# Patient Record
Sex: FEMALE | Race: WHITE | NOT HISPANIC OR LATINO | Employment: UNEMPLOYED | URBAN - METROPOLITAN AREA
[De-identification: names, ages, dates, MRNs, and addresses within clinical notes are randomized per-mention and may not be internally consistent; named-entity substitution may affect disease eponyms.]

---

## 2016-09-14 LAB — EXTERNAL GONORRHEA SCREEN: NEGATIVE

## 2016-09-15 LAB
EXTERNAL ABO GROUPING: NORMAL
EXTERNAL ANTIBODY SCREEN: NORMAL
EXTERNAL CHLAMYDIA SCREEN: NEGATIVE
EXTERNAL HEPATITIS B SURFACE ANTIGEN: NEGATIVE
EXTERNAL HIV-1 ANTIBODY: NON REACTIVE
EXTERNAL RH FACTOR: POSITIVE
EXTERNAL RUBELLA IGG QUANTITATION: NORMAL
EXTERNAL SYPHILIS RPR SCREEN: NORMAL

## 2017-02-08 ENCOUNTER — HOSPITAL ENCOUNTER (INPATIENT)
Facility: HOSPITAL | Age: 42
LOS: 1 days | DRG: 566 | End: 2017-02-09
Attending: SPECIALIST | Admitting: SPECIALIST
Payer: COMMERCIAL

## 2017-02-08 DIAGNOSIS — O14.90 PREECLAMPSIA: ICD-10-CM

## 2017-02-08 DIAGNOSIS — O09.529 ADVANCED MATERNAL AGE IN MULTIGRAVIDA: ICD-10-CM

## 2017-02-08 DIAGNOSIS — F17.200 SMOKER: ICD-10-CM

## 2017-02-08 DIAGNOSIS — Z3A.31 31 WEEKS GESTATION OF PREGNANCY: Primary | ICD-10-CM

## 2017-02-09 ENCOUNTER — GENERIC CONVERSION - ENCOUNTER (OUTPATIENT)
Dept: OTHER | Facility: OTHER | Age: 42
End: 2017-02-09

## 2017-02-09 ENCOUNTER — HOSPITAL ENCOUNTER (INPATIENT)
Facility: HOSPITAL | Age: 42
LOS: 10 days | Discharge: HOME | DRG: 540 | End: 2017-02-19
Attending: SPECIALIST | Admitting: SPECIALIST
Payer: COMMERCIAL

## 2017-02-09 VITALS
DIASTOLIC BLOOD PRESSURE: 77 MMHG | RESPIRATION RATE: 18 BRPM | HEIGHT: 61 IN | BODY MASS INDEX: 40.59 KG/M2 | WEIGHT: 215 LBS | SYSTOLIC BLOOD PRESSURE: 146 MMHG | HEART RATE: 96 BPM | TEMPERATURE: 98.4 F | OXYGEN SATURATION: 99 %

## 2017-02-09 DIAGNOSIS — O14.90 PREECLAMPSIA: ICD-10-CM

## 2017-02-09 DIAGNOSIS — F11.10 OPIATE ABUSE, CONTINUOUS (HCC): ICD-10-CM

## 2017-02-09 DIAGNOSIS — O14.13 SEVERE PRE-ECLAMPSIA IN THIRD TRIMESTER: Primary | ICD-10-CM

## 2017-02-09 DIAGNOSIS — Z3A.31 31 WEEKS GESTATION OF PREGNANCY: ICD-10-CM

## 2017-02-09 DIAGNOSIS — O36.5930 INTRAUTERINE GROWTH RESTRICTION (IUGR) AFFECTING CARE OF MOTHER, THIRD TRIMESTER, SINGLE GESTATION: ICD-10-CM

## 2017-02-09 DIAGNOSIS — O14.93 PREECLAMPSIA, THIRD TRIMESTER: ICD-10-CM

## 2017-02-09 PROBLEM — O24.410 DIET CONTROLLED GESTATIONAL DIABETES MELLITUS (GDM) IN THIRD TRIMESTER: Status: ACTIVE | Noted: 2017-02-09

## 2017-02-09 PROBLEM — E66.01 MORBID OBESITY DUE TO EXCESS CALORIES (HCC): Status: ACTIVE | Noted: 2017-02-09

## 2017-02-09 LAB
ABO GROUP BLD: NORMAL
ALBUMIN SERPL BCP-MCNC: 2.3 G/DL (ref 3.5–5)
ALBUMIN SERPL BCP-MCNC: 2.4 G/DL (ref 3.5–5)
ALBUMIN SERPL BCP-MCNC: 2.5 G/DL (ref 3.5–5)
ALP SERPL-CCNC: 113 U/L (ref 46–116)
ALP SERPL-CCNC: 114 U/L (ref 46–116)
ALP SERPL-CCNC: 123 U/L (ref 46–116)
ALT SERPL W P-5'-P-CCNC: 13 U/L (ref 12–78)
ALT SERPL W P-5'-P-CCNC: 14 U/L (ref 12–78)
ALT SERPL W P-5'-P-CCNC: 14 U/L (ref 12–78)
AMPHETAMINES SERPL QL SCN: NEGATIVE
ANION GAP SERPL CALCULATED.3IONS-SCNC: 10 MMOL/L (ref 4–13)
ANION GAP SERPL CALCULATED.3IONS-SCNC: 12 MMOL/L (ref 4–13)
ANION GAP SERPL CALCULATED.3IONS-SCNC: 12 MMOL/L (ref 4–13)
AST SERPL W P-5'-P-CCNC: 10 U/L (ref 5–45)
AST SERPL W P-5'-P-CCNC: 13 U/L (ref 5–45)
AST SERPL W P-5'-P-CCNC: 9 U/L (ref 5–45)
BACTERIA UR QL AUTO: ABNORMAL /HPF
BARBITURATES UR QL: NEGATIVE
BASOPHILS # BLD AUTO: 0.01 THOUSANDS/ΜL (ref 0–0.1)
BASOPHILS NFR BLD AUTO: 0 % (ref 0–1)
BENZODIAZ UR QL: NEGATIVE
BILIRUB SERPL-MCNC: 0.14 MG/DL (ref 0.2–1)
BILIRUB SERPL-MCNC: 0.15 MG/DL (ref 0.2–1)
BILIRUB SERPL-MCNC: <0.1 MG/DL (ref 0.2–1)
BILIRUB UR QL STRIP: NEGATIVE
BLD GP AB SCN SERPL QL: NEGATIVE
BUN SERPL-MCNC: 10 MG/DL (ref 5–25)
BUN SERPL-MCNC: 12 MG/DL (ref 5–25)
BUN SERPL-MCNC: 17 MG/DL (ref 5–25)
CALCIUM SERPL-MCNC: 8.3 MG/DL (ref 8.3–10.1)
CALCIUM SERPL-MCNC: 8.8 MG/DL (ref 8.3–10.1)
CALCIUM SERPL-MCNC: 9 MG/DL (ref 8.3–10.1)
CHLORIDE SERPL-SCNC: 102 MMOL/L (ref 100–108)
CHLORIDE SERPL-SCNC: 102 MMOL/L (ref 100–108)
CHLORIDE SERPL-SCNC: 103 MMOL/L (ref 100–108)
CLARITY UR: CLEAR
CO2 SERPL-SCNC: 21 MMOL/L (ref 21–32)
CO2 SERPL-SCNC: 21 MMOL/L (ref 21–32)
CO2 SERPL-SCNC: 25 MMOL/L (ref 21–32)
COCAINE UR QL: NEGATIVE
COLOR UR: YELLOW
CREAT SERPL-MCNC: 0.48 MG/DL (ref 0.6–1.3)
CREAT SERPL-MCNC: 0.55 MG/DL (ref 0.6–1.3)
CREAT SERPL-MCNC: 0.58 MG/DL (ref 0.6–1.3)
CREAT UR-MCNC: 97.5 MG/DL
EOSINOPHIL # BLD AUTO: 0.04 THOUSAND/ΜL (ref 0–0.61)
EOSINOPHIL NFR BLD AUTO: 0 % (ref 0–6)
ERYTHROCYTE [DISTWIDTH] IN BLOOD BY AUTOMATED COUNT: 13.5 % (ref 11.6–15.1)
ERYTHROCYTE [DISTWIDTH] IN BLOOD BY AUTOMATED COUNT: 13.7 % (ref 11.6–15.1)
ERYTHROCYTE [DISTWIDTH] IN BLOOD BY AUTOMATED COUNT: 13.7 % (ref 11.6–15.1)
GFR SERPL CREATININE-BSD FRML MDRD: >60 ML/MIN/1.73SQ M
GLUCOSE SERPL-MCNC: 116 MG/DL (ref 65–140)
GLUCOSE SERPL-MCNC: 128 MG/DL (ref 65–140)
GLUCOSE SERPL-MCNC: 131 MG/DL (ref 65–140)
GLUCOSE SERPL-MCNC: 154 MG/DL (ref 65–140)
GLUCOSE SERPL-MCNC: 165 MG/DL (ref 65–140)
GLUCOSE SERPL-MCNC: 174 MG/DL (ref 65–140)
GLUCOSE SERPL-MCNC: 99 MG/DL (ref 65–140)
GLUCOSE UR STRIP-MCNC: NEGATIVE MG/DL
HCT VFR BLD AUTO: 34.2 % (ref 34.8–46.1)
HCT VFR BLD AUTO: 36.4 % (ref 34.8–46.1)
HCT VFR BLD AUTO: 38.2 % (ref 34.8–46.1)
HGB BLD-MCNC: 11.8 G/DL (ref 11.5–15.4)
HGB BLD-MCNC: 12.7 G/DL (ref 11.5–15.4)
HGB BLD-MCNC: 13.5 G/DL (ref 11.5–15.4)
HGB UR QL STRIP.AUTO: ABNORMAL
HYALINE CASTS #/AREA URNS LPF: ABNORMAL /LPF
KETONES UR STRIP-MCNC: NEGATIVE MG/DL
LEUKOCYTE ESTERASE UR QL STRIP: NEGATIVE
LYMPHOCYTES # BLD AUTO: 3.29 THOUSANDS/ΜL (ref 0.6–4.47)
LYMPHOCYTES NFR BLD AUTO: 25 % (ref 14–44)
MCH RBC QN AUTO: 30.4 PG (ref 26.8–34.3)
MCH RBC QN AUTO: 30.6 PG (ref 26.8–34.3)
MCH RBC QN AUTO: 31 PG (ref 26.8–34.3)
MCHC RBC AUTO-ENTMCNC: 34.5 G/DL (ref 31.4–37.4)
MCHC RBC AUTO-ENTMCNC: 34.9 G/DL (ref 31.4–37.4)
MCHC RBC AUTO-ENTMCNC: 35.3 G/DL (ref 31.4–37.4)
MCV RBC AUTO: 88 FL (ref 82–98)
METHADONE UR QL: NEGATIVE
MONOCYTES # BLD AUTO: 0.49 THOUSAND/ΜL (ref 0.17–1.22)
MONOCYTES NFR BLD AUTO: 4 % (ref 4–12)
MUCOUS THREADS UR QL AUTO: ABNORMAL
NEUTROPHILS # BLD AUTO: 9.13 THOUSANDS/ΜL (ref 1.85–7.62)
NEUTS SEG NFR BLD AUTO: 71 % (ref 43–75)
NITRITE UR QL STRIP: NEGATIVE
NON-SQ EPI CELLS URNS QL MICRO: ABNORMAL /HPF
NRBC BLD AUTO-RTO: 0 /100 WBCS
OPIATES UR QL SCN: NEGATIVE
PCP UR QL: NEGATIVE
PH UR STRIP.AUTO: 6 [PH] (ref 4.5–8)
PLATELET # BLD AUTO: 148 THOUSANDS/UL (ref 149–390)
PLATELET # BLD AUTO: 158 THOUSANDS/UL (ref 149–390)
PLATELET # BLD AUTO: 178 THOUSANDS/UL (ref 149–390)
PMV BLD AUTO: 10.2 FL (ref 8.9–12.7)
PMV BLD AUTO: 10.3 FL (ref 8.9–12.7)
PMV BLD AUTO: 10.4 FL (ref 8.9–12.7)
POTASSIUM SERPL-SCNC: 3.8 MMOL/L (ref 3.5–5.3)
POTASSIUM SERPL-SCNC: 4 MMOL/L (ref 3.5–5.3)
POTASSIUM SERPL-SCNC: 4.1 MMOL/L (ref 3.5–5.3)
PROT SERPL-MCNC: 6.4 G/DL (ref 6.4–8.2)
PROT SERPL-MCNC: 6.7 G/DL (ref 6.4–8.2)
PROT SERPL-MCNC: 7.1 G/DL (ref 6.4–8.2)
PROT UR STRIP-MCNC: >=300 MG/DL
PROT UR-MCNC: 548 MG/DL
PROT/CREAT UR: 5.62 MG/G{CREAT} (ref 0–0.1)
RBC # BLD AUTO: 3.88 MILLION/UL (ref 3.81–5.12)
RBC # BLD AUTO: 4.15 MILLION/UL (ref 3.81–5.12)
RBC # BLD AUTO: 4.36 MILLION/UL (ref 3.81–5.12)
RBC #/AREA URNS AUTO: ABNORMAL /HPF
RH BLD: POSITIVE
SODIUM SERPL-SCNC: 135 MMOL/L (ref 136–145)
SODIUM SERPL-SCNC: 136 MMOL/L (ref 136–145)
SODIUM SERPL-SCNC: 137 MMOL/L (ref 136–145)
SP GR UR STRIP.AUTO: >=1.03 (ref 1–1.03)
THC UR QL: NEGATIVE
URATE SERPL-MCNC: 5.3 MG/DL (ref 2–6.8)
URATE SERPL-MCNC: 5.4 MG/DL (ref 2–6.8)
UROBILINOGEN UR QL STRIP.AUTO: 0.2 E.U./DL
WBC # BLD AUTO: 10.98 THOUSAND/UL (ref 4.31–10.16)
WBC # BLD AUTO: 11.1 THOUSAND/UL (ref 4.31–10.16)
WBC # BLD AUTO: 12.96 THOUSAND/UL (ref 4.31–10.16)
WBC #/AREA URNS AUTO: ABNORMAL /HPF

## 2017-02-09 PROCEDURE — 80053 COMPREHEN METABOLIC PANEL: CPT | Performed by: OBSTETRICS & GYNECOLOGY

## 2017-02-09 PROCEDURE — 82948 REAGENT STRIP/BLOOD GLUCOSE: CPT

## 2017-02-09 PROCEDURE — 80307 DRUG TEST PRSMV CHEM ANLYZR: CPT | Performed by: OBSTETRICS & GYNECOLOGY

## 2017-02-09 PROCEDURE — 81001 URINALYSIS AUTO W/SCOPE: CPT | Performed by: OBSTETRICS & GYNECOLOGY

## 2017-02-09 PROCEDURE — 4A1HXCZ MONITORING OF PRODUCTS OF CONCEPTION, CARDIAC RATE, EXTERNAL APPROACH: ICD-10-PCS | Performed by: SPECIALIST

## 2017-02-09 PROCEDURE — 85025 COMPLETE CBC W/AUTO DIFF WBC: CPT | Performed by: OBSTETRICS & GYNECOLOGY

## 2017-02-09 PROCEDURE — 84156 ASSAY OF PROTEIN URINE: CPT | Performed by: OBSTETRICS & GYNECOLOGY

## 2017-02-09 PROCEDURE — 76811 OB US DETAILED SNGL FETUS: CPT | Performed by: OBSTETRICS & GYNECOLOGY

## 2017-02-09 PROCEDURE — 84550 ASSAY OF BLOOD/URIC ACID: CPT | Performed by: OBSTETRICS & GYNECOLOGY

## 2017-02-09 PROCEDURE — 86900 BLOOD TYPING SEROLOGIC ABO: CPT | Performed by: OBSTETRICS & GYNECOLOGY

## 2017-02-09 PROCEDURE — 85027 COMPLETE CBC AUTOMATED: CPT | Performed by: OBSTETRICS & GYNECOLOGY

## 2017-02-09 PROCEDURE — 99205 OFFICE O/P NEW HI 60 MIN: CPT

## 2017-02-09 PROCEDURE — 86850 RBC ANTIBODY SCREEN: CPT | Performed by: OBSTETRICS & GYNECOLOGY

## 2017-02-09 PROCEDURE — 82570 ASSAY OF URINE CREATININE: CPT | Performed by: OBSTETRICS & GYNECOLOGY

## 2017-02-09 PROCEDURE — 87653 STREP B DNA AMP PROBE: CPT | Performed by: OBSTETRICS & GYNECOLOGY

## 2017-02-09 PROCEDURE — 86901 BLOOD TYPING SEROLOGIC RH(D): CPT | Performed by: OBSTETRICS & GYNECOLOGY

## 2017-02-09 RX ORDER — SODIUM CHLORIDE 9 MG/ML
25 INJECTION, SOLUTION INTRAVENOUS CONTINUOUS
Status: DISCONTINUED | OUTPATIENT
Start: 2017-02-09 | End: 2017-02-10

## 2017-02-09 RX ORDER — ACETAMINOPHEN 325 MG/1
650 TABLET ORAL EVERY 4 HOURS PRN
Status: CANCELLED | OUTPATIENT
Start: 2017-02-09

## 2017-02-09 RX ORDER — LABETALOL HYDROCHLORIDE 5 MG/ML
20 INJECTION, SOLUTION INTRAVENOUS ONCE
Status: COMPLETED | OUTPATIENT
Start: 2017-02-09 | End: 2017-02-09

## 2017-02-09 RX ORDER — LABETALOL 100 MG/1
100 TABLET, FILM COATED ORAL EVERY 12 HOURS SCHEDULED
Status: CANCELLED | OUTPATIENT
Start: 2017-02-10

## 2017-02-09 RX ORDER — SODIUM CHLORIDE 9 MG/ML
125 INJECTION, SOLUTION INTRAVENOUS CONTINUOUS
Status: DISCONTINUED | OUTPATIENT
Start: 2017-02-09 | End: 2017-02-09 | Stop reason: HOSPADM

## 2017-02-09 RX ORDER — ACETAMINOPHEN 325 MG/1
650 TABLET ORAL EVERY 4 HOURS PRN
Status: DISCONTINUED | OUTPATIENT
Start: 2017-02-09 | End: 2017-02-15

## 2017-02-09 RX ORDER — OXYCODONE HYDROCHLORIDE AND ACETAMINOPHEN 5; 325 MG/1; MG/1
1 TABLET ORAL EVERY 4 HOURS PRN
Status: DISCONTINUED | OUTPATIENT
Start: 2017-02-09 | End: 2017-02-09 | Stop reason: HOSPADM

## 2017-02-09 RX ORDER — LABETALOL HYDROCHLORIDE 5 MG/ML
INJECTION, SOLUTION INTRAVENOUS
Status: COMPLETED
Start: 2017-02-09 | End: 2017-02-09

## 2017-02-09 RX ORDER — OXYCODONE HYDROCHLORIDE 10 MG/1
10 TABLET ORAL EVERY 8 HOURS PRN
Status: DISCONTINUED | OUTPATIENT
Start: 2017-02-09 | End: 2017-02-11

## 2017-02-09 RX ORDER — OXYCODONE HYDROCHLORIDE AND ACETAMINOPHEN 5; 325 MG/1; MG/1
1 TABLET ORAL EVERY 4 HOURS PRN
Status: DISCONTINUED | OUTPATIENT
Start: 2017-02-09 | End: 2017-02-09

## 2017-02-09 RX ORDER — ACETAMINOPHEN 325 MG/1
650 TABLET ORAL EVERY 4 HOURS PRN
Status: DISCONTINUED | OUTPATIENT
Start: 2017-02-09 | End: 2017-02-09 | Stop reason: HOSPADM

## 2017-02-09 RX ORDER — BETAMETHASONE SODIUM PHOSPHATE AND BETAMETHASONE ACETATE 3; 3 MG/ML; MG/ML
12 INJECTION, SUSPENSION INTRA-ARTICULAR; INTRALESIONAL; INTRAMUSCULAR; SOFT TISSUE EVERY 24 HOURS
Status: DISCONTINUED | OUTPATIENT
Start: 2017-02-09 | End: 2017-02-09

## 2017-02-09 RX ORDER — OXYCODONE HYDROCHLORIDE 5 MG/1
10 TABLET ORAL ONCE
Status: COMPLETED | OUTPATIENT
Start: 2017-02-09 | End: 2017-02-09

## 2017-02-09 RX ORDER — BETAMETHASONE SODIUM PHOSPHATE AND BETAMETHASONE ACETATE 3; 3 MG/ML; MG/ML
12 INJECTION, SUSPENSION INTRA-ARTICULAR; INTRALESIONAL; INTRAMUSCULAR; SOFT TISSUE EVERY 24 HOURS
Status: CANCELLED | OUTPATIENT
Start: 2017-02-10 | End: 2017-02-11

## 2017-02-09 RX ORDER — LABETALOL HYDROCHLORIDE 5 MG/ML
40 INJECTION, SOLUTION INTRAVENOUS ONCE
Status: COMPLETED | OUTPATIENT
Start: 2017-02-09 | End: 2017-02-09

## 2017-02-09 RX ORDER — OXYCODONE HYDROCHLORIDE AND ACETAMINOPHEN 5; 325 MG/1; MG/1
1 TABLET ORAL EVERY 4 HOURS PRN
Status: CANCELLED | OUTPATIENT
Start: 2017-02-09

## 2017-02-09 RX ORDER — LABETALOL 100 MG/1
100 TABLET, FILM COATED ORAL EVERY 12 HOURS SCHEDULED
Status: DISCONTINUED | OUTPATIENT
Start: 2017-02-09 | End: 2017-02-09

## 2017-02-09 RX ORDER — NICOTINE 21 MG/24HR
1 PATCH, TRANSDERMAL 24 HOURS TRANSDERMAL DAILY
Status: DISCONTINUED | OUTPATIENT
Start: 2017-02-09 | End: 2017-02-09 | Stop reason: HOSPADM

## 2017-02-09 RX ORDER — BETAMETHASONE SODIUM PHOSPHATE AND BETAMETHASONE ACETATE 3; 3 MG/ML; MG/ML
12 INJECTION, SUSPENSION INTRA-ARTICULAR; INTRALESIONAL; INTRAMUSCULAR; SOFT TISSUE EVERY 24 HOURS
Status: DISCONTINUED | OUTPATIENT
Start: 2017-02-09 | End: 2017-02-09 | Stop reason: HOSPADM

## 2017-02-09 RX ORDER — SODIUM CHLORIDE 9 MG/ML
125 INJECTION, SOLUTION INTRAVENOUS CONTINUOUS
Status: CANCELLED | OUTPATIENT
Start: 2017-02-09

## 2017-02-09 RX ORDER — BETAMETHASONE SODIUM PHOSPHATE AND BETAMETHASONE ACETATE 3; 3 MG/ML; MG/ML
12 INJECTION, SUSPENSION INTRA-ARTICULAR; INTRALESIONAL; INTRAMUSCULAR; SOFT TISSUE EVERY 24 HOURS
Status: COMPLETED | OUTPATIENT
Start: 2017-02-10 | End: 2017-02-10

## 2017-02-09 RX ORDER — LABETALOL 100 MG/1
100 TABLET, FILM COATED ORAL EVERY 12 HOURS SCHEDULED
Status: DISCONTINUED | OUTPATIENT
Start: 2017-02-10 | End: 2017-02-15

## 2017-02-09 RX ORDER — MAGNESIUM SULFATE IN WATER 40 MG/ML
6 INJECTION, SOLUTION INTRAVENOUS ONCE
Status: COMPLETED | OUTPATIENT
Start: 2017-02-09 | End: 2017-02-09

## 2017-02-09 RX ORDER — METOCLOPRAMIDE HYDROCHLORIDE 5 MG/ML
10 INJECTION INTRAMUSCULAR; INTRAVENOUS ONCE
Status: COMPLETED | OUTPATIENT
Start: 2017-02-09 | End: 2017-02-09

## 2017-02-09 RX ORDER — MAGNESIUM SULFATE IN WATER 40 MG/ML
INJECTION, SOLUTION INTRAVENOUS
Status: COMPLETED
Start: 2017-02-09 | End: 2017-02-09

## 2017-02-09 RX ORDER — LABETALOL 100 MG/1
100 TABLET, FILM COATED ORAL EVERY 12 HOURS SCHEDULED
Status: DISCONTINUED | OUTPATIENT
Start: 2017-02-09 | End: 2017-02-09 | Stop reason: HOSPADM

## 2017-02-09 RX ADMIN — ACETAMINOPHEN 650 MG: 325 TABLET, FILM COATED ORAL at 02:47

## 2017-02-09 RX ADMIN — Medication 6 G: at 17:34

## 2017-02-09 RX ADMIN — SODIUM CHLORIDE 125 ML/HR: 0.9 INJECTION, SOLUTION INTRAVENOUS at 08:32

## 2017-02-09 RX ADMIN — BETAMETHASONE SODIUM PHOSPHATE AND BETAMETHASONE ACETATE 12 MG: 3; 3 INJECTION, SUSPENSION INTRA-ARTICULAR; INTRALESIONAL; INTRAMUSCULAR at 01:22

## 2017-02-09 RX ADMIN — LABETALOL HYDROCHLORIDE 40 MG: 5 INJECTION, SOLUTION INTRAVENOUS at 17:57

## 2017-02-09 RX ADMIN — MAGNESIUM SULFATE HEPTAHYDRATE 2 G/HR: 500 INJECTION, SOLUTION INTRAMUSCULAR; INTRAVENOUS at 18:12

## 2017-02-09 RX ADMIN — SODIUM CHLORIDE 125 ML/HR: 0.9 INJECTION, SOLUTION INTRAVENOUS at 00:45

## 2017-02-09 RX ADMIN — LABETALOL HYDROCHLORIDE 20 MG: 5 INJECTION, SOLUTION INTRAVENOUS at 00:11

## 2017-02-09 RX ADMIN — LABETALOL HYDROCHLORIDE 40 MG: 5 INJECTION, SOLUTION INTRAVENOUS at 01:10

## 2017-02-09 RX ADMIN — OXYCODONE HYDROCHLORIDE AND ACETAMINOPHEN 1 TABLET: 5; 325 TABLET ORAL at 13:51

## 2017-02-09 RX ADMIN — INSULIN LISPRO 2 UNITS: 100 INJECTION, SOLUTION INTRAVENOUS; SUBCUTANEOUS at 12:48

## 2017-02-09 RX ADMIN — METOCLOPRAMIDE 10 MG: 5 INJECTION, SOLUTION INTRAMUSCULAR; INTRAVENOUS at 23:45

## 2017-02-09 RX ADMIN — LABETALOL HYDROCHLORIDE 100 MG: 100 TABLET, FILM COATED ORAL at 17:42

## 2017-02-09 RX ADMIN — SODIUM CHLORIDE 25 ML/HR: 0.9 INJECTION, SOLUTION INTRAVENOUS at 23:47

## 2017-02-09 RX ADMIN — ACETAMINOPHEN 650 MG: 325 TABLET, FILM COATED ORAL at 21:47

## 2017-02-09 RX ADMIN — MAGNESIUM SULFATE HEPTAHYDRATE 2 G/HR: 500 INJECTION, SOLUTION INTRAMUSCULAR; INTRAVENOUS at 22:35

## 2017-02-09 RX ADMIN — LABETALOL HYDROCHLORIDE 20 MG: 5 INJECTION, SOLUTION INTRAVENOUS at 17:10

## 2017-02-09 RX ADMIN — MAGNESIUM SULFATE IN WATER 6 G: 40 INJECTION, SOLUTION INTRAVENOUS at 17:34

## 2017-02-09 RX ADMIN — SODIUM CHLORIDE 125 ML/HR: 0.9 INJECTION, SOLUTION INTRAVENOUS at 17:09

## 2017-02-09 RX ADMIN — OXYCODONE HYDROCHLORIDE 10 MG: 5 TABLET ORAL at 06:00

## 2017-02-10 LAB
ALBUMIN SERPL BCP-MCNC: 2.5 G/DL (ref 3.5–5)
ALP SERPL-CCNC: 111 U/L (ref 46–116)
ALT SERPL W P-5'-P-CCNC: 12 U/L (ref 12–78)
ANION GAP SERPL CALCULATED.3IONS-SCNC: 12 MMOL/L (ref 4–13)
AST SERPL W P-5'-P-CCNC: 9 U/L (ref 5–45)
BILIRUB SERPL-MCNC: 0.16 MG/DL (ref 0.2–1)
BUN SERPL-MCNC: 9 MG/DL (ref 5–25)
CALCIUM SERPL-MCNC: 7.5 MG/DL (ref 8.3–10.1)
CHLORIDE SERPL-SCNC: 104 MMOL/L (ref 100–108)
CO2 SERPL-SCNC: 19 MMOL/L (ref 21–32)
CREAT SERPL-MCNC: 0.57 MG/DL (ref 0.6–1.3)
ERYTHROCYTE [DISTWIDTH] IN BLOOD BY AUTOMATED COUNT: 13.7 % (ref 11.6–15.1)
EST. AVERAGE GLUCOSE BLD GHB EST-MCNC: 126 MG/DL
GFR SERPL CREATININE-BSD FRML MDRD: >60 ML/MIN/1.73SQ M
GLUCOSE SERPL-MCNC: 130 MG/DL (ref 65–140)
GLUCOSE SERPL-MCNC: 143 MG/DL (ref 65–140)
GLUCOSE SERPL-MCNC: 159 MG/DL (ref 65–140)
GLUCOSE SERPL-MCNC: 173 MG/DL (ref 65–140)
GLUCOSE SERPL-MCNC: 175 MG/DL (ref 65–140)
GLUCOSE SERPL-MCNC: 92 MG/DL (ref 65–140)
HBA1C MFR BLD: 6 % (ref 4.2–6.3)
HCT VFR BLD AUTO: 34.9 % (ref 34.8–46.1)
HGB BLD-MCNC: 11.8 G/DL (ref 11.5–15.4)
MCH RBC QN AUTO: 29.8 PG (ref 26.8–34.3)
MCHC RBC AUTO-ENTMCNC: 33.8 G/DL (ref 31.4–37.4)
MCV RBC AUTO: 88 FL (ref 82–98)
PLATELET # BLD AUTO: 173 THOUSANDS/UL (ref 149–390)
PMV BLD AUTO: 10.3 FL (ref 8.9–12.7)
POTASSIUM SERPL-SCNC: 4.3 MMOL/L (ref 3.5–5.3)
PROT 24H UR-MCNC: 5100 MG/24 HRS (ref 40–150)
PROT SERPL-MCNC: 6.8 G/DL (ref 6.4–8.2)
RBC # BLD AUTO: 3.96 MILLION/UL (ref 3.81–5.12)
SODIUM SERPL-SCNC: 135 MMOL/L (ref 136–145)
SPECIMEN VOL UR: 2500 ML
WBC # BLD AUTO: 11.88 THOUSAND/UL (ref 4.31–10.16)

## 2017-02-10 PROCEDURE — 85027 COMPLETE CBC AUTOMATED: CPT | Performed by: OBSTETRICS & GYNECOLOGY

## 2017-02-10 PROCEDURE — 84156 ASSAY OF PROTEIN URINE: CPT | Performed by: OBSTETRICS & GYNECOLOGY

## 2017-02-10 PROCEDURE — 82948 REAGENT STRIP/BLOOD GLUCOSE: CPT

## 2017-02-10 PROCEDURE — 80053 COMPREHEN METABOLIC PANEL: CPT | Performed by: OBSTETRICS & GYNECOLOGY

## 2017-02-10 PROCEDURE — 83036 HEMOGLOBIN GLYCOSYLATED A1C: CPT | Performed by: OBSTETRICS & GYNECOLOGY

## 2017-02-10 PROCEDURE — 90715 TDAP VACCINE 7 YRS/> IM: CPT | Performed by: OBSTETRICS & GYNECOLOGY

## 2017-02-10 RX ORDER — DIPHENHYDRAMINE HCL 25 MG
25 TABLET ORAL ONCE
Status: COMPLETED | OUTPATIENT
Start: 2017-02-10 | End: 2017-02-10

## 2017-02-10 RX ORDER — INSULIN GLARGINE 100 [IU]/ML
30 INJECTION, SOLUTION SUBCUTANEOUS
Status: DISCONTINUED | OUTPATIENT
Start: 2017-02-10 | End: 2017-02-11

## 2017-02-10 RX ORDER — BUTALBITAL, ACETAMINOPHEN AND CAFFEINE 50; 325; 40 MG/1; MG/1; MG/1
1 TABLET ORAL ONCE
Status: COMPLETED | OUTPATIENT
Start: 2017-02-10 | End: 2017-02-10

## 2017-02-10 RX ORDER — INSULIN GLARGINE 100 [IU]/ML
15 INJECTION, SOLUTION SUBCUTANEOUS ONCE
Status: COMPLETED | OUTPATIENT
Start: 2017-02-10 | End: 2017-02-10

## 2017-02-10 RX ADMIN — INSULIN LISPRO 10 UNITS: 100 INJECTION, SOLUTION INTRAVENOUS; SUBCUTANEOUS at 12:44

## 2017-02-10 RX ADMIN — BETAMETHASONE SODIUM PHOSPHATE AND BETAMETHASONE ACETATE 12 MG: 3; 3 INJECTION, SUSPENSION INTRA-ARTICULAR; INTRALESIONAL; INTRAMUSCULAR at 01:05

## 2017-02-10 RX ADMIN — LABETALOL HYDROCHLORIDE 100 MG: 100 TABLET, FILM COATED ORAL at 08:54

## 2017-02-10 RX ADMIN — INSULIN GLARGINE 15 UNITS: 100 INJECTION, SOLUTION SUBCUTANEOUS at 11:20

## 2017-02-10 RX ADMIN — INSULIN LISPRO 10 UNITS: 100 INJECTION, SOLUTION INTRAVENOUS; SUBCUTANEOUS at 17:39

## 2017-02-10 RX ADMIN — LABETALOL HYDROCHLORIDE 100 MG: 100 TABLET, FILM COATED ORAL at 21:06

## 2017-02-10 RX ADMIN — DIPHENHYDRAMINE HCL 25 MG: 25 TABLET ORAL at 10:45

## 2017-02-10 RX ADMIN — INSULIN LISPRO 2 UNITS: 100 INJECTION, SOLUTION INTRAVENOUS; SUBCUTANEOUS at 07:34

## 2017-02-10 RX ADMIN — BUTALBITAL, ACETAMINOPHEN, AND CAFFEINE 1 TABLET: 50; 325; 40 TABLET ORAL at 10:45

## 2017-02-10 RX ADMIN — TETANUS TOXOID, REDUCED DIPHTHERIA TOXOID AND ACELLULAR PERTUSSIS VACCINE, ADSORBED 0.5 ML: 5; 2.5; 8; 8; 2.5 SUSPENSION INTRAMUSCULAR at 16:12

## 2017-02-10 RX ADMIN — OXYCODONE HYDROCHLORIDE 10 MG: 10 TABLET ORAL at 03:31

## 2017-02-10 RX ADMIN — INSULIN GLARGINE 30 UNITS: 100 INJECTION, SOLUTION SUBCUTANEOUS at 21:54

## 2017-02-11 LAB
ALBUMIN SERPL BCP-MCNC: 2.5 G/DL (ref 3.5–5)
ALP SERPL-CCNC: 101 U/L (ref 46–116)
ALT SERPL W P-5'-P-CCNC: 13 U/L (ref 12–78)
ANION GAP SERPL CALCULATED.3IONS-SCNC: 12 MMOL/L (ref 4–13)
AST SERPL W P-5'-P-CCNC: 10 U/L (ref 5–45)
BILIRUB SERPL-MCNC: 0.17 MG/DL (ref 0.2–1)
BUN SERPL-MCNC: 12 MG/DL (ref 5–25)
CALCIUM SERPL-MCNC: 8.3 MG/DL (ref 8.3–10.1)
CHLORIDE SERPL-SCNC: 103 MMOL/L (ref 100–108)
CO2 SERPL-SCNC: 22 MMOL/L (ref 21–32)
CREAT SERPL-MCNC: 0.48 MG/DL (ref 0.6–1.3)
ERYTHROCYTE [DISTWIDTH] IN BLOOD BY AUTOMATED COUNT: 13.8 % (ref 11.6–15.1)
GFR SERPL CREATININE-BSD FRML MDRD: >60 ML/MIN/1.73SQ M
GLUCOSE SERPL-MCNC: 100 MG/DL (ref 65–140)
GLUCOSE SERPL-MCNC: 104 MG/DL (ref 65–140)
GLUCOSE SERPL-MCNC: 76 MG/DL (ref 65–140)
GLUCOSE SERPL-MCNC: 89 MG/DL (ref 65–140)
GLUCOSE SERPL-MCNC: 89 MG/DL (ref 65–140)
HCT VFR BLD AUTO: 35.9 % (ref 34.8–46.1)
HGB BLD-MCNC: 12.1 G/DL (ref 11.5–15.4)
MCH RBC QN AUTO: 30 PG (ref 26.8–34.3)
MCHC RBC AUTO-ENTMCNC: 33.7 G/DL (ref 31.4–37.4)
MCV RBC AUTO: 89 FL (ref 82–98)
PLATELET # BLD AUTO: 184 THOUSANDS/UL (ref 149–390)
PMV BLD AUTO: 10 FL (ref 8.9–12.7)
POTASSIUM SERPL-SCNC: 4.4 MMOL/L (ref 3.5–5.3)
PROT SERPL-MCNC: 6.8 G/DL (ref 6.4–8.2)
RBC # BLD AUTO: 4.03 MILLION/UL (ref 3.81–5.12)
SODIUM SERPL-SCNC: 137 MMOL/L (ref 136–145)
WBC # BLD AUTO: 13.75 THOUSAND/UL (ref 4.31–10.16)

## 2017-02-11 PROCEDURE — 80053 COMPREHEN METABOLIC PANEL: CPT | Performed by: OBSTETRICS & GYNECOLOGY

## 2017-02-11 PROCEDURE — 82948 REAGENT STRIP/BLOOD GLUCOSE: CPT

## 2017-02-11 PROCEDURE — 85027 COMPLETE CBC AUTOMATED: CPT | Performed by: OBSTETRICS & GYNECOLOGY

## 2017-02-11 RX ORDER — OXYCODONE HYDROCHLORIDE AND ACETAMINOPHEN 5; 325 MG/1; MG/1
1 TABLET ORAL EVERY 6 HOURS PRN
Status: DISCONTINUED | OUTPATIENT
Start: 2017-02-11 | End: 2017-02-15

## 2017-02-11 RX ORDER — INSULIN GLARGINE 100 [IU]/ML
15 INJECTION, SOLUTION SUBCUTANEOUS
Status: DISCONTINUED | OUTPATIENT
Start: 2017-02-11 | End: 2017-02-15

## 2017-02-11 RX ADMIN — OXYCODONE HYDROCHLORIDE AND ACETAMINOPHEN 1 TABLET: 5; 325 TABLET ORAL at 16:04

## 2017-02-11 RX ADMIN — INSULIN LISPRO 10 UNITS: 100 INJECTION, SOLUTION INTRAVENOUS; SUBCUTANEOUS at 08:24

## 2017-02-11 RX ADMIN — OXYCODONE HYDROCHLORIDE AND ACETAMINOPHEN 1 TABLET: 5; 325 TABLET ORAL at 23:08

## 2017-02-11 RX ADMIN — LABETALOL HYDROCHLORIDE 100 MG: 100 TABLET, FILM COATED ORAL at 08:25

## 2017-02-11 RX ADMIN — INSULIN GLARGINE 15 UNITS: 100 INJECTION, SOLUTION SUBCUTANEOUS at 23:08

## 2017-02-11 RX ADMIN — OXYCODONE HYDROCHLORIDE AND ACETAMINOPHEN 1 TABLET: 5; 325 TABLET ORAL at 08:17

## 2017-02-11 RX ADMIN — LABETALOL HYDROCHLORIDE 100 MG: 100 TABLET, FILM COATED ORAL at 20:32

## 2017-02-11 RX ADMIN — OXYCODONE HYDROCHLORIDE 10 MG: 10 TABLET ORAL at 00:10

## 2017-02-12 ENCOUNTER — ANESTHESIA (INPATIENT)
Dept: LABOR AND DELIVERY | Facility: HOSPITAL | Age: 42
DRG: 540 | End: 2017-02-12
Payer: COMMERCIAL

## 2017-02-12 LAB
ALBUMIN SERPL BCP-MCNC: 2.6 G/DL (ref 3.5–5)
ALP SERPL-CCNC: 96 U/L (ref 46–116)
ALT SERPL W P-5'-P-CCNC: 14 U/L (ref 12–78)
ANION GAP SERPL CALCULATED.3IONS-SCNC: 10 MMOL/L (ref 4–13)
AST SERPL W P-5'-P-CCNC: 14 U/L (ref 5–45)
BILIRUB SERPL-MCNC: 0.2 MG/DL (ref 0.2–1)
BUN SERPL-MCNC: 13 MG/DL (ref 5–25)
CALCIUM SERPL-MCNC: 9.2 MG/DL (ref 8.3–10.1)
CHLORIDE SERPL-SCNC: 101 MMOL/L (ref 100–108)
CO2 SERPL-SCNC: 24 MMOL/L (ref 21–32)
CREAT SERPL-MCNC: 0.48 MG/DL (ref 0.6–1.3)
ERYTHROCYTE [DISTWIDTH] IN BLOOD BY AUTOMATED COUNT: 13.7 % (ref 11.6–15.1)
GFR SERPL CREATININE-BSD FRML MDRD: >60 ML/MIN/1.73SQ M
GLUCOSE SERPL-MCNC: 118 MG/DL (ref 65–140)
GLUCOSE SERPL-MCNC: 69 MG/DL (ref 65–140)
GLUCOSE SERPL-MCNC: 83 MG/DL (ref 65–140)
GLUCOSE SERPL-MCNC: 84 MG/DL (ref 65–140)
GLUCOSE SERPL-MCNC: 88 MG/DL (ref 65–140)
GP B STREP DNA SPEC QL NAA+PROBE: ABNORMAL
HCT VFR BLD AUTO: 36.6 % (ref 34.8–46.1)
HGB BLD-MCNC: 11.9 G/DL (ref 11.5–15.4)
MCH RBC QN AUTO: 29 PG (ref 26.8–34.3)
MCHC RBC AUTO-ENTMCNC: 32.5 G/DL (ref 31.4–37.4)
MCV RBC AUTO: 89 FL (ref 82–98)
PLATELET # BLD AUTO: 184 THOUSANDS/UL (ref 149–390)
PMV BLD AUTO: 10.2 FL (ref 8.9–12.7)
POTASSIUM SERPL-SCNC: 4.4 MMOL/L (ref 3.5–5.3)
PROT SERPL-MCNC: 7 G/DL (ref 6.4–8.2)
RBC # BLD AUTO: 4.11 MILLION/UL (ref 3.81–5.12)
SODIUM SERPL-SCNC: 135 MMOL/L (ref 136–145)
WBC # BLD AUTO: 12.31 THOUSAND/UL (ref 4.31–10.16)

## 2017-02-12 PROCEDURE — 85027 COMPLETE CBC AUTOMATED: CPT | Performed by: OBSTETRICS & GYNECOLOGY

## 2017-02-12 PROCEDURE — 80053 COMPREHEN METABOLIC PANEL: CPT | Performed by: OBSTETRICS & GYNECOLOGY

## 2017-02-12 PROCEDURE — 82948 REAGENT STRIP/BLOOD GLUCOSE: CPT

## 2017-02-12 RX ORDER — SODIUM CHLORIDE 9 MG/ML
125 INJECTION, SOLUTION INTRAVENOUS CONTINUOUS
Status: DISCONTINUED | OUTPATIENT
Start: 2017-02-13 | End: 2017-02-13

## 2017-02-12 RX ADMIN — LABETALOL HYDROCHLORIDE 100 MG: 100 TABLET, FILM COATED ORAL at 10:00

## 2017-02-12 RX ADMIN — LABETALOL HYDROCHLORIDE 100 MG: 100 TABLET, FILM COATED ORAL at 21:44

## 2017-02-12 RX ADMIN — OXYCODONE HYDROCHLORIDE AND ACETAMINOPHEN 1 TABLET: 5; 325 TABLET ORAL at 17:33

## 2017-02-12 RX ADMIN — INSULIN GLARGINE 15 UNITS: 100 INJECTION, SOLUTION SUBCUTANEOUS at 21:44

## 2017-02-13 ENCOUNTER — GENERIC CONVERSION - ENCOUNTER (OUTPATIENT)
Dept: OTHER | Facility: OTHER | Age: 42
End: 2017-02-13

## 2017-02-13 LAB
ABO GROUP BLD: NORMAL
ALBUMIN SERPL BCP-MCNC: 2.7 G/DL (ref 3.5–5)
ALP SERPL-CCNC: 103 U/L (ref 46–116)
ALT SERPL W P-5'-P-CCNC: 15 U/L (ref 12–78)
ANION GAP SERPL CALCULATED.3IONS-SCNC: 10 MMOL/L (ref 4–13)
AST SERPL W P-5'-P-CCNC: 11 U/L (ref 5–45)
BILIRUB SERPL-MCNC: 0.36 MG/DL (ref 0.2–1)
BLD GP AB SCN SERPL QL: NEGATIVE
BUN SERPL-MCNC: 14 MG/DL (ref 5–25)
CALCIUM SERPL-MCNC: 9.4 MG/DL (ref 8.3–10.1)
CHLORIDE SERPL-SCNC: 99 MMOL/L (ref 100–108)
CO2 SERPL-SCNC: 25 MMOL/L (ref 21–32)
CREAT SERPL-MCNC: 0.44 MG/DL (ref 0.6–1.3)
ERYTHROCYTE [DISTWIDTH] IN BLOOD BY AUTOMATED COUNT: 13.9 % (ref 11.6–15.1)
GFR SERPL CREATININE-BSD FRML MDRD: >60 ML/MIN/1.73SQ M
GLUCOSE SERPL-MCNC: 135 MG/DL (ref 65–140)
GLUCOSE SERPL-MCNC: 69 MG/DL (ref 65–140)
GLUCOSE SERPL-MCNC: 75 MG/DL (ref 65–140)
GLUCOSE SERPL-MCNC: 78 MG/DL (ref 65–140)
GLUCOSE SERPL-MCNC: 88 MG/DL (ref 65–140)
HCT VFR BLD AUTO: 39.5 % (ref 34.8–46.1)
HGB BLD-MCNC: 13.8 G/DL (ref 11.5–15.4)
MCH RBC QN AUTO: 30.7 PG (ref 26.8–34.3)
MCHC RBC AUTO-ENTMCNC: 34.9 G/DL (ref 31.4–37.4)
MCV RBC AUTO: 88 FL (ref 82–98)
PLATELET # BLD AUTO: 184 THOUSANDS/UL (ref 149–390)
PMV BLD AUTO: 10.8 FL (ref 8.9–12.7)
POTASSIUM SERPL-SCNC: 4.1 MMOL/L (ref 3.5–5.3)
PROT SERPL-MCNC: 6.9 G/DL (ref 6.4–8.2)
RBC # BLD AUTO: 4.5 MILLION/UL (ref 3.81–5.12)
RH BLD: POSITIVE
SODIUM SERPL-SCNC: 134 MMOL/L (ref 136–145)
WBC # BLD AUTO: 12.12 THOUSAND/UL (ref 4.31–10.16)

## 2017-02-13 PROCEDURE — 86850 RBC ANTIBODY SCREEN: CPT | Performed by: SPECIALIST

## 2017-02-13 PROCEDURE — 76818 FETAL BIOPHYS PROFILE W/NST: CPT | Performed by: OBSTETRICS & GYNECOLOGY

## 2017-02-13 PROCEDURE — 86592 SYPHILIS TEST NON-TREP QUAL: CPT | Performed by: SPECIALIST

## 2017-02-13 PROCEDURE — 76821 MIDDLE CEREBRAL ARTERY ECHO: CPT | Performed by: OBSTETRICS & GYNECOLOGY

## 2017-02-13 PROCEDURE — 86920 COMPATIBILITY TEST SPIN: CPT

## 2017-02-13 PROCEDURE — 82948 REAGENT STRIP/BLOOD GLUCOSE: CPT

## 2017-02-13 PROCEDURE — 86900 BLOOD TYPING SEROLOGIC ABO: CPT | Performed by: SPECIALIST

## 2017-02-13 PROCEDURE — 80053 COMPREHEN METABOLIC PANEL: CPT | Performed by: SPECIALIST

## 2017-02-13 PROCEDURE — 76820 UMBILICAL ARTERY ECHO: CPT | Performed by: OBSTETRICS & GYNECOLOGY

## 2017-02-13 PROCEDURE — 86901 BLOOD TYPING SEROLOGIC RH(D): CPT | Performed by: SPECIALIST

## 2017-02-13 PROCEDURE — 85027 COMPLETE CBC AUTOMATED: CPT | Performed by: SPECIALIST

## 2017-02-13 RX ORDER — DEXTROSE AND SODIUM CHLORIDE 5; .45 G/100ML; G/100ML
125 INJECTION, SOLUTION INTRAVENOUS CONTINUOUS
Status: DISCONTINUED | OUTPATIENT
Start: 2017-02-13 | End: 2017-02-15

## 2017-02-13 RX ADMIN — DEXTROSE AND SODIUM CHLORIDE 125 ML/HR: 5; .45 INJECTION, SOLUTION INTRAVENOUS at 10:31

## 2017-02-13 RX ADMIN — INSULIN GLARGINE 15 UNITS: 100 INJECTION, SOLUTION SUBCUTANEOUS at 22:01

## 2017-02-13 RX ADMIN — OXYCODONE HYDROCHLORIDE AND ACETAMINOPHEN 1 TABLET: 5; 325 TABLET ORAL at 23:13

## 2017-02-13 RX ADMIN — LABETALOL HYDROCHLORIDE 100 MG: 100 TABLET, FILM COATED ORAL at 21:07

## 2017-02-13 RX ADMIN — LABETALOL HYDROCHLORIDE 100 MG: 100 TABLET, FILM COATED ORAL at 10:47

## 2017-02-13 RX ADMIN — OXYCODONE HYDROCHLORIDE AND ACETAMINOPHEN 1 TABLET: 5; 325 TABLET ORAL at 07:51

## 2017-02-14 LAB
ALBUMIN SERPL BCP-MCNC: 2.6 G/DL (ref 3.5–5)
ALP SERPL-CCNC: 102 U/L (ref 46–116)
ALT SERPL W P-5'-P-CCNC: 15 U/L (ref 12–78)
ANION GAP SERPL CALCULATED.3IONS-SCNC: 11 MMOL/L (ref 4–13)
AST SERPL W P-5'-P-CCNC: 7 U/L (ref 5–45)
BILIRUB SERPL-MCNC: 0.31 MG/DL (ref 0.2–1)
BUN SERPL-MCNC: 16 MG/DL (ref 5–25)
CALCIUM SERPL-MCNC: 9.7 MG/DL (ref 8.3–10.1)
CHLORIDE SERPL-SCNC: 100 MMOL/L (ref 100–108)
CO2 SERPL-SCNC: 23 MMOL/L (ref 21–32)
CREAT SERPL-MCNC: 0.48 MG/DL (ref 0.6–1.3)
ERYTHROCYTE [DISTWIDTH] IN BLOOD BY AUTOMATED COUNT: 13.9 % (ref 11.6–15.1)
GFR SERPL CREATININE-BSD FRML MDRD: >60 ML/MIN/1.73SQ M
GLUCOSE SERPL-MCNC: 118 MG/DL (ref 65–140)
GLUCOSE SERPL-MCNC: 147 MG/DL (ref 65–140)
GLUCOSE SERPL-MCNC: 79 MG/DL (ref 65–140)
GLUCOSE SERPL-MCNC: 79 MG/DL (ref 65–140)
GLUCOSE SERPL-MCNC: 84 MG/DL (ref 65–140)
HCT VFR BLD AUTO: 39.7 % (ref 34.8–46.1)
HGB BLD-MCNC: 13.6 G/DL (ref 11.5–15.4)
MCH RBC QN AUTO: 30.1 PG (ref 26.8–34.3)
MCHC RBC AUTO-ENTMCNC: 34.3 G/DL (ref 31.4–37.4)
MCV RBC AUTO: 88 FL (ref 82–98)
PLATELET # BLD AUTO: 178 THOUSANDS/UL (ref 149–390)
PMV BLD AUTO: 10.2 FL (ref 8.9–12.7)
POTASSIUM SERPL-SCNC: 4.2 MMOL/L (ref 3.5–5.3)
PROT SERPL-MCNC: 7.1 G/DL (ref 6.4–8.2)
RBC # BLD AUTO: 4.52 MILLION/UL (ref 3.81–5.12)
RPR SER QL: NORMAL
SODIUM SERPL-SCNC: 134 MMOL/L (ref 136–145)
WBC # BLD AUTO: 13 THOUSAND/UL (ref 4.31–10.16)

## 2017-02-14 PROCEDURE — 80053 COMPREHEN METABOLIC PANEL: CPT | Performed by: OBSTETRICS & GYNECOLOGY

## 2017-02-14 PROCEDURE — 85027 COMPLETE CBC AUTOMATED: CPT | Performed by: OBSTETRICS & GYNECOLOGY

## 2017-02-14 PROCEDURE — 82948 REAGENT STRIP/BLOOD GLUCOSE: CPT

## 2017-02-14 RX ADMIN — LABETALOL HYDROCHLORIDE 100 MG: 100 TABLET, FILM COATED ORAL at 08:47

## 2017-02-14 RX ADMIN — INSULIN GLARGINE 15 UNITS: 100 INJECTION, SOLUTION SUBCUTANEOUS at 21:51

## 2017-02-14 RX ADMIN — LABETALOL HYDROCHLORIDE 100 MG: 100 TABLET, FILM COATED ORAL at 21:54

## 2017-02-15 PROBLEM — O24.410 DIET CONTROLLED GESTATIONAL DIABETES MELLITUS (GDM) IN THIRD TRIMESTER: Status: RESOLVED | Noted: 2017-02-09 | Resolved: 2017-02-15

## 2017-02-15 PROBLEM — O14.13 SEVERE PRE-ECLAMPSIA IN THIRD TRIMESTER: Status: RESOLVED | Noted: 2017-02-09 | Resolved: 2017-02-15

## 2017-02-15 PROBLEM — Z98.891 S/P CESAREAN SECTION: Status: ACTIVE | Noted: 2017-02-15

## 2017-02-15 PROBLEM — Z3A.31 31 WEEKS GESTATION OF PREGNANCY: Status: RESOLVED | Noted: 2017-02-09 | Resolved: 2017-02-15

## 2017-02-15 PROBLEM — Z72.0 TOBACCO ABUSE: Status: ACTIVE | Noted: 2017-02-15

## 2017-02-15 PROBLEM — O36.5930 POOR FETAL GROWTH AFFECTING MANAGEMENT OF MOTHER IN THIRD TRIMESTER: Status: RESOLVED | Noted: 2017-02-09 | Resolved: 2017-02-15

## 2017-02-15 LAB
ALBUMIN SERPL BCP-MCNC: 2.7 G/DL (ref 3.5–5)
ALP SERPL-CCNC: 109 U/L (ref 46–116)
ALT SERPL W P-5'-P-CCNC: 12 U/L (ref 12–78)
ANION GAP SERPL CALCULATED.3IONS-SCNC: 11 MMOL/L (ref 4–13)
AST SERPL W P-5'-P-CCNC: 5 U/L (ref 5–45)
BASE EXCESS BLDCOA CALC-SCNC: 0.3 MMOL/L (ref 3–11)
BASE EXCESS BLDCOV CALC-SCNC: 0.5 MMOL/L (ref 1–9)
BILIRUB SERPL-MCNC: 0.31 MG/DL (ref 0.2–1)
BUN SERPL-MCNC: 18 MG/DL (ref 5–25)
CALCIUM SERPL-MCNC: 9.6 MG/DL (ref 8.3–10.1)
CHLORIDE SERPL-SCNC: 101 MMOL/L (ref 100–108)
CO2 SERPL-SCNC: 23 MMOL/L (ref 21–32)
CREAT SERPL-MCNC: 0.49 MG/DL (ref 0.6–1.3)
ERYTHROCYTE [DISTWIDTH] IN BLOOD BY AUTOMATED COUNT: 13.7 % (ref 11.6–15.1)
GFR SERPL CREATININE-BSD FRML MDRD: >60 ML/MIN/1.73SQ M
GLUCOSE SERPL-MCNC: 76 MG/DL (ref 65–140)
GLUCOSE SERPL-MCNC: 88 MG/DL (ref 65–140)
GLUCOSE SERPL-MCNC: 91 MG/DL (ref 65–140)
HCO3 BLDCOA-SCNC: 28.4 MMOL/L (ref 17.3–27.3)
HCO3 BLDCOV-SCNC: 27.5 MMOL/L (ref 12.2–28.6)
HCT VFR BLD AUTO: 39 % (ref 34.8–46.1)
HGB BLD-MCNC: 13.1 G/DL (ref 11.5–15.4)
MCH RBC QN AUTO: 29.9 PG (ref 26.8–34.3)
MCHC RBC AUTO-ENTMCNC: 33.6 G/DL (ref 31.4–37.4)
MCV RBC AUTO: 89 FL (ref 82–98)
OXYHGB MFR BLDCOA: 7.4 %
OXYHGB MFR BLDCOV: 38 %
PCO2 BLDCOA: 59.7 MM[HG] (ref 30–60)
PCO2 BLDCOV: 53.1 MM HG (ref 27–43)
PH BLDCOA: 7.29 [PH] (ref 7.23–7.43)
PH BLDCOV: 7.33 [PH] (ref 7.19–7.49)
PLATELET # BLD AUTO: 168 THOUSANDS/UL (ref 149–390)
PMV BLD AUTO: 10.4 FL (ref 8.9–12.7)
PO2 BLDCOA: <10 MM HG (ref 5–25)
PO2 BLDCOV: 19.4 MM HG (ref 15–45)
POTASSIUM SERPL-SCNC: 4.1 MMOL/L (ref 3.5–5.3)
PROT SERPL-MCNC: 7.2 G/DL (ref 6.4–8.2)
RBC # BLD AUTO: 4.38 MILLION/UL (ref 3.81–5.12)
SAO2 % BLDCOV: 8.7 ML/DL
SODIUM SERPL-SCNC: 135 MMOL/L (ref 136–145)
WBC # BLD AUTO: 12.36 THOUSAND/UL (ref 4.31–10.16)

## 2017-02-15 PROCEDURE — 82948 REAGENT STRIP/BLOOD GLUCOSE: CPT

## 2017-02-15 PROCEDURE — 82805 BLOOD GASES W/O2 SATURATION: CPT | Performed by: SPECIALIST

## 2017-02-15 PROCEDURE — 85027 COMPLETE CBC AUTOMATED: CPT | Performed by: OBSTETRICS & GYNECOLOGY

## 2017-02-15 PROCEDURE — 88307 TISSUE EXAM BY PATHOLOGIST: CPT | Performed by: SPECIALIST

## 2017-02-15 PROCEDURE — 0UT60ZZ RESECTION OF LEFT FALLOPIAN TUBE, OPEN APPROACH: ICD-10-PCS | Performed by: SPECIALIST

## 2017-02-15 PROCEDURE — 80053 COMPREHEN METABOLIC PANEL: CPT | Performed by: OBSTETRICS & GYNECOLOGY

## 2017-02-15 PROCEDURE — 88302 TISSUE EXAM BY PATHOLOGIST: CPT | Performed by: SPECIALIST

## 2017-02-15 RX ORDER — FENTANYL CITRATE 50 UG/ML
INJECTION, SOLUTION INTRAMUSCULAR; INTRAVENOUS AS NEEDED
Status: DISCONTINUED | OUTPATIENT
Start: 2017-02-15 | End: 2017-02-15 | Stop reason: SURG

## 2017-02-15 RX ORDER — ONDANSETRON 2 MG/ML
4 INJECTION INTRAMUSCULAR; INTRAVENOUS EVERY 4 HOURS PRN
Status: ACTIVE | OUTPATIENT
Start: 2017-02-15 | End: 2017-02-16

## 2017-02-15 RX ORDER — DOCUSATE SODIUM 100 MG/1
100 CAPSULE, LIQUID FILLED ORAL 2 TIMES DAILY
Status: DISCONTINUED | OUTPATIENT
Start: 2017-02-15 | End: 2017-02-19 | Stop reason: HOSPADM

## 2017-02-15 RX ORDER — ACETAMINOPHEN 325 MG/1
650 TABLET ORAL EVERY 6 HOURS PRN
Status: DISCONTINUED | OUTPATIENT
Start: 2017-02-15 | End: 2017-02-19 | Stop reason: HOSPADM

## 2017-02-15 RX ORDER — LABETALOL 100 MG/1
100 TABLET, FILM COATED ORAL EVERY 12 HOURS SCHEDULED
Status: DISCONTINUED | OUTPATIENT
Start: 2017-02-15 | End: 2017-02-15

## 2017-02-15 RX ORDER — DIPHENHYDRAMINE HYDROCHLORIDE 50 MG/ML
25 INJECTION INTRAMUSCULAR; INTRAVENOUS EVERY 6 HOURS PRN
Status: ACTIVE | OUTPATIENT
Start: 2017-02-15 | End: 2017-02-16

## 2017-02-15 RX ORDER — ONDANSETRON 2 MG/ML
4 INJECTION INTRAMUSCULAR; INTRAVENOUS EVERY 8 HOURS PRN
Status: DISCONTINUED | OUTPATIENT
Start: 2017-02-15 | End: 2017-02-19 | Stop reason: HOSPADM

## 2017-02-15 RX ORDER — DIPHENHYDRAMINE HCL 25 MG
25 TABLET ORAL EVERY 6 HOURS PRN
Status: DISCONTINUED | OUTPATIENT
Start: 2017-02-15 | End: 2017-02-19 | Stop reason: HOSPADM

## 2017-02-15 RX ORDER — SODIUM CHLORIDE, SODIUM LACTATE, POTASSIUM CHLORIDE, CALCIUM CHLORIDE 600; 310; 30; 20 MG/100ML; MG/100ML; MG/100ML; MG/100ML
125 INJECTION, SOLUTION INTRAVENOUS CONTINUOUS
Status: DISCONTINUED | OUTPATIENT
Start: 2017-02-15 | End: 2017-02-19 | Stop reason: HOSPADM

## 2017-02-15 RX ORDER — NALBUPHINE HCL 10 MG/ML
5 AMPUL (ML) INJECTION
Status: ACTIVE | OUTPATIENT
Start: 2017-02-15 | End: 2017-02-16

## 2017-02-15 RX ORDER — ONDANSETRON 2 MG/ML
4 INJECTION INTRAMUSCULAR; INTRAVENOUS EVERY 4 HOURS PRN
Status: DISCONTINUED | OUTPATIENT
Start: 2017-02-15 | End: 2017-02-15

## 2017-02-15 RX ORDER — EPHEDRINE SULFATE 50 MG/ML
INJECTION, SOLUTION INTRAVENOUS AS NEEDED
Status: DISCONTINUED | OUTPATIENT
Start: 2017-02-15 | End: 2017-02-15 | Stop reason: SURG

## 2017-02-15 RX ORDER — SODIUM CHLORIDE 9 MG/ML
125 INJECTION, SOLUTION INTRAVENOUS CONTINUOUS
Status: DISCONTINUED | OUTPATIENT
Start: 2017-02-15 | End: 2017-02-15

## 2017-02-15 RX ORDER — OXYTOCIN 10 [USP'U]/ML
INJECTION, SOLUTION INTRAMUSCULAR; INTRAVENOUS AS NEEDED
Status: DISCONTINUED | OUTPATIENT
Start: 2017-02-15 | End: 2017-02-15 | Stop reason: SURG

## 2017-02-15 RX ORDER — METOCLOPRAMIDE HYDROCHLORIDE 5 MG/ML
5 INJECTION INTRAMUSCULAR; INTRAVENOUS EVERY 6 HOURS PRN
Status: DISCONTINUED | OUTPATIENT
Start: 2017-02-15 | End: 2017-02-15

## 2017-02-15 RX ORDER — ONDANSETRON 2 MG/ML
INJECTION INTRAMUSCULAR; INTRAVENOUS AS NEEDED
Status: DISCONTINUED | OUTPATIENT
Start: 2017-02-15 | End: 2017-02-15 | Stop reason: SURG

## 2017-02-15 RX ORDER — MORPHINE SULFATE 1 MG/ML
INJECTION, SOLUTION EPIDURAL; INTRATHECAL; INTRAVENOUS AS NEEDED
Status: DISCONTINUED | OUTPATIENT
Start: 2017-02-15 | End: 2017-02-15 | Stop reason: SURG

## 2017-02-15 RX ORDER — IBUPROFEN 600 MG/1
600 TABLET ORAL EVERY 6 HOURS PRN
Status: DISCONTINUED | OUTPATIENT
Start: 2017-02-15 | End: 2017-02-19 | Stop reason: HOSPADM

## 2017-02-15 RX ORDER — BUPIVACAINE HYDROCHLORIDE 7.5 MG/ML
INJECTION, SOLUTION INTRASPINAL AS NEEDED
Status: DISCONTINUED | OUTPATIENT
Start: 2017-02-15 | End: 2017-02-15 | Stop reason: SURG

## 2017-02-15 RX ORDER — NALOXONE HYDROCHLORIDE 0.4 MG/ML
0.1 INJECTION, SOLUTION INTRAMUSCULAR; INTRAVENOUS; SUBCUTANEOUS
Status: ACTIVE | OUTPATIENT
Start: 2017-02-15 | End: 2017-02-16

## 2017-02-15 RX ORDER — OXYCODONE HYDROCHLORIDE 10 MG/1
10 TABLET ORAL EVERY 4 HOURS PRN
Status: DISCONTINUED | OUTPATIENT
Start: 2017-02-15 | End: 2017-02-19 | Stop reason: HOSPADM

## 2017-02-15 RX ORDER — LIDOCAINE HYDROCHLORIDE 10 MG/ML
INJECTION, SOLUTION EPIDURAL; INFILTRATION; INTRACAUDAL; PERINEURAL
Status: DISCONTINUED
Start: 2017-02-15 | End: 2017-02-15 | Stop reason: WASHOUT

## 2017-02-15 RX ORDER — BISACODYL 10 MG
10 SUPPOSITORY, RECTAL RECTAL AS NEEDED
Status: DISCONTINUED | OUTPATIENT
Start: 2017-02-15 | End: 2017-02-19 | Stop reason: HOSPADM

## 2017-02-15 RX ORDER — OXYCODONE HYDROCHLORIDE AND ACETAMINOPHEN 5; 325 MG/1; MG/1
1 TABLET ORAL EVERY 4 HOURS PRN
Status: DISCONTINUED | OUTPATIENT
Start: 2017-02-16 | End: 2017-02-16

## 2017-02-15 RX ORDER — OXYCODONE HYDROCHLORIDE AND ACETAMINOPHEN 5; 325 MG/1; MG/1
2 TABLET ORAL EVERY 4 HOURS PRN
Status: DISCONTINUED | OUTPATIENT
Start: 2017-02-16 | End: 2017-02-16

## 2017-02-15 RX ORDER — CEFAZOLIN SODIUM 1 G/3ML
INJECTION, POWDER, FOR SOLUTION INTRAMUSCULAR; INTRAVENOUS AS NEEDED
Status: DISCONTINUED | OUTPATIENT
Start: 2017-02-15 | End: 2017-02-15 | Stop reason: SURG

## 2017-02-15 RX ORDER — SIMETHICONE 80 MG
80 TABLET,CHEWABLE ORAL 4 TIMES DAILY PRN
Status: DISCONTINUED | OUTPATIENT
Start: 2017-02-15 | End: 2017-02-19 | Stop reason: HOSPADM

## 2017-02-15 RX ADMIN — MORPHINE SULFATE 0.15 MG: 1 INJECTION, SOLUTION EPIDURAL; INTRATHECAL; INTRAVENOUS at 08:12

## 2017-02-15 RX ADMIN — FENTANYL CITRATE 15 MCG: 50 INJECTION, SOLUTION INTRAMUSCULAR; INTRAVENOUS at 08:12

## 2017-02-15 RX ADMIN — SODIUM CHLORIDE, SODIUM LACTATE, POTASSIUM CHLORIDE, AND CALCIUM CHLORIDE 125 ML/HR: .6; .31; .03; .02 INJECTION, SOLUTION INTRAVENOUS at 21:39

## 2017-02-15 RX ADMIN — HYDROMORPHONE HYDROCHLORIDE 0.5 MG: 1 INJECTION, SOLUTION INTRAMUSCULAR; INTRAVENOUS; SUBCUTANEOUS at 12:59

## 2017-02-15 RX ADMIN — OXYCODONE HYDROCHLORIDE 10 MG: 10 TABLET ORAL at 21:48

## 2017-02-15 RX ADMIN — SODIUM CHLORIDE, SODIUM LACTATE, POTASSIUM CHLORIDE, AND CALCIUM CHLORIDE 125 ML/HR: .6; .31; .03; .02 INJECTION, SOLUTION INTRAVENOUS at 09:32

## 2017-02-15 RX ADMIN — HYDROMORPHONE HYDROCHLORIDE 0.5 MG: 1 INJECTION, SOLUTION INTRAMUSCULAR; INTRAVENOUS; SUBCUTANEOUS at 19:50

## 2017-02-15 RX ADMIN — CEFAZOLIN 3000 MG: 1 INJECTION, POWDER, FOR SOLUTION INTRAVENOUS at 08:09

## 2017-02-15 RX ADMIN — SODIUM CHLORIDE, SODIUM LACTATE, POTASSIUM CHLORIDE, AND CALCIUM CHLORIDE 125 ML/HR: .6; .31; .03; .02 INJECTION, SOLUTION INTRAVENOUS at 13:39

## 2017-02-15 RX ADMIN — DOCUSATE SODIUM 100 MG: 100 CAPSULE, LIQUID FILLED ORAL at 19:35

## 2017-02-15 RX ADMIN — ONDANSETRON 4 MG: 2 INJECTION INTRAMUSCULAR; INTRAVENOUS at 08:20

## 2017-02-15 RX ADMIN — EPHEDRINE SULFATE 5 MG: 50 INJECTION, SOLUTION INTRAMUSCULAR; INTRAVENOUS; SUBCUTANEOUS at 08:17

## 2017-02-15 RX ADMIN — OXYTOCIN 30 UNITS: 10 INJECTION, SOLUTION INTRAMUSCULAR; INTRAVENOUS at 08:27

## 2017-02-15 RX ADMIN — BUPIVACAINE HYDROCHLORIDE IN DEXTROSE 1.6 ML: 7.5 INJECTION, SOLUTION SUBARACHNOID at 08:12

## 2017-02-15 RX ADMIN — SODIUM CHLORIDE 999 ML/HR: 0.9 INJECTION, SOLUTION INTRAVENOUS at 06:57

## 2017-02-15 RX ADMIN — EPHEDRINE SULFATE 5 MG: 50 INJECTION, SOLUTION INTRAMUSCULAR; INTRAVENOUS; SUBCUTANEOUS at 08:37

## 2017-02-15 RX ADMIN — OXYCODONE HYDROCHLORIDE 10 MG: 10 TABLET ORAL at 15:41

## 2017-02-16 ENCOUNTER — APPOINTMENT (INPATIENT)
Dept: RADIOLOGY | Facility: HOSPITAL | Age: 42
DRG: 540 | End: 2017-02-16
Payer: COMMERCIAL

## 2017-02-16 LAB
BASOPHILS # BLD AUTO: 0 THOUSANDS/ΜL (ref 0–0.1)
BASOPHILS NFR BLD AUTO: 0 % (ref 0–1)
EOSINOPHIL # BLD AUTO: 0.06 THOUSAND/ΜL (ref 0–0.61)
EOSINOPHIL NFR BLD AUTO: 1 % (ref 0–6)
ERYTHROCYTE [DISTWIDTH] IN BLOOD BY AUTOMATED COUNT: 13.9 % (ref 11.6–15.1)
HCT VFR BLD AUTO: 31.8 % (ref 34.8–46.1)
HGB BLD-MCNC: 10.7 G/DL (ref 11.5–15.4)
LYMPHOCYTES # BLD AUTO: 2.25 THOUSANDS/ΜL (ref 0.6–4.47)
LYMPHOCYTES NFR BLD AUTO: 24 % (ref 14–44)
MCH RBC QN AUTO: 30.2 PG (ref 26.8–34.3)
MCHC RBC AUTO-ENTMCNC: 33.6 G/DL (ref 31.4–37.4)
MCV RBC AUTO: 90 FL (ref 82–98)
MONOCYTES # BLD AUTO: 0.43 THOUSAND/ΜL (ref 0.17–1.22)
MONOCYTES NFR BLD AUTO: 5 % (ref 4–12)
NEUTROPHILS # BLD AUTO: 6.68 THOUSANDS/ΜL (ref 1.85–7.62)
NEUTS SEG NFR BLD AUTO: 70 % (ref 43–75)
NRBC BLD AUTO-RTO: 0 /100 WBCS
PLATELET # BLD AUTO: 126 THOUSANDS/UL (ref 149–390)
PMV BLD AUTO: 10.2 FL (ref 8.9–12.7)
RBC # BLD AUTO: 3.54 MILLION/UL (ref 3.81–5.12)
WBC # BLD AUTO: 9.43 THOUSAND/UL (ref 4.31–10.16)

## 2017-02-16 PROCEDURE — 93970 EXTREMITY STUDY: CPT

## 2017-02-16 PROCEDURE — 85025 COMPLETE CBC W/AUTO DIFF WBC: CPT | Performed by: SPECIALIST

## 2017-02-16 RX ORDER — KETOROLAC TROMETHAMINE 30 MG/ML
INJECTION, SOLUTION INTRAMUSCULAR; INTRAVENOUS
Status: COMPLETED
Start: 2017-02-16 | End: 2017-02-16

## 2017-02-16 RX ORDER — KETOROLAC TROMETHAMINE 30 MG/ML
30 INJECTION, SOLUTION INTRAMUSCULAR; INTRAVENOUS ONCE
Status: COMPLETED | OUTPATIENT
Start: 2017-02-16 | End: 2017-02-16

## 2017-02-16 RX ADMIN — KETOROLAC TROMETHAMINE 30 MG: 30 INJECTION, SOLUTION INTRAMUSCULAR; INTRAVENOUS at 16:05

## 2017-02-16 RX ADMIN — DOCUSATE SODIUM 100 MG: 100 CAPSULE, LIQUID FILLED ORAL at 18:16

## 2017-02-16 RX ADMIN — OXYCODONE HYDROCHLORIDE 10 MG: 10 TABLET ORAL at 08:34

## 2017-02-16 RX ADMIN — KETOROLAC TROMETHAMINE 30 MG: 30 INJECTION, SOLUTION INTRAMUSCULAR at 16:05

## 2017-02-16 RX ADMIN — HYDROMORPHONE HYDROCHLORIDE 0.5 MG: 1 INJECTION, SOLUTION INTRAMUSCULAR; INTRAVENOUS; SUBCUTANEOUS at 05:35

## 2017-02-16 RX ADMIN — OXYCODONE HYDROCHLORIDE 10 MG: 10 TABLET ORAL at 13:00

## 2017-02-16 RX ADMIN — OXYCODONE HYDROCHLORIDE 10 MG: 10 TABLET ORAL at 18:16

## 2017-02-16 RX ADMIN — OXYCODONE HYDROCHLORIDE 10 MG: 10 TABLET ORAL at 23:21

## 2017-02-16 RX ADMIN — DOCUSATE SODIUM 100 MG: 100 CAPSULE, LIQUID FILLED ORAL at 10:17

## 2017-02-16 RX ADMIN — ENOXAPARIN SODIUM 50 MG: 60 INJECTION SUBCUTANEOUS at 10:19

## 2017-02-16 RX ADMIN — OXYCODONE HYDROCHLORIDE 10 MG: 10 TABLET ORAL at 04:22

## 2017-02-17 LAB
ABO GROUP BLD BPU: NORMAL
ABO GROUP BLD BPU: NORMAL
BPU ID: NORMAL
BPU ID: NORMAL
CROSSMATCH: NORMAL
CROSSMATCH: NORMAL
UNIT DISPENSE STATUS: NORMAL
UNIT DISPENSE STATUS: NORMAL
UNIT PRODUCT CODE: NORMAL
UNIT PRODUCT CODE: NORMAL
UNIT RH: NORMAL
UNIT RH: NORMAL

## 2017-02-17 RX ADMIN — OXYCODONE HYDROCHLORIDE 10 MG: 10 TABLET ORAL at 09:12

## 2017-02-17 RX ADMIN — HYDROMORPHONE HYDROCHLORIDE 0.5 MG: 1 INJECTION, SOLUTION INTRAMUSCULAR; INTRAVENOUS; SUBCUTANEOUS at 02:03

## 2017-02-17 RX ADMIN — DOCUSATE SODIUM 100 MG: 100 CAPSULE, LIQUID FILLED ORAL at 09:11

## 2017-02-17 RX ADMIN — ENOXAPARIN SODIUM 50 MG: 60 INJECTION SUBCUTANEOUS at 09:11

## 2017-02-17 RX ADMIN — OXYCODONE HYDROCHLORIDE 10 MG: 10 TABLET ORAL at 20:58

## 2017-02-18 RX ORDER — DOCUSATE SODIUM 100 MG/1
100 CAPSULE, LIQUID FILLED ORAL 2 TIMES DAILY
Qty: 60 CAPSULE | Refills: 0 | Status: SHIPPED | OUTPATIENT
Start: 2017-02-18 | End: 2017-08-27

## 2017-02-18 RX ORDER — IBUPROFEN 600 MG/1
600 TABLET ORAL EVERY 6 HOURS PRN
Qty: 30 TABLET | Refills: 0 | Status: SHIPPED | OUTPATIENT
Start: 2017-02-18 | End: 2017-08-27

## 2017-02-18 RX ADMIN — ENOXAPARIN SODIUM 50 MG: 60 INJECTION SUBCUTANEOUS at 08:35

## 2017-02-18 RX ADMIN — IBUPROFEN 600 MG: 600 TABLET ORAL at 00:08

## 2017-02-19 VITALS
RESPIRATION RATE: 18 BRPM | OXYGEN SATURATION: 96 % | HEIGHT: 61 IN | WEIGHT: 219.5 LBS | SYSTOLIC BLOOD PRESSURE: 133 MMHG | TEMPERATURE: 98.3 F | HEART RATE: 84 BPM | BODY MASS INDEX: 41.44 KG/M2 | DIASTOLIC BLOOD PRESSURE: 66 MMHG

## 2017-02-19 RX ADMIN — ENOXAPARIN SODIUM 60 MG: 60 INJECTION SUBCUTANEOUS at 08:44

## 2017-02-19 RX ADMIN — OXYCODONE HYDROCHLORIDE 10 MG: 10 TABLET ORAL at 00:41

## 2017-02-19 RX ADMIN — DOCUSATE SODIUM 100 MG: 100 CAPSULE, LIQUID FILLED ORAL at 08:44

## 2017-02-19 RX ADMIN — OXYCODONE HYDROCHLORIDE 10 MG: 10 TABLET ORAL at 08:43

## 2017-08-24 ENCOUNTER — HOSPITAL ENCOUNTER (EMERGENCY)
Facility: HOSPITAL | Age: 42
Discharge: HOME | End: 2017-08-24
Attending: EMERGENCY MEDICINE | Admitting: EMERGENCY MEDICINE
Payer: COMMERCIAL

## 2017-08-24 ENCOUNTER — APPOINTMENT (EMERGENCY)
Dept: MRI IMAGING | Facility: HOSPITAL | Age: 42
End: 2017-08-24
Payer: COMMERCIAL

## 2017-08-24 VITALS
DIASTOLIC BLOOD PRESSURE: 79 MMHG | HEIGHT: 61 IN | RESPIRATION RATE: 18 BRPM | OXYGEN SATURATION: 99 % | WEIGHT: 228.4 LBS | SYSTOLIC BLOOD PRESSURE: 158 MMHG | BODY MASS INDEX: 43.12 KG/M2 | HEART RATE: 78 BPM | TEMPERATURE: 97.8 F

## 2017-08-24 DIAGNOSIS — M54.50 LOW BACK PAIN: Primary | ICD-10-CM

## 2017-08-24 DIAGNOSIS — R15.9 FECAL INCONTINENCE: ICD-10-CM

## 2017-08-24 PROCEDURE — 96376 TX/PRO/DX INJ SAME DRUG ADON: CPT

## 2017-08-24 PROCEDURE — 72148 MRI LUMBAR SPINE W/O DYE: CPT

## 2017-08-24 PROCEDURE — 96374 THER/PROPH/DIAG INJ IV PUSH: CPT

## 2017-08-24 PROCEDURE — 96372 THER/PROPH/DIAG INJ SC/IM: CPT

## 2017-08-24 PROCEDURE — 96375 TX/PRO/DX INJ NEW DRUG ADDON: CPT

## 2017-08-24 PROCEDURE — 99284 EMERGENCY DEPT VISIT MOD MDM: CPT

## 2017-08-24 RX ORDER — FENTANYL CITRATE 50 UG/ML
50 INJECTION, SOLUTION INTRAMUSCULAR; INTRAVENOUS ONCE
Status: COMPLETED | OUTPATIENT
Start: 2017-08-24 | End: 2017-08-24

## 2017-08-24 RX ORDER — FENTANYL 75 UG/H
75 PATCH TRANSDERMAL ONCE
Status: DISCONTINUED | OUTPATIENT
Start: 2017-08-24 | End: 2017-08-24 | Stop reason: HOSPADM

## 2017-08-24 RX ORDER — FENTANYL CITRATE 50 UG/ML
100 INJECTION, SOLUTION INTRAMUSCULAR; INTRAVENOUS ONCE
Status: COMPLETED | OUTPATIENT
Start: 2017-08-24 | End: 2017-08-24

## 2017-08-24 RX ORDER — PREDNISONE 10 MG/1
40 TABLET ORAL DAILY
Qty: 20 TABLET | Refills: 0 | Status: SHIPPED | OUTPATIENT
Start: 2017-08-24 | End: 2017-08-29

## 2017-08-24 RX ORDER — DIAZEPAM 5 MG/1
5 TABLET ORAL ONCE
Status: COMPLETED | OUTPATIENT
Start: 2017-08-24 | End: 2017-08-24

## 2017-08-24 RX ORDER — PREDNISONE 20 MG/1
40 TABLET ORAL ONCE
Status: COMPLETED | OUTPATIENT
Start: 2017-08-24 | End: 2017-08-24

## 2017-08-24 RX ORDER — KETOROLAC TROMETHAMINE 30 MG/ML
30 INJECTION, SOLUTION INTRAMUSCULAR; INTRAVENOUS ONCE
Status: COMPLETED | OUTPATIENT
Start: 2017-08-24 | End: 2017-08-24

## 2017-08-24 RX ADMIN — FENTANYL CITRATE 100 MCG: 50 INJECTION INTRAMUSCULAR; INTRAVENOUS at 08:00

## 2017-08-24 RX ADMIN — FENTANYL CITRATE 50 MCG: 50 INJECTION INTRAMUSCULAR; INTRAVENOUS at 08:46

## 2017-08-24 RX ADMIN — FENTANYL TRANSDERMAL 75 MCG: 75 PATCH, EXTENDED RELEASE TRANSDERMAL at 09:30

## 2017-08-24 RX ADMIN — KETOROLAC TROMETHAMINE 30 MG: 30 INJECTION, SOLUTION INTRAMUSCULAR; INTRAVENOUS at 08:04

## 2017-08-24 RX ADMIN — DIAZEPAM 5 MG: 5 TABLET ORAL at 07:03

## 2017-08-24 RX ADMIN — HYDROMORPHONE HYDROCHLORIDE 1 MG: 1 INJECTION, SOLUTION INTRAMUSCULAR; INTRAVENOUS; SUBCUTANEOUS at 07:04

## 2017-08-24 RX ADMIN — PREDNISONE 40 MG: 20 TABLET ORAL at 09:20

## 2017-08-27 ENCOUNTER — APPOINTMENT (EMERGENCY)
Dept: CT IMAGING | Facility: HOSPITAL | Age: 42
End: 2017-08-27
Payer: COMMERCIAL

## 2017-08-27 ENCOUNTER — HOSPITAL ENCOUNTER (EMERGENCY)
Facility: HOSPITAL | Age: 42
Discharge: HOME | End: 2017-08-27
Attending: EMERGENCY MEDICINE
Payer: COMMERCIAL

## 2017-08-27 VITALS
HEART RATE: 86 BPM | DIASTOLIC BLOOD PRESSURE: 77 MMHG | OXYGEN SATURATION: 98 % | WEIGHT: 220.9 LBS | HEIGHT: 61 IN | TEMPERATURE: 97.2 F | SYSTOLIC BLOOD PRESSURE: 151 MMHG | RESPIRATION RATE: 16 BRPM | BODY MASS INDEX: 41.71 KG/M2

## 2017-08-27 DIAGNOSIS — M54.50 ACUTE EXACERBATION OF CHRONIC LOW BACK PAIN: Primary | ICD-10-CM

## 2017-08-27 DIAGNOSIS — G89.29 ACUTE EXACERBATION OF CHRONIC LOW BACK PAIN: Primary | ICD-10-CM

## 2017-08-27 DIAGNOSIS — R73.9 HYPERGLYCEMIA, DRUG-INDUCED: ICD-10-CM

## 2017-08-27 DIAGNOSIS — T50.905A HYPERGLYCEMIA, DRUG-INDUCED: ICD-10-CM

## 2017-08-27 LAB
ALBUMIN SERPL BCP-MCNC: 3.5 G/DL (ref 3.5–5)
ALP SERPL-CCNC: 56 U/L (ref 46–116)
ALT SERPL W P-5'-P-CCNC: 51 U/L (ref 12–78)
ANION GAP SERPL CALCULATED.3IONS-SCNC: 11 MMOL/L (ref 4–13)
AST SERPL W P-5'-P-CCNC: 51 U/L (ref 5–45)
BACTERIA UR QL AUTO: ABNORMAL /HPF
BASOPHILS # BLD MANUAL: 0 THOUSAND/UL (ref 0–0.1)
BASOPHILS NFR MAR MANUAL: 0 % (ref 0–1)
BILIRUB SERPL-MCNC: 0.4 MG/DL (ref 0.2–1)
BILIRUB UR QL STRIP: NEGATIVE
BUN SERPL-MCNC: 25 MG/DL (ref 5–25)
CALCIUM SERPL-MCNC: 8.8 MG/DL (ref 8.3–10.1)
CHLORIDE SERPL-SCNC: 97 MMOL/L (ref 100–108)
CLARITY UR: CLEAR
CO2 SERPL-SCNC: 25 MMOL/L (ref 21–32)
COLOR UR: YELLOW
CREAT SERPL-MCNC: 0.67 MG/DL (ref 0.6–1.3)
EOSINOPHIL # BLD MANUAL: 0 THOUSAND/UL (ref 0–0.4)
EOSINOPHIL NFR BLD MANUAL: 0 % (ref 0–6)
ERYTHROCYTE [DISTWIDTH] IN BLOOD BY AUTOMATED COUNT: 13.6 % (ref 11.6–15.1)
GFR SERPL CREATININE-BSD FRML MDRD: 110 ML/MIN/1.73SQ M
GLUCOSE SERPL-MCNC: 240 MG/DL (ref 65–140)
GLUCOSE UR STRIP-MCNC: ABNORMAL MG/DL
HCG UR QL: NEGATIVE
HCT VFR BLD AUTO: 38.9 % (ref 34.8–46.1)
HGB BLD-MCNC: 12.9 G/DL (ref 11.5–15.4)
HGB UR QL STRIP.AUTO: ABNORMAL
KETONES UR STRIP-MCNC: NEGATIVE MG/DL
LEUKOCYTE ESTERASE UR QL STRIP: NEGATIVE
LIPASE SERPL-CCNC: 104 U/L (ref 73–393)
LYMPHOCYTES # BLD AUTO: 1.91 THOUSAND/UL (ref 0.6–4.47)
LYMPHOCYTES # BLD AUTO: 15 % (ref 14–44)
MCH RBC QN AUTO: 29.9 PG (ref 26.8–34.3)
MCHC RBC AUTO-ENTMCNC: 33.2 G/DL (ref 31.4–37.4)
MCV RBC AUTO: 90 FL (ref 82–98)
MONOCYTES # BLD AUTO: 0.38 THOUSAND/UL (ref 0–1.22)
MONOCYTES NFR BLD: 3 % (ref 4–12)
NEUTROPHILS # BLD MANUAL: 10.46 THOUSAND/UL (ref 1.85–7.62)
NEUTS SEG NFR BLD AUTO: 82 % (ref 43–75)
NITRITE UR QL STRIP: NEGATIVE
NON-SQ EPI CELLS URNS QL MICRO: ABNORMAL /HPF
PH UR STRIP.AUTO: 5.5 [PH] (ref 4.5–8)
PLATELET # BLD AUTO: 212 THOUSANDS/UL (ref 149–390)
PLATELET BLD QL SMEAR: ADEQUATE
PMV BLD AUTO: 10.4 FL (ref 8.9–12.7)
POTASSIUM SERPL-SCNC: 5.6 MMOL/L (ref 3.5–5.3)
PROT SERPL-MCNC: 8.2 G/DL (ref 6.4–8.2)
PROT UR STRIP-MCNC: ABNORMAL MG/DL
RBC # BLD AUTO: 4.31 MILLION/UL (ref 3.81–5.12)
RBC #/AREA URNS AUTO: ABNORMAL /HPF
SODIUM SERPL-SCNC: 133 MMOL/L (ref 136–145)
SP GR UR STRIP.AUTO: 1.02 (ref 1–1.03)
TOTAL CELLS COUNTED SPEC: 100
UROBILINOGEN UR QL STRIP.AUTO: 0.2 E.U./DL
WBC # BLD AUTO: 12.75 THOUSAND/UL (ref 4.31–10.16)
WBC #/AREA URNS AUTO: ABNORMAL /HPF

## 2017-08-27 PROCEDURE — 99284 EMERGENCY DEPT VISIT MOD MDM: CPT

## 2017-08-27 PROCEDURE — 83690 ASSAY OF LIPASE: CPT | Performed by: EMERGENCY MEDICINE

## 2017-08-27 PROCEDURE — 96374 THER/PROPH/DIAG INJ IV PUSH: CPT

## 2017-08-27 PROCEDURE — 96361 HYDRATE IV INFUSION ADD-ON: CPT

## 2017-08-27 PROCEDURE — 81001 URINALYSIS AUTO W/SCOPE: CPT | Performed by: EMERGENCY MEDICINE

## 2017-08-27 PROCEDURE — 81025 URINE PREGNANCY TEST: CPT | Performed by: EMERGENCY MEDICINE

## 2017-08-27 PROCEDURE — 85007 BL SMEAR W/DIFF WBC COUNT: CPT | Performed by: EMERGENCY MEDICINE

## 2017-08-27 PROCEDURE — 85027 COMPLETE CBC AUTOMATED: CPT | Performed by: EMERGENCY MEDICINE

## 2017-08-27 PROCEDURE — 36415 COLL VENOUS BLD VENIPUNCTURE: CPT | Performed by: EMERGENCY MEDICINE

## 2017-08-27 PROCEDURE — 74177 CT ABD & PELVIS W/CONTRAST: CPT

## 2017-08-27 PROCEDURE — 96375 TX/PRO/DX INJ NEW DRUG ADDON: CPT

## 2017-08-27 PROCEDURE — 80053 COMPREHEN METABOLIC PANEL: CPT | Performed by: EMERGENCY MEDICINE

## 2017-08-27 RX ORDER — METHOCARBAMOL 750 MG/1
750 TABLET, FILM COATED ORAL 4 TIMES DAILY PRN
Qty: 20 TABLET | Refills: 0 | Status: SHIPPED | OUTPATIENT
Start: 2017-08-27 | End: 2018-05-17 | Stop reason: ALTCHOICE

## 2017-08-27 RX ORDER — KETAMINE HYDROCHLORIDE 50 MG/ML
10 INJECTION, SOLUTION, CONCENTRATE INTRAMUSCULAR; INTRAVENOUS ONCE
Status: COMPLETED | OUTPATIENT
Start: 2017-08-27 | End: 2017-08-27

## 2017-08-27 RX ORDER — ONDANSETRON 2 MG/ML
4 INJECTION INTRAMUSCULAR; INTRAVENOUS ONCE
Status: COMPLETED | OUTPATIENT
Start: 2017-08-27 | End: 2017-08-27

## 2017-08-27 RX ORDER — LORAZEPAM 2 MG/ML
1 INJECTION INTRAMUSCULAR ONCE
Status: COMPLETED | OUTPATIENT
Start: 2017-08-27 | End: 2017-08-27

## 2017-08-27 RX ORDER — METHOCARBAMOL 500 MG/1
750 TABLET, FILM COATED ORAL ONCE
Status: COMPLETED | OUTPATIENT
Start: 2017-08-27 | End: 2017-08-27

## 2017-08-27 RX ADMIN — SODIUM CHLORIDE 1000 ML: 0.9 INJECTION, SOLUTION INTRAVENOUS at 17:15

## 2017-08-27 RX ADMIN — IOHEXOL 100 ML: 350 INJECTION, SOLUTION INTRAVENOUS at 18:05

## 2017-08-27 RX ADMIN — ONDANSETRON 4 MG: 2 INJECTION INTRAMUSCULAR; INTRAVENOUS at 17:10

## 2017-08-27 RX ADMIN — METHOCARBAMOL 750 MG: 500 TABLET ORAL at 17:12

## 2017-08-27 RX ADMIN — KETAMINE HYDROCHLORIDE 10 MG: 50 INJECTION, SOLUTION INTRAMUSCULAR; INTRAVENOUS at 17:11

## 2017-08-27 RX ADMIN — KETAMINE HYDROCHLORIDE 10 MG: 50 INJECTION, SOLUTION INTRAMUSCULAR; INTRAVENOUS at 20:10

## 2017-08-27 RX ADMIN — LORAZEPAM 1 MG: 2 INJECTION, SOLUTION INTRAMUSCULAR; INTRAVENOUS at 17:11

## 2018-01-11 NOTE — PROGRESS NOTES
2017         RE: Cynthia Scott                                  To: MARYBETH Chavira    MR#: [id number]   : 1975   ENC:                                              Fax: [phone]   (Exam #: [id number])      The LMP of this 37year old,  G3, P1-1-0-1 patient was 2016, giving   her an FABIÁN of 2017 and a current gestational age of 34 weeks 1 day   by dates  A sonographic examination was performed on 2017 using real   time equipment  The ultrasound examination was performed using abdominal   technique  Earliest ultrasound found in her record: 16   MFM  11w3d  FABIÁN 17      Problem list:   1  Increased BMI > 40   2  CHTN based on blood pressure of 124/90 in early pregnancy on 16   and a blood pressure 152/86 outside of pregnancy on 14 at an urgent   care visit   3  She has a history of elevated liver enzymes in 2014 with an AST of   75 and an ALP of 56   4  Poor prenatal care with an elevated Glucola of 191  She has never   followed up with diabetes education  5  AMA with a normal NIPT   6  Chronic opioid use in pregnancy secondary to back pain   7  History of a 36 week stillbirth and a pregnancy that was complicated by   preeclampsia  She was noncompliant with follow-up care  Fetal IUGR was   found at the time of delivery  8  History of a prior  section and a successful    9  New onset superimposed preeclampsia based on her increased BP requiring   meds and her newly found 3+ proteinuria        Cardiac motion was observed at 146 bpm       INDICATIONS      advanced maternal age   morbid obesity   previous    previous stillbirth   preeclampsia   chronic hypertension   diabetes, gestational      RESULTS      Fetus # 1 of 1   Vertex presentation   Placenta Location = Anterior   No placenta previa   Placenta Grade = I      MEASUREMENTS (* Included In Average GA)      AC              23 1 cm        27 weeks 3 days* (<5%)   BPD 6 4 cm        25 weeks 6 days* (<5%)   HC              24 1 cm        26 weeks 0 days* (<5%)   Femur            5 0 cm        27 weeks 0 days* (<5%)      Humerus          4 3 cm        25 weeks 5 days  (<5%)   Radius           3 7 cm        27 weeks 0 days   Ulna             4 2 cm        27 weeks 1 day   Tibia            4 2 cm        26 weeks 1 day   (<5%)   Fibula           4 2 cm        25 weeks 1 day      Cerebellum       3 3 cm        29 weeks 4 days   Biorbit          4 3 cm        27 weeks 1 day      HC/AC           1 04   FL/AC           0 22   FL/BPD          0 78   Ceph Index      0 56   EFW (Ac/Fl/Hc)  1021 grams - 2 lbs 4 oz                 (<5%)      THE AVERAGE GESTATIONAL AGE is 26 weeks 4 days +/- 14 days  AMNIOTIC FLUID      GAEL Total = 9 9 cm   Amniotic Fluid: Normal      FETAL VESSELS                                     S/D   PI    RI    PSV   AEDV RF                                                    cm/s       Umbilical Artery                 1 57              No   No       Middle Cerebral Artery           1 30      FETAL VESSELS                                    PVSys PVDia PASys  S/D   S/A   DVI   RF       Ductus Venosus:                                                No      ANATOMY      Head                                    Normal   Face/Neck                               See Details   Th  Cav  Normal   Heart                                   See Details   Abd  Cav  Normal   Stomach                                 Normal   Right Kidney                            Normal   Left Kidney                             Normal   Bladder                                 Normal   Abd   Wall                               Normal   Spine                                   See Details   Extrems                                 See Details   Genitalia                               Normal   Placenta Normal   Umbl  Cord                              Normal   Uterus                                  Normal      ANATOMY DETAILS      Visualized Appearing Sonographically Normal:   HEAD: (Calvarium, BPD Level, Cavum, Lateral Ventricles, Choroid Plexus,   Cerebellum, Cisterna Magna);    FACE/NECK: (Orbits, Nose/Lips, Palate);      TH  CAV  : (Lungs, Diaphragm); HEART: (Four Chamber View, Proximal Left   Outflow, Proximal Right Outflow, Cardiac Axis, Cardiac Position);    ABD    CAV : (Liver);    STOMACH, RIGHT KIDNEY, LEFT KIDNEY, BLADDER, ABD  WALL,   SPINE: (Cervical Spine, Thoracic Spine);    EXTREMS: (Lt Humerus, Rt   Humerus, Lt Forearm, Lt Hand, Lt Femur, Rt Femur, Lt Low Leg, Rt Low Leg,   Lt Foot, Rt Foot);    GENITALIA (Female), PLACENTA, UMBL  CORD, UTERUS      Not Visualized:   FACE/NECK: (Neck, Nuchal Fold, Profile, Face); HEART: (3VV, 3 Vessel   Trachea, Short Axis of Greater Vessels, Ductal Arch, Aortic Arch,   Interventricular Septum, Interatrial Septum, IVC, SVC);    ABD  CAV :   (Gall Bladder); SPINE: (Lumbar Spine, Sacrum);    EXTREMS: (Rt Forearm,   Rt Hand)      ANATOMY COMMENTS      Her survey of the fetal anatomy is not complete  No fetal structural abnormality or ultrasound marker for aneuploidy is   identified on the Level II ultrasound study today  The missed or limited   views above are secondary to her skin thickness, fetal position, and late   gestational age and her back pain and deep breathing  Fetal growth and   amniotic fluid volume appear symmetric and severely lagging behind her   dates  Active movement of the fetal body & extremities was seen  There is   active fetal breathing seen  There is no suspicion of a subchorionic   bleed  The placental cord insertion was not seen  The placenta is not near her prior  section scar  There is cephalization of blood flow seen and normal ductal flows        BIOPHYSICAL PROFILE      The Biophysical Profile score was 10/10  Breathin  Movement: 2  Tone: 2  AFV: 2  NST: 2      IMPRESSION      Portillo IUP   26 weeks and 4 days by this ultrasound  (FABIÁN=MAY 14 2017)   Vertex presentation   Regular fetal heart rate of 146 bpm   Anterior placenta   No placenta previa      GENERAL COMMENT      For 2 months this patient reports she had left the area and went to   [place] to help take care of a dieing relative as she was his power of     She did not seek medical care there  She reports she was   checking her blood sugars and fastings were less than 90 into her   postprandials less than 120  She was feeling better yesterday and had her sister check a blood pressure   which was elevated and which prompted her to be seen at [facility]   Review of the blood work, proteinuria, elevated blood pressures   suggest that she now has superimposed preeclampsia  Ultrasound shows severe symmetric IUGR with cephalization of blood flow   seen and normal ductal flows  NST was 140s with 10-15 beat accels and no contractions and one possible   decel to 60 with no interval change  She is very difficult to keep on the   monitor due to her weight  Recommendations   1  Recommend a hemoglobin A1c and would monitor blood sugars  Her steroids   will increase her blood sugars and will require coverage  2  Recommend serial preeclamptic labs watching for signs of HELLP   3  Would complete 48 hours of steroid benefit   4  Recommend controlling her blood pressures to keep in the range of 140s   over 90s with labetalol  Till controlled < 160/105 agree with starting her   on magnesium sulfate  5  Delivery recommended for superimposed preeclampsia with severe features   or nonreassuring fetal testing or uncontrolled blood pressures despite   medication  6  Recommend inpatient stay until delivered  7  Recommend serial dopplers2 times a week and growth in 2 weeks if   undelivered     8  Recommend transfer to [place] as she is <32 weeks and < 1500 gms  Face to face and floor time is 2hrs  MARYBETH Li     Maternal-Fetal Medicine   Electronically signed 02/09/17 20:24

## 2018-01-12 NOTE — PROGRESS NOTES
2017         RE: Óscar Ni                                  To: MARYBETH Stack    MR#: [id number]   : 1975   ENC:                                              Fax: [phone]   (Exam #: [id number])      The LMP of this 37year old,  G3, P1-1-0-1 patient was 2016, giving   her an FABIÁN of 2017 and a current gestational age of 34 weeks 5 days   by dates  A sonographic examination was performed on 2017 using   real time equipment  The ultrasound examination was performed using   abdominal technique  Earliest ultrasound found in her record: 16   MFM  11w3d  FABIÁN 17         Lorraine Rico is currently an inpatient on Labor and Delivery at the [facility] receiving evaluation and management of her pregnancy complicated by   chronic hypertension with superimposed preeclampsia with severe features,   along with a diagnosis of IUGR with abnormal fetal Doppler studies  Cardiac motion was observed at 142 bpm       INDICATIONS      intrauterine growth restriction      Exam Types      Doppler study   Biophysical Profile      RESULTS      Fetus # 1 of 1   Vertex presentation   Placenta Location = Anterior   Placenta Grade = II      AMNIOTIC FLUID      Q1: 3 1      Q2: 3 5      Q3: 3 6      Q4: 2 5   GAEL Total = 12 7 cm   Amniotic Fluid: Normal      FETAL VESSELS                                     S/D   PI    RI    PSV   AEDV RF                                                    cm/s       Umbilical Artery                 1 77       Middle Cerebral Artery           1 11      FETAL VESSELS                                    PVSys PVDia PASys  S/D   S/A   DVI   RF       Ductus Venosus:                                                No      BIOPHYSICAL PROFILE      The Biophysical Profile score was 8/8     Breathin  Movement: 2  Tone: 2  AFV: 2      IMPRESSION      Portillo IUP   Vertex presentation   Regular fetal heart rate of 142 bpm   Anterior placenta GENERAL COMMENT      Detailed evaluation of fetal growth and anatomy was not performed at the   visit today  The amniotic fluid volume is normal  The biophysical profile   is normal  The fetal Doppler studies are abnormal  The umbilical artery   pulsatility index is increased, though there is no evidence of absent or   reverse diastolic flow  The middle cerebral artery pulsatility index is   abnormally decreased, indicating redistribution of blood flow to the fetal   cerebral circulation  The cerbroplacental ratio is abnormal  The ductus   venosus waveform is normal  Underlying placental ischemic disease is   suspected  FIDENCIO Pardo M D     Maternal-Fetal Medicine   Electronically signed 02/13/17 09:00

## 2018-01-14 NOTE — PROGRESS NOTES
SEP 23 2016         RE: Radha Plants                                  To: Alberta MARYBETH Esqueda    MR#: [id number]   : [address] [place] [address] Way: [phone]:[facility]                             Fax: [phone]   (Exam #: [id number])      The LMP of this 37year old,  G3, P1-1-0-1 patient was 2016, giving   her an FABIÁN of 2017 and a current gestational age of 5 weeks 2 days   by dates  A sonographic examination was performed on SEP 23 2016 using   real time equipment  The ultrasound examination was performed using   abdominal technique  The patient has a BMI of 41 9  Her blood pressure   today was 133/71  Earliest ultrasound found in her record: 16   MFM  11w3d  FABIÁN   17 Multiple longitudinal and transverse sections revealed a boswell   intrauterine pregnancy with the fetus in transverse presentation  The   placenta is anterior in implantation, grade 0 in appearance  Cardiac motion was observed at 162 bpm       INDICATIONS      advanced maternal age   morbid obesity   previous    previous stillbirth      Exam Types      Level I      RESULTS      Fetus # 1 of 1   Transverse presentation   Fetal growth appeared normal      MEASUREMENTS (* Included In Average GA)      CRL              4 9 cm        11 weeks 3 days*   Nuchal Trans    1 40 mm      THE AVERAGE GESTATIONAL AGE is 11 weeks 3 days +/- 7 days  UTERINE ARTERIES                                  S/D   PI    RI    NOTCH       Left Uterine Artery        3 78  1 46  0 74      ANATOMY COMMENTS      Anatomic detail is limited at this gestational age  The yolk sac was not   noted  The fetal cranium appeared normal in shape and the nuchal   translucency was normal in size (1 4mm)  The nasal bone appears to be   present  The intracranial anatomy was unremarkable  Anatomy of the   fetal thorax appeared within normal limits  The cardiac rhythm was   regular    Within the abdomen, stomach & bladder were visualized and the   abdominal wall appeared intact  A three vessel cord appears to be present  Active movement of the fetal body & extremities was seen  There is no   suspicion of a subchorionic bleed  The placental cord insertion was   normal    The uterine artery Dopplers are normal for this gestational age  There is no suspicion of a uterine myoma  Free fluid is not seen in the   posterior cul-de-sac  AMNIOTIC FLUID         Largest Vertical Pocket = 2 5 cm   Amniotic Fluid: Normal      IMPRESSION      Portillo IUP   11 weeks and 3 days by this ultrasound  (FABIÁN=2017)   Transverse presentation   Fetal growth appeared normal   Regular fetal heart rate of 162 bpm   Anterior placenta      [facility]      Dear Dr Raúl Rhodes,      Thank you very much for requesting a consultation on this very nice   patient for the indication of advanced maternal age and history of   stillbirth  The patient has had 2 previous pregnancies, her first of   which in  was a  section at 38 weeks for fetal heart rate   abnormality  In , she had a near-term stillbirth  She states it   occurred between 36 and 37 weeks  She states that she was in the hospital   for at least one month because of "elevated protein"  She denies being   diagnosed with preeclampsia at that time but states that they were very   worried about her "protein"  There are no records of that stillbirth  End the patient was not a very good historian describing the significant   circumstances around that  She did state that she had gestational   diabetes with the pregnancy as well and was on insulin but also states   that she was "too much insulin" and that her blood sugar was "too low at   times"  The patient has significant back pain from reported herniated and   dislocated discs  She has had back surgery  She is on chronic opiates   including Percocet 4-5 times a day of the 10 mg Percocet  She is managed   by Dr Kimi Martins and Dr Junie Saxena  The patient is 37years old and she is   significantly obese with a BMI of greater than 40  She currently smokes   about 6 cigarettes a day  She denies alcohol or illicit drug use  The   patient has allergy to codeine  She takes Percocet 4-5 pills of 10 mg per   day for chronic neck pain  She has no significant family medical history  A review of systems is otherwise negative  On exam, the patient appears   well, in no acute distress, and her abdomen is nontender  We discussed the options for genetic screening, including but not limited   to first trimester screening, second trimester screening, combined first   and second trimester screening, noninvasive prenatal testing (NIPT) for   patients at high risk and diagnostic screening through the use of CVS and   amniocentesis  We discussed the risks and benefits of each approach   including the sensitivities and false positive rates as well as the   difference between a screening test and a diagnostic test   At the   conclusion of our discussion the patient elected noninvasive prenatal   testing utilizing the MaterniT 21 plus test   The patient had her blood   drawn in our office and the results should be available in approximately   7-10 days  Pregnancy in women over the age of 36 is a high risk pregnancy with   increased risk of adverse pregnancy outcomes particularly stillbirth,   diabetes, hypertension, macrosomia, and  section  We recommend   additional surveillance starting at at least 36 weeks with weekly   nonstress test and amniotic fluid evaluations in an otherwise   uncomplicated pregnancy until women who are 40 are over        We discussed the implications of obesity and pregnancy related to the risk   of adverse pregnancy outcomes including but not limited to, risk of   diabetes, hypertensive disorders of pregnancy, macrosomia, intrauterine   growth restriction, labor and shoulder dystocias,  section, and   increased risk of stillbirth  We discussed the current weight gain   recommendations for women with obesity and discussed good dietary   practices as well as the safety of exercise in pregnancy  We recommend the   patient gain no more than 5-10 pounds throughout her entire pregnancy,   increase her exercise and follow healthy dietary habits  Consider   referral to a dietitian should the patient have difficulty following the   aforementioned recommendations  Recommend third trimester growth   ultrasound to screen for fetal growth problems  The patient is at high   risk for the development of gestational diabetes given her advanced   maternal age, morbid obesity, and history of prior diabetes during   pregnancy requiring insulin  Early screening is recommended and referral   to the diabetes in pregnancy program if the patient screens positive  I would recommend obtaining records from the patient's previous stillbirth   as it appears she had a very complicated pregnancy that was either   complicated by preeclampsia or some type of significant renal dysfunction  It sounds like the patient was admitted for at least one month and   obtaining those records could help elucidate what the circumstances   surrounding her stillbirth were  Given the patient's history of probable hypertension in prior pregnancy,   morbid obesity, and age greater than 36, I recommend initiating low dose   aspirin therapy  A recent meta-analysis yielded risk reductions of 24%   for preeclampsia, 20% for intrauterine growth restriction, and 14% for    birth, with an absolute risk reduction of 2-5% for preeclampsia,   one to 5% for intrauterine growth restriction, and 2-4% for  birth  In this study, there was no identified risk of harm to the mother or   fetus but long-term evidence was somewhat limited    Given the overall   safety profile and risk-benefit analysis, I recommend an 81 mg aspirin be   taken everyday until approximately 35 weeks  Ideally, low dose aspirin   therapy should commence prior to 16 weeks because if aspirin is taking   later, it does not appear to be as effective  I reviewed these   recommendations with the patient and answered all of her questions to   apparent satisfaction  I am concerned the patient is on chronic opiates  It is unclear how   necessary these chronic opiates are with regard to the patient's back pain   however chronic copious can increase the risk for pregnancy complications   such as fetal growth restriction and  abstinence syndrome  I   recommend referral to neonatology prior to delivery to make the patient   wear of the screening process for  abstinence syndrome and patient   to utilize chronic opiates  Recommend an anatomy ultrasound be scheduled for 20 weeks gestation  Please note, in addition to the time spent discussing the results of the   ultrasound, I spent approximately 30 minutes of face-to-face time with the   patient, greater than 50% of which was spent in counseling and the   coordination of care for this patient  Thank you very much for allowing us to participate in the care of this   very nice patient  Should you have any questions, please do not hesitate   to contact our office  FIDENCIO Ferrell M D     Electronically signed 16 09:02

## 2018-01-15 NOTE — MISCELLANEOUS
Message  Attempted to contact pt regarding NIPT testing-pt left message on nurse line for results-mailbox is full and cannot leave message  Message was left on pt cell phone on 10/3/2016   Active Problems    1  Advanced maternal age in multigravida, first trimester (65 59) (O09 521)   2  H/O gestational diabetes in prior pregnancy, currently pregnant (V23 49)   (O09 299,Z86 32)   3  Knee Sprain (844 9)   4  Lower back pain (724 2) (M54 5)   5  Maternal morbid obesity in first trimester, antepartum (649 13,278 01) (O99 211,E66 01)   6  Other chronic pain (338 29) (G89 29)   7  Previous stillbirth or demise, antepartum, first trimester (V23 5) (O09 291)    Current Meds   1  Flexeril TABS (Cyclobenzaprine HCl); Therapy: (Recorded:15Vca1837) to Recorded   2  Percocet TABS (Oxycodone-Acetaminophen); Therapy: (Recorded:[id number]) to Recorded   3  Prenatal Vitamin TABS; TAKE 1 TABLET DAILY; Therapy: (Recorded:21Cbf6452) to Recorded    Allergies    1  Codeine Derivatives    2  No Known Environmental Allergies   3   No Known Food Allergies    Signatures   Electronically signed by : Venita Petersen RN; Oct 12 2016  2:24PM EST                       (Author)

## 2018-01-16 NOTE — MISCELLANEOUS
Message  Pt returned call and results of [id number] given and well as gender per pt request       Active Problems    1  Advanced maternal age in multigravida, first trimester (65 59) (O09 521)   2  H/O gestational diabetes in prior pregnancy, currently pregnant (V23 49)   (O09 299,Z86 32)   3  Knee Sprain (844 9)   4  Lower back pain (724 2) (M54 5)   5  Maternal morbid obesity in first trimester, antepartum (649 13,278 01) (O99 211,E66 01)   6  Other chronic pain (338 29) (G89 29)   7  Previous stillbirth or demise, antepartum, first trimester (V23 5) (O09 291)    Current Meds   1  Flexeril TABS (Cyclobenzaprine HCl); Therapy: (Recorded:60Mkb1049) to Recorded   2  Percocet TABS (Oxycodone-Acetaminophen); Therapy: (Recorded:74Mdn9175) to Recorded   3  Prenatal Vitamin TABS; TAKE 1 TABLET DAILY; Therapy: (Recorded:60Fuu0170) to Recorded    Allergies    1  Codeine Derivatives    2  No Known Environmental Allergies   3   No Known Food Allergies    Signatures   Electronically signed by : Laquita Cameron RN; Oct 12 2016  2:34PM EST                       (Author)

## 2018-05-17 ENCOUNTER — OFFICE VISIT (OUTPATIENT)
Dept: URGENT CARE | Facility: CLINIC | Age: 43
End: 2018-05-17
Payer: COMMERCIAL

## 2018-05-17 VITALS — TEMPERATURE: 97.5 F | HEART RATE: 101 BPM | DIASTOLIC BLOOD PRESSURE: 76 MMHG | SYSTOLIC BLOOD PRESSURE: 135 MMHG

## 2018-05-17 DIAGNOSIS — L02.91 ABSCESS: Primary | ICD-10-CM

## 2018-05-17 PROCEDURE — 99283 EMERGENCY DEPT VISIT LOW MDM: CPT | Performed by: NURSE PRACTITIONER

## 2018-05-17 PROCEDURE — G0382 LEV 3 HOSP TYPE B ED VISIT: HCPCS | Performed by: NURSE PRACTITIONER

## 2018-05-17 RX ORDER — CYCLOBENZAPRINE HCL 10 MG
10 TABLET ORAL 3 TIMES DAILY PRN
COMMUNITY
End: 2021-09-27 | Stop reason: ALTCHOICE

## 2018-05-17 RX ORDER — LANCETS 23 GAUGE
EACH MISCELLANEOUS
COMMUNITY
Start: 2016-11-30 | End: 2021-09-27 | Stop reason: ALTCHOICE

## 2018-05-17 RX ORDER — SULFAMETHOXAZOLE AND TRIMETHOPRIM 800; 160 MG/1; MG/1
1 TABLET ORAL EVERY 12 HOURS SCHEDULED
Qty: 14 TABLET | Refills: 0 | Status: SHIPPED | OUTPATIENT
Start: 2018-05-17 | End: 2018-05-24

## 2018-05-17 NOTE — PATIENT INSTRUCTIONS
Abscess   WHAT YOU NEED TO KNOW:   A warm compress may help your abscess drain  Your healthcare provider may make a cut in the abscess so it can drain  You may need surgery to remove an abscess that is on your hands or buttocks  DISCHARGE INSTRUCTIONS:   Return to the emergency department if:   · The area around your abscess becomes very painful, warm, or has red streaks  · You have a fever and chills  · Your heart is beating faster than usual      · You feel faint or confused  Contact your healthcare provider if:   · Your abscess gets bigger or does not get better  · Your abscess returns  · You have questions or concerns about your condition or care  Medicines: You may  need any of the following:  · Antibiotics  help treat a bacterial infection  · Acetaminophen  decreases pain and fever  It is available without a doctor's order  Ask how much to take and how often to take it  Follow directions  Acetaminophen can cause liver damage if not taken correctly  · NSAIDs , such as ibuprofen, help decrease swelling, pain, and fever  This medicine is available with or without a doctor's order  NSAIDs can cause stomach bleeding or kidney problems in certain people  If you take blood thinner medicine, always ask your healthcare provider if NSAIDs are safe for you  Always read the medicine label and follow directions  · Take your medicine as directed  Contact your healthcare provider if you think your medicine is not helping or if you have side effects  Tell him or her if you are allergic to any medicine  Keep a list of the medicines, vitamins, and herbs you take  Include the amounts, and when and why you take them  Bring the list or the pill bottles to follow-up visits  Carry your medicine list with you in case of an emergency  Self-care:   · Apply a warm compress to your abscess  This will help it open and drain  Wet a washcloth in warm, but not hot, water  Apply the compress for 10 minutes  Repeat this 4 times each day  Do not  press on an abscess or try to open it with a needle  You may push the bacteria deeper or into your blood  · Do not share your clothes, towels, or sheets with anyone  This can spread the infection to others  · Wash your hands often  This can help prevent the spread of germs  Use soap and water or an alcohol-based hand rub  Care for your wound after it is drained:   · Care for your wound as directed  If your healthcare provider says it is okay, carefully remove the bandage and gauze packing  You may need to soak the gauze to get it out of your wound  Clean your wound and the area around it as directed  Dry the area and put on new, clean bandages  Change your bandages when they get wet or dirty  · Ask your healthcare provider how to change the gauze in your wound  Keep track of how many pieces of gauze are placed inside the wound  Do not put too much packing in the wound  Do not pack the gauze too tightly in your wound  Follow up with your healthcare provider in 1 to 3 days: You may need to have your packing removed or your bandage changed  Write down your questions so you remember to ask them during your visits  © 2017 [address]  Information is for End User's use only and may not be sold, redistributed or otherwise used for commercial purposes  All illustrations and images included in CareNotes® are the copyrighted property of [employer] , [employer]  or [employer] Pressley  The above information is an  only  It is not intended as medical advice for individual conditions or treatments  Talk to your doctor, nurse or pharmacist before following any medical regimen to see if it is safe and effective for you

## 2018-05-17 NOTE — PROGRESS NOTES
[facility]        NAME: Dinah Hill is a 40 y o  female  : 1975    MRN: [id number]  DATE: May 17, 2018  TIME: 3:43 PM    Assessment and Plan   Abscess [L02 91]  1  Abscess  sulfamethoxazole-trimethoprim (BACTRIM DS) 800-160 mg per tablet         Patient Instructions       Follow up with PCP in 3-5 days  Proceed to  ER if symptoms worsen  Chief Complaint     Chief Complaint   Patient presents with    Cyst     or bug bite on back of head x 3 days that is now painful         History of Present Illness       50-year-old female with a chief complaint a lump on the back of her head  She noticed this 2-3 days ago  She denies injury and she does not remember getting bit by an insect  She is complaining of pain and swelling at the area  No drainage  Review of Systems   Review of Systems   Constitutional: Negative  HENT: Negative  Eyes: Negative  Respiratory: Negative  Cardiovascular: Negative  Gastrointestinal: Negative  Endocrine: Negative  Genitourinary: Negative  Musculoskeletal: Negative  Skin:        Redness and swelling posterior head  Neurological: Negative  Psychiatric/Behavioral: Negative            Current Medications       Current Outpatient Prescriptions:     cyclobenzaprine (FLEXERIL) 10 mg tablet, Take 10 mg by mouth 3 (three) times a day as needed for muscle spasms, Disp: , Rfl:     Glucose Blood (COOL BLOOD GLUCOSE TEST STRIPS VI), by In Vitro route, Disp: , Rfl:     Lancets 28G MISC, by Does not apply route, Disp: , Rfl:     oxyCODONE (ROXICODONE) 10 MG TABS, Take by mouth every 4 (four) hours as needed for moderate pain, Disp: , Rfl:     sertraline (ZOLOFT) 50 mg tablet, Take 50 mg by mouth daily, Disp: , Rfl:     sulfamethoxazole-trimethoprim (BACTRIM DS) 800-160 mg per tablet, Take 1 tablet by mouth every 12 (twelve) hours for 7 days, Disp: 14 tablet, Rfl: 0    Current Allergies     Allergies as of 2018 - Reviewed 2018 Allergen Reaction Noted    Codeine  10/05/2016            The following portions of the patient's history were reviewed and updated as appropriate: allergies, current medications, past family history, past medical history, past social history, past surgical history and problem list      Past Medical History:   Diagnosis Date    Back pain     used 2 percocet tid until current admission in 2017    Bone spur     in back per pt    Depression     Gestational diabetes     insulin dependent    Herniated cervical disc     Hx of degenerative disc disease     Obesity     Pre-eclampsia     Prior pregnancy with fetal demise     previous demise at 39 weeks       Past Surgical History:   Procedure Laterality Date    BACK SURGERY       SECTION      OTHER SURGICAL HISTORY      Right ovary removal 2005 per pt recall at [facility] N/A 2/15/2017    Procedure:  SECTION (); Surgeon: Tameka Goodman MD;  Location: [facility];  Service: Obstetrics    OR Deri Primus TUBE W/ Left 2/15/2017    Procedure: LIGATION/COAGULATION TUBAL;  Surgeon: Tameka Goodman MD;  Location: [facility];  Service: Obstetrics       No family history on file  Medications have been verified  Objective   /76   Pulse 101   Temp 97 5 °F (36 4 °C) (Tympanic)        Physical Exam     Physical Exam   Constitutional: She is oriented to person, place, and time  She appears well-developed and well-nourished  No distress  HENT:   Head: Normocephalic and atraumatic  Right Ear: External ear normal    Left Ear: External ear normal    Nose: Nose normal    Mouth/Throat: Oropharynx is clear and moist    Eyes: Conjunctivae and EOM are normal  Pupils are equal, round, and reactive to light  Neck: Normal range of motion  Neck supple  Cardiovascular: Normal rate, regular rhythm and normal heart sounds      Pulmonary/Chest: Effort normal and breath sounds normal    Abdominal: Soft  Bowel sounds are normal    Lymphadenopathy:     She has no cervical adenopathy  Neurological: She is alert and oriented to person, place, and time  She has normal reflexes  Skin: She is not diaphoretic  Psychiatric: She has a normal mood and affect  Nursing note and vitals reviewed  Follow up with PCP if area worsens or does not improve

## 2018-06-10 ENCOUNTER — HOSPITAL ENCOUNTER (EMERGENCY)
Facility: HOSPITAL | Age: 43
Discharge: HOME | End: 2018-06-10
Attending: EMERGENCY MEDICINE
Payer: COMMERCIAL

## 2018-06-10 VITALS
HEART RATE: 98 BPM | DIASTOLIC BLOOD PRESSURE: 89 MMHG | BODY MASS INDEX: 43.46 KG/M2 | RESPIRATION RATE: 18 BRPM | TEMPERATURE: 99.1 F | OXYGEN SATURATION: 97 % | WEIGHT: 230 LBS | SYSTOLIC BLOOD PRESSURE: 136 MMHG

## 2018-06-10 DIAGNOSIS — L02.811 ABSCESS OF SCALP: Primary | ICD-10-CM

## 2018-06-10 PROCEDURE — 99282 EMERGENCY DEPT VISIT SF MDM: CPT

## 2018-06-10 RX ORDER — MILNACIPRAN HYDROCHLORIDE 50 MG/1
TABLET, FILM COATED ORAL
Refills: 0 | COMMUNITY
Start: 2018-03-15 | End: 2018-09-18

## 2018-06-10 RX ORDER — IBUPROFEN 600 MG/1
600 TABLET ORAL EVERY 8 HOURS PRN
Qty: 15 TABLET | Refills: 0 | Status: SHIPPED | OUTPATIENT
Start: 2018-06-10 | End: 2021-09-27 | Stop reason: ALTCHOICE

## 2018-06-10 RX ORDER — CLINDAMYCIN HYDROCHLORIDE 300 MG/1
300 CAPSULE ORAL 4 TIMES DAILY
Qty: 28 CAPSULE | Refills: 0 | Status: SHIPPED | OUTPATIENT
Start: 2018-06-10 | End: 2018-06-17

## 2018-06-10 RX ORDER — CLINDAMYCIN HYDROCHLORIDE 150 MG/1
300 CAPSULE ORAL ONCE
Status: COMPLETED | OUTPATIENT
Start: 2018-06-10 | End: 2018-06-10

## 2018-06-10 RX ORDER — IBUPROFEN 600 MG/1
600 TABLET ORAL ONCE
Status: COMPLETED | OUTPATIENT
Start: 2018-06-10 | End: 2018-06-10

## 2018-06-10 RX ORDER — LIDOCAINE HYDROCHLORIDE AND EPINEPHRINE 10; 10 MG/ML; UG/ML
10 INJECTION, SOLUTION INFILTRATION; PERINEURAL ONCE
Status: COMPLETED | OUTPATIENT
Start: 2018-06-10 | End: 2018-06-10

## 2018-06-10 RX ADMIN — IBUPROFEN 600 MG: 600 TABLET ORAL at 15:25

## 2018-06-10 RX ADMIN — LIDOCAINE HYDROCHLORIDE,EPINEPHRINE BITARTRATE 10 ML: 10; .01 INJECTION, SOLUTION INFILTRATION; PERINEURAL at 15:25

## 2018-06-10 RX ADMIN — CLINDAMYCIN HYDROCHLORIDE 300 MG: 150 CAPSULE ORAL at 15:25

## 2018-06-10 NOTE — DISCHARGE INSTRUCTIONS
Abscess   WHAT YOU NEED TO KNOW:   A warm compress may help your abscess drain  Your healthcare provider may make a cut in the abscess so it can drain  You may need surgery to remove an abscess that is on your hands or buttocks  DISCHARGE INSTRUCTIONS:   Return to the emergency department if:   · The area around your abscess becomes very painful, warm, or has red streaks  · You have a fever and chills  · Your heart is beating faster than usual      · You feel faint or confused  Contact your healthcare provider if:   · Your abscess gets bigger or does not get better  · Your abscess returns  · You have questions or concerns about your condition or care  Medicines: You may  need any of the following:  · Antibiotics  help treat a bacterial infection  · Acetaminophen  decreases pain and fever  It is available without a doctor's order  Ask how much to take and how often to take it  Follow directions  Acetaminophen can cause liver damage if not taken correctly  · NSAIDs , such as ibuprofen, help decrease swelling, pain, and fever  This medicine is available with or without a doctor's order  NSAIDs can cause stomach bleeding or kidney problems in certain people  If you take blood thinner medicine, always ask your healthcare provider if NSAIDs are safe for you  Always read the medicine label and follow directions  · Take your medicine as directed  Contact your healthcare provider if you think your medicine is not helping or if you have side effects  Tell him or her if you are allergic to any medicine  Keep a list of the medicines, vitamins, and herbs you take  Include the amounts, and when and why you take them  Bring the list or the pill bottles to follow-up visits  Carry your medicine list with you in case of an emergency  Self-care:   · Apply a warm compress to your abscess  This will help it open and drain  Wet a washcloth in warm, but not hot, water  Apply the compress for 10 minutes  Repeat this 4 times each day  Do not  press on an abscess or try to open it with a needle  You may push the bacteria deeper or into your blood  · Do not share your clothes, towels, or sheets with anyone  This can spread the infection to others  · Wash your hands often  This can help prevent the spread of germs  Use soap and water or an alcohol-based hand rub  Care for your wound after it is drained:   · Care for your wound as directed  If your healthcare provider says it is okay, carefully remove the bandage and gauze packing  You may need to soak the gauze to get it out of your wound  Clean your wound and the area around it as directed  Dry the area and put on new, clean bandages  Change your bandages when they get wet or dirty  · Ask your healthcare provider how to change the gauze in your wound  Keep track of how many pieces of gauze are placed inside the wound  Do not put too much packing in the wound  Do not pack the gauze too tightly in your wound  Follow up with your healthcare provider in 1 to 3 days: You may need to have your packing removed or your bandage changed  Write down your questions so you remember to ask them during your visits  © 2017 [address] [employer]  Information is for End User's use only and may not be sold, redistributed or otherwise used for commercial purposes  All illustrations and images included in CareNotes® are the copyrighted property of [employer] , [employer]  or [employer]ley  The above information is an  only  It is not intended as medical advice for individual conditions or treatments  Talk to your doctor, nurse or pharmacist before following any medical regimen to see if it is safe and effective for you

## 2018-06-10 NOTE — EMTALA/ACUTE CARE TRANSFER
[address] [address]  [address] [address]  Dept: [address] [place] TRANSFER CONSENT    NAME Kevin Hanson                                         1975                              MRN [id number]    I have been informed of my rights regarding examination, treatment, and transfer   by Janine Ferrara PA-C    Benefits: Specialized equipment and/or services available at the receiving facility (Include comment)________________________ (hematology and pulmonary)    Risks: Potential for delay in receiving treatment, Potential deterioration of medical condition, Loss of IV, Increased discomfort during transfer, Possible worsening of condition or death during transfer      Consent for Transfer:  I acknowledge that my medical condition has been evaluated and explained to me by the emergency department physician or other qualified medical person and/or my attending physician, who has recommended that I be transferred to the service of  Accepting Physician: dr Bk Tineo at [address] [place] [address] Name, [address] : [address] [facility]  The above potential benefits of such transfer, the potential risks associated with such transfer, and the probable risks of not being transferred have been explained to me, and I fully understand them  The doctor has explained that, in my case, the benefits of transfer outweigh the risks  I agree to be transferred  I authorize the performance of emergency medical procedures and treatments upon me in both transit and upon arrival at the receiving facility  Additionally, I authorize the release of any and all medical records to the receiving facility and request they be transported with me, if possible  I understand that the safest mode of transportation during a medical emergency is an ambulance and that the Hospital advocates the use of this mode of transport   Risks of traveling to the receiving facility by car, including absence of medical control, life sustaining equipment, such as oxygen, and medical personnel has been explained to me and I fully understand them  (JAMESON CORRECT BOX BELOW)  [x]  I consent to the stated transfer and to be transported by ambulance/helicopter  [  ]  I consent to the stated transfer, but refuse transportation by ambulance and accept full responsibility for my transportation by car  I understand the risks of non-ambulance transfers and I exonerate the Hospital and its staff from any deterioration in my condition that results from this refusal     X___________________________________________    DATE  06/10/18  TIME________  Signature of patient or legally responsible individual signing on patient behalf           RELATIONSHIP TO PATIENT_________________________          Provider Certification    NAME Isaias Gilbert                                         1975                              MRN [id number]    A medical screening exam was performed on the above named patient  Based on the examination:    Condition Necessitating Transfer The encounter diagnosis was Abscess of scalp      Patient Condition: The patient has been stabilized such that within reasonable medical probability, no material deterioration of the patient condition or the condition of the unborn child(payal) is likely to result from the transfer    Reason for Transfer: Level of Care needed not available at this facility    Transfer Requirements: [facility]   · Space available and qualified personnel available for treatment as acknowledged by musa pacs  · Agreed to accept transfer and to provide appropriate medical treatment as acknowledged by       dr Maya Cotton  · Appropriate medical records of the examination and treatment of the patient are provided at the time of transfer   [facility], [address] _______  · Transfer will be performed by qualified personnel from [facility]  and appropriate transfer equipment as required, including the use of necessary and appropriate life support measures  Provider Certification: I have examined the patient and explained the following risks and benefits of being transferred/refusing transfer to the patient/family:  General risk, such as traffic hazards, adverse weather conditions, rough terrain or turbulence, possible failure of equipment (including vehicle or aircraft), or consequences of actions of persons outside the control of the transport personnel      Based on these reasonable risks and benefits to the patient and/or the unborn child(payal), and based upon the information available at the time of the patients examination, I certify that the medical benefits reasonably to be expected from the provision of appropriate medical treatments at another medical facility outweigh the increasing risks, if any, to the individuals medical condition, and in the case of labor to the unborn child, from effecting the transfer      X____________________________________________ DATE 06/10/18        TIME_______      ORIGINAL - SEND TO MEDICAL RECORDS   COPY - SEND WITH PATIENT DURING TRANSFER

## 2018-06-10 NOTE — ED PROVIDER NOTES
History  Chief Complaint   Patient presents with    Abscess     painful abcess on back of head which keep forming and "popping"       History provided by:  Patient  Abscess   Location:  Head/neck  Head/neck abscess location:  Scalp (occipital scalp)  Size:  Top lesion 2cm, lower lesion 4cm  Abscess quality: draining, fluctuance, induration and painful    Abscess quality: no redness and no warmth    Red streaking: no    Duration:  1 week  Progression:  Unchanged  Pain details:     Quality:  Pressure, dull and aching    Severity:  Moderate    Timing:  Constant    Progression:  Unchanged  Chronicity:  Recurrent (initially started 5/17/18 seen at urgent care on 7 days of bactrim  recurred just below initial lesion site)  Context: not diabetes, not immunosuppression, not injected drug use, not insect bite/sting and not skin injury    Relieved by:  Nothing  Worsened by:  Nothing  Ineffective treatments:  Draining/squeezing and oral antibiotics (seen at urgent care and [facility] earlier in the course prescribed Bactrim  not currently on abx for this episode)  Associated symptoms: no anorexia, no fatigue, no fever, no headaches, no nausea and no vomiting    Associated symptoms comment:  Otherwise feels well no associated illness  Risk factors: no family hx of MRSA, no hx of MRSA and no prior abscess        Prior to Admission Medications   Prescriptions Last Dose Informant Patient Reported? Taking?    Glucose Blood (COOL BLOOD GLUCOSE TEST STRIPS VI)   Yes No   Sig: by In Vitro route   Lancets 28G MISC   Yes No   Sig: by Does not apply route   SAVELLA 50 MG TABS   Yes No   cyclobenzaprine (FLEXERIL) 10 mg tablet   Yes Yes   Sig: Take 10 mg by mouth 3 (three) times a day as needed for muscle spasms   oxyCODONE (ROXICODONE) 10 MG TABS   Yes Yes   Sig: Take by mouth every 4 (four) hours as needed for moderate pain   sertraline (ZOLOFT) 50 mg tablet   Yes Yes   Sig: Take 50 mg by mouth daily      Facility-Administered Medications: None       Past Medical History:   Diagnosis Date    Back pain     used 2 percocet tid until current admission in 2017    Bone spur     in back per pt    Depression     Gestational diabetes     insulin dependent    Herniated cervical disc     Hx of degenerative disc disease     Obesity     Pre-eclampsia     Prior pregnancy with fetal demise     previous demise at 39 weeks       Past Surgical History:   Procedure Laterality Date    BACK SURGERY       SECTION      OTHER SURGICAL HISTORY      Right ovary removal 2005 per pt recall at [facility] N/A 2/15/2017    Procedure:  SECTION (); Surgeon: Laura Lynne MD;  Location: [facility];  Service: Obstetrics    MI Katelin Share TUBE W/ Left 2/15/2017    Procedure: LIGATION/COAGULATION TUBAL;  Surgeon: Laura Lynne MD;  Location: [facility];  Service: Obstetrics       History reviewed  No pertinent family history  I have reviewed and agree with the history as documented  Social History   Substance Use Topics    Smoking status: Current Every Day Smoker     Packs/day: 0 50     Types: Cigarettes    Smokeless tobacco: Never Used    Alcohol use No        Review of Systems   Constitutional: Negative for activity change, appetite change, chills, diaphoresis, fatigue, fever and unexpected weight change  HENT: Negative for congestion, ear pain, postnasal drip, rhinorrhea, sinus pressure and sore throat  Eyes: Negative for pain, discharge and redness  Respiratory: Negative for cough, chest tightness and shortness of breath  Cardiovascular: Negative for chest pain, palpitations and leg swelling  Gastrointestinal: Negative for abdominal pain, anorexia, constipation, diarrhea, nausea and vomiting  Genitourinary: Negative for difficulty urinating, dysuria, flank pain, frequency, hematuria and urgency     Musculoskeletal: Negative for arthralgias, back pain and myalgias  Skin: Positive for wound (scalp abscess/cyst)  Negative for color change and rash  Allergic/Immunologic: Negative for immunocompromised state  Neurological: Negative for dizziness, tremors, syncope, weakness, numbness and headaches  Physical Exam  Physical Exam   Constitutional: She is oriented to person, place, and time  She appears well-developed and well-nourished  No distress  HENT:   Head: Normocephalic and atraumatic  Right Ear: External ear normal    Left Ear: External ear normal    Nose: Nose normal    Mouth/Throat: Oropharynx is clear and moist    Eyes: Conjunctivae and EOM are normal  Pupils are equal, round, and reactive to light  Neck: Normal range of motion  Neck supple  Cardiovascular: Normal rate, regular rhythm and normal heart sounds  No murmur heard  Pulmonary/Chest: Effort normal and breath sounds normal  No respiratory distress  She exhibits no tenderness  Abdominal: Soft  Bowel sounds are normal    Musculoskeletal: She exhibits no edema or tenderness  Neurological: She is alert and oriented to person, place, and time  No cranial nerve deficit or sensory deficit  Skin: Skin is warm and dry  Capillary refill takes less than 2 seconds  No rash noted  She is not diaphoretic  No erythema  No pallor  Psychiatric: She has a normal mood and affect  Nursing note and vitals reviewed        Vital Signs  ED Triage Vitals [06/10/18 1428]   Temperature Pulse Respirations Blood Pressure SpO2   99 1 °F (37 3 °C) (!) 114 20 141/96 96 %      Temp Source Heart Rate Source Patient Position - Orthostatic VS BP Location FiO2 (%)   Temporal Left Sitting Left arm --      Pain Score       6           Vitals:    06/10/18 1428 06/10/18 1720   BP: 141/96 136/89   Pulse: (!) 114 98   Patient Position - Orthostatic VS: Sitting        Visual Acuity      ED Medications  Medications   lidocaine-epinephrine (XYLOCAINE/EPINEPHRINE) 1 %-1:100,000 injection 10 mL (10 mL Infiltration Given 6/10/18 1525)   ibuprofen (MOTRIN) tablet 600 mg (600 mg Oral Given 6/10/18 1525)   clindamycin (CLEOCIN) capsule 300 mg (300 mg Oral Given 6/10/18 1525)       Diagnostic Studies  Results Reviewed     None                 No orders to display              Procedures  Incision/Drainage  Date/Time: 6/10/2018 4:15 PM  Performed by: Krishna Conklin  Authorized by: Krishna Conklin     Patient location:  ED  Other Assisting Provider: No    Consent:     Consent obtained:  Verbal    Consent given by:  Patient    Risks discussed:  Bleeding, damage to other organs, incomplete drainage, infection and pain  Universal protocol:     Patient identity confirmed:  Verbally with patient and arm band  Location:     Type:  Abscess    Size:  3cm    Location:  Head/neck    Head/neck location:  Scalp (occiput)  Pre-procedure details:     Skin preparation:  Betadine  Anesthesia (see MAR for exact dosages): Anesthesia method:  Local infiltration    Local anesthetic:  Lidocaine 1% WITH epi  Procedure details:     Complexity:  Simple    Needle aspiration: no      Incision types:  Single straight    Scalpel blade:  11    Incision depth:  Skin    Wound management:  Irrigated with saline    Irrigation with saline:  100    Drainage:  Purulent    Drainage amount: Moderate    Wound treatment:  Packing placed    Packing materials:  1/4 in gauze    Amount 1/4":  6 cm  Post-procedure details:     Patient tolerance of procedure:   Tolerated well, no immediate complications           Phone Contacts  ED Phone Contact    ED Course         MDM  Number of Diagnoses or Management Options  Abscess of scalp: new and does not require workup     Amount and/or Complexity of Data Reviewed  Review and summarize past medical records: yes ([facility] visit end of may)    Patient Progress  Patient progress: stable    CritCare Time    Disposition  Final diagnoses:   Abscess of scalp     Time reflects when diagnosis was documented in both MDM as applicable and the Disposition within this note     Time User Action Codes Description Comment    6/10/2018  4:44 PM Milagros So Add [K98 [id number]] Abscess of scalp       ED Disposition     ED Disposition Condition Comment    Discharge  Majo Phillips discharge to home/self care      Condition at discharge: Good        MD Documentation      Most Recent Value   Patient Condition  The patient has been stabilized such that within reasonable medical probability, no material deterioration of the patient condition or the condition of the unborn child(payal) is likely to result from the transfer   Reason for Transfer  Level of Care needed not available at this facility   Benefits of Transfer  Specialized equipment and/or services available at the receiving facility (Include comment)________________________ [hematology and pulmonary]   Risks of Transfer  Potential for delay in receiving treatment, Potential deterioration of medical condition, Loss of IV, Increased discomfort during transfer, Possible worsening of condition or death during transfer   Accepting Physician  dr Tahira Cooper Name, [address]    (Name & Tel number)  [facility] pacs   [facility]  [facility]   Sending MD ronquillo/leslie   Provider Certification  General risk, such as traffic hazards, adverse weather conditions, rough terrain or turbulence, possible failure of equipment (including vehicle or aircraft), or consequences of actions of persons outside the control of the transport personnel      RN Documentation      Most [facility] Name, [address]    (Name & Tel number)  [facility] pacs   Transported by [employer] and Unit #)  [facility]      Follow-up Information     Follow up With Specialties Details Why Contact Info Additional Rekha Lion 10, DO Family Medicine Schedule an appointment as soon as possible for a visit in 2 days For wound re-check [facility]  [address] [facility] [address] [facility]       [address] [facility] [place] Schedule an appointment as soon as possible for a visit in 2 days For wound re-check [address] [facility] [address] [facility], [address], [place], [address] [facility], [address]          Discharge Medication List as of 6/10/2018  4:45 PM      START taking these medications    Details   clindamycin (CLEOCIN) 300 MG capsule Take 1 capsule (300 mg total) by mouth 4 (four) times a day for 7 days, Starting Sun 6/10/2018, Until Sun 6/17/2018, Normal      ibuprofen (MOTRIN) 600 mg tablet Take 1 tablet (600 mg total) by mouth every 8 (eight) hours as needed for mild pain or moderate pain for up to 5 days, Starting Sun 6/10/2018, Until Fri 6/15/2018, Normal         CONTINUE these medications which have NOT CHANGED    Details   cyclobenzaprine (FLEXERIL) 10 mg tablet Take 10 mg by mouth 3 (three) times a day as needed for muscle spasms, Historical Med      oxyCODONE (ROXICODONE) 10 MG TABS Take by mouth every 4 (four) hours as needed for moderate pain, Until Discontinued, Historical Med      sertraline (ZOLOFT) 50 mg tablet Take 50 mg by mouth daily, Historical Med      Glucose Blood (COOL BLOOD GLUCOSE TEST STRIPS VI) by In Vitro route, Starting Wed 11/30/2016, Historical Med      Lancets 28G MISC by Does not apply route, Starting Wed 11/30/2016, Historical Med      SAVELLA 50 MG TABS Starting Thu 3/15/2018, Historical Med           No discharge procedures on file      ED Provider  Electronically Signed by           Rosalie Dutta PA-C  06/10/18 2054

## 2018-06-13 ENCOUNTER — OFFICE VISIT (OUTPATIENT)
Dept: SURGERY | Facility: HOSPITAL | Age: 43
End: 2018-06-13
Payer: COMMERCIAL

## 2018-06-13 ENCOUNTER — TRANSCRIBE ORDERS (OUTPATIENT)
Dept: ADMINISTRATIVE | Facility: HOSPITAL | Age: 43
End: 2018-06-13

## 2018-06-13 ENCOUNTER — APPOINTMENT (OUTPATIENT)
Dept: LAB | Facility: HOSPITAL | Age: 43
End: 2018-06-13
Attending: SURGERY
Payer: COMMERCIAL

## 2018-06-13 VITALS
WEIGHT: 219 LBS | DIASTOLIC BLOOD PRESSURE: 91 MMHG | BODY MASS INDEX: 41.35 KG/M2 | HEIGHT: 61 IN | SYSTOLIC BLOOD PRESSURE: 157 MMHG | HEART RATE: 108 BPM | TEMPERATURE: 98.5 F

## 2018-06-13 DIAGNOSIS — L02.811 ABSCESS OF SCALP: Primary | ICD-10-CM

## 2018-06-13 DIAGNOSIS — L02.91 ABSCESS: ICD-10-CM

## 2018-06-13 PROCEDURE — 87070 CULTURE OTHR SPECIMN AEROBIC: CPT

## 2018-06-13 PROCEDURE — 87205 SMEAR GRAM STAIN: CPT

## 2018-06-13 PROCEDURE — 87147 CULTURE TYPE IMMUNOLOGIC: CPT

## 2018-06-13 PROCEDURE — 87077 CULTURE AEROBIC IDENTIFY: CPT

## 2018-06-13 PROCEDURE — 99204 OFFICE O/P NEW MOD 45 MIN: CPT | Performed by: SURGERY

## 2018-06-13 PROCEDURE — 87186 SC STD MICRODIL/AGAR DIL: CPT

## 2018-06-16 LAB
BACTERIA WND AEROBE CULT: ABNORMAL
BACTERIA WND AEROBE CULT: ABNORMAL
GRAM STN SPEC: ABNORMAL
GRAM STN SPEC: ABNORMAL

## 2018-06-16 RX ORDER — CIPROFLOXACIN 500 MG/1
500 TABLET, FILM COATED ORAL EVERY 12 HOURS SCHEDULED
Qty: 14 TABLET | Refills: 0 | Status: SHIPPED | OUTPATIENT
Start: 2018-06-16 | End: 2018-06-23

## 2018-06-19 PROBLEM — M54.50 LOW BACK PAIN: Status: ACTIVE | Noted: 2018-06-19

## 2018-06-19 PROBLEM — L02.811 ABSCESS OF SCALP: Status: ACTIVE | Noted: 2018-06-19

## 2018-06-19 PROBLEM — O11.9 CHRONIC HYPERTENSION WITH SUPERIMPOSED PREECLAMPSIA: Status: ACTIVE | Noted: 2017-02-14

## 2018-06-19 PROBLEM — M54.30 CHRONIC SCIATICA: Status: ACTIVE | Noted: 2018-06-19

## 2018-06-19 PROBLEM — O36.5930 INTRAUTERINE GROWTH RESTRICTION AFFECTING ANTEPARTUM CARE OF MOTHER IN THIRD TRIMESTER: Status: ACTIVE | Noted: 2017-02-14

## 2018-06-19 PROCEDURE — 10060 I&D ABSCESS SIMPLE/SINGLE: CPT | Performed by: SURGERY

## 2018-06-19 NOTE — ASSESSMENT & PLAN NOTE
Patient is status post a drainage of scalp abscess post found have additional scalp abscess that communicated with her previous I&D site pain  She underwent incision and drainage in the office of the additional abscess  Tolerated well  - continue antibiotics  - follow-up cultures  - remove packing in 24 hours  - follow-up 1-2 weeks for wound check

## 2018-06-19 NOTE — PROGRESS NOTES
Assessment/Plan:    Abscess of scalp  Patient is status post a drainage of scalp abscess post found have additional scalp abscess that communicated with her previous I&D site pain  She underwent incision and drainage in the office of the additional abscess  Tolerated well  - continue antibiotics  - follow-up cultures  - remove packing in 24 hours  - follow-up 1-2 weeks for wound check  Diagnoses and all orders for this visit:    Abscess  -     Wound culture and Gram stain; Future  -     ciprofloxacin (CIPRO) 500 mg tablet; Take 1 tablet (500 mg total) by mouth every 12 (twelve) hours for 7 days    Abscess of scalp          Subjective:      Patient ID: Yoshi Ny is a 40 y o  female  41-year-old female presents today for evaluation of abscess of scalp  Patient also story of having visited multiple urgent care and ER visits over the course of the last week  As she was given antibiotics at an outside in ER with a subsequent follow-up with [address] [place] [address] where she underwent incision and drainage of a new or abscess of the scalp  Since that time the patient states the area continues to drain  She is currently taking antibiotics  Denies any nausea vomiting  No fevers or chills  Patient denies any significant previous episodes of abscesses in the past   States that the area that was opened up continues to drain  She still having some tenderness at that area although S and the improved since the drainage  The following portions of the patient's history were reviewed and updated as appropriate:   She  has a past medical history of Back pain; Bone spur; Depression; Gestational diabetes; Herniated cervical disc; Herniated lumbar intervertebral disc; degenerative disc disease; Obesity; Pre-eclampsia; and Prior pregnancy with fetal demise    She   Patient Active Problem List    Diagnosis Date Noted    Chronic sciatica 06/19/2018    Low back pain 06/19/2018    Abscess of scalp 06/19/2018    S/P  section 02/15/2017    Tobacco abuse 02/15/2017    Chronic hypertension with superimposed preeclampsia 2017    Intrauterine growth restriction affecting antepartum care of mother in third trimester 2017    Diet controlled gestational diabetes mellitus (GDM) in second trimester 2017    Morbid obesity due to excess calories ([facility] [address] ) 2017    Advanced maternal age in multigravida, second trimester 2016    Chronic low back pain 2016    Chronic, continuous use of opioids 2016    Maternal morbid obesity in second trimester, antepartum ([facility] [address] ) 2016     She  has a past surgical history that includes Back surgery;  section; Other surgical history; pr  delivery only (N/A, 2/15/2017); pr ligation,fallopian tube w/ (Left, 2/15/2017); and Laminectomy  Her family history includes Other in her mother  She  reports that she has been smoking Cigarettes  She has been smoking about 0 50 packs per day  She has never used smokeless tobacco  She reports that she uses drugs, including Oxycodone  She reports that she does not drink alcohol    Current Outpatient Prescriptions   Medication Sig Dispense Refill    cyclobenzaprine (FLEXERIL) 10 mg tablet Take 10 mg by mouth 3 (three) times a day as needed for muscle spasms      Glucose Blood (COOL BLOOD GLUCOSE TEST STRIPS VI) by In Vitro route      ibuprofen (MOTRIN) 600 mg tablet Take 1 tablet (600 mg total) by mouth every 8 (eight) hours as needed for mild pain or moderate pain for up to 5 days 15 tablet 0    Lancets 28G MISC by Does not apply route      oxyCODONE (ROXICODONE) 10 MG TABS Take by mouth every 4 (four) hours as needed for moderate pain      SAVELLA 50 MG TABS   0    sertraline (ZOLOFT) 50 mg tablet Take 50 mg by mouth daily      ciprofloxacin (CIPRO) 500 mg tablet Take 1 tablet (500 mg total) by mouth every 12 (twelve) hours for 7 days 14 tablet 0     No current facility-administered medications for this visit  She is allergic to codeine       Review of Systems      A 10 point review of systems was conducted, all negative except as noted above HPI  Objective:      /91   Pulse (!) 108   Temp 98 5 °F (36 9 °C)   Ht 5' 1" (1 549 m)   Wt 99 3 kg (219 lb)   BMI 41 38 kg/m²            Physical Exam   Constitutional: She is oriented to person, place, and time  She appears well-developed and well-nourished  No distress  HENT:   Head: Normocephalic and atraumatic  Eyes: No scleral icterus  Neck: Normal range of motion  No tracheal deviation present  Cardiovascular: Normal rate, regular rhythm and normal heart sounds  Exam reveals no gallop and no friction rub  No murmur heard  Pulmonary/Chest: Effort normal and breath sounds normal  No respiratory distress  She has no wheezes  She has no rales  She exhibits no tenderness  Abdominal: She exhibits no distension  There is no tenderness  There is no rebound and no guarding  Musculoskeletal: Normal range of motion  She exhibits no edema or tenderness  Lymphadenopathy:     She has no cervical adenopathy  Neurological: She is alert and oriented to person, place, and time  No cranial nerve deficit  Skin: Skin is warm  No rash noted  She is not diaphoretic  No erythema  No pallor  Posterior scalp there is noted to be a well-healed wound likely from previous abscess  There is also a draining abscess cavity with a noted approximately 4 millimeter opening with noted expression of purulent material with pressure  Just inferior and lateral to that is a 3 x 2 painful and fluctuant area that with compression also notes to drain into the previous I&D site  There is tender to palpation in the entire region associated with these abscesses  No significant erythema  Psychiatric: She has a normal mood and affect  Her behavior is normal    Vitals reviewed        Incision and Drainage  Date/Time: 6/19/2018 2:57 PM  Performed by: Sudhakar Maxwell  Authorized by: Sudhakar Maxwell     Patient location:  Clinic  Other Assisting Provider: No    Consent:     Consent obtained:  Verbal    Consent given by:  Patient    Risks discussed:  Bleeding, incomplete drainage, pain and infection    Alternatives discussed:  Observation and no treatment  Universal protocol:     Procedure explained and questions answered to patient or proxy's satisfaction: yes      Relevant documents present and verified: yes      Patient identity confirmed:  Verbally with patient  Location:     Type:  Abscess    Size:  3x2xm    Location:  Head/neck    Head/neck location:  Scalp  Pre-procedure details:     Skin preparation:  Chloraprep  Anesthesia (see MAR for exact dosages): Anesthesia method:  Local infiltration    Local anesthetic:  Lidocaine 1% w/o epi  Procedure details:     Complexity:  Simple    Needle aspiration: no      Incision types:  Stab incision and cruciate    Scalpel blade:  11    Approach:  Open    Incision depth:  Skin and subcutaneous    Wound management:  Probed and deloculated and irrigated with saline    Drainage:  Bloody and purulent    Drainage amount: Moderate    Wound treatment:  Packing placed    Packing materials:  1/4 in gauze  Post-procedure details:     Patient tolerance of procedure:   Tolerated well, no immediate complications

## 2018-09-18 ENCOUNTER — HOSPITAL ENCOUNTER (EMERGENCY)
Facility: HOSPITAL | Age: 43
Discharge: HOME | End: 2018-09-18
Attending: EMERGENCY MEDICINE | Admitting: INTERNAL MEDICINE
Payer: COMMERCIAL

## 2018-09-18 ENCOUNTER — APPOINTMENT (EMERGENCY)
Dept: RADIOLOGY | Facility: HOSPITAL | Age: 43
End: 2018-09-18
Payer: COMMERCIAL

## 2018-09-18 VITALS
TEMPERATURE: 97 F | WEIGHT: 210 LBS | SYSTOLIC BLOOD PRESSURE: 165 MMHG | HEART RATE: 115 BPM | OXYGEN SATURATION: 96 % | RESPIRATION RATE: 18 BRPM | DIASTOLIC BLOOD PRESSURE: 90 MMHG | BODY MASS INDEX: 39.65 KG/M2 | HEIGHT: 61 IN

## 2018-09-18 DIAGNOSIS — I10 HYPERTENSION: ICD-10-CM

## 2018-09-18 DIAGNOSIS — Z72.0 TOBACCO ABUSE: ICD-10-CM

## 2018-09-18 DIAGNOSIS — J20.9 ACUTE BRONCHITIS: Primary | ICD-10-CM

## 2018-09-18 PROCEDURE — 71046 X-RAY EXAM CHEST 2 VIEWS: CPT

## 2018-09-18 PROCEDURE — 94760 N-INVAS EAR/PLS OXIMETRY 1: CPT

## 2018-09-18 PROCEDURE — 94640 AIRWAY INHALATION TREATMENT: CPT

## 2018-09-18 PROCEDURE — 99285 EMERGENCY DEPT VISIT HI MDM: CPT

## 2018-09-18 RX ORDER — AMOXICILLIN AND CLAVULANATE POTASSIUM 875; 125 MG/1; MG/1
1 TABLET, FILM COATED ORAL ONCE
Status: COMPLETED | OUTPATIENT
Start: 2018-09-18 | End: 2018-09-18

## 2018-09-18 RX ORDER — AMOXICILLIN AND CLAVULANATE POTASSIUM 875; 125 MG/1; MG/1
1 TABLET, FILM COATED ORAL EVERY 12 HOURS SCHEDULED
Qty: 14 TABLET | Refills: 0 | Status: SHIPPED | OUTPATIENT
Start: 2018-09-18 | End: 2018-09-25

## 2018-09-18 RX ORDER — IPRATROPIUM BROMIDE AND ALBUTEROL SULFATE 2.5; .5 MG/3ML; MG/3ML
3 SOLUTION RESPIRATORY (INHALATION) ONCE
Status: COMPLETED | OUTPATIENT
Start: 2018-09-18 | End: 2018-09-18

## 2018-09-18 RX ORDER — IPRATROPIUM BROMIDE AND ALBUTEROL SULFATE 2.5; .5 MG/3ML; MG/3ML
3 SOLUTION RESPIRATORY (INHALATION) 4 TIMES DAILY
Qty: 30 VIAL | Refills: 0 | Status: SHIPPED | OUTPATIENT
Start: 2018-09-18 | End: 2021-09-27 | Stop reason: ALTCHOICE

## 2018-09-18 RX ADMIN — AMOXICILLIN AND CLAVULANATE POTASSIUM 1 TABLET: 875; 125 TABLET, FILM COATED ORAL at 19:57

## 2018-09-18 RX ADMIN — IPRATROPIUM BROMIDE AND ALBUTEROL SULFATE 3 ML: .5; 3 SOLUTION RESPIRATORY (INHALATION) at 18:13

## 2018-09-18 NOTE — ED PROVIDER NOTES
History  Chief Complaint   Patient presents with    Shortness of Breath     began 4 days ago and has gotten worse today   Cough     Patient presents emergency room with complaint shortness breath which began today  She states she has had upper respiratory symptoms with cough runny nose for several days  She states she had a nebulizer left over from previous infection with pneumonia and she used 1 neb treatment yesterday and 1 earlier today with minimal improvement  Denies fevers or chills   She admits to smoking and continuing to smoke during her current symptoms  She admits to cough however no production of sputum  Patient states her upper respiratory symptoms progressed to being short of breath which began only earlier today  No nausea vomiting lightheaded dizziness no syncopal events  History provided by:  Patient   used: No    Shortness of Breath   Associated symptoms: cough and wheezing    Associated symptoms: no abdominal pain, no chest pain, no diaphoresis, no ear pain, no fever, no headaches, no neck pain, no rash, no sore throat and no vomiting    Cough   Associated symptoms: rhinorrhea, shortness of breath and wheezing    Associated symptoms: no chest pain, no chills, no diaphoresis, no ear pain, no fever, no headaches, no myalgias, no rash and no sore throat      Allergies   Allergen Reactions    Codeine      aggression           Prior to Admission Medications   Prescriptions Last Dose Informant Patient Reported? Taking?    Glucose Blood (COOL BLOOD GLUCOSE TEST STRIPS VI)  Self Yes No   Sig: by In Vitro route   Lancets 28G MISC  Self Yes No   Sig: by Does not apply route   NARCAN 4 MG/0 1ML LIQD   Yes No   cyclobenzaprine (FLEXERIL) 10 mg tablet  Self Yes No   Sig: Take 10 mg by mouth 3 (three) times a day as needed for muscle spasms   ibuprofen (MOTRIN) 600 mg tablet  Self No No   Sig: Take 1 tablet (600 mg total) by mouth every 8 (eight) hours as needed for mild pain or moderate pain for up to 5 days   omeprazole (PRILOSEC) 20 mg delayed release capsule   Yes No   Sig: Daily   oxyCODONE (ROXICODONE) 10 MG TABS  Self Yes No   Sig: Take by mouth every 4 (four) hours as needed for moderate pain   sertraline (ZOLOFT) 50 mg tablet  Self Yes No   Sig: Take 50 mg by mouth daily      Facility-Administered Medications: None       Past Medical History:   Diagnosis Date    Back pain     used 2 percocet tid until current admission in 2017    Bone spur     in back per pt    Depression     Gestational diabetes     insulin dependent    Herniated cervical disc     Herniated lumbar intervertebral disc     Hx of degenerative disc disease     Obesity     Pre-eclampsia     Prior pregnancy with fetal demise     previous demise at 39 weeks       Past Surgical History:   Procedure Laterality Date    BACK SURGERY       SECTION      LAMINECTOMY      with disc removal, last assessed 16    OTHER SURGICAL HISTORY      Right ovary removal 2005 per pt recall at [facility] N/A 2/15/2017    Procedure:  SECTION (); Surgeon: Jose Gan MD;  Location: [facility] ;  Service: Obstetrics    [facility] TUBE W/ Left 2/15/2017    Procedure: LIGATION/COAGULATION TUBAL;  Surgeon: Jose Gan MD;  Location: [facility] ;  Service: Obstetrics       Family History   Problem Relation Age of Onset    Other Mother         brain tumor     I have reviewed and agree with the history as documented  Social History   Substance Use Topics    Smoking status: Current Every Day Smoker     Packs/day: 0 50     Types: Cigarettes    Smokeless tobacco: Never Used    Alcohol use No        Review of Systems   Constitutional: Negative for chills, diaphoresis, fatigue and fever  HENT: Positive for congestion and rhinorrhea   Negative for ear pain, facial swelling, sneezing, sore throat, trouble swallowing and voice change  Respiratory: Positive for cough, shortness of breath and wheezing  Negative for chest tightness and stridor  Cardiovascular: Negative for chest pain, palpitations and leg swelling  Gastrointestinal: Negative for abdominal pain, constipation, diarrhea, nausea and vomiting  Genitourinary: Negative for difficulty urinating, dysuria, frequency, hematuria and urgency  Musculoskeletal: Negative for gait problem, myalgias and neck pain  Skin: Negative for rash  Neurological: Negative for dizziness, syncope, weakness, light-headedness and headaches  All other systems reviewed and are negative  Physical Exam  Physical Exam   Constitutional: She is oriented to person, place, and time  She appears well-developed and well-nourished  HENT:   Head: Normocephalic and atraumatic  Right Ear: External ear normal    Left Ear: External ear normal    Nose: Nose normal    Mouth/Throat: Oropharynx is clear and moist  No oropharyngeal exudate  Eyes: Conjunctivae and EOM are normal  Pupils are equal, round, and reactive to light  Neck: Normal range of motion  Neck supple  No tracheal deviation present  Cardiovascular: Normal rate, regular rhythm, normal heart sounds and intact distal pulses  No murmur heard  Pulmonary/Chest: Effort normal  No accessory muscle usage  No respiratory distress  She has decreased breath sounds  She has wheezes  She has rhonchi  She has no rales  Generalized decreased breath sounds with expiratory wheezing and scattered rhonchi  Rhonchi improves with coughing  Patient speaking in short sentences however no tripoding or tachypnea  Abdominal: Soft  Bowel sounds are normal  She exhibits no distension and no mass  There is no tenderness  Musculoskeletal: Normal range of motion  She exhibits no edema or tenderness  Lymphadenopathy:     She has no cervical adenopathy  Neurological: She is alert and oriented to person, place, and time     Skin: Skin is warm and dry  No rash noted  No erythema  Nursing note and vitals reviewed  Vital Signs  ED Triage Vitals [09/18/18 1749]   Temperature Pulse Respirations Blood Pressure SpO2   (!) 97 4 °F (36 3 °C) (!) 121 20 160/87 96 %      Temp Source Heart Rate Source Patient Position - Orthostatic VS BP Location FiO2 (%)   Oral Monitor Sitting Left arm --      Pain Score       8           Vitals:    09/18/18 1749   BP: 160/87   Pulse: (!) 121   Patient Position - Orthostatic VS: Sitting       Visual Acuity      ED Medications  Medications   ipratropium-albuterol (DUO-NEB) 0 5-2 5 mg/3 mL inhalation solution 3 mL (3 mL Nebulization Given 9/18/18 1813)   amoxicillin-clavulanate (AUGMENTIN) 875-125 mg per tablet 1 tablet (1 tablet Oral Given 9/18/18 1957)       Diagnostic Studies  Results Reviewed     None                 XR chest 2 views   Final Result by Dylan Barrow (09/18 1944)   No acute cardiopulmonary abnormality  Signed by Dylan Barrow MD                 Procedures  Procedures       Phone Contacts  ED Phone Contact    ED Course      patient's breath sounds improved clear no wheezing or rhonchi following DuoNeb treatment  Nontoxic appearing stable for discharge home    Encouraged tobacco cessation                          MDM  Number of Diagnoses or Management Options     Amount and/or Complexity of Data Reviewed  Tests in the radiology section of CPT®: ordered and reviewed  Independent visualization of images, tracings, or specimens: yes    Risk of Complications, Morbidity, and/or Mortality  Presenting problems: moderate  Diagnostic procedures: moderate  Management options: moderate    Patient Progress  Patient progress: stable    CritCare Time    Disposition  Final diagnoses:   Acute bronchitis   Hypertension   Tobacco abuse     Time reflects when diagnosis was documented in both MDM as applicable and the Disposition within this note     Time User Action Codes Description Comment    9/18/2018  7:20 PM Jet Patricio Clarrosa [place] Add [J20 9] Acute bronchitis     9/18/2018  7:50 PM Murray RUEDA Add [I10] Hypertension     9/18/2018  7:50 PM Murray RUEDA Add [Z72 0] Tobacco abuse       ED Disposition     ED Disposition Condition Comment    Discharge  Kevin Hanson discharge to home/self care  Condition at discharge: Stable        Follow-up Information     Follow up With Specialties Details Why Contact Info    Yash Tabares DO Internal Medicine In 1 week  [address]  [phone]            Patient's Medications   Discharge Prescriptions    AMOXICILLIN-CLAVULANATE (AUGMENTIN) 875-125 MG PER TABLET    Take 1 tablet by mouth every 12 (twelve) hours for 7 days       Start Date: 9/18/2018 End Date: 9/25/2018       Order Dose: 1 tablet       Quantity: 14 tablet    Refills: 0    IPRATROPIUM-ALBUTEROL (DUO-NEB) 0 5-2 5 MG/3 ML NEBULIZER SOLUTION    Take 1 vial (3 mL total) by nebulization 4 (four) times a day       Start Date: 9/18/2018 End Date: --       Order Dose: 3 mL       Quantity: 30 vial    Refills: 0     No discharge procedures on file      ED Provider  Electronically Signed by           Mario Chapin PA-C  09/18/18 1958

## 2018-09-18 NOTE — DISCHARGE INSTRUCTIONS
Acute Bronchitis   WHAT YOU SHOULD KNOW:   Acute bronchitis is swelling and irritation in the air passages of your lungs  This irritation may cause you to cough or have other breathing problems  Acute bronchitis often starts because of another viral illness, such as a cold or the flu  The illness spreads from your nose and throat to your windpipe and airways  Bronchitis is often called a chest cold  Acute bronchitis lasts about 2 weeks and is usually not a serious illness  AFTER YOU LEAVE:   Medicines:   · Ibuprofen or acetaminophen:  These medicines help lower a fever  They are available without a doctor's order  Ask your healthcare provider which medicine is right for you  Ask how much to take and how often to take it  Follow directions  These medicines can cause stomach bleeding if not taken correctly  Ibuprofen can cause kidney damage  Do not take ibuprofen if you have kidney disease, an ulcer, or allergies to aspirin  Acetaminophen can cause liver damage  Do not drink alcohol if you take acetaminophen  · Cough medicine: This medicine helps loosen mucus in your lungs and make it easier to cough up  This can help you breathe easier  · Inhalers: You may need one or more inhalers to help you breathe easier and cough less  An inhaler gives your medicine in a mist form so that you can breathe it into your lungs  Ask your healthcare provider to show you how to use your inhaler correctly  · Steroid medicine:  Steroid medicine helps open your air passages so you can breathe easier  · Take your medicine as directed  Call your healthcare provider if you think your medicine is not helping or if you have side effects  Tell him if you are allergic to any medicine  Keep a list of the medicines, vitamins, and herbs you take  Include the amounts, and when and why you take them  Bring the list or the pill bottles to follow-up visits  Carry your medicine list with you in case of an emergency    How to use an inhaler:   · Shake the inhaler well to make sure you get the correct amount of medicine per puff  Remove the cover from your inhaler's mouthpiece  If you are using a spacer, connect your inhaler to the flat end of the spacer  · Exhale as much air from your lungs as you can  Put the mouthpiece in your mouth past your front teeth and rest it on the top of your tongue  Do not block the mouthpiece opening with your tongue  · Breathe in through your mouth at a slow and steady rate  As you do this, press the inhaler to release the puff of medicine  Finish breathing in slowly and deeply as you inhale the medicine  When your lungs are full, hold your breath for 10 seconds  Then breathe out slowly through puckered lips or through your nose  · If you need to take more puffs, wait at least 1 minute between each puff  · Rinse your mouth with water after you use the inhaler  This may keep you from getting a mouth infection or irritation  · Follow the instructions that come with your inhaler to clean it  You should clean your inhaler at least once a week  Ways to care for yourself:   · Avoid alcohol:  Alcohol dulls your urge to cough and sneeze  When you have bronchitis, you need to be able to cough and sneeze to clear your air passages  Alcohol also causes your body to lose fluid  This can make the mucus in your lungs thicker and harder to cough up  · Avoid irritants in the air:  Do not smoke or allow others to smoke around you  Avoid chemicals, fumes, and dust  Wear a face mask if you must work around dust or fumes  Stay inside on days when air pollution levels are high  If you have allergies, stay inside when pollen counts are high  Avoid aerosol products  This includes spray-on deodorant, bug spray, and hair spray  · Drink more liquids:  Most people should drink at least 8 eight-ounce cups of water a day  You may need to drink more liquids when you have acute bronchitis   Liquids help keep your air passages moist and help you cough up mucus  · Get more rest:  You may feel like resting more  Slowly start to do more each day  Rest when you feel it is needed  · Eat healthy foods:  Eat a variety healthy foods every day  Your diet should include fruits, vegetables, breads, and protein (such as chicken, fish, and beans)  Dairy products (such as milk, cheese, and ice cream) can sometimes increase the amount of mucus your body makes  Ask if you should decrease your intake of dairy products  · Use a humidifier:  Use a cool mist humidifier to increase air moisture in your home  This may make it easier for you to breathe and help decrease your cough  Decrease your risk of acute bronchitis:   · Get the vaccinations you need:  Ask your healthcare provider if you should get vaccinated against the flu or pneumonia  · Avoid things that may irritate your lungs:  Stay inside or cover your mouth and nose with a scarf when you are outside during cold weather  You should also stay inside on days when air pollution levels are high  If you have allergies, stay inside when pollen counts are high  Avoid using aerosol products in your home  This includes spray-on deodorant, bug spray, and hair spray  · Avoid the spread of germs:        [facility] your hands often with soap and water  Carry germ-killing gel with you  You can use the gel to clean your hands when there is no soap and water available  ¨ Do not touch your eyes, nose, or mouth unless you have washed your hands first     ¨ Always cover your mouth when you cough  Cough into a tissue or your shirtsleeve so you do not spread germs from your hands  ¨ Try to avoid people who have a cold or the flu  If you are sick, stay away from others as much as possible  Follow up with your healthcare provider as directed:  Write down questions you have so you will remember to ask them during your follow-up visits    Contact your healthcare provider if:   · You have a fever     · Your skin becomes itchy or you have a rash after you take your medicine  · Your breathing problems do not go away or get worse  · Your cough does not get better with treatment  · You cough up blood  · You have questions or concerns about your condition or care  Seek care immediately or call 911 if:   · You faint  · Your lips or fingernails turn blue  · You feel like you are not getting enough air when you breathe  · You have swelling of your lips, tongue, or throat that makes it hard to breathe or swallow  © 2014 [employer] is for End User's use only and may not be sold, redistributed or otherwise used for commercial purposes  All illustrations and images included in CareNotes® are the copyrighted property of [employer]  or [employer]  The above information is an  only  It is not intended as medical advice for individual conditions or treatments  Talk to your doctor, nurse or pharmacist before following any medical regimen to see if it is safe and effective for you

## 2018-11-05 ENCOUNTER — APPOINTMENT (EMERGENCY)
Dept: CT IMAGING | Facility: HOSPITAL | Age: 43
DRG: 720 | End: 2018-11-05
Payer: COMMERCIAL

## 2018-11-05 ENCOUNTER — HOSPITAL ENCOUNTER (INPATIENT)
Facility: HOSPITAL | Age: 43
LOS: 1 days | Discharge: LEFT BEFORE DISCHARGE | DRG: 720 | End: 2018-11-05
Attending: EMERGENCY MEDICINE | Admitting: HOSPITALIST
Payer: COMMERCIAL

## 2018-11-05 ENCOUNTER — APPOINTMENT (EMERGENCY)
Dept: RADIOLOGY | Facility: HOSPITAL | Age: 43
DRG: 720 | End: 2018-11-05
Payer: COMMERCIAL

## 2018-11-05 VITALS
DIASTOLIC BLOOD PRESSURE: 86 MMHG | WEIGHT: 215.5 LBS | BODY MASS INDEX: 40.69 KG/M2 | RESPIRATION RATE: 22 BRPM | OXYGEN SATURATION: 94 % | SYSTOLIC BLOOD PRESSURE: 136 MMHG | HEART RATE: 100 BPM | HEIGHT: 61 IN | TEMPERATURE: 98 F

## 2018-11-05 DIAGNOSIS — J18.9 PNEUMONIA: Primary | ICD-10-CM

## 2018-11-05 DIAGNOSIS — R09.02 HYPOXIA: ICD-10-CM

## 2018-11-05 PROBLEM — E87.1 HYPONATREMIA: Status: ACTIVE | Noted: 2018-11-05

## 2018-11-05 PROBLEM — R73.9 HYPERGLYCEMIA: Status: ACTIVE | Noted: 2018-11-05

## 2018-11-05 PROBLEM — R79.89 ELEVATED D-DIMER: Status: ACTIVE | Noted: 2018-11-05

## 2018-11-05 PROBLEM — A41.9 SEPSIS (HCC): Status: ACTIVE | Noted: 2018-11-05

## 2018-11-05 PROBLEM — R06.02 SHORTNESS OF BREATH: Status: ACTIVE | Noted: 2018-11-05

## 2018-11-05 LAB
ANION GAP SERPL CALCULATED.3IONS-SCNC: 14 MMOL/L (ref 4–13)
ANION GAP SERPL CALCULATED.3IONS-SCNC: 8 MMOL/L (ref 4–13)
APTT PPP: 34 SECONDS (ref 24–36)
ATRIAL RATE: 112 BPM
B-HCG SERPL-ACNC: 1.09 MIU/ML (ref 0–11.6)
BACTERIA UR QL AUTO: ABNORMAL /HPF
BASOPHILS # BLD AUTO: 0 THOUSANDS/ΜL (ref 0–0.1)
BASOPHILS NFR BLD AUTO: 1 % (ref 0–2)
BILIRUB UR QL STRIP: NEGATIVE
BUN SERPL-MCNC: 13 MG/DL (ref 7–25)
BUN SERPL-MCNC: 9 MG/DL (ref 7–25)
CALCIUM SERPL-MCNC: 9.1 MG/DL (ref 8.6–10.5)
CALCIUM SERPL-MCNC: 9.9 MG/DL (ref 8.6–10.5)
CHLORIDE SERPL-SCNC: 95 MMOL/L (ref 98–107)
CHLORIDE SERPL-SCNC: 95 MMOL/L (ref 98–107)
CLARITY UR: CLEAR
CO2 SERPL-SCNC: 22 MMOL/L (ref 21–31)
CO2 SERPL-SCNC: 29 MMOL/L (ref 21–31)
COLOR UR: ABNORMAL
CREAT SERPL-MCNC: 0.57 MG/DL (ref 0.6–1.2)
CREAT SERPL-MCNC: 1.04 MG/DL (ref 0.6–1.2)
DEPRECATED D DIMER PPP: 358 NG/ML (FEU)
EOSINOPHIL # BLD AUTO: 0.2 THOUSAND/ΜL (ref 0–0.61)
EOSINOPHIL NFR BLD AUTO: 2 % (ref 0–5)
ERYTHROCYTE [DISTWIDTH] IN BLOOD BY AUTOMATED COUNT: 14 % (ref 11.5–14.5)
GFR SERPL CREATININE-BSD FRML MDRD: 115 ML/MIN/1.73SQ M
GFR SERPL CREATININE-BSD FRML MDRD: 66 ML/MIN/1.73SQ M
GLUCOSE SERPL-MCNC: 337 MG/DL (ref 65–140)
GLUCOSE SERPL-MCNC: 358 MG/DL (ref 65–99)
GLUCOSE SERPL-MCNC: 375 MG/DL (ref 65–140)
GLUCOSE SERPL-MCNC: 419 MG/DL (ref 65–140)
GLUCOSE SERPL-MCNC: 513 MG/DL (ref 65–99)
GLUCOSE SERPL-MCNC: >500 MG/DL (ref 65–140)
GLUCOSE UR STRIP-MCNC: ABNORMAL MG/DL
HCT VFR BLD AUTO: 39.4 % (ref 34.8–46.1)
HGB BLD-MCNC: 12.8 G/DL (ref 12–16)
HGB UR QL STRIP.AUTO: ABNORMAL
INR PPP: 0.98 (ref 0.9–1.5)
KETONES UR STRIP-MCNC: NEGATIVE MG/DL
L PNEUMO1 AG UR QL IA.RAPID: NEGATIVE
LACTATE SERPL-SCNC: 1.9 MMOL/L (ref 0.5–2)
LACTATE SERPL-SCNC: 2.3 MMOL/L (ref 0.5–2)
LEUKOCYTE ESTERASE UR QL STRIP: NEGATIVE
LYMPHOCYTES # BLD AUTO: 1.7 THOUSANDS/ΜL (ref 0.6–4.47)
LYMPHOCYTES NFR BLD AUTO: 18 % (ref 21–51)
MCH RBC QN AUTO: 29.3 PG (ref 26–34)
MCHC RBC AUTO-ENTMCNC: 32.4 G/DL (ref 31–37)
MCV RBC AUTO: 90 FL (ref 81–99)
MONOCYTES # BLD AUTO: 0.3 THOUSAND/ΜL (ref 0.17–1.22)
MONOCYTES NFR BLD AUTO: 4 % (ref 2–12)
NEUTROPHILS # BLD AUTO: 6.9 THOUSANDS/ΜL (ref 1.4–6.5)
NEUTS SEG NFR BLD AUTO: 76 % (ref 42–75)
NITRITE UR QL STRIP: NEGATIVE
NON-SQ EPI CELLS URNS QL MICRO: ABNORMAL /HPF
NRBC BLD AUTO-RTO: 0 /100 WBCS
OTHER STN SPEC: ABNORMAL
P AXIS: 65 DEGREES
PH UR STRIP.AUTO: 6 [PH] (ref 5–8)
PLATELET # BLD AUTO: 179 THOUSANDS/UL (ref 149–390)
PMV BLD AUTO: 8.4 FL (ref 8.6–11.7)
POTASSIUM SERPL-SCNC: 3.4 MMOL/L (ref 3.5–5.5)
POTASSIUM SERPL-SCNC: 4.2 MMOL/L (ref 3.5–5.5)
PR INTERVAL: 162 MS
PROT UR STRIP-MCNC: ABNORMAL MG/DL
PROTHROMBIN TIME: 11.4 SECONDS (ref 10.1–12.9)
QRS AXIS: 175 DEGREES
QRSD INTERVAL: 76 MS
QT INTERVAL: 328 MS
QTC INTERVAL: 447 MS
RBC # BLD AUTO: 4.36 MILLION/UL (ref 3.9–5.2)
RBC #/AREA URNS AUTO: ABNORMAL /HPF
S PNEUM AG UR QL: NEGATIVE
SODIUM SERPL-SCNC: 131 MMOL/L (ref 134–143)
SODIUM SERPL-SCNC: 132 MMOL/L (ref 134–143)
SP GR UR STRIP.AUTO: 1.02 (ref 1–1.03)
T WAVE AXIS: 66 DEGREES
TROPONIN I SERPL-MCNC: <0.03 NG/ML
UROBILINOGEN UR QL STRIP.AUTO: 0.2 E.U./DL
VENTRICULAR RATE: 112 BPM
WBC # BLD AUTO: 9.2 THOUSAND/UL (ref 4.8–10.8)
WBC #/AREA URNS AUTO: ABNORMAL /HPF

## 2018-11-05 PROCEDURE — 80048 BASIC METABOLIC PNL TOTAL CA: CPT | Performed by: EMERGENCY MEDICINE

## 2018-11-05 PROCEDURE — 94640 AIRWAY INHALATION TREATMENT: CPT

## 2018-11-05 PROCEDURE — 80048 BASIC METABOLIC PNL TOTAL CA: CPT | Performed by: NURSE PRACTITIONER

## 2018-11-05 PROCEDURE — 85025 COMPLETE CBC W/AUTO DIFF WBC: CPT | Performed by: EMERGENCY MEDICINE

## 2018-11-05 PROCEDURE — 71045 X-RAY EXAM CHEST 1 VIEW: CPT

## 2018-11-05 PROCEDURE — 85610 PROTHROMBIN TIME: CPT | Performed by: EMERGENCY MEDICINE

## 2018-11-05 PROCEDURE — 93005 ELECTROCARDIOGRAM TRACING: CPT

## 2018-11-05 PROCEDURE — 96361 HYDRATE IV INFUSION ADD-ON: CPT

## 2018-11-05 PROCEDURE — 87449 NOS EACH ORGANISM AG IA: CPT | Performed by: NURSE PRACTITIONER

## 2018-11-05 PROCEDURE — 71275 CT ANGIOGRAPHY CHEST: CPT

## 2018-11-05 PROCEDURE — 93010 ELECTROCARDIOGRAM REPORT: CPT | Performed by: INTERNAL MEDICINE

## 2018-11-05 PROCEDURE — 99285 EMERGENCY DEPT VISIT HI MDM: CPT

## 2018-11-05 PROCEDURE — 85379 FIBRIN DEGRADATION QUANT: CPT | Performed by: EMERGENCY MEDICINE

## 2018-11-05 PROCEDURE — 83605 ASSAY OF LACTIC ACID: CPT | Performed by: EMERGENCY MEDICINE

## 2018-11-05 PROCEDURE — 99222 1ST HOSP IP/OBS MODERATE 55: CPT | Performed by: NURSE PRACTITIONER

## 2018-11-05 PROCEDURE — 96365 THER/PROPH/DIAG IV INF INIT: CPT

## 2018-11-05 PROCEDURE — 36415 COLL VENOUS BLD VENIPUNCTURE: CPT | Performed by: EMERGENCY MEDICINE

## 2018-11-05 PROCEDURE — 81001 URINALYSIS AUTO W/SCOPE: CPT | Performed by: EMERGENCY MEDICINE

## 2018-11-05 PROCEDURE — 82948 REAGENT STRIP/BLOOD GLUCOSE: CPT

## 2018-11-05 PROCEDURE — 84484 ASSAY OF TROPONIN QUANT: CPT | Performed by: EMERGENCY MEDICINE

## 2018-11-05 PROCEDURE — 87070 CULTURE OTHR SPECIMN AEROBIC: CPT | Performed by: NURSE PRACTITIONER

## 2018-11-05 PROCEDURE — 96375 TX/PRO/DX INJ NEW DRUG ADDON: CPT

## 2018-11-05 PROCEDURE — 84702 CHORIONIC GONADOTROPIN TEST: CPT | Performed by: EMERGENCY MEDICINE

## 2018-11-05 PROCEDURE — 87040 BLOOD CULTURE FOR BACTERIA: CPT | Performed by: EMERGENCY MEDICINE

## 2018-11-05 PROCEDURE — 85730 THROMBOPLASTIN TIME PARTIAL: CPT | Performed by: EMERGENCY MEDICINE

## 2018-11-05 PROCEDURE — 87205 SMEAR GRAM STAIN: CPT | Performed by: NURSE PRACTITIONER

## 2018-11-05 PROCEDURE — 94760 N-INVAS EAR/PLS OXIMETRY 1: CPT

## 2018-11-05 RX ORDER — GUAIFENESIN 600 MG
600 TABLET, EXTENDED RELEASE 12 HR ORAL EVERY 12 HOURS SCHEDULED
Status: DISCONTINUED | OUTPATIENT
Start: 2018-11-05 | End: 2018-11-05 | Stop reason: HOSPADM

## 2018-11-05 RX ORDER — POTASSIUM CHLORIDE 20 MEQ/1
40 TABLET, EXTENDED RELEASE ORAL ONCE
Status: COMPLETED | OUTPATIENT
Start: 2018-11-05 | End: 2018-11-05

## 2018-11-05 RX ORDER — ALBUTEROL SULFATE 2.5 MG/3ML
2.5 SOLUTION RESPIRATORY (INHALATION) EVERY 4 HOURS PRN
Status: DISCONTINUED | OUTPATIENT
Start: 2018-11-05 | End: 2018-11-05 | Stop reason: HOSPADM

## 2018-11-05 RX ORDER — METHYLPREDNISOLONE SODIUM SUCCINATE 125 MG/2ML
125 INJECTION, POWDER, LYOPHILIZED, FOR SOLUTION INTRAMUSCULAR; INTRAVENOUS ONCE
Status: COMPLETED | OUTPATIENT
Start: 2018-11-05 | End: 2018-11-05

## 2018-11-05 RX ORDER — NICOTINE 21 MG/24HR
1 PATCH, TRANSDERMAL 24 HOURS TRANSDERMAL DAILY
Status: DISCONTINUED | OUTPATIENT
Start: 2018-11-05 | End: 2018-11-05 | Stop reason: HOSPADM

## 2018-11-05 RX ORDER — LEVOFLOXACIN 5 MG/ML
750 INJECTION, SOLUTION INTRAVENOUS ONCE
Status: COMPLETED | OUTPATIENT
Start: 2018-11-05 | End: 2018-11-05

## 2018-11-05 RX ORDER — OXYCODONE HYDROCHLORIDE 5 MG/1
10 TABLET ORAL EVERY 8 HOURS PRN
Status: DISCONTINUED | OUTPATIENT
Start: 2018-11-05 | End: 2018-11-05 | Stop reason: HOSPADM

## 2018-11-05 RX ORDER — ALBUTEROL SULFATE 2.5 MG/3ML
5 SOLUTION RESPIRATORY (INHALATION) ONCE
Status: COMPLETED | OUTPATIENT
Start: 2018-11-05 | End: 2018-11-05

## 2018-11-05 RX ORDER — ONDANSETRON 2 MG/ML
4 INJECTION INTRAMUSCULAR; INTRAVENOUS EVERY 6 HOURS PRN
Status: DISCONTINUED | OUTPATIENT
Start: 2018-11-05 | End: 2018-11-05 | Stop reason: HOSPADM

## 2018-11-05 RX ORDER — SODIUM CHLORIDE 9 MG/ML
100 INJECTION, SOLUTION INTRAVENOUS CONTINUOUS
Status: DISCONTINUED | OUTPATIENT
Start: 2018-11-05 | End: 2018-11-05 | Stop reason: HOSPADM

## 2018-11-05 RX ORDER — METHYLPREDNISOLONE SODIUM SUCCINATE 40 MG/ML
40 INJECTION, POWDER, LYOPHILIZED, FOR SOLUTION INTRAMUSCULAR; INTRAVENOUS EVERY 12 HOURS SCHEDULED
Status: DISCONTINUED | OUTPATIENT
Start: 2018-11-06 | End: 2018-11-05 | Stop reason: HOSPADM

## 2018-11-05 RX ORDER — PANTOPRAZOLE SODIUM 40 MG/1
40 TABLET, DELAYED RELEASE ORAL
Status: DISCONTINUED | OUTPATIENT
Start: 2018-11-06 | End: 2018-11-05 | Stop reason: HOSPADM

## 2018-11-05 RX ORDER — GUAIFENESIN 100 MG/5ML
200 SOLUTION ORAL EVERY 4 HOURS PRN
Status: DISCONTINUED | OUTPATIENT
Start: 2018-11-05 | End: 2018-11-05 | Stop reason: HOSPADM

## 2018-11-05 RX ORDER — METHYLPREDNISOLONE SODIUM SUCCINATE 40 MG/ML
40 INJECTION, POWDER, LYOPHILIZED, FOR SOLUTION INTRAMUSCULAR; INTRAVENOUS EVERY 8 HOURS SCHEDULED
Status: DISCONTINUED | OUTPATIENT
Start: 2018-11-06 | End: 2018-11-05

## 2018-11-05 RX ORDER — CYCLOBENZAPRINE HCL 10 MG
10 TABLET ORAL 3 TIMES DAILY PRN
Status: DISCONTINUED | OUTPATIENT
Start: 2018-11-05 | End: 2018-11-05 | Stop reason: HOSPADM

## 2018-11-05 RX ORDER — IPRATROPIUM BROMIDE AND ALBUTEROL SULFATE 2.5; .5 MG/3ML; MG/3ML
3 SOLUTION RESPIRATORY (INHALATION)
Status: DISCONTINUED | OUTPATIENT
Start: 2018-11-05 | End: 2018-11-05 | Stop reason: HOSPADM

## 2018-11-05 RX ORDER — LEVOFLOXACIN 5 MG/ML
750 INJECTION, SOLUTION INTRAVENOUS EVERY 24 HOURS
Status: DISCONTINUED | OUTPATIENT
Start: 2018-11-06 | End: 2018-11-05 | Stop reason: HOSPADM

## 2018-11-05 RX ORDER — ACETAMINOPHEN 325 MG/1
650 TABLET ORAL EVERY 6 HOURS PRN
Status: DISCONTINUED | OUTPATIENT
Start: 2018-11-05 | End: 2018-11-05 | Stop reason: HOSPADM

## 2018-11-05 RX ADMIN — SERTRALINE HYDROCHLORIDE 50 MG: 50 TABLET ORAL at 14:28

## 2018-11-05 RX ADMIN — GUAIFENESIN 600 MG: 600 TABLET, EXTENDED RELEASE ORAL at 14:28

## 2018-11-05 RX ADMIN — IPRATROPIUM BROMIDE AND ALBUTEROL SULFATE 3 ML: .5; 3 SOLUTION RESPIRATORY (INHALATION) at 14:15

## 2018-11-05 RX ADMIN — SODIUM CHLORIDE 1000 ML: 0.9 INJECTION, SOLUTION INTRAVENOUS at 10:27

## 2018-11-05 RX ADMIN — INSULIN DETEMIR 8 UNITS: 100 INJECTION, SOLUTION SUBCUTANEOUS at 19:25

## 2018-11-05 RX ADMIN — ALBUTEROL SULFATE 5 MG: 2.5 SOLUTION RESPIRATORY (INHALATION) at 07:50

## 2018-11-05 RX ADMIN — SODIUM CHLORIDE 100 ML/HR: 9 INJECTION, SOLUTION INTRAVENOUS at 13:30

## 2018-11-05 RX ADMIN — POTASSIUM CHLORIDE 40 MEQ: 1500 TABLET, EXTENDED RELEASE ORAL at 14:28

## 2018-11-05 RX ADMIN — SODIUM CHLORIDE 2000 ML: 0.9 INJECTION, SOLUTION INTRAVENOUS at 08:26

## 2018-11-05 RX ADMIN — OXYCODONE HYDROCHLORIDE 10 MG: 5 TABLET ORAL at 14:38

## 2018-11-05 RX ADMIN — METHYLPREDNISOLONE SODIUM SUCCINATE 125 MG: 125 INJECTION, POWDER, FOR SOLUTION INTRAMUSCULAR; INTRAVENOUS at 07:58

## 2018-11-05 RX ADMIN — CYCLOBENZAPRINE HYDROCHLORIDE 10 MG: 10 TABLET, FILM COATED ORAL at 14:38

## 2018-11-05 RX ADMIN — IPRATROPIUM BROMIDE AND ALBUTEROL SULFATE 3 ML: .5; 3 SOLUTION RESPIRATORY (INHALATION) at 20:32

## 2018-11-05 RX ADMIN — INSULIN HUMAN 8 UNITS: 100 INJECTION, SOLUTION PARENTERAL at 17:00

## 2018-11-05 RX ADMIN — ENOXAPARIN SODIUM 40 MG: 40 INJECTION SUBCUTANEOUS at 14:29

## 2018-11-05 RX ADMIN — LEVOFLOXACIN 750 MG: 5 INJECTION, SOLUTION INTRAVENOUS at 07:58

## 2018-11-05 RX ADMIN — IOHEXOL 80 ML: 350 INJECTION, SOLUTION INTRAVENOUS at 09:49

## 2018-11-05 NOTE — ASSESSMENT & PLAN NOTE
· Will admit to [facility] telemetry  · Will continue with Levaquin  · DuoNeb and p r n  albuterol for shortness of breath and wheezing  · Check urine for strep and Legionella  · Obtain sputum culture and Gram stain

## 2018-11-05 NOTE — H&P
H&P- ftopia 1975, 44 y o  female MRN: [id number]    Unit/Bed#: -02 Encounter: [id number]    Primary Care Provider: Nino Martin DO   Date and time admitted to hospital: 11/5/2018  7:03 AM        * Pneumonia due to infectious organism   Assessment & Plan    · Will admit to med surge telemetry  · Will continue with Levaquin  · DuoNeb and p r n  albuterol for shortness of breath and wheezing  · Check urine for strep and Legionella  · Obtain sputum culture and Gram stain  Sepsis ([facility] [address] )   Assessment & Plan    · Patient meets criteria for sepsis with tachycardia, tachypnea, and fever  This is secondary to community-acquired pneumonia  · Blood cultures were obtained in the ER  · Will continue treatment stated above  Shortness of breath   Assessment & Plan    · This is likely secondary to bronchospasm  · The patient with history of tobacco use but no formal diagnosis for COPD  · Will continue with DuoNeb and p r n  Albuterol  · Continue Solu-Medrol  · Respiratory protocol for oxygen supplement  Elevated d-dimer   Assessment & Plan    · Likely secondary to acute infection  · CTA of the chest shows no evidence of PE  Hyponatremia   Assessment & Plan    · Likely secondary to volume depletion  · Will continue with IVF      Hyperglycemia   Assessment & Plan    · The patient denies history of diabetes mellitus however she does have a history of gestational diabetes  · Noted that her blood glucose in the ER was in 300  · Will check A1c  Will place the patient on insulin sliding scales  Chronic low back pain   Assessment & Plan    · Will continue with current home regimen     Tobacco abuse   Assessment & Plan    · Smoking cessation discussed    · Will use patch while in the hospital        VTE Prophylaxis: Enoxaparin (Lovenox)  Code Status: Full Code   POLST: There is no POLST form on file for this patient (pre-hospital) and POLST form is completed  Discussion with family:n/a     Anticipated Length of Stay:  Patient will be admitted on an Inpatient basis with an anticipated length of stay of  > 2 midnights  Justification for Hospital Stay:   Pneumonia    Total Time for Visit, including Counseling / Coordination of Care: 30 minutes  Greater than 50% of this total time spent on direct patient counseling and coordination of care  Chief Complaint:     Shortness of breath    History of Present Illness:    Lisette Dvais is a 44 y o  female who presents with shortness of breath for the past 4 days  The patient states that she had some sinus pressure and nasal congestion starting Thursday last week  She took some Sudafed over-the-counter medication and was feeling somewhat better  However last night she developed productive green sputum cough with mild chest tightness which is worse with coughing and deep breathing  She felt very short of breath that is worse with exertion  She also developed some chills  Denies any chest pain or palpitation  She denies any lightheadedness or dizziness  She denies any nausea, vomiting, or abdominal pain  She did have 1 episode of diarrhea yesterday- nonbloody  She denies any recent sick contacts or any recent traveling  No recent antibiotic use  She has history of tobacco abuse with 1/2 pack per day  Review of Systems:  Review of Systems   Constitutional: Positive for chills  HENT: Positive for congestion and sinus pressure  Respiratory: Positive for cough, chest tightness and shortness of breath  Negative for wheezing  Cardiovascular: Negative for chest pain, palpitations and leg swelling  Gastrointestinal: Positive for diarrhea (x1 yesterday )  Negative for abdominal pain, nausea and vomiting  All other systems reviewed and are negative        Past Medical and Surgical History:   Past Medical History:   Diagnosis Date    Back pain     used 2 percocet tid until current admission in 2/2017    Bone spur     in back per pt    Depression     Gestational diabetes     insulin dependent    Herniated cervical disc     Herniated lumbar intervertebral disc     Hx of degenerative disc disease     Obesity     Pre-eclampsia     Prior pregnancy with fetal demise     previous demise at 42 weeks       Past Surgical History:   Procedure Laterality Date    BACK SURGERY       SECTION      LAMINECTOMY      with disc removal, last assessed 16    OTHER SURGICAL HISTORY      Right ovary removal 2005 per pt recall at [address] [facility] N/A 2/15/2017    Procedure:  SECTION (); Surgeon: Harvey Ferreira MD;  Location: [facility] ;  Service: Obstetrics    ID Genora Kiatlin TUBE W/ Left 2/15/2017    Procedure: LIGATION/COAGULATION TUBAL;  Surgeon: Harvey Ferreira MD;  Location: [facility] ;  Service: Obstetrics       Meds/Allergies:  Prior to Admission medications    Medication Sig Start Date End Date Taking?  Authorizing Provider   cyclobenzaprine (FLEXERIL) 10 mg tablet Take 10 mg by mouth 3 (three) times a day as needed for muscle spasms   Yes Historical Provider, MD   Glucose Blood (COOL BLOOD GLUCOSE TEST STRIPS VI) by In Vitro route 16  Yes Historical Provider, MD   Lancets 28G [facility] by Does not apply route 16  Yes Historical Provider, MD   omeprazole (PRILOSEC) 20 mg delayed release capsule Daily   Yes Historical Provider, MD   oxyCODONE (ROXICODONE) 10 MG TABS Take by mouth every 8 (eight) hours as needed for moderate pain     Yes Historical Provider, MD   sertraline (ZOLOFT) 50 mg tablet Take 50 mg by mouth daily   Yes Historical Provider, MD   ibuprofen (MOTRIN) 600 mg tablet Take 1 tablet (600 mg total) by mouth every 8 (eight) hours as needed for mild pain or moderate pain for up to 5 days 6/10/18 6/15/18  Paul Oshea PA-C   ipratropium-albuterol (DUO-NEB) 0 5-2 5 mg/3 mL nebulizer solution Take 1 vial (3 mL total) by nebulization 4 (four) times a day  Patient not taking: Reported on 11/5/2018 9/18/18   [facility] 4 MG/0 1ML LIQD  7/25/18   Historical Provider, MD     I have reviewed home medications with patient personally  Allergies: Allergies   Allergen Reactions    Codeine      aggression       Social History:  Marital Status: /Civil Union   Occupation: n/a   Patient Pre-hospital Living Situation: with family   Patient Pre-hospital Level of Mobility: independence   Patient Pre-hospital Diet Restrictions: none   Substance Use History:     History   Alcohol Use No     History   Smoking Status    Current Every Day Smoker    Packs/day: 0 50    Types: Cigarettes   Smokeless Tobacco    Never Used     History   Drug Use    Types: Oxycodone     Comment: Prescribed, uses for chronic back/neck pain       Family History:  I have reviewed the patients family history    Physical Exam:   Vitals:   Blood Pressure: 142/84 (11/05/18 1209)  Pulse: (!) 107 (11/05/18 1209)  Temperature: (!) 100 6 °F (38 1 °C) (11/05/18 1209)  Temp Source: Tympanic (11/05/18 1209)  Respirations: (!) 24 (11/05/18 1209)  Height: 5' 1" (154 9 cm) (11/05/18 1209)  Weight - Scale: 97 8 kg (215 lb 8 oz) (11/05/18 1209)  SpO2: 94 % (11/05/18 1209)    Physical Exam   Constitutional: She is oriented to person, place, and time  She appears well-developed and well-nourished  HENT:   Head: Normocephalic and atraumatic  Mouth/Throat: Oropharynx is clear and moist    Eyes: Pupils are equal, round, and reactive to light  Conjunctivae and EOM are normal    Neck: Normal range of motion  Neck supple  Cardiovascular: Normal rate, regular rhythm and normal heart sounds  Exam reveals no gallop and no friction rub  No murmur heard  Pulmonary/Chest: Effort normal and breath sounds normal  She has no wheezes  She has no rales  Slightly coarse breath sounds in upper lobes     Abdominal: Soft  Bowel sounds are normal  She exhibits no distension   There is no tenderness  There is no rebound  Musculoskeletal: Normal range of motion  She exhibits no edema, tenderness or deformity  Neurological: She is alert and oriented to person, place, and time  No cranial nerve deficit  Skin: Skin is warm and dry  No rash noted  No erythema  Psychiatric: She has a normal mood and affect  Her behavior is normal  Thought content normal    Nursing note and vitals reviewed  Additional Data:   Lab Results: I have personally reviewed pertinent reports  Results from last 7 days  Lab Units 11/05/18  0741   WBC Thousand/uL 9 20   HEMOGLOBIN g/dL 12 8   HEMATOCRIT % 39 4   PLATELETS Thousands/uL 179   NEUTROS PCT % 76*   LYMPHS PCT % 18*   MONOS PCT % 4   EOS PCT % 2       Results from last 7 days  Lab Units 11/05/18  0741   POTASSIUM mmol/L 3 4*   CHLORIDE mmol/L 95*   CO2 mmol/L 29   BUN mg/dL 9   CREATININE mg/dL 0 57*   CALCIUM mg/dL 9 9       Results from last 7 days  Lab Units 11/05/18  0741   INR  0 98               Imaging: I have personally reviewed pertinent reports  CTA ED chest PE study   Final Result by Herrera Rose MD (11/05 1038)   No pulmonary emboli within the major pulmonary arteries  Scattered patchy interstitial disease of the upper lobes most likely   inflammatory in nature  A few scattered mediastinal lymph nodes are noted measuring up to 12 mm in   diameter most likely reactive in nature  Signed by Herrera Rose      XR chest 1 view   Final Result by Shayan Rogel (11/05 1027)   Faint patchy bilateral parahilar infiltrates/pneumonia  Follow-up   recommended  Signed by Aliya Ford MD          EKG, Pathology, and Other Studies Reviewed on Admission:   · EKG: sinus tachycardia     [facility] Records Reviewed: Yes     ** Please Note: This note has been constructed using a voice recognition system   **

## 2018-11-05 NOTE — ASSESSMENT & PLAN NOTE
· The patient denies history of diabetes mellitus however she does have a history of gestational diabetes  · Noted that her blood glucose in the ER was in 300  · Will check A1c  Will place the patient on insulin sliding scales

## 2018-11-05 NOTE — ASSESSMENT & PLAN NOTE
· Patient meets criteria for sepsis with tachycardia, tachypnea, and fever  This is secondary to community-acquired pneumonia  · Blood cultures were obtained in the ER  · Will continue treatment stated above

## 2018-11-05 NOTE — PLAN OF CARE
DISCHARGE PLANNING     Discharge to home or other facility with appropriate resources Progressing        INFECTION - ADULT     Absence or prevention of progression during hospitalization Progressing     Absence of fever/infection during neutropenic period Progressing        Knowledge Deficit     Patient/family/caregiver demonstrates understanding of disease process, treatment plan, medications, and discharge instructions Progressing        PAIN - ADULT     Verbalizes/displays adequate comfort level or baseline comfort level Progressing        Potential for Falls     Patient will remain free of falls Progressing        SAFETY ADULT     Maintain or return to baseline ADL function Progressing     Maintain or return mobility status to optimal level Progressing        Admission

## 2018-11-05 NOTE — ED PROVIDER NOTES
History  Chief Complaint   Patient presents with    Shortness of Breath     for the past 4 days      44 yr female with sob since last night with cough productive green sputum x3 days  Positive mild chest tightness worse with cough deep breath  No sore throat or rhinorrhea  Fever chills  No back or abdominal pain  Some wheezing  No leg pain or swelling  Patient states she does use inhalers she gets upper respiratory infections or pneumonia  Positive tobacco use        History provided by:  Patient  Shortness of Breath   Timing:  Constant  Progression:  Worsening  Relieved by:  Nothing  Worsened by:  Nothing  Associated symptoms: chest pain, cough, sputum production and wheezing    Associated symptoms: no abdominal pain, no diaphoresis, no fever, no headaches, no rash, no sore throat and no vomiting        Prior to Admission Medications   Prescriptions Last Dose Informant Patient Reported? Taking?    Glucose Blood (COOL BLOOD GLUCOSE TEST STRIPS VI) 11/4/2018 at Unknown time Self Yes Yes   Sig: by In Vitro route   Lancets 28G MISC 11/4/2018 at Unknown time Self Yes Yes   Sig: by Does not apply route   NARCAN 4 MG/0 1ML LIQD Not Taking at Unknown time  Yes No   cyclobenzaprine (FLEXERIL) 10 mg tablet 11/4/2018 at Unknown time Self Yes Yes   Sig: Take 10 mg by mouth 3 (three) times a day as needed for muscle spasms   ibuprofen (MOTRIN) 600 mg tablet  Self No No   Sig: Take 1 tablet (600 mg total) by mouth every 8 (eight) hours as needed for mild pain or moderate pain for up to 5 days   ipratropium-albuterol (DUO-NEB) 0 5-2 5 mg/3 mL nebulizer solution Not Taking at Unknown time  No No   Sig: Take 1 vial (3 mL total) by nebulization 4 (four) times a day   Patient not taking: Reported on 11/5/2018    omeprazole (PRILOSEC) 20 mg delayed release capsule 11/4/2018 at Unknown time  Yes Yes   Sig: Daily   oxyCODONE (ROXICODONE) 10 MG TABS 11/4/2018 at Unknown time Self Yes Yes   Sig: Take by mouth every 8 (eight) hours as needed for moderate pain     sertraline (ZOLOFT) 50 mg tablet 2018 at Unknown time Self Yes Yes   Sig: Take 50 mg by mouth daily      Facility-Administered Medications: None       Past Medical History:   Diagnosis Date    Back pain     used 2 percocet tid until current admission in 2017    Bone spur     in back per pt    Depression     Gestational diabetes     insulin dependent    Herniated cervical disc     Herniated lumbar intervertebral disc     Hx of degenerative disc disease     Obesity     Pre-eclampsia     Prior pregnancy with fetal demise     previous demise at 39 weeks       Past Surgical History:   Procedure Laterality Date    BACK SURGERY       SECTION      LAMINECTOMY      with disc removal, last assessed 16    OTHER SURGICAL HISTORY      Right ovary removal 2005 per pt recall at [facility] N/A 2/15/2017    Procedure:  SECTION (); Surgeon: Jenna Garza MD;  Location: [facility] ;  Service: Obstetrics    MD Jossy Labella TUBE W/ Left 2/15/2017    Procedure: LIGATION/COAGULATION TUBAL;  Surgeon: Jenna Garza MD;  Location: [facility] ;  Service: Obstetrics       Family History   Problem Relation Age of Onset    Other Mother         brain tumor     I have reviewed and agree with the history as documented  Social History   Substance Use Topics    Smoking status: Current Every Day Smoker     Packs/day: 0 50     Types: Cigarettes    Smokeless tobacco: Never Used    Alcohol use No        Review of Systems   Constitutional: Negative for chills, diaphoresis and fever  HENT: Negative for rhinorrhea and sore throat  Eyes: Negative for visual disturbance  Respiratory: Positive for cough, sputum production, chest tightness, shortness of breath and wheezing  Cardiovascular: Positive for chest pain  Negative for leg swelling     Gastrointestinal: Negative for abdominal pain, diarrhea, nausea and vomiting  Genitourinary: Negative for dysuria  Musculoskeletal: Negative for back pain and myalgias  Skin: Negative for rash  Neurological: Negative for dizziness and headaches  Psychiatric/Behavioral: Negative for confusion  All other systems reviewed and are negative  Physical Exam  Physical Exam   Constitutional: She is oriented to person, place, and time  She appears well-developed and well-nourished  HENT:   Nose: Nose normal    Mouth/Throat: Oropharynx is clear and moist  No oropharyngeal exudate  Eyes: Pupils are equal, round, and reactive to light  Conjunctivae and EOM are normal  No scleral icterus  Neck: Normal range of motion  Neck supple  No JVD present  No tracheal deviation present  Cardiovascular: Normal rate, regular rhythm and normal heart sounds  No murmur heard  Pulmonary/Chest: Effort normal  No respiratory distress  She has wheezes (Soft expiratory wheeze bilaterally)  She has no rales  Abdominal: Soft  Bowel sounds are normal  There is no tenderness  There is no guarding  Musculoskeletal: Normal range of motion  She exhibits no edema or tenderness  No calf tenderness   Neurological: She is alert and oriented to person, place, and time  No cranial nerve deficit or sensory deficit  She exhibits normal muscle tone  5/5 motor, nl sens   Skin: Skin is warm and dry  Psychiatric: She has a normal mood and affect  Her behavior is normal    Nursing note and vitals reviewed        Vital Signs  ED Triage Vitals [11/05/18 0701]   Temperature Pulse Respirations Blood Pressure SpO2   (!) 97 °F (36 1 °C) (!) 123 20 (!) 179/86 90 %      Temp Source Heart Rate Source Patient Position - Orthostatic VS BP Location FiO2 (%)   Temporal Monitor Sitting Left arm --      Pain Score       5           Vitals:    11/05/18 0915 11/05/18 1030 11/05/18 1045 11/05/18 1209   BP: 158/81   142/84   Pulse:  (!) 107 (!) 109 (!) 107   Patient Position - Orthostatic VS:    Sitting Visual Acuity      ED Medications  Medications   cyclobenzaprine (FLEXERIL) tablet 10 mg (not administered)   pantoprazole (PROTONIX) EC tablet 40 mg (not administered)   oxyCODONE (ROXICODONE) IR tablet 10 mg (not administered)   sertraline (ZOLOFT) tablet 50 mg (not administered)   sodium chloride 0 9 % infusion (100 mL/hr Intravenous New Bag 11/5/18 1330)   acetaminophen (TYLENOL) tablet 650 mg (not administered)   ondansetron (ZOFRAN) injection 4 mg (not administered)   nicotine (NICODERM CQ) 14 mg/24hr TD 24 hr patch 1 patch (not administered)   enoxaparin (LOVENOX) subcutaneous injection 40 mg (not administered)   insulin lispro (HumaLOG) 100 units/mL subcutaneous injection 1-6 Units (not administered)   levofloxacin (LEVAQUIN) IVPB (premix) 750 mg (not administered)   ipratropium-albuterol (DUO-NEB) 0 5-2 5 mg/3 mL inhalation solution 3 mL (not administered)   albuterol inhalation solution 2 5 mg (not administered)   methylPREDNISolone sodium succinate (Solu-MEDROL) injection 40 mg (not administered)   albuterol inhalation solution 5 mg (5 mg Nebulization Given 11/5/18 0750)   methylPREDNISolone sodium succinate (Solu-MEDROL) injection 125 mg (125 mg Intravenous Given 11/5/18 0758)   levofloxacin (LEVAQUIN) IVPB (premix) 750 mg (0 mg Intravenous Stopped 11/5/18 0928)   sodium chloride 0 9 % bolus 1,000 mL (1,000 mL Intravenous New Bag 11/5/18 1027)   sodium chloride 0 9 % bolus 2,000 mL (2,000 mL Intravenous New Bag 11/5/18 0826)   iohexol (OMNIPAQUE) 350 MG/ML injection (MULTI-DOSE) 80 mL (80 mL Intravenous Given 11/5/18 0949)       Diagnostic Studies  Results Reviewed     Procedure Component Value Units Date/Time    Urine Microscopic [[id number]]  (Abnormal) Collected:  11/05/18 0975    Lab Status:  Final result Specimen:  Urine from Urine, Clean Catch Updated:  11/05/18 1038     RBC, UA 2-4 (A) /hpf      WBC, UA 0-1 (A) /hpf      Epithelial Cells Occasional /hpf      Bacteria, UA Occasional /hpf OTHER OBSERVATIONS Yeast Cells Present    Lactic acid, plasma [[id number]]  (Normal) Collected:  11/05/18 0955    Lab Status:  Final result Specimen:  Blood from Arm, Right Updated:  11/05/18 1031     LACTIC ACID 1 9 mmol/L     Narrative:         Result may be elevated if tourniquet was used during collection  UA w Reflex to Microscopic w Reflex to Culture [[id number]]  (Abnormal) Collected:  11/05/18 0954    Lab Status:  Final result Specimen:  Urine from Urine, Clean Catch Updated:  11/05/18 1007     Color, UA Straw     Clarity, UA Clear     Specific [place], UA 1 020     pH, UA 6 0     Leukocytes, UA Negative     Nitrite, UA Negative     Protein, UA 1+ (A) mg/dl      Glucose, UA 3+ (A) mg/dl      Ketones, UA Negative mg/dl      Urobilinogen, UA 0 2 E U /dl      Bilirubin, UA Negative     Blood, UA Trace-Intact (A)    hCG, quantitative [[id number]]  (Normal) Collected:  11/05/18 0741    Lab Status:  Final result Specimen:  Blood from Arm, Right Updated:  11/05/18 0845     HCG, Quant 1 09 mIU/mL     Narrative:          Expected Ranges:     Approximate               Approximate HCG  Gestation age          Concentration ( mIU/mL)  _____________          ______________________   Garcia Engle                      HCG values  0 2-1                       5-50  1-2                           2-3                         100-5000  3-4                         500-95190  4-5                         1000-09279  5-6                         12328-197062  6-8                         76000-534562  8-12                        54440-499514    Lactic acid, plasma [[id number]]  (Abnormal) Collected:  11/05/18 0741    Lab Status:  Final result Specimen:  Blood from Arm, Right Updated:  11/05/18 0815     LACTIC ACID 2 3 (HH) mmol/L     Narrative:         Result may be elevated if tourniquet was used during collection      Basic metabolic panel [[id number]]  (Abnormal) Collected:  11/05/18 0741    Lab Status:  Final result Specimen:  Blood from Arm, Right Updated:  11/05/18 0083     Sodium 132 (L) mmol/L      Potassium 3 4 (L) mmol/L      Chloride 95 (L) mmol/L      CO2 29 mmol/L      ANION GAP 8 mmol/L      BUN 9 mg/dL      Creatinine 0 57 (L) mg/dL      Glucose 358 (H) mg/dL      Calcium 9 9 mg/dL      eGFR 115 ml/min/1 73sq m     Narrative:         National Kidney Disease Education Program recommendations are as follows:  GFR calculation is accurate only with a steady state creatinine  Chronic Kidney disease less than 60 ml/min/1 73 sq  meters  Kidney failure less than 15 ml/min/1 73 sq  meters      Troponin I [[id number]]  (Normal) Collected:  11/05/18 0741    Lab Status:  Final result Specimen:  Blood from Arm, Right Updated:  11/05/18 0814     Troponin I <0 03 ng/mL     D-Dimer [[id number]]  (Abnormal) Collected:  11/05/18 0741    Lab Status:  Final result Specimen:  Blood from Arm, Right Updated:  11/05/18 0805     D-Dimer, Quant 358 (H) ng/ml (FEU)     Protime-INR [[id number]]  (Normal) Collected:  11/05/18 0741    Lab Status:  Final result Specimen:  Blood from Arm, Right Updated:  11/05/18 0805     Protime 11 4 seconds      INR 0 98    APTT [[id number]]  (Normal) Collected:  11/05/18 0741    Lab Status:  Final result Specimen:  Blood from Arm, Right Updated:  11/05/18 0805     PTT 34 seconds     CBC and differential [[id number]]  (Abnormal) Collected:  11/05/18 0741    Lab Status:  Final result Specimen:  Blood from Arm, Right Updated:  11/05/18 0754     WBC 9 20 Thousand/uL      RBC 4 36 Million/uL      Hemoglobin 12 8 g/dL      Hematocrit 39 4 %      MCV 90 fL      MCH 29 3 pg      MCHC 32 4 g/dL      RDW 14 0 %      MPV 8 4 (L) fL      Platelets 941 Thousands/uL      nRBC 0 /100 WBCs      Neutrophils Relative 76 (H) %      Lymphocytes Relative 18 (L) %      Monocytes Relative 4 %      Eosinophils Relative 2 %      Basophils Relative 1 %      Neutrophils Absolute 6 90 (H) Thousands/µL      Lymphocytes Absolute 1 70 Thousands/µL      Monocytes Absolute 0 30 Thousand/µL      Eosinophils Absolute 0 20 Thousand/µL      Basophils Absolute 0 00 Thousands/µL     Blood culture #2 [[id number]] Collected:  11/05/18 0741    Lab Status: In process Specimen:  Blood from Arm, Left Updated:  11/05/18 0747    Blood culture #1 [[id number]] Collected:  11/05/18 0741    Lab Status: In process Specimen:  Blood from Arm, Right Updated:  11/05/18 0747                 CTA ED chest PE study   Final Result by Kevin Ortega MD (11/05 1038)   No pulmonary emboli within the major pulmonary arteries  Scattered patchy interstitial disease of the upper lobes most likely   inflammatory in nature  A few scattered mediastinal lymph nodes are noted measuring up to 12 mm in   diameter most likely reactive in nature  Signed by Kevin Ortega      XR chest 1 view   Final Result by Kerry Cuellar (11/05 1027)   Faint patchy bilateral parahilar infiltrates/pneumonia  Follow-up   recommended  Signed by Jacquie Hale MD                 Procedures  Procedures       Phone Contacts  ED Phone Contact    ED Course  ED Course as of Nov 05 1355   Mon Nov 05, 2018   0803 EKG ST 110bpm, nl QRS, NSSTTW changes MCH: 29 3   1041 MDM: 44 yr female with hypoxia, bilateral pneumonia, elevated lactate - admit to med surg, r/o sepsis    1044 D/w Dr Clarence Soares - admit to med surg                                MDM  CritCare Time    Disposition  Final diagnoses:   Pneumonia   Hypoxia     Time reflects when diagnosis was documented in both MDM as applicable and the Disposition within this note     Time User Action Codes Description Comment    11/5/2018 10:54 AM Leticia BUTCHER Add [J18 9] Pneumonia     11/5/2018 10:54 AM Leticia BUTCHER Add [R09 02] Hypoxia       ED Disposition     ED Disposition Condition Comment    Admit  Case was discussed with Dr Clarence Soares and the patient's admission status was agreed to be Admission Status: inpatient status to the service of Dr Clarence Soares           Follow-up Information    None Current Discharge Medication List      CONTINUE these medications which have NOT CHANGED    Details   cyclobenzaprine (FLEXERIL) 10 mg tablet Take 10 mg by mouth 3 (three) times a day as needed for muscle spasms      Glucose Blood (COOL BLOOD GLUCOSE TEST STRIPS VI) by In Vitro route      Lancets 28G MISC by Does not apply route      omeprazole (PRILOSEC) 20 mg delayed release capsule Daily      oxyCODONE (ROXICODONE) 10 MG TABS Take by mouth every 8 (eight) hours as needed for moderate pain        sertraline (ZOLOFT) 50 mg tablet Take 50 mg by mouth daily      ibuprofen (MOTRIN) 600 mg tablet Take 1 tablet (600 mg total) by mouth every 8 (eight) hours as needed for mild pain or moderate pain for up to 5 days  Qty: 15 tablet, Refills: 0    Associated Diagnoses: Abscess of scalp      ipratropium-albuterol (DUO-NEB) 0 5-2 5 mg/3 mL nebulizer solution Take 1 vial (3 mL total) by nebulization 4 (four) times a day  Qty: 30 vial, Refills: 0    Associated Diagnoses: Acute bronchitis      NARCAN 4 MG/0 1ML LIQD Refills: 0           No discharge procedures on file      ED Provider  Electronically Signed by           Angelique Garcia MD  11/05/18 [id number]

## 2018-11-05 NOTE — ASSESSMENT & PLAN NOTE
· This is likely secondary to bronchospasm  · The patient with history of tobacco use but no formal diagnosis for COPD  · Will continue with DuoNeb and p r n  Albuterol  · Continue Solu-Medrol  · Respiratory protocol for oxygen supplement

## 2018-11-06 NOTE — NURSING NOTE
Pt abruptly chose to leave AMA after receiving telephone call r/t to a personal family matter  Hospitalist was made aware and complications of disease processes w/o treatment discussed with pt  Pt states she understands information presented to her  IV fluids discontinued, IV removed, telemetry box # [id number] removed and discontinued as well  CNA rechecked blood glucose before pt departed and the result of 337 was populated into EPIC  Pt ambulated w/o assistance via elevators near nurse's station
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
## 2018-11-06 NOTE — NURSING NOTE
Pt abruptly chose to leave AMA after receiving telephone call r/t to a personal family matter  Hospitalist was made aware and complications of disease processes w/o treatment discussed with pt  Pt states she understands information presented to her  IV fluids discontinued, IV removed, telemetry box # [id number] removed and discontinued as well  CNA rechecked blood glucose before pt departed and the result of 337 was populated into EPIC  Pt ambulated w/o assistance via elevators near nurse's station

## 2018-11-07 LAB
BACTERIA SPT RESP CULT: ABNORMAL
BACTERIA SPT RESP CULT: ABNORMAL
GRAM STN SPEC: ABNORMAL

## 2018-11-07 NOTE — UTILIZATION REVIEW
Initial Clinical Review    Admission: Date/Time/Statement: 11/5/18 @ 1054 INPATIENT  Patient left AMA on 11/5/18 @ 9:05 PM      Orders Placed This Encounter   Procedures    Inpatient Admission (expected length of stay for this patient is greater than two midnights)     Standing Status:   Standing     Number of Occurrences:   1     Order Specific Question:   Admitting Physician     Answer:   Rasta Hyman [[id number]]     Order Specific Question:   Level of Care     Answer:   Med Surg [16]     Order Specific Question:   Estimated length of stay     Answer:   More than 2 Midnights     Order Specific Question:   Certification     Answer:   I certify that inpatient services are medically necessary for this patient for a duration of greater than two midnights  See H&P and MD Progress Notes for additional information about the patient's course of treatment  ED: Date/Time/Mode of Arrival:   ED Arrival Information     Expected Arrival Acuity Means of Arrival Escorted By Service Admission Type    - 11/5/2018 06:10 Emergent Walk-In Self General Medicine Emergency    Arrival Complaint    Troubl Breathing          Chief Complaint:   Chief Complaint   Patient presents with    Shortness of Breath     for the past 4 days        History of Illness: 44 yr female with sob since last night with cough productive green sputum x3 days  Positive mild chest tightness worse with cough deep breath  Some wheezing  No leg pain or swelling  Patient states she does use inhalers         ED Vital Signs:   ED Triage Vitals [11/05/18 0701]   Temperature Pulse Respirations Blood Pressure SpO2   (!) 97 °F (36 1 °C) (!) 123 20 (!) 179/86 90 % on Room Air      Temp Source Heart Rate Source Patient Position - Orthostatic VS BP Location FiO2 (%)   Temporal Monitor Sitting Left arm --      Pain Score       5        Wt Readings from Last 1 Encounters:   11/05/18 97 8 kg (215 lb 8 oz)       Vital Signs (abnormal):     Date/Time             Temp Pulse        RR  18 1209   100 6    107   24   94 %  Nasal cannula 2L O2   18 1045  --   109   24   94 %  --    18 1030  --   107   26   94 %  --      Physical exam: Pulmonary/Chest:  wheezes (Soft expiratory wheeze bilaterally)  Abnormal Labs/Diagnostic Test Results:     CT chest: No PE  Scattered patchy interstitial disease of the upper lobes most likely  inflammatory in nature  Chest x-ray: Faint patchy bilateral parahilar infiltrates/pneumonia  EKG: ST, 112 BPM, left posterior fasicular block  Sodium = 132, Potassium = 3 4, Glucose = 358, Lactic acid = 2 3, D- Dimer = 358    ED Treatment:   Medication Administration from 2018 0610 to 2018 1123       Date/Time Order Dose Route Action     2018 0750 albuterol inhalation solution 5 mg 5 mg Nebulization Given     2018 0758 methylPREDNISolone sodium succinate (Solu-MEDROL) injection 125 mg 125 mg Intravenous Given     2018 0928 levofloxacin (LEVAQUIN) IVPB (premix) 750 mg 0 mg Intravenous Stopped     2018 0758 levofloxacin (LEVAQUIN) IVPB (premix) 750 mg 750 mg Intravenous New Bag     2018 1027 sodium chloride 0 9 % bolus 1,000 mL 1,000 mL Intravenous New Bag     2018 0826 sodium chloride 0 9 % bolus 2,000 mL 2,000 mL Intravenous New Bag     2018 0949 iohexol (OMNIPAQUE) 350 MG/ML injection (MULTI-DOSE) 80 mL 80 mL Intravenous Given        Past Medical/Surgical History:    Active Ambulatory Problems     Diagnosis Date Noted    Diet controlled gestational diabetes mellitus (GDM) in second trimester 2017    Morbid obesity due to excess calories ([facility] ) 2017    S/P  section 02/15/2017    Tobacco abuse 02/15/2017    Advanced maternal age in multigravida, second trimester 2016    Chronic hypertension with superimposed preeclampsia 2017    Chronic low back pain 2016    Chronic sciatica 2018    Chronic, continuous use of opioids 11/30/2016    Intrauterine growth restriction affecting antepartum care of mother in third trimester 02/14/2017    Low back pain 06/19/2018    Maternal morbid obesity in second trimester, antepartum ([facility] ) 11/30/2016    Abscess of scalp 06/19/2018     Past Medical History:   Diagnosis Date    Back pain     Bone spur     Depression     Gestational diabetes     Herniated cervical disc     Herniated lumbar intervertebral disc     Hx of degenerative disc disease     Obesity     Pre-eclampsia     Prior pregnancy with fetal demise        Admitting Diagnosis: Shortness of breath [R06 02]  Pneumonia [J18 9]  Hypoxia [R09 02]    Age/Sex: 44 y o  female    Assessment/Plan:    Huang Zimmer is a 44 y o  female who presented to the ED with shortness of breath for the past 4 days  Last night she developed productive green sputum cough with mild chest tightness which is worse with coughing and deep breathing  She felt very short of breath that is worse with exertion  She also developed some chills  * Pneumonia due to infectious organism   Assessment & Plan     · Will admit to med surge telemetry  · Will continue with Levaquin  · DuoNeb and p r n  albuterol for shortness of breath and wheezing  · Check urine for strep and Legionella  · Obtain sputum culture and Gram stain          Sepsis ([facility] )   Assessment & Plan     · Patient meets criteria for sepsis with tachycardia, tachypnea, and fever  This is secondary to community-acquired pneumonia  · Blood cultures were obtained in the ER  · Will continue treatment stated above       Shortness of breath   Assessment & Plan     · This is likely secondary to bronchospasm  · The patient with history of tobacco use but no formal diagnosis for COPD  · Will continue with DuoNeb and p r n  Albuterol  · Continue Solu-Medrol    · Respiratory protocol for oxygen supplement       Elevated d-dimer   Assessment & Plan     · Likely secondary to acute infection  · CTA of the chest shows no evidence of PE       Hyponatremia   Assessment & Plan     · Likely secondary to volume depletion  · Will continue with IVF       Hyperglycemia   Assessment & Plan     · The patient denies history of diabetes mellitus however she does have a history of gestational diabetes  · Noted that her blood glucose in the ER was in 300  · Will check A1c  Will place the patient on insulin sliding scales       Tobacco abuse   Assessment & Plan     · Smoking cessation discussed  · Will use patch while in the hospital      Admission Orders: blood cultures, strep pneumoniae, legionella, sputum culture, nasal cannula oxygen, OOB, incentive spirometry, nasal cannula oxygen, sequential compression device, continuous cardiac monitoring  Scheduled Medication:    Lovenox 40 mg SC Daily  Guaifenesin 600 mg PO Q12H  Levemir insulin 10 units SC daily at bedtime  Humalog insulin 1-6 units TID before meals, sliding scale  Duoneb (iprapropium-albuterol 3 ml TID  Levaquin 750 mg IV Q24H  SoluMedrol 40 mg IV Q8H  Nicoderm patch transdermal QD  Protonix 40 mg PO QD  Zoloft 50 mg PO QD    Continuous Infusion:    sodium chloride 0 9 % infusion  Rate: 100 mL/hr Dose: 100 mL/hr  Freq: Continuous Route: IV  Indications of Use: IV Hydration    PRN:    Albuterol nebulizer 2 5 mg  Q4H PRN wheezing, SOB  Robitussin 20 mg Q4H PRN congestion    ============================================================  11/5 8:55 PM Attending Progress Note:    Patient is signing herself out against medical advice due to issues at home  She was advised to return to ED or call PCP if worsening  [address] Utilization Review Department  Phone: [phone]; Fax [phone]  [email or web address]  ATTENTION: Please call with any questions or concerns to [phone]  and carefully listen to the prompts so that you are directed to the right person     Send all requests for admission clinical reviews, approved or denied determinations and any other requests to fax [phone]   All voicemails are confidential

## 2018-11-07 NOTE — UTILIZATION REVIEW
Notification of Discharge  This is a Notification of Discharge from our facility [address]  Please be advised that this patient has been discharge from our facility  Below you will find the admission and discharge date and time including the patients disposition  PRESENTATION DATE: 11/5/2018  7:03 AM  IP ADMISSION DATE: 11/5/18 1054  DISCHARGE DATE: 11/5/2018  8:55 PM  DISPOSITION: Left against medical advice or discontinued care    [facility] Review Department  Phone: [phone]; Fax [phone]  ATTENTION: Please call with any questions or concerns to [phone]  and carefully listen to the prompts so that you are directed to the right person  Send all requests for admission clinical reviews, approved or denied determinations and any other requests to fax [phone]   All voicemails are confidential

## 2018-11-10 LAB
BACTERIA BLD CULT: NORMAL
BACTERIA BLD CULT: NORMAL

## 2018-11-11 ENCOUNTER — APPOINTMENT (EMERGENCY)
Dept: CT IMAGING | Facility: HOSPITAL | Age: 43
End: 2018-11-11
Payer: COMMERCIAL

## 2018-11-11 ENCOUNTER — APPOINTMENT (EMERGENCY)
Dept: RADIOLOGY | Facility: HOSPITAL | Age: 43
End: 2018-11-11
Payer: COMMERCIAL

## 2018-11-11 ENCOUNTER — HOSPITAL ENCOUNTER (EMERGENCY)
Facility: HOSPITAL | Age: 43
Discharge: HOME | End: 2018-11-11
Payer: COMMERCIAL

## 2018-11-11 VITALS
TEMPERATURE: 96.9 F | DIASTOLIC BLOOD PRESSURE: 72 MMHG | WEIGHT: 215.61 LBS | RESPIRATION RATE: 22 BRPM | OXYGEN SATURATION: 97 % | HEART RATE: 92 BPM | HEIGHT: 60 IN | SYSTOLIC BLOOD PRESSURE: 150 MMHG | BODY MASS INDEX: 42.33 KG/M2

## 2018-11-11 DIAGNOSIS — I10 HYPERTENSION, UNSPECIFIED TYPE: ICD-10-CM

## 2018-11-11 DIAGNOSIS — J45.901 ASTHMA EXACERBATION: ICD-10-CM

## 2018-11-11 DIAGNOSIS — R06.02 SHORTNESS OF BREATH: Primary | ICD-10-CM

## 2018-11-11 LAB
ALBUMIN SERPL BCP-MCNC: 3.9 G/DL (ref 3.5–5.7)
ALP SERPL-CCNC: 64 U/L (ref 40–150)
ALT SERPL W P-5'-P-CCNC: 28 U/L (ref 7–52)
ANION GAP SERPL CALCULATED.3IONS-SCNC: 7 MMOL/L (ref 4–13)
APTT PPP: 33 SECONDS (ref 24–36)
AST SERPL W P-5'-P-CCNC: 23 U/L (ref 13–39)
BILIRUB SERPL-MCNC: 0.3 MG/DL (ref 0.2–1)
BUN SERPL-MCNC: 11 MG/DL (ref 7–25)
CALCIUM SERPL-MCNC: 9.6 MG/DL (ref 8.6–10.5)
CHLORIDE SERPL-SCNC: 93 MMOL/L (ref 98–107)
CO2 SERPL-SCNC: 31 MMOL/L (ref 21–31)
CREAT SERPL-MCNC: 0.53 MG/DL (ref 0.6–1.2)
DEPRECATED D DIMER PPP: 503 NG/ML (FEU)
ERYTHROCYTE [DISTWIDTH] IN BLOOD BY AUTOMATED COUNT: 13.9 % (ref 11.5–14.5)
GFR SERPL CREATININE-BSD FRML MDRD: 117 ML/MIN/1.73SQ M
GLUCOSE SERPL-MCNC: 321 MG/DL (ref 65–99)
HCT VFR BLD AUTO: 37.8 % (ref 34.8–46.1)
HGB BLD-MCNC: 12.4 G/DL (ref 12–16)
INR PPP: 0.91 (ref 0.9–1.5)
MCH RBC QN AUTO: 29.1 PG (ref 26–34)
MCHC RBC AUTO-ENTMCNC: 32.6 G/DL (ref 31–37)
MCV RBC AUTO: 89 FL (ref 81–99)
NRBC BLD AUTO-RTO: 0 /100 WBCS
PLATELET # BLD AUTO: 223 THOUSANDS/UL (ref 149–390)
PMV BLD AUTO: 7.8 FL (ref 8.6–11.7)
POTASSIUM SERPL-SCNC: 3.5 MMOL/L (ref 3.5–5.5)
PROT SERPL-MCNC: 6.9 G/DL (ref 6.4–8.9)
PROTHROMBIN TIME: 10.6 SECONDS (ref 10.2–13)
RBC # BLD AUTO: 4.24 MILLION/UL (ref 3.9–5.2)
SODIUM SERPL-SCNC: 131 MMOL/L (ref 134–143)
TROPONIN I SERPL-MCNC: <0.03 NG/ML
WBC # BLD AUTO: 10.8 THOUSAND/UL (ref 4.8–10.8)

## 2018-11-11 PROCEDURE — 71275 CT ANGIOGRAPHY CHEST: CPT

## 2018-11-11 PROCEDURE — 93005 ELECTROCARDIOGRAM TRACING: CPT

## 2018-11-11 PROCEDURE — 94761 N-INVAS EAR/PLS OXIMETRY MLT: CPT

## 2018-11-11 PROCEDURE — 96374 THER/PROPH/DIAG INJ IV PUSH: CPT

## 2018-11-11 PROCEDURE — 96375 TX/PRO/DX INJ NEW DRUG ADDON: CPT

## 2018-11-11 PROCEDURE — 71046 X-RAY EXAM CHEST 2 VIEWS: CPT

## 2018-11-11 PROCEDURE — 85730 THROMBOPLASTIN TIME PARTIAL: CPT

## 2018-11-11 PROCEDURE — 84484 ASSAY OF TROPONIN QUANT: CPT

## 2018-11-11 PROCEDURE — 85610 PROTHROMBIN TIME: CPT

## 2018-11-11 PROCEDURE — 80053 COMPREHEN METABOLIC PANEL: CPT

## 2018-11-11 PROCEDURE — 36415 COLL VENOUS BLD VENIPUNCTURE: CPT

## 2018-11-11 PROCEDURE — 94664 DEMO&/EVAL PT USE INHALER: CPT

## 2018-11-11 PROCEDURE — 85379 FIBRIN DEGRADATION QUANT: CPT

## 2018-11-11 PROCEDURE — 99284 EMERGENCY DEPT VISIT MOD MDM: CPT

## 2018-11-11 RX ORDER — METHYLPREDNISOLONE SODIUM SUCCINATE 125 MG/2ML
125 INJECTION, POWDER, LYOPHILIZED, FOR SOLUTION INTRAMUSCULAR; INTRAVENOUS ONCE
Status: COMPLETED | OUTPATIENT
Start: 2018-11-11 | End: 2018-11-11

## 2018-11-11 RX ORDER — LABETALOL HYDROCHLORIDE 5 MG/ML
20 INJECTION, SOLUTION INTRAVENOUS ONCE
Status: COMPLETED | OUTPATIENT
Start: 2018-11-11 | End: 2018-11-11

## 2018-11-11 RX ORDER — METHYLPREDNISOLONE 4 MG/1
TABLET ORAL
Qty: 21 TABLET | Refills: 0 | Status: SHIPPED | OUTPATIENT
Start: 2018-11-11 | End: 2021-09-27 | Stop reason: ALTCHOICE

## 2018-11-11 RX ADMIN — METHYLPREDNISOLONE SODIUM SUCCINATE 125 MG: 125 INJECTION, POWDER, FOR SOLUTION INTRAMUSCULAR; INTRAVENOUS at 17:24

## 2018-11-11 RX ADMIN — LABETALOL HYDROCHLORIDE 20 MG: 5 INJECTION, SOLUTION INTRAVENOUS at 17:47

## 2018-11-11 RX ADMIN — ALBUTEROL SULFATE 2.5 MG: 2.5 SOLUTION RESPIRATORY (INHALATION) at 17:21

## 2018-11-11 RX ADMIN — IOHEXOL 80 ML: 350 INJECTION, SOLUTION INTRAVENOUS at 18:30

## 2018-11-11 NOTE — ED PROVIDER NOTES
History  Chief Complaint   Patient presents with    Cough     pt with cough monday with pneumonia - pt signed out AMA on monday  Carol Jung is a 56-year-old female came to the emergency department due to worsening shortness of breath for the last several days prior to arrival   Patient states she was admitted in the word several days ago but signed out against medical advice  Patient states that the she has difficulty finding is flat for an appointment with her family doctor so she decided to come to the emergency department for further evaluation and treatment  History provided by:  Patient   used: No    Shortness of Breath   Severity:  Mild  Onset quality:  Gradual  Duration: Several   Timing:  Intermittent  Progression:  Waxing and waning  Chronicity:  Recurrent  Context: not activity, not animal exposure, not emotional upset, not fumes, not known allergens, not occupational exposure, not pollens, not smoke exposure, not strong odors, not URI and not weather changes    Relieved by:  Nothing  Worsened by:  Coughing  Ineffective treatments:  None tried  Associated symptoms: cough    Associated symptoms: no abdominal pain, no chest pain, no claudication, no diaphoresis, no ear pain, no fever, no headaches, no hemoptysis, no neck pain, no PND, no rash, no sore throat, no sputum production, no syncope, no swollen glands, no vomiting and no wheezing    Cough:     Cough characteristics:  Non-productive    Sputum characteristics:  Unable to specify    Severity:  Mild    Onset quality:  Gradual    Cough duration: Several     Timing:  Intermittent    Progression:  Unchanged    Chronicity:  Recurrent  Risk factors: tobacco use        Prior to Admission Medications   Prescriptions Last Dose Informant Patient Reported? Taking?    Glucose Blood (COOL BLOOD GLUCOSE TEST STRIPS VI)  Self Yes No   Sig: by In Vitro route   Lancets 28G MISC  Self Yes No   Sig: by Does not apply route   NARCAN 4 MG/0 1ML LIQD   Yes No   cyclobenzaprine (FLEXERIL) 10 mg tablet  Self Yes No   Sig: Take 10 mg by mouth 3 (three) times a day as needed for muscle spasms   ibuprofen (MOTRIN) 600 mg tablet  Self No No   Sig: Take 1 tablet (600 mg total) by mouth every 8 (eight) hours as needed for mild pain or moderate pain for up to 5 days   ipratropium-albuterol (DUO-NEB) 0 5-2 5 mg/3 mL nebulizer solution   No No   Sig: Take 1 vial (3 mL total) by nebulization 4 (four) times a day   Patient not taking: Reported on 2018    omeprazole (PRILOSEC) 20 mg delayed release capsule   Yes No   Sig: Daily   oxyCODONE (ROXICODONE) 10 MG TABS  Self Yes No   Sig: Take by mouth every 8 (eight) hours as needed for moderate pain     sertraline (ZOLOFT) 50 mg tablet  Self Yes No   Sig: Take 50 mg by mouth daily      Facility-Administered Medications: None       Past Medical History:   Diagnosis Date    Back pain     used 2 percocet tid until current admission in 2017    Bone spur     in back per pt    Depression     Gestational diabetes     insulin dependent    Herniated cervical disc     Herniated lumbar intervertebral disc     Hx of degenerative disc disease     Obesity     Pre-eclampsia     Prior pregnancy with fetal demise     previous demise at 42 weeks       Past Surgical History:   Procedure Laterality Date    BACK SURGERY       SECTION      LAMINECTOMY      with disc removal, last assessed 16    OTHER SURGICAL HISTORY      Right ovary removal 2005 per pt recall at [facility] N/A 2/15/2017    Procedure:  SECTION ();   Surgeon: Aby Salgado MD;  Location: [facility] ;  Service: Obstetrics    RI Terie Fabian TUBE W/ Left 2/15/2017    Procedure: LIGATION/COAGULATION TUBAL;  Surgeon: Aby Salgado MD;  Location: [facility] ;  Service: Obstetrics       Family History   Problem Relation Age of Onset    Other Mother         brain tumor I have reviewed and agree with the history as documented  Social History   Substance Use Topics    Smoking status: Current Every Day Smoker     Packs/day: 0 50     Types: Cigarettes    Smokeless tobacco: Never Used    Alcohol use No        Review of Systems   Constitutional: Negative for diaphoresis and fever  HENT: Negative for ear pain and sore throat  Eyes: Negative  Respiratory: Positive for cough and shortness of breath  Negative for hemoptysis, sputum production and wheezing  Cardiovascular: Negative for chest pain, claudication, syncope and PND  Gastrointestinal: Negative for abdominal pain and vomiting  Endocrine: Negative  Genitourinary: Negative  Musculoskeletal: Negative for neck pain  Skin: Negative for rash  Allergic/Immunologic: Negative  Neurological: Negative for headaches  Hematological: Negative  Psychiatric/Behavioral: Negative  Physical Exam  Physical Exam   Constitutional: She is oriented to person, place, and time  She appears well-developed and well-nourished  No distress  HENT:   Head: Normocephalic and atraumatic  Right Ear: External ear normal    Left Ear: External ear normal    Nose: Nose normal    Mouth/Throat: Oropharynx is clear and moist  No oropharyngeal exudate  Eyes: Pupils are equal, round, and reactive to light  Conjunctivae and EOM are normal  Right eye exhibits no discharge  Left eye exhibits no discharge  No scleral icterus  Neck: Normal range of motion  Neck supple  No tracheal deviation present  No thyromegaly present  Cardiovascular: Normal rate, regular rhythm, normal heart sounds and intact distal pulses  Pulmonary/Chest: Effort normal  No respiratory distress  She has wheezes in the right lower field and the left lower field  Abdominal: Soft  Bowel sounds are normal  She exhibits no distension  There is no tenderness  Musculoskeletal: Normal range of motion  She exhibits no edema, tenderness or deformity  Lymphadenopathy:     She has no cervical adenopathy  Neurological: She is alert and oriented to person, place, and time  No cranial nerve deficit or sensory deficit  She exhibits normal muscle tone  Coordination normal    Skin: Skin is warm and dry  No rash noted  She is not diaphoretic  No erythema  No pallor  Psychiatric: She has a normal mood and affect  Her behavior is normal  Judgment and thought content normal    Nursing note and vitals reviewed        Vital Signs  ED Triage Vitals   Temperature Pulse Respirations Blood Pressure SpO2   11/11/18 1651 11/11/18 1651 11/11/18 1651 11/11/18 1651 11/11/18 1651   (!) 96 9 °F (36 1 °C) (!) 116 18 (!) 212/100 94 %      Temp src Heart Rate Source Patient Position - Orthostatic VS BP Location FiO2 (%)   -- 11/11/18 1842 11/11/18 1842 11/11/18 1842 --    Monitor Sitting Left arm       Pain Score       11/11/18 1651       7           Vitals:    11/11/18 1746 11/11/18 1753 11/11/18 1800 11/11/18 1842   BP: (!) 178/124 138/63 141/71 139/76   Pulse: 104 88 86 89   Patient Position - Orthostatic VS:    Sitting       Visual Acuity      ED Medications  Medications   albuterol inhalation solution 2 5 mg (2 5 mg Nebulization Given 11/11/18 1721)   methylPREDNISolone sodium succinate (Solu-MEDROL) injection 125 mg (125 mg Intravenous Given 11/11/18 1724)   labetalol (NORMODYNE) injection 20 mg (20 mg Intravenous Given 11/11/18 1747)   iohexol (OMNIPAQUE) 350 MG/ML injection (SINGLE-DOSE) 100 mL (80 mL Intravenous Given 11/11/18 1830)       Diagnostic Studies  Results Reviewed     Procedure Component Value Units Date/Time    Troponin I [[id number]]  (Normal) Collected:  11/11/18 1721    Lab Status:  Final result Specimen:  Blood from Arm, Right Updated:  11/11/18 1749     Troponin I <0 03 ng/mL     Comprehensive metabolic panel [[id number]]  (Abnormal) Collected:  11/11/18 1721    Lab Status:  Final result Specimen:  Blood from Arm, Right Updated:  11/11/18 1744     Sodium 131 (L) mmol/L      Potassium 3 5 mmol/L      Chloride 93 (L) mmol/L      CO2 31 mmol/L      ANION GAP 7 mmol/L      BUN 11 mg/dL      Creatinine 0 53 (L) mg/dL      Glucose 321 (H) mg/dL      Calcium 9 6 mg/dL      AST 23 U/L      ALT 28 U/L      Alkaline Phosphatase 64 U/L      Total Protein 6 9 g/dL      Albumin 3 9 g/dL      Total Bilirubin 0 30 mg/dL      eGFR 117 ml/min/1 73sq m     Narrative:         National Kidney Disease Education Program recommendations are as follows:  GFR calculation is accurate only with a steady state creatinine  Chronic Kidney disease less than 60 ml/min/1 73 sq  meters  Kidney failure less than 15 ml/min/1 73 sq  meters      Protime-INR [[id number]]  (Normal) Collected:  11/11/18 1721    Lab Status:  Final result Specimen:  Blood from Arm, Right Updated:  11/11/18 1740     Protime 10 6 seconds      INR 0 91    APTT [[id number]]  (Normal) Collected:  11/11/18 1721    Lab Status:  Final result Specimen:  Blood from Arm, Right Updated:  11/11/18 1740     PTT 33 seconds     D-Dimer [[id number]]  (Abnormal) Collected:  11/11/18 1721    Lab Status:  Final result Specimen:  Blood from Arm, Right Updated:  11/11/18 1740     D-Dimer, Quant 503 (H) ng/ml (FEU)     CBC and differential [[id number]]  (Abnormal) Collected:  11/11/18 1721    Lab Status:  Final result Specimen:  Blood from Arm, Right Updated:  11/11/18 1731     WBC 10 80 Thousand/uL      RBC 4 24 Million/uL      Hemoglobin 12 4 g/dL      Hematocrit 37 8 %      MCV 89 fL      MCH 29 1 pg      MCHC 32 6 g/dL      RDW 13 9 %      MPV 7 8 (L) fL      Platelets 713 Thousands/uL      nRBC 0 /100 WBCs     Troponin I [[id number]] Collected:  11/11/18 1721    Lab Status:  No result Specimen:  Blood from Arm, Right                  XR chest 2 views    (Results Pending)   CTA ED chest PE study    (Results Pending)              Procedures  ECG 12 Lead Documentation  Date/Time: 11/11/2018 5:45 PM  Performed by: MAZIN Nieves  Authorized by: MAZIN Nieves Indications / Diagnosis:  Shortness of breath  ECG reviewed by me, the ED Provider: yes    Patient location:  ED  Previous ECG:     Previous ECG:  Unavailable    Comparison to cardiac monitor: Yes    Interpretation:     Interpretation: non-specific    Quality:     Tracing quality:  Limited by artifact  Rate:     ECG rate:  104 beats per minute    ECG rate assessment: tachycardic    Rhythm:     Rhythm: sinus tachycardia    Ectopy:     Ectopy: none    QRS:     QRS axis:  Normal    QRS intervals:  Normal  Conduction:     Conduction: normal    ST segments:     ST segments:  Non-specific  T waves:     T waves: non-specific             Phone Contacts  ED Phone Contact    ED Course  ED Course as of Nov 11 1845   Sun Nov 11, 2018 1844 ED care was transferred to Dr Cadence Carcamo                                  MDM  Number of Diagnoses or Management Options  Hypertension, unspecified type: new and requires workup  Shortness of breath: new and requires workup     Amount and/or Complexity of Data Reviewed  Clinical lab tests: ordered and reviewed  Tests in the radiology section of CPT®: ordered and reviewed  Tests in the medicine section of CPT®: ordered and reviewed  Independent visualization of images, tracings, or specimens: yes    Risk of Complications, Morbidity, and/or Mortality  Presenting problems: moderate  Diagnostic procedures: moderate  Management options: moderate    Patient Progress  Patient progress: stable    CritCare Time    Disposition  Final diagnoses:   Shortness of breath   Hypertension, unspecified type     Time reflects when diagnosis was documented in both MDM as applicable and the Disposition within this note     Time User Action Codes Description Comment    11/11/2018  6:14 PM Ppipa Langley Add [R06 02] Shortness of breath     11/11/2018  6:14 PM Manuel Hernandez Add [I10] Hypertension, unspecified type       ED Disposition     None      Follow-up Information    None         Patient's Medications Discharge Prescriptions    No medications on file     No discharge procedures on file      ED Provider  Electronically Signed by           Lisa Horta MD  11/11/18 2887

## 2018-11-12 LAB
ATRIAL RATE: 104 BPM
P AXIS: 53 DEGREES
PR INTERVAL: 164 MS
QRS AXIS: -77 DEGREES
QRSD INTERVAL: 76 MS
QT INTERVAL: 352 MS
QTC INTERVAL: 462 MS
T WAVE AXIS: 50 DEGREES
VENTRICULAR RATE: 104 BPM

## 2018-11-12 PROCEDURE — 93010 ELECTROCARDIOGRAM REPORT: CPT | Performed by: INTERNAL MEDICINE

## 2018-11-12 NOTE — ED PROVIDER NOTES
History  Chief Complaint   Patient presents with    Cough     pt with cough monday with pneumonia - pt signed out AMA on monday  HPI    Prior to Admission Medications   Prescriptions Last Dose Informant Patient Reported? Taking?    Glucose Blood (COOL BLOOD GLUCOSE TEST STRIPS VI)  Self Yes No   Sig: by In Vitro route   Lancets 28G MISC  Self Yes No   Sig: by Does not apply route   NARCAN 4 MG/0 1ML LIQD   Yes No   cyclobenzaprine (FLEXERIL) 10 mg tablet  Self Yes No   Sig: Take 10 mg by mouth 3 (three) times a day as needed for muscle spasms   ibuprofen (MOTRIN) 600 mg tablet  Self No No   Sig: Take 1 tablet (600 mg total) by mouth every 8 (eight) hours as needed for mild pain or moderate pain for up to 5 days   ipratropium-albuterol (DUO-NEB) 0 5-2 5 mg/3 mL nebulizer solution   No No   Sig: Take 1 vial (3 mL total) by nebulization 4 (four) times a day   Patient not taking: Reported on 2018    omeprazole (PRILOSEC) 20 mg delayed release capsule   Yes No   Sig: Daily   oxyCODONE (ROXICODONE) 10 MG TABS  Self Yes No   Sig: Take by mouth every 8 (eight) hours as needed for moderate pain     sertraline (ZOLOFT) 50 mg tablet  Self Yes No   Sig: Take 50 mg by mouth daily      Facility-Administered Medications: None       Past Medical History:   Diagnosis Date    Back pain     used 2 percocet tid until current admission in 2017    Bone spur     in back per pt    Depression     Gestational diabetes     insulin dependent    Herniated cervical disc     Herniated lumbar intervertebral disc     Hx of degenerative disc disease     Obesity     Pre-eclampsia     Prior pregnancy with fetal demise     previous demise at 39 weeks       Past Surgical History:   Procedure Laterality Date    BACK SURGERY       SECTION      LAMINECTOMY      with disc removal, last assessed 16    OTHER SURGICAL HISTORY      Right ovary removal 2005 per pt [facility] at [facility] DELIVERY ONLY N/A 2/15/2017    Procedure:  SECTION (); Surgeon: Ginette Neumann MD;  [facility]: [facility];  Service: Obstetrics    SD Pepper Reap TUBE W/ Left 2/15/2017    Procedure: LIGATION/COAGULATION TUBAL;  Surgeon: Ginette Neumann MD;  Location: [facility] ;  Service: Obstetrics       Family History   Problem Relation Age of Onset    Other Mother         brain tumor     I have reviewed and agree with the history as documented      Social History   Substance Use Topics    Smoking status: Current Every Day Smoker     Packs/day: 0 50     Types: Cigarettes    Smokeless tobacco: Never Used    Alcohol use No        Review of Systems    Physical Exam  Physical Exam    Vital Signs  ED Triage Vitals   Temperature Pulse Respirations Blood Pressure SpO2   18 1651 18 1651 18 1651 18 1651 18 1651   (!) 96 9 °F (36 1 °C) (!) 116 18 (!) 212/100 94 %      Temp src Heart Rate Source Patient Position - Orthostatic VS BP Location FiO2 (%)   -- 18 1842 18 1842 18 184 --    Monitor Sitting Left arm       Pain Score       18 1651       7           Vitals:    18 1753 18 1800 18 1842 18 1845   BP: 138/63 141/71 139/76 130/79   Pulse: 88 86 89 87   Patient Position - Orthostatic VS:   Sitting        Visual Acuity      ED Medications  Medications   albuterol inhalation solution 2 5 mg (2 5 mg Nebulization Given 18 172)   methylPREDNISolone sodium succinate (Solu-MEDROL) injection 125 mg (125 mg Intravenous Given 18 1724)   labetalol (NORMODYNE) injection 20 mg (20 mg Intravenous Given 18 1747)   iohexol (OMNIPAQUE) 350 MG/ML injection (SINGLE-DOSE) 100 mL (80 mL Intravenous Given 18 1830)       Diagnostic Studies  Results Reviewed     Procedure Component Value Units Date/Time    Troponin I [[id number]]  (Normal) Collected:  18    Lab Status:  Final result Specimen:  Blood from Arm, Right Updated:  11/11/18 1749     Troponin I <0 03 ng/mL     Comprehensive metabolic panel [[id number]]  (Abnormal) Collected:  11/11/18 1721    Lab Status:  Final result Specimen:  Blood from Arm, Right Updated:  11/11/18 1744     Sodium 131 (L) mmol/L      Potassium 3 5 mmol/L      Chloride 93 (L) mmol/L      CO2 31 mmol/L      ANION GAP 7 mmol/L      BUN 11 mg/dL      Creatinine 0 53 (L) mg/dL      Glucose 321 (H) mg/dL      Calcium 9 6 mg/dL      AST 23 U/L      ALT 28 U/L      Alkaline Phosphatase 64 U/L      Total Protein 6 9 g/dL      Albumin 3 9 g/dL      Total Bilirubin 0 30 mg/dL      eGFR 117 ml/min/1 73sq m     Narrative:         National Kidney Disease Education Program recommendations are as follows:  GFR calculation is accurate only with a steady state creatinine  Chronic Kidney disease less than 60 ml/min/1 73 sq  meters  Kidney failure less than 15 ml/min/1 73 sq  meters      Protime-INR [[id number]]  (Normal) Collected:  11/11/18 1721    Lab Status:  Final result Specimen:  Blood from Arm, Right Updated:  11/11/18 1740     Protime 10 6 seconds      INR 0 91    APTT [[id number]]  (Normal) Collected:  11/11/18 1721    Lab Status:  Final result Specimen:  Blood from Arm, Right Updated:  11/11/18 1740     PTT 33 seconds     D-Dimer [[id number]]  (Abnormal) Collected:  11/11/18 1721    Lab Status:  Final result Specimen:  Blood from Arm, Right Updated:  11/11/18 1740     D-Dimer, Quant 503 (H) ng/ml (FEU)     CBC and differential [[id number]]  (Abnormal) Collected:  11/11/18 1721    Lab Status:  Final result Specimen:  Blood from Arm, Right Updated:  11/11/18 1731     WBC 10 80 Thousand/uL      RBC 4 24 Million/uL      Hemoglobin 12 4 g/dL      Hematocrit 37 8 %      MCV 89 fL      MCH 29 1 pg      MCHC 32 6 g/dL      RDW 13 9 %      MPV 7 8 (L) fL      Platelets 870 Thousands/uL      nRBC 0 /100 WBCs     Troponin I [[id number]] Collected:  11/11/18 1721    Lab Status:  No result Specimen:  Blood from Arm, Right CTA ED chest PE study   Final Result by Rob Choi (11/11 1924)   1  No evidence of pulmonary embolus  2   Suspect bronchitis  3   Scattered prominent mediastinal lymph nodes, likely reactive  4   Hepatic steatosis  Splenomegaly  Signed by Leona Renee MD      XR chest 2 views   Final Result by Rob Choi (11/11 1903)   Suspect bronchitis  Signed by Leona Renee MD                 Procedures  Procedures       Phone Contacts  ED Phone Contact    ED Course                               MDM  Number of Diagnoses or Management Options  Hypertension, unspecified type: established and improving  Shortness of breath: established and improving  Diagnosis management comments: Patient's blood pressure down and respirations nonlabored  Pulse ox 95% on room air  Patient's CT shows no infiltrate or pulmonary embolism  Will discharge patient on steroids and outpatient nebulizer treatments  Patient to follow up with primary doctor    CritCare Time    Disposition  Final diagnoses:   Shortness of breath   Hypertension, unspecified type     Time reflects when diagnosis was documented in both MDM as applicable and the Disposition within this note     Time User Action Codes Description Comment    11/11/2018  6:14 PM Raeann Pereira Add [R06 02] Shortness of breath     11/11/2018  6:14 PM Pattie Hernandez 79 Hypertension, unspecified type       ED Disposition     None      Follow-up Information    None         Patient's Medications   Discharge Prescriptions    No medications on file     No discharge procedures on file      ED Provider  Electronically Signed by           Gracie Mcneal MD  11/11/18 1946

## 2018-11-12 NOTE — DISCHARGE INSTRUCTIONS
Asthma, Ambulatory Care   GENERAL INFORMATION:   Asthma  is a lung disease that makes breathing difficult  Chronic inflammation and reactions to triggers narrow the airways in your lungs  Asthma can become life-threatening if it is not managed  Common symptoms include the following:   · Coughing     · Wheezing     · Shortness of breath     · Chest tightness  Seek immediate care for the following symptoms:   · Severe shortness of breath    · Blue or gray lips or nails    · Skin around your neck and ribs pulls in with each breath    · Shortness of breath, even after you take your short-term medicine as directed     · Peak flow numbers in the red zone of your asthma action plan  Treatment for asthma  will depend on how severe it is  Medicine may decrease inflammation, open airways, and make it easier to breathe  Medicines may be inhaled, taken as a pill, or injected  Short-term medicines relieve your symptoms quickly  Long-term medicines are used to prevent future attacks  You may also need medicine to help control your allergies  Manage and prevent future asthma attacks:   · Follow your asthma action pan  This is a written plan that you and your healthcare provider create  It explains which medicine you need and when to change doses if necessary  It also explains how you can monitor symptoms and use a peak flow meter  The meter measures how well your lungs are working  · Manage other health conditions , such as allergies, acid reflux, and sleep apnea  · Identify and avoid triggers  These may include pets, dust mites, mold, and cockroaches  · Do not smoke and avoid others who smoke  If you smoke, it is never too late to quit  Ask your healthcare provider if you need help quitting  · Ask about a flu vaccine  The flu can make your asthma worse  You may need a yearly flu shot  Follow up with your healthcare provider as directed:   You will need to return to make sure your medicine is working and your symptoms are controlled  You may be referred to an asthma or allergy specialist  Jenny Favian may be asked to keep a record of your peak flow values and bring it with you to your appointments  Write down your questions so you remember to ask them during your visits  CARE AGREEMENT:   You have the right to help plan your care  Learn about your health condition and how it may be treated  Discuss treatment options with your caregivers to decide what care you want to receive  You always have the right to refuse treatment  The above information is an  only  It is not intended as medical advice for individual conditions or treatments  Talk to your doctor, nurse or pharmacist before following any medical regimen to see if it is safe and effective for you  © 2014 [address] is for End User's use only and may not be sold, redistributed or otherwise used for commercial purposes  All illustrations and images included in CareNotes® are the copyrighted property of [employer] D [employer] M [employer]  or Bradford Pressley

## 2021-02-12 ENCOUNTER — NURSE TRIAGE (OUTPATIENT)
Dept: OTHER | Facility: OTHER | Age: 46
End: 2021-02-12

## 2021-02-12 DIAGNOSIS — Z20.828 SARS-ASSOCIATED CORONAVIRUS EXPOSURE: ICD-10-CM

## 2021-02-12 DIAGNOSIS — Z20.828 SARS-ASSOCIATED CORONAVIRUS EXPOSURE: Primary | ICD-10-CM

## 2021-02-12 PROCEDURE — U0003 INFECTIOUS AGENT DETECTION BY NUCLEIC ACID (DNA OR RNA); SEVERE ACUTE RESPIRATORY SYNDROME CORONAVIRUS 2 (SARS-COV-2) (CORONAVIRUS DISEASE [COVID-19]), AMPLIFIED PROBE TECHNIQUE, MAKING USE OF HIGH THROUGHPUT TECHNOLOGIES AS DESCRIBED BY CMS-2020-01-R: HCPCS | Performed by: FAMILY MEDICINE

## 2021-02-12 NOTE — TELEPHONE ENCOUNTER
Reason for Disposition   [1] COVID-19 infection suspected by caller or triager AND [2] mild symptoms (cough, fever, or others) AND [9] no complications or SOB    Additional Information   [1] Symptoms of COVID-19 (e g , cough, fever, SOB, or others) AND [2] within 14 days of EXPOSURE (close contact) with diagnosed or suspected COVID-19 patient    Answer Assessment - Initial Assessment Questions  1  Were you within 6 feet or less, for up to 15 minutes or more with a person that has a confirmed COVID-19 test?    Yes      2  What was the date of your exposure? Exactly one week ago  3  Are you experiencing any symptoms attributed to the virus?  (Assess for SOB, cough, fever, difficulty breathing)     Headache, fatigue, diarrhea, cough  States she did feel some SOB from chasing her 1 yr old around  Denies SOB right now  Denies chest pain  4  HIGH RISK: Do you have any history heart or lung conditions, weakened immune system, diabetes, Asthma, CHF, HIV, COPD, Chemo, renal failure, sickle cell, etc?     Denies  5  PREGNANCY: Are you pregnant or did you recently give birth? Denies  Protocols used: CORONAVIRUS (COVID-19) DIAGNOSED OR SUSPECTED-ADULT-AH, CORONAVIRUS ([id number]) EXPOSURE-ADULT-AH      Has out of network PCP

## 2021-02-12 NOTE — TELEPHONE ENCOUNTER
Regarding: Symptomatic COVID - SOB   ----- Message from Winnie Dubon sent at 2/12/2021  3:42 PM EST -----  "I was exposed and I'm having symptoms of a headache, fatigue, diarrhea, and SOB"

## 2021-02-15 LAB — SARS-COV-2 RNA RESP QL NAA+PROBE: NEGATIVE

## 2021-09-27 ENCOUNTER — OFFICE VISIT (OUTPATIENT)
Dept: URGENT CARE | Facility: CLINIC | Age: 46
End: 2021-09-27
Payer: COMMERCIAL

## 2021-09-27 ENCOUNTER — APPOINTMENT (OUTPATIENT)
Dept: RADIOLOGY | Facility: CLINIC | Age: 46
End: 2021-09-27
Payer: COMMERCIAL

## 2021-09-27 VITALS
SYSTOLIC BLOOD PRESSURE: 195 MMHG | TEMPERATURE: 97.5 F | DIASTOLIC BLOOD PRESSURE: 83 MMHG | RESPIRATION RATE: 14 BRPM | OXYGEN SATURATION: 98 % | HEART RATE: 98 BPM
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
## 2021-09-27 DIAGNOSIS — S49.91XA RIGHT SHOULDER INJURY, INITIAL ENCOUNTER: ICD-10-CM

## 2021-09-27 DIAGNOSIS — S42.209A: Primary | ICD-10-CM

## 2021-09-27 PROCEDURE — 99213 OFFICE O/P EST LOW 20 MIN: CPT | Performed by: NURSE PRACTITIONER

## 2021-09-27 PROCEDURE — 73030 X-RAY EXAM OF SHOULDER: CPT

## 2021-09-27 RX ORDER — IBUPROFEN 600 MG/1
600 TABLET ORAL EVERY 6 HOURS PRN
Qty: 30 TABLET | Refills: 0 | Status: SHIPPED | OUTPATIENT
Start: 2021-09-27

## 2021-09-28 ENCOUNTER — TELEPHONE (OUTPATIENT)
Dept: OBGYN CLINIC | Facility: HOSPITAL | Age: 46
End: 2021-09-28

## 2021-09-28 ENCOUNTER — OFFICE VISIT (OUTPATIENT)
Dept: OBGYN CLINIC | Facility: CLINIC | Age: 46
End: 2021-09-28
Payer: COMMERCIAL

## 2021-09-28 VITALS
HEIGHT: 60 IN | BODY MASS INDEX: 38.48 KG/M2 | SYSTOLIC BLOOD PRESSURE: 174 MMHG | DIASTOLIC BLOOD PRESSURE: 96 MMHG | HEART RATE: 112 BPM | WEIGHT: 196 LBS

## 2021-09-28 DIAGNOSIS — S49.91XA RIGHT SHOULDER INJURY, INITIAL ENCOUNTER: ICD-10-CM

## 2021-09-28 DIAGNOSIS — S42.209A: ICD-10-CM

## 2021-09-28 DIAGNOSIS — S40.011A CONTUSION OF RIGHT SHOULDER, INITIAL ENCOUNTER: Primary | ICD-10-CM
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
## 2021-09-28 PROCEDURE — 99203 OFFICE O/P NEW LOW 30 MIN: CPT | Performed by: ORTHOPAEDIC SURGERY

## 2021-09-28 NOTE — PROGRESS NOTES
ASSESSMENT/PLAN:    Diagnoses and all orders for this visit:    Contusion of right shoulder, initial encounter         x-rays of the patient's right shoulder are negative for any fractures or dislocations  She does have calcific tendinitis  The patient was instructed to stop using the sling and to start a home exercise program for strengthening, stretching range of motion  She will follow up with our office in six weeks  The patient is acceptable to this plan  Return in about 6 weeks (around 11/9/2021)  it appears the patient sustained a contusion of her right shoulder  Physical exam shows nearly full range of motion  No swelling or ecchymosis  Rotator cuff strength testing is grossly intact  The patient was instructed to discontinue the immobilizer and start a range of motion program   She is already scheduled to go back to work tonight  Return back in 6 weeks for strength and motion check  If her condition changes and she is more shoulder pain, an MRI may be considered in the future  _____________________________________________________  CHIEF COMPLAINT:  Chief Complaint   Patient presents with    Right Shoulder - Pain         SUBJECTIVE:  Leisa Sanchez is a 40 y o  female who presents   To our office complaining of right shoulder pain  The patient states she fell on her right side last evening  She had immediate pain  She is currently wearing a sling  She denies any numbness or tingling  She denies any fever or chills       The following portions of the patient's history were reviewed and updated as appropriate: allergies, current medications, past family history, past medical history, past social history, past surgical history and problem list     PAST MEDICAL HISTORY:  Past Medical History:   Diagnosis Date    Back pain     used 2 percocet tid until current admission in 2/2017    Bone spur     in back per pt    Depression     Gestational diabetes     insulin dependent    Herniated cervical disc     Herniated lumbar intervertebral disc     Hx of degenerative disc disease     Obesity     Pre-eclampsia     Prior pregnancy with fetal demise     previous demise at 39 weeks       PAST SURGICAL HISTORY:  Past Surgical History:   Procedure Laterality Date    BACK SURGERY       SECTION      LAMINECTOMY      with disc removal, last assessed 16    OTHER SURGICAL HISTORY      Right ovary removal 2005 per pt recall at [facility] N/A 2/15/2017    Procedure:  SECTION (); Surgeon: Dagoberto Grijalva MD;  Location: [facility];  Service: Obstetrics    [place] Denise Bernard TUBE W/ Left 2/15/2017    Procedure: LIGATION/COAGULATION TUBAL;  Surgeon: Dagoberto Grijalva MD;  Location: [facility];  Service: Obstetrics       FAMILY HISTORY:  Family History   Problem Relation Age of Onset    Other Mother         brain tumor       SOCIAL HISTORY:  Social History     Tobacco Use    Smoking status: Current Every Day Smoker     Packs/day: 0 50     Types: Cigarettes    Smokeless tobacco: Never Used   Vaping Use    Vaping Use: Never used   Substance Use Topics    Alcohol use: No    Drug use: Not Currently       MEDICATIONS:    Current Outpatient Medications:     ibuprofen (MOTRIN) 600 mg tablet, Take 1 tablet (600 mg total) by mouth every 6 (six) hours as needed for mild pain, moderate pain, fever or headaches, Disp: 30 tablet, Rfl: 0    omeprazole (PRILOSEC) 20 mg delayed release capsule, Take 20 mg by mouth daily as needed, Disp: , Rfl:     ALLERGIES:  Allergies   Allergen Reactions    Codeine      aggression       ROS:  Review of Systems     Constitutional: Negative for fatigue, fever or loss of appetite  HENT: Negative  Respiratory: Negative for shortness of breath, dyspnea  Cardiovascular: Negative for chest pain/tightness  Gastrointestinal: Negative for abdominal pain, N/V     Endocrine: Negative for cold/heat intolerance, unexplained weight loss/gain  Genitourinary: Negative for flank pain, dysuria, hematuria  Musculoskeletal: Positive for arthralgia   Skin: Negative for rash  Neurological: Negative for numbness or tingling  Psychiatric/Behavioral: Negative for agitation  _____________________________________________________  PHYSICAL EXAMINATION:    Blood pressure (!) 174/96, pulse (!) 112, height 5' (1 524 m), weight 88 9 kg (196 lb), not currently breastfeeding  Constitutional: Oriented to person, place, and time  Appears well-developed and well-nourished  No distress  HENT:   Head: Normocephalic  Eyes: Conjunctivae are normal  Right eye exhibits no discharge  Left eye exhibits no discharge  No scleral icterus  Cardiovascular: Normal rate  Pulmonary/Chest: Effort normal    Neurological: Alert and oriented to person, place, and time  Skin: Skin is warm and dry  No rash noted  Not diaphoretic  No erythema  No pallor  Psychiatric: Normal mood and affect  Behavior is normal  Judgment and thought content normal       MUSCULOSKELETAL EXAMINATION:   Physical Exam  Ortho Exam      Right upper extremity is neurovascularly intact  Fingers are pink mobile   Compartments are soft   Slight tenderness to palpation   Range of motion of the shoulder is from 0-140 degrees of forward flexion and abduction   Rotator cuff strength 5/5   Brisk cap refill   Sensation intact  Objective:  BP Readings from Last 1 Encounters:   09/28/21 (!) 174/96      Wt Readings from Last 1 Encounters:   09/28/21 88 9 kg (196 lb)        BMI:   Estimated body mass index is 38 28 kg/m² as calculated from the following:    Height as of this encounter: 5' (1 524 m)  Weight as of this encounter: 88 9 kg (196 lb)  Radiographs:  _____________________________________________________  STUDIES REVIEWED:  I have personally reviewed pertinent films and reports in PACS        x-rays of the patient's right shoulder are negative for any fractures or dislocations      COTY DotyC

## 2021-09-28 NOTE — PROGRESS NOTES
[facility]        NAME: Kyle Mccauley is a 40 y o  female  : 1975    MRN: [id number]  DATE: 2021  TIME: 9:17 PM      Assessment and Plan     Closed fracture of proximal end of humerus, initial encounter [S42 A]  1  Closed fracture of proximal end of humerus, initial encounter  ibuprofen (MOTRIN) 600 mg tablet    Ambulatory referral to Orthopedic Surgery   2  Fall, initial encounter  XR shoulder 2+ vw right    ibuprofen (MOTRIN) 600 mg tablet    Ambulatory referral to Orthopedic Surgery   3  Right shoulder injury, initial encounter  XR shoulder 2+ vw right    ibuprofen (MOTRIN) 600 mg tablet    Ambulatory referral to Orthopedic Surgery     Patient placed in shoulder sling by myself, tolerated well  Shoulder immobilizer strap provided to patient as an option  Patient Instructions   Patient Instructions     I do see a tiny avulsion "chip" fracture at the top ("proximal"/ closest part to your body) of the humerus  Wear the shoulder sling  Rest, ice often, use the ibuprofen (with food) for pain, follow-up with ortho  Proximal Humerus Fracture   WHAT YOU NEED TO KNOW:   What is a proximal humerus fracture? A proximal humerus fracture is a crack or break in the top of your upper arm bone  The proximal humerus is one of the bones in your shoulder joint  What are the signs and symptoms of a proximal humerus fracture? · Pain when you move your arm    · Swelling and bruising    · Trouble moving your shoulder    · Abnormal arm position or shape    · Weakness or numbness in your arm, hand, or fingers    How is a proximal humerus fracture diagnosed? Your healthcare provider will ask about your injury and examine you  An x-ray may show the type of fracture you have  You may need more than 1 x-ray, or another x-ray after several days have passed  How is an arm fracture treated? Treatment depends on the type of fracture you have   You may need any of the following:  · A sling  may be needed to hold your broken bones in place  It will decrease your arm movement and allow the bones to heal          · Prescription pain medicine  may be given  Ask your healthcare provider how to take this medicine safely  Some prescription pain medicines contain acetaminophen  Do not take other medicines that contain acetaminophen without talking to your healthcare provider  Too much acetaminophen may cause liver damage  Prescription pain medicine may cause constipation  Ask your healthcare provider how to prevent or treat constipation  · NSAIDs , such as ibuprofen, help decrease swelling, pain, and fever  This medicine is available with or without a doctor's order  NSAIDs can cause stomach bleeding or kidney problems in certain people  If you take blood thinner medicine, always ask your healthcare provider if NSAIDs are safe for you  Always read the medicine label and follow directions  · Acetaminophen  decreases pain and fever  It is available without a doctor's order  Ask how much to take and how often to take it  Follow directions  Read the labels of all other medicines you are using to see if they also contain acetaminophen, or ask your doctor or pharmacist  Acetaminophen can cause liver damage if not taken correctly  Do not use more than 4 grams (4,000 milligrams) total of acetaminophen in one day  · Closed reduction  may be done to put your bones back into the correct position without surgery  · Surgery  may be needed to put your bones back into the correct position  This is called open reduction  An incision is made and the bones are put back in the correct position  Wires, pins, plates, or screws may be used to hold the bones in place  For severe fractures, surgery to replace part of the shoulder joint with an artificial implant may be needed  How can I manage my symptoms? · Rest  your arm as much as possible  Ask your healthcare provider when you can move your arm   Also ask when you can return to sports or vigorous exercises  · Apply ice  on your arm for 15 to 20 minutes every hour or as directed  Use an ice pack, or put crushed ice in a plastic bag  Cover it with a towel before you put it on your arm  Ice helps prevent tissue damage and decreases swelling and pain  · Go to physical therapy as directed  A physical therapist teaches you exercises to help improve movement and strength, and to decrease pain  When should I seek immediate care? · Your pain does not get better or gets worse, even after you rest and take medicine  · Your arm, hand, or fingers feel numb  · The skin over your fracture is swollen, cold, or pale  · You cannot move your arm, hand, or fingers  When should I call my doctor? · You have a fever  · Your sling gets wet, damaged, or falls off  · You have questions or concerns about your condition or care  CARE AGREEMENT:   You have the right to help plan your care  Learn about your health condition and how it may be treated  Discuss treatment options with your healthcare providers to decide what care you want to receive  You always have the right to refuse treatment  The above information is an  only  It is not intended as medical advice for individual conditions or treatments  Talk to your doctor, nurse or pharmacist before following any medical regimen to see if it is safe and effective for you  © Copyright [employer] 2021 Information is for End User's use only and may not be sold, redistributed or otherwise used for commercial purposes  All illustrations and images included in CareNotes® are the copyrighted property of A D A M [employer] Inc  or Anh Ann        Follow up with PCP in 3-5 days  Proceed to  ER if symptoms worsen  Chief Complaint     Chief Complaint   Patient presents with    Arm Injury     fell and injured right arm today, shoulder and elbow  Painful to move            History of Present Illness Patient just moved back to this area in October from [place]  There were a few boxes she had not gotten to, and she was looking for something and opened up one of the boxes  She found the kitchen item she was looking for and left the box/tote sitting to go use it  She states that her preschooler must have gotten curious, because a little later (shortly before arrival this evening), she was walking and slipped on a cookie sheet laying on the floor near the tote  Her feet went right out from under her, and she landed hard in the box on her right arm  Review of Systems     Review of Systems   Musculoskeletal: Positive for arthralgias  Negative for myalgias  All other systems reviewed and are negative          Current Medications       Current Outpatient Medications:     omeprazole (PRILOSEC) 20 mg delayed release capsule, Take 20 mg by mouth daily as needed, Disp: , Rfl:     ibuprofen (MOTRIN) 600 mg tablet, Take 1 tablet (600 mg total) by mouth every 6 (six) hours as needed for mild pain, moderate pain, fever or headaches, Disp: 30 tablet, Rfl: 0    Current Allergies     Allergies as of 2021 - Reviewed 2021   Allergen Reaction Noted    Codeine  10/05/2016              The following portions of the patient's history were reviewed and updated as appropriate: allergies, current medications, past family history, past medical history, past social history, past surgical history and problem list      Past Medical History:   Diagnosis Date    Back pain     used 2 percocet tid until current admission in 2017    Bone spur     in back per pt    Depression     Gestational diabetes     insulin dependent    Herniated cervical disc     Herniated lumbar intervertebral disc     Hx of degenerative disc disease     Obesity     Pre-eclampsia     Prior pregnancy with fetal demise     previous demise at 39 weeks       Past Surgical History:   Procedure Laterality Date    BACK SURGERY       SECTION      LAMINECTOMY      with disc removal, last assessed 16    OTHER SURGICAL HISTORY      Right ovary removal 2005 per pt recall at [address] [facility] [place] N/A 2/15/2017    Procedure:  SECTION (); Surgeon: Lacie Bailon MD;  Location: [facility] ;  Service: Obstetrics    [place] TUBE W/ Left 2/15/2017    Procedure: LIGATION/COAGULATION TUBAL;  Surgeon: Lacie Bailon MD;  Location: [facility] ;  Service: Obstetrics       Family History   Problem Relation Age of Onset    Other Mother         brain tumor         Medications have been verified  Objective     BP (!) 195/83   Pulse 98   Temp 97 5 °F (36 4 °C)   Resp 14   SpO2 98%   No LMP recorded  Physical Exam     Physical Exam  Vitals and nursing note reviewed  Constitutional:       General: She is not in acute distress  Appearance: Normal appearance  She is well-developed  She is not ill-appearing, toxic-appearing or diaphoretic  HENT:      Head: Normocephalic and atraumatic  Pulmonary:      Effort: Pulmonary effort is normal  No respiratory distress  Abdominal:      General: There is no distension  Palpations: Abdomen is soft  Musculoskeletal:         General: Tenderness and signs of injury present  No deformity  Right shoulder: Tenderness and bony tenderness present  No swelling  Decreased range of motion (most tender over the scapula and the proximal humerus)  Right upper arm: Normal       Right elbow: No swelling or deformity  Normal range of motion  No tenderness (mildly sore/generalized, but no true bony tenderness  Moving the elbow makes the shoulder hurt likely because it is moving too, but moving the elbow does not cause any pain in the elbow)  Right forearm: Normal       Right wrist: Normal       Right hand: Normal    Skin:     General: Skin is warm and dry        Capillary Refill: Capillary refill takes less than 2 seconds  Neurological:      General: No focal deficit present  Mental Status: She is alert and oriented to person, place, and time  Psychiatric:         Mood and Affect: Mood normal          Behavior: Behavior normal  Behavior is cooperative  Thought Content:  Thought content normal          Judgment: Judgment normal

## 2021-09-28 NOTE — PATIENT INSTRUCTIONS
I do see a tiny avulsion "chip" fracture at the top ("proximal"/ closest part to your body) of the humerus  Wear the shoulder sling  Rest, ice often, use the ibuprofen (with food) for pain, follow-up with ortho  Proximal Humerus Fracture   WHAT YOU NEED TO KNOW:   What is a proximal humerus fracture? A proximal humerus fracture is a crack or break in the top of your upper arm bone  The proximal humerus is one of the bones in your shoulder joint  What are the signs and symptoms of a proximal humerus fracture? · Pain when you move your arm    · Swelling and bruising    · Trouble moving your shoulder    · Abnormal arm position or shape    · Weakness or numbness in your arm, hand, or fingers    How is a proximal humerus fracture diagnosed? Your healthcare provider will ask about your injury and examine you  An x-ray may show the type of fracture you have  You may need more than 1 x-ray, or another x-ray after several days have passed  How is an arm fracture treated? Treatment depends on the type of fracture you have  You may need any of the following:  · A sling  may be needed to hold your broken bones in place  It will decrease your arm movement and allow the bones to heal          · Prescription pain medicine  may be given  Ask your healthcare provider how to take this medicine safely  Some prescription pain medicines contain acetaminophen  Do not take other medicines that contain acetaminophen without talking to your healthcare provider  Too much acetaminophen may cause liver damage  Prescription pain medicine may cause constipation  Ask your healthcare provider how to prevent or treat constipation  · NSAIDs , such as ibuprofen, help decrease swelling, pain, and fever  This medicine is available with or without a doctor's order  NSAIDs can cause stomach bleeding or kidney problems in certain people   If you take blood thinner medicine, always ask your healthcare provider if NSAIDs are safe for you  Always read the medicine label and follow directions  · Acetaminophen  decreases pain and fever  It is available without a doctor's order  Ask how much to take and how often to take it  Follow directions  Read the labels of all other medicines you are using to see if they also contain acetaminophen, or ask your doctor or pharmacist  Acetaminophen can cause liver damage if not taken correctly  Do not use more than 4 grams (4,000 milligrams) total of acetaminophen in one day  · Closed reduction  may be done to put your bones back into the correct position without surgery  · Surgery  may be needed to put your bones back into the correct position  This is called open reduction  An incision is made and the bones are put back in the correct position  Wires, pins, plates, or screws may be used to hold the bones in place  For severe fractures, surgery to replace part of the shoulder joint with an artificial implant may be needed  How can I manage my symptoms? · Rest  your arm as much as possible  Ask your healthcare provider when you can move your arm  Also ask when you can return to sports or vigorous exercises  · Apply ice  on your arm for 15 to 20 minutes every hour or as directed  Use an ice pack, or put crushed ice in a plastic bag  Cover it with a towel before you put it on your arm  Ice helps prevent tissue damage and decreases swelling and pain  · Go to physical therapy as directed  A physical therapist teaches you exercises to help improve movement and strength, and to decrease pain  When should I seek immediate care? · Your pain does not get better or gets worse, even after you rest and take medicine  · Your arm, hand, or fingers feel numb  · The skin over your fracture is swollen, cold, or pale  · You cannot move your arm, hand, or fingers  When should I call my doctor? · You have a fever  · Your sling gets wet, damaged, or falls off      · You have questions or concerns about your condition or care  CARE AGREEMENT:   You have the right to help plan your care  Learn about your health condition and how it may be treated  Discuss treatment options with your healthcare providers to decide what care you want to receive  You always have the right to refuse treatment  The above information is an  only  It is not intended as medical advice for individual conditions or treatments  Talk to your doctor, nurse or pharmacist before following any medical regimen to see if it is safe and effective for you  © Copyright [employer] 2021 Information is for End User's use only and may not be sold, redistributed or otherwise used for commercial purposes   All illustrations and images included in CareNotes® are the copyrighted property of A D A M , Inc  or [address] [place], [place] [address]jennifer

## 2022-07-25 ENCOUNTER — VBI (OUTPATIENT)
Dept: ADMINISTRATIVE | Facility: OTHER | Age: 47
End: 2022-07-25

## 2022-09-19 ENCOUNTER — OFFICE VISIT (OUTPATIENT)
Dept: URGENT CARE | Facility: CLINIC | Age: 47
End: 2022-09-19
Payer: COMMERCIAL

## 2022-09-19 ENCOUNTER — APPOINTMENT (EMERGENCY)
Dept: NON INVASIVE DIAGNOSTICS | Facility: HOSPITAL | Age: 47
End: 2022-09-19
Payer: COMMERCIAL

## 2022-09-19 ENCOUNTER — HOSPITAL ENCOUNTER (EMERGENCY)
Facility: HOSPITAL | Age: 47
Discharge: HOME | End: 2022-09-19
Attending: INTERNAL MEDICINE
Payer: COMMERCIAL

## 2022-09-19 ENCOUNTER — APPOINTMENT (OUTPATIENT)
Dept: RADIOLOGY | Facility: HOSPITAL | Age: 47
End: 2022-09-19
Payer: COMMERCIAL

## 2022-09-19 VITALS
HEIGHT: 61 IN | SYSTOLIC BLOOD PRESSURE: 173 MMHG | TEMPERATURE: 97.9 F | WEIGHT: 185 LBS | HEART RATE: 115 BPM | OXYGEN SATURATION: 97 % | BODY MASS INDEX: 34.93 KG/M2 | RESPIRATION RATE: 18 BRPM | DIASTOLIC BLOOD PRESSURE: 87 MMHG

## 2022-09-19 VITALS
OXYGEN SATURATION: 97 % | DIASTOLIC BLOOD PRESSURE: 70 MMHG | RESPIRATION RATE: 17 BRPM | TEMPERATURE: 98 F | SYSTOLIC BLOOD PRESSURE: 164 MMHG | WEIGHT: 190 LBS | BODY MASS INDEX: 35.9 KG/M2 | HEART RATE: 95 BPM

## 2022-09-19 DIAGNOSIS — I80.9 THROMBOPHLEBITIS: Primary | ICD-10-CM

## 2022-09-19 DIAGNOSIS — L53.9 ERYTHEMA: ICD-10-CM

## 2022-09-19 DIAGNOSIS — R73.09 ELEVATED HEMOGLOBIN A1C: ICD-10-CM

## 2022-09-19 DIAGNOSIS — M79.89 LEFT LEG SWELLING: Primary | ICD-10-CM

## 2022-09-19 DIAGNOSIS — L03.90 CELLULITIS: ICD-10-CM

## 2022-09-19 LAB
ALBUMIN SERPL BCP-MCNC: 4.3 G/DL (ref 3.5–5)
ALP SERPL-CCNC: 73 U/L (ref 34–104)
ALT SERPL W P-5'-P-CCNC: 19 U/L (ref 7–52)
ANION GAP SERPL CALCULATED.3IONS-SCNC: 10 MMOL/L (ref 4–13)
APTT PPP: 29 SECONDS (ref 23–37)
AST SERPL W P-5'-P-CCNC: 22 U/L (ref 13–39)
BASOPHILS # BLD AUTO: 0.02 THOUSANDS/ΜL (ref 0–0.1)
BASOPHILS NFR BLD AUTO: 0 % (ref 0–1)
BILIRUB SERPL-MCNC: 0.37 MG/DL (ref 0.2–1)
BUN SERPL-MCNC: 14 MG/DL (ref 5–25)
CALCIUM SERPL-MCNC: 10 MG/DL (ref 8.4–10.2)
CHLORIDE SERPL-SCNC: 94 MMOL/L (ref 96–108)
CO2 SERPL-SCNC: 27 MMOL/L (ref 21–32)
CREAT SERPL-MCNC: 0.73 MG/DL (ref 0.6–1.3)
EOSINOPHIL # BLD AUTO: 0.09 THOUSAND/ΜL (ref 0–0.61)
EOSINOPHIL NFR BLD AUTO: 1 % (ref 0–6)
ERYTHROCYTE [DISTWIDTH] IN BLOOD BY AUTOMATED COUNT: 12.5 % (ref 11.6–15.1)
EST. AVERAGE GLUCOSE BLD GHB EST-MCNC: 269 MG/DL
GFR SERPL CREATININE-BSD FRML MDRD: 99 ML/MIN/1.73SQ M
GLUCOSE SERPL-MCNC: 322 MG/DL (ref 65–140)
HBA1C MFR BLD: 11 %
HCT VFR BLD AUTO: 41.2 % (ref 34.8–46.1)
HGB BLD-MCNC: 13.5 G/DL (ref 11.5–15.4)
IMM GRANULOCYTES # BLD AUTO: 0.03 THOUSAND/UL (ref 0–0.2)
IMM GRANULOCYTES NFR BLD AUTO: 0 % (ref 0–2)
INR PPP: 0.89 (ref 0.84–1.19)
LYMPHOCYTES # BLD AUTO: 3.22 THOUSANDS/ΜL (ref 0.6–4.47)
LYMPHOCYTES NFR BLD AUTO: 38 % (ref 14–44)
MCH RBC QN AUTO: 30 PG (ref 26.8–34.3)
MCHC RBC AUTO-ENTMCNC: 32.8 G/DL (ref 31.4–37.4)
MCV RBC AUTO: 92 FL (ref 82–98)
MONOCYTES # BLD AUTO: 0.27 THOUSAND/ΜL (ref 0.17–1.22)
MONOCYTES NFR BLD AUTO: 3 % (ref 4–12)
NEUTROPHILS # BLD AUTO: 4.95 THOUSANDS/ΜL (ref 1.85–7.62)
NEUTS SEG NFR BLD AUTO: 58 % (ref 43–75)
NRBC BLD AUTO-RTO: 0 /100 WBCS
PLATELET # BLD AUTO: 160 THOUSANDS/UL (ref 149–390)
PMV BLD AUTO: 10.2 FL (ref 8.9–12.7)
POTASSIUM SERPL-SCNC: 3.7 MMOL/L (ref 3.5–5.3)
PROT SERPL-MCNC: 7.8 G/DL (ref 6.4–8.4)
PROTHROMBIN TIME: 12 SECONDS (ref 11.6–14.5)
RBC # BLD AUTO: 4.5 MILLION/UL (ref 3.81–5.12)
SODIUM SERPL-SCNC: 131 MMOL/L (ref 135–147)
WBC # BLD AUTO: 8.58 THOUSAND/UL (ref 4.31–10.16)

## 2022-09-19 PROCEDURE — 99285 EMERGENCY DEPT VISIT HI MDM: CPT | Performed by: INTERNAL MEDICINE

## 2022-09-19 PROCEDURE — 93971 EXTREMITY STUDY: CPT

## 2022-09-19 PROCEDURE — 93971 EXTREMITY STUDY: CPT | Performed by: SURGERY

## 2022-09-19 PROCEDURE — 85025 COMPLETE CBC W/AUTO DIFF WBC: CPT | Performed by: INTERNAL MEDICINE

## 2022-09-19 PROCEDURE — 83036 HEMOGLOBIN GLYCOSYLATED A1C: CPT | Performed by: INTERNAL MEDICINE

## 2022-09-19 PROCEDURE — 80053 COMPREHEN METABOLIC PANEL: CPT | Performed by: INTERNAL MEDICINE

## 2022-09-19 PROCEDURE — 36415 COLL VENOUS BLD VENIPUNCTURE: CPT | Performed by: INTERNAL MEDICINE

## 2022-09-19 PROCEDURE — 71045 X-RAY EXAM CHEST 1 VIEW: CPT

## 2022-09-19 PROCEDURE — 85730 THROMBOPLASTIN TIME PARTIAL: CPT | Performed by: INTERNAL MEDICINE

## 2022-09-19 PROCEDURE — 99213 OFFICE O/P EST LOW 20 MIN: CPT | Performed by: NURSE PRACTITIONER

## 2022-09-19 PROCEDURE — 85610 PROTHROMBIN TIME: CPT | Performed by: INTERNAL MEDICINE

## 2022-09-19 PROCEDURE — 99284 EMERGENCY DEPT VISIT MOD MDM: CPT

## 2022-09-19 RX ORDER — CEPHALEXIN 500 MG/1
500 CAPSULE ORAL EVERY 6 HOURS SCHEDULED
Qty: 28 CAPSULE | Refills: 0 | Status: SHIPPED | OUTPATIENT
Start: 2022-09-19 | End: 2022-09-26

## 2022-09-19 RX ORDER — CEPHALEXIN 500 MG/1
500 CAPSULE ORAL ONCE
Status: COMPLETED | OUTPATIENT
Start: 2022-09-19 | End: 2022-09-19

## 2022-09-19 RX ADMIN — CEPHALEXIN 500 MG: 500 CAPSULE ORAL at 17:41

## 2022-09-19 NOTE — PROGRESS NOTES
[facility]        NAME: Magalys Ford is a 56 y o  female  : 1975    MRN: [id number]  DATE: 2022  TIME: 3:34 PM      Assessment and Plan     Left leg swelling [M79 89]  1  Left leg swelling  Transfer to other facility   2  Erythema  Transfer to other facility     Report called to Stefany De Leon in ER  Patient Instructions     Patient Instructions   Present to the ER for further evaluation and treatment  Follow up with PCP in 3-5 days  Proceed to  ER if symptoms worsen  Chief Complaint     Chief Complaint   Patient presents with    vein swelling     Redness and swelling along leg vein on left leg, started yesterday          History of Present Illness     Patient presents with a hard, reddened, swollen vein on the inner part of her left lower thigh and knee  She states it started yesterday but was only a couple inches long; today it is much longer, approx 7-8 inches  No hx blood clots; does have a hx mild/regular varicose veins but this has never happened before  Review of Systems     Review of Systems   Musculoskeletal: Positive for arthralgias and myalgias  Negative for joint swelling  Skin: Positive for color change  All other systems reviewed and are negative          Current Medications       Current Outpatient Medications:     ibuprofen (MOTRIN) 600 mg tablet, Take 1 tablet (600 mg total) by mouth every 6 (six) hours as needed for mild pain, moderate pain, fever or headaches (Patient not taking: Reported on 2022), Disp: 30 tablet, Rfl: 0    omeprazole (PriLOSEC) 20 mg delayed release capsule, Take 20 mg by mouth daily as needed (Patient not taking: Reported on 2022), Disp: , Rfl:     Current Allergies     Allergies as of 2022 - Reviewed 2022   Allergen Reaction Noted    Codeine  10/05/2016              The following portions of the patient's history were reviewed and updated as appropriate: allergies, current medications, past family history, past medical history, past social history, past surgical history and problem list      Past Medical History:   Diagnosis Date    Back pain     used 2 percocet tid until current admission in 2017    Bone spur     in back per pt    Depression     Gestational diabetes     insulin dependent    Herniated cervical disc     Herniated lumbar intervertebral disc     Hx of degenerative disc disease     Obesity     Pre-eclampsia     Prior pregnancy with fetal demise     previous demise at 39 weeks       Past Surgical History:   Procedure Laterality Date    BACK SURGERY       SECTION      LAMINECTOMY      with disc removal, last assessed 16    OTHER SURGICAL HISTORY      Right ovary removal 2005 per pt recall at [address] [facility] N/A 2/15/2017    Procedure:  SECTION (); Surgeon: Carolynn Fernandez MD;  Location: [facility];  Service: Obstetrics    [place] TUBE W/ Left 2/15/2017    Procedure: LIGATION/COAGULATION TUBAL;  Surgeon: Carolynn Fernandez MD;  Location: [facility];  Service: Obstetrics       Family History   Problem Relation Age of Onset    Other Mother         brain tumor         Medications have been verified  Objective     BP (!) 173/87   Pulse (!) 115   Temp 97 9 °F (36 6 °C) (Temporal)   Resp 18   Ht 5' 1" (1 549 m)   Wt 83 9 kg (185 lb)   LMP  (LMP Unknown)   SpO2 97%   BMI 34 96 kg/m²   No LMP recorded (lmp unknown)  Patient is perimenopausal          Physical Exam     Physical Exam  Vitals and nursing note reviewed  Constitutional:       General: She is not in acute distress  Appearance: Normal appearance  She is well-developed  She is not ill-appearing, toxic-appearing or diaphoretic  HENT:      Head: Normocephalic and atraumatic  Eyes:      Pupils: Pupils are equal, round, and reactive to light  Pulmonary:      Effort: Pulmonary effort is normal  No respiratory distress  Abdominal:      General: There is no distension  Palpations: Abdomen is soft  Musculoskeletal:         General: Tenderness present  Normal range of motion  Cervical back: Normal range of motion and neck supple  Skin:     General: Skin is warm and dry  Capillary Refill: Capillary refill takes less than 2 seconds  Findings: Erythema present  Comments: Localized erythema over a firm vein, painful  Patient states that area has significantly increased since onset yesterday evening  No significant thigh swelling or thigh size discrepancy, just very localized swelling over the vein  Neurological:      General: No focal deficit present  Mental Status: She is alert and oriented to person, place, and time  Psychiatric:         Behavior: Behavior normal          Thought Content:  Thought content normal          Judgment: Judgment normal

## 2022-09-19 NOTE — ED PROVIDER NOTES
History  Chief Complaint   Patient presents with    Leg Pain     Left upper leg pain, swelling, and reddness  Patient was sent to the ED from the urgent care to rule out Blood clots     52-year-old female sent to the emergency room from the urgent care with tracking left lower extremity to rule out DVT  Patient noted the redness and erythema which is in the inner thigh tracking the femoral about 2-3 days ago and noted worsening  She denies any trauma or injury  Patient does not see a PCP, she denies any medical problems, she denies any chest pain chest pressure tightness heaviness or shortness of breath  Patient denies cough , shortness of breath respiratory difficulty  Patient denies any history of hemoptysis  Patient's last menstrual period was about 4 months ago prior to that her last menstrual period was almost 10 months prior she is in the perimenopausal               Prior to Admission Medications   Prescriptions Last Dose Informant Patient Reported?  Taking?   ibuprofen (MOTRIN) 600 mg tablet   No No   Sig: Take 1 tablet (600 mg total) by mouth every 6 (six) hours as needed for mild pain, moderate pain, fever or headaches   Patient not taking: Reported on 2022   omeprazole (PriLOSEC) 20 mg delayed release capsule   Yes No   Sig: Take 20 mg by mouth daily as needed   Patient not taking: Reported on 2022      Facility-Administered Medications: None       Past Medical History:   Diagnosis Date    Back pain     used 2 percocet tid until current admission in 2017    Bone spur     in back per pt    Depression     Gestational diabetes     insulin dependent    Herniated cervical disc     Herniated lumbar intervertebral disc     Hx of degenerative disc disease     Obesity     Pre-eclampsia     Prior pregnancy with fetal demise     previous demise at 42 weeks       Past Surgical History:   Procedure Laterality Date    BACK SURGERY       SECTION      LAMINECTOMY      with disc removal, last assessed 16    OTHER SURGICAL HISTORY      Right ovary removal 2005 per pt recall at [address] [facility] [place] N/A 2/15/2017    Procedure:  SECTION (); Surgeon: Kaushik Liu MD;  Location: [facility];  Service: Obstetrics    AL Nehemias Sep TUBE W/ Left 2/15/2017    Procedure: LIGATION/COAGULATION TUBAL;  Surgeon: Kaushik Liu MD;  Location: [facility];  Service: Obstetrics       Family History   Problem Relation Age of Onset    Other Mother         brain tumor     I have reviewed and agree with the history as documented  E-Cigarette/Vaping    E-Cigarette Use Never User      E-Cigarette/Vaping Substances     Social History     Tobacco Use    Smoking status: Current Every Day Smoker     Packs/day: 0 50     Types: Cigarettes    Smokeless tobacco: Never Used   Vaping Use    Vaping Use: Never used   Substance Use Topics    Alcohol use: No    Drug use: Not Currently       Review of Systems   Constitutional: Negative  HENT: Negative  Respiratory: Negative  Cardiovascular: Negative  Gastrointestinal: Negative  Genitourinary: Negative  Musculoskeletal: Negative  Skin: Positive for color change  Neurological: Negative  Hematological: Negative  Psychiatric/Behavioral: Negative  Physical Exam  Physical Exam  Vitals and nursing note reviewed  Constitutional:       Appearance: Normal appearance  HENT:      Head: Normocephalic and atraumatic  Cardiovascular:      Rate and Rhythm: Normal rate and regular rhythm  Pulses: Normal pulses  Heart sounds: Normal heart sounds  Pulmonary:      Effort: Pulmonary effort is normal       Breath sounds: Normal breath sounds  Abdominal:      General: Abdomen is flat  Palpations: Abdomen is soft  Musculoskeletal:         General: Normal range of motion  Cervical back: Normal range of motion and neck supple     Skin:     General: Skin is warm and dry  Findings: Erythema present  Comments: Patient has a red streak left inner thigh femoral region  Is mildly tender to palpation no cords appreciated  Patient's calves are soft, there are no cords appreciated negative Homans sign bilaterally  Neurological:      General: No focal deficit present  Mental Status: She is alert and oriented to person, place, and time  Psychiatric:         Behavior: Behavior normal          Vital Signs  ED Triage Vitals [09/19/22 1624]   Temperature Pulse Respirations Blood Pressure SpO2   98 °F (36 7 °C) (!) 107 16 (!) 197/93 98 %      Temp Source Heart Rate Source Patient Position - Orthostatic VS BP Location FiO2 (%)   Tympanic Monitor Sitting Left arm --      Pain Score       6           Vitals:    09/19/22 1624   BP: (!) 197/93   Pulse: (!) 107   Patient Position - Orthostatic VS: Sitting         Visual Acuity      ED Medications  Medications - No data to display    Diagnostic Studies  Results Reviewed     Procedure Component Value Units Date/Time    CBC and differential [[id number]]  (Abnormal) Collected: 09/19/22 1657    Lab Status: Final result Specimen: Blood from Arm, Right Updated: 09/19/22 1702     WBC 8 58 Thousand/uL      RBC 4 50 Million/uL      Hemoglobin 13 5 g/dL      Hematocrit 41 2 %      MCV 92 fL      MCH 30 0 pg      MCHC 32 8 g/dL      RDW 12 5 %      MPV 10 2 fL      Platelets 857 Thousands/uL      nRBC 0 /100 WBCs      Neutrophils Relative 58 %      Immat GRANS % 0 %      Lymphocytes Relative 38 %      Monocytes Relative 3 %      Eosinophils Relative 1 %      Basophils Relative 0 %      Neutrophils Absolute 4 95 Thousands/µL      Immature Grans Absolute 0 03 Thousand/uL      Lymphocytes Absolute 3 22 Thousands/µL      Monocytes Absolute 0 27 Thousand/µL      Eosinophils Absolute 0 09 Thousand/µL      Basophils Absolute 0 02 Thousands/µL     Comprehensive metabolic panel [[id number]] Collected: 09/19/22 1657    Lab Status:  In process Specimen: Blood from Arm, Right Updated: 09/19/22 1700    Protime-INR [[id number]] Collected: 09/19/22 1657    Lab Status: In process Specimen: Blood from Arm, Right Updated: 09/19/22 1700    APTT [[id number]] Collected: 09/19/22 1657    Lab Status: In process Specimen: Blood from Arm, Right Updated: 09/19/22 1700                 VAS lower limb venous duplex study, unilateral/limited    (Results Pending)   XR chest 1 view portable    (Results Pending)              Procedures  ECG 12 Lead Documentation Only    Date/Time: 9/19/2022 5:14 PM  Performed by: Kevin Leonardo MD  Authorized by: Kevin Leonardo MD     Indications / Diagnosis:  Left lower extremity swelling , tachycardia  ECG reviewed by me, the ED Provider: yes    Patient location:  ED  Previous ECG:     Previous ECG:  Unavailable  Interpretation:     Interpretation: normal    Rate:     ECG rate:  One hundred    ECG rate assessment: tachycardic    Rhythm:     Rhythm: sinus tachycardia    Ectopy:     Ectopy: none    QRS:     QRS axis:  Normal  Conduction:     Conduction: normal    ST segments:     ST segments:  Normal  T waves:     T waves: non-specific and flattening               ED Course                                             MDM  Number of Diagnoses or Management Options  Cellulitis: minor  Thrombophlebitis: new and requires workup  Diagnosis management comments: Patient with thrombophlebitis superficial   Discussed treatment warm compresses  Patient's glucose was 322 in the emergency room  I explained that this means she has most likely diabetic and needs to follow her A1c and follow-up with the PCP  Disposition  Final diagnoses:   None     ED Disposition     None      Follow-up Information    None         Patient's Medications   Discharge Prescriptions    No medications on file       No discharge procedures on file      PDMP Review     None          ED Provider  Electronically Signed by           Kevin Leonardo MD  09/19/22 [id number] Shiva Bello MD  09/23/22 0983

## 2022-09-19 NOTE — DISCHARGE INSTRUCTIONS
I had a lengthy discussion with the patient about her elevated glucose  She has no history of diabetes however she does not really see PCP will refer to PCP    One patient needs to follow-up with PCP had A1c was drawn and emergency room

## 2023-01-03 ENCOUNTER — OFFICE VISIT (OUTPATIENT)
Dept: URGENT CARE | Facility: CLINIC | Age: 48
End: 2023-01-03

## 2023-01-03 VITALS
HEART RATE: 125 BPM | BODY MASS INDEX: 35.3 KG/M2 | DIASTOLIC BLOOD PRESSURE: 74 MMHG | RESPIRATION RATE: 18 BRPM | TEMPERATURE: 97.4 F | SYSTOLIC BLOOD PRESSURE: 160 MMHG | WEIGHT: 187 LBS | HEIGHT: 61 IN | OXYGEN SATURATION: 98 %

## 2023-01-03 DIAGNOSIS — J01.90 ACUTE SINUSITIS, RECURRENCE NOT SPECIFIED, UNSPECIFIED LOCATION: Primary | ICD-10-CM

## 2023-01-03 DIAGNOSIS — R51.9 INTRACTABLE HEADACHE, UNSPECIFIED CHRONICITY PATTERN, UNSPECIFIED HEADACHE TYPE: ICD-10-CM

## 2023-01-03 DIAGNOSIS — J02.9 SORE THROAT: ICD-10-CM

## 2023-01-03 LAB
S PYO AG THROAT QL: NEGATIVE
SARS-COV-2 AG UPPER RESP QL IA: NEGATIVE
VALID CONTROL: NORMAL

## 2023-01-03 RX ORDER — PREDNISONE 20 MG/1
20 TABLET ORAL DAILY
Qty: 5 TABLET | Refills: 0 | Status: SHIPPED | OUTPATIENT
Start: 2023-01-03 | End: 2023-01-08

## 2023-01-03 RX ORDER — KETOROLAC TROMETHAMINE 30 MG/ML
30 INJECTION, SOLUTION INTRAMUSCULAR; INTRAVENOUS ONCE
Status: COMPLETED | OUTPATIENT
Start: 2023-01-03 | End: 2023-01-03

## 2023-01-03 RX ORDER — AMOXICILLIN AND CLAVULANATE POTASSIUM 875; 125 MG/1; MG/1
1 TABLET, FILM COATED ORAL EVERY 12 HOURS SCHEDULED
Qty: 20 TABLET | Refills: 0 | Status: SHIPPED | OUTPATIENT
Start: 2023-01-03 | End: 2023-01-13

## 2023-01-03 RX ADMIN — KETOROLAC TROMETHAMINE 30 MG: 30 INJECTION, SOLUTION INTRAMUSCULAR; INTRAVENOUS at 19:58

## 2023-01-03 NOTE — LETTER
[place] [address]  Dept: [phone]    January 3, 2023    Patient: Yazmin Orozco  YOB: 1975    Yazmin Orozco was seen and evaluated at our [facility]  Please note if Covid and Flu tests are negative, they may return to work when fever free for 24 hours without the use of a fever reducing agent  If Covid or Flu test is positive, they may return to work on 1/9/23, as this is 5 days from the onset of symptoms  Upon return, they must then adhere to strict masking for an additional 5 days      Sincerely,    Minh Ortiz PA-C

## 2023-01-04 LAB
FLUAV RNA RESP QL NAA+PROBE: NEGATIVE
FLUBV RNA RESP QL NAA+PROBE: NEGATIVE
SARS-COV-2 RNA RESP QL NAA+PROBE: NEGATIVE

## 2023-01-04 NOTE — PROGRESS NOTES
[facility]    NAME: Rochelle Nair is a 56 y o  female  : 1975    MRN: [id number]  DATE: 2023  TIME: 1:26 PM    Assessment and Plan   Acute sinusitis, recurrence not specified, unspecified location [J01 90]  1  Acute sinusitis, recurrence not specified, unspecified location  amoxicillin-clavulanate (AUGMENTIN) 875-125 mg per tablet    predniSONE 20 mg tablet    Throat culture      2  Sore throat  POCT rapid strepA    Poct Covid 19 Rapid Antigen Test    Covid/Flu-Office Collect    Throat culture    CANCELED: COVID Only -Office Collect      3  Intractable headache, unspecified chronicity pattern, unspecified headache type  ketorolac (TORADOL) injection 30 mg    Covid/Flu-Office Collect    Throat culture          Patient Instructions   Patient Instructions     Vitamin D3 2000 IU daily  Vitamin C 1g every 12 hours  Multivitamin daily  Fluids and rest  Flonase 2 sprays each nostril once daily  Zyrtec   Over the counter cold medication as needed such as mucinex  Ibuprofen and/or tylenol for fever, body aches  Follow up with PCP upon confirmation of a positive covid test   Proceed to  ER if symptoms worsen  Chief Complaint     Chief Complaint   Patient presents with   • Sinusitis     Started 2 days ago  Has pressure in her head & eyes         History of Present Illness   HPI    Review of Systems   Review of Systems   Constitutional: Positive for chills  Negative for appetite change and fever  HENT: Positive for congestion (in back of throat, sinus area), ear pain and sore throat  Negative for ear discharge, facial swelling, postnasal drip, sinus pressure and sneezing  Eyes: Positive for photophobia  Respiratory: Negative for cough, shortness of breath and wheezing  Neurological: Positive for headaches         Current Medications     Current Outpatient Medications:   •  amoxicillin-clavulanate (AUGMENTIN) 875-125 mg per tablet, Take 1 tablet by mouth every 12 (twelve) hours for 10 days, Disp: 20 tablet, Rfl: 0  •  ibuprofen (MOTRIN) 600 mg tablet, Take 1 tablet (600 mg total) by mouth every 6 (six) hours as needed for mild pain, moderate pain, fever or headaches, Disp: 30 tablet, Rfl: 0  •  predniSONE 20 mg tablet, Take 1 tablet (20 mg total) by mouth daily for 5 days, Disp: 5 tablet, Rfl: 0  •  omeprazole (PriLOSEC) 20 mg delayed release capsule, Take 20 mg by mouth daily as needed (Patient not taking: Reported on 2022), Disp: , Rfl:     Current Allergies     Allergies as of 2023 - Reviewed 2023   Allergen Reaction Noted   • Codeine  10/05/2016          The following portions of the patient's history were reviewed and updated as appropriate: allergies, current medications, past family history, past medical history, past social history, past surgical history and problem list    Past Medical History:   Diagnosis Date   • Back pain     used 2 percocet tid until current admission in 2017   • Bone spur     in back per pt   • Depression    • Gestational diabetes     insulin dependent   • Herniated cervical disc    • Herniated lumbar intervertebral disc    • Hx of degenerative disc disease    • Obesity    • Pre-eclampsia    • Prior pregnancy with fetal demise     previous demise at 39 weeks     Past Surgical History:   Procedure Laterality Date   • BACK SURGERY     •  SECTION     • LAMINECTOMY      with disc removal, last assessed 16   • OTHER SURGICAL HISTORY      Right ovary removal 2005 per pt recall at [facility] N/A 2/15/2017    Procedure:  SECTION ();   Surgeon: Torey Conklin MD;  [facility] ;  Service: Obstetrics   • MO LIG/TRNSXJ FALOPIAN TUBE  DEL/ABDML SURG Left 2/15/2017    Procedure: LIGATION/COAGULATION TUBAL;  Surgeon: Torey Conklin MD;  [facility] ;  Service: Obstetrics     Family History   Problem Relation Age of Onset   • Other Mother         brain tumor     Social History     Socioeconomic History   • Marital status: /Civil Union     Spouse name: Not on file   • Number of children: Not on file   • Years of education: Not on file   • Highest education level: Not on file   Occupational History   • Occupation: unemployed   Tobacco Use   • Smoking status: Every Day     Packs/day: 0 50     Types: Cigarettes   • Smokeless tobacco: Never   Vaping Use   • Vaping Use: Never used   Substance and Sexual Activity   • Alcohol use: No   • Drug use: Not Currently   • Sexual activity: Yes     Partners: Male   Other Topics Concern   • Not on file   Social History Narrative    Lack of access to transportation     Social Determinants of Health     Financial Resource Strain: Not on file   Food Insecurity: Not on file   Transportation Needs: Not on file   Physical Activity: Not on file   Stress: Not on file   Social Connections: Not on file   Intimate Partner Violence: Not on file   Housing Stability: Not on file     Medications have been verified  Objective   /74   Pulse (!) 125   Temp (!) 97 4 °F (36 3 °C)   Resp 18   Ht 5' 1" (1 549 m)   Wt 84 8 kg (187 lb)   SpO2 98%   BMI 35 33 kg/m²      Physical Exam   Physical Exam  Vitals and nursing note reviewed  Constitutional:       General: She is not in acute distress  Appearance: She is well-developed  HENT:      Head: Normocephalic and atraumatic  Right Ear: Tympanic membrane normal       Left Ear: Tympanic membrane normal       Nose: Congestion present  No mucosal edema  Right Sinus: No maxillary sinus tenderness or frontal sinus tenderness  Left Sinus: No maxillary sinus tenderness or frontal sinus tenderness  Mouth/Throat:      Pharynx: Posterior oropharyngeal erythema present  No oropharyngeal exudate  Eyes:      Conjunctiva/sclera: Conjunctivae normal    Cardiovascular:      Rate and Rhythm: Normal rate and regular rhythm  Heart sounds: Normal heart sounds   No murmur heard

## 2023-01-04 NOTE — PATIENT INSTRUCTIONS
Vitamin D3 2000 IU daily  Vitamin C 1g every 12 hours  Multivitamin daily  Fluids and rest  Flonase 2 sprays each nostril once daily  Zyrtec   Over the counter cold medication as needed such as mucinex  Ibuprofen and/or tylenol for fever, body aches  Follow up with PCP upon confirmation of a positive covid test   Proceed to  ER if symptoms worsen

## 2023-01-05 LAB — BACTERIA THROAT CULT: NORMAL

## 2023-02-15 ENCOUNTER — VBI (OUTPATIENT)
Dept: ADMINISTRATIVE | Facility: OTHER | Age: 48
End: 2023-02-15

## 2023-04-27 ENCOUNTER — VBI (OUTPATIENT)
Dept: ADMINISTRATIVE | Facility: OTHER | Age: 48
End: 2023-04-27

## 2024-02-21 PROBLEM — J18.9 PNEUMONIA DUE TO INFECTIOUS ORGANISM: Status: RESOLVED | Noted: 2018-11-05 | Resolved: 2024-02-21

## 2024-02-21 PROBLEM — A41.9 SEPSIS (HCC): Status: RESOLVED | Noted: 2018-11-05 | Resolved: 2024-02-21

## 2024-07-01 ENCOUNTER — VBI (OUTPATIENT)
Dept: ADMINISTRATIVE | Facility: OTHER | Age: 49
End: 2024-07-01

## 2024-07-01 NOTE — TELEPHONE ENCOUNTER
07/01/24 7:12 AM     Chart reviewed for Pap Smear (HPV) aka Cervical Cancer Screening ; nothing is submitted to the patient's insurance at this time.     Jacquie Lin   [facility]

## 2024-09-23 ENCOUNTER — OFFICE VISIT (OUTPATIENT)
Dept: URGENT CARE | Facility: CLINIC | Age: 49
End: 2024-09-23
Payer: COMMERCIAL

## 2024-09-23 VITALS
OXYGEN SATURATION: 99 % | BODY MASS INDEX: 33.99 KG/M2 | RESPIRATION RATE: 18 BRPM | HEIGHT: 61 IN | TEMPERATURE: 98 F | DIASTOLIC BLOOD PRESSURE: 82 MMHG | WEIGHT: 180 LBS | HEART RATE: 89 BPM | SYSTOLIC BLOOD PRESSURE: 150 MMHG

## 2024-09-23 DIAGNOSIS — R20.2 NUMBNESS AND TINGLING OF LEFT LEG: ICD-10-CM

## 2024-09-23 DIAGNOSIS — R20.0 NUMBNESS AND TINGLING OF LEFT LEG: ICD-10-CM

## 2024-09-23 DIAGNOSIS — M54.42 ACUTE LEFT-SIDED LOW BACK PAIN WITH LEFT-SIDED SCIATICA: Primary | ICD-10-CM

## 2024-09-23 PROCEDURE — 99213 OFFICE O/P EST LOW 20 MIN: CPT | Performed by: PHYSICIAN ASSISTANT

## 2024-09-23 RX ORDER — METHOCARBAMOL 500 MG/1
500 TABLET, FILM COATED ORAL 4 TIMES DAILY
Qty: 15 TABLET | Refills: 0 | Status: SHIPPED | OUTPATIENT
Start: 2024-09-23

## 2024-09-23 RX ORDER — LIDOCAINE 50 MG/G
1 PATCH TOPICAL DAILY
Qty: 15 PATCH | Refills: 0 | Status: SHIPPED | OUTPATIENT
Start: 2024-09-23

## 2024-09-23 RX ORDER — METHYLPREDNISOLONE 4 MG
TABLET, DOSE PACK ORAL
Qty: 1 EACH | Refills: 0 | Status: SHIPPED | OUTPATIENT
Start: 2024-09-23

## 2024-09-23 RX ORDER — KETOROLAC TROMETHAMINE 10 MG/1
10 TABLET, FILM COATED ORAL EVERY 8 HOURS
Qty: 15 TABLET | Refills: 0 | Status: SHIPPED | OUTPATIENT
Start: 2024-09-23 | End: 2024-09-28

## 2024-09-23 NOTE — LETTER
September 23, 2024     Patient: Lizzy Acuna   YOB: 1975   Date of Visit: 9/23/2024       To Whom It May Concern:    It is my medical opinion that Lizzy Acuna should remain out of work until 09/26/2024.    If you have any questions or concerns, please don't hesitate to call.         Sincerely,        Hilda Guzmán PA-C    CC: No Recipients

## 2024-09-23 NOTE — PROGRESS NOTES
[facility]        NAME: Lizzy Acuna is a 49 y.o. female  : 1975    MRN: [id number]  DATE: 2024  TIME: 6:33 PM    Assessment and Plan   Acute left-sided low back pain with left-sided sciatica [M54.42]  1. Acute left-sided low back pain with left-sided sciatica  methylPREDNISolone 4 MG tablet therapy pack    ketorolac (TORADOL) 10 mg tablet    lidocaine (Lidoderm) 5 %    methocarbamol (ROBAXIN) 500 mg tablet      2. Numbness and tingling of left leg  methylPREDNISolone 4 MG tablet therapy pack    ketorolac (TORADOL) 10 mg tablet    lidocaine (Lidoderm) 5 %    methocarbamol (ROBAXIN) 500 mg tablet            Patient Instructions       Follow up with PCP in 3-5 days.  Proceed to  ER if symptoms worsen.    If tests have been performed at [facility], our office will contact you with results if changes need to be made to the care plan discussed with you at the visit.  [facility]    Chief Complaint     Chief Complaint   Patient presents with    Leg Pain     Left leg is numb for 4 days         History of Present Illness       Patient is a 49 year old female presenting to [facility] with left lower extremity numbness, tingling and discomfort.  Patient reports symptoms began about 4 days ago.  Patient reports w/ lifting left up putting sock on or shoe she becomes nauseated.  Patient is able to ambulate but feels unsteady at times due to the decreased sensation.  Going up steps or off of the curb she notices to be more unsteady.  Pt reports the numb sensation begins in left lower back and radiates into left hip and to left foot.  Patient denies loss of bowel or bladder control.  Patient took Advil this morning.  Pt is on her feet all day while at work.  Patient does have a hx of spinal surgery in the past for decompression.          Review of Systems   Review of Systems   Constitutional:  Negative for chills and fever.   HENT:  Negative for ear pain  and sore throat.    Eyes:  Negative for pain and visual disturbance.   Respiratory:  Negative for cough and shortness of breath.    Cardiovascular:  Negative for chest pain and palpitations.   Gastrointestinal:  Negative for abdominal pain and vomiting.   Genitourinary:  Negative for dysuria and hematuria.   Musculoskeletal:  Positive for arthralgias (Pain to left lower extremity). Negative for back pain.   Skin:  Negative for color change and rash.   Neurological:  Negative for seizures and syncope.   All other systems reviewed and are negative.        Current Medications       Current Outpatient Medications:     ketorolac (TORADOL) 10 mg tablet, Take 1 tablet (10 mg total) by mouth every 8 (eight) hours for 5 days, Disp: 15 tablet, Rfl: 0    lidocaine (Lidoderm) 5 %, Apply 1 patch topically over 12 hours daily Remove & Discard patch within 12 hours or as directed by MD, Disp: 15 patch, Rfl: 0    methocarbamol (ROBAXIN) 500 mg tablet, Take 1 tablet (500 mg total) by mouth 4 (four) times a day, Disp: 15 tablet, Rfl: 0    methylPREDNISolone 4 MG tablet therapy pack, Use as directed on package, Disp: 1 each, Rfl: 0    omeprazole (PriLOSEC) 20 mg delayed release capsule, Take 20 mg by mouth daily as needed, Disp: , Rfl:     ibuprofen (MOTRIN) 600 mg tablet, Take 1 tablet (600 mg total) by mouth every 6 (six) hours as needed for mild pain, moderate pain, fever or headaches (Patient not taking: Reported on 9/23/2024), Disp: 30 tablet, Rfl: 0    Current Allergies     Allergies as of 09/23/2024 - Reviewed 09/23/2024   Allergen Reaction Noted    Codeine  10/05/2016            The following portions of the patient's history were reviewed and updated as appropriate: allergies, current medications, past family history, past medical history, past social history, past surgical history and problem list.     Past Medical History:   Diagnosis Date    Back pain     used 2 percocet tid until current admission in 2/2017    Bone spur   "   in back per pt    Depression     Gestational diabetes     insulin dependent    Herniated cervical disc     Herniated lumbar intervertebral disc     Hx of degenerative disc disease     Obesity     Pre-eclampsia     Prior pregnancy with fetal demise     previous demise at 36 weeks       Past Surgical History:   Procedure Laterality Date    BACK SURGERY       SECTION      LAMINECTOMY      with disc removal, last assessed 16    OTHER SURGICAL HISTORY      [facility]    MO  DELIVERY ONLY N/A 2/15/2017    Procedure:  SECTION ();  Surgeon: Tito Umaña MD;  [facility]: [facility] ;  Service: Obstetrics    MO LIG/TRNSXJ FALOPIAN TUBE  DEL/ABDML SURG Left 2/15/2017    Procedure: LIGATION/COAGULATION TUBAL;  Surgeon: Tito Umaña MD;  Location: [facility] ;  Service: Obstetrics       Family History   Problem Relation Age of Onset    Other Mother         brain tumor         Medications have been verified.        Objective   /82   Pulse 89   Temp 98 °F (36.7 °C) (Temporal)   Resp 18   Ht 5' 1\" (1.549 m)   Wt 81.6 kg (180 lb)   SpO2 99%   BMI 34.01 kg/m²   No LMP recorded.       Physical Exam     Physical Exam  Constitutional:       Appearance: Normal appearance.   HENT:      Head: Normocephalic and atraumatic.      Nose: Nose normal.      Mouth/Throat:      Mouth: Mucous membranes are moist.   Eyes:      Extraocular Movements: Extraocular movements intact.      Conjunctiva/sclera: Conjunctivae normal.      Pupils: Pupils are equal, round, and reactive to light.   Cardiovascular:      Rate and Rhythm: Normal rate.   Pulmonary:      Effort: Pulmonary effort is normal.   Musculoskeletal:         General: Normal range of motion.      Cervical back: Normal range of motion and neck supple.        Back:    Skin:     General: Skin is warm and dry.      Capillary Refill: Capillary refill takes less than 2 seconds. "   Neurological:      General: No focal deficit present.      Mental Status: She is alert and oriented to person, place, and time.   Psychiatric:         Mood and Affect: Mood normal.         Behavior: Behavior normal.

## 2024-12-09 ENCOUNTER — APPOINTMENT (EMERGENCY)
Dept: RADIOLOGY | Facility: HOSPITAL | Age: 49
End: 2024-12-09
Payer: COMMERCIAL

## 2024-12-09 ENCOUNTER — HOSPITAL ENCOUNTER (EMERGENCY)
Facility: HOSPITAL | Age: 49
Discharge: HOME | End: 2024-12-09
Attending: FAMILY MEDICINE
Payer: COMMERCIAL

## 2024-12-09 VITALS
HEART RATE: 98 BPM | OXYGEN SATURATION: 97 % | TEMPERATURE: 98.9 F | RESPIRATION RATE: 16 BRPM | SYSTOLIC BLOOD PRESSURE: 177 MMHG | DIASTOLIC BLOOD PRESSURE: 78 MMHG

## 2024-12-09 DIAGNOSIS — S90.129A CONTUSION OF TOE: Primary | ICD-10-CM

## 2024-12-09 PROCEDURE — 99284 EMERGENCY DEPT VISIT MOD MDM: CPT | Performed by: FAMILY MEDICINE

## 2024-12-09 PROCEDURE — 73630 X-RAY EXAM OF FOOT: CPT

## 2024-12-09 PROCEDURE — 99283 EMERGENCY DEPT VISIT LOW MDM: CPT

## 2024-12-09 RX ORDER — HYDROCODONE BITARTRATE AND ACETAMINOPHEN 5; 325 MG/1; MG/1
1 TABLET ORAL ONCE
Refills: 0 | Status: COMPLETED | OUTPATIENT
Start: 2024-12-09 | End: 2024-12-09

## 2024-12-09 RX ADMIN — HYDROCODONE BITARTRATE AND ACETAMINOPHEN 1 TABLET: 5; 325 TABLET ORAL at 19:33

## 2024-12-10 NOTE — ED PROVIDER NOTES
Time reflects when diagnosis was documented in both MDM as applicable and the Disposition within this note       Time User Action Codes Description Comment    12/9/2024  8:23 PM Siddharth Cueva Add [S90.129A] Contusion of toe           ED Disposition       ED Disposition   Discharge    Condition   Stable    Date/Time   Mon Dec 9, 2024  8:23 PM    Comment   Lizzy Acuna discharge to home/self care.                   Assessment & Plan       Medical Decision Making    49-year-old female presented to ED with the complaint toe pain.  Patient states she dropped a case of soda about a week ago and since then has been walking on it.  Patient states that she noticed that it has started turning black  Patient states that she have difficulty walking.  States that her daughter saw her foot and recommended come to the ED.  The patient diagnoses include fracture/hematoma/necrotic foot/gangrenous  Plan will obtain x-ray patient is given pain medication and ice will discuss with the podiatry.  Message sent to podiatry waiting for callback.  Discussed with podiatry on-call Dr. Jaime Agrawal agree with the wrapping the foot placed in a boot follow-up with the office.  States that she will call our office to schedule an appointment.  Patient is also provided with a referral to follow-up with podiatry.  Disposition discharge home with strict precaution to return recommended continue with ice Tylenol be Profen as needed for pain.  Patient verbalized understand the plan discharge home.       Medications   HYDROcodone-acetaminophen (NORCO) 5-325 mg per tablet 1 tablet (1 tablet Oral Given 12/9/24 1933)       ED Risk Strat Scores                           SBIRT 22yo+      Flowsheet Row Most Recent Value   Initial Alcohol Screen: US AUDIT-C     1. How often do you have a drink containing alcohol? 0 Filed at: 12/09/2024 1959   2. How many drinks containing alcohol do you have on a typical day you are drinking?  0 Filed at: 12/09/2024 1959    3a. Male UNDER 65: How often do you have five or more drinks on one occasion? 0 Filed at: 2024   3b. FEMALE Any Age, or MALE 65+: How often do you have 4 or more drinks on one occassion? 0 Filed at: 2024   Audit-C Score 0 Filed at: 2024   BANDAR: How many times in the past year have you...    Used an illegal drug or used a prescription medication for non-medical reasons? Never Filed at: 2024                            History of Present Illness       Chief Complaint   Patient presents with    Toe Injury     Dropped a case of soda on the left last two toes; states she noticed the end of toe and underneath turning black       Past Medical History:   Diagnosis Date    Back pain     used 2 percocet tid until current admission in 2017    Bone spur     in back per pt    Depression     Gestational diabetes     insulin dependent    Herniated cervical disc     Herniated lumbar intervertebral disc     Hx of degenerative disc disease     Obesity     Pre-eclampsia     Prior pregnancy with fetal demise     previous demise at 36 weeks      Past Surgical History:   Procedure Laterality Date    BACK SURGERY       SECTION      LAMINECTOMY      with disc removal, last assessed 16    OTHER SURGICAL HISTORY      [facility]    IN  DELIVERY ONLY N/A 2/15/2017    Procedure:  SECTION ();  Surgeon: Tito Umaña MD;  Location: [facility] ;  Service: Obstetrics    IN LIG/TRNSXJ FALOPIAN TUBE  DEL/ABDML SURG Left 2/15/2017    Procedure: LIGATION/COAGULATION TUBAL;  Surgeon: Tito Umaña MD;  Location: [facility] ;  Service: Obstetrics      Family History   Problem Relation Age of Onset    Other Mother         brain tumor      Social History     Tobacco Use    Smoking status: Every Day     Current packs/day: 0.50     Types: Cigarettes    Smokeless tobacco: Never   Vaping Use    Vaping status: Never  Used   Substance Use Topics    Alcohol use: No    Drug use: Not Currently      E-Cigarette/Vaping    E-Cigarette Use Never User       E-Cigarette/Vaping Substances    Nicotine No     THC No     CBD No     Flavoring No     Other No     Unknown No       I have reviewed and agree with the history as documented.     HPI    Review of Systems   Constitutional:  Negative for chills and fever.   HENT:  Negative for rhinorrhea and sore throat.    Eyes:  Negative for visual disturbance.   Respiratory:  Negative for cough and shortness of breath.    Cardiovascular:  Negative for chest pain and leg swelling.   Gastrointestinal:  Negative for abdominal pain, diarrhea, nausea and vomiting.   Genitourinary:  Negative for dysuria.   Musculoskeletal:  Negative for back pain and myalgias.   Skin:  Positive for wound. Negative for rash.   Neurological:  Negative for dizziness and headaches.   Psychiatric/Behavioral:  Negative for confusion.    All other systems reviewed and are negative.          Objective       ED Triage Vitals   Temperature Pulse Blood Pressure Respirations SpO2 Patient Position - Orthostatic VS   12/09/24 1856 12/09/24 1856 12/09/24 1857 12/09/24 1856 12/09/24 1856 --   98.9 °F (37.2 °C) (!) 107 (!) 189/112 19 97 %       Temp src Heart Rate Source BP Location FiO2 (%) Pain Score    -- 12/09/24 1856 -- -- 12/09/24 1856     Monitor   7      Vitals      Date and Time Temp Pulse SpO2 Resp BP Pain Score FACES Pain Rating User   12/09/24 1945 -- 97 97 % -- -- -- -- AM   12/09/24 1933 -- -- -- -- -- 10 - Worst Possible Pain -- AM   12/09/24 1857 -- -- -- -- 189/112 -- -- AF   12/09/24 1856 98.9 °F (37.2 °C) 107 97 % 19 -- 7 -- AF            Physical Exam  Vitals and nursing note reviewed.   Constitutional:       Appearance: She is well-developed.   HENT:      Head: Normocephalic and atraumatic.      Right Ear: External ear normal.      Left Ear: External ear normal.      Nose: Nose normal.      Mouth/Throat:      Mouth:  Mucous membranes are moist.      Pharynx: No oropharyngeal exudate.   Eyes:      General: No scleral icterus.        Right eye: No discharge.         Left eye: No discharge.      Conjunctiva/sclera: Conjunctivae normal.      Pupils: Pupils are equal, round, and reactive to light.   Cardiovascular:      Rate and Rhythm: Normal rate and regular rhythm.      Pulses: Normal pulses.      Heart sounds: Normal heart sounds.   Pulmonary:      Effort: Pulmonary effort is normal. No respiratory distress.      Breath sounds: Normal breath sounds. No wheezing.   Abdominal:      General: Bowel sounds are normal.      Palpations: Abdomen is soft.   Musculoskeletal:         General: Swelling and tenderness (left foot:Fourth toe swollen discolored  redness extending to the foot.  Palpable pedal pulses.) present. Normal range of motion.      Cervical back: Normal range of motion and neck supple.   Lymphadenopathy:      Cervical: No cervical adenopathy.   Skin:     General: Skin is warm and dry.      Capillary Refill: Capillary refill takes less than 2 seconds.   Neurological:      General: No focal deficit present.      Mental Status: She is alert and oriented to person, place, and time.   Psychiatric:         Mood and Affect: Mood normal.         Behavior: Behavior normal.         Results Reviewed       None            XR foot 3+ views LEFT   ED Interpretation by Siddharth Cueva MD (12/09 2020)   No fracture noted           Procedures    ED Medication and Procedure Management   Prior to Admission Medications   Prescriptions Last Dose Informant Patient Reported? Taking?   ibuprofen (MOTRIN) 600 mg tablet   No No   Sig: Take 1 tablet (600 mg total) by mouth every 6 (six) hours as needed for mild pain, moderate pain, fever or headaches   Patient not taking: Reported on 9/23/2024   ketorolac (TORADOL) 10 mg tablet   No No   Sig: Take 1 tablet (10 mg total) by mouth every 8 (eight) hours for 5 days   lidocaine (Lidoderm) 5 %   No No   Sig:  Apply 1 patch topically over 12 hours daily Remove & Discard patch within 12 hours or as directed by MD   methocarbamol (ROBAXIN) 500 mg tablet   No No   Sig: Take 1 tablet (500 mg total) by mouth 4 (four) times a day   methylPREDNISolone 4 MG tablet therapy pack   No No   Sig: Use as directed on package   omeprazole (PriLOSEC) 20 mg delayed release capsule   Yes No   Sig: Take 20 mg by mouth daily as needed      Facility-Administered Medications: None     Patient's Medications   Discharge Prescriptions    No medications on file       ED SEPSIS DOCUMENTATION   Time reflects when diagnosis was documented in both MDM as applicable and the Disposition within this note       Time User Action Codes Description Comment    12/9/2024  8:23 PM Siddharth Cueva Add [S90.129A] Contusion of toe                  Siddharth Cueva MD  12/09/24 2025

## 2024-12-13 ENCOUNTER — HOSPITAL ENCOUNTER (INPATIENT)
Facility: HOSPITAL | Age: 49
LOS: 12 days | Discharge: HOME | DRG: 182 | End: 2024-12-25
Attending: EMERGENCY MEDICINE | Admitting: INTERNAL MEDICINE
Payer: COMMERCIAL

## 2024-12-13 ENCOUNTER — APPOINTMENT (EMERGENCY)
Dept: RADIOLOGY | Facility: HOSPITAL | Age: 49
DRG: 182 | End: 2024-12-13
Payer: COMMERCIAL

## 2024-12-13 ENCOUNTER — OFFICE VISIT (OUTPATIENT)
Dept: PODIATRY | Facility: CLINIC | Age: 49
End: 2024-12-13
Payer: COMMERCIAL

## 2024-12-13 VITALS
HEART RATE: 102 BPM | RESPIRATION RATE: 16 BRPM | DIASTOLIC BLOOD PRESSURE: 92 MMHG | OXYGEN SATURATION: 98 % | TEMPERATURE: 98 F | SYSTOLIC BLOOD PRESSURE: 165 MMHG | HEIGHT: 61 IN | BODY MASS INDEX: 33.99 KG/M2 | WEIGHT: 180 LBS

## 2024-12-13 DIAGNOSIS — L03.90 CELLULITIS: ICD-10-CM

## 2024-12-13 DIAGNOSIS — L03.116 CELLULITIS OF LEFT LOWER EXTREMITY: ICD-10-CM

## 2024-12-13 DIAGNOSIS — I96 GANGRENE OF TOE OF LEFT FOOT (HCC): Primary | ICD-10-CM

## 2024-12-13 DIAGNOSIS — S90.129A CONTUSION OF TOE: ICD-10-CM

## 2024-12-13 DIAGNOSIS — E11.8 DIABETES MELLITUS TYPE 2 WITH COMPLICATIONS (HCC): ICD-10-CM

## 2024-12-13 PROBLEM — R79.89 ELEVATED D-DIMER: Status: RESOLVED | Noted: 2018-11-05 | Resolved: 2024-12-13

## 2024-12-13 PROBLEM — E66.811 OBESITY (BMI 30.0-34.9): Chronic | Status: ACTIVE | Noted: 2017-02-09

## 2024-12-13 PROBLEM — O24.410 DIET CONTROLLED GESTATIONAL DIABETES MELLITUS (GDM) IN SECOND TRIMESTER: Status: RESOLVED | Noted: 2017-02-09 | Resolved: 2024-12-13

## 2024-12-13 PROBLEM — L02.811 ABSCESS OF SCALP: Status: RESOLVED | Noted: 2018-06-19 | Resolved: 2024-12-13

## 2024-12-13 PROBLEM — M54.50 LOW BACK PAIN: Status: RESOLVED | Noted: 2018-06-19 | Resolved: 2024-12-13

## 2024-12-13 PROBLEM — O36.5930 INTRAUTERINE GROWTH RESTRICTION AFFECTING ANTEPARTUM CARE OF MOTHER IN THIRD TRIMESTER: Status: RESOLVED | Noted: 2017-02-14 | Resolved: 2024-12-13

## 2024-12-13 PROBLEM — Z98.891 S/P CESAREAN SECTION: Status: RESOLVED | Noted: 2017-02-15 | Resolved: 2024-12-13

## 2024-12-13 PROBLEM — R06.02 SHORTNESS OF BREATH: Status: RESOLVED | Noted: 2018-11-05 | Resolved: 2024-12-13

## 2024-12-13 PROBLEM — Z72.0 TOBACCO ABUSE: Chronic | Status: ACTIVE | Noted: 2017-02-15

## 2024-12-13 PROBLEM — O11.9 CHRONIC HYPERTENSION WITH SUPERIMPOSED PREECLAMPSIA: Status: RESOLVED | Noted: 2017-02-14 | Resolved: 2024-12-13

## 2024-12-13 LAB
ALBUMIN SERPL BCG-MCNC: 4.1 G/DL (ref 3.5–5)
ALP SERPL-CCNC: 75 U/L (ref 34–104)
ALT SERPL W P-5'-P-CCNC: 11 U/L (ref 7–52)
ANION GAP SERPL CALCULATED.3IONS-SCNC: 8 MMOL/L (ref 4–13)
APTT PPP: 29 SECONDS (ref 23–34)
AST SERPL W P-5'-P-CCNC: 10 U/L (ref 13–39)
BASOPHILS # BLD AUTO: 0.01 THOUSANDS/ÂΜL (ref 0–0.1)
BASOPHILS NFR BLD AUTO: 0 % (ref 0–1)
BILIRUB SERPL-MCNC: 0.29 MG/DL (ref 0.2–1)
BUN SERPL-MCNC: 19 MG/DL (ref 5–25)
CALCIUM SERPL-MCNC: 9.8 MG/DL (ref 8.4–10.2)
CHLORIDE SERPL-SCNC: 91 MMOL/L (ref 96–108)
CO2 SERPL-SCNC: 32 MMOL/L (ref 21–32)
CREAT SERPL-MCNC: 0.68 MG/DL (ref 0.6–1.3)
CRP SERPL QL: 13.6 MG/L
EOSINOPHIL # BLD AUTO: 0.1 THOUSAND/ÂΜL (ref 0–0.61)
EOSINOPHIL NFR BLD AUTO: 1 % (ref 0–6)
ERYTHROCYTE [DISTWIDTH] IN BLOOD BY AUTOMATED COUNT: 12.7 % (ref 11.6–15.1)
ERYTHROCYTE [SEDIMENTATION RATE] IN BLOOD: 83 MM/HOUR (ref 0–19)
EST. AVERAGE GLUCOSE BLD GHB EST-MCNC: 249 MG/DL
GFR SERPL CREATININE-BSD FRML MDRD: 103 ML/MIN/1.73SQ M
GLUCOSE SERPL-MCNC: 223 MG/DL (ref 65–140)
GLUCOSE SERPL-MCNC: 276 MG/DL (ref 65–140)
HBA1C MFR BLD: 10.3 %
HCT VFR BLD AUTO: 38.3 % (ref 34.8–46.1)
HGB BLD-MCNC: 12.3 G/DL (ref 11.5–15.4)
IMM GRANULOCYTES # BLD AUTO: 0.03 THOUSAND/UL (ref 0–0.2)
IMM GRANULOCYTES NFR BLD AUTO: 0 % (ref 0–2)
INR PPP: 0.82 (ref 0.85–1.19)
LACTATE SERPL-SCNC: 1 MMOL/L (ref 0.5–2)
LYMPHOCYTES # BLD AUTO: 2.81 THOUSANDS/ÂΜL (ref 0.6–4.47)
LYMPHOCYTES NFR BLD AUTO: 32 % (ref 14–44)
MCH RBC QN AUTO: 29.1 PG (ref 26.8–34.3)
MCHC RBC AUTO-ENTMCNC: 32.1 G/DL (ref 31.4–37.4)
MCV RBC AUTO: 91 FL (ref 82–98)
MONOCYTES # BLD AUTO: 0.33 THOUSAND/ÂΜL (ref 0.17–1.22)
MONOCYTES NFR BLD AUTO: 4 % (ref 4–12)
NEUTROPHILS # BLD AUTO: 5.38 THOUSANDS/ÂΜL (ref 1.85–7.62)
NEUTS SEG NFR BLD AUTO: 63 % (ref 43–75)
NRBC BLD AUTO-RTO: 0 /100 WBCS
PLATELET # BLD AUTO: 248 THOUSANDS/UL (ref 149–390)
PMV BLD AUTO: 9.4 FL (ref 8.9–12.7)
POTASSIUM SERPL-SCNC: 3.8 MMOL/L (ref 3.5–5.3)
PROCALCITONIN SERPL-MCNC: 0.07 NG/ML
PROT SERPL-MCNC: 8.1 G/DL (ref 6.4–8.4)
PROTHROMBIN TIME: 11.8 SECONDS (ref 12.3–15)
RBC # BLD AUTO: 4.23 MILLION/UL (ref 3.81–5.12)
SODIUM SERPL-SCNC: 131 MMOL/L (ref 135–147)
WBC # BLD AUTO: 8.66 THOUSAND/UL (ref 4.31–10.16)

## 2024-12-13 PROCEDURE — 83036 HEMOGLOBIN GLYCOSYLATED A1C: CPT | Performed by: EMERGENCY MEDICINE

## 2024-12-13 PROCEDURE — 99285 EMERGENCY DEPT VISIT HI MDM: CPT

## 2024-12-13 PROCEDURE — 99223 1ST HOSP IP/OBS HIGH 75: CPT | Performed by: PHYSICIAN ASSISTANT

## 2024-12-13 PROCEDURE — 85730 THROMBOPLASTIN TIME PARTIAL: CPT | Performed by: EMERGENCY MEDICINE

## 2024-12-13 PROCEDURE — 87040 BLOOD CULTURE FOR BACTERIA: CPT | Performed by: EMERGENCY MEDICINE

## 2024-12-13 PROCEDURE — 82948 REAGENT STRIP/BLOOD GLUCOSE: CPT

## 2024-12-13 PROCEDURE — 36415 COLL VENOUS BLD VENIPUNCTURE: CPT | Performed by: EMERGENCY MEDICINE

## 2024-12-13 PROCEDURE — 96365 THER/PROPH/DIAG IV INF INIT: CPT

## 2024-12-13 PROCEDURE — 80053 COMPREHEN METABOLIC PANEL: CPT | Performed by: EMERGENCY MEDICINE

## 2024-12-13 PROCEDURE — 84145 PROCALCITONIN (PCT): CPT | Performed by: EMERGENCY MEDICINE

## 2024-12-13 PROCEDURE — 85652 RBC SED RATE AUTOMATED: CPT | Performed by: EMERGENCY MEDICINE

## 2024-12-13 PROCEDURE — 99203 OFFICE O/P NEW LOW 30 MIN: CPT | Performed by: PODIATRIST

## 2024-12-13 PROCEDURE — 86140 C-REACTIVE PROTEIN: CPT | Performed by: EMERGENCY MEDICINE

## 2024-12-13 PROCEDURE — 83605 ASSAY OF LACTIC ACID: CPT | Performed by: EMERGENCY MEDICINE

## 2024-12-13 PROCEDURE — 73660 X-RAY EXAM OF TOE(S): CPT

## 2024-12-13 PROCEDURE — 99285 EMERGENCY DEPT VISIT HI MDM: CPT | Performed by: EMERGENCY MEDICINE

## 2024-12-13 PROCEDURE — 85025 COMPLETE CBC W/AUTO DIFF WBC: CPT | Performed by: EMERGENCY MEDICINE

## 2024-12-13 PROCEDURE — 96375 TX/PRO/DX INJ NEW DRUG ADDON: CPT

## 2024-12-13 PROCEDURE — 85610 PROTHROMBIN TIME: CPT | Performed by: EMERGENCY MEDICINE

## 2024-12-13 RX ORDER — CEFEPIME HYDROCHLORIDE 2 G/50ML
2000 INJECTION, SOLUTION INTRAVENOUS EVERY 12 HOURS
Status: DISCONTINUED | OUTPATIENT
Start: 2024-12-14 | End: 2024-12-16

## 2024-12-13 RX ORDER — ENOXAPARIN SODIUM 100 MG/ML
40 INJECTION SUBCUTANEOUS DAILY
Status: DISCONTINUED | OUTPATIENT
Start: 2024-12-14 | End: 2024-12-25 | Stop reason: HOSPADM

## 2024-12-13 RX ORDER — INSULIN LISPRO 100 [IU]/ML
1-5 INJECTION, SOLUTION INTRAVENOUS; SUBCUTANEOUS
Status: DISCONTINUED | OUTPATIENT
Start: 2024-12-13 | End: 2024-12-16

## 2024-12-13 RX ORDER — OXYCODONE HYDROCHLORIDE 5 MG/1
5 TABLET ORAL EVERY 4 HOURS PRN
Refills: 0 | Status: DISCONTINUED | OUTPATIENT
Start: 2024-12-13 | End: 2024-12-16

## 2024-12-13 RX ORDER — CEFEPIME HYDROCHLORIDE 2 G/1
2000 INJECTION, POWDER, FOR SOLUTION INTRAVENOUS EVERY 12 HOURS
Status: DISCONTINUED | OUTPATIENT
Start: 2024-12-14 | End: 2024-12-13

## 2024-12-13 RX ORDER — VANCOMYCIN HYDROCHLORIDE 1 G/200ML
12.5 INJECTION, SOLUTION INTRAVENOUS EVERY 24 HOURS
Status: DISCONTINUED | OUTPATIENT
Start: 2024-12-14 | End: 2024-12-13

## 2024-12-13 RX ORDER — HYDROMORPHONE HCL/PF 1 MG/ML
1 SYRINGE (ML) INJECTION EVERY 4 HOURS PRN
Refills: 0 | Status: DISCONTINUED | OUTPATIENT
Start: 2024-12-13 | End: 2024-12-17

## 2024-12-13 RX ORDER — CEFEPIME HYDROCHLORIDE 2 G/50ML
2000 INJECTION, SOLUTION INTRAVENOUS ONCE
Status: COMPLETED | OUTPATIENT
Start: 2024-12-13 | End: 2024-12-13

## 2024-12-13 RX ORDER — OXYCODONE HYDROCHLORIDE 10 MG/1
10 TABLET ORAL EVERY 6 HOURS PRN
Refills: 0 | Status: DISCONTINUED | OUTPATIENT
Start: 2024-12-13 | End: 2024-12-17

## 2024-12-13 RX ORDER — ACETAMINOPHEN 325 MG/1
650 TABLET ORAL EVERY 4 HOURS PRN
Status: DISCONTINUED | OUTPATIENT
Start: 2024-12-13 | End: 2024-12-17

## 2024-12-13 RX ORDER — HYDRALAZINE HYDROCHLORIDE 20 MG/ML
10 INJECTION INTRAMUSCULAR; INTRAVENOUS EVERY 6 HOURS PRN
Status: DISCONTINUED | OUTPATIENT
Start: 2024-12-13 | End: 2024-12-25 | Stop reason: HOSPADM

## 2024-12-13 RX ORDER — VANCOMYCIN HYDROCHLORIDE 1 G/200ML
17.5 INJECTION, SOLUTION INTRAVENOUS ONCE
Status: COMPLETED | OUTPATIENT
Start: 2024-12-13 | End: 2024-12-13

## 2024-12-13 RX ORDER — ONDANSETRON 2 MG/ML
4 INJECTION INTRAMUSCULAR; INTRAVENOUS EVERY 6 HOURS PRN
Status: DISCONTINUED | OUTPATIENT
Start: 2024-12-13 | End: 2024-12-25 | Stop reason: HOSPADM

## 2024-12-13 RX ORDER — NICOTINE 21 MG/24HR
1 PATCH, TRANSDERMAL 24 HOURS TRANSDERMAL DAILY
Status: DISCONTINUED | OUTPATIENT
Start: 2024-12-13 | End: 2024-12-25 | Stop reason: HOSPADM

## 2024-12-13 RX ORDER — INSULIN LISPRO 100 [IU]/ML
1-6 INJECTION, SOLUTION INTRAVENOUS; SUBCUTANEOUS
Status: DISCONTINUED | OUTPATIENT
Start: 2024-12-14 | End: 2024-12-16

## 2024-12-13 RX ORDER — HYDROMORPHONE HCL/PF 1 MG/ML
0.5 SYRINGE (ML) INJECTION ONCE
Refills: 0 | Status: COMPLETED | OUTPATIENT
Start: 2024-12-13 | End: 2024-12-13

## 2024-12-13 RX ADMIN — SODIUM CHLORIDE 1000 ML: 0.9 INJECTION, SOLUTION INTRAVENOUS at 20:03

## 2024-12-13 RX ADMIN — HYDROMORPHONE HYDROCHLORIDE 1 MG: 1 INJECTION, SOLUTION INTRAMUSCULAR; INTRAVENOUS; SUBCUTANEOUS at 22:11

## 2024-12-13 RX ADMIN — HYDROMORPHONE HYDROCHLORIDE 0.5 MG: 1 INJECTION, SOLUTION INTRAMUSCULAR; INTRAVENOUS; SUBCUTANEOUS at 18:50

## 2024-12-13 RX ADMIN — VANCOMYCIN HYDROCHLORIDE 1000 MG: 1 INJECTION, SOLUTION INTRAVENOUS at 19:45

## 2024-12-13 RX ADMIN — OXYCODONE HYDROCHLORIDE 10 MG: 10 TABLET ORAL at 20:26

## 2024-12-13 RX ADMIN — INSULIN LISPRO 2 UNITS: 100 INJECTION, SOLUTION INTRAVENOUS; SUBCUTANEOUS at 22:12

## 2024-12-13 RX ADMIN — CEFEPIME HYDROCHLORIDE 2000 MG: 2 INJECTION, SOLUTION INTRAVENOUS at 19:00

## 2024-12-13 RX ADMIN — HYDRALAZINE HYDROCHLORIDE 10 MG: 20 INJECTION INTRAMUSCULAR; INTRAVENOUS at 20:37

## 2024-12-13 NOTE — ED PROVIDER NOTES
Time reflects when diagnosis was documented in both MDM as applicable and the Disposition within this note       Time User Action Codes Description Comment    12/13/2024  7:13 PM Jose Grande Add [L03.90] Cellulitis     12/13/2024  7:50 PM Suad Jane Add [I96] Gangrene of toe of left foot (HCC)     12/13/2024  7:51 PM Suad Jane Modify [L03.90] Cellulitis     12/13/2024  7:51 PM Suad Jane Modify [I96] Gangrene of toe of left foot (HCC)           ED Disposition       ED Disposition   Admit    Condition   Stable    Date/Time   Fri Dec 13, 2024  7:13 PM    Comment   Case was discussed with samantha and the patient's admission status was agreed to be Admission Status: inpatient status to the service of Dr. singh .               Assessment & Plan       Medical Decision Making  49-year-old female presents with left foot infection.  Patient states that she dropped a 12 pack of soda cans on it about 2 weeks ago, was seen in the emergency department 2 days ago for development of bruising and had a reassuring exam at that time.  She states that the dark area she was concerned about dark and significantly and now she has surrounding cellulitis.  She followed up with podiatry today who sent her to the emergency department for concern for infection.    On exam she has a gangrenous appearing second left toe with surrounding erythema and superficial ulceration.  No streaking redness no crepitus.  Will get x-ray to look for gas producing infection.  ESR CRP for possible osteo-, start patient on vancomycin and cefepime.  She denies history of diabetes however chart review indicates a chemistry with a blood sugar in the 300s, will also send an A1c provide Pseudomonas prophylaxis.    She is hypertensive and asymptomatic.    Problems Addressed:  Cellulitis: acute illness or injury    Amount and/or Complexity of Data Reviewed  Labs: ordered. Decision-making details documented in ED Course.  Radiology: ordered  and independent interpretation performed. Decision-making details documented in ED Course.    Risk  Prescription drug management.  Decision regarding hospitalization.             Medications   sodium chloride 0.9 % bolus 1,000 mL (1,000 mL Intravenous New Bag 12/13/24 2003)   hydrALAZINE (APRESOLINE) injection 10 mg (has no administration in time range)   insulin lispro (HumALOG/ADMELOG) 100 units/mL subcutaneous injection 1-6 Units (has no administration in time range)   insulin lispro (HumALOG/ADMELOG) 100 units/mL subcutaneous injection 1-5 Units (has no administration in time range)   acetaminophen (TYLENOL) tablet 650 mg (has no administration in time range)   ondansetron (ZOFRAN) injection 4 mg (has no administration in time range)   nicotine (NICODERM CQ) 14 mg/24hr TD 24 hr patch 1 patch (has no administration in time range)   enoxaparin (LOVENOX) subcutaneous injection 40 mg (has no administration in time range)   oxyCODONE (ROXICODONE) IR tablet 5 mg (has no administration in time range)   oxyCODONE (ROXICODONE) immediate release tablet 10 mg (has no administration in time range)   cefepime (MAXIPIME) IVPB (premix in dextrose) 2,000 mg 50 mL (has no administration in time range)   cefepime (MAXIPIME) IVPB (premix in dextrose) 2,000 mg 50 mL (0 mg Intravenous Stopped 12/13/24 1930)   HYDROmorphone (DILAUDID) injection 0.5 mg (0.5 mg Intravenous Given 12/13/24 1850)   vancomycin (VANCOCIN) IVPB (premix in dextrose) 1,000 mg 200 mL (1,000 mg Intravenous New Bag 12/13/24 1945)       ED Risk Strat Scores                                              History of Present Illness       Chief Complaint   Patient presents with    Wound Infection     Left foot         Past Medical History:   Diagnosis Date    Advanced maternal age in multigravida, second trimester 11/30/2016    Transitioned From: Advanced maternal age in multigravida, first trimester      Back pain     used 2 percocet tid until current admission in  2017    Bone spur     in back per pt    Chronic hypertension with superimposed preeclampsia 2017    Depression     Gestational diabetes     insulin dependent    Herniated cervical disc     Herniated lumbar intervertebral disc     Hx of degenerative disc disease     Obesity     Pre-eclampsia     Prior pregnancy with fetal demise     previous demise at 36 weeks      Past Surgical History:   Procedure Laterality Date    BACK SURGERY       SECTION      LAMINECTOMY      with disc removal, last assessed 16    OTHER SURGICAL HISTORY      Right ovary removal 2005 per pt recall at [facility]    [place]  DELIVERY ONLY N/A 2/15/2017    Procedure:  SECTION ();  Surgeon: Tito Umaña MD;  Location: [facility] ;  Service: Obstetrics    [place] LIG/TRNSXJ FALOPIAN TUBE  DEL/ABDML SURG Left 2/15/2017    Procedure: LIGATION/COAGULATION TUBAL;  Surgeon: Tito Umaña MD;  Location: [facility] ;  Service: Obstetrics      Family History   Problem Relation Age of Onset    Diabetes Mother     COPD Mother     Heart disease Mother     Stroke Father     Heart disease Father       Social History     Tobacco Use    Smoking status: Every Day     Current packs/day: 0.50     Types: Cigarettes    Smokeless tobacco: Never   Vaping Use    Vaping status: Never Used   Substance Use Topics    Alcohol use: No    Drug use: Not Currently      E-Cigarette/Vaping    E-Cigarette Use Never User       E-Cigarette/Vaping Substances    Nicotine No     THC No     CBD No     Flavoring No     Other No     Unknown No       I have reviewed and agree with the history as documented.     49F with toe infection sent in by podiatry          Review of Systems        Objective       ED Triage Vitals [24 1822]   Temperature Pulse Blood Pressure Respirations SpO2 Patient Position - Orthostatic VS   98 °F (36.7 °C) 104 (!) 228/104 18 98 % Sitting      Temp Source Heart Rate Source BP Location FiO2 (%) Pain Score     Oral Monitor Left arm -- 7      Vitals      Date and Time Temp Pulse SpO2 Resp BP Pain Score FACES Pain Rating User   12/13/24 2030 98.7 °F (37.1 °C) 96 98 % -- 191/107 -- -- DII   12/13/24 2027 98.7 °F (37.1 °C) 97 98 % 18 191/107 -- -- DII   12/13/24 2026 -- -- -- -- -- 9 -- SB   12/13/24 1945 -- 94 97 % -- 179/76 -- -- KB   12/13/24 1930 -- 90 93 % 18 181/84 -- -- KB   12/13/24 1850 -- -- -- -- -- 10 - Worst Possible Pain -- [facility]   12/13/24 1828 -- -- -- -- 204/92 -- -- NAD   12/13/24 1822 98 °F (36.7 °C) 104 98 % 18 228/104 7 -- NAD            Physical Exam  Vitals and nursing note reviewed.   Constitutional:       Appearance: Normal appearance. She is well-developed.   HENT:      Head: Normocephalic and atraumatic.   Eyes:      Conjunctiva/sclera: Conjunctivae normal.      Pupils: Pupils are equal, round, and reactive to light.   Neck:      Trachea: No tracheal deviation.   Cardiovascular:      Rate and Rhythm: Normal rate and regular rhythm.      Heart sounds: Normal heart sounds. No murmur heard.  Pulmonary:      Effort: Pulmonary effort is normal. No respiratory distress.      Breath sounds: Normal breath sounds. No wheezing or rales.   Abdominal:      General: Bowel sounds are normal. There is no distension.      Palpations: Abdomen is soft.      Tenderness: There is no abdominal tenderness.   Musculoskeletal:         General: No deformity.      Cervical back: Normal range of motion and neck supple.   Skin:     General: Skin is warm and dry.      Capillary Refill: Capillary refill takes less than 2 seconds.      Comments: Black left fourth toe/devitalized tissue with cellulitis extending into the dorsal midfoot without crepitus no streaking, some ulceration at the MTP of the second digit which is superficial   Neurological:      General: No focal deficit present.      Mental Status: She is alert and oriented to person, place, and time.      Sensory: No sensory deficit.   Psychiatric:         Mood and  Affect: Mood normal.         Judgment: Judgment normal.         Results Reviewed       Procedure Component Value Units Date/Time    Procalcitonin [[id number]]  (Normal) Collected: 12/13/24 1841    Lab Status: Final result Specimen: Blood from Arm, Right Updated: 12/13/24 1918     Procalcitonin 0.07 ng/ml     Comprehensive metabolic panel [[id number]]  (Abnormal) Collected: 12/13/24 1841    Lab Status: Final result Specimen: Blood from Arm, Right Updated: 12/13/24 1912     Sodium 131 mmol/L      Potassium 3.8 mmol/L      Chloride 91 mmol/L      CO2 32 mmol/L      ANION GAP 8 mmol/L      BUN 19 mg/dL      Creatinine 0.68 mg/dL      Glucose 276 mg/dL      Calcium 9.8 mg/dL      AST 10 U/L      ALT 11 U/L      Alkaline Phosphatase 75 U/L      Total Protein 8.1 g/dL      Albumin 4.1 g/dL      Total Bilirubin 0.29 mg/dL      eGFR 103 ml/min/1.73sq m     Narrative:      National Kid[employer] guidelines for Chronic Kidney Disease (CKD):     Stage 1 with normal or high GFR (GFR > 90 mL/min/1.73 square meters)    Stage 2 Mild CKD (GFR = 60-89 mL/min/1.73 square meters)    Stage 3A Moderate CKD (GFR = 45-59 mL/min/1.73 square meters)    Stage 3B Moderate CKD (GFR = 30-44 mL/min/1.73 square meters)    Stage 4 Severe CKD (GFR = 15-29 mL/min/1.73 square meters)    Stage 5 End Stage CKD (GFR <15 mL/min/1.73 square meters)  Note: GFR calculation is accurate only with a steady state creatinine    C-reactive protein [[id number]]  (Abnormal) Collected: 12/13/24 1841    Lab Status: Final result Specimen: Blood from Arm, Right Updated: 12/13/24 1912     CRP 13.6 mg/L     Lactic acid [[id number]]  (Normal) Collected: 12/13/24 1841    Lab Status: Final result Specimen: Blood from Arm, Right Updated: 12/13/24 1910     LACTIC ACID 1.0 mmol/L     Narrative:      Result may be elevated if tourniquet was used during collection.    Protime-INR [[id number]]  (Abnormal) Collected: 12/13/24 1841    Lab Status: Final result Specimen: Blood  from Arm, Right Updated: 12/13/24 1908     Protime 11.8 seconds      INR 0.82    Narrative:      INR Therapeutic Range    Indication                                             INR Range      Atrial Fibrillation                                               2.0-3.0  Hypercoagulable State                                    2.0.2.3  Left Ventricular Asist Device                            2.0-3.0  Mechanical Heart Valve                                  -    Aortic(with afib, MI, embolism, HF, LA enlargement,    and/or coagulopathy)                                     2.0-3.0 (2.5-3.5)     Mitral                                                             2.5-3.5  Prosthetic/Bioprosthetic Heart Valve               2.0-3.0  Venous thromboembolism (VTE: VT, PE        2.0-3.0    APTT [[id number]]  (Normal) Collected: 12/13/24 1841    Lab Status: Final result Specimen: Blood from Arm, Right Updated: 12/13/24 1908     PTT 29 seconds     Blood culture #1 [[id number]] Collected: 12/13/24 1841    Lab Status: In process Specimen: Blood from Arm, Right Updated: 12/13/24 1907    Sedimentation rate, automated [[id number]]  (Abnormal) Collected: 12/13/24 1841    Lab Status: Final result Specimen: Blood from Arm, Right Updated: 12/13/24 1901     Sed Rate 83 mm/hour     CBC and differential [[id number]] Collected: 12/13/24 1841    Lab Status: Final result Specimen: Blood from Arm, Right Updated: 12/13/24 1853     WBC 8.66 Thousand/uL      RBC 4.23 Million/uL      Hemoglobin 12.3 g/dL      Hematocrit 38.3 %      MCV 91 fL      MCH 29.1 pg      MCHC 32.1 g/dL      RDW 12.7 %      MPV 9.4 fL      Platelets 248 Thousands/uL      nRBC 0 /100 WBCs      Segmented % 63 %      Immature Grans % 0 %      Lymphocytes % 32 %      Monocytes % 4 %      Eosinophils Relative 1 %      Basophils Relative 0 %      Absolute Neutrophils 5.38 Thousands/µL      Absolute Immature Grans 0.03 Thousand/uL      Absolute Lymphocytes 2.81 Thousands/µL      Absolute  Monocytes 0.33 Thousand/µL      Eosinophils Absolute 0.10 Thousand/µL      Basophils Absolute 0.01 Thousands/µL     Hemoglobin A1C [[id number]] Collected: 12/13/24 1841    Lab Status: In process Specimen: Blood from Arm, Right Updated: 12/13/24 1851    Blood culture #2 [[id number]] Updated: 12/13/24 1850    Lab Status: In process Specimen: Blood             XR toe second min 2 views LEFT    (Results Pending)       Procedures    ED Medication and Procedure Management   None     There are no discharge medications for this patient.    No discharge procedures on file.  ED SEPSIS DOCUMENTATION   Time reflects when diagnosis was documented in both MDM as applicable and the Disposition within this note       Time User Action Codes Description Comment    12/13/2024  7:13 PM Jose Grande [L03.90] Cellulitis     12/13/2024  7:50 PM Suad Jane Add [I96] Gangrene of toe of left foot (HCC)     12/13/2024  7:51 PM Suad Jane Modify [L03.90] Cellulitis     12/13/2024  7:51 PM Suad Jane Modify [I96] Gangrene of toe of left foot (HCC)                  Jose Grande DO  12/13/24 2048

## 2024-12-13 NOTE — PROGRESS NOTES
Name: Lizzy Acuna      : 1975      MRN: [id number]  Encounter Provider: Catalina Pak DPM  Encounter Date: 2024   Encounter department: [facility]ODI[facility] [place]  :  Assessment & Plan  Contusion of toe    Orders:    Ambulatory Referral to Podiatry    Gangrene of toe of left foot (HCC)         Cellulitis of left lower extremity           Imaging Reviewed at this visit (I personally reviewed/independently interpreted images and reports in PACS)  XR left foot WB 3v today's date: No acute osseous abnormalities noted.  No acute fracture or dislocation noted.        IMPRESSION:  Left foot ischemic changes with necrotic fourth toe  Left forefoot cellulitis    PLAN:  I reviewed clinical exam and radiographic imaging (XR) with patient in detail today. I have discussed with the patient the pathophysiology of this diagnosis and reviewed how the examination correlates with this diagnosis.  ED note from 2024 reviewed  X-ray 2024 negative for acute fracture    Patient with left foot gangrenous changes to forefoot and signs of SOI  Patient counseled on ischemic changes and necrosis of left foot after traumatic injury to the lateral lesser toes.  Patient is having acute ischemic changes with gangrene noted to the fourth digit and cyanotic changes to the remaining digits.    Erythema and mild malodor noted surrounding distal lateral forefoot indicative of soft tissue infection.  Patient counseled on cellulitis and concern for deeper soft tissue or bone infection.  Patient counseled she is at a high risk of loss of tissue including digital and forefoot amputation due to ischemic changes.  Tobacco cessation reviewed.  Effects of smoking on vasculature and wound healing potential reviewed.  Patient was advised to report to the emergency department immediately for further evaluation and management of left foot ischemic changes and soft tissue infection  Patient will go to [facility], patient  "states she will go there directly.    Recommend admission with broad-spectrum antibiotics.  Vascular consult, noninvasive vascular studies, x-ray, MRI    History of Present Illness   HPI  Lizzy Acuna is a 49 y.o. female who presents for evaluation and management of left foot injury sustained approximately a week and a half ago.  Patient then noticed dark discoloration to the distal aspect of her fourth toe with pain developing.  She presented to the ED on Monday for evaluation.  Patient reports she was told she has a blood blister and she was discharged with pain medication and told to follow-up with podiatry outpatient.  She was given a cam boot for ambulation.  She has been keeping her foot wrapped and wearing the cam boot as directed.  She states her pain has worsened and she has had increase in dark discoloration to her fourth toe with further discoloration to her lesser toes.  She denies any drainage from the area.  Patient denies any nausea, vomiting, fever, chills, shortness of breath.  She is accompanied by her mother at today's evaluation      Review of Systems  Constitutional:  Negative for chills and fever.   HENT:  Negative for rhinorrhea and sore throat.    Eyes:  Negative for visual disturbance.   Respiratory:  Negative for cough and shortness of breath.    Cardiovascular:  Negative for chest pain and leg swelling.   Gastrointestinal:  Negative for abdominal pain, diarrhea, nausea and vomiting.   Genitourinary:  Negative for dysuria.   Musculoskeletal:  Negative for back pain and myalgias.   Skin:  Positive for wound. Negative for rash.   Neurological:  Negative for dizziness and headaches.   Psychiatric/Behavioral:  Negative for confusion.    All other systems reviewed and are negative.            Objective   /92   Pulse 102   Temp 98 °F (36.7 °C)   Resp 16   Ht 5' 1\" (1.549 m)   Wt 81.6 kg (180 lb)   SpO2 98%   BMI 34.01 kg/m²      Physical Exam  Cardiovascular:      Comments: Left " foot DP/PT pulse nonpalpable  CRT greater than 3 seconds with ischemic necrotic changes noted to fourth digit and cyanotic changes noted to lesser digits.  No digital hair growth  Musculoskeletal:         General: Swelling, tenderness and signs of injury present.      Comments: Tender with palpation over distal lateral forefoot.  Tender with palpation of third, fourth, fifth digit and MPJs.  No crepitus with range of motion   Skin:     Findings: Bruising, erythema and lesion present.      Comments: Left distal forefoot with ischemic changes noted.  Sluggish blanching or nonblanching noted to distal digits with cyanotic changes.  Fourth digit necrosis noted with mild fibrotic tissue at the dorsal base.  No serous drainage expressed.  No purulence expressed, no crepitus, no fluctuance.  Mild malodor noted erythema extending to the level of the midfoot.   Neurological:      Comments: Left foot sensation diminished to distal digits.  Sensation absent to fourth digit

## 2024-12-14 ENCOUNTER — APPOINTMENT (INPATIENT)
Dept: MRI IMAGING | Facility: HOSPITAL | Age: 49
DRG: 182 | End: 2024-12-14
Payer: COMMERCIAL

## 2024-12-14 PROBLEM — E11.8 DIABETES MELLITUS TYPE 2 WITH COMPLICATIONS (HCC): Status: ACTIVE | Noted: 2018-11-05

## 2024-12-14 LAB
ANION GAP SERPL CALCULATED.3IONS-SCNC: 8 MMOL/L (ref 4–13)
BUN SERPL-MCNC: 15 MG/DL (ref 5–25)
CALCIUM SERPL-MCNC: 9.3 MG/DL (ref 8.4–10.2)
CHLORIDE SERPL-SCNC: 97 MMOL/L (ref 96–108)
CO2 SERPL-SCNC: 27 MMOL/L (ref 21–32)
CREAT SERPL-MCNC: 0.5 MG/DL (ref 0.6–1.3)
ERYTHROCYTE [DISTWIDTH] IN BLOOD BY AUTOMATED COUNT: 12.5 % (ref 11.6–15.1)
GFR SERPL CREATININE-BSD FRML MDRD: 114 ML/MIN/1.73SQ M
GLUCOSE SERPL-MCNC: 122 MG/DL (ref 65–140)
GLUCOSE SERPL-MCNC: 152 MG/DL (ref 65–140)
GLUCOSE SERPL-MCNC: 170 MG/DL (ref 65–140)
GLUCOSE SERPL-MCNC: 193 MG/DL (ref 65–140)
GLUCOSE SERPL-MCNC: 196 MG/DL (ref 65–140)
HCT VFR BLD AUTO: 39.6 % (ref 34.8–46.1)
HGB BLD-MCNC: 12.7 G/DL (ref 11.5–15.4)
MCH RBC QN AUTO: 28.8 PG (ref 26.8–34.3)
MCHC RBC AUTO-ENTMCNC: 32.1 G/DL (ref 31.4–37.4)
MCV RBC AUTO: 90 FL (ref 82–98)
PLATELET # BLD AUTO: 249 THOUSANDS/UL (ref 149–390)
PMV BLD AUTO: 9.3 FL (ref 8.9–12.7)
POTASSIUM SERPL-SCNC: 4.3 MMOL/L (ref 3.5–5.3)
PROCALCITONIN SERPL-MCNC: 0.07 NG/ML
RBC # BLD AUTO: 4.41 MILLION/UL (ref 3.81–5.12)
SODIUM SERPL-SCNC: 132 MMOL/L (ref 135–147)
WBC # BLD AUTO: 9.53 THOUSAND/UL (ref 4.31–10.16)

## 2024-12-14 PROCEDURE — 80048 BASIC METABOLIC PNL TOTAL CA: CPT | Performed by: PHYSICIAN ASSISTANT

## 2024-12-14 PROCEDURE — 85027 COMPLETE CBC AUTOMATED: CPT | Performed by: PHYSICIAN ASSISTANT

## 2024-12-14 PROCEDURE — 84145 PROCALCITONIN (PCT): CPT | Performed by: PHYSICIAN ASSISTANT

## 2024-12-14 PROCEDURE — 99223 1ST HOSP IP/OBS HIGH 75: CPT | Performed by: SURGERY

## 2024-12-14 PROCEDURE — 82948 REAGENT STRIP/BLOOD GLUCOSE: CPT

## 2024-12-14 PROCEDURE — 99232 SBSQ HOSP IP/OBS MODERATE 35: CPT | Performed by: INTERNAL MEDICINE

## 2024-12-14 PROCEDURE — 73718 MRI LOWER EXTREMITY W/O DYE: CPT

## 2024-12-14 RX ORDER — INSULIN LISPRO 100 [IU]/ML
5 INJECTION, SOLUTION INTRAVENOUS; SUBCUTANEOUS
Status: DISCONTINUED | OUTPATIENT
Start: 2024-12-14 | End: 2024-12-16

## 2024-12-14 RX ORDER — INSULIN GLARGINE 100 [IU]/ML
10 INJECTION, SOLUTION SUBCUTANEOUS
Status: DISCONTINUED | OUTPATIENT
Start: 2024-12-14 | End: 2024-12-25 | Stop reason: HOSPADM

## 2024-12-14 RX ADMIN — HYDROMORPHONE HYDROCHLORIDE 1 MG: 1 INJECTION, SOLUTION INTRAMUSCULAR; INTRAVENOUS; SUBCUTANEOUS at 12:36

## 2024-12-14 RX ADMIN — INSULIN LISPRO 5 UNITS: 100 INJECTION, SOLUTION INTRAVENOUS; SUBCUTANEOUS at 11:39

## 2024-12-14 RX ADMIN — HYDROMORPHONE HYDROCHLORIDE 1 MG: 1 INJECTION, SOLUTION INTRAMUSCULAR; INTRAVENOUS; SUBCUTANEOUS at 07:43

## 2024-12-14 RX ADMIN — OXYCODONE HYDROCHLORIDE 10 MG: 10 TABLET ORAL at 11:38

## 2024-12-14 RX ADMIN — INSULIN LISPRO 2 UNITS: 100 INJECTION, SOLUTION INTRAVENOUS; SUBCUTANEOUS at 07:43

## 2024-12-14 RX ADMIN — CEFEPIME HYDROCHLORIDE 2000 MG: 2 INJECTION, SOLUTION INTRAVENOUS at 06:53

## 2024-12-14 RX ADMIN — OXYCODONE HYDROCHLORIDE 10 MG: 10 TABLET ORAL at 05:15

## 2024-12-14 RX ADMIN — INSULIN LISPRO 1 UNITS: 100 INJECTION, SOLUTION INTRAVENOUS; SUBCUTANEOUS at 11:38

## 2024-12-14 RX ADMIN — ENOXAPARIN SODIUM 40 MG: 40 INJECTION SUBCUTANEOUS at 09:38

## 2024-12-14 RX ADMIN — HYDROMORPHONE HYDROCHLORIDE 1 MG: 1 INJECTION, SOLUTION INTRAMUSCULAR; INTRAVENOUS; SUBCUTANEOUS at 16:42

## 2024-12-14 RX ADMIN — INSULIN LISPRO 5 UNITS: 100 INJECTION, SOLUTION INTRAVENOUS; SUBCUTANEOUS at 16:43

## 2024-12-14 RX ADMIN — CEFEPIME HYDROCHLORIDE 2000 MG: 2 INJECTION, SOLUTION INTRAVENOUS at 18:16

## 2024-12-14 RX ADMIN — INSULIN GLARGINE 10 UNITS: 100 INJECTION, SOLUTION SUBCUTANEOUS at 21:03

## 2024-12-14 RX ADMIN — VANCOMYCIN HYDROCHLORIDE 1500 MG: 1 INJECTION, POWDER, LYOPHILIZED, FOR SOLUTION INTRAVENOUS at 19:30

## 2024-12-14 RX ADMIN — VANCOMYCIN HYDROCHLORIDE 1250 MG: 1 INJECTION, POWDER, LYOPHILIZED, FOR SOLUTION INTRAVENOUS at 07:42

## 2024-12-14 RX ADMIN — OXYCODONE HYDROCHLORIDE 10 MG: 10 TABLET ORAL at 18:15

## 2024-12-14 RX ADMIN — HYDROMORPHONE HYDROCHLORIDE 1 MG: 1 INJECTION, SOLUTION INTRAMUSCULAR; INTRAVENOUS; SUBCUTANEOUS at 20:44

## 2024-12-14 RX ADMIN — INSULIN LISPRO 1 UNITS: 100 INJECTION, SOLUTION INTRAVENOUS; SUBCUTANEOUS at 21:03

## 2024-12-14 NOTE — ASSESSMENT & PLAN NOTE
Reports half a pack per day for 30-year smoking history  Recommend smoking cessation.  Discussion to be had regarding willingness to quit and if there are any cessation medications she might be interested in.  Nicotine patch while inpatient  Counseled the patient on effects of smoking cigarettes on vasculature, wound healing, infection, etc.

## 2024-12-14 NOTE — ASSESSMENT & PLAN NOTE
Patient seen by podiatry and sent to ED for further evaluation  IV abx  Podiatry following  Procal 0.07  Lactic acid 1.0

## 2024-12-14 NOTE — ASSESSMENT & PLAN NOTE
Presents with left fourth toe dry gangrene (hx of trauma, dropping case of soda on it 3 weeks ago)  AFVSS  Na 132, Cr 0.5, WBC 9.53, Hgb 12.7  Blood cx pending

## 2024-12-14 NOTE — UTILIZATION REVIEW
Initial Clinical Review    Admission: Date/Time/Statement:   Admission Orders (From admission, onward)       Ordered        12/13/24 1934  INPATIENT ADMISSION  Once                          Orders Placed This Encounter   Procedures    INPATIENT ADMISSION     Standing Status:   Standing     Number of Occurrences:   1     Level of Care:   Med Surg [16]     Estimated length of stay:   More than 2 Midnights     Certification:   I certify that inpatient services are medically necessary for this patient for a duration of greater than two midnights. See H&P and MD Progress Notes for additional information about the patient's course of treatment.     ED Arrival Information       Expected   -    Arrival   12/13/2024 18:14    Acuity   Urgent              Means of arrival   Walk-In    Escorted by   Family Member    Service   Hospitalist    Admission type   Emergency              Arrival complaint   infected toe             Chief Complaint   Patient presents with    Wound Infection     Left foot         Initial Presentation: 49 y.o. female presents to the ED from Podiatry office with c/o continued and worsening L toe pain and change in color after dropping a case of soda on her foot 1 wk PTA.  She was seen in the ED 12/9, treated and d/c in boot with Podiatry OP F/u.  PMH: gestational diabetes, obesity, depression, tobacco abuse.  In the ED she was HTN, tachycardic, rated pain 7/10.  Labs - low NA, glucose 276, elevated sed rate and CRP.  Imaging - soft tissue swelling.  Treated with IV antibiotics, IV Dilaudid, IV fluids.  On exam acute distress d/t pain, no other deficits.  Admitted to INPATIENT status with  Gangrene Toe L foot, cellulitis L foot, hyperglycemia, hyponatremia -  PLAN: IV antibiotics, Podiatry and Vascular surgery consult, check A1C,  w/ glucose correction.      Anticipated Length of Stay/Certification Statement:  Patient will be admitted on an inpatient basis with an anticipated length of stay of greater  than 2 midnights secondary to IV abx, specialist input.     Date: 12/14   Day 2:   Gangrene Toe L foot, cellulitis L foot, hyperglycemia, hyponatremia - MRI L foot and arterial duplex pending, continues on IV antibiotics, new diagnosis DM - will start long acting insulin and premeal insulin, Endocrine consult.  Pt reports continue L foot pain.  Again in acute distress on exam - is using oral analgesia.      12/14 Surgery Consult - Gangrene toe L foot, cellulitis L foot, DM - MRI Pending.  On exam Vascular exam: Palpable bilateral DP/PT, however right> left.  Bilateral palpable femoral pulses.  Positive Doppler signals bilateral DP/PT.  Slightly prolonged capillary refill to left toes. + M/S intact. Continue IV antibiotics.     ED Treatment-Medication Administration from 12/13/2024 1813 to 12/13/2024 2014         Date/Time Order Dose Route Action     12/13/2024 1900 cefepime (MAXIPIME) IVPB (premix in dextrose) 2,000 mg 50 mL 2,000 mg Intravenous New Bag     12/13/2024 1850 HYDROmorphone (DILAUDID) injection 0.5 mg 0.5 mg Intravenous Given     12/13/2024 1945 vancomycin (VANCOCIN) IVPB (premix in dextrose) 1,000 mg 200 mL 1,000 mg Intravenous New Bag     12/13/2024 2003 sodium chloride 0.9 % bolus 1,000 mL 1,000 mL Intravenous New Bag            Scheduled Medications:  cefepime, 2,000 mg, Intravenous, Q12H  enoxaparin, 40 mg, Subcutaneous, Daily  insulin glargine, 10 Units, Subcutaneous, HS  insulin lispro, 1-5 Units, Subcutaneous, HS  insulin lispro, 1-6 Units, Subcutaneous, TID AC  insulin lispro, 5 Units, Subcutaneous, TID With Meals  nicotine, 1 patch, Transdermal, Daily  vancomycin, 1,500 mg, Intravenous, Q12H      Continuous IV Infusions:     PRN Meds:  acetaminophen, 650 mg, Oral, Q4H PRN  hydrALAZINE, 10 mg, Intravenous, Q6H PRN - x 1 12/13  HYDROmorphone, 1 mg, Intravenous, Q4H PRN - x 1 12/13, 12/14  ondansetron, 4 mg, Intravenous, Q6H PRN  oxyCODONE, 10 mg, Oral, Q6H PRN - x 1 12/13, x 2 12/14  oxyCODONE,  5 mg, Oral, Q4H PRN      ED Triage Vitals [12/13/24 1822]   Temperature Pulse Respirations Blood Pressure SpO2 Pain Score   98 °F (36.7 °C) 104 18 (!) 228/104 98 % 7     Weight (last 2 days)       Date/Time Weight    12/13/24 2014 86 (189.49)            Vital Signs (last 3 days)       Date/Time Temp Pulse Resp BP MAP (mmHg) SpO2 O2 Device Patient Position - Orthostatic VS [place] Coma Scale Score Pain    12/14/24 1138 -- -- -- -- -- -- -- -- -- 9 12/14/24 0828 -- -- -- -- -- 96 % None (Room air) -- 15 6    12/14/24 0743 -- -- -- -- -- -- -- -- -- 9 12/14/24 07:31:32 98.6 °F (37 °C) 98 19 131/89 103 96 % None (Room air) Lying -- --    12/14/24 0515 -- -- -- -- -- -- -- -- -- 10 - Worst Possible Pain    12/13/24 22:54:13 98.5 °F (36.9 °C) 94 18 136/90 105 96 % None (Room air) Lying -- --    12/13/24 2211 -- -- -- -- -- -- -- -- -- 9 12/13/24 20:30:47 98.7 °F (37.1 °C) 96 -- 191/107 135 98 % -- -- -- --    12/13/24 20:27:34 98.7 °F (37.1 °C) 97 18 191/107 135 98 % -- -- -- --    12/13/24 2027 -- -- -- -- -- -- -- -- 15 --    12/13/24 2026 -- -- -- -- -- -- -- -- -- 9 12/13/24 1945 -- 94 -- 179/76 109 97 % -- -- -- --    12/13/24 1930 -- 90 18 181/84 121 93 % -- -- -- --    12/13/24 1850 -- -- -- -- -- -- -- -- -- 10 - Worst Possible Pain    12/13/24 1832 -- -- -- -- -- -- -- -- 15 --    12/13/24 1828 -- -- -- 204/92 -- -- -- -- -- --    12/13/24 1822 98 °F (36.7 °C) 104 18 228/104 -- 98 % None (Room air) Sitting -- 7              Pertinent Labs/Diagnostic Test Results:   Radiology:  XR toe second min 2 views LEFT   Final Interpretation by Korey Amin MD (12/14 6538)      No acute osseous abnormality. Soft tissue swelling.      VAS ARTERIAL DUPLEX- LOWER LIMB BILATERAL    (Results Pending)         MRI foot/forefoot toes left wo contrast    (Results Pending)        Cardiology:  No orders to display     GI:  No orders to display           Results from last 7 days   Lab Units 12/14/24  2347  12/13/24  1841   WBC Thousand/uL 9.53 8.66   HEMOGLOBIN g/dL 12.7 12.3   HEMATOCRIT % 39.6 38.3   PLATELETS Thousands/uL 249 248   TOTAL NEUT ABS Thousands/µL  --  5.38         Results from last 7 days   Lab Units 12/14/24  0432 12/13/24  1841   SODIUM mmol/L 132* 131*   POTASSIUM mmol/L 4.3 3.8   CHLORIDE mmol/L 97 91*   CO2 mmol/L 27 32   ANION GAP mmol/L 8 8   BUN mg/dL 15 19   CREATININE mg/dL 0.50* 0.68   EGFR ml/min/1.73sq m 114 103   CALCIUM mg/dL 9.3 9.8     Results from last 7 days   Lab Units 12/13/24  1841   AST U/L 10*   ALT U/L 11   ALK PHOS U/L 75   TOTAL PROTEIN g/dL 8.1   ALBUMIN g/dL 4.1   TOTAL BILIRUBIN mg/dL 0.29     Results from last 7 days   Lab Units 12/14/24  1058 12/14/24  0731 12/13/24  2137   POC GLUCOSE mg/dl 170* 196* 223*     Results from last 7 days   Lab Units 12/14/24  0432 12/13/24  1841   GLUCOSE RANDOM mg/dL 193* 276*         Results from last 7 days   Lab Units 12/13/24  1841   HEMOGLOBIN A1C % 10.3*   EAG mg/dl 249       Results from last 7 days   Lab Units 12/13/24  1841   PROTIME seconds 11.8*   INR  0.82*   PTT seconds 29         Results from last 7 days   Lab Units 12/14/24  0432 12/13/24  1841   PROCALCITONIN ng/ml 0.07 0.07     Results from last 7 days   Lab Units 12/13/24  1841   LACTIC ACID mmol/L 1.0       Results from last 7 days   Lab Units 12/13/24  1841   CRP mg/L 13.6*   SED RATE mm/hour 83*     Results from last 7 days   Lab Units 12/14/24  0101 12/13/24  1841   BLOOD CULTURE  Received in Microbiology Lab. Culture in Progress. Received in Microbiology Lab. Culture in Progress.                   Past Medical History:   Diagnosis Date    Advanced maternal age in multigravida, second trimester 11/30/2016    Transitioned From: Advanced maternal age in multigravida, first trimester      Back pain     used 2 percocet tid until current admission in 2/2017    Bone spur     in back per pt    Chronic hypertension with superimposed preeclampsia 02/14/2017    Depression      Gestational diabetes     insulin dependent    Herniated cervical disc     Herniated lumbar intervertebral disc     Hx of degenerative disc disease     Obesity     Pre-eclampsia     Prior pregnancy with fetal demise     previous demise at 36 weeks     Present on Admission:   Hyponatremia   Obesity (BMI 30.0-34.9)   Tobacco abuse   Diabetes mellitus type 2 with complications (HCC)   Cellulitis of left foot   Gangrene of toe of left foot (HCC)      Admitting Diagnosis: Cellulitis [L03.90]  Wound infection [T14.8XXA, L08.9]  Gangrene of toe of left foot (HCC) [I96]  Age/Sex: 49 y.o. female    Network Utilization Review Department  ATTENTION: Please call with any questions or concerns to [phone] and carefully listen to the prompts so that you are directed to the right person. All voicemails are confidential.   For Discharge needs, contact Care Management DC Support Team at [phone] opt. [phone]  Send all requests for admission clinical reviews, approved or denied determinations and any other requests to dedicated fax number below belonging to the campus where the patient is receiving treatment. List of dedicated fax numbers for the Facilities:  FACILITY NAME UR FAX NUMBER   ADMISSION DENIALS (Administrative/Medical Necessity) [phone]   DISCHARGE SUPPORT TEAM (NETWORK) [phone]   PARENT CHILD HEALTH (Maternity/NICU/Pediatrics) [phone]   [facility] [phone]   [facility] [phone]   [facility] [phone]   [facility] [phone]   [facility] [phone]   [facility] [phone]   [facility] [phone]   [facility] [phone]   [facility] [phone]   [facility] [phone]   [facility]  [phone]   [facility] [phone]

## 2024-12-14 NOTE — PROGRESS NOTES
Lizzy Acuna is a 49 y.o. female who is currently ordered Vancomycin IV with management by the Pharmacy Consult service.  Relevant clinical data and objective / subjective history reviewed.  Vancomycin Assessment:  Indication and Goal AUC/Trough: Soft tissue (goal -600, trough >10)  Clinical Status: stable  Micro:   pending  Renal Function:  SCr: 0.50 mg/dL  CrCl: 136.3 mL/min  Renal replacement: Not on dialysis  Days of Therapy: 2  Current Dose: 1250mg IV Q12hrs  Vancomycin Plan:  New Dosinmg IV Q12hrs  Estimated AUC: 470 mcg*hr/mL  Estimated Trough: 11 mcg/mL  Next Level: 12/15/24 with am labs   Renal Function Monitoring: Daily BMP and UOP  Pharmacy will continue to follow closely for s/sx of nephrotoxicity, infusion reactions and appropriateness of therapy.  BMP and CBC will be ordered per protocol. We will continue to follow the patient’s culture results and clinical progress daily.    Mary Pressley, Pharmacist

## 2024-12-14 NOTE — CASE MANAGEMENT
Case Management Assessment & Discharge Planning Note    Patient name Lizzy Acuna  Location /-01 MRN [id number]  : 1975 Date 2024       Current Admission Date: 2024  Current Admission Diagnosis:Gangrene of toe of left foot (HCC)   Patient Active Problem List    Diagnosis Date Noted Date Diagnosed    Cellulitis of left foot 2024     Gangrene of toe of left foot (HCC) 2024     Diabetes mellitus type 2 with complications (HCC) 2018     Hyponatremia 2018     Chronic sciatica 2018     Tobacco abuse 02/15/2017     Obesity (BMI 30.0-34.9) 2017     Chronic low back pain 2016     Chronic, continuous use of opioids 2016       LOS (days): 1  Geometric Mean LOS (GMLOS) (days):   Days to GMLOS:     OBJECTIVE:    Risk of Unplanned Readmission Score: 12.2      Current admission status: Inpatient    Preferred Pharmacy:   [facility] [address] [address]  Phone: [phone] Fax: [phone]    [id number] - [facility] [address] [address]  Phone: [phone] Fax: [phone]    Primary Care Provider: Linwood North DO    Primary Insurance: [employer] [place] ([place]: [place])  Secondary Insurance:     ASSESSMENT:  Active Health Care Proxies    There are no active Health Care Proxies on file.       Readmission Root Cause  30 Day Readmission: No    Patient Information  Admitted from:: Home  Mental Status: Alert  During Assessment patient was accompanied by: Not accompanied during assessment  Assessment information provided by:: Patient  Primary Caregiver: Self  Support Systems: Family members  County of Residence: [place]  What city do you live in?: [place]  Home entry access options. Select all that apply.: Ramp  Type of Current Residence: 2 story home  Upon entering residence, is there a bedroom on the main floor (no further steps)?:  Anesthesia Type: 1% lidocaine with epinephrine No  A bedroom is located on the following floor levels of residence (select all that apply):: 2nd Floor  Upon entering residence, is there a bathroom on the main floor (no further steps)?: Yes  Number of steps to 2nd floor from main floor: One Flight  Living Arrangements: Lives w/ Parent(s), Lives w/ Children  Is patient a ?: No    Activities of Daily Living Prior to Admission  Functional Status: Independent  Completes ADLs independently?: Yes  Ambulates independently?: Yes  Does patient use assisted devices?: No  Does patient currently own DME?: Yes  What DME does the patient currently own?: Walker, Straight Cane  Does patient have a history of Outpatient Therapy (PT/OT)?: Yes ([facility])  Does the patient have a history of Short-Term Rehab?: No  Does patient have a history of HHC?: No  Does patient currently have HHC?: No    Patient Information Continued  Income Source: Unemployed  Does patient have prescription coverage?: Yes ([employer])  Does patient receive dialysis treatments?: No  Does patient have a history of substance abuse?: No  Does patient have a history of Mental Health Diagnosis?: No    Means of Transportation  Means of Transport to Appts:: Drives Self          DISCHARGE DETAILS:    Discharge planning discussed with:: Pt     Contacts  Patient Contacts: Paige Johansen mother  Relationship to Patient:: Family  Contact Method: Phone  Phone Number: [phone]  Reason/Outcome: Emergency Contact   Evaluated the pt at the bedside.  Pt lives with her children and mother.  Pt was IPA and drives.  Mother will transport home upon DC.                                                                                           Show Inventory Tab: Show Treatment Number: 1 Medical Necessity: Precancerous Lesions Debridement Text (Will Only Render In Visit Note If You Select Debridement Option Under Who Performed The Pdt Field): Prior to application of the photodynamic medication the hyperkeratotic lesions were curetted to make them more amenable to therapy. Illumination Time: 00:16:40 Occlusion: No Show Medical Necessity In Plan?: Yes Pre-Procedure Text: The treatment areas were cleaned and prepped in the usual fashion. Light Source: Shaun-U Who Performed The Pdt?: Performed by Nurse, MA or Aesthetician ([id number]) Detail Level: Zone Incubation Time: 01:00:00 Consent: Written consent obtained.  The risks were reviewed with the patient including but not limited to: pigmentary changes, pain, blistering, scabbing, redness, and the remote possibility of scarring. Post-Care Instructions: I reviewed with the patient in detail post-care instructions. Patient is to avoid sunlight for the next 2 days, and wear sun protection. Patients may expect sunburn like redness, discomfort and scabbing. Who Performed The Pdt (Staff): Sherly Which Photosensitizer Was Used: Levulan Total Number Of Aks Treated (Optional To Report): 0

## 2024-12-14 NOTE — H&P
H&P - Hospitalist   Name: Lizzy Acuna 49 y.o. female I MRN: [id number]  Unit/Bed#: -01 I Date of Admission: 12/13/2024   Date of Service: 12/13/2024 I Hospital Day: 0     Assessment & Plan  Gangrene of toe of left foot (HCC)  Patient with foot injury approximately 10 days ago, has dark discoloration of distal aspect of fourth left toe with pain. She was seen by podiatry and sent to ED for admission  Podiatry consult  Vascular surgery consult  ESR 83  CRP 13.6  Cellulitis of left foot  Patient seen by podiatry and sent to ED for further evaluation  IV abx  Podiatry consult  Procal 0.07, continue to trend  Lactic acid 1.0  Obesity (BMI 30.0-34.9)  BMI 34  Healthy diet and lifestyle modification recommended  Tobacco abuse  Smokes half pack per day  Nicotine patch 14 mg  Smoking cessation recommended  Hyperglycemia  Glucose 276 on labs  A1c pending  Hx of gestational diabetes  Hyponatremia  Pseudohyponatremia - Sodium 135 with glucose correction      VTE Pharmacologic Prophylaxis: VTE Score: 3 Moderate Risk (Score 3-4) - Pharmacological DVT Prophylaxis Ordered: enoxaparin (Lovenox).  Code Status: Full Code  Discussion with patient    Anticipated Length of Stay: Patient will be admitted on an inpatient basis with an anticipated length of stay of greater than 2 midnights secondary to IV abx, specialist input.    History of Present Illness   Chief Complaint: toe pain and change of color    Lizzy Acuna is a 49 y.o. female with a PMH of gestational diabetes, obesity, depression, tobacco abuse who presents with toe pain and change of color. Patient was seen in ED 12/9 for toe pain, she reported at that time dropping case of soda on left foot about 1 week prior. At that time she noted toe turning black and having difficulty walking on it. ED reviewed w/ podiatry and patient was given boot and scheduled to see podiatry in office. Today she had visit. There she was sent to ED for treatment of left toe gangrene and  cellulitis of foot and to see vascular surgery.    Patient notes that she bruised her foot prior to dropping the case of soda on her foot. Pain currently 8-9/10 with stabbing pain. Notes it did resolve after the pain medication in ED but is returning. She has been using the walking boot since it was provided on . Notes toe to look worse than it did when she was seen in ED. Patient reports takes no medications except over the counter things.    Review of Systems   Constitutional:  Negative for chills and fever.   Respiratory:  Negative for cough and shortness of breath.    Cardiovascular:  Positive for leg swelling. Negative for chest pain.   Musculoskeletal:  Positive for gait problem.   Skin:  Positive for color change and wound.   All other systems reviewed and are negative.      Historical Information   Past Medical History:   Diagnosis Date    Advanced maternal age in multigravida, second trimester 2016    Transitioned From: Advanced maternal age in multigravida, first trimester      Back pain     used 2 percocet tid until current admission in 2017    Bone spur     in back per pt    Chronic hypertension with superimposed preeclampsia 2017    Depression     Gestational diabetes     insulin dependent    Herniated cervical disc     Herniated lumbar intervertebral disc     Hx of degenerative disc disease     Obesity     Pre-eclampsia     Prior pregnancy with fetal demise     previous demise at 36 weeks     Past Surgical History:   Procedure Laterality Date    BACK SURGERY       SECTION      LAMINECTOMY      with disc removal, last assessed 16    OTHER SURGICAL HISTORY      Right ovary removal 2005 per pt recall at [facility] facillity    DE  DELIVERY ONLY N/A 2/15/2017    Procedure:  SECTION ();  Surgeon: Tito Umaña MD;  Location: [facility];  Service: Obstetrics    DE LIG/TRNSXJ FALOPIAN TUBE  DEL/ABDML SURG Left 2/15/2017    Procedure:  LIGATION/COAGULATION TUBAL;  Surgeon: Tito Umaña MD;  Location: [facility];  Service: Obstetrics     Social History     Tobacco Use    Smoking status: Every Day     Current packs/day: 0.50     Types: Cigarettes    Smokeless tobacco: Never   Vaping Use    Vaping status: Never Used   Substance and Sexual Activity    Alcohol use: No    Drug use: Not Currently    Sexual activity: Yes     Partners: Male     E-Cigarette/Vaping    E-Cigarette Use Never User      E-Cigarette/Vaping Substances    Nicotine No     THC No     CBD No     Flavoring No     Other No     Unknown No      Family History   Problem Relation Age of Onset    Diabetes Mother     COPD Mother     Heart disease Mother     Stroke Father     Heart disease Father      Social History:  Marital Status: Unknown   Occupation: ivan at [employer]  Patient Pre-hospital Living Situation: Home  Patient Pre-hospital Level of Mobility: walks  Patient Pre-hospital Diet Restrictions: none    Meds/Allergies   I have reviewed home medications with patient personally.  Prior to Admission medications    Medication Sig Start Date End Date Taking? Authorizing Provider   ibuprofen (MOTRIN) 600 mg tablet Take 1 tablet (600 mg total) by mouth every 6 (six) hours as needed for mild pain, moderate pain, fever or headaches  Patient not taking: Reported on 9/23/2024 9/27/21   NAOMY Stanley   ketorolac (TORADOL) 10 mg tablet Take 1 tablet (10 mg total) by mouth every 8 (eight) hours for 5 days 9/23/24 9/28/24  Hilda Guzmán PA-C   lidocaine (Lidoderm) 5 % Apply 1 patch topically over 12 hours daily Remove & Discard patch within 12 hours or as directed by MD 9/23/24   Hilda Guzmán PA-C   methocarbamol (ROBAXIN) 500 mg tablet Take 1 tablet (500 mg total) by mouth 4 (four) times a day 9/23/24   Hilda Guzmán PA-C   methylPREDNISolone 4 MG tablet therapy pack Use as directed on package 9/23/24   Hilda Guzmán PA-C   omeprazole (PriLOSEC) 20 mg delayed release  capsule Take 20 mg by mouth daily as needed    Historical Provider, MD     Allergies   Allergen Reactions    Codeine      aggression       Objective :  Temp:  [98 °F (36.7 °C)-98.7 °F (37.1 °C)] 98.7 °F (37.1 °C)  HR:  [] 96  BP: (165-228)/() 191/107  Resp:  [16-18] 18  SpO2:  [93 %-98 %] 98 %  O2 Device: None (Room air)    Physical Exam  Vitals reviewed.   Constitutional:       General: She is in acute distress (Secondary to pain).      Appearance: Normal appearance. She is obese.   HENT:      Head: Normocephalic and atraumatic.      Nose: Nose normal.   Eyes:      General:         Right eye: No discharge.         Left eye: No discharge.      Extraocular Movements: Extraocular movements intact.      Conjunctiva/sclera: Conjunctivae normal.   Cardiovascular:      Rate and Rhythm: Normal rate and regular rhythm.   Pulmonary:      Effort: Pulmonary effort is normal. No respiratory distress.      Breath sounds: Normal breath sounds. No wheezing.   Abdominal:      General: Bowel sounds are normal. There is no distension.      Palpations: Abdomen is soft.      Tenderness: There is no abdominal tenderness. There is no guarding.   Musculoskeletal:         General: No swelling or tenderness. Normal range of motion.      Cervical back: Normal range of motion.      Right lower leg: No edema.      Left lower leg: No edema.        Feet:    Skin:     General: Skin is warm and dry.      Capillary Refill: Capillary refill takes less than 2 seconds.   Neurological:      General: No focal deficit present.      Mental Status: She is alert and oriented to person, place, and time. Mental status is at baseline.   Psychiatric:         Mood and Affect: Mood normal.         Behavior: Behavior normal.         Thought Content: Thought content normal.         Judgment: Judgment normal.        Lines/Drains:      Lab Results: I have reviewed the following results:  Results from last 7 days   Lab Units 12/13/24  1841   WBC  Thousand/uL 8.66   HEMOGLOBIN g/dL 12.3   HEMATOCRIT % 38.3   PLATELETS Thousands/uL 248   SEGS PCT % 63   LYMPHO PCT % 32   MONO PCT % 4   EOS PCT % 1     Results from last 7 days   Lab Units 12/13/24  1841   SODIUM mmol/L 131*   POTASSIUM mmol/L 3.8   CHLORIDE mmol/L 91*   CO2 mmol/L 32   BUN mg/dL 19   CREATININE mg/dL 0.68   ANION GAP mmol/L 8   CALCIUM mg/dL 9.8   ALBUMIN g/dL 4.1   TOTAL BILIRUBIN mg/dL 0.29   ALK PHOS U/L 75   ALT U/L 11   AST U/L 10*   GLUCOSE RANDOM mg/dL 276*     Results from last 7 days   Lab Units 12/13/24  1841   INR  0.82*         Lab Results   Component Value Date    HGBA1C 11.0 (H) 09/19/2022    HGBA1C 6.0 02/10/2017     Results from last 7 days   Lab Units 12/13/24  1841   LACTIC ACID mmol/L 1.0   PROCALCITONIN ng/ml 0.07       Imaging Results Review: I personally reviewed the following image studies in PACS and associated radiology reports: left second toe xray. My interpretation of the radiology images/reports is: no fracture.  Other Study Results Review: No additional pertinent studies reviewed.    Administrative Statements   I have spent a total time of 75 minutes in caring for this patient on the day of the visit/encounter including Diagnostic results, Counseling / Coordination of care, Documenting in the medical record, Reviewing / ordering tests, medicine, procedures  , and Obtaining or reviewing history  .    ** Please Note: This note has been constructed using a voice recognition system. **

## 2024-12-14 NOTE — ASSESSMENT & PLAN NOTE
Lab Results   Component Value Date    HGBA1C 10.3 (H) 12/13/2024       Recent Labs     12/13/24  2137 12/14/24  0731 12/14/24  1058   POCGLU 223* 196* 170*       Blood Sugar Average: Last 72 hrs:  (P) 196.7362329973469881  Risk factor for poor wound healing, infection, and poor vasculature  Hx of gestational diabetes  Newly diagnosed this admission  Tight glucose management per primary team

## 2024-12-14 NOTE — ASSESSMENT & PLAN NOTE
Patient with foot injury approximately 10 days ago, has dark discoloration of distal aspect of fourth left toe with pain. She was seen by podiatry and sent to ED for admission  Podiatry consulted  Vascular surgery consulted  ESR 83  CRP 13.6  MRI and arterial duplex ordered

## 2024-12-14 NOTE — CONSULTS
Consultation - Vascular Surgery  Name: Lizzy Acuna 49 y.o. female I MRN: [id number]  Unit/Bed#: -01 I Date of Admission: 12/13/2024   Date of Service: 12/14/2024 I Hospital Day: 1   Inpatient consult to Vascular Surgery  Consult performed by: Lily Oliver PA-C  Consult ordered by: Suad Jane PA-C        Assessment & Plan  Gangrene of toe of left foot (HCC)  Presents with left fourth toe dry gangrene (hx of trauma, dropping case of soda on it 3 weeks ago)  AFVSS  Na 132, Cr 0.5, WBC 9.53, Hgb 12.7\  ESR 83, CRP 13.6  Blood cx pending   12/13 XR L toe with soft tissue swelling, negative for frx  MRI pending rule out osteomyelitis  Podiatry following   Vascular exam: Palpable bilateral DP/PT, however right> left.  Bilateral palpable femoral pulses.  Positive Doppler signals bilateral DP/PT.  Slightly prolonged capillary refill to left toes. + M/S intact.    Plan:  Recommend obtaining bilateral LE arterial duplexes   Wound care per podiatry, currently with Betadine paint  Cellulitis of left foot  Surrounding cellulitis vs duskiness of the dorsal aspect of the left foot  Continue empiric abx  Tobacco abuse  Reports half a pack per day for 30-year smoking history  Recommend smoking cessation.  Discussion to be had regarding willingness to quit and if there are any cessation medications she might be interested in.  Nicotine patch while inpatient  Counseled the patient on effects of smoking cigarettes on vasculature, wound healing, infection, etc.  Diabetes mellitus type 2 with complications ([facility])  Lab Results   Component Value Date    HGBA1C 10.3 (H) 12/13/2024       Recent Labs     12/13/24  2137 12/14/24  0731 12/14/24  1058   POCGLU 223* 196* 170*       Blood Sugar Average: Last 72 hrs:  (P) 196.9349554619724533  Risk factor for poor wound healing, infection, and poor vasculature  Hx of gestational diabetes  Newly diagnosed this admission  Tight glucose management per primary team  Obesity (BMI  30.0-34.9)  Recommend healthy diet, lifestyle modifications      History of Present Illness   Lizzy Acuna is a 49 y.o. female who has no documented past medical history but says she does not normally see a doctor unless she is sick.  Does not follow with a PCP.  Patient reports no history of diagnosis of diabetes however A1c in 2022 was 11 and repeat A1c 2 days ago was 10.3 per chart review.  Appears to not follow up, history of noncompliance, not on any diabetes medications at home.  Reports smoking half a pack a day for 30-year history.  No marijuana or vape use, no alcohol use, no illicit drug use.  No prior vascular interventions.  No prior vascular imaging.    Patient reports that she has not had any pain in her legs, paresthesias, or lower extremity wounds prior to the trauma to her left foot 2 weeks ago.  She does note she thinks she has a history of left-sided sciatica and had some numbness all the way down the leg recently.  Per chart review was seen in urgent care for this.  She also has a history of spinal surgery in the past (laminectomy? )    Patient reports that she dropped a case of soda cans on her left foot and since then has been having bruising and some discomfort to the area.  She did not notice any changes until recently once the discoloration occurred to the front of her toe.  She was seen in the emergency room shortly after she had this trauma to her right foot on 12/9, x-ray was obtained and was negative, and she was scheduled for follow-up with podiatry in the outpatient office.  At the her office appointment on 12/13/2024 there was concern for deeper infection and ischemic changes so was recommended she be evaluated in the emergency room.  Patient reports continued persistent pain in her left foot.    Review of Systems   Constitutional:  Negative for appetite change, chills and fever.   HENT: Negative.     Eyes: Negative.    Respiratory: Negative.     Cardiovascular: Negative.     Gastrointestinal: Negative.    Endocrine: Negative.    Genitourinary: Negative.    Musculoskeletal:         Pain to the left fourth toe   Skin:  Positive for color change (Left fourth toe).   Neurological: Negative.    Hematological: Negative.    Psychiatric/Behavioral: Negative.       I have reviewed the patient's PMH, PSH, Social History, Family History, Meds, and Allergies  Historical Information   Past Medical History:   Diagnosis Date    Advanced maternal age in multigravida, second trimester 2016    Transitioned From: Advanced maternal age in multigravida, first trimester      Back pain     used 2 percocet tid until current admission in 2017    Bone spur     in back per pt    Chronic hypertension with superimposed preeclampsia 2017    Depression     Gestational diabetes     insulin dependent    Herniated cervical disc     Herniated lumbar intervertebral disc     Hx of degenerative disc disease     Obesity     Pre-eclampsia     Prior pregnancy with fetal demise     previous demise at 36 weeks     Past Surgical History:   Procedure Laterality Date    BACK SURGERY       SECTION      LAMINECTOMY      with disc removal, last assessed 16    OTHER SURGICAL HISTORY      Right ovary removal 2005 per pt recall at [facility] facillity    CT  DELIVERY ONLY N/A 2/15/2017    Procedure:  SECTION ();  Surgeon: Tito Umaña MD;  [facility]: [facility] ;  Service: Obstetrics    CT LIG/TRNSXJ FALOPIAN TUBE  DEL/ABDML SURG Left 2/15/2017    Procedure: LIGATION/COAGULATION TUBAL;  Surgeon: Tito Umaña MD;  Location: [facility] ;  Service: Obstetrics     Social History     Tobacco Use    Smoking status: Every Day     Current packs/day: 0.50     Types: Cigarettes    Smokeless tobacco: Never   Vaping Use    Vaping status: Never Used   Substance and Sexual Activity    Alcohol use: Never    Drug use: Not Currently    Sexual activity: Yes     Partners: Male      E-Cigarette/Vaping    E-Cigarette Use Never User      E-Cigarette/Vaping Substances    Nicotine No     THC No     CBD No     Flavoring No     Other No     Unknown No      Family History   Problem Relation Age of Onset    Diabetes Mother     COPD Mother     Heart disease Mother     Stroke Father     Heart disease Father      Social History     Tobacco Use    Smoking status: Every Day     Current packs/day: 0.50     Types: Cigarettes    Smokeless tobacco: Never   Vaping Use    Vaping status: Never Used   Substance and Sexual Activity    Alcohol use: Never    Drug use: Not Currently    Sexual activity: Yes     Partners: Male       Current Facility-Administered Medications:     acetaminophen (TYLENOL) tablet 650 mg, Q4H PRN    cefepime (MAXIPIME) IVPB (premix in dextrose) 2,000 mg 50 mL, Q12H, Last Rate: 2,000 mg (12/14/24 0653)    enoxaparin (LOVENOX) subcutaneous injection 40 mg, Daily    hydrALAZINE (APRESOLINE) injection 10 mg, Q6H PRN    HYDROmorphone (DILAUDID) injection 1 mg, Q4H PRN    insulin glargine (LANTUS) subcutaneous injection 10 Units 0.1 mL, HS    insulin lispro (HumALOG/ADMELOG) 100 units/mL subcutaneous injection 1-5 Units, HS    insulin lispro (HumALOG/ADMELOG) 100 units/mL subcutaneous injection 1-6 Units, TID AC **AND** Fingerstick Glucose (POCT), TID AC    insulin lispro (HumALOG/ADMELOG) 100 units/mL subcutaneous injection 5 Units, TID With Meals    nicotine (NICODERM CQ) 14 mg/24hr TD 24 hr patch 1 patch, Daily    ondansetron (ZOFRAN) injection 4 mg, Q6H PRN    oxyCODONE (ROXICODONE) immediate release tablet 10 mg, Q6H PRN    oxyCODONE (ROXICODONE) IR tablet 5 mg, Q4H PRN    vancomycin (VANCOCIN) 1500 mg in sodium chloride 0.9% 250 mL IVPB, Q12H  None     Codeine    Objective :  Temp:  [98 °F (36.7 °C)-98.7 °F (37.1 °C)] 98.6 °F (37 °C)  HR:  [] 98  BP: (131-228)/() 131/89  Resp:  [16-19] 19  SpO2:  [93 %-98 %] 96 %  O2 Device: None (Room air)      Physical Exam  Vitals reviewed.    Constitutional:       General: She is not in acute distress.     Appearance: She is not toxic-appearing.   Cardiovascular:      Pulses:           Femoral pulses are 2+ on the right side and 2+ on the left side.       Dorsalis pedis pulses are 2+ on the right side and 2+ on the left side.        Posterior tibial pulses are 2+ on the right side and 2+ on the left side.      Comments: Left 1st through 5th toes with dusky appearance, slightly prolonged capillary refill greater and 3-second, approx 4 to 5 seconds, sensation intact, no paresthesias, motor intact  Erythema to left dorsum of the foot near fourth/fifth toe, blistering and epidermolysis near the base of the fourth toe  Fourth toe with dry gangrene  Pulmonary:      Effort: Pulmonary effort is normal.      Breath sounds: Wheezing (Audible without stethoscope) present.   Neurological:      Mental Status: She is alert.               Lab Results: I have reviewed the following results:  Recent Labs     12/13/24  1841 12/14/24  0432   WBC 8.66 9.53   HGB 12.3 12.7   HCT 38.3 39.6    249   SODIUM 131* 132*   K 3.8 4.3   CL 91* 97   CO2 32 27   BUN 19 15   CREATININE 0.68 0.50*   GLUC 276* 193*   AST 10*  --    ALT 11  --    ALB 4.1  --    TBILI 0.29  --    ALKPHOS 75  --    PTT 29  --    INR 0.82*  --    LACTICACID 1.0  --        Lily Oliver  12/14/2024  12:24 PM

## 2024-12-14 NOTE — PROGRESS NOTES
Lizzy Acuna is a 49 y.o. female who is currently ordered Vancomycin IV with management by the Pharmacy Consult service.  Relevant clinical data and objective / subjective history reviewed.  Vancomycin Assessment:  Indication and Goal AUC/Trough: Soft tissue (goal -600, trough >10)  Clinical Status: stable  Micro:   pending  Renal Function:  SCr: 0.68 mg/dL  CrCl: 100.8 mL/min  Renal replacement: Not on dialysis  Days of Therapy: 1  Current Dose: 1000 mg iv q 24 hrs  Vancomycin Plan:  New Dosin mg iv q 12 hrs  Estimated AUC: 520 mcg*hr/mL  Estimated Trough: 14.1 mcg/mL  Next Level: 0600 on 12/15/24  Renal Function Monitoring: Daily BMP and UOP  Pharmacy will continue to follow closely for s/sx of nephrotoxicity, infusion reactions and appropriateness of therapy.  BMP and CBC will be ordered per protocol. We will continue to follow the patient’s culture results and clinical progress daily.    Lui Mann, Pharmacist

## 2024-12-14 NOTE — ASSESSMENT & PLAN NOTE
Glucose 276 on labs  A1c 10.3  Hx of gestational diabetes  Newly diagnosed this admission  Will start long acting and premeals insulin  Endocrine consulted

## 2024-12-14 NOTE — PLAN OF CARE
Problem: PAIN - ADULT  Goal: Verbalizes/displays adequate comfort level or baseline comfort level  Description: Interventions:  - Encourage patient to monitor pain and request assistance  - Assess pain using appropriate pain scale  - Administer analgesics based on type and severity of pain and evaluate response  - Implement non-pharmacological measures as appropriate and evaluate response  - Consider cultural and social influences on pain and pain management  - Notify physician/advanced practitioner if interventions unsuccessful or patient reports new pain  Outcome: Progressing     Problem: SKIN/TISSUE INTEGRITY - ADULT  Goal: Incision(s), wounds(s) or drain site(s) healing without S/S of infection  Description: INTERVENTIONS  - Assess and document dressing, incision, wound bed, drain sites and surrounding tissue  - Provide patient and family education  - Perform skin care/dressing changes every daily  Outcome: Progressing

## 2024-12-14 NOTE — ASSESSMENT & PLAN NOTE
Lab Results   Component Value Date    HGBA1C 10.3 (H) 12/13/2024       Recent Labs     12/13/24  2137 12/14/24  0731 12/14/24  1058   POCGLU 223* 196* 170*       Blood Sugar Average: Last 72 hrs:  (P) [id number]  Risk factor for poor wound healing, infection, and poor vasculature  Tight glucose management per primary team

## 2024-12-14 NOTE — ASSESSMENT & PLAN NOTE
Patient seen by podiatry and sent to ED for further evaluation  IV abx  Podiatry consult  Procal 0.07, continue to trend  Lactic acid 1.0

## 2024-12-14 NOTE — ASSESSMENT & PLAN NOTE
Patient with foot injury approximately 10 days ago, has dark discoloration of distal aspect of fourth left toe with pain. She was seen by podiatry and sent to ED for admission  Podiatry consult  Vascular surgery consult  ESR 83  CRP 13.6

## 2024-12-14 NOTE — ASSESSMENT & PLAN NOTE
Presents with left fourth toe dry gangrene (hx of trauma, dropping case of soda on it 3 weeks ago)  AFVSS  Na 132, Cr 0.5, WBC 9.53, Hgb 12.7\  ESR 83, CRP 13.6  Blood cx pending   12/13 XR L toe with soft tissue swelling, negative for frx  MRI pending rule out osteomyelitis  Podiatry following   Vascular exam: Palpable bilateral DP/PT, however right> left.  Bilateral palpable femoral pulses.  Positive Doppler signals bilateral DP/PT.  Slightly prolonged capillary refill to left toes. + M/S intact.    Plan:  Recommend obtaining bilateral LE arterial duplexes   Wound care per podiatry, currently with Betadine paint

## 2024-12-14 NOTE — PLAN OF CARE
Problem: SKIN/TISSUE INTEGRITY - ADULT  Goal: Incision(s), wounds(s) or drain site(s) healing without S/S of infection  Description: INTERVENTIONS  - Assess and document dressing, incision, wound bed, drain sites and surrounding tissue  - Provide patient and family education  - Perform skin care/dressing changes daily  Problem: INFECTION - ADULT  Goal: Absence or prevention of progression during hospitalization  Description: INTERVENTIONS:  - Assess and monitor for signs and symptoms of infection  - Monitor lab/diagnostic results  - Monitor all insertion sites, i.e. indwelling lines, tubes, and drains  - Monitor endotracheal if appropriate and nasal secretions for changes in amount and color  - [place] appropriate cooling/warming therapies per order  - Administer medications as ordered  - Instruct and encourage patient and family to use good hand hygiene technique  - Identify and instruct in appropriate isolation precautions for identified infection/condition  12/14/2024 0928 by Emmy Goodman RN  Outcome: Progressing  12/14/2024 0928 by Emmy Goodman RN  Outcome: Progressing     12/14/2024 0928 by Emmy Goodman RN  Outcome: Progressing  12/14/2024 0928 by Emmy Goodman RN  Outcome: Progressing

## 2024-12-14 NOTE — PROGRESS NOTES
Progress Note - Internal Medicine   Name: Lizzy Acuna 49 y.o. female I MRN: [id number]  Unit/Bed#: [facility] I Date of Admission: 12/13/2024   Date of Service: 12/14/2024 I Hospital Day: 1     Assessment & Plan  Gangrene of toe of left foot (HCC)  Patient with foot injury approximately 10 days ago, has dark discoloration of distal aspect of fourth left toe with pain. She was seen by podiatry and sent to ED for admission  Podiatry consulted  Vascular surgery consulted  ESR 83  CRP 13.6  MRI and arterial duplex ordered  Obesity (BMI 30.0-34.9)  BMI 34  Healthy diet and lifestyle modification recommended  Tobacco abuse  Smokes half pack per day  Nicotine patch 14 mg  Smoking cessation recommended  Diabetes mellitus type 2 with complications (HCC)  Glucose 276 on labs  A1c 10.3  Hx of gestational diabetes  Newly diagnosed this admission  Will start long acting and premeals insulin  Endocrine consulted  Hyponatremia  Slow improvement  Cellulitis of left foot  Patient seen by podiatry and sent to ED for further evaluation  IV abx  Podiatry following  Procal 0.07  Lactic acid 1.0    24 Hour Events : cont to have pain, no fever or chills  Subjective : pain in her left toe, no cp    Objective :  Temp:  [98 °F (36.7 °C)-98.7 °F (37.1 °C)] 98.6 °F (37 °C)  HR:  [] 98  BP: (131-228)/() 131/89  Resp:  [16-19] 19  SpO2:  [93 %-98 %] 96 %  O2 Device: None (Room air)    Physical Exam  Vitals reviewed.   Constitutional:       General: She is in acute distress (Secondary to pain).      Appearance: Normal appearance. She is obese.   HENT:      Head: Normocephalic and atraumatic.      Nose: Nose normal.   Eyes:      General:         Right eye: No discharge.         Left eye: No discharge.      Extraocular Movements: Extraocular movements intact.      Conjunctiva/sclera: Conjunctivae normal.   Cardiovascular:      Rate and Rhythm: Normal rate and regular rhythm.   Pulmonary:      Effort: Pulmonary effort is normal. No  respiratory distress.      Breath sounds: Normal breath sounds. No wheezing.   Abdominal:      General: Bowel sounds are normal. There is no distension.      Palpations: Abdomen is soft.      Tenderness: There is no abdominal tenderness. There is no guarding.   Musculoskeletal:         General: No swelling or tenderness. Normal range of motion.      Cervical back: Normal range of motion.      Right lower leg: No edema.      Left lower leg: No edema.        Feet:    Skin:     General: Skin is warm and dry.      Capillary Refill: Capillary refill takes less than 2 seconds.   Neurological:      General: No focal deficit present.      Mental Status: She is alert and oriented to person, place, and time. Mental status is at baseline.   Psychiatric:         Mood and Affect: Mood normal.         Behavior: Behavior normal.         Thought Content: Thought content normal.         Judgment: Judgment normal.        Lab Results: I have reviewed the following results:          VTE Pharmacologic Prophylaxis: Enoxaparin (Lovenox)  VTE Mechanical Prophylaxis: sequential compression device

## 2024-12-15 LAB
ANION GAP SERPL CALCULATED.3IONS-SCNC: 6 MMOL/L (ref 4–13)
BASOPHILS # BLD AUTO: 0.02 THOUSANDS/ÂΜL (ref 0–0.1)
BASOPHILS NFR BLD AUTO: 0 % (ref 0–1)
BUN SERPL-MCNC: 13 MG/DL (ref 5–25)
CALCIUM SERPL-MCNC: 9.2 MG/DL (ref 8.4–10.2)
CHLORIDE SERPL-SCNC: 96 MMOL/L (ref 96–108)
CO2 SERPL-SCNC: 28 MMOL/L (ref 21–32)
CREAT SERPL-MCNC: 0.49 MG/DL (ref 0.6–1.3)
EOSINOPHIL # BLD AUTO: 0.08 THOUSAND/ÂΜL (ref 0–0.61)
EOSINOPHIL NFR BLD AUTO: 1 % (ref 0–6)
ERYTHROCYTE [DISTWIDTH] IN BLOOD BY AUTOMATED COUNT: 12.5 % (ref 11.6–15.1)
GFR SERPL CREATININE-BSD FRML MDRD: 115 ML/MIN/1.73SQ M
GLUCOSE SERPL-MCNC: 110 MG/DL (ref 65–140)
GLUCOSE SERPL-MCNC: 117 MG/DL (ref 65–140)
GLUCOSE SERPL-MCNC: 137 MG/DL (ref 65–140)
GLUCOSE SERPL-MCNC: 143 MG/DL (ref 65–140)
GLUCOSE SERPL-MCNC: 146 MG/DL (ref 65–140)
HCT VFR BLD AUTO: 36 % (ref 34.8–46.1)
HGB BLD-MCNC: 11.5 G/DL (ref 11.5–15.4)
IMM GRANULOCYTES # BLD AUTO: 0.04 THOUSAND/UL (ref 0–0.2)
IMM GRANULOCYTES NFR BLD AUTO: 0 % (ref 0–2)
LYMPHOCYTES # BLD AUTO: 2.79 THOUSANDS/ÂΜL (ref 0.6–4.47)
LYMPHOCYTES NFR BLD AUTO: 30 % (ref 14–44)
MCH RBC QN AUTO: 29.1 PG (ref 26.8–34.3)
MCHC RBC AUTO-ENTMCNC: 31.9 G/DL (ref 31.4–37.4)
MCV RBC AUTO: 91 FL (ref 82–98)
MONOCYTES # BLD AUTO: 0.44 THOUSAND/ÂΜL (ref 0.17–1.22)
MONOCYTES NFR BLD AUTO: 5 % (ref 4–12)
NEUTROPHILS # BLD AUTO: 5.83 THOUSANDS/ÂΜL (ref 1.85–7.62)
NEUTS SEG NFR BLD AUTO: 64 % (ref 43–75)
NRBC BLD AUTO-RTO: 0 /100 WBCS
PLATELET # BLD AUTO: 202 THOUSANDS/UL (ref 149–390)
PMV BLD AUTO: 9.6 FL (ref 8.9–12.7)
POTASSIUM SERPL-SCNC: 4.4 MMOL/L (ref 3.5–5.3)
RBC # BLD AUTO: 3.95 MILLION/UL (ref 3.81–5.12)
SODIUM SERPL-SCNC: 130 MMOL/L (ref 135–147)
VANCOMYCIN TROUGH SERPL-MCNC: 14 UG/ML (ref 10–20)
WBC # BLD AUTO: 9.2 THOUSAND/UL (ref 4.31–10.16)

## 2024-12-15 PROCEDURE — 82948 REAGENT STRIP/BLOOD GLUCOSE: CPT

## 2024-12-15 PROCEDURE — 85025 COMPLETE CBC W/AUTO DIFF WBC: CPT | Performed by: INTERNAL MEDICINE

## 2024-12-15 PROCEDURE — 80202 ASSAY OF VANCOMYCIN: CPT | Performed by: PHYSICIAN ASSISTANT

## 2024-12-15 PROCEDURE — 99232 SBSQ HOSP IP/OBS MODERATE 35: CPT | Performed by: INTERNAL MEDICINE

## 2024-12-15 PROCEDURE — 99232 SBSQ HOSP IP/OBS MODERATE 35: CPT | Performed by: SURGERY

## 2024-12-15 PROCEDURE — 80048 BASIC METABOLIC PNL TOTAL CA: CPT | Performed by: INTERNAL MEDICINE

## 2024-12-15 RX ORDER — KETOROLAC TROMETHAMINE 30 MG/ML
15 INJECTION, SOLUTION INTRAMUSCULAR; INTRAVENOUS ONCE
Status: COMPLETED | OUTPATIENT
Start: 2024-12-15 | End: 2024-12-15

## 2024-12-15 RX ADMIN — OXYCODONE HYDROCHLORIDE 10 MG: 10 TABLET ORAL at 07:43

## 2024-12-15 RX ADMIN — OXYCODONE HYDROCHLORIDE 10 MG: 10 TABLET ORAL at 21:43

## 2024-12-15 RX ADMIN — OXYCODONE HYDROCHLORIDE 10 MG: 10 TABLET ORAL at 15:40

## 2024-12-15 RX ADMIN — HYDROMORPHONE HYDROCHLORIDE 1 MG: 1 INJECTION, SOLUTION INTRAMUSCULAR; INTRAVENOUS; SUBCUTANEOUS at 13:09

## 2024-12-15 RX ADMIN — INSULIN LISPRO 5 UNITS: 100 INJECTION, SOLUTION INTRAVENOUS; SUBCUTANEOUS at 07:47

## 2024-12-15 RX ADMIN — CEFEPIME HYDROCHLORIDE 2000 MG: 2 INJECTION, SOLUTION INTRAVENOUS at 06:40

## 2024-12-15 RX ADMIN — HYDROMORPHONE HYDROCHLORIDE 1 MG: 1 INJECTION, SOLUTION INTRAMUSCULAR; INTRAVENOUS; SUBCUTANEOUS at 23:10

## 2024-12-15 RX ADMIN — VANCOMYCIN HYDROCHLORIDE 1500 MG: 1 INJECTION, POWDER, LYOPHILIZED, FOR SOLUTION INTRAVENOUS at 07:43

## 2024-12-15 RX ADMIN — INSULIN LISPRO 5 UNITS: 100 INJECTION, SOLUTION INTRAVENOUS; SUBCUTANEOUS at 16:45

## 2024-12-15 RX ADMIN — HYDROMORPHONE HYDROCHLORIDE 1 MG: 1 INJECTION, SOLUTION INTRAMUSCULAR; INTRAVENOUS; SUBCUTANEOUS at 03:00

## 2024-12-15 RX ADMIN — KETOROLAC TROMETHAMINE 15 MG: 30 INJECTION, SOLUTION INTRAMUSCULAR at 18:35

## 2024-12-15 RX ADMIN — INSULIN GLARGINE 10 UNITS: 100 INJECTION, SOLUTION SUBCUTANEOUS at 21:44

## 2024-12-15 RX ADMIN — OXYCODONE HYDROCHLORIDE 10 MG: 10 TABLET ORAL at 01:44

## 2024-12-15 RX ADMIN — HYDROMORPHONE HYDROCHLORIDE 1 MG: 1 INJECTION, SOLUTION INTRAMUSCULAR; INTRAVENOUS; SUBCUTANEOUS at 08:45

## 2024-12-15 RX ADMIN — HYDROMORPHONE HYDROCHLORIDE 1 MG: 1 INJECTION, SOLUTION INTRAMUSCULAR; INTRAVENOUS; SUBCUTANEOUS at 17:19

## 2024-12-15 RX ADMIN — CEFEPIME HYDROCHLORIDE 2000 MG: 2 INJECTION, SOLUTION INTRAVENOUS at 18:22

## 2024-12-15 RX ADMIN — ENOXAPARIN SODIUM 40 MG: 40 INJECTION SUBCUTANEOUS at 08:31

## 2024-12-15 RX ADMIN — VANCOMYCIN HYDROCHLORIDE 1250 MG: 1 INJECTION, POWDER, LYOPHILIZED, FOR SOLUTION INTRAVENOUS at 19:20

## 2024-12-15 NOTE — ASSESSMENT & PLAN NOTE
Patient with foot injury approximately 10 days ago, has dark discoloration of distal aspect of fourth left toe with pain. She was seen by podiatry and sent to ED for admission  Podiatry consulted  Vascular surgery following  ESR 83  CRP 13.6  awaiting MRI and arterial duplex results

## 2024-12-15 NOTE — PLAN OF CARE
Problem: INFECTION - ADULT  Goal: Absence or prevention of progression during hospitalization  Description: INTERVENTIONS:  - Assess and monitor for signs and symptoms of infection  - Monitor lab/diagnostic results  - Monitor all insertion sites, i.e. indwelling lines, tubes, and drains  - Monitor endotracheal if appropriate and nasal secretions for changes in amount and color  - [place] appropriate cooling/warming therapies per order  - Administer medications as ordered  - Instruct and encourage patient and family to use good hand hygiene technique  - Identify and instruct in appropriate isolation precautions for identified infection/condition  Outcome: Progressing     Problem: SKIN/TISSUE INTEGRITY - ADULT  Goal: Incision(s), wounds(s) or drain site(s) healing without S/S of infection  Description: INTERVENTIONS  - Assess and document dressing, incision, wound bed, drain sites and surrounding tissue  - Provide patient and family education  - Perform skin care/dressing changes every shift  Outcome: Progressing

## 2024-12-15 NOTE — ASSESSMENT & PLAN NOTE
Presents with left fourth toe dry gangrene (hx of trauma, dropping case of soda on it 3 weeks ago)  AFVSS  Na 132, Cr 0.5, WBC 9.53, Hgb 12.7\83, CRP 13.6  Blood cx pending   12/13 XR L toe with soft tissue swelling, negative for frx  MRI pending rule out osteomyelitis  Podiatry following     Plan:  Recommend obtaining bilateral LE arterial duplexes (this will be completed tomorrow).  Will follow-up study findings to determine if there is any further need for vascular intervention.  Wound care per podiatry, currently with Betadine paint

## 2024-12-15 NOTE — ASSESSMENT & PLAN NOTE
Lab Results   Component Value Date    HGBA1C 10.3 (H) 12/13/2024       Recent Labs     12/14/24  1607 12/14/24 2055 12/15/24  0712 12/15/24  1122   POCGLU 122 152* 143* 137       Blood Sugar Average: Last 72 hrs:  (P) [id number]  Risk factor for poor wound healing, infection, and poor vasculature  Hx of gestational diabetes  Newly diagnosed this admission  Tight glucose management per primary team

## 2024-12-15 NOTE — PROGRESS NOTES
Progress Note - Internal Medicine   Name: Lizzy Acuna 49 y.o. female I MRN: [id number]  Unit/Bed#: [facility] I Date of Admission: 12/13/2024   Date of Service: 12/15/2024 I Hospital Day: 2     Assessment & Plan  Gangrene of toe of left foot (HCC)  Patient with foot injury approximately 10 days ago, has dark discoloration of distal aspect of fourth left toe with pain. She was seen by podiatry and sent to ED for admission  Podiatry consulted  Vascular surgery following  ESR 83  CRP 13.6  awaiting MRI and arterial duplex results  Obesity (BMI 30.0-34.9)  BMI 34  Healthy diet and lifestyle modification recommended  Tobacco abuse  Smokes half pack per day  Nicotine patch 14 mg  Smoking cessation recommended  Diabetes mellitus type 2 with complications (HCC)  Glucose 276 on labs  A1c 10.3  Hx of gestational diabetes  Newly diagnosed this admission  started long acting and premeals insulin  Endocrine consulted  Hyponatremia  Slow improvement  Cellulitis of left foot  Patient seen by podiatry and sent to ED for further evaluation  IV abx  Podiatry following  Procal 0.07  Lactic acid 1.0    24 Hour Events : cont to have pain, no fever or chills  Subjective : pain in her left toe, no cp    Objective :  Temp:  [98 °F (36.7 °C)-98.5 °F (36.9 °C)] 98.5 °F (36.9 °C)  HR:  [] 99  BP: (115-142)/(68-84) 142/74  Resp:  [18-20] 19  SpO2:  [86 %-97 %] 94 %  O2 Device: None (Room air)    Physical Exam  Vitals reviewed.   Constitutional:       General: She is in acute distress (Secondary to pain).      Appearance: Normal appearance. She is obese.   HENT:      Head: Normocephalic and atraumatic.      Nose: Nose normal.   Eyes:      General:         Right eye: No discharge.         Left eye: No discharge.      Extraocular Movements: Extraocular movements intact.      Conjunctiva/sclera: Conjunctivae normal.   Cardiovascular:      Rate and Rhythm: Normal rate and regular rhythm.   Pulmonary:      Effort: Pulmonary effort is normal.  No respiratory distress.      Breath sounds: Normal breath sounds. No wheezing.   Abdominal:      General: Bowel sounds are normal. There is no distension.      Palpations: Abdomen is soft.      Tenderness: There is no abdominal tenderness. There is no guarding.   Musculoskeletal:         General: No swelling or tenderness. Normal range of motion.      Cervical back: Normal range of motion.      Right lower leg: No edema.      Left lower leg: No edema.        Feet:    Skin:     General: Skin is warm and dry.      Capillary Refill: Capillary refill takes less than 2 seconds.   Neurological:      General: No focal deficit present.      Mental Status: She is alert and oriented to person, place, and time. Mental status is at baseline.   Psychiatric:         Mood and Affect: Mood normal.         Behavior: Behavior normal.         Thought Content: Thought content normal.         Judgment: Judgment normal.        Lab Results: I have reviewed the following results:          VTE Pharmacologic Prophylaxis: Enoxaparin (Lovenox)  VTE Mechanical Prophylaxis: sequential compression device

## 2024-12-15 NOTE — PLAN OF CARE
Problem: PAIN - ADULT  Goal: Verbalizes/displays adequate comfort level or baseline comfort level  Description: Interventions:  - Encourage patient to monitor pain and request assistance  - Assess pain using appropriate pain scale  - Administer analgesics based on type and severity of pain and evaluate response  - Implement non-pharmacological measures as appropriate and evaluate response  - Consider cultural and social influences on pain and pain management  - Notify physician/advanced practitioner if interventions unsuccessful or patient reports new pain  Outcome: Progressing     Problem: INFECTION - ADULT  Goal: Absence or prevention of progression during hospitalization  Description: INTERVENTIONS:  - Assess and monitor for signs and symptoms of infection  - Monitor lab/diagnostic results  - Monitor all insertion sites, i.e. indwelling lines, tubes, and drains  - Monitor endotracheal if appropriate and nasal secretions for changes in amount and color  - [place] appropriate cooling/warming therapies per order  - Administer medications as ordered  - Instruct and encourage patient and family to use good hand hygiene technique  - Identify and instruct in appropriate isolation precautions for identified infection/condition  Outcome: Progressing  Goal: Absence of fever/infection during neutropenic period  Description: INTERVENTIONS:  - Monitor WBC    Outcome: Progressing     Problem: DISCHARGE PLANNING  Goal: Discharge to home or other facility with appropriate resources  Description: INTERVENTIONS:  - Identify barriers to discharge w/patient and caregiver  - Arrange for needed discharge resources and transportation as appropriate  - Identify discharge learning needs (meds, wound care, etc.)  - Arrange for interpretive services to assist at discharge as needed  - Refer to Case Management Department for coordinating discharge planning if the patient needs post-hospital services based on physician/advanced  practitioner order or complex needs related to functional status, cognitive ability, or social support system  Outcome: Progressing     Problem: Knowledge Deficit  Goal: Patient/family/caregiver demonstrates understanding of disease process, treatment plan, medications, and discharge instructions  Description: Complete learning assessment and assess knowledge base.  Interventions:  - Provide teaching at level of understanding  - Provide teaching via preferred learning methods  Outcome: Progressing     Problem: SKIN/TISSUE INTEGRITY - ADULT  Goal: Incision(s), wounds(s) or drain site(s) healing without S/S of infection  Description: INTERVENTIONS  - Assess and document dressing, incision, wound bed, drain sites and surrounding tissue  - Provide patient and family education  - Perform skin care/dressing changes every daily  Outcome: Progressing

## 2024-12-15 NOTE — PROGRESS NOTES
Progress Note - Surgery-General   Name: Lizzy Acuna 49 y.o. female I MRN: [id number]  Unit/Bed#: [facility] I Date of Admission: 12/13/2024   Date of Service: 12/15/2024 I Hospital Day: 2    Assessment & Plan  Gangrene of toe of left foot (HCC)  Presents with left fourth toe dry gangrene (hx of trauma, dropping case of soda on it 3 weeks ago)  AFVSS  Na 132, Cr 0.5, WBC 9.53, Hgb 12.7\83, CRP 13.6  Blood cx pending   12/13 XR L toe with soft tissue swelling, negative for frx  MRI pending rule out osteomyelitis  Podiatry following     Plan:  Recommend obtaining bilateral LE arterial duplexes (this will be completed tomorrow).  Will follow-up study findings to determine if there is any further need for vascular intervention.  Wound care per podiatry, currently with Betadine paint  Cellulitis of left foot  Surrounding cellulitis vs duskiness of the dorsal aspect of the left foot  Continue empiric abx  Tobacco abuse  Reports half a pack per day for 30-year smoking history  Recommend smoking cessation.  Discussion to be had regarding willingness to quit and if there are any cessation medications she might be interested in.  Nicotine patch while inpatient  Counseled the patient on effects of smoking cigarettes on vasculature, wound healing, infection, etc.  Diabetes mellitus type 2 with complications ([facility])  Lab Results   Component Value Date    HGBA1C 10.3 (H) 12/13/2024       Recent Labs     12/14/24  1607 12/14/24  2055 12/15/24  0712 12/15/24  1122   POCGLU 122 152* 143* 137       Blood Sugar Average: Last 72 hrs:  (P) [id number]  Risk factor for poor wound healing, infection, and poor vasculature  Hx of gestational diabetes  Newly diagnosed this admission  Tight glucose management per primary team  Obesity (BMI 30.0-34.9)  Recommend healthy diet, lifestyle modifications      Subjective   Still with pain to left lower extremity but improving a little bit since yesterday.    Objective :  Temp:  [98 °F (36.7  °C)-98.5 °F (36.9 °C)] 98.5 °F (36.9 °C)  HR:  [] 99  BP: (115-142)/(68-84) 142/74  Resp:  [18-20] 19  SpO2:  [86 %-97 %] 94 %  O2 Device: None (Room air)    I/O         12/13 0701 12/14 0700 12/14 0701  12/15 0700 12/15 0701 12/16 0700    P.O. 480 581 200    IV Piggyback 50      Total Intake(mL/kg) 530 (6.2) 581 (6.8) 200 (2.3)    Urine (mL/kg/hr) 400 1500 (0.7) 900 (1.4)    Total Output 400 1500 900    Net +130 -919 -700           Unmeasured Urine Occurrence 1 x              Physical Exam  HENT:      Head: Normocephalic and atraumatic.   Cardiovascular:      Rate and Rhythm: Normal rate.      Pulses:           Femoral pulses are 2+ on the right side and 2+ on the left side.       Dorsalis pedis pulses are 2+ on the right side and 2+ on the left side.        Posterior tibial pulses are 2+ on the right side and 2+ on the left side.   Pulmonary:      Effort: Pulmonary effort is normal.   Neurological:      Mental Status: She is alert.           Lab Results: I have reviewed the following results:  Recent Labs     12/13/24  1841 12/14/24  0432 12/15/24  0617   WBC 8.66   < > 9.20   HGB 12.3   < > 11.5   HCT 38.3   < > 36.0      < > 202   SODIUM 131*   < > 130*   K 3.8   < > 4.4   CL 91*   < > 96   CO2 32   < > 28   BUN 19   < > 13   CREATININE 0.68   < > 0.49*   GLUC 276*   < > 146*   AST 10*  --   --    ALT 11  --   --    ALB 4.1  --   --    TBILI 0.29  --   --    ALKPHOS 75  --   --    PTT 29  --   --    INR 0.82*  --   --    LACTICACID 1.0  --   --     < > = values in this interval not displayed.       Lily Oliver  12/15/2024  2:24 PM

## 2024-12-15 NOTE — ASSESSMENT & PLAN NOTE
Glucose 276 on labs  A1c 10.3  Hx of gestational diabetes  Newly diagnosed this admission  started long acting and premeals insulin  Endocrine consulted

## 2024-12-15 NOTE — UTILIZATION REVIEW
NOTIFICATION OF INPATIENT ADMISSION   AUTHORIZATION REQUEST   SERVICING FACILITY:   [place] [address]  Tax ID: [id number]  NPI: [id number]   ATTENDING PROVIDER:  Attending Name and NPI#: Bunny Rosado Do [[id number]]  Address: [address] [place] [address]  Phone: [phone]     ADMISSION INFORMATION:  Place of Service: Inpatient [facility]  Place of Service Code: 21  Inpatient Admission Date/Time: 12/13/24  7:34 PM  Discharge Date/Time: No discharge date for patient encounter.  Admitting Diagnosis Code/Description:  Cellulitis [L03.90]  Wound infection [T14.8XXA, L08.9]  Gangrene of toe of left foot (HCC) [I96]     UTILIZATION REVIEW CONTACT:  Toma Cadet, Utilization   Network Utilization Review Department  Phone: [phone]  Fax: [phone]  Email: [email or web address]  Contact for approvals/pending authorizations, clinical reviews, and discharge.     PHYSICIAN ADVISORY SERVICES:  Medical Necessity Denial & [employer] Review  Phone: [phone]  Fax: [phone]  Email: [email or web address]     DISCHARGE SUPPORT TEAM:  For Patients Discharge Needs & Updates  Phone: [phone] opt. [address] Fax: [phone]  Email: [email or web address]

## 2024-12-15 NOTE — PROGRESS NOTES
Lizzy Acuna is a 49 y.o. female who is currently ordered Vancomycin IV with management by the Pharmacy Consult service.  Relevant clinical data and objective / subjective history reviewed.  Vancomycin Assessment:  Indication and Goal AUC/Trough: Soft tissue (goal -600, trough >10)  Clinical Status: stable  Micro:   Blood culture - no growth x 24 hrs   Renal Function:  SCr: 0.49 mg/dL  CrCl: 139.1 mL/min  Renal replacement: Not on dialysis  Days of Therapy: 3  Current Dose: 1500mg IV Q12hrs  Vancomycin Plan:  New Dosinmg IV Q12hrs  Estimated AUC: 484 mcg*hr/mL  Estimated Trough: 12.6 mcg/mL  Next Level:  with am labs   Renal Function Monitoring: Daily BMP and UOP  Pharmacy will continue to follow closely for s/sx of nephrotoxicity, infusion reactions and appropriateness of therapy.  BMP and CBC will be ordered per protocol. We will continue to follow the patient’s culture results and clinical progress daily.    Mary Pressley, Pharmacist

## 2024-12-16 ENCOUNTER — APPOINTMENT (INPATIENT)
Dept: NON INVASIVE DIAGNOSTICS | Facility: HOSPITAL | Age: 49
DRG: 182 | End: 2024-12-16
Payer: COMMERCIAL

## 2024-12-16 LAB
ANION GAP SERPL CALCULATED.3IONS-SCNC: 7 MMOL/L (ref 4–13)
BUN SERPL-MCNC: 19 MG/DL (ref 5–25)
CALCIUM SERPL-MCNC: 9.2 MG/DL (ref 8.4–10.2)
CHLORIDE SERPL-SCNC: 98 MMOL/L (ref 96–108)
CO2 SERPL-SCNC: 26 MMOL/L (ref 21–32)
CREAT SERPL-MCNC: 0.71 MG/DL (ref 0.6–1.3)
GFR SERPL CREATININE-BSD FRML MDRD: 101 ML/MIN/1.73SQ M
GLUCOSE SERPL-MCNC: 106 MG/DL (ref 65–140)
GLUCOSE SERPL-MCNC: 106 MG/DL (ref 65–140)
GLUCOSE SERPL-MCNC: 118 MG/DL (ref 65–140)
GLUCOSE SERPL-MCNC: 152 MG/DL (ref 65–140)
GLUCOSE SERPL-MCNC: 93 MG/DL (ref 65–140)
POTASSIUM SERPL-SCNC: 4.4 MMOL/L (ref 3.5–5.3)
SODIUM SERPL-SCNC: 131 MMOL/L (ref 135–147)

## 2024-12-16 PROCEDURE — 99223 1ST HOSP IP/OBS HIGH 75: CPT

## 2024-12-16 PROCEDURE — 82948 REAGENT STRIP/BLOOD GLUCOSE: CPT

## 2024-12-16 PROCEDURE — 93922 UPR/L XTREMITY ART 2 LEVELS: CPT | Performed by: SURGERY

## 2024-12-16 PROCEDURE — 80048 BASIC METABOLIC PNL TOTAL CA: CPT | Performed by: NURSE PRACTITIONER

## 2024-12-16 PROCEDURE — 99254 IP/OBS CNSLTJ NEW/EST MOD 60: CPT | Performed by: STUDENT IN AN ORGANIZED HEALTH CARE EDUCATION/TRAINING PROGRAM

## 2024-12-16 PROCEDURE — 99232 SBSQ HOSP IP/OBS MODERATE 35: CPT | Performed by: INTERNAL MEDICINE

## 2024-12-16 PROCEDURE — 93925 LOWER EXTREMITY STUDY: CPT | Performed by: SURGERY

## 2024-12-16 PROCEDURE — 93923 UPR/LXTR ART STDY 3+ LVLS: CPT

## 2024-12-16 PROCEDURE — 93925 LOWER EXTREMITY STUDY: CPT

## 2024-12-16 PROCEDURE — 99232 SBSQ HOSP IP/OBS MODERATE 35: CPT | Performed by: SURGERY

## 2024-12-16 RX ORDER — INSULIN LISPRO 100 [IU]/ML
3 INJECTION, SOLUTION INTRAVENOUS; SUBCUTANEOUS
Status: DISCONTINUED | OUTPATIENT
Start: 2024-12-17 | End: 2024-12-25 | Stop reason: HOSPADM

## 2024-12-16 RX ORDER — INSULIN LISPRO 100 [IU]/ML
1-5 INJECTION, SOLUTION INTRAVENOUS; SUBCUTANEOUS
Status: DISCONTINUED | OUTPATIENT
Start: 2024-12-16 | End: 2024-12-25 | Stop reason: HOSPADM

## 2024-12-16 RX ORDER — KETOROLAC TROMETHAMINE 30 MG/ML
15 INJECTION, SOLUTION INTRAMUSCULAR; INTRAVENOUS EVERY 6 HOURS PRN
Status: DISCONTINUED | OUTPATIENT
Start: 2024-12-16 | End: 2024-12-17

## 2024-12-16 RX ADMIN — INSULIN LISPRO 1 UNITS: 100 INJECTION, SOLUTION INTRAVENOUS; SUBCUTANEOUS at 22:13

## 2024-12-16 RX ADMIN — KETOROLAC TROMETHAMINE 15 MG: 30 INJECTION, SOLUTION INTRAMUSCULAR at 10:43

## 2024-12-16 RX ADMIN — OXYCODONE HYDROCHLORIDE 10 MG: 10 TABLET ORAL at 22:12

## 2024-12-16 RX ADMIN — OXYCODONE HYDROCHLORIDE 10 MG: 10 TABLET ORAL at 06:09

## 2024-12-16 RX ADMIN — ENOXAPARIN SODIUM 40 MG: 40 INJECTION SUBCUTANEOUS at 08:16

## 2024-12-16 RX ADMIN — HYDROMORPHONE HYDROCHLORIDE 1 MG: 1 INJECTION, SOLUTION INTRAMUSCULAR; INTRAVENOUS; SUBCUTANEOUS at 12:10

## 2024-12-16 RX ADMIN — KETOROLAC TROMETHAMINE 15 MG: 30 INJECTION, SOLUTION INTRAMUSCULAR at 02:45

## 2024-12-16 RX ADMIN — ACETAMINOPHEN 650 MG: 325 TABLET ORAL at 11:57

## 2024-12-16 RX ADMIN — HYDROMORPHONE HYDROCHLORIDE 1 MG: 1 INJECTION, SOLUTION INTRAMUSCULAR; INTRAVENOUS; SUBCUTANEOUS at 23:10

## 2024-12-16 RX ADMIN — OXYCODONE HYDROCHLORIDE 10 MG: 10 TABLET ORAL at 14:23

## 2024-12-16 RX ADMIN — VANCOMYCIN HYDROCHLORIDE 1250 MG: 1 INJECTION, POWDER, LYOPHILIZED, FOR SOLUTION INTRAVENOUS at 07:18

## 2024-12-16 RX ADMIN — CEFEPIME HYDROCHLORIDE 2000 MG: 2 INJECTION, SOLUTION INTRAVENOUS at 06:09

## 2024-12-16 RX ADMIN — INSULIN LISPRO 5 UNITS: 100 INJECTION, SOLUTION INTRAVENOUS; SUBCUTANEOUS at 11:57

## 2024-12-16 RX ADMIN — HYDROMORPHONE HYDROCHLORIDE 1 MG: 1 INJECTION, SOLUTION INTRAMUSCULAR; INTRAVENOUS; SUBCUTANEOUS at 07:18

## 2024-12-16 RX ADMIN — INSULIN LISPRO 5 UNITS: 100 INJECTION, SOLUTION INTRAVENOUS; SUBCUTANEOUS at 08:16

## 2024-12-16 RX ADMIN — HYDROMORPHONE HYDROCHLORIDE 1 MG: 1 INJECTION, SOLUTION INTRAMUSCULAR; INTRAVENOUS; SUBCUTANEOUS at 17:44

## 2024-12-16 RX ADMIN — INSULIN GLARGINE 10 UNITS: 100 INJECTION, SOLUTION SUBCUTANEOUS at 22:12

## 2024-12-16 NOTE — ASSESSMENT & PLAN NOTE
Presents with left fourth toe dry gangrene (hx of trauma, dropping case of soda on it 3 weeks ago)  AFVSS  Na 130, Cr 049, WBC 9.20, Hgb 11.5, CRP 13.6  Blood cx [id number]  12/13 XR L toe with soft tissue swelling, negative for frx  MRI negative for osteomyelitis  LEAD: R KAILEE 0/91/105/90; L KAILEE 0.59/89/-. >75% proximal SFA and distal pop stenosis. Monophasic waveforms in tibial vessels.     Plan:  LEAD reviewed in detail. There is a >75% proximal SFA and distal pop stenosis in LLE. Monophasic waveforms throughout the tibial vessels indicative of tibial disease. L KAILEE 0.59/ MTP 89/ GTP -.   Palpable DP and PT pulses on exam.   No vascular intervention recommended at this time.   Podiatry following. Recommending left 4th toe amputation. Ok to proceed with surgery from a vascular standpoint. If there is poor intra-op bleeding or poor wound healing, please reach out to vascular surgery.   Wound care per podiatry.  Recommend aspirin 81 mg and statin therapy for OMT of PAD.   Rest of care as per SLIM.  Seen and examined with Dr. Wilson.

## 2024-12-16 NOTE — DISCHARGE INSTR - OTHER ORDERS
Discharge Instructions - Podiatry    Weight Bearing Status: Weight bearing instructions not necessary at this time                    Pain: Continue analgesics as directed    Follow-up appointment instructions: Please make an appointment within one week of discharge with  wound care Dr. Ritchie or Nghia. Contact sooner if any increase in pain, or signs of infection occur    Wound Care: Leave dressings clean, dry, and intact between professional dressing changes    Nursing Instructions: Please apply Betadine wet to dry. Then cover with Gauze and secure with Kerlix and tape. Please change dressing every day .

## 2024-12-16 NOTE — PROGRESS NOTES
Progress Note - Vascular Surgery   Name: Lizzy Acuna 49 y.o. female I MRN: [id number]  Unit/Bed#: [facility] I Date of Admission: 12/13/2024   Date of Service: 12/16/2024 I Hospital Day: 3    Assessment & Plan  Gangrene of toe of left foot (HCC)  Presents with left fourth toe dry gangrene (hx of trauma, dropping case of soda on it 3 weeks ago)  AFVSS  Na 130, Cr 049, WBC 9.20, Hgb 11.5, CRP 13.6  Blood cx [id number]  12/13 XR L toe with soft tissue swelling, negative for frx  MRI negative for osteomyelitis  LEAD: R KAILEE 0/91/105/90; L KAILEE 0.59/89/-. >75% proximal SFA and distal pop stenosis. Monophasic waveforms in tibial vessels.     Plan:  LEAD reviewed in detail. There is a >75% proximal SFA and distal pop stenosis in LLE. Monophasic waveforms throughout the tibial vessels indicative of tibial disease. L KAILEE 0.59/ MTP 89/ GTP -.   Palpable DP and PT pulses on exam.   No vascular intervention recommended at this time.   Podiatry following. Recommending left 4th toe amputation. Ok to proceed with surgery from a vascular standpoint. If there is poor intra-op bleeding or poor wound healing, please reach out to vascular surgery.   Wound care per podiatry.  Recommend aspirin 81 mg and statin therapy for OMT of PAD.   Rest of care as per SLIM.  Seen and examined with Dr. Wilson.   Cellulitis of left foot  Surrounding cellulitis vs duskiness of the dorsal aspect of the left foot  Continue empiric abx  Tobacco abuse  Reports half a pack per day for 30-year smoking history  Recommend smoking cessation.  Discussion to be had regarding willingness to quit and if there are any cessation medications she might be interested in.  Nicotine patch while inpatient  Counseled the patient on effects of smoking cigarettes on vasculature, wound healing, infection, etc.  Diabetes mellitus type 2 with complications (HCC)  Lab Results   Component Value Date    HGBA1C 10.3 (H) 12/13/2024       Recent Labs     12/15/24  1545 12/15/24  2105  12/16/24  0659 12/16/24  1122   POCGLU 110 117 118 106       Blood Sugar Average: Last 72 hrs:  (P) [id number]  Risk factor for poor wound healing, infection, and poor vasculature  Hx of gestational diabetes  Newly diagnosed this admission  Tight glucose management per primary team  Obesity (BMI 30.0-34.9)  Recommend healthy diet, lifestyle modifications  Hyponatremia      24 Hour Events : None   Subjective : Patient examined at bedside. She reports pain in the left foot. She denies any numbness or tingling. She is motor and sensation intact. She denies any prior vascular surgical history or prior surgeries on her legs. She is smoking less than 1 pack per day.     Objective :  Temp:  [98.3 °F (36.8 °C)-98.8 °F (37.1 °C)] 98.3 °F (36.8 °C)  HR:  [] 84  BP: (104-123)/(66-77) 104/68  Resp:  [16-19] 18  SpO2:  [90 %-93 %] 93 %  O2 Device: None (Room air)     I/O         12/14 0701  12/15 0700 12/15 0701  12/16 0700 12/16 0701  12/17 0700    P.O. 581 690 240    IV Piggyback       Total Intake(mL/kg) 581 (6.8) 690 (8) 240 (2.8)    Urine (mL/kg/hr) 1500 (0.7) 1950 (0.9) 200 (0.5)    Total Output 1500 1950 200    Net -919 -1260 +40                   Physical Exam  Vitals and nursing note reviewed.   Constitutional:       Appearance: Normal appearance. She is obese.   HENT:      Head: Normocephalic and atraumatic.   Cardiovascular:      Rate and Rhythm: Normal rate and regular rhythm.      Heart sounds: Normal heart sounds.   Pulmonary:      Effort: Pulmonary effort is normal.   Musculoskeletal:         General: Normal range of motion.   Skin:     General: Skin is warm and dry.      Comments: Left 4th toe dry gangrene    Neurological:      General: No focal deficit present.      Mental Status: She is alert and oriented to person, place, and time.   Psychiatric:         Mood and Affect: Mood normal.         Behavior: Behavior normal.         Thought Content: Thought content normal.         Judgment: Judgment  "normal.             Pulse exam:  DP: Right: 2+ Left: 1+  PT: Right: 2+ Left: 1+      Lab Results: I have reviewed the following results:  CBC with diff:   Lab Results   Component Value Date    WBC 9.20 12/15/2024    HGB 11.5 12/15/2024    HCT 36.0 12/15/2024    MCV 91 12/15/2024     12/15/2024    RBC 3.95 12/15/2024    MCH 29.1 12/15/2024    MCHC 31.9 12/15/2024    RDW 12.5 12/15/2024    MPV 9.6 12/15/2024    NRBC 0 12/15/2024   ,   BMP/CMP:  Lab Results   Component Value Date    SODIUM 130 (L) 12/15/2024    K 4.4 12/15/2024    CL 96 12/15/2024    CO2 28 12/15/2024    BUN 13 12/15/2024    CREATININE 0.49 (L) 12/15/2024    CALCIUM 9.2 12/15/2024    AST 10 (L) 12/13/2024    ALT 11 12/13/2024    ALKPHOS 75 12/13/2024    EGFR 115 12/15/2024   ,   Lipid Panel: No results found for: \"CHOL\",   Coags:   Lab Results   Component Value Date    PTT 29 12/13/2024    INR 0.82 (L) 12/13/2024   ,   Blood Culture:   Lab Results   Component Value Date    BLOODCX No Growth at 48 hrs. 12/16/2024   ,   Urinalysis:   Lab Results   Component Value Date    COLORU Straw 11/05/2018    CLARITYU Clear 11/05/2018    SPECGRAV 1.020 11/05/2018    PHUR 6.0 11/05/2018    LEUKOCYTESUR Negative 11/05/2018    NITRITE Negative 11/05/2018    GLUCOSEU 3+ (A) 11/05/2018    KETONESU Negative 11/05/2018    BILIRUBINUR Negative 11/05/2018    BLOODU Trace-Intact (A) 11/05/2018   ,   Urine Culture:   Lab Results   Component Value Date    URINECX 50,000-59,000 cfu/ml Mixed Contaminants X4 10/21/2016   ,   Wound Culure:   Lab Results   Component Value Date    WOUNDCULT 1+ Growth of Enterobacter gergoviae (A) 06/13/2018    WOUNDCULT (A) 06/13/2018     2+ Growth of Methicillin Resistant Staphylococcus aureus       Imaging Results Review: I reviewed radiology reports from this admission including: MRI , xray(s), and Ultrasound(s).  Other Study Results Review: No additional pertinent studies reviewed.    VTE Prophylaxis: VTE covered by:  enoxaparin, " Subcutaneous, 40 mg at 12/16/24 0816

## 2024-12-16 NOTE — PLAN OF CARE
Problem: PAIN - ADULT  Goal: Verbalizes/displays adequate comfort level or baseline comfort level  Description: Interventions:  - Encourage patient to monitor pain and request assistance  - Assess pain using appropriate pain scale  - Administer analgesics based on type and severity of pain and evaluate response  - Implement non-pharmacological measures as appropriate and evaluate response  - Consider cultural and social influences on pain and pain management  - Notify physician/advanced practitioner if interventions unsuccessful or patient reports new pain  Outcome: Progressing     Problem: INFECTION - ADULT  Goal: Absence or prevention of progression during hospitalization  Description: INTERVENTIONS:  - Assess and monitor for signs and symptoms of infection  - Monitor lab/diagnostic results  - Monitor all insertion sites, i.e. indwelling lines, tubes, and drains  - Monitor endotracheal if appropriate and nasal secretions for changes in amount and color  - [place] appropriate cooling/warming therapies per order  - Administer medications as ordered  - Instruct and encourage patient and family to use good hand hygiene technique  - Identify and instruct in appropriate isolation precautions for identified infection/condition  Outcome: Progressing  Goal: Absence of fever/infection during neutropenic period  Description: INTERVENTIONS:  - Monitor WBC    Outcome: Progressing     Problem: DISCHARGE PLANNING  Goal: Discharge to home or other facility with appropriate resources  Description: INTERVENTIONS:  - Identify barriers to discharge w/patient and caregiver  - Arrange for needed discharge resources and transportation as appropriate  - Identify discharge learning needs (meds, wound care, etc.)  - Arrange for interpretive services to assist at discharge as needed  - Refer to Case Management Department for coordinating discharge planning if the patient needs post-hospital services based on physician/advanced  practitioner order or complex needs related to functional status, cognitive ability, or social support system  Outcome: Progressing     Problem: Knowledge Deficit  Goal: Patient/family/caregiver demonstrates understanding of disease process, treatment plan, medications, and discharge instructions  Description: Complete learning assessment and assess knowledge base.  Interventions:  - Provide teaching at level of understanding  - Provide teaching via preferred learning methods  Outcome: Progressing     Problem: SKIN/TISSUE INTEGRITY - ADULT  Goal: Incision(s), wounds(s) or drain site(s) healing without S/S of infection  Description: INTERVENTIONS  - Assess and document dressing, incision, wound bed, drain sites and surrounding tissue  - Provide patient and family education  - Perform skin care/dressing changes every daily  Outcome: Progressing

## 2024-12-16 NOTE — PROGRESS NOTES
Progress Note - Hospitalist   Name: Lizzy Acuna 49 y.o. female I MRN: [id number]  Unit/Bed#: -[facility] I Date of Admission: 12/13/2024   Date of Service: 12/16/2024 I Hospital Day: 3    Assessment & Plan  Gangrene of toe of left foot (HCC)  History of foot injury approximately 10 days ago, has dark discoloration of distal aspect of fourth left toe with pain. She was seen by podiatry and sent to ER for admission  Podiatry input appreciated.  Plan for demarcation of stable dry gangrene in outpatient setting  Vascular surgery input appreciated.  No intervention recommended at this time  Blood cultures: No growth x 48 hours  ESR 83  CRP 13.6  Await formal LEADs  Possible discharge planning 24 to 48 hours  Cellulitis of left foot  Seen by podiatry and sent to ED for further evaluation  Procalcitonin 0.07->0.07  Received IV cefepime and vancomycin, now monitoring off antibiotics    Diabetes mellitus type 2 with complications ([facility])  Hemoglobin A1c 10.3 on 12/13/2024   Initiated on Lantus 10 units SQ at bedtime with 5 units of Humalog SQ with meals 3 times daily  Continue sliding scale insulin coverage  Improved glycemic control will be important for wound healing  Endocrinology consultation pending  Tobacco abuse  Smokes half pack per day  Continue nicotine patch 14 mg  Smoking cessation recommended  Obesity (BMI 30.0-34.9)  BMI 34  Healthy diet and lifestyle modification recommended  Hyponatremia  Pseudohyponatremia - Sodium 135 with glucose correction    VTE Pharmacologic Prophylaxis: VTE Score: 3 Moderate Risk (Score 3-4) - Pharmacological DVT Prophylaxis Ordered: enoxaparin (Lovenox).    Mobility:   Basic Mobility Inpatient Raw Score: 22  JH-HLM Goal: 7: Walk 25 feet or more  JH-HLM Achieved: 6: Walk 10 steps or more  JH-HLM Goal NOT achieved. Continue with multidisciplinary rounding and encourage appropriate mobility to improve upon JH-HLM goals.    Patient Centered Rounds: I performed bedside rounds with nursing  staff today.   Discussions with Specialists or Other Care Team Provider: Case management    Education and Discussions with Family / Patient: Patient declined call to .     Current Length of Stay: 3 day(s)  Current Patient Status: Inpatient   Certification Statement: The patient will continue to require additional inpatient hospital stay due to left fourth toe gangrene.  Discharge Plan: Anticipate discharge tomorrow to home.    Code Status: Level 1 - Full Code    Subjective   Evaluated at bedside.  She feels that her left foot is looking better.  Denies physical complaints.    Objective :  Temp:  [98.3 °F (36.8 °C)-98.8 °F (37.1 °C)] 98.6 °F (37 °C)  HR:  [84-98] 86  BP: (104-112)/(66-74) 112/74  Resp:  [16-18] 18  SpO2:  [93 %-95 %] 95 %  O2 Device: None (Room air)    Body mass index is 35.8 kg/m².     Input and Output Summary (last 24 hours):     Intake/Output Summary (Last 24 hours) at 12/16/2024 1600  Last data filed at 12/16/2024 1425  Gross per 24 hour   Intake 940 ml   Output 1100 ml   Net -160 ml       Physical Exam  Vitals and nursing note reviewed.   HENT:      Head: Normocephalic and atraumatic.      Nose: Nose normal.      Mouth/Throat:      Mouth: Mucous membranes are moist.      Pharynx: Oropharynx is clear.   Eyes:      Pupils: Pupils are equal, round, and reactive to light.   Cardiovascular:      Rate and Rhythm: Normal rate and regular rhythm.      Pulses: Normal pulses.   Pulmonary:      Effort: Pulmonary effort is normal. No respiratory distress.      Breath sounds: Normal breath sounds.   Abdominal:      General: Bowel sounds are normal.      Palpations: Abdomen is soft.      Tenderness: There is no abdominal tenderness.   Musculoskeletal:      Cervical back: Neck supple.      Right lower leg: No edema.      Left lower leg: No edema.      Comments: Left fourth toe darkened discolored, dry gangrene.  No surrounding redness.   Skin:     General: Skin is warm and dry.      Capillary  Refill: Capillary refill takes less than 2 seconds.   Neurological:      General: No focal deficit present.      Mental Status: She is alert and oriented to person, place, and time.                   Lab Results: I have reviewed the following results:   Results from last 7 days   Lab Units 12/15/24  0617   WBC Thousand/uL 9.20   HEMOGLOBIN g/dL 11.5   HEMATOCRIT % 36.0   PLATELETS Thousands/uL 202   SEGS PCT % 64   LYMPHO PCT % 30   MONO PCT % 5   EOS PCT % 1     Results from last 7 days   Lab Units 12/16/24  1208 12/14/24  0432 12/13/24  1841   SODIUM mmol/L 131*   < > 131*   POTASSIUM mmol/L 4.4   < > 3.8   CHLORIDE mmol/L 98   < > 91*   CO2 mmol/L 26   < > 32   BUN mg/dL 19   < > 19   CREATININE mg/dL 0.71   < > 0.68   ANION GAP mmol/L 7   < > 8   CALCIUM mg/dL 9.2   < > 9.8   ALBUMIN g/dL  --   --  4.1   TOTAL BILIRUBIN mg/dL  --   --  0.29   ALK PHOS U/L  --   --  75   ALT U/L  --   --  11   AST U/L  --   --  10*   GLUCOSE RANDOM mg/dL 106   < > 276*    < > = values in this interval not displayed.     Results from last 7 days   Lab Units 12/13/24  1841   INR  0.82*     Results from last 7 days   Lab Units 12/16/24  1122 12/16/24  0659 12/15/24  2105 12/15/24  1545 12/15/24  1122 12/15/24  0712 12/14/24  2055 12/14/24  1607 12/14/24  1058 12/14/24  0731 12/13/24  2137   POC GLUCOSE mg/dl 106 118 117 110 137 143* 152* 122 170* 196* 223*     Results from last 7 days   Lab Units 12/13/24  1841   HEMOGLOBIN A1C % 10.3*     Results from last 7 days   Lab Units 12/14/24  0432 12/13/24  1841   LACTIC ACID mmol/L  --  1.0   PROCALCITONIN ng/ml 0.07 0.07       Recent Cultures (last 7 days):   Results from last 7 days   Lab Units 12/13/24  1841 12/13/24  1832   BLOOD CULTURE  No Growth at 48 hrs. No Growth at 48 hrs.       Imaging Results Review: I reviewed radiology reports from this admission including: xray(s).  Other Study Results Review: No additional pertinent studies reviewed.    Last 24 Hours Medication List:      Current Facility-Administered Medications:     acetaminophen (TYLENOL) tablet 650 mg, Q4H PRN    enoxaparin (LOVENOX) subcutaneous injection 40 mg, Daily    hydrALAZINE (APRESOLINE) injection 10 mg, Q6H PRN    HYDROmorphone (DILAUDID) injection 1 mg, Q4H PRN    insulin glargine (LANTUS) subcutaneous injection 10 Units 0.1 mL, HS    insulin lispro (HumALOG/ADMELOG) 100 units/mL subcutaneous injection 1-5 Units, HS    insulin lispro (HumALOG/ADMELOG) 100 units/mL subcutaneous injection 1-6 Units, TID AC **AND** Fingerstick Glucose (POCT), TID AC    insulin lispro (HumALOG/ADMELOG) 100 units/mL subcutaneous injection 5 Units, TID With Meals    ketorolac (TORADOL) injection 15 mg, Q6H PRN    nicotine (NICODERM CQ) 14 mg/24hr TD 24 hr patch 1 patch, Daily    ondansetron (ZOFRAN) injection 4 mg, Q6H PRN    oxyCODONE (ROXICODONE) immediate release tablet 10 mg, Q6H PRN    Administrative Statements   Today, Patient Was Seen By: NAOMY Helton  I have spent a total time of 40 minutes in caring for this patient on the day of the visit/encounter including Diagnostic results, Instructions for management, Patient and family education, Risk factor reductions, Counseling / Coordination of care, Documenting in the medical record, Reviewing / ordering tests, medicine, procedures  , Obtaining or reviewing history  , and Communicating with other healthcare professionals .    **Please Note: This note may have been constructed using a voice recognition system.**

## 2024-12-16 NOTE — PROGRESS NOTES
Lizzy Acuna is a 49 y.o. female who is currently ordered Vancomycin IV with management by the Pharmacy Consult service.  Relevant clinical data and objective / subjective history reviewed.  Vancomycin Assessment:  Indication and Goal AUC/Trough: Soft tissue (goal -600, trough >10)  Clinical Status: stable  Micro:     Renal Function:  SCr: 0.49 mg/dL  CrCl: 139.1 mL/min  Renal replacement: Not on dialysis  Days of Therapy: 4  Current Dose: 1250mg IV q12h  Vancomycin Plan:  New Dosinmg IV q12h  Estimated AUC: 484 mcg*hr/mL  Estimated Trough: 12.6 mcg/mL  Next Level: 12/20 AM labs  Renal Function Monitoring: Daily BMP and UOP  Pharmacy will continue to follow closely for s/sx of nephrotoxicity, infusion reactions and appropriateness of therapy.  BMP and CBC will be ordered per protocol. We will continue to follow the patient’s culture results and clinical progress daily.    Mik Martinez, Pharmacist

## 2024-12-16 NOTE — ASSESSMENT & PLAN NOTE
Hemoglobin A1c 10.3 on 12/13/2024   Initiated on Lantus 10 units SQ at bedtime with 5 units of Humalog SQ with meals 3 times daily  Continue sliding scale insulin coverage  Improved glycemic control will be important for wound healing  Endocrinology consultation pending

## 2024-12-16 NOTE — ASSESSMENT & PLAN NOTE
Lab Results   Component Value Date    HGBA1C 10.3 (H) 12/13/2024       Recent Labs     12/15/24  1545 12/15/24  2105 12/16/24  0659 12/16/24  1122   POCGLU 110 117 118 106       Blood Sugar Average: Last 72 hrs:  (P) [id number]  Risk factor for poor wound healing, infection, and poor vasculature  Hx of gestational diabetes  Newly diagnosed this admission  Tight glucose management per primary team

## 2024-12-16 NOTE — ASSESSMENT & PLAN NOTE
Lab Results   Component Value Date    HGBA1C 10.3 (H) 12/13/2024       Recent Labs     12/15/24  2105 12/16/24  0659 12/16/24  1122 12/16/24  1625   POCGLU 117 118 106 93       Blood Sugar Average: Last 72 hrs:  (P) 140.4088248957821589    Poorly controlled diabetes with A1c of 10.3% and complicated by left fourth toe gangrene, patient states that was not aware of diabetes, not on antihyperglycemic medications.  Currently on Lantus 10 units nightly and Humalog 5 units 3 times daily AC, and blood sugars are tightly controlled, with a goal of 140 to 180 mg/dl.  Humalog was decreased to 3 units 3 times daily AC.  Continue Lantus at current dose.  Continue correctional insulin.  If her daily requirement stays the same, she may be discharged just on just basal insulin or oral antihyperglycemic agents,  Endocrinology will follow.

## 2024-12-16 NOTE — ASSESSMENT & PLAN NOTE
Seen by podiatry and sent to ED for further evaluation  Procalcitonin 0.07->0.07  Received IV cefepime and vancomycin, now monitoring off antibiotics

## 2024-12-16 NOTE — PLAN OF CARE
Problem: PAIN - ADULT  Goal: Verbalizes/displays adequate comfort level or baseline comfort level  Description: Interventions:  - Encourage patient to monitor pain and request assistance  - Assess pain using appropriate pain scale  - Administer analgesics based on type and severity of pain and evaluate response  - Implement non-pharmacological measures as appropriate and evaluate response  - Consider cultural and social influences on pain and pain management  - Notify physician/advanced practitioner if interventions unsuccessful or patient reports new pain  Outcome: Progressing     Problem: INFECTION - ADULT  Goal: Absence or prevention of progression during hospitalization  Description: INTERVENTIONS:  - Assess and monitor for signs and symptoms of infection  - Monitor lab/diagnostic results  - Monitor all insertion sites, i.e. indwelling lines, tubes, and drains  - Monitor endotracheal if appropriate and nasal secretions for changes in amount and color  - [place] appropriate cooling/warming therapies per order  - Administer medications as ordered  - Instruct and encourage patient and family to use good hand hygiene technique  - Identify and instruct in appropriate isolation precautions for identified infection/condition  Outcome: Progressing     Problem: DISCHARGE PLANNING  Goal: Discharge to home or other facility with appropriate resources  Description: INTERVENTIONS:  - Identify barriers to discharge w/patient and caregiver  - Arrange for needed discharge resources and transportation as appropriate  - Identify discharge learning needs (meds, wound care, etc.)  - Arrange for interpretive services to assist at discharge as needed  - Refer to Case Management Department for coordinating discharge planning if the patient needs post-hospital services based on physician/advanced practitioner order or complex needs related to functional status, cognitive ability, or social support system  Outcome: Progressing      Problem: Knowledge Deficit  Goal: Patient/family/caregiver demonstrates understanding of disease process, treatment plan, medications, and discharge instructions  Description: Complete learning assessment and assess knowledge base.  Interventions:  - Provide teaching at level of understanding  - Provide teaching via preferred learning methods  Outcome: Progressing     Problem: SKIN/TISSUE INTEGRITY - ADULT  Goal: Incision(s), wounds(s) or drain site(s) healing without S/S of infection  Description: INTERVENTIONS  - Assess and document dressing, incision, wound bed, drain sites and surrounding tissue  - Provide patient and family education  - Perform skin care/dressing changes per wound care orders and as needed for soilage  Outcome: Progressing     Problem: METABOLIC, FLUID AND ELECTROLYTES - ADULT  Goal: Electrolytes maintained within normal limits  Description: INTERVENTIONS:  - Monitor labs and assess patient for signs and symptoms of electrolyte imbalances  - Administer electrolyte replacement as ordered  - Monitor response to electrolyte replacements, including repeat lab results as appropriate  - Instruct patient on fluid and nutrition as appropriate  Outcome: Progressing  Goal: Glucose maintained within target range  Description: INTERVENTIONS:  - Monitor Blood Glucose as ordered  - Assess for signs and symptoms of hyperglycemia and hypoglycemia  - Administer ordered medications to maintain glucose within target range  - Assess nutritional intake and initiate nutrition service referral as needed  Outcome: Progressing     Problem: MUSCULOSKELETAL - ADULT  Goal: Maintain or return mobility to safest level of function  Description: INTERVENTIONS:  - Assess patient's ability to carry out ADLs; assess patient's baseline for ADL function and identify physical deficits which impact ability to perform ADLs (bathing, care of mouth/teeth, toileting, grooming, dressing, etc.)  - Assess/evaluate cause of self-care  deficits   - Assess range of motion  - Assess patient's mobility  - Assess patient's need for assistive devices and provide as appropriate  - Encourage maximum independence but intervene and supervise when necessary  - Involve family in performance of ADLs  - Assess for home care needs following discharge   - Consider OT consult to assist with ADL evaluation and planning for discharge  - Provide patient education as appropriate  Outcome: Progressing

## 2024-12-16 NOTE — CONSULTS
Podiatry - Consultation    Patient Information:   Lizzy Acuna 49 y.o. female MRN: [id number]  Unit/Bed#: -01 Encounter: [id number]  PCP: Linwood North DO  Date of Admission:  12/13/2024  Date of Consultation: 12/16/24  Requesting Physician: Luz Maria Shore, *      ASSESSMENT:    Lizzy Acuna is a 49 y.o. female with:    Left 4th digit gangrene  Left 5th digit dusky appearance  Current tobacco use  PAD  T2DM    PLAN:    Plan for demarcation of stable dry gangrene in outpatient setting. Very high risk for readmission however can evaluate demarcation on outpatient visits. Follow up within 1 week in wound care.   Left foot x-rays reviewed, no acute osseous abnormalities noted.  Left foot MRI reviewed, no evidence of osteomyelitis, no soft tissue abscess, fibrous calcaneonavicular coalition noted.   Blood cultures pending.   Final LEADs pending.   Local wound care consisting of betadine MYNOR. Wound care instructions placed.  Elevation on green foam wedges or pillows when non-ambulatory.  Rest of care per primary team.      Weightbearing status: as tolerated    SUBJECTIVE:    History of Present Illness:    Lizzy Acuna is a 49 y.o. female who is originally admitted 12/13/2024 due to left fourth digit dry gangrene. Patient has a past medical history of T2DM, current tobacco use, PAD.    We are consulted for left fourth digit gangrene.  Patient reports that she dropped a can of soda on fourth toe several weeks ago and it became discolored and has now turned black.  Patient is a current smoker she reports she smokes just under a pack a day.  She reports pain of left fourth toe.  Denies any recent fever or chills.     Review of Systems:    Constitutional: Negative.    HENT: Negative.    Eyes: Negative.    Respiratory: Negative.    Cardiovascular: Negative.    Gastrointestinal: Negative.    Musculoskeletal: Left foot pain  Skin: Gangrene left fourth digit, dusky appearance left fifth digit  Neurological:  Negative  Psych: Negative.     Past Medical and Surgical History:     Past Medical History:   Diagnosis Date    Advanced maternal age in multigravida, second trimester 2016    Transitioned From: Advanced maternal age in multigravida, first trimester      Back pain     used 2 percocet tid until current admission in 2017    Bone spur     in back per pt    Chronic hypertension with superimposed preeclampsia 2017    Depression     Gestational diabetes     insulin dependent    Herniated cervical disc     Herniated lumbar intervertebral disc     Hx of degenerative disc disease     Obesity     Pre-eclampsia     Prior pregnancy with fetal demise     previous demise at 36 weeks       Past Surgical History:   Procedure Laterality Date    BACK SURGERY       SECTION      LAMINECTOMY      with disc removal, last assessed 16    OTHER SURGICAL HISTORY      Right ovary removal 2005 [facility]    [place]  DELIVERY ONLY N/A 2/15/2017    Procedure:  SECTION ();  Surgeon: Tito Umaña MD;  Location: [facility] ;  Service: Obstetrics    [place] LIG/TRNSXJ FALOPIAN TUBE  DEL/ABDML SURG Left 2/15/2017    Procedure: LIGATION/COAGULATION TUBAL;  Surgeon: Tito Umaña MD;  Location: [facility] ;  Service: Obstetrics       Meds/Allergies:    No medications prior to admission.    Allergies   Allergen Reactions    Codeine      aggression       Social History:     Marital Status: Unknown    Substance Use History:   Social History     Substance and Sexual Activity   Alcohol Use Never     Social History     Tobacco Use   Smoking Status Every Day    Current packs/day: 0.50    Types: Cigarettes   Smokeless Tobacco Never     Social History     Substance and Sexual Activity   Drug Use Not Currently       Family History:    Family History   Problem Relation Age of Onset    Diabetes Mother     COPD Mother     Heart disease Mother     Stroke Father     Heart disease Father   "        OBJECTIVE:    Vitals:   Blood Pressure: 104/68 (12/16/24 0736)  Pulse: 84 (12/16/24 0736)  Temperature: 98.3 °F (36.8 °C) (12/16/24 0736)  Temp Source: Oral (12/15/24 2316)  Respirations: 18 (12/16/24 0736)  Height: 5' 1\" (154.9 cm) (12/13/24 2014)  Weight - Scale: 86 kg (189 lb 7.8 oz) (12/13/24 2014)  SpO2: 93 % (12/15/24 2316)    Physical Exam:    General Appearance: Alert, cooperative, no distress.  HEENT: Head normocephalic, atraumatic, without obvious abnormality.  Heart: Normal rate and rhythm.  Lungs: Non-labored breathing. No respiratory distress.  Abdomen: Without distension.  Psychiatric: AAOx3  Lower Extremity:  Vascular:   Right DP and PT pulses are present. Left DP and PT pulses are present. CRT < 3 seconds at the digits. +/4 edema noted at bilateral lower extremities. Pedal hair is absent. Skin temperature is WNL bilaterally.    Musculoskeletal:  Painful left digits 3, 4, 5.     Dermatological:  Left fourth digit dry gangrene, left fifth digit discoloration.  Localized erythema at base of left digits 2, 3, 4, 5.    Neurological:  Gross sensation is intact. Protective sensation is intact. Patient Denies numbness and/or paresthesias.    Clinical Images 12/16/24:            Additional data:     Lab Results: I have personally reviewed pertinent labs including:    Results from last 7 days   Lab Units 12/15/24  0617   WBC Thousand/uL 9.20   HEMOGLOBIN g/dL 11.5   HEMATOCRIT % 36.0   PLATELETS Thousands/uL 202   SEGS PCT % 64   LYMPHO PCT % 30   MONO PCT % 5   EOS PCT % 1     Results from last 7 days   Lab Units 12/16/24  1208 12/14/24  0432 12/13/24  1841   POTASSIUM mmol/L 4.4   < > 3.8   CHLORIDE mmol/L 98   < > 91*   CO2 mmol/L 26   < > 32   BUN mg/dL 19   < > 19   CREATININE mg/dL 0.71   < > 0.68   CALCIUM mg/dL 9.2   < > 9.8   ALK PHOS U/L  --   --  75   ALT U/L  --   --  11   AST U/L  --   --  10*    < > = values in this interval not displayed.     Results from last 7 days   Lab Units " "12/13/24 1841   INR  0.82*       Cultures: I have personally reviewed pertinent cultures including:    Results from last 7 days   Lab Units 12/16/24  0001 12/13/24  1841   BLOOD CULTURE  No Growth at 48 hrs. No Growth at 48 hrs.           Imaging: I have personally reviewed pertinent reports in PACS.  EKG, Pathology, and Other Studies: I have personally reviewed pertinent reports.        ** Please Note: Portions of the record may have been created with voice recognition software. Occasional wrong word or \"sound a like\" substitutions may have occurred due to the inherent limitations of voice recognition software. Read the chart carefully and recognize, using context, where substitutions have occurred. **   "

## 2024-12-16 NOTE — ASSESSMENT & PLAN NOTE
History of foot injury approximately 10 days ago, has dark discoloration of distal aspect of fourth left toe with pain. She was seen by podiatry and sent to ER for admission  Podiatry input appreciated.  Plan for demarcation of stable dry gangrene in outpatient setting  Vascular surgery input appreciated.  No intervention recommended at this time  Blood cultures: No growth x 48 hours  ESR 83  CRP 13.6  Await formal LEADs  Possible discharge planning 24 to 48 hours

## 2024-12-16 NOTE — CONSULTS
Consultation - Lizzy Acuna 49 y.o. female MRN: [id number]    Unit/Bed#: -01 Encounter: [id number]  Administrative Statements   VIRTUAL CARE DOCUMENTATION:     1. This service was provided via Telemedicine using [employer]     2. Parties in the room with patient during teleconsult Patient only    3. Confidentiality My office door was closed     4. Participants No one else was in the room    5. Patient acknowledged consent and understanding of privacy and security of the  Telemedicine consult. I informed the patient that I have reviewed their record in Epic and presented the opportunity for them to ask any questions regarding the visit today.  The patient agreed to participate.    6. I have spent a total time of 30 minutes in caring for this patient on the day of the visit/encounter including Diagnostic results, Risks and benefits of tx options, Instructions for management, Impressions, Counseling / Coordination of care, Documenting in the medical record, Reviewing / ordering tests, medicine, procedures  , and Obtaining or reviewing history  , not including the time spent for establishing the audio/video connection.         Assessment & Plan     Diabetes mellitus type 2 with complications (HCC)  Assessment & Plan  Lab Results   Component Value Date    HGBA1C 10.3 (H) 12/13/2024       Recent Labs     12/15/24  2105 12/16/24  0659 12/16/24  1122 12/16/24  1625   POCGLU 117 118 106 93       Blood Sugar Average: Last 72 hrs:  (P) [id number]    Poorly controlled diabetes with A1c of 10.3% and complicated by left fourth toe gangrene, patient states that was not aware of diabetes, not on antihyperglycemic medications.  Currently on Lantus 10 units nightly and Humalog 5 units 3 times daily AC, and blood sugars are tightly controlled, with a goal of 140 to 180 mg/dl.  Humalog was decreased to 3 units 3 times daily AC.  Continue Lantus at current dose.  Continue correctional insulin.  If her daily  requirement stays the same, she may be discharged just on just basal insulin or oral antihyperglycemic agents,  Endocrinology will follow.        CC: Diabetes Consult    History of Present Illness     HPI: Lizzy Acuna is a 49 y.o. year old female with type 2 diabetes who presented for toe pain, patient is admitted for gangrene of left fourth toe, vascular and podiatry team on board.  Endocrinology is consulted for diabetes management.    She is not aware of having diabetes, although in 2022 her A1c was 11.0%, so obviously she was not any antihyperglycemic medications.      Currently on Lantus 10 units at bedtime and Humalog 5 units 3 times daily AC.        Inpatient consult to Endocrinology  Consult performed by: Anay Sheets MD  Consult ordered by: Howard Morgan MD          Review of Systems   Constitutional:  Positive for appetite change and fatigue.   Endocrine: Negative for polydipsia and polyuria.       Historical Information   Past Medical History:   Diagnosis Date    Advanced maternal age in multigravida, second trimester 2016    Transitioned From: Advanced maternal age in multigravida, first trimester      Back pain     used 2 percocet tid until current admission in 2017    Bone spur     in back per pt    Chronic hypertension with superimposed preeclampsia 2017    Depression     Gestational diabetes     insulin dependent    Herniated cervical disc     Herniated lumbar intervertebral disc     Hx of degenerative disc disease     Obesity     Pre-eclampsia     Prior pregnancy with fetal demise     previous demise at 36 weeks     Past Surgical History:   Procedure Laterality Date    BACK SURGERY       SECTION      LAMINECTOMY      with disc removal, last assessed 16    OTHER SURGICAL HISTORY      Right ovary removal 2005 per pt recall at [facility]    [place]  DELIVERY ONLY N/A 2/15/2017    Procedure:  SECTION ();  Surgeon: Tito  MD Chasidy;  Location: [facility] ;  Service: Obstetrics    [place] LIG/TRNSXJ FALOPIAN TUBE  DEL/ABDML SURG Left 2/15/2017    Procedure: LIGATION/COAGULATION TUBAL;  Surgeon: Tito Umaña MD;  Location: [facility] ;  Service: Obstetrics     Social History   Social History     Substance and Sexual Activity   Alcohol Use Never     Social History     Substance and Sexual Activity   Drug Use Not Currently     Social History     Tobacco Use   Smoking Status Every Day    Current packs/day: 0.50    Types: Cigarettes   Smokeless Tobacco Never     Family History:   Family History   Problem Relation Age of Onset    Diabetes Mother     COPD Mother     Heart disease Mother     Stroke Father     Heart disease Father        Meds/Allergies   Current Facility-Administered Medications   Medication Dose Route Frequency Provider Last Rate Last Admin    acetaminophen (TYLENOL) tablet 650 mg  650 mg Oral Q4H PRN Suad Jane PA-C   650 mg at 24 1157    enoxaparin (LOVENOX) subcutaneous injection 40 mg  40 mg Subcutaneous Daily Suad Jane PA-C   40 mg at 24 0816    hydrALAZINE (APRESOLINE) injection 10 mg  10 mg Intravenous Q6H PRN Suad Jane PA-C   10 mg at 24 2037    HYDROmorphone (DILAUDID) injection 1 mg  1 mg Intravenous Q4H PRN Suad Jane PA-C   1 mg at 24 1210    insulin glargine (LANTUS) subcutaneous injection 10 Units 0.1 mL  10 Units Subcutaneous HS Howard Morgan MD   10 Units at 12/15/24 2144    insulin lispro (HumALOG/ADMELOG) 100 units/mL subcutaneous injection 1-5 Units  1-5 Units Subcutaneous HS Suad Jane PA-C   1 Units at 24 2103    insulin lispro (HumALOG/ADMELOG) 100 units/mL subcutaneous injection 1-6 Units  1-6 Units Subcutaneous TID AC Suad Jane PA-C   1 Units at 24 1138    insulin lispro (HumALOG/ADMELOG) 100 units/mL subcutaneous injection 5 Units  5 Units Subcutaneous TID With Meals Howard  "MD Eddie   5 Units at 12/16/24 1157    ketorolac (TORADOL) injection 15 mg  15 mg Intravenous Q6H PRN Suad Jane PA-C   15 mg at 12/16/24 1043    nicotine (NICODERM CQ) 14 mg/24hr TD 24 hr patch 1 patch  1 patch Transdermal Daily Suad aJne PA-C        ondansetron (ZOFRAN) injection 4 mg  4 mg Intravenous Q6H PRN Suad Jane PA-C        oxyCODONE (ROXICODONE) immediate release tablet 10 mg  10 mg Oral Q6H PRN Suad Jane PA-C   10 mg at 12/16/24 1423     Allergies   Allergen Reactions    Codeine      aggression       Objective   Vitals: Blood pressure 112/74, pulse 86, temperature 98.6 °F (37 °C), resp. rate 18, height 5' 1\" (1.549 m), weight 86 kg (189 lb 7.8 oz), SpO2 95%, not currently breastfeeding.    Intake/Output Summary (Last 24 hours) at 12/16/2024 1611  Last data filed at 12/16/2024 1425  Gross per 24 hour   Intake 940 ml   Output 1100 ml   Net -160 ml     Invasive Devices       Peripheral Intravenous Line  Duration             Peripheral IV 12/15/24 Dorsal (posterior);Right Forearm 1 day                    Physical Exam  Constitutional:       Appearance: Normal appearance. She is not ill-appearing.   Pulmonary:      Effort: Pulmonary effort is normal. No respiratory distress.   Neurological:      Mental Status: She is alert and oriented to person, place, and time.         The history was obtained from the review of the chart, patient.    Lab Results:   Results from last 7 days   Lab Units 12/13/24  1841   HEMOGLOBIN A1C % 10.3*     Lab Results   Component Value Date    WBC 9.20 12/15/2024    HGB 11.5 12/15/2024    HCT 36.0 12/15/2024    MCV 91 12/15/2024     12/15/2024     Lab Results   Component Value Date/Time    BUN 19 12/16/2024 12:08 PM    K 4.4 12/16/2024 12:08 PM    CL 98 12/16/2024 12:08 PM    CO2 26 12/16/2024 12:08 PM    CREATININE 0.71 12/16/2024 12:08 PM    AST 10 (L) 12/13/2024 06:41 PM    ALT 11 12/13/2024 06:41 PM    TP 8.1 " "12/13/2024 06:41 PM    ALB 4.1 12/13/2024 06:41 PM     No results for input(s): \"CHOL\", \"HDL\", \"LDL\", \"TRIG\", \"VLDL\" in the last 72 hours.  No results found for: \"MICROALBUR\", \"SVOK36YHQ\"  POC Glucose (mg/dl)   Date Value   12/16/2024 106   12/16/2024 118   12/15/2024 117   12/15/2024 110   12/15/2024 137   12/15/2024 143 (H)   12/14/2024 152 (H)   12/14/2024 122   12/14/2024 170 (H)   12/14/2024 196 (H)       Imaging Studies: Results Review Statement: No pertinent imaging studies reviewed.    Portions of the record may have been created with voice recognition software.    "

## 2024-12-16 NOTE — H&P
H&P - Hospitalist   Name: Lizzy Acuna 49 y.o. female I MRN: [id number]  Unit/Bed#: -Sander I Date of Admission: 12/13/2024   Date of Service: 12/16/2024 I Hospital Day: 3     Assessment & Plan  Gangrene of toe of left foot (HCC)  History of foot injury approximately 10 days ago, has dark discoloration of distal aspect of fourth left toe with pain. She was seen by podiatry and sent to ER for admission  Podiatry input appreciated.  Plan for demarcation of stable dry gangrene in outpatient setting  Vascular surgery input appreciated.  No intervention recommended at this time  Blood cultures: No growth x 48 hours  ESR 83  CRP 13.6  Await formal LEADs  Possible discharge planning 24 to 48 hours  Cellulitis of left foot  Seen by podiatry and sent to ED for further evaluation  Procalcitonin 0.07->0.07  Received IV cefepime and vancomycin, now monitoring off antibiotics    Diabetes mellitus type 2 with complications ([facility])  Hemoglobin A1c 10.3 on 12/13/2024   Initiated on Lantus 10 units SQ at bedtime with 5 units of Humalog SQ with meals 3 times daily  Continue sliding scale insulin coverage  Improved glycemic control will be important for wound healing  Endocrinology consultation pending  Tobacco abuse  Smokes half pack per day  Continue nicotine patch 14 mg  Smoking cessation recommended  Obesity (BMI 30.0-34.9)  BMI 34  Healthy diet and lifestyle modification recommended  VTE Pharmacologic Prophylaxis: VTE Score: 3 Moderate Risk (Score 3-4) - Pharmacological DVT Prophylaxis Ordered: enoxaparin (Lovenox).    Mobility:   Basic Mobility Inpatient Raw Score: 22  JH-HLM Goal: 7: Walk 25 feet or more  JH-HLM Achieved: 6: Walk 10 steps or more  JH-HLM Goal NOT achieved. Continue with multidisciplinary rounding and encourage appropriate mobility to improve upon JH-HLM goals.    Patient Centered Rounds: I performed bedside rounds with nursing staff today.   Discussions with Specialists or Other Care Team Provider: Case  management    Education and Discussions with Family / Patient: Patient declined call to .     Current Length of Stay: 3 day(s)  Current Patient Status: Inpatient   Certification Statement: The patient will continue to require additional inpatient hospital stay due to LEADs, endocrinology consultation, care coordination.  Discharge Plan: Anticipate discharge tomorrow to home.    Code Status: Level 1 - Full Code    Subjective   Evaluated at bedside.  She feels that her left foot is looking better.  No physical complaints offered.     Objective :  Temp:  [98.3 °F (36.8 °C)-98.8 °F (37.1 °C)] 98.6 °F (37 °C)  HR:  [84-98] 86  BP: (104-112)/(66-74) 112/74  Resp:  [16-18] 18  SpO2:  [93 %-95 %] 95 %  O2 Device: None (Room air)    Body mass index is 35.8 kg/m².     Input and Output Summary (last 24 hours):     Intake/Output Summary (Last 24 hours) at 12/16/2024 1553  Last data filed at 12/16/2024 1425  Gross per 24 hour   Intake 940 ml   Output 1100 ml   Net -160 ml       Physical Exam  Vitals and nursing note reviewed.   HENT:      Head: Normocephalic and atraumatic.      Nose: Nose normal.      Mouth/Throat:      Mouth: Mucous membranes are moist.      Pharynx: Oropharynx is clear.   Eyes:      Pupils: Pupils are equal, round, and reactive to light.   Cardiovascular:      Rate and Rhythm: Normal rate and regular rhythm.      Pulses: Normal pulses.      Heart sounds: Normal heart sounds.   Pulmonary:      Effort: Pulmonary effort is normal. No respiratory distress.      Breath sounds: Normal breath sounds.   Abdominal:      General: Bowel sounds are normal.      Palpations: Abdomen is soft.      Tenderness: There is no abdominal tenderness.   Musculoskeletal:      Cervical back: Neck supple.      Right lower leg: No edema.      Left lower leg: No edema.      Comments: Left fourth toe dark, dry gangrene.  No surrounding redness at this time   Skin:     General: Skin is warm and dry.      Capillary Refill:  Capillary refill takes less than 2 seconds.   Neurological:      General: No focal deficit present.      Mental Status: She is alert and oriented to person, place, and time. Mental status is at baseline.           Lab Results: I have reviewed the following results:   Results from last 7 days   Lab Units 12/15/24  0617   WBC Thousand/uL 9.20   HEMOGLOBIN g/dL 11.5   HEMATOCRIT % 36.0   PLATELETS Thousands/uL 202   SEGS PCT % 64   LYMPHO PCT % 30   MONO PCT % 5   EOS PCT % 1     Results from last 7 days   Lab Units 12/16/24  1208 12/14/24  0432 12/13/24  1841   SODIUM mmol/L 131*   < > 131*   POTASSIUM mmol/L 4.4   < > 3.8   CHLORIDE mmol/L 98   < > 91*   CO2 mmol/L 26   < > 32   BUN mg/dL 19   < > 19   CREATININE mg/dL 0.71   < > 0.68   ANION GAP mmol/L 7   < > 8   CALCIUM mg/dL 9.2   < > 9.8   ALBUMIN g/dL  --   --  4.1   TOTAL BILIRUBIN mg/dL  --   --  0.29   ALK PHOS U/L  --   --  75   ALT U/L  --   --  11   AST U/L  --   --  10*   GLUCOSE RANDOM mg/dL 106   < > 276*    < > = values in this interval not displayed.     Results from last 7 days   Lab Units 12/13/24  1841   INR  0.82*     Results from last 7 days   Lab Units 12/16/24  1122 12/16/24  0659 12/15/24  2105 12/15/24  1545 12/15/24  1122 12/15/24  0712 12/14/24  2055 12/14/24  1607 12/14/24  1058 12/14/24  0731 12/13/24  2137   POC GLUCOSE mg/dl 106 118 117 110 137 143* 152* 122 170* 196* 223*     Results from last 7 days   Lab Units 12/13/24  1841   HEMOGLOBIN A1C % 10.3*     Results from last 7 days   Lab Units 12/14/24  0432 12/13/24  1841   LACTIC ACID mmol/L  --  1.0   PROCALCITONIN ng/ml 0.07 0.07       Recent Cultures (last 7 days):   Results from last 7 days   Lab Units 12/13/24  1841 12/13/24  1832   BLOOD CULTURE  No Growth at 48 hrs. No Growth at 48 hrs.       Imaging Results Review: I reviewed radiology reports from this admission including: xray(s).  Other Study Results Review: No additional pertinent studies reviewed.    Last 24 Hours  Medication List:     Current Facility-Administered Medications:     acetaminophen (TYLENOL) tablet 650 mg, Q4H PRN    enoxaparin (LOVENOX) subcutaneous injection 40 mg, Daily    hydrALAZINE (APRESOLINE) injection 10 mg, Q6H PRN    HYDROmorphone (DILAUDID) injection 1 mg, Q4H PRN    insulin glargine (LANTUS) subcutaneous injection 10 Units 0.1 mL, HS    insulin lispro (HumALOG/ADMELOG) 100 units/mL subcutaneous injection 1-5 Units, HS    insulin lispro (HumALOG/ADMELOG) 100 units/mL subcutaneous injection 1-6 Units, TID AC **AND** Fingerstick Glucose (POCT), TID AC    insulin lispro (HumALOG/ADMELOG) 100 units/mL subcutaneous injection 5 Units, TID With Meals    ketorolac (TORADOL) injection 15 mg, Q6H PRN    nicotine (NICODERM CQ) 14 mg/24hr TD 24 hr patch 1 patch, Daily    ondansetron (ZOFRAN) injection 4 mg, Q6H PRN    oxyCODONE (ROXICODONE) immediate release tablet 10 mg, Q6H PRN    Administrative Statements   Today, Patient Was Seen By: NAOMY Helton  I have spent a total time of 40 minutes in caring for this patient on the day of the visit/encounter including Prognosis, Instructions for management, Patient and family education, Counseling / Coordination of care, Documenting in the medical record, Reviewing / ordering tests, medicine, procedures  , Obtaining or reviewing history  , and Communicating with other healthcare professionals .    **Please Note: This note may have been constructed using a voice recognition system.**

## 2024-12-17 LAB
ANION GAP SERPL CALCULATED.3IONS-SCNC: 6 MMOL/L (ref 4–13)
BASOPHILS # BLD AUTO: 0.02 THOUSANDS/ÂΜL (ref 0–0.1)
BASOPHILS NFR BLD AUTO: 0 % (ref 0–1)
BUN SERPL-MCNC: 21 MG/DL (ref 5–25)
CALCIUM SERPL-MCNC: 9.5 MG/DL (ref 8.4–10.2)
CHLORIDE SERPL-SCNC: 100 MMOL/L (ref 96–108)
CO2 SERPL-SCNC: 27 MMOL/L (ref 21–32)
CREAT SERPL-MCNC: 0.64 MG/DL (ref 0.6–1.3)
EOSINOPHIL # BLD AUTO: 0.08 THOUSAND/ÂΜL (ref 0–0.61)
EOSINOPHIL NFR BLD AUTO: 1 % (ref 0–6)
ERYTHROCYTE [DISTWIDTH] IN BLOOD BY AUTOMATED COUNT: 12.4 % (ref 11.6–15.1)
GFR SERPL CREATININE-BSD FRML MDRD: 105 ML/MIN/1.73SQ M
GLUCOSE SERPL-MCNC: 116 MG/DL (ref 65–140)
GLUCOSE SERPL-MCNC: 120 MG/DL (ref 65–140)
GLUCOSE SERPL-MCNC: 125 MG/DL (ref 65–140)
GLUCOSE SERPL-MCNC: 145 MG/DL (ref 65–140)
GLUCOSE SERPL-MCNC: 178 MG/DL (ref 65–140)
HCT VFR BLD AUTO: 35.4 % (ref 34.8–46.1)
HGB BLD-MCNC: 11.1 G/DL (ref 11.5–15.4)
IMM GRANULOCYTES # BLD AUTO: 0.03 THOUSAND/UL (ref 0–0.2)
IMM GRANULOCYTES NFR BLD AUTO: 0 % (ref 0–2)
LYMPHOCYTES # BLD AUTO: 1.93 THOUSANDS/ÂΜL (ref 0.6–4.47)
LYMPHOCYTES NFR BLD AUTO: 26 % (ref 14–44)
MCH RBC QN AUTO: 28.8 PG (ref 26.8–34.3)
MCHC RBC AUTO-ENTMCNC: 31.4 G/DL (ref 31.4–37.4)
MCV RBC AUTO: 92 FL (ref 82–98)
MONOCYTES # BLD AUTO: 0.38 THOUSAND/ÂΜL (ref 0.17–1.22)
MONOCYTES NFR BLD AUTO: 5 % (ref 4–12)
NEUTROPHILS # BLD AUTO: 5 THOUSANDS/ÂΜL (ref 1.85–7.62)
NEUTS SEG NFR BLD AUTO: 68 % (ref 43–75)
NRBC BLD AUTO-RTO: 0 /100 WBCS
PLATELET # BLD AUTO: 171 THOUSANDS/UL (ref 149–390)
PMV BLD AUTO: 9.8 FL (ref 8.9–12.7)
POTASSIUM SERPL-SCNC: 4.4 MMOL/L (ref 3.5–5.3)
RBC # BLD AUTO: 3.86 MILLION/UL (ref 3.81–5.12)
SODIUM SERPL-SCNC: 133 MMOL/L (ref 135–147)
WBC # BLD AUTO: 7.44 THOUSAND/UL (ref 4.31–10.16)

## 2024-12-17 PROCEDURE — 80048 BASIC METABOLIC PNL TOTAL CA: CPT | Performed by: NURSE PRACTITIONER

## 2024-12-17 PROCEDURE — 82948 REAGENT STRIP/BLOOD GLUCOSE: CPT

## 2024-12-17 PROCEDURE — 99232 SBSQ HOSP IP/OBS MODERATE 35: CPT | Performed by: NURSE PRACTITIONER

## 2024-12-17 PROCEDURE — 99255 IP/OBS CONSLTJ NEW/EST HI 80: CPT | Performed by: PHYSICIAN ASSISTANT

## 2024-12-17 PROCEDURE — 85025 COMPLETE CBC W/AUTO DIFF WBC: CPT | Performed by: NURSE PRACTITIONER

## 2024-12-17 PROCEDURE — 99232 SBSQ HOSP IP/OBS MODERATE 35: CPT | Performed by: PODIATRIST

## 2024-12-17 RX ORDER — OXYCODONE HYDROCHLORIDE 10 MG/1
10 TABLET ORAL EVERY 4 HOURS PRN
Refills: 0 | Status: DISCONTINUED | OUTPATIENT
Start: 2024-12-17 | End: 2024-12-19

## 2024-12-17 RX ORDER — METHOCARBAMOL 500 MG/1
500 TABLET, FILM COATED ORAL EVERY 6 HOURS SCHEDULED
Status: DISCONTINUED | OUTPATIENT
Start: 2024-12-17 | End: 2024-12-25 | Stop reason: HOSPADM

## 2024-12-17 RX ORDER — KETOROLAC TROMETHAMINE 30 MG/ML
15 INJECTION, SOLUTION INTRAMUSCULAR; INTRAVENOUS EVERY 6 HOURS SCHEDULED
Status: DISCONTINUED | OUTPATIENT
Start: 2024-12-17 | End: 2024-12-18

## 2024-12-17 RX ORDER — HYDROMORPHONE HCL/PF 1 MG/ML
0.5 SYRINGE (ML) INJECTION EVERY 4 HOURS PRN
Status: DISCONTINUED | OUTPATIENT
Start: 2024-12-17 | End: 2024-12-18

## 2024-12-17 RX ORDER — ACETAMINOPHEN 325 MG/1
975 TABLET ORAL EVERY 6 HOURS SCHEDULED
Status: DISCONTINUED | OUTPATIENT
Start: 2024-12-17 | End: 2024-12-25 | Stop reason: HOSPADM

## 2024-12-17 RX ORDER — AMOXICILLIN 250 MG
1 CAPSULE ORAL
Status: DISCONTINUED | OUTPATIENT
Start: 2024-12-17 | End: 2024-12-25 | Stop reason: HOSPADM

## 2024-12-17 RX ADMIN — OXYCODONE HYDROCHLORIDE 10 MG: 10 TABLET ORAL at 22:49

## 2024-12-17 RX ADMIN — INSULIN GLARGINE 10 UNITS: 100 INJECTION, SOLUTION SUBCUTANEOUS at 22:47

## 2024-12-17 RX ADMIN — ACETAMINOPHEN 975 MG: 325 TABLET ORAL at 17:55

## 2024-12-17 RX ADMIN — HYDROMORPHONE HYDROCHLORIDE 1 MG: 1 INJECTION, SOLUTION INTRAMUSCULAR; INTRAVENOUS; SUBCUTANEOUS at 05:47

## 2024-12-17 RX ADMIN — ENOXAPARIN SODIUM 40 MG: 40 INJECTION SUBCUTANEOUS at 08:00

## 2024-12-17 RX ADMIN — INSULIN LISPRO 3 UNITS: 100 INJECTION, SOLUTION INTRAVENOUS; SUBCUTANEOUS at 08:00

## 2024-12-17 RX ADMIN — OXYCODONE HYDROCHLORIDE 10 MG: 10 TABLET ORAL at 11:48

## 2024-12-17 RX ADMIN — METHOCARBAMOL TABLETS 500 MG: 500 TABLET, COATED ORAL at 17:55

## 2024-12-17 RX ADMIN — OXYCODONE HYDROCHLORIDE 10 MG: 10 TABLET ORAL at 04:37

## 2024-12-17 RX ADMIN — INSULIN LISPRO 3 UNITS: 100 INJECTION, SOLUTION INTRAVENOUS; SUBCUTANEOUS at 11:48

## 2024-12-17 RX ADMIN — KETOROLAC TROMETHAMINE 15 MG: 30 INJECTION, SOLUTION INTRAMUSCULAR at 08:05

## 2024-12-17 RX ADMIN — KETOROLAC TROMETHAMINE 15 MG: 30 INJECTION, SOLUTION INTRAMUSCULAR at 17:55

## 2024-12-17 RX ADMIN — HYDROMORPHONE HYDROCHLORIDE 1 MG: 1 INJECTION, SOLUTION INTRAMUSCULAR; INTRAVENOUS; SUBCUTANEOUS at 14:56

## 2024-12-17 RX ADMIN — INSULIN LISPRO 1 UNITS: 100 INJECTION, SOLUTION INTRAVENOUS; SUBCUTANEOUS at 22:47

## 2024-12-17 RX ADMIN — HYDROMORPHONE HYDROCHLORIDE 1 MG: 1 INJECTION, SOLUTION INTRAMUSCULAR; INTRAVENOUS; SUBCUTANEOUS at 10:15

## 2024-12-17 NOTE — PLAN OF CARE
Problem: PAIN - ADULT  Goal: Verbalizes/displays adequate comfort level or baseline comfort level  Description: Interventions:  - Encourage patient to monitor pain and request assistance  - Assess pain using appropriate pain scale  - Administer analgesics based on type and severity of pain and evaluate response  - Implement non-pharmacological measures as appropriate and evaluate response  - Consider cultural and social influences on pain and pain management  - Notify physician/advanced practitioner if interventions unsuccessful or patient reports new pain  12/17/2024 0347 by Cynthia Gilman RN  Outcome: Progressing  12/17/2024 0347 by Cynthia Gilman RN  Outcome: Progressing     Problem: INFECTION - ADULT  Goal: Absence or prevention of progression during hospitalization  Description: INTERVENTIONS:  - Assess and monitor for signs and symptoms of infection  - Monitor lab/diagnostic results  - Monitor all insertion sites, i.e. indwelling lines, tubes, and drains  - Monitor endotracheal if appropriate and nasal secretions for changes in amount and color  - [place] appropriate cooling/warming therapies per order  - Administer medications as ordered  - Instruct and encourage patient and family to use good hand hygiene technique  - Identify and instruct in appropriate isolation precautions for identified infection/condition  12/17/2024 0347 by Cynthia Gilman RN  Outcome: Progressing  12/17/2024 0347 by Cynthia Gilman RN  Outcome: Progressing     Problem: DISCHARGE PLANNING  Goal: Discharge to home or other facility with appropriate resources  Description: INTERVENTIONS:  - Identify barriers to discharge w/patient and caregiver  - Arrange for needed discharge resources and transportation as appropriate  - Identify discharge learning needs (meds, wound care, etc.)  - Arrange for interpretive services to assist at discharge as needed  - Refer to Case Management Department for coordinating discharge planning if the  patient needs post-hospital services based on physician/advanced practitioner order or complex needs related to functional status, cognitive ability, or social support system  12/17/2024 0347 by Cynthia Gilman RN  Outcome: Progressing  12/17/2024 0347 by Cynthia Gilman RN  Outcome: Progressing     Problem: Knowledge Deficit  Goal: Patient/family/caregiver demonstrates understanding of disease process, treatment plan, medications, and discharge instructions  Description: Complete learning assessment and assess knowledge base.  Interventions:  - Provide teaching at level of understanding  - Provide teaching via preferred learning methods  12/17/2024 0347 by Cynthia Gilman RN  Outcome: Progressing  12/17/2024 0347 by Cynthia Gilman RN  Outcome: Progressing     Problem: SKIN/TISSUE INTEGRITY - ADULT  Goal: Incision(s), wounds(s) or drain site(s) healing without S/S of infection  Description: INTERVENTIONS  - Assess and document dressing, incision, wound bed, drain sites and surrounding tissue  - Provide patient and family education  - Perform skin care/dressing changes per wound care orders and as needed for soilage  12/17/2024 0347 by Cynthia Gilman RN  Outcome: Progressing  12/17/2024 0347 by Cynthia Gilman RN  Outcome: Progressing     Problem: METABOLIC, FLUID AND ELECTROLYTES - ADULT  Goal: Electrolytes maintained within normal limits  Description: INTERVENTIONS:  - Monitor labs and assess patient for signs and symptoms of electrolyte imbalances  - Administer electrolyte replacement as ordered  - Monitor response to electrolyte replacements, including repeat lab results as appropriate  - Instruct patient on fluid and nutrition as appropriate  12/17/2024 0347 by Cynthia Gilman RN  Outcome: Progressing  12/17/2024 0347 by Cynthia Gilman RN  Outcome: Progressing  Goal: Glucose maintained within target range  Description: INTERVENTIONS:  - Monitor Blood Glucose as ordered  - Assess for signs and symptoms of  hyperglycemia and hypoglycemia  - Administer ordered medications to maintain glucose within target range  - Assess nutritional intake and initiate nutrition service referral as needed  12/17/2024 0347 by Cynthia Gilman RN  Outcome: Progressing  12/17/2024 0347 by Cynthia Gilman RN  Outcome: Progressing     Problem: MUSCULOSKELETAL - ADULT  Goal: Maintain or return mobility to safest level of function  Description: INTERVENTIONS:  - Assess patient's ability to carry out ADLs; assess patient's baseline for ADL function and identify physical deficits which impact ability to perform ADLs (bathing, care of mouth/teeth, toileting, grooming, dressing, etc.)  - Assess/evaluate cause of self-care deficits   - Assess range of motion  - Assess patient's mobility  - Assess patient's need for assistive devices and provide as appropriate  - Encourage maximum independence but intervene and supervise when necessary  - Involve family in performance of ADLs  - Assess for home care needs following discharge   - Consider OT consult to assist with ADL evaluation and planning for discharge  - Provide patient education as appropriate  12/17/2024 0347 by Cynthia Gilman RN  Outcome: Progressing  12/17/2024 0347 by Cynthia Gilman RN  Outcome: Progressing

## 2024-12-17 NOTE — WOUND OSTOMY CARE
Consult Note - Wound   Lizzy Acuna 49 y.o. female MRN: [id number]  Unit/Bed#: -01 Encounter: [id number]        Assessment :   Patient admitted to [facility] due to gangrene of toe of left foot. History of diabetes. Wound care nurse consulted for left 4th toe gangrene. Podiatry is following and managing the left 4th toe wound, vascular is on board as well. Patient is alert and oriented x4, continent of bowel and bladder, independently ambulates, is independent with care.     1. Patient declined full skin assessment- patient states there are no wound, rashes, or skin changes with exception to left 4th toe.     Wound care will sign off at this time, please re-consult if needed. Please follow skin care recommendations written as orders for skin protection and prevention.      Skin care plans:  1-Hydraguard/Silicone Cream to bilateral sacrum, buttock, and heels BID and PRN  2-Elevate heels to offload pressure.  3-Ehob cushion in chair when out of bed.  4-Moisturize skin daily with skin nourishing cream.      Wound Care will sign off  Contact through [employer] Secure Chat for any questions/concerns    Mirta CARROLL RN CWON  Wound and Ostomy care

## 2024-12-17 NOTE — ASSESSMENT & PLAN NOTE
Lab Results   Component Value Date    HGBA1C 10.3 (H) 12/13/2024       Recent Labs     12/16/24  1625 12/16/24  2045 12/17/24  0712 12/17/24  1100   POCGLU 93 152* 125 116       Blood Sugar Average: Last 72 hrs:  (P) [id number]

## 2024-12-17 NOTE — CASE MANAGEMENT
Case Management Discharge Planning Note    Patient name Lizzy Acuna  Location /-01 MRN [id number]  : 1975 Date 2024       Current Admission Date: 2024  Current Admission Diagnosis:Gangrene of toe of left foot (HCC)   Patient Active Problem List    Diagnosis Date Noted Date Diagnosed    Cellulitis of left foot 2024     Gangrene of toe of left foot (HCC) 2024     Diabetes mellitus type 2 with complications (HCC) 2018     Hyponatremia 2018     Chronic sciatica 2018     Tobacco abuse 02/15/2017     Obesity (BMI 30.0-34.9) 2017     Chronic low back pain 2016     Chronic, continuous use of opioids 2016       LOS (days): 3  Geometric Mean LOS (GMLOS) (days):   Days to GMLOS:     OBJECTIVE:  Risk of Unplanned Readmission Score: 8.62         Current admission status: Inpatient   Preferred Pharmacy:   [facility] - [address] [address]  Phone: [phone] Fax: [phone]    [id number] - [address] [address]  Phone: [phone] Fax: [phone]    Primary Care Provider: Linwood North DO    Primary Insurance: [employer]  Secondary Insurance:     DISCHARGE DETAILS:    Discharge planning discussed with:: patient  Freedom of Choice: Yes                                  Other Referral/Resources/Interventions Provided:  Referral Comments: pt not stable otday, podiartry & vascular consult & endo consult- cm will continue to follow for additional d/c needs.    Would you like to participate in our [facility] service program?  : No - Declined    Treatment Team Recommendation: Home (home with family & outpt follow up- family)

## 2024-12-17 NOTE — CASE MANAGEMENT
Case Management Discharge Planning Note    Patient name Lizzy Acuna  Location /-01 MRN [id number]  : 1975 Date 2024       Current Admission Date: 2024  Current Admission Diagnosis:Gangrene of toe of left foot (HCC)   Patient Active Problem List    Diagnosis Date Noted Date Diagnosed    Cellulitis of left foot 2024     Gangrene of toe of left foot (HCC) 2024     Diabetes mellitus type 2 with complications (HCC) 2018     Hyponatremia 2018     Chronic sciatica 2018     Tobacco abuse 02/15/2017     Obesity (BMI 30.0-34.9) 2017     Chronic low back pain 2016     Chronic, continuous use of opioids 2016       LOS (days): 4  Geometric Mean LOS (GMLOS) (days):   Days to GMLOS:     OBJECTIVE:  Risk of Unplanned Readmission Score: 9.97         Current admission status: Inpatient   Preferred Pharmacy:   [facility] [address] [address]  Phone: [phone] Fax: [phone]    [id number] - [facility] [address] [address]  Phone: [phone] Fax: [phone]    Primary Care Provider: Linwood North DO    Primary Insurance: [employer]  Secondary Insurance:     DISCHARGE DETAILS:    Discharge planning discussed with:: patient                                     Other Referral/Resources/Interventions Provided:  Referral Comments: pain consult ordered, podiatry, Iv toradol q 6hrs

## 2024-12-17 NOTE — PLAN OF CARE
Problem: PAIN - ADULT  Goal: Verbalizes/displays adequate comfort level or baseline comfort level  Description: Interventions:  - Encourage patient to monitor pain and request assistance  - Assess pain using appropriate pain scale  - Administer analgesics based on type and severity of pain and evaluate response  - Implement non-pharmacological measures as appropriate and evaluate response  - Consider cultural and social influences on pain and pain management  - Notify physician/advanced practitioner if interventions unsuccessful or patient reports new pain  Outcome: Progressing     Problem: INFECTION - ADULT  Goal: Absence or prevention of progression during hospitalization  Description: INTERVENTIONS:  - Assess and monitor for signs and symptoms of infection  - Monitor lab/diagnostic results  - Monitor all insertion sites, i.e. indwelling lines, tubes, and drains  - Monitor endotracheal if appropriate and nasal secretions for changes in amount and color  - [place] appropriate cooling/warming therapies per order  - Administer medications as ordered  - Instruct and encourage patient and family to use good hand hygiene technique  - Identify and instruct in appropriate isolation precautions for identified infection/condition  Outcome: Progressing     Problem: DISCHARGE PLANNING  Goal: Discharge to home or other facility with appropriate resources  Description: INTERVENTIONS:  - Identify barriers to discharge w/patient and caregiver  - Arrange for needed discharge resources and transportation as appropriate  - Identify discharge learning needs (meds, wound care, etc.)  - Arrange for interpretive services to assist at discharge as needed  - Refer to Case Management Department for coordinating discharge planning if the patient needs post-hospital services based on physician/advanced practitioner order or complex needs related to functional status, cognitive ability, or social support system  Outcome: Progressing      Problem: Knowledge Deficit  Goal: Patient/family/caregiver demonstrates understanding of disease process, treatment plan, medications, and discharge instructions  Description: Complete learning assessment and assess knowledge base.  Interventions:  - Provide teaching at level of understanding  - Provide teaching via preferred learning methods  Outcome: Progressing     Problem: SKIN/TISSUE INTEGRITY - ADULT  Goal: Incision(s), wounds(s) or drain site(s) healing without S/S of infection  Description: INTERVENTIONS  - Assess and document dressing, incision, wound bed, drain sites and surrounding tissue  - Provide patient and family education  - Perform skin care/dressing changes per wound care orders and as needed for soilage  Outcome: Progressing     Problem: METABOLIC, FLUID AND ELECTROLYTES - ADULT  Goal: Electrolytes maintained within normal limits  Description: INTERVENTIONS:  - Monitor labs and assess patient for signs and symptoms of electrolyte imbalances  - Administer electrolyte replacement as ordered  - Monitor response to electrolyte replacements, including repeat lab results as appropriate  - Instruct patient on fluid and nutrition as appropriate  Outcome: Progressing  Goal: Glucose maintained within target range  Description: INTERVENTIONS:  - Monitor Blood Glucose as ordered  - Assess for signs and symptoms of hyperglycemia and hypoglycemia  - Administer ordered medications to maintain glucose within target range  - Assess nutritional intake and initiate nutrition service referral as needed  Outcome: Progressing     Problem: MUSCULOSKELETAL - ADULT  Goal: Maintain or return mobility to safest level of function  Description: INTERVENTIONS:  - Assess patient's ability to carry out ADLs; assess patient's baseline for ADL function and identify physical deficits which impact ability to perform ADLs (bathing, care of mouth/teeth, toileting, grooming, dressing, etc.)  - Assess/evaluate cause of self-care  deficits   - Assess range of motion  - Assess patient's mobility  - Assess patient's need for assistive devices and provide as appropriate  - Encourage maximum independence but intervene and supervise when necessary  - Involve family in performance of ADLs  - Assess for home care needs following discharge   - Consider OT consult to assist with ADL evaluation and planning for discharge  - Provide patient education as appropriate  Outcome: Progressing

## 2024-12-17 NOTE — PROGRESS NOTES
Progress Note - Podiatry   Name: Lizzy Acuna 49 y.o. female I MRN: [id number]  Unit/Bed#: MS 213[facility] I Date of Admission: 12/13/2024   Date of Service: 12/17/2024 I Hospital Day: 4     Assessment & Plan  Gangrene of toe of left foot (HCC)    Obesity (BMI 30.0-34.9)    Tobacco abuse    Diabetes mellitus type 2 with complications (HCC)  Lab Results   Component Value Date    HGBA1C 10.3 (H) 12/13/2024       Recent Labs     12/16/24  1625 12/16/24  2045 12/17/24  0712 12/17/24  1100   POCGLU 93 152* 125 116       Blood Sugar Average: Last 72 hrs:  (P) [id number]    Hyponatremia    Cellulitis of left foot  Resolved  -Unfortunately it seems that her microvascular disease is severe, she is optimized from a vascular standpoint for this area per vascular, I am concerned about the stenosis and the monophasic waveforms  - However it seems as though her entire forefoot may be slowly dying.  She is having severe ischemic pain to the left forefoot.  With the mottling and the overall lack of any clinical improvement I would not recommend proceeding with amputation at this time  - I would recommend attempted pain control and then amputation of the demarcated areas  - She is very high risk for TMA  - I did discuss with her today advancing to a elective TMA to address both the areas with microvascular disease and pain    Subjective : Patient seen and evaluated in bed for her left foot.  She is complaining of severe pain of the foot, states that she had an A1c done a couple of years ago and was not told that she was diabetic.  She is a bit confused and apprehensive about her left foot, she was told by nursing that if she did not have this toe removed immediately she would likely lose more toes.    Objective :  Temp:  [97.8 °F (36.6 °C)-98.9 °F (37.2 °C)] 98.9 °F (37.2 °C)  HR:  [] 89  BP: (123-145)/(74-86) 123/75  Resp:  [16-18] 16  SpO2:  [82 %-97 %] 94 %  O2 Device: None (Room air)    Physical Exam  Vitals  reviewed.   Skin:     Comments: Gangrenous changes are worsening to the left foot.  The left fourth toe is now completely gangrenous and is extending to the MTPJ, there is duskiness and pain with palpation in the left fifth toe and also to the right first second and third toes.  There is some mottling that is beginning to happen outside of the initial areas of gangrene.   Neurological:      Mental Status: She is alert.         Lab Results: I have reviewed the following results:

## 2024-12-17 NOTE — ASSESSMENT & PLAN NOTE
Change acetaminophen to 975 mg p.o. every 6 hours scheduled.  Change Toradol to 15 mg IV every 6 hours scheduled x 2 days.  Change oxycodone to 10 mg p.o. every 4 hours as needed severe pain.  Change Dilaudid to 0.5 mg IV every 4 hours as needed breakthrough pain.  Add methocarbamol 500 mg p.o. every 6 hours scheduled.  Will avoid gabapentinoids as patient has had an adverse reaction to these in the past.  Suggest start bowel regimen while on opioid pain medication to avoid opioid-induced constipation.  Will add as needed Narcan in the event that opioid reversal becomes necessary.

## 2024-12-17 NOTE — TELEMEDICINE
Tele-Consultation - Acute Pain   Name: Lizzy Acuna 49 y.o. female I MRN: [id number]  Unit/Bed#: -01 I Date of Admission: 12/13/2024   Date of Service: 12/17/2024 I Hospital Day: 4     Administrative Statements   VIRTUAL CARE DOCUMENTATION:     1. This service was provided via Telemedicine using [employer] Kit     2. Parties in the room with patient during teleconsult Patient only    3. Confidentiality My office door was closed     4. Participants No one else was in the room    5. Patient acknowledged consent and understanding of privacy and security of the  Telemedicine consult. I informed the patient that I have reviewed their record in Epic and presented the opportunity for them to ask any questions regarding the visit today.  The patient agreed to participate.    6. I have spent a total time of 43 minutes in caring for this patient on the day of the visit/encounter including Risks and benefits of tx options, Instructions for management, Patient and family education, Importance of tx compliance, Risk factor reductions, Impressions, Counseling / Coordination of care, Documenting in the medical record, Reviewing / ordering tests, medicine, procedures  , Obtaining or reviewing history  , and Communicating with other healthcare professionals , not including the time spent for establishing the audio/video connection.            Inpatient consult to Acute Pain Service  Consult performed by: James Da Silva PA-C  Consult ordered by: NAOMY Helton        Physician Requesting Evaluation: Luz Maria Shore, *   Reason for Evaluation / Principal Problem: Left foot gangrene    Assessment & Plan  Tobacco abuse  Smokers have been shown to require higher doses of opioid pain medication and are more likely to develop chronic pain.  Encourage smoking cessation.    Cellulitis of left foot  Change acetaminophen to 975 mg p.o. every 6 hours scheduled.  Change Toradol to 15 mg IV every 6 hours scheduled x 2  days.  Change oxycodone to 10 mg p.o. every 4 hours as needed severe pain.  Change Dilaudid to 0.5 mg IV every 4 hours as needed breakthrough pain.  Add methocarbamol 500 mg p.o. every 6 hours scheduled.  Will avoid gabapentinoids as patient has had an adverse reaction to these in the past.  Suggest start bowel regimen while on opioid pain medication to avoid opioid-induced constipation.  Will add as needed Narcan in the event that opioid reversal becomes necessary.  I have discussed the above management plan in detail with the primary service.   Please contact the [employer] role for the Acute Pain service with any questions/concerns.      [facility] will continue to follow. Please contact Acute Pain Service - via [employer] from [phone] with additional questions or concerns. See [employer] or [facility] for additional contacts and after hours information.     History of Present Illness    HPI: Lizzy Acuna is a 49 y.o. year old female who presents with left foot cellulitis and gangrene of the left fourth toe.  Patient gradually developed pain and redness in her left foot following a minor trauma several weeks ago.  She was admitted several days ago with what appears to be cellulitis of the left foot and a gangrenous left fourth toe.  She was seen by podiatry who plan to allow the wound to demarcate and follow-up as an outpatient for probable surgery.  Patient has complained of pain which has not been well-controlled and therefore acute pain service was consulted.  On my evaluation, patient was lying in bed and appeared slightly uncomfortable.  She complains of pain in her left foot which does improve with oxycodone, however the oxycodone takes approximately 1 hour to work and lasts approximately 3 to 4 hours and is currently ordered every 6 hours.  Because of this, patient has been taking the 1 mg IV Dilaudid between oxycodone doses.  Patient denies any side effects from oxycodone such as constipation, nausea, vomiting,  itching, altered sensorium.  Patient has a remote history of chronic lower back pain and previous chronic opioid use, however has not been on opioid medication in several years.  We discussed changing the frequency of the oxycodone as well as adding a muscle relaxer, scheduling Tylenol and scheduling Toradol which patient is in agreement with.  We also discussed possibly starting gabapentin, however patient states she had a reaction to this in the past in which her hands became rather swollen which resolved after discontinuing gabapentin and would prefer to avoid this medication.    Current pain location(s): Pain Score: 8  Pain Location/Orientation: Orientation: Left, Location: Foot  Pain Scale: Pain Assessment Tool: 0-10  Current Analgesic regimen:  Acetaminophen 650 mg p.o. every 6 hours as needed mild pain.  Oxycodone 10 mg p.o. every 6 hours as needed severe pain.  Dilaudid 1 mg IV every 4 hours as needed breakthrough pain.  Toradol 15 mg IV every 6 hours as needed moderate pain.    Pain History: Remote history of chronic back pain, no longer present.  Pain Management Physician: None  I have reviewed the patient's controlled substance dispensing history in the Prescription Drug Monitoring Program in compliance with the [facility] regulations before prescribing any controlled substances.     Review of Systems   Musculoskeletal:  Positive for arthralgias.   All other systems reviewed and are negative.    I have reviewed the patient's PMH, PSH, Social History, Family History, Meds, and Allergies  Historical Information   Past Medical History:   Diagnosis Date    Advanced maternal age in multigravida, second trimester 11/30/2016    Transitioned From: Advanced maternal age in multigravida, first trimester      Back pain     used 2 percocet tid until current admission in 2/2017    Bone spur     in back per pt    Chronic hypertension with superimposed preeclampsia 02/14/2017    Depression     Gestational diabetes     insulin  dependent    Herniated cervical disc     Herniated lumbar intervertebral disc     Hx of degenerative disc disease     Obesity     Pre-eclampsia     Prior pregnancy with fetal demise     previous demise at 36 weeks     Past Surgical History:   Procedure Laterality Date    BACK SURGERY       SECTION      LAMINECTOMY      with disc removal, last assessed 16    OTHER SURGICAL HISTORY      Right ovary removal 2005 per pt recall at [facility] facilty    NC  DELIVERY ONLY N/A 2/15/2017    Procedure:  SECTION ();  Surgeon: Tito Umaña MD;  Location: [facility] ;  Service: Obstetrics    NC LIG/TRNSXJ FALOPIAN TUBE  DEL/ABDML SURG Left 2/15/2017    Procedure: LIGATION/COAGULATION TUBAL;  Surgeon: Tito Umaña MD;  Location: [facility] ;  Service: Obstetrics     Social History     Tobacco Use    Smoking status: Every Day     Current packs/day: 0.50     Types: Cigarettes    Smokeless tobacco: Never   Vaping Use    Vaping status: Never Used   Substance and Sexual Activity    Alcohol use: Never    Drug use: Not Currently    Sexual activity: Yes     Partners: Male     E-Cigarette/Vaping    E-Cigarette Use Never User      E-Cigarette/Vaping Substances    Nicotine No     THC No     CBD No     Flavoring No     Other No     Unknown No      Family History   Problem Relation Age of Onset    Diabetes Mother     COPD Mother     Heart disease Mother     Stroke Father     Heart disease Father      Social History     Tobacco Use    Smoking status: Every Day     Current packs/day: 0.50     Types: Cigarettes    Smokeless tobacco: Never   Vaping Use    Vaping status: Never Used   Substance and Sexual Activity    Alcohol use: Never    Drug use: Not Currently    Sexual activity: Yes     Partners: Male       Current Facility-Administered Medications:     acetaminophen (TYLENOL) tablet 975 mg, Q6H JORGE    enoxaparin (LOVENOX) subcutaneous injection 40 mg, Daily    hydrALAZINE (APRESOLINE)  injection 10 mg, Q6H PRN    HYDROmorphone (DILAUDID) injection 0.5 mg, Q4H PRN    insulin glargine (LANTUS) subcutaneous injection 10 Units 0.1 mL, HS    insulin lispro (HumALOG/ADMELOG) 100 units/mL subcutaneous injection 1-5 Units, TID AC **AND** Fingerstick Glucose (POCT), TID AC    insulin lispro (HumALOG/ADMELOG) 100 units/mL subcutaneous injection 1-5 Units, HS    insulin lispro (HumALOG/ADMELOG) 100 units/mL subcutaneous injection 3 Units, TID With Meals    ketorolac (TORADOL) injection 15 mg, Q6H JORGE    methocarbamol (ROBAXIN) tablet 500 mg, Q6H JORGE    nicotine (NICODERM CQ) 14 mg/24hr TD 24 hr patch 1 patch, Daily    ondansetron (ZOFRAN) injection 4 mg, Q6H PRN    oxyCODONE (ROXICODONE) immediate release tablet 10 mg, Q4H PRN  None     Codeine    Objective :  Temp:  [97.8 °F (36.6 °C)-98.9 °F (37.2 °C)] 98.9 °F (37.2 °C)  HR:  [] 89  BP: (123-145)/(74-86) 123/75  Resp:  [16-18] 16  SpO2:  [82 %-97 %] 94 %  O2 Device: None (Room air)    Physical Exam  Vitals and nursing note reviewed.   Constitutional:       General: She is awake. She is not in acute distress.     Appearance: She is not ill-appearing, toxic-appearing or diaphoretic.   Neurological:      Mental Status: She is alert and oriented to person, place, and time.      GCS: GCS eye subscore is 4. GCS verbal subscore is 5. GCS motor subscore is 6.   Psychiatric:         Attention and Perception: Attention normal.         Speech: Speech normal.         Behavior: Behavior normal. Behavior is cooperative.          Lab Results: I have reviewed the following results:  Estimated Creatinine Clearance: 107.1 mL/min (by C-G formula based on SCr of 0.64 mg/dL).  Lab Results   Component Value Date    WBC 7.44 12/17/2024    HGB 11.1 (L) 12/17/2024    HCT 35.4 12/17/2024     12/17/2024         Component Value Date/Time    K 4.4 12/17/2024 0438     12/17/2024 0438    CO2 27 12/17/2024 0438    BUN 21 12/17/2024 0438    CREATININE 0.64 12/17/2024  0438         Component Value Date/Time    CALCIUM 9.5 12/17/2024 0438    ALKPHOS 75 12/13/2024 1841    AST 10 (L) 12/13/2024 1841    ALT 11 12/13/2024 1841    TP 8.1 12/13/2024 1841    ALB 4.1 12/13/2024 1841       Imaging Results Review: No pertinent imaging studies reviewed.  Other Study Results Review: No additional pertinent studies reviewed.    Administrative Statements   I have spent a total time of 43 minutes in caring for this patient on the day of the visit/encounter including Risks and benefits of tx options, Instructions for management, Patient and family education, Importance of tx compliance, Risk factor reductions, Impressions, Counseling / Coordination of care, Documenting in the medical record, Reviewing / ordering tests, medicine, procedures  , Obtaining or reviewing history  , and Communicating with other healthcare professionals .    Administrative Statements

## 2024-12-18 LAB
ANION GAP SERPL CALCULATED.3IONS-SCNC: 8 MMOL/L (ref 4–13)
BASOPHILS # BLD AUTO: 0.02 THOUSANDS/ÂΜL (ref 0–0.1)
BASOPHILS NFR BLD AUTO: 0 % (ref 0–1)
BUN SERPL-MCNC: 31 MG/DL (ref 5–25)
CALCIUM SERPL-MCNC: 9.4 MG/DL (ref 8.4–10.2)
CHLORIDE SERPL-SCNC: 102 MMOL/L (ref 96–108)
CO2 SERPL-SCNC: 23 MMOL/L (ref 21–32)
CREAT SERPL-MCNC: 0.78 MG/DL (ref 0.6–1.3)
EOSINOPHIL # BLD AUTO: 0.09 THOUSAND/ÂΜL (ref 0–0.61)
EOSINOPHIL NFR BLD AUTO: 1 % (ref 0–6)
ERYTHROCYTE [DISTWIDTH] IN BLOOD BY AUTOMATED COUNT: 12.5 % (ref 11.6–15.1)
GFR SERPL CREATININE-BSD FRML MDRD: 90 ML/MIN/1.73SQ M
GLUCOSE SERPL-MCNC: 118 MG/DL (ref 65–140)
GLUCOSE SERPL-MCNC: 133 MG/DL (ref 65–140)
GLUCOSE SERPL-MCNC: 138 MG/DL (ref 65–140)
GLUCOSE SERPL-MCNC: 151 MG/DL (ref 65–140)
GLUCOSE SERPL-MCNC: 168 MG/DL (ref 65–140)
HCT VFR BLD AUTO: 35.5 % (ref 34.8–46.1)
HGB BLD-MCNC: 11 G/DL (ref 11.5–15.4)
IMM GRANULOCYTES # BLD AUTO: 0.02 THOUSAND/UL (ref 0–0.2)
IMM GRANULOCYTES NFR BLD AUTO: 0 % (ref 0–2)
LYMPHOCYTES # BLD AUTO: 1.85 THOUSANDS/ÂΜL (ref 0.6–4.47)
LYMPHOCYTES NFR BLD AUTO: 26 % (ref 14–44)
MCH RBC QN AUTO: 28.9 PG (ref 26.8–34.3)
MCHC RBC AUTO-ENTMCNC: 31 G/DL (ref 31.4–37.4)
MCV RBC AUTO: 93 FL (ref 82–98)
MONOCYTES # BLD AUTO: 0.4 THOUSAND/ÂΜL (ref 0.17–1.22)
MONOCYTES NFR BLD AUTO: 6 % (ref 4–12)
NEUTROPHILS # BLD AUTO: 4.86 THOUSANDS/ÂΜL (ref 1.85–7.62)
NEUTS SEG NFR BLD AUTO: 67 % (ref 43–75)
NRBC BLD AUTO-RTO: 0 /100 WBCS
PLATELET # BLD AUTO: 211 THOUSANDS/UL (ref 149–390)
PLATELET BLD QL SMEAR: ADEQUATE
PMV BLD AUTO: 10.9 FL (ref 8.9–12.7)
POTASSIUM SERPL-SCNC: 4.9 MMOL/L (ref 3.5–5.3)
RBC # BLD AUTO: 3.81 MILLION/UL (ref 3.81–5.12)
RBC MORPH BLD: NORMAL
SODIUM SERPL-SCNC: 133 MMOL/L (ref 135–147)
WBC # BLD AUTO: 7.24 THOUSAND/UL (ref 4.31–10.16)

## 2024-12-18 PROCEDURE — 99233 SBSQ HOSP IP/OBS HIGH 50: CPT

## 2024-12-18 PROCEDURE — 97162 PT EVAL MOD COMPLEX 30 MIN: CPT

## 2024-12-18 PROCEDURE — 82948 REAGENT STRIP/BLOOD GLUCOSE: CPT

## 2024-12-18 PROCEDURE — 85025 COMPLETE CBC W/AUTO DIFF WBC: CPT | Performed by: NURSE PRACTITIONER

## 2024-12-18 PROCEDURE — 80048 BASIC METABOLIC PNL TOTAL CA: CPT | Performed by: NURSE PRACTITIONER

## 2024-12-18 PROCEDURE — 99232 SBSQ HOSP IP/OBS MODERATE 35: CPT | Performed by: INTERNAL MEDICINE

## 2024-12-18 RX ORDER — HYDROMORPHONE HCL/PF 1 MG/ML
0.5 SYRINGE (ML) INJECTION EVERY 6 HOURS PRN
Status: DISCONTINUED | OUTPATIENT
Start: 2024-12-18 | End: 2024-12-20

## 2024-12-18 RX ORDER — IBUPROFEN 600 MG/1
600 TABLET, FILM COATED ORAL EVERY 6 HOURS SCHEDULED
Status: DISCONTINUED | OUTPATIENT
Start: 2024-12-18 | End: 2024-12-20

## 2024-12-18 RX ADMIN — METHOCARBAMOL TABLETS 500 MG: 500 TABLET, COATED ORAL at 01:27

## 2024-12-18 RX ADMIN — OXYCODONE HYDROCHLORIDE 10 MG: 10 TABLET ORAL at 08:28

## 2024-12-18 RX ADMIN — IBUPROFEN 600 MG: 600 TABLET, FILM COATED ORAL at 11:44

## 2024-12-18 RX ADMIN — METHOCARBAMOL TABLETS 500 MG: 500 TABLET, COATED ORAL at 11:45

## 2024-12-18 RX ADMIN — INSULIN LISPRO 3 UNITS: 100 INJECTION, SOLUTION INTRAVENOUS; SUBCUTANEOUS at 08:30

## 2024-12-18 RX ADMIN — KETOROLAC TROMETHAMINE 15 MG: 30 INJECTION, SOLUTION INTRAMUSCULAR at 06:26

## 2024-12-18 RX ADMIN — INSULIN LISPRO 1 UNITS: 100 INJECTION, SOLUTION INTRAVENOUS; SUBCUTANEOUS at 23:50

## 2024-12-18 RX ADMIN — ACETAMINOPHEN 975 MG: 325 TABLET ORAL at 18:12

## 2024-12-18 RX ADMIN — ENOXAPARIN SODIUM 40 MG: 40 INJECTION SUBCUTANEOUS at 08:29

## 2024-12-18 RX ADMIN — OXYCODONE HYDROCHLORIDE 10 MG: 10 TABLET ORAL at 12:33

## 2024-12-18 RX ADMIN — IBUPROFEN 600 MG: 600 TABLET, FILM COATED ORAL at 17:20

## 2024-12-18 RX ADMIN — INSULIN GLARGINE 10 UNITS: 100 INJECTION, SOLUTION SUBCUTANEOUS at 23:48

## 2024-12-18 RX ADMIN — METHOCARBAMOL TABLETS 500 MG: 500 TABLET, COATED ORAL at 17:20

## 2024-12-18 RX ADMIN — METHOCARBAMOL TABLETS 500 MG: 500 TABLET, COATED ORAL at 06:26

## 2024-12-18 RX ADMIN — INSULIN LISPRO 3 UNITS: 100 INJECTION, SOLUTION INTRAVENOUS; SUBCUTANEOUS at 17:20

## 2024-12-18 RX ADMIN — INSULIN LISPRO 3 UNITS: 100 INJECTION, SOLUTION INTRAVENOUS; SUBCUTANEOUS at 11:46

## 2024-12-18 RX ADMIN — HYDROMORPHONE HYDROCHLORIDE 0.5 MG: 1 INJECTION, SOLUTION INTRAMUSCULAR; INTRAVENOUS; SUBCUTANEOUS at 15:29

## 2024-12-18 RX ADMIN — ACETAMINOPHEN 975 MG: 325 TABLET ORAL at 01:27

## 2024-12-18 RX ADMIN — ACETAMINOPHEN 975 MG: 325 TABLET ORAL at 11:44

## 2024-12-18 RX ADMIN — INSULIN LISPRO 1 UNITS: 100 INJECTION, SOLUTION INTRAVENOUS; SUBCUTANEOUS at 11:46

## 2024-12-18 RX ADMIN — KETOROLAC TROMETHAMINE 15 MG: 30 INJECTION, SOLUTION INTRAMUSCULAR at 01:27

## 2024-12-18 RX ADMIN — OXYCODONE HYDROCHLORIDE 10 MG: 10 TABLET ORAL at 18:12

## 2024-12-18 RX ADMIN — ACETAMINOPHEN 975 MG: 325 TABLET ORAL at 06:26

## 2024-12-18 RX ADMIN — METHOCARBAMOL TABLETS 500 MG: 500 TABLET, COATED ORAL at 23:48

## 2024-12-18 RX ADMIN — IBUPROFEN 600 MG: 600 TABLET, FILM COATED ORAL at 23:48

## 2024-12-18 NOTE — ASSESSMENT & PLAN NOTE
Acetaminophen 975 mg p.o. every 6 hours scheduled.  Toradol 15 mg IV every 6 hours scheduled now discontinued  Trial transition to Ibuprofen 600 mg every 6 hours  Oxycodone 10 mg p.o. every 4 hours as needed severe pain.  Dilaudid decreased to 0.5 mg IV every 6 hours as needed breakthrough pain.  methocarbamol 500 mg p.o. every 6 hours scheduled.  Will avoid gabapentinoids as patient has had an adverse reaction to these in the past.  Suggest start bowel regimen while on opioid pain medication to avoid opioid-induced constipation.  Will add as needed Narcan in the event that opioid reversal becomes necessary.

## 2024-12-18 NOTE — PROGRESS NOTES
Administrative Statements   VIRTUAL CARE DOCUMENTATION:     1. This service was provided via Telemedicine using [employer] Kit     2. Parties in the room with patient during teleconsult Patient only    3. Confidentiality My office door was closed     4. Participants No one else was in the room    5. Patient acknowledged consent and understanding of privacy and security of the  Telemedicine consult. I informed the patient that I have reviewed their record in Epic and presented the opportunity for them to ask any questions regarding the visit today.  The patient agreed to participate.    6. I have spent a total time of 25 minutes in caring for this patient on the day of the visit/encounter including Instructions for management, not including the time spent for establishing the audio/video connection.        Progress Note - Acute Pain   Name: Lizzy Acuna 49 y.o. female I MRN: [id number]  Unit/Bed#: -01 I Date of Admission: 12/13/2024   Date of Service: 12/18/2024 I Hospital Day: 5    Assessment & Plan  Tobacco abuse  Smokers have been shown to require higher doses of opioid pain medication and are more likely to develop chronic pain.  Encourage smoking cessation.    Cellulitis of left foot  Acetaminophen 975 mg p.o. every 6 hours scheduled.  Toradol 15 mg IV every 6 hours scheduled now discontinued  Trial transition to Ibuprofen 600 mg every 6 hours  Oxycodone 10 mg p.o. every 4 hours as needed severe pain.  Dilaudid decreased to 0.5 mg IV every 6 hours as needed breakthrough pain.  methocarbamol 500 mg p.o. every 6 hours scheduled.  Will avoid gabapentinoids as patient has had an adverse reaction to these in the past.  Suggest start bowel regimen while on opioid pain medication to avoid opioid-induced constipation.  Will add as needed Narcan in the event that opioid reversal becomes necessary.    APS will continue to follow. Please contact Acute Pain Service - via [employer] from [phone] with additional  questions or concerns. See [facility] or [facility] for additional contacts and after hours information.     Subjective   Lizzy Acuna is a 49 y.o. female who presents with left foot cellulitis and gangrene of the left fourth toe.  Patient gradually developed pain and redness in her left foot following a minor trauma several weeks ago.  She was admitted several days ago with what appears to be cellulitis of the left foot and a gangrenous left fourth toe.  She was seen by podiatry who plan to allow the wound to demarcate and follow-up as an outpatient for probable surgery.  Patient has complained of pain which has not been well-controlled and therefore acute pain service was consulted.     Pain History  Current pain location(s):  Pain Score: 8  Pain Location/Orientation: Orientation: Lower, Location: Foot  Pain Scale: Pain Assessment Tool: 0-10  24 hour history: Patient reports improvement in pain overall following adjustments to her medication regimen yesterday. Feels oxycodone has been beneficial in controlling her pain and lasting approximately 4 hours. She expressed concern as she has been receiving IV toradol and knows she cannot utilize IV medications at home. We discussed trialing a transition to Ibuprofen and she was in agreement. Reports that otherwise she is feeling well and offers no other complaints.     Opioid requirement previous 24 hours:  Oxycodone 10 mg x 3 doses  IV Dilaudid 1 mg x 1 dose    Meds/Allergies   all current active meds have been reviewed, current meds:   Current Facility-Administered Medications:     acetaminophen (TYLENOL) tablet 975 mg, Q6H JORGE    enoxaparin (LOVENOX) subcutaneous injection 40 mg, Daily    hydrALAZINE (APRESOLINE) injection 10 mg, Q6H PRN    HYDROmorphone (DILAUDID) injection 0.5 mg, Q6H PRN    ibuprofen (MOTRIN) tablet 600 mg, Q6H JORGE    insulin glargine (LANTUS) subcutaneous injection 10 Units 0.1 mL, HS    insulin lispro (HumALOG/ADMELOG) 100 units/mL subcutaneous  injection 1-5 Units, TID AC **AND** Fingerstick Glucose (POCT), TID AC    insulin lispro (HumALOG/ADMELOG) 100 units/mL subcutaneous injection 1-5 Units, HS    insulin lispro (HumALOG/ADMELOG) 100 units/mL subcutaneous injection 3 Units, TID With Meals    methocarbamol (ROBAXIN) tablet 500 mg, Q6H JORGE    naloxone (NARCAN) 0.04 mg/mL syringe 0.04 mg, Q1MIN PRN    nicotine (NICODERM CQ) 14 mg/24hr TD 24 hr patch 1 patch, Daily    ondansetron (ZOFRAN) injection 4 mg, Q6H PRN    oxyCODONE (ROXICODONE) immediate release tablet 10 mg, Q4H PRN    senna-docusate sodium (SENOKOT S) 8.6-50 mg per tablet 1 tablet, HS, and PTA meds:   None     Allergies   Allergen Reactions    Codeine      aggression     Objective :  Temp:  [97.9 °F (36.6 °C)-98.9 °F (37.2 °C)] 97.9 °F (36.6 °C)  HR:  [76-89] 76  BP: (123-145)/(69-85) 142/69  Resp:  [17-18] 18  SpO2:  [92 %-94 %] 94 %    Physical Exam  Vitals reviewed.   Constitutional:       General: She is not in acute distress.  HENT:      Head: Normocephalic and atraumatic.   Pulmonary:      Effort: Pulmonary effort is normal. No respiratory distress.   Musculoskeletal:         General: Normal range of motion.      Cervical back: Normal range of motion.   Neurological:      Mental Status: She is alert and oriented to person, place, and time. Mental status is at baseline.   Psychiatric:         Mood and Affect: Mood normal.         Behavior: Behavior normal.            Lab Results: I have reviewed the following results:  Estimated Creatinine Clearance: 87.9 mL/min (by C-G formula based on SCr of 0.78 mg/dL).  Lab Results   Component Value Date    WBC 7.24 12/18/2024    HGB 11.0 (L) 12/18/2024    HCT 35.5 12/18/2024     12/18/2024         Component Value Date/Time    K 4.9 12/18/2024 0632     12/18/2024 0632    CO2 23 12/18/2024 0632    BUN 31 (H) 12/18/2024 0632    CREATININE 0.78 12/18/2024 0632         Component Value Date/Time    CALCIUM 9.4 12/18/2024 0632    ALKPHOS 75  12/13/2024 1841    AST 10 (L) 12/13/2024 1841    ALT 11 12/13/2024 1841    TP 8.1 12/13/2024 1841    ALB 4.1 12/13/2024 1841       Imaging Results Review: No pertinent imaging studies reviewed.  Other Study Results Review: No additional pertinent studies reviewed.

## 2024-12-18 NOTE — PROGRESS NOTES
Progress Note - Hospitalist   Name: Lizzy Acuna 49 y.o. female I MRN: [id number]  Unit/Bed#: MS 213[facility] I Date of Admission: 12/13/2024   Date of Service: 12/18/2024 I Hospital Day: 5    Assessment & Plan  Gangrene of toe of left foot (HCC)  History of foot injury approximately 10 days ago, has dark discoloration of distal aspect of fourth left toe with pain. She was seen by podiatry and sent to ER for admission  Podiatry input appreciated.  Plan for demarcation of stable dry gangrene in outpatient setting  Vascular surgery input appreciated.  No intervention recommended at this time  Blood cultures: No growth to date  ESR 83  CRP 13.6  LEADs reviewed  Possible discharge planning 24 to 48 hours  Pain management following due to difficult to control pain  Cellulitis of left foot  Seen by podiatry and sent to ED for further evaluation  Procalcitonin 0.07->0.07  Received IV cefepime and vancomycin, now monitoring off antibiotics    Diabetes mellitus type 2 with complications ([facility])  Hemoglobin A1c 10.3 on 12/13/2024   Initiated on Lantus 10 units SQ at bedtime with 5 units of Humalog SQ with meals 3 times daily  Continue sliding scale insulin coverage  Improved glycemic control will be important for wound healing  Endocrinology consultation appreciated  Tobacco abuse  Smokes half pack per day  Continue nicotine patch 14 mg  Smoking cessation recommended  Obesity (BMI 30.0-34.9)  BMI 34  Healthy diet and lifestyle modification recommended  Hyponatremia  Improving    VTE Pharmacologic Prophylaxis: VTE Score: 3 Moderate Risk (Score 3-4) - Pharmacological DVT Prophylaxis Ordered: enoxaparin (Lovenox).    Mobility:   Basic Mobility Inpatient Raw Score: 23  JH-HLM Goal: 7: Walk 25 feet or more  JH-HLM Achieved: 7: Walk 25 feet or more  JH-HLM Goal achieved. Continue to encourage appropriate mobility.    Patient Centered Rounds: I performed bedside rounds with nursing staff today.   Discussions with Specialists or Other Care  Team Provider: yes    Education and Discussions with Family / Patient:  Updated patient regarding plan of care.     Current Length of Stay: 5 day(s)  Current Patient Status: Inpatient   Certification Statement: The patient will continue to require additional inpatient hospital stay due to foot infection  Discharge Plan: Anticipate discharge tomorrow to home.    Code Status: Level 1 - Full Code    Subjective   Patient's pain better controlled today    Objective :  Temp:  [97.9 °F (36.6 °C)-98.9 °F (37.2 °C)] 97.9 °F (36.6 °C)  HR:  [76-89] 82  BP: (142-152)/(69-85) 152/84  Resp:  [17-18] 18  SpO2:  [92 %-96 %] 96 %    Body mass index is 35.8 kg/m².     Input and Output Summary (last 24 hours):     Intake/Output Summary (Last 24 hours) at 12/18/2024 1743  Last data filed at 12/18/2024 1100  Gross per 24 hour   Intake 760 ml   Output 550 ml   Net 210 ml       Physical Exam  Constitutional:       General: She is not in acute distress.     Appearance: She is obese.   HENT:      Head: Normocephalic and atraumatic.      Nose: Nose normal.      Mouth/Throat:      Mouth: Mucous membranes are moist.   Eyes:      Extraocular Movements: Extraocular movements intact.      Conjunctiva/sclera: Conjunctivae normal.   Cardiovascular:      Rate and Rhythm: Normal rate and regular rhythm.   Pulmonary:      Effort: Pulmonary effort is normal. No respiratory distress.   Abdominal:      Palpations: Abdomen is soft.      Tenderness: There is no abdominal tenderness.   Musculoskeletal:         General: Normal range of motion.      Cervical back: Normal range of motion and neck supple.   Skin:     General: Skin is warm and dry.      Comments: Dry gangrene noted of left fourth toe   Neurological:      General: No focal deficit present.      Mental Status: She is alert. Mental status is at baseline.      Cranial Nerves: No cranial nerve deficit.   Psychiatric:         Mood and Affect: Mood normal.         Behavior: Behavior normal.            Lines/Drains:        Lab Results: I have reviewed the following results:   Results from last 7 days   Lab Units 12/18/24  0804   WBC Thousand/uL 7.24   HEMOGLOBIN g/dL 11.0*   HEMATOCRIT % 35.5   PLATELETS Thousands/uL 211   SEGS PCT % 67   LYMPHO PCT % 26   MONO PCT % 6   EOS PCT % 1     Results from last 7 days   Lab Units 12/18/24  0632 12/14/24  0432 12/13/24  1841   SODIUM mmol/L 133*   < > 131*   POTASSIUM mmol/L 4.9   < > 3.8   CHLORIDE mmol/L 102   < > 91*   CO2 mmol/L 23   < > 32   BUN mg/dL 31*   < > 19   CREATININE mg/dL 0.78   < > 0.68   ANION GAP mmol/L 8   < > 8   CALCIUM mg/dL 9.4   < > 9.8   ALBUMIN g/dL  --   --  4.1   TOTAL BILIRUBIN mg/dL  --   --  0.29   ALK PHOS U/L  --   --  75   ALT U/L  --   --  11   AST U/L  --   --  10*   GLUCOSE RANDOM mg/dL 133   < > 276*    < > = values in this interval not displayed.     Results from last 7 days   Lab Units 12/13/24  1841   INR  0.82*     Results from last 7 days   Lab Units 12/18/24  1545 12/18/24  1103 12/18/24  0736 12/17/24  2114 12/17/24  1555 12/17/24  1100 12/17/24  0712 12/16/24  2045 12/16/24  1625 12/16/24  1122 12/16/24  0659 12/15/24  2105   POC GLUCOSE mg/dl 118 151* 138 178* 120 116 125 152* 93 106 118 117     Results from last 7 days   Lab Units 12/13/24  1841   HEMOGLOBIN A1C % 10.3*     Results from last 7 days   Lab Units 12/14/24  0432 12/13/24  1841   LACTIC ACID mmol/L  --  1.0   PROCALCITONIN ng/ml 0.07 0.07       Recent Cultures (last 7 days):   Results from last 7 days   Lab Units 12/13/24  1841 12/13/24  1832   BLOOD CULTURE  No Growth After 4 Days. No Growth After 4 Days.       Imaging Results Review: No pertinent imaging studies reviewed.  Other Study Results Review: No additional pertinent studies reviewed.    Last 24 Hours Medication List:     Current Facility-Administered Medications:     acetaminophen (TYLENOL) tablet 975 mg, Q6H JORGE    enoxaparin (LOVENOX) subcutaneous injection 40 mg, Daily    hydrALAZINE  (APRESOLINE) injection 10 mg, Q6H PRN    HYDROmorphone (DILAUDID) injection 0.5 mg, Q6H PRN    ibuprofen (MOTRIN) tablet 600 mg, Q6H JORGE    insulin glargine (LANTUS) subcutaneous injection 10 Units 0.1 mL, HS    insulin lispro (HumALOG/ADMELOG) 100 units/mL subcutaneous injection 1-5 Units, TID AC **AND** Fingerstick Glucose (POCT), TID AC    insulin lispro (HumALOG/ADMELOG) 100 units/mL subcutaneous injection 1-5 Units, HS    insulin lispro (HumALOG/ADMELOG) 100 units/mL subcutaneous injection 3 Units, TID With Meals    methocarbamol (ROBAXIN) tablet 500 mg, Q6H JORGE    naloxone (NARCAN) 0.04 mg/mL syringe 0.04 mg, Q1MIN PRN    nicotine (NICODERM CQ) 14 mg/24hr TD 24 hr patch 1 patch, Daily    ondansetron (ZOFRAN) injection 4 mg, Q6H PRN    oxyCODONE (ROXICODONE) immediate release tablet 10 mg, Q4H PRN    senna-docusate sodium (SENOKOT S) 8.6-50 mg per tablet 1 tablet, HS    Administrative Statements   Today, Patient Was Seen By: Luz Maria Shore MD      **Please Note: This note may have been constructed using a voice recognition system.**

## 2024-12-18 NOTE — PHYSICAL THERAPY NOTE
Physical Therapy Cancellation Note     12/18/24 1100   PT Last Visit   PT Visit Date 12/18/24   Note Type   Note type Cancelled Session   Cancel Reasons Other   Additional Comments telehealth appointment     Tiffanie Chatterjee

## 2024-12-18 NOTE — OCCUPATIONAL THERAPY NOTE
Occupational Therapy         Patient Name: Lizzy Acuna  Today's Date: 12/18/2024 12/18/24 1108   Note Type   Note type Cancelled Session;Evaluation   Cancel Reasons Other   Additional Comments telehealth appointment     Mary Avila

## 2024-12-18 NOTE — PHYSICAL THERAPY NOTE
PHYSICAL THERAPY EVALUATION  NAME:  Lizzy Acuna  DATE: 24    AGE:   49 y.o.  Mrn:   [id number]  ADMIT DX:  Cellulitis [L03.90]  Wound infection [T14.8XXA, L08.9]  Gangrene of toe of left foot (HCC) [I96]  Problem List:   Patient Active Problem List   Diagnosis    Obesity (BMI 30.0-34.9)    Tobacco abuse    Chronic low back pain    Chronic sciatica    Chronic, continuous use of opioids    Diabetes mellitus type 2 with complications (HCC)    Hyponatremia    Cellulitis of left foot    Gangrene of toe of left foot (HCC)       Past Medical History  Past Medical History:   Diagnosis Date    Advanced maternal age in multigravida, second trimester 2016    Transitioned From: Advanced maternal age in multigravida, first trimester      Back pain     used 2 percocet tid until current admission in 2017    Bone spur     in back per pt    Chronic hypertension with superimposed preeclampsia 2017    Depression     Gestational diabetes     insulin dependent    Herniated cervical disc     Herniated lumbar intervertebral disc     Hx of degenerative disc disease     Obesity     Pre-eclampsia     Prior pregnancy with fetal demise     previous demise at 36 weeks       Past Surgical History  Past Surgical History:   Procedure Laterality Date    BACK SURGERY       SECTION      LAMINECTOMY      with disc removal, last assessed 16    OTHER SURGICAL HISTORY      Right ovary removal 2005 per pt recall at [facility]    MO  DELIVERY ONLY N/A 2/15/2017    Procedure:  SECTION ();  Surgeon: Tito Umaña MD;  Location: [facility] ;  Service: Obstetrics    MO LIG/TRNSXJ FALOPIAN TUBE  DEL/ABDML SURG Left 2/15/2017    Procedure: LIGATION/COAGULATION TUBAL;  Surgeon: Tito Umaña MD;  Location: [facility] ;  Service: Obstetrics       Length Of Stay: 5  Performed at least 2 patient identifiers during session: Name and Epic photo       24 1329   PT Last Visit   PT  Visit Date 12/18/24   Note Type   Note type Evaluation   Pain Assessment   Pain Assessment Tool 0-10   Pain Score 8   Pain Location/Orientation Orientation: Left;Location: Foot   Pain Onset/Description Onset: Ongoing   Patient's Stated Pain Goal No pain   Hospital Pain Intervention(s) Medication (See MAR);Ambulation/increased activity   Restrictions/Precautions   Weight Bearing Precautions Per Order Yes   LLE Weight Bearing Per Order WBAT   Other Precautions Fall Risk;Pain   Home Living   Type of Home House   Home Layout Two level;Performs ADLs on one level;Able to live on main level with bedroom/bathroom;Stairs to enter with rails   Bathroom Shower/Tub Walk-in shower   Bathroom Toilet Standard   Bathroom Equipment Shower chair   Bathroom Accessibility Accessible   Home Equipment Walker   Prior Function   Level of [place] Independent with ADLs;Independent with functional mobility;Independent with IADLS   Lives With Family   Receives Help From Family   IADLs Independent with driving;Independent with meal prep;Independent with medication management   Falls in the last 6 months 0   Vocational Part time employment  ([employer])   Cognition   Overall Cognitive Status WFL   Arousal/Participation Alert   Orientation Level Oriented X4   Memory Within functional limits   Following Commands Follows all commands and directions without difficulty   RLE Assessment   RLE Assessment WFL   LLE Assessment   LLE Assessment   (increased pain and decreased strength)   Vision-Basic Assessment   Current Vision No visual deficits   Coordination   Sensation WFL   Bed Mobility   Supine to Sit 6  Modified independent   Additional items Bedrails;HOB elevated   Sit to Supine 6  Modified independent   Additional items HOB elevated;Bedrails   Transfers   Sit to Stand 6  Modified independent   Additional items Armrests   Stand to Sit 6  Modified independent   Additional items Armrests   Ambulation/Elevation   Gait pattern  Improper Weight shift;Decreased foot clearance;Decreased L stance   Gait Assistance 6  Modified independent   Additional items Verbal cues   Assistive Device Rolling walker   Distance 25ft   Balance   Static Sitting Good   Dynamic Sitting Good   Static Standing Fair +   Dynamic Standing Fair +   Ambulatory Fair +   Activity Tolerance   Activity Tolerance Patient tolerated treatment well;Patient limited by pain   Assessment   Prognosis Good   Problem List Decreased strength;Pain   Assessment Pt is 49 y.o. female seen for PT evaluation s/p admit to [facility] on 12/13/2024 w/ Gangrene of toe of left foot (HCC). PT consulted to assess pt's functional mobility and d/c needs. Order placed for PT eval and tx, w/ WBAT LLE order. Pt agreeable to PT  session upon arrival, pt found supine in bed.  Patient was independent w/ all functional mobility w/ RW.  Pt presents at [facility] with transfers, ambulation, and bed mobility.  From PT/mobility standpoint, recommendation at time of d/c would be anticipate no needs/resources. Upon conclusion pt supine in bed and with all needs in reach. D/c PT services at this time. Complexity: Comorbidities affecting pt's physical performance at time of assessment include: DM, obesity, and cellulitis and gangrene . Personal factors affecting pt at time of IE include: limited mobility, limited insight into impairments, ambulating with assistive device, and steps to enter home. Please find objective findings from PT assessment regarding body systems outlined above with impairments and limitations including weakness and pain.  Pt's clinical presentation is currently unstable/unpredictable seen in pt's presentation of abnormal renal values, abnormal H&H, abnormal sodium levels, and abnormal blood sugar levels. The patient's AM-PAC Basic Mobility Inpatient Short Form Raw Score is 24.  A Raw score of greater than 16 suggests the patient may benefit from discharge to home. Please also refer to the  recommendation of the Physical Therapist for safe discharge planning. RN verbalized pt appropriate for PT session.   Goals   Patient Goals To rest   Plan   Treatment/Interventions Functional transfer training;LE strengthening/ROM;Elevations;Therapeutic exercise;Endurance training;Gait training   PT Frequency   (dc)   AM-PAC Basic Mobility Inpatient   Turning in Flat Bed Without Bedrails 4   Lying on Back to Sitting on Edge of Flat Bed Without Bedrails 4   Moving Bed to Chair 4   Standing Up From Chair Using Arms 4   Walk in Room 4   Climb 3-5 Stairs With Railing 3   Basic Mobility Inpatient Raw Score 23   Basic Mobility Standardized Score 50.88   [facility] Highest Level Of Mobility   JH-HLM Goal 7: Walk 25 feet or more   JH-HLM Achieved 7: Walk 25 feet or more        Time In: 1329  Time Out: 1342  Total Evaluation Minutes: 13    Riley Dean, PT

## 2024-12-18 NOTE — PROGRESS NOTES
Progress Note - Hospitalist   Name: Lizzy Acuna 49 y.o. female I MRN: [id number]  Unit/Bed#: -Sander I Date of Admission: 12/13/2024   Date of Service: 12/17/2024 I Hospital Day: 4    Assessment & Plan  Gangrene of toe of left foot (HCC)  History of foot injury approximately 10 days ago, has dark discoloration of distal aspect of fourth left toe with pain. She was seen by podiatry and sent to ER for admission  Podiatry input appreciated.  Plan for demarcation of stable dry gangrene in outpatient setting  Vascular surgery input appreciated.  No intervention recommended at this time  Blood cultures: No growth x 48 hours  ESR 83  CRP 13.6  LEADs reviewed  Possible discharge planning 24 to 48 hours  Still experiencing significant pain despite pain medicine, consulted acute pain service  Cellulitis of left foot  Seen by podiatry and sent to ED for further evaluation  Procalcitonin 0.07->0.07  Received IV cefepime and vancomycin, now monitoring off antibiotics    Diabetes mellitus type 2 with complications ([facility])  Hemoglobin A1c 10.3 on 12/13/2024   Initiated on Lantus 10 units SQ at bedtime with 5 units of Humalog SQ with meals 3 times daily  Continue sliding scale insulin coverage  Improved glycemic control will be important for wound healing  Endocrinology consultation appreciated  Tobacco abuse  Smokes half pack per day  Continue nicotine patch 14 mg  Smoking cessation recommended  Obesity (BMI 30.0-34.9)  BMI 34  Healthy diet and lifestyle modification recommended  Hyponatremia  Pseudohyponatremia - Sodium 135 with glucose correction    VTE Pharmacologic Prophylaxis: VTE Score: 3 Moderate Risk (Score 3-4) - Pharmacological DVT Prophylaxis Ordered: enoxaparin (Lovenox).    Mobility:   Basic Mobility Inpatient Raw Score: 23  JH-HLM Goal: 7: Walk 25 feet or more  JH-HLM Achieved: 7: Walk 25 feet or more  JH-HLM Goal achieved. Continue to encourage appropriate mobility.    Patient Centered Rounds: I performed bedside  rounds with nursing staff today.   Discussions with Specialists or Other Care Team Provider: Podiatry, acute pain    Education and Discussions with Family / Patient: Patient declined call to .     Current Length of Stay: 4 day(s)  Current Patient Status: Inpatient   Certification Statement: The patient will continue to require additional inpatient hospital stay due to acute pain, care coordination.  Discharge Plan: Anticipate discharge in 24-48 hrs to home.    Code Status: Level 1 - Full Code    Subjective   Patient evaluated at bedside.  She is crying.  She says the pain medicine works at first but then needs away fast.  She is upset about the prospect of losing her toes and having a procedure as outpatient.  She was reassured as able.    Objective :  Temp:  [97.8 °F (36.6 °C)-98.9 °F (37.2 °C)] 98.9 °F (37.2 °C)  HR:  [] 89  BP: (123-145)/(74-86) 123/75  Resp:  [16-18] 16  SpO2:  [82 %-97 %] 94 %  O2 Device: None (Room air)    Body mass index is 35.8 kg/m².     Input and Output Summary (last 24 hours):     Intake/Output Summary (Last 24 hours) at 12/17/2024 2038  Last data filed at 12/17/2024 1755  Gross per 24 hour   Intake 860 ml   Output 1200 ml   Net -340 ml       Physical Exam  Vitals and nursing note reviewed.   Constitutional:       General: She is not in acute distress.  HENT:      Head: Normocephalic and atraumatic.      Nose: Nose normal.      Mouth/Throat:      Mouth: Mucous membranes are moist.      Pharynx: Oropharynx is clear.   Eyes:      Pupils: Pupils are equal, round, and reactive to light.   Cardiovascular:      Rate and Rhythm: Normal rate and regular rhythm.      Pulses: Normal pulses.      Heart sounds: Normal heart sounds.   Pulmonary:      Effort: Pulmonary effort is normal. No respiratory distress.      Breath sounds: Normal breath sounds.   Abdominal:      General: Bowel sounds are normal.      Palpations: Abdomen is soft.      Tenderness: There is no abdominal  tenderness.   Musculoskeletal:      Cervical back: Neck supple.      Right lower leg: No edema.      Left lower leg: No edema.      Comments: Left fourth toe necrotic.  Left fifth toe dusky, along with duskiness at bases of left fourth fifth and third toes   Skin:     General: Skin is warm and dry.      Capillary Refill: Capillary refill takes less than 2 seconds.   Neurological:      General: No focal deficit present.      Mental Status: She is alert and oriented to person, place, and time.             Lab Results: I have reviewed the following results:   Results from last 7 days   Lab Units 12/17/24  0438   WBC Thousand/uL 7.44   HEMOGLOBIN g/dL 11.1*   HEMATOCRIT % 35.4   PLATELETS Thousands/uL 171   SEGS PCT % 68   LYMPHO PCT % 26   MONO PCT % 5   EOS PCT % 1     Results from last 7 days   Lab Units 12/17/24  0438 12/14/24  0432 12/13/24  1841   SODIUM mmol/L 133*   < > 131*   POTASSIUM mmol/L 4.4   < > 3.8   CHLORIDE mmol/L 100   < > 91*   CO2 mmol/L 27   < > 32   BUN mg/dL 21   < > 19   CREATININE mg/dL 0.64   < > 0.68   ANION GAP mmol/L 6   < > 8   CALCIUM mg/dL 9.5   < > 9.8   ALBUMIN g/dL  --   --  4.1   TOTAL BILIRUBIN mg/dL  --   --  0.29   ALK PHOS U/L  --   --  75   ALT U/L  --   --  11   AST U/L  --   --  10*   GLUCOSE RANDOM mg/dL 145*   < > 276*    < > = values in this interval not displayed.     Results from last 7 days   Lab Units 12/13/24  1841   INR  0.82*     Results from last 7 days   Lab Units 12/17/24  1555 12/17/24  1100 12/17/24  0712 12/16/24  2045 12/16/24  1625 12/16/24  1122 12/16/24  0659 12/15/24  2105 12/15/24  1545 12/15/24  1122 12/15/24  0712 12/14/24  2055   POC GLUCOSE mg/dl 120 116 125 152* 93 106 118 117 110 137 143* 152*     Results from last 7 days   Lab Units 12/13/24  1841   HEMOGLOBIN A1C % 10.3*     Results from last 7 days   Lab Units 12/14/24  0432 12/13/24  1841   LACTIC ACID mmol/L  --  1.0   PROCALCITONIN ng/ml 0.07 0.07       Recent Cultures (last 7 days):    Results from last 7 days   Lab Units 12/13/24  1841 12/13/24  1832   BLOOD CULTURE  No Growth at 72 hrs. No Growth at 72 hrs.       Last 24 Hours Medication List:     Current Facility-Administered Medications:     acetaminophen (TYLENOL) tablet 975 mg, Q6H JORGE    enoxaparin (LOVENOX) subcutaneous injection 40 mg, Daily    hydrALAZINE (APRESOLINE) injection 10 mg, Q6H PRN    HYDROmorphone (DILAUDID) injection 0.5 mg, Q4H PRN    insulin glargine (LANTUS) subcutaneous injection 10 Units 0.1 mL, HS    insulin lispro (HumALOG/ADMELOG) 100 units/mL subcutaneous injection 1-5 Units, TID AC **AND** Fingerstick Glucose (POCT), TID AC    insulin lispro (HumALOG/ADMELOG) 100 units/mL subcutaneous injection 1-5 Units, HS    insulin lispro (HumALOG/ADMELOG) 100 units/mL subcutaneous injection 3 Units, TID With Meals    ketorolac (TORADOL) injection 15 mg, Q6H JORGE    methocarbamol (ROBAXIN) tablet 500 mg, Q6H JORGE    naloxone (NARCAN) 0.04 mg/mL syringe 0.04 mg, Q1MIN PRN    nicotine (NICODERM CQ) 14 mg/24hr TD 24 hr patch 1 patch, Daily    ondansetron (ZOFRAN) injection 4 mg, Q6H PRN    oxyCODONE (ROXICODONE) immediate release tablet 10 mg, Q4H PRN    senna-docusate sodium (SENOKOT S) 8.6-50 mg per tablet 1 tablet, HS    Administrative Statements   Today, Patient Was Seen By: NAOMY Helton  I have spent a total time of 50 minutes in caring for this patient on the day of the visit/encounter including Diagnostic results, Prognosis, Risks and benefits of tx options, Instructions for management, Patient and family education, Importance of tx compliance, Risk factor reductions, Impressions, Counseling / Coordination of care, Documenting in the medical record, Reviewing / ordering tests, medicine, procedures  , Obtaining or reviewing history  , and Communicating with other healthcare professionals .    **Please Note: This note may have been constructed using a voice recognition system.**

## 2024-12-18 NOTE — ASSESSMENT & PLAN NOTE
History of foot injury approximately 10 days ago, has dark discoloration of distal aspect of fourth left toe with pain. She was seen by podiatry and sent to ER for admission  Podiatry input appreciated.  Plan for demarcation of stable dry gangrene in outpatient setting  Vascular surgery input appreciated.  No intervention recommended at this time  Blood cultures: No growth to date  ESR 83  CRP 13.6  LEADs reviewed  Possible discharge planning 24 to 48 hours  Pain management following due to difficult to control pain

## 2024-12-18 NOTE — ASSESSMENT & PLAN NOTE
Hemoglobin A1c 10.3 on 12/13/2024   Initiated on Lantus 10 units SQ at bedtime with 5 units of Humalog SQ with meals 3 times daily  Continue sliding scale insulin coverage  Improved glycemic control will be important for wound healing  Endocrinology consultation appreciated

## 2024-12-18 NOTE — ASSESSMENT & PLAN NOTE
History of foot injury approximately 10 days ago, has dark discoloration of distal aspect of fourth left toe with pain. She was seen by podiatry and sent to ER for admission  Podiatry input appreciated.  Plan for demarcation of stable dry gangrene in outpatient setting  Vascular surgery input appreciated.  No intervention recommended at this time  Blood cultures: No growth x 48 hours  ESR 83  CRP 13.6  LEADs reviewed  Possible discharge planning 24 to 48 hours  Still experiencing significant pain despite pain medicine, consulted acute pain service

## 2024-12-19 LAB
BACTERIA BLD CULT: NORMAL
BACTERIA BLD CULT: NORMAL
GLUCOSE SERPL-MCNC: 132 MG/DL (ref 65–140)
GLUCOSE SERPL-MCNC: 146 MG/DL (ref 65–140)
GLUCOSE SERPL-MCNC: 151 MG/DL (ref 65–140)
GLUCOSE SERPL-MCNC: 211 MG/DL (ref 65–140)

## 2024-12-19 PROCEDURE — 99232 SBSQ HOSP IP/OBS MODERATE 35: CPT | Performed by: INTERNAL MEDICINE

## 2024-12-19 PROCEDURE — 82948 REAGENT STRIP/BLOOD GLUCOSE: CPT

## 2024-12-19 RX ORDER — HYDROMORPHONE HYDROCHLORIDE 4 MG/1
4 TABLET ORAL EVERY 4 HOURS PRN
Refills: 0 | Status: DISCONTINUED | OUTPATIENT
Start: 2024-12-19 | End: 2024-12-24

## 2024-12-19 RX ORDER — HYDROMORPHONE HYDROCHLORIDE 2 MG/1
2 TABLET ORAL EVERY 4 HOURS PRN
Refills: 0 | Status: DISCONTINUED | OUTPATIENT
Start: 2024-12-19 | End: 2024-12-24

## 2024-12-19 RX ADMIN — ACETAMINOPHEN 975 MG: 325 TABLET ORAL at 23:12

## 2024-12-19 RX ADMIN — ACETAMINOPHEN 975 MG: 325 TABLET ORAL at 17:00

## 2024-12-19 RX ADMIN — OXYCODONE HYDROCHLORIDE 10 MG: 10 TABLET ORAL at 08:39

## 2024-12-19 RX ADMIN — IBUPROFEN 600 MG: 600 TABLET, FILM COATED ORAL at 06:25

## 2024-12-19 RX ADMIN — HYDROMORPHONE HYDROCHLORIDE 0.5 MG: 1 INJECTION, SOLUTION INTRAMUSCULAR; INTRAVENOUS; SUBCUTANEOUS at 10:29

## 2024-12-19 RX ADMIN — HYDROMORPHONE HYDROCHLORIDE 2 MG: 2 TABLET ORAL at 12:21

## 2024-12-19 RX ADMIN — METHOCARBAMOL TABLETS 500 MG: 500 TABLET, COATED ORAL at 23:12

## 2024-12-19 RX ADMIN — OXYCODONE HYDROCHLORIDE 10 MG: 10 TABLET ORAL at 00:42

## 2024-12-19 RX ADMIN — METHOCARBAMOL TABLETS 500 MG: 500 TABLET, COATED ORAL at 11:38

## 2024-12-19 RX ADMIN — METHOCARBAMOL TABLETS 500 MG: 500 TABLET, COATED ORAL at 17:00

## 2024-12-19 RX ADMIN — INSULIN LISPRO 2 UNITS: 100 INJECTION, SOLUTION INTRAVENOUS; SUBCUTANEOUS at 11:38

## 2024-12-19 RX ADMIN — INSULIN LISPRO 3 UNITS: 100 INJECTION, SOLUTION INTRAVENOUS; SUBCUTANEOUS at 11:38

## 2024-12-19 RX ADMIN — ENOXAPARIN SODIUM 40 MG: 40 INJECTION SUBCUTANEOUS at 08:41

## 2024-12-19 RX ADMIN — HYDROMORPHONE HYDROCHLORIDE 4 MG: 4 TABLET ORAL at 21:23

## 2024-12-19 RX ADMIN — IBUPROFEN 600 MG: 600 TABLET, FILM COATED ORAL at 17:00

## 2024-12-19 RX ADMIN — HYDROMORPHONE HYDROCHLORIDE 2 MG: 2 TABLET ORAL at 16:38

## 2024-12-19 RX ADMIN — INSULIN LISPRO 1 UNITS: 100 INJECTION, SOLUTION INTRAVENOUS; SUBCUTANEOUS at 21:26

## 2024-12-19 RX ADMIN — IBUPROFEN 600 MG: 600 TABLET, FILM COATED ORAL at 11:38

## 2024-12-19 RX ADMIN — INSULIN GLARGINE 10 UNITS: 100 INJECTION, SOLUTION SUBCUTANEOUS at 21:25

## 2024-12-19 RX ADMIN — INSULIN LISPRO 3 UNITS: 100 INJECTION, SOLUTION INTRAVENOUS; SUBCUTANEOUS at 08:40

## 2024-12-19 RX ADMIN — INSULIN LISPRO 3 UNITS: 100 INJECTION, SOLUTION INTRAVENOUS; SUBCUTANEOUS at 17:00

## 2024-12-19 RX ADMIN — ACETAMINOPHEN 975 MG: 325 TABLET ORAL at 11:38

## 2024-12-19 RX ADMIN — ACETAMINOPHEN 975 MG: 325 TABLET ORAL at 06:25

## 2024-12-19 RX ADMIN — METHOCARBAMOL TABLETS 500 MG: 500 TABLET, COATED ORAL at 06:25

## 2024-12-19 NOTE — ASSESSMENT & PLAN NOTE
Acetaminophen 975 mg p.o. every 6 hours scheduled.  Toradol 15 mg IV every 6 hours scheduled now discontinued  Continue transition to Ibuprofen 600 mg every 6 hours  Discontinued oxycodone and will trial opioid rotation to oral dilaudid as follows:  Oral Dilaudid 2 mg every 4 hours as needed for moderate pain  Oral Dilaudid 4 mg every 4 hours as needed for severe pain  Dilaudid decreased to 0.5 mg IV every 6 hours as needed breakthrough pain.  Patient was instructed to avoid using this if at all possible  methocarbamol 500 mg p.o. every 6 hours scheduled.  Will avoid gabapentinoids as patient has had an adverse reaction to these in the past.  Suggest start bowel regimen while on opioid pain medication to avoid opioid-induced constipation.  Will add as needed Narcan in the event that opioid reversal becomes necessary.

## 2024-12-19 NOTE — CASE MANAGEMENT
Case Management Discharge Planning Note    Patient name Lizzy Acuna  Location /-01 MRN [id number]  : 1975 Date 2024       Current Admission Date: 2024  Current Admission Diagnosis:Gangrene of toe of left foot (HCC)   Patient Active Problem List    Diagnosis Date Noted Date Diagnosed    Cellulitis of left foot 2024     Gangrene of toe of left foot (HCC) 2024     Diabetes mellitus type 2 with complications (HCC) 2018     Hyponatremia 2018     Chronic sciatica 2018     Tobacco abuse 02/15/2017     Obesity (BMI 30.0-34.9) 2017     Chronic low back pain 2016     Chronic, continuous use of opioids 2016       LOS (days): 5  Geometric Mean LOS (GMLOS) (days): 3.1  Days to GMLOS:-1.9     OBJECTIVE:  Risk of Unplanned Readmission Score: 11.51         Current admission status: Inpatient   Preferred Pharmacy:   [facility] [address] [address]  Phone: [phone] Fax: [phone]    [id number] - [place], [place] - [address] [facility] [address]  [address] [facility] [address]  Phone: [phone] Fax: [phone]    Primary Care Provider: Linwood North DO    Primary Insurance: [employer]  Secondary Insurance:     DISCHARGE DETAILS:       Freedom of Choice: Yes  Comments - Freedom of Choice: pt/ot eval ordered- cm will continue to follow for any d/c needs                               Other Referral/Resources/Interventions Provided:  Referral Comments: pain consult, podiatry, vascular- pt needs he pain managed    Would you like to participate in our [facility] service program?  : No - Declined    Treatment Team Recommendation: Home (home with family & outpt follow up- family)

## 2024-12-19 NOTE — PROGRESS NOTES
Progress Note - Hospitalist   Name: Lizzy Acuna 49 y.o. female I MRN: [id number]  Unit/Bed#: MS 213[facility] I Date of Admission: 12/13/2024   Date of Service: 12/19/2024 I Hospital Day: 6    Assessment & Plan  Gangrene of toe of left foot (HCC)  History of foot injury approximately 10 days ago, has dark discoloration of distal aspect of fourth left toe with pain. She was seen by podiatry and sent to ER for admission  Podiatry input appreciated.  Plan for demarcation of stable dry gangrene in outpatient setting  Vascular surgery input appreciated.  No intervention recommended at this time  Blood cultures: No growth to date  ESR 83  CRP 13.6  LEADs reviewed  Possible discharge planning 24 to 48 hours  Pain management following due to difficult to control pain  Cellulitis of left foot  Seen by podiatry and sent to ED for further evaluation  Procalcitonin 0.07->0.07  Received IV cefepime and vancomycin, now monitoring off antibiotics    Diabetes mellitus type 2 with complications ([facility])  Hemoglobin A1c 10.3 on 12/13/2024   Initiated on Lantus 10 units SQ at bedtime with 5 units of Humalog SQ with meals 3 times daily  Continue sliding scale insulin coverage  Improved glycemic control will be important for wound healing  Endocrinology consultation appreciated  Tobacco abuse  Smokes half pack per day  Continue nicotine patch 14 mg  Smoking cessation recommended  Obesity (BMI 30.0-34.9)  BMI 34  Healthy diet and lifestyle modification recommended  Hyponatremia  Improving    VTE Pharmacologic Prophylaxis: VTE Score: 3 Moderate Risk (Score 3-4) - Pharmacological DVT Prophylaxis Ordered: enoxaparin (Lovenox).    Mobility:   Basic Mobility Inpatient Raw Score: 22  JH-HLM Goal: 7: Walk 25 feet or more  JH-HLM Achieved: 7: Walk 25 feet or more  JH-HLM Goal achieved. Continue to encourage appropriate mobility.    Patient Centered Rounds: I performed bedside rounds with nursing staff today.   Discussions with Specialists or Other Care  Team Provider: yes    Education and Discussions with Family / Patient:  Updated patient regarding plan of care.     Current Length of Stay: 6 day(s)  Current Patient Status: Inpatient   Certification Statement: The patient will continue to require additional inpatient hospital stay due to pain control  Discharge Plan: Anticipate discharge tomorrow to home.    Code Status: Level 1 - Full Code    Subjective   No overnight events noted.  Patient still with intermittent pain of foot    Objective :  Temp:  [98 °F (36.7 °C)-99 °F (37.2 °C)] 98.9 °F (37.2 °C)  HR:  [79-89] 82  BP: (134-170)/(75-89) 134/75  Resp:  [16-20] 20  SpO2:  [89 %-96 %] 89 %    Body mass index is 35.8 kg/m².     Input and Output Summary (last 24 hours):     Intake/Output Summary (Last 24 hours) at 12/19/2024 1524  Last data filed at 12/19/2024 1345  Gross per 24 hour   Intake 640 ml   Output 900 ml   Net -260 ml       Physical Exam  Constitutional:       General: She is not in acute distress.     Appearance: She is obese.   HENT:      Head: Normocephalic and atraumatic.      Nose: Nose normal.      Mouth/Throat:      Mouth: Mucous membranes are moist.   Eyes:      Extraocular Movements: Extraocular movements intact.      Conjunctiva/sclera: Conjunctivae normal.   Cardiovascular:      Rate and Rhythm: Normal rate and regular rhythm.   Pulmonary:      Effort: Pulmonary effort is normal. No respiratory distress.   Abdominal:      Palpations: Abdomen is soft.      Tenderness: There is no abdominal tenderness.   Musculoskeletal:         General: Normal range of motion.      Cervical back: Normal range of motion and neck supple.   Skin:     General: Skin is warm.      Comments: Left foot with fourth digit dry gangrene and surrounding erythema noted   Neurological:      General: No focal deficit present.      Mental Status: She is alert. Mental status is at baseline.      Cranial Nerves: No cranial nerve deficit.   Psychiatric:         Mood and Affect:  Mood normal.         Behavior: Behavior normal.       Lines/Drains:      Lab Results: I have reviewed the following results:   Results from last 7 days   Lab Units 12/18/24  0804   WBC Thousand/uL 7.24   HEMOGLOBIN g/dL 11.0*   HEMATOCRIT % 35.5   PLATELETS Thousands/uL 211   SEGS PCT % 67   LYMPHO PCT % 26   MONO PCT % 6   EOS PCT % 1     Results from last 7 days   Lab Units 12/18/24  0632 12/14/24  0432 12/13/24  1841   SODIUM mmol/L 133*   < > 131*   POTASSIUM mmol/L 4.9   < > 3.8   CHLORIDE mmol/L 102   < > 91*   CO2 mmol/L 23   < > 32   BUN mg/dL 31*   < > 19   CREATININE mg/dL 0.78   < > 0.68   ANION GAP mmol/L 8   < > 8   CALCIUM mg/dL 9.4   < > 9.8   ALBUMIN g/dL  --   --  4.1   TOTAL BILIRUBIN mg/dL  --   --  0.29   ALK PHOS U/L  --   --  75   ALT U/L  --   --  11   AST U/L  --   --  10*   GLUCOSE RANDOM mg/dL 133   < > 276*    < > = values in this interval not displayed.     Results from last 7 days   Lab Units 12/13/24  1841   INR  0.82*     Results from last 7 days   Lab Units 12/19/24  1110 12/19/24  0721 12/18/24  2103 12/18/24  1545 12/18/24  1103 12/18/24  0736 12/17/24  2114 12/17/24  1555 12/17/24  1100 12/17/24  0712 12/16/24  2045 12/16/24  1625   POC GLUCOSE mg/dl 211* 146* 168* 118 151* 138 178* 120 116 125 152* 93     Results from last 7 days   Lab Units 12/13/24  1841   HEMOGLOBIN A1C % 10.3*     Results from last 7 days   Lab Units 12/14/24  0432 12/13/24  1841   LACTIC ACID mmol/L  --  1.0   PROCALCITONIN ng/ml 0.07 0.07       Recent Cultures (last 7 days):   Results from last 7 days   Lab Units 12/13/24  1841 12/13/24  1832   BLOOD CULTURE  No Growth After 5 Days. No Growth After 5 Days.       Imaging Results Review: No pertinent imaging studies reviewed.  Other Study Results Review: No additional pertinent studies reviewed.    Last 24 Hours Medication List:     Current Facility-Administered Medications:     acetaminophen (TYLENOL) tablet 975 mg, Q6H JORGE    enoxaparin (LOVENOX)  subcutaneous injection 40 mg, Daily    hydrALAZINE (APRESOLINE) injection 10 mg, Q6H PRN    HYDROmorphone (DILAUDID) injection 0.5 mg, Q6H PRN    HYDROmorphone (DILAUDID) tablet 2 mg, Q4H PRN **OR** HYDROmorphone (DILAUDID) tablet 4 mg, Q4H PRN    ibuprofen (MOTRIN) tablet 600 mg, Q6H JORGE    insulin glargine (LANTUS) subcutaneous injection 10 Units 0.1 mL, HS    insulin lispro (HumALOG/ADMELOG) 100 units/mL subcutaneous injection 1-5 Units, TID AC **AND** Fingerstick Glucose (POCT), TID AC    insulin lispro (HumALOG/ADMELOG) 100 units/mL subcutaneous injection 1-5 Units, HS    insulin lispro (HumALOG/ADMELOG) 100 units/mL subcutaneous injection 3 Units, TID With Meals    methocarbamol (ROBAXIN) tablet 500 mg, Q6H JORGE    naloxone (NARCAN) 0.04 mg/mL syringe 0.04 mg, Q1MIN PRN    nicotine (NICODERM CQ) 14 mg/24hr TD 24 hr patch 1 patch, Daily    ondansetron (ZOFRAN) injection 4 mg, Q6H PRN    senna-docusate sodium (SENOKOT S) 8.6-50 mg per tablet 1 tablet, HS    Administrative Statements   Today, Patient Was Seen By: Luz Maria Shore MD      **Please Note: This note may have been constructed using a voice recognition system.**

## 2024-12-19 NOTE — OCCUPATIONAL THERAPY NOTE
Occupational Therapy Progress Note     Patient Name: Lizzy Acuna  Today's Date: 12/19/2024  Problem List  Principal Problem:    Gangrene of toe of left foot (HCC)  Active Problems:    Obesity (BMI 30.0-34.9)    Tobacco abuse    Diabetes mellitus type 2 with complications (HCC)    Hyponatremia    Cellulitis of left foot     Order received for OT Evaluation. Spoke with nursing and patient who indicate that the patient has been functioning at  baseline level on the nursing floor.  Will f/u following surgical intervention as needed.    Tete Melgar OTR/L

## 2024-12-19 NOTE — PROGRESS NOTES
Progress Note - Acute Pain   Name: Lizzy Acuna 49 y.o. female I MRN: [id number]  Unit/Bed#: -01 I Date of Admission: 12/13/2024   Date of Service: 12/19/2024 I Hospital Day: 6    Assessment & Plan  Tobacco abuse  Smokers have been shown to require higher doses of opioid pain medication and are more likely to develop chronic pain.  Encourage smoking cessation.    Cellulitis of left foot  Acetaminophen 975 mg p.o. every 6 hours scheduled.  Toradol 15 mg IV every 6 hours scheduled now discontinued  Continue transition to Ibuprofen 600 mg every 6 hours  Discontinued oxycodone and will trial opioid rotation to oral dilaudid as follows:  Oral Dilaudid 2 mg every 4 hours as needed for moderate pain  Oral Dilaudid 4 mg every 4 hours as needed for severe pain  Dilaudid decreased to 0.5 mg IV every 6 hours as needed breakthrough pain.  Patient was instructed to avoid using this if at all possible  methocarbamol 500 mg p.o. every 6 hours scheduled.  Will avoid gabapentinoids as patient has had an adverse reaction to these in the past.  Suggest start bowel regimen while on opioid pain medication to avoid opioid-induced constipation.  Will add as needed Narcan in the event that opioid reversal becomes necessary.    APS will continue to follow. Please contact Acute Pain Service - via SecureChat from [phone] with additional questions or concerns. See [email or web address] or [email or web address] for additional contacts and after hours information.     Subjective   Lizzy Acuna is a 49 y.o. female who presents with left foot cellulitis and gangrene of the left fourth toe. Patient gradually developed pain and redness in her left foot following a minor trauma several weeks ago. She was admitted several days ago with what appears to be cellulitis of the left foot and a gangrenous left fourth toe. She was seen by podiatry who plan to allow the wound to demarcate and follow-up as an outpatient for probable surgery. Patient has complained of pain  which has not been well-controlled and therefore acute pain service was consulted.     Pain History  Current pain location(s):  Pain Score: 9  Pain Location/Orientation: Orientation: Left, Location: Foot  Pain Scale: Pain Assessment Tool: 0-10  24 hour history: No acute events overnight. Patient reported an exacerbation of pain overnight and did require 2 doses of IV dilaudid for breakthrough pain since my last visit. She did go extended periods of time overnight without utilizing oral opioids and felt that she fell behind on her pain control. She was agreeable to trial an opioid rotation to oral dilaudid but I encouraged her to ask for oral PRN medications more frequently to avoid falling behind. Also, discussed with nursing staff who will work to assess the patients pain more frequently and medicate as appropriate.    Opioid requirement previous 24 hours:   0.5 mg IV dilaudid x 2 doses  Oxycodone 10 mg x 4 doses    Meds/Allergies   all current active meds have been reviewed, current meds:   Current Facility-Administered Medications:     acetaminophen (TYLENOL) tablet 975 mg, Q6H JORGE    enoxaparin (LOVENOX) subcutaneous injection 40 mg, Daily    hydrALAZINE (APRESOLINE) injection 10 mg, Q6H PRN    HYDROmorphone (DILAUDID) injection 0.5 mg, Q6H PRN    HYDROmorphone (DILAUDID) tablet 2 mg, Q4H PRN **OR** HYDROmorphone (DILAUDID) tablet 4 mg, Q4H PRN    ibuprofen (MOTRIN) tablet 600 mg, Q6H JORGE    insulin glargine (LANTUS) subcutaneous injection 10 Units 0.1 mL, HS    insulin lispro (HumALOG/ADMELOG) 100 units/mL subcutaneous injection 1-5 Units, TID AC **AND** Fingerstick Glucose (POCT), TID AC    insulin lispro (HumALOG/ADMELOG) 100 units/mL subcutaneous injection 1-5 Units, HS    insulin lispro (HumALOG/ADMELOG) 100 units/mL subcutaneous injection 3 Units, TID With Meals    methocarbamol (ROBAXIN) tablet 500 mg, Q6H JORGE    naloxone (NARCAN) 0.04 mg/mL syringe 0.04 mg, Q1MIN PRN    nicotine (NICODERM CQ) 14 mg/24hr  TD 24 hr patch 1 patch, Daily    ondansetron (ZOFRAN) injection 4 mg, Q6H PRN    senna-docusate sodium (SENOKOT S) 8.6-50 mg per tablet 1 tablet, HS, and PTA meds:   None     Allergies   Allergen Reactions    Codeine      aggression     Objective :  Temp:  [98 °F (36.7 °C)-99 °F (37.2 °C)] 98 °F (36.7 °C)  HR:  [79-89] 79  BP: (152-170)/(79-89) 157/79  Resp:  [16-18] 16  SpO2:  [95 %-96 %] 96 %    Physical Exam  Vitals reviewed.   Constitutional:       General: She is not in acute distress.  HENT:      Head: Normocephalic and atraumatic.   Pulmonary:      Effort: Pulmonary effort is normal. No respiratory distress.   Musculoskeletal:         General: Normal range of motion.      Cervical back: Normal range of motion.   Neurological:      Mental Status: She is alert and oriented to person, place, and time. Mental status is at baseline.   Psychiatric:         Mood and Affect: Mood normal.         Behavior: Behavior normal.            Lab Results: I have reviewed the following results:  Estimated Creatinine Clearance: 87.9 mL/min (by C-G formula based on SCr of 0.78 mg/dL).  Lab Results   Component Value Date    WBC 7.24 12/18/2024    HGB 11.0 (L) 12/18/2024    HCT 35.5 12/18/2024     12/18/2024         Component Value Date/Time    K 4.9 12/18/2024 0632     12/18/2024 0632    CO2 23 12/18/2024 0632    BUN 31 (H) 12/18/2024 0632    CREATININE 0.78 12/18/2024 0632         Component Value Date/Time    CALCIUM 9.4 12/18/2024 0632    ALKPHOS 75 12/13/2024 1841    AST 10 (L) 12/13/2024 1841    ALT 11 12/13/2024 1841    TP 8.1 12/13/2024 1841    ALB 4.1 12/13/2024 1841       Imaging Results Review: No pertinent imaging studies reviewed.  Other Study Results Review: No additional pertinent studies reviewed.

## 2024-12-19 NOTE — PLAN OF CARE
Problem: INFECTION - ADULT  Goal: Absence or prevention of progression during hospitalization  Description: INTERVENTIONS:  - Assess and monitor for signs and symptoms of infection  - Monitor lab/diagnostic results  - Monitor all insertion sites, i.e. indwelling lines, tubes, and drains  - Monitor endotracheal if appropriate and nasal secretions for changes in amount and color  - [place] appropriate cooling/warming therapies per order  - Administer medications as ordered  - Instruct and encourage patient and family to use good hand hygiene technique  - Identify and instruct in appropriate isolation precautions for identified infection/condition  Outcome: Progressing

## 2024-12-20 LAB
ANION GAP SERPL CALCULATED.3IONS-SCNC: 6 MMOL/L (ref 4–13)
BUN SERPL-MCNC: 24 MG/DL (ref 5–25)
CALCIUM SERPL-MCNC: 9.4 MG/DL (ref 8.4–10.2)
CHLORIDE SERPL-SCNC: 101 MMOL/L (ref 96–108)
CO2 SERPL-SCNC: 28 MMOL/L (ref 21–32)
CREAT SERPL-MCNC: 0.68 MG/DL (ref 0.6–1.3)
ERYTHROCYTE [DISTWIDTH] IN BLOOD BY AUTOMATED COUNT: 12.5 % (ref 11.6–15.1)
GFR SERPL CREATININE-BSD FRML MDRD: 103 ML/MIN/1.73SQ M
GLUCOSE SERPL-MCNC: 127 MG/DL (ref 65–140)
GLUCOSE SERPL-MCNC: 127 MG/DL (ref 65–140)
GLUCOSE SERPL-MCNC: 132 MG/DL (ref 65–140)
GLUCOSE SERPL-MCNC: 141 MG/DL (ref 65–140)
GLUCOSE SERPL-MCNC: 151 MG/DL (ref 65–140)
HCT VFR BLD AUTO: 33 % (ref 34.8–46.1)
HGB BLD-MCNC: 10.3 G/DL (ref 11.5–15.4)
MCH RBC QN AUTO: 28.9 PG (ref 26.8–34.3)
MCHC RBC AUTO-ENTMCNC: 31.2 G/DL (ref 31.4–37.4)
MCV RBC AUTO: 92 FL (ref 82–98)
PLATELET # BLD AUTO: 218 THOUSANDS/UL (ref 149–390)
PMV BLD AUTO: 9.7 FL (ref 8.9–12.7)
POTASSIUM SERPL-SCNC: 4.6 MMOL/L (ref 3.5–5.3)
RBC # BLD AUTO: 3.57 MILLION/UL (ref 3.81–5.12)
SODIUM SERPL-SCNC: 135 MMOL/L (ref 135–147)
WBC # BLD AUTO: 6.71 THOUSAND/UL (ref 4.31–10.16)

## 2024-12-20 PROCEDURE — 82948 REAGENT STRIP/BLOOD GLUCOSE: CPT

## 2024-12-20 PROCEDURE — 80048 BASIC METABOLIC PNL TOTAL CA: CPT | Performed by: INTERNAL MEDICINE

## 2024-12-20 PROCEDURE — 99232 SBSQ HOSP IP/OBS MODERATE 35: CPT | Performed by: PODIATRIST

## 2024-12-20 PROCEDURE — 99232 SBSQ HOSP IP/OBS MODERATE 35: CPT | Performed by: SURGERY

## 2024-12-20 PROCEDURE — 85027 COMPLETE CBC AUTOMATED: CPT | Performed by: INTERNAL MEDICINE

## 2024-12-20 PROCEDURE — 99232 SBSQ HOSP IP/OBS MODERATE 35: CPT | Performed by: INTERNAL MEDICINE

## 2024-12-20 RX ORDER — HYDROMORPHONE HCL/PF 1 MG/ML
0.5 SYRINGE (ML) INJECTION EVERY 8 HOURS PRN
Status: DISCONTINUED | OUTPATIENT
Start: 2024-12-20 | End: 2024-12-24

## 2024-12-20 RX ORDER — IBUPROFEN 600 MG/1
600 TABLET, FILM COATED ORAL EVERY 6 HOURS SCHEDULED
Status: DISCONTINUED | OUTPATIENT
Start: 2024-12-20 | End: 2024-12-22

## 2024-12-20 RX ADMIN — ACETAMINOPHEN 975 MG: 325 TABLET ORAL at 05:40

## 2024-12-20 RX ADMIN — ACETAMINOPHEN 975 MG: 325 TABLET ORAL at 17:49

## 2024-12-20 RX ADMIN — IBUPROFEN 600 MG: 600 TABLET, FILM COATED ORAL at 08:08

## 2024-12-20 RX ADMIN — ACETAMINOPHEN 975 MG: 325 TABLET ORAL at 14:16

## 2024-12-20 RX ADMIN — INSULIN LISPRO 3 UNITS: 100 INJECTION, SOLUTION INTRAVENOUS; SUBCUTANEOUS at 07:56

## 2024-12-20 RX ADMIN — HYDROMORPHONE HYDROCHLORIDE 4 MG: 4 TABLET ORAL at 12:28

## 2024-12-20 RX ADMIN — INSULIN LISPRO 3 UNITS: 100 INJECTION, SOLUTION INTRAVENOUS; SUBCUTANEOUS at 12:29

## 2024-12-20 RX ADMIN — HYDROMORPHONE HYDROCHLORIDE 0.5 MG: 1 INJECTION, SOLUTION INTRAMUSCULAR; INTRAVENOUS; SUBCUTANEOUS at 00:18

## 2024-12-20 RX ADMIN — METHOCARBAMOL TABLETS 500 MG: 500 TABLET, COATED ORAL at 05:40

## 2024-12-20 RX ADMIN — HYDROMORPHONE HYDROCHLORIDE 4 MG: 4 TABLET ORAL at 20:50

## 2024-12-20 RX ADMIN — HYDROMORPHONE HYDROCHLORIDE 2 MG: 2 TABLET ORAL at 08:03

## 2024-12-20 RX ADMIN — HYDROMORPHONE HYDROCHLORIDE 4 MG: 4 TABLET ORAL at 16:27

## 2024-12-20 RX ADMIN — ENOXAPARIN SODIUM 40 MG: 40 INJECTION SUBCUTANEOUS at 08:08

## 2024-12-20 RX ADMIN — IBUPROFEN 600 MG: 600 TABLET, FILM COATED ORAL at 02:23

## 2024-12-20 RX ADMIN — IBUPROFEN 600 MG: 600 TABLET, FILM COATED ORAL at 21:06

## 2024-12-20 RX ADMIN — INSULIN GLARGINE 10 UNITS: 100 INJECTION, SOLUTION SUBCUTANEOUS at 21:06

## 2024-12-20 RX ADMIN — METHOCARBAMOL TABLETS 500 MG: 500 TABLET, COATED ORAL at 14:16

## 2024-12-20 RX ADMIN — INSULIN LISPRO 3 UNITS: 100 INJECTION, SOLUTION INTRAVENOUS; SUBCUTANEOUS at 16:28

## 2024-12-20 RX ADMIN — METHOCARBAMOL TABLETS 500 MG: 500 TABLET, COATED ORAL at 17:49

## 2024-12-20 RX ADMIN — INSULIN LISPRO 1 UNITS: 100 INJECTION, SOLUTION INTRAVENOUS; SUBCUTANEOUS at 21:06

## 2024-12-20 RX ADMIN — HYDROMORPHONE HYDROCHLORIDE 0.5 MG: 1 INJECTION, SOLUTION INTRAMUSCULAR; INTRAVENOUS; SUBCUTANEOUS at 14:16

## 2024-12-20 NOTE — ASSESSMENT & PLAN NOTE
Patient will require surgical intervention outpatient by podiatry  Now tentatively planned for IR angiogram Monday 12/23    Acetaminophen 975 mg p.o. every 6 hours scheduled.  Toradol 15 mg IV every 6 hours scheduled now discontinued  Continue Ibuprofen 600 mg every 6 hours  Discontinued oxycodone and opioid rotated to oral dilaudid as follows:  Oral Dilaudid 2 mg every 4 hours as needed for moderate pain  Oral Dilaudid 4 mg every 4 hours as needed for severe pain  Dilaudid decreased to 0.5 mg IV every 8 hours as needed breakthrough pain.  Patient was instructed to avoid using this if at all possible  methocarbamol 500 mg p.o. every 6 hours scheduled.  Will avoid gabapentinoids as patient has had an adverse reaction to these in the past.  Suggest start bowel regimen while on opioid pain medication to avoid opioid-induced constipation.  Will add as needed Narcan in the event that opioid reversal becomes necessary.

## 2024-12-20 NOTE — PROGRESS NOTES
Progress Note - Podiatry   Name: Lizzy Acuna 49 y.o. female I MRN: [id number]  Unit/Bed#: -[facility] I Date of Admission: 12/13/2024   Date of Service: 12/20/2024 I Hospital Day: 7     Assessment & Plan  Gangrene of toe of left foot (HCC)    Obesity (BMI 30.0-34.9)    Tobacco abuse    Diabetes mellitus type 2 with complications (HCC)  Lab Results   Component Value Date    HGBA1C 10.3 (H) 12/13/2024       Recent Labs     12/19/24  1626 12/19/24 2058 12/20/24  0707 12/20/24  1109   POCGLU 132 151* 127 141*       Blood Sugar Average: Last 72 hrs:  (P) 144.5134088358472700    Hyponatremia    Cellulitis of left foot  Resolved  - She is still demarcting, but the toes 1,2,3 are less painful today, so hopefully they will survive  - Continue betadine paint  - can shower, but to keep the affected are dry  - WBAT in sx shoe  - can pursue surgical intervention outpatient once pain is controlled.     Subjective : Patient seen and evaluated at bedside and has more controlled pain today, her 1st three toe pain has improved.     Objective :  Temp:  [98.1 °F (36.7 °C)-98.9 °F (37.2 °C)] 98.1 °F (36.7 °C)  HR:  [82-83] 83  BP: (134-165)/(75-94) 165/82  Resp:  [18-20] 18  SpO2:  [89 %-92 %] 92 %  O2 Device: None (Room air)    Physical Exam  Vitals reviewed.   Skin:     Comments: Perfusion has improved a bit to the digits 1,2,3. The left 5th toe is still dusky and painful. Eschar formation is stable, cellulitis remains resolved.          Lab Results: I have reviewed the following results:

## 2024-12-20 NOTE — ASSESSMENT & PLAN NOTE
Lab Results   Component Value Date    HGBA1C 10.3 (H) 12/13/2024       Recent Labs     12/19/24  1110 12/19/24  1626 12/19/24 2058 12/20/24  0707   POCGLU 211* 132 151* 127       Blood Sugar Average: Last 72 hrs:  (P) [id number]  Risk factor for poor wound healing, infection, and poor vasculature  Hx of gestational diabetes  Newly diagnosed this admission  Tight glucose management per primary team

## 2024-12-20 NOTE — PROGRESS NOTES
Progress Note - Acute Pain   Name: Lizzy Acuna 49 y.o. female I MRN: [id number]  Unit/Bed#: -01 I Date of Admission: 12/13/2024   Date of Service: 12/20/2024 I Hospital Day: 7    Assessment & Plan  Tobacco abuse  Smokers have been shown to require higher doses of opioid pain medication and are more likely to develop chronic pain.  Encourage smoking cessation.    Cellulitis of left foot  Patient will require surgical intervention outpatient by podiatry  Now tentatively planned for IR angiogram Monday 12/23    Acetaminophen 975 mg p.o. every 6 hours scheduled.  Toradol 15 mg IV every 6 hours scheduled now discontinued  Continue Ibuprofen 600 mg every 6 hours  Discontinued oxycodone and opioid rotated to oral dilaudid as follows:  Oral Dilaudid 2 mg every 4 hours as needed for moderate pain  Oral Dilaudid 4 mg every 4 hours as needed for severe pain  Dilaudid decreased to 0.5 mg IV every 8 hours as needed breakthrough pain.  Patient was instructed to avoid using this if at all possible  methocarbamol 500 mg p.o. every 6 hours scheduled.  Will avoid gabapentinoids as patient has had an adverse reaction to these in the past.  Suggest start bowel regimen while on opioid pain medication to avoid opioid-induced constipation.  Will add as needed Narcan in the event that opioid reversal becomes necessary.    APS will continue to follow. Please contact Acute Pain Service - via SecureChat from [phone] with additional questions or concerns. See SecureChat or [employer] for additional contacts and after hours information.     Subjective   Lizzy Acuna is a 49 y.o. female who presents with left foot cellulitis and gangrene of the left fourth toe. Patient gradually developed pain and redness in her left foot following a minor trauma several weeks ago. She was admitted several days ago with what appears to be cellulitis of the left foot and a gangrenous left fourth toe. She was seen by podiatry who plan to allow the wound to  demarcate and follow-up as an outpatient for probable surgery. Patient has complained of pain which has not been well-controlled and therefore acute pain service was consulted.     Pain History  Current pain location(s):  Pain Score: 8  Pain Location/Orientation: Orientation: Left, Location: Foot  Pain Scale: Pain Assessment Tool: 0-10  24 hour history: Seen via telehealth consultation this afternoon.  During time of encounter patient was resting in bed without acute distress.  She reports pain is somewhat improved today and feels oral Dilaudid has been more effective in reduction of pain.  She did require 1 dose of IV opioids overnight when she had a severe exacerbation of stabbing pain in the left foot.  Otherwise feels well, and has been ambulating and performing ADLs independently.  She has no plan for angiogram with IR Monday.    Opioid requirement previous 24 hours:   12 mg oral Dilaudid  0.5 mg IV Dilaudid    Meds/Allergies   all current active meds have been reviewed, current meds:   Current Facility-Administered Medications:     acetaminophen (TYLENOL) tablet 975 mg, Q6H JORGE    enoxaparin (LOVENOX) subcutaneous injection 40 mg, Daily    hydrALAZINE (APRESOLINE) injection 10 mg, Q6H PRN    HYDROmorphone (DILAUDID) injection 0.5 mg, Q8H PRN    HYDROmorphone (DILAUDID) tablet 2 mg, Q4H PRN **OR** HYDROmorphone (DILAUDID) tablet 4 mg, Q4H PRN    ibuprofen (MOTRIN) tablet 600 mg, Q6H JORGE    insulin glargine (LANTUS) subcutaneous injection 10 Units 0.1 mL, HS    insulin lispro (HumALOG/ADMELOG) 100 units/mL subcutaneous injection 1-5 Units, TID AC **AND** Fingerstick Glucose (POCT), TID AC    insulin lispro (HumALOG/ADMELOG) 100 units/mL subcutaneous injection 1-5 Units, HS    insulin lispro (HumALOG/ADMELOG) 100 units/mL subcutaneous injection 3 Units, TID With Meals    methocarbamol (ROBAXIN) tablet 500 mg, Q6H JORGE    naloxone (NARCAN) 0.04 mg/mL syringe 0.04 mg, Q1MIN PRN    nicotine (NICODERM CQ) 14 mg/24hr  TD 24 hr patch 1 patch, Daily    ondansetron (ZOFRAN) injection 4 mg, Q6H PRN    senna-docusate sodium (SENOKOT S) 8.6-50 mg per tablet 1 tablet, HS, and PTA meds:   None     Allergies   Allergen Reactions    Codeine      aggression     Objective :  Temp:  [98.1 °F (36.7 °C)-98.9 °F (37.2 °C)] 98.1 °F (36.7 °C)  HR:  [82-83] 83  BP: (134-165)/(75-94) 165/82  Resp:  [18-20] 18  SpO2:  [89 %-92 %] 92 %  O2 Device: None (Room air)    Physical Exam  Vitals reviewed.   Constitutional:       General: She is not in acute distress.  HENT:      Head: Normocephalic and atraumatic.   Pulmonary:      Effort: Pulmonary effort is normal. No respiratory distress.   Musculoskeletal:         General: Normal range of motion.      Cervical back: Normal range of motion.   Neurological:      Mental Status: She is alert and oriented to person, place, and time. Mental status is at baseline.   Psychiatric:         Mood and Affect: Mood normal.         Behavior: Behavior normal.            Lab Results: I have reviewed the following results:  Estimated Creatinine Clearance: 100.8 mL/min (by C-G formula based on SCr of 0.68 mg/dL).  Lab Results   Component Value Date    WBC 6.71 12/20/2024    HGB 10.3 (L) 12/20/2024    HCT 33.0 (L) 12/20/2024     12/20/2024         Component Value Date/Time    K 4.6 12/20/2024 0558     12/20/2024 0558    CO2 28 12/20/2024 0558    BUN 24 12/20/2024 0558    CREATININE 0.68 12/20/2024 0558         Component Value Date/Time    CALCIUM 9.4 12/20/2024 0558    ALKPHOS 75 12/13/2024 1841    AST 10 (L) 12/13/2024 1841    ALT 11 12/13/2024 1841    TP 8.1 12/13/2024 1841    ALB 4.1 12/13/2024 1841       Imaging Results Review: No pertinent imaging studies reviewed.  Other Study Results Review: No additional pertinent studies reviewed.

## 2024-12-20 NOTE — PLAN OF CARE
Problem: Knowledge Deficit  Goal: Patient/family/caregiver demonstrates understanding of disease process, treatment plan, medications, and discharge instructions  Description: Complete learning assessment and assess knowledge base.  Interventions:  - Provide teaching at level of understanding  - Provide teaching via preferred learning methods  Outcome: Progressing     Problem: METABOLIC, FLUID AND ELECTROLYTES - ADULT  Goal: Electrolytes maintained within normal limits  Description: INTERVENTIONS:  - Monitor labs and assess patient for signs and symptoms of electrolyte imbalances  - Administer electrolyte replacement as ordered  - Monitor response to electrolyte replacements, including repeat lab results as appropriate  - Instruct patient on fluid and nutrition as appropriate  Outcome: Progressing  Goal: Glucose maintained within target range  Description: INTERVENTIONS:  - Monitor Blood Glucose as ordered  - Assess for signs and symptoms of hyperglycemia and hypoglycemia  - Administer ordered medications to maintain glucose within target range  - Assess nutritional intake and initiate nutrition service referral as needed  Outcome: Progressing     Problem: MUSCULOSKELETAL - ADULT  Goal: Maintain or return mobility to safest level of function  Description: INTERVENTIONS:  - Assess patient's ability to carry out ADLs; assess patient's baseline for ADL function and identify physical deficits which impact ability to perform ADLs (bathing, care of mouth/teeth, toileting, grooming, dressing, etc.)  - Assess/evaluate cause of self-care deficits   - Assess range of motion  - Assess patient's mobility  - Assess patient's need for assistive devices and provide as appropriate  - Encourage maximum independence but intervene and supervise when necessary  - Involve family in performance of ADLs  - Assess for home care needs following discharge   - Consider OT consult to assist with ADL evaluation and planning for discharge  -  Provide patient education as appropriate  Outcome: Progressing

## 2024-12-20 NOTE — TELEMEDICINE
e-Consult (IP[facility])  - Interventional Radiology  Lizzy Acuna 49 y.o. female MRN: [id number]  Unit/Bed#: -01 Encounter: [id number]          Interventional Radiology has been consulted to evaluate Lizzy Acuna    We were consulted by vascular concerning this patient with nonhealing left foot wound.  He is reportedly patient dropped a container on her left foot is causing injury that is not healing and is now resulting in gangrene.  Patient has a history of diabetes obesity and tobacco abuse.  IR has been asked to evaluate the patient for possible angiogram and revascularization of the left foot prior to amputation.    Inpatient Consult to IR  Consult performed by: Rip Uriarte PA-C  Consult ordered by: Gabrielle Urrutia PA-C      12/20/24    Assessment/Recommendation:     Nonhealing Left Foot Wounds  -Plan for left lower extremity angiography tentatively Monday, pending IR scheduling  -N.p.o. midnight prior  -Hold long-acting anticoagulation      11-20 minutes, >50% of the total time devoted to medical consultative verbal/EMR discussion between providers. Written report will be generated in the EMR.     Thank you for allowing Interventional Radiology to participate in the care of Lizzy Acuna. Please don't hesitate to call or TigerText us with any questions.     Rip Uriarte PA-C

## 2024-12-20 NOTE — ASSESSMENT & PLAN NOTE
Lab Results   Component Value Date    HGBA1C 10.3 (H) 12/13/2024       Recent Labs     12/19/24  1626 12/19/24 2058 12/20/24  0707 12/20/24  1109   POCGLU 132 151* 127 141*       Blood Sugar Average: Last 72 hrs:  (P) [id number]

## 2024-12-20 NOTE — ASSESSMENT & PLAN NOTE
Presents with left fourth toe dry gangrene (hx of trauma, dropping case of soda on it 3 weeks ago)  AFVSS  Na 135, Cr 0.68, WBC 6.71, Hgb 10.3  Blood cx Ng x 5 days  12/13 XR L toe with soft tissue swelling, negative for frx  MRI negative for osteomyelitis  Palpable L dp and pt pulses on exam   LEAD: R KAILEE 0/91/105/90; L KAILEE 0.59/89/-. >75% proximal SFA and distal pop stenosis. Monophasic waveforms in tibial vessels.     Plan:  LEAD reviewed in detail. There is a >75% proximal SFA and distal pop stenosis in LLE. Monophasic waveforms throughout the tibial vessels indicative of tibial disease. L KAILEE 0.59/ MTP 89/ GTP -.   Given worsening ischemic toe changes, will consult IR for possible LLE angiogram, however, patient with likely microvascular disease  Podiatry following, appreciate recs, no plans for intervention at this time   Wound care per podiatry.  Recommend aspirin 81 mg and statin therapy for OMT of PAD.   Rest of care as per SLIM.  Discussed with Dr. Wilson, final recs pending attestation

## 2024-12-20 NOTE — PROGRESS NOTES
Progress Note - Vascular Surgery   Name: Lizzy Acuna 49 y.o. female I MRN: [id number]  Unit/Bed#: MS 213[facility] I Date of Admission: 12/13/2024   Date of Service: 12/20/2024 I Hospital Day: 7    Assessment & Plan  Gangrene of toe of left foot (HCC)  Presents with left fourth toe dry gangrene (hx of trauma, dropping case of soda on it 3 weeks ago)  AFVSS  Na 135, Cr 0.68, WBC 6.71, Hgb 10.3  Blood cx Ng x 5 days  12/13 XR L toe with soft tissue swelling, negative for frx  MRI negative for osteomyelitis  Palpable L dp and pt pulses on exam   LEAD: R KAILEE 0/91/105/90; L KAILEE 0.59/89/-. >75% proximal SFA and distal pop stenosis. Monophasic waveforms in tibial vessels.     Plan:  LEAD reviewed in detail. There is a >75% proximal SFA and distal pop stenosis in LLE. Monophasic waveforms throughout the tibial vessels indicative of tibial disease. L KAILEE 0.59/ MTP 89/ GTP -.   Given worsening ischemic toe changes, will consult IR for possible LLE angiogram, however, patient with likely microvascular disease  Podiatry following, appreciate recs, no plans for intervention at this time   Wound care per podiatry.  Recommend aspirin 81 mg and statin therapy for OMT of PAD.   Rest of care as per SLIM.  Discussed with Dr. Wilson, final recs pending attestation   Cellulitis of left foot  Surrounding cellulitis vs duskiness of the dorsal aspect of the left foot  Tobacco abuse  Reports half a pack per day for 30-year smoking history  Recommend smoking cessation.  Discussion to be had regarding willingness to quit and if there are any cessation medications she might be interested in.  Nicotine patch while inpatient  Counseled the patient on effects of smoking cigarettes on vasculature, wound healing, infection, etc.  Diabetes mellitus type 2 with complications (HCC)  Lab Results   Component Value Date    HGBA1C 10.3 (H) 12/13/2024       Recent Labs     12/19/24  1110 12/19/24  1626 12/19/24 2058 12/20/24  0707   POCGLU 211* 132 151* 127        Blood Sugar Average: Last 72 hrs:  (P) [id number]  Risk factor for poor wound healing, infection, and poor vasculature  Hx of gestational diabetes  Newly diagnosed this admission  Tight glucose management per primary team  Obesity (BMI 30.0-34.9)  Recommend healthy diet, lifestyle modifications  Hyponatremia  Resolved, Na 135    I have discussed the above management plan in detail with the primary service.     Subjective   Patient reports persistent L foot pain. She describes it as stabbing. Pain with movement/touch. Denies fever, chills, abdominal pain.     Objective :  Temp:  [98.1 °F (36.7 °C)-98.9 °F (37.2 °C)] 98.1 °F (36.7 °C)  HR:  [82-83] 83  BP: (134-165)/(75-94) 165/82  Resp:  [18-20] 18  SpO2:  [89 %-92 %] 92 %  O2 Device: None (Room air)    I/O         12/18 0701  12/19 0700 12/19 0701  12/20 0700 12/20 0701  12/21 0700    P.O. 840 920 240    Total Intake(mL/kg) 840 (9.8) 920 (10.7) 240 (2.8)    Urine (mL/kg/hr) 700 (0.3) 1550 (0.8) 600 (1.8)    Total Output 700 1550 600    Net +140 -630 -360                   Physical Exam  Vitals reviewed.   Constitutional:       General: She is not in acute distress.     Appearance: She is obese. She is not ill-appearing.   HENT:      Head: Normocephalic and atraumatic.   Cardiovascular:      Rate and Rhythm: Normal rate.   Pulmonary:      Effort: Pulmonary effort is normal. No respiratory distress.   Abdominal:      General: There is no distension.      Palpations: Abdomen is soft.      Tenderness: There is no abdominal tenderness. There is no guarding.   Musculoskeletal:      Right lower leg: No edema.      Left lower leg: No edema.   Skin:     General: Skin is warm and dry.      Comments: L 4th toe with dry gangrene, worse than admission. Tenderness of L forefoot. Sensation and motor intact of L foot. 1+ L dp and pt pulse. See below for new images   Neurological:      General: No focal deficit present.      Mental Status: She is alert. Mental status  is at baseline.   Psychiatric:         Mood and Affect: Mood normal.         Behavior: Behavior normal.                 Lab Results: I have reviewed the following results:  Recent Labs     12/20/24  0558   WBC 6.71   HGB 10.3*   HCT 33.0*      SODIUM 135   K 4.6      CO2 28   BUN 24   CREATININE 0.68   GLUC 132       Imaging Results Review: I reviewed radiology reports from this admission including: Ultrasound(s).  Other Study Results Review: No additional pertinent studies reviewed.    VTE Pharmacologic Prophylaxis: VTE covered by:  enoxaparin, Subcutaneous, 40 mg at 12/20/24 0808     VTE Mechanical Prophylaxis: sequential compression device    Gabrielle Urrutia PA-C

## 2024-12-20 NOTE — PROGRESS NOTES
Progress Note - Hospitalist   Name: Lizzy Acuna 49 y.o. female I MRN: [id number]  Unit/Bed#: -[facility] I Date of Admission: 12/13/2024   Date of Service: 12/20/2024 I Hospital Day: 7    Assessment & Plan  Gangrene of toe of left foot (HCC)  History of foot injury approximately 10 days ago, has dark discoloration of distal aspect of fourth left toe with pain. She was seen by podiatry and sent to ER for admission  Podiatry input appreciated.  Plan for demarcation of stable dry gangrene in outpatient setting  Patient was reevaluated by vascular team.  Given continued pain and worsening gangrene, recommend IR evaluation for possible angiogram.  Per IR team, likely angiogram to be performed on Monday  Blood cultures: No growth to date  ESR 83  CRP 13.6  LEADs reviewed  Pain management following due to difficult to control pain  Cellulitis of left foot  Seen by podiatry and sent to ED for further evaluation  Procalcitonin 0.07->0.07  Received IV cefepime and vancomycin, now monitoring off antibiotics    Diabetes mellitus type 2 with complications ([facility])  Hemoglobin A1c 10.3 on 12/13/2024   Initiated on Lantus 10 units SQ at bedtime with 5 units of Humalog SQ with meals 3 times daily  Continue sliding scale insulin coverage  Improved glycemic control will be important for wound healing  Endocrinology consultation appreciated  Tobacco abuse  Smokes half pack per day  Continue nicotine patch 14 mg  Smoking cessation recommended  Obesity (BMI 30.0-34.9)  BMI 34  Healthy diet and lifestyle modification recommended  Hyponatremia  Improving    VTE Pharmacologic Prophylaxis: VTE Score: 3 Moderate Risk (Score 3-4) - Pharmacological DVT Prophylaxis Ordered: enoxaparin (Lovenox).    Mobility:   Basic Mobility Inpatient Raw Score: 22  JH-HLM Goal: 7: Walk 25 feet or more  JH-HLM Achieved: 7: Walk 25 feet or more  JH-HLM Goal achieved. Continue to encourage appropriate mobility.    Patient Centered Rounds: I performed bedside rounds  with nursing staff today.   Discussions with Specialists or Other Care Team Provider: yes    Education and Discussions with Family / Patient: Patient declined call to .     Current Length of Stay: 7 day(s)  Current Patient Status: Inpatient   Certification Statement: The patient will continue to require additional inpatient hospital stay due to gangrenous toe  Discharge Plan: Anticipate discharge in 48-72 hrs to home with home services.    Code Status: Level 1 - Full Code    Subjective   No overnight events noted    Objective :  Temp:  [98.1 °F (36.7 °C)-98.9 °F (37.2 °C)] 98.1 °F (36.7 °C)  HR:  [82-83] 83  BP: (134-165)/(75-94) 165/82  Resp:  [18-20] 18  SpO2:  [89 %-92 %] 92 %  O2 Device: None (Room air)    Body mass index is 35.8 kg/m².     Input and Output Summary (last 24 hours):     Intake/Output Summary (Last 24 hours) at 12/20/2024 1303  Last data filed at 12/20/2024 1251  Gross per 24 hour   Intake 960 ml   Output 2150 ml   Net -1190 ml       Physical Exam  Constitutional:       General: She is not in acute distress.     Appearance: She is obese.   HENT:      Head: Normocephalic and atraumatic.      Nose: Nose normal.      Mouth/Throat:      Mouth: Mucous membranes are moist.   Eyes:      Extraocular Movements: Extraocular movements intact.      Conjunctiva/sclera: Conjunctivae normal.   Cardiovascular:      Rate and Rhythm: Normal rate and regular rhythm.   Pulmonary:      Effort: Pulmonary effort is normal. No respiratory distress.   Abdominal:      Palpations: Abdomen is soft.      Tenderness: There is no abdominal tenderness.   Musculoskeletal:         General: Normal range of motion.      Cervical back: Normal range of motion and neck supple.   Skin:     General: Skin is warm and dry.      Comments: Gangrenous fourth toe noted   Neurological:      General: No focal deficit present.      Mental Status: She is alert. Mental status is at baseline.      Cranial Nerves: No cranial nerve  deficit.   Psychiatric:         Mood and Affect: Mood normal.         Behavior: Behavior normal.           Lines/Drains:        Lab Results: I have reviewed the following results:   Results from last 7 days   Lab Units 12/20/24  0558 12/18/24  0804   WBC Thousand/uL 6.71 7.24   HEMOGLOBIN g/dL 10.3* 11.0*   HEMATOCRIT % 33.0* 35.5   PLATELETS Thousands/uL 218 211   SEGS PCT %  --  67   LYMPHO PCT %  --  26   MONO PCT %  --  6   EOS PCT %  --  1     Results from last 7 days   Lab Units 12/20/24  0558 12/14/24  0432 12/13/24  1841   SODIUM mmol/L 135   < > 131*   POTASSIUM mmol/L 4.6   < > 3.8   CHLORIDE mmol/L 101   < > 91*   CO2 mmol/L 28   < > 32   BUN mg/dL 24   < > 19   CREATININE mg/dL 0.68   < > 0.68   ANION GAP mmol/L 6   < > 8   CALCIUM mg/dL 9.4   < > 9.8   ALBUMIN g/dL  --   --  4.1   TOTAL BILIRUBIN mg/dL  --   --  0.29   ALK PHOS U/L  --   --  75   ALT U/L  --   --  11   AST U/L  --   --  10*   GLUCOSE RANDOM mg/dL 132   < > 276*    < > = values in this interval not displayed.     Results from last 7 days   Lab Units 12/13/24  1841   INR  0.82*     Results from last 7 days   Lab Units 12/20/24  1109 12/20/24  0707 12/19/24  2058 12/19/24  1626 12/19/24  1110 12/19/24  0721 12/18/24  2103 12/18/24  1545 12/18/24  1103 12/18/24  0736 12/17/24  2114 12/17/24  1555   POC GLUCOSE mg/dl 141* 127 151* 132 211* 146* 168* 118 151* 138 178* 120     Results from last 7 days   Lab Units 12/13/24  1841   HEMOGLOBIN A1C % 10.3*     Results from last 7 days   Lab Units 12/14/24  0432 12/13/24  1841   LACTIC ACID mmol/L  --  1.0   PROCALCITONIN ng/ml 0.07 0.07       Recent Cultures (last 7 days):   Results from last 7 days   Lab Units 12/13/24  1841 12/13/24  1832   BLOOD CULTURE  No Growth After 5 Days. No Growth After 5 Days.       Imaging Results Review: No pertinent imaging studies reviewed.  Other Study Results Review: No additional pertinent studies reviewed.    Last 24 Hours Medication List:     Current  Facility-Administered Medications:     acetaminophen (TYLENOL) tablet 975 mg, Q6H JORGE    enoxaparin (LOVENOX) subcutaneous injection 40 mg, Daily    hydrALAZINE (APRESOLINE) injection 10 mg, Q6H PRN    HYDROmorphone (DILAUDID) injection 0.5 mg, Q6H PRN    HYDROmorphone (DILAUDID) tablet 2 mg, Q4H PRN **OR** HYDROmorphone (DILAUDID) tablet 4 mg, Q4H PRN    ibuprofen (MOTRIN) tablet 600 mg, Q6H JORGE    insulin glargine (LANTUS) subcutaneous injection 10 Units 0.1 mL, HS    insulin lispro (HumALOG/ADMELOG) 100 units/mL subcutaneous injection 1-5 Units, TID AC **AND** Fingerstick Glucose (POCT), TID AC    insulin lispro (HumALOG/ADMELOG) 100 units/mL subcutaneous injection 1-5 Units, HS    insulin lispro (HumALOG/ADMELOG) 100 units/mL subcutaneous injection 3 Units, TID With Meals    methocarbamol (ROBAXIN) tablet 500 mg, Q6H JORGE    naloxone (NARCAN) 0.04 mg/mL syringe 0.04 mg, Q1MIN PRN    nicotine (NICODERM CQ) 14 mg/24hr TD 24 hr patch 1 patch, Daily    ondansetron (ZOFRAN) injection 4 mg, Q6H PRN    senna-docusate sodium (SENOKOT S) 8.6-50 mg per tablet 1 tablet, HS    Administrative Statements   Today, Patient Was Seen By: Luz Maria Shore MD      **Please Note: This note may have been constructed using a voice recognition system.**

## 2024-12-20 NOTE — UTILIZATION REVIEW
Continued Stay Review    Date: 12/20                          Current Patient Class: Inpatient  Current Level of Care: MEd/surg    HPI:49 y.o. female initially admitted on 12/13     Assessment/Plan: Reevaluated by vascular team. Given continued pain and worsening gangrene, recommend IR evaluation for possible angiogram. Per IR team, likely angiogram to be performed on Monday   Has received courses of iv abx, montior off abs for now.  Blood cultures neg.  Acute pain service following for uncontrolled continued pain.     Podiatry consult: Pain improved, controlled.   Perfusion has improved a bit to the digits 1,2,3. The left 5th toe is still dusky and painful. Eschar formation is stable, cellulitis remains resolved.      IR consult:  Plan for left lower extremity angiography tentatively Monday, pending IR scheduling   V ascular: L 4th toe with dry gangrene, worse than admission. Tenderness of L forefoot. Sensation and motor intact of L foot. 1+ L dp and pt pulse.     Medications:   Scheduled Medications:  acetaminophen, 975 mg, Oral, Q6H JORGE  enoxaparin, 40 mg, Subcutaneous, Daily  ibuprofen, 600 mg, Oral, Q6H JORGE  insulin glargine, 10 Units, Subcutaneous, HS  insulin lispro, 1-5 Units, Subcutaneous, TID AC  insulin lispro, 1-5 Units, Subcutaneous, HS  insulin lispro, 3 Units, Subcutaneous, TID With Meals  methocarbamol, 500 mg, Oral, Q6H JORGE  nicotine, 1 patch, Transdermal, Daily  senna-docusate sodium, 1 tablet, Oral, HS      Continuous IV Infusions:     PRN Meds:  hydrALAZINE, 10 mg, Intravenous, Q6H PRN  HYDROmorphone, 0.5 mg, Intravenous, Q8H PRN x 2 12/20  HYDROmorphone, 2 mg, Oral, Q4H PRN   Or  HYDROmorphone, 4 mg, Oral, Q4H PRN  naloxone, 0.04 mg, Intravenous, Q1MIN PRN  ondansetron, 4 mg, Intravenous, Q6H PRN      Discharge Plan: TBD    Vital Signs (last 3 days)       Date/Time Temp Pulse Resp BP MAP (mmHg) SpO2 O2 Device Patient Position - Orthostatic VS [place] Coma Scale Score Pain    12/20/24 1416 -- --  -- -- -- -- -- -- -- 9    12/20/24 1228 -- -- -- -- -- -- -- -- -- 8 12/20/24 0915 -- -- -- -- -- -- -- -- 15 --    12/20/24 0808 -- -- -- -- -- -- -- -- -- 6 12/20/24 0803 -- -- -- -- -- -- -- -- -- 6 12/20/24 07:08:40 98.1 °F (36.7 °C) 83 18 165/82 110 92 % None (Room air) Lying -- --    12/20/24 0540 -- -- -- -- -- -- -- -- -- 7 12/20/24 0223 -- -- -- -- -- -- -- -- -- 6 12/20/24 0018 -- -- -- -- -- -- -- -- -- 10 - Worst Possible Pain    12/19/24 2312 -- -- -- -- -- -- -- -- -- 6 12/19/24 21:59:58 98.2 °F (36.8 °C) 83 18 148/94 112 92 % -- -- -- --    12/19/24 2123 -- -- -- -- -- -- -- -- -- 8 12/19/24 2100 -- -- -- -- -- -- None (Room air) -- 15 --    12/19/24 2000 -- -- -- -- -- -- -- -- 15 --    12/19/24 1638 -- -- -- -- -- -- -- -- -- 7 12/19/24 15:14:26 98.9 °F (37.2 °C) 82 20 134/75 95 89 % -- -- -- --    12/19/24 1221 -- -- -- -- -- -- -- -- -- 6 12/19/24 1218 -- -- -- -- -- -- -- -- -- 6 12/19/24 1029 -- -- -- -- -- -- -- -- -- 9    12/19/24 0839 -- -- -- -- -- -- -- -- 15 10 - Worst Possible Pain    12/19/24 0719 98 °F (36.7 °C) 79 16 157/79 105 96 % -- -- -- --    12/19/24 0042 -- -- -- -- -- -- -- -- -- 10 - Worst Possible Pain    12/18/24 2300 -- 86 -- 158/86 -- -- -- -- -- --    12/18/24 22:08:37 99 °F (37.2 °C) 89 17 170/89 116 95 % -- -- -- --    12/18/24 2007 -- -- -- -- -- -- -- -- 15 9    12/18/24 1812 -- -- -- -- -- -- -- -- -- 10 - Worst Possible Pain    12/18/24 1529 -- -- -- -- -- -- -- -- -- 7    12/18/24 15:16:54 -- 82 18 152/84 107 96 % -- -- -- --    12/18/24 1329 -- -- -- -- -- -- -- -- -- 8    12/18/24 1233 -- -- -- -- -- -- -- -- -- 8    12/18/24 0828 -- -- -- -- -- -- -- -- -- 8    12/18/24 07:36:42 97.9 °F (36.6 °C) 76 18 142/69 93 94 % -- Lying 15 8    12/17/24 2249 -- -- -- -- -- -- -- -- -- 8    12/17/24 22:32:45 98.9 °F (37.2 °C) 89 17 145/85 105 92 % -- -- -- --    12/17/24 1949 -- -- -- -- -- -- -- -- 15 8    12/17/24 1755 -- -- -- -- -- -- -- --  -- 7    12/17/24 1456 -- -- -- -- -- -- -- -- -- 8    12/17/24 14:38:02 98.9 °F (37.2 °C) 89 -- 123/75 91 94 % -- -- -- --    12/17/24 1148 -- -- -- -- -- -- -- -- -- 7    12/17/24 1015 -- -- -- -- -- -- -- -- -- 7    12/17/24 0805 -- -- -- -- -- -- -- -- -- 6    12/17/24 0721 -- -- -- -- -- 96 % None (Room air) -- 15 6    12/17/24 07:11:28 98.9 °F (37.2 °C) 96 16 145/86 106 96 % None (Room air) Lying -- --    12/17/24 07:10:19 98.9 °F (37.2 °C) 96 -- 145/86 106 82 % -- -- -- --    12/17/24 0547 -- -- -- -- -- -- -- -- -- 10 - Worst Possible Pain    12/17/24 0537 -- -- -- -- -- -- -- -- -- 10 - Worst Possible Pain    12/17/24 0437 -- -- -- -- -- -- -- -- -- 9          Weight (last 2 days)       None            Pertinent Labs/Diagnostic Results:   Radiology:  VAS ARTERIAL DUPLEX- LOWER LIMB BILATERAL   Final Interpretation by Dejuan Lay DO (12/16 2101)      MRI foot/forefoot toes left wo contrast   Final Interpretation by Mik Brown MD (12/15 1500)      Minimal ulceration distal second toe. No soft tissue abscess.      No MR evidence for osteomyelitis.      Fibrous calcaneonavicular coalition.            Workstation performed: [id number]         XR toe second min 2 views LEFT   Final Interpretation by Korey Amin MD (12/14 7545)      No acute osseous abnormality. Soft tissue swelling.               Workstation performed: [id number]         IR lower extremity angiogram    (Results Pending)     Cardiology:    Results from last 7 days   Lab Units 12/20/24  0558 12/18/24  0804 12/17/24  0438 12/15/24  0617 12/14/24  0432   WBC Thousand/uL 6.71 7.24 7.44 9.20 9.53   HEMOGLOBIN g/dL 10.3* 11.0* 11.1* 11.5 12.7   HEMATOCRIT % 33.0* 35.5 35.4 36.0 39.6   PLATELETS Thousands/uL 218 211 171 202 249   TOTAL NEUT ABS Thousands/µL  --  4.86 5.00 5.83  --          Results from last 7 days   Lab Units 12/20/24  0558 12/18/24  0632 12/17/24  0438 12/16/24  1208 12/15/24  0617   SODIUM mmol/L 135 133* 133*  131* 130*   POTASSIUM mmol/L 4.6 4.9 4.4 4.4 4.4   CHLORIDE mmol/L 101 102 100 98 96   CO2 mmol/L 28 23 27 26 28   ANION GAP mmol/L 6 8 6 7 6   BUN mg/dL 24 31* 21 19 13   CREATININE mg/dL 0.68 0.78 0.64 0.71 0.49*   EGFR ml/min/1.73sq m 103 90 105 101 115   CALCIUM mg/dL 9.4 9.4 9.5 9.2 9.2     Results from last 7 days   Lab Units 12/13/24  1841   AST U/L 10*   ALT U/L 11   ALK PHOS U/L 75   TOTAL PROTEIN g/dL 8.1   ALBUMIN g/dL 4.1   TOTAL BILIRUBIN mg/dL 0.29     Results from last 7 days   Lab Units 12/20/24  1109 12/20/24  0707 12/19/24  2058 12/19/24  1626 12/19/24  1110 12/19/24  0721 12/18/24  2103 12/18/24  1545 12/18/24  1103 12/18/24  0736 12/17/24  2114 12/17/24  1555   POC GLUCOSE mg/dl 141* 127 151* 132 211* 146* 168* 118 151* 138 178* 120     Results from last 7 days   Lab Units 12/20/24  0558 12/18/24  0632 12/17/24  0438 12/16/24  1208 12/15/24  0617 12/14/24  0432 12/13/24  1841   GLUCOSE RANDOM mg/dL 132 133 145* 106 146* 193* 276*         Results from last 7 days   Lab Units 12/13/24  1841   HEMOGLOBIN A1C % 10.3*   EAG mg/dl 249           Results from last 7 days   Lab Units 12/13/24  1841   PROTIME seconds 11.8*   INR  0.82*   PTT seconds 29         Results from last 7 days   Lab Units 12/14/24  0432 12/13/24  1841   PROCALCITONIN ng/ml 0.07 0.07     Results from last 7 days   Lab Units 12/13/24  1841   LACTIC ACID mmol/L 1.0           Results from last 7 days   Lab Units 12/13/24  1841   CRP mg/L 13.6*   SED RATE mm/hour 83*           Results from last 7 days   Lab Units 12/13/24  1841 12/13/24  1832   BLOOD CULTURE  No Growth After 5 Days. No Growth After 5 Days.             Results from last 7 days   Lab Units 12/15/24  0617   VANCOMYCIN TR ug/mL 14.0       Network Utilization Review Department  ATTENTION: Please call with any questions or concerns to [phone] and carefully listen to the prompts so that you are directed to the right person. All voicemails are confidential.   For Discharge  needs, contact Care Management DC Support Team at [phone] opt. [phone]  Send all requests for admission clinical reviews, approved or denied determinations and any other requests to dedicated fax number below belonging to the campus where the patient is receiving treatment. List of dedicated fax numbers for the Facilities:  FACILITY NAME UR FAX NUMBER   ADMISSION DENIALS (Administrative/Medical Necessity) [phone]   DISCHARGE SUPPORT TEAM (NETWORK) [phone]   PARENT CHILD HEALTH (Maternity/NICU/Pediatrics) [phone]   [facility] [phone]   [facility] [phone]   [facility] [phone]   [facility] [phone]   [facility] [phone]   [facility] [phone]   [facility] [phone]   [facility] [phone]   [facility] [phone]   [facility] [phone]   [facility] [phone]   [facility] [phone]

## 2024-12-20 NOTE — CASE MANAGEMENT
Case Management Discharge Planning Note    Patient name Lizzy Acuna  Location /-01 MRN [id number]  : 1975 Date 2024       Current Admission Date: 2024  Current Admission Diagnosis:Gangrene of toe of left foot (HCC)   Patient Active Problem List    Diagnosis Date Noted Date Diagnosed    Cellulitis of left foot 2024     Gangrene of toe of left foot (HCC) 2024     Diabetes mellitus type 2 with complications (HCC) 2018     Hyponatremia 2018     Chronic sciatica 2018     Tobacco abuse 02/15/2017     Obesity (BMI 30.0-34.9) 2017     Chronic low back pain 2016     Chronic, continuous use of opioids 2016       LOS (days): 7  Geometric Mean LOS (GMLOS) (days): 3.1  Days to GMLOS:-3.8     OBJECTIVE:  Risk of Unplanned Readmission Score: 10.57         Current admission status: Inpatient   Preferred Pharmacy:   [facility] [address] [address]  Phone: [phone] Fax: [phone]    [id number] - [place], [place] - [address]  [address] [address]  Phone: [phone] Fax: [phone]    Primary Care Provider: Linwood North DO    Primary Insurance: [employer]  Secondary Insurance:     DISCHARGE DETAILS:    Discharge planning discussed with:: patient  Freedom of Choice: Yes  Comments - Freedom of Choice: pt denies any d/c needs -  pt not stable for d/c - pt to have a LLE angiogram on Monday- IR consult                               Other Referral/Resources/Interventions Provided:  Referral Comments: pain management -vascular - IR for an angiogram on Monday    Would you like to participate in our [facility] service program?  : No - Declined    Treatment Team Recommendation: Home (home with famly & outpt follow up- TBD)

## 2024-12-20 NOTE — ASSESSMENT & PLAN NOTE
History of foot injury approximately 10 days ago, has dark discoloration of distal aspect of fourth left toe with pain. She was seen by podiatry and sent to ER for admission  Podiatry input appreciated.  Plan for demarcation of stable dry gangrene in outpatient setting  Patient was reevaluated by vascular team.  Given continued pain and worsening gangrene, recommend IR evaluation for possible angiogram.  Per IR team, likely angiogram to be performed on Monday  Blood cultures: No growth to date  ESR 83  CRP 13.6  LEADs reviewed  Pain management following due to difficult to control pain

## 2024-12-21 LAB
GLUCOSE SERPL-MCNC: 105 MG/DL (ref 65–140)
GLUCOSE SERPL-MCNC: 108 MG/DL (ref 65–140)
GLUCOSE SERPL-MCNC: 138 MG/DL (ref 65–140)
GLUCOSE SERPL-MCNC: 172 MG/DL (ref 65–140)

## 2024-12-21 PROCEDURE — 82948 REAGENT STRIP/BLOOD GLUCOSE: CPT

## 2024-12-21 PROCEDURE — 99232 SBSQ HOSP IP/OBS MODERATE 35: CPT | Performed by: INTERNAL MEDICINE

## 2024-12-21 RX ADMIN — METHOCARBAMOL TABLETS 500 MG: 500 TABLET, COATED ORAL at 06:31

## 2024-12-21 RX ADMIN — HYDROMORPHONE HYDROCHLORIDE 0.5 MG: 1 INJECTION, SOLUTION INTRAMUSCULAR; INTRAVENOUS; SUBCUTANEOUS at 23:13

## 2024-12-21 RX ADMIN — HYDROMORPHONE HYDROCHLORIDE 0.5 MG: 1 INJECTION, SOLUTION INTRAMUSCULAR; INTRAVENOUS; SUBCUTANEOUS at 16:10

## 2024-12-21 RX ADMIN — INSULIN LISPRO 1 UNITS: 100 INJECTION, SOLUTION INTRAVENOUS; SUBCUTANEOUS at 21:39

## 2024-12-21 RX ADMIN — IBUPROFEN 600 MG: 600 TABLET, FILM COATED ORAL at 08:00

## 2024-12-21 RX ADMIN — HYDROMORPHONE HYDROCHLORIDE 0.5 MG: 1 INJECTION, SOLUTION INTRAMUSCULAR; INTRAVENOUS; SUBCUTANEOUS at 04:13

## 2024-12-21 RX ADMIN — METHOCARBAMOL TABLETS 500 MG: 500 TABLET, COATED ORAL at 00:08

## 2024-12-21 RX ADMIN — IBUPROFEN 600 MG: 600 TABLET, FILM COATED ORAL at 21:38

## 2024-12-21 RX ADMIN — ACETAMINOPHEN 975 MG: 325 TABLET ORAL at 12:41

## 2024-12-21 RX ADMIN — HYDRALAZINE HYDROCHLORIDE 10 MG: 20 INJECTION INTRAMUSCULAR; INTRAVENOUS at 23:14

## 2024-12-21 RX ADMIN — ACETAMINOPHEN 975 MG: 325 TABLET ORAL at 23:13

## 2024-12-21 RX ADMIN — ACETAMINOPHEN 975 MG: 325 TABLET ORAL at 00:08

## 2024-12-21 RX ADMIN — INSULIN LISPRO 3 UNITS: 100 INJECTION, SOLUTION INTRAVENOUS; SUBCUTANEOUS at 12:40

## 2024-12-21 RX ADMIN — ENOXAPARIN SODIUM 40 MG: 40 INJECTION SUBCUTANEOUS at 08:00

## 2024-12-21 RX ADMIN — IBUPROFEN 600 MG: 600 TABLET, FILM COATED ORAL at 17:07

## 2024-12-21 RX ADMIN — HYDROMORPHONE HYDROCHLORIDE 4 MG: 4 TABLET ORAL at 14:00

## 2024-12-21 RX ADMIN — INSULIN LISPRO 3 UNITS: 100 INJECTION, SOLUTION INTRAVENOUS; SUBCUTANEOUS at 07:57

## 2024-12-21 RX ADMIN — METHOCARBAMOL TABLETS 500 MG: 500 TABLET, COATED ORAL at 17:07

## 2024-12-21 RX ADMIN — INSULIN GLARGINE 10 UNITS: 100 INJECTION, SOLUTION SUBCUTANEOUS at 21:39

## 2024-12-21 RX ADMIN — HYDROMORPHONE HYDROCHLORIDE 4 MG: 4 TABLET ORAL at 02:07

## 2024-12-21 RX ADMIN — METHOCARBAMOL TABLETS 500 MG: 500 TABLET, COATED ORAL at 12:42

## 2024-12-21 RX ADMIN — HYDROMORPHONE HYDROCHLORIDE 2 MG: 2 TABLET ORAL at 21:38

## 2024-12-21 RX ADMIN — METHOCARBAMOL TABLETS 500 MG: 500 TABLET, COATED ORAL at 23:13

## 2024-12-21 RX ADMIN — ACETAMINOPHEN 975 MG: 325 TABLET ORAL at 18:45

## 2024-12-21 RX ADMIN — HYDRALAZINE HYDROCHLORIDE 10 MG: 20 INJECTION INTRAMUSCULAR; INTRAVENOUS at 00:14

## 2024-12-21 RX ADMIN — ACETAMINOPHEN 975 MG: 325 TABLET ORAL at 06:31

## 2024-12-21 RX ADMIN — INSULIN LISPRO 3 UNITS: 100 INJECTION, SOLUTION INTRAVENOUS; SUBCUTANEOUS at 17:07

## 2024-12-21 RX ADMIN — HYDROMORPHONE HYDROCHLORIDE 4 MG: 4 TABLET ORAL at 08:03

## 2024-12-21 RX ADMIN — IBUPROFEN 600 MG: 600 TABLET, FILM COATED ORAL at 03:46

## 2024-12-21 NOTE — PROGRESS NOTES
Progress Note - Hospitalist   Name: Lizzy Acuna 49 y.o. female I MRN: [id number]  Unit/Bed#: -Sander I Date of Admission: 12/13/2024   Date of Service: 12/21/2024 I Hospital Day: 8    Assessment & Plan  Gangrene of toe of left foot (HCC)  History of foot injury approximately 10 days ago, has dark discoloration of distal aspect of fourth left toe with pain. She was seen by podiatry and sent to ER for admission  Podiatry input appreciated.  Plan for demarcation of stable dry gangrene in outpatient setting  Patient was reevaluated by vascular team.  Given continued pain and worsening gangrene, recommend IR evaluation for possible angiogram.  Per IR team, likely angiogram to be performed on Monday  Blood cultures: No growth to date  ESR 83  CRP 13.6  LEADs reviewed  Pain management following due to difficult to control pain  Cellulitis of left foot  Seen by podiatry and sent to ED for further evaluation  Procalcitonin 0.07->0.07  Received IV cefepime and vancomycin, now monitoring off antibiotics    Diabetes mellitus type 2 with complications ([facility])  Hemoglobin A1c 10.3 on 12/13/2024   Initiated on Lantus 10 units SQ at bedtime with 5 units of Humalog SQ with meals 3 times daily  Continue sliding scale insulin coverage  Improved glycemic control will be important for wound healing  Endocrinology consultation appreciated  Tobacco abuse  Smokes half pack per day  Continue nicotine patch 14 mg  Smoking cessation recommended  Obesity (BMI 30.0-34.9)  BMI 34  Healthy diet and lifestyle modification recommended  Hyponatremia  Improved    VTE Pharmacologic Prophylaxis: VTE Score: 3 Moderate Risk (Score 3-4) - Pharmacological DVT Prophylaxis Ordered: enoxaparin (Lovenox).    Mobility:   Basic Mobility Inpatient Raw Score: 22  JH-HLM Goal: 7: Walk 25 feet or more  JH-HLM Achieved: 7: Walk 25 feet or more  JH-HLM Goal achieved. Continue to encourage appropriate mobility.    Patient Centered Rounds: I performed bedside rounds  with nursing staff today.   Discussions with Specialists or Other Care Team Provider: yes    Education and Discussions with Family / Patient:  Updated patient regarding plan of care.     Current Length of Stay: 8 day(s)  Current Patient Status: Inpatient   Certification Statement: The patient will continue to require additional inpatient hospital stay due to ganagrenous toe  Discharge Plan: Anticipate discharge in 24-48 hrs to home with home services.    Code Status: Level 1 - Full Code    Subjective   No overnight events noted    Objective :  Temp:  [97.9 °F (36.6 °C)-98.3 °F (36.8 °C)] 97.9 °F (36.6 °C)  HR:  [78-86] 79  BP: (120-184)/() 172/91  Resp:  [18-20] 18  SpO2:  [91 %-96 %] 94 %  O2 Device: None (Room air)    Body mass index is 35.8 kg/m².     Input and Output Summary (last 24 hours):     Intake/Output Summary (Last 24 hours) at 12/21/2024 1223  Last data filed at 12/21/2024 0601  Gross per 24 hour   Intake 360 ml   Output 2075 ml   Net -1715 ml       Physical Exam  Constitutional:       General: She is not in acute distress.  HENT:      Head: Normocephalic and atraumatic.      Nose: Nose normal.      Mouth/Throat:      Mouth: Mucous membranes are moist.   Eyes:      Extraocular Movements: Extraocular movements intact.      Conjunctiva/sclera: Conjunctivae normal.   Cardiovascular:      Rate and Rhythm: Normal rate and regular rhythm.   Pulmonary:      Effort: Pulmonary effort is normal. No respiratory distress.   Abdominal:      Palpations: Abdomen is soft.      Tenderness: There is no abdominal tenderness.   Musculoskeletal:         General: Normal range of motion.      Cervical back: Normal range of motion and neck supple.   Skin:     General: Skin is warm and dry.      Comments: Dark discoloration of fourth toe noted with surrounding erythema   Neurological:      General: No focal deficit present.      Mental Status: She is alert. Mental status is at baseline.      Cranial Nerves: No cranial  nerve deficit.   Psychiatric:         Mood and Affect: Mood normal.         Behavior: Behavior normal.           Lines/Drains:              Lab Results: I have reviewed the following results:   Results from last 7 days   Lab Units 12/20/24  0558 12/18/24  0804   WBC Thousand/uL 6.71 7.24   HEMOGLOBIN g/dL 10.3* 11.0*   HEMATOCRIT % 33.0* 35.5   PLATELETS Thousands/uL 218 211   SEGS PCT %  --  67   LYMPHO PCT %  --  26   MONO PCT %  --  6   EOS PCT %  --  1     Results from last 7 days   Lab Units 12/20/24  0558   SODIUM mmol/L 135   POTASSIUM mmol/L 4.6   CHLORIDE mmol/L 101   CO2 mmol/L 28   BUN mg/dL 24   CREATININE mg/dL 0.68   ANION GAP mmol/L 6   CALCIUM mg/dL 9.4   GLUCOSE RANDOM mg/dL 132         Results from last 7 days   Lab Units 12/21/24  1130 12/21/24  0739 12/20/24  2101 12/20/24  1615 12/20/24  1109 12/20/24  0707 12/19/24  2058 12/19/24  1626 12/19/24  1110 12/19/24  0721 12/18/24  2103 12/18/24  1545   POC GLUCOSE mg/dl 108 105 151* 127 141* 127 151* 132 211* 146* 168* 118               Recent Cultures (last 7 days):         Imaging Results Review: No pertinent imaging studies reviewed.  Other Study Results Review: No additional pertinent studies reviewed.    Last 24 Hours Medication List:     Current Facility-Administered Medications:     acetaminophen (TYLENOL) tablet 975 mg, Q6H JORGE    enoxaparin (LOVENOX) subcutaneous injection 40 mg, Daily    hydrALAZINE (APRESOLINE) injection 10 mg, Q6H PRN    HYDROmorphone (DILAUDID) injection 0.5 mg, Q8H PRN    HYDROmorphone (DILAUDID) tablet 2 mg, Q4H PRN **OR** HYDROmorphone (DILAUDID) tablet 4 mg, Q4H PRN    ibuprofen (MOTRIN) tablet 600 mg, Q6H JORGE    insulin glargine (LANTUS) subcutaneous injection 10 Units 0.1 mL, HS    insulin lispro (HumALOG/ADMELOG) 100 units/mL subcutaneous injection 1-5 Units, TID AC **AND** Fingerstick Glucose (POCT), TID AC    insulin lispro (HumALOG/ADMELOG) 100 units/mL subcutaneous injection 1-5 Units, HS    insulin lispro  (HumALOG/ADMELOG) 100 units/mL subcutaneous injection 3 Units, TID With Meals    methocarbamol (ROBAXIN) tablet 500 mg, Q6H JORGE    naloxone (NARCAN) 0.04 mg/mL syringe 0.04 mg, Q1MIN PRN    nicotine (NICODERM CQ) 14 mg/24hr TD 24 hr patch 1 patch, Daily    ondansetron (ZOFRAN) injection 4 mg, Q6H PRN    senna-docusate sodium (SENOKOT S) 8.6-50 mg per tablet 1 tablet, HS    Administrative Statements   Today, Patient Was Seen By: Luz Maria Shore MD      **Please Note: This note may have been constructed using a voice recognition system.**

## 2024-12-21 NOTE — PLAN OF CARE
Problem: DISCHARGE PLANNING  Goal: Discharge to home or other facility with appropriate resources  Description: INTERVENTIONS:  - Identify barriers to discharge w/patient and caregiver  - Arrange for needed discharge resources and transportation as appropriate  - Identify discharge learning needs (meds, wound care, etc.)    Problem: Knowledge Deficit  Goal: Patient/family/caregiver demonstrates understanding of disease process, treatment plan, medications, and discharge instructions  Description: Complete learning assessment and assess knowledge base.  Interventions:  - Provide teaching at level of understanding  - Provide teaching via preferred learning methods  Outcome: Progressing   - Refer to Case Management Department for coordinating discharge planning if the patient needs post-hospital services based on physician/advanced practitioner order or complex needs related to functional status, cognitive ability, or social support system  Outcome: Progressing

## 2024-12-21 NOTE — PLAN OF CARE
Problem: PAIN - ADULT  Goal: Verbalizes/displays adequate comfort level or baseline comfort level  Description: Interventions:  - Encourage patient to monitor pain and request assistance  - Assess pain using appropriate pain scale  - Administer analgesics based on type and severity of pain and evaluate response  - Implement non-pharmacological measures as appropriate and evaluate response  - Consider cultural and social influences on pain and pain management  - Notify physician/advanced practitioner if interventions unsuccessful or patient reports new pain  Outcome: Progressing     Problem: INFECTION - ADULT  Goal: Absence or prevention of progression during hospitalization  Description: INTERVENTIONS:  - Assess and monitor for signs and symptoms of infection  - Monitor lab/diagnostic results  - Monitor all insertion sites, i.e. indwelling lines, tubes, and drains  - Monitor endotracheal if appropriate and nasal secretions for changes in amount and color  - [place] appropriate cooling/warming therapies per order  - Administer medications as ordered  - Instruct and encourage patient and family to use good hand hygiene technique  - Identify and instruct in appropriate isolation precautions for identified infection/condition  Outcome: Progressing     Problem: DISCHARGE PLANNING  Goal: Discharge to home or other facility with appropriate resources  Description: INTERVENTIONS:  - Identify barriers to discharge w/patient and caregiver  - Arrange for needed discharge resources and transportation as appropriate  - Identify discharge learning needs (meds, wound care, etc.)  - Arrange for interpretive services to assist at discharge as needed  - Refer to Case Management Department for coordinating discharge planning if the patient needs post-hospital services based on physician/advanced practitioner order or complex needs related to functional status, cognitive ability, or social support system  Outcome: Progressing      Problem: Knowledge Deficit  Goal: Patient/family/caregiver demonstrates understanding of disease process, treatment plan, medications, and discharge instructions  Description: Complete learning assessment and assess knowledge base.  Interventions:  - Provide teaching at level of understanding  - Provide teaching via preferred learning methods  Outcome: Progressing     Problem: SKIN/TISSUE INTEGRITY - ADULT  Goal: Incision(s), wounds(s) or drain site(s) healing without S/S of infection  Description: INTERVENTIONS  - Assess and document dressing, incision, wound bed, drain sites and surrounding tissue  - Provide patient and family education  - Perform skin care/dressing changes per wound care orders and as needed for soilage  Outcome: Progressing     Problem: METABOLIC, FLUID AND ELECTROLYTES - ADULT  Goal: Electrolytes maintained within normal limits  Description: INTERVENTIONS:  - Monitor labs and assess patient for signs and symptoms of electrolyte imbalances  - Administer electrolyte replacement as ordered  - Monitor response to electrolyte replacements, including repeat lab results as appropriate  - Instruct patient on fluid and nutrition as appropriate  Outcome: Progressing  Goal: Glucose maintained within target range  Description: INTERVENTIONS:  - Monitor Blood Glucose as ordered  - Assess for signs and symptoms of hyperglycemia and hypoglycemia  - Administer ordered medications to maintain glucose within target range  - Assess nutritional intake and initiate nutrition service referral as needed  Outcome: Progressing     Problem: MUSCULOSKELETAL - ADULT  Goal: Maintain or return mobility to safest level of function  Description: INTERVENTIONS:  - Assess patient's ability to carry out ADLs; assess patient's baseline for ADL function and identify physical deficits which impact ability to perform ADLs (bathing, care of mouth/teeth, toileting, grooming, dressing, etc.)  - Assess/evaluate cause of self-care  deficits   - Assess range of motion  - Assess patient's mobility  - Assess patient's need for assistive devices and provide as appropriate  - Encourage maximum independence but intervene and supervise when necessary  - Involve family in performance of ADLs  - Assess for home care needs following discharge   - Consider OT consult to assist with ADL evaluation and planning for discharge  - Provide patient education as appropriate  Outcome: Progressing

## 2024-12-22 LAB
GLUCOSE SERPL-MCNC: 102 MG/DL (ref 65–140)
GLUCOSE SERPL-MCNC: 105 MG/DL (ref 65–140)
GLUCOSE SERPL-MCNC: 126 MG/DL (ref 65–140)
GLUCOSE SERPL-MCNC: 151 MG/DL (ref 65–140)

## 2024-12-22 PROCEDURE — 99232 SBSQ HOSP IP/OBS MODERATE 35: CPT | Performed by: INTERNAL MEDICINE

## 2024-12-22 PROCEDURE — 82948 REAGENT STRIP/BLOOD GLUCOSE: CPT

## 2024-12-22 RX ORDER — HYDROMORPHONE HCL/PF 1 MG/ML
0.5 SYRINGE (ML) INJECTION ONCE
Refills: 0 | Status: COMPLETED | OUTPATIENT
Start: 2024-12-22 | End: 2024-12-22

## 2024-12-22 RX ORDER — KETOROLAC TROMETHAMINE 30 MG/ML
15 INJECTION, SOLUTION INTRAMUSCULAR; INTRAVENOUS EVERY 6 HOURS SCHEDULED
Status: COMPLETED | OUTPATIENT
Start: 2024-12-22 | End: 2024-12-23

## 2024-12-22 RX ADMIN — KETOROLAC TROMETHAMINE 15 MG: 30 INJECTION, SOLUTION INTRAMUSCULAR at 13:46

## 2024-12-22 RX ADMIN — ENOXAPARIN SODIUM 40 MG: 40 INJECTION SUBCUTANEOUS at 09:03

## 2024-12-22 RX ADMIN — HYDROMORPHONE HYDROCHLORIDE 4 MG: 4 TABLET ORAL at 01:46

## 2024-12-22 RX ADMIN — HYDROMORPHONE HYDROCHLORIDE 4 MG: 4 TABLET ORAL at 21:32

## 2024-12-22 RX ADMIN — HYDROMORPHONE HYDROCHLORIDE 0.5 MG: 1 INJECTION, SOLUTION INTRAMUSCULAR; INTRAVENOUS; SUBCUTANEOUS at 00:28

## 2024-12-22 RX ADMIN — METHOCARBAMOL TABLETS 500 MG: 500 TABLET, COATED ORAL at 13:46

## 2024-12-22 RX ADMIN — METHOCARBAMOL TABLETS 500 MG: 500 TABLET, COATED ORAL at 23:50

## 2024-12-22 RX ADMIN — ACETAMINOPHEN 975 MG: 325 TABLET ORAL at 17:05

## 2024-12-22 RX ADMIN — INSULIN LISPRO 3 UNITS: 100 INJECTION, SOLUTION INTRAVENOUS; SUBCUTANEOUS at 13:46

## 2024-12-22 RX ADMIN — ACETAMINOPHEN 975 MG: 325 TABLET ORAL at 13:46

## 2024-12-22 RX ADMIN — INSULIN LISPRO 3 UNITS: 100 INJECTION, SOLUTION INTRAVENOUS; SUBCUTANEOUS at 09:03

## 2024-12-22 RX ADMIN — KETOROLAC TROMETHAMINE 15 MG: 30 INJECTION, SOLUTION INTRAMUSCULAR at 05:39

## 2024-12-22 RX ADMIN — HYDROMORPHONE HYDROCHLORIDE 4 MG: 4 TABLET ORAL at 05:39

## 2024-12-22 RX ADMIN — INSULIN LISPRO 1 UNITS: 100 INJECTION, SOLUTION INTRAVENOUS; SUBCUTANEOUS at 21:32

## 2024-12-22 RX ADMIN — INSULIN LISPRO 3 UNITS: 100 INJECTION, SOLUTION INTRAVENOUS; SUBCUTANEOUS at 17:00

## 2024-12-22 RX ADMIN — KETOROLAC TROMETHAMINE 15 MG: 30 INJECTION, SOLUTION INTRAMUSCULAR at 17:03

## 2024-12-22 RX ADMIN — IBUPROFEN 600 MG: 600 TABLET, FILM COATED ORAL at 03:09

## 2024-12-22 RX ADMIN — ACETAMINOPHEN 975 MG: 325 TABLET ORAL at 23:50

## 2024-12-22 RX ADMIN — ACETAMINOPHEN 975 MG: 325 TABLET ORAL at 05:39

## 2024-12-22 RX ADMIN — HYDROMORPHONE HYDROCHLORIDE 0.5 MG: 1 INJECTION, SOLUTION INTRAMUSCULAR; INTRAVENOUS; SUBCUTANEOUS at 18:46

## 2024-12-22 RX ADMIN — METHOCARBAMOL TABLETS 500 MG: 500 TABLET, COATED ORAL at 05:39

## 2024-12-22 RX ADMIN — INSULIN GLARGINE 10 UNITS: 100 INJECTION, SOLUTION SUBCUTANEOUS at 21:32

## 2024-12-22 RX ADMIN — KETOROLAC TROMETHAMINE 15 MG: 30 INJECTION, SOLUTION INTRAMUSCULAR at 23:51

## 2024-12-22 RX ADMIN — HYDROMORPHONE HYDROCHLORIDE 4 MG: 4 TABLET ORAL at 15:55

## 2024-12-22 RX ADMIN — HYDROMORPHONE HYDROCHLORIDE 0.5 MG: 1 INJECTION, SOLUTION INTRAMUSCULAR; INTRAVENOUS; SUBCUTANEOUS at 09:03

## 2024-12-22 RX ADMIN — METHOCARBAMOL TABLETS 500 MG: 500 TABLET, COATED ORAL at 17:03

## 2024-12-22 NOTE — PLAN OF CARE
Problem: PAIN - ADULT  Goal: Verbalizes/displays adequate comfort level or baseline comfort level  Description: Interventions:  - Encourage patient to monitor pain and request assistance  - Assess pain using appropriate pain scale  - Administer analgesics based on type and severity of pain and evaluate response  - Implement non-pharmacological measures as appropriate and evaluate response  - Consider cultural and social influences on pain and pain management  - Notify physician/advanced practitioner if interventions unsuccessful or patient reports new pain  12/22/2024 1043 by Robles Kennedy RN  Outcome: Progressing  12/22/2024 1043 by Robles Kennedy RN  Outcome: Progressing     Problem: Knowledge Deficit  Goal: Patient/family/caregiver demonstrates understanding of disease process, treatment plan, medications, and discharge instructions  Description: Complete learning assessment and assess knowledge base.  Interventions:  - Provide teaching at level of understanding  - Provide teaching via preferred learning methods  12/22/2024 1043 by Robles Kennedy RN  Outcome: Progressing  12/22/2024 1043 by Robles Kennedy RN  Outcome: Progressing

## 2024-12-22 NOTE — PROGRESS NOTES
Progress Note - Hospitalist   Name: Lizzy Acuna 49 y.o. female I MRN: [id number]  Unit/Bed#: -01 I Date of Admission: 12/13/2024   Date of Service: 12/22/2024 I Hospital Day: 9    Assessment & Plan  Gangrene of toe of left foot (HCC)  History of foot injury approximately 10 days ago, has dark discoloration of distal aspect of fourth left toe with pain. She was seen by podiatry and sent to ER for admission  Podiatry input appreciated.  Plan for demarcation of stable dry gangrene in outpatient setting  Patient was reevaluated by vascular team.  Given continued pain and worsening gangrene, recommend IR evaluation for possible angiogram.  Per IR team, likely angiogram to be performed on Monday  Blood cultures: No growth to date  ESR 83  CRP 13.6  LEADs reviewed  Pain management following due to difficult to control pain  Cellulitis of left foot  Seen by podiatry and sent to ED for further evaluation  Procalcitonin 0.07->0.07  Received IV cefepime and vancomycin, now monitoring off antibiotics    Diabetes mellitus type 2 with complications ([facility])  Hemoglobin A1c 10.3 on 12/13/2024   Initiated on Lantus 10 units SQ at bedtime with 5 units of Humalog SQ with meals 3 times daily  Continue sliding scale insulin coverage  Improved glycemic control will be important for wound healing  Endocrinology consultation appreciated  Tobacco abuse  Smokes half pack per day  Continue nicotine patch 14 mg  Smoking cessation recommended  Obesity (BMI 30.0-34.9)  BMI 34  Healthy diet and lifestyle modification recommended  Hyponatremia  Improved    VTE Pharmacologic Prophylaxis: VTE Score: 3 Moderate Risk (Score 3-4) - Pharmacological DVT Prophylaxis Ordered: enoxaparin (Lovenox).    Mobility:   Basic Mobility Inpatient Raw Score: 22  JH-HLM Goal: 7: Walk 25 feet or more  JH-HLM Achieved: 7: Walk 25 feet or more  JH-HLM Goal achieved. Continue to encourage appropriate mobility.    Patient Centered Rounds: I performed bedside rounds  with nursing staff today.   Discussions with Specialists or Other Care Team Provider: yes    Education and Discussions with Family / Patient:  Updated patient regarding plan of care.     Current Length of Stay: 9 day(s)  Current Patient Status: Inpatient   Certification Statement: The patient will continue to require additional inpatient hospital stay due to foot wound  Discharge Plan: Anticipate discharge in 24-48 hrs to home with home services.    Code Status: Level 1 - Full Code    Subjective   No overnight events noted.  Patient continues with pain of foot    Objective :  Temp:  [98 °F (36.7 °C)-98.2 °F (36.8 °C)] 98.2 °F (36.8 °C)  HR:  [75-99] 75  BP: (144-180)/() 148/77  Resp:  [16-18] 16  SpO2:  [92 %-94 %] 93 %  O2 Device: None (Room air)    Body mass index is 35.8 kg/m².     Input and Output Summary (last 24 hours):     Intake/Output Summary (Last 24 hours) at 12/22/2024 1224  Last data filed at 12/22/2024 0601  Gross per 24 hour   Intake 180 ml   Output 600 ml   Net -420 ml       Physical Exam  Constitutional:       General: She is not in acute distress.     Appearance: She is obese.   HENT:      Head: Normocephalic and atraumatic.      Nose: Nose normal.      Mouth/Throat:      Mouth: Mucous membranes are moist.   Eyes:      Extraocular Movements: Extraocular movements intact.      Conjunctiva/sclera: Conjunctivae normal.   Cardiovascular:      Rate and Rhythm: Normal rate and regular rhythm.   Pulmonary:      Effort: Pulmonary effort is normal. No respiratory distress.   Abdominal:      Palpations: Abdomen is soft.      Tenderness: There is no abdominal tenderness.   Musculoskeletal:         General: Normal range of motion.      Cervical back: Normal range of motion and neck supple.   Skin:     General: Skin is warm and dry.      Comments: Fourth digit of foot with dry gangrene noted, dark discoloration with surrounding erythema   Neurological:      General: No focal deficit present.      Mental  Status: She is alert. Mental status is at baseline.      Cranial Nerves: No cranial nerve deficit.   Psychiatric:         Mood and Affect: Mood normal.         Behavior: Behavior normal.           Lines/Drains:        Lab Results: I have reviewed the following results:   Results from last 7 days   Lab Units 12/20/24  0558 12/18/24  0804   WBC Thousand/uL 6.71 7.24   HEMOGLOBIN g/dL 10.3* 11.0*   HEMATOCRIT % 33.0* 35.5   PLATELETS Thousands/uL 218 211   SEGS PCT %  --  67   LYMPHO PCT %  --  26   MONO PCT %  --  6   EOS PCT %  --  1     Results from last 7 days   Lab Units 12/20/24  0558   SODIUM mmol/L 135   POTASSIUM mmol/L 4.6   CHLORIDE mmol/L 101   CO2 mmol/L 28   BUN mg/dL 24   CREATININE mg/dL 0.68   ANION GAP mmol/L 6   CALCIUM mg/dL 9.4   GLUCOSE RANDOM mg/dL 132         Results from last 7 days   Lab Units 12/22/24  1154 12/22/24  0746 12/21/24  2126 12/21/24  1627 12/21/24  1130 12/21/24  0739 12/20/24  2101 12/20/24  1615 12/20/24  1109 12/20/24  0707 12/19/24  2058 12/19/24  1626   POC GLUCOSE mg/dl 126 105 172* 138 108 105 151* 127 141* 127 151* 132               Recent Cultures (last 7 days):         Imaging Results Review: No pertinent imaging studies reviewed.  Other Study Results Review: No additional pertinent studies reviewed.    Last 24 Hours Medication List:     Current Facility-Administered Medications:     acetaminophen (TYLENOL) tablet 975 mg, Q6H JORGE    enoxaparin (LOVENOX) subcutaneous injection 40 mg, Daily    hydrALAZINE (APRESOLINE) injection 10 mg, Q6H PRN    HYDROmorphone (DILAUDID) injection 0.5 mg, Q8H PRN    HYDROmorphone (DILAUDID) tablet 2 mg, Q4H PRN **OR** HYDROmorphone (DILAUDID) tablet 4 mg, Q4H PRN    insulin glargine (LANTUS) subcutaneous injection 10 Units 0.1 mL, HS    insulin lispro (HumALOG/ADMELOG) 100 units/mL subcutaneous injection 1-5 Units, TID AC **AND** Fingerstick Glucose (POCT), TID AC    insulin lispro (HumALOG/ADMELOG) 100 units/mL subcutaneous injection 1-5  Units, HS    insulin lispro (HumALOG/ADMELOG) 100 units/mL subcutaneous injection 3 Units, TID With Meals    ketorolac (TORADOL) injection 15 mg, Q6H JORGE    methocarbamol (ROBAXIN) tablet 500 mg, Q6H JORGE    naloxone (NARCAN) 0.04 mg/mL syringe 0.04 mg, Q1MIN PRN    nicotine (NICODERM CQ) 14 mg/24hr TD 24 hr patch 1 patch, Daily    ondansetron (ZOFRAN) injection 4 mg, Q6H PRN    senna-docusate sodium (SENOKOT S) 8.6-50 mg per tablet 1 tablet, HS    Administrative Statements   Today, Patient Was Seen By: Luz Maria Shore MD      **Please Note: This note may have been constructed using a voice recognition system.**

## 2024-12-22 NOTE — PLAN OF CARE
Problem: PAIN - ADULT  Goal: Verbalizes/displays adequate comfort level or baseline comfort level  Description: Interventions:  - Encourage patient to monitor pain and request assistance  - Assess pain using appropriate pain scale  - Administer analgesics based on type and severity of pain and evaluate response  - Implement non-pharmacological measures as appropriate and evaluate response  - Consider cultural and social influences on pain and pain management  - Notify physician/advanced practitioner if interventions unsuccessful or patient reports new pain  Outcome: Progressing     Problem: SKIN/TISSUE INTEGRITY - ADULT  Goal: Incision(s), wounds(s) or drain site(s) healing without S/S of infection  Description: INTERVENTIONS  - Assess and document dressing, incision, wound bed, drain sites and surrounding tissue  - Provide patient and family education  - Perform skin care/dressing changes per wound care orders and as needed for soilage  Outcome: Progressing

## 2024-12-22 NOTE — PLAN OF CARE
Problem: PAIN - ADULT  Goal: Verbalizes/displays adequate comfort level or baseline comfort level  Description: Interventions:  - Encourage patient to monitor pain and request assistance  - Assess pain using appropriate pain scale  - Administer analgesics based on type and severity of pain and evaluate response  - Implement non-pharmacological measures as appropriate and evaluate response  - Consider cultural and social influences on pain and pain management  - Notify physician/advanced practitioner if interventions unsuccessful or patient reports new pain  Outcome: Progressing     Problem: INFECTION - ADULT  Goal: Absence or prevention of progression during hospitalization  Description: INTERVENTIONS:  - Assess and monitor for signs and symptoms of infection  - Monitor lab/diagnostic results  - Monitor all insertion sites, i.e. indwelling lines, tubes, and drains  - Monitor endotracheal if appropriate and nasal secretions for changes in amount and color  - [place] appropriate cooling/warming therapies per order  - Administer medications as ordered  - Instruct and encourage patient and family to use good hand hygiene technique  - Identify and instruct in appropriate isolation precautions for identified infection/condition  Outcome: Progressing     Problem: DISCHARGE PLANNING  Goal: Discharge to home or other facility with appropriate resources  Description: INTERVENTIONS:  - Identify barriers to discharge w/patient and caregiver  - Arrange for needed discharge resources and transportation as appropriate  - Identify discharge learning needs (meds, wound care, etc.)  - Arrange for interpretive services to assist at discharge as needed  - Refer to Case Management Department for coordinating discharge planning if the patient needs post-hospital services based on physician/advanced practitioner order or complex needs related to functional status, cognitive ability, or social support system  Outcome: Progressing      Problem: Knowledge Deficit  Goal: Patient/family/caregiver demonstrates understanding of disease process, treatment plan, medications, and discharge instructions  Description: Complete learning assessment and assess knowledge base.  Interventions:  - Provide teaching at level of understanding  - Provide teaching via preferred learning methods  Outcome: Progressing     Problem: SKIN/TISSUE INTEGRITY - ADULT  Goal: Incision(s), wounds(s) or drain site(s) healing without S/S of infection  Description: INTERVENTIONS  - Assess and document dressing, incision, wound bed, drain sites and surrounding tissue  - Provide patient and family education  - Perform skin care/dressing changes per wound care orders and as needed for soilage  Outcome: Progressing

## 2024-12-23 ENCOUNTER — APPOINTMENT (INPATIENT)
Dept: INTERVENTIONAL RADIOLOGY/VASCULAR | Facility: HOSPITAL | Age: 49
DRG: 182 | End: 2024-12-23
Payer: COMMERCIAL

## 2024-12-23 LAB
ANION GAP SERPL CALCULATED.3IONS-SCNC: 7 MMOL/L (ref 4–13)
BASOPHILS # BLD AUTO: 0.01 THOUSANDS/ÂΜL (ref 0–0.1)
BASOPHILS NFR BLD AUTO: 0 % (ref 0–1)
BUN SERPL-MCNC: 26 MG/DL (ref 5–25)
CALCIUM SERPL-MCNC: 9.5 MG/DL (ref 8.4–10.2)
CHLORIDE SERPL-SCNC: 101 MMOL/L (ref 96–108)
CO2 SERPL-SCNC: 28 MMOL/L (ref 21–32)
CREAT SERPL-MCNC: 0.74 MG/DL (ref 0.6–1.3)
EOSINOPHIL # BLD AUTO: 0.15 THOUSAND/ÂΜL (ref 0–0.61)
EOSINOPHIL NFR BLD AUTO: 2 % (ref 0–6)
ERYTHROCYTE [DISTWIDTH] IN BLOOD BY AUTOMATED COUNT: 12.4 % (ref 11.6–15.1)
GFR SERPL CREATININE-BSD FRML MDRD: 96 ML/MIN/1.73SQ M
GLUCOSE SERPL-MCNC: 100 MG/DL (ref 65–140)
GLUCOSE SERPL-MCNC: 109 MG/DL (ref 65–140)
GLUCOSE SERPL-MCNC: 112 MG/DL (ref 65–140)
GLUCOSE SERPL-MCNC: 161 MG/DL (ref 65–140)
GLUCOSE SERPL-MCNC: 87 MG/DL (ref 65–140)
HCT VFR BLD AUTO: 38.2 % (ref 34.8–46.1)
HGB BLD-MCNC: 11.9 G/DL (ref 11.5–15.4)
IMM GRANULOCYTES # BLD AUTO: 0.02 THOUSAND/UL (ref 0–0.2)
IMM GRANULOCYTES NFR BLD AUTO: 0 % (ref 0–2)
LYMPHOCYTES # BLD AUTO: 2.12 THOUSANDS/ÂΜL (ref 0.6–4.47)
LYMPHOCYTES NFR BLD AUTO: 34 % (ref 14–44)
MCH RBC QN AUTO: 28.3 PG (ref 26.8–34.3)
MCHC RBC AUTO-ENTMCNC: 31.2 G/DL (ref 31.4–37.4)
MCV RBC AUTO: 91 FL (ref 82–98)
MONOCYTES # BLD AUTO: 0.35 THOUSAND/ÂΜL (ref 0.17–1.22)
MONOCYTES NFR BLD AUTO: 6 % (ref 4–12)
NEUTROPHILS # BLD AUTO: 3.54 THOUSANDS/ÂΜL (ref 1.85–7.62)
NEUTS SEG NFR BLD AUTO: 58 % (ref 43–75)
NRBC BLD AUTO-RTO: 0 /100 WBCS
PLATELET # BLD AUTO: 233 THOUSANDS/UL (ref 149–390)
PMV BLD AUTO: 9.4 FL (ref 8.9–12.7)
POTASSIUM SERPL-SCNC: 5.1 MMOL/L (ref 3.5–5.3)
RBC # BLD AUTO: 4.21 MILLION/UL (ref 3.81–5.12)
SODIUM SERPL-SCNC: 136 MMOL/L (ref 135–147)
WBC # BLD AUTO: 6.19 THOUSAND/UL (ref 4.31–10.16)

## 2024-12-23 PROCEDURE — 37226 PR REVSC OPN/PRQ FEM/POP W/STNT/ANGIOP SM VSL: CPT | Performed by: RADIOLOGY

## 2024-12-23 PROCEDURE — 80048 BASIC METABOLIC PNL TOTAL CA: CPT | Performed by: INTERNAL MEDICINE

## 2024-12-23 PROCEDURE — 37226 HB FEM/POPL REVASC W/STENT: CPT

## 2024-12-23 PROCEDURE — 82948 REAGENT STRIP/BLOOD GLUCOSE: CPT

## 2024-12-23 PROCEDURE — 76937 US GUIDE VASCULAR ACCESS: CPT | Performed by: RADIOLOGY

## 2024-12-23 PROCEDURE — C1769 GUIDE WIRE: HCPCS

## 2024-12-23 PROCEDURE — 75710 ARTERY X-RAYS ARM/LEG: CPT

## 2024-12-23 PROCEDURE — C1894 INTRO/SHEATH, NON-LASER: HCPCS

## 2024-12-23 PROCEDURE — C1887 CATHETER, GUIDING: HCPCS

## 2024-12-23 PROCEDURE — C1725 CATH, TRANSLUMIN NON-LASER: HCPCS

## 2024-12-23 PROCEDURE — B41G1ZZ FLUOROSCOPY OF LEFT LOWER EXTREMITY ARTERIES USING LOW OSMOLAR CONTRAST: ICD-10-PCS | Performed by: RADIOLOGY

## 2024-12-23 PROCEDURE — 99232 SBSQ HOSP IP/OBS MODERATE 35: CPT

## 2024-12-23 PROCEDURE — C1760 CLOSURE DEV, VASC: HCPCS

## 2024-12-23 PROCEDURE — 99152 MOD SED SAME PHYS/QHP 5/>YRS: CPT

## 2024-12-23 PROCEDURE — 85025 COMPLETE CBC W/AUTO DIFF WBC: CPT | Performed by: INTERNAL MEDICINE

## 2024-12-23 PROCEDURE — NC001 PR NO CHARGE: Performed by: RADIOLOGY

## 2024-12-23 PROCEDURE — C1884 EMBOLIZATION PROTECT SYST: HCPCS

## 2024-12-23 PROCEDURE — 047L3DZ DILATION OF LEFT FEMORAL ARTERY WITH INTRALUMINAL DEVICE, PERCUTANEOUS APPROACH: ICD-10-PCS | Performed by: RADIOLOGY

## 2024-12-23 PROCEDURE — C1874 STENT, COATED/COV W/DEL SYS: HCPCS

## 2024-12-23 PROCEDURE — 75630 X-RAY AORTA LEG ARTERIES: CPT

## 2024-12-23 PROCEDURE — 99152 MOD SED SAME PHYS/QHP 5/>YRS: CPT | Performed by: RADIOLOGY

## 2024-12-23 PROCEDURE — 99232 SBSQ HOSP IP/OBS MODERATE 35: CPT | Performed by: INTERNAL MEDICINE

## 2024-12-23 PROCEDURE — 36245 INS CATH ABD/L-EXT ART 1ST: CPT

## 2024-12-23 PROCEDURE — 99153 MOD SED SAME PHYS/QHP EA: CPT

## 2024-12-23 PROCEDURE — 75710 ARTERY X-RAYS ARM/LEG: CPT | Performed by: RADIOLOGY

## 2024-12-23 RX ORDER — IODIXANOL 270 MG/ML
INJECTION, SOLUTION INTRAVASCULAR AS NEEDED
Status: COMPLETED | OUTPATIENT
Start: 2024-12-23 | End: 2024-12-23

## 2024-12-23 RX ORDER — HEPARIN SODIUM 1000 [USP'U]/ML
INJECTION, SOLUTION INTRAVENOUS; SUBCUTANEOUS AS NEEDED
Status: COMPLETED | OUTPATIENT
Start: 2024-12-23 | End: 2024-12-23

## 2024-12-23 RX ORDER — MIDAZOLAM HYDROCHLORIDE 2 MG/2ML
INJECTION, SOLUTION INTRAMUSCULAR; INTRAVENOUS AS NEEDED
Status: COMPLETED | OUTPATIENT
Start: 2024-12-23 | End: 2024-12-23

## 2024-12-23 RX ORDER — FENTANYL CITRATE 50 UG/ML
INJECTION, SOLUTION INTRAMUSCULAR; INTRAVENOUS AS NEEDED
Status: COMPLETED | OUTPATIENT
Start: 2024-12-23 | End: 2024-12-23

## 2024-12-23 RX ORDER — LIDOCAINE WITH 8.4% SOD BICARB 0.9%(10ML)
SYRINGE (ML) INJECTION AS NEEDED
Status: COMPLETED | OUTPATIENT
Start: 2024-12-23 | End: 2024-12-23

## 2024-12-23 RX ADMIN — IODIXANOL 113 ML: 270 INJECTION, SOLUTION INTRAVASCULAR at 14:25

## 2024-12-23 RX ADMIN — METHOCARBAMOL TABLETS 500 MG: 500 TABLET, COATED ORAL at 11:53

## 2024-12-23 RX ADMIN — KETOROLAC TROMETHAMINE 15 MG: 30 INJECTION, SOLUTION INTRAMUSCULAR at 23:05

## 2024-12-23 RX ADMIN — FENTANYL CITRATE 50 MCG: 50 INJECTION INTRAMUSCULAR; INTRAVENOUS at 14:25

## 2024-12-23 RX ADMIN — KETOROLAC TROMETHAMINE 15 MG: 30 INJECTION, SOLUTION INTRAMUSCULAR at 17:07

## 2024-12-23 RX ADMIN — FENTANYL CITRATE 50 MCG: 50 INJECTION INTRAMUSCULAR; INTRAVENOUS at 13:19

## 2024-12-23 RX ADMIN — ACETAMINOPHEN 975 MG: 325 TABLET ORAL at 05:47

## 2024-12-23 RX ADMIN — HYDROMORPHONE HYDROCHLORIDE 4 MG: 4 TABLET ORAL at 08:01

## 2024-12-23 RX ADMIN — HYDROMORPHONE HYDROCHLORIDE 2 MG: 2 TABLET ORAL at 19:42

## 2024-12-23 RX ADMIN — MIDAZOLAM HYDROCHLORIDE 1 MG: 1 INJECTION, SOLUTION INTRAMUSCULAR; INTRAVENOUS at 13:47

## 2024-12-23 RX ADMIN — INSULIN LISPRO 1 UNITS: 100 INJECTION, SOLUTION INTRAVENOUS; SUBCUTANEOUS at 22:52

## 2024-12-23 RX ADMIN — INSULIN LISPRO 3 UNITS: 100 INJECTION, SOLUTION INTRAVENOUS; SUBCUTANEOUS at 17:10

## 2024-12-23 RX ADMIN — METHOCARBAMOL TABLETS 500 MG: 500 TABLET, COATED ORAL at 05:47

## 2024-12-23 RX ADMIN — HYDROMORPHONE HYDROCHLORIDE 0.5 MG: 1 INJECTION, SOLUTION INTRAMUSCULAR; INTRAVENOUS; SUBCUTANEOUS at 09:59

## 2024-12-23 RX ADMIN — HEPARIN SODIUM 5000 UNITS: 1000 INJECTION INTRAVENOUS; SUBCUTANEOUS at 13:47

## 2024-12-23 RX ADMIN — ENOXAPARIN SODIUM 40 MG: 40 INJECTION SUBCUTANEOUS at 08:01

## 2024-12-23 RX ADMIN — HYDRALAZINE HYDROCHLORIDE 10 MG: 20 INJECTION INTRAMUSCULAR; INTRAVENOUS at 19:29

## 2024-12-23 RX ADMIN — METHOCARBAMOL TABLETS 500 MG: 500 TABLET, COATED ORAL at 17:07

## 2024-12-23 RX ADMIN — FENTANYL CITRATE 50 MCG: 50 INJECTION INTRAMUSCULAR; INTRAVENOUS at 13:29

## 2024-12-23 RX ADMIN — ACETAMINOPHEN 975 MG: 325 TABLET ORAL at 17:07

## 2024-12-23 RX ADMIN — MIDAZOLAM HYDROCHLORIDE 1 MG: 1 INJECTION, SOLUTION INTRAMUSCULAR; INTRAVENOUS at 13:19

## 2024-12-23 RX ADMIN — MIDAZOLAM HYDROCHLORIDE 1 MG: 1 INJECTION, SOLUTION INTRAMUSCULAR; INTRAVENOUS at 13:28

## 2024-12-23 RX ADMIN — HYDROMORPHONE HYDROCHLORIDE 4 MG: 4 TABLET ORAL at 02:03

## 2024-12-23 RX ADMIN — HYDROMORPHONE HYDROCHLORIDE 0.5 MG: 1 INJECTION, SOLUTION INTRAMUSCULAR; INTRAVENOUS; SUBCUTANEOUS at 02:57

## 2024-12-23 RX ADMIN — MIDAZOLAM HYDROCHLORIDE 1 MG: 1 INJECTION, SOLUTION INTRAMUSCULAR; INTRAVENOUS at 14:25

## 2024-12-23 RX ADMIN — METHOCARBAMOL TABLETS 500 MG: 500 TABLET, COATED ORAL at 23:04

## 2024-12-23 RX ADMIN — Medication 10 ML: at 13:30

## 2024-12-23 RX ADMIN — FENTANYL CITRATE 50 MCG: 50 INJECTION INTRAMUSCULAR; INTRAVENOUS at 13:47

## 2024-12-23 RX ADMIN — ACETAMINOPHEN 975 MG: 325 TABLET ORAL at 11:52

## 2024-12-23 RX ADMIN — KETOROLAC TROMETHAMINE 15 MG: 30 INJECTION, SOLUTION INTRAMUSCULAR at 05:47

## 2024-12-23 RX ADMIN — KETOROLAC TROMETHAMINE 15 MG: 30 INJECTION, SOLUTION INTRAMUSCULAR at 11:53

## 2024-12-23 RX ADMIN — INSULIN GLARGINE 10 UNITS: 100 INJECTION, SOLUTION SUBCUTANEOUS at 22:52

## 2024-12-23 NOTE — PROGRESS NOTES
Progress Note - Hospitalist   Name: Lizzy Acuna 49 y.o. female I MRN: [id number]  Unit/Bed#: -01 I Date of Admission: 12/13/2024   Date of Service: 12/23/2024 I Hospital Day: 10     Assessment & Plan  Gangrene of toe of left foot (HCC)  History of foot injury approximately 10 days ago, has dark discoloration of distal aspect of fourth left toe with pain. She was seen by podiatry and sent to ER for admission  Podiatry input appreciated  Plan for demarcation of stable dry gangrene in outpatient setting  Patient was reevaluated by vascular team  Given continued pain and worsening gangrene, recommend IR evaluation for possible angiogram  Per IR team, likely angiogram to be performed today  Blood cultures: No growth to date  ESR 83  CRP 13.6  LEADs reviewed  Pain management following due to difficult to control pain  Cellulitis of left foot  Seen by podiatry and sent to ED for further evaluation  Procalcitonin 0.07->0.07  Received IV cefepime and vancomycin  Now monitoring off antibiotics  Diabetes mellitus type 2 with complications ([facility])  Hemoglobin A1c 10.3 on 12/13/2024   Initiated on Lantus 10 units SQ at bedtime with 5 units of Humalog SQ with meals 3 times daily  Continue sliding scale insulin coverage  Improved glycemic control will be important for wound healing  Endocrinology consultation appreciated  Tobacco abuse  Smokes half pack per day  Continue nicotine patch 14 mg  Smoking cessation recommended  Obesity (BMI 30.0-34.9)  BMI 34  Healthy diet and lifestyle modification recommended  Hyponatremia  Improved    VTE Pharmacologic Prophylaxis: VTE Score: 3 Moderate Risk (Score 3-4) - Pharmacological DVT Prophylaxis Contraindicated. Sequential Compression Devices Ordered.    Mobility:   Basic Mobility Inpatient Raw Score: 23  JH-HLM Goal: 7: Walk 25 feet or more  JH-HLM Achieved: 7: Walk 25 feet or more  JH-HLM Goal achieved. Continue to encourage appropriate mobility.    Patient Centered Rounds: I  performed bedside rounds with nursing staff today.     Discussions with Specialists or Other Care Team Provider: IR, Podiatry    Education and Discussions with Family / Patient: Patient declined call to .     Current Length of Stay: 10 day(s)  Current Patient Status: Inpatient   Certification Statement: The patient will continue to require additional inpatient hospital stay due to need for IR angiogram  Discharge Plan: Anticipate discharge later today or tomorrow to home.    Code Status: Level 1 - Full Code    Subjective   Patient seen and examined at bedside. No acute events overnight. Denies chest pain, SOB, diaphoresis, nausea/vomiting/diarrhea, fevers/chills.     Objective :  Temp:  [98.5 °F (36.9 °C)-98.8 °F (37.1 °C)] 98.7 °F (37.1 °C)  HR:  [75-88] 75  BP: (149-156)/() 155/104  Resp:  [16-20] 20  SpO2:  [95 %-97 %] 97 %  O2 Device: None (Room air)    Body mass index is 35.8 kg/m².     Input and Output Summary (last 24 hours):     Intake/Output Summary (Last 24 hours) at 12/23/2024 0855  Last data filed at 12/23/2024 0300  Gross per 24 hour   Intake 720 ml   Output 2100 ml   Net -1380 ml       Physical Exam  Vitals and nursing note reviewed.   Constitutional:       General: She is not in acute distress.     Appearance: She is well-developed.   HENT:      Head: Normocephalic and atraumatic.   Eyes:      Conjunctiva/sclera: Conjunctivae normal.   Cardiovascular:      Rate and Rhythm: Normal rate and regular rhythm.      Heart sounds: No murmur heard.  Pulmonary:      Effort: Pulmonary effort is normal. No respiratory distress.      Breath sounds: Normal breath sounds.   Abdominal:      Palpations: Abdomen is soft.      Tenderness: There is no abdominal tenderness.   Musculoskeletal:         General: No swelling.      Cervical back: Neck supple.   Skin:     General: Skin is warm and dry.      Capillary Refill: Capillary refill takes less than 2 seconds.   Neurological:      Mental Status: She  is alert.   Psychiatric:         Mood and Affect: Mood normal.         Lab Results: I have reviewed the following results:   Results from last 7 days   Lab Units 12/23/24  0557   WBC Thousand/uL 6.19   HEMOGLOBIN g/dL 11.9   HEMATOCRIT % 38.2   PLATELETS Thousands/uL 233   SEGS PCT % 58   LYMPHO PCT % 34   MONO PCT % 6   EOS PCT % 2     Results from last 7 days   Lab Units 12/23/24  0557   SODIUM mmol/L 136   POTASSIUM mmol/L 5.1   CHLORIDE mmol/L 101   CO2 mmol/L 28   BUN mg/dL 26*   CREATININE mg/dL 0.74   ANION GAP mmol/L 7   CALCIUM mg/dL 9.5   GLUCOSE RANDOM mg/dL 112         Results from last 7 days   Lab Units 12/23/24  0715 12/22/24  2103 12/22/24  1659 12/22/24  1154 12/22/24  0746 12/21/24  2126 12/21/24  1627 12/21/24  1130 12/21/24  0739 12/20/24  2101 12/20/24  1615 12/20/24  1109   POC GLUCOSE mg/dl 109 151* 102 126 105 172* 138 108 105 151* 127 141*               Recent Cultures (last 7 days):         Imaging Results Review: No pertinent imaging studies reviewed.  Other Study Results Review: No additional pertinent studies reviewed.    Last 24 Hours Medication List:     Current Facility-Administered Medications:     acetaminophen (TYLENOL) tablet 975 mg, Q6H JORGE    [Held by provider] enoxaparin (LOVENOX) subcutaneous injection 40 mg, Daily    hydrALAZINE (APRESOLINE) injection 10 mg, Q6H PRN    HYDROmorphone (DILAUDID) injection 0.5 mg, Q8H PRN    HYDROmorphone (DILAUDID) tablet 2 mg, Q4H PRN **OR** HYDROmorphone (DILAUDID) tablet 4 mg, Q4H PRN    insulin glargine (LANTUS) subcutaneous injection 10 Units 0.1 mL, HS    insulin lispro (HumALOG/ADMELOG) 100 units/mL subcutaneous injection 1-5 Units, TID AC **AND** Fingerstick Glucose (POCT), TID AC    insulin lispro (HumALOG/ADMELOG) 100 units/mL subcutaneous injection 1-5 Units, HS    insulin lispro (HumALOG/ADMELOG) 100 units/mL subcutaneous injection 3 Units, TID With Meals    ketorolac (TORADOL) injection 15 mg, Q6H JORGE    methocarbamol (ROBAXIN)  tablet 500 mg, Q6H JORGE    naloxone (NARCAN) 0.04 mg/mL syringe 0.04 mg, Q1MIN PRN    nicotine (NICODERM CQ) 14 mg/24hr TD 24 hr patch 1 patch, Daily    ondansetron (ZOFRAN) injection 4 mg, Q6H PRN    senna-docusate sodium (SENOKOT S) 8.6-50 mg per tablet 1 tablet, HS    Administrative Statements   Today, Patient Was Seen By: Bunny Rosado, DO  I have spent a total time of 35 minutes in caring for this patient on the day of the visit/encounter including Diagnostic results, Prognosis, Risks and benefits of tx options, Instructions for management, Patient and family education, Importance of tx compliance, Risk factor reductions, Impressions, Counseling / Coordination of care, Documenting in the medical record, Reviewing / ordering tests, medicine, procedures  , Obtaining or reviewing history  , and Communicating with other healthcare professionals .    **Please Note: This note may have been constructed using a voice recognition system.**

## 2024-12-23 NOTE — ASSESSMENT & PLAN NOTE
Seen by podiatry and sent to ED for further evaluation  Procalcitonin 0.07->0.07  Received IV cefepime and vancomycin  Now monitoring off antibiotics

## 2024-12-23 NOTE — ASSESSMENT & PLAN NOTE
Lab Results   Component Value Date    HGBA1C 10.3 (H) 12/13/2024       Recent Labs     12/22/24  1154 12/22/24  1659 12/22/24  2103 12/23/24  0715   POCGLU 126 102 151* 109       Blood Sugar Average: Last 72 hrs:  (P) [id number]  Risk factor for poor wound healing, infection, and poor vasculature  Hx of gestational diabetes  Newly diagnosed this admission  Tight glucose management per primary team

## 2024-12-23 NOTE — PLAN OF CARE
Problem: PAIN - ADULT  Goal: Verbalizes/displays adequate comfort level or baseline comfort level  Description: Interventions:  - Encourage patient to monitor pain and request assistance  - Assess pain using appropriate pain scale  - Administer analgesics based on type and severity of pain and evaluate response  - Implement non-pharmacological measures as appropriate and evaluate response  - Consider cultural and social influences on pain and pain management  - Notify physician/advanced practitioner if interventions unsuccessful or patient reports new pain  Outcome: Progressing     Problem: INFECTION - ADULT  Goal: Absence or prevention of progression during hospitalization  Description: INTERVENTIONS:  - Assess and monitor for signs and symptoms of infection  - Monitor lab/diagnostic results  - Monitor all insertion sites, i.e. indwelling lines, tubes, and drains  - Monitor endotracheal if appropriate and nasal secretions for changes in amount and color  - [place] appropriate cooling/warming therapies per order  - Administer medications as ordered  - Instruct and encourage patient and family to use good hand hygiene technique  - Identify and instruct in appropriate isolation precautions for identified infection/condition  Outcome: Progressing     Problem: DISCHARGE PLANNING  Goal: Discharge to home or other facility with appropriate resources  Description: INTERVENTIONS:  - Identify barriers to discharge w/patient and caregiver  - Arrange for needed discharge resources and transportation as appropriate  - Identify discharge learning needs (meds, wound care, etc.)  - Arrange for interpretive services to assist at discharge as needed  - Refer to Case Management Department for coordinating discharge planning if the patient needs post-hospital services based on physician/advanced practitioner order or complex needs related to functional status, cognitive ability, or social support system  Outcome: Progressing      Problem: Knowledge Deficit  Goal: Patient/family/caregiver demonstrates understanding of disease process, treatment plan, medications, and discharge instructions  Description: Complete learning assessment and assess knowledge base.  Interventions:  - Provide teaching at level of understanding  - Provide teaching via preferred learning methods  Outcome: Progressing     Problem: SKIN/TISSUE INTEGRITY - ADULT  Goal: Incision(s), wounds(s) or drain site(s) healing without S/S of infection  Description: INTERVENTIONS  - Assess and document dressing, incision, wound bed, drain sites and surrounding tissue  - Provide patient and family education  - Perform skin care/dressing changes per wound care orders and as needed for soilage  Outcome: Progressing

## 2024-12-23 NOTE — CASE MANAGEMENT
Case Management Discharge Planning Note    Patient name Lizzy Acuna  Location /-01 MRN [id number]  : 1975 Date 2024       Current Admission Date: 2024  Current Admission Diagnosis:Gangrene of toe of left foot (HCC)   Patient Active Problem List    Diagnosis Date Noted Date Diagnosed    Cellulitis of left foot 2024     Gangrene of toe of left foot (HCC) 2024     Diabetes mellitus type 2 with complications (HCC) 2018     Hyponatremia 2018     Chronic sciatica 2018     Tobacco abuse 02/15/2017     Obesity (BMI 30.0-34.9) 2017     Chronic low back pain 2016     Chronic, continuous use of opioids 2016       LOS (days): 10  Geometric Mean LOS (GMLOS) (days): 3.1  Days to GMLOS:-6.8     OBJECTIVE:  Risk of Unplanned Readmission Score: 12.07         Current admission status: Inpatient   Preferred Pharmacy:   [facility] [place], [place] - [address] [address]  Phone: [phone] Fax: [phone]    [id number] - [place], [place] - [address]  [address] [address]  Phone: [phone] Fax: [phone]    Primary Care Provider: Linwood North DO    [facility]  Secondary Insurance:     DISCHARGE DETAILS:       Freedom of Choice: Yes  Comments - Freedom of Choice: pt denies any d/c needs - pt went for an angiogram today- cm will continue to follow for any additonal d/c needs

## 2024-12-23 NOTE — PROGRESS NOTES
Progress Note - Surgery-General   Name: Lizzy Acuna 49 y.o. female I MRN: [id number]  Unit/Bed#: [facility] I Date of Admission: 12/13/2024   Date of Service: 12/23/2024 I Hospital Day: 10    Assessment & Plan  Gangrene of toe of left foot (HCC)  Presents with left fourth toe dry gangrene (hx of trauma, dropping case of soda on it 3 weeks ago)  AFVSS  Na 136, Cr 0.78, WBC 6.19, Hgb 11.9  Blood cx Ng x 5 days  12/13 XR L toe with soft tissue swelling, negative for frx  MRI negative for osteomyelitis  Palpable L dp and pt pulses on exam   LEAD: R KAILEE 0/91/105/90; L KAILEE 0.59/89/-. >75% proximal SFA and distal pop stenosis. Monophasic waveforms in tibial vessels.     Plan:  LEAD reviewed in detail. There is a >75% proximal SFA and distal pop stenosis in LLE. Monophasic waveforms throughout the tibial vessels indicative of tibial disease. L KAILEE 0.59/ MTP 89/ GTP -.   Plan for IR LLE agram today.   NPO for procedure   Podiatry following, appreciate recs  Wound care per podiatry.  Recommend aspirin 81 mg and statin therapy for OMT of PAD.   Rest of care as per SLIM.  Discussed with Dr. Arauz, final recs pending attestation   Cellulitis of left foot  Surrounding cellulitis vs duskiness of the dorsal aspect of the left foot  Tobacco abuse  Reports half a pack per day for 30-year smoking history  Recommend smoking cessation.  Discussion to be had regarding willingness to quit and if there are any cessation medications she might be interested in.  Nicotine patch while inpatient  Counseled the patient on effects of smoking cigarettes on vasculature, wound healing, infection, etc.  Diabetes mellitus type 2 with complications (HCC)  Lab Results   Component Value Date    HGBA1C 10.3 (H) 12/13/2024       Recent Labs     12/22/24  1154 12/22/24  1659 12/22/24  2103 12/23/24  0715   POCGLU 126 102 151* 109       Blood Sugar Average: Last 72 hrs:  (P) [id number]  Risk factor for poor wound healing, infection, and poor  vasculature  Hx of gestational diabetes  Newly diagnosed this admission  Tight glucose management per primary team  Obesity (BMI 30.0-34.9)  Recommend healthy diet, lifestyle modifications  Hyponatremia  Resolved, Na 136    I have discussed the above management plan in detail with the primary service.     Subjective   Patient reports her pain has overall improved, reports it waxes and wanes. No other complaints    Objective :  Temp:  [98.5 °F (36.9 °C)-98.8 °F (37.1 °C)] 98.7 °F (37.1 °C)  HR:  [75-88] 75  BP: (149-156)/() 155/104  Resp:  [16-20] 20  SpO2:  [95 %-97 %] 97 %  O2 Device: None (Room air)    I/O         12/21 0701 12/22 0700 12/22 0701 12/23 0700 12/23 0701  12/24 0700    P.O. 180 720     Total Intake(mL/kg) 180 (2.1) 720 (8.4)     Urine (mL/kg/hr) 600 (0.3) 2100 (1)     Total Output 600 2100     Net -420 -1380                    Physical Exam  Vitals reviewed.   Constitutional:       General: She is not in acute distress.     Appearance: She is obese. She is not ill-appearing.   HENT:      Head: Normocephalic and atraumatic.   Cardiovascular:      Rate and Rhythm: Normal rate.   Pulmonary:      Effort: Pulmonary effort is normal. No respiratory distress.   Abdominal:      General: Abdomen is flat. There is no distension.      Palpations: Abdomen is soft.      Tenderness: There is no abdominal tenderness. There is no guarding.   Musculoskeletal:      Right lower leg: No edema.      Left lower leg: No edema.   Skin:     General: Skin is warm and dry.      Comments: L 4th toe gangrene   Neurological:      General: No focal deficit present.      Mental Status: She is alert. Mental status is at baseline.   Psychiatric:         Behavior: Behavior normal.         Lab Results: I have reviewed the following results:  Recent Labs     12/23/24  0557   WBC 6.19   HGB 11.9   HCT 38.2      SODIUM 136   K 5.1      CO2 28   BUN 26*   CREATININE 0.74   GLUC 112       Imaging Results Review: No  pertinent imaging studies reviewed.  Other Study Results Review: No additional pertinent studies reviewed.    VTE Pharmacologic Prophylaxis: VTE covered by:  [Held by provider] enoxaparin, Subcutaneous, 40 mg at 12/23/24 0801     VTE Mechanical Prophylaxis: sequential compression device    Gabrielle Urrutia PA-C

## 2024-12-23 NOTE — ASSESSMENT & PLAN NOTE
History of foot injury approximately 10 days ago, has dark discoloration of distal aspect of fourth left toe with pain. She was seen by podiatry and sent to ER for admission  Podiatry input appreciated  Plan for demarcation of stable dry gangrene in outpatient setting  Patient was reevaluated by vascular team  Given continued pain and worsening gangrene, recommend IR evaluation for possible angiogram  Per IR team, likely angiogram to be performed today  Blood cultures: No growth to date  ESR 83  CRP 13.6  LEADs reviewed  Pain management following due to difficult to control pain

## 2024-12-23 NOTE — NURSING NOTE
Returned from IR at this time in stable condition.  Denies pain.  VSS.  Dressing to right groin is clean, dry , and intact.  Groin is soft upon palpation.

## 2024-12-23 NOTE — PROCEDURES
Interventional Radiology Procedure Note    PATIENT NAME: Lizzy Acuna  : 1975  MRN: [id number]     Pre-op Diagnosis:   1. Gangrene of toe of left foot (HCC)    2. Cellulitis    3. Diabetes mellitus type 2 with complications (HCC)      2.    CT from 2017 shows intact inflow    Post-op Diagnosis:   1. Gangrene of toe of left foot (HCC)    2. Cellulitis    3. Diabetes mellitus type 2 with complications (HCC)      2.   Same    Procedure: Left lower extremity angiogram and angioplasty    Surgeon:   Jose Alberto Gutierrez MD  Assistants:     No qualified resident was available, Resident is only observing    Estimated Blood Loss: Minimal     Findings: Right common femoral artery puncture  Contrast injection in the distal aorta shows no iliac disease.  Left leg runoff shows focal, high-grade stenosis of the superficial femoral artery.  The margins were very irregular consistent with some element of thrombus.  This did show some resistance to balloon inflation, consistent with some element of hard plaque.  I believe this is probably subacute appearance of a plaque that ruptured.    Distally, the superficial femoral artery and popliteal artery are patent.  Notably, the adductor canal is free of disease.  This is the most common location for atherosclerosis below the diaphragm.  The fact that the adductor canal is spared, does make me think this was ruptured plaque.  We should consider statin and aspirin.    The anterior tibial is patent all the way onto the foot.  The peroneal is small and makes no significant contribution to the runoff  The posterior tibial is occluded over its length.  Portions of the arch are reconstituted    Distal embolic protection was used.  The superficial femoral artery lesion was treated with balloon.  There was significant elastic recoil.  A covered stent was placed.  Completion angiography showed good result.    I injected the right side as I was taking the sheath out.  There was no, similar,  lesion on the opposite side.  Again, this seems to support the notion of ruptured plaque.    Specimens: None     Complications:  None     Anesthesia: conscious sedation and local    Jose Alberto Gutierrez MD     Date: 12/23/2024  Time: 3:58 PM

## 2024-12-23 NOTE — ASSESSMENT & PLAN NOTE
Presents with left fourth toe dry gangrene (hx of trauma, dropping case of soda on it 3 weeks ago)  AFVSS  Na 136, Cr 0.78, WBC 6.19, Hgb 11.9  Blood cx Ng x 5 days  12/13 XR L toe with soft tissue swelling, negative for frx  MRI negative for osteomyelitis  Palpable L dp and pt pulses on exam   LEAD: R KAILEE 0/91/105/90; L KAILEE 0.59/89/-. >75% proximal SFA and distal pop stenosis. Monophasic waveforms in tibial vessels.     Plan:  LEAD reviewed in detail. There is a >75% proximal SFA and distal pop stenosis in LLE. Monophasic waveforms throughout the tibial vessels indicative of tibial disease. L KAILEE 0.59/ MTP 89/ GTP -.   Plan for IR LLE agram today.   NPO for procedure   Podiatry following, appreciate recs  Wound care per podiatry.  Recommend aspirin 81 mg and statin therapy for OMT of PAD.   Rest of care as per SLIM.  Discussed with Dr. Arauz, final recs pending attestation

## 2024-12-24 LAB
ANION GAP SERPL CALCULATED.3IONS-SCNC: 6 MMOL/L (ref 4–13)
BASOPHILS # BLD AUTO: 0.01 THOUSANDS/ÂΜL (ref 0–0.1)
BASOPHILS NFR BLD AUTO: 0 % (ref 0–1)
BUN SERPL-MCNC: 24 MG/DL (ref 5–25)
CALCIUM SERPL-MCNC: 9.2 MG/DL (ref 8.4–10.2)
CHLORIDE SERPL-SCNC: 100 MMOL/L (ref 96–108)
CO2 SERPL-SCNC: 27 MMOL/L (ref 21–32)
CREAT SERPL-MCNC: 0.8 MG/DL (ref 0.6–1.3)
EOSINOPHIL # BLD AUTO: 0.09 THOUSAND/ÂΜL (ref 0–0.61)
EOSINOPHIL NFR BLD AUTO: 1 % (ref 0–6)
ERYTHROCYTE [DISTWIDTH] IN BLOOD BY AUTOMATED COUNT: 12.5 % (ref 11.6–15.1)
GFR SERPL CREATININE-BSD FRML MDRD: 87 ML/MIN/1.73SQ M
GLUCOSE SERPL-MCNC: 120 MG/DL (ref 65–140)
GLUCOSE SERPL-MCNC: 127 MG/DL (ref 65–140)
GLUCOSE SERPL-MCNC: 130 MG/DL (ref 65–140)
GLUCOSE SERPL-MCNC: 133 MG/DL (ref 65–140)
GLUCOSE SERPL-MCNC: 164 MG/DL (ref 65–140)
HCT VFR BLD AUTO: 34.4 % (ref 34.8–46.1)
HGB BLD-MCNC: 10.9 G/DL (ref 11.5–15.4)
IMM GRANULOCYTES # BLD AUTO: 0.02 THOUSAND/UL (ref 0–0.2)
IMM GRANULOCYTES NFR BLD AUTO: 0 % (ref 0–2)
LYMPHOCYTES # BLD AUTO: 2.16 THOUSANDS/ÂΜL (ref 0.6–4.47)
LYMPHOCYTES NFR BLD AUTO: 29 % (ref 14–44)
MAGNESIUM SERPL-MCNC: 1.4 MG/DL (ref 1.9–2.7)
MCH RBC QN AUTO: 28.4 PG (ref 26.8–34.3)
MCHC RBC AUTO-ENTMCNC: 31.7 G/DL (ref 31.4–37.4)
MCV RBC AUTO: 90 FL (ref 82–98)
MONOCYTES # BLD AUTO: 0.42 THOUSAND/ÂΜL (ref 0.17–1.22)
MONOCYTES NFR BLD AUTO: 6 % (ref 4–12)
NEUTROPHILS # BLD AUTO: 4.87 THOUSANDS/ÂΜL (ref 1.85–7.62)
NEUTS SEG NFR BLD AUTO: 64 % (ref 43–75)
NRBC BLD AUTO-RTO: 0 /100 WBCS
PHOSPHATE SERPL-MCNC: 3.4 MG/DL (ref 2.7–4.5)
PLATELET # BLD AUTO: 240 THOUSANDS/UL (ref 149–390)
PMV BLD AUTO: 9.3 FL (ref 8.9–12.7)
POTASSIUM SERPL-SCNC: 4.7 MMOL/L (ref 3.5–5.3)
RBC # BLD AUTO: 3.84 MILLION/UL (ref 3.81–5.12)
SODIUM SERPL-SCNC: 133 MMOL/L (ref 135–147)
WBC # BLD AUTO: 7.57 THOUSAND/UL (ref 4.31–10.16)

## 2024-12-24 PROCEDURE — 99232 SBSQ HOSP IP/OBS MODERATE 35: CPT | Performed by: INTERNAL MEDICINE

## 2024-12-24 PROCEDURE — 83735 ASSAY OF MAGNESIUM: CPT | Performed by: INTERNAL MEDICINE

## 2024-12-24 PROCEDURE — 82948 REAGENT STRIP/BLOOD GLUCOSE: CPT

## 2024-12-24 PROCEDURE — 99233 SBSQ HOSP IP/OBS HIGH 50: CPT | Performed by: PHYSICIAN ASSISTANT

## 2024-12-24 PROCEDURE — 99232 SBSQ HOSP IP/OBS MODERATE 35: CPT | Performed by: STUDENT IN AN ORGANIZED HEALTH CARE EDUCATION/TRAINING PROGRAM

## 2024-12-24 PROCEDURE — 84100 ASSAY OF PHOSPHORUS: CPT | Performed by: INTERNAL MEDICINE

## 2024-12-24 PROCEDURE — 80048 BASIC METABOLIC PNL TOTAL CA: CPT | Performed by: INTERNAL MEDICINE

## 2024-12-24 PROCEDURE — 85025 COMPLETE CBC W/AUTO DIFF WBC: CPT | Performed by: INTERNAL MEDICINE

## 2024-12-24 RX ORDER — MAGNESIUM SULFATE HEPTAHYDRATE 40 MG/ML
2 INJECTION, SOLUTION INTRAVENOUS ONCE
Status: COMPLETED | OUTPATIENT
Start: 2024-12-24 | End: 2024-12-24

## 2024-12-24 RX ORDER — HYDROMORPHONE HCL/PF 1 MG/ML
0.5 SYRINGE (ML) INJECTION EVERY 4 HOURS PRN
Status: DISCONTINUED | OUTPATIENT
Start: 2024-12-24 | End: 2024-12-25 | Stop reason: HOSPADM

## 2024-12-24 RX ORDER — KETOROLAC TROMETHAMINE 30 MG/ML
15 INJECTION, SOLUTION INTRAMUSCULAR; INTRAVENOUS EVERY 6 HOURS SCHEDULED
Status: DISCONTINUED | OUTPATIENT
Start: 2024-12-24 | End: 2024-12-25

## 2024-12-24 RX ORDER — ATORVASTATIN CALCIUM 80 MG/1
80 TABLET, FILM COATED ORAL
Status: DISCONTINUED | OUTPATIENT
Start: 2024-12-24 | End: 2024-12-25 | Stop reason: HOSPADM

## 2024-12-24 RX ORDER — GABAPENTIN 100 MG/1
100 CAPSULE ORAL 3 TIMES DAILY
Status: DISCONTINUED | OUTPATIENT
Start: 2024-12-24 | End: 2024-12-25 | Stop reason: HOSPADM

## 2024-12-24 RX ORDER — ASPIRIN 81 MG/1
81 TABLET ORAL DAILY
Status: DISCONTINUED | OUTPATIENT
Start: 2024-12-24 | End: 2024-12-25 | Stop reason: HOSPADM

## 2024-12-24 RX ORDER — HYDROMORPHONE HYDROCHLORIDE 4 MG/1
4 TABLET ORAL EVERY 4 HOURS PRN
Refills: 0 | Status: DISCONTINUED | OUTPATIENT
Start: 2024-12-24 | End: 2024-12-25 | Stop reason: HOSPADM

## 2024-12-24 RX ADMIN — KETOROLAC TROMETHAMINE 15 MG: 30 INJECTION, SOLUTION INTRAMUSCULAR at 23:47

## 2024-12-24 RX ADMIN — GABAPENTIN 100 MG: 100 CAPSULE ORAL at 11:18

## 2024-12-24 RX ADMIN — HYDROMORPHONE HYDROCHLORIDE 6 MG: 4 TABLET ORAL at 12:15

## 2024-12-24 RX ADMIN — HYDROMORPHONE HYDROCHLORIDE 4 MG: 4 TABLET ORAL at 06:27

## 2024-12-24 RX ADMIN — INSULIN LISPRO 3 UNITS: 100 INJECTION, SOLUTION INTRAVENOUS; SUBCUTANEOUS at 12:15

## 2024-12-24 RX ADMIN — ACETAMINOPHEN 975 MG: 325 TABLET ORAL at 08:39

## 2024-12-24 RX ADMIN — KETOROLAC TROMETHAMINE 15 MG: 30 INJECTION, SOLUTION INTRAMUSCULAR at 11:18

## 2024-12-24 RX ADMIN — HYDROMORPHONE HYDROCHLORIDE 4 MG: 4 TABLET ORAL at 02:23

## 2024-12-24 RX ADMIN — INSULIN LISPRO 3 UNITS: 100 INJECTION, SOLUTION INTRAVENOUS; SUBCUTANEOUS at 16:37

## 2024-12-24 RX ADMIN — METHOCARBAMOL TABLETS 500 MG: 500 TABLET, COATED ORAL at 23:47

## 2024-12-24 RX ADMIN — INSULIN GLARGINE 10 UNITS: 100 INJECTION, SOLUTION SUBCUTANEOUS at 21:17

## 2024-12-24 RX ADMIN — INSULIN LISPRO 3 UNITS: 100 INJECTION, SOLUTION INTRAVENOUS; SUBCUTANEOUS at 07:56

## 2024-12-24 RX ADMIN — GABAPENTIN 100 MG: 100 CAPSULE ORAL at 16:40

## 2024-12-24 RX ADMIN — HYDROMORPHONE HYDROCHLORIDE 0.5 MG: 1 INJECTION, SOLUTION INTRAMUSCULAR; INTRAVENOUS; SUBCUTANEOUS at 08:39

## 2024-12-24 RX ADMIN — METHOCARBAMOL TABLETS 500 MG: 500 TABLET, COATED ORAL at 05:46

## 2024-12-24 RX ADMIN — ASPIRIN 81 MG: 81 TABLET, COATED ORAL at 10:01

## 2024-12-24 RX ADMIN — METHOCARBAMOL TABLETS 500 MG: 500 TABLET, COATED ORAL at 11:18

## 2024-12-24 RX ADMIN — HYDROMORPHONE HYDROCHLORIDE 6 MG: 4 TABLET ORAL at 19:48

## 2024-12-24 RX ADMIN — ACETAMINOPHEN 975 MG: 325 TABLET ORAL at 03:03

## 2024-12-24 RX ADMIN — ACETAMINOPHEN 975 MG: 325 TABLET ORAL at 21:17

## 2024-12-24 RX ADMIN — KETOROLAC TROMETHAMINE 15 MG: 30 INJECTION, SOLUTION INTRAMUSCULAR at 17:01

## 2024-12-24 RX ADMIN — HYDROMORPHONE HYDROCHLORIDE 0.5 MG: 1 INJECTION, SOLUTION INTRAMUSCULAR; INTRAVENOUS; SUBCUTANEOUS at 14:50

## 2024-12-24 RX ADMIN — METHOCARBAMOL TABLETS 500 MG: 500 TABLET, COATED ORAL at 17:01

## 2024-12-24 RX ADMIN — INSULIN LISPRO 1 UNITS: 100 INJECTION, SOLUTION INTRAVENOUS; SUBCUTANEOUS at 21:19

## 2024-12-24 RX ADMIN — MAGNESIUM SULFATE HEPTAHYDRATE 2 G: 40 INJECTION, SOLUTION INTRAVENOUS at 09:59

## 2024-12-24 RX ADMIN — ATORVASTATIN CALCIUM 80 MG: 80 TABLET, FILM COATED ORAL at 16:37

## 2024-12-24 RX ADMIN — ACETAMINOPHEN 975 MG: 325 TABLET ORAL at 14:46

## 2024-12-24 RX ADMIN — GABAPENTIN 100 MG: 100 CAPSULE ORAL at 21:17

## 2024-12-24 NOTE — PROGRESS NOTES
Progress Note - Hospitalist   Name: Lizzy Acuna 49 y.o. female I MRN: [id number]  Unit/Bed#: -[facility] I Date of Admission: 12/13/2024   Date of Service: 12/24/2024 I Hospital Day: 11     Assessment & Plan  Gangrene of toe of left foot (HCC)  History of foot injury approximately 10 days ago, has dark discoloration of distal aspect of fourth left toe with pain  She was seen by Podiatry and sent to ER for admission  Podiatry following  Plan for demarcation of stable dry gangrene in outpatient setting  Blood cultures: No growth to date  ESR 83  CRP 13.6  LEADs reviewed  Pain management following  Status post angiogram with IR and patient still with severe pain  Will reach out to podiatry as patient is having severe pain for need for possible intervention  Cellulitis of left foot  Seen by podiatry and sent to ED for further evaluation  Procalcitonin 0.07->0.07  Received IV cefepime and vancomycin  Now monitoring off antibiotics  Diabetes mellitus type 2 with complications ([facility])  Hemoglobin A1c 10.3 on 12/13/2024   Initiated on Lantus 10 units SQ at bedtime with 5 units of Humalog SQ with meals 3 times daily  Continue sliding scale insulin coverage  Improved glycemic control will be important for wound healing  Endocrinology consultation appreciated  Tobacco abuse  Smokes half pack per day  Continue nicotine patch 14 mg  Smoking cessation recommended  Obesity (BMI 30.0-34.9)  BMI 34  Healthy diet and lifestyle modification recommended  Hyponatremia  Improved    VTE Pharmacologic Prophylaxis: VTE Score: 3 Moderate Risk (Score 3-4) - Pharmacological DVT Prophylaxis Contraindicated. Sequential Compression Devices Ordered.    Mobility:   Basic Mobility Inpatient Raw Score: 23  JH-HLM Goal: 7: Walk 25 feet or more  JH-HLM Achieved: 7: Walk 25 feet or more  JH-HLM Goal achieved. Continue to encourage appropriate mobility.    Patient Centered Rounds: I performed bedside rounds with nursing staff today.     Discussions with  Specialists or Other Care Team Provider: IR, Podiatry    Education and Discussions with Family / Patient: Patient declined call to .     Current Length of Stay: 11 day(s)  Current Patient Status: Inpatient   Certification Statement: The patient will continue to require additional inpatient hospital stay due to need for IR angiogram  Discharge Plan: Anticipate discharge later today or tomorrow to home.    Code Status: Level 1 - Full Code    Subjective   Patient seen and examined at bedside. No acute events overnight. Denies chest pain, SOB, diaphoresis, nausea/vomiting/diarrhea, fevers/chills.  Patient is having extreme pain.  Will reach out to podiatry as patient may require inpatient intervention.    Objective :  Temp:  [97.9 °F (36.6 °C)-98.7 °F (37.1 °C)] 97.9 °F (36.6 °C)  HR:  [72-92] 81  BP: (134-219)/() 163/91  Resp:  [12-20] 18  SpO2:  [92 %-100 %] 96 %  O2 Device: None (Room air)  Nasal Cannula O2 Flow Rate (L/min):  [3 L/min] 3 L/min    Body mass index is 35.8 kg/m².     Input and Output Summary (last 24 hours):     Intake/Output Summary (Last 24 hours) at 12/24/2024 0902  Last data filed at 12/24/2024 0833  Gross per 24 hour   Intake 100 ml   Output 2050 ml   Net -1950 ml       Physical Exam  Vitals and nursing note reviewed.   Constitutional:       General: She is not in acute distress.     Appearance: She is well-developed.   HENT:      Head: Normocephalic and atraumatic.   Eyes:      Conjunctiva/sclera: Conjunctivae normal.   Cardiovascular:      Rate and Rhythm: Normal rate and regular rhythm.      Heart sounds: No murmur heard.  Pulmonary:      Effort: Pulmonary effort is normal. No respiratory distress.      Breath sounds: Normal breath sounds.   Abdominal:      Palpations: Abdomen is soft.      Tenderness: There is no abdominal tenderness.   Musculoskeletal:         General: No swelling.      Cervical back: Neck supple.   Skin:     General: Skin is warm and dry.      Capillary  Refill: Capillary refill takes less than 2 seconds.   Neurological:      Mental Status: She is alert.   Psychiatric:         Mood and Affect: Mood normal.         Lab Results: I have reviewed the following results:   Results from last 7 days   Lab Units 12/24/24  0532   WBC Thousand/uL 7.57   HEMOGLOBIN g/dL 10.9*   HEMATOCRIT % 34.4*   PLATELETS Thousands/uL 240   SEGS PCT % 64   LYMPHO PCT % 29   MONO PCT % 6   EOS PCT % 1     Results from last 7 days   Lab Units 12/24/24  0532   SODIUM mmol/L 133*   POTASSIUM mmol/L 4.7   CHLORIDE mmol/L 100   CO2 mmol/L 27   BUN mg/dL 24   CREATININE mg/dL 0.80   ANION GAP mmol/L 6   CALCIUM mg/dL 9.2   GLUCOSE RANDOM mg/dL 127         Results from last 7 days   Lab Units 12/24/24  0741 12/23/24  2105 12/23/24  1621 12/23/24  1125 12/23/24  0715 12/22/24  2103 12/22/24  1659 12/22/24  1154 12/22/24  0746 12/21/24  2126 12/21/24  1627 12/21/24  1130   POC GLUCOSE mg/dl 130 161* 87 100 109 151* 102 126 105 172* 138 108               Recent Cultures (last 7 days):         Imaging Results Review: No pertinent imaging studies reviewed.  Other Study Results Review: No additional pertinent studies reviewed.    Last 24 Hours Medication List:     Current Facility-Administered Medications:     acetaminophen (TYLENOL) tablet 975 mg, Q6H JORGE    [Held by provider] enoxaparin (LOVENOX) subcutaneous injection 40 mg, Daily    hydrALAZINE (APRESOLINE) injection 10 mg, Q6H PRN    HYDROmorphone (DILAUDID) injection 0.5 mg, Q8H PRN    HYDROmorphone (DILAUDID) tablet 2 mg, Q4H PRN **OR** HYDROmorphone (DILAUDID) tablet 4 mg, Q4H PRN    insulin glargine (LANTUS) subcutaneous injection 10 Units 0.1 mL, HS    insulin lispro (HumALOG/ADMELOG) 100 units/mL subcutaneous injection 1-5 Units, TID AC **AND** Fingerstick Glucose (POCT), TID AC    insulin lispro (HumALOG/ADMELOG) 100 units/mL subcutaneous injection 1-5 Units, HS    insulin lispro (HumALOG/ADMELOG) 100 units/mL subcutaneous injection 3 Units,  TID With Meals    methocarbamol (ROBAXIN) tablet 500 mg, Q6H JORGE    naloxone (NARCAN) 0.04 mg/mL syringe 0.04 mg, Q1MIN PRN    nicotine (NICODERM CQ) 14 mg/24hr TD 24 hr patch 1 patch, Daily    ondansetron (ZOFRAN) injection 4 mg, Q6H PRN    senna-docusate sodium (SENOKOT S) 8.6-50 mg per tablet 1 tablet, HS    Administrative Statements   Today, Patient Was Seen By: Bunny Rosado, DO  I have spent a total time of 35 minutes in caring for this patient on the day of the visit/encounter including Diagnostic results, Prognosis, Risks and benefits of tx options, Instructions for management, Patient and family education, Importance of tx compliance, Risk factor reductions, Impressions, Counseling / Coordination of care, Documenting in the medical record, Reviewing / ordering tests, medicine, procedures  , Obtaining or reviewing history  , and Communicating with other healthcare professionals .    **Please Note: This note may have been constructed using a voice recognition system.**

## 2024-12-24 NOTE — PROGRESS NOTES
Progress Note - Acute Pain   Name: Lizzy Acuna 49 y.o. female I MRN: [id number]  Unit/Bed#: -01 I Date of Admission: 12/13/2024   Date of Service: 12/24/2024 I Hospital Day: 11    Assessment & Plan  Tobacco abuse  Smokers have been shown to require higher doses of opioid pain medication and are more likely to develop chronic pain.  Encourage smoking cessation.    Cellulitis of left foot  Patient will require surgical intervention outpatient by podiatry  Status post IR angiogram/angioplasty on 12/23/2024.    Acetaminophen 975 mg p.o. every 6 hours scheduled.  Toradol 15 mg IV every 6 hours scheduled x 2 more days  Change Dilaudid to 4 mg p.o. every 4 hours as needed moderate pain or 6 mg p.o. every 4 hours as needed severe pain.  Change IV Dilaudid to 0.5 mg IV every 4 hours as needed breakthrough pain.  Patient was instructed to avoid using this if at all possible  methocarbamol 500 mg p.o. every 6 hours scheduled.  Add gabapentin 100 mg p.o. 3 times daily for neuropathic pain.  Estimated Creatinine Clearance: 85.7 mL/min (by C-G formula based on SCr of 0.8 mg/dL).  Bowel regimen while on opioid pain medication to avoid opioid-induced constipation.  As needed Narcan in the event that opioid reversal becomes necessary.    APS will continue to follow. Please contact Acute Pain Service - via SecureChat from [phone] with additional questions or concerns. See SecureChat or Scanadu for additional contacts and after hours information.     Subjective   Lizzy Acuna is a 49 y.o. female who presents with left foot cellulitis and gangrene of the left fourth toe. Patient gradually developed pain and redness in her left foot following a minor trauma several weeks ago. She was admitted several days ago with what appears to be cellulitis of the left foot and a gangrenous left fourth toe. She was seen by podiatry who plan to allow the wound to demarcate and follow-up as an outpatient for probable surgery. Patient has  complained of pain which has not been well-controlled and therefore acute pain service was consulted.     Pain History  Current pain location(s):  Pain Score: 10  Pain Location/Orientation: Orientation: Left, Location: Foot  Pain Scale: Pain Assessment Tool: 0-10  24 hour history: Patient underwent left lower extremity angiography and angioplasty yesterday.  Overnight developed significant increase in left foot pain, likely reperfusion pain.  Current regimen no longer effective in managing this pain.  We discussed increasing oral and IV opioids, adding gabapentin and continuing Toradol temporarily while this pain improves and patient is in agreement.    Opioid requirement previous 24 hours: Dilaudid 2 mg p.o. x 1, Dilaudid 4 mg p.o. x 2, Dilaudid 0.5 mg IV x 1.  Meds/Allergies   all current active meds have been reviewed, current meds:   Current Facility-Administered Medications:     acetaminophen (TYLENOL) tablet 975 mg, Q6H JORGE    aspirin (ECOTRIN LOW STRENGTH) EC tablet 81 mg, Daily    atorvastatin (LIPITOR) tablet 80 mg, Daily With Dinner    enoxaparin (LOVENOX) subcutaneous injection 40 mg, Daily    gabapentin (NEURONTIN) capsule 100 mg, TID    hydrALAZINE (APRESOLINE) injection 10 mg, Q6H PRN    HYDROmorphone (DILAUDID) injection 0.5 mg, Q4H PRN    HYDROmorphone (DILAUDID) tablet 4 mg, Q4H PRN **OR** HYDROmorphone (DILAUDID) tablet 6 mg, Q4H PRN    insulin glargine (LANTUS) subcutaneous injection 10 Units 0.1 mL, HS    insulin lispro (HumALOG/ADMELOG) 100 units/mL subcutaneous injection 1-5 Units, TID AC **AND** Fingerstick Glucose (POCT), TID AC    insulin lispro (HumALOG/ADMELOG) 100 units/mL subcutaneous injection 1-5 Units, HS    insulin lispro (HumALOG/ADMELOG) 100 units/mL subcutaneous injection 3 Units, TID With Meals    ketorolac (TORADOL) injection 15 mg, Q6H JORGE    magnesium sulfate 2 g/50 mL IVPB (premix) 2 g, Once, Last Rate: 2 g (12/24/24 0959)    methocarbamol (ROBAXIN) tablet 500 mg, Q6H JORGE     naloxone (NARCAN) 0.04 mg/mL syringe 0.04 mg, Q1MIN PRN    nicotine (NICODERM CQ) 14 mg/24hr TD 24 hr patch 1 patch, Daily    ondansetron (ZOFRAN) injection 4 mg, Q6H PRN    senna-docusate sodium (SENOKOT S) 8.6-50 mg per tablet 1 tablet, HS, and PTA meds:   None     Allergies   Allergen Reactions    Codeine      aggression     Objective :  Temp:  [97.9 °F (36.6 °C)-98.7 °F (37.1 °C)] 97.9 °F (36.6 °C)  HR:  [72-92] 81  BP: (134-219)/() 163/91  Resp:  [12-20] 18  SpO2:  [92 %-100 %] 96 %  O2 Device: None (Room air)  Nasal Cannula O2 Flow Rate (L/min):  [3 L/min] 3 L/min    Physical Exam  Vitals and nursing note reviewed.   Constitutional:       General: She is awake. She is not in acute distress.     Appearance: She is not ill-appearing, toxic-appearing or diaphoretic.      Comments: Appears uncomfortable.   Skin:     General: Skin is warm and dry.   Neurological:      Mental Status: She is alert and oriented to person, place, and time.      GCS: GCS eye subscore is 4. GCS verbal subscore is 5. GCS motor subscore is 6.   Psychiatric:         Attention and Perception: Attention normal.         Speech: Speech normal.         Behavior: Behavior normal. Behavior is cooperative.            Lab Results: I have reviewed the following results:  Estimated Creatinine Clearance: 85.7 mL/min (by C-G formula based on SCr of 0.8 mg/dL).  Lab Results   Component Value Date    WBC 7.57 12/24/2024    HGB 10.9 (L) 12/24/2024    HCT 34.4 (L) 12/24/2024     12/24/2024         Component Value Date/Time    K 4.7 12/24/2024 0532     12/24/2024 0532    CO2 27 12/24/2024 0532    BUN 24 12/24/2024 0532    CREATININE 0.80 12/24/2024 0532         Component Value Date/Time    CALCIUM 9.2 12/24/2024 0532    ALKPHOS 75 12/13/2024 1841    AST 10 (L) 12/13/2024 1841    ALT 11 12/13/2024 1841    TP 8.1 12/13/2024 1841    ALB 4.1 12/13/2024 1841       Imaging Results Review: No pertinent imaging studies reviewed.  Other Study  Results Review: No additional pertinent studies reviewed.     Administrative Statements   I have spent a total time of 38 minutes in caring for this patient on the day of the visit/encounter including Risks and benefits of tx options, Instructions for management, Patient and family education, Importance of tx compliance, Risk factor reductions, Impressions, Counseling / Coordination of care, Documenting in the medical record, Reviewing / ordering tests, medicine, procedures  , Obtaining or reviewing history  , and Communicating with other healthcare professionals .

## 2024-12-24 NOTE — PROGRESS NOTES
Progress Note - Vascular Surgery   Name: Lizzy Acuna 49 y.o. female I MRN: [id number]  Unit/Bed#: [facility] 213[facility] I Date of Admission: 12/13/2024   Date of Service: 12/24/2024 I Hospital Day: 11    Assessment & Plan  Gangrene of toe of left foot (HCC)  Presents with left fourth toe dry gangrene (hx of trauma, dropping case of soda on it 3 weeks ago)  AFVSS  Na 133, Cr 0.wnl, WBC wnl, Hgb 10.9  Blood cx Ng x 5 days  12/13 XR L toe with soft tissue swelling, negative for frx  MRI negative for osteomyelitis  Palpable L dp and pt pulses on exam   LEAD: R KAILEE 0/91/105/90; L KAILEE 0.59/89/-. >75% proximal SFA and distal pop stenosis. Monophasic waveforms in tibial vessels.   IR completed LLE angiogram yesterday; SFA treated with balloon and covered stent was placed; R groin puncture is soft, no evidence of active bleeding or hematoma    Plan:  LEAD reviewed in detail. There is a >75% proximal SFA and distal pop stenosis in LLE. Monophasic waveforms throughout the tibial vessels indicative of tibial disease. L KAILEE 0.59/ MTP 89/ GTP -.   LLE angiogram completed yesterday, no further vascular recommendations at this time  Patient continues to have severe left foot pain, podiatry following, appreciate recs  Wound care per podiatry.  Recommend aspirin 81 mg and statin therapy for OMT of PAD.   Rest of care as per SLIM.  Updated on call vascular surgeon Dr Johnson, final recs pending attestation   Cellulitis of left foot  Surrounding cellulitis vs duskiness of the dorsal aspect of the left foot  Tobacco abuse  Reports half a pack per day for 30-year smoking history  Recommend smoking cessation.  Discussion to be had regarding willingness to quit and if there are any cessation medications she might be interested in.  Nicotine patch while inpatient  Counseled the patient on effects of smoking cigarettes on vasculature, wound healing, infection, etc.  Diabetes mellitus type 2 with complications (HCC)  Lab Results   Component Value Date     HGBA1C 10.3 (H) 12/13/2024       Recent Labs     12/23/24  1125 12/23/24  1621 12/23/24  2105 12/24/24  0741   POCGLU 100 87 161* 130       Blood Sugar Average: Last 72 hrs:  (P) [id number]  Risk factor for poor wound healing, infection, and poor vasculature  Hx of gestational diabetes  Newly diagnosed this admission  Tight glucose management per primary team  Obesity (BMI 30.0-34.9)  Recommend healthy diet, lifestyle modifications  Hyponatremia  Resolved, Na 136      Subjective   Patient continues to have severe left foot pain. She says it is worse than prior to the procedure yesterday with IR.     Objective :  Temp:  [97.9 °F (36.6 °C)-98.7 °F (37.1 °C)] 97.9 °F (36.6 °C)  HR:  [72-92] 81  BP: (134-219)/() 163/91  Resp:  [12-20] 18  SpO2:  [92 %-100 %] 96 %  O2 Device: None (Room air)  Nasal Cannula O2 Flow Rate (L/min):  [3 L/min] 3 L/min    I/O         12/22 0701  12/23 0700 12/23 0701  12/24 0700 12/24 0701  12/25 0700    P.O. 720  100    Total Intake(mL/kg) 720 (8.4)  100 (1.2)    Urine (mL/kg/hr) 2100 (1) 1300 (0.6) 750 (3.8)    Total Output 2100 1300 750    Net -1380 -1300 -650                   Physical Exam  Vitals reviewed.   Constitutional:       General: She is not in acute distress.     Appearance: She is obese. She is not ill-appearing.   HENT:      Head: Normocephalic and atraumatic.   Cardiovascular:      Rate and Rhythm: Normal rate.   Pulmonary:      Effort: Pulmonary effort is normal. No respiratory distress.   Abdominal:      General: Abdomen is flat. There is no distension.      Palpations: Abdomen is soft.      Tenderness: There is no abdominal tenderness. There is no guarding.   Musculoskeletal:      Right lower leg: No edema.      Left lower leg: No edema.   Skin:     General: Skin is warm and dry.      Comments: L 4th toe gangrene   Neurological:      General: No focal deficit present.      Mental Status: She is alert. Mental status is at baseline.   Psychiatric:          Behavior: Behavior normal.       Lab Results: I have reviewed the following results:  Recent Labs     12/24/24  0532   WBC 7.57   HGB 10.9*   HCT 34.4*      SODIUM 133*   K 4.7      CO2 27   BUN 24   CREATININE 0.80   GLUC 127   MG 1.4*   PHOS 3.4       VTE Pharmacologic Prophylaxis: Enoxaparin (Lovenox)  VTE Mechanical Prophylaxis: sequential compression device    Miroslava Gabriel PA-C

## 2024-12-24 NOTE — ASSESSMENT & PLAN NOTE
History of foot injury approximately 10 days ago, has dark discoloration of distal aspect of fourth left toe with pain  She was seen by Podiatry and sent to ER for admission  Podiatry following  Plan for demarcation of stable dry gangrene in outpatient setting  Blood cultures: No growth to date  ESR 83  CRP 13.6  LEADs reviewed  Pain management following  Status post angiogram with IR and patient still with severe pain  Will reach out to podiatry as patient is having severe pain for need for possible intervention

## 2024-12-24 NOTE — PLAN OF CARE
Problem: PAIN - ADULT  Goal: Verbalizes/displays adequate comfort level or baseline comfort level  Description: Interventions:  - Encourage patient to monitor pain and request assistance  - Assess pain using appropriate pain scale  - Administer analgesics based on type and severity of pain and evaluate response  - Implement non-pharmacological measures as appropriate and evaluate response  - Consider cultural and social influences on pain and pain management  - Notify physician/advanced practitioner if interventions unsuccessful or patient reports new pain  Outcome: Progressing     Problem: INFECTION - ADULT  Goal: Absence or prevention of progression during hospitalization  Description: INTERVENTIONS:  - Assess and monitor for signs and symptoms of infection  - Monitor lab/diagnostic results  - Monitor all insertion sites, i.e. indwelling lines, tubes, and drains  - Monitor endotracheal if appropriate and nasal secretions for changes in amount and color  - [place] appropriate cooling/warming therapies per order  - Administer medications as ordered  - Instruct and encourage patient and family to use good hand hygiene technique  - Identify and instruct in appropriate isolation precautions for identified infection/condition  Outcome: Progressing     Problem: DISCHARGE PLANNING  Goal: Discharge to home or other facility with appropriate resources  Description: INTERVENTIONS:  - Identify barriers to discharge w/patient and caregiver  - Arrange for needed discharge resources and transportation as appropriate  - Identify discharge learning needs (meds, wound care, etc.)  - Arrange for interpretive services to assist at discharge as needed  - Refer to Case Management Department for coordinating discharge planning if the patient needs post-hospital services based on physician/advanced practitioner order or complex needs related to functional status, cognitive ability, or social support system  Outcome: Progressing      Problem: Knowledge Deficit  Goal: Patient/family/caregiver demonstrates understanding of disease process, treatment plan, medications, and discharge instructions  Description: Complete learning assessment and assess knowledge base.  Interventions:  - Provide teaching at level of understanding  - Provide teaching via preferred learning methods  Outcome: Progressing     Problem: SKIN/TISSUE INTEGRITY - ADULT  Goal: Incision(s), wounds(s) or drain site(s) healing without S/S of infection  Description: INTERVENTIONS  - Assess and document dressing, incision, wound bed, drain sites and surrounding tissue  - Provide patient and family education  - Perform skin care/dressing changes per wound care orders and as needed for soilage  Outcome: Progressing     Problem: METABOLIC, FLUID AND ELECTROLYTES - ADULT  Goal: Electrolytes maintained within normal limits  Description: INTERVENTIONS:  - Monitor labs and assess patient for signs and symptoms of electrolyte imbalances  - Administer electrolyte replacement as ordered  - Monitor response to electrolyte replacements, including repeat lab results as appropriate  - Instruct patient on fluid and nutrition as appropriate  Outcome: Progressing  Goal: Glucose maintained within target range  Description: INTERVENTIONS:  - Monitor Blood Glucose as ordered  - Assess for signs and symptoms of hyperglycemia and hypoglycemia  - Administer ordered medications to maintain glucose within target range  - Assess nutritional intake and initiate nutrition service referral as needed  Outcome: Progressing     Problem: MUSCULOSKELETAL - ADULT  Goal: Maintain or return mobility to safest level of function  Description: INTERVENTIONS:  - Assess patient's ability to carry out ADLs; assess patient's baseline for ADL function and identify physical deficits which impact ability to perform ADLs (bathing, care of mouth/teeth, toileting, grooming, dressing, etc.)  - Assess/evaluate cause of self-care  deficits   - Assess range of motion  - Assess patient's mobility  - Assess patient's need for assistive devices and provide as appropriate  - Encourage maximum independence but intervene and supervise when necessary  - Involve family in performance of ADLs  - Assess for home care needs following discharge   - Consider OT consult to assist with ADL evaluation and planning for discharge  - Provide patient education as appropriate  Outcome: Progressing

## 2024-12-24 NOTE — PLAN OF CARE
Problem: INFECTION - ADULT  Goal: Absence or prevention of progression during hospitalization  Description: INTERVENTIONS:  - Assess and monitor for signs and symptoms of infection  - Monitor lab/diagnostic results  - Monitor all insertion sites, i.e. indwelling lines, tubes, and drains  - Monitor endotracheal if appropriate and nasal secretions for changes in amount and color  - [place] appropriate cooling/warming therapies per order  - Administer medications as ordered  - Instruct and encourage patient and family to use good hand hygiene technique  - Identify and instruct in appropriate isolation precautions for identified infection/condition  Outcome: Progressing     Problem: MUSCULOSKELETAL - ADULT  Goal: Maintain or return mobility to safest level of function  Description: INTERVENTIONS:  - Assess patient's ability to carry out ADLs; assess patient's baseline for ADL function and identify physical deficits which impact ability to perform ADLs (bathing, care of mouth/teeth, toileting, grooming, dressing, etc.)  - Assess/evaluate cause of self-care deficits   - Assess range of motion  - Assess patient's mobility  - Assess patient's need for assistive devices and provide as appropriate  - Encourage maximum independence but intervene and supervise when necessary  - Involve family in performance of ADLs  - Assess for home care needs following discharge   - Consider OT consult to assist with ADL evaluation and planning for discharge  - Provide patient education as appropriate  Outcome: Progressing

## 2024-12-24 NOTE — CASE MANAGEMENT
Case Management Discharge Planning Note    Patient name Lizzy Acuna  Location /-01 MRN [id number]  : 1975 Date 2024       Current Admission Date: 2024  Current Admission Diagnosis:Gangrene of toe of left foot (HCC)   Patient Active Problem List    Diagnosis Date Noted Date Diagnosed    Cellulitis of left foot 2024     Gangrene of toe of left foot (HCC) 2024     Diabetes mellitus type 2 with complications (HCC) 2018     Hyponatremia 2018     Chronic sciatica 2018     Tobacco abuse 02/15/2017     Obesity (BMI 30.0-34.9) 2017     Chronic low back pain 2016     Chronic, continuous use of opioids 2016       LOS (days): 11  Geometric Mean LOS (GMLOS) (days): 3.8  Days to GMLOS:-7.2     OBJECTIVE:  Risk of Unplanned Readmission Score: 13.07         Current admission status: Inpatient   Preferred Pharmacy:   [facility] [address] [address]  Phone: [phone] Fax: [phone]    [id number] - [facility] [address] [address]  Phone: [phone] Fax: [phone]    Primary Care Provider: Linwood North DO    Primary Insurance: [employer]  Secondary Insurance:     DISCHARGE DETAILS:    Discharge planning discussed with:: patient & Paige (mother) @15:55 pm  Freedom of Choice: Yes  Comments - Freedom of Choice: pt denies any d/c needs  CM contacted family/caregiver?: Yes             Contacts  Patient Contacts: Paige Johansen mother  Relationship to Patient:: Family  Contact Method: Phone  Phone Number: [phone]  Reason/Outcome: Discharge Planning    Requested Home Health Care         Is the patient interested in HHC at discharge?: No         Other Referral/Resources/Interventions Provided:  Referral Comments: pt continue to have unmanaged pain- pain management following         Treatment Team  Recommendation: Home (home wiht family & outpt follow up-family)

## 2024-12-24 NOTE — ASSESSMENT & PLAN NOTE
Lab Results   Component Value Date    HGBA1C 10.3 (H) 12/13/2024       Recent Labs     12/23/24  1125 12/23/24  1621 12/23/24  2105 12/24/24  0741   POCGLU 100 87 161* 130       Blood Sugar Average: Last 72 hrs:  (P) 122.8657450537669369  Risk factor for poor wound healing, infection, and poor vasculature  Hx of gestational diabetes  Newly diagnosed this admission  Tight glucose management per primary team

## 2024-12-24 NOTE — ASSESSMENT & PLAN NOTE
Patient will require surgical intervention outpatient by podiatry  Status post IR angiogram/angioplasty on 12/23/2024.    Acetaminophen 975 mg p.o. every 6 hours scheduled.  Toradol 15 mg IV every 6 hours scheduled x 2 more days  Change Dilaudid to 4 mg p.o. every 4 hours as needed moderate pain or 6 mg p.o. every 4 hours as needed severe pain.  Change IV Dilaudid to 0.5 mg IV every 4 hours as needed breakthrough pain.  Patient was instructed to avoid using this if at all possible  methocarbamol 500 mg p.o. every 6 hours scheduled.  Add gabapentin 100 mg p.o. 3 times daily for neuropathic pain.  Estimated Creatinine Clearance: 85.7 mL/min (by C-G formula based on SCr of 0.8 mg/dL).  Bowel regimen while on opioid pain medication to avoid opioid-induced constipation.  As needed Narcan in the event that opioid reversal becomes necessary.

## 2024-12-24 NOTE — ASSESSMENT & PLAN NOTE
Presents with left fourth toe dry gangrene (hx of trauma, dropping case of soda on it 3 weeks ago)  AFVSS  Na 133, Cr 0.wnl, WBC wnl, Hgb 10.9  Blood cx Ng x 5 days  12/13 XR L toe with soft tissue swelling, negative for frx  MRI negative for osteomyelitis  Palpable L dp and pt pulses on exam   LEAD: R KAILEE 0/91/105/90; L KAILEE 0.59/89/-. >75% proximal SFA and distal pop stenosis. Monophasic waveforms in tibial vessels.   IR completed LLE angiogram yesterday; SFA treated with balloon and covered stent was placed; R groin puncture is soft, no evidence of active bleeding or hematoma    Plan:  LEAD reviewed in detail. There is a >75% proximal SFA and distal pop stenosis in LLE. Monophasic waveforms throughout the tibial vessels indicative of tibial disease. L KAILEE 0.59/ MTP 89/ GTP -.   LLE angiogram completed yesterday, no further vascular recommendations at this time  Patient continues to have severe left foot pain, podiatry following, appreciate recs  Wound care per podiatry.  Recommend aspirin 81 mg and statin therapy for OMT of PAD.   Rest of care as per SLIM.  Updated on call vascular surgeon Dr Johnson, final recs pending attestation

## 2024-12-25 ENCOUNTER — HOSPITAL ENCOUNTER (EMERGENCY)
Facility: HOSPITAL | Age: 49
Discharge: LEFT BEFORE DISCHARGE | End: 2024-12-25
Attending: FAMILY MEDICINE | Admitting: FAMILY MEDICINE
Payer: COMMERCIAL

## 2024-12-25 VITALS
DIASTOLIC BLOOD PRESSURE: 89 MMHG | HEART RATE: 100 BPM | TEMPERATURE: 97 F | SYSTOLIC BLOOD PRESSURE: 187 MMHG | RESPIRATION RATE: 20 BRPM | OXYGEN SATURATION: 97 %

## 2024-12-25 VITALS
RESPIRATION RATE: 18 BRPM | TEMPERATURE: 98.8 F | SYSTOLIC BLOOD PRESSURE: 174 MMHG | HEART RATE: 72 BPM | BODY MASS INDEX: 35.78 KG/M2 | OXYGEN SATURATION: 96 % | HEIGHT: 61 IN | DIASTOLIC BLOOD PRESSURE: 88 MMHG | WEIGHT: 189.49 LBS

## 2024-12-25 DIAGNOSIS — M79.672 LEFT FOOT PAIN: Primary | ICD-10-CM

## 2024-12-25 LAB — GLUCOSE SERPL-MCNC: 102 MG/DL (ref 65–140)

## 2024-12-25 PROCEDURE — 99284 EMERGENCY DEPT VISIT MOD MDM: CPT

## 2024-12-25 PROCEDURE — 82948 REAGENT STRIP/BLOOD GLUCOSE: CPT

## 2024-12-25 PROCEDURE — 99239 HOSP IP/OBS DSCHRG MGMT >30: CPT | Performed by: INTERNAL MEDICINE

## 2024-12-25 PROCEDURE — 99283 EMERGENCY DEPT VISIT LOW MDM: CPT

## 2024-12-25 RX ORDER — GLUCOSAMINE HCL/CHONDROITIN SU 500-400 MG
CAPSULE ORAL
Qty: 100 EACH | Refills: 0 | Status: SHIPPED | OUTPATIENT
Start: 2024-12-25 | End: 2024-12-25

## 2024-12-25 RX ORDER — LANCETS 33 GAUGE
EACH MISCELLANEOUS
Qty: 100 EACH | Refills: 0 | Status: SHIPPED | OUTPATIENT
Start: 2024-12-25

## 2024-12-25 RX ORDER — PEN NEEDLE, DIABETIC 32GX 5/32"
NEEDLE, DISPOSABLE MISCELLANEOUS
Qty: 100 EACH | Refills: 0 | Status: SHIPPED | OUTPATIENT
Start: 2024-12-25

## 2024-12-25 RX ORDER — HYDROMORPHONE HYDROCHLORIDE 4 MG/1
4 TABLET ORAL EVERY 4 HOURS PRN
Qty: 30 TABLET | Refills: 0 | Status: SHIPPED | OUTPATIENT
Start: 2024-12-25 | End: 2024-12-26 | Stop reason: SDUPTHER

## 2024-12-25 RX ORDER — HYDROMORPHONE HYDROCHLORIDE 2 MG/1
6 TABLET ORAL EVERY 4 HOURS PRN
Qty: 30 TABLET | Refills: 0 | Status: SHIPPED | OUTPATIENT
Start: 2024-12-25 | End: 2024-12-26 | Stop reason: SDUPTHER

## 2024-12-25 RX ORDER — ATORVASTATIN CALCIUM 80 MG/1
80 TABLET, FILM COATED ORAL
Qty: 30 TABLET | Refills: 0 | Status: SHIPPED | OUTPATIENT
Start: 2024-12-25 | End: 2024-12-25

## 2024-12-25 RX ORDER — METHOCARBAMOL 500 MG/1
500 TABLET, FILM COATED ORAL EVERY 6 HOURS SCHEDULED
Qty: 30 TABLET | Refills: 0 | Status: SHIPPED | OUTPATIENT
Start: 2024-12-25

## 2024-12-25 RX ORDER — INSULIN GLARGINE 100 [IU]/ML
10 INJECTION, SOLUTION SUBCUTANEOUS
Qty: 3 ML | Refills: 0 | Status: SHIPPED | OUTPATIENT
Start: 2024-12-25

## 2024-12-25 RX ORDER — ASPIRIN 81 MG/1
81 TABLET ORAL DAILY
Qty: 30 TABLET | Refills: 0 | Status: SHIPPED | OUTPATIENT
Start: 2024-12-25

## 2024-12-25 RX ORDER — PEN NEEDLE, DIABETIC 32GX 5/32"
NEEDLE, DISPOSABLE MISCELLANEOUS
Qty: 100 EACH | Refills: 0 | Status: SHIPPED | OUTPATIENT
Start: 2024-12-25 | End: 2024-12-25

## 2024-12-25 RX ORDER — HYDROMORPHONE HYDROCHLORIDE 4 MG/1
4 TABLET ORAL EVERY 4 HOURS PRN
Qty: 30 TABLET | Refills: 0 | Status: SHIPPED | OUTPATIENT
Start: 2024-12-25 | End: 2024-12-25

## 2024-12-25 RX ORDER — INSULIN GLARGINE 100 [IU]/ML
10 INJECTION, SOLUTION SUBCUTANEOUS
Qty: 3 ML | Refills: 0 | Status: SHIPPED | OUTPATIENT
Start: 2024-12-25 | End: 2024-12-25

## 2024-12-25 RX ORDER — METHOCARBAMOL 500 MG/1
500 TABLET, FILM COATED ORAL EVERY 6 HOURS SCHEDULED
Qty: 30 TABLET | Refills: 0 | Status: SHIPPED | OUTPATIENT
Start: 2024-12-25 | End: 2024-12-25

## 2024-12-25 RX ORDER — AMOXICILLIN 250 MG
1 CAPSULE ORAL
Qty: 30 TABLET | Refills: 0 | Status: SHIPPED | OUTPATIENT
Start: 2024-12-25

## 2024-12-25 RX ORDER — AMOXICILLIN 250 MG
1 CAPSULE ORAL
Qty: 30 TABLET | Refills: 0 | Status: SHIPPED | OUTPATIENT
Start: 2024-12-25 | End: 2024-12-25

## 2024-12-25 RX ORDER — LANCETS 33 GAUGE
EACH MISCELLANEOUS
Qty: 100 EACH | Refills: 0 | Status: SHIPPED | OUTPATIENT
Start: 2024-12-25 | End: 2024-12-25

## 2024-12-25 RX ORDER — ASPIRIN 81 MG/1
81 TABLET ORAL DAILY
Qty: 30 TABLET | Refills: 0 | Status: SHIPPED | OUTPATIENT
Start: 2024-12-25 | End: 2024-12-25

## 2024-12-25 RX ORDER — HYDROMORPHONE HYDROCHLORIDE 2 MG/1
6 TABLET ORAL EVERY 4 HOURS PRN
Qty: 30 TABLET | Refills: 0 | Status: SHIPPED | OUTPATIENT
Start: 2024-12-25 | End: 2024-12-25

## 2024-12-25 RX ORDER — GABAPENTIN 100 MG/1
100 CAPSULE ORAL 3 TIMES DAILY
Qty: 90 CAPSULE | Refills: 0 | Status: SHIPPED | OUTPATIENT
Start: 2024-12-25

## 2024-12-25 RX ORDER — ACETAMINOPHEN 325 MG/1
975 TABLET ORAL EVERY 6 HOURS SCHEDULED
Qty: 360 TABLET | Refills: 0 | Status: SHIPPED | OUTPATIENT
Start: 2024-12-25

## 2024-12-25 RX ORDER — INSULIN ASPART 100 [IU]/ML
5 INJECTION, SOLUTION INTRAVENOUS; SUBCUTANEOUS
Qty: 6 ML | Refills: 0 | Status: SHIPPED | OUTPATIENT
Start: 2024-12-25

## 2024-12-25 RX ORDER — HYDROMORPHONE HYDROCHLORIDE 2 MG/1
4 TABLET ORAL ONCE
Refills: 0 | Status: DISCONTINUED | OUTPATIENT
Start: 2024-12-25 | End: 2024-12-25 | Stop reason: HOSPADM

## 2024-12-25 RX ORDER — ATORVASTATIN CALCIUM 80 MG/1
80 TABLET, FILM COATED ORAL
Qty: 30 TABLET | Refills: 0 | Status: SHIPPED | OUTPATIENT
Start: 2024-12-25

## 2024-12-25 RX ORDER — GLUCOSAMINE HCL/CHONDROITIN SU 500-400 MG
CAPSULE ORAL
Qty: 100 EACH | Refills: 0 | Status: SHIPPED | OUTPATIENT
Start: 2024-12-25

## 2024-12-25 RX ORDER — BLOOD SUGAR DIAGNOSTIC
STRIP MISCELLANEOUS
Qty: 100 EACH | Refills: 0 | Status: SHIPPED | OUTPATIENT
Start: 2024-12-25 | End: 2024-12-25

## 2024-12-25 RX ORDER — BLOOD SUGAR DIAGNOSTIC
STRIP MISCELLANEOUS
Qty: 100 EACH | Refills: 0 | Status: SHIPPED | OUTPATIENT
Start: 2024-12-25

## 2024-12-25 RX ORDER — ACETAMINOPHEN 325 MG/1
975 TABLET ORAL EVERY 6 HOURS SCHEDULED
Qty: 360 TABLET | Refills: 0 | Status: SHIPPED | OUTPATIENT
Start: 2024-12-25 | End: 2024-12-25

## 2024-12-25 RX ORDER — INSULIN ASPART 100 [IU]/ML
5 INJECTION, SOLUTION INTRAVENOUS; SUBCUTANEOUS
Qty: 6 ML | Refills: 0 | Status: SHIPPED | OUTPATIENT
Start: 2024-12-25 | End: 2024-12-25

## 2024-12-25 RX ORDER — GABAPENTIN 100 MG/1
100 CAPSULE ORAL 3 TIMES DAILY
Qty: 90 CAPSULE | Refills: 0 | Status: SHIPPED | OUTPATIENT
Start: 2024-12-25 | End: 2024-12-25

## 2024-12-25 RX ORDER — BLOOD-GLUCOSE METER
KIT MISCELLANEOUS
Qty: 1 KIT | Refills: 0 | Status: SHIPPED | OUTPATIENT
Start: 2024-12-25

## 2024-12-25 RX ORDER — BLOOD-GLUCOSE METER
KIT MISCELLANEOUS
Qty: 1 KIT | Refills: 0 | Status: SHIPPED | OUTPATIENT
Start: 2024-12-25 | End: 2024-12-25

## 2024-12-25 RX ADMIN — ENOXAPARIN SODIUM 40 MG: 40 INJECTION SUBCUTANEOUS at 08:08

## 2024-12-25 RX ADMIN — ACETAMINOPHEN 975 MG: 325 TABLET ORAL at 03:40

## 2024-12-25 RX ADMIN — METHOCARBAMOL TABLETS 500 MG: 500 TABLET, COATED ORAL at 06:23

## 2024-12-25 RX ADMIN — GABAPENTIN 100 MG: 100 CAPSULE ORAL at 08:08

## 2024-12-25 RX ADMIN — HYDROMORPHONE HYDROCHLORIDE 6 MG: 4 TABLET ORAL at 01:12

## 2024-12-25 RX ADMIN — HYDROMORPHONE HYDROCHLORIDE 0.5 MG: 1 INJECTION, SOLUTION INTRAMUSCULAR; INTRAVENOUS; SUBCUTANEOUS at 02:01

## 2024-12-25 RX ADMIN — INSULIN LISPRO 3 UNITS: 100 INJECTION, SOLUTION INTRAVENOUS; SUBCUTANEOUS at 08:10

## 2024-12-25 RX ADMIN — ASPIRIN 81 MG: 81 TABLET, COATED ORAL at 08:08

## 2024-12-25 RX ADMIN — KETOROLAC TROMETHAMINE 15 MG: 30 INJECTION, SOLUTION INTRAMUSCULAR at 06:23

## 2024-12-25 NOTE — ED NOTES
"Patient walks out of room with provider and states \"I can't get my scripts and no one is helping me here\". This RN explained that the provider would be prescribing pain medications while in ED. Patient refused and left department. Provider made aware.      Beatrice Chatterjee RN  12/25/24 [id number]    "

## 2024-12-25 NOTE — PROGRESS NOTES
Progress Note - Hospitalist   Name: Lizzy Acuna 49 y.o. female I MRN: [id number]  Unit/Bed#: -[facility] I Date of Admission: 12/13/2024   Date of Service: 12/25/2024 I Hospital Day: 12     Assessment & Plan  Gangrene of toe of left foot (HCC)  History of foot injury approximately 10 days ago, has dark discoloration of distal aspect of fourth left toe with pain  She was seen by Podiatry and sent to ER for admission  Podiatry following  Plan for demarcation of stable dry gangrene in outpatient setting  Blood cultures: No growth to date  ESR 83  CRP 13.6  LEADs reviewed  Pain management following  Status post angiogram with IR  No inpatient podiatric intervention required at this time  Pain is now well-controlled  Plan for discharge home on 12/26/2024 with pain regimen  Cellulitis of left foot  Seen by podiatry and sent to ED for further evaluation  Procalcitonin 0.07->0.07  Received IV cefepime and vancomycin  Now monitoring off antibiotics  Diabetes mellitus type 2 with complications ([facility])  Hemoglobin A1c 10.3 on 12/13/2024   Initiated on Lantus 10 units SQ at bedtime with 5 units of Humalog SQ with meals 3 times daily  Continue sliding scale insulin coverage  Improved glycemic control will be important for wound healing  Endocrinology consultation appreciated  Tobacco abuse  Smokes half pack per day  Continue nicotine patch 14 mg  Smoking cessation recommended  Obesity (BMI 30.0-34.9)  BMI 34  Healthy diet and lifestyle modification recommended  Hyponatremia  Improved    VTE Pharmacologic Prophylaxis: VTE Score: 3 Moderate Risk (Score 3-4) - Pharmacological DVT Prophylaxis Contraindicated. Sequential Compression Devices Ordered.    Mobility:   Basic Mobility Inpatient Raw Score: 22  JH-HLM Goal: 7: Walk 25 feet or more  JH-HLM Achieved: 7: Walk 25 feet or more  JH-HLM Goal achieved. Continue to encourage appropriate mobility.    Patient Centered Rounds: I performed bedside rounds with nursing staff today.      Discussions with Specialists or Other Care Team Provider: IR, Podiatry    Education and Discussions with Family / Patient: Patient declined call to .     Current Length of Stay: 12 day(s)  Current Patient Status: Inpatient   Certification Statement: The patient will continue to require additional inpatient hospital stay due to need for IR angiogram  Discharge Plan: Anticipate discharge later today or tomorrow to home.    Code Status: Level 1 - Full Code    Subjective   Patient seen and examined at bedside. No acute events overnight. Denies chest pain, SOB, diaphoresis, nausea/vomiting/diarrhea, fevers/chills.  Patient states her pain is now well-controlled.  Plan for discharge to home on 12/26/2024 with pain regimen.    Objective :  Temp:  [98.2 °F (36.8 °C)-98.8 °F (37.1 °C)] 98.8 °F (37.1 °C)  HR:  [72-96] 72  BP: (117-174)/(76-91) 174/88  Resp:  [18] 18  SpO2:  [92 %-96 %] 96 %  O2 Device: None (Room air)    Body mass index is 35.8 kg/m².     Input and Output Summary (last 24 hours):     Intake/Output Summary (Last 24 hours) at 12/25/2024 0753  Last data filed at 12/24/2024 1712  Gross per 24 hour   Intake 820 ml   Output 300 ml   Net 520 ml       Physical Exam  Vitals and nursing note reviewed.   Constitutional:       General: She is not in acute distress.     Appearance: She is well-developed.   HENT:      Head: Normocephalic and atraumatic.   Eyes:      Conjunctiva/sclera: Conjunctivae normal.   Cardiovascular:      Rate and Rhythm: Normal rate and regular rhythm.      Heart sounds: No murmur heard.  Pulmonary:      Effort: Pulmonary effort is normal. No respiratory distress.      Breath sounds: Normal breath sounds.   Abdominal:      Palpations: Abdomen is soft.      Tenderness: There is no abdominal tenderness.   Musculoskeletal:         General: No swelling.      Cervical back: Neck supple.   Skin:     General: Skin is warm and dry.      Capillary Refill: Capillary refill takes less than 2  seconds.   Neurological:      Mental Status: She is alert.   Psychiatric:         Mood and Affect: Mood normal.         Lab Results: I have reviewed the following results:   Results from last 7 days   Lab Units 12/24/24  0532   WBC Thousand/uL 7.57   HEMOGLOBIN g/dL 10.9*   HEMATOCRIT % 34.4*   PLATELETS Thousands/uL 240   SEGS PCT % 64   LYMPHO PCT % 29   MONO PCT % 6   EOS PCT % 1     Results from last 7 days   Lab Units 12/24/24  0532   SODIUM mmol/L 133*   POTASSIUM mmol/L 4.7   CHLORIDE mmol/L 100   CO2 mmol/L 27   BUN mg/dL 24   CREATININE mg/dL 0.80   ANION GAP mmol/L 6   CALCIUM mg/dL 9.2   GLUCOSE RANDOM mg/dL 127         Results from last 7 days   Lab Units 12/25/24  0737 12/24/24  2044 12/24/24  1622 12/24/24  1107 12/24/24  0741 12/23/24  2105 12/23/24  1621 12/23/24  1125 12/23/24  0715 12/22/24  2103 12/22/24  1659 12/22/24  1154   POC GLUCOSE mg/dl 102 164* 120 133 130 161* 87 100 109 151* 102 126               Recent Cultures (last 7 days):         Imaging Results Review: No pertinent imaging studies reviewed.  Other Study Results Review: No additional pertinent studies reviewed.    Last 24 Hours Medication List:     Current Facility-Administered Medications:     acetaminophen (TYLENOL) tablet 975 mg, Q6H JORGE    aspirin (ECOTRIN LOW STRENGTH) EC tablet 81 mg, Daily    atorvastatin (LIPITOR) tablet 80 mg, Daily With Dinner    enoxaparin (LOVENOX) subcutaneous injection 40 mg, Daily    gabapentin (NEURONTIN) capsule 100 mg, TID    hydrALAZINE (APRESOLINE) injection 10 mg, Q6H PRN    HYDROmorphone (DILAUDID) injection 0.5 mg, Q4H PRN    HYDROmorphone (DILAUDID) tablet 4 mg, Q4H PRN **OR** HYDROmorphone (DILAUDID) tablet 6 mg, Q4H PRN    insulin glargine (LANTUS) subcutaneous injection 10 Units 0.1 mL, HS    insulin lispro (HumALOG/ADMELOG) 100 units/mL subcutaneous injection 1-5 Units, TID AC **AND** Fingerstick Glucose (POCT), TID AC    insulin lispro (HumALOG/ADMELOG) 100 units/mL subcutaneous  injection 1-5 Units, HS    insulin lispro (HumALOG/ADMELOG) 100 units/mL subcutaneous injection 3 Units, TID With Meals    methocarbamol (ROBAXIN) tablet 500 mg, Q6H JORGE    naloxone (NARCAN) 0.04 mg/mL syringe 0.04 mg, Q1MIN PRN    nicotine (NICODERM CQ) 14 mg/24hr TD 24 hr patch 1 patch, Daily    ondansetron (ZOFRAN) injection 4 mg, Q6H PRN    senna-docusate sodium (SENOKOT S) 8.6-50 mg per tablet 1 tablet, HS    Administrative Statements   Today, Patient Was Seen By: Bunny Rosado, DO  I have spent a total time of 35 minutes in caring for this patient on the day of the visit/encounter including Diagnostic results, Prognosis, Risks and benefits of tx options, Instructions for management, Patient and family education, Importance of tx compliance, Risk factor reductions, Impressions, Counseling / Coordination of care, Documenting in the medical record, Reviewing / ordering tests, medicine, procedures  , Obtaining or reviewing history  , and Communicating with other healthcare professionals .    **Please Note: This note may have been constructed using a voice recognition system.**

## 2024-12-25 NOTE — ED PROVIDER NOTES
Time reflects when diagnosis was documented in both MDM as applicable and the Disposition within this note       Time User Action Codes Description Comment    12/25/2024  5:39 PM Bunny Dick Add [M79.672] Left foot pain           ED Disposition       ED Disposition   Left from Room after Provider Exam    Condition   --    Date/Time   Wed Dec 25, 2024  5:39 PM    Comment   --             Assessment & Plan       Medical Decision Making  Patient is a well-appearing 49-year-old female who was just discharged from our hospital this morning and is presenting due to left foot/toe pain. She was discharged this morning and the hospitalist ensured that she would be able to get her pain meds at a pharmacy that was open today due to it being [place] and she went to the pharmacy in [place] and they unfortunately needed a prior authorization for the Dilaudid. She returns the emergency department because of this. No new complaints. No fevers or chills.  Will reach out to the hospitalist that discharged her for recommendations. Will give oral Dilaudid in the meantime for pain. Reached out to the hospitalist that discharged her this morning as well as the hospitalist that is on now and we are all in agreeance that admission for this is not really warranted. Patient assumed I was accusing her of drug-seeking and eloped. She ambulated out of the hospital without difficulty and refused the pain meds offered by her nurse. Hospitalist will try to call her tomorrow.    Amount and/or Complexity of Data Reviewed  External Data Reviewed: labs, radiology and notes.    Risk  Prescription drug management.        ED Course as of 12/25/24 1803   Wed Dec 25, 2024   1713 Blood Pressure(!): 187/89  Elevated BP. Other vital signs reviewed and within normal limits.    1723 SC sent to Dr. Rosado.       Medications   HYDROmorphone (DILAUDID) tablet 4 mg (4 mg Oral Not Given 12/25/24 1737)       ED Risk Strat Scores                                               History of Present Illness       Chief Complaint   Patient presents with    Leg Pain     Unable to get pain meds from pharmacy this morning due to prior auth       Past Medical History:   Diagnosis Date    Advanced maternal age in multigravida, second trimester 2016    Transitioned From: Advanced maternal age in multigravida, first trimester      Back pain     used 2 percocet tid until current admission in 2017    Bone spur     in back per pt    Chronic hypertension with superimposed preeclampsia 2017    Depression     Gestational diabetes     insulin dependent    Herniated cervical disc     Herniated lumbar intervertebral disc     Hx of degenerative disc disease     Obesity     Pre-eclampsia     Prior pregnancy with fetal demise     previous demise at 36 weeks      Past Surgical History:   Procedure Laterality Date    BACK SURGERY       SECTION      IR LOWER EXTREMITY ANGIOGRAM  2024    LAMINECTOMY      with disc removal, last assessed 16    OTHER SURGICAL HISTORY      Right ovary removal 2005 per pt recall at [facility]    FL  DELIVERY ONLY N/A 2/15/2017    Procedure:  SECTION ();  Surgeon: Tito Umaña MD;  Location: [facility] ;  Service: Obstetrics    FL LIG/TRNSXJ FALOPIAN TUBE  DEL/ABDML SURG Left 2/15/2017    Procedure: LIGATION/COAGULATION TUBAL;  Surgeon: Tito Umaña MD;  Location: [facility] ;  Service: Obstetrics      Family History   Problem Relation Age of Onset    Diabetes Mother     COPD Mother     Heart disease Mother     Stroke Father     Heart disease Father       Social History     Tobacco Use    Smoking status: Every Day     Current packs/day: 0.50     Types: Cigarettes    Smokeless tobacco: Never   Vaping Use    Vaping status: Never Used   Substance Use Topics    Alcohol use: Never    Drug use: Not Currently      E-Cigarette/Vaping    E-Cigarette Use Never User        E-Cigarette/Vaping Substances    Nicotine No     THC No     CBD No     Flavoring No     Other No     Unknown No       I have reviewed and agree with the history as documented.     Patient is a 49-year-old male with relevant past medical history of back pain, bone spur, chronic hypertension, depression, gestational diabetes, herniated cervical disc, obesity, preeclampsia, gangrene, and cellulitis presenting with ongoing left foot pain. She was discharged from our hospital this morning and the hospitalist ensured that she would be able to get her pain meds at a pharmacy that was open today due to it being [place] and she went to the [facility] pharmacy in [place] and they unfortunately needed a prior authorization for the Dilaudid. She tried calling back to our hospital several times and had her insurance call the pharmacy but they were unable to get it approved. She returns the emergency department because of this and for pain control. She reports pain in her left toes 4-5 but they appear unchanged. She took Tylenol and ibuprofen prior to arrival.      History provided by:  Patient   used: No    Leg Pain  Associated symptoms: no back pain and no fever        Review of Systems   Constitutional:  Negative for chills and fever.   HENT:  Negative for congestion, ear pain, rhinorrhea and sore throat.    Eyes:  Negative for pain and visual disturbance.   Respiratory:  Negative for cough and shortness of breath.    Cardiovascular:  Negative for chest pain and palpitations.   Gastrointestinal:  Negative for abdominal pain, constipation, diarrhea, nausea and vomiting.   Genitourinary:  Negative for dysuria, frequency, hematuria and urgency.   Musculoskeletal:  Negative for arthralgias and back pain.        Left foot pain.   Skin:  Negative for color change and rash.   Neurological:  Negative for dizziness, seizures, syncope, light-headedness and headaches.   Psychiatric/Behavioral:  Negative for  agitation and confusion.            Objective       ED Triage Vitals   Temperature Pulse Blood Pressure Respirations SpO2 Patient Position - Orthostatic VS   12/25/24 1659 12/25/24 1659 12/25/24 1700 12/25/24 1659 12/25/24 1659 12/25/24 1700   (!) 97 °F (36.1 °C) 100 (!) 187/89 20 97 % Lying      Temp Source Heart Rate Source BP Location FiO2 (%) Pain Score    12/25/24 1659 12/25/24 1659 12/25/24 1700 -- 12/25/24 1700    Temporal Monitor Left arm  9      Vitals      Date and Time Temp Pulse SpO2 Resp BP Pain Score FACES Pain Rating User   12/25/24 1700 -- -- -- -- 187/89 9 -- JCS   12/25/24 1659 97 °F (36.1 °C) 100 97 % 20 -- -- -- JCS            Physical Exam  Vitals and nursing note reviewed.   Constitutional:       General: She is not in acute distress.     Appearance: She is well-developed. She is obese. She is not ill-appearing.   HENT:      Head: Normocephalic and atraumatic.   Eyes:      Conjunctiva/sclera: Conjunctivae normal.   Cardiovascular:      Rate and Rhythm: Normal rate and regular rhythm.      Heart sounds: No murmur heard.  Pulmonary:      Effort: Pulmonary effort is normal. No respiratory distress.      Breath sounds: Normal breath sounds. No wheezing, rhonchi or rales.   Abdominal:      Palpations: Abdomen is soft.      Tenderness: There is no abdominal tenderness. There is no guarding or rebound.   Musculoskeletal:         General: No swelling.      Cervical back: Neck supple.      Comments: Gangrene left foot.   Skin:     General: Skin is warm and dry.      Capillary Refill: Capillary refill takes less than 2 seconds.   Neurological:      General: No focal deficit present.      Mental Status: She is alert and oriented to person, place, and time.      GCS: GCS eye subscore is 4. GCS verbal subscore is 5. GCS motor subscore is 6.      Cranial Nerves: Cranial nerves 2-12 are intact.      Sensory: Sensation is intact.      Motor: Motor function is intact.      Coordination: Coordination is intact.       Gait: Gait is intact.   Psychiatric:         Mood and Affect: Mood normal.         Results Reviewed       None            No orders to display       Procedures    ED Medication and Procedure Management   Prior to Admission Medications   Prescriptions Last Dose Informant Patient Reported? Taking?   Alcohol Swabs 70 % PADS   No No   Sig: May substitute brand based on insurance coverage. Check glucose TID.   Blood Glucose Monitoring Suppl (OneTouch Verio Reflect) w/Device KIT   No No   Sig: May substitute brand based on insurance coverage. Check glucose TID.   HYDROmorphone (DILAUDID) 2 mg tablet   No No   Sig: Take 3 tablets (6 mg total) by mouth every 4 (four) hours as needed for severe pain for up to 10 days Max Daily Amount: 36 mg   HYDROmorphone (DILAUDID) 4 mg tablet   No No   Sig: Take 1 tablet (4 mg total) by mouth every 4 (four) hours as needed for moderate pain for up to 10 days Max Daily Amount: 24 mg   Insulin Glargine Solostar (Lantus SoloStar) 100 UNIT/ML SOPN   No No   Sig: Inject 0.1 mL (10 Units total) under the skin daily at bedtime   Insulin Pen Needle (BD Pen Needle Ana 2nd Gen) 32G X 4 MM MISC   No No   Sig: For use with insulin pen. Pharmacy may dispense brand covered by insurance.   Insulin Pen Needle (BD Pen Needle Ana 2nd Gen) 32G X 4 MM MISC   No No   Sig: For use with insulin pen. Pharmacy may dispense brand covered by insurance.   OneTouch Delica Lancets 33G MISC   No No   Sig: May substitute brand based on insurance coverage. Check glucose TID.   acetaminophen (TYLENOL) 325 mg tablet   No No   Sig: Take 3 tablets (975 mg total) by mouth every 6 (six) hours   aspirin (ECOTRIN LOW STRENGTH) 81 mg EC tablet   No No   Sig: Take 1 tablet (81 mg total) by mouth daily   atorvastatin (LIPITOR) 80 mg tablet   No No   Sig: Take 1 tablet (80 mg total) by mouth daily with dinner   gabapentin (NEURONTIN) 100 mg capsule   No No   Sig: Take 1 capsule (100 mg total) by mouth 3 (three) times a day    glucose blood (OneTouch Verio) test strip   No No   Sig: May substitute brand based on insurance coverage. Check glucose TID.   insulin aspart (NovoLOG FlexPen) 100 UNIT/ML injection pen   No No   Sig: Inject 5 Units under the skin 3 (three) times a day with meals   methocarbamol (ROBAXIN) 500 mg tablet   No No   Sig: Take 1 tablet (500 mg total) by mouth every 6 (six) hours   senna-docusate sodium (SENOKOT S) 8.6-50 mg per tablet   No No   Sig: Take 1 tablet by mouth daily at bedtime      Facility-Administered Medications: None     Discharge Medication List as of 12/25/2024  5:40 PM        CONTINUE these medications which have NOT CHANGED    Details   acetaminophen (TYLENOL) 325 mg tablet Take 3 tablets (975 mg total) by mouth every 6 (six) hours, Starting Wed 12/25/2024, Normal      Alcohol Swabs 70 % PADS May substitute brand based on insurance coverage. Check glucose TID., Normal      aspirin (ECOTRIN LOW STRENGTH) 81 mg EC tablet Take 1 tablet (81 mg total) by mouth daily, Starting Wed 12/25/2024, Normal      atorvastatin (LIPITOR) 80 mg tablet Take 1 tablet (80 mg total) by mouth daily with dinner, Starting Wed 12/25/2024, Normal      Blood Glucose Monitoring Suppl (OneTouch Verio Reflect) w/Device KIT May substitute brand based on insurance coverage. Check glucose TID., Normal      gabapentin (NEURONTIN) 100 mg capsule Take 1 capsule (100 mg total) by mouth 3 (three) times a day, Starting Wed 12/25/2024, Normal      glucose blood (OneTouch Verio) test strip May substitute brand based on insurance coverage. Check glucose TID., Normal      !! HYDROmorphone (DILAUDID) 2 mg tablet Take 3 tablets (6 mg total) by mouth every 4 (four) hours as needed for severe pain for up to 10 days Max Daily Amount: 36 mg, Starting Wed 12/25/2024, Until Sat 1/4/2025 at 2359, Normal      !! HYDROmorphone (DILAUDID) 4 mg tablet Take 1 tablet (4 mg total) by mouth every 4 (four) hours as needed for moderate pain for up to 10 days  Max Daily Amount: 24 mg, Starting Wed 12/25/2024, Until Sat 1/4/2025 at 2359, Normal      insulin aspart (NovoLOG FlexPen) 100 UNIT/ML injection pen Inject 5 Units under the skin 3 (three) times a day with meals, Starting Wed 12/25/2024, Normal      Insulin Glargine Solostar (Lantus SoloStar) 100 UNIT/ML SOPN Inject 0.1 mL (10 Units total) under the skin daily at bedtime, Starting Wed 12/25/2024, Normal      !! Insulin Pen Needle (BD Pen Needle Ana 2nd Gen) 32G X 4 MM MISC For use with insulin pen. Pharmacy may dispense brand covered by insurance., Normal      !! Insulin Pen Needle (BD Pen Needle Ana 2nd Gen) 32G X 4 MM MISC For use with insulin pen. Pharmacy may dispense brand covered by insurance., Normal      methocarbamol (ROBAXIN) 500 mg tablet Take 1 tablet (500 mg total) by mouth every 6 (six) hours, Starting Wed 12/25/2024, Normal      OneTouch Delica Lancets 33G MISC May substitute brand based on insurance coverage. Check glucose TID., Normal      senna-docusate sodium (SENOKOT S) 8.6-50 mg per tablet Take 1 tablet by mouth daily at bedtime, Starting Wed 12/25/2024, Normal       !! - Potential duplicate medications found. Please discuss with provider.        No discharge procedures on file.  ED SEPSIS DOCUMENTATION   Time reflects when diagnosis was documented in both MDM as applicable and the Disposition within this note       Time User Action Codes Description Comment    12/25/2024  5:39 PM Bunny Dick Add [M79.672] Left foot pain                  Bunny Dick PA-C  12/25/24 0174

## 2024-12-25 NOTE — ASSESSMENT & PLAN NOTE
History of foot injury approximately 10 days ago, has dark discoloration of distal aspect of fourth left toe with pain  She was seen by Podiatry and sent to ER for admission  Podiatry following  Plan for demarcation of stable dry gangrene in outpatient setting  Blood cultures: No growth to date  ESR 83  CRP 13.6  LEADs reviewed  Pain management following  Status post angiogram with IR  No inpatient podiatric intervention required at this time  Pain is now well-controlled  Plan for discharge home on 12/26/2024 with pain regimen

## 2024-12-25 NOTE — DISCHARGE SUMMARY
Discharge Summary - Hospitalist   Name: Lizzy Acuna 49 y.o. female I MRN: [id number]  Unit/Bed#: -[facility] I Date of Admission: 12/13/2024   Date of Service: 12/25/2024 I Hospital Day: 12     Assessment & Plan  Gangrene of toe of left foot (HCC)  History of foot injury approximately 10 days ago, has dark discoloration of distal aspect of fourth left toe with pain  She was seen by Podiatry and sent to ER for admission  Podiatry following  Plan for demarcation of stable dry gangrene in outpatient setting  Blood cultures: No growth to date  ESR 83  CRP 13.6  LEADs reviewed  Pain management following  Status post angiogram with IR  No inpatient podiatric intervention required at this time  Pain is now well-controlled  Plan for discharge home on 12/26/2024 with pain regimen  Cellulitis of left foot  Seen by podiatry and sent to ED for further evaluation  Procalcitonin 0.07->0.07  Received IV cefepime and vancomycin  Now monitoring off antibiotics  Diabetes mellitus type 2 with complications ([facility])  Hemoglobin A1c 10.3 on 12/13/2024   Initiated on Lantus 10 units SQ at bedtime with 5 units of Humalog SQ with meals 3 times daily  Continue sliding scale insulin coverage  Improved glycemic control will be important for wound healing  Endocrinology consultation appreciated  Tobacco abuse  Smokes half pack per day  Continue nicotine patch 14 mg  Smoking cessation recommended  Obesity (BMI 30.0-34.9)  BMI 34  Healthy diet and lifestyle modification recommended  Hyponatremia  Improved     Medical Problems       Resolved Problems  Date Reviewed: 12/13/2024   None       Discharging Physician / Practitioner: Bunny Rosado DO  PCP: Linwood North DO  Admission Date:   Admission Orders (From admission, onward)       Ordered        12/13/24 1934  INPATIENT ADMISSION  Once                          Discharge Date: 12/25/24    Consultations During Hospital Stay:  Podiatry, vascular surgery, endocrinology    Procedures Performed:    Angiogram    Significant Findings / Test Results:   Not applicable    Incidental Findings:   Not applicable    Test Results Pending at Discharge (will require follow up):   Not applicable     Outpatient Tests Requested:  Follow-up with endocrinology and podiatry    Complications: Not applicable    Reason for Admission: Toe pain    Hospital Course:   Lizzy Acuna is a 49 y.o. female with a PMH of gestational diabetes, obesity, depression, tobacco abuse who presents with toe pain and change of color. Patient was seen in ED 12/9 for toe pain, she reported at that time dropping case of soda on left foot about 1 week prior. At that time she noted toe turning black and having difficulty walking on it. ED reviewed w/ podiatry and patient was given boot and scheduled to see podiatry in office. Today she had visit. There she was sent to ED for treatment of left toe gangrene and cellulitis of foot and to see vascular surgery.     Patient notes that she bruised her foot prior to dropping the case of soda on her foot. Pain currently 8-9/10 with stabbing pain. Notes it did resolve after the pain medication in ED but is returning. She has been using the walking boot since it was provided on 12/9. Notes toe to look worse than it did when she was seen in ED. Patient reports takes no medications except over the counter things.    The patient underwent angiogram with IR with intervention.  The patient's pain significantly improved with oral and IV pain regimen as per acute pain service.  Endocrinology was consulted and the patient was initiated on Lantus 10 units daily at bedtime and lispro 5 units 3 times daily with meals.  Podiatry will evaluate the patient in the office for need for for likely amputation.  The patient has remained hemodynamically stable and saturating well on room air throughout her hospital course.  Her pain is now very well-controlled and she states she would like to be discharged from the hospital.  I have  "sent over pain regimen as per acute pain service as well as insulin regimen as per endocrinology.  She was strongly encouraged to follow-up with endocrinology and podiatry in the outpatient setting within 3-5 days.  This serves as a brief discharge summary.  Please see full medical need for more details.    Condition at Discharge: stable    Discharge Day Visit / Exam:   Subjective:  Patient seen and examined at bedside. No acute events overnight. Denies chest pain, SOB, diaphoresis, nausea/vomiting/diarrhea, fevers/chills.     Vitals: Blood Pressure: (!) 174/88 (12/25/24 0726)  Pulse: 72 (12/25/24 0726)  Temperature: 98.8 °F (37.1 °C) (12/25/24 0700)  Temp Source: Oral (12/24/24 1505)  Respirations: 18 (12/25/24 0726)  Height: 5' 1\" (154.9 cm) (12/13/24 2014)  Weight - Scale: 86 kg (189 lb 7.8 oz) (12/13/24 2014)  SpO2: 96 % (12/25/24 0726)    Physical Exam  Vitals and nursing note reviewed.   Constitutional:       General: She is not in acute distress.     Appearance: She is well-developed.   HENT:      Head: Normocephalic and atraumatic.   Eyes:      Conjunctiva/sclera: Conjunctivae normal.   Cardiovascular:      Rate and Rhythm: Normal rate and regular rhythm.      Heart sounds: No murmur heard.  Pulmonary:      Effort: Pulmonary effort is normal. No respiratory distress.      Breath sounds: Normal breath sounds.   Abdominal:      Palpations: Abdomen is soft.      Tenderness: There is no abdominal tenderness.   Musculoskeletal:         General: No swelling.      Cervical back: Neck supple.   Skin:     General: Skin is warm and dry.      Capillary Refill: Capillary refill takes less than 2 seconds.   Neurological:      Mental Status: She is alert.   Psychiatric:         Mood and Affect: Mood normal.       Discussion with Family: Patient declined call to .     Discharge instructions/Information to patient and family:   See after visit summary for information provided to patient and family.  "     Provisions for Follow-Up Care:  See after visit summary for information related to follow-up care and any pertinent home health orders.      Mobility at time of Discharge:   Basic Mobility Inpatient Raw Score: 22  JH-HLM Goal: 7: Walk 25 feet or more  JH-HLM Achieved: 7: Walk 25 feet or more  HLM Goal achieved. Continue to encourage appropriate mobility.     Disposition:   Home    Planned Readmission: Not applicable    Discharge Medications:  See after visit summary for reconciled discharge medications provided to patient and/or family.      Administrative Statements   Discharge Statement:  I have spent a total time of 65 minutes in caring for this patient on the day of the visit/encounter. >30 minutes of time was spent on: Diagnostic results, Prognosis, Risks and benefits of tx options, Instructions for management, Patient and family education, Importance of tx compliance, Risk factor reductions, Impressions, Counseling / Coordination of care, Documenting in the medical record, Reviewing / ordering tests, medicine, procedures  , and Communicating with other healthcare professionals .    **Please Note: This note may have been constructed using a voice recognition system**

## 2024-12-25 NOTE — NURSING NOTE
Pt DC to home via family in stable condition. All belongings packed and sent with pt. AVS reviewed and sent. Wound dressed before DC.

## 2024-12-25 NOTE — ASSESSMENT & PLAN NOTE
BMI 34  Healthy diet and lifestyle modification recommended   [FreeTextEntry3] : I personally saw and examined NICO COTE in detail.  I spoke to SHAE Miller regarding the assessment and plan of care. I performed the procedures and relevant physical exam.  I have reviewed the above assessment and plan of care and I agree.  I have made changes to the body of the note wherever necessary and appropriate.

## 2024-12-26 ENCOUNTER — TELEPHONE (OUTPATIENT)
Age: 49
End: 2024-12-26

## 2024-12-26 DIAGNOSIS — I96 GANGRENE OF TOE OF LEFT FOOT (HCC): ICD-10-CM

## 2024-12-26 RX ORDER — HYDROMORPHONE HYDROCHLORIDE 4 MG/1
4 TABLET ORAL EVERY 4 HOURS PRN
Qty: 30 TABLET | Refills: 0 | Status: SHIPPED | OUTPATIENT
Start: 2024-12-26 | End: 2025-01-05

## 2024-12-26 RX ORDER — HYDROMORPHONE HYDROCHLORIDE 2 MG/1
6 TABLET ORAL EVERY 4 HOURS PRN
Qty: 30 TABLET | Refills: 0 | Status: SHIPPED | OUTPATIENT
Start: 2024-12-26 | End: 2025-01-05

## 2024-12-26 NOTE — TELEPHONE ENCOUNTER
Attempted to secure chat ER Dr but is Offline. Spoke with pharmacist @ [facility] but they are unable to transfer Narcotic RX.  Patient needs RX sent to [facility] in [place] which is now on file. Secure chat sent to hospitalist Bunny Rosado.

## 2024-12-26 NOTE — TELEPHONE ENCOUNTER
Return secure chat from Bnuny Rosado that RX to be sent to pharmacy on file. Robyn CONTEH will contact pharmacy to confirm this has been received and will then contact patient.

## 2024-12-26 NOTE — TELEPHONE ENCOUNTER
Speaking to patient about her medication being sent to [place] pharmacy. Patient is stating she has tried multiple times to reach out to hospital to speak to the doctor she had seen. Patient was seen in [facility]. No one is giving her a call back nor answering her question about transferring her pain medication to a closer pharmacy. Stated to patient I will call her back to speak to her more after talking to Dr. Ritchie about the situation to see what we can do about it.

## 2024-12-26 NOTE — TELEPHONE ENCOUNTER
Caller: Lizzy Acuna    Doctor and/or Office: Dr. Ritchie/[place]    #: [phone]    Escalation: Medication Patient is being seen tomorrow by Dr. Ritchie for a hospital follow up. While she was in the hospital Dr. Rosado prescribed her pain medication. She would like to know how she can transfer that script to her pharmacy that's closer to her home. She would like a return call to answer this. Thank you

## 2024-12-27 ENCOUNTER — PREP FOR PROCEDURE (OUTPATIENT)
Dept: PODIATRY | Facility: CLINIC | Age: 49
End: 2024-12-27

## 2024-12-27 ENCOUNTER — ANESTHESIA EVENT (OUTPATIENT)
Dept: PERIOP | Facility: HOSPITAL | Age: 49
DRG: 305 | End: 2024-12-27
Payer: COMMERCIAL

## 2024-12-27 ENCOUNTER — OFFICE VISIT (OUTPATIENT)
Dept: PODIATRY | Facility: CLINIC | Age: 49
End: 2024-12-27
Payer: COMMERCIAL

## 2024-12-27 VITALS — WEIGHT: 189 LBS | BODY MASS INDEX: 35.68 KG/M2 | HEIGHT: 61 IN

## 2024-12-27 DIAGNOSIS — S90.222D CONTUSION OF LESSER TOE OF LEFT FOOT WITH DAMAGE TO NAIL, SUBSEQUENT ENCOUNTER: Primary | ICD-10-CM

## 2024-12-27 DIAGNOSIS — I96 GANGRENE OF TOE OF LEFT FOOT (HCC): Primary | ICD-10-CM

## 2024-12-27 DIAGNOSIS — E11.8 DIABETES MELLITUS TYPE 2 WITH COMPLICATIONS (HCC): ICD-10-CM

## 2024-12-27 DIAGNOSIS — L03.116 CELLULITIS OF LEFT LOWER EXTREMITY: ICD-10-CM

## 2024-12-27 LAB
DME PARACHUTE DELIVERY DATE REQUESTED: NORMAL
DME PARACHUTE ITEM DESCRIPTION: NORMAL
DME PARACHUTE ORDER STATUS: NORMAL
DME PARACHUTE SUPPLIER NAME: NORMAL
DME PARACHUTE SUPPLIER PHONE: NORMAL

## 2024-12-27 PROCEDURE — 99214 OFFICE O/P EST MOD 30 MIN: CPT | Performed by: PODIATRIST

## 2024-12-27 RX ORDER — CHLORHEXIDINE GLUCONATE ORAL RINSE 1.2 MG/ML
15 SOLUTION DENTAL ONCE
OUTPATIENT
Start: 2024-12-27 | End: 2024-12-27

## 2024-12-27 RX ORDER — CHLORHEXIDINE GLUCONATE 40 MG/ML
SOLUTION TOPICAL DAILY PRN
OUTPATIENT
Start: 2024-12-27

## 2024-12-27 NOTE — PROGRESS NOTES
Name: Lizzy Acuna      : 1975      MRN: [id number]  Encounter Provider: Leopoldo Ritchie DPM  Encounter Date: 2024   Encounter department: [facility] PODIATRY [place]  :  Assessment & Plan  Contusion of lesser toe of left foot with damage to nail, subsequent encounter         Cellulitis of left lower extremity         Diabetes mellitus type 2 with complications (HCC)    Lab Results   Component Value Date    HGBA1C 10.3 (H) 2024            -  this point she has had some worsening tissue loss I think this is has stabilized.  We discussed the options for management of this and her malodor is worsening  - She does have severe vascular disease and has been optimized  - She has stopped all forms of nicotine, she is working on blood sugar control and dietary changes aggressively  - Unfortunately the effects of her uncontrolled diabetes are still going to affect this postsurgical plan  - We discussed toe amputation with limb salvage via wound care and grafting versus escalating directly to a TMA  - With her history of her uncontrolled diabetes and the likelihood for revision and complications considering the above complications and comorbidities I would recommend escalating directly to a transmetatarsal amputation  - We will perform this on an outpatient basis and she will be strict nonweightbearing status post surgical intervention until sutures are removed  - She will eventually need to go back to work and diabetic shoes with custom inserts on the left foot    Patient was counseled and educated on the condition and the diagnosis. The diagnosis, treatment options and prognosis were discussed with the patient.  The patient failed conservative care at this time and wished to proceed with surgical treatment.  Explained surgical details and post-op course.  Discussed all risks and complications related to the patient's condition and surgery.  The benefits of surgery were also discussed.   "The patient understood that the surgery would not guarantee desirable outcome.  All questions and concerns were addressed and the consent was signed.        History of Present Illness   HPI  Lizzy Acuna is a 49 y.o. female who presents for evaluation and management of her left foot.  She states that she has stopped smoking.  She has had difficulty with her pain management.  She notes some worsening smell of the left fourth toe.  She has been changing dressing and keeping covered as directed.  No other pedal complaints      Review of Systems   Constitutional:  Negative for chills and fever.   HENT:  Negative for ear pain and sore throat.    Eyes:  Negative for pain and visual disturbance.   Respiratory:  Negative for cough and shortness of breath.    Cardiovascular:  Negative for chest pain and palpitations.   Gastrointestinal:  Negative for abdominal pain and vomiting.   Genitourinary:  Negative for dysuria and hematuria.   Musculoskeletal:  Negative for arthralgias and back pain.   Skin:  Negative for color change and rash.   Neurological:  Negative for seizures and syncope.   All other systems reviewed and are negative.         Objective   Ht 5' 1\" (1.549 m)   Wt 85.7 kg (189 lb)   BMI 35.71 kg/m²      Physical Exam  Musculoskeletal:      Comments: There is worsening gangrenous changes, the mottling of her left fifth toe has improved.  There is some capillary refill there on the left toenail however the gangrenous changes are encompassing the entirety of the left fourth toe with some extension onto the dorsal and lateral aspect of the left third toe.  There is soupy tissue interdigitally in the left third interspace with some component of malodor.  Minimal erythema blanchable and decreases with leg raise           "

## 2024-12-30 DIAGNOSIS — I96 GANGRENE OF TOE OF LEFT FOOT (HCC): ICD-10-CM

## 2024-12-30 RX ORDER — IBUPROFEN 600 MG/1
TABLET, FILM COATED ORAL EVERY 6 HOURS PRN
COMMUNITY

## 2024-12-30 RX ORDER — DIPHENHYDRAMINE HCL 25 MG
25 CAPSULE ORAL EVERY 6 HOURS PRN
COMMUNITY

## 2024-12-30 RX ORDER — HYDROMORPHONE HYDROCHLORIDE 2 MG/1
6 TABLET ORAL EVERY 4 HOURS PRN
Qty: 30 TABLET | Refills: 0 | Status: CANCELLED | OUTPATIENT
Start: 2024-12-30 | End: 2025-01-09

## 2024-12-30 RX ORDER — HYDROMORPHONE HYDROCHLORIDE 4 MG/1
4 TABLET ORAL EVERY 4 HOURS PRN
Qty: 30 TABLET | Refills: 0 | Status: SHIPPED | OUTPATIENT
Start: 2024-12-30 | End: 2025-01-09

## 2024-12-30 NOTE — TELEPHONE ENCOUNTER
Medication was given in the hospital.     Reason for call:   [x] Refill   [] Prior Auth  [] Other:     Office:   [] PCP/Provider -   [x] Specialty/Provider - Podiatry    Medication:   - Hydromorphone 2mg- take 3 tablets by mouth every 4 hours as needed  - Hydromorphone 4mg- take 1 tablet by mouth every 4 hours as needed      Pharmacy: [facility] [place] [facility]    Does the patient have enough for 3 days?   [] Yes   [x] No - Send as HP to POD

## 2024-12-30 NOTE — PRE-PROCEDURE INSTRUCTIONS
Pre-Surgery Instructions:   Medication Instructions    acetaminophen (TYLENOL) 325 mg tablet Uses PRN- OK to take day of surgery    aspirin (ECOTRIN LOW STRENGTH) 81 mg EC tablet Take day of surgery.    atorvastatin (LIPITOR) 80 mg tablet Take night before surgery    diphenhydrAMINE (BENADRYL) 25 mg capsule Uses PRN- DO NOT take day of surgery    gabapentin (NEURONTIN) 100 mg capsule Take day of surgery.    HYDROmorphone (DILAUDID) 2 mg tablet Uses PRN- OK to take day of surgery    HYDROmorphone (DILAUDID) 4 mg tablet Uses PRN- OK to take day of surgery    ibuprofen (MOTRIN) 600 mg tablet Stop taking 3 days prior to surgery.    insulin aspart (NovoLOG FlexPen) 100 UNIT/ML injection pen Hold day of surgery.    Insulin Glargine Solostar (Lantus SoloStar) 100 UNIT/ML SOPN Take night before surgery    methocarbamol (ROBAXIN) 500 mg tablet Uses PRN- OK to take day of surgery    naloxone (NARCAN) 4 mg/0.1 mL nasal spray Uses PRN- OK to take day of surgery    Medication instructions for day surgery reviewed. Please use only a sip of water to take your instructed medications. Avoid all over the counter vitamins, supplements and NSAIDS for one week prior to surgery per anesthesia guidelines. Tylenol is ok to take as needed.     You will receive a call one business day prior to surgery with an arrival time and hospital directions. If your surgery is scheduled on a Monday, the hospital will be calling you on the Friday prior to your surgery. If you have not heard from anyone by 8pm, please call the hospital supervisor through the hospital  at [phone]. ([place] [phone] or [place] [phone]).    Do not eat or drink anything after midnight the night before your surgery, including candy, mints, lifesavers, or chewing gum. Do not drink alcohol 24hrs before your surgery. Try not to smoke at least 24hrs before your surgery.       Follow the pre surgery showering instructions as listed in the “My Surgical  Experience Booklet” or otherwise provided by your surgeon's office. Do not use a blade to shave the surgical area 1 week before surgery. It is okay to use a clean electric clippers up to 24 hours before surgery. Do not apply any lotions, creams, including makeup, cologne, deodorant, or perfumes after showering on the day of your surgery. Do not use dry shampoo, hair spray, hair gel, or any type of hair products.     No contact lenses, eye make-up, or artificial eyelashes. Remove nail polish, including gel polish, and any artificial, gel, or acrylic nails if possible. Remove all jewelry including rings and body piercing jewelry.     Wear causal clothing that is easy to take on and off. Consider your type of surgery.    Keep any valuables, jewelry, piercings at home. Please bring any specially ordered equipment (sling, braces) if indicated.    Arrange for a responsible person to drive you to and from the hospital on the day of your surgery. Please confirm the visitor policy for the day of your procedure when you receive your phone call with an arrival time.     Call the surgeon's office with any new illnesses, exposures, or additional questions prior to surgery.    Please reference your “My Surgical Experience Booklet” for additional information to prepare for your upcoming surgery.

## 2024-12-30 NOTE — TELEPHONE ENCOUNTER
Medication:  PDMP  12/26/2024 12/26/2024 HYDROmorphone HCL (Tablet) 30.0 5 2 MG 48.0 [facility] Private Pay 0 / 0 [place]   1 [id number] 12/26/2024 12/26/2024 HYDROmorphone HCL (Tablet) 30.0 5 4 MG 96.0 [facility] Private Pay 0 / 0  Active agreement on file -

## 2025-01-03 ENCOUNTER — ANESTHESIA (OUTPATIENT)
Dept: PERIOP | Facility: HOSPITAL | Age: 50
DRG: 305 | End: 2025-01-03
Payer: COMMERCIAL

## 2025-01-03 ENCOUNTER — HOSPITAL ENCOUNTER (INPATIENT)
Facility: HOSPITAL | Age: 50
LOS: 1 days | Discharge: HOME | DRG: 305 | End: 2025-01-03
Attending: PODIATRIST | Admitting: PODIATRIST
Payer: COMMERCIAL

## 2025-01-03 ENCOUNTER — APPOINTMENT (INPATIENT)
Dept: RADIOLOGY | Facility: HOSPITAL | Age: 50
DRG: 305 | End: 2025-01-03
Payer: COMMERCIAL

## 2025-01-03 VITALS
BODY MASS INDEX: 34.93 KG/M2 | HEART RATE: 90 BPM | HEIGHT: 61 IN | SYSTOLIC BLOOD PRESSURE: 139 MMHG | WEIGHT: 185 LBS | TEMPERATURE: 98.5 F | DIASTOLIC BLOOD PRESSURE: 64 MMHG | OXYGEN SATURATION: 92 % | RESPIRATION RATE: 20 BRPM

## 2025-01-03 DIAGNOSIS — L03.116 CELLULITIS OF LEFT FOOT: Primary | ICD-10-CM

## 2025-01-03 DIAGNOSIS — I96 GANGRENE OF TOE OF LEFT FOOT (HCC): ICD-10-CM

## 2025-01-03 LAB
GLUCOSE SERPL-MCNC: 216 MG/DL (ref 65–140)
GLUCOSE SERPL-MCNC: 219 MG/DL (ref 65–140)

## 2025-01-03 PROCEDURE — 88307 TISSUE EXAM BY PATHOLOGIST: CPT | Performed by: PATHOLOGY

## 2025-01-03 PROCEDURE — 99024 POSTOP FOLLOW-UP VISIT: CPT | Performed by: PODIATRIST

## 2025-01-03 PROCEDURE — 73630 X-RAY EXAM OF FOOT: CPT

## 2025-01-03 PROCEDURE — 28805 AMPUTATION THRU METATARSAL: CPT | Performed by: PODIATRIST

## 2025-01-03 PROCEDURE — 82948 REAGENT STRIP/BLOOD GLUCOSE: CPT

## 2025-01-03 PROCEDURE — 0Y6N0Z0 DETACHMENT AT LEFT FOOT, COMPLETE, OPEN APPROACH: ICD-10-PCS | Performed by: PODIATRIST

## 2025-01-03 PROCEDURE — 88311 DECALCIFY TISSUE: CPT | Performed by: PATHOLOGY

## 2025-01-03 RX ORDER — MIDAZOLAM HYDROCHLORIDE 2 MG/2ML
INJECTION, SOLUTION INTRAMUSCULAR; INTRAVENOUS AS NEEDED
Status: DISCONTINUED | OUTPATIENT
Start: 2025-01-03 | End: 2025-01-03

## 2025-01-03 RX ORDER — HYDROMORPHONE HCL/PF 1 MG/ML
0.2 SYRINGE (ML) INJECTION
Status: DISCONTINUED | OUTPATIENT
Start: 2025-01-03 | End: 2025-01-03 | Stop reason: HOSPADM

## 2025-01-03 RX ORDER — SODIUM CHLORIDE, SODIUM LACTATE, POTASSIUM CHLORIDE, CALCIUM CHLORIDE 600; 310; 30; 20 MG/100ML; MG/100ML; MG/100ML; MG/100ML
INJECTION, SOLUTION INTRAVENOUS CONTINUOUS PRN
Status: DISCONTINUED | OUTPATIENT
Start: 2025-01-03 | End: 2025-01-03

## 2025-01-03 RX ORDER — ONDANSETRON 2 MG/ML
INJECTION INTRAMUSCULAR; INTRAVENOUS AS NEEDED
Status: DISCONTINUED | OUTPATIENT
Start: 2025-01-03 | End: 2025-01-03

## 2025-01-03 RX ORDER — KETOROLAC TROMETHAMINE 30 MG/ML
15 INJECTION, SOLUTION INTRAMUSCULAR; INTRAVENOUS ONCE
Status: COMPLETED | OUTPATIENT
Start: 2025-01-03 | End: 2025-01-03

## 2025-01-03 RX ORDER — FENTANYL CITRATE 50 UG/ML
INJECTION, SOLUTION INTRAMUSCULAR; INTRAVENOUS AS NEEDED
Status: DISCONTINUED | OUTPATIENT
Start: 2025-01-03 | End: 2025-01-03

## 2025-01-03 RX ORDER — ONDANSETRON 2 MG/ML
4 INJECTION INTRAMUSCULAR; INTRAVENOUS ONCE AS NEEDED
Status: DISCONTINUED | OUTPATIENT
Start: 2025-01-03 | End: 2025-01-03 | Stop reason: HOSPADM

## 2025-01-03 RX ORDER — GABAPENTIN 100 MG/1
100 CAPSULE ORAL ONCE
Status: COMPLETED | OUTPATIENT
Start: 2025-01-03 | End: 2025-01-03

## 2025-01-03 RX ORDER — SODIUM CHLORIDE, SODIUM LACTATE, POTASSIUM CHLORIDE, CALCIUM CHLORIDE 600; 310; 30; 20 MG/100ML; MG/100ML; MG/100ML; MG/100ML
100 INJECTION, SOLUTION INTRAVENOUS CONTINUOUS
Status: ACTIVE | OUTPATIENT
Start: 2025-01-03 | End: 2025-01-03

## 2025-01-03 RX ORDER — BUPIVACAINE HYDROCHLORIDE 5 MG/ML
INJECTION, SOLUTION EPIDURAL; INTRACAUDAL
Status: COMPLETED | OUTPATIENT
Start: 2025-01-03 | End: 2025-01-03

## 2025-01-03 RX ORDER — FENTANYL CITRATE/PF 50 MCG/ML
25 SYRINGE (ML) INJECTION
Status: DISCONTINUED | OUTPATIENT
Start: 2025-01-03 | End: 2025-01-03 | Stop reason: HOSPADM

## 2025-01-03 RX ORDER — DEXAMETHASONE SODIUM PHOSPHATE 10 MG/ML
INJECTION, SOLUTION INTRAMUSCULAR; INTRAVENOUS AS NEEDED
Status: DISCONTINUED | OUTPATIENT
Start: 2025-01-03 | End: 2025-01-03

## 2025-01-03 RX ORDER — MAGNESIUM HYDROXIDE 1200 MG/15ML
LIQUID ORAL AS NEEDED
Status: DISCONTINUED | OUTPATIENT
Start: 2025-01-03 | End: 2025-01-03 | Stop reason: HOSPADM

## 2025-01-03 RX ORDER — PROPOFOL 10 MG/ML
INJECTION, EMULSION INTRAVENOUS AS NEEDED
Status: DISCONTINUED | OUTPATIENT
Start: 2025-01-03 | End: 2025-01-03

## 2025-01-03 RX ORDER — HYDROMORPHONE HCL/PF 1 MG/ML
0.5 SYRINGE (ML) INJECTION
Status: DISCONTINUED | OUTPATIENT
Start: 2025-01-03 | End: 2025-01-03 | Stop reason: HOSPADM

## 2025-01-03 RX ORDER — CHLORHEXIDINE GLUCONATE ORAL RINSE 1.2 MG/ML
15 SOLUTION DENTAL ONCE
Status: COMPLETED | OUTPATIENT
Start: 2025-01-03 | End: 2025-01-03

## 2025-01-03 RX ORDER — CHLORHEXIDINE GLUCONATE 40 MG/ML
SOLUTION TOPICAL DAILY PRN
Status: DISCONTINUED | OUTPATIENT
Start: 2025-01-03 | End: 2025-01-03 | Stop reason: HOSPADM

## 2025-01-03 RX ORDER — ACETAMINOPHEN 10 MG/ML
1000 INJECTION, SOLUTION INTRAVENOUS ONCE
Status: COMPLETED | OUTPATIENT
Start: 2025-01-03 | End: 2025-01-03

## 2025-01-03 RX ORDER — OXYCODONE HYDROCHLORIDE 5 MG/1
5 TABLET ORAL EVERY 4 HOURS PRN
Status: CANCELLED | OUTPATIENT
Start: 2025-01-03

## 2025-01-03 RX ORDER — CEFAZOLIN SODIUM 2 G/50ML
2000 SOLUTION INTRAVENOUS ONCE
Status: COMPLETED | OUTPATIENT
Start: 2025-01-03 | End: 2025-01-03

## 2025-01-03 RX ADMIN — ACETAMINOPHEN 1000 MG: 1000 INJECTION, SOLUTION INTRAVENOUS at 13:31

## 2025-01-03 RX ADMIN — ONDANSETRON 4 MG: 2 INJECTION INTRAMUSCULAR; INTRAVENOUS at 12:34

## 2025-01-03 RX ADMIN — KETOROLAC TROMETHAMINE 15 MG: 30 INJECTION, SOLUTION INTRAMUSCULAR at 13:37

## 2025-01-03 RX ADMIN — BUPIVACAINE HYDROCHLORIDE 5 ML: 5 INJECTION, SOLUTION EPIDURAL; INTRACAUDAL at 11:56

## 2025-01-03 RX ADMIN — GABAPENTIN 100 MG: 100 CAPSULE ORAL at 14:24

## 2025-01-03 RX ADMIN — FENTANYL CITRATE 50 MCG: 50 INJECTION, SOLUTION INTRAMUSCULAR; INTRAVENOUS at 12:18

## 2025-01-03 RX ADMIN — FENTANYL CITRATE 25 MCG: 50 INJECTION INTRAMUSCULAR; INTRAVENOUS at 13:17

## 2025-01-03 RX ADMIN — DEXAMETHASONE SODIUM PHOSPHATE 5 MG: 10 INJECTION, SOLUTION INTRAMUSCULAR; INTRAVENOUS at 12:20

## 2025-01-03 RX ADMIN — CEFAZOLIN SODIUM 2000 MG: 2 SOLUTION INTRAVENOUS at 12:05

## 2025-01-03 RX ADMIN — BUPIVACAINE HYDROCHLORIDE 5 ML: 5 INJECTION, SOLUTION EPIDURAL; INTRACAUDAL; PERINEURAL at 11:52

## 2025-01-03 RX ADMIN — SODIUM CHLORIDE, SODIUM LACTATE, POTASSIUM CHLORIDE, AND CALCIUM CHLORIDE: .6; .31; .03; .02 INJECTION, SOLUTION INTRAVENOUS at 11:23

## 2025-01-03 RX ADMIN — PROPOFOL 170 MG: 10 INJECTION, EMULSION INTRAVENOUS at 12:09

## 2025-01-03 RX ADMIN — FENTANYL CITRATE 25 MCG: 50 INJECTION INTRAMUSCULAR; INTRAVENOUS at 13:23

## 2025-01-03 RX ADMIN — FENTANYL CITRATE 50 MCG: 50 INJECTION, SOLUTION INTRAMUSCULAR; INTRAVENOUS at 12:09

## 2025-01-03 RX ADMIN — FENTANYL CITRATE 25 MCG: 50 INJECTION INTRAMUSCULAR; INTRAVENOUS at 13:10

## 2025-01-03 RX ADMIN — PROPOFOL 70 MCG/KG/MIN: 10 INJECTION, EMULSION INTRAVENOUS at 12:10

## 2025-01-03 RX ADMIN — BUPIVACAINE 15 ML: 13.3 INJECTION, SUSPENSION, LIPOSOMAL INFILTRATION at 11:52

## 2025-01-03 RX ADMIN — HYDROMORPHONE HYDROCHLORIDE 0.5 MG: 1 INJECTION, SOLUTION INTRAMUSCULAR; INTRAVENOUS; SUBCUTANEOUS at 13:28

## 2025-01-03 RX ADMIN — HYDROMORPHONE HYDROCHLORIDE 0.5 MG: 1 INJECTION, SOLUTION INTRAMUSCULAR; INTRAVENOUS; SUBCUTANEOUS at 13:40

## 2025-01-03 RX ADMIN — MIDAZOLAM 2 MG: 1 INJECTION INTRAMUSCULAR; INTRAVENOUS at 11:50

## 2025-01-03 RX ADMIN — CHLORHEXIDINE GLUCONATE 15 ML: 1.2 SOLUTION ORAL at 11:17

## 2025-01-03 NOTE — DISCHARGE INSTR - AVS FIRST PAGE
[facility]  Dr. Ritchie  Post-Operative Instructions    1. Take your prescribed medication as needed. You can take ibuprofen in between doses of the narcotic if needed. You have also been prescribed an antibiotic, keflex, to your pharmacy. Take as prescribed  2. Upon arrival at home, lie down and elevate your surgical foot on 2 pillows.  3. Remain quiet, off your feet as much as possible, for the first 24-48 hours. This is when your feet first swell and may become painful. After 48 hours you may begin limited walking following these restrictions: non weight bearing to your left foot     4. Drink large quantities of water. Consume no alcohol. Continue a well-balanced diet.  5. Report any unusual discomfort or fever to this office.  6. A limited amount of discomfort and swelling is to be expected. In some cases the skin may take on a bruised appearance. The surgical solution that was applied to your foot prior to the operation is dark in color and the operation site may appear to be oozing when it actually is not.  7. A slight amount of blood is to be expected, and is no cause for alarm. Do not remove the dressings. If there is active bleeding and if the bleeding persists, add additional gauze to the bandage, apply direct pressure, elevate your feet and call this office.  8. Do not get the dressings wet. As regular bathing may be inconvenient, sponge baths are recommended. If you shower, make sure the dressing stays dry.   9. When anesthesia wears off and if any discomfort should be present, apply an ice pack directly over the operated area for 15 minute intervals for several hours or until the pain leaves. (USE IN EXCESS OF 15 MINUTES COULD CAUSE FROSTBITE). Do not use hot water bags or electric pads. A convenient icepack can be made by placing ice cubes in a plastic bag and covering this with a towel.  10. If necessary, take a mild laxative before retiring.  11. Wear your special open shoes anytime you put  weight on your foot, even if it is just to walk to the bathroom and back. It will probably be 2 or 3 weeks before you will be permitted to try regular shoes.  12. Having performed the operation, we are interested in a prompt recovery. Please cooperate by following the above instructions.  13. Please call to confirm your post-op appointment or call with any other questions.

## 2025-01-03 NOTE — UTILIZATION REVIEW
Initial Clinical Review    Elective Inpatient  surgical procedure   Case# [id number], Inpatient authorization approved     Age/Sex: 49 y.o. female  Surgery Date: 1/3/2025  Procedure: Left - AMPUTATION TRANSMETATARSAL (TMA)   Anesthesia:  conscious sedation, left popliteal and adductor canal nerve blocks  at surgeon's request and post-op pain management      Operative Indications:  Gangrene of toe of left foot (HCC) [I96]    Operative Findings:  1) consistent with above       Admission Orders: Date/Time/Statement:   Admission Orders (From admission, onward)       Ordered        01/03/25 1111  INPATIENT ADMISSION  Once                          Orders Placed This Encounter   Procedures    INPATIENT ADMISSION     Standing Status:   Standing     Number of Occurrences:   1     Level of Care:   Med Surg [16]     Estimated length of stay:   Inpatient Only Surgery     Diet: Shakir/CHO controlled level 2  Mobility: as tolerated. Pt to stand, progress to ambulation with assistance as needed. NWB left lower extremity    DVT Prophylaxis:SCD's bilateral    Medications/Pain Control:   Scheduled Medications:  bupivacaine liposomal, 20 mL, Infiltration, Once  gabapentin, 100 mg, Oral, Once      Continuous IV Infusions:  lactated ringers, 100 mL/hr, Intravenous, Continuous      PRN Meds:  chlorhexidine gluconate, , Topical, Daily PRN  fentaNYL, 25 mcg, Intravenous, Q5 Min PRN  HYDROmorphone, 0.2 mg, Intravenous, Q5 Min PRN  HYDROmorphone, 0.5 mg, Intravenous, Q10 Min PRN  ondansetron, 4 mg, Intravenous, Once PRN      Vital Signs (last 3 days)       Date/Time Temp Pulse Resp BP MAP (mmHg) SpO2 O2 Flow Rate (L/min) O2 Device Patient Position - Orthostatic VS Pain    01/03/25 1405 -- 82 15 170/60 -- 97 % -- None (Room air) Lying 6    01/03/25 1400 -- 82 20 169/81 118 97 % -- None (Room air) Lying --    01/03/25 1355 -- 84 32 188/89 125 99 % -- None (Room air) Lying --    01/03/25 1350 -- 89 21 173/81 117 100 % -- None (Room air) Lying --     01/03/25 1345 -- 86 20 158/78 111 97 % -- None (Room air) Lying --    01/03/25 1340 -- 90 20 156/76 109 96 % -- None (Room air) Lying 7    01/03/25 1337 -- -- -- -- -- -- -- -- -- 7    01/03/25 1335 -- 91 18 169/83 119 97 % -- None (Room air) Lying --    01/03/25 1330 -- 93 26 100/65 78 97 % -- None (Room air) Lying --    01/03/25 1328 -- -- -- -- -- -- -- -- -- 9    01/03/25 1325 -- 95 33 169/79 113 98 % -- None (Room air) Lying --    01/03/25 1323 -- 95 33 -- -- -- -- -- -- 8    01/03/25 1320 -- 97 32 201/89 128 97 % -- None (Room air) Lying --    01/03/25 1317 -- -- -- -- -- 96 % -- -- -- 8    01/03/25 1315 -- 91 19 172/84 121 97 % -- None (Room air) Lying --    01/03/25 1310 -- 91 16 141/68 97 100 % 6 L/min Simple mask Lying 8    01/03/25 1305 -- 89 14 137/69 97 100 % 6 L/min Simple mask Lying No Pain    01/03/25 1302 98.5 °F (36.9 °C) 87 18 136/66 95 100 % 6 L/min Simple mask Lying --    01/03/25 1116 97.1 °F (36.2 °C) 90 20 157/67 -- 99 % -- None (Room air) -- 6          Weight (last 2 days)       Date/Time Weight    01/03/25 1116 83.9 (185)            Pertinent Labs/Diagnostic Test Results:   Radiology:  XR foot 3+ vw left    (Results Pending)             Results from last 7 days   Lab Units 01/03/25  1312 01/03/25  1132   POC GLUCOSE mg/dl 216* 219*     Network Utilization Review Department  ATTENTION: Please call with any questions or concerns to [phone] and carefully listen to the prompts so that you are directed to the right person. All voicemails are confidential.   For Discharge needs, contact Care Management DC Support Team at [phone] opt. [phone]  Send all requests for admission clinical reviews, approved or denied determinations and any other requests to dedicated fax number below belonging to the campus where the patient is receiving treatment. List of dedicated fax numbers for the Facilities:  FACILITY NAME UR FAX NUMBER   ADMISSION DENIALS (Administrative/Medical Necessity) [phone]    DISCHARGE SUPPORT TEAM (NETWORK) [phone]   PARENT CHILD HEALTH (Maternity/NICU/Pediatrics) [phone]   [facility] [phone]   [facility] [phone]   [facility] [phone]   [facility] [phone]   [facility] [phone]   [facility] [phone]   [facility] [phone]   [facility] [phone]   [facility] [phone]   [facility] [phone]   [facility] [phone]   [facility] [phone]

## 2025-01-03 NOTE — ANESTHESIA PROCEDURE NOTES
Peripheral Block    Patient location during procedure: holding area  Start time: 1/3/2025 11:56 AM  Reason for block: at surgeon's request and post-op pain management  Staffing  Performed by: Tim Post MD  Authorized by: Tim Post MD    Preanesthetic Checklist  Completed: patient identified, IV checked, site marked, risks and benefits discussed, surgical consent, monitors and equipment checked, pre-op evaluation and timeout performed  Peripheral Block  Prep: ChloraPrep  Patient monitoring: frequent blood pressure checks, continuous pulse oximetry and heart rate  Block type: Adductor Canal  Laterality: left  Injection technique: single-shot  Procedures: ultrasound guided, Ultrasound guidance required for the procedure to increase accuracy and safety of medication placement and decrease risk of complications.  Ultrasound permanent image saved  bupivacaine liposomal (EXPAREL) 1.3 % injection 20 mL - Perineural   15 mL - 1/3/2025 11:52:00 AM  bupivacaine (PF) (MARCAINE) 0.5 % injection 20 mL - Perineural   5 mL - 1/3/2025 11:52:00 AM  Needle  Needle type: Stimuplex   Needle gauge: 20 G  Needle length: 6 in  Needle localization: anatomical landmarks and ultrasound guidance  Assessment  Injection assessment: incremental injection, frequent aspiration, injected with ease, negative aspiration, negative for heart rate change, no paresthesia on injection, no symptoms of intraneural/intravenous injection and needle tip visualized at all times  Paresthesia pain: none  Post-procedure:  site cleaned  patient tolerated the procedure well with no immediate complications

## 2025-01-03 NOTE — DISCHARGE SUMMARY
Discharge Summary Outpatient Procedure Podiatry -   Lizzy Acuna 49 y.o. female MRN: [id number]  Unit/Bed#: OR POOL Encounter: [id number]    Admission Date: 1/3/2025     Admitting Diagnosis: Gangrene of toe of left foot (HCC) [I96]    Discharge Diagnosis: same    Procedures Performed: AMPUTATION TRANSMETATARSAL (TMA): 21291 (CPT®)    Complications: none    Condition at Discharge: stable    Discharge instructions/Information to patient and family:   See after visit summary for information provided to patient and family.      Provisions for Follow-Up Care/Important appointments:  See after visit summary for information related to follow-up care and any pertinent home health orders.      Discharge Medications:  See after visit summary for reconciled discharge medications provided to patient and family.

## 2025-01-03 NOTE — ANESTHESIA PROCEDURE NOTES
Peripheral Block    Patient location during procedure: holding area  Start time: 1/3/2025 11:56 AM  Reason for block: at surgeon's request and post-op pain management  Staffing  Performed by: Tim Post MD  Authorized by: Tim Post MD    Preanesthetic Checklist  Completed: patient identified, IV checked, site marked, risks and benefits discussed, surgical consent, monitors and equipment checked, pre-op evaluation and timeout performed  Peripheral Block  Prep: ChloraPrep  Patient monitoring: frequent blood pressure checks, continuous pulse oximetry and heart rate  Block type: Popliteal  Laterality: left  Injection technique: single-shot  Procedures: ultrasound guided, Ultrasound guidance required for the procedure to increase accuracy and safety of medication placement and decrease risk of complications.  Ultrasound permanent image saved  bupivacaine liposomal (EXPAREL) 1.3 % injection 20 mL - Perineural   5 mL - 1/3/2025 11:56:00 AM  bupivacaine (PF) (MARCAINE) 0.5 % injection 20 mL - Perineural   5 mL - 1/3/2025 11:56:00 AM  Needle  Needle type: Stimuplex   Needle gauge: 20 G  Needle length: 6 in  Needle localization: anatomical landmarks and ultrasound guidance  Assessment  Injection assessment: incremental injection, frequent aspiration, injected with ease, negative aspiration, negative for heart rate change, no paresthesia on injection, no symptoms of intraneural/intravenous injection and needle tip visualized at all times  Paresthesia pain: none  Post-procedure:  site cleaned  patient tolerated the procedure well with no immediate complications

## 2025-01-03 NOTE — ANESTHESIA POSTPROCEDURE EVALUATION
Post-Op Assessment Note    CV Status:  Stable  Pain Score: 4    Pain management: adequate       Mental Status:  Arousable   Hydration Status:  Stable   PONV Controlled:  None   Airway Patency:  Patent     Post Op Vitals Reviewed: Yes    No anethesia notable event occurred.    Staff: CRNA       Last Filed PACU Vitals:  Vitals Value Taken Time   Temp 98.5    Pulse 89 01/03/25 1303   /66 01/03/25 1303   Resp 20    SpO2 100 % 01/03/25 1303   Vitals shown include unfiled device data.

## 2025-01-03 NOTE — OP NOTE
OPERATIVE REPORT  PATIENT NAME: Lizzy Acuna    :  1975  MRN: [id number]  Pt Location: [facility] OR ROOM 03    SURGERY DATE: 1/3/2025    Surgeons and Role:     * Leopoldo Ritchie DPM - Primary     * Vamsi Urias DPM - Assisting    Preop Diagnosis:  Gangrene of toe of left foot (HCC) [I96]    Post-Op Diagnosis Codes:     * Gangrene of toe of left foot (HCC) [I96]    Procedure(s):  Left - AMPUTATION TRANSMETATARSAL (TMA)    Specimen(s):  ID Type Source Tests Collected by Time Destination   1 : Forefoot Tissue Foot, Left TISSUE EXAM Leopoldo Ritchie DPM 1/3/2025 1238        Estimated Blood Loss:   25 mL    Drains:  * No LDAs found *    Anesthesia Type:   Conscious Sedation     Operative Indications:  Gangrene of toe of left foot (HCC) [I96]      Operative Findings:  1) consistent with above       Complications:   None    Procedure and Technique:  Patient brought back to the operating room under light sedation and remained on the stretcher for the entirety of the procedure.  After anesthesia was administered a tourniquet was placed on the [facility] left lower extremity with ample Webril padding.  Left lower extremity was then prepped and draped in the normal sterile manner.  A preincision timeout was complete with all parties in agreement.  The left lower extremity was exsanguinated and the tourniquet was inflated to 250mmhg.     A fishmouth type incision was drawn out over the forefoot. Utilizing a #15 blade a full thickness incision was made down to bone. The forefoot was then sharply dissected out to isolate the metatarsal shafts. Utilizing a key elevator soft tissues were freed from each of the metatarsals. A sagittal saw was then used to make the transmetatarsal amputation osteotomies. Each metatarsal was cut in a dorsal distal to plantar proximal fashion with care taken to maintain the metatarsal parabola. The entire forefoot was then removed from the table and passed off.  The  tourniquet was then deflated and any pulsatile bleeding vessels were cauterized.    Any residual prominences were removed utilizing a rongeur. The remaining wound bed was then inspected. No remaining purulent sinus tracts were visualized. Any necrotic or nonviable soft tissues were sharply excised from the wound utilizing metzenbaum scissors. Any residual exposed tendons were excised proximally to prevent any infection from tracking proximally. Bones proximal to the amputation site was noted to be of hard viable quality. The remaining surgical wound was red granular with adequate bleeding noted.    The wound was then flushed with copious amounts of normal sterile saline.     Skin closure was achieved utilizing 2-0 nylon in a vertical mattress and simple suture type fashion.  The foot was then cleansed and dried.  A postoperative dressing consisting of Adaptic DSD and Ace wrap was applied to the left foot.  Patient tolerated procedure well with no immediate complications.     Dr. Ritchie was present for the entire procedure.    Patient Disposition:  PACU              SIGNATURE: Vamsi Urias DPM  DATE: January 3, 2025  TIME: 1:05 PM

## 2025-01-03 NOTE — ANESTHESIA PREPROCEDURE EVALUATION
"Procedure:  AMPUTATION TRANSMETATARSAL (TMA) (Left: Foot)    Relevant Problems   ANESTHESIA (within normal limits)      CARDIO (within normal limits)   (-) MI (myocardial infarction) (HCC)      ENDO   (+) Diabetes mellitus type 2 with complications (HCC)      GI/HEPATIC  NPO confirmed  BMI 35   (+) GERD (gastroesophageal reflux disease)      /RENAL (within normal limits)      HEMATOLOGY (within normal limits)      MUSCULOSKELETAL   (+) Chronic low back pain   (+) Chronic sciatica      NEURO/PSYCH   (+) Chronic low back pain   (+) Chronic sciatica   (+) Chronic, continuous use of opioids   (-) CVA (cerebral vascular accident) (HCC)   (-) Seizures (HCC)      PULMONARY (within normal limits)   (-) Asthma   (-) Sleep apnea   (-) URI (upper respiratory infection)      Orthopedic/Musculoskeletal   (+) Cellulitis of left foot      Allergies   Allergen Reactions    Codeine Other (See Comments)     Aggression-and nasty--\"took a cough syrup with codeine as a child\"     Social History     Tobacco Use    Smoking status: Former     Current packs/day: 0.50     Types: Cigarettes    Smokeless tobacco: Never    Tobacco comments:     Per pt last cigarette 12/13/24--quit cold turkey   Vaping Use    Vaping status: Never Used   Substance Use Topics    Alcohol use: Yes     Comment: 1-2 drinks a year    Drug use: Not Currently     Current Outpatient Medications   Medication Instructions    acetaminophen (TYLENOL) 975 mg, Oral, Every 6 hours scheduled    Alcohol Swabs 70 % PADS May substitute brand based on insurance coverage. Check glucose TID.    aspirin (ECOTRIN LOW STRENGTH) 81 mg, Oral, Daily    atorvastatin (LIPITOR) 80 mg, Oral, Daily with dinner    Blood Glucose Monitoring Suppl (OneTouch Verio Reflect) w/Device KIT May substitute brand based on insurance coverage. Check glucose TID.    diphenhydrAMINE (BENADRYL) 25 mg, Every 6 hours PRN    gabapentin (NEURONTIN) 100 mg, Oral, 3 times daily    glucose blood (OneTouch Verio) test " strip May substitute brand based on insurance coverage. Check glucose TID.    HYDROmorphone (DILAUDID) 6 mg, Oral, Every 4 hours PRN    HYDROmorphone (DILAUDID) 4 mg, Oral, Every 4 hours PRN    ibuprofen (MOTRIN) 600 mg tablet Every 6 hours PRN    insulin aspart (NOVOLOG FLEXPEN) 5 Units, Subcutaneous, 3 times daily with meals    Insulin Glargine Solostar (LANTUS SOLOSTAR) 10 Units, Subcutaneous, Daily at bedtime    Insulin Pen Needle (BD Pen Needle Ana 2nd Gen) 32G X 4 MM MISC For use with insulin pen. Pharmacy may dispense brand covered by insurance.    Insulin Pen Needle (BD Pen Needle Ana 2nd Gen) 32G X 4 MM MISC For use with insulin pen. Pharmacy may dispense brand covered by insurance.    methocarbamol (ROBAXIN) 500 mg, Oral, Every 6 hours scheduled    naloxone (NARCAN) 4 mg/0.1 mL nasal spray Administer 1 spray into a nostril. If no response after 2-3 minutes, give another dose in the other nostril using a new spray.    OneTouch Delica Lancets 33G MISC May substitute brand based on insurance coverage. Check glucose TID.    senna-docusate sodium (SENOKOT S) 8.6-50 mg per tablet 1 tablet, Oral, Daily at bedtime     Lab Results   Component Value Date    WBC 7.57 12/24/2024    HGB 10.9 (L) 12/24/2024    HCT 34.4 (L) 12/24/2024     12/24/2024    SODIUM 133 (L) 12/24/2024    K 4.7 12/24/2024     12/24/2024    CO2 27 12/24/2024    BUN 24 12/24/2024    CREATININE 0.80 12/24/2024    GLUC 127 12/24/2024    HGBA1C 10.3 (H) 12/13/2024    AST 10 (L) 12/13/2024    ALT 11 12/13/2024    ALKPHOS 75 12/13/2024    TBILI 0.29 12/13/2024    ALB 4.1 12/13/2024    PROTIME 11.8 (L) 12/13/2024    PTT 29 12/13/2024    INR 0.82 (L) 12/13/2024     Vitals:    01/03/25 1116   BP: 157/67   Pulse: 90   Resp: 20   Temp: (!) 97.1 °F (36.2 °C)   SpO2: 99%       Physical Exam    Airway    Mallampati score: III  TM Distance: >3 FB  Neck ROM: full     Dental   Comment: edentulous, No notable dental hx     Cardiovascular  Rhythm:  regular, Rate: normal, Cardiovascular exam normal    Pulmonary  Pulmonary exam normal Breath sounds clear to auscultation    Other Findings  post-pubertal.      Anesthesia Plan  ASA Score- 3     Anesthesia Type- general with ASA Monitors.         Additional Monitors:     Airway Plan: LMA.    Comment: Consent obtained for single shot Left popliteal and sciatic block for post-op pain management. Discussed risks including bleeding, infection, nerve damage, local anesthetic systemic toxicity and failure..       Plan Factors-Exercise tolerance (METS): >4 METS.    Chart reviewed.   Existing labs reviewed. Patient summary reviewed.    Patient is not a current smoker (recently quit 12/13/24).              Induction- intravenous.    Postoperative Plan- Plan for postoperative opioid use.     Perioperative Resuscitation Plan - Level 1 - Full Code.       Informed Consent- Anesthetic plan and risks discussed with patient.  I personally reviewed this patient with the CRNA. Discussed and agreed on the Anesthesia Plan with the CRNA..

## 2025-01-04 NOTE — UTILIZATION REVIEW
NOTIFICATION OF INPATIENT ADMISSION   AUTHORIZATION REQUEST   SERVICING FACILITY:   [place] [address]  Tax ID: [id number]  NPI: [id number]   ATTENDING PROVIDER:  Attending Name and NPI#: Leopoldo Ritchie Dpm [[id number]]  Address: [address] [place] [address]  Phone: [phone]     ADMISSION INFORMATION:  Place of Service: Inpatient Acute [facility]  Place of Service Code: 21  Inpatient Admission Date/Time: 1/3/25 11:11 AM  Discharge Date/Time: 1/3/2025  2:50 PM  Admitting Diagnosis Code/Description:  Gangrene of toe of left foot (HCC) [I96]     UTILIZATION REVIEW CONTACT:  Toma Cadet, Utilization   Network Utilization Review Department  Phone: [phone]  Fax: [phone]  Email: [email or web address]  Contact for approvals/pending authorizations, clinical reviews, and discharge.     PHYSICIAN ADVISORY SERVICES:  Medical Necessity Denial & [place]-vs-[place] Review  Phone: [phone]  Fax: [phone]  Email: [email or web address]     DISCHARGE SUPPORT TEAM:  For Patients Discharge Needs & Updates  Phone: [phone] opt. [phone] Fax: [phone]  Email: [email or web address]

## 2025-01-06 NOTE — ANESTHESIA POSTPROCEDURE EVALUATION
Post-Op Assessment Note    CV Status:  Stable  Pain Score: 4    Pain management: adequate       Mental Status:  Arousable   Hydration Status:  Stable   PONV Controlled:  None   Airway Patency:  Patent     Post Op Vitals Reviewed: Yes    No anethesia notable event occurred.    Staff: CRNA         Last Filed PACU Vitals:  Vitals Value Taken Time   Temp 98.5    Pulse 89 01/03/25 1303   /66 01/03/25 1303   Resp 20    SpO2 100 % 01/03/25 1303   Vitals shown include unfiled device data.    Modified Cali:     Vitals Value Taken Time   Activity 2 01/03/25 1345   Respiration 2 01/03/25 1345   Circulation 2 01/03/25 1345   Consciousness 2 01/03/25 1345   Oxygen Saturation 2 01/03/25 1345     Modified Cali Score: 10

## 2025-01-06 NOTE — UTILIZATION REVIEW
NOTIFICATION OF ADMISSION DISCHARGE   This is a Notification of Discharge from [facility]. Please be advised that this patient has been discharge from our facility. Below you will find the admission and discharge date and time including the patient’s disposition.   UTILIZATION REVIEW CONTACT:  Angelica Byrne  Utilization   Network Utilization Review Department  Phone: [phone] x carefully listen to the prompts. All voicemails are confidential.  Email: [email or web address]     ADMISSION INFORMATION  PRESENTATION DATE: 1/3/2025 10:47 AM  OBERVATION ADMISSION DATE: N/A  INPATIENT ADMISSION DATE: 1/3/25 11:11 AM   DISCHARGE DATE: 1/3/2025  2:50 PM   DISPOSITION:Home/Self Care    Network Utilization Review Department  ATTENTION: Please call with any questions or concerns to [phone] and carefully listen to the prompts so that you are directed to the right person. All voicemails are confidential.   For Discharge needs, contact Care Management DC Support Team at [phone] opt. 2  Send all requests for admission clinical reviews, approved or denied determinations and any other requests to dedicated fax number below belonging to the campus where the patient is receiving treatment. List of dedicated fax numbers for the Facilities:  FACILITY NAME UR FAX NUMBER   ADMISSION DENIALS (Administrative/Medical Necessity) [phone]   DISCHARGE SUPPORT TEAM ([facility]) [phone]   PARENT CHILD HEALTH (Maternity/NICU/Pediatrics) [phone]   [facility] [phone]   [facility] [phone]   [facility] [phone]   [facility] [phone]   [facility] [phone]   [facility] [phone]   [facility] [phone]   [facility] [phone]    [facility] [phone]   [facility] [phone]   [facility] [phone]   [facility] [phone]          Discharge Summary Outpatient Procedure Podiatry -   Lizzy Acuna 49 y.o. female MRN: [id number]  Unit/Bed#: OR POOL Encounter: [id number]     Admission Date: 1/3/2025      Admitting Diagnosis: Gangrene of toe of left foot (HCC) [I96]     Discharge Diagnosis: same     Procedures Performed: AMPUTATION TRANSMETATARSAL (TMA): 90608 (CPT®)     Complications: none     Condition at Discharge: stable     Discharge instructions/Information to patient and family:   See after visit summary for information provided to patient and family.       Provisions for Follow-Up Care/Important appointments:  See after visit summary for information related to follow-up care and any pertinent home health orders.       Discharge Medications:  See after visit summary for reconciled discharge medications provided to patient and family.

## 2025-01-08 PROCEDURE — 88311 DECALCIFY TISSUE: CPT | Performed by: PATHOLOGY

## 2025-01-08 PROCEDURE — 88307 TISSUE EXAM BY PATHOLOGIST: CPT | Performed by: PATHOLOGY

## 2025-01-10 ENCOUNTER — OFFICE VISIT (OUTPATIENT)
Dept: PODIATRY | Facility: CLINIC | Age: 50
End: 2025-01-10

## 2025-01-10 VITALS — BODY MASS INDEX: 34.93 KG/M2 | WEIGHT: 185 LBS | HEIGHT: 61 IN

## 2025-01-10 DIAGNOSIS — E11.42 DIABETIC POLYNEUROPATHY ASSOCIATED WITH TYPE 2 DIABETES MELLITUS (HCC): Primary | ICD-10-CM

## 2025-01-10 DIAGNOSIS — Z89.432: ICD-10-CM

## 2025-01-10 PROCEDURE — 99024 POSTOP FOLLOW-UP VISIT: CPT | Performed by: PODIATRIST

## 2025-01-10 RX ORDER — GABAPENTIN 300 MG/1
300 CAPSULE ORAL 3 TIMES DAILY
Qty: 90 CAPSULE | Refills: 1 | Status: SHIPPED | OUTPATIENT
Start: 2025-01-10

## 2025-01-10 NOTE — PROGRESS NOTES
Assessment/Plan:      Diagnoses and all orders for this visit:    Diabetic polyneuropathy associated with type 2 diabetes mellitus (HCC)    History of amputation of left forefoot (HCC)        -She is now managing her pain well with over-the-counter anti-inflammatories and also gabapentin.  She is currently taking 300 mg total throughout the day of gabapentin, and we will triple this dosage and put up to 900 mg daily with 300 mg pills 3 times daily  - I did discuss the side effects of gabapentin and if she started to have side effects she is to reduce her dosage back down.  - RTC 1 wk for now for hopeful suture removal        Subjective:     Patient ID: Lizzy Acuna is a 49 y.o. female.    Presents for evaluation management of her left foot, 1 week status post TMA.  Her pain is now controlled.  She is taking the pin and this does help with the shooting stabbing pains.  She is been very compliant with use of a walker, offloading.  Has not put any pressure on it.  Left the dressing intact, no other pedal complaints.  She has not smoked in almost a month.         Review of Systems   Constitutional:  Negative for chills and fever.   HENT:  Negative for ear pain and sore throat.    Eyes:  Negative for pain and visual disturbance.   Respiratory:  Negative for cough and shortness of breath.    Cardiovascular:  Negative for chest pain and palpitations.   Gastrointestinal:  Negative for abdominal pain and vomiting.   Genitourinary:  Negative for dysuria and hematuria.   Musculoskeletal:  Negative for arthralgias and back pain.   Skin:  Negative for color change and rash.   Neurological:  Negative for seizures and syncope.   All other systems reviewed and are negative.        Objective:     Physical Exam  Vitals reviewed.   Constitutional:       Appearance: Normal appearance.   HENT:      Head: Normocephalic and atraumatic.      Nose: Nose normal.      Mouth/Throat:      Mouth: Mucous membranes are moist.   Pulmonary:       Effort: Pulmonary effort is normal.   Skin:     Comments: Sutures are intact, with no evidence of overload, normal-appearing postoperative TMA, there is some slight reactive erythema on the lateral aspect of the incision, minimal drainage, sutures are intact.  No signs of infection.  Range of motion is intact.   Neurological:      General: No focal deficit present.      Mental Status: She is alert and oriented to person, place, and time. Mental status is at baseline.

## 2025-01-11 LAB
DME PARACHUTE DELIVERY DATE ACTUAL: NORMAL
DME PARACHUTE DELIVERY DATE REQUESTED: NORMAL
DME PARACHUTE ITEM DESCRIPTION: NORMAL
DME PARACHUTE ORDER STATUS: NORMAL
DME PARACHUTE SUPPLIER NAME: NORMAL
DME PARACHUTE SUPPLIER PHONE: NORMAL

## 2025-01-16 ENCOUNTER — TELEPHONE (OUTPATIENT)
Age: 50
End: 2025-01-16

## 2025-01-16 ENCOUNTER — OFFICE VISIT (OUTPATIENT)
Dept: PODIATRY | Facility: CLINIC | Age: 50
End: 2025-01-16

## 2025-01-16 VITALS — HEIGHT: 61 IN | WEIGHT: 185 LBS | BODY MASS INDEX: 34.93 KG/M2

## 2025-01-16 DIAGNOSIS — Z89.432: Primary | ICD-10-CM

## 2025-01-16 PROCEDURE — 99024 POSTOP FOLLOW-UP VISIT: CPT | Performed by: PODIATRIST

## 2025-01-16 NOTE — PROGRESS NOTES
Assessment/Plan:      Diagnoses and all orders for this visit:    History of amputation of left forefoot (HCC)      -Continue current dressing change regimen, continue strict nonweightbearing  - We will leave the stitches in for 1 more week due to the presence of this active drainage  - Hopefully she can transition to weightbearing based off of the incision following suture removal next week    Subjective:     Patient ID: Lizyz Acuna is a 49 y.o. female.    Presents for evaluation management status post TMA 2 weeks.  Doing very well, changing the dressings as directed, has had an incident where she smacked it on the side of the shower but has been overall otherwise compliant and has had no other incidents postop.        Review of Systems   Constitutional:  Negative for chills and fever.   HENT:  Negative for ear pain and sore throat.    Eyes:  Negative for pain and visual disturbance.   Respiratory:  Negative for cough and shortness of breath.    Cardiovascular:  Negative for chest pain and palpitations.   Gastrointestinal:  Negative for abdominal pain and vomiting.   Genitourinary:  Negative for dysuria and hematuria.   Musculoskeletal:  Negative for arthralgias and back pain.   Skin:  Negative for color change and rash.   Neurological:  Negative for seizures and syncope.   All other systems reviewed and are negative.        Objective:     Physical Exam  Skin:     Comments: There is some increased sanguinous drainage on the lateral aspect of the foot, noted to dehiscence.  Normal postoperative appearance sutures seem to be holding and incision seems to be coapting well

## 2025-01-16 NOTE — TELEPHONE ENCOUNTER
Caller: Lizzy     Doctor and/or Office: Dr Chau Waldrop    #: [phone]     Escalation: Appointment She is been seen every week for post op she is sched for the 1/23 but wanted to know for the following week if she can be sched with her mom she is sched for 12/31 at 8:45.  Please advise thank you

## 2025-01-17 ENCOUNTER — TELEPHONE (OUTPATIENT)
Age: 50
End: 2025-01-17

## 2025-01-17 NOTE — TELEPHONE ENCOUNTER
Caller: Lizzy Acuna    Doctor and/or Office: Dr. Ritchie/Palma    #: [phone]    Escalation: Care/Was awakened at 5 am with shooting pains up her left leg-asking what she can do to relieve this pain-she is elevating but she is in tears. Let her know  is not in office today, but may be doing SX so may see this today, but may not see until Monday. Please call back and advise. Thanks

## 2025-01-21 DIAGNOSIS — G62.9 NEUROPATHY: ICD-10-CM

## 2025-01-21 DIAGNOSIS — M79.672 LEFT FOOT PAIN: Primary | ICD-10-CM

## 2025-01-21 NOTE — TELEPHONE ENCOUNTER
Tried to see if patient was feeling better. No answer so left a voicemail stating to call us back.

## 2025-01-21 NOTE — TELEPHONE ENCOUNTER
Stated to patient that Dr. Ritchie will discuss PT and pain management with her on Thursday at her appointment. Patient stated verbal understanding.

## 2025-01-21 NOTE — TELEPHONE ENCOUNTER
Asked patient is she was doing better. Patient states she is doing a lot better now after she unwrapped her foot. She believes it was just wrapped to tight which is why she was having pain. She was wondering if there was anything we can do about her nerve pain. Dr. Ritchie recommends either PT or pain management. Patient states she does not feel comfortable with PT due to her being nonweightbearing. Stated to patient I will give her a call back after speaking to Dr. Ritchie more about the situation.

## 2025-01-23 ENCOUNTER — OFFICE VISIT (OUTPATIENT)
Dept: PODIATRY | Facility: CLINIC | Age: 50
End: 2025-01-23
Payer: COMMERCIAL

## 2025-01-23 VITALS — WEIGHT: 185 LBS | HEIGHT: 61 IN | BODY MASS INDEX: 34.93 KG/M2

## 2025-01-23 DIAGNOSIS — T81.31XA DEHISCENCE OF OPERATIVE WOUND, INITIAL ENCOUNTER: ICD-10-CM

## 2025-01-23 DIAGNOSIS — T78.40XA HYPERSENSITIVITY, INITIAL ENCOUNTER: Primary | ICD-10-CM

## 2025-01-23 PROCEDURE — 97597 DBRDMT OPN WND 1ST 20 CM/<: CPT | Performed by: PODIATRIST

## 2025-01-23 PROCEDURE — 99213 OFFICE O/P EST LOW 20 MIN: CPT | Performed by: PODIATRIST

## 2025-01-23 RX ORDER — HYDROMORPHONE HYDROCHLORIDE 4 MG/1
TABLET ORAL
COMMUNITY
Start: 2024-12-30

## 2025-01-23 NOTE — PROGRESS NOTES
Name: Lizzy Acuna      : 1975      MRN: [id number]  Encounter Provider: Leopoldo Ritchie DPM  Encounter Date: 2025   Encounter department: [facility] PODIAT[facility] [place]  :  Assessment & Plan  Hypersensitivity, initial encounter    Orders:    Cam Boot    Dehiscence of operative wound, initial encounter    Orders:    Cam Boot    -I discussed that she could go back to work in a sitting position she works as a , but I am worried about her being able to tolerate the hypersensitivity and the pain that she is currently having  - She thankfully has a appointment with spine and pain tomorrow hopefully they can help manage her hypersensitivity and postoperative pain  - With the dehiscence I would recommend deferring physical therapy for right now until we heal her incision.  But following healing of the incision, we may place her in aggressive physical therapy for desensitization measures  - The dehiscence seems to be mainly superficial at this point  - She is to be weight-bear as tolerated to the left foot in cam boot  - She is to have every other day to day dressing changes to the left foot with Dermagran, DSD and there is a new fissure on the posterior lateral aspect of the left foot she is to attempting to avoid all pressure on the left heel she is to use pillows at night to offload her calf, she may shower and she is to rinse this with his own soap and water.    Debridement    Universal Protocol:  Consent: Verbal consent obtained.  Risks and benefits: risks, benefits and alternatives were discussed  Consent given by: patient  Patient understanding: patient states understanding of the procedure being performed  Patient consent: the patient's understanding of the procedure matches consent given  Patient identity confirmed: verbally with patient    Debridement Details  Performed by: physician  Debridement type: selective  Pain control: none      Post-debridement measurements  Length (cm):  "4.2  Width (cm): 0.2  Depth (cm): 0.2  Percent debrided: 100%  Surface Area (cm^2): 0.84  Area Debrided (cm^2): 0.84  Volume (cm^3): 0.17    Devitalized tissue debrided: necrotic debris and slough  Instrument(s) utilized: curette  Technique utilized: nonexcisionalBleeding: small  Hemostasis obtained with: pressure  Procedural pain (0-10): 2  Post-procedural pain: 2   Response to treatment: procedure was tolerated well          History of Present Illness   HPI  Lizzy Acuna is a 49 y.o. female who presents evaluation management of her left foot.  She is still having a lot of pain in her gabapentin is not helping her sleep she is taking 900 mg daily and she is waking up between 2 and 3 in the morning with severe pain in the left foot.  She is here for follow-up on her left transmetatarsal amputation site and she would like to go back to work but is having a lot of pain      Review of Systems   Constitutional:  Negative for chills and fever.   HENT:  Negative for ear pain and sore throat.    Eyes:  Negative for pain and visual disturbance.   Respiratory:  Negative for cough and shortness of breath.    Cardiovascular:  Negative for chest pain and palpitations.   Gastrointestinal:  Negative for abdominal pain and vomiting.   Genitourinary:  Negative for dysuria and hematuria.   Musculoskeletal:  Negative for arthralgias and back pain.   Skin:  Negative for color change and rash.   Neurological:  Negative for seizures and syncope.   All other systems reviewed and are negative.         Objective   Ht 5' 1\" (1.549 m)   Wt 83.9 kg (185 lb)   LMP  (LMP Unknown)   BMI 34.96 kg/m²      Physical Exam  Skin:     Comments: Incision has dehiscnce along the lateral aspect of the left foot, measures 4.2x0.2x0.cm  Swelling and appearance of the left foot is normal post op, thankfully the dehiscnece appears superficial    There is pain outside of the area of the incision, she has pain with palpation along the plantar flap and also " on the dorsal foot.  The foot does exhibit some mottling but this does not extend up to her calf or the ankle level.  Incision is healing, normal overall postoperative appearance with superficial dehiscence    Fissure lateral aspect left foot

## 2025-01-24 ENCOUNTER — CONSULT (OUTPATIENT)
Dept: PAIN MEDICINE | Facility: CLINIC | Age: 50
End: 2025-01-24
Payer: COMMERCIAL

## 2025-01-24 VITALS — HEIGHT: 61 IN | BODY MASS INDEX: 34.96 KG/M2

## 2025-01-24 DIAGNOSIS — Z89.432: ICD-10-CM

## 2025-01-24 DIAGNOSIS — G62.9 NEUROPATHY: ICD-10-CM

## 2025-01-24 DIAGNOSIS — M79.672 LEFT FOOT PAIN: Primary | ICD-10-CM

## 2025-01-24 DIAGNOSIS — E11.42 DIABETIC POLYNEUROPATHY ASSOCIATED WITH TYPE 2 DIABETES MELLITUS (HCC): ICD-10-CM

## 2025-01-24 PROCEDURE — 99244 OFF/OP CNSLTJ NEW/EST MOD 40: CPT | Performed by: STUDENT IN AN ORGANIZED HEALTH CARE EDUCATION/TRAINING PROGRAM

## 2025-01-24 RX ORDER — GABAPENTIN 300 MG/1
CAPSULE ORAL
Qty: 120 CAPSULE | Refills: 2 | Status: SHIPPED | OUTPATIENT
Start: 2025-01-24

## 2025-01-24 NOTE — PROGRESS NOTES
"Assessment:  1. Left foot pain    2. Neuropathy    3. Diabetic polyneuropathy associated with type 2 diabetes mellitus (HCC)    4. History of amputation of left forefoot (HCC)        Portions of the record may have been created with voice recognition software. Occasional wrong word or \"sound a like\" substitutions may have occurred due to the inherent limitations of voice recognition software. Read the chart carefully and recognize, using context, where substitutions have occurred. Contact me with any questions.       Plan:  49-year-old female with history of DM2 (A1c 10.3 - 12/13/24), referred by podiatry, here for initial evaluation of left foot pain status post a left transmetatarsal amputation.  Patient has experienced some dehiscence of the operative wound and is being followed by podiatry for this.  She has not been cleared to start physical therapy at this time.  She has been taking gabapentin 300 mg 3 times daily and notes some improvement in pain with this but notes residual symptoms continue to be significantly bothersome. Denies any adverse effects from this medication regimen.      Increase gabapentin to 300/300/600 mg daily.  Risks and benefits discussed. Dose can be titrated further in the future depending on response.    Continue follow up with podiatry for post op care.    Consider course of physical therapy when cleared by podiatry to do so.    Discussed impact of poor glycemic control on postop recovery and pain symptoms.  Patient notes she has not yet established care for diabetes management due to prioritizing recovery from recent amputation.  Discussed referral to endocrinology for this, patient defers.      History of Present Illness:    The patient is a 49 y.o. female who presents for consultation in regards to Foot Pain (Left ).  Symptoms have been present for 4 months. Symptoms began after developing cellulitis of left foot subsequently requiring a transmetatarsal amputation . Pain is " "reported to be 5 on the numeric rating scale.  Symptoms are felt nearly constantly and worst in the no typical pattern.  Symptoms are characterized as shooting, sharp, dull/aching, and throbbing.  Symptoms are associated with no weakness.  Aggravating factors include lying down, standing, and walking.  Relieving factors include nothing.        Review of Systems:    Review of Systems   Constitutional:  Negative for chills and fever.   HENT:  Negative for ear pain, sinus pressure and sore throat.    Eyes:  Negative for pain and redness.   Respiratory:  Negative for cough and wheezing.    Cardiovascular:  Negative for leg swelling.   Gastrointestinal:  Negative for abdominal pain and nausea.   Endocrine: Negative for polydipsia and polyuria.   Genitourinary:  Negative for difficulty urinating and frequency.   Musculoskeletal:  Positive for gait problem. Negative for myalgias.   Skin:  Negative for pallor and wound.   Neurological:  Negative for seizures, weakness and headaches.   Psychiatric/Behavioral:  Negative for decreased concentration and sleep disturbance.            Past Medical History:   Diagnosis Date    Advanced maternal age in multigravida, second trimester 11/30/2016    Transitioned From: Advanced maternal age in multigravida, first trimester      Back pain     used 2 percocet tid until current admission in 2/2017    Bone spur     in back per pt    Chronic hypertension with superimposed preeclampsia 02/14/2017    Depression     Diabetes mellitus (HCC)     Elevated BP without diagnosis of hypertension     \"with pain\"    Foot injury     Full dentures     does not wear    GERD (gastroesophageal reflux disease)     occas    Gestational diabetes     insulin dependent    Herniated cervical disc     Herniated lumbar intervertebral disc     History of pneumonia     Hx of degenerative disc disease     Hyperlipidemia     Obesity     Pre-eclampsia     Prior pregnancy with fetal demise     previous demise at 36 weeks "    Toe pain     and foot pain       Past Surgical History:   Procedure Laterality Date    BACK SURGERY       SECTION      IR LOWER EXTREMITY ANGIOGRAM  2024    LAMINECTOMY      with disc removal, last assessed 16    OTHER SURGICAL HISTORY      Right ovary removal 2005 per pt recall at [facility] facilty    [place] AMPUTATION FOOT TRANSMETARSAL Left 1/3/2025    Procedure: AMPUTATION TRANSMETATARSAL (TMA);  Surgeon: Leopoldo Ritchie DPM;  Location: [facility];  Service: Podiatry    [place]  DELIVERY ONLY N/A 02/15/2017    Procedure:  SECTION ();  Surgeon: Tito Umaña MD;  Location: [facility];  Service: Obstetrics    [place] LIG/TRNSXJ FALOPIAN TUBE  DEL/ABDML SURG Left 02/15/2017    Procedure: LIGATION/COAGULATION TUBAL;  Surgeon: Tito Umaña MD;  Location: [facility];  Service: Obstetrics    VASCULAR SURGERY  2024    WISDOM TOOTH EXTRACTION         Family History   Problem Relation Age of Onset    Diabetes Mother     COPD Mother     Heart disease Mother     Stroke Father     Heart disease Father        Social History     Occupational History    Occupation: unemployed   Tobacco Use    Smoking status: Former     Current packs/day: 0.50     Types: Cigarettes    Smokeless tobacco: Never    Tobacco comments:     Per pt last cigarette 24--quit cold turkey   Vaping Use    Vaping status: Never Used   Substance and Sexual Activity    Alcohol use: Yes     Comment: 1-2 drinks a year    Drug use: Not Currently    Sexual activity: Not on file     Comment: defer         Current Outpatient Medications:     Alcohol Swabs 70 % PADS, May substitute brand based on insurance coverage. Check glucose TID., Disp: 100 each, Rfl: 0    aspirin (ECOTRIN LOW STRENGTH) 81 mg EC tablet, Take 1 tablet (81 mg total) by mouth daily, Disp: 30 tablet, Rfl: 0    atorvastatin (LIPITOR) 80 mg tablet, Take 1 tablet (80 mg total) by mouth daily with dinner, Disp: 30 tablet, Rfl: 0    Blood  Glucose Monitoring Suppl (OneTouch Verio Reflect) w/Device KIT, May substitute brand based on insurance coverage. Check glucose TID., Disp: 1 kit, Rfl: 0    diphenhydrAMINE (BENADRYL) 25 mg capsule, Take 25 mg by mouth every 6 (six) hours as needed for allergies, Disp: , Rfl:     gabapentin (Neurontin) 300 mg capsule, Take 1 tablet in the morning, 1 in the afternoon and 2 tablets at night., Disp: 120 capsule, Rfl: 2    glucose blood (OneTouch Verio) test strip, May substitute brand based on insurance coverage. Check glucose TID., Disp: 100 each, Rfl: 0    HYDROmorphone (DILAUDID) 4 mg tablet, , Disp: , Rfl:     ibuprofen (MOTRIN) 600 mg tablet, Take by mouth every 6 (six) hours as needed for mild pain, Disp: , Rfl:     insulin aspart (NovoLOG FlexPen) 100 UNIT/ML injection pen, Inject 5 Units under the skin 3 (three) times a day with meals, Disp: 6 mL, Rfl: 0    Insulin Glargine Solostar (Lantus SoloStar) 100 UNIT/ML SOPN, Inject 0.1 mL (10 Units total) under the skin daily at bedtime, Disp: 3 mL, Rfl: 0    Insulin Pen Needle (BD Pen Needle Ana 2nd Gen) 32G X 4 MM MISC, For use with insulin pen. Pharmacy may dispense brand covered by insurance., Disp: 100 each, Rfl: 0    Insulin Pen Needle (BD Pen Needle Ana 2nd Gen) 32G X 4 MM MISC, For use with insulin pen. Pharmacy may dispense brand covered by insurance., Disp: 100 each, Rfl: 0    methocarbamol (ROBAXIN) 500 mg tablet, Take 1 tablet (500 mg total) by mouth every 6 (six) hours, Disp: 30 tablet, Rfl: 0    naloxone (NARCAN) 4 mg/0.1 mL nasal spray, Administer 1 spray into a nostril. If no response after 2-3 minutes, give another dose in the other nostril using a new spray., Disp: 1 each, Rfl: 0    OneTouch Delica Lancets 33G MISC, May substitute brand based on insurance coverage. Check glucose TID., Disp: 100 each, Rfl: 0    senna-docusate sodium (SENOKOT S) 8.6-50 mg per tablet, Take 1 tablet by mouth daily at bedtime, Disp: 30 tablet, Rfl: 0    acetaminophen  "(TYLENOL) 325 mg tablet, Take 3 tablets (975 mg total) by mouth every 6 (six) hours (Patient not taking: Reported on 1/23/2025), Disp: 360 tablet, Rfl: 0    Allergies   Allergen Reactions    Codeine Other (See Comments)     Aggression-and nasty--\"took a cough syrup with codeine as a child\"       Physical Exam:    Ht 5' 1\" (1.549 m)   LMP  (LMP Unknown)   BMI 34.96 kg/m²     Constitutional: normal, well developed, well nourished, alert, in no distress and non-toxic and no overt pain behavior.  Eyes: anicteric  HEENT: grossly intact  Neck: supple, symmetric, trachea midline and no masses   Pulmonary:even and unlabored  Cardiovascular:No edema or pitting edema present  Skin:Normal without rashes or lesions and well hydrated  Psychiatric:Mood and affect appropriate  Neurologic:Cranial Nerves II-XII grossly intact  Musculoskeletal: L foot in dressing and CAM boot      Imaging  No orders to display       No orders of the defined types were placed in this encounter.    "

## 2025-01-24 NOTE — PATIENT INSTRUCTIONS
Increase gabapentin to 1 tablet in the morning, 1 in the afternoon and 2 tablets at night. Gabapentin may cause vision changes, clumsiness, unsteadiness, dizziness, drowsiness, sleepiness, or trouble with thinking. Make sure you know how you react to this medicine before you drive, use machines, or do anything else that could be dangerous if you are not alert, well-coordinated, or able to think or see well.

## 2025-01-27 ENCOUNTER — TELEPHONE (OUTPATIENT)
Dept: PODIATRY | Facility: CLINIC | Age: 50
End: 2025-01-27

## 2025-01-27 NOTE — TELEPHONE ENCOUNTER
Caller: Lizzy Acuna    Doctor and/or Office: Leopoldo Ritchie DPM    #: [phone]    Escalation: This message was included with an earlier phone message from today./

## 2025-01-27 NOTE — TELEPHONE ENCOUNTER
Put patient on for 3:30pm tomorrow in our [place] office. Sent patient a message about it, also stated how Dr. Ritchie wants her to keep the foot covered.

## 2025-01-27 NOTE — TELEPHONE ENCOUNTER
Lizzy was also questioning her medical leave papers that must go to [employer].  She may have discussed this today with Cece.  If not, please address as Lizzy said she could lose her job if [employer] does not receive the papers today.

## 2025-01-27 NOTE — TELEPHONE ENCOUNTER
Caller: Lizzy Acuna    Doctor: Leopoldo Ritchie DPM    Reason for call:  Lizzy is concerned because she said where the stitches were removed, it is starting to open, it is oozing onto the bandage and it is a bloody discharge.  Ever since she tried walking on her foot, she started having a lot of pain.  She had to stop walking on the foot.  Pain Management changed her Gabapentin 300 mg to taking one tab in the morning 2 tabs at night and that  is not helping. She only had one packet of the Dermagram and she used all of it.  Please Advise    Call back#: [phone]

## 2025-01-28 ENCOUNTER — HOSPITAL ENCOUNTER (INPATIENT)
Facility: HOSPITAL | Age: 50
LOS: 3 days | DRG: 721 | End: 2025-01-31
Attending: EMERGENCY MEDICINE | Admitting: HOSPITALIST
Payer: COMMERCIAL

## 2025-01-28 ENCOUNTER — OFFICE VISIT (OUTPATIENT)
Age: 50
End: 2025-01-28
Payer: COMMERCIAL

## 2025-01-28 VITALS — BODY MASS INDEX: 34.96 KG/M2 | HEIGHT: 61 IN

## 2025-01-28 DIAGNOSIS — E11.621 DIABETIC FOOT ULCER (HCC): Primary | ICD-10-CM

## 2025-01-28 DIAGNOSIS — L03.116 CELLULITIS OF LEFT FOOT: ICD-10-CM

## 2025-01-28 DIAGNOSIS — L02.619 ABSCESS OF FOOT: ICD-10-CM

## 2025-01-28 DIAGNOSIS — L08.9 DIABETIC FOOT INFECTION  (HCC): ICD-10-CM

## 2025-01-28 DIAGNOSIS — L97.509 DIABETIC FOOT ULCER (HCC): Primary | ICD-10-CM

## 2025-01-28 DIAGNOSIS — Z98.890 POST-OPERATIVE STATE: Primary | ICD-10-CM

## 2025-01-28 DIAGNOSIS — E11.628 DIABETIC FOOT INFECTION  (HCC): ICD-10-CM

## 2025-01-28 DIAGNOSIS — A41.9 SEPSIS (HCC): ICD-10-CM

## 2025-01-28 DIAGNOSIS — L03.119 CELLULITIS OF FOOT: ICD-10-CM

## 2025-01-28 DIAGNOSIS — T81.31XA DEHISCENCE OF OPERATIVE WOUND, INITIAL ENCOUNTER: ICD-10-CM

## 2025-01-28 PROBLEM — E11.9 TYPE II DIABETES MELLITUS (HCC): Status: ACTIVE | Noted: 2025-01-28

## 2025-01-28 PROBLEM — L03.90 SEPSIS DUE TO CELLULITIS (HCC): Status: ACTIVE | Noted: 2018-11-05

## 2025-01-28 LAB
ALBUMIN SERPL BCG-MCNC: 3.8 G/DL (ref 3.5–5)
ALP SERPL-CCNC: 106 U/L (ref 34–104)
ALT SERPL W P-5'-P-CCNC: 20 U/L (ref 7–52)
ANION GAP SERPL CALCULATED.3IONS-SCNC: 13 MMOL/L (ref 4–13)
APTT PPP: 33 SECONDS (ref 23–34)
AST SERPL W P-5'-P-CCNC: 14 U/L (ref 13–39)
BASOPHILS # BLD AUTO: 0.01 THOUSANDS/ΜL (ref 0–0.1)
BASOPHILS NFR BLD AUTO: 0 % (ref 0–1)
BILIRUB SERPL-MCNC: 0.38 MG/DL (ref 0.2–1)
BUN SERPL-MCNC: 20 MG/DL (ref 5–25)
CALCIUM SERPL-MCNC: 9.9 MG/DL (ref 8.4–10.2)
CHLORIDE SERPL-SCNC: 91 MMOL/L (ref 96–108)
CO2 SERPL-SCNC: 25 MMOL/L (ref 21–32)
CREAT SERPL-MCNC: 0.88 MG/DL (ref 0.6–1.3)
CRP SERPL QL: 95.2 MG/L
EOSINOPHIL # BLD AUTO: 0.04 THOUSAND/ΜL (ref 0–0.61)
EOSINOPHIL NFR BLD AUTO: 0 % (ref 0–6)
ERYTHROCYTE [DISTWIDTH] IN BLOOD BY AUTOMATED COUNT: 13.2 % (ref 11.6–15.1)
ERYTHROCYTE [SEDIMENTATION RATE] IN BLOOD: 104 MM/HOUR (ref 0–19)
GFR SERPL CREATININE-BSD FRML MDRD: 77 ML/MIN/1.73SQ M
GLUCOSE SERPL-MCNC: 228 MG/DL (ref 65–140)
GLUCOSE SERPL-MCNC: 316 MG/DL (ref 65–140)
HCT VFR BLD AUTO: 33.7 % (ref 34.8–46.1)
HGB BLD-MCNC: 10.8 G/DL (ref 11.5–15.4)
IMM GRANULOCYTES # BLD AUTO: 0.09 THOUSAND/UL (ref 0–0.2)
IMM GRANULOCYTES NFR BLD AUTO: 1 % (ref 0–2)
INR PPP: 0.95 (ref 0.85–1.19)
LACTATE SERPL-SCNC: 1.1 MMOL/L (ref 0.5–2)
LACTATE SERPL-SCNC: 3 MMOL/L (ref 0.5–2)
LYMPHOCYTES # BLD AUTO: 2.1 THOUSANDS/ΜL (ref 0.6–4.47)
LYMPHOCYTES NFR BLD AUTO: 20 % (ref 14–44)
MCH RBC QN AUTO: 28.3 PG (ref 26.8–34.3)
MCHC RBC AUTO-ENTMCNC: 32 G/DL (ref 31.4–37.4)
MCV RBC AUTO: 88 FL (ref 82–98)
MONOCYTES # BLD AUTO: 0.49 THOUSAND/ΜL (ref 0.17–1.22)
MONOCYTES NFR BLD AUTO: 5 % (ref 4–12)
NEUTROPHILS # BLD AUTO: 7.75 THOUSANDS/ΜL (ref 1.85–7.62)
NEUTS SEG NFR BLD AUTO: 74 % (ref 43–75)
NRBC BLD AUTO-RTO: 0 /100 WBCS
PLATELET # BLD AUTO: 267 THOUSANDS/UL (ref 149–390)
PMV BLD AUTO: 9.2 FL (ref 8.9–12.7)
POTASSIUM SERPL-SCNC: 4 MMOL/L (ref 3.5–5.3)
PROCALCITONIN SERPL-MCNC: 0.22 NG/ML
PROT SERPL-MCNC: 8.3 G/DL (ref 6.4–8.4)
PROTHROMBIN TIME: 13.1 SECONDS (ref 12.3–15)
RBC # BLD AUTO: 3.82 MILLION/UL (ref 3.81–5.12)
SODIUM SERPL-SCNC: 129 MMOL/L (ref 135–147)
WBC # BLD AUTO: 10.48 THOUSAND/UL (ref 4.31–10.16)

## 2025-01-28 PROCEDURE — 82948 REAGENT STRIP/BLOOD GLUCOSE: CPT

## 2025-01-28 PROCEDURE — 96365 THER/PROPH/DIAG IV INF INIT: CPT

## 2025-01-28 PROCEDURE — 84145 PROCALCITONIN (PCT): CPT | Performed by: EMERGENCY MEDICINE

## 2025-01-28 PROCEDURE — 85652 RBC SED RATE AUTOMATED: CPT | Performed by: EMERGENCY MEDICINE

## 2025-01-28 PROCEDURE — 85610 PROTHROMBIN TIME: CPT | Performed by: EMERGENCY MEDICINE

## 2025-01-28 PROCEDURE — 80053 COMPREHEN METABOLIC PANEL: CPT | Performed by: EMERGENCY MEDICINE

## 2025-01-28 PROCEDURE — 99223 1ST HOSP IP/OBS HIGH 75: CPT | Performed by: PHYSICIAN ASSISTANT

## 2025-01-28 PROCEDURE — 99215 OFFICE O/P EST HI 40 MIN: CPT | Performed by: PODIATRIST

## 2025-01-28 PROCEDURE — 83605 ASSAY OF LACTIC ACID: CPT | Performed by: EMERGENCY MEDICINE

## 2025-01-28 PROCEDURE — 85730 THROMBOPLASTIN TIME PARTIAL: CPT | Performed by: EMERGENCY MEDICINE

## 2025-01-28 PROCEDURE — 87040 BLOOD CULTURE FOR BACTERIA: CPT | Performed by: EMERGENCY MEDICINE

## 2025-01-28 PROCEDURE — 99291 CRITICAL CARE FIRST HOUR: CPT | Performed by: EMERGENCY MEDICINE

## 2025-01-28 PROCEDURE — 86140 C-REACTIVE PROTEIN: CPT | Performed by: EMERGENCY MEDICINE

## 2025-01-28 PROCEDURE — 36415 COLL VENOUS BLD VENIPUNCTURE: CPT | Performed by: EMERGENCY MEDICINE

## 2025-01-28 PROCEDURE — 85025 COMPLETE CBC W/AUTO DIFF WBC: CPT | Performed by: EMERGENCY MEDICINE

## 2025-01-28 PROCEDURE — 99284 EMERGENCY DEPT VISIT MOD MDM: CPT

## 2025-01-28 RX ORDER — ACETAMINOPHEN 325 MG/1
650 TABLET ORAL EVERY 6 HOURS PRN
Status: DISCONTINUED | OUTPATIENT
Start: 2025-01-28 | End: 2025-01-30

## 2025-01-28 RX ORDER — HYDROMORPHONE HYDROCHLORIDE 2 MG/1
4 TABLET ORAL EVERY 6 HOURS PRN
Status: DISCONTINUED | OUTPATIENT
Start: 2025-01-28 | End: 2025-01-28

## 2025-01-28 RX ORDER — HYDROMORPHONE HCL IN WATER/PF 6 MG/30 ML
0.2 PATIENT CONTROLLED ANALGESIA SYRINGE INTRAVENOUS ONCE
Status: COMPLETED | OUTPATIENT
Start: 2025-01-28 | End: 2025-01-28

## 2025-01-28 RX ORDER — INSULIN LISPRO 100 [IU]/ML
1-6 INJECTION, SOLUTION INTRAVENOUS; SUBCUTANEOUS
Status: DISCONTINUED | OUTPATIENT
Start: 2025-01-29 | End: 2025-01-31 | Stop reason: HOSPADM

## 2025-01-28 RX ORDER — HYDROCODONE BITARTRATE AND ACETAMINOPHEN 5; 325 MG/1; MG/1
1 TABLET ORAL EVERY 6 HOURS PRN
Refills: 0 | Status: DISCONTINUED | OUTPATIENT
Start: 2025-01-28 | End: 2025-01-30

## 2025-01-28 RX ORDER — ASPIRIN 81 MG/1
81 TABLET ORAL DAILY
Status: DISCONTINUED | OUTPATIENT
Start: 2025-01-29 | End: 2025-01-31 | Stop reason: HOSPADM

## 2025-01-28 RX ORDER — GABAPENTIN 100 MG/1
100 CAPSULE ORAL 3 TIMES DAILY
Status: DISCONTINUED | OUTPATIENT
Start: 2025-01-28 | End: 2025-01-29

## 2025-01-28 RX ORDER — HEPARIN SODIUM 5000 [USP'U]/ML
5000 INJECTION, SOLUTION INTRAVENOUS; SUBCUTANEOUS EVERY 8 HOURS SCHEDULED
Status: DISCONTINUED | OUTPATIENT
Start: 2025-01-28 | End: 2025-01-29

## 2025-01-28 RX ORDER — ONDANSETRON 2 MG/ML
4 INJECTION INTRAMUSCULAR; INTRAVENOUS EVERY 6 HOURS PRN
Status: DISCONTINUED | OUTPATIENT
Start: 2025-01-28 | End: 2025-01-31 | Stop reason: HOSPADM

## 2025-01-28 RX ORDER — INSULIN LISPRO 100 [IU]/ML
1-6 INJECTION, SOLUTION INTRAVENOUS; SUBCUTANEOUS
Status: DISCONTINUED | OUTPATIENT
Start: 2025-01-28 | End: 2025-01-31 | Stop reason: HOSPADM

## 2025-01-28 RX ORDER — INSULIN GLARGINE 100 [IU]/ML
10 INJECTION, SOLUTION SUBCUTANEOUS
Status: DISCONTINUED | OUTPATIENT
Start: 2025-01-28 | End: 2025-01-31 | Stop reason: HOSPADM

## 2025-01-28 RX ORDER — METHOCARBAMOL 500 MG/1
500 TABLET, FILM COATED ORAL EVERY 6 HOURS SCHEDULED
Status: DISCONTINUED | OUTPATIENT
Start: 2025-01-28 | End: 2025-01-28

## 2025-01-28 RX ORDER — SODIUM CHLORIDE, SODIUM GLUCONATE, SODIUM ACETATE, POTASSIUM CHLORIDE, MAGNESIUM CHLORIDE, SODIUM PHOSPHATE, DIBASIC, AND POTASSIUM PHOSPHATE .53; .5; .37; .037; .03; .012; .00082 G/100ML; G/100ML; G/100ML; G/100ML; G/100ML; G/100ML; G/100ML
150 INJECTION, SOLUTION INTRAVENOUS CONTINUOUS
Status: DISCONTINUED | OUTPATIENT
Start: 2025-01-28 | End: 2025-01-29

## 2025-01-28 RX ORDER — NICOTINE 21 MG/24HR
14 PATCH, TRANSDERMAL 24 HOURS TRANSDERMAL DAILY
Status: DISCONTINUED | OUTPATIENT
Start: 2025-01-28 | End: 2025-01-28

## 2025-01-28 RX ORDER — VANCOMYCIN HYDROCHLORIDE 1 G/200ML
15 INJECTION, SOLUTION INTRAVENOUS EVERY 12 HOURS
Status: DISCONTINUED | OUTPATIENT
Start: 2025-01-29 | End: 2025-01-30

## 2025-01-28 RX ORDER — ATORVASTATIN CALCIUM 80 MG/1
80 TABLET, FILM COATED ORAL
Status: DISCONTINUED | OUTPATIENT
Start: 2025-01-28 | End: 2025-01-31 | Stop reason: HOSPADM

## 2025-01-28 RX ORDER — DIPHENHYDRAMINE HCL 25 MG
25 TABLET ORAL EVERY 6 HOURS PRN
Status: DISCONTINUED | OUTPATIENT
Start: 2025-01-28 | End: 2025-01-31 | Stop reason: HOSPADM

## 2025-01-28 RX ORDER — METHOCARBAMOL 500 MG/1
500 TABLET, FILM COATED ORAL EVERY 6 HOURS SCHEDULED
Status: DISCONTINUED | OUTPATIENT
Start: 2025-01-29 | End: 2025-01-31 | Stop reason: HOSPADM

## 2025-01-28 RX ADMIN — METHOCARBAMOL 500 MG: 500 TABLET ORAL at 21:06

## 2025-01-28 RX ADMIN — ATORVASTATIN CALCIUM 80 MG: 80 TABLET, FILM COATED ORAL at 21:06

## 2025-01-28 RX ADMIN — VANCOMYCIN HYDROCHLORIDE 1250 MG: 1 INJECTION, POWDER, LYOPHILIZED, FOR SOLUTION INTRAVENOUS at 20:03

## 2025-01-28 RX ADMIN — HYDROMORPHONE HYDROCHLORIDE 0.2 MG: 0.2 INJECTION, SOLUTION INTRAMUSCULAR; INTRAVENOUS; SUBCUTANEOUS at 19:21

## 2025-01-28 RX ADMIN — HEPARIN SODIUM 5000 UNITS: 5000 INJECTION INTRAVENOUS; SUBCUTANEOUS at 21:19

## 2025-01-28 RX ADMIN — CEFEPIME 2000 MG: 2 INJECTION, POWDER, FOR SOLUTION INTRAVENOUS at 18:35

## 2025-01-28 RX ADMIN — HYDROCODONE BITARTRATE AND ACETAMINOPHEN 1 TABLET: 5; 325 TABLET ORAL at 23:54

## 2025-01-28 RX ADMIN — GABAPENTIN 100 MG: 100 CAPSULE ORAL at 21:06

## 2025-01-28 RX ADMIN — INSULIN GLARGINE 10 UNITS: 100 INJECTION, SOLUTION SUBCUTANEOUS at 21:19

## 2025-01-28 RX ADMIN — SODIUM CHLORIDE, SODIUM GLUCONATE, SODIUM ACETATE, POTASSIUM CHLORIDE, MAGNESIUM CHLORIDE, SODIUM PHOSPHATE, DIBASIC, AND POTASSIUM PHOSPHATE 150 ML/HR: .53; .5; .37; .037; .03; .012; .00082 INJECTION, SOLUTION INTRAVENOUS at 22:18

## 2025-01-28 RX ADMIN — INSULIN LISPRO 2 UNITS: 100 INJECTION, SOLUTION INTRAVENOUS; SUBCUTANEOUS at 21:26

## 2025-01-28 RX ADMIN — MORPHINE SULFATE 2 MG: 2 INJECTION, SOLUTION INTRAMUSCULAR; INTRAVENOUS at 21:15

## 2025-01-28 NOTE — PROGRESS NOTES
Name: Lizzy Acuna      : 1975      MRN: [id number]  Encounter Provider: Leopoldo Ritchie DPM  Encounter Date: 2025   [facility]  :  Assessment & Plan  Post-operative state    Orders:    XR foot 3+ vw left; Future    Dehiscence of operative wound, initial encounter         Cellulitis of foot         Abscess of foot         -Will place her in a hospital, her x-rays that were taken were reviewed and do not show any substantial changes that would be concerning for underlying osteomyelitis.  -She is incredibly painful, I would recommend admission to the hospital for workup for repeat infection of the left lower extremity, on admission she would benefit from wound culture, repeat MRI  -She did have a previous arterial study prior to intervention on , with her postoperative course at this point I would recommend repeat arterial studies  -From a dressing standpoint I would recommend a Betadine wet-to-dry dressing with daily changes for right now  - Following the above imaging, I will plan to take her for operative debridement and likely wound VAC placement, if there is a substantial bioburden versus beginnings of failure of the flap we may need to pursue hyperbaric oxygen therapy on an outpatient basis as well.  -I discussed the limb and life threatening nature of this potential infection          History of Present Illness   HPI  Lizzy Acuna is a 49 y.o. female who presents evaluation management of her left foot, she is having severe worsening drainage, and severe worsening pain.  She denies that her blood sugars are spiking but she tended to walk on this as directed in cam boot and had severe pain she notes a lot of drainage after walking on it.  And is here for reassess      Review of Systems   Constitutional:  Negative for chills and fever.   HENT:  Negative for ear pain and sore throat.    Eyes:  Negative for pain and visual  "disturbance.   Respiratory:  Negative for cough and shortness of breath.    Cardiovascular:  Negative for chest pain and palpitations.   Gastrointestinal:  Negative for abdominal pain and vomiting.   Genitourinary:  Negative for dysuria and hematuria.   Musculoskeletal:  Negative for arthralgias and back pain.   Skin:  Negative for color change and rash.   Neurological:  Negative for seizures and syncope.   All other systems reviewed and are negative.         Objective   Ht 5' 1\" (1.549 m)   LMP  (LMP Unknown)   BMI 34.96 kg/m²      Physical Exam  Skin:     Comments: She still is having what I would grade his hypersensitivity, but there is new onset erythema to the periwound but there is severe worsening drainage that does have some malodor and is serosanguineous, there is no trish purulence but I can probe nearly her across the entirety of her TMA site, considering that we are this far out from surgery, she should be much further along and there is probably a substantial amount of devitalized tissue or hematoma versus abscess in the left foot           "

## 2025-01-28 NOTE — ED PROVIDER NOTES
Time reflects when diagnosis was documented in both MDM as applicable and the Disposition within this note       Time User Action Codes Description Comment    1/28/2025  7:10 PM Tree Irby Add [E11.621,  L97.509] Diabetic foot ulcer (HCC)     1/28/2025  7:11 PM Tree Irby [A41.9] Sepsis (HCC)           ED Disposition       ED Disposition   Admit    Condition   Stable    Date/Time   Tue Jan 28, 2025  7:09 PM    Comment   Case was discussed with WILTON and the patient's admission status was agreed to be Admission Status: inpatient status to the service of Dr. Laura.               Assessment & Plan       Medical Decision Making  49-year-old female sent in by her podiatrist for worsening foot wound after recent TMA of the left foot.  Patient with severe worsening pain.  Septic workup along inflammatory markers drawn and started on Vanco cefepime and admitted to medicine for further evaluation with podiatry.    Amount and/or Complexity of Data Reviewed  Labs: ordered.    Risk  Prescription drug management.  Decision regarding hospitalization.             Medications   vancomycin (VANCOCIN) 1,250 mg in sodium chloride 0.9 % 250 mL IVPB (has no administration in time range)   cefepime (MAXIPIME) 2 g/50 mL dextrose IVPB (0 mg Intravenous Stopped 1/28/25 1905)   HYDROmorphone HCl (DILAUDID) injection 0.2 mg (0.2 mg Intravenous Given 1/28/25 1921)       ED Risk Strat Scores                          SBIRT 22yo+      Flowsheet Row Most Recent Value   Initial Alcohol Screen: US AUDIT-C     1. How often do you have a drink containing alcohol? 0 Filed at: 01/28/2025 1750   2. How many drinks containing alcohol do you have on a typical day you are drinking?  0 Filed at: 01/28/2025 1750   3a. Male UNDER 65: How often do you have five or more drinks on one occasion? 0 Filed at: 01/28/2025 1750   Audit-C Score 0 Filed at: 01/28/2025 1750   BANDAR: How many times in the past year have you...    Used an illegal drug or used  "a prescription medication for non-medical reasons? Never Filed at: 2025 1750                            History of Present Illness       Chief Complaint   Patient presents with    Wound Check     According to the patient, she was sent by podiatry for a wound check on the left foot.       Past Medical History:   Diagnosis Date    Advanced maternal age in multigravida, second trimester 2016    Transitioned From: Advanced maternal age in multigravida, first trimester      Back pain     used 2 percocet tid until current admission in 2017    Bone spur     in back per pt    Chronic hypertension with superimposed preeclampsia 2017    Depression     Diabetes mellitus (HCC)     Elevated BP without diagnosis of hypertension     \"with pain\"    Foot injury     Full dentures     does not wear    GERD (gastroesophageal reflux disease)     occas    Gestational diabetes     insulin dependent    Herniated cervical disc     Herniated lumbar intervertebral disc     History of pneumonia     Hx of degenerative disc disease     Hyperlipidemia     Obesity     Pre-eclampsia     Prior pregnancy with fetal demise     previous demise at 36 weeks    Toe pain     and foot pain      Past Surgical History:   Procedure Laterality Date    BACK SURGERY       SECTION      IR LOWER EXTREMITY ANGIOGRAM  2024    LAMINECTOMY      with disc removal, last assessed 16    OTHER SURGICAL HISTORY      Right ovary removal 2005 per pt recall at [facility] facillity    SC AMPUTATION FOOT TRANSMETARSAL Left 1/3/2025    Procedure: AMPUTATION TRANSMETATARSAL (TMA);  Surgeon: Leopoldo Ritchie DPM;  Location: [facility];  Service: Podiatry    SC  DELIVERY ONLY N/A 02/15/2017    Procedure:  SECTION ();  Surgeon: Tito Umaña MD;  Location: [facility];  Service: Obstetrics    SC LIG/TRNSXJ FALOPIAN TUBE  DEL/ABDML SURG Left 02/15/2017    Procedure: LIGATION/COAGULATION TUBAL;  " Surgeon: Tito Umaña MD;  Location: [facility];  Service: Obstetrics    VASCULAR SURGERY  12/2024    WISDOM TOOTH EXTRACTION        Family History   Problem Relation Age of Onset    Diabetes Mother     COPD Mother     Heart disease Mother     Stroke Father     Heart disease Father       Social History     Tobacco Use    Smoking status: Former     Current packs/day: 0.50     Types: Cigarettes    Smokeless tobacco: Never    Tobacco comments:     Per pt last cigarette 12/13/24--quit cold turkey   Vaping Use    Vaping status: Never Used   Substance Use Topics    Alcohol use: Yes     Comment: 1-2 drinks a year    Drug use: Not Currently      E-Cigarette/Vaping    E-Cigarette Use Never User       E-Cigarette/Vaping Substances    Nicotine No     THC No     CBD No     Flavoring No     Other No     Unknown No       I have reviewed and agree with the history as documented.     49-year-old female presenting to ED for reports of wound dehiscence from recent TMA of left foot.  Patient with severe worsening pain.  Denies any other systemic symptoms or issues.      Wound Check         Review of Systems   Musculoskeletal:  Positive for arthralgias.   All other systems reviewed and are negative.          Objective       ED Triage Vitals   Temperature Pulse Blood Pressure Respirations SpO2 Patient Position - Orthostatic VS   01/28/25 1748 01/28/25 1748 01/28/25 1748 01/28/25 1748 01/28/25 1748 01/28/25 1748   97.7 °F (36.5 °C) (!) 114 143/72 20 98 % Lying      Temp Source Heart Rate Source BP Location FiO2 (%) Pain Score    01/28/25 1748 01/28/25 1900 01/28/25 1748 -- 01/28/25 1748    Tympanic Monitor Right arm  8      Vitals      Date and Time Temp Pulse SpO2 Resp BP Pain Score FACES Pain Rating User   01/28/25 1921 -- -- -- -- -- 8 -- [facility]   01/28/25 1900 -- 104 96 % 23 135/58 -- -- [facility]   01/28/25 1748 97.7 °F (36.5 °C) 114 98 % 20 143/72 8 -- [facility]            Physical Exam  Vitals and nursing note reviewed.   Constitutional:        General: She is not in acute distress.     Appearance: She is well-developed. She is not diaphoretic.   HENT:      Head: Normocephalic and atraumatic.      Right Ear: External ear normal.      Left Ear: External ear normal.      Nose: Nose normal.   Eyes:      General: No scleral icterus.        Right eye: No discharge.         Left eye: No discharge.      Conjunctiva/sclera: Conjunctivae normal.      Pupils: Pupils are equal, round, and reactive to light.   Neck:      Vascular: No JVD.      Trachea: No tracheal deviation.   Cardiovascular:      Rate and Rhythm: Normal rate and regular rhythm.      Heart sounds: Normal heart sounds. No murmur heard.     No friction rub. No gallop.   Pulmonary:      Effort: Pulmonary effort is normal. No respiratory distress.      Breath sounds: Normal breath sounds. No stridor. No wheezing or rales.   Abdominal:      General: Bowel sounds are normal. There is no distension.      Palpations: Abdomen is soft. There is no mass.      Tenderness: There is no abdominal tenderness. There is no guarding.   Musculoskeletal:         General: Tenderness and deformity present. Normal range of motion.      Cervical back: Normal range of motion and neck supple.      Comments: Left prior TMA   Skin:     General: Skin is warm and dry.      Coloration: Skin is not pale.      Findings: No erythema or rash.   Neurological:      Mental Status: She is alert and oriented to person, place, and time.      Cranial Nerves: No cranial nerve deficit.      Sensory: No sensory deficit.      Motor: No abnormal muscle tone.   Psychiatric:         Behavior: Behavior normal.         Thought Content: Thought content normal.         Judgment: Judgment normal.         Results Reviewed       Procedure Component Value Units Date/Time    Comprehensive metabolic panel [[id number]]  (Abnormal) Collected: 01/28/25 1820    Lab Status: Final result Specimen: Blood from Arm, Right Updated: 01/28/25 1851     Sodium 129 mmol/L       Potassium 4.0 mmol/L      Chloride 91 mmol/L      CO2 25 mmol/L      ANION GAP 13 mmol/L      BUN 20 mg/dL      Creatinine 0.88 mg/dL      Glucose 316 mg/dL      Calcium 9.9 mg/dL      AST 14 U/L      ALT 20 U/L      Alkaline Phosphatase 106 U/L      Total Protein 8.3 g/dL      Albumin 3.8 g/dL      Total Bilirubin 0.38 mg/dL      eGFR 77 ml/min/1.73sq m     Narrative:      National Kid[employer] Disease Foundation guidelines for Chronic Kidney Disease (CKD):     Stage 1 with normal or high GFR (GFR > 90 mL/min/1.73 square meters)    Stage 2 Mild CKD (GFR = 60-89 mL/min/1.73 square meters)    Stage 3A Moderate CKD (GFR = 45-59 mL/min/1.73 square meters)    Stage 3B Moderate CKD (GFR = 30-44 mL/min/1.73 square meters)    Stage 4 Severe CKD (GFR = 15-29 mL/min/1.73 square meters)    Stage 5 End Stage CKD (GFR <15 mL/min/1.73 square meters)  Note: GFR calculation is accurate only with a steady state creatinine    C-reactive protein [[id number]]  (Abnormal) Collected: 01/28/25 1820    Lab Status: Final result Specimen: Blood from Arm, Right Updated: 01/28/25 1851     CRP 95.2 mg/L     Lactic acid [[id number]]  (Abnormal) Collected: 01/28/25 1820    Lab Status: Final result Specimen: Blood from Arm, Right Updated: 01/28/25 1849     LACTIC ACID 3.0 mmol/L     Narrative:      Result may be elevated if tourniquet was used during collection.    Lactic acid 2 Hours [[id number]]     Lab Status: No result Specimen: Blood     Protime-INR [[id number]]  (Normal) Collected: 01/28/25 1820    Lab Status: Final result Specimen: Blood from Arm, Right Updated: 01/28/25 1847     Protime 13.1 seconds      INR 0.95    Narrative:      INR Therapeutic Range    Indication                                             INR Range      Atrial Fibrillation                                               2.0-3.0  Hypercoagulable State                                    2.0.2.3  Left Ventricular Asist Device                            2.0-3.0  Mechanical Heart  Valve                                  -    Aortic(with afib, MI, embolism, HF, LA enlargement,    and/or coagulopathy)                                     2.0-3.0 (2.5-3.5)     Mitral                                                             2.5-3.5  Prosthetic/Bioprosthetic Heart Valve               2.0-3.0  Venous thromboembolism (VTE: VT, PE        2.0-3.0    APTT [[id number]]  (Normal) Collected: 01/28/25 1820    Lab Status: Final result Specimen: Blood from Arm, Right Updated: 01/28/25 1847     PTT 33 seconds     Sedimentation rate, automated [[id number]]  (Abnormal) Collected: 01/28/25 1820    Lab Status: Final result Specimen: Blood from Arm, Right Updated: 01/28/25 1835     Sed Rate 104 mm/hour     CBC and differential [[id number]]  (Abnormal) Collected: 01/28/25 1820    Lab Status: Final result Specimen: Blood from Arm, Right Updated: 01/28/25 1832     WBC 10.48 Thousand/uL      RBC 3.82 Million/uL      Hemoglobin 10.8 g/dL      Hematocrit 33.7 %      MCV 88 fL      MCH 28.3 pg      MCHC 32.0 g/dL      RDW 13.2 %      MPV 9.2 fL      Platelets 267 Thousands/uL      nRBC 0 /100 WBCs      Segmented % 74 %      Immature Grans % 1 %      Lymphocytes % 20 %      Monocytes % 5 %      Eosinophils Relative 0 %      Basophils Relative 0 %      Absolute Neutrophils 7.75 Thousands/µL      Absolute Immature Grans 0.09 Thousand/uL      Absolute Lymphocytes 2.10 Thousands/µL      Absolute Monocytes 0.49 Thousand/µL      Eosinophils Absolute 0.04 Thousand/µL      Basophils Absolute 0.01 Thousands/µL     Procalcitonin [[id number]] Collected: 01/28/25 1820    Lab Status: In process Specimen: Blood from Arm, Right Updated: 01/28/25 1830    Blood culture #2 [[id number]] Collected: 01/28/25 1820    Lab Status: In process Specimen: Blood from Arm, Left Updated: 01/28/25 1830    Blood culture #1 [[id number]] Collected: 01/28/25 1820    Lab Status: In process Specimen: Blood from Arm, Right Updated: 01/28/25 1830            No  orders to display       CriticalCare Time    Date/Time: 1/28/2025 7:22 PM    Performed by: Tree Irby DO  Authorized by: Tree Irby DO    Critical care provider statement:     Critical care time (minutes):  35    Critical care time was exclusive of:  Separately billable procedures and treating other patients and teaching time    Critical care was necessary to treat or prevent imminent or life-threatening deterioration of the following conditions:  Sepsis    Critical care was time spent personally by me on the following activities:  Blood draw for specimens, obtaining history from patient or surrogate, development of treatment plan with patient or surrogate, evaluation of patient's response to treatment, examination of patient, discussions with consultants, review of old charts, re-evaluation of patient's condition, ordering and review of radiographic studies, ordering and review of laboratory studies, ordering and performing treatments and interventions and interpretation of cardiac output measurements    I assumed direction of critical care for this patient from another provider in my specialty: no        ED Medication and Procedure Management   Prior to Admission Medications   Prescriptions Last Dose Informant Patient Reported? Taking?   Alcohol Swabs 70 % PADS  Self No No   Sig: May substitute brand based on insurance coverage. Check glucose TID.   Blood Glucose Monitoring Suppl (OneTouch Verio Reflect) w/Device KIT  Self No No   Sig: May substitute brand based on insurance coverage. Check glucose TID.   HYDROmorphone (DILAUDID) 4 mg tablet  Self Yes No   Insulin Glargine Solostar (Lantus SoloStar) 100 UNIT/ML SOPN  Self No No   Sig: Inject 0.1 mL (10 Units total) under the skin daily at bedtime   Insulin Pen Needle (BD Pen Needle Ana 2nd Gen) 32G X 4 MM MISC  Self No No   Sig: For use with insulin pen. Pharmacy may dispense brand covered by insurance.   Insulin Pen Needle (BD Pen Needle Ana  2nd Gen) 32G X 4 MM MISC  Self No No   Sig: For use with insulin pen. Pharmacy may dispense brand covered by insurance.   OneTouch Delica Lancets 33G MISC  Self No No   Sig: May substitute brand based on insurance coverage. Check glucose TID.   acetaminophen (TYLENOL) 325 mg tablet  Self No No   Sig: Take 3 tablets (975 mg total) by mouth every 6 (six) hours   Patient not taking: Reported on 1/23/2025   aspirin (ECOTRIN LOW STRENGTH) 81 mg EC tablet  Self No No   Sig: Take 1 tablet (81 mg total) by mouth daily   atorvastatin (LIPITOR) 80 mg tablet  Self No No   Sig: Take 1 tablet (80 mg total) by mouth daily with dinner   diphenhydrAMINE (BENADRYL) 25 mg capsule  Self Yes No   Sig: Take 25 mg by mouth every 6 (six) hours as needed for allergies   gabapentin (Neurontin) 300 mg capsule  Self No No   Sig: Take 1 tablet in the morning, 1 in the afternoon and 2 tablets at night.   glucose blood (OneTouch Verio) test strip  Self No No   Sig: May substitute brand based on insurance coverage. Check glucose TID.   ibuprofen (MOTRIN) 600 mg tablet  Self Yes No   Sig: Take by mouth every 6 (six) hours as needed for mild pain   insulin aspart (NovoLOG FlexPen) 100 UNIT/ML injection pen  Self No No   Sig: Inject 5 Units under the skin 3 (three) times a day with meals   methocarbamol (ROBAXIN) 500 mg tablet  Self No No   Sig: Take 1 tablet (500 mg total) by mouth every 6 (six) hours   naloxone (NARCAN) 4 mg/0.1 mL nasal spray  Self No No   Sig: Administer 1 spray into a nostril. If no response after 2-3 minutes, give another dose in the other nostril using a new spray.   senna-docusate sodium (SENOKOT S) 8.6-50 mg per tablet  Self No No   Sig: Take 1 tablet by mouth daily at bedtime   Patient not taking: Reported on 1/28/2025      Facility-Administered Medications: None     Patient's Medications   Discharge Prescriptions    No medications on file     No discharge procedures on file.  ED SEPSIS DOCUMENTATION   Time reflects when  diagnosis was documented in both MDM as applicable and the Disposition within this note       Time User Action Codes Description Comment    1/28/2025  7:10 PM Tree Irby [E11.621,  L97.509] Diabetic foot ulcer (HCC)     1/28/2025  7:11 PM Tree Irby [A41.9] Sepsis (HCC)                  Tree Irby DO  01/28/25 1922

## 2025-01-29 ENCOUNTER — ANESTHESIA EVENT (INPATIENT)
Dept: PERIOP | Facility: HOSPITAL | Age: 50
DRG: 721 | End: 2025-01-29
Payer: COMMERCIAL

## 2025-01-29 ENCOUNTER — APPOINTMENT (INPATIENT)
Dept: NON INVASIVE DIAGNOSTICS | Facility: HOSPITAL | Age: 50
DRG: 721 | End: 2025-01-29
Payer: COMMERCIAL

## 2025-01-29 ENCOUNTER — APPOINTMENT (INPATIENT)
Dept: MRI IMAGING | Facility: HOSPITAL | Age: 50
DRG: 721 | End: 2025-01-29
Payer: COMMERCIAL

## 2025-01-29 ENCOUNTER — ANESTHESIA (INPATIENT)
Dept: PERIOP | Facility: HOSPITAL | Age: 50
DRG: 721 | End: 2025-01-29
Payer: COMMERCIAL

## 2025-01-29 LAB
ANION GAP SERPL CALCULATED.3IONS-SCNC: 9 MMOL/L (ref 4–13)
APTT PPP: 31 SECONDS (ref 23–34)
APTT PPP: 47 SECONDS (ref 23–34)
BUN SERPL-MCNC: 17 MG/DL (ref 5–25)
CALCIUM SERPL-MCNC: 9.3 MG/DL (ref 8.4–10.2)
CHLORIDE SERPL-SCNC: 95 MMOL/L (ref 96–108)
CO2 SERPL-SCNC: 26 MMOL/L (ref 21–32)
CREAT SERPL-MCNC: 0.68 MG/DL (ref 0.6–1.3)
ERYTHROCYTE [DISTWIDTH] IN BLOOD BY AUTOMATED COUNT: 13.2 % (ref 11.6–15.1)
ERYTHROCYTE [DISTWIDTH] IN BLOOD BY AUTOMATED COUNT: 13.4 % (ref 11.6–15.1)
GFR SERPL CREATININE-BSD FRML MDRD: 103 ML/MIN/1.73SQ M
GLUCOSE SERPL-MCNC: 152 MG/DL (ref 65–140)
GLUCOSE SERPL-MCNC: 188 MG/DL (ref 65–140)
GLUCOSE SERPL-MCNC: 200 MG/DL (ref 65–140)
GLUCOSE SERPL-MCNC: 203 MG/DL (ref 65–140)
GLUCOSE SERPL-MCNC: 208 MG/DL (ref 65–140)
GLUCOSE SERPL-MCNC: 221 MG/DL (ref 65–140)
HCT VFR BLD AUTO: 27.5 % (ref 34.8–46.1)
HCT VFR BLD AUTO: 30.6 % (ref 34.8–46.1)
HGB BLD-MCNC: 8.9 G/DL (ref 11.5–15.4)
HGB BLD-MCNC: 9.7 G/DL (ref 11.5–15.4)
INR PPP: 0.95 (ref 0.85–1.19)
MCH RBC QN AUTO: 27.8 PG (ref 26.8–34.3)
MCH RBC QN AUTO: 28.9 PG (ref 26.8–34.3)
MCHC RBC AUTO-ENTMCNC: 31.7 G/DL (ref 31.4–37.4)
MCHC RBC AUTO-ENTMCNC: 32.4 G/DL (ref 31.4–37.4)
MCV RBC AUTO: 88 FL (ref 82–98)
MCV RBC AUTO: 89 FL (ref 82–98)
PLATELET # BLD AUTO: 198 THOUSANDS/UL (ref 149–390)
PLATELET # BLD AUTO: 219 THOUSANDS/UL (ref 149–390)
PMV BLD AUTO: 8.9 FL (ref 8.9–12.7)
PMV BLD AUTO: 9.1 FL (ref 8.9–12.7)
POTASSIUM SERPL-SCNC: 4 MMOL/L (ref 3.5–5.3)
PROCALCITONIN SERPL-MCNC: 0.25 NG/ML
PROTHROMBIN TIME: 13.2 SECONDS (ref 12.3–15)
RBC # BLD AUTO: 3.08 MILLION/UL (ref 3.81–5.12)
RBC # BLD AUTO: 3.49 MILLION/UL (ref 3.81–5.12)
SODIUM SERPL-SCNC: 130 MMOL/L (ref 135–147)
WBC # BLD AUTO: 9.13 THOUSAND/UL (ref 4.31–10.16)
WBC # BLD AUTO: 9.82 THOUSAND/UL (ref 4.31–10.16)

## 2025-01-29 PROCEDURE — 87070 CULTURE OTHR SPECIMN AEROBIC: CPT | Performed by: PODIATRIST

## 2025-01-29 PROCEDURE — 93926 LOWER EXTREMITY STUDY: CPT

## 2025-01-29 PROCEDURE — 80048 BASIC METABOLIC PNL TOTAL CA: CPT | Performed by: PHYSICIAN ASSISTANT

## 2025-01-29 PROCEDURE — 84145 PROCALCITONIN (PCT): CPT | Performed by: PHYSICIAN ASSISTANT

## 2025-01-29 PROCEDURE — 73718 MRI LOWER EXTREMITY W/O DYE: CPT

## 2025-01-29 PROCEDURE — 93926 LOWER EXTREMITY STUDY: CPT | Performed by: STUDENT IN AN ORGANIZED HEALTH CARE EDUCATION/TRAINING PROGRAM

## 2025-01-29 PROCEDURE — 99232 SBSQ HOSP IP/OBS MODERATE 35: CPT | Performed by: INTERNAL MEDICINE

## 2025-01-29 PROCEDURE — 82948 REAGENT STRIP/BLOOD GLUCOSE: CPT

## 2025-01-29 PROCEDURE — 36415 COLL VENOUS BLD VENIPUNCTURE: CPT | Performed by: PHYSICIAN ASSISTANT

## 2025-01-29 PROCEDURE — 85730 THROMBOPLASTIN TIME PARTIAL: CPT | Performed by: INTERNAL MEDICINE

## 2025-01-29 PROCEDURE — 87186 SC STD MICRODIL/AGAR DIL: CPT | Performed by: PODIATRIST

## 2025-01-29 PROCEDURE — 99255 IP/OBS CONSLTJ NEW/EST HI 80: CPT | Performed by: SURGERY

## 2025-01-29 PROCEDURE — 85027 COMPLETE CBC AUTOMATED: CPT | Performed by: INTERNAL MEDICINE

## 2025-01-29 PROCEDURE — 93922 UPR/L XTREMITY ART 2 LEVELS: CPT | Performed by: STUDENT IN AN ORGANIZED HEALTH CARE EDUCATION/TRAINING PROGRAM

## 2025-01-29 PROCEDURE — 87076 CULTURE ANAEROBE IDENT EACH: CPT | Performed by: PODIATRIST

## 2025-01-29 PROCEDURE — 85027 COMPLETE CBC AUTOMATED: CPT | Performed by: PHYSICIAN ASSISTANT

## 2025-01-29 PROCEDURE — 87205 SMEAR GRAM STAIN: CPT | Performed by: PODIATRIST

## 2025-01-29 PROCEDURE — 85610 PROTHROMBIN TIME: CPT | Performed by: INTERNAL MEDICINE

## 2025-01-29 PROCEDURE — 87147 CULTURE TYPE IMMUNOLOGIC: CPT | Performed by: PODIATRIST

## 2025-01-29 PROCEDURE — 0Y9N0ZZ DRAINAGE OF LEFT FOOT, OPEN APPROACH: ICD-10-PCS | Performed by: PODIATRIST

## 2025-01-29 PROCEDURE — 87075 CULTR BACTERIA EXCEPT BLOOD: CPT | Performed by: PODIATRIST

## 2025-01-29 RX ORDER — MAGNESIUM HYDROXIDE 1200 MG/15ML
LIQUID ORAL AS NEEDED
Status: DISCONTINUED | OUTPATIENT
Start: 2025-01-29 | End: 2025-01-29 | Stop reason: HOSPADM

## 2025-01-29 RX ORDER — LIDOCAINE HYDROCHLORIDE 10 MG/ML
INJECTION, SOLUTION EPIDURAL; INFILTRATION; INTRACAUDAL; PERINEURAL AS NEEDED
Status: DISCONTINUED | OUTPATIENT
Start: 2025-01-29 | End: 2025-01-29 | Stop reason: HOSPADM

## 2025-01-29 RX ORDER — PROPOFOL 10 MG/ML
INJECTION, EMULSION INTRAVENOUS CONTINUOUS PRN
Status: DISCONTINUED | OUTPATIENT
Start: 2025-01-29 | End: 2025-01-29

## 2025-01-29 RX ORDER — HEPARIN SODIUM 1000 [USP'U]/ML
6400 INJECTION, SOLUTION INTRAVENOUS; SUBCUTANEOUS ONCE
Status: COMPLETED | OUTPATIENT
Start: 2025-01-29 | End: 2025-01-29

## 2025-01-29 RX ORDER — GABAPENTIN 300 MG/1
300 CAPSULE ORAL
Status: DISCONTINUED | OUTPATIENT
Start: 2025-01-29 | End: 2025-01-31 | Stop reason: HOSPADM

## 2025-01-29 RX ORDER — SODIUM CHLORIDE, SODIUM LACTATE, POTASSIUM CHLORIDE, CALCIUM CHLORIDE 600; 310; 30; 20 MG/100ML; MG/100ML; MG/100ML; MG/100ML
INJECTION, SOLUTION INTRAVENOUS CONTINUOUS PRN
Status: DISCONTINUED | OUTPATIENT
Start: 2025-01-29 | End: 2025-01-29

## 2025-01-29 RX ORDER — ACETAMINOPHEN 10 MG/ML
1000 INJECTION, SOLUTION INTRAVENOUS ONCE
Status: COMPLETED | OUTPATIENT
Start: 2025-01-29 | End: 2025-01-29

## 2025-01-29 RX ORDER — GABAPENTIN 300 MG/1
300 CAPSULE ORAL 3 TIMES DAILY
Status: DISCONTINUED | OUTPATIENT
Start: 2025-01-29 | End: 2025-01-31 | Stop reason: HOSPADM

## 2025-01-29 RX ORDER — ONDANSETRON 2 MG/ML
4 INJECTION INTRAMUSCULAR; INTRAVENOUS ONCE AS NEEDED
Status: DISCONTINUED | OUTPATIENT
Start: 2025-01-29 | End: 2025-01-29 | Stop reason: HOSPADM

## 2025-01-29 RX ORDER — LIDOCAINE HYDROCHLORIDE 10 MG/ML
INJECTION, SOLUTION EPIDURAL; INFILTRATION; INTRACAUDAL; PERINEURAL AS NEEDED
Status: DISCONTINUED | OUTPATIENT
Start: 2025-01-29 | End: 2025-01-29

## 2025-01-29 RX ORDER — HEPARIN SODIUM 1000 [USP'U]/ML
3200 INJECTION, SOLUTION INTRAVENOUS; SUBCUTANEOUS EVERY 6 HOURS PRN
Status: DISCONTINUED | OUTPATIENT
Start: 2025-01-29 | End: 2025-01-31 | Stop reason: HOSPADM

## 2025-01-29 RX ORDER — HEPARIN SODIUM 10000 [USP'U]/100ML
3-30 INJECTION, SOLUTION INTRAVENOUS
Status: DISCONTINUED | OUTPATIENT
Start: 2025-01-29 | End: 2025-01-31 | Stop reason: HOSPADM

## 2025-01-29 RX ORDER — SODIUM CHLORIDE, SODIUM GLUCONATE, SODIUM ACETATE, POTASSIUM CHLORIDE, MAGNESIUM CHLORIDE, SODIUM PHOSPHATE, DIBASIC, AND POTASSIUM PHOSPHATE .53; .5; .37; .037; .03; .012; .00082 G/100ML; G/100ML; G/100ML; G/100ML; G/100ML; G/100ML; G/100ML
100 INJECTION, SOLUTION INTRAVENOUS CONTINUOUS
Status: DISPENSED | OUTPATIENT
Start: 2025-01-29 | End: 2025-01-29

## 2025-01-29 RX ORDER — FENTANYL CITRATE/PF 50 MCG/ML
25 SYRINGE (ML) INJECTION
Status: DISCONTINUED | OUTPATIENT
Start: 2025-01-29 | End: 2025-01-29 | Stop reason: HOSPADM

## 2025-01-29 RX ORDER — HYDROMORPHONE HCL/PF 1 MG/ML
0.5 SYRINGE (ML) INJECTION ONCE
Refills: 0 | Status: COMPLETED | OUTPATIENT
Start: 2025-01-29 | End: 2025-01-29

## 2025-01-29 RX ORDER — HEPARIN SODIUM 1000 [USP'U]/ML
6400 INJECTION, SOLUTION INTRAVENOUS; SUBCUTANEOUS EVERY 6 HOURS PRN
Status: DISCONTINUED | OUTPATIENT
Start: 2025-01-29 | End: 2025-01-31 | Stop reason: HOSPADM

## 2025-01-29 RX ORDER — FENTANYL CITRATE 50 UG/ML
INJECTION, SOLUTION INTRAMUSCULAR; INTRAVENOUS AS NEEDED
Status: DISCONTINUED | OUTPATIENT
Start: 2025-01-29 | End: 2025-01-29

## 2025-01-29 RX ADMIN — INSULIN LISPRO 2 UNITS: 100 INJECTION, SOLUTION INTRAVENOUS; SUBCUTANEOUS at 07:23

## 2025-01-29 RX ADMIN — INSULIN LISPRO 1 UNITS: 100 INJECTION, SOLUTION INTRAVENOUS; SUBCUTANEOUS at 17:14

## 2025-01-29 RX ADMIN — METHOCARBAMOL 500 MG: 500 TABLET ORAL at 11:26

## 2025-01-29 RX ADMIN — PROPOFOL 100 MCG/KG/MIN: 10 INJECTION, EMULSION INTRAVENOUS at 14:47

## 2025-01-29 RX ADMIN — VANCOMYCIN HYDROCHLORIDE 1000 MG: 1 INJECTION, SOLUTION INTRAVENOUS at 08:22

## 2025-01-29 RX ADMIN — VANCOMYCIN HYDROCHLORIDE 1000 MG: 1 INJECTION, SOLUTION INTRAVENOUS at 20:20

## 2025-01-29 RX ADMIN — ATORVASTATIN CALCIUM 80 MG: 80 TABLET, FILM COATED ORAL at 16:42

## 2025-01-29 RX ADMIN — SODIUM CHLORIDE, SODIUM GLUCONATE, SODIUM ACETATE, POTASSIUM CHLORIDE, MAGNESIUM CHLORIDE, SODIUM PHOSPHATE, DIBASIC, AND POTASSIUM PHOSPHATE 100 ML/HR: .53; .5; .37; .037; .03; .012; .00082 INJECTION, SOLUTION INTRAVENOUS at 10:34

## 2025-01-29 RX ADMIN — LIDOCAINE HYDROCHLORIDE 50 MG: 10 INJECTION, SOLUTION EPIDURAL; INFILTRATION; INTRACAUDAL; PERINEURAL at 14:47

## 2025-01-29 RX ADMIN — CEFEPIME 2000 MG: 2 INJECTION, POWDER, FOR SOLUTION INTRAVENOUS at 07:20

## 2025-01-29 RX ADMIN — INSULIN GLARGINE 10 UNITS: 100 INJECTION, SOLUTION SUBCUTANEOUS at 22:12

## 2025-01-29 RX ADMIN — INSULIN LISPRO 2 UNITS: 100 INJECTION, SOLUTION INTRAVENOUS; SUBCUTANEOUS at 11:28

## 2025-01-29 RX ADMIN — METHOCARBAMOL 500 MG: 500 TABLET ORAL at 23:17

## 2025-01-29 RX ADMIN — GABAPENTIN 300 MG: 300 CAPSULE ORAL at 22:13

## 2025-01-29 RX ADMIN — SODIUM CHLORIDE, SODIUM LACTATE, POTASSIUM CHLORIDE, AND CALCIUM CHLORIDE: .6; .31; .03; .02 INJECTION, SOLUTION INTRAVENOUS at 14:39

## 2025-01-29 RX ADMIN — HEPARIN SODIUM 5000 UNITS: 5000 INJECTION INTRAVENOUS; SUBCUTANEOUS at 06:25

## 2025-01-29 RX ADMIN — HEPARIN SODIUM 18 UNITS/KG/HR: 10000 INJECTION, SOLUTION INTRAVENOUS at 16:33

## 2025-01-29 RX ADMIN — MORPHINE SULFATE 2 MG: 2 INJECTION, SOLUTION INTRAMUSCULAR; INTRAVENOUS at 12:14

## 2025-01-29 RX ADMIN — GABAPENTIN 300 MG: 300 CAPSULE ORAL at 16:42

## 2025-01-29 RX ADMIN — HYDROCODONE BITARTRATE AND ACETAMINOPHEN 1 TABLET: 5; 325 TABLET ORAL at 06:24

## 2025-01-29 RX ADMIN — MORPHINE SULFATE 2 MG: 2 INJECTION, SOLUTION INTRAMUSCULAR; INTRAVENOUS at 22:12

## 2025-01-29 RX ADMIN — HEPARIN SODIUM 3200 UNITS: 1000 INJECTION INTRAVENOUS; SUBCUTANEOUS at 23:18

## 2025-01-29 RX ADMIN — ASPIRIN 81 MG: 81 TABLET, COATED ORAL at 09:16

## 2025-01-29 RX ADMIN — FENTANYL CITRATE 50 MCG: 50 INJECTION INTRAMUSCULAR; INTRAVENOUS at 14:53

## 2025-01-29 RX ADMIN — METHOCARBAMOL 500 MG: 500 TABLET ORAL at 17:14

## 2025-01-29 RX ADMIN — MORPHINE SULFATE 2 MG: 2 INJECTION, SOLUTION INTRAMUSCULAR; INTRAVENOUS at 23:17

## 2025-01-29 RX ADMIN — GABAPENTIN 300 MG: 300 CAPSULE ORAL at 22:12

## 2025-01-29 RX ADMIN — GABAPENTIN 100 MG: 100 CAPSULE ORAL at 09:16

## 2025-01-29 RX ADMIN — ACETAMINOPHEN 1000 MG: 1000 INJECTION, SOLUTION INTRAVENOUS at 13:56

## 2025-01-29 RX ADMIN — MORPHINE SULFATE 2 MG: 2 INJECTION, SOLUTION INTRAMUSCULAR; INTRAVENOUS at 02:33

## 2025-01-29 RX ADMIN — HEPARIN SODIUM 6400 UNITS: 1000 INJECTION, SOLUTION INTRAVENOUS; SUBCUTANEOUS at 16:25

## 2025-01-29 RX ADMIN — HYDROMORPHONE HYDROCHLORIDE 0.5 MG: 1 INJECTION, SOLUTION INTRAMUSCULAR; INTRAVENOUS; SUBCUTANEOUS at 03:07

## 2025-01-29 RX ADMIN — METHOCARBAMOL 500 MG: 500 TABLET ORAL at 02:35

## 2025-01-29 RX ADMIN — INSULIN LISPRO 2 UNITS: 100 INJECTION, SOLUTION INTRAVENOUS; SUBCUTANEOUS at 22:14

## 2025-01-29 RX ADMIN — CEFEPIME 2000 MG: 2 INJECTION, POWDER, FOR SOLUTION INTRAVENOUS at 20:00

## 2025-01-29 RX ADMIN — FENTANYL CITRATE 50 MCG: 50 INJECTION INTRAMUSCULAR; INTRAVENOUS at 14:44

## 2025-01-29 RX ADMIN — HEPARIN SODIUM 20 UNITS/KG/HR: 10000 INJECTION, SOLUTION INTRAVENOUS at 23:26

## 2025-01-29 NOTE — PLAN OF CARE
Problem: PAIN - ADULT  Goal: Verbalizes/displays adequate comfort level or baseline comfort level  Description: Interventions:  - Encourage patient to monitor pain and request assistance  - Assess pain using appropriate pain scale  - Administer analgesics based on type and severity of pain and evaluate response  - Implement non-pharmacological measures as appropriate and evaluate response  - Consider cultural and social influences on pain and pain management  - Notify physician/advanced practitioner if interventions unsuccessful or patient reports new pain  Outcome: Progressing     Problem: NEUROSENSORY - ADULT  Goal: Achieves maximal functionality and self care  Description: INTERVENTIONS  - Monitor swallowing and airway patency with patient fatigue and changes in neurological status  - Encourage and assist patient to increase activity and self care.   - Encourage visually impaired, hearing impaired and aphasic patients to use assistive/communication devices  Outcome: Progressing     Problem: MUSCULOSKELETAL - ADULT  Goal: Maintain or return mobility to safest level of function  Description: INTERVENTIONS:  - Assess patient's ability to carry out ADLs; assess patient's baseline for ADL function and identify physical deficits which impact ability to perform ADLs (bathing, care of mouth/teeth, toileting, grooming, dressing, etc.)  - Assess/evaluate cause of self-care deficits   - Assess range of motion  - Assess patient's mobility  - Assess patient's need for assistive devices and provide as appropriate  - Encourage maximum independence but intervene and supervise when necessary  - Involve family in performance of ADLs  - Assess for home care needs following discharge   - Consider OT consult to assist with ADL evaluation and planning for discharge  - Provide patient education as appropriate  Outcome: Progressing

## 2025-01-29 NOTE — ASSESSMENT & PLAN NOTE
Placed on [facility] step 2 diet  Encourage healthy weight loss and supportive care

## 2025-01-29 NOTE — CONSULTS
"Consultation - Vascular Surgery   Name: Lizzy Acuna 49 y.o. female I MRN: [id number]  Unit/Bed#: ED 25 I Date of Admission: 1/28/2025   Date of Service: 1/29/2025 I Hospital Day: 1   Inpatient consult to Vascular Surgery  Consult performed by: Catalina Casillas PA-C  Consult ordered by: Luz Maria Shore MD        Physician Requesting Evaluation: Luz Maria Shore, *   Reason for Evaluation / Principal Problem: PAD    Assessment & Plan  Diabetic foot infection  (HCC)  49 y.o. F w/ PMH of T2DM (A1c 10.3), tobacco use (quit Dec 2024) who was previously seen at [facility] in December with wound of left foot with cellulitis.   During previous admission vascular surgery was consulted and recommended LLE agram w/ intervention which was preformed by IR. Post-procedure pt underwent TMA with podiatry. She now presents from podiatry office with LLE wound breakdown.   Vascular surgery consulted this admission for worsening vascular studies s/p angiogram     Afeb, VSS  Leukocytosis resolved 10.4 (9.8)    1/29 MRI \"1. No findings to suggest acute osteomyelitis.  2. 6.5 x 0.9 x 1.7 cm fluid collection within the dorsal soft tissues just distal to the resection site. This spans the width of the foot from medial to lateral. Differential considerations include postoperative seroma/hematoma although abscess is also   in the differential in the proper clinical setting.\"    1/29 LEADs: RIGHT LOWER LIMB:  Ankle/Brachial index: 0.84, which is in the moderate disease category. (Prior  0.91)  PVR/ PPG tracings are dampened.  Metatarsal pressure of 109 mmHg  Great toe pressure of 72 mmHg, within the healing range     LEFT LOWER LIMB:  Evaluation shows a segment occlusion in the proximal and mid superficial artery  and stent and a >75% stenosis in the distal superficial femoral artery. There is  a 50-75% stenosis in the tibio-peroneal trunk. Diffuse disease throughout the  remaining femoro-popliteal and tibio-peroneal " "segments.  Ankle/Brachial index: 0.27 which is in the rest pain/tissue loss disease  category but may be unreliable due to unobtainable PT (Prior 0.59)  PVR/ PPG tracings are dampened.  Metatarsal pressure and Great toe pressure not obtained due to amputation/pain.\"    Plan:   - Discussed with Dr. Wilson, on call vascular surgeon, there is now occlusion of SFA stent. After discussion between vascular and IR, patient will need to be transferred to [facility] for intervention as IR does not have instrumentation at [facility] to open occluded stent   - wound care per podiatry   - NPO, added on for I&D with podiatry today   - Discussed indications for transfer with Dr. Ritchie of podiatry   - Communicated indications for transfer with primary team   Sepsis due to cellulitis (HCC)  POA aeb tachycardia, tachypnea  Source: left foot wound   Tobacco abuse  Patient reports she quit smoking after her hospitalization in December  Diabetes mellitus type 2 with complications (HCC)  Lab Results   Component Value Date    HGBA1C 10.3 (H) 12/13/2024       Recent Labs     01/28/25  2118 01/29/25  0719 01/29/25  1126   POCGLU 228* 208* 221*       Blood Sugar Average: Last 72 hrs:  (P) 219    Obesity (BMI 30.0-34.9)    Please contact the SecureChat role for the Vascular Surgery service with any questions/concerns.    History of Present Illness   Lizzy Acuna is a 49 y.o. female w/ PMH of T2DM, tobacco use (quit in Dec 2024) who was previously seen at [facility] in December with diabetic wound of left foot with cellulitis. During previous admission vascular surgery was consulted and recommended LLE agram w/ intervention which was preformed by IR. Post-procedure pt underwent TMA with podiatry. She now presents from podiatry office with LLE wound breakdown. Patient states she was doing well until last week when she started to bear weight on LLE. On Friday she had onset of severe pain and by her appointment with podiatry on Tuesday her TMA site had " opened. Denies fever/chills.   MRI obtained and negative for osteomyelitis but significant for fluid collection in L foot. WBC elevated at 10.4 on admission. No documented fever.     Review of Systems   Constitutional:  Negative for chills.   HENT:  Negative for congestion.    Respiratory:  Negative for shortness of breath.    Cardiovascular:  Negative for chest pain.   Gastrointestinal:  Negative for abdominal pain.   Musculoskeletal:  Positive for gait problem.        Left foot pain    Skin:  Positive for wound.   Neurological:  Negative for dizziness and light-headedness.   Psychiatric/Behavioral:  Negative for confusion.      I have reviewed the patient's PMH, PSH, Social History, Family History, Meds, and Allergies    Objective :  Temp:  [97.7 °F (36.5 °C)] 97.7 °F (36.5 °C)  HR:  [] 98  BP: (135-144)/(58-72) 144/71  Resp:  [20-24] 24  SpO2:  [96 %-98 %] 96 %  O2 Device: None (Room air)    I/O         01/27 0701  01/28 0700 01/28 0701  01/29 0700 01/29 0701  01/30 0700    I.V. (mL/kg)   1000 (11.9)    IV Piggyback  300.1 250    Total Intake(mL/kg)  300.1 (3.6) 1250 (14.9)    Net  +300.1 +1250                   Physical Exam  Vitals and nursing note reviewed.   Constitutional:       General: She is awake.      Appearance: She is obese. She is not ill-appearing, toxic-appearing or diaphoretic.   Cardiovascular:      Rate and Rhythm: Normal rate.      Pulses:           Dorsalis pedis pulses are detected w/ Doppler on the right side and detected w/ Doppler on the left side.        Posterior tibial pulses are detected w/ Doppler on the right side and 0 on the left side.   Pulmonary:      Effort: No respiratory distress.   Musculoskeletal:      Right lower leg: No edema.      Left lower leg: No edema.   Feet:      Comments: S/p L TMA  Dressing C/D/I    Skin:     General: Skin is warm and dry.   Neurological:      General: No focal deficit present.      Mental Status: She is alert and oriented to person, place,  and time.   Psychiatric:         Mood and Affect: Mood normal.         Behavior: Behavior is cooperative.            Lab Results: I have reviewed the following results:  Recent Labs     01/28/25  1820 01/28/25  2113 01/29/25  0530   WBC 10.48*  --  9.82   HGB 10.8*  --  9.7*   HCT 33.7*  --  30.6*     --  219   SODIUM 129*  --  130*   K 4.0  --  4.0   CL 91*  --  95*   CO2 25  --  26   BUN 20  --  17   CREATININE 0.88  --  0.68   GLUC 316*  --  203*   AST 14  --   --    ALT 20  --   --    ALB 3.8  --   --    TBILI 0.38  --   --    ALKPHOS 106*  --   --    PTT 33  --   --    INR 0.95  --   --    LACTICACID 3.0* 1.1  --        Imaging Results Review: No pertinent imaging studies reviewed.  Other Study Results Review: No additional pertinent studies reviewed.    VTE Prophylaxis: VTE covered by:  heparin (porcine), Subcutaneous, 5,000 Units at 01/29/25 0625       Catalina Casillas, PAC  01/29/25

## 2025-01-29 NOTE — ASSESSMENT & PLAN NOTE
Patient with left foot diabetic wound, noted to have tachycardia and tachypnea on admission  MRI with fluid collection within dorsal soft tissue  Podiatry tentatively planning for surgical debridement today  Continue IV antibiotics  Arterial study did not show arterial abnormalities, vascular team to be consulted  IV fluids as needed   Follow-up cultures   Monitor infectious parameters

## 2025-01-29 NOTE — ASSESSMENT & PLAN NOTE
Lab Results   Component Value Date    HGBA1C 10.3 (H) 12/13/2024       Recent Labs     01/28/25 2118 01/29/25  0719   POCGLU 228* 208*       Blood Sugar Average: Last 72 hrs:  (P) 218  Placed on CCH type II diet  Continue prehospital Lantus 10 units SQ nightly  Obtain Accu-Cheks before meals and at bedtime with Humalog correction dose before meals and at bedtime

## 2025-01-29 NOTE — ASSESSMENT & PLAN NOTE
Patient states that she stopped smoking after her last admission  Is declining nicotine replacement at this time

## 2025-01-29 NOTE — CONSULTS
[facility] Podiatry - Consultation    Patient Information:   Lizzy Acuna 49 y.o. female MRN: [id number]  Unit/Bed#: ED 25 Encounter: [id number]  PCP: NAOMY Basilio  Date of Admission:  1/28/2025  Date of Consultation: 01/29/25  Requesting Physician: Luz Maria Shore, *      ASSESSMENT:    Lizzy Acuna is a 49 y.o. female with:    Abscess of the left foot  Cellulitis left foot  Ischemic pain left foot  New onset stenosis left foot  Uncontrolled diabetes    PLAN:    Arterial studies were reviewed and show new onset blockage of the entirety of his stent, I have reviewed vascular's note and I discussed this with the PA from vascular surgery as well, they are going to plan repeat intervention and possible transfer, but I do the severity of her presentation with the extremely large fluid collection present we will plan for incision and drainage and attempted wound VAC management to decompress the area to avoid further tissue death and infection  She has been n.p.o. today, and we will plan for incision and drainage and wound VAC placement today. following this we will hold any further procedures until she is revascularized per vascular  Rest of care per primary team.      SUBJECTIVE    History of Present Illness:    Lizzy Acuna is a 49 y.o. female with past medical history of TMA who presents with severe worsening pain drainage etc. from her left foot.  Doing overall poorly.     Review of Systems:    Constitutional: Negative.    HENT: Negative.    Eyes: Negative.    Respiratory: Negative.    Cardiovascular: Negative.    Gastrointestinal: Negative.    Musculoskeletal: as aboved   Skin:as above   Neurological: severe pain of the amputation site.    Psych: Negative.     Past Medical and Surgical History:     Past Medical History:   Diagnosis Date    Advanced maternal age in multigravida, second trimester 11/30/2016    Transitioned From: Advanced maternal age in multigravida, first trimester      Back  "pain     used 2 percocet tid until current admission in 2017    Bone spur     in back per pt    Chronic hypertension with superimposed preeclampsia 2017    Depression     Diabetes mellitus (HCC)     Elevated BP without diagnosis of hypertension     \"with pain\"    Foot injury     Full dentures     does not wear    GERD (gastroesophageal reflux disease)     occas    Gestational diabetes     insulin dependent    Herniated cervical disc     Herniated lumbar intervertebral disc     History of pneumonia     Hx of degenerative disc disease     Hyperlipidemia     Obesity     Pre-eclampsia     Prior pregnancy with fetal demise     previous demise at 36 weeks    Toe pain     and foot pain       Past Surgical History:   Procedure Laterality Date    BACK SURGERY       SECTION      IR LOWER EXTREMITY ANGIOGRAM  2024    LAMINECTOMY      with disc removal, last assessed 16    OTHER SURGICAL HISTORY      Right ovary removal 2005 per pt recall at [facility]    NC AMPUTATION FOOT TRANSMETARSAL Left 1/3/2025    Procedure: AMPUTATION TRANSMETATARSAL (TMA);  Surgeon: Leopoldo Ritchie DPM;  Location: [facility] MAIN OR;  Service: Podiatry    NC  DELIVERY ONLY N/A 02/15/2017    Procedure:  SECTION ();  Surgeon: Tito Umaña MD;  Location: [facility] LD;  Service: Obstetrics    NC LIG/TRNSXJ FALOPIAN TUBE  DEL/ABDML SURG Left 02/15/2017    Procedure: LIGATION/COAGULATION TUBAL;  Surgeon: Tito Umaña MD;  Location: [facility] ;  Service: Obstetrics    VASCULAR SURGERY  2024    WISDOM TOOTH EXTRACTION         Meds/Allergies:    Not in a hospital admission.    Allergies   Allergen Reactions    Codeine Other (See Comments)     Aggression-and nasty--\"took a cough syrup with codeine as a child\"       Social History:     Marital Status: Unknown    Substance Use History:   Social History     Substance and Sexual Activity   Alcohol Use Yes    Comment: 1-2 drinks a year " "    Social History     Tobacco Use   Smoking Status Former    Current packs/day: 0.50    Types: Cigarettes   Smokeless Tobacco Never   Tobacco Comments    Per pt last cigarette 12/13/24--quit cold turkey     Social History     Substance and Sexual Activity   Drug Use Not Currently       Family History:    Family History   Problem Relation Age of Onset    Diabetes Mother     COPD Mother     Heart disease Mother     Stroke Father     Heart disease Father          OBJECTIVE:    Vitals:   Blood Pressure: 144/71 (01/29/25 0533)  Pulse: 98 (01/29/25 0533)  Temperature: 97.7 °F (36.5 °C) (01/29/25 0533)  Temp Source: Tympanic (01/28/25 1748)  Respirations: (!) 24 (01/29/25 0533)  Height: 5' 1\" (154.9 cm) (01/28/25 1748)  Weight - Scale: 83.9 kg (185 lb) (01/28/25 1748)  SpO2: 96 % (01/29/25 0533)    Physical Exam:     General Appearance: Alert, cooperative, no distress.  HEENT: Head normocephalic, atraumatic, without obvious abnormality.  Heart: Normal rate and rhythm.  Lungs: Non-labored breathing. No respiratory distress.  Abdomen: Without distension.  Psychiatric: AAOx3  Lower Extremity:  Vascular:   Dressing left intact  Additional Data:     Lab Results: I have personally reviewed pertinent labs including:    Results from last 7 days   Lab Units 01/29/25  0530 01/28/25  1820   WBC Thousand/uL 9.82 10.48*   HEMOGLOBIN g/dL 9.7* 10.8*   HEMATOCRIT % 30.6* 33.7*   PLATELETS Thousands/uL 219 267   SEGS PCT %  --  74   LYMPHO PCT %  --  20   MONO PCT %  --  5   EOS PCT %  --  0     Results from last 7 days   Lab Units 01/29/25  0530 01/28/25  1820   POTASSIUM mmol/L 4.0 4.0   CHLORIDE mmol/L 95* 91*   CO2 mmol/L 26 25   BUN mg/dL 17 20   CREATININE mg/dL 0.68 0.88   CALCIUM mg/dL 9.3 9.9   ALK PHOS U/L  --  106*   ALT U/L  --  20   AST U/L  --  14     Results from last 7 days   Lab Units 01/28/25  1820   INR  0.95       Cultures: I have personally reviewed pertinent cultures including:    Results from last 7 days   Lab " "Units 01/28/25  1820   BLOOD CULTURE  Received in Microbiology Lab. Culture in Progress.  Received in Microbiology Lab. Culture in Progress.           Imaging: I have personally reviewed pertinent films in PACS.  EKG, Pathology, and Other Studies: I have personally reviewed pertinent reports.        ** Please Note: Portions of the record may have been created with voice recognition software. Occasional wrong word or \"sound a like\" substitutions may have occurred due to the inherent limitations of voice recognition software. Read the chart carefully and recognize, using context, where substitutions have occurred. **    "

## 2025-01-29 NOTE — CASE MANAGEMENT
Case Management Assessment & Discharge Planning Note    Patient name Lizzy Acuna  Location ED 25/ED 25 MRN [id number]  : 1975 Date 2025       Current Admission Date: 2025  Current Admission Diagnosis:Sepsis due to cellulitis (HCC)   Patient Active Problem List    Diagnosis Date Noted Date Diagnosed    Type II diabetes mellitus (HCC) 2025     Diabetic foot infection  (HCC) 2025     Dehiscence of operative wound, initial encounter 2025     Hypersensitivity, initial encounter 2025     GERD (gastroesophageal reflux disease)      Cellulitis of left foot 2024     Gangrene of left foot (HCC) 2024     Sepsis due to cellulitis (HCC) 2018     Diabetes mellitus type 2 with complications (HCC) 2018     Hyponatremia 2018     Chronic sciatica 2018     Tobacco abuse 02/15/2017     Obesity (BMI 30.0-34.9) 2017     Chronic low back pain 2016     Chronic, continuous use of opioids 2016     Proteinuria 2011       LOS (days): 1  Geometric Mean LOS (GMLOS) (days):   Days to GMLOS:     OBJECTIVE:  PATIENT READMITTED TO HOSPITAL  Risk of Unplanned Readmission Score: 20.9         Current admission status: Inpatient  Referral Reason: Other (Discharge planning)    Preferred Pharmacy:   [address] [address]  Phone: [phone] Fax: [phone]    Primary Care Provider: NAOMY Basilio    Primary Insurance: [employer]  Secondary Insurance:     ASSESSMENT:  Active Health Care Proxies    There are no active Health Care Proxies on file.       Advance Directives  Does patient have a Health Care POA?: No  Was patient offered paperwork?: Yes  Does patient currently have a Health Care decision maker?: No  Does patient have Advance Directives?: No  Was patient offered paperwork?: Yes  Primary Contact: Paige Johansen - Mother         Readmission Root  Cause  30 Day Readmission: No    Patient Information  Admitted from:: Home  Mental Status: Alert  During Assessment patient was accompanied by: Not accompanied during assessment  Assessment information provided by:: Patient  Primary Caregiver: Self  Support Systems: Self, Daughter, Parent  County of Residence: [facility] do you live in?: [place]  Home entry access options. Select all that apply.: Ramp  Type of Current Residence: 2 story home  Upon entering residence, is there a bedroom on the main floor (no further steps)?: No  A bedroom is located on the following floor levels of residence (select all that apply):: 2nd Floor  Upon entering residence, is there a bathroom on the main floor (no further steps)?: Yes  Number of steps to 2nd floor from main floor: One Flight  Living Arrangements: Lives w/ Parent(s), Lives w/ Children  Is patient a ?: No    Activities of Daily Living Prior to Admission  Functional Status: Independent  Completes ADLs independently?: Yes  Ambulates independently?: Yes  Does patient use assisted devices?: No  Does patient currently own DME?: Yes  What DME does the patient currently own?: Walker, Straight Cane  Does patient have a history of Outpatient Therapy (PT/OT)?: No  Does the patient have a history of Short-Term Rehab?: No  Does patient have a history of HHC?: No  Does patient currently have HHC?: No         Patient Information Continued  Income Source: Unemployed  Does patient have prescription coverage?: Yes  Does patient receive dialysis treatments?: No  Does patient have a history of substance abuse?: No  Does patient have a history of Mental Health Diagnosis?: No         Means of Transportation  Means of Transport to Appts:: Drives Self          DISCHARGE DETAILS:    Discharge planning discussed with:: patient  Freedom of Choice: Yes  Comments - Freedom of Choice: Pt has no idenitfied CM discharge needs at this time. Pt will return home with family when stable. CM  to follow as needed.                     Requested Home Health Care         Is the patient interested in HHC at discharge?: No    DME Referral Provided  Referral made for DME?: No              Treatment Team Recommendation: Home  Discharge Destination Plan:: Home  Transport at Discharge : Family

## 2025-01-29 NOTE — ASSESSMENT & PLAN NOTE
Lab Results   Component Value Date    HGBA1C 10.3 (H) 12/13/2024       Recent Labs     01/28/25 2118   POCGLU 228*       Blood Sugar Average: Last 72 hrs:  (P) 228  Placed on CCH type II diet  Continue prehospital Lantus 10 units SQ nightly  Obtain Accu-Cheks before meals and at bedtime with Humalog correction dose before meals and at bedtime

## 2025-01-29 NOTE — PROGRESS NOTES
Progress Note - Hospitalist   Name: Lizzy Acuna 49 y.o. female I MRN: [id number]  Unit/Bed#: ED 25 I Date of Admission: 1/28/2025   Date of Service: 1/29/2025 I Hospital Day: 1    Assessment & Plan  Sepsis due to cellulitis (HCC)  Patient with left foot diabetic wound, noted to have tachycardia and tachypnea on admission  MRI with fluid collection within dorsal soft tissue  Podiatry tentatively planning for surgical debridement today  Continue IV antibiotics  Arterial study did not show arterial abnormalities, vascular team to be consulted  IV fluids as needed   Follow-up cultures   Monitor infectious parameters  Diabetic foot infection  (HCC)  Patient is status post left TMA on 1/3/2025  MRI and vascular study results appreciated  Podiatry and vascular team consulted  Further treatment as above  Hyponatremia  Patient has history of chronic hyponatremia  Component of pseudohyponatremia  Sodium level gradually improving  Monitor BMP as needed  Obesity (BMI 30.0-34.9)  Placed on [facility] step 2 diet  Encourage healthy weight loss and supportive care  Tobacco abuse  Patient states that she stopped smoking after her last admission  Is declining nicotine replacement at this time  Diabetes mellitus type 2 with complications (HCC)  Lab Results   Component Value Date    HGBA1C 10.3 (H) 12/13/2024       Recent Labs     01/28/25  2118 01/29/25  0719   POCGLU 228* 208*       Blood Sugar Average: Last 72 hrs:  (P) 218  Placed on [facility] type II diet  Continue prehospital Lantus 10 units SQ nightly  Obtain Accu-Cheks before meals and at bedtime with Humalog correction dose before meals and at bedtime    VTE Pharmacologic Prophylaxis: VTE Score: 3 Moderate Risk (Score 3-4) - Pharmacological DVT Prophylaxis Ordered: heparin.    Mobility:   Basic Mobility Inpatient Raw Score: 21  JH-HLM Goal: 6: Walk 10 steps or more  JH-HLM Achieved: 4: Move to chair/commode  JH-HLM Goal achieved. Continue to encourage appropriate mobility.    Patient  Centered Rounds: I performed bedside rounds with nursing staff today.   Discussions with Specialists or Other Care Team Provider: yes    Education and Discussions with Family / Patient:  Updated patient regarding plan of care.     Current Length of Stay: 1 day(s)  Current Patient Status: Inpatient   Certification Statement: The patient will continue to require additional inpatient hospital stay due to diabetic foot wound  Discharge Plan: Anticipate discharge in 48-72 hrs to discharge location to be determined pending rehab evaluations.    Code Status: Level 1 - Full Code    Subjective   No overnight events noted    Objective :  Temp:  [97.7 °F (36.5 °C)] 97.7 °F (36.5 °C)  HR:  [] 98  BP: (135-144)/(58-72) 144/71  Resp:  [20-24] 24  SpO2:  [96 %-98 %] 96 %  O2 Device: None (Room air)    Body mass index is 34.96 kg/m².     Input and Output Summary (last 24 hours):     Intake/Output Summary (Last 24 hours) at 1/29/2025 1121  Last data filed at 1/29/2025 1033  Gross per 24 hour   Intake 1550.05 ml   Output --   Net 1550.05 ml       Physical Exam  Constitutional:       General: She is not in acute distress.  HENT:      Head: Normocephalic and atraumatic.      Nose: Nose normal.      Mouth/Throat:      Mouth: Mucous membranes are moist.   Eyes:      Extraocular Movements: Extraocular movements intact.      Conjunctiva/sclera: Conjunctivae normal.   Cardiovascular:      Rate and Rhythm: Normal rate and regular rhythm.   Pulmonary:      Effort: Pulmonary effort is normal. No respiratory distress.   Abdominal:      Palpations: Abdomen is soft.      Tenderness: There is no abdominal tenderness.   Musculoskeletal:         General: Normal range of motion.      Cervical back: Normal range of motion and neck supple.   Skin:     General: Skin is warm and dry.      Comments: Left foot wound with dressing in place   Neurological:      General: No focal deficit present.      Mental Status: She is alert. Mental status is at  baseline.      Cranial Nerves: No cranial nerve deficit.   Psychiatric:         Mood and Affect: Mood normal.         Behavior: Behavior normal.       Lines/Drains:        Lab Results: I have reviewed the following results:   Results from last 7 days   Lab Units 01/29/25  0530 01/28/25  1820   WBC Thousand/uL 9.82 10.48*   HEMOGLOBIN g/dL 9.7* 10.8*   HEMATOCRIT % 30.6* 33.7*   PLATELETS Thousands/uL 219 267   SEGS PCT %  --  74   LYMPHO PCT %  --  20   MONO PCT %  --  5   EOS PCT %  --  0     Results from last 7 days   Lab Units 01/29/25  0530 01/28/25  1820   SODIUM mmol/L 130* 129*   POTASSIUM mmol/L 4.0 4.0   CHLORIDE mmol/L 95* 91*   CO2 mmol/L 26 25   BUN mg/dL 17 20   CREATININE mg/dL 0.68 0.88   ANION GAP mmol/L 9 13   CALCIUM mg/dL 9.3 9.9   ALBUMIN g/dL  --  3.8   TOTAL BILIRUBIN mg/dL  --  0.38   ALK PHOS U/L  --  106*   ALT U/L  --  20   AST U/L  --  14   GLUCOSE RANDOM mg/dL 203* 316*     Results from last 7 days   Lab Units 01/28/25  1820   INR  0.95     Results from last 7 days   Lab Units 01/29/25  0719 01/28/25  2118   POC GLUCOSE mg/dl 208* 228*         Results from last 7 days   Lab Units 01/29/25  0530 01/28/25  2113 01/28/25  1820   LACTIC ACID mmol/L  --  1.1 3.0*   PROCALCITONIN ng/ml 0.25  --  0.22       Recent Cultures (last 7 days):   Results from last 7 days   Lab Units 01/28/25  1820   BLOOD CULTURE  Received in Microbiology Lab. Culture in Progress.  Received in Microbiology Lab. Culture in Progress.       Imaging Results Review: No pertinent imaging studies reviewed.  Other Study Results Review: No additional pertinent studies reviewed.    Last 24 Hours Medication List:     Current Facility-Administered Medications:     acetaminophen (TYLENOL) tablet 650 mg, Q6H PRN    aspirin (ECOTRIN LOW STRENGTH) EC tablet 81 mg, Daily    atorvastatin (LIPITOR) tablet 80 mg, Daily With Dinner    cefepime (MAXIPIME) 2 g/50 mL dextrose IVPB, Q12H, Last Rate: Stopped (01/29/25 3325)    diphenhydrAMINE  (BENADRYL) tablet 25 mg, Q6H PRN    gabapentin (NEURONTIN) capsule 100 mg, TID    heparin (porcine) subcutaneous injection 5,000 Units, Q8H JORGE    HYDROcodone-acetaminophen (NORCO) 5-325 mg per tablet 1 tablet, Q6H PRN    insulin glargine (LANTUS) subcutaneous injection 10 Units 0.1 mL, HS    insulin lispro (HumALOG/ADMELOG) 100 units/mL subcutaneous injection 1-6 Units, TID AC **AND** Fingerstick Glucose (POCT), TID AC    insulin lispro (HumALOG/ADMELOG) 100 units/mL subcutaneous injection 1-6 Units, HS    methocarbamol (ROBAXIN) tablet 500 mg, Q6H JORGE    morphine injection 2 mg, Q4H PRN    multi-electrolyte (PLASMALYTE-A/ISOLYTE-S PH 7.4) IV solution, Continuous, Last Rate: 100 mL/hr (01/29/25 1034)    ondansetron (ZOFRAN) injection 4 mg, Q6H PRN    vancomycin (VANCOCIN) IVPB (premix in dextrose) 1,000 mg 200 mL, Q12H, Last Rate: Stopped (01/29/25 0922)    Administrative Statements   Today, Patient Was Seen By: Luz Maria Shore MD      **Please Note: This note may have been constructed using a voice recognition system.**

## 2025-01-29 NOTE — ASSESSMENT & PLAN NOTE
Patient is status post left TMA on 1/3/2025  MRI and vascular study results appreciated  Podiatry and vascular team consulted  Further treatment as above

## 2025-01-29 NOTE — PROGRESS NOTES
Lizzy Acuna is a 49 y.o. female who is currently ordered Vancomycin IV with management by the Pharmacy Consult service.  Relevant clinical data and objective / subjective history reviewed.  Vancomycin Assessment:  Indication and Goal AUC/Trough: Bone/joint infection (goal -600, trough >10)  Clinical Status: stable  Micro:   Cx pending  Renal Function:  SCr: 0.88 mg/dL  CrCl: 76.8 mL/min  Renal replacement: Not on dialysis  Days of Therapy: 1  Current Dose: new start  Vancomycin Plan:  New Dosin mg LD then 1000 mg IV q12  Estimated AUC: 537 mcg*hr/mL  Estimated Trough: 15.9 mcg/mL  Next Level: 1/30 am  Renal Function Monitoring: Daily BMP and UOP  Pharmacy will continue to follow closely for s/sx of nephrotoxicity, infusion reactions and appropriateness of therapy.  BMP and CBC will be ordered per protocol. We will continue to follow the patient’s culture results and clinical progress daily.    Angel Perez, Pharmacist

## 2025-01-29 NOTE — ASSESSMENT & PLAN NOTE
Lab Results   Component Value Date    HGBA1C 10.3 (H) 12/13/2024       Recent Labs     01/28/25  2118 01/29/25  0719 01/29/25  1126   POCGLU 228* 208* 221*       Blood Sugar Average: Last 72 hrs:  (P) 219

## 2025-01-29 NOTE — ANESTHESIA POSTPROCEDURE EVALUATION
Post-Op Assessment Note    CV Status:  Stable  Pain Score: 0    Pain management: adequate       Mental Status:  Alert and awake   Hydration Status:  Euvolemic   PONV Controlled:  Controlled   Airway Patency:  Patent     Post Op Vitals Reviewed: Yes    No anethesia notable event occurred.    Staff: Anesthesiologist, CRNA           Last Filed PACU Vitals:  Vitals Value Taken Time   Temp 97.7 °F (36.5 °C) 01/29/25 1515   Pulse 84 01/29/25 1543   BP 88/51 01/29/25 1541   Resp 23 01/29/25 1543   SpO2 95 % 01/29/25 1542   Vitals shown include unfiled device data.    Modified Cali:     Vitals Value Taken Time   Activity 2 01/29/25 1540   Respiration 2 01/29/25 1540   Circulation 2 01/29/25 1540   Consciousness 2 01/29/25 1540   Oxygen Saturation 2 01/29/25 1540     Modified Cali Score: 10

## 2025-01-29 NOTE — ASSESSMENT & PLAN NOTE
Placed on [facility] step 2 diet  Encourage healthy weight loss and supportive care

## 2025-01-29 NOTE — PROGRESS NOTES
Lizzy Acuna is a 49 y.o. female who is currently ordered Vancomycin IV with management by the Pharmacy Consult service.  Relevant clinical data and objective / subjective history reviewed.  Vancomycin Assessment:  Indication and Goal AUC/Trough: Bone/joint infection (goal -600, trough >10), -600, trough >10  Clinical Status: stable  Micro:   pending  Renal Function:  SCr: 0.68 mg/dL  CrCl: 99.3 mL/min  Renal replacement: Not on dialysis  Days of Therapy: 2  Current Dose: 1g q12  Vancomycin Plan:  New Dosing: continue 1gq12  Estimated AUC: 426 mcg*hr/mL  Estimated Trough: 11.4 mcg/mL  Next Level: 1/30 6am  Renal Function Monitoring: Daily BMP and UOP  Pharmacy will continue to follow closely for s/sx of nephrotoxicity, infusion reactions and appropriateness of therapy.  BMP and CBC will be ordered per protocol. We will continue to follow the patient’s culture results and clinical progress daily.    Shirley Hernandez, Pharmacist

## 2025-01-29 NOTE — ANESTHESIA POSTPROCEDURE EVALUATION
Post-Op Assessment Note    CV Status:  Stable  Pain Score: 0    Pain management: adequate       Mental Status:  Awake and sleepy   Hydration Status:  Euvolemic   PONV Controlled:  Controlled   Airway Patency:  Patent     Post Op Vitals Reviewed: Yes    No anethesia notable event occurred.    Staff: CRNA       Last Filed PACU Vitals:  Vitals Value Taken Time   Temp     Pulse     BP     Resp     SpO2

## 2025-01-29 NOTE — ASSESSMENT & PLAN NOTE
Admit to medicine  Trend lactic acid and procalcitonin  Give vancomycin 1250 mg IV every 12 hours with pharmacy to dose and cefepime 2 g IV every 12 hours  Obtain wound cultures  Hydrate with Isolyte at 125 cc/h  Patient met sepsis criteria and that she was tachycardic with a heart rate as high as 114, tachypneic with respiratory rate as high as 23 and a known source of infection being a left diabetic foot

## 2025-01-29 NOTE — ASSESSMENT & PLAN NOTE
Patient has history of chronic hyponatremia  Component of pseudohyponatremia  Sodium level gradually improving  Monitor BMP as needed

## 2025-01-29 NOTE — OP NOTE
OPERATIVE REPORT  PATIENT NAME: Lizzy Acuna    :  1975  MRN: [id number]  Pt Location: [facility] OR ROOM 03    SURGERY DATE: 2025    Surgeons and Role:     * Leopoldo Ritchie DPM - Primary    Preop Diagnosis:  Diabetic foot infection  (HCC) [E11.628, L08.9]    Post-Op Diagnosis Codes:     * Diabetic foot infection  (HCC) [E11.628, L08.9]    Procedure(s):  Left - INCISION AND DRAINAGE (I&D) EXTREMITY    Specimen(s):  ID Type Source Tests Collected by Time Destination   A :  Tissue Foot, Left ANAEROBIC CULTURE AND GRAM STAIN, CULTURE, TISSUE AND GRAM STAIN Leopoldo Ritchie DPM 2025 1507        Estimated Blood Loss:   Minimal    Drains:  * No LDAs found *    Anesthesia Type:   Conscious Sedation     Operative Indications:  Diabetic foot infection  (HCC) [E11.628, L08.9]      Operative Findings:  C/w dx severe abscess and large amount of necrotic tissue within the wound      Complications:   None    Procedure and Technique:  Patient was brought into the operating room and prepared for the procedure on her litter.  Timeout was then performed identify the correct patient location.  An ankle block was performed.  Attention was directed to the left TMA where purulent drainage was noted emanating out from the lateral aspect of the incisional site.    The TMA was then opened with manual pressure and a large amount of necrotic with infected hematoma and also abscess dorsally was noted.  There is some substantial malodor purulent drainage was identified.  Utilizing a combination of sterile 15 blade, ductal rondure, small rongeur and forceps this area was extensively debrided with removal of all nonviable tissue, bone appeared to be within normal limits.  The deficits of the TMA site measured 14 x 9 x 1 cm.  Intraoperative bleeding was below normal.  Copious Byers sterile saline and cystoscopy tubing were utilized to rinse the wound. aerobic and anaerobic soft tissue cultures were taken from the  wound following lavage    Wound VAC was deferred at this time, the wound was packed with 1 bottle of half-inch iodoform packing and covered with 4 fours, ABD, lightly wrapped Kerlix.         I was present for the entire procedure.    Patient Disposition:  PACU  and extubated and stable             SIGNATURE: Leopoldo Ritchie DPM  DATE: January 29, 2025  TIME: 3:11 PM

## 2025-01-29 NOTE — ASSESSMENT & PLAN NOTE
Patient has history of chronic hyponatremia  Component of pseudohyponatremia as corrected sodium is 134  Continue to monitor with repeat labs in a.m.

## 2025-01-29 NOTE — UTILIZATION REVIEW
NOTIFICATION OF INPATIENT ADMISSION   AUTHORIZATION REQUEST   SERVICING FACILITY:   [place] [address]  Tax ID: [id number]  NPI: [id number]   ATTENDING PROVIDER:  Attending Name and NPI#: Luz Maria Shore Md [[id number]]  Address: [address] [place] [address]  Phone: [phone]     ADMISSION INFORMATION:  Place of Service: Inpatient [facility]  Place of Service Code: 21  Inpatient Admission Date/Time: 1/28/25  7:11 PM  Discharge Date/Time: No discharge date for patient encounter.  Admitting Diagnosis Code/Description:  Diabetic foot ulcer (HCC) [E11.621, L97.509]  Cellulitis of left foot [L03.116]  Visit for wound check [Z51.89]  Diabetic foot infection  (HCC) [E11.628, L08.9]  Sepsis (HCC) [A41.9]     UTILIZATION REVIEW CONTACT:  Toma Cadet, Utilization   Network Utilization Review Department  Phone: [phone]  Fax: [phone]  Email: [email or web address]  Contact for approvals/pending authorizations, clinical reviews, and discharge.     PHYSICIAN ADVISORY SERVICES:  Medical Necessity Denial & Auxt-gv-Sxzj Review  Phone: [phone]  Fax: [phone]  Email: [email or web address]     DISCHARGE SUPPORT TEAM:  For Patients Discharge Needs & Updates  Phone: [phone] opt. 2 Fax: [phone]  Email: [email or web address]

## 2025-01-29 NOTE — H&P
H&P - Hospitalist   Name: Lizzy Acuna 49 y.o. female I MRN: [id number]  Unit/Bed#: ED 25 I Date of Admission: 1/28/2025   Date of Service: 1/28/2025 I Hospital Day: 0     Assessment & Plan  Sepsis due to cellulitis (HCC)  Admit to medicine  Trend lactic acid and procalcitonin  Give vancomycin 1250 mg IV every 12 hours with pharmacy to dose and cefepime 2 g IV every 12 hours  Obtain wound cultures  Hydrate with Isolyte at 125 cc/h  Patient met sepsis criteria and that she was tachycardic with a heart rate as high as 114, tachypneic with respiratory rate as high as 23 and a known source of infection being a left diabetic foot  Diabetic foot infection  (HCC)  Patient is status post left TMA on 1/3/2025  Obtain vascular studies and MRI in a.m.  Consult podiatry  Antibiotics as per above  In control and antipyretics as needed    Hyponatremia  Patient has history of chronic hyponatremia  Component of pseudohyponatremia as corrected sodium is 134  Continue to monitor with repeat labs in a.m.  Obesity (BMI 30.0-34.9)  Placed on [facility] step 2 diet  Encourage healthy weight loss and supportive care  Tobacco abuse  Patient states that she stopped smoking after her last admission  Is declining nicotine replacement at this time  Diabetes mellitus type 2 with complications ([facility])  Lab Results   Component Value Date    HGBA1C 10.3 (H) 12/13/2024       Recent Labs     01/28/25  2118   POCGLU 228*       Blood Sugar Average: Last 72 hrs:  (P) 228  Placed on [facility] type II diet  Continue prehospital Lantus 10 units SQ nightly  Obtain Accu-Cheks before meals and at bedtime with Humalog correction dose before meals and at bedtime      VTE Pharmacologic Prophylaxis: VTE Score: 3 Moderate Risk (Score 3-4) - Pharmacological DVT Prophylaxis Ordered: heparin.  Code Status: Level 1 - Full Code per patient  Discussion with family: Patient declined call to .     Anticipated Length of Stay: Patient will be admitted on an inpatient  basis with an anticipated length of stay of greater than 2 midnights secondary to sepsis secondary to diabetic left foot wound for IV antibiotics and further podiatry evaluation.    History of Present Illness   Chief Complaint: Left foot pain x 5 days    Lizzy Acuna is a 49 y.o. female with a PMH of nondependent diabetes who presents with left foot pain x 5 days.  Patient recently underwent left TMA January 3 evaluated by podiatry was cleared to walk she states that after resuming walking she was having increased pain, redness and discharge from her left foot.  Patient was seen in podiatry office today and was referred to the ER for admission for IV antibiotics, further workup and possible OR visit.  Patient denies any fever or chills, she does complain of pain to the point where it is difficult for her to ambulate and was noted to have an elevated blood sugar in the 300s in the ER.    At time of exam patient is resting comfortably, she does complain of some pain in her left foot and dressing applied earlier by podiatry is clean and dry.      Review of Systems   Constitutional:  Negative for chills and fever.   HENT:  Negative for congestion and sore throat.    Respiratory:  Negative for cough, shortness of breath and wheezing.    Cardiovascular:  Negative for chest pain and palpitations.   Gastrointestinal:  Negative for diarrhea, nausea and vomiting.   Genitourinary:  Negative for dysuria, frequency, hematuria and urgency.   Musculoskeletal:  Positive for gait problem. Negative for arthralgias and myalgias.   Skin:  Positive for wound.   Neurological:  Negative for dizziness, syncope, light-headedness and headaches.   All other systems reviewed and are negative.      Historical Information   Past Medical History:   Diagnosis Date    Advanced maternal age in multigravida, second trimester 11/30/2016    Transitioned From: Advanced maternal age in multigravida, first trimester      Back pain     used 2 percocet  "tid until current admission in 2017    Bone spur     in back per pt    Chronic hypertension with superimposed preeclampsia 2017    Depression     Diabetes mellitus (HCC)     Elevated BP without diagnosis of hypertension     \"with pain\"    Foot injury     Full dentures     does not wear    GERD (gastroesophageal reflux disease)     occas    Gestational diabetes     insulin dependent    Herniated cervical disc     Herniated lumbar intervertebral disc     History of pneumonia     Hx of degenerative disc disease     Hyperlipidemia     Obesity     Pre-eclampsia     Prior pregnancy with fetal demise     previous demise at 36 weeks    Toe pain     and foot pain     Past Surgical History:   Procedure Laterality Date    BACK SURGERY       SECTION      IR LOWER EXTREMITY ANGIOGRAM  2024    LAMINECTOMY      with disc removal, last assessed 16    OTHER SURGICAL HISTORY      Right ovary removal 2005 per pt recall at [facility] facillity    TX AMPUTATION FOOT TRANSMETARSAL Left 1/3/2025    Procedure: AMPUTATION TRANSMETATARSAL (TMA);  Surgeon: Leopoldo Ritchie DPM;  Location: [facility];  Service: Podiatry    TX  DELIVERY ONLY N/A 02/15/2017    Procedure:  SECTION ();  Surgeon: Tito Umaña MD;  Location: [facility] ;  Service: Obstetrics    TX LIG/TRNSXJ FALOPIAN TUBE  DEL/ABDML SURG Left 02/15/2017    Procedure: LIGATION/COAGULATION TUBAL;  Surgeon: Tito Umaña MD;  Location: [facility];  Service: Obstetrics    VASCULAR SURGERY  2024    WISDOM TOOTH EXTRACTION       Social History     Tobacco Use    Smoking status: Former     Current packs/day: 0.50     Types: Cigarettes    Smokeless tobacco: Never    Tobacco comments:     Per pt last cigarette 24--quit cold turkey   Vaping Use    Vaping status: Never Used   Substance and Sexual Activity    Alcohol use: Yes     Comment: 1-2 drinks a year    Drug use: Not Currently    Sexual activity: Not on " file     Comment: defer     E-Cigarette/Vaping    E-Cigarette Use Never User      E-Cigarette/Vaping Substances    Nicotine No     THC No     CBD No     Flavoring No     Other No     Unknown No      Family history non-contributory  Social History:  Marital Status: Unknown   Occupation: [employer]  Patient Pre-hospital Living Situation: Home  Patient Pre-hospital Level of Mobility: walks with walker  Patient Pre-hospital Diet Restrictions: Diabetic     Meds/Allergies   I have reviewed home medications with patient personally.  Prior to Admission medications    Medication Sig Start Date End Date Taking? Authorizing Provider   acetaminophen (TYLENOL) 325 mg tablet Take 3 tablets (975 mg total) by mouth every 6 (six) hours  Patient not taking: Reported on 1/23/2025 12/25/24   Bunny Rosado DO   Alcohol Swabs 70 % PADS May substitute brand based on insurance coverage. Check glucose TID. 12/25/24   Bunny Rosado DO   aspirin (ECOTRIN LOW STRENGTH) 81 mg EC tablet Take 1 tablet (81 mg total) by mouth daily 12/25/24   Bunny Rosado DO   atorvastatin (LIPITOR) 80 mg tablet Take 1 tablet (80 mg total) by mouth daily with dinner 12/25/24   Bunny Rosado DO   Blood Glucose Monitoring Suppl (OneTouch Verio Reflect) w/Device KIT May substitute brand based on insurance coverage. Check glucose TID. 12/25/24   Bunny Rosado DO   diphenhydrAMINE (BENADRYL) 25 mg capsule Take 25 mg by mouth every 6 (six) hours as needed for allergies    Historical Provider, MD   gabapentin (Neurontin) 300 mg capsule Take 1 tablet in the morning, 1 in the afternoon and 2 tablets at night. 1/24/25   Sarah Walker MD   glucose blood (OneTouch Verio) test strip May substitute brand based on insurance coverage. Check glucose TID. 12/25/24   Bunny Rosado DO   HYDROmorphone (DILAUDID) 4 mg tablet  12/30/24   Historical Provider, MD   ibuprofen (MOTRIN) 600 mg tablet Take by mouth every 6 (six) hours as needed for mild pain     "Historical Provider, MD   insulin aspart (NovoLOG FlexPen) 100 UNIT/ML injection pen Inject 5 Units under the skin 3 (three) times a day with meals 12/25/24   Bunny Rosado DO   Insulin Glargine Solostar (Lantus SoloStar) 100 UNIT/ML SOPN Inject 0.1 mL (10 Units total) under the skin daily at bedtime 12/25/24   Bunny Rosado DO   Insulin Pen Needle (BD Pen Needle Ana 2nd Gen) 32G X 4 MM MISC For use with insulin pen. Pharmacy may dispense brand covered by insurance. 12/25/24   Bunny Rosado, DO   Insulin Pen Needle (BD Pen Needle Ana 2nd Gen) 32G X 4 MM MISC For use with insulin pen. Pharmacy may dispense brand covered by insurance. 12/25/24   Bunny Rosado DO   methocarbamol (ROBAXIN) 500 mg tablet Take 1 tablet (500 mg total) by mouth every 6 (six) hours 12/25/24   Bunny Rosado DO   naloxone (NARCAN) 4 mg/0.1 mL nasal spray Administer 1 spray into a nostril. If no response after 2-3 minutes, give another dose in the other nostril using a new spray. 12/26/24 12/26/25  Bunny Rosado DO   OneTouch Delica Lancets 33G MISC May substitute brand based on insurance coverage. Check glucose TID. 12/25/24   Bunny Rosado DO   senna-docusate sodium (SENOKOT S) 8.6-50 mg per tablet Take 1 tablet by mouth daily at bedtime  Patient not taking: Reported on 1/28/2025 12/25/24   Bunny Rosado DO     Allergies   Allergen Reactions    Codeine Other (See Comments)     Aggression-and nasty--\"took a cough syrup with codeine as a child\"       Objective :  Temp:  [97.7 °F (36.5 °C)] 97.7 °F (36.5 °C)  HR:  [104-114] 104  BP: (135-143)/(58-72) 135/58  Resp:  [20-23] 23  SpO2:  [96 %-98 %] 96 %  O2 Device: None (Room air)    Physical Exam  Vitals and nursing note reviewed.   Constitutional:       Appearance: She is well-developed.   HENT:      Head: Normocephalic and atraumatic.      Right Ear: Tympanic membrane normal.      Left Ear: Tympanic membrane normal.      Nose: Nose normal.      Mouth/Throat:      Mouth: " Mucous membranes are moist.      Pharynx: No oropharyngeal exudate.   Eyes:      General: No scleral icterus.     Pupils: Pupils are equal, round, and reactive to light.   Neck:      Vascular: No JVD.   Cardiovascular:      Rate and Rhythm: Normal rate and regular rhythm.      Heart sounds: Normal heart sounds. No murmur heard.  Pulmonary:      Effort: Pulmonary effort is normal. No respiratory distress.      Breath sounds: Normal breath sounds. No wheezing or rales.   Abdominal:      General: Bowel sounds are normal.      Palpations: Abdomen is soft.      Tenderness: There is no abdominal tenderness. There is no guarding or rebound.   Musculoskeletal:         General: Normal range of motion.      Cervical back: Normal range of motion and neck supple.      Right lower leg: No edema.      Left lower leg: No edema.   Lymphadenopathy:      Cervical: No cervical adenopathy.   Skin:     General: Skin is warm and dry.      Findings: Lesion present. No erythema or rash.   Neurological:      Mental Status: She is alert and oriented to person, place, and time.   Psychiatric:         Behavior: Behavior normal.          Lines/Drains:            Lab Results: I have reviewed the following results:  Results from last 7 days   Lab Units 01/28/25  1820   WBC Thousand/uL 10.48*   HEMOGLOBIN g/dL 10.8*   HEMATOCRIT % 33.7*   PLATELETS Thousands/uL 267   SEGS PCT % 74   LYMPHO PCT % 20   MONO PCT % 5   EOS PCT % 0     Results from last 7 days   Lab Units 01/28/25  1820   SODIUM mmol/L 129*   POTASSIUM mmol/L 4.0   CHLORIDE mmol/L 91*   CO2 mmol/L 25   BUN mg/dL 20   CREATININE mg/dL 0.88   ANION GAP mmol/L 13   CALCIUM mg/dL 9.9   ALBUMIN g/dL 3.8   TOTAL BILIRUBIN mg/dL 0.38   ALK PHOS U/L 106*   ALT U/L 20   AST U/L 14   GLUCOSE RANDOM mg/dL 316*     Results from last 7 days   Lab Units 01/28/25  1820   INR  0.95     Results from last 7 days   Lab Units 01/28/25  2118   POC GLUCOSE mg/dl 228*     Lab Results   Component Value Date     HGBA1C 10.3 (H) 12/13/2024    HGBA1C 11.0 (H) 09/19/2022    HGBA1C 6.0 02/10/2017     Results from last 7 days   Lab Units 01/28/25 2113 01/28/25  1820   LACTIC ACID mmol/L 1.1 3.0*   PROCALCITONIN ng/ml  --  0.22       Imaging Results Review: I reviewed radiology reports from this admission including: xray(s).  Other Study Results Review: No additional pertinent studies reviewed.    Administrative Statements   I have spent a total time of 60 minutes in caring for this patient on the day of the visit/encounter including Diagnostic results, Impressions, Documenting in the medical record, Reviewing / ordering tests, medicine, procedures  , and Obtaining or reviewing history  .    ** Please Note: This note has been constructed using a voice recognition system. **

## 2025-01-29 NOTE — UTILIZATION REVIEW
Initial Clinical Review    Admission: Date/Time/Statement:   Admission Orders (From admission, onward)       Ordered        01/28/25 1911  INPATIENT ADMISSION  Once                          Orders Placed This Encounter   Procedures    INPATIENT ADMISSION     Standing Status:   Standing     Number of Occurrences:   1     Level of Care:   Med Surg [16]     Estimated length of stay:   More than 2 Midnights     Certification:   I certify that inpatient services are medically necessary for this patient for a duration of greater than two midnights. See H&P and MD Progress Notes for additional information about the patient's course of treatment.     ED Arrival Information       Expected   -    Arrival   1/28/2025 17:38    Acuity   Urgent              Means of arrival   Walk-In    Escorted by   Family Member    Service   Hospitalist    Admission type   Emergency              Arrival complaint   wound checj             Chief Complaint   Patient presents with    Wound Check     According to the patient, she was sent by podiatry for a wound check on the left foot.       Initial Presentation: 49 y.o. female presented to ED from home as inpatient admission for sepsis.PMH of nondependent diabetes who presents with left foot pain x 5 days. She is having severe worsening drainage, and severe worsening pain. She denies that her blood sugars are spiking but she tended to walk on this as directed in cam boot and had severe pain she notes a lot of drainage after walking on it. There is new onset erythema to the periwound but there is severe worsening drainage that does have some malodor and is serosanguineous, there is no trish purulence but I can probe nearly her across the entirety of her TMA site, considering that we are this far out from surgery, she should be much further along and there is probably a substantial amount of devitalized tissue or hematoma versus abscess in the left foot   Plan   Trend lactic acid and procalcitonin  "IV Vancomycin IV, wound culture blood cultures consult antipyretics Obtain vascular studies and MRI in a.m,chronic hyponatremia Component of pseudohyponatremia as corrected sodium is 134  Continue to monitor with repeat labs in a.m. accu checks with SSI     Anticipated Length of Stay/Certification Statement: Patient will be admitted on an inpatient basis with an anticipated length of stay of greater than 2 midnights secondary to sepsis secondary to diabetic left foot wound for IV antibiotics and further podiatry evaluation.     Date: 01-29-25   Day 2: continue IVF, IV Vanco & IV Cefepime. NPO for OR for I&D with VAC placement continue accu checks with SSI,  monitor NA levels     Vascular surgery consult  49 y.o. F w/ PMH of T2DM (A1c 10.3), tobacco use (quit Dec 2024) who was previously seen at [facility] in December with wound of left foot with cellulitis.   During previous admission vascular surgery was consulted and recommended LLE agram w/ intervention which was preformed by IR. Post-procedure pt underwent TMA with podiatry. She now presents from podiatry office with LLE wound breakdown.   Vascular surgery consulted this admission for worsening vascular studies s/p angiogram     LEFT LOWER LIMB:  Evaluation shows a segment occlusion in the proximal and mid superficial artery  and stent and a >75% stenosis in the distal superficial femoral artery. There is  a 50-75% stenosis in the tibio-peroneal trunk. Diffuse disease throughout the  remaining femoro-popliteal and tibio-peroneal segments.  Ankle/Brachial index: 0.27 which is in the rest pain/tissue loss disease  category but may be unreliable due to unobtainable PT (Prior 0.59)  PVR/ PPG tracings are dampened.  Metatarsal pressure and Great toe pressure not obtained due to amputation/pain.\"     Plan:   - Discussed with Dr. Wilson, on call vascular surgeon, there is now occlusion of SFA stent. After discussion between vascular and IR, patient will need to be " transferred [facility] intervention as IR does not have instrumentation at [facility] to open occluded stent   - wound care per podiatry   - NPO, added on for I&D with podiatry today           Podiatry consult  Abscess of the left foot  Cellulitis left foot  Ischemic pain left foot  New onset stenosis left foot  Uncontrolled diabetes   PLAN:     Arterial studies were reviewed and show new onset blockage of the entirety of his stent, I have reviewed vascular's note and I discussed this with the PA from vascular surgery as well, they are going to plan repeat intervention and possible transfer, but I do the severity of her presentation with the extremely large fluid collection present we will plan for incision and drainage and attempted wound VAC management to decompress the area to avoid further tissue death and infection  She has been n.p.o. today, and we will plan for incision and drainage and wound VAC placement today. following this we will hold any further procedures until she is revascularized per vascular  Rest of care per primary team.       ED Treatment-Medication Administration from 01/28/2025 1738 to 01/29/2025 1328         Date/Time Order Dose Route Action     01/28/2025 2003 vancomycin (VANCOCIN) 1,250 mg in sodium chloride 0.9 % 250 mL IVPB 1,250 mg Intravenous New Bag     01/28/2025 1835 cefepime (MAXIPIME) 2 g/50 mL dextrose IVPB 2,000 mg Intravenous New Bag     01/28/2025 1921 HYDROmorphone HCl (DILAUDID) injection 0.2 mg 0.2 mg Intravenous Given     01/29/2025 0916 aspirin (ECOTRIN LOW STRENGTH) EC tablet 81 mg 81 mg Oral Given     01/28/2025 2106 atorvastatin (LIPITOR) tablet 80 mg 80 mg Oral Given     01/28/2025 2106 gabapentin (NEURONTIN) capsule 100 mg 100 mg Oral Given     01/29/2025 0916 gabapentin (NEURONTIN) capsule 100 mg 100 mg Oral Given     01/28/2025 2119 insulin glargine (LANTUS) subcutaneous injection 10 Units 0.1 mL 10 Units Subcutaneous Given     01/28/2025 2106 methocarbamol  (ROBAXIN) tablet 500 mg 500 mg Oral Given     01/29/2025 0720 cefepime (MAXIPIME) 2 g/50 mL dextrose IVPB 2,000 mg Intravenous New Bag     01/29/2025 0822 vancomycin (VANCOCIN) IVPB (premix in dextrose) 1,000 mg 200 mL 1,000 mg Intravenous New Bag     01/28/2025 2119 heparin (porcine) subcutaneous injection 5,000 Units 5,000 Units Subcutaneous Given     01/29/2025 0625 heparin (porcine) subcutaneous injection 5,000 Units 5,000 Units Subcutaneous Given     01/29/2025 0723 insulin lispro (HumALOG/ADMELOG) 100 units/mL subcutaneous injection 1-6 Units 2 Units Subcutaneous Given     01/29/2025 1128 insulin lispro (HumALOG/ADMELOG) 100 units/mL subcutaneous injection 1-6 Units 2 Units Subcutaneous Given     01/28/2025 2126 insulin lispro (HumALOG/ADMELOG) 100 units/mL subcutaneous injection 1-6 Units 2 Units Subcutaneous Given     01/28/2025 2218 multi-electrolyte (PLASMALYTE-A/ISOLYTE-S PH 7.4) IV solution 150 mL/hr Intravenous New Bag     01/28/2025 2354 HYDROcodone-acetaminophen (NORCO) 5-325 mg per tablet 1 tablet 1 tablet Oral Given     01/29/2025 0624 HYDROcodone-acetaminophen (NORCO) 5-325 mg per tablet 1 tablet 1 tablet Oral Given     01/28/2025 2115 morphine injection 2 mg 2 mg Intravenous Given     01/29/2025 0233 morphine injection 2 mg 2 mg Intravenous Given     01/29/2025 1214 morphine injection 2 mg 2 mg Intravenous Given     01/29/2025 0235 methocarbamol (ROBAXIN) tablet 500 mg 500 mg Oral Given     01/29/2025 1126 methocarbamol (ROBAXIN) tablet 500 mg 500 mg Oral Given     01/29/2025 0307 HYDROmorphone (DILAUDID) injection 0.5 mg 0.5 mg Intravenous Given     01/29/2025 1034 multi-electrolyte (PLASMALYTE-A/ISOLYTE-S PH 7.4) IV solution 100 mL/hr Intravenous New Bag            Scheduled Medications:  aspirin, 81 mg, Oral, Daily  atorvastatin, 80 mg, Oral, Daily With Dinner  cefepime, 2,000 mg, Intravenous, Q12H  gabapentin, 100 mg, Oral, TID  heparin (porcine), 5,000 Units, Subcutaneous, Q8H [facility]  insulin  glargine, 10 Units, Subcutaneous, HS  insulin lispro, 1-6 Units, Subcutaneous, TID AC  insulin lispro, 1-6 Units, Subcutaneous, HS  methocarbamol, 500 mg, Oral, Q6H JORGE  vancomycin, 15 mg/kg (Adjusted), Intravenous, Q12H      Continuous IV Infusions:  multi-electrolyte, 100 mL/hr, Intravenous, Continuous      PRN Meds:  acetaminophen, 650 mg, Oral, Q6H PRN  diphenhydrAMINE, 25 mg, Oral, Q6H PRN  HYDROcodone-acetaminophen, 1 tablet, Oral, Q6H PRN  morphine injection, 2 mg, Intravenous, Q4H PRN  ondansetron, 4 mg, Intravenous, Q6H PRN      ED Triage Vitals [01/28/25 1748]   Temperature Pulse Respirations Blood Pressure SpO2 Pain Score   97.7 °F (36.5 °C) (!) 114 20 143/72 98 % 8     Weight (last 2 days)       Date/Time Weight    01/28/25 1748 83.9 (185)            Vital Signs (last 3 days)       Date/Time Temp Pulse Resp BP MAP (mmHg) SpO2 O2 Device Patient Position - Orthostatic VS Kiko Coma Scale Score Pain    01/29/25 1214 -- -- -- -- -- -- -- -- -- 9    01/29/25 0533 97.7 °F (36.5 °C) 98 24 144/71 -- 96 % None (Room air) Lying -- 8    01/28/25 2354 -- -- -- -- -- -- -- -- -- 8    01/28/25 2300 -- -- -- -- -- -- -- -- 15 --    01/28/25 2115 -- -- -- -- -- -- -- -- -- 8    01/28/25 1921 -- -- -- -- -- -- -- -- -- 8    01/28/25 1900 -- 104 20 135/58 83 96 % None (Room air) Lying -- --    01/28/25 1748 97.7 °F (36.5 °C) 114 20 143/72 101 98 % None (Room air) Lying -- 8              Pertinent Labs/Diagnostic Test Results:   Radiology:  VAS ARTERIAL DUPLEX-LOWER LIMB UNILATERAL   Final Interpretation by Girish Johnson MD (01/29 8334)      MRI foot/forefoot toes left wo contrast   Final Interpretation by Bradley Landon Kocher, MD (01/29 0919)      1. No findings to suggest acute osteomyelitis.   2. 6.5 x 0.9 x 1.7 cm fluid collection within the dorsal soft tissues just distal to the resection site. This spans the width of the foot from medial to lateral. Differential considerations include postoperative  seroma/hematoma although abscess is also    in the differential in the proper clinical setting.            Workstation performed: [id number]           Cardiology:  No orders to display     GI:  No orders to display           Results from last 7 days   Lab Units 01/29/25  0530 01/28/25  1820   WBC Thousand/uL 9.82 10.48*   HEMOGLOBIN g/dL 9.7* 10.8*   HEMATOCRIT % 30.6* 33.7*   PLATELETS Thousands/uL 219 267   TOTAL NEUT ABS Thousands/µL  --  7.75*         Results from last 7 days   Lab Units 01/29/25  0530 01/28/25  1820   SODIUM mmol/L 130* 129*   POTASSIUM mmol/L 4.0 4.0   CHLORIDE mmol/L 95* 91*   CO2 mmol/L 26 25   ANION GAP mmol/L 9 13   BUN mg/dL 17 20   CREATININE mg/dL 0.68 0.88   EGFR ml/min/1.73sq m 103 77   CALCIUM mg/dL 9.3 9.9     Results from last 7 days   Lab Units 01/28/25  1820   AST U/L 14   ALT U/L 20   ALK PHOS U/L 106*   TOTAL PROTEIN g/dL 8.3   ALBUMIN g/dL 3.8   TOTAL BILIRUBIN mg/dL 0.38     Results from last 7 days   Lab Units 01/29/25  1126 01/29/25  0719 01/28/25  2118   POC GLUCOSE mg/dl 221* 208* 228*     Results from last 7 days   Lab Units 01/29/25  0530 01/28/25  1820   GLUCOSE RANDOM mg/dL 203* 316*         Results from last 7 days   Lab Units 01/28/25  1820   PROTIME seconds 13.1   INR  0.95   PTT seconds 33         Results from last 7 days   Lab Units 01/29/25  0530 01/28/25  1820   PROCALCITONIN ng/ml 0.25 0.22     Results from last 7 days   Lab Units 01/28/25  2113 01/28/25  1820   LACTIC ACID mmol/L 1.1 3.0*         Results from last 7 days   Lab Units 01/28/25  1820   CRP mg/L 95.2*   SED RATE mm/hour 104*       Results from last 7 days   Lab Units 01/28/25  1820   BLOOD CULTURE  Received in Microbiology Lab. Culture in Progress.  Received in Microbiology Lab. Culture in Progress.                   Past Medical History:   Diagnosis Date    Advanced maternal age in multigravida, second trimester 11/30/2016    Transitioned From: Advanced maternal age in multigravida, first  "trimester      Back pain     used 2 percocet tid until current admission in 2/2017    Bone spur     in back per pt    Chronic hypertension with superimposed preeclampsia 02/14/2017    Depression     Diabetes mellitus (HCC)     Elevated BP without diagnosis of hypertension     \"with pain\"    Foot injury     Full dentures     does not wear    GERD (gastroesophageal reflux disease)     occas    Gestational diabetes     insulin dependent    Herniated cervical disc     Herniated lumbar intervertebral disc     History of pneumonia     Hx of degenerative disc disease     Hyperlipidemia     Obesity     Pre-eclampsia     Prior pregnancy with fetal demise     previous demise at 36 weeks    Toe pain     and foot pain     Present on Admission:   Obesity (BMI 30.0-34.9)   Tobacco abuse   Diabetes mellitus type 2 with complications (HCC)   Hyponatremia   Sepsis due to cellulitis (HCC)   Diabetic foot infection  (HCC)      Admitting Diagnosis: Visit for wound check [Z51.89]  Age/Sex: 49 y.o. female    Network Utilization Review Department  ATTENTION: Please call with any questions or concerns to [phone] and carefully listen to the prompts so that you are directed to the right person. All voicemails are confidential.   For Discharge needs, contact Care Management DC Support Team at [phone] opt. 2  Send all requests for admission clinical reviews, approved or denied determinations and any other requests to dedicated fax number below belonging to the campus where the patient is receiving treatment. List of dedicated fax numbers for the Facilities:  FACILITY NAME UR FAX NUMBER   ADMISSION DENIALS (Administrative/Medical Necessity) [phone]   DISCHARGE SUPPORT TEAM (NETWORK) [phone]   PARENT CHILD HEALTH (Maternity/NICU/Pediatrics) [phone]   [facility] [phone]   [facility] [phone]   Count includes the [facility] [phone]   [facility]" [facility] [phone]   [facility] [phone]   [facility] [phone]   [facility] [phone]   [facility] [phone]   [facility] [phone]   [facility] [phone]   [facility] [phone]   [facility] [phone]

## 2025-01-29 NOTE — ANESTHESIA POSTPROCEDURE EVALUATION
Post-Op Assessment Note    Last Filed PACU Vitals:  Vitals Value Taken Time   Temp 97.7    Pulse 83 01/29/25 1515   /65    Resp 23 01/29/25 1515   SpO2 100 % 01/29/25 1515   Vitals shown include unfiled device data.

## 2025-01-29 NOTE — QUICK NOTE
- Severe postoperative infection with abscess formation, bone thankfully appears to be viable and within normal limits  - Cultures were obtained soft tissue for aerobic and anaerobic  - She will need attempted revascularization per discussion with vascular, daily changes with packing, DSD.  -We will plan for finalization of procedure following revascularization.  She is very high risk for further amputation and proximal amputation.

## 2025-01-29 NOTE — ASSESSMENT & PLAN NOTE
"49 y.o. F w/ PMH of T2DM (A1c 10.3), tobacco use (quit Dec 2024) who was previously seen at [facility] in December with wound of left foot with cellulitis.   During previous admission vascular surgery was consulted and recommended LLE agram w/ intervention which was preformed by IR. Post-procedure pt underwent TMA with podiatry. She now presents from podiatry office with LLE wound breakdown.   Vascular surgery consulted this admission for worsening vascular studies s/p angiogram     Afeb, VSS  Leukocytosis resolved 10.4 (9.8)    1/29 MRI \"1. No findings to suggest acute osteomyelitis.  2. 6.5 x 0.9 x 1.7 cm fluid collection within the dorsal soft tissues just distal to the resection site. This spans the width of the foot from medial to lateral. Differential considerations include postoperative seroma/hematoma although abscess is also   in the differential in the proper clinical setting.\"    1/29 LEADs: RIGHT LOWER LIMB:  Ankle/Brachial index: 0.84, which is in the moderate disease category. (Prior  0.91)  PVR/ PPG tracings are dampened.  Metatarsal pressure of 109 mmHg  Great toe pressure of 72 mmHg, within the healing range     LEFT LOWER LIMB:  Evaluation shows a segment occlusion in the proximal and mid superficial artery  and stent and a >75% stenosis in the distal superficial femoral artery. There is  a 50-75% stenosis in the tibio-peroneal trunk. Diffuse disease throughout the  remaining femoro-popliteal and tibio-peroneal segments.  Ankle/Brachial index: 0.27 which is in the rest pain/tissue loss disease  category but may be unreliable due to unobtainable PT (Prior 0.59)  PVR/ PPG tracings are dampened.  Metatarsal pressure and Great toe pressure not obtained due to amputation/pain.\"    Plan:   - Discussed with Dr. Wilson, on call vascular surgeon, there is now occlusion of SFA stent. After discussion between vascular and IR, patient will need to be transferred to B for intervention as IR does not have " instrumentation at [facility] to open occluded stent   - wound care per podiatry   - NPO, added on for I&D with podiatry today   - Discussed indications for transfer with Dr. Ritchie of podiatry   - Communicated indications for transfer with primary team

## 2025-01-29 NOTE — UTILIZATION REVIEW
NOTIFICATION OF INPATIENT ADMISSION   AUTHORIZATION REQUEST   SERVICING FACILITY:   [place] [address]  Tax ID: [id number]  NPI: [id number]   ATTENDING PROVIDER:  Attending Name and NPI#: Luz Maria Shore Md [[id number]]  Address: [address] [place] [address]  Phone: [phone]     ADMISSION INFORMATION:  Place of Service: Inpatient [facility]  Place of Service Code: 21  Inpatient Admission Date/Time: 1/28/25  7:11 PM  Discharge Date/Time: No discharge date for patient encounter.  Admitting Diagnosis Code/Description:  Visit for wound check [Z51.89]     UTILIZATION REVIEW CONTACT:  Toma Cadet, Utilization   Network Utilization Review Department  Phone: [phone]  Fax: [phone]  Email: [email or web address]  Contact for approvals/pending authorizations, clinical reviews, and discharge.     PHYSICIAN ADVISORY SERVICES:  Medical Necessity Denial & [id number] Review  Phone: [phone]  Fax: [phone]  Email: [email or web address]     DISCHARGE SUPPORT TEAM:  For Patients Discharge Needs & Updates  Phone: [phone] opt. 2 Fax: [phone]  Email: [email or web address]

## 2025-01-29 NOTE — ANESTHESIA PREPROCEDURE EVALUATION
"Procedure:  INCISION AND DRAINAGE (I&D) EXTREMITY (Left: Foot)    Had Lt. Foot TMA about 3 weeks ago with no anesthesia complications     Relevant Problems   ANESTHESIA (within normal limits)      CARDIO (within normal limits)   (-) MI (myocardial infarction) (HCC)      ENDO   (+) Diabetes mellitus type 2 with complications (HCC)   (+) Type II diabetes mellitus (HCC)      GI/HEPATIC  NPO confirmed  BMI 35   (+) GERD (gastroesophageal reflux disease)      /RENAL (within normal limits)      HEMATOLOGY (within normal limits)      MUSCULOSKELETAL   (+) Chronic low back pain   (+) Chronic sciatica      NEURO/PSYCH   (+) Chronic low back pain   (+) Chronic sciatica   (+) Chronic, continuous use of opioids   (-) CVA (cerebral vascular accident) (HCC)   (-) Seizures (HCC)      PULMONARY (within normal limits)   (-) Asthma   (-) Sleep apnea   (-) URI (upper respiratory infection)      Allergies   Allergen Reactions   • Codeine Other (See Comments)     Aggression-and nasty--\"took a cough syrup with codeine as a child\"     Social History     Tobacco Use   • Smoking status: Former     Current packs/day: 0.50     Types: Cigarettes   • Smokeless tobacco: Never   • Tobacco comments:     Per pt last cigarette 12/13/24--quit cold turkey   Vaping Use   • Vaping status: Never Used   Substance Use Topics   • Alcohol use: Yes     Comment: 1-2 drinks a year   • Drug use: Not Currently     Current Outpatient Medications   Medication Instructions   • acetaminophen (TYLENOL) 975 mg, Oral, Every 6 hours scheduled   • Alcohol Swabs 70 % PADS May substitute brand based on insurance coverage. Check glucose TID.   • aspirin (ECOTRIN LOW STRENGTH) 81 mg, Oral, Daily   • atorvastatin (LIPITOR) 80 mg, Oral, Daily with dinner   • Blood Glucose Monitoring Suppl (OneTouch Verio Reflect) w/Device KIT May substitute brand based on insurance coverage. Check glucose TID.   • diphenhydrAMINE (BENADRYL) 25 mg, Every 6 hours PRN   • gabapentin (Neurontin) " 300 mg capsule Take 1 tablet in the morning, 1 in the afternoon and 2 tablets at night.   • glucose blood (OneTouch Verio) test strip May substitute brand based on insurance coverage. Check glucose TID.   • HYDROmorphone (DILAUDID) 4 mg tablet    • ibuprofen (MOTRIN) 600 mg tablet Every 6 hours PRN   • insulin aspart (NOVOLOG FLEXPEN) 5 Units, Subcutaneous, 3 times daily with meals   • Insulin Glargine Solostar (LANTUS SOLOSTAR) 10 Units, Subcutaneous, Daily at bedtime   • Insulin Pen Needle (BD Pen Needle Ana 2nd Gen) 32G X 4 MM MISC For use with insulin pen. Pharmacy may dispense brand covered by insurance.   • Insulin Pen Needle (BD Pen Needle Ana 2nd Gen) 32G X 4 MM MISC For use with insulin pen. Pharmacy may dispense brand covered by insurance.   • methocarbamol (ROBAXIN) 500 mg, Oral, Every 6 hours scheduled   • naloxone (NARCAN) 4 mg/0.1 mL nasal spray Administer 1 spray into a nostril. If no response after 2-3 minutes, give another dose in the other nostril using a new spray.   • OneTouch Delica Lancets 33G MISC May substitute brand based on insurance coverage. Check glucose TID.   • senna-docusate sodium (SENOKOT S) 8.6-50 mg per tablet 1 tablet, Oral, Daily at bedtime     Lab Results   Component Value Date    WBC 9.82 01/29/2025    HGB 9.7 (L) 01/29/2025    HCT 30.6 (L) 01/29/2025     01/29/2025    SODIUM 130 (L) 01/29/2025    K 4.0 01/29/2025    CL 95 (L) 01/29/2025    CO2 26 01/29/2025    BUN 17 01/29/2025    CREATININE 0.68 01/29/2025    GLUC 203 (H) 01/29/2025    HGBA1C 10.3 (H) 12/13/2024    AST 14 01/28/2025    ALT 20 01/28/2025    ALKPHOS 106 (H) 01/28/2025    TBILI 0.38 01/28/2025    ALB 3.8 01/28/2025    PROTIME 13.1 01/28/2025    PTT 33 01/28/2025    INR 0.95 01/28/2025     Vitals:    01/29/25 0533   BP: 144/71   Pulse: 98   Resp: (!) 24   Temp: 97.7 °F (36.5 °C)   SpO2: 96%       Physical Exam    Airway    Mallampati score: III  TM Distance: >3 FB  Neck ROM: full     Dental   Comment:  edentulous, No notable dental hx     Cardiovascular  Rhythm: regular, Rate: normal, Cardiovascular exam normal    Pulmonary  Pulmonary exam normal Breath sounds clear to auscultation    Other Findings  post-pubertal.      Anesthesia Plan  ASA Score- 3     Anesthesia Type- IV sedation with anesthesia with ASA Monitors.         Additional Monitors:     Airway Plan:            Plan Factors-Exercise tolerance (METS): <4 METS.    Chart reviewed. EKG reviewed.  Existing labs reviewed. Patient summary reviewed.    Patient is not a current smoker (recently quit 12/13/24).              Induction- intravenous.    Postoperative Plan- Plan for postoperative opioid use.         Informed Consent- Anesthetic plan and risks discussed with patient.  I personally reviewed this patient with the CRNA. Discussed and agreed on the Anesthesia Plan with the CRNA..

## 2025-01-29 NOTE — ASSESSMENT & PLAN NOTE
Patient is status post left TMA on 1/3/2025  Obtain vascular studies and MRI in a.m.  Consult podiatry  Antibiotics as per above  In control and antipyretics as needed

## 2025-01-30 VITALS
RESPIRATION RATE: 16 BRPM | SYSTOLIC BLOOD PRESSURE: 127 MMHG | TEMPERATURE: 98.5 F | WEIGHT: 185 LBS | OXYGEN SATURATION: 95 % | HEIGHT: 61 IN | BODY MASS INDEX: 34.93 KG/M2 | HEART RATE: 101 BPM | DIASTOLIC BLOOD PRESSURE: 67 MMHG

## 2025-01-30 PROBLEM — E11.9 TYPE II DIABETES MELLITUS (HCC): Status: RESOLVED | Noted: 2025-01-28 | Resolved: 2025-01-30

## 2025-01-30 PROBLEM — I73.9 PERIPHERAL ARTERIAL DISEASE (HCC): Status: ACTIVE | Noted: 2025-01-30

## 2025-01-30 LAB
ANION GAP SERPL CALCULATED.3IONS-SCNC: 8 MMOL/L (ref 4–13)
APTT PPP: 47 SECONDS (ref 23–34)
APTT PPP: 62 SECONDS (ref 23–34)
APTT PPP: 63 SECONDS (ref 23–34)
BASOPHILS # BLD AUTO: 0.02 THOUSANDS/ΜL (ref 0–0.1)
BASOPHILS NFR BLD AUTO: 0 % (ref 0–1)
BUN SERPL-MCNC: 17 MG/DL (ref 5–25)
CALCIUM SERPL-MCNC: 9.1 MG/DL (ref 8.4–10.2)
CHLORIDE SERPL-SCNC: 95 MMOL/L (ref 96–108)
CO2 SERPL-SCNC: 25 MMOL/L (ref 21–32)
CREAT SERPL-MCNC: 0.79 MG/DL (ref 0.6–1.3)
EOSINOPHIL # BLD AUTO: 0.05 THOUSAND/ΜL (ref 0–0.61)
EOSINOPHIL NFR BLD AUTO: 1 % (ref 0–6)
ERYTHROCYTE [DISTWIDTH] IN BLOOD BY AUTOMATED COUNT: 13.2 % (ref 11.6–15.1)
GFR SERPL CREATININE-BSD FRML MDRD: 88 ML/MIN/1.73SQ M
GLUCOSE SERPL-MCNC: 159 MG/DL (ref 65–140)
GLUCOSE SERPL-MCNC: 184 MG/DL (ref 65–140)
GLUCOSE SERPL-MCNC: 191 MG/DL (ref 65–140)
GLUCOSE SERPL-MCNC: 194 MG/DL (ref 65–140)
GLUCOSE SERPL-MCNC: 208 MG/DL (ref 65–140)
HCT VFR BLD AUTO: 28.3 % (ref 34.8–46.1)
HGB BLD-MCNC: 9.1 G/DL (ref 11.5–15.4)
IMM GRANULOCYTES # BLD AUTO: 0.07 THOUSAND/UL (ref 0–0.2)
IMM GRANULOCYTES NFR BLD AUTO: 1 % (ref 0–2)
LYMPHOCYTES # BLD AUTO: 2.62 THOUSANDS/ΜL (ref 0.6–4.47)
LYMPHOCYTES NFR BLD AUTO: 25 % (ref 14–44)
MCH RBC QN AUTO: 28.5 PG (ref 26.8–34.3)
MCHC RBC AUTO-ENTMCNC: 32.2 G/DL (ref 31.4–37.4)
MCV RBC AUTO: 89 FL (ref 82–98)
MONOCYTES # BLD AUTO: 0.51 THOUSAND/ΜL (ref 0.17–1.22)
MONOCYTES NFR BLD AUTO: 5 % (ref 4–12)
NEUTROPHILS # BLD AUTO: 7.4 THOUSANDS/ΜL (ref 1.85–7.62)
NEUTS SEG NFR BLD AUTO: 68 % (ref 43–75)
NRBC BLD AUTO-RTO: 0 /100 WBCS
PLATELET # BLD AUTO: 207 THOUSANDS/UL (ref 149–390)
PMV BLD AUTO: 9.1 FL (ref 8.9–12.7)
POTASSIUM SERPL-SCNC: 4.3 MMOL/L (ref 3.5–5.3)
RBC # BLD AUTO: 3.19 MILLION/UL (ref 3.81–5.12)
SODIUM SERPL-SCNC: 128 MMOL/L (ref 135–147)
VANCOMYCIN SERPL-MCNC: 18.2 UG/ML (ref 10–20)
WBC # BLD AUTO: 10.67 THOUSAND/UL (ref 4.31–10.16)

## 2025-01-30 PROCEDURE — 85730 THROMBOPLASTIN TIME PARTIAL: CPT | Performed by: INTERNAL MEDICINE

## 2025-01-30 PROCEDURE — 99232 SBSQ HOSP IP/OBS MODERATE 35: CPT | Performed by: SURGERY

## 2025-01-30 PROCEDURE — 99239 HOSP IP/OBS DSCHRG MGMT >30: CPT | Performed by: INTERNAL MEDICINE

## 2025-01-30 PROCEDURE — 80048 BASIC METABOLIC PNL TOTAL CA: CPT | Performed by: INTERNAL MEDICINE

## 2025-01-30 PROCEDURE — 80202 ASSAY OF VANCOMYCIN: CPT | Performed by: PHYSICIAN ASSISTANT

## 2025-01-30 PROCEDURE — 85025 COMPLETE CBC W/AUTO DIFF WBC: CPT | Performed by: INTERNAL MEDICINE

## 2025-01-30 PROCEDURE — 82948 REAGENT STRIP/BLOOD GLUCOSE: CPT

## 2025-01-30 RX ORDER — GABAPENTIN 300 MG/1
300 CAPSULE ORAL 3 TIMES DAILY
Status: CANCELLED | OUTPATIENT
Start: 2025-01-30

## 2025-01-30 RX ORDER — HYDROMORPHONE HCL/PF 1 MG/ML
0.5 SYRINGE (ML) INJECTION ONCE
Status: COMPLETED | OUTPATIENT
Start: 2025-01-30 | End: 2025-01-30

## 2025-01-30 RX ORDER — METHOCARBAMOL 500 MG/1
500 TABLET, FILM COATED ORAL EVERY 6 HOURS SCHEDULED
Status: CANCELLED | OUTPATIENT
Start: 2025-01-31

## 2025-01-30 RX ORDER — ATORVASTATIN CALCIUM 80 MG/1
80 TABLET, FILM COATED ORAL
Status: CANCELLED | OUTPATIENT
Start: 2025-01-31

## 2025-01-30 RX ORDER — ACETAMINOPHEN 325 MG/1
975 TABLET ORAL EVERY 6 HOURS SCHEDULED
Status: CANCELLED | OUTPATIENT
Start: 2025-01-31

## 2025-01-30 RX ORDER — INSULIN GLARGINE 100 [IU]/ML
10 INJECTION, SOLUTION SUBCUTANEOUS
Status: CANCELLED | OUTPATIENT
Start: 2025-01-30

## 2025-01-30 RX ORDER — INSULIN LISPRO 100 [IU]/ML
1-6 INJECTION, SOLUTION INTRAVENOUS; SUBCUTANEOUS
Status: CANCELLED | OUTPATIENT
Start: 2025-01-31

## 2025-01-30 RX ORDER — HEPARIN SODIUM 1000 [USP'U]/ML
3200 INJECTION, SOLUTION INTRAVENOUS; SUBCUTANEOUS EVERY 6 HOURS PRN
Status: CANCELLED | OUTPATIENT
Start: 2025-01-30

## 2025-01-30 RX ORDER — METRONIDAZOLE 500 MG/100ML
500 INJECTION, SOLUTION INTRAVENOUS EVERY 8 HOURS
Status: CANCELLED | OUTPATIENT
Start: 2025-01-30

## 2025-01-30 RX ORDER — HYDROMORPHONE HYDROCHLORIDE 4 MG/1
4 TABLET ORAL EVERY 4 HOURS PRN
Refills: 0 | Status: CANCELLED | OUTPATIENT
Start: 2025-01-30

## 2025-01-30 RX ORDER — VANCOMYCIN HYDROCHLORIDE 750 MG/150ML
750 INJECTION, SOLUTION INTRAVENOUS EVERY 12 HOURS
Status: CANCELLED | OUTPATIENT
Start: 2025-01-30

## 2025-01-30 RX ORDER — ACETAMINOPHEN 10 MG/ML
1000 INJECTION, SOLUTION INTRAVENOUS EVERY 8 HOURS
Status: DISCONTINUED | OUTPATIENT
Start: 2025-01-30 | End: 2025-01-31 | Stop reason: HOSPADM

## 2025-01-30 RX ORDER — VANCOMYCIN HYDROCHLORIDE 750 MG/150ML
750 INJECTION, SOLUTION INTRAVENOUS EVERY 12 HOURS
Status: DISCONTINUED | OUTPATIENT
Start: 2025-01-30 | End: 2025-01-31 | Stop reason: HOSPADM

## 2025-01-30 RX ORDER — DIPHENHYDRAMINE HCL 25 MG
25 TABLET ORAL EVERY 6 HOURS PRN
Status: CANCELLED | OUTPATIENT
Start: 2025-01-30

## 2025-01-30 RX ORDER — HYDROMORPHONE HYDROCHLORIDE 4 MG/1
4 TABLET ORAL EVERY 4 HOURS PRN
Refills: 0 | Status: DISCONTINUED | OUTPATIENT
Start: 2025-01-30 | End: 2025-01-31 | Stop reason: HOSPADM

## 2025-01-30 RX ORDER — ASPIRIN 81 MG/1
81 TABLET ORAL DAILY
Status: CANCELLED | OUTPATIENT
Start: 2025-01-31

## 2025-01-30 RX ORDER — GABAPENTIN 300 MG/1
300 CAPSULE ORAL
Status: CANCELLED | OUTPATIENT
Start: 2025-01-30

## 2025-01-30 RX ORDER — HYDROMORPHONE HCL/PF 1 MG/ML
0.5 SYRINGE (ML) INJECTION EVERY 4 HOURS PRN
Status: DISCONTINUED | OUTPATIENT
Start: 2025-01-30 | End: 2025-01-31 | Stop reason: HOSPADM

## 2025-01-30 RX ORDER — KETOROLAC TROMETHAMINE 30 MG/ML
15 INJECTION, SOLUTION INTRAMUSCULAR; INTRAVENOUS ONCE
Status: COMPLETED | OUTPATIENT
Start: 2025-01-30 | End: 2025-01-30

## 2025-01-30 RX ORDER — HEPARIN SODIUM 10000 [USP'U]/100ML
3-30 INJECTION, SOLUTION INTRAVENOUS
Status: CANCELLED | OUTPATIENT
Start: 2025-01-30

## 2025-01-30 RX ORDER — INSULIN LISPRO 100 [IU]/ML
1-6 INJECTION, SOLUTION INTRAVENOUS; SUBCUTANEOUS
Status: CANCELLED | OUTPATIENT
Start: 2025-01-30

## 2025-01-30 RX ORDER — ONDANSETRON 2 MG/ML
4 INJECTION INTRAMUSCULAR; INTRAVENOUS EVERY 6 HOURS PRN
Status: CANCELLED | OUTPATIENT
Start: 2025-01-30

## 2025-01-30 RX ORDER — INSULIN LISPRO 100 [IU]/ML
5 INJECTION, SOLUTION INTRAVENOUS; SUBCUTANEOUS
Status: CANCELLED | OUTPATIENT
Start: 2025-01-31

## 2025-01-30 RX ORDER — DIPHENHYDRAMINE HCL 25 MG
25 CAPSULE ORAL EVERY 6 HOURS PRN
Status: CANCELLED | OUTPATIENT
Start: 2025-01-30

## 2025-01-30 RX ORDER — AMOXICILLIN 250 MG
1 CAPSULE ORAL
Status: CANCELLED | OUTPATIENT
Start: 2025-01-30

## 2025-01-30 RX ORDER — ACETAMINOPHEN 10 MG/ML
1000 INJECTION, SOLUTION INTRAVENOUS EVERY 8 HOURS
Status: CANCELLED | OUTPATIENT
Start: 2025-01-31

## 2025-01-30 RX ORDER — METRONIDAZOLE 500 MG/100ML
500 INJECTION, SOLUTION INTRAVENOUS EVERY 8 HOURS
Status: DISCONTINUED | OUTPATIENT
Start: 2025-01-30 | End: 2025-01-31 | Stop reason: HOSPADM

## 2025-01-30 RX ORDER — HYDROMORPHONE HCL/PF 1 MG/ML
0.5 SYRINGE (ML) INJECTION EVERY 4 HOURS PRN
Refills: 0 | Status: CANCELLED | OUTPATIENT
Start: 2025-01-30

## 2025-01-30 RX ORDER — HEPARIN SODIUM 1000 [USP'U]/ML
6400 INJECTION, SOLUTION INTRAVENOUS; SUBCUTANEOUS EVERY 6 HOURS PRN
Status: CANCELLED | OUTPATIENT
Start: 2025-01-30

## 2025-01-30 RX ADMIN — HYDROMORPHONE HYDROCHLORIDE 6 MG: 4 TABLET ORAL at 02:11

## 2025-01-30 RX ADMIN — HYDROMORPHONE HYDROCHLORIDE 0.5 MG: 1 INJECTION, SOLUTION INTRAMUSCULAR; INTRAVENOUS; SUBCUTANEOUS at 00:16

## 2025-01-30 RX ADMIN — METRONIDAZOLE 500 MG: 500 INJECTION, SOLUTION INTRAVENOUS at 18:55

## 2025-01-30 RX ADMIN — VANCOMYCIN HYDROCHLORIDE 750 MG: 750 INJECTION, SOLUTION INTRAVENOUS at 21:28

## 2025-01-30 RX ADMIN — HEPARIN SODIUM 22 UNITS/KG/HR: 10000 INJECTION, SOLUTION INTRAVENOUS at 20:22

## 2025-01-30 RX ADMIN — ACETAMINOPHEN 1000 MG: 10 INJECTION INTRAVENOUS at 01:52

## 2025-01-30 RX ADMIN — CEFEPIME 2000 MG: 2 INJECTION, POWDER, FOR SOLUTION INTRAVENOUS at 19:40

## 2025-01-30 RX ADMIN — METRONIDAZOLE 500 MG: 500 INJECTION, SOLUTION INTRAVENOUS at 11:11

## 2025-01-30 RX ADMIN — GABAPENTIN 300 MG: 300 CAPSULE ORAL at 16:51

## 2025-01-30 RX ADMIN — METHOCARBAMOL 500 MG: 500 TABLET ORAL at 05:14

## 2025-01-30 RX ADMIN — ACETAMINOPHEN 1000 MG: 10 INJECTION INTRAVENOUS at 17:00

## 2025-01-30 RX ADMIN — CEFEPIME 2000 MG: 2 INJECTION, POWDER, FOR SOLUTION INTRAVENOUS at 07:20

## 2025-01-30 RX ADMIN — GABAPENTIN 300 MG: 300 CAPSULE ORAL at 08:02

## 2025-01-30 RX ADMIN — INSULIN LISPRO 2 UNITS: 100 INJECTION, SOLUTION INTRAVENOUS; SUBCUTANEOUS at 16:55

## 2025-01-30 RX ADMIN — GABAPENTIN 300 MG: 300 CAPSULE ORAL at 21:27

## 2025-01-30 RX ADMIN — ACETAMINOPHEN 1000 MG: 10 INJECTION INTRAVENOUS at 10:33

## 2025-01-30 RX ADMIN — INSULIN LISPRO 1 UNITS: 100 INJECTION, SOLUTION INTRAVENOUS; SUBCUTANEOUS at 08:02

## 2025-01-30 RX ADMIN — METHOCARBAMOL 500 MG: 500 TABLET ORAL at 17:00

## 2025-01-30 RX ADMIN — ASPIRIN 81 MG: 81 TABLET, COATED ORAL at 08:02

## 2025-01-30 RX ADMIN — HEPARIN SODIUM 3200 UNITS: 1000 INJECTION INTRAVENOUS; SUBCUTANEOUS at 12:29

## 2025-01-30 RX ADMIN — ATORVASTATIN CALCIUM 80 MG: 80 TABLET, FILM COATED ORAL at 16:51

## 2025-01-30 RX ADMIN — VANCOMYCIN HYDROCHLORIDE 750 MG: 750 INJECTION, SOLUTION INTRAVENOUS at 09:01

## 2025-01-30 RX ADMIN — METHOCARBAMOL 500 MG: 500 TABLET ORAL at 11:57

## 2025-01-30 RX ADMIN — HYDROMORPHONE HYDROCHLORIDE 6 MG: 4 TABLET ORAL at 16:51

## 2025-01-30 RX ADMIN — INSULIN GLARGINE 10 UNITS: 100 INJECTION, SOLUTION SUBCUTANEOUS at 21:28

## 2025-01-30 RX ADMIN — HYDROMORPHONE HYDROCHLORIDE 4 MG: 4 TABLET ORAL at 12:00

## 2025-01-30 RX ADMIN — HEPARIN SODIUM 20 UNITS/KG/HR: 10000 INJECTION, SOLUTION INTRAVENOUS at 06:15

## 2025-01-30 RX ADMIN — HYDROMORPHONE HYDROCHLORIDE 0.5 MG: 1 INJECTION, SOLUTION INTRAMUSCULAR; INTRAVENOUS; SUBCUTANEOUS at 20:21

## 2025-01-30 RX ADMIN — HYDROMORPHONE HYDROCHLORIDE 6 MG: 4 TABLET ORAL at 22:51

## 2025-01-30 RX ADMIN — INSULIN LISPRO 2 UNITS: 100 INJECTION, SOLUTION INTRAVENOUS; SUBCUTANEOUS at 12:02

## 2025-01-30 RX ADMIN — INSULIN LISPRO 2 UNITS: 100 INJECTION, SOLUTION INTRAVENOUS; SUBCUTANEOUS at 21:36

## 2025-01-30 RX ADMIN — KETOROLAC TROMETHAMINE 15 MG: 30 INJECTION, SOLUTION INTRAMUSCULAR; INTRAVENOUS at 01:51

## 2025-01-30 NOTE — PROGRESS NOTES
"Progress Note - Surgery-General   Name: Lizzy Acuna 49 y.o. female I MRN: [id number]  Unit/Bed#: MS 216[facility] I Date of Admission: 1/28/2025   Date of Service: 1/30/2025 I Hospital Day: 2    Assessment & Plan  Diabetic foot infection  (HCC)  49 y.o. F w/ PMH of T2DM (A1c 10.3), tobacco use (quit Dec 2024) who was previously seen at [facility] in December with wound of left foot with cellulitis.   During previous admission vascular surgery was consulted and recommended LLE agram w/ intervention which was preformed by IR. Post-procedure pt underwent TMA with podiatry. She now presents from podiatry office with LLE wound breakdown.   Vascular surgery consulted this admission for worsening vascular studies s/p angiogram     Afeb, VSS  WBC 10.67 (9)  Hgb 9.1  Na 128  Cr 0.79  Sensation and motor intact of LLE    1/29 MRI \"1. No findings to suggest acute osteomyelitis.  2. 6.5 x 0.9 x 1.7 cm fluid collection within the dorsal soft tissues just distal to the resection site. This spans the width of the foot from medial to lateral. Differential considerations include postoperative seroma/hematoma although abscess is also   in the differential in the proper clinical setting.\"    1/29 LEADs: RIGHT LOWER LIMB:  Ankle/Brachial index: 0.84, which is in the moderate disease category. (Prior  0.91)  PVR/ PPG tracings are dampened.  Metatarsal pressure of 109 mmHg  Great toe pressure of 72 mmHg, within the healing range     LEFT LOWER LIMB:  Evaluation shows a segment occlusion in the proximal and mid superficial artery  and stent and a >75% stenosis in the distal superficial femoral artery. There is  a 50-75% stenosis in the tibio-peroneal trunk. Diffuse disease throughout the  remaining femoro-popliteal and tibio-peroneal segments.  Ankle/Brachial index: 0.27 which is in the rest pain/tissue loss disease  category but may be unreliable due to unobtainable PT (Prior 0.59)  PVR/ PPG tracings are dampened.  Metatarsal pressure and Great " "toe pressure not obtained due to amputation/pain.\"    Plan:   - Recommend transfer to [facility] for intervention of occluded SFA stent. If patient loses motor or sensory functions, recommend emergent transfer  - wound care per podiatry, appreciate recs   - continue heparin drip   - diet as tolerated   - Rest of medical management per SLIM  - Discussed with Dr. Wilson, on call vascular surgeon   Sepsis due to cellulitis (HCC)  POA aeb tachycardia, tachypnea  Source: left foot wound   Tobacco abuse  Patient reports she quit smoking after her hospitalization in December  Diabetes mellitus type 2 with complications (HCC)  Lab Results   Component Value Date    HGBA1C 10.3 (H) 12/13/2024       Recent Labs     01/29/25  1332 01/29/25  1607 01/29/25  2122 01/30/25  0741   POCGLU 188* 152* 200* 159*       Blood Sugar Average: Last 72 hrs:  (P) 193.0490320893641172  Management per primary    Please contact the SecureChat role for the Vascular Surgery service with any questions/concerns.    Subjective   Patient reports her pain was intense overnight, but has since improved. No chest pain, sob.     Objective :  Temp:  [97.7 °F (36.5 °C)-99.3 °F (37.4 °C)] 97.8 °F (36.6 °C)  HR:  [] 87  BP: (101-163)/(62-99) 112/76  Resp:  [16-20] 16  SpO2:  [91 %-100 %] 92 %  O2 Device: None (Room air)    I/O         01/28 0701  01/29 0700 01/29 0701  01/30 0700 01/30 0701  01/31 0700    I.V. (mL/kg)  2640.8 (31.5)     IV Piggyback 300.1 250     Total Intake(mL/kg) 300.1 (3.6) 2890.8 (34.5)     Net +300.1 +2890.8                    Physical Exam  Vitals reviewed.   Constitutional:       Appearance: She is obese. She is not toxic-appearing or diaphoretic.   HENT:      Head: Normocephalic and atraumatic.   Cardiovascular:      Rate and Rhythm: Normal rate.   Pulmonary:      Effort: Pulmonary effort is normal. No respiratory distress.   Abdominal:      General: Abdomen is flat. There is no distension.      Tenderness: There is no abdominal " tenderness. There is no guarding.   Musculoskeletal:      Right lower leg: No edema.      Left lower leg: No edema.      Comments: L foot dressings in place. Sensation and motor intact of LLE.   Skin:     General: Skin is warm and dry.   Neurological:      General: No focal deficit present.      Mental Status: She is alert. Mental status is at baseline.   Psychiatric:         Mood and Affect: Mood normal.         Behavior: Behavior normal.         Lab Results: I have reviewed the following results:  Recent Labs     01/28/25  1820 01/28/25  2113 01/29/25  0530 01/29/25  1615 01/29/25  2230 01/30/25  0519   WBC 10.48*  --    < > 9.13  --  10.67*   HGB 10.8*  --    < > 8.9*  --  9.1*   HCT 33.7*  --    < > 27.5*  --  28.3*     --    < > 198  --  207   SODIUM 129*  --    < >  --   --  128*   K 4.0  --    < >  --   --  4.3   CL 91*  --    < >  --   --  95*   CO2 25  --    < >  --   --  25   BUN 20  --    < >  --   --  17   CREATININE 0.88  --    < >  --   --  0.79   GLUC 316*  --    < >  --   --  184*   AST 14  --   --   --   --   --    ALT 20  --   --   --   --   --    ALB 3.8  --   --   --   --   --    TBILI 0.38  --   --   --   --   --    ALKPHOS 106*  --   --   --   --   --    PTT 33  --   --  31   < > 63*   INR 0.95  --   --  0.95  --   --    LACTICACID 3.0* 1.1  --   --   --   --     < > = values in this interval not displayed.       Imaging Results Review: No pertinent imaging studies reviewed.  Other Study Results Review: No additional pertinent studies reviewed.    VTE Pharmacologic Prophylaxis: VTE covered by:  heparin (porcine), Intravenous, 20 Units/kg/hr at 01/30/25 0615  heparin (porcine), Intravenous, 3,200 Units at 01/29/25 2318  heparin (porcine), Intravenous     VTE Mechanical Prophylaxis: sequential compression device    Gabrielle Urrutia PA-C

## 2025-01-30 NOTE — ASSESSMENT & PLAN NOTE
Lab Results   Component Value Date    HGBA1C 10.3 (H) 12/13/2024       Recent Labs     01/29/25  2122 01/30/25  0741 01/30/25  1143 01/30/25  1637   POCGLU 200* 159* 191* 194*       Blood Sugar Average: Last 72 hrs:  (P) [id number]  Placed on CCH type II diet  Continue prehospital Lantus 10 units SQ nightly  Obtain Accu-Cheks before meals and at bedtime with Humalog correction dose before meals and at bedtime

## 2025-01-30 NOTE — PROGRESS NOTES
Lizzy Acuna is a 49 y.o. female who is currently ordered Vancomycin IV with management by the Pharmacy Consult service.  Relevant clinical data and objective / subjective history reviewed.  Vancomycin Assessment:  Indication and Goal AUC/Trough: Bone/joint infection (goal -600, trough >10); Soft tissue (goal -600, trough >10), -600, trough >10  Clinical Status: stable  Micro:     Renal Function:  SCr: 0.79 mg/dL  CrCl: 85.5 mL/min  Renal replacement: Not on dialysis  Days of Therapy: 3  Current Dose: 1g q12  Vancomycin Plan:  New Dosin q12  Estimated AUC: 467 mcg*hr/mL  Estimated Trough: 14.3 mcg/mL  Next Level:  6am  Renal Function Monitoring: Daily BMP and UOP  Pharmacy will continue to follow closely for s/sx of nephrotoxicity, infusion reactions and appropriateness of therapy.  BMP and CBC will be ordered per protocol. We will continue to follow the patient’s culture results and clinical progress daily.    Shirley Hernandez, Pharmacist

## 2025-01-30 NOTE — PLAN OF CARE
Problem: PAIN - ADULT  Goal: Verbalizes/displays adequate comfort level or baseline comfort level  Description: Interventions:  - Encourage patient to monitor pain and request assistance  - Assess pain using appropriate pain scale  - Administer analgesics based on type and severity of pain and evaluate response  - Implement non-pharmacological measures as appropriate and evaluate response  - Consider cultural and social influences on pain and pain management  - Notify physician/advanced practitioner if interventions unsuccessful or patient reports new pain  Outcome: Progressing     Problem: INFECTION - ADULT  Goal: Absence or prevention of progression during hospitalization  Description: INTERVENTIONS:  - Assess and monitor for signs and symptoms of infection  - Monitor lab/diagnostic results  - Monitor all insertion sites, i.e. indwelling lines, tubes, and drains  - Monitor endotracheal if appropriate and nasal secretions for changes in amount and color  - [place] appropriate cooling/warming therapies per order  - Administer medications as ordered  - Instruct and encourage patient and family to use good hand hygiene technique  - Identify and instruct in appropriate isolation precautions for identified infection/condition  Outcome: Progressing  Goal: Absence of fever/infection during neutropenic period  Description: INTERVENTIONS:  - Monitor WBC    Outcome: Progressing     Problem: SAFETY ADULT  Goal: Patient will remain free of falls  Description: INTERVENTIONS:  - Educate patient/family on patient safety including physical limitations  - Instruct patient to call for assistance with activity   - Consult OT/PT to assist with strengthening/mobility   - Keep Call bell within reach  - Keep bed low and locked with side rails adjusted as appropriate  - Keep care items and personal belongings within reach  - Initiate and maintain comfort rounds  - Make Fall Risk Sign visible to staff  - Offer Toileting every 2 Hours,  in advance of need  - Initiate/Maintain bed alarm  - Obtain necessary fall risk management equipment: nonskid socks  - Apply yellow socks and bracelet for high fall risk patients  - Consider moving patient to room near nurses station  Outcome: Progressing  Goal: Maintain or return to baseline ADL function  Description: INTERVENTIONS:  -  Assess patient's ability to carry out ADLs; assess patient's baseline for ADL function and identify physical deficits which impact ability to perform ADLs (bathing, care of mouth/teeth, toileting, grooming, dressing, etc.)  - Assess/evaluate cause of self-care deficits   - Assess range of motion  - Assess patient's mobility; develop plan if impaired  - Assess patient's need for assistive devices and provide as appropriate  - Encourage maximum independence but intervene and supervise when necessary  - Involve family in performance of ADLs  - Assess for home care needs following discharge   - Consider OT consult to assist with ADL evaluation and planning for discharge  - Provide patient education as appropriate  Outcome: Progressing     Problem: DISCHARGE PLANNING  Goal: Discharge to home or other facility with appropriate resources  Description: INTERVENTIONS:  - Identify barriers to discharge w/patient and caregiver  - Arrange for needed discharge resources and transportation as appropriate  - Identify discharge learning needs (meds, wound care, etc.)  - Arrange for interpretive services to assist at discharge as needed  - Refer to Case Management Department for coordinating discharge planning if the patient needs post-hospital services based on physician/advanced practitioner order or complex needs related to functional status, cognitive ability, or social support system  Outcome: Progressing     Problem: Knowledge Deficit  Goal: Patient/family/caregiver demonstrates understanding of disease process, treatment plan, medications, and discharge instructions  Description: Complete  learning assessment and assess knowledge base.  Interventions:  - Provide teaching at level of understanding  - Provide teaching via preferred learning methods  Outcome: Progressing     Problem: NEUROSENSORY - ADULT  Goal: Achieves maximal functionality and self care  Description: INTERVENTIONS  - Monitor swallowing and airway patency with patient fatigue and changes in neurological status  - Encourage and assist patient to increase activity and self care.   - Encourage visually impaired, hearing impaired and aphasic patients to use assistive/communication devices  Outcome: Progressing     Problem: METABOLIC, FLUID AND ELECTROLYTES - ADULT  Goal: Electrolytes maintained within normal limits  Description: INTERVENTIONS:  - Monitor labs and assess patient for signs and symptoms of electrolyte imbalances  - Administer electrolyte replacement as ordered  - Monitor response to electrolyte replacements, including repeat lab results as appropriate  - Instruct patient on fluid and nutrition as appropriate  Outcome: Progressing  Goal: Glucose maintained within target range  Description: INTERVENTIONS:  - Monitor Blood Glucose as ordered  - Assess for signs and symptoms of hyperglycemia and hypoglycemia  - Administer ordered medications to maintain glucose within target range  - Assess nutritional intake and initiate nutrition service referral as needed  Outcome: Progressing     Problem: SKIN/TISSUE INTEGRITY - ADULT  Goal: Incision(s), wounds(s) or drain site(s) healing without S/S of infection  Description: INTERVENTIONS  - Assess and document dressing, incision, wound bed, drain sites and surrounding tissue  - Provide patient and family education  - Perform skin care/dressing changes every 3 days  Outcome: Progressing     Problem: MUSCULOSKELETAL - ADULT  Goal: Maintain or return mobility to safest level of function  Description: INTERVENTIONS:  - Assess patient's ability to carry out ADLs; assess patient's baseline for  ADL function and identify physical deficits which impact ability to perform ADLs (bathing, care of mouth/teeth, toileting, grooming, dressing, etc.)  - Assess/evaluate cause of self-care deficits   - Assess range of motion  - Assess patient's mobility  - Assess patient's need for assistive devices and provide as appropriate  - Encourage maximum independence but intervene and supervise when necessary  - Involve family in performance of ADLs  - Assess for home care needs following discharge   - Consider OT consult to assist with ADL evaluation and planning for discharge  - Provide patient education as appropriate  Outcome: Progressing  Goal: Maintain proper alignment of affected body part  Description: INTERVENTIONS:  - Support, maintain and protect limb and body alignment  - Provide patient/ family with appropriate education  Outcome: Progressing

## 2025-01-30 NOTE — CASE MANAGEMENT
Case Management Discharge Planning Note    Patient name Lizzy Acuna  Location /-01 MRN [id number]  : 1975 Date 2025       Current Admission Date: 2025  Current Admission Diagnosis:Sepsis due to cellulitis (HCC)   Patient Active Problem List    Diagnosis Date Noted Date Diagnosed    Type II diabetes mellitus (HCC) 2025     Diabetic foot infection  (HCC) 2025     Dehiscence of operative wound, initial encounter 2025     Hypersensitivity, initial encounter 2025     GERD (gastroesophageal reflux disease)      Cellulitis of left foot 2024     Gangrene of left foot (HCC) 2024     Sepsis due to cellulitis (HCC) 2018     Diabetes mellitus type 2 with complications (HCC) 2018     Hyponatremia 2018     Chronic sciatica 2018     Tobacco abuse 02/15/2017     Obesity (BMI 30.0-34.9) 2017     Chronic low back pain 2016     Chronic, continuous use of opioids 2016     Proteinuria 2011       LOS (days): 2  Geometric Mean LOS (GMLOS) (days):   Days to GMLOS:     OBJECTIVE:  Risk of Unplanned Readmission Score: 22.04         Current admission status: Inpatient   Preferred Pharmacy:   [address] [address]  Phone: [phone] Fax: [phone]    Primary Care Provider: NAOMY Basilio    Primary Insurance: [employer]  Secondary Insurance:     DISCHARGE DETAILS:    Additional Comments: CM continuing to follow for discharge planning. Chart reviewed. Plan to transfer to [facility] for intervention of occluded SFA stent as per surgery. CM to follow as needed.

## 2025-01-30 NOTE — ASSESSMENT & PLAN NOTE
Patient had Left lower extremity angiogram and angioplasty performed on 12/23/2024  Arterial duplex performed on 1/29/2025 shows arterial stent occlusion  Reviewed case with vascular surgery who recommended transfer to [facility] for further treatment  Continue heparin drip

## 2025-01-30 NOTE — ASSESSMENT & PLAN NOTE
Patient with left foot diabetic wound, noted to have tachycardia and tachypnea on admission  MRI with fluid collection within dorsal soft tissue  Continue IV antibiotics with cefepime, vancomycin, and Flagyl  Arterial study showed arterial abnormalities, reviewed case with vascular surgery on 1/29/2025.  Recommendation was for initiation of heparin drip and transferred to [facility]  Patient was taken to the OR on 1/29/2025 by podiatry for left foot I&D  IV fluids as needed   Follow-up cultures   Monitor infectious parameters  Infectious disease consulted

## 2025-01-30 NOTE — ASSESSMENT & PLAN NOTE
"49 y.o. F w/ PMH of T2DM (A1c 10.3), tobacco use (quit Dec 2024) who was previously seen at [facility] in December with wound of left foot with cellulitis.   During previous admission vascular surgery was consulted and recommended LLE agram w/ intervention which was preformed by IR. Post-procedure pt underwent TMA with podiatry. She now presents from podiatry office with LLE wound breakdown.   Vascular surgery consulted this admission for worsening vascular studies s/p angiogram     Afeb, VSS  WBC 10.67 (9)  Hgb 9.1  Na 128  Cr 0.79  Sensation and motor intact of LLE    1/29 MRI \"1. No findings to suggest acute osteomyelitis.  2. 6.5 x 0.9 x 1.7 cm fluid collection within the dorsal soft tissues just distal to the resection site. This spans the width of the foot from medial to lateral. Differential considerations include postoperative seroma/hematoma although abscess is also   in the differential in the proper clinical setting.\"    1/29 LEADs: RIGHT LOWER LIMB:  Ankle/Brachial index: 0.84, which is in the moderate disease category. (Prior  0.91)  PVR/ PPG tracings are dampened.  Metatarsal pressure of 109 mmHg  Great toe pressure of 72 mmHg, within the healing range     LEFT LOWER LIMB:  Evaluation shows a segment occlusion in the proximal and mid superficial artery  and stent and a >75% stenosis in the distal superficial femoral artery. There is  a 50-75% stenosis in the tibio-peroneal trunk. Diffuse disease throughout the  remaining femoro-popliteal and tibio-peroneal segments.  Ankle/Brachial index: 0.27 which is in the rest pain/tissue loss disease  category but may be unreliable due to unobtainable PT (Prior 0.59)  PVR/ PPG tracings are dampened.  Metatarsal pressure and Great toe pressure not obtained due to amputation/pain.\"    Plan:   - Recommend transfer to B for intervention of occluded SFA stent. If patient loses motor or sensory functions, recommend emergent transfer  - wound care per podiatry, appreciate " recs   - continue heparin drip   - diet as tolerated   - Rest of medical management per SLIM  - Discussed with Dr. Wilson, on call vascular surgeon

## 2025-01-30 NOTE — ASSESSMENT & PLAN NOTE
Lab Results   Component Value Date    HGBA1C 10.3 (H) 12/13/2024       Recent Labs     01/29/25  1332 01/29/25  1607 01/29/25  2122 01/30/25  0741   POCGLU 188* 152* 200* 159*       Blood Sugar Average: Last 72 hrs:  (P) [id number]  Management per primary

## 2025-01-30 NOTE — ASSESSMENT & PLAN NOTE
Patient has history of chronic hyponatremia  Component of pseudohyponatremia  Sodium level fluctuating  Monitor BMP as needed

## 2025-01-30 NOTE — PLAN OF CARE
Problem: PAIN - ADULT  Goal: Verbalizes/displays adequate comfort level or baseline comfort level  Description: Interventions:  - Encourage patient to monitor pain and request assistance  - Assess pain using appropriate pain scale  - Administer analgesics based on type and severity of pain and evaluate response  - Implement non-pharmacological measures as appropriate and evaluate response  - Consider cultural and social influences on pain and pain management  - Notify physician/advanced practitioner if interventions unsuccessful or patient reports new pain  Outcome: Progressing     Problem: INFECTION - ADULT  Goal: Absence or prevention of progression during hospitalization  Description: INTERVENTIONS:  - Assess and monitor for signs and symptoms of infection  - Monitor lab/diagnostic results  - Monitor all insertion sites, i.e. indwelling lines, tubes, and drains  - Monitor endotracheal if appropriate and nasal secretions for changes in amount and color  - [place] appropriate cooling/warming therapies per order  - Administer medications as ordered  - Instruct and encourage patient and family to use good hand hygiene technique  - Identify and instruct in appropriate isolation precautions for identified infection/condition  Outcome: Progressing  Goal: Absence of fever/infection during neutropenic period  Description: INTERVENTIONS:  - Monitor WBC    Outcome: Progressing     Problem: SAFETY ADULT  Goal: Patient will remain free of falls  Description: INTERVENTIONS:  - Educate patient/family on patient safety including physical limitations  - Instruct patient to call for assistance with activity   - Consult OT/PT to assist with strengthening/mobility   - Keep Call bell within reach  - Keep bed low and locked with side rails adjusted as appropriate  - Keep care items and personal belongings within reach  - Initiate and maintain comfort rounds  - Make Fall Risk Sign visible to staff  - Offer Toileting every 2 Hours,  in advance of need  - Initiate/Maintain bed alarm  - Obtain necessary fall risk management equipment: non slip socks   - Apply yellow socks and bracelet for high fall risk patients  - Consider moving patient to room near nurses station  Outcome: Progressing  Goal: Maintain or return to baseline ADL function  Description: INTERVENTIONS:  -  Assess patient's ability to carry out ADLs; assess patient's baseline for ADL function and identify physical deficits which impact ability to perform ADLs (bathing, care of mouth/teeth, toileting, grooming, dressing, etc.)  - Assess/evaluate cause of self-care deficits   - Assess range of motion  - Assess patient's mobility; develop plan if impaired  - Assess patient's need for assistive devices and provide as appropriate  - Encourage maximum independence but intervene and supervise when necessary  - Involve family in performance of ADLs  - Assess for home care needs following discharge   - Consider OT consult to assist with ADL evaluation and planning for discharge  - Provide patient education as appropriate  Outcome: Progressing     Problem: DISCHARGE PLANNING  Goal: Discharge to home or other facility with appropriate resources  Description: INTERVENTIONS:  - Identify barriers to discharge w/patient and caregiver  - Arrange for needed discharge resources and transportation as appropriate  - Identify discharge learning needs (meds, wound care, etc.)  - Arrange for interpretive services to assist at discharge as needed  - Refer to Case Management Department for coordinating discharge planning if the patient needs post-hospital services based on physician/advanced practitioner order or complex needs related to functional status, cognitive ability, or social support system  Outcome: Progressing     Problem: Knowledge Deficit  Goal: Patient/family/caregiver demonstrates understanding of disease process, treatment plan, medications, and discharge instructions  Description: Complete  learning assessment and assess knowledge base.  Interventions:  - Provide teaching at level of understanding  - Provide teaching via preferred learning methods  Outcome: Progressing     Problem: NEUROSENSORY - ADULT  Goal: Achieves maximal functionality and self care  Description: INTERVENTIONS  - Monitor swallowing and airway patency with patient fatigue and changes in neurological status  - Encourage and assist patient to increase activity and self care.   - Encourage visually impaired, hearing impaired and aphasic patients to use assistive/communication devices  Outcome: Progressing     Problem: METABOLIC, FLUID AND ELECTROLYTES - ADULT  Goal: Electrolytes maintained within normal limits  Description: INTERVENTIONS:  - Monitor labs and assess patient for signs and symptoms of electrolyte imbalances  - Administer electrolyte replacement as ordered  - Monitor response to electrolyte replacements, including repeat lab results as appropriate  - Instruct patient on fluid and nutrition as appropriate  Outcome: Progressing  Goal: Glucose maintained within target range  Description: INTERVENTIONS:  - Monitor Blood Glucose as ordered  - Assess for signs and symptoms of hyperglycemia and hypoglycemia  - Administer ordered medications to maintain glucose within target range  - Assess nutritional intake and initiate nutrition service referral as needed  Outcome: Progressing     Problem: SKIN/TISSUE INTEGRITY - ADULT  Goal: Incision(s), wounds(s) or drain site(s) healing without S/S of infection  Description: INTERVENTIONS  - Assess and document dressing, incision, wound bed, drain sites and surrounding tissue  - Provide patient and family education  - Perform skin care/dressing changes every shift   Outcome: Progressing     Problem: MUSCULOSKELETAL - ADULT  Goal: Maintain or return mobility to safest level of function  Description: INTERVENTIONS:  - Assess patient's ability to carry out ADLs; assess patient's baseline for  ADL function and identify physical deficits which impact ability to perform ADLs (bathing, care of mouth/teeth, toileting, grooming, dressing, etc.)  - Assess/evaluate cause of self-care deficits   - Assess range of motion  - Assess patient's mobility  - Assess patient's need for assistive devices and provide as appropriate  - Encourage maximum independence but intervene and supervise when necessary  - Involve family in performance of ADLs  - Assess for home care needs following discharge   - Consider OT consult to assist with ADL evaluation and planning for discharge  - Provide patient education as appropriate  Outcome: Progressing  Goal: Maintain proper alignment of affected body part  Description: INTERVENTIONS:  - Support, maintain and protect limb and body alignment  - Provide patient/ family with appropriate education  Outcome: Progressing

## 2025-01-30 NOTE — DISCHARGE SUMMARY
Discharge Summary - Hospitalist   Name: Lizzy Acuna 49 y.o. female I MRN: [id number]  Unit/Bed#: -01 I Date of Admission: 1/28/2025   Date of Service: 1/30/2025 I Hospital Day: 2     Assessment & Plan  Sepsis due to cellulitis (HCC)  Patient with left foot diabetic wound, noted to have tachycardia and tachypnea on admission  MRI with fluid collection within dorsal soft tissue  Continue IV antibiotics with cefepime, vancomycin, and Flagyl  Arterial study showed arterial abnormalities, reviewed case with vascular surgery on 1/29/2025.  Recommendation was for initiation of heparin drip and transferred to [facility]  Patient was taken to the OR on 1/29/2025 by podiatry for left foot I&D  IV fluids as needed   Follow-up cultures   Monitor infectious parameters  Infectious disease consulted  Diabetic foot infection  (HCC)  Patient is status post left TMA on 1/3/2025  MRI and vascular study results appreciated  Podiatry and vascular team consulted  Further treatment as above  Hyponatremia  Patient has history of chronic hyponatremia  Component of pseudohyponatremia  Sodium level fluctuating  Monitor BMP as needed  Obesity (BMI 30.0-34.9)  Placed on [facility] step 2 diet  Encourage healthy weight loss and supportive care  Tobacco abuse  Patient states that she stopped smoking after her last admission  Is declining nicotine replacement at this time  Diabetes mellitus type 2 with complications (HCC)  Lab Results   Component Value Date    HGBA1C 10.3 (H) 12/13/2024       Recent Labs     01/29/25  2122 01/30/25  0741 01/30/25  1143 01/30/25  1637   POCGLU 200* 159* 191* 194*       Blood Sugar Average: Last 72 hrs:  (P) [id number]  Placed on [facility] type II diet  Continue prehospital Lantus 10 units SQ nightly  Obtain Accu-Cheks before meals and at bedtime with Humalog correction dose before meals and at bedtime  Peripheral arterial disease (HCC)  Patient had Left lower extremity angiogram and angioplasty  performed on 12/23/2024  Arterial duplex performed on 1/29/2025 shows arterial stent occlusion  Reviewed case with vascular surgery who recommended transfer to [facility] for further treatment  Continue heparin drip     Medical Problems       Resolved Problems  Date Reviewed: 1/29/2025   None       Discharging Physician / Practitioner: Luz Maria Shore MD  PCP: NAOMY Basilio  Admission Date:   Admission Orders (From admission, onward)       Ordered        01/28/25 1911  INPATIENT ADMISSION  Once                          Discharge Date: 01/30/25    Consultations During Hospital Stay:  Podiatry, vascular    Procedures Performed:   Left foot I&D    Significant Findings / Test Results:   Diabetic foot infection    Incidental Findings:   None    Test Results Pending at Discharge (will require follow up):   None     Outpatient Tests Requested:  Routine labs with PCP as outpatient    Complications: None    Reason for Admission: Diabetic foot infection    Hospital Course:   Lizzy Acuna is a 49 y.o. female patient who originally presented to the hospital on 1/28/2025 due to left foot pain.  Patient was sent by podiatry to the ER for further evaluation/treatment.  Patient started on IV antibiotics.  Podiatry consulted.  Patient was taken to the OR on 1/29/2025 for I&D as MRI did show suspected abscess.  Patient had arterial study which showed occlusion of left lower extremity arterial stent.  Case was reviewed with vascular surgery who recommended transfer to [facility] for definitive treatment.  In the meantime patient started on heparin drip.  Infectious disease consulted.  Patient has been accepted by [facility] for transfer.  Will continue on IV antibiotics and heparin drip.  Continue follow-up with podiatry and vascular  Please see above list of diagnoses and related plan for additional information.     Condition at Discharge: stable    Discharge Day Visit / Exam:  "  Subjective: No complaints at this time  Vitals: Blood Pressure: 127/67 (01/30/25 1533)  Pulse: 101 (01/30/25 1533)  Temperature: 98.5 °F (36.9 °C) (01/30/25 1533)  Temp Source: Temporal (01/29/25 1335)  Respirations: 16 (01/30/25 1533)  Height: 5' 1\" (154.9 cm) (01/29/25 1335)  Weight - Scale: 83.9 kg (185 lb) (01/29/25 1335)  SpO2: 95 % (01/30/25 1533)  Physical Exam  Constitutional:       General: She is not in acute distress.  HENT:      Head: Normocephalic and atraumatic.      Nose: Nose normal.      Mouth/Throat:      Mouth: Mucous membranes are moist.   Eyes:      Extraocular Movements: Extraocular movements intact.      Conjunctiva/sclera: Conjunctivae normal.   Cardiovascular:      Rate and Rhythm: Normal rate and regular rhythm.   Pulmonary:      Effort: Pulmonary effort is normal. No respiratory distress.   Abdominal:      Palpations: Abdomen is soft.      Tenderness: There is no abdominal tenderness.   Musculoskeletal:         General: Normal range of motion.      Cervical back: Normal range of motion and neck supple.   Skin:     General: Skin is warm and dry.      Comments: Foot wound with dressing in place   Neurological:      General: No focal deficit present.      Mental Status: She is alert. Mental status is at baseline.      Cranial Nerves: No cranial nerve deficit.   Psychiatric:         Mood and Affect: Mood normal.         Behavior: Behavior normal.          Discussion with Family:  Updated patient regarding transfer plan.     Discharge instructions/Information to patient and family:   See after visit summary for information provided to patient and family.      Provisions for Follow-Up Care:  See after visit summary for information related to follow-up care and any pertinent home health orders.      Mobility at time of Discharge:   Basic Mobility Inpatient Raw Score: 21  JH-HLM Goal: 6: Walk 10 steps or more  JH-HLM Achieved: 1: Laying in bed  HLM Goal achieved. Continue to encourage " appropriate mobility.     Disposition:   Acute Care Hospital Transfer to [facility]    Planned Readmission: yes    Discharge Medications:  See after visit summary for reconciled discharge medications provided to patient and/or family.      Administrative Statements   Discharge Statement:  I have spent a total time of 35 minutes in caring for this patient on the day of the visit/encounter. >30 minutes of time was spent on: Counseling / Coordination of care, Documenting in the medical record, Reviewing / ordering tests, medicine, procedures  , and Communicating with other healthcare professionals .    **Please Note: This note may have been constructed using a voice recognition system**

## 2025-01-31 ENCOUNTER — APPOINTMENT (INPATIENT)
Dept: RADIOLOGY | Facility: HOSPITAL | Age: 50
DRG: 169 | End: 2025-01-31
Payer: COMMERCIAL

## 2025-01-31 ENCOUNTER — APPOINTMENT (INPATIENT)
Dept: RADIOLOGY | Facility: HOSPITAL | Age: 50
DRG: 169 | End: 2025-01-31
Attending: RADIOLOGY
Payer: COMMERCIAL

## 2025-01-31 ENCOUNTER — HOSPITAL ENCOUNTER (INPATIENT)
Facility: HOSPITAL | Age: 50
LOS: 15 days | DRG: 169 | End: 2025-02-15
Admitting: INTERNAL MEDICINE
Payer: COMMERCIAL

## 2025-01-31 DIAGNOSIS — I10 PRIMARY HYPERTENSION: ICD-10-CM

## 2025-01-31 DIAGNOSIS — L03.116 CELLULITIS OF LEFT FOOT: ICD-10-CM

## 2025-01-31 DIAGNOSIS — T81.31XA DEHISCENCE OF OPERATIVE WOUND, INITIAL ENCOUNTER: ICD-10-CM

## 2025-01-31 DIAGNOSIS — E11.42 DIABETIC POLYNEUROPATHY ASSOCIATED WITH TYPE 2 DIABETES MELLITUS (HCC): ICD-10-CM

## 2025-01-31 DIAGNOSIS — E11.628 DIABETIC FOOT INFECTION  (HCC): ICD-10-CM

## 2025-01-31 DIAGNOSIS — E11.621 DIABETIC FOOT ULCER (HCC): ICD-10-CM

## 2025-01-31 DIAGNOSIS — E04.1 THYROID NODULE: ICD-10-CM

## 2025-01-31 DIAGNOSIS — I73.9 PAD (PERIPHERAL ARTERY DISEASE) (HCC): Primary | ICD-10-CM

## 2025-01-31 DIAGNOSIS — Z89.432: ICD-10-CM

## 2025-01-31 DIAGNOSIS — L03.90 SEPSIS DUE TO CELLULITIS (HCC): ICD-10-CM

## 2025-01-31 DIAGNOSIS — E66.9 OBESITY (BMI 30-39.9): ICD-10-CM

## 2025-01-31 DIAGNOSIS — L97.509 DIABETIC FOOT ULCER (HCC): ICD-10-CM

## 2025-01-31 DIAGNOSIS — A41.9 SEPSIS DUE TO CELLULITIS (HCC): ICD-10-CM

## 2025-01-31 DIAGNOSIS — Z89.512 STATUS POST BELOW-KNEE AMPUTATION OF LEFT LOWER EXTREMITY (HCC): ICD-10-CM

## 2025-01-31 DIAGNOSIS — L08.9 DIABETIC FOOT INFECTION  (HCC): ICD-10-CM

## 2025-01-31 LAB
ALBUMIN SERPL BCG-MCNC: 3.3 G/DL (ref 3.5–5)
ALP SERPL-CCNC: 110 U/L (ref 34–104)
ALT SERPL W P-5'-P-CCNC: 13 U/L (ref 7–52)
ANION GAP SERPL CALCULATED.3IONS-SCNC: 11 MMOL/L (ref 4–13)
ANION GAP SERPL CALCULATED.3IONS-SCNC: 8 MMOL/L (ref 4–13)
ANISOCYTOSIS BLD QL SMEAR: PRESENT
APTT PPP: 188 SECONDS (ref 23–34)
APTT PPP: 30 SECONDS (ref 23–34)
APTT PPP: 38 SECONDS (ref 23–34)
APTT PPP: 81 SECONDS (ref 23–34)
APTT PPP: 91 SECONDS (ref 23–34)
AST SERPL W P-5'-P-CCNC: 14 U/L (ref 13–39)
BACTERIA SPEC ANAEROBE CULT: ABNORMAL
BASOPHILS # BLD MANUAL: 0 THOUSAND/UL (ref 0–0.1)
BASOPHILS NFR MAR MANUAL: 0 % (ref 0–1)
BILIRUB SERPL-MCNC: 0.35 MG/DL (ref 0.2–1)
BUN SERPL-MCNC: 12 MG/DL (ref 5–25)
BUN SERPL-MCNC: 20 MG/DL (ref 5–25)
CALCIUM ALBUM COR SERPL-MCNC: 9.7 MG/DL (ref 8.3–10.1)
CALCIUM SERPL-MCNC: 9.1 MG/DL (ref 8.4–10.2)
CALCIUM SERPL-MCNC: 9.2 MG/DL (ref 8.4–10.2)
CHLORIDE SERPL-SCNC: 102 MMOL/L (ref 96–108)
CHLORIDE SERPL-SCNC: 94 MMOL/L (ref 96–108)
CO2 SERPL-SCNC: 20 MMOL/L (ref 21–32)
CO2 SERPL-SCNC: 24 MMOL/L (ref 21–32)
CREAT SERPL-MCNC: 0.57 MG/DL (ref 0.6–1.3)
CREAT SERPL-MCNC: 0.74 MG/DL (ref 0.6–1.3)
DACRYOCYTES BLD QL SMEAR: PRESENT
EOSINOPHIL # BLD MANUAL: 0 THOUSAND/UL (ref 0–0.4)
EOSINOPHIL NFR BLD MANUAL: 0 % (ref 0–6)
ERYTHROCYTE [DISTWIDTH] IN BLOOD BY AUTOMATED COUNT: 13.4 % (ref 11.6–15.1)
ERYTHROCYTE [DISTWIDTH] IN BLOOD BY AUTOMATED COUNT: 13.5 % (ref 11.6–15.1)
FIBRINOGEN PPP-MCNC: 729 MG/DL (ref 206–523)
GFR SERPL CREATININE-BSD FRML MDRD: 109 ML/MIN/1.73SQ M
GFR SERPL CREATININE-BSD FRML MDRD: 96 ML/MIN/1.73SQ M
GLUCOSE SERPL-MCNC: 118 MG/DL (ref 65–140)
GLUCOSE SERPL-MCNC: 119 MG/DL (ref 65–140)
GLUCOSE SERPL-MCNC: 158 MG/DL (ref 65–140)
GLUCOSE SERPL-MCNC: 169 MG/DL (ref 65–140)
GLUCOSE SERPL-MCNC: 172 MG/DL (ref 65–140)
GLUCOSE SERPL-MCNC: 173 MG/DL (ref 65–140)
GLUCOSE SERPL-MCNC: 184 MG/DL (ref 65–140)
HCT VFR BLD AUTO: 25.3 % (ref 34.8–46.1)
HCT VFR BLD AUTO: 28.9 % (ref 34.8–46.1)
HGB BLD-MCNC: 8.2 G/DL (ref 11.5–15.4)
HGB BLD-MCNC: 9 G/DL (ref 11.5–15.4)
INR PPP: 0.99 (ref 0.85–1.19)
INR PPP: 1.01 (ref 0.85–1.19)
LYMPHOCYTES # BLD AUTO: 34 % (ref 14–44)
LYMPHOCYTES # BLD AUTO: 5.18 THOUSAND/UL (ref 0.6–4.47)
MACROCYTES BLD QL AUTO: PRESENT
MCH RBC QN AUTO: 28.7 PG (ref 26.8–34.3)
MCH RBC QN AUTO: 29.3 PG (ref 26.8–34.3)
MCHC RBC AUTO-ENTMCNC: 31.1 G/DL (ref 31.4–37.4)
MCHC RBC AUTO-ENTMCNC: 32.4 G/DL (ref 31.4–37.4)
MCV RBC AUTO: 90 FL (ref 82–98)
MCV RBC AUTO: 92 FL (ref 82–98)
METAMYELOCYTE ABSOLUTE CT: 0.14 THOUSAND/UL (ref 0–0.1)
METAMYELOCYTES NFR BLD MANUAL: 1 % (ref 0–1)
MICROCYTES BLD QL AUTO: PRESENT
MONOCYTES # BLD AUTO: 0.55 THOUSAND/UL (ref 0–1.22)
MONOCYTES NFR BLD: 4 % (ref 4–12)
NEUTROPHILS # BLD MANUAL: 7.77 THOUSAND/UL (ref 1.85–7.62)
NEUTS BAND NFR BLD MANUAL: 1 % (ref 0–8)
NEUTS SEG NFR BLD AUTO: 56 % (ref 43–75)
NRBC BLD AUTO-RTO: 1 /100 WBC (ref 0–2)
OVALOCYTES BLD QL SMEAR: PRESENT
PLATELET # BLD AUTO: 207 THOUSANDS/UL (ref 149–390)
PLATELET # BLD AUTO: 270 THOUSANDS/UL (ref 149–390)
PLATELET BLD QL SMEAR: ADEQUATE
PMV BLD AUTO: 9.3 FL (ref 8.9–12.7)
PMV BLD AUTO: 9.6 FL (ref 8.9–12.7)
POIKILOCYTOSIS BLD QL SMEAR: PRESENT
POLYCHROMASIA BLD QL SMEAR: PRESENT
POTASSIUM SERPL-SCNC: 4 MMOL/L (ref 3.5–5.3)
POTASSIUM SERPL-SCNC: 4.1 MMOL/L (ref 3.5–5.3)
PROT SERPL-MCNC: 7.2 G/DL (ref 6.4–8.4)
PROTHROMBIN TIME: 13.4 SECONDS (ref 12.3–15)
PROTHROMBIN TIME: 13.6 SECONDS (ref 12.3–15)
RBC # BLD AUTO: 2.8 MILLION/UL (ref 3.81–5.12)
RBC # BLD AUTO: 3.14 MILLION/UL (ref 3.81–5.12)
RBC MORPH BLD: PRESENT
SODIUM SERPL-SCNC: 125 MMOL/L (ref 135–147)
SODIUM SERPL-SCNC: 134 MMOL/L (ref 135–147)
VANCOMYCIN SERPL-MCNC: 10.8 UG/ML (ref 10–20)
VARIANT LYMPHS # BLD AUTO: 4 %
WBC # BLD AUTO: 13.64 THOUSAND/UL (ref 4.31–10.16)
WBC # BLD AUTO: 8.68 THOUSAND/UL (ref 4.31–10.16)

## 2025-01-31 PROCEDURE — 85007 BL SMEAR W/DIFF WBC COUNT: CPT | Performed by: INTERNAL MEDICINE

## 2025-01-31 PROCEDURE — C1757 CATH, THROMBECTOMY/EMBOLECT: HCPCS

## 2025-01-31 PROCEDURE — 37246 TRLUML BALO ANGIOP 1ST ART: CPT

## 2025-01-31 PROCEDURE — 85610 PROTHROMBIN TIME: CPT | Performed by: RADIOLOGY

## 2025-01-31 PROCEDURE — C1769 GUIDE WIRE: HCPCS

## 2025-01-31 PROCEDURE — 99152 MOD SED SAME PHYS/QHP 5/>YRS: CPT | Performed by: RADIOLOGY

## 2025-01-31 PROCEDURE — 36140 INTRO NDL ICATH UPR/LXTR ART: CPT

## 2025-01-31 PROCEDURE — 76080 X-RAY EXAM OF FISTULA: CPT

## 2025-01-31 PROCEDURE — 49424 ASSESS CYST CONTRAST INJECT: CPT

## 2025-01-31 PROCEDURE — X2CT3T7 EXTIRPATION OF MATTER FROM LEFT LOWER EXTREMITY ARTERY USING COMPUTER-AIDED MECHANICAL ASPIRATION, PERCUTANEOUS APPROACH, NEW TECHNOLOGY GROUP 7: ICD-10-PCS | Performed by: RADIOLOGY

## 2025-01-31 PROCEDURE — 82948 REAGENT STRIP/BLOOD GLUCOSE: CPT

## 2025-01-31 PROCEDURE — 76937 US GUIDE VASCULAR ACCESS: CPT | Performed by: RADIOLOGY

## 2025-01-31 PROCEDURE — 36573 INSJ PICC RS&I 5 YR+: CPT | Performed by: RADIOLOGY

## 2025-01-31 PROCEDURE — 99223 1ST HOSP IP/OBS HIGH 75: CPT | Performed by: INTERNAL MEDICINE

## 2025-01-31 PROCEDURE — 70450 CT HEAD/BRAIN W/O DYE: CPT

## 2025-01-31 PROCEDURE — C1887 CATHETER, GUIDING: HCPCS

## 2025-01-31 PROCEDURE — 80053 COMPREHEN METABOLIC PANEL: CPT | Performed by: NURSE PRACTITIONER

## 2025-01-31 PROCEDURE — 36573 INSJ PICC RS&I 5 YR+: CPT

## 2025-01-31 PROCEDURE — 99291 CRITICAL CARE FIRST HOUR: CPT | Performed by: SURGERY

## 2025-01-31 PROCEDURE — 85027 COMPLETE CBC AUTOMATED: CPT | Performed by: INTERNAL MEDICINE

## 2025-01-31 PROCEDURE — NC001 PR NO CHARGE: Performed by: NURSE PRACTITIONER

## 2025-01-31 PROCEDURE — 85384 FIBRINOGEN ACTIVITY: CPT

## 2025-01-31 PROCEDURE — 37184 PRIM ART M-THRMBC 1ST VSL: CPT | Performed by: RADIOLOGY

## 2025-01-31 PROCEDURE — 99153 MOD SED SAME PHYS/QHP EA: CPT

## 2025-01-31 PROCEDURE — 02HV33Z INSERTION OF INFUSION DEVICE INTO SUPERIOR VENA CAVA, PERCUTANEOUS APPROACH: ICD-10-PCS | Performed by: RADIOLOGY

## 2025-01-31 PROCEDURE — 047L3ZZ DILATION OF LEFT FEMORAL ARTERY, PERCUTANEOUS APPROACH: ICD-10-PCS | Performed by: RADIOLOGY

## 2025-01-31 PROCEDURE — 80048 BASIC METABOLIC PNL TOTAL CA: CPT | Performed by: INTERNAL MEDICINE

## 2025-01-31 PROCEDURE — 37185 PRIM ART M-THRMBC SBSQ VSL: CPT

## 2025-01-31 PROCEDURE — 85730 THROMBOPLASTIN TIME PARTIAL: CPT | Performed by: RADIOLOGY

## 2025-01-31 PROCEDURE — C1725 CATH, TRANSLUMIN NON-LASER: HCPCS

## 2025-01-31 PROCEDURE — C1894 INTRO/SHEATH, NON-LASER: HCPCS

## 2025-01-31 PROCEDURE — 37211 THROMBOLYTIC ART THERAPY: CPT | Performed by: RADIOLOGY

## 2025-01-31 PROCEDURE — 36247 INS CATH ABD/L-EXT ART 3RD: CPT | Performed by: RADIOLOGY

## 2025-01-31 PROCEDURE — 85730 THROMBOPLASTIN TIME PARTIAL: CPT

## 2025-01-31 PROCEDURE — 99232 SBSQ HOSP IP/OBS MODERATE 35: CPT | Performed by: NURSE PRACTITIONER

## 2025-01-31 PROCEDURE — 99152 MOD SED SAME PHYS/QHP 5/>YRS: CPT

## 2025-01-31 PROCEDURE — 85730 THROMBOPLASTIN TIME PARTIAL: CPT | Performed by: INTERNAL MEDICINE

## 2025-01-31 PROCEDURE — 75710 ARTERY X-RAYS ARM/LEG: CPT

## 2025-01-31 PROCEDURE — 85610 PROTHROMBIN TIME: CPT

## 2025-01-31 PROCEDURE — B41GYZZ FLUOROSCOPY OF LEFT LOWER EXTREMITY ARTERIES USING OTHER CONTRAST: ICD-10-PCS | Performed by: RADIOLOGY

## 2025-01-31 PROCEDURE — 37184 PRIM ART M-THRMBC 1ST VSL: CPT

## 2025-01-31 PROCEDURE — 80202 ASSAY OF VANCOMYCIN: CPT | Performed by: INTERNAL MEDICINE

## 2025-01-31 PROCEDURE — 85027 COMPLETE CBC AUTOMATED: CPT | Performed by: RADIOLOGY

## 2025-01-31 PROCEDURE — NC001 PR NO CHARGE: Performed by: SURGERY

## 2025-01-31 RX ORDER — SODIUM CHLORIDE 9 MG/ML
75 INJECTION, SOLUTION INTRAVENOUS CONTINUOUS
Status: DISCONTINUED | OUTPATIENT
Start: 2025-01-31 | End: 2025-02-01

## 2025-01-31 RX ORDER — HYDROMORPHONE HCL/PF 1 MG/ML
1 SYRINGE (ML) INJECTION ONCE
Status: COMPLETED | OUTPATIENT
Start: 2025-01-31 | End: 2025-01-31

## 2025-01-31 RX ORDER — HEPARIN SODIUM 1000 [USP'U]/ML
3200 INJECTION, SOLUTION INTRAVENOUS; SUBCUTANEOUS EVERY 6 HOURS PRN
Status: DISCONTINUED | OUTPATIENT
Start: 2025-01-31 | End: 2025-02-01

## 2025-01-31 RX ORDER — DIPHENHYDRAMINE HCL 25 MG
25 TABLET ORAL EVERY 6 HOURS PRN
Status: DISCONTINUED | OUTPATIENT
Start: 2025-01-31 | End: 2025-02-03

## 2025-01-31 RX ORDER — HEPARIN SODIUM 1000 [USP'U]/ML
1000 INJECTION, SOLUTION INTRAVENOUS; SUBCUTANEOUS EVERY 6 HOURS PRN
Status: DISCONTINUED | OUTPATIENT
Start: 2025-01-31 | End: 2025-02-02

## 2025-01-31 RX ORDER — INSULIN LISPRO 100 [IU]/ML
1-6 INJECTION, SOLUTION INTRAVENOUS; SUBCUTANEOUS
Status: DISCONTINUED | OUTPATIENT
Start: 2025-01-31 | End: 2025-02-01

## 2025-01-31 RX ORDER — AMOXICILLIN 250 MG
1 CAPSULE ORAL
Status: DISCONTINUED | OUTPATIENT
Start: 2025-01-31 | End: 2025-02-15 | Stop reason: HOSPADM

## 2025-01-31 RX ORDER — ATORVASTATIN CALCIUM 80 MG/1
80 TABLET, FILM COATED ORAL
Status: DISCONTINUED | OUTPATIENT
Start: 2025-01-31 | End: 2025-02-15 | Stop reason: HOSPADM

## 2025-01-31 RX ORDER — METRONIDAZOLE 500 MG/100ML
500 INJECTION, SOLUTION INTRAVENOUS EVERY 8 HOURS
Status: DISCONTINUED | OUTPATIENT
Start: 2025-01-31 | End: 2025-02-01

## 2025-01-31 RX ORDER — HEPARIN SODIUM 10000 [USP'U]/100ML
300-2000 INJECTION, SOLUTION INTRAVENOUS
Status: DISCONTINUED | OUTPATIENT
Start: 2025-01-31 | End: 2025-02-02

## 2025-01-31 RX ORDER — FENTANYL CITRATE 50 UG/ML
INJECTION, SOLUTION INTRAMUSCULAR; INTRAVENOUS AS NEEDED
Status: COMPLETED | OUTPATIENT
Start: 2025-01-31 | End: 2025-01-31

## 2025-01-31 RX ORDER — HEPARIN SODIUM 1000 [USP'U]/ML
2000 INJECTION, SOLUTION INTRAVENOUS; SUBCUTANEOUS EVERY 6 HOURS PRN
Status: DISCONTINUED | OUTPATIENT
Start: 2025-01-31 | End: 2025-02-02

## 2025-01-31 RX ORDER — INSULIN LISPRO 100 [IU]/ML
1-6 INJECTION, SOLUTION INTRAVENOUS; SUBCUTANEOUS EVERY 6 HOURS SCHEDULED
Status: DISCONTINUED | OUTPATIENT
Start: 2025-01-31 | End: 2025-02-06

## 2025-01-31 RX ORDER — HYDROMORPHONE HYDROCHLORIDE 2 MG/1
4 TABLET ORAL EVERY 4 HOURS PRN
Refills: 0 | Status: DISCONTINUED | OUTPATIENT
Start: 2025-01-31 | End: 2025-01-31

## 2025-01-31 RX ORDER — HEPARIN SODIUM 10000 [USP'U]/100ML
3-30 INJECTION, SOLUTION INTRAVENOUS
Status: DISCONTINUED | OUTPATIENT
Start: 2025-01-31 | End: 2025-02-01

## 2025-01-31 RX ORDER — HEPARIN SODIUM 1000 [USP'U]/ML
6400 INJECTION, SOLUTION INTRAVENOUS; SUBCUTANEOUS EVERY 6 HOURS PRN
Status: DISCONTINUED | OUTPATIENT
Start: 2025-01-31 | End: 2025-02-01

## 2025-01-31 RX ORDER — INSULIN GLARGINE 100 [IU]/ML
10 INJECTION, SOLUTION SUBCUTANEOUS
Status: DISCONTINUED | OUTPATIENT
Start: 2025-01-31 | End: 2025-02-10

## 2025-01-31 RX ORDER — HYDROMORPHONE HCL/PF 1 MG/ML
0.5 SYRINGE (ML) INJECTION EVERY 4 HOURS PRN
Refills: 0 | Status: DISCONTINUED | OUTPATIENT
Start: 2025-01-31 | End: 2025-02-01

## 2025-01-31 RX ORDER — HYDROMORPHONE HYDROCHLORIDE 2 MG/1
4 TABLET ORAL EVERY 4 HOURS PRN
Refills: 0 | Status: DISCONTINUED | OUTPATIENT
Start: 2025-01-31 | End: 2025-02-01

## 2025-01-31 RX ORDER — ATORVASTATIN CALCIUM 80 MG/1
80 TABLET, FILM COATED ORAL
Status: DISCONTINUED | OUTPATIENT
Start: 2025-01-31 | End: 2025-01-31

## 2025-01-31 RX ORDER — METHOCARBAMOL 500 MG/1
500 TABLET, FILM COATED ORAL EVERY 6 HOURS SCHEDULED
Status: DISCONTINUED | OUTPATIENT
Start: 2025-01-31 | End: 2025-01-31 | Stop reason: SDUPTHER

## 2025-01-31 RX ORDER — HYDROMORPHONE HCL/PF 1 MG/ML
0.5 SYRINGE (ML) INJECTION EVERY 2 HOUR PRN
Status: DISCONTINUED | OUTPATIENT
Start: 2025-01-31 | End: 2025-02-01

## 2025-01-31 RX ORDER — HEPARIN SODIUM 200 [USP'U]/100ML
20 INJECTION, SOLUTION INTRAVENOUS CONTINUOUS
Status: DISPENSED | OUTPATIENT
Start: 2025-01-31 | End: 2025-02-01

## 2025-01-31 RX ORDER — ASPIRIN 81 MG/1
81 TABLET ORAL DAILY
Status: DISCONTINUED | OUTPATIENT
Start: 2025-01-31 | End: 2025-02-15 | Stop reason: HOSPADM

## 2025-01-31 RX ORDER — HYDROMORPHONE HCL/PF 1 MG/ML
0.5 SYRINGE (ML) INJECTION
Status: DISCONTINUED | OUTPATIENT
Start: 2025-01-31 | End: 2025-01-31

## 2025-01-31 RX ORDER — CHLORHEXIDINE GLUCONATE ORAL RINSE 1.2 MG/ML
15 SOLUTION DENTAL EVERY 12 HOURS SCHEDULED
Status: DISCONTINUED | OUTPATIENT
Start: 2025-01-31 | End: 2025-02-14

## 2025-01-31 RX ORDER — IODIXANOL 320 MG/ML
200 INJECTION, SOLUTION INTRAVASCULAR
Status: COMPLETED | OUTPATIENT
Start: 2025-01-31 | End: 2025-01-31

## 2025-01-31 RX ORDER — HYDROMORPHONE HYDROCHLORIDE 2 MG/1
6 TABLET ORAL EVERY 4 HOURS PRN
Refills: 0 | Status: DISCONTINUED | OUTPATIENT
Start: 2025-01-31 | End: 2025-02-01

## 2025-01-31 RX ORDER — ACETAMINOPHEN 10 MG/ML
1000 INJECTION, SOLUTION INTRAVENOUS EVERY 8 HOURS
Status: DISCONTINUED | OUTPATIENT
Start: 2025-01-31 | End: 2025-01-31

## 2025-01-31 RX ORDER — ACETAMINOPHEN 325 MG/1
975 TABLET ORAL EVERY 6 HOURS SCHEDULED
Status: DISCONTINUED | OUTPATIENT
Start: 2025-01-31 | End: 2025-02-15 | Stop reason: HOSPADM

## 2025-01-31 RX ORDER — DIPHENHYDRAMINE HCL 25 MG
25 TABLET ORAL EVERY 6 HOURS PRN
Status: DISCONTINUED | OUTPATIENT
Start: 2025-01-31 | End: 2025-01-31

## 2025-01-31 RX ORDER — METHOCARBAMOL 500 MG/1
500 TABLET, FILM COATED ORAL EVERY 6 HOURS SCHEDULED
Status: DISCONTINUED | OUTPATIENT
Start: 2025-01-31 | End: 2025-02-02

## 2025-01-31 RX ORDER — INSULIN LISPRO 100 [IU]/ML
5 INJECTION, SOLUTION INTRAVENOUS; SUBCUTANEOUS
Status: DISCONTINUED | OUTPATIENT
Start: 2025-01-31 | End: 2025-01-31

## 2025-01-31 RX ORDER — GABAPENTIN 300 MG/1
300 CAPSULE ORAL
Status: DISCONTINUED | OUTPATIENT
Start: 2025-01-31 | End: 2025-02-02

## 2025-01-31 RX ORDER — LIDOCAINE WITH 8.4% SOD BICARB 0.9%(10ML)
SYRINGE (ML) INJECTION AS NEEDED
Status: COMPLETED | OUTPATIENT
Start: 2025-01-31 | End: 2025-01-31

## 2025-01-31 RX ORDER — DIPHENHYDRAMINE HYDROCHLORIDE 50 MG/ML
INJECTION INTRAMUSCULAR; INTRAVENOUS AS NEEDED
Status: COMPLETED | OUTPATIENT
Start: 2025-01-31 | End: 2025-01-31

## 2025-01-31 RX ORDER — INSULIN LISPRO 100 [IU]/ML
1-6 INJECTION, SOLUTION INTRAVENOUS; SUBCUTANEOUS
Status: DISCONTINUED | OUTPATIENT
Start: 2025-01-31 | End: 2025-01-31

## 2025-01-31 RX ORDER — HEPARIN SODIUM 1000 [USP'U]/ML
INJECTION, SOLUTION INTRAVENOUS; SUBCUTANEOUS AS NEEDED
Status: COMPLETED | OUTPATIENT
Start: 2025-01-31 | End: 2025-01-31

## 2025-01-31 RX ORDER — MIDAZOLAM HYDROCHLORIDE 2 MG/2ML
INJECTION, SOLUTION INTRAMUSCULAR; INTRAVENOUS AS NEEDED
Status: COMPLETED | OUTPATIENT
Start: 2025-01-31 | End: 2025-01-31

## 2025-01-31 RX ORDER — ONDANSETRON 2 MG/ML
4 INJECTION INTRAMUSCULAR; INTRAVENOUS EVERY 6 HOURS PRN
Status: DISCONTINUED | OUTPATIENT
Start: 2025-01-31 | End: 2025-02-15 | Stop reason: HOSPADM

## 2025-01-31 RX ORDER — GABAPENTIN 300 MG/1
300 CAPSULE ORAL 3 TIMES DAILY
Status: DISCONTINUED | OUTPATIENT
Start: 2025-01-31 | End: 2025-01-31

## 2025-01-31 RX ORDER — ASPIRIN 81 MG/1
81 TABLET ORAL DAILY
Status: DISCONTINUED | OUTPATIENT
Start: 2025-01-31 | End: 2025-01-31 | Stop reason: SDUPTHER

## 2025-01-31 RX ADMIN — ALTEPLASE 1 MG/HR: 2.2 INJECTION, POWDER, LYOPHILIZED, FOR SOLUTION INTRAVENOUS at 19:30

## 2025-01-31 RX ADMIN — SENNOSIDES AND DOCUSATE SODIUM 1 TABLET: 50; 8.6 TABLET ORAL at 00:56

## 2025-01-31 RX ADMIN — GABAPENTIN 300 MG: 300 CAPSULE ORAL at 14:10

## 2025-01-31 RX ADMIN — HYDROMORPHONE HYDROCHLORIDE 0.5 MG: 1 INJECTION, SOLUTION INTRAMUSCULAR; INTRAVENOUS; SUBCUTANEOUS at 10:24

## 2025-01-31 RX ADMIN — HYDROMORPHONE HYDROCHLORIDE 6 MG: 2 TABLET ORAL at 14:10

## 2025-01-31 RX ADMIN — METRONIDAZOLE 500 MG: 500 INJECTION, SOLUTION INTRAVENOUS at 10:12

## 2025-01-31 RX ADMIN — DIPHENHYDRAMINE HYDROCHLORIDE 25 MG: 50 INJECTION, SOLUTION INTRAMUSCULAR; INTRAVENOUS at 18:15

## 2025-01-31 RX ADMIN — ACETAMINOPHEN 975 MG: 325 TABLET, FILM COATED ORAL at 22:36

## 2025-01-31 RX ADMIN — HEPARIN SODIUM 300 UNITS/HR: 10000 INJECTION, SOLUTION INTRAVENOUS at 18:59

## 2025-01-31 RX ADMIN — CHLORHEXIDINE GLUCONATE 0.12% ORAL RINSE 15 ML: 1.2 LIQUID ORAL at 22:44

## 2025-01-31 RX ADMIN — METHOCARBAMOL 500 MG: 500 TABLET ORAL at 14:11

## 2025-01-31 RX ADMIN — VANCOMYCIN HYDROCHLORIDE 750 MG: 5 INJECTION, POWDER, LYOPHILIZED, FOR SOLUTION INTRAVENOUS at 23:47

## 2025-01-31 RX ADMIN — METHOCARBAMOL 500 MG: 500 TABLET ORAL at 22:30

## 2025-01-31 RX ADMIN — HYDROMORPHONE HYDROCHLORIDE: 10 INJECTION, SOLUTION INTRAMUSCULAR; INTRAVENOUS; SUBCUTANEOUS at 22:46

## 2025-01-31 RX ADMIN — FENTANYL CITRATE 50 MCG: 50 INJECTION INTRAMUSCULAR; INTRAVENOUS at 17:26

## 2025-01-31 RX ADMIN — IODIXANOL 65 ML: 320 INJECTION, SOLUTION INTRAVASCULAR at 19:48

## 2025-01-31 RX ADMIN — CEFEPIME 2000 MG: 2 INJECTION, POWDER, FOR SOLUTION INTRAVENOUS at 23:42

## 2025-01-31 RX ADMIN — HEPARIN SODIUM 2000 UNITS: 1000 INJECTION INTRAVENOUS; SUBCUTANEOUS at 18:24

## 2025-01-31 RX ADMIN — CEFEPIME 2000 MG: 2 INJECTION, POWDER, FOR SOLUTION INTRAVENOUS at 06:19

## 2025-01-31 RX ADMIN — HYDROMORPHONE HYDROCHLORIDE 0.5 MG: 1 INJECTION, SOLUTION INTRAMUSCULAR; INTRAVENOUS; SUBCUTANEOUS at 20:13

## 2025-01-31 RX ADMIN — MIDAZOLAM 1 MG: 1 INJECTION INTRAMUSCULAR; INTRAVENOUS at 16:47

## 2025-01-31 RX ADMIN — Medication 10 ML: at 16:56

## 2025-01-31 RX ADMIN — HEPARIN SODIUM 5000 UNITS: 1000 INJECTION INTRAVENOUS; SUBCUTANEOUS at 17:22

## 2025-01-31 RX ADMIN — GABAPENTIN 300 MG: 300 CAPSULE ORAL at 22:44

## 2025-01-31 RX ADMIN — SODIUM CHLORIDE 75 ML/HR: 0.9 INJECTION, SOLUTION INTRAVENOUS at 00:56

## 2025-01-31 RX ADMIN — HEPARIN SODIUM 22 UNITS/KG/HR: 10000 INJECTION, SOLUTION INTRAVENOUS at 00:56

## 2025-01-31 RX ADMIN — HEPARIN SODIUM 6400 UNITS: 1000 INJECTION INTRAVENOUS; SUBCUTANEOUS at 02:10

## 2025-01-31 RX ADMIN — INSULIN GLARGINE 10 UNITS: 100 INJECTION, SOLUTION SUBCUTANEOUS at 22:44

## 2025-01-31 RX ADMIN — HYDROMORPHONE HYDROCHLORIDE 1 MG: 1 INJECTION, SOLUTION INTRAMUSCULAR; INTRAVENOUS; SUBCUTANEOUS at 21:33

## 2025-01-31 RX ADMIN — METRONIDAZOLE 500 MG: 500 INJECTION, SOLUTION INTRAVENOUS at 22:45

## 2025-01-31 RX ADMIN — HYDROMORPHONE HYDROCHLORIDE 0.5 MG: 1 INJECTION, SOLUTION INTRAMUSCULAR; INTRAVENOUS; SUBCUTANEOUS at 20:42

## 2025-01-31 RX ADMIN — HYDROMORPHONE HYDROCHLORIDE 0.5 MG: 1 INJECTION, SOLUTION INTRAMUSCULAR; INTRAVENOUS; SUBCUTANEOUS at 00:55

## 2025-01-31 RX ADMIN — MIDAZOLAM 1 MG: 1 INJECTION INTRAMUSCULAR; INTRAVENOUS at 17:57

## 2025-01-31 RX ADMIN — METRONIDAZOLE 500 MG: 500 INJECTION, SOLUTION INTRAVENOUS at 02:19

## 2025-01-31 RX ADMIN — HYDROMORPHONE HYDROCHLORIDE 0.5 MG: 1 INJECTION, SOLUTION INTRAMUSCULAR; INTRAVENOUS; SUBCUTANEOUS at 06:18

## 2025-01-31 RX ADMIN — ASPIRIN 81 MG: 81 TABLET, COATED ORAL at 09:21

## 2025-01-31 RX ADMIN — HEPARIN SODIUM IN SODIUM CHLORIDE 20 UNITS/HR: 200 INJECTION INTRAVENOUS at 19:00

## 2025-01-31 RX ADMIN — ONDANSETRON 4 MG: 2 INJECTION INTRAMUSCULAR; INTRAVENOUS at 21:37

## 2025-01-31 RX ADMIN — HYDROMORPHONE HYDROCHLORIDE 4 MG: 4 TABLET ORAL at 04:42

## 2025-01-31 RX ADMIN — ACETAMINOPHEN 1000 MG: 10 INJECTION INTRAVENOUS at 10:15

## 2025-01-31 RX ADMIN — MIDAZOLAM 1 MG: 1 INJECTION INTRAMUSCULAR; INTRAVENOUS at 17:26

## 2025-01-31 RX ADMIN — HYDROMORPHONE HYDROCHLORIDE 0.5 MG: 1 INJECTION, SOLUTION INTRAMUSCULAR; INTRAVENOUS; SUBCUTANEOUS at 20:58

## 2025-01-31 RX ADMIN — GABAPENTIN 300 MG: 300 CAPSULE ORAL at 09:21

## 2025-01-31 RX ADMIN — VANCOMYCIN HYDROCHLORIDE 750 MG: 5 INJECTION, POWDER, LYOPHILIZED, FOR SOLUTION INTRAVENOUS at 09:21

## 2025-01-31 RX ADMIN — ACETAMINOPHEN 1000 MG: 10 INJECTION INTRAVENOUS at 01:57

## 2025-01-31 RX ADMIN — FENTANYL CITRATE 50 MCG: 50 INJECTION INTRAMUSCULAR; INTRAVENOUS at 16:47

## 2025-01-31 RX ADMIN — FENTANYL CITRATE 50 MCG: 50 INJECTION INTRAMUSCULAR; INTRAVENOUS at 17:57

## 2025-01-31 NOTE — ASSESSMENT & PLAN NOTE
Patient has Hx of PAD. S/p Left lower extremity angiogram and angioplasty performed on 12/23/2024   Patient presents with sepsis secondary to  left  foot wound, tachycardia and tachypnea on admission at [facility]   Advised transfer to [place] on 1/30/2025  Vas arterial duplex shows greater than 75 stenosis in the superficial femoral artery of the left, 50-75 stenosis in the tibioperoneal trunk.  A/P index 0.27.  Patient status post left TMA on 1/3/2025   Now infected sp I & D.   Also noted left SFA stent with findings of additional multifocal stenoses on LEAD.  Previously not discharged on Plavix sp stent placement   Pt just leaving floor to IR for LLE agram possible intervention   Continue asa/statin and heparin gtt at this time per vascular   Likely requiring Plavix sp procedure -   Appreciate podiatry for local wound care after I & D  Possible VAC post LLE revascularization  Patient was placed on cefepime, vancomycin, Flagyl during that time  Blood cultures 1/28 with no growth at 48 hrs   Continue IV antibiotics for now will consult ID   Monitor wbc count 13.64   Repeat labs in am

## 2025-01-31 NOTE — DISCHARGE INSTRUCTIONS
ARTERIOGRAM    WHAT YOU SHOULD KNOW:   An angiogram is a procedure to look at arteries in your body. Arteries are the blood vessels that carry blood from your heart to your body.     AFTER YOU LEAVE:     Self-care:   Limit activity: Rest for the remainder of the day of your procedure.Have some one with you until the next morning. Keep your arm or leg straight as much as possible.Rest as much as possible, sitting lying or reclining. Walk only to go to the bathroom, to bed or to eat. If the angiogram catheter was put in your leg, use the stairs as little as possible. No driving for 24-48 hours. No heavy lifting, >10 lbs. Or strenuous activity for 48 hours.      Keep your wound clean and dry. Remove band aid/ dressing tomorrow. You may shower 24 hours after your procedure. Shower and wash groin area or wrist area gently with soap and water: beginning tomorrow. Rinse and pat Dry. Apply new water seal band aid. Repeat this process for 5 days.  If there is any drainage from the puncture site, you should put on a clean bandage. No Powders, creams, lotions or antibiotic ointments for 5 days.  No tub baths, hot tubs or swimming for 5 days.    Watch for bleeding and bruising: It is normal to have a bruise and soreness where the angiogram catheter went in.  Medication: If your angiogram was performed to treat blockages in your leg arteries, it is strongly recommended that you take both an antiplatelet medication (like aspirin or Plavix) to prevent clotting AND a statin drug (like Lipitor or Crestor), even if you have normal cholesterol. If these drugs are not ordered for you please contact either your Vascular Surgery office or the Interventional Radiology Dept during normal daytime working hours. See Interventional Radiology telephone numbers below.  You Should Have Follow up with the vascular surgeon   call [phone] with questions  Diet:   You may resume your regular diet, Sips of flat soda will help with mild  nausea.  Drink more liquids than usual for the next 24 hours      IMMEDIATELY Contact Interventional Radiology at [phone]  if any of the following occur:  If your bruise gets larger or if you notice any active bleeding. APPLY DIRECT PRESSURE TO THE BLEEDING SITE.   If you notice increased swelling or have increased pain at the puncture site   If you have any numbness or pain in the extremity of the puncture site   If that extremity seems cold or pale.    You have fever greater than 101  Persistent nausea or vomitting    Follow up with your primary healthcare provider  as directed: Write down your questions so you remember to ask them during your visits.

## 2025-01-31 NOTE — PROGRESS NOTES
Progress Note - Hospitalist   Name: Lizzy Acuna 49 y.o. female I MRN: [id number]  Unit/Bed#: -01 I Date of Admission: 1/31/2025   Date of Service: 1/31/2025 I Hospital Day: 0    Assessment & Plan  Sepsis due to cellulitis (HCC)      Patient has Hx of PAD. S/p Left lower extremity angiogram and angioplasty performed on 12/23/2024   Patient presents with sepsis secondary to  left  foot wound, tachycardia and tachypnea on admission at [facility]   Advised transfer to [place] on 1/30/2025  Vas arterial duplex shows greater than 75 stenosis in the superficial femoral artery of the left, 50-75 stenosis in the tibioperoneal trunk.  A/P index 0.27.  Patient status post left TMA on 1/3/2025   Now infected sp I & D.   Also noted left SFA stent with findings of additional multifocal stenoses on LEAD.  Previously not discharged on Plavix sp stent placement   Pt just leaving floor to IR for LLE agram possible intervention   Continue asa/statin and heparin gtt at this time per vascular   Likely requiring Plavix sp procedure -   Appreciate podiatry for local wound care after I & D  Possible VAC post LLE revascularization  Patient was placed on cefepime, vancomycin, Flagyl during that time  Blood cultures 1/28 with no growth at 48 hrs   Continue IV antibiotics for now will consult ID   Monitor wbc count 13.64   Repeat labs in am    Cellulitis of left foot  Plan noted above  Diabetes mellitus type 2 with complications (HCC)  Lab Results   Component Value Date    HGBA1C 10.3 (H) 12/13/2024       Recent Labs     01/31/25  0050 01/31/25  0628 01/31/25  0805 01/31/25  1211   POCGLU 184* 173* 172* 158*       Blood Sugar Average: Last 72 hrs:  (P) 171.75  Continue Lantus 10 units nightly  Sliding scale  Diabetic diet    PAD (peripheral artery disease) (HCC)  Patient has severe PAD on the left side  Vascular podiatry on board  Continue aspirin 81 mg once daily  Continue atorvastatin 80 mg once  "daily  Hyponatremia  Recent Labs     01/29/25  0530 01/30/25  0519 01/31/25  0432   SODIUM 130* 128* 125*     No results found for: \"OSMOUA\", \"NAUR\", \"OSMOLALITSER\"    Chronic hyponatremia  Continue to monitor  Pt with sodium of 125 will require repeat upon return from IR     Obesity (BMI 30.0-34.9)  Affects all aspects of care    VTE Pharmacologic Prophylaxis:   High Risk (Score >/= 5) - Pharmacological DVT Prophylaxis Ordered: heparin drip. Sequential Compression Devices Ordered.    Mobility:   Basic Mobility Inpatient Raw Score: 20  JH-HLM Goal: 6: Walk 10 steps or more  JH-HLM Achieved: 6: Walk 10 steps or more  JH-HLM Goal achieved. Continue to encourage appropriate mobility.    Patient Centered Rounds: I performed bedside rounds with nursing staff today.   Discussions with Specialists or Other Care Team Provider: nursing     Education and Discussions with Family / Patient: Patient declined call to .     Current Length of Stay: 0 day(s)  Current Patient Status: Inpatient   Certification Statement: The patient will continue to require additional inpatient hospital stay due to going for agram now   Discharge Plan: Anticipate discharge in >72 hrs to ongoing workup up pending further plan sp agram    Code Status: Level 1 - Full Code    Subjective   Patient laying in bed waiting to go down for a gram.  Patient does have ongoing pain on left lower extremity reports feels like shooting needles at times.  No nausea vomiting no shortness of breath no chest pain.  Recent BM but does not feel constipated    Objective :  Temp:  [98.1 °F (36.7 °C)-98.7 °F (37.1 °C)] 98.1 °F (36.7 °C)  HR:  [] 97  BP: (122-130)/(67-80) 122/78  Resp:  [16-18] 18  SpO2:  [95 %-96 %] 96 %  O2 Device: None (Room air)    Body mass index is 36.21 kg/m².     Input and Output Summary (last 24 hours):     Intake/Output Summary (Last 24 hours) at 1/31/2025 1510  Last data filed at 1/31/2025 0900  Gross per 24 hour   Intake 0 ml "   Output --   Net 0 ml       Physical Exam  Constitutional:       General: She is not in acute distress.     Appearance: She is obese. She is not ill-appearing, toxic-appearing or diaphoretic.   HENT:      Head: Normocephalic and atraumatic.      Nose: No congestion.   Eyes:      General:         Right eye: No discharge.         Left eye: No discharge.   Cardiovascular:      Rate and Rhythm: Normal rate.      Heart sounds: No murmur heard.     No friction rub. No gallop.   Pulmonary:      Effort: No respiratory distress.      Breath sounds: No stridor. No wheezing, rhonchi or rales.   Chest:      Chest wall: No tenderness.   Abdominal:      General: There is no distension.      Palpations: There is no mass.      Tenderness: There is no abdominal tenderness. There is no guarding or rebound.      Hernia: No hernia is present.   Musculoskeletal:         General: Deformity present.   Skin:     Coloration: Skin is pale. Skin is not jaundiced.      Findings: No bruising, erythema, lesion or rash.   Neurological:      Mental Status: She is alert and oriented to person, place, and time.   Psychiatric:         Behavior: Behavior normal.           Lines/Drains:          Lab Results: I have reviewed the following results:   Results from last 7 days   Lab Units 01/31/25  0432 01/30/25  0519   WBC Thousand/uL 13.64* 10.67*   HEMOGLOBIN g/dL 9.0* 9.1*   HEMATOCRIT % 28.9* 28.3*   PLATELETS Thousands/uL 270 207   BANDS PCT % 1  --    SEGS PCT %  --  68   LYMPHO PCT % 34 25   MONO PCT % 4 5   EOS PCT % 0 1     Results from last 7 days   Lab Units 01/31/25  0432 01/29/25  0530 01/28/25  1820   SODIUM mmol/L 125*   < > 129*   POTASSIUM mmol/L 4.0   < > 4.0   CHLORIDE mmol/L 94*   < > 91*   CO2 mmol/L 20*   < > 25   BUN mg/dL 20   < > 20   CREATININE mg/dL 0.74   < > 0.88   ANION GAP mmol/L 11   < > 13   CALCIUM mg/dL 9.2   < > 9.9   ALBUMIN g/dL  --   --  3.8   TOTAL BILIRUBIN mg/dL  --   --  0.38   ALK PHOS U/L  --   --  106*   ALT  U/L  --   --  20   AST U/L  --   --  14   GLUCOSE RANDOM mg/dL 169*   < > 316*    < > = values in this interval not displayed.     Results from last 7 days   Lab Units 01/31/25  0629   INR  0.99     Results from last 7 days   Lab Units 01/31/25  1211 01/31/25  0805 01/31/25  0628 01/31/25  0050 01/30/25  2131 01/30/25  1637 01/30/25  1143 01/30/25  0741 01/29/25  2122 01/29/25  1607 01/29/25  1332 01/29/25  1126   POC GLUCOSE mg/dl 158* 172* 173* 184* 208* 194* 191* 159* 200* 152* 188* 221*         Results from last 7 days   Lab Units 01/29/25  0530 01/28/25  2113 01/28/25  1820   LACTIC ACID mmol/L  --  1.1 3.0*   PROCALCITONIN ng/ml 0.25  --  0.22       Recent Cultures (last 7 days):   Results from last 7 days   Lab Units 01/29/25  1507 01/28/25  1820   BLOOD CULTURE   --  No Growth at 48 hrs.  No Growth at 48 hrs.   GRAM STAIN RESULT  Rare Gram positive cocci in pairs*  No polys seen*  --        Imaging Results Review: I reviewed radiology reports from this admission including: CT head.  Other Study Results Review: No additional pertinent studies reviewed.    Last 24 Hours Medication List:     Current Facility-Administered Medications:     acetaminophen (Ofirmev) injection 1,000 mg, Q8H, Last Rate: 1,000 mg (01/31/25 1015)    acetaminophen (TYLENOL) tablet 975 mg, Q6H JORGE    aspirin (ECOTRIN LOW STRENGTH) EC tablet 81 mg, Daily    atorvastatin (LIPITOR) tablet 80 mg, Daily With Dinner    atorvastatin (LIPITOR) tablet 80 mg, Daily With Dinner    ceFEPime (MAXIPIME) 2,000 mg in dextrose 5 % 50 mL IVPB, Q12H, Last Rate: 2,000 mg (01/31/25 0619)    diphenhydrAMINE (BENADRYL) tablet 25 mg, Q6H PRN    diphenhydrAMINE (BENADRYL) tablet 25 mg, Q6H PRN    gabapentin (NEURONTIN) capsule 300 mg, TID    gabapentin (NEURONTIN) capsule 300 mg, HS    heparin (porcine) 25,000 units in 0.45% NaCl 250 mL infusion (premix), Titrated, Last Rate: 23 Units/kg/hr (01/31/25 0607)    heparin (porcine) injection 3,200 Units, Q6H PRN     heparin (porcine) injection 6,400 Units, Q6H PRN    HYDROmorphone (DILAUDID) injection 0.5 mg, Q4H PRN    HYDROmorphone (DILAUDID) tablet 4 mg, Q4H PRN **OR** HYDROmorphone (DILAUDID) tablet 6 mg, Q4H PRN    insulin glargine (LANTUS) subcutaneous injection 10 Units 0.1 mL, HS    insulin lispro (HumALOG/ADMELOG) 100 units/mL subcutaneous injection 1-6 Units, TID AC **AND** Fingerstick Glucose (POCT), TID AC    insulin lispro (HumALOG/ADMELOG) 100 units/mL subcutaneous injection 1-6 Units, Q6H JORGE **AND** Fingerstick Glucose (POCT), Q6H    methocarbamol (ROBAXIN) tablet 500 mg, Q6H JORGE    metroNIDAZOLE (FLAGYL) IVPB (premix) 500 mg 100 mL, Q8H, Last Rate: 500 mg (01/31/25 1012)    ondansetron (ZOFRAN) injection 4 mg, Q6H PRN    senna-docusate sodium (SENOKOT S) 8.6-50 mg per tablet 1 tablet, HS    sodium chloride 0.9 % infusion, Continuous, Last Rate: 75 mL/hr (01/31/25 0056)    vancomycin 750 mg in sodium chloride 0.9% 250 mL, Q12H    Administrative Statements   Today, Patient Was Seen By: NAOMY Yusuf      **Please Note: This note may have been constructed using a voice recognition system.**

## 2025-01-31 NOTE — H&P
"H&P - Hospitalist   Name: Lizzy Acuna 49 y.o. female I MRN: [id number]  Unit/Bed#: -01 I Date of Admission: (Not on file)   Date of Service: 1/30/2025 I Hospital Day: 0     Assessment & Plan  Sepsis due to cellulitis (HCC)      Patient has Hx of PAD. S/p Left lower extremity angiogram and angioplasty performed on 12/23/2024   Patient presents with sepsis secondary to  left  foot wound, tachycardia and tachypnea on admission in [facility].  Patient status post left TMA on 1/3/2025  Patient was placed on cefepime, vancomycin, Flagyl during that time  Vas arterial duplex shows greater than 75 stenosis in the superficial femoral artery of the left, 50-75 stenosis in the tibioperoneal trunk.  A/P index 0.27.  Plan:  Vascular and podiatry on board  Plan for  intervention of occluded SFA stent.   Advised transfer to [place] on 1/30/2025  Follow cultures  Continue IV antibiotics  Monitor wbc count    Cellulitis of left foot  Plan noted above  Diabetes mellitus type 2 with complications ([facility])  Lab Results   Component Value Date    HGBA1C 10.3 (H) 12/13/2024       Recent Labs     01/29/25  2122 01/30/25  0741 01/30/25  1143 01/30/25  1637   POCGLU 200* 159* 191* 194*       Blood Sugar Average: Last 72 hrs:    Continue Lantus 10 units nightly  Sliding scale  Diabetic diet    Hyponatremia  Recent Labs     01/28/25  1820 01/29/25  0530 01/30/25  0519   SODIUM 129* 130* 128*     No results found for: \"OSMOUA\", \"NAUR\", \"OSMOLALITSER\"    Chronic hyponatremia  Continue to monitor    Obesity (BMI 30.0-34.9)  Affects all aspects of care  PAD (peripheral artery disease) (HCC)  Patient has severe PAD on the left side  Vascular podiatry on board  Continue aspirin 81 mg once daily  Continue atorvastatin 80 mg once daily      VTE Pharmacologic Prophylaxis:   Moderate Risk (Score 3-4) - Pharmacological DVT Prophylaxis Ordered: heparin drip.  Code Status: Level 1 - Full Code   Discussion with family: Patient declined call " "to .     Anticipated Length of Stay: Patient will be admitted on an inpatient basis with an anticipated length of stay of greater than 2 midnights secondary to Foot wound.    History of Present Illness   Chief Complaint: Foot wound left    Lizzy Acuna is a 49 y.o. female with a past medical history of type 2 diabetes, obesity, hypertension, hyperlipidemia severe PAD coming in for her left foot wound.  Was initially presenting at [facility].  During the workup over there was noted to have severe PAD on the left leg.  Was advised to transfer to [facility] for intervention.  Otherwise denies any acute complaints.  Denies fevers, chills, chest pain, abdominal pain, nausea or any other complaints  Review of Systems   Constitutional:  Negative for chills and fever.   HENT:  Negative for ear pain and sore throat.    Eyes:  Negative for pain and visual disturbance.   Respiratory:  Negative for cough and shortness of breath.    Cardiovascular:  Negative for chest pain and palpitations.   Gastrointestinal:  Negative for abdominal pain and vomiting.   Genitourinary:  Negative for dysuria and hematuria.   Musculoskeletal:  Negative for arthralgias and back pain.   Skin:  Negative for color change and rash.   Neurological:  Negative for seizures and syncope.   All other systems reviewed and are negative.      Historical Information   Past Medical History:   Diagnosis Date    Advanced maternal age in multigravida, second trimester 11/30/2016    Transitioned From: Advanced maternal age in multigravida, first trimester      Back pain     used 2 percocet tid until current admission in 2/2017    Bone spur     in back per pt    Chronic hypertension with superimposed preeclampsia 02/14/2017    Depression     Diabetes mellitus (HCC)     Elevated BP without diagnosis of hypertension     \"with pain\"    Foot injury     Full dentures     does not wear    GERD (gastroesophageal reflux disease)     occas    " Gestational diabetes     insulin dependent    Herniated cervical disc     Herniated lumbar intervertebral disc     History of pneumonia     Hx of degenerative disc disease     Hyperlipidemia     Obesity     Pre-eclampsia     Prior pregnancy with fetal demise     previous demise at 36 weeks    Toe pain     and foot pain     Past Surgical History:   Procedure Laterality Date    BACK SURGERY       SECTION      INCISION AND DRAINAGE OF WOUND Left 2025    Procedure: INCISION AND DRAINAGE (I&D) EXTREMITY;  Surgeon: Leopoldo Ritchie DPM;  Location: [facility];  Service: Podiatry    IR LOWER EXTREMITY ANGIOGRAM  2024    LAMINECTOMY      with disc removal, last assessed 16    OTHER SURGICAL HISTORY      Right ovary removal 2005 [facility]    [place] AMPUTATION FOOT TRANSMETARSAL Left 1/3/2025    Procedure: AMPUTATION TRANSMETATARSAL (TMA);  Surgeon: Leopoldo Ritchie DPM;  Location: [facility];  Service: Podiatry    [place]  DELIVERY ONLY N/A 02/15/2017    Procedure:  SECTION ();  Surgeon: Tito Umaña MD;  Location: [facility];  Service: Obstetrics    [place] LIG/TRNSXJ FALOPIAN TUBE  DEL/ABDML SURG Left 02/15/2017    Procedure: LIGATION/COAGULATION TUBAL;  Surgeon: Tito Umaña MD;  Location: [facility];  Service: Obstetrics    VASCULAR SURGERY  2024    WISDOM TOOTH EXTRACTION       Social History     Tobacco Use    Smoking status: Former     Current packs/day: 0.50     Types: Cigarettes    Smokeless tobacco: Never    Tobacco comments:     Per pt last cigarette 24--quit cold turkey   Vaping Use    Vaping status: Never Used   Substance and Sexual Activity    Alcohol use: Yes     Comment: 1-2 drinks a year    Drug use: Not Currently    Sexual activity: Not on file     Comment: defer     E-Cigarette/Vaping    E-Cigarette Use Never User      E-Cigarette/Vaping Substances    Nicotine No     THC No     CBD No     Flavoring No      Other No     Unknown No      Family history non-contributory  Social History:  Marital Status: Unknown   Occupation: none  Patient Pre-hospital Living Situation: Home  Patient Pre-hospital Level of Mobility: walks  Patient Pre-hospital Diet Restrictions: None    Meds/Allergies   I have reviewed home medications with patient personally.  Prior to Admission medications    Medication Sig Start Date End Date Taking? Authorizing Provider   acetaminophen (TYLENOL) 325 mg tablet Take 3 tablets (975 mg total) by mouth every 6 (six) hours  Patient not taking: Reported on 1/23/2025 12/25/24   Bunny Rosado DO   Alcohol Swabs 70 % PADS May substitute brand based on insurance coverage. Check glucose TID. 12/25/24   Bunny Rosado DO   aspirin (ECOTRIN LOW STRENGTH) 81 mg EC tablet Take 1 tablet (81 mg total) by mouth daily 12/25/24   Bunny Rosado DO   atorvastatin (LIPITOR) 80 mg tablet Take 1 tablet (80 mg total) by mouth daily with dinner 12/25/24   Bunny Rosado DO   Blood Glucose Monitoring Suppl (OneTouch Verio Reflect) w/Device KIT May substitute brand based on insurance coverage. Check glucose TID. 12/25/24   Bunny Rosado DO   diphenhydrAMINE (BENADRYL) 25 mg capsule Take 25 mg by mouth every 6 (six) hours as needed for allergies    Historical Provider, MD   gabapentin (Neurontin) 300 mg capsule Take 1 tablet in the morning, 1 in the afternoon and 2 tablets at night. 1/24/25   Sarah Walker MD   glucose blood (OneTouch Verio) test strip May substitute brand based on insurance coverage. Check glucose TID. 12/25/24   Bunny Rosado DO   HYDROmorphone (DILAUDID) 4 mg tablet  12/30/24   Historical Provider, MD   ibuprofen (MOTRIN) 600 mg tablet Take by mouth every 6 (six) hours as needed for mild pain    Historical Provider, MD   insulin aspart (NovoLOG FlexPen) 100 UNIT/ML injection pen Inject 5 Units under the skin 3 (three) times a day with meals 12/25/24   Bunny Rosado DO   Insulin Glargine  "Solostar (Lantus SoloStar) 100 UNIT/ML SOPN Inject 0.1 mL (10 Units total) under the skin daily at bedtime 12/25/24   Bunny Rosado, DO   Insulin Pen Needle (BD Pen Needle Ana 2nd Gen) 32G X 4 MM MISC For use with insulin pen. Pharmacy may dispense brand covered by insurance. 12/25/24   Bunny Rosado, DO   Insulin Pen Needle (BD Pen Needle Ana 2nd Gen) 32G X 4 MM MISC For use with insulin pen. Pharmacy may dispense brand covered by insurance. 12/25/24   Bunny Rosado, DO   methocarbamol (ROBAXIN) 500 mg tablet Take 1 tablet (500 mg total) by mouth every 6 (six) hours 12/25/24   Bunny Rosado DO   naloxone (NARCAN) 4 mg/0.1 mL nasal spray Administer 1 spray into a nostril. If no response after 2-3 minutes, give another dose in the other nostril using a new spray. 12/26/24 12/26/25  Bunny Rosado DO   OneTouch Delica Lancets 33G MISC May substitute brand based on insurance coverage. Check glucose TID. 12/25/24   Bunny Rosado DO   senna-docusate sodium (SENOKOT S) 8.6-50 mg per tablet Take 1 tablet by mouth daily at bedtime  Patient not taking: Reported on 1/28/2025 12/25/24   Bunny Rosado DO     Allergies   Allergen Reactions    Codeine Other (See Comments)     Aggression-and nasty--\"took a cough syrup with codeine as a child\"       Objective :  Temp:  [97.8 °F (36.6 °C)-99.3 °F (37.4 °C)] 98.5 °F (36.9 °C)  HR:  [] 101  BP: (112-163)/(67-99) 127/67  Resp:  [16-20] 16  SpO2:  [92 %-95 %] 95 %  O2 Device: None (Room air)    Physical Exam  Vitals and nursing note reviewed.   Constitutional:       General: She is not in acute distress.     Appearance: She is well-developed.   HENT:      Head: Normocephalic and atraumatic.      Nose: Nose normal.      Mouth/Throat:      Mouth: Mucous membranes are moist.   Eyes:      Conjunctiva/sclera: Conjunctivae normal.   Cardiovascular:      Rate and Rhythm: Normal rate and regular rhythm.      Heart sounds: No murmur heard.  Pulmonary:      Effort: " Pulmonary effort is normal. No respiratory distress.      Breath sounds: Normal breath sounds.   Abdominal:      Palpations: Abdomen is soft.      Tenderness: There is no abdominal tenderness.   Musculoskeletal:      Cervical back: Neck supple.      Left lower leg: No edema.      Comments: Wound dressing is in place on the left   Skin:     General: Skin is warm and dry.      Capillary Refill: Capillary refill takes less than 2 seconds.   Neurological:      General: No focal deficit present.      Mental Status: She is alert and oriented to person, place, and time.   Psychiatric:         Mood and Affect: Mood normal.          Lines/Drains:            Lab Results: I have reviewed the following results:  Results from last 7 days   Lab Units 01/30/25  0519   WBC Thousand/uL 10.67*   HEMOGLOBIN g/dL 9.1*   HEMATOCRIT % 28.3*   PLATELETS Thousands/uL 207   SEGS PCT % 68   LYMPHO PCT % 25   MONO PCT % 5   EOS PCT % 1     Results from last 7 days   Lab Units 01/30/25  0519 01/29/25  0530 01/28/25  1820   SODIUM mmol/L 128*   < > 129*   POTASSIUM mmol/L 4.3   < > 4.0   CHLORIDE mmol/L 95*   < > 91*   CO2 mmol/L 25   < > 25   BUN mg/dL 17   < > 20   CREATININE mg/dL 0.79   < > 0.88   ANION GAP mmol/L 8   < > 13   CALCIUM mg/dL 9.1   < > 9.9   ALBUMIN g/dL  --   --  3.8   TOTAL BILIRUBIN mg/dL  --   --  0.38   ALK PHOS U/L  --   --  106*   ALT U/L  --   --  20   AST U/L  --   --  14   GLUCOSE RANDOM mg/dL 184*   < > 316*    < > = values in this interval not displayed.     Results from last 7 days   Lab Units 01/29/25  1615   INR  0.95     Results from last 7 days   Lab Units 01/30/25  1637 01/30/25  1143 01/30/25  0741 01/29/25  2122 01/29/25  1607 01/29/25  1332 01/29/25  1126 01/29/25  0719 01/28/25  2118   POC GLUCOSE mg/dl 194* 191* 159* 200* 152* 188* 221* 208* 228*     Lab Results   Component Value Date    HGBA1C 10.3 (H) 12/13/2024    HGBA1C 11.0 (H) 09/19/2022    HGBA1C 6.0 02/10/2017     Results from last 7 days   Lab  Units 01/29/25  0530 01/28/25  2113 01/28/25  1820   LACTIC ACID mmol/L  --  1.1 3.0*   PROCALCITONIN ng/ml 0.25  --  0.22       Imaging Results Review: I personally reviewed the following image studies/reports in PACS and discussed pertinent findings with Radiology: Ultrasound(s). My interpretation of the radiology images/reports is: Above.  Other Study Results Review: EKG was reviewed.     Administrative Statements   I have spent a total time of 80 minutes in caring for this patient on the day of the visit/encounter including Diagnostic results, Prognosis, Risks and benefits of tx options, Instructions for management, Patient and family education, Importance of tx compliance, Risk factor reductions, Impressions, Counseling / Coordination of care, Documenting in the medical record, Reviewing / ordering tests, medicine, procedures  , Obtaining or reviewing history  , and Communicating with other healthcare professionals .    ** Please Note: This note has been constructed using a voice recognition system. **

## 2025-01-31 NOTE — ASSESSMENT & PLAN NOTE
Patient has Hx of PAD. S/p Left lower extremity angiogram and angioplasty performed on 12/23/2024   Patient presents with sepsis secondary to  left  foot wound, tachycardia and tachypnea on admission in [facility].  Patient status post left TMA on 1/3/2025  Patient was placed on cefepime, vancomycin, Flagyl during that time  Vas arterial duplex shows greater than 75 stenosis in the superficial femoral artery of the left, 50-75 stenosis in the tibioperoneal trunk.  A/P index 0.27.  Plan:  Vascular and podiatry on board  Plan for  intervention of occluded SFA stent.   Advised transfer to [place] on 1/30/2025  Follow cultures  Continue IV antibiotics  Monitor wbc count

## 2025-01-31 NOTE — ASSESSMENT & PLAN NOTE
Lab Results   Component Value Date    HGBA1C 10.3 (H) 12/13/2024       Recent Labs     01/31/25  0050 01/31/25  0628 01/31/25  0805 01/31/25  1211   POCGLU 184* 173* 172* 158*       Blood Sugar Average: Last 72 hrs:  (P) 171.75  Continue Lantus 10 units nightly  Sliding scale  Diabetic diet

## 2025-01-31 NOTE — PROGRESS NOTES
Lizzy Acuna is a 49 y.o. female who is currently ordered Vancomycin IV with management by the Pharmacy Consult service.  Relevant clinical data and objective / subjective history reviewed.  Vancomycin Assessment:  Indication and Goal AUC/Trough: Bone/joint infection (goal -600, trough >10), -600, trough >10  Clinical Status: stable  Micro:     Renal Function:  SCr: 0.79 mg/dL  CrCl: 85.5 mL/min  Renal replacement: Not on dialysis  Days of Therapy: 3  Current Dose: vancomycin 750 mg q12 hours  Vancomycin Plan:  New Dosing: continue vancomycin 750 mg q12 hours  Estimated AUC: 468 mcg*hr/mL  Estimated Trough: 14.3 mcg/mL  Next Level: 01/31/25 at 0600  Renal Function Monitoring: Daily BMP and UOP  Pharmacy will continue to follow closely for s/sx of nephrotoxicity, infusion reactions and appropriateness of therapy.  BMP and CBC will be ordered per protocol. We will continue to follow the patient’s culture results and clinical progress daily.    Rosetta Gan, Pharmacist

## 2025-01-31 NOTE — QUICK NOTE
Attempted to see patient but was YOUNG at IR.  Chart briefly reviewed.  DM, PAD [place] [facility] [place] of [place].  Taken to OR for washout.  Cultures with S. Aureus and anaerobes.  On broad spectrum antibiotics.  Hemodynamically stable without fever or WBC.  Transferred to B for vascular evaluation.  Concern for occlusion of recently placed left SFA stent.  A-gram underway.  Continue current antibiotics and ID will see formally tomorrow.  Please call in the interim with any acute issues.

## 2025-01-31 NOTE — ASSESSMENT & PLAN NOTE
Lab Results   Component Value Date    HGBA1C 10.3 (H) 12/13/2024       Recent Labs     01/29/25  2122 01/30/25  0741 01/30/25  1143 01/30/25  1637   POCGLU 200* 159* 191* 194*       Blood Sugar Average: Last 72 hrs:    Continue Lantus 10 units nightly  Sliding scale  Diabetic diet

## 2025-01-31 NOTE — ASSESSMENT & PLAN NOTE
Patient has severe PAD on the left side  Vascular podiatry on board  Continue aspirin 81 mg once daily  Continue atorvastatin 80 mg once daily

## 2025-01-31 NOTE — NURSING NOTE
Patient discharged to [facility] stretcher. All IVS in with heparin running at 22units/kg/hr 80kg weight. AVS discussed with patient, report was given to Efrain at [facility]. All belongings with patient.

## 2025-01-31 NOTE — CASE MANAGEMENT
Case Management Discharge Planning Note    Patient name Lizzy Acuna  Location /-01 MRN [id number]  : 1975 Date 2025       Current Admission Date: 2025  Current Admission Diagnosis:Sepsis due to cellulitis (HCC)   Patient Active Problem List    Diagnosis Date Noted Date Diagnosed    PAD (peripheral artery disease) (HCC) 2025     Diabetic foot infection  (HCC) 2025     Dehiscence of operative wound, initial encounter 2025     Hypersensitivity, initial encounter 2025     GERD (gastroesophageal reflux disease)      Cellulitis of left foot 2024     Gangrene of left foot (HCC) 2024     Sepsis due to cellulitis ([facility]) 2018     Diabetes mellitus type 2 with complications ([facility]) 2018     Hyponatremia 2018     Chronic sciatica 2018     Tobacco abuse 02/15/2017     Obesity (BMI 30.0-34.9) 2017     Chronic low back pain 2016     Chronic, continuous use of opioids 2016     Proteinuria 2011       LOS (days): 0  Geometric Mean LOS (GMLOS) (days):   Days to GMLOS:     OBJECTIVE:  Risk of Unplanned Readmission Score: 23.81         Current admission status: Inpatient   Preferred Pharmacy:   [address] [address]  Phone: [phone] Fax: [phone]    Primary Care Provider: NAOMY Basilio    Primary Insurance: [employer]  Secondary Insurance:     DISCHARGE DETAILS:    Additional Comments: Please refer to initial CM assessment on  for baseline information. CM department to remain available for discharge concerns or needs.

## 2025-01-31 NOTE — CONSULTS
Please see consult performed by Catalina Casillas PA-C at [facility].    Patient transferred on hep gtt with occluded SFA stent.    Strong R DP and weak PT signal intact.    Plan for IR thrombectomy consult.

## 2025-01-31 NOTE — PLAN OF CARE
Problem: PAIN - ADULT  Goal: Verbalizes/displays adequate comfort level or baseline comfort level  Description: Interventions:  - Encourage patient to monitor pain and request assistance  - Assess pain using appropriate pain scale  - Administer analgesics based on type and severity of pain and evaluate response  - Implement non-pharmacological measures as appropriate and evaluate response  - Consider cultural and social influences on pain and pain management  - Notify physician/advanced practitioner if interventions unsuccessful or patient reports new pain  Outcome: Not Progressing     Problem: INFECTION - ADULT  Goal: Absence or prevention of progression during hospitalization  Description: INTERVENTIONS:  - Assess and monitor for signs and symptoms of infection  - Monitor lab/diagnostic results  - Monitor all insertion sites, i.e. indwelling lines, tubes, and drains  - Monitor endotracheal if appropriate and nasal secretions for changes in amount and color  - [place] appropriate cooling/warming therapies per order  - Administer medications as ordered  - Instruct and encourage patient and family to use good hand hygiene technique  - Identify and instruct in appropriate isolation precautions for identified infection/condition  Outcome: Not Progressing

## 2025-01-31 NOTE — PLAN OF CARE
Patient from [facility], alert/oriented, afebrile with severe left lower extremity pain, and denies SOB on room air. Arrived on a heparin gtt running at 22units/kg/hr, continued with the same rate, but later on increased the dose per Heparin protocol.  NPO since midnight, IVF running, and will continue to assess/monitor.              Problem: PAIN - ADULT  Goal: Verbalizes/displays adequate comfort level or baseline comfort level  Description: Interventions:  - Encourage patient to monitor pain and request assistance  - Assess pain using appropriate pain scale  - Administer analgesics based on type and severity of pain and evaluate response  - Implement non-pharmacological measures as appropriate and evaluate response  - Consider cultural and social influences on pain and pain management  - Notify physician/advanced practitioner if interventions unsuccessful or patient reports new pain  Outcome: Progressing     Problem: DISCHARGE PLANNING  Goal: Discharge to home or other facility with appropriate resources  Description: INTERVENTIONS:  - Identify barriers to discharge w/patient and caregiver  - Arrange for needed discharge resources and transportation as appropriate  - Identify discharge learning needs (meds, wound care, etc.)  - Arrange for interpretive services to assist at discharge as needed  - Refer to Case Management Department for coordinating discharge planning if the patient needs post-hospital services based on physician/advanced practitioner order or complex needs related to functional status, cognitive ability, or social support system  Outcome: Progressing     Problem: Knowledge Deficit  Goal: Patient/family/caregiver demonstrates understanding of disease process, treatment plan, medications, and discharge instructions  Description: Complete learning assessment and assess knowledge base.  Interventions:  - Provide teaching at level of understanding  - Provide teaching via preferred learning  methods  Outcome: Progressing     Problem: NEUROSENSORY - ADULT  Goal: Achieves stable or improved neurological status  Description: INTERVENTIONS  - Monitor and report changes in neurological status  - Monitor vital signs such as temperature, blood pressure, glucose, and any other labs ordered   - Initiate measures to prevent increased intracranial pressure  - Monitor for seizure activity and implement precautions if appropriate      Outcome: Progressing     Problem: CARDIOVASCULAR - ADULT  Goal: Maintains optimal cardiac output and hemodynamic stability  Description: INTERVENTIONS:  - Monitor I/O, vital signs and rhythm  - Monitor for S/S and trends of decreased cardiac output  - Administer and titrate ordered vasoactive medications to optimize hemodynamic stability  - Assess quality of pulses, skin color and temperature  - Assess for signs of decreased coronary artery perfusion  - Instruct patient to report change in severity of symptoms  Outcome: Progressing     Problem: GASTROINTESTINAL - ADULT  Goal: Minimal or absence of nausea and/or vomiting  Description: INTERVENTIONS:  - Administer IV fluids if ordered to ensure adequate hydration  - Maintain NPO status until nausea and vomiting are resolved  - Nasogastric tube if ordered  - Administer ordered antiemetic medications as needed  - Provide nonpharmacologic comfort measures as appropriate  - Advance diet as tolerated, if ordered  - Consider nutrition services referral to assist patient with adequate nutrition and appropriate food choices  Outcome: Progressing     Problem: HEMATOLOGIC - ADULT  Goal: Maintains hematologic stability  Description: INTERVENTIONS  - Assess for signs and symptoms of bleeding or hemorrhage  - Monitor labs  - Administer supportive blood products/factors as ordered and appropriate  Outcome: Progressing

## 2025-01-31 NOTE — ASSESSMENT & PLAN NOTE
"Recent Labs     01/29/25  0530 01/30/25  0519 01/31/25  0432   SODIUM 130* 128* 125*     No results found for: \"OSMOUA\", \"NAUR\", \"OSMOLALITSER\"    Chronic hyponatremia  Continue to monitor  Pt with sodium of 125 will require repeat upon return from IR     "

## 2025-01-31 NOTE — ASSESSMENT & PLAN NOTE
"Recent Labs     01/28/25  1820 01/29/25  0530 01/30/25  0519   SODIUM 129* 130* 128*     No results found for: \"OSMOUA\", \"NAUR\", \"OSMOLALITSER\"    Chronic hyponatremia  Continue to monitor    "

## 2025-01-31 NOTE — CONSULTS
"e-Consult (IPC)  - Interventional Radiology  Lizzy Acuna 49 y.o. female MRN: [id number]  Unit/Bed#: -Sander Encounter: [id number]          Interventional Radiology has been consulted to evaluate Lizzy Acuna    We were consulted by Vascular Surgery concerning this patient with Worsening Left TMA healing, occlusion of L SFA stent .    Inpatient Consult to IR  Consult performed by: NAOMY Thomas  Consult ordered by: Dennis Gamez MD        01/31/25    Assessment/Recommendation:   50 yo female with poorly controlled DM type 2, tobacco use h/o PAD s/p LLE agram, angioplasty and stent placement on 12/23/24 and L TMA on 1/3/25 who was transferred to [facility] with sepsis 2/2 LLE foot wound concerning for osteomylitis.     1/29/25 LEADs :  -LLE with occlusion in the proximal and mid superficial artery and stent and a >75% stenosis in the distal superficial femoral artery. There is a 50-75% stenosis in the tibio-peroneal trunk. Diffuse disease throughout the  remaining femoro-popliteal and tibio-peroneal segments.  Ankle/Brachial index: 0.27  RLE: KAILEE 0.84\"    IR consulted for evaluation and possible LLE agram with thrombectomy vs lysis.    -Discussed case with Dr Pratt and Dr Caraballo  -Plan for IR LLE angriogram with intervention tentatively today, pending IR schedule  -maintain NPO  -INR 0.95  -remainder of care per primary team    31 + minutes, >50% of the total time devoted to medical consultative verbal/EMR discussion between providers. Written report will be generated in the EMR.     Thank you for allowing Interventional Radiology to participate in the care of Lizzy Acuna. Please don't hesitate to call or TigerText us with any questions.     NAOMY Thomas            "

## 2025-02-01 ENCOUNTER — APPOINTMENT (INPATIENT)
Dept: RADIOLOGY | Facility: HOSPITAL | Age: 50
DRG: 169 | End: 2025-02-01
Attending: RADIOLOGY
Payer: COMMERCIAL

## 2025-02-01 PROBLEM — L02.612 ABSCESS OF LEFT FOOT: Status: ACTIVE | Noted: 2024-12-13

## 2025-02-01 LAB
ANION GAP SERPL CALCULATED.3IONS-SCNC: 9 MMOL/L (ref 4–13)
APTT PPP: 115 SECONDS (ref 23–34)
APTT PPP: 38 SECONDS (ref 23–34)
APTT PPP: 42 SECONDS (ref 23–34)
APTT PPP: 47 SECONDS (ref 23–34)
APTT PPP: 65 SECONDS (ref 23–34)
BACTERIA TISS AEROBE CULT: ABNORMAL
BUN SERPL-MCNC: 13 MG/DL (ref 5–25)
CALCIUM SERPL-MCNC: 8.2 MG/DL (ref 8.4–10.2)
CHLORIDE SERPL-SCNC: 99 MMOL/L (ref 96–108)
CO2 SERPL-SCNC: 23 MMOL/L (ref 21–32)
CREAT SERPL-MCNC: 0.56 MG/DL (ref 0.6–1.3)
ERYTHROCYTE [DISTWIDTH] IN BLOOD BY AUTOMATED COUNT: 13.6 % (ref 11.6–15.1)
ERYTHROCYTE [DISTWIDTH] IN BLOOD BY AUTOMATED COUNT: 13.6 % (ref 11.6–15.1)
ERYTHROCYTE [DISTWIDTH] IN BLOOD BY AUTOMATED COUNT: 13.7 % (ref 11.6–15.1)
FIBRINOGEN PPP-MCNC: 318 MG/DL (ref 206–523)
FIBRINOGEN PPP-MCNC: 466 MG/DL (ref 206–523)
FIBRINOGEN PPP-MCNC: 528 MG/DL (ref 206–523)
FIBRINOGEN PPP-MCNC: 89 MG/DL (ref 206–523)
GFR SERPL CREATININE-BSD FRML MDRD: 110 ML/MIN/1.73SQ M
GLUCOSE SERPL-MCNC: 116 MG/DL (ref 65–140)
GLUCOSE SERPL-MCNC: 129 MG/DL (ref 65–140)
GLUCOSE SERPL-MCNC: 138 MG/DL (ref 65–140)
GLUCOSE SERPL-MCNC: 163 MG/DL (ref 65–140)
GRAM STN SPEC: ABNORMAL
GRAM STN SPEC: ABNORMAL
HCT VFR BLD AUTO: 22.6 % (ref 34.8–46.1)
HCT VFR BLD AUTO: 23.2 % (ref 34.8–46.1)
HCT VFR BLD AUTO: 23.7 % (ref 34.8–46.1)
HGB BLD-MCNC: 7 G/DL (ref 11.5–15.4)
HGB BLD-MCNC: 7.3 G/DL (ref 11.5–15.4)
HGB BLD-MCNC: 7.4 G/DL (ref 11.5–15.4)
MCH RBC QN AUTO: 28.5 PG (ref 26.8–34.3)
MCH RBC QN AUTO: 28.6 PG (ref 26.8–34.3)
MCH RBC QN AUTO: 28.7 PG (ref 26.8–34.3)
MCHC RBC AUTO-ENTMCNC: 31 G/DL (ref 31.4–37.4)
MCHC RBC AUTO-ENTMCNC: 31.2 G/DL (ref 31.4–37.4)
MCHC RBC AUTO-ENTMCNC: 31.5 G/DL (ref 31.4–37.4)
MCV RBC AUTO: 91 FL (ref 82–98)
MCV RBC AUTO: 92 FL (ref 82–98)
MCV RBC AUTO: 92 FL (ref 82–98)
PLATELET # BLD AUTO: 164 THOUSANDS/UL (ref 149–390)
PLATELET # BLD AUTO: 213 THOUSANDS/UL (ref 149–390)
PLATELET # BLD AUTO: 224 THOUSANDS/UL (ref 149–390)
PMV BLD AUTO: 9.1 FL (ref 8.9–12.7)
PMV BLD AUTO: 9.2 FL (ref 8.9–12.7)
PMV BLD AUTO: 9.3 FL (ref 8.9–12.7)
POTASSIUM SERPL-SCNC: 4.1 MMOL/L (ref 3.5–5.3)
RBC # BLD AUTO: 2.46 MILLION/UL (ref 3.81–5.12)
RBC # BLD AUTO: 2.55 MILLION/UL (ref 3.81–5.12)
RBC # BLD AUTO: 2.58 MILLION/UL (ref 3.81–5.12)
SODIUM SERPL-SCNC: 131 MMOL/L (ref 135–147)
WBC # BLD AUTO: 8.17 THOUSAND/UL (ref 4.31–10.16)
WBC # BLD AUTO: 9.79 THOUSAND/UL (ref 4.31–10.16)
WBC # BLD AUTO: 9.95 THOUSAND/UL (ref 4.31–10.16)

## 2025-02-01 PROCEDURE — 82948 REAGENT STRIP/BLOOD GLUCOSE: CPT

## 2025-02-01 PROCEDURE — C2623 CATH, TRANSLUMIN, DRUG-COAT: HCPCS

## 2025-02-01 PROCEDURE — 85730 THROMBOPLASTIN TIME PARTIAL: CPT | Performed by: RADIOLOGY

## 2025-02-01 PROCEDURE — 85027 COMPLETE CBC AUTOMATED: CPT | Performed by: RADIOLOGY

## 2025-02-01 PROCEDURE — 99152 MOD SED SAME PHYS/QHP 5/>YRS: CPT | Performed by: RADIOLOGY

## 2025-02-01 PROCEDURE — 37184 PRIM ART M-THRMBC 1ST VSL: CPT | Performed by: RADIOLOGY

## 2025-02-01 PROCEDURE — C1887 CATHETER, GUIDING: HCPCS

## 2025-02-01 PROCEDURE — 80048 BASIC METABOLIC PNL TOTAL CA: CPT | Performed by: PHYSICIAN ASSISTANT

## 2025-02-01 PROCEDURE — 047L3ZZ DILATION OF LEFT FEMORAL ARTERY, PERCUTANEOUS APPROACH: ICD-10-PCS | Performed by: RADIOLOGY

## 2025-02-01 PROCEDURE — G0545 PR INHERENT VISIT TO INPT: HCPCS | Performed by: INTERNAL MEDICINE

## 2025-02-01 PROCEDURE — C1769 GUIDE WIRE: HCPCS

## 2025-02-01 PROCEDURE — 85730 THROMBOPLASTIN TIME PARTIAL: CPT | Performed by: SURGERY

## 2025-02-01 PROCEDURE — 37213 THROMBLYTIC ART/VEN THERAPY: CPT

## 2025-02-01 PROCEDURE — NC001 PR NO CHARGE: Performed by: EMERGENCY MEDICINE

## 2025-02-01 PROCEDURE — 85384 FIBRINOGEN ACTIVITY: CPT | Performed by: RADIOLOGY

## 2025-02-01 PROCEDURE — 3E05317 INTRODUCTION OF OTHER THROMBOLYTIC INTO PERIPHERAL ARTERY, PERCUTANEOUS APPROACH: ICD-10-PCS | Performed by: RADIOLOGY

## 2025-02-01 PROCEDURE — 99291 CRITICAL CARE FIRST HOUR: CPT | Performed by: EMERGENCY MEDICINE

## 2025-02-01 PROCEDURE — 37213 THROMBLYTIC ART/VEN THERAPY: CPT | Performed by: RADIOLOGY

## 2025-02-01 PROCEDURE — B41GYZZ FLUOROSCOPY OF LEFT LOWER EXTREMITY ARTERIES USING OTHER CONTRAST: ICD-10-PCS | Performed by: RADIOLOGY

## 2025-02-01 PROCEDURE — 75710 ARTERY X-RAYS ARM/LEG: CPT

## 2025-02-01 PROCEDURE — C1751 CATH, INF, PER/CENT/MIDLINE: HCPCS

## 2025-02-01 PROCEDURE — 99255 IP/OBS CONSLTJ NEW/EST HI 80: CPT | Performed by: INTERNAL MEDICINE

## 2025-02-01 PROCEDURE — C1725 CATH, TRANSLUMIN NON-LASER: HCPCS

## 2025-02-01 PROCEDURE — 37224 HB FEM/POPL REVAS W/TLA: CPT

## 2025-02-01 PROCEDURE — 047L3Z1 DILATION OF LEFT FEMORAL ARTERY USING DRUG-COATED BALLOON, PERCUTANEOUS APPROACH: ICD-10-PCS | Performed by: RADIOLOGY

## 2025-02-01 PROCEDURE — 99232 SBSQ HOSP IP/OBS MODERATE 35: CPT | Performed by: SURGERY

## 2025-02-01 RX ORDER — SODIUM CHLORIDE, SODIUM GLUCONATE, SODIUM ACETATE, POTASSIUM CHLORIDE, MAGNESIUM CHLORIDE, SODIUM PHOSPHATE, DIBASIC, AND POTASSIUM PHOSPHATE .53; .5; .37; .037; .03; .012; .00082 G/100ML; G/100ML; G/100ML; G/100ML; G/100ML; G/100ML; G/100ML
75 INJECTION, SOLUTION INTRAVENOUS CONTINUOUS
Status: DISCONTINUED | OUTPATIENT
Start: 2025-02-01 | End: 2025-02-02

## 2025-02-01 RX ORDER — MIDAZOLAM HYDROCHLORIDE 2 MG/2ML
INJECTION, SOLUTION INTRAMUSCULAR; INTRAVENOUS AS NEEDED
Status: COMPLETED | OUTPATIENT
Start: 2025-02-01 | End: 2025-02-01

## 2025-02-01 RX ORDER — IODIXANOL 320 MG/ML
50 INJECTION, SOLUTION INTRAVASCULAR
Status: COMPLETED | OUTPATIENT
Start: 2025-02-01 | End: 2025-02-01

## 2025-02-01 RX ORDER — FENTANYL CITRATE 50 UG/ML
INJECTION, SOLUTION INTRAMUSCULAR; INTRAVENOUS AS NEEDED
Status: COMPLETED | OUTPATIENT
Start: 2025-02-01 | End: 2025-02-01

## 2025-02-01 RX ORDER — LORAZEPAM 2 MG/ML
1 INJECTION INTRAMUSCULAR ONCE
Status: COMPLETED | OUTPATIENT
Start: 2025-02-01 | End: 2025-02-01

## 2025-02-01 RX ORDER — IODIXANOL 320 MG/ML
200 INJECTION, SOLUTION INTRAVASCULAR
Status: COMPLETED | OUTPATIENT
Start: 2025-02-01 | End: 2025-02-01

## 2025-02-01 RX ORDER — HEPARIN SODIUM 200 [USP'U]/100ML
20 INJECTION, SOLUTION INTRAVENOUS CONTINUOUS
Status: DISCONTINUED | OUTPATIENT
Start: 2025-02-01 | End: 2025-02-02

## 2025-02-01 RX ORDER — HYDROMORPHONE HCL/PF 1 MG/ML
0.5 SYRINGE (ML) INJECTION
Status: DISCONTINUED | OUTPATIENT
Start: 2025-02-01 | End: 2025-02-07

## 2025-02-01 RX ORDER — VANCOMYCIN HYDROCHLORIDE 1 G/200ML
1000 INJECTION, SOLUTION INTRAVENOUS EVERY 12 HOURS
Status: DISCONTINUED | OUTPATIENT
Start: 2025-02-01 | End: 2025-02-02 | Stop reason: DRUGHIGH

## 2025-02-01 RX ADMIN — MIDAZOLAM 0.5 MG: 1 INJECTION INTRAMUSCULAR; INTRAVENOUS at 11:22

## 2025-02-01 RX ADMIN — ASPIRIN 81 MG: 81 TABLET, COATED ORAL at 09:20

## 2025-02-01 RX ADMIN — MIDAZOLAM 1 MG: 1 INJECTION INTRAMUSCULAR; INTRAVENOUS at 10:26

## 2025-02-01 RX ADMIN — CHLORHEXIDINE GLUCONATE 0.12% ORAL RINSE 15 ML: 1.2 LIQUID ORAL at 20:12

## 2025-02-01 RX ADMIN — METRONIDAZOLE 500 MG: 500 INJECTION, SOLUTION INTRAVENOUS at 06:42

## 2025-02-01 RX ADMIN — ONDANSETRON 4 MG: 2 INJECTION INTRAMUSCULAR; INTRAVENOUS at 14:08

## 2025-02-01 RX ADMIN — ONDANSETRON 4 MG: 2 INJECTION INTRAMUSCULAR; INTRAVENOUS at 05:58

## 2025-02-01 RX ADMIN — ATORVASTATIN CALCIUM 80 MG: 80 TABLET, FILM COATED ORAL at 18:11

## 2025-02-01 RX ADMIN — METHOCARBAMOL 500 MG: 500 TABLET ORAL at 05:49

## 2025-02-01 RX ADMIN — LORAZEPAM 1 MG: 2 INJECTION INTRAMUSCULAR; INTRAVENOUS at 14:40

## 2025-02-01 RX ADMIN — VANCOMYCIN HYDROCHLORIDE 1000 MG: 1 INJECTION, SOLUTION INTRAVENOUS at 13:00

## 2025-02-01 RX ADMIN — FENTANYL CITRATE 50 MCG: 50 INJECTION INTRAMUSCULAR; INTRAVENOUS at 11:03

## 2025-02-01 RX ADMIN — METHOCARBAMOL 500 MG: 500 TABLET ORAL at 18:11

## 2025-02-01 RX ADMIN — HEPARIN SODIUM IN SODIUM CHLORIDE 20 UNITS/HR: 200 INJECTION INTRAVENOUS at 21:30

## 2025-02-01 RX ADMIN — MIDAZOLAM 0.5 MG: 1 INJECTION INTRAMUSCULAR; INTRAVENOUS at 10:41

## 2025-02-01 RX ADMIN — HEPARIN SODIUM 2000 UNITS: 1000 INJECTION INTRAVENOUS; SUBCUTANEOUS at 07:34

## 2025-02-01 RX ADMIN — HYDROMORPHONE HYDROCHLORIDE 0.5 MG: 1 INJECTION, SOLUTION INTRAMUSCULAR; INTRAVENOUS; SUBCUTANEOUS at 07:32

## 2025-02-01 RX ADMIN — IODIXANOL 85 ML: 320 INJECTION, SOLUTION INTRAVASCULAR at 15:35

## 2025-02-01 RX ADMIN — FENTANYL CITRATE 25 MCG: 50 INJECTION INTRAMUSCULAR; INTRAVENOUS at 10:35

## 2025-02-01 RX ADMIN — MIDAZOLAM 0.5 MG: 1 INJECTION INTRAMUSCULAR; INTRAVENOUS at 10:35

## 2025-02-01 RX ADMIN — FENTANYL CITRATE 25 MCG: 50 INJECTION INTRAMUSCULAR; INTRAVENOUS at 10:41

## 2025-02-01 RX ADMIN — MIDAZOLAM 0.5 MG: 1 INJECTION INTRAMUSCULAR; INTRAVENOUS at 11:50

## 2025-02-01 RX ADMIN — MIDAZOLAM 0.5 MG: 1 INJECTION INTRAMUSCULAR; INTRAVENOUS at 10:57

## 2025-02-01 RX ADMIN — ACETAMINOPHEN 975 MG: 325 TABLET, FILM COATED ORAL at 12:53

## 2025-02-01 RX ADMIN — ACETAMINOPHEN 975 MG: 325 TABLET, FILM COATED ORAL at 18:11

## 2025-02-01 RX ADMIN — ALTEPLASE 1 MG/HR: 2.2 INJECTION, POWDER, LYOPHILIZED, FOR SOLUTION INTRAVENOUS at 22:53

## 2025-02-01 RX ADMIN — FENTANYL CITRATE 25 MCG: 50 INJECTION INTRAMUSCULAR; INTRAVENOUS at 11:35

## 2025-02-01 RX ADMIN — INSULIN GLARGINE 10 UNITS: 100 INJECTION, SOLUTION SUBCUTANEOUS at 22:07

## 2025-02-01 RX ADMIN — SODIUM CHLORIDE, SODIUM GLUCONATE, SODIUM ACETATE, POTASSIUM CHLORIDE, MAGNESIUM CHLORIDE, SODIUM PHOSPHATE, DIBASIC, AND POTASSIUM PHOSPHATE 75 ML/HR: .53; .5; .37; .037; .03; .012; .00082 INJECTION, SOLUTION INTRAVENOUS at 05:47

## 2025-02-01 RX ADMIN — FENTANYL CITRATE 25 MCG: 50 INJECTION INTRAMUSCULAR; INTRAVENOUS at 11:50

## 2025-02-01 RX ADMIN — CHLORHEXIDINE GLUCONATE 0.12% ORAL RINSE 15 ML: 1.2 LIQUID ORAL at 09:20

## 2025-02-01 RX ADMIN — HEPARIN SODIUM 2000 UNITS: 1000 INJECTION INTRAVENOUS; SUBCUTANEOUS at 01:15

## 2025-02-01 RX ADMIN — IODIXANOL 103 ML: 320 INJECTION, SOLUTION INTRAVASCULAR at 16:14

## 2025-02-01 RX ADMIN — INSULIN LISPRO 1 UNITS: 100 INJECTION, SOLUTION INTRAVENOUS; SUBCUTANEOUS at 18:12

## 2025-02-01 RX ADMIN — GABAPENTIN 300 MG: 300 CAPSULE ORAL at 22:07

## 2025-02-01 RX ADMIN — MIDAZOLAM 0.5 MG: 1 INJECTION INTRAMUSCULAR; INTRAVENOUS at 11:34

## 2025-02-01 RX ADMIN — ALTEPLASE 1 MG/HR: 2.2 INJECTION, POWDER, LYOPHILIZED, FOR SOLUTION INTRAVENOUS at 16:11

## 2025-02-01 RX ADMIN — ALTEPLASE 1 MG/HR: 2.2 INJECTION, POWDER, LYOPHILIZED, FOR SOLUTION INTRAVENOUS at 05:04

## 2025-02-01 RX ADMIN — METHOCARBAMOL 500 MG: 500 TABLET ORAL at 12:53

## 2025-02-01 RX ADMIN — HYDROMORPHONE HYDROCHLORIDE 0.5 MG: 1 INJECTION, SOLUTION INTRAMUSCULAR; INTRAVENOUS; SUBCUTANEOUS at 00:40

## 2025-02-01 RX ADMIN — ACETAMINOPHEN 975 MG: 325 TABLET, FILM COATED ORAL at 05:49

## 2025-02-01 RX ADMIN — FENTANYL CITRATE 50 MCG: 50 INJECTION INTRAMUSCULAR; INTRAVENOUS at 10:28

## 2025-02-01 NOTE — BRIEF OP NOTE (RAD/CATH)
INTERVENTIONAL RADIOLOGY PROCEDURE NOTE    Date: 1/31/2025    Procedure:   LLE thrombectomy  Right arm PICC placement  Procedure Summary       Date:  Room / Location:     Anesthesia Start:  Anesthesia Stop:     Procedure:  Diagnosis:     Scheduled Providers:  Responsible Provider:     Anesthesia Type: Not recorded ASA Status: Not recorded            Preoperative diagnosis:   1. PAD (peripheral artery disease) (HCC)    2. Diabetic foot ulcer (HCC)    3. Sepsis due to cellulitis (HCC)    4. Cellulitis of left foot         Postoperative diagnosis: Same.    Surgeon: Yamil Davidson MD     Assistant: Dr. Kb Brown    Blood loss: 120 mL    Specimens: None     Findings:   Occluded left SFA, thrombectomy performed with residual thrombus.  Thrombolysis initiated with 1 mg/hr TPA.  Plan for for lysis check tomorrow.  Right arm PICC placement.  All blood draws and labs to be done through right arm PICC.    Complications: None immediate.    Anesthesia: conscious sedation and local

## 2025-02-01 NOTE — CONSULTS
Consultation - Infectious Disease   Name: Lizzy Acuna 49 y.o. female I MRN: [id number]  Unit/Bed#: [facility] 514-01 I Date of Admission: 1/31/2025   Date of Service: 2/1/2025 I Hospital Day: 1   Inpatient consult to Infectious Diseases  Consult performed by: Kb Sr MD  Consult ordered by: NAOMY Yusuf      Inpatient consult to Infectious Diseases  Consult performed by: Kb Sr MD  Consult ordered by: Juanjo Simmons MD        Physician Requesting Evaluation: Mary Fonseca,    Reason for Evaluation / Principal Problem: Foot cellulitis    Assessment & Plan  Sepsis due to cellulitis (HCC)  In the setting of a left forefoot abscess.  Clinically improved and seems to be tolerating the antibiotics without difficulty.  Patient remained stable.  -IV antibiotics as below  -Recheck CBC with differential and BMP to make sure no developing toxicities  -Source control measures as below  -Supportive care  Cellulitis of left foot  Complicated by large abscess.  Patient underwent I&D on 1/29/2025 with a large abscess found with purulent fluid and necrotic tissue drained and debrided.  Wound cultures are growing MRSA and Finegoldia.  MRI and operative findings not consistent with osteomyelitis.  -Continue intravenous vancomycin  -Pharmacy follow-up for vancomycin dose management  -Reconsult podiatry to assist with management.  -Local wound care  -Serial exams  PAD (peripheral artery disease) (HCC)  Status post angiogram with intervention including left SFA thrombectomy and placement of lysis catheter on 1/31/2025.  Seems to have much improved perfusion.  -Ongoing anticoagulation  -Close vascular follow-up  Obesity (BMI 30.0-34.9)  As a risk factor for recurrent infection.  May also affect antibiotic dosing.  Diabetes mellitus type 2 with complications (HCC)  Poor control with a hemoglobin A1c of 10.3.  Risk factor for recurrent infection.  -Tighten diabetic control    I have discussed with the primary service the  above plan to simplify the antibiotics to intravenous vancomycin.  The primary service agrees with the plan.    Antibiotics:  Vancomycin 5  Cefepime 5  Flagyl 5  Postop day 3    History of Present Illness   Lizzy Acuna is a 49 y.o. year old female with diabetes mellitus, obesity, cerebral peripheral arterial disease transferred to [facility] after initially presenting to the [facility] due to concern about the appearance of her left foot TMA site who we are asked to assist with antibiotic management.  The patient had undergone elective transmetatarsal amputation approximately 3 weeks prior to this admission and had been doing well until last week when she began having dehiscence of the surgical wound.  She underwent some debridement in the office was noticed to have worsening findings of cellulitis and therefore was sent to the hospital for further evaluation.  She underwent urgent MRI was found to have a large collection anterior to the TMA site and therefore was taken to the OR and underwent evacuation of purulent fluid on 1/29/2025.  Operatively the bones appeared viable.  However there was a fair amount of necrotic soft tissue and therefore the patient was sent to [facility] for vascular intervention.  Yesterday she underwent angiogram with thrombectomy with lysis catheter placed and was admitted to the ICU for ongoing management.  She has had increased bleeding from the forefoot site.  She has remained hemodynamically stable and is not requiring vasopressor support.  She is on intravenous heparin.  She denies any fever chills or sweats, denies any nausea vomiting or diarrhea.    A complete review of systems is negative other than that noted in the HPI.    I have reviewed the patient's PMH, PSH, Social History, Family History, Meds, and Allergies    Objective :  Temp:  [98.2 °F (36.8 °C)-99.7 °F (37.6 °C)] 99 °F (37.2 °C)  HR:  [] 90  BP: (114-174)/(59-96)  155/70  Resp:  [12-22] 16  SpO2:  [90 %-100 %] 100 %  O2 Device: Nasal cannula  Nasal Cannula O2 Flow Rate (L/min):  [2 L/min-4 L/min] 4 L/min  FiO2 (%):  [21] 21    General:  No acute distress  Psychiatric:  Awake and alert  Pulmonary:  Normal respiratory excursion without accessory muscle use  Abdomen:  Soft, nontender  Extremities: Left foot status post TMA and more recent I&D with some bleeding but no spreading erythema  Skin:  No rashes      Lab Results: I have reviewed the following results:  Results from last 7 days   Lab Units 02/01/25 0558 01/31/25 2231 01/31/25 0432   WBC Thousand/uL 8.17 8.68 13.64*   HEMOGLOBIN g/dL 7.3* 8.2* 9.0*   PLATELETS Thousands/uL 213 207 270     Results from last 7 days   Lab Units 02/01/25 0558 01/31/25 2231 01/31/25  0432 01/29/25  0530 01/28/25  1820   SODIUM mmol/L 131* 134* 125*   < > 129*   POTASSIUM mmol/L 4.1 4.1 4.0   < > 4.0   CHLORIDE mmol/L 99 102 94*   < > 91*   CO2 mmol/L 23 24 20*   < > 25   BUN mg/dL 13 12 20   < > 20   CREATININE mg/dL 0.56* 0.57* 0.74   < > 0.88   EGFR ml/min/1.73sq m 110 109 96   < > 77   CALCIUM mg/dL 8.2* 9.1 9.2   < > 9.9   AST U/L  --  14  --   --  14   ALT U/L  --  13  --   --  20   ALK PHOS U/L  --  110*  --   --  106*   ALBUMIN g/dL  --  3.3*  --   --  3.8    < > = values in this interval not displayed.     Results from last 7 days   Lab Units 01/29/25  1507 01/28/25  1820   BLOOD CULTURE   --  No Growth at 72 hrs.  No Growth at 72 hrs.   GRAM STAIN RESULT  Rare Gram positive cocci in pairs*  No polys seen*  --      Results from last 7 days   Lab Units 01/29/25  0530 01/28/25  1820   PROCALCITONIN ng/ml 0.25 0.22     Results from last 7 days   Lab Units 01/28/25  1820   CRP mg/L 95.2*               Imaging Results Review: I personally reviewed the following image studies in PACS and associated radiology reports: MRI left forefoot. My interpretation of the radiology images/reports is: Large collection anterior to the TMA resection  site..

## 2025-02-01 NOTE — ASSESSMENT & PLAN NOTE
In the setting of a left forefoot abscess.  Clinically improved and seems to be tolerating the antibiotics without difficulty.  Patient remained stable.  -IV antibiotics as below  -Recheck CBC with differential and BMP to make sure no developing toxicities  -Source control measures as below  -Supportive care

## 2025-02-01 NOTE — PERIOPERATIVE NURSING NOTE
As per Dr. Stuart MD, stop patient's heparin sheath infusion due to occlusion, and Dr. Stuart MD, will see patient in 2 hours.

## 2025-02-01 NOTE — ASSESSMENT & PLAN NOTE
Status post angiogram with intervention including left SFA thrombectomy and placement of lysis catheter on 1/31/2025.  Seems to have much improved perfusion.  -Ongoing anticoagulation  -Close vascular follow-up

## 2025-02-01 NOTE — ASSESSMENT & PLAN NOTE
Complicated by large abscess.  Patient underwent I&D on 1/29/2025 with a large abscess found with purulent fluid and necrotic tissue drained and debrided.  Wound cultures are growing MRSA and Finegoldia.  MRI and operative findings not consistent with osteomyelitis.  -Continue intravenous vancomycin  -Pharmacy follow-up for vancomycin dose management  -Reconsult podiatry to assist with management.  -Local wound care  -Serial exams

## 2025-02-01 NOTE — ASSESSMENT & PLAN NOTE
49 y.o. F w/ PMH of T2DM (A1c 10.3), tobacco use (quit Dec 2024) who was previously seen at [facility] in December with wound of left foot with cellulitis.   During previous admission vascular surgery was consulted and recommended LLE agram w/ intervention which was preformed by IR. Post-procedure pt underwent TMA with podiatry.  Now S/P Agram, Left SFA thrombectomy, placement of lysis catheter on 1/30    Plan  Lysis recheck today  Cont ASA/statin  Cont heparin gtt  Plan to restart Plavix post procedure  Likely wound VAC pending LLE revascularization  Local wound care per Podiatry

## 2025-02-01 NOTE — ASSESSMENT & PLAN NOTE
Poor control with a hemoglobin A1c of 10.3.  Risk factor for recurrent infection.  -Tighten diabetic control

## 2025-02-01 NOTE — SEDATION DOCUMENTATION
IR LLE angiogram with lysis catheter placement performed by Dr. Davidson. Patient tolerated procedure well. Report given to ICU RN and PACU RN.

## 2025-02-01 NOTE — PROGRESS NOTES
Lizzy Acuna is a 49 y.o. female who is currently ordered Vancomycin IV with management by the Pharmacy Consult service.  Relevant clinical data and objective / subjective history reviewed.  Vancomycin Assessment:  Indication and Goal AUC/Trough: Bone/joint infection (goal -600, trough >10), -600, trough >10  Clinical Status: stable  Micro:     Renal Function:  SCr: 0.57 mg/dL  CrCl: 121 mL/min  Renal replacement: Not on dialysis  Days of Therapy: 4  Current Dose: 750mg iv q12h  Vancomycin Plan:  New Dosinmg iv q12h  Estimated AUC: 452 mcg*hr/mL  Estimated Trough: 12.4 mcg/mL  Next Level: 25  Renal Function Monitoring: Daily BMP and UOP  Pharmacy will continue to follow closely for s/sx of nephrotoxicity, infusion reactions and appropriateness of therapy.  BMP and CBC will be ordered per protocol. We will continue to follow the patient’s culture results and clinical progress daily.    Sudheer Romero, Pharmacist

## 2025-02-01 NOTE — PROGRESS NOTES
Progress Note - Vascular Surgery   Name: Lizzy Acuna 49 y.o. female I MRN: [id number]  Unit/Bed#: [facility] 514-01 I Date of Admission: 1/31/2025   Date of Service: 2/1/2025 I Hospital Day: 1    Assessment & Plan  Diabetic foot infection  (HCC)  49 y.o. F w/ PMH of T2DM (A1c 10.3), tobacco use (quit Dec 2024) who was previously seen at [facility] in December with wound of left foot with cellulitis.   During previous admission vascular surgery was consulted and recommended LLE agram w/ intervention which was preformed by IR. Post-procedure pt underwent TMA with podiatry.  Now S/P Agram, Left SFA thrombectomy, placement of lysis catheter on 1/30    Plan  Lysis recheck today  Cont ASA/statin  Cont heparin gtt  Plan to restart Plavix post procedure  Likely wound VAC pending LLE revascularization  Local wound care per Podiatry      Sepsis due to cellulitis (HCC)    Obesity (BMI 30.0-34.9)    Diabetes mellitus type 2 with complications (HCC)  Lab Results   Component Value Date    HGBA1C 10.3 (H) 12/13/2024       Recent Labs     01/31/25  0805 01/31/25  1211 01/31/25  2252 02/01/25  0557   POCGLU 172* 158* 118 129       Blood Sugar Average: Last 72 hrs:  (P) [id number]    Hyponatremia    Cellulitis of left foot    PAD (peripheral artery disease) (HCC)      24 Hour Events : POD 1 lysis  Subjective : Patient in acute distress from site of blood from the LLE. Reports pain in her L foot that's moderately controlled. Denies pain around insertion sites. Denies fever, chills, SOB, chest pain.    Objective :  Temp:  [98.2 °F (36.8 °C)-99.7 °F (37.6 °C)] 99 °F (37.2 °C)  HR:  [] 99  BP: (114-189)/(59-96) 145/78  Resp:  [12-29] 18  SpO2:  [90 %-100 %] 100 %  O2 Device: Nasal cannula  Nasal Cannula O2 Flow Rate (L/min):  [2 L/min-4 L/min] 4 L/min  FiO2 (%):  [21] 21     I/O         01/30 0701 01/31 0700 01/31 0701 02/01 0700 02/01 0701 02/02 0700    P.O.  0     I.V. (mL/kg)  18.5 (0.2) 151.3 (1.7)    IV Piggyback  212.5  "200.4    Total Intake(mL/kg)  231 (2.6) 351.7 (4)    Urine (mL/kg/hr)  1075 (0.5) 225 (0.6)    Total Output  1075 225    Net  -844.1 +126.7           Unmeasured Urine Occurrence 5 x            Lines/Drains/Airways       Active Status       Name Placement date Placement time Site Days    PICC Line 01/31/25 01/31/25  1937  --  less than 1    Urethral Catheter Temperature probe 01/31/25  2200  -- less than 1                  Physical Exam  General: NAD  HENT: NCAT MMM  Neck: supple, no JVD  CV: nl rate  Lungs: nl wob. No resp distress  ABD: Soft, nontender, protuberant/nondistended. R groin access CDI without signs of swelling, fluctuance or erythema.  Pulse exam: Monophasic L PT signal only.  Neuro: AAOx3. M/S intact to BLE        Lab Results: I have reviewed the following results:  CBC with diff:   Lab Results   Component Value Date    WBC 8.17 02/01/2025    HGB 7.3 (L) 02/01/2025    HCT 23.2 (L) 02/01/2025    MCV 91 02/01/2025     02/01/2025    RBC 2.55 (L) 02/01/2025    MCH 28.6 02/01/2025    MCHC 31.5 02/01/2025    RDW 13.7 02/01/2025    MPV 9.1 02/01/2025    NRBC 1 01/31/2025   ,   BMP/CMP:  Lab Results   Component Value Date    SODIUM 131 (L) 02/01/2025    K 4.1 02/01/2025    CL 99 02/01/2025    CO2 23 02/01/2025    BUN 13 02/01/2025    CREATININE 0.56 (L) 02/01/2025    CALCIUM 8.2 (L) 02/01/2025    AST 14 01/31/2025    ALT 13 01/31/2025    ALKPHOS 110 (H) 01/31/2025    EGFR 110 02/01/2025   ,   Lipid Panel: No results found for: \"CHOL\",   Coags:   Lab Results   Component Value Date    PTT 42 (H) 02/01/2025    INR 1.01 01/31/2025   ,   Blood Culture:   Lab Results   Component Value Date    BLOODCX No Growth at 72 hrs. 01/28/2025    BLOODCX No Growth at 72 hrs. 01/28/2025   ,   Urinalysis:   Lab Results   Component Value Date    COLORU Straw 11/05/2018    CLARITYU Clear 11/05/2018    SPECGRAV 1.020 11/05/2018    PHUR 6.0 11/05/2018    LEUKOCYTESUR Negative 11/05/2018    NITRITE Negative 11/05/2018    " GLUCOSEU 3+ (A) 11/05/2018    KETONESU Negative 11/05/2018    BILIRUBINUR Negative 11/05/2018    BLOODU Trace-Intact (A) 11/05/2018   ,   Urine Culture:   Lab Results   Component Value Date    URINECX 50,000-59,000 cfu/ml Mixed Contaminants X4 10/21/2016   ,   Wound Culure:   Lab Results   Component Value Date    WOUNDCULT 1+ Growth of Enterobacter gergoviae (A) 06/13/2018    WOUNDCULT (A) 06/13/2018     2+ Growth of Methicillin Resistant Staphylococcus aureus             VTE Prophylaxis: VTE covered by:  heparin (porcine), Intravenous, Stopped at 01/31/25 1859  heparin (porcine), Intravenous, 800 Units/hr at 02/01/25 0734  heparin (porcine), Intravenous  heparin (porcine), Intravenous, 2,000 Units at 02/01/25 0734  heparin (porcine), Intravenous  heparin (porcine), Intravenous, 6,400 Units at 01/31/25 0210  heparin, Intravenous, Stopped at 01/31/25 2051

## 2025-02-01 NOTE — QUICK NOTE
Vascular Surgery    Procedure Check:    S/P Agram, Left SFA thrombectomy, placement of lysis catheter POD#0    Pt now recovering in ICU on TPA    C/O severe LLE pain, improved w/ IV Dilaudid    Left foot cool, pale, sensation and motor intact at ankle, pt is s/p TMA w/ non-healing, no signals obtained w/ doppler DP/PT/peroneal    Right groin access site CDI, no active bleeding, no hematoma    Right foot warm, M/S intact, +DP    D/W ICU and nursing team analgesia and place bed position in Reverse trendelenburg. Continue TPA as per IR, Heparin port on sheath occl- IR notified and to re-evaluate tonight. Continue neurovascular checks and notify if change in ankle motor or sensation. Lysis recheck in IR tomorrow.

## 2025-02-01 NOTE — BRIEF OP NOTE (RAD/CATH)
INTERVENTIONAL RADIOLOGY PROCEDURE NOTE    Date: 2/1/2025    Procedure: LLE lysis check  Procedure Summary       Date:  Room / Location:     Anesthesia Start:  Anesthesia Stop:     Procedure:  Diagnosis:     Scheduled Providers:  Responsible Provider:     Anesthesia Type: Not recorded ASA Status: Not recorded            Preoperative diagnosis:   1. PAD (peripheral artery disease) (HCC)    2. Diabetic foot ulcer (HCC)    3. Sepsis due to cellulitis (HCC)    4. Cellulitis of left foot    5. Diabetic foot infection  (HCC)         Postoperative diagnosis: Same.    Surgeon: Yamil Davidson MD     Assistant: None. No qualified resident was available.    Blood loss: 10 mL    Specimens: None     Findings:   Residual thrombus along left SFA treated with 4 mm balloon angioplasty.  No flow noted within left SFA following angioplasty.  Lysis re-started through lysis catheter in the left SFA to popliteal artery.  Return tomorrow for lysis check.    Complications: None immediate.    Anesthesia: conscious sedation and local

## 2025-02-01 NOTE — PROCEDURES
Venous Access Line Insertion    Date/Time: 1/31/2025 7:16 PM    Performed by: Yvette Rodrigues RN  Authorized by: Jose Maravilla MD    Patient location:  IR  Other Assisting Provider: No    Consent:     Consent obtained:  Written    Consent given by:  Patient    Risks discussed:  Arterial puncture, infection, bleeding, incorrect placement, nerve damage and pneumothorax    Alternatives discussed:  No treatment, delayed treatment, alternative treatment, observation and referral  Universal protocol:     Procedure explained and questions answered to patient or proxy's satisfaction: yes      Immediately prior to procedure, a time out was called: yes      Relevant documents present and verified: yes      Test results available and properly labeled: yes      Radiology Images displayed and confirmed.  If images not available, report reviewed: yes      Required blood products, implants, devices, and special equipment available: yes      Site/side marked: yes      Patient identity confirmed:  Verbally with patient, arm band, provided demographic data and hospital-assigned identification number  Pre-procedure details:     Hand hygiene: Hand hygiene performed prior to insertion      Sterile barrier technique: All elements of maximal sterile technique followed      Skin preparation:  2% chlorhexidine    Skin preparation agent: Skin preparation agent completely dried prior to procedure    Procedure details:     Complex Venous Access Line Type: PICC      Complex Venous Access Line Indications: other (comment)      Complex Venous Access Line Indications comment:  Lysis    Catheter tip vessel location: atriocaval junction      Orientation:  Right    Location:  Basilic    Procedural supplies:  Double lumen    Catheter size:  5 Fr    Total catheter length (cm):  36    Catheter out on skin (cm):  0    Max flow rate:  999    Arm circumference:  32    Patient evaluated for contraindications to access (i.e. fistula, thrombosis, etc): Yes       Site selection rationale:  Largest patent vessel    Approach: percutaneous technique used      Patient position:  Flat    Ultrasound image availability:  Images available in PACS    Sterile ultrasound techniques: Sterile gel and sterile probe covers were used      Number of attempts:  1    Successful placement: yes      Landmarks identified: yes      Vessel of catheter tip end:  Sherlock 3CG confirmed (under fluoro)  Anesthesia (see MAR for exact dosages):     Anesthesia method:  Local infiltration    Local anesthetic:  Lidocaine 1% w/o epi  Post-procedure details:     Post-procedure:  Dressing applied and securement device placed    Assessment:  Blood return through all ports (fluoro)    Patient tolerance of procedure:  Tolerated well, no immediate complications    Observer: Yes      Observer name:  VÍCTOR

## 2025-02-01 NOTE — ASSESSMENT & PLAN NOTE
Lab Results   Component Value Date    HGBA1C 10.3 (H) 12/13/2024       Recent Labs     01/31/25  0805 01/31/25  1211 01/31/25  2252 02/01/25  0557   POCGLU 172* 158* 118 129       Blood Sugar Average: Last 72 hrs:  (P) [id number]

## 2025-02-01 NOTE — PROGRESS NOTES
Progress Note - Trauma   Name: Lizzy Acuna 49 y.o. female I MRN: [id number]  Unit/Bed#: [facility] 514-01 I Date of Admission: 1/31/2025   Date of Service: 2/1/2025 I Hospital Day: 1       Interval Events:    Patient arrives back to the ICU s/p lysis recheck. Residual thrombus along SFA noted, tx with balloon angioplasty, no flow noted after angioplasty.   - Lysis reinitiated.     Pertinent New Data:   blood pressure, pulse, temperature, respirations, and pulse oximetry  Physical Exam  Eyes:      Pupils: Pupils are equal, round, and reactive to light.   Skin:     General: Skin is warm.   HENT:      Head: Normocephalic.      Mouth/Throat:      Mouth: Mucous membranes are moist.   Cardiovascular:      Rate and Rhythm: Normal rate.      Pulses: Normal pulses.   Abdominal:      Palpations: Abdomen is soft.   Constitutional:       Appearance: She is well-developed.   Pulmonary:      Effort: Pulmonary effort is normal.     LLE Neurovascular: Bulky dressing in place to TMA. DP/PT monophasic doppler signal. Sensation and strength intact. Noted pain, persistent, unchanged    Assessment and Plan  Diagnosis: PAD with LLE CLTI  Plan:   Lysis catheter remains in place  Monitor for bleeding of LLE TMA site  Neurovascular Checks   Serial Labs  Diet, NPO midnight       Oscar Hyde PA-C

## 2025-02-01 NOTE — PROGRESS NOTES
Progress Note - Critical Care/ICU   Name: Lizzy Acuna 49 y.o. female I MRN: [id number]  Unit/Bed#: [facility] 514-01 I Date of Admission: 1/31/2025   Date of Service: 2/1/2025 I Hospital Day: 1      Assessment & Plan   Neuro:   Diagnosis: Acute post operative pain, reperfusion pain  Plan:   Multimodal pain control  Tylenol 975 ATC  Robaxin 500mg Q6  Gabapentin 300mg TID  Start dilaudid PCA, 0cc cont, 0.2mg q10 mins with 1.2mg/hr lock out     CV:   Diagnosis: HLD, HTN  Plan:   Continue statin 80mg QD  Goal SBP <180mmHg - should improve with pain control  Routine Tele monitoring      Pulm:  No active issues     GI:   Diagnosis: GERD  No intervention required     :   Diagnosis: Hyponatremia  Plan:   Improved on repeat labs  Daily BMP's  Appears acute on chronic - baseline Na 130 - 133  Consider NS infusion      F/E/N:   F: N/a  E: K > 4, Mag >2   N: Regular diet now, NPO p MN     Heme/Onc:   Diagnosis: Severe PAD s/p thrombectomy and lysis cath placement on 1/31  Plan:   Continue hep gtt   Continue TPA via R fem sheath  Asa 81mg QD     Endo:   Diagnosis: T2DM  Plan:   Routine accuchecks  Continue ISS and Lantus     ID:   Diagnosis: Sepsis, RLE foot infection  Plan:   ID consulted  Continue Vanc/Cef/Flagyl     MSK/Skin:   Diagnosis: S/p LLE TMA, Post operative soft tissue infection  Plan:   Local wound care per podiatry  Disposition: Critical care    ICU Core Measures     A: Assess, Prevent, and Manage Pain Has pain been assessed? Yes  Need for changes to pain regimen? No   B: Both SAT/SAT  N/A   C: Choice of Sedation RASS Goal: 0 Alert and Calm  Need for changes to sedation or analgesia regimen? No   D: Delirium CAM-ICU: Negative   E: Early Mobility  Plan for early mobility? Yes   F: Family Engagement Plan for family engagement today? Yes       Antibiotic Review: Patient on appropriate coverage based on culture data.  and Infectious disease consulted    Review of Invasive Devices:    Ramos Plan: Continue for accurate  I/O monitoring for 48 hours  Central access plan: Patient requires PICC secondary to long term abx      Prophylaxis:  VTE VTE covered by:  heparin (porcine), Intravenous, Stopped at 01/31/25 1859  heparin (porcine), Intravenous, 600 Units/hr at 02/01/25 0120  heparin (porcine), Intravenous  heparin (porcine), Intravenous, 2,000 Units at 02/01/25 0115  heparin (porcine), Intravenous  heparin (porcine), Intravenous, 6,400 Units at 01/31/25 0210  heparin, Intravenous, Stopped at 01/31/25 2051       Stress Ulcer  not ordered         24 Hour Events : S/p IR thrombectomy and TPA lysis cath placement. Patient with significant pain overnight, improved with dilaudid PCA. Primarily feels uncomfortable from laying flat on her back. Otherwise pain in leg is better. Denies any N/V, chest pain, sob.     Subjective   Review of Systems: See HPI for Review of Systems    Objective :                   Vitals I/O      Most Recent Min/Max in 24hrs   Temp 98.4 °F (36.9 °C) Temp  Min: 98.1 °F (36.7 °C)  Max: 99.7 °F (37.6 °C)   Pulse 86 Pulse  Min: 85  Max: 113   Resp 12 Resp  Min: 12  Max: 22   /69 BP  Min: 114/60  Max: 174/79   O2 Sat 92 % SpO2  Min: 90 %  Max: 100 %      Intake/Output Summary (Last 24 hours) at 2/1/2025 0412  Last data filed at 2/1/2025 0400  Gross per 24 hour   Intake 213.5 ml   Output 950 ml   Net -736.5 ml       Diet NPO; Sips with meds    Invasive Monitoring           Physical Exam   Physical Exam  Vitals and nursing note reviewed.   Eyes:      Extraocular Movements: Extraocular movements intact.      Pupils: Pupils are equal, round, and reactive to light.   Skin:     Comments: LLE TMA dressing C/D  L foot slightly cooler than R foot up to ankle  Sensation/motor intact.    HENT:      Head: Normocephalic and atraumatic.   Neck:      Vascular: No JVD.   Cardiovascular:      Rate and Rhythm: Regular rhythm. Tachycardia present.      Pulses: Pulses are Palpable PT/DP in RLE  Strong monophasic L DP, no PT.       Heart sounds: Normal heart sounds.   Musculoskeletal:      Right lower leg: No edema.      Left lower leg: No edema.   Abdominal:      Palpations: Abdomen is soft.      Tenderness: There is no abdominal tenderness. There is no guarding.      Comments: R groin access site C/D, some minimal sanguinous oozing from site   Constitutional:       General: She is not in acute distress.     Appearance: She is well-developed and well-nourished. She is not ill-appearing or toxic-appearing.   Pulmonary:      Effort: Pulmonary effort is normal. No respiratory distress.      Breath sounds: Normal breath sounds.   Neurological:      General: No focal deficit present.      Mental Status: She is alert and oriented to person, place and time. She is agitated.      Motor: Strength full and intact in all extremities.          Diagnostic Studies        Lab Results: I have reviewed the following results:     Medications:  Scheduled PRN   acetaminophen, 975 mg, Q6H JORGE  aspirin, 81 mg, Daily  atorvastatin, 80 mg, Daily With Dinner  cefepime, 2,000 mg, Q12H  chlorhexidine, 15 mL, Q12H JORGE  gabapentin, 300 mg, HS  insulin glargine, 10 Units, HS  insulin lispro, 1-6 Units, TID AC  insulin lispro, 1-6 Units, Q6H JORGE  methocarbamol, 500 mg, Q6H JORGE  metroNIDAZOLE, 500 mg, Q8H  senna-docusate sodium, 1 tablet, HS  vancomycin, 750 mg, Q12H      diphenhydrAMINE, 25 mg, Q6H PRN  heparin (porcine), 1,000 Units, Q6H PRN  heparin (porcine), 2,000 Units, Q6H PRN  heparin (porcine), 3,200 Units, Q6H PRN  heparin (porcine), 6,400 Units, Q6H PRN  HYDROmorphone, 0.5 mg, Q4H PRN  ondansetron, 4 mg, Q6H PRN       Continuous    alteplase (CATHFLO) 10 mg in sodium chloride 0.9 % 250 mL infusion, 1 mg/hr, Last Rate: 1 mg/hr (01/31/25 1957)  heparin (porcine), 3-30 Units/kg/hr (Order-Specific), Last Rate: Stopped (01/31/25 1859)  heparin (porcine), 300-2,000 Units/hr, Last Rate: 600 Units/hr (02/01/25 0120)  heparin, 20 Units/hr, Last Rate: Stopped (01/31/25  2051)  HYDROmorphone,   sodium chloride, 75 mL/hr, Last Rate: 75 mL/hr (01/31/25 0056)         Labs:   CBC    Recent Labs     01/31/25  0432 01/31/25 2231   WBC 13.64* 8.68   HGB 9.0* 8.2*   HCT 28.9* 25.3*    207   BANDSPCT 1  --      BMP    Recent Labs     01/31/25 0432 01/31/25 2231   SODIUM 125* 134*   K 4.0 4.1   CL 94* 102   CO2 20* 24   AGAP 11 8   BUN 20 12   CREATININE 0.74 0.57*   CALCIUM 9.2 9.1       Coags    Recent Labs     01/31/25  0629 01/31/25  1214 01/31/25  2231 02/01/25  0000   INR 0.99  --  1.01  --    PTT 91*   < > 38* 38*    < > = values in this interval not displayed.        Additional Electrolytes  No recent results       Blood Gas    No recent results  No recent results LFTs  Recent Labs     01/31/25 2231   ALT 13   AST 14   ALKPHOS 110*   ALB 3.3*   TBILI 0.35       Infectious  No recent results  Glucose  Recent Labs     01/30/25  0519 01/31/25  0432 01/31/25  2231   GLUC 184* 169* 119

## 2025-02-01 NOTE — CONSULTS
Consultation - Critical Care/ICU   Name: Lizzy Acuna 49 y.o. female I MRN: [id number]  Unit/Bed#: [facility] 514-01 I Date of Admission: 1/31/2025   Date of Service: 1/31/2025 I Hospital Day: 0   Consults  Physician Requesting Evaluation: Mary BROWNLEE To, DO   Reason for Evaluation / Principal Problem: PAD s/p Lysis        Assessment & Plan   Neuro:   Diagnosis: Acute post operative pain, reperfusion pain  Plan:   Multimodal pain control  Tylenol 975 ATC  Robaxin 500mg Q6  Gabapentin 300mg TID  Start dilaudid PCA, 0cc cont, 0.2mg q10 mins with 1.2mg/hr lock out    CV:   Diagnosis: HLD, HTN  Plan:   Continue statin 80mg QD  Goal SBP <180mmHg - should improve with pain control  Routine Tele monitoring     Pulm:  No active issues    GI:   Diagnosis: GERD  No intervention required    :   Diagnosis: Hyponatremia  Plan:   F/u post op labs  Appears acute on chronic - baseline Na 130 - 133  Consider NS infusion     F/E/N:   F: N/a  E: K > 4, Mag >2   N: Regular diet now, NPO p MN    Heme/Onc:   Diagnosis: Severe PAD s/p thrombectomy and lysis cath placement on 1/31  Plan:   Continue hep gtt   Continue TPA via R fem sheath  Asa 81mg QD    Endo:   Diagnosis: T2DM  Plan:   Routine accuchecks  Continue ISS and Lantus    ID:   Diagnosis: Sepsis, RLE foot infection  Plan:   ID consulted  Continue Vanc/Cef/Flagyl    MSK/Skin:   Diagnosis: S/p LLE TMA, Post operative soft tissue infection  Plan:   Local wound care per podiatry    Disposition: Critical care    History of Present Illness   Lizzy Acuna is a 49 y.o. with a PMHx of HTN, HLD, PAD, and GERD who [place] [facility] [place] by her podiatrist for a LLE soft tissue infection. The patient underwent an elective TMA approx 3 weeks ago and was doing well until last week when she was noticed to have dehiscence of her surgical wound. The wound was debrided by her podiatrist in the office but ultimately worsened with evidence of cellulitis. The patient was  "transferred to [facility] for vascular surgery and possible IR intervention after ultrasounds showed greater than 75% stenosis of the SFA. She underwent an IR thrombectomy with lysis cath placement today and presents to the ICU for closer monitoring.     The patient arrives in extreme pain, describing it as a spasm/tight sensation in her L leg that has worsened since the procedure. She denies any chest pain, sob, difficulty breathing, abdominal pain. She does have some nausea which she states is from not eating all day.     History obtained from chart review and the patient.  Review of Systems: See HPI for Review of Systems    Historical Information   Past Medical History:  2016: Advanced maternal age in multigravida, second trimester      Comment:  Transitioned From: Advanced maternal age in                multigravida, first trimester    No date: Back pain      Comment:  used 2 percocet tid until current admission in 2017  No date: Bone spur      Comment:  in back per pt  2017: Chronic hypertension with superimposed preeclampsia  No date: Depression  No date: Diabetes mellitus (HCC)  No date: Elevated BP without diagnosis of hypertension      Comment:  \"with pain\"  No date: Foot injury  No date: Full dentures      Comment:  does not wear  No date: GERD (gastroesophageal reflux disease)      Comment:  occas  No date: Gestational diabetes      Comment:  insulin dependent  No date: Herniated cervical disc  No date: Herniated lumbar intervertebral disc  No date: History of pneumonia  No date: Hx of degenerative disc disease  No date: Hyperlipidemia  No date: Obesity  No date: Pre-eclampsia  No date: Prior pregnancy with fetal demise      Comment:  previous demise at 36 weeks  No date: Toe pain      Comment:  and foot pain Past Surgical History:  No date: BACK SURGERY  No date:  SECTION  2025: INCISION AND DRAINAGE OF WOUND; Left      Comment:  Procedure: INCISION AND DRAINAGE (I&D) EXTREMITY;       "           Surgeon: Leopoldo Ritchie DPM;  Location: [facility]                MAIN OR;  Service: Podiatry  2024: IR LOWER EXTREMITY ANGIOGRAM  2025: IR LOWER EXTREMITY ANGIOGRAM  2025: IR PICC PLACEMENT DOUBLE LUMEN  No date: LAMINECTOMY      Comment:  with disc removal, last assessed 16  No date: OTHER SURGICAL HISTORY      Comment:  Right ovary removal 2005 per pt recall at [facility]  1/3/2025: MD AMPUTATION FOOT TRANSMETARSAL; Left      Comment:  Procedure: AMPUTATION TRANSMETATARSAL (TMA);  Surgeon:                Leopoldo Ritchie DPM;  Location: [facility] MAIN OR;                 Service: Podiatry  02/15/2017: MD  DELIVERY ONLY; N/A      Comment:  Procedure:  SECTION ();  Surgeon:                Tito Umaña MD;  Location: [facility] ;  Service:                Obstetrics  02/15/2017: MD LIG/TRNSXJ FALOPIAN TUBE  DEL/ABDML SURG; Left      Comment:  Procedure: LIGATION/COAGULATION TUBAL;  Surgeon:                Tito Umaña MD;  Location: [facility] ;  Service:                Obstetrics  2024: VASCULAR SURGERY  No date: WISDOM TOOTH EXTRACTION   Current Outpatient Medications   Medication Instructions    acetaminophen (TYLENOL) 975 mg, Oral, Every 6 hours scheduled    Alcohol Swabs 70 % PADS May substitute brand based on insurance coverage. Check glucose TID.    aspirin (ECOTRIN LOW STRENGTH) 81 mg, Oral, Daily    atorvastatin (LIPITOR) 80 mg, Oral, Daily with dinner    Blood Glucose Monitoring Suppl (OneTouch Verio Reflect) w/Device KIT May substitute brand based on insurance coverage. Check glucose TID.    diphenhydrAMINE (BENADRYL) 25 mg, Every 6 hours PRN    gabapentin (Neurontin) 300 mg capsule Take 1 tablet in the morning, 1 in the afternoon and 2 tablets at night.    glucose blood (OneTouch Verio) test strip May substitute brand based on insurance coverage. Check glucose TID.    HYDROmorphone (DILAUDID) 4 mg tablet  "    ibuprofen (MOTRIN) 600 mg tablet Every 6 hours PRN    insulin aspart (NOVOLOG FLEXPEN) 5 Units, Subcutaneous, 3 times daily with meals    Insulin Glargine Solostar (LANTUS SOLOSTAR) 10 Units, Subcutaneous, Daily at bedtime    Insulin Pen Needle (BD Pen Needle Ana 2nd Gen) 32G X 4 MM MISC For use with insulin pen. Pharmacy may dispense brand covered by insurance.    Insulin Pen Needle (BD Pen Needle Ana 2nd Gen) 32G X 4 MM MISC For use with insulin pen. Pharmacy may dispense brand covered by insurance.    methocarbamol (ROBAXIN) 500 mg, Oral, Every 6 hours scheduled    naloxone (NARCAN) 4 mg/0.1 mL nasal spray Administer 1 spray into a nostril. If no response after 2-3 minutes, give another dose in the other nostril using a new spray.    OneTouch Delica Lancets 33G MISC May substitute brand based on insurance coverage. Check glucose TID.    senna-docusate sodium (SENOKOT S) 8.6-50 mg per tablet 1 tablet, Oral, Daily at bedtime    Allergies   Allergen Reactions    Codeine Other (See Comments)     Aggression-and nasty--\"took a cough syrup with codeine as a child\"      Social History     Tobacco Use    Smoking status: Former     Current packs/day: 0.50     Types: Cigarettes    Smokeless tobacco: Never    Tobacco comments:     Per pt last cigarette 12/13/24--quit cold turkey   Vaping Use    Vaping status: Never Used   Substance Use Topics    Alcohol use: Yes     Comment: 1-2 drinks a year    Drug use: Not Currently    Family History   Problem Relation Age of Onset    Diabetes Mother     COPD Mother     Heart disease Mother     Stroke Father     Heart disease Father           Objective :                   Vitals I/O      Most Recent Min/Max in 24hrs   Temp 98.4 °F (36.9 °C) Temp  Min: 98.1 °F (36.7 °C)  Max: 98.7 °F (37.1 °C)   Pulse 98 Pulse  Min: 85  Max: 100   Resp 18 Resp  Min: 18  Max: 18   /78 BP  Min: 122/78  Max: 163/77   O2 Sat 97 % SpO2  Min: 96 %  Max: 100 %      Intake/Output Summary (Last 24 " hours) at 1/31/2025 2152  Last data filed at 1/31/2025 0900  Gross per 24 hour   Intake 0 ml   Output --   Net 0 ml       Diet Regular; Regular House  Diet NPO; Sips with meds    Invasive Monitoring           Physical Exam   Physical Exam  Vitals and nursing note reviewed.   Eyes:      Extraocular Movements: Extraocular movements intact.      Pupils: Pupils are equal, round, and reactive to light.   Skin:     Comments: LLE TMA dressing C/D  L foot slightly cooler than R foot up to ankle  Sensation/motor intact.    HENT:      Head: Normocephalic and atraumatic.   Neck:      Vascular: No JVD.   Cardiovascular:      Rate and Rhythm: Regular rhythm. Tachycardia present.      Pulses: Pulses are Palpable PT/DP in RLE  No signals appreciated in LLE.      Heart sounds: Normal heart sounds.   Musculoskeletal:      Right lower leg: No edema.      Left lower leg: No edema.   Abdominal:      Palpations: Abdomen is soft.      Tenderness: There is no abdominal tenderness. There is no guarding.      Comments: R groin access site C/D, some minimal sanguinous oozing from site   Constitutional:       General: She is not in acute distress.     Appearance: She is well-developed and well-nourished. She is not ill-appearing or toxic-appearing.   Pulmonary:      Effort: Pulmonary effort is normal. No respiratory distress.      Breath sounds: Normal breath sounds.   Neurological:      General: No focal deficit present.      Mental Status: She is alert and oriented to person, place and time. She is agitated.      Motor: Strength full and intact in all extremities.          Diagnostic Studies        Lab Results: I have reviewed the following results:     Medications:  Scheduled PRN   acetaminophen, 1,000 mg, Q8H  acetaminophen, 975 mg, Q6H JORGE  aspirin, 81 mg, Daily  atorvastatin, 80 mg, Daily With Dinner  atorvastatin, 80 mg, Daily With Dinner  cefepime, 2,000 mg, Q12H  chlorhexidine, 15 mL, Q12H JORGE  gabapentin, 300 mg, TID  gabapentin,  300 mg, HS  insulin glargine, 10 Units, HS  insulin lispro, 1-6 Units, TID AC  insulin lispro, 1-6 Units, Q6H JORGE  methocarbamol, 500 mg, Q6H JORGE  metroNIDAZOLE, 500 mg, Q8H  senna-docusate sodium, 1 tablet, HS  vancomycin, 750 mg, Q12H      diphenhydrAMINE, 25 mg, Q6H PRN  diphenhydrAMINE, 25 mg, Q6H PRN  heparin (porcine), 1,000 Units, Q6H PRN  heparin (porcine), 2,000 Units, Q6H PRN  heparin (porcine), 3,200 Units, Q6H PRN  heparin (porcine), 6,400 Units, Q6H PRN  HYDROmorphone, 0.5 mg, Q4H PRN  HYDROmorphone, 0.5 mg, Q5 Min PRN  HYDROmorphone, 4 mg, Q4H PRN   Or  HYDROmorphone, 6 mg, Q4H PRN  ondansetron, 4 mg, Q6H PRN       Continuous    alteplase (CATHFLO) 10 mg in sodium chloride 0.9 % 250 mL infusion, 1 mg/hr, Last Rate: 1 mg/hr (01/31/25 1957)  heparin (porcine), 3-30 Units/kg/hr (Order-Specific), Last Rate: Stopped (01/31/25 1859)  heparin (porcine), 300-2,000 Units/hr, Last Rate: 300 Units/hr (01/31/25 1957)  heparin, 20 Units/hr, Last Rate: 20 Units/hr (01/31/25 1957)  HYDROmorphone,   sodium chloride, 75 mL/hr, Last Rate: 75 mL/hr (01/31/25 0056)         Labs:   CBC    Recent Labs     01/30/25 0519 01/31/25  0432   WBC 10.67* 13.64*   HGB 9.1* 9.0*   HCT 28.3* 28.9*    270   BANDSPCT  --  1     BMP    Recent Labs     01/30/25 0519 01/31/25  0432   SODIUM 128* 125*   K 4.3 4.0   CL 95* 94*   CO2 25 20*   AGAP 8 11   BUN 17 20   CREATININE 0.79 0.74   CALCIUM 9.1 9.2       Coags    Recent Labs     01/31/25  0629 01/31/25  1214   INR 0.99  --    PTT 91* 81*        Additional Electrolytes  No recent results       Blood Gas    No recent results  No recent results LFTs  No recent results    Infectious  No recent results  Glucose  Recent Labs     01/30/25  0519 01/31/25  0432   GLUC 184* 169*

## 2025-02-02 ENCOUNTER — APPOINTMENT (INPATIENT)
Dept: RADIOLOGY | Facility: HOSPITAL | Age: 50
DRG: 169 | End: 2025-02-02
Attending: RADIOLOGY
Payer: COMMERCIAL

## 2025-02-02 PROBLEM — A41.9 SEPSIS DUE TO CELLULITIS (HCC): Status: RESOLVED | Noted: 2018-11-05 | Resolved: 2025-02-02

## 2025-02-02 PROBLEM — L03.90 SEPSIS DUE TO CELLULITIS (HCC): Status: RESOLVED | Noted: 2018-11-05 | Resolved: 2025-02-02

## 2025-02-02 PROBLEM — D50.0 BLOOD LOSS ANEMIA: Status: ACTIVE | Noted: 2025-02-02

## 2025-02-02 LAB
ABO GROUP BLD: NORMAL
ANION GAP SERPL CALCULATED.3IONS-SCNC: 5 MMOL/L (ref 4–13)
APTT PPP: 101 SECONDS (ref 23–34)
APTT PPP: 37 SECONDS (ref 23–34)
APTT PPP: 44 SECONDS (ref 23–34)
APTT PPP: 45 SECONDS (ref 23–34)
APTT PPP: 48 SECONDS (ref 23–34)
BASOPHILS # BLD AUTO: 0.01 THOUSANDS/ΜL (ref 0–0.1)
BASOPHILS NFR BLD AUTO: 0 % (ref 0–1)
BLD GP AB SCN SERPL QL: NEGATIVE
BUN SERPL-MCNC: 13 MG/DL (ref 5–25)
CALCIUM SERPL-MCNC: 7.9 MG/DL (ref 8.4–10.2)
CHLORIDE SERPL-SCNC: 99 MMOL/L (ref 96–108)
CO2 SERPL-SCNC: 28 MMOL/L (ref 21–32)
CREAT SERPL-MCNC: 0.7 MG/DL (ref 0.6–1.3)
EOSINOPHIL # BLD AUTO: 0.11 THOUSAND/ΜL (ref 0–0.61)
EOSINOPHIL NFR BLD AUTO: 1 % (ref 0–6)
ERYTHROCYTE [DISTWIDTH] IN BLOOD BY AUTOMATED COUNT: 13.7 % (ref 11.6–15.1)
ERYTHROCYTE [DISTWIDTH] IN BLOOD BY AUTOMATED COUNT: 14.1 % (ref 11.6–15.1)
FIBRINOGEN PPP-MCNC: 150 MG/DL (ref 206–523)
FIBRINOGEN PPP-MCNC: 158 MG/DL (ref 206–523)
FIBRINOGEN PPP-MCNC: 180 MG/DL (ref 206–523)
GFR SERPL CREATININE-BSD FRML MDRD: 102 ML/MIN/1.73SQ M
GLUCOSE SERPL-MCNC: 114 MG/DL (ref 65–140)
GLUCOSE SERPL-MCNC: 147 MG/DL (ref 65–140)
GLUCOSE SERPL-MCNC: 153 MG/DL (ref 65–140)
GLUCOSE SERPL-MCNC: 156 MG/DL (ref 65–140)
GLUCOSE SERPL-MCNC: 175 MG/DL (ref 65–140)
GLUCOSE SERPL-MCNC: 181 MG/DL (ref 65–140)
HCT VFR BLD AUTO: 19.8 % (ref 34.8–46.1)
HCT VFR BLD AUTO: 19.8 % (ref 34.8–46.1)
HCT VFR BLD AUTO: 23.6 % (ref 34.8–46.1)
HCT VFR BLD AUTO: 23.6 % (ref 34.8–46.1)
HCT VFR BLD AUTO: 24.5 % (ref 34.8–46.1)
HGB BLD-MCNC: 6.2 G/DL (ref 11.5–15.4)
HGB BLD-MCNC: 6.3 G/DL (ref 11.5–15.4)
HGB BLD-MCNC: 7.5 G/DL (ref 11.5–15.4)
HGB BLD-MCNC: 7.7 G/DL (ref 11.5–15.4)
HGB BLD-MCNC: 7.7 G/DL (ref 11.5–15.4)
IMM GRANULOCYTES # BLD AUTO: 0.13 THOUSAND/UL (ref 0–0.2)
IMM GRANULOCYTES NFR BLD AUTO: 1 % (ref 0–2)
INR PPP: 1.05 (ref 0.85–1.19)
LYMPHOCYTES # BLD AUTO: 3.06 THOUSANDS/ΜL (ref 0.6–4.47)
LYMPHOCYTES NFR BLD AUTO: 28 % (ref 14–44)
MCH RBC QN AUTO: 28.7 PG (ref 26.8–34.3)
MCH RBC QN AUTO: 28.8 PG (ref 26.8–34.3)
MCH RBC QN AUTO: 29.3 PG (ref 26.8–34.3)
MCH RBC QN AUTO: 29.6 PG (ref 26.8–34.3)
MCHC RBC AUTO-ENTMCNC: 31.3 G/DL (ref 31.4–37.4)
MCHC RBC AUTO-ENTMCNC: 31.8 G/DL (ref 31.4–37.4)
MCHC RBC AUTO-ENTMCNC: 31.8 G/DL (ref 31.4–37.4)
MCHC RBC AUTO-ENTMCNC: 32.6 G/DL (ref 31.4–37.4)
MCV RBC AUTO: 91 FL (ref 82–98)
MCV RBC AUTO: 91 FL (ref 82–98)
MCV RBC AUTO: 92 FL (ref 82–98)
MCV RBC AUTO: 92 FL (ref 82–98)
MONOCYTES # BLD AUTO: 0.81 THOUSAND/ΜL (ref 0.17–1.22)
MONOCYTES NFR BLD AUTO: 7 % (ref 4–12)
NEUTROPHILS # BLD AUTO: 6.79 THOUSANDS/ΜL (ref 1.85–7.62)
NEUTS SEG NFR BLD AUTO: 63 % (ref 43–75)
NRBC BLD AUTO-RTO: 1 /100 WBCS
PLATELET # BLD AUTO: 160 THOUSANDS/UL (ref 149–390)
PLATELET # BLD AUTO: 167 THOUSANDS/UL (ref 149–390)
PLATELET # BLD AUTO: 184 THOUSANDS/UL (ref 149–390)
PLATELET # BLD AUTO: 184 THOUSANDS/UL (ref 149–390)
PMV BLD AUTO: 9.1 FL (ref 8.9–12.7)
PMV BLD AUTO: 9.2 FL (ref 8.9–12.7)
PMV BLD AUTO: 9.3 FL (ref 8.9–12.7)
PMV BLD AUTO: 9.3 FL (ref 8.9–12.7)
POTASSIUM SERPL-SCNC: 4.1 MMOL/L (ref 3.5–5.3)
PROTHROMBIN TIME: 14.1 SECONDS (ref 12.3–15)
RBC # BLD AUTO: 2.15 MILLION/UL (ref 3.81–5.12)
RBC # BLD AUTO: 2.16 MILLION/UL (ref 3.81–5.12)
RBC # BLD AUTO: 2.6 MILLION/UL (ref 3.81–5.12)
RBC # BLD AUTO: 2.6 MILLION/UL (ref 3.81–5.12)
RH BLD: POSITIVE
SODIUM SERPL-SCNC: 132 MMOL/L (ref 135–147)
SPECIMEN EXPIRATION DATE: NORMAL
VANCOMYCIN SERPL-MCNC: 25.9 UG/ML (ref 10–20)
WBC # BLD AUTO: 10.35 THOUSAND/UL (ref 4.31–10.16)
WBC # BLD AUTO: 10.45 THOUSAND/UL (ref 4.31–10.16)
WBC # BLD AUTO: 10.91 THOUSAND/UL (ref 4.31–10.16)
WBC # BLD AUTO: 11.1 THOUSAND/UL (ref 4.31–10.16)

## 2025-02-02 PROCEDURE — 85014 HEMATOCRIT: CPT

## 2025-02-02 PROCEDURE — 99152 MOD SED SAME PHYS/QHP 5/>YRS: CPT

## 2025-02-02 PROCEDURE — 85730 THROMBOPLASTIN TIME PARTIAL: CPT | Performed by: SURGERY

## 2025-02-02 PROCEDURE — 85027 COMPLETE CBC AUTOMATED: CPT | Performed by: RADIOLOGY

## 2025-02-02 PROCEDURE — 86901 BLOOD TYPING SEROLOGIC RH(D): CPT

## 2025-02-02 PROCEDURE — 37214 CESSJ THERAPY CATH REMOVAL: CPT | Performed by: RADIOLOGY

## 2025-02-02 PROCEDURE — 99255 IP/OBS CONSLTJ NEW/EST HI 80: CPT | Performed by: ANESTHESIOLOGY

## 2025-02-02 PROCEDURE — P9016 RBC LEUKOCYTES REDUCED: HCPCS

## 2025-02-02 PROCEDURE — 85730 THROMBOPLASTIN TIME PARTIAL: CPT | Performed by: PHYSICIAN ASSISTANT

## 2025-02-02 PROCEDURE — 85610 PROTHROMBIN TIME: CPT | Performed by: PHYSICIAN ASSISTANT

## 2025-02-02 PROCEDURE — 047L3ZZ DILATION OF LEFT FEMORAL ARTERY, PERCUTANEOUS APPROACH: ICD-10-PCS | Performed by: RADIOLOGY

## 2025-02-02 PROCEDURE — 82948 REAGENT STRIP/BLOOD GLUCOSE: CPT

## 2025-02-02 PROCEDURE — 80048 BASIC METABOLIC PNL TOTAL CA: CPT | Performed by: SURGERY

## 2025-02-02 PROCEDURE — 99232 SBSQ HOSP IP/OBS MODERATE 35: CPT | Performed by: SURGERY

## 2025-02-02 PROCEDURE — C1725 CATH, TRANSLUMIN NON-LASER: HCPCS

## 2025-02-02 PROCEDURE — B41GYZZ FLUOROSCOPY OF LEFT LOWER EXTREMITY ARTERIES USING OTHER CONTRAST: ICD-10-PCS | Performed by: RADIOLOGY

## 2025-02-02 PROCEDURE — 85384 FIBRINOGEN ACTIVITY: CPT | Performed by: SURGERY

## 2025-02-02 PROCEDURE — 99233 SBSQ HOSP IP/OBS HIGH 50: CPT | Performed by: INTERNAL MEDICINE

## 2025-02-02 PROCEDURE — 99153 MOD SED SAME PHYS/QHP EA: CPT

## 2025-02-02 PROCEDURE — 86850 RBC ANTIBODY SCREEN: CPT

## 2025-02-02 PROCEDURE — 85027 COMPLETE CBC AUTOMATED: CPT | Performed by: SURGERY

## 2025-02-02 PROCEDURE — 37228 PR REVSC OPN/PRQ TIB/PERO W/ANGIOPLASTY UNI: CPT | Performed by: RADIOLOGY

## 2025-02-02 PROCEDURE — 86900 BLOOD TYPING SEROLOGIC ABO: CPT

## 2025-02-02 PROCEDURE — 75710 ARTERY X-RAYS ARM/LEG: CPT

## 2025-02-02 PROCEDURE — 85027 COMPLETE CBC AUTOMATED: CPT | Performed by: PHYSICIAN ASSISTANT

## 2025-02-02 PROCEDURE — C1769 GUIDE WIRE: HCPCS

## 2025-02-02 PROCEDURE — 85025 COMPLETE CBC W/AUTO DIFF WBC: CPT | Performed by: SURGERY

## 2025-02-02 PROCEDURE — 37214 CESSJ THERAPY CATH REMOVAL: CPT

## 2025-02-02 PROCEDURE — 99232 SBSQ HOSP IP/OBS MODERATE 35: CPT

## 2025-02-02 PROCEDURE — 85730 THROMBOPLASTIN TIME PARTIAL: CPT | Performed by: INTERNAL MEDICINE

## 2025-02-02 PROCEDURE — 85730 THROMBOPLASTIN TIME PARTIAL: CPT | Performed by: RADIOLOGY

## 2025-02-02 PROCEDURE — 99024 POSTOP FOLLOW-UP VISIT: CPT | Performed by: PODIATRIST

## 2025-02-02 PROCEDURE — 047Q3ZZ DILATION OF LEFT ANTERIOR TIBIAL ARTERY, PERCUTANEOUS APPROACH: ICD-10-PCS | Performed by: RADIOLOGY

## 2025-02-02 PROCEDURE — 37224 HB FEM/POPL REVAS W/TLA: CPT

## 2025-02-02 PROCEDURE — G0545 PR INHERENT VISIT TO INPT: HCPCS | Performed by: INTERNAL MEDICINE

## 2025-02-02 PROCEDURE — 99291 CRITICAL CARE FIRST HOUR: CPT | Performed by: EMERGENCY MEDICINE

## 2025-02-02 PROCEDURE — 86923 COMPATIBILITY TEST ELECTRIC: CPT

## 2025-02-02 PROCEDURE — NC001 PR NO CHARGE: Performed by: EMERGENCY MEDICINE

## 2025-02-02 PROCEDURE — 85018 HEMOGLOBIN: CPT

## 2025-02-02 PROCEDURE — 80202 ASSAY OF VANCOMYCIN: CPT | Performed by: SURGERY

## 2025-02-02 PROCEDURE — 85384 FIBRINOGEN ACTIVITY: CPT | Performed by: RADIOLOGY

## 2025-02-02 PROCEDURE — 30233N1 TRANSFUSION OF NONAUTOLOGOUS RED BLOOD CELLS INTO PERIPHERAL VEIN, PERCUTANEOUS APPROACH: ICD-10-PCS | Performed by: EMERGENCY MEDICINE

## 2025-02-02 PROCEDURE — 37224 PR REVSC OPN/PRG FEM/POP W/ANGIOPLASTY UNI: CPT | Performed by: RADIOLOGY

## 2025-02-02 PROCEDURE — C1760 CLOSURE DEV, VASC: HCPCS

## 2025-02-02 RX ORDER — DIAZEPAM 10 MG/2ML
INJECTION, SOLUTION INTRAMUSCULAR; INTRAVENOUS AS NEEDED
Status: COMPLETED | OUTPATIENT
Start: 2025-02-02 | End: 2025-02-02

## 2025-02-02 RX ORDER — METHOCARBAMOL 750 MG/1
750 TABLET, FILM COATED ORAL EVERY 6 HOURS SCHEDULED
Status: DISCONTINUED | OUTPATIENT
Start: 2025-02-02 | End: 2025-02-07

## 2025-02-02 RX ORDER — GABAPENTIN 300 MG/1
600 CAPSULE ORAL
Status: DISCONTINUED | OUTPATIENT
Start: 2025-02-02 | End: 2025-02-15 | Stop reason: HOSPADM

## 2025-02-02 RX ORDER — CLOPIDOGREL BISULFATE 75 MG/1
75 TABLET ORAL DAILY
Status: DISCONTINUED | OUTPATIENT
Start: 2025-02-03 | End: 2025-02-11

## 2025-02-02 RX ORDER — HEPARIN SODIUM 1000 [USP'U]/ML
6800 INJECTION, SOLUTION INTRAVENOUS; SUBCUTANEOUS EVERY 6 HOURS PRN
Status: DISCONTINUED | OUTPATIENT
Start: 2025-02-02 | End: 2025-02-11

## 2025-02-02 RX ORDER — DIAZEPAM 10 MG/2ML
5 INJECTION, SOLUTION INTRAMUSCULAR; INTRAVENOUS EVERY 8 HOURS PRN
Status: DISCONTINUED | OUTPATIENT
Start: 2025-02-02 | End: 2025-02-03

## 2025-02-02 RX ORDER — FENTANYL CITRATE 50 UG/ML
INJECTION, SOLUTION INTRAMUSCULAR; INTRAVENOUS AS NEEDED
Status: COMPLETED | OUTPATIENT
Start: 2025-02-02 | End: 2025-02-02

## 2025-02-02 RX ORDER — HEPARIN SODIUM 1000 [USP'U]/ML
3400 INJECTION, SOLUTION INTRAVENOUS; SUBCUTANEOUS EVERY 6 HOURS PRN
Status: DISCONTINUED | OUTPATIENT
Start: 2025-02-02 | End: 2025-02-11

## 2025-02-02 RX ORDER — GABAPENTIN 300 MG/1
300 CAPSULE ORAL
Status: DISCONTINUED | OUTPATIENT
Start: 2025-02-02 | End: 2025-02-15 | Stop reason: HOSPADM

## 2025-02-02 RX ORDER — LIDOCAINE WITH 8.4% SOD BICARB 0.9%(10ML)
SYRINGE (ML) INJECTION AS NEEDED
Status: COMPLETED | OUTPATIENT
Start: 2025-02-02 | End: 2025-02-02

## 2025-02-02 RX ORDER — IODIXANOL 320 MG/ML
100 INJECTION, SOLUTION INTRAVASCULAR
Status: COMPLETED | OUTPATIENT
Start: 2025-02-02 | End: 2025-02-02

## 2025-02-02 RX ORDER — MIDAZOLAM HYDROCHLORIDE 2 MG/2ML
INJECTION, SOLUTION INTRAMUSCULAR; INTRAVENOUS AS NEEDED
Status: COMPLETED | OUTPATIENT
Start: 2025-02-02 | End: 2025-02-02

## 2025-02-02 RX ORDER — CLOPIDOGREL BISULFATE 75 MG/1
300 TABLET ORAL ONCE
Status: COMPLETED | OUTPATIENT
Start: 2025-02-02 | End: 2025-02-02

## 2025-02-02 RX ORDER — HEPARIN SODIUM 10000 [USP'U]/100ML
3-30 INJECTION, SOLUTION INTRAVENOUS
Status: DISCONTINUED | OUTPATIENT
Start: 2025-02-02 | End: 2025-02-11

## 2025-02-02 RX ADMIN — HYDROMORPHONE HYDROCHLORIDE 0.5 MG: 1 INJECTION, SOLUTION INTRAMUSCULAR; INTRAVENOUS; SUBCUTANEOUS at 11:27

## 2025-02-02 RX ADMIN — FENTANYL CITRATE 50 MCG: 50 INJECTION INTRAMUSCULAR; INTRAVENOUS at 15:06

## 2025-02-02 RX ADMIN — DIAZEPAM 2.5 MG: 10 INJECTION, SOLUTION INTRAMUSCULAR; INTRAVENOUS at 14:31

## 2025-02-02 RX ADMIN — DIAZEPAM 5 MG: 10 INJECTION, SOLUTION INTRAMUSCULAR; INTRAVENOUS at 14:20

## 2025-02-02 RX ADMIN — VANCOMYCIN HYDROCHLORIDE 1000 MG: 1 INJECTION, SOLUTION INTRAVENOUS at 00:22

## 2025-02-02 RX ADMIN — METHOCARBAMOL 500 MG: 500 TABLET ORAL at 00:04

## 2025-02-02 RX ADMIN — ALTEPLASE 1 MG/HR: 2.2 INJECTION, POWDER, LYOPHILIZED, FOR SOLUTION INTRAVENOUS at 13:24

## 2025-02-02 RX ADMIN — MIDAZOLAM 0.5 MG: 1 INJECTION INTRAMUSCULAR; INTRAVENOUS at 15:00

## 2025-02-02 RX ADMIN — FENTANYL CITRATE 25 MCG: 50 INJECTION INTRAMUSCULAR; INTRAVENOUS at 14:47

## 2025-02-02 RX ADMIN — CLOPIDOGREL BISULFATE 300 MG: 75 TABLET ORAL at 17:08

## 2025-02-02 RX ADMIN — ONDANSETRON 4 MG: 2 INJECTION INTRAMUSCULAR; INTRAVENOUS at 17:23

## 2025-02-02 RX ADMIN — DIAZEPAM 2.5 MG: 10 INJECTION, SOLUTION INTRAMUSCULAR; INTRAVENOUS at 14:41

## 2025-02-02 RX ADMIN — METHOCARBAMOL 500 MG: 500 TABLET ORAL at 11:27

## 2025-02-02 RX ADMIN — Medication 10 ML: at 15:02

## 2025-02-02 RX ADMIN — ACETAMINOPHEN 975 MG: 325 TABLET, FILM COATED ORAL at 11:26

## 2025-02-02 RX ADMIN — IODIXANOL 70 ML: 320 INJECTION, SOLUTION INTRAVASCULAR at 15:41

## 2025-02-02 RX ADMIN — INSULIN GLARGINE 10 UNITS: 100 INJECTION, SOLUTION SUBCUTANEOUS at 22:50

## 2025-02-02 RX ADMIN — ONDANSETRON 4 MG: 2 INJECTION INTRAMUSCULAR; INTRAVENOUS at 08:39

## 2025-02-02 RX ADMIN — CHLORHEXIDINE GLUCONATE 0.12% ORAL RINSE 15 ML: 1.2 LIQUID ORAL at 22:50

## 2025-02-02 RX ADMIN — GABAPENTIN 300 MG: 300 CAPSULE ORAL at 13:23

## 2025-02-02 RX ADMIN — METHOCARBAMOL 750 MG: 750 TABLET ORAL at 17:08

## 2025-02-02 RX ADMIN — HYDROMORPHONE HYDROCHLORIDE 0.5 MG: 1 INJECTION, SOLUTION INTRAMUSCULAR; INTRAVENOUS; SUBCUTANEOUS at 08:38

## 2025-02-02 RX ADMIN — METHOCARBAMOL 500 MG: 500 TABLET ORAL at 06:17

## 2025-02-02 RX ADMIN — HEPARIN SODIUM 1000 UNITS: 1000 INJECTION INTRAVENOUS; SUBCUTANEOUS at 09:03

## 2025-02-02 RX ADMIN — INSULIN LISPRO 1 UNITS: 100 INJECTION, SOLUTION INTRAVENOUS; SUBCUTANEOUS at 13:03

## 2025-02-02 RX ADMIN — FENTANYL CITRATE 25 MCG: 50 INJECTION INTRAMUSCULAR; INTRAVENOUS at 14:21

## 2025-02-02 RX ADMIN — HEPARIN SODIUM 1000 UNITS: 1000 INJECTION INTRAVENOUS; SUBCUTANEOUS at 00:03

## 2025-02-02 RX ADMIN — ACETAMINOPHEN 975 MG: 325 TABLET, FILM COATED ORAL at 06:17

## 2025-02-02 RX ADMIN — INSULIN LISPRO 1 UNITS: 100 INJECTION, SOLUTION INTRAVENOUS; SUBCUTANEOUS at 06:28

## 2025-02-02 RX ADMIN — FENTANYL CITRATE 25 MCG: 50 INJECTION INTRAMUSCULAR; INTRAVENOUS at 14:28

## 2025-02-02 RX ADMIN — DIAZEPAM 5 MG: 10 INJECTION, SOLUTION INTRAMUSCULAR; INTRAVENOUS at 22:49

## 2025-02-02 RX ADMIN — VANCOMYCIN HYDROCHLORIDE 750 MG: 5 INJECTION, POWDER, LYOPHILIZED, FOR SOLUTION INTRAVENOUS at 13:23

## 2025-02-02 RX ADMIN — ATORVASTATIN CALCIUM 80 MG: 80 TABLET, FILM COATED ORAL at 17:08

## 2025-02-02 RX ADMIN — ASPIRIN 81 MG: 81 TABLET, COATED ORAL at 08:39

## 2025-02-02 RX ADMIN — GABAPENTIN 600 MG: 300 CAPSULE ORAL at 22:50

## 2025-02-02 RX ADMIN — ACETAMINOPHEN 975 MG: 325 TABLET, FILM COATED ORAL at 17:08

## 2025-02-02 RX ADMIN — ALTEPLASE 1 MG/HR: 2.2 INJECTION, POWDER, LYOPHILIZED, FOR SOLUTION INTRAVENOUS at 06:19

## 2025-02-02 RX ADMIN — MIDAZOLAM 1 MG: 1 INJECTION INTRAMUSCULAR; INTRAVENOUS at 15:06

## 2025-02-02 RX ADMIN — ALTEPLASE 1 MG/HR: 2.2 INJECTION, POWDER, LYOPHILIZED, FOR SOLUTION INTRAVENOUS at 13:02

## 2025-02-02 RX ADMIN — HEPARIN SODIUM 700 UNITS/HR: 10000 INJECTION, SOLUTION INTRAVENOUS at 02:14

## 2025-02-02 RX ADMIN — FENTANYL CITRATE 50 MCG: 50 INJECTION INTRAMUSCULAR; INTRAVENOUS at 14:58

## 2025-02-02 RX ADMIN — FENTANYL CITRATE 25 MCG: 50 INJECTION INTRAMUSCULAR; INTRAVENOUS at 14:41

## 2025-02-02 RX ADMIN — CHLORHEXIDINE GLUCONATE 0.12% ORAL RINSE 15 ML: 1.2 LIQUID ORAL at 08:39

## 2025-02-02 RX ADMIN — HEPARIN SODIUM 18 UNITS/KG/HR: 10000 INJECTION, SOLUTION INTRAVENOUS at 17:23

## 2025-02-02 RX ADMIN — ACETAMINOPHEN 975 MG: 325 TABLET, FILM COATED ORAL at 00:04

## 2025-02-02 RX ADMIN — MIDAZOLAM 0.5 MG: 1 INJECTION INTRAMUSCULAR; INTRAVENOUS at 14:47

## 2025-02-02 NOTE — PROGRESS NOTES
Progress Note - Trauma   Name: Lizzy Acuna 49 y.o. female I MRN: [id number]  Unit/Bed#: [facility] 514-01 I Date of Admission: 1/31/2025   Date of Service: 2/2/2025 I Hospital Day: 2       Critical Care Interval Transfer Note:    Brief Hospital Summary:   Lizzy Acuna is a 49 y.o. with a PMHx of HTN, HLD, PAD, and GERD who [place] [facility] [place] her podiatrist for a LLE soft tissue infection. The patient underwent an elective TMA approx 3 weeks ago and was doing well until last week when she was noticed to have dehiscence of her surgical wound. The wound was debrided by her podiatrist in the office but ultimately worsened with evidence of cellulitis. The patient was transferred to [facility] for vascular surgery and possible IR intervention after ultrasounds showed greater than 75% stenosis of the SFA. The patient was seen by vascular and IR and thrombectomy was performed and lysis catheters placed in the LLE 1/31. Patient was seen by APS for pain control and placed on dilaudid PCA. The patient was seen by Podiatry given recent TMA with recs for local wound care and eventual plans for VAC or possible DPC. The patient is being followed by ID due to LLE wound infection and remains on vanco. Patient underwent lysis recheck on 2/1 and lysis catheters remained in place. Recheck on 2/2 revealed patent Left SFA, popliteal, tibioperoneal trunk, and anterior tibial arteries.Lysis catheters were removed.     Barriers to discharge:   LLE CLTI- Vascular following- on heparin drip, asa, and plavix.   LLE TMA w/ dehiscence- podiatry following- will need wound care plan.  ID following- currently on vancomycin. Appreciate pharmacy dosing.      Consults: IP CONSULT TO PHARMACY  IP CONSULT TO INFECTIOUS DISEASES  IP CONSULT TO VASCULAR SURGERY  INPATIENT CONSULT TO IR  IP CONSULT TO INFECTIOUS DISEASES  IP CONSULT TO ACUTE PAIN SERVICE  IP CONSULT TO PODIATRY      Ramos Plan: Ramos to be removed when bed rest is  over  Central access plan: Patient requires PICC secondary to IV ABX, recent lysis        Patient seen and evaluated by Critical Care today and deemed to be appropriate for transfer to Med Surg. Spoke to Dr. Marline Caraballo from [facility] to accept transfer. Critical care can be contacted via SecureChat with any questions or concerns.

## 2025-02-02 NOTE — PROGRESS NOTES
Progress Note - Infectious Disease   Name: Lizzy Acuna 49 y.o. female I MRN: [id number]  Unit/Bed#: [facility] 514-01 I Date of Admission: 1/31/2025   Date of Service: 2/2/2025 I Hospital Day: 2    Assessment & Plan  Sepsis due to cellulitis (HCC)  In the setting of a left forefoot abscess.  Clinically improved and seems to be tolerating the antibiotics without difficulty.  Patient remained stable.  -IV antibiotics as below  -Recheck CBC with differential and BMP to make sure no developing toxicities  -Source control measures as below  -Supportive care  Cellulitis of left foot  Complicated by large abscess.  Patient underwent I&D on 1/29/2025 with a large abscess found with purulent fluid and necrotic tissue drained and debrided.  Wound cultures are growing MRSA and Finegoldia.  MRI and operative findings not consistent with osteomyelitis.  -Continue intravenous vancomycin  -Pharmacy follow-up for vancomycin dose management  -Reconsult podiatry to assist with management.  -Local wound care  -Serial exams  PAD (peripheral artery disease) (HCC)  Status post angiogram with intervention including left SFA thrombectomy and placement of lysis catheter on 1/31/2025.  Seems to have much improved perfusion.  -Ongoing anticoagulation  -Close vascular follow-up  Obesity (BMI 30.0-34.9)  As a risk factor for recurrent infection.  May also affect antibiotic dosing.  Diabetes mellitus type 2 with complications (HCC)  Poor control with a hemoglobin A1c of 10.3.  Risk factor for recurrent infection.  -Tighten diabetic control    I have discussed with the primary service the above plan to to continue the intravenous vancomycin.  The primary service agrees with the plan.    Antibiotics:  Vancomycin 6  Postop day 4    Subjective   Patient has no fever, chills, sweats; no nausea, vomiting, diarrhea; no cough, shortness of breath; no increased pain. No new symptoms.    Objective :  Temp:  [98.2 °F (36.8 °C)-99.9 °F (37.7 °C)] 99.5 °F (37.5  °C)  HR:  [] 93  BP: ()/(54-88) 129/72  Resp:  [9-29] 16  SpO2:  [96 %-100 %] 100 %  O2 Device: Nasal cannula  Nasal Cannula O2 Flow Rate (L/min):  [4 L/min] 4 L/min    General:  No acute distress  Psychiatric:  Awake and alert  Pulmonary:  Normal respiratory excursion without accessory muscle use  Abdomen:  Soft, nontender  Extremities: Left foot dressed with a dry dressing in place.  Skin:  No rashes      Lab Results: I have reviewed the following results:  Results from last 7 days   Lab Units 02/02/25  0344 02/02/25  0013 02/01/25  1720   WBC Thousand/uL 10.91* 10.35* 9.79   HEMOGLOBIN g/dL 6.2* 6.3* 7.0*   PLATELETS Thousands/uL 184 184 164     Results from last 7 days   Lab Units 02/02/25  0344 02/01/25  0558 01/31/25  2231 01/29/25  0530 01/28/25  1820   SODIUM mmol/L 132* 131* 134*   < > 129*   POTASSIUM mmol/L 4.1 4.1 4.1   < > 4.0   CHLORIDE mmol/L 99 99 102   < > 91*   CO2 mmol/L 28 23 24   < > 25   BUN mg/dL 13 13 12   < > 20   CREATININE mg/dL 0.70 0.56* 0.57*   < > 0.88   EGFR ml/min/1.73sq m 102 110 109   < > 77   CALCIUM mg/dL 7.9* 8.2* 9.1   < > 9.9   AST U/L  --   --  14  --  14   ALT U/L  --   --  13  --  20   ALK PHOS U/L  --   --  110*  --  106*   ALBUMIN g/dL  --   --  3.3*  --  3.8    < > = values in this interval not displayed.     Results from last 7 days   Lab Units 01/29/25  1507 01/28/25  1820   BLOOD CULTURE   --  No Growth After 4 Days.  No Growth After 4 Days.   GRAM STAIN RESULT  Rare Gram positive cocci in pairs*  No polys seen*  --      Results from last 7 days   Lab Units 01/29/25  0530 01/28/25  1820   PROCALCITONIN ng/ml 0.25 0.22     Results from last 7 days   Lab Units 01/28/25  1820   CRP mg/L 95.2*

## 2025-02-02 NOTE — PLAN OF CARE
Problem: PAIN - ADULT  Goal: Verbalizes/displays adequate comfort level or baseline comfort level  Description: Interventions:  - Encourage patient to monitor pain and request assistance  - Assess pain using appropriate pain scale  - Administer analgesics based on type and severity of pain and evaluate response  - Implement non-pharmacological measures as appropriate and evaluate response  - Consider cultural and social influences on pain and pain management  - Notify physician/advanced practitioner if interventions unsuccessful or patient reports new pain  Outcome: Progressing     Problem: INFECTION - ADULT  Goal: Absence or prevention of progression during hospitalization  Description: INTERVENTIONS:  - Assess and monitor for signs and symptoms of infection  - Monitor lab/diagnostic results  - Monitor all insertion sites, i.e. indwelling lines, tubes, and drains  - Monitor endotracheal if appropriate and nasal secretions for changes in amount and color  - [place] appropriate cooling/warming therapies per order  - Administer medications as ordered  - Instruct and encourage patient and family to use good hand hygiene technique  - Identify and instruct in appropriate isolation precautions for identified infection/condition  Outcome: Progressing  Goal: Absence of fever/infection during neutropenic period  Description: INTERVENTIONS:  - Monitor WBC    Outcome: Progressing     Problem: SAFETY ADULT  Goal: Patient will remain free of falls  Description: INTERVENTIONS:  - Educate patient/family on patient safety including physical limitations  - Instruct patient to call for assistance with activity   - Consult OT/PT to assist with strengthening/mobility   - Keep Call bell within reach  - Keep bed low and locked with side rails adjusted as appropriate  - Keep care items and personal belongings within reach  - Initiate and maintain comfort rounds  - Make Fall Risk Sign visible to staff  - Apply yellow socks and bracelet  for high fall risk patients  - Consider moving patient to room near nurses station  Outcome: Progressing  Goal: Maintain or return to baseline ADL function  Description: INTERVENTIONS:  -  Assess patient's ability to carry out ADLs; assess patient's baseline for ADL function and identify physical deficits which impact ability to perform ADLs (bathing, care of mouth/teeth, toileting, grooming, dressing, etc.)  - Assess/evaluate cause of self-care deficits   - Assess range of motion  - Assess patient's mobility; develop plan if impaired  - Assess patient's need for assistive devices and provide as appropriate  - Encourage maximum independence but intervene and supervise when necessary  - Involve family in performance of ADLs  - Assess for home care needs following discharge   - Consider OT consult to assist with ADL evaluation and planning for discharge  - Provide patient education as appropriate  Outcome: Progressing  Goal: Maintains/Returns to pre admission functional level  Description: INTERVENTIONS:  - Perform AM-PAC 6 Click Basic Mobility/ Daily Activity assessment daily.  - Set and communicate daily mobility goal to care team and patient/family/caregiver.   - Collaborate with rehabilitation services on mobility goals if consulted  - Out of bed for toileting  - Record patient progress and toleration of activity level   Outcome: Progressing     Problem: DISCHARGE PLANNING  Goal: Discharge to home or other facility with appropriate resources  Description: INTERVENTIONS:  - Identify barriers to discharge w/patient and caregiver  - Arrange for needed discharge resources and transportation as appropriate  - Identify discharge learning needs (meds, wound care, etc.)  - Arrange for interpretive services to assist at discharge as needed  - Refer to Case Management Department for coordinating discharge planning if the patient needs post-hospital services based on physician/advanced practitioner order or complex needs  related to functional status, cognitive ability, or social support system  Outcome: Progressing     Problem: Knowledge Deficit  Goal: Patient/family/caregiver demonstrates understanding of disease process, treatment plan, medications, and discharge instructions  Description: Complete learning assessment and assess knowledge base.  Interventions:  - Provide teaching at level of understanding  - Provide teaching via preferred learning methods  Outcome: Progressing     Problem: NEUROSENSORY - ADULT  Goal: Achieves stable or improved neurological status  Description: INTERVENTIONS  - Monitor and report changes in neurological status  - Monitor vital signs such as temperature, blood pressure, glucose, and any other labs ordered   - Initiate measures to prevent increased intracranial pressure  - Monitor for seizure activity and implement precautions if appropriate      Outcome: Progressing  Goal: Remains free of injury related to seizures activity  Description: INTERVENTIONS  - Maintain airway, patient safety  and administer oxygen as ordered  - Monitor patient for seizure activity, document and report duration and description of seizure to physician/advanced practitioner  - If seizure occurs,  ensure patient safety during seizure  - Reorient patient post seizure  - Seizure pads on all 4 side rails  - Instruct patient/family to notify RN of any seizure activity including if an aura is experienced  - Instruct patient/family to call for assistance with activity based on nursing assessment  - Administer anti-seizure medications if ordered    Outcome: Progressing  Goal: Achieves maximal functionality and self care  Description: INTERVENTIONS  - Monitor swallowing and airway patency with patient fatigue and changes in neurological status  - Encourage and assist patient to increase activity and self care.   - Encourage visually impaired, hearing impaired and aphasic patients to use assistive/communication devices  Outcome:  Progressing     Problem: CARDIOVASCULAR - ADULT  Goal: Maintains optimal cardiac output and hemodynamic stability  Description: INTERVENTIONS:  - Monitor I/O, vital signs and rhythm  - Monitor for S/S and trends of decreased cardiac output  - Administer and titrate ordered vasoactive medications to optimize hemodynamic stability  - Assess quality of pulses, skin color and temperature  - Assess for signs of decreased coronary artery perfusion  - Instruct patient to report change in severity of symptoms  Outcome: Progressing  Goal: Absence of cardiac dysrhythmias or at baseline rhythm  Description: INTERVENTIONS:  - Continuous cardiac monitoring, vital signs, obtain 12 lead EKG if ordered  - Administer antiarrhythmic and heart rate control medications as ordered  - Monitor electrolytes and administer replacement therapy as ordered  Outcome: Progressing     Problem: RESPIRATORY - ADULT  Goal: Achieves optimal ventilation and oxygenation  Description: INTERVENTIONS:  - Assess for changes in respiratory status  - Assess for changes in mentation and behavior  - Position to facilitate oxygenation and minimize respiratory effort  - Oxygen administered by appropriate delivery if ordered  - Initiate smoking cessation education as indicated  - Encourage broncho-pulmonary hygiene including cough, deep breathe, Incentive Spirometry  - Assess the need for suctioning and aspirate as needed  - Assess and instruct to report SOB or any respiratory difficulty  - Respiratory Therapy support as indicated  Outcome: Progressing     Problem: GASTROINTESTINAL - ADULT  Goal: Minimal or absence of nausea and/or vomiting  Description: INTERVENTIONS:  - Administer IV fluids if ordered to ensure adequate hydration  - Maintain NPO status until nausea and vomiting are resolved  - Nasogastric tube if ordered  - Administer ordered antiemetic medications as needed  - Provide nonpharmacologic comfort measures as appropriate  - Advance diet as  tolerated, if ordered  - Consider nutrition services referral to assist patient with adequate nutrition and appropriate food choices  Outcome: Progressing  Goal: Maintains or returns to baseline bowel function  Description: INTERVENTIONS:  - Assess bowel function  - Encourage oral fluids to ensure adequate hydration  - Administer IV fluids if ordered to ensure adequate hydration  - Administer ordered medications as needed  - Encourage mobilization and activity  - Consider nutritional services referral to assist patient with adequate nutrition and appropriate food choices  Outcome: Progressing  Goal: Maintains adequate nutritional intake  Description: INTERVENTIONS:  - Monitor percentage of each meal consumed  - Identify factors contributing to decreased intake, treat as appropriate  - Assist with meals as needed  - Monitor I&O, weight, and lab values if indicated  - Obtain nutrition services referral as needed  Outcome: Progressing  Goal: Establish and maintain optimal ostomy function  Description: INTERVENTIONS:  - Assess bowel function  - Encourage oral fluids to ensure adequate hydration  - Administer IV fluids if ordered to ensure adequate hydration   - Administer ordered medications as needed  - Encourage mobilization and activity  - Nutrition services referral to assist patient with appropriate food choices  - Assess stoma site  - Consider wound care consult   Outcome: Progressing  Goal: Oral mucous membranes remain intact  Description: INTERVENTIONS  - Assess oral mucosa and hygiene practices  - Implement preventative oral hygiene regimen  - Implement oral medicated treatments as ordered  - Initiate Nutrition services referral as needed  Outcome: Progressing     Problem: GENITOURINARY - ADULT  Goal: Maintains or returns to baseline urinary function  Description: INTERVENTIONS:  - Assess urinary function  - Encourage oral fluids to ensure adequate hydration if ordered  - Administer IV fluids as ordered to  ensure adequate hydration  - Administer ordered medications as needed  - Offer frequent toileting  - Follow urinary retention protocol if ordered  Outcome: Progressing  Goal: Absence of urinary retention  Description: INTERVENTIONS:  - Assess patient’s ability to void and empty bladder  - Monitor I/O  - Bladder scan as needed  - Discuss with physician/AP medications to alleviate retention as needed  - Discuss catheterization for long term situations as appropriate  Outcome: Progressing  Goal: Urinary catheter remains patent  Description: INTERVENTIONS:  - Assess patency of urinary catheter  - If patient has a chronic negron, consider changing catheter if non-functioning  - Follow guidelines for intermittent irrigation of non-functioning urinary catheter  Outcome: Progressing     Problem: METABOLIC, FLUID AND ELECTROLYTES - ADULT  Goal: Electrolytes maintained within normal limits  Description: INTERVENTIONS:  - Monitor labs and assess patient for signs and symptoms of electrolyte imbalances  - Administer electrolyte replacement as ordered  - Monitor response to electrolyte replacements, including repeat lab results as appropriate  - Instruct patient on fluid and nutrition as appropriate  Outcome: Progressing  Goal: Fluid balance maintained  Description: INTERVENTIONS:  - Monitor labs   - Monitor I/O and WT  - Instruct patient on fluid and nutrition as appropriate  - Assess for signs & symptoms of volume excess or deficit  Outcome: Progressing  Goal: Glucose maintained within target range  Description: INTERVENTIONS:  - Monitor Blood Glucose as ordered  - Assess for signs and symptoms of hyperglycemia and hypoglycemia  - Administer ordered medications to maintain glucose within target range  - Assess nutritional intake and initiate nutrition service referral as needed  Outcome: Progressing     Problem: SKIN/TISSUE INTEGRITY - ADULT  Goal: Skin Integrity remains intact(Skin Breakdown Prevention)  Description:  Assess:  -Inspect skin when repositioning, toileting, and assisting with ADLS  -Assess extremities for adequate circulation and sensation     Bed Management:  -Have minimal linens on bed & keep smooth, unwrinkled  -Change linens as needed when moist or perspiring    Toileting:  -Offer bedside commode    Activity:    -Encourage activity and walks on unit  -Encourage or provide ROM exercises   -Use appropriate equipment to lift or move patient in bed  Skin Care:  -Avoid use of baby powder, tape, friction and shearing, hot water or constrictive clothing  -Do not massage red bony areas  Outcome: Progressing  Goal: Incision(s), wounds(s) or drain site(s) healing without S/S of infection  Description: INTERVENTIONS  - Assess and document dressing, incision, wound bed, drain sites and surrounding tissue  - Provide patient and family education  Outcome: Progressing  Goal: Pressure injury heals and does not worsen  Description: Interventions:  - Implement low air loss mattress or specialty surface (Criteria met)  - Apply silicone foam dressing  - Apply fecal or urinary incontinence containment device   - Utilize friction reducing device or surface for transfers   - Consider nutrition services referral as needed  Outcome: Progressing     Problem: HEMATOLOGIC - ADULT  Goal: Maintains hematologic stability  Description: INTERVENTIONS  - Assess for signs and symptoms of bleeding or hemorrhage  - Monitor labs  - Administer supportive blood products/factors as ordered and appropriate  Outcome: Progressing     Problem: MUSCULOSKELETAL - ADULT  Goal: Maintain or return mobility to safest level of function  Description: INTERVENTIONS:  - Assess patient's ability to carry out ADLs; assess patient's baseline for ADL function and identify physical deficits which impact ability to perform ADLs (bathing, care of mouth/teeth, toileting, grooming, dressing, etc.)  - Assess/evaluate cause of self-care deficits   - Assess range of motion  -  Assess patient's mobility  - Assess patient's need for assistive devices and provide as appropriate  - Encourage maximum independence but intervene and supervise when necessary  - Involve family in performance of ADLs  - Assess for home care needs following discharge   - Consider OT consult to assist with ADL evaluation and planning for discharge  - Provide patient education as appropriate  Outcome: Progressing  Goal: Maintain proper alignment of affected body part  Description: INTERVENTIONS:  - Support, maintain and protect limb and body alignment  - Provide patient/ family with appropriate education  Outcome: Progressing     Problem: Prexisting or High Potential for Compromised Skin Integrity  Goal: Skin integrity is maintained or improved  Description: INTERVENTIONS:  - Identify patients at risk for skin breakdown  - Assess and monitor skin integrity  - Assess and monitor nutrition and hydration status  - Monitor labs   - Assess for incontinence   - Turn and reposition patient  - Assist with mobility/ambulation  - Relieve pressure over bony prominences  - Avoid friction and shearing  - Provide appropriate hygiene as needed including keeping skin clean and dry  - Evaluate need for skin moisturizer/barrier cream  - Collaborate with interdisciplinary team   - Patient/family teaching  - Consider wound care consult   Outcome: Progressing

## 2025-02-02 NOTE — SEDATION DOCUMENTATION
Pt in IR for TPA Lysis check procedure performed by Dr. Davidson. Pt tolerated procedure well. TPA therapy treatment completed with angioplasty interventions of LEFT lower limb. Right groin closed with . IR bedrest 2 hours start time 1500. Report given to CCU RN. Pt transported back to CCU floor.

## 2025-02-02 NOTE — QUICK NOTE
"Post-Procedure Note - Vascular Surgery   Lizzy Acuna 49 y.o. female MRN: [id number]  Unit/Bed#: [facility] 514-01 Encounter: [id number]    ASSESSMENT:  Lizzy Acuna is a 49 y.o. female who is status post S/P Agram, Left SFA thrombectomy, placement of lysis catheter 1/31/25    - S/P lysis recheck today, Residual thrombus noted within left SFA, lysis restarted and plan for recheck 2/2/25    Subjective:    Patient reporting pain in LLE, controlled with medication.    Physical Exam:  GEN: NAD  CV: RRR  Lung: Normal effort  Ab: Soft, NT/ND  Neuro: A+Ox3  Incisions: Rt groin access site is clean, dry and intact. Lt foot is slightly cool to touch. Dressing over left TMA site noted to be moderately saturated. Peroneal signal obtained with doppler. S/M intact.     Blood pressure 120/58, pulse 101, temperature 99 °F (37.2 °C), resp. rate 21, height 5' 1.2\" (1.554 m), weight 87.5 kg (192 lb 14.4 oz), last menstrual period 12/01/2023, SpO2 100%, not currently breastfeeding.,Body mass index is 36.21 kg/m².      Intake/Output Summary (Last 24 hours) at 2/1/2025 2006  Last data filed at 2/1/2025 1801  Gross per 24 hour   Intake 1693.85 ml   Output 1825 ml   Net -131.15 ml       Invasive Devices       Peripherally Inserted Central Catheter Line  Duration             PICC Line 01/31/25 1 day              Peripheral Intravenous Line  Duration             Peripheral IV Right;Ventral (anterior);Medial Forearm -- days    Peripheral IV 01/31/25 Distal;Right;Ventral (anterior) Forearm 1 day              Line  Duration             Arterial Sheath 7 Fr. Right Femoral <1 day              Drain  Duration             Urethral Catheter Temperature probe <1 day                    VTE Pharmacologic Prophylaxis: Heparin            "

## 2025-02-02 NOTE — BRIEF OP NOTE (RAD/CATH)
INTERVENTIONAL RADIOLOGY PROCEDURE NOTE    Date: 2/2/2025    Procedure: LLE lysis check  Procedure Summary       Date:  Room / Location:     Anesthesia Start:  Anesthesia Stop:     Procedure:  Diagnosis:     Scheduled Providers:  Responsible Provider:     Anesthesia Type: Not recorded ASA Status: Not recorded            Preoperative diagnosis:   1. PAD (peripheral artery disease) (HCC)    2. Diabetic foot ulcer (HCC)    3. Sepsis due to cellulitis (HCC)    4. Cellulitis of left foot    5. Diabetic foot infection  (HCC)    6. Dehiscence of operative wound, initial encounter         Postoperative diagnosis: Same.    Surgeon: Yamil Davidson MD     Assistant: None. No qualified resident was available.    Blood loss: 10 mL    Specimens: None     Findings:   Left SFA, popliteal, tibioperoneal trunk, and anterior tibial arteries were patent.  Mild residual stenoses along left SFA and popliteal artery treated with 4 mm balloon angioplasty.  Mild stenoses along left AT treated with 2.5 mm and 3 mm balloon angioplasty.  Left posterior tibial and peroneal arteries were chronically occluded in the distal segments.  TPA discontinued.  Right groin closure with Perclose.    Complications: None immediate.    Anesthesia: conscious sedation and local            Vascular Quality Initiative - Peripheral Vascular Intervention     Urgency: Urgent    Functional Status:  Ambulatory and capable of all self care but unable to carry out any work activities. Up and about more than 50% of waking hours.   Ambulation: Amb w/ assistance = ambulation requires use of cane, walker, person, etc    Leg Symptoms    Right: Asymptomatic:  documented peripheral arterial disease without symptoms of claudication or ischemic pain      Treatment of Native Artery to Maintain Bypass Patency?:  No  Left: Ischemic Rest Pain    COVID Information  COVID Symptoms Pre-Procedure: Asymptomatic    Treatment Delayed by Pandemic: None    Access   Number of Sites: 1     Access  Site 1:     Side 1: Right    Site 1: Femoral Retro to Antegrade    Access Guidance 1:U/S    Largest Sheath Size 1: 7 Fr.    Closure Device 1: Perclose      Number of Closure Devices: 1     Closure Device Outcome: Closure device successful         Procedure  Fluoro Time: 44.43 minutes  Contrast Volume: Visipaque 220 ml  DAP: 463 mGy  CO2: no  Anticoagulant: Heparin  Protamine: No  If Creatinine is > 1.2 or missing, ELLIOT Prophylaxis none     Treatment Details  Indication: Occlusive Disease,    Completion Assessment  Artery 1 treated: SFA        Left               Outflow: AT,PT,Peroneal: 1                  Was this Site previously treated?: Yes, Stent          TASC Grade: B          Total Treated Length: 20 cm          Total Occluded Length: 20 cm          Calcification: None (no calcification visible on fluoroscopic, CT or IVUS imaging)          Number of Treatment types (Devices):   2           Device 1          Treatment Type: Plain Balloon         Device 2          Treatment Type: Special Balloon,  Drug Coated Balloon                Diameter: 4 mm          Length: 150 mm, 100 mm          Concomitant: Thrombolysis, Pharmacologic; Aspiration thrombecotmy   Planned, Current Procedure          Technical result: Successful (stenosis <=30%)      Artery 2 treated: Popliteal   Left               Outflow: AT,PT,Peroneal: 1             Segments treated: P1+P2+P3                       Was this Site previously treated?: No          TASC Grade: A          Total Treated Length: 15 cm           Total Occluded Length: 0 cm          Calcification: None (no calcification visible on fluoroscopic, CT or IVUS imaging)          Number of Treatment types (Devices):   1           Device 1          Treatment Type: Plain Balloon          Concomitant: None          Technical result: Successful (stenosis <=30%)      Artery 3 treated: Ant Tibial   Left                    Was this Site previously treated?: No          TASC Grade: A           Total Treated Length: 20 cm           Total Occluded Length: 0 cm          Calcification: None (no calcification visible on fluoroscopic, CT or IVUS imaging)          Number of Treatment types (Devices):   1           Device 1          Treatment Type: Plain Balloon          Concomitant: None          Technical result: Successful (stenosis <=30%)      None     Post Procedure  Patient currently taking: Statin, Yes      Antiplatelet Medication, Yes    Procedure Complications: No

## 2025-02-02 NOTE — ASSESSMENT & PLAN NOTE
Chronic euvolemic hyponatremia that was 133 on admission. Asymptomatic and no recent history of confusion, altered metal status, seizure, or coma.     Current differential includes SIADH in the setting of acute pain vs hypothyroidism     Plan   - Monitor BMP  - Low threshold to workup with serum and urine studies if hyponatremia worsens     - Hold off on ordering TSH in the acute setting because possible confounding via acute illness

## 2025-02-02 NOTE — CONSULTS
Consultation - Acute Pain   Name: Lizzy Acuna 49 y.o. female I MRN: [id number]  Unit/Bed#: [facility] 514-01 I Date of Admission: 1/31/2025   Date of Service: 2/2/2025 I Hospital Day: 2   Inpatient consult to Acute Pain Service  Consult performed by: Ankur Gallo MD  Consult ordered by: Cuauhtemoc Hilliard DO        Physician Requesting Evaluation: Mary Fonseca DO   Reason for Evaluation / Principal Problem: Ischemic limb pain     Assessment & Plan  Sepsis due to cellulitis (HCC)    Obesity (BMI 30.0-34.9)    Diabetes mellitus type 2 with complications (HCC)  Lab Results   Component Value Date    HGBA1C 10.3 (H) 12/13/2024       Recent Labs     02/01/25  1719 02/02/25  0007 02/02/25  0627 02/02/25  1147   POCGLU 163* 147* 156* 153*       Blood Sugar Average: Last 72 hrs:  (P) 151.8963990258794391    Hyponatremia    Cellulitis of left foot    Diabetic foot infection  (HCC)  Lab Results   Component Value Date    HGBA1C 10.3 (H) 12/13/2024       Recent Labs     02/01/25  1719 02/02/25  0007 02/02/25  0627 02/02/25  1147   POCGLU 163* 147* 156* 153*       Blood Sugar Average: Last 72 hrs:  (P) [id number]    PAD (peripheral artery disease) (HCC)  Lt. SFA thrombus s/p thrombectomy on 1/31/25 with residual thrombus so initiated on thrombolysis with TPA s/p lysis check and balloon angioplasty on 2/1/25 with plan for lysis recheck on 12/2/25     Continue multimodal Analgesia:   - IV dilaudid PCA @ 0/0.2/5/2.2, will consider switching to oral dilaudid once patient is done with interventions and can tolerate diet (oxy has not been effective in the past)   - Continue IV dilaudid 0.5mg Q3H for breakthrough pain   - Increase gabapentin to home dose 300mg BID + 600mg HS   - Increase po robaxin to 750mg Q6H for muscle spasms   - Start IV valium 5mg Q8H prn for muscle spasms   - Continue po tylenol 975mg Q6H         APS will continue to follow. Please contact Acute Pain Service - via SecureChat from [phone] with  additional questions or concerns. See Marco A or Antonieta for additional contacts and after hours information.     History of Present Illness    HPI: Lizzy Acuna is a 49 y.o. year old female who presents with h/o DM, obesity, PAD s/p Lt foot TMA c/b cellulitis and Lt. SFA thrombus s/p thrombectomy on 1/31/25 with residual thrombus so initiated on thrombolysis with TPA s/p lysis check and balloon angioplasty on 2/1/25 with plan for lysis recheck on 12/2/25. APS team consulted for Lt. Leg pain.     Current pain location(s): Pain Score: 9  Pain Location/Orientation: Orientation: Left, Location: Foot  Pain Scale: Pain Assessment Tool: 0-10  Current Analgesic regimen:  Dilaudid PCA, po tylenol, gabapentin, po robaxin, IV dilaudid prn     Pain History: Recent prescription for po dilaudid 4mg # 30 for 30days   Pain Management Physician:  Dr. Leopoldo Ritchie (Podiatry)   I have reviewed the patient's controlled substance dispensing history in the Prescription Drug Monitoring Program in compliance with the [facility] regulations before prescribing any controlled substances.     Review of Systems  I have reviewed the patient's PMH, PSH, Social History, Family History, Meds, and Allergies    Objective :  Temp:  [98.2 °F (36.8 °C)-99.9 °F (37.7 °C)] 99.5 °F (37.5 °C)  HR:  [] 86  BP: ()/(54-87) 133/63  Resp:  [9-29] 12  SpO2:  [96 %-100 %] 99 %  O2 Device: Nasal cannula  Nasal Cannula O2 Flow Rate (L/min):  [4 L/min] 4 L/min    Physical Exam  Vitals and nursing note reviewed.   Constitutional:       General: She is in acute distress.      Appearance: Normal appearance. She is obese.   HENT:      Head: Normocephalic and atraumatic.      Nose: Nose normal.      Mouth/Throat:      Mouth: Mucous membranes are moist.   Eyes:      Conjunctiva/sclera: Conjunctivae normal.   Cardiovascular:      Rate and Rhythm: Normal rate.   Pulmonary:      Effort: Pulmonary effort is normal.   Musculoskeletal:         General: Tenderness  present.      Cervical back: Normal range of motion.      Left lower leg: Edema present.      Comments: LLE wound dressing in place    Skin:     General: Skin is dry.   Neurological:      General: No focal deficit present.      Mental Status: She is alert and oriented to person, place, and time. Mental status is at baseline.   Psychiatric:      Comments: Anxious           Lab Results: I have reviewed the following results:  Estimated Creatinine Clearance: 99.3 mL/min (by C-G formula based on SCr of 0.7 mg/dL).  Lab Results   Component Value Date    WBC 10.91 (H) 02/02/2025    HGB 7.7 (L) 02/02/2025    HCT 24.5 (L) 02/02/2025     02/02/2025         Component Value Date/Time    K 4.1 02/02/2025 0344    CL 99 02/02/2025 0344    CO2 28 02/02/2025 0344    BUN 13 02/02/2025 0344    CREATININE 0.70 02/02/2025 0344         Component Value Date/Time    CALCIUM 7.9 (L) 02/02/2025 0344    ALKPHOS 110 (H) 01/31/2025 2231    AST 14 01/31/2025 2231    ALT 13 01/31/2025 2231    TP 7.2 01/31/2025 2231    ALB 3.3 (L) 01/31/2025 2231

## 2025-02-02 NOTE — CONSULTS
Podiatry - Consultation    Patient Information:   Lizzy Acuna 49 y.o. female MRN: [id number]  Unit/Bed#: [facility] 514-01 Encounter: [id number]  PCP: NAOMY Basilio  Date of Admission:  1/31/2025  Date of Consultation: 02/02/25  Requesting Physician: Mary Fonseca, DO      ASSESSMENT:    Lizzy Acuna is a 49 y.o. 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 Left TMA site noted to be stable, no acute signs of infection however significant oozing noted to amputation site.    Vascular surgery notes reviewed. Patient currently with LLE CLTI with ongoing lysis for SFA stent occlusion with lysis recheck today. Will follow up vascular surgery recommendations.   Patient to likely need long term wound vac application vs DPC pending vascular recs. At this time, given plantar TMA flap is not as robust as podiatry would like, patient is likely looking at long term wound vac applications with possible DPC once the wound bed has granulated in.    Infectious disease on board, continue IV antibiotics per their recommendations.  Continue local wound care consisting of packing, bolster dressing to the left TMA site.   Wound care instructions placed. Appreciate nursing assistance with dressing changes.   Elevation on green foam wedges or pillows when non-ambulatory  Rest of care per primary team.  Will discuss this plan with my attending and update as needed.    Weightbearing status: Non weightbearing to left lower extremity     SUBJECTIVE:    History of Present Illness:    Lizzy Acuna is a 49 y.o. female who is  "originally admitted 1/31/2025 due to need for vascular intervention. Patient has a past medical history of type 2 diabetes, obesity, hypertension, hyperlipidemia severe PAD.    We are consulted for patient's non healing left TMA. Patient was previously admitted to the [facility] but was transferred to [facility] for further vascular work up given CLTI to the LLE. Patient is currently undergoing lysis as her SFA stent has occluded.     Patient reports she is in immense pain to the lower extremities. Patient states she is known to Dr. Ritchie and previously had a TMA due to gangrene. About 3 weeks post operatively she reports the stitches were removed and she later developed an abscess and the TMA had to be opened again.     Patient denies nausea, vomiting, fever, chills, shortness of breath, chest pain.      Review of Systems:    Constitutional: Negative.    HENT: Negative.    Eyes: Negative.    Respiratory: Negative.    Cardiovascular: Negative.    Gastrointestinal: Negative.    Musculoskeletal: Left foot pain     Skin: Left non healing TMA site    Neurological: numbness and tingling bilateral feet    Psych: Negative.     Past Medical and Surgical History:     Past Medical History:   Diagnosis Date    Advanced maternal age in multigravida, second trimester 11/30/2016    Transitioned From: Advanced maternal age in multigravida, first trimester      Back pain     used 2 percocet tid until current admission in 2/2017    Bone spur     in back per pt    Chronic hypertension with superimposed preeclampsia 02/14/2017    Depression     Diabetes mellitus (HCC)     Elevated BP without diagnosis of hypertension     \"with pain\"    Foot injury     Full dentures     does not wear    GERD (gastroesophageal reflux disease)     occas    Gestational diabetes     insulin dependent    Herniated cervical disc     Herniated lumbar intervertebral disc     History of pneumonia     Hx of degenerative disc disease     Hyperlipidemia     " Obesity     Pre-eclampsia     Prior pregnancy with fetal demise     previous demise at 36 weeks    Toe pain     and foot pain       Past Surgical History:   Procedure Laterality Date    BACK SURGERY       SECTION      INCISION AND DRAINAGE OF WOUND Left 2025    Procedure: INCISION AND DRAINAGE (I&D) EXTREMITY;  Surgeon: Leopoldo Ritchie DPM;  Location: [facility];  Service: Podiatry    IR LOWER EXTREMITY ANGIOGRAM  2024    IR LOWER EXTREMITY ANGIOGRAM  2025    IR PICC PLACEMENT DOUBLE LUMEN  2025    IR TPA LYSIS CHECK  2025    LAMINECTOMY      with disc removal, last assessed 16    OTHER SURGICAL HISTORY      Right ovary removal 2005 per pt recall at [facility]    WA AMPUTATION FOOT TRANSMETARSAL Left 1/3/2025    Procedure: AMPUTATION TRANSMETATARSAL (TMA);  Surgeon: Leopoldo Ritchie DPM;  Location: [facility];  Service: Podiatry    WA  DELIVERY ONLY N/A 02/15/2017    Procedure:  SECTION ();  Surgeon: Tito Umaña MD;  Location: [facility];  Service: Obstetrics    WA LIG/TRNSXJ FALOPIAN TUBE  DEL/ABDML SURG Left 02/15/2017    Procedure: LIGATION/COAGULATION TUBAL;  Surgeon: Tito Umaña MD;  Location: [facility];  Service: Obstetrics    VASCULAR SURGERY  2024    WISDOM TOOTH EXTRACTION         Meds/Allergies:      Medications Prior to Admission:     acetaminophen (TYLENOL) 325 mg tablet    Alcohol Swabs 70 % PADS    aspirin (ECOTRIN LOW STRENGTH) 81 mg EC tablet    atorvastatin (LIPITOR) 80 mg tablet    Blood Glucose Monitoring Suppl (OneTouch Verio Reflect) w/Device KIT    diphenhydrAMINE (BENADRYL) 25 mg capsule    gabapentin (Neurontin) 300 mg capsule    glucose blood (OneTouch Verio) test strip    HYDROmorphone (DILAUDID) 4 mg tablet    ibuprofen (MOTRIN) 600 mg tablet    insulin aspart (NovoLOG FlexPen) 100 UNIT/ML injection pen    Insulin Glargine Solostar (Lantus SoloStar) 100 UNIT/ML SOPN    Insulin  "Pen Needle (BD Pen Needle Ana 2nd Gen) 32G X 4 MM MISC    Insulin Pen Needle (BD Pen Needle Ana 2nd Gen) 32G X 4 MM MISC    methocarbamol (ROBAXIN) 500 mg tablet    naloxone (NARCAN) 4 mg/0.1 mL nasal spray    OneTouch Delica Lancets 33G MISC    senna-docusate sodium (SENOKOT S) 8.6-50 mg per tablet    Allergies   Allergen Reactions    Codeine Other (See Comments)     Aggression-and nasty--\"took a cough syrup with codeine as a child\"       Social History:     Marital Status: Unknown    Substance Use History:   Social History     Substance and Sexual Activity   Alcohol Use Yes    Comment: 1-2 drinks a year     Social History     Tobacco Use   Smoking Status Former    Current packs/day: 0.50    Types: Cigarettes   Smokeless Tobacco Never   Tobacco Comments    Per pt last cigarette 12/13/24--quit cold turkey     Social History     Substance and Sexual Activity   Drug Use Not Currently       Family History:    Family History   Problem Relation Age of Onset    Diabetes Mother     COPD Mother     Heart disease Mother     Stroke Father     Heart disease Father          OBJECTIVE:    Vitals:   Blood Pressure: 108/58 (02/02/25 1300)  Pulse: 85 (02/02/25 1300)  Temperature: 99.5 °F (37.5 °C) (02/02/25 1300)  Temp Source: Bladder (02/02/25 1300)  Respirations: 16 (02/02/25 1300)  Height: 5' 1.2\" (155.4 cm) (01/31/25 0324)  Weight - Scale: 87.5 kg (192 lb 14.4 oz) (01/31/25 0324)  SpO2: 100 % (02/02/25 1300)    Physical Exam:    General Appearance: Alert, cooperative, no distress.  HEENT: Head normocephalic, atraumatic, without obvious abnormality.  Heart: Normal rate and rhythm.  Lungs: Non-labored breathing. No respiratory distress.  Abdomen: Without distension.  Psychiatric: AAOx3  Lower Extremity:  Vascular:   Right DP and PT pulses are present. Left  PT pulse is monophasic by Doppler. CRT < 3 seconds at the digits. +2/4 edema noted at bilateral lower extremities. Pedal hair is absent. Skin temperature is cool LLE. "     Musculoskeletal:  Left transmetatarsal amputation packed open    Dermatological:    Left lower extremity: Left TMA site packed open with exposed bone. Thin plantar flap noted. TMA wound bed noted to be both granular and fibrotic in nature with significant oozing secondary to lysis. No acute clinical signs of infection.     Neurological:  Gross sensation is diminished. Protective sensation is diminished. Patient Reports numbness and/or paresthesias.    Clinical Images 02/02/25:      Additional data:     Lab Results: I have personally reviewed pertinent labs including:    Results from last 7 days   Lab Units 02/02/25  1149 02/02/25  1035 02/02/25  0344   WBC Thousand/uL 11.10*  --  10.91*   HEMOGLOBIN g/dL 7.5*   < > 6.2*   HEMATOCRIT % 23.6*   < > 19.8*   PLATELETS Thousands/uL 167  --  184   SEGS PCT %  --   --  63   LYMPHO PCT %  --   --  28   MONO PCT %  --   --  7   EOS PCT %  --   --  1    < > = values in this interval not displayed.     Results from last 7 days   Lab Units 02/02/25  0344 02/01/25  0558 01/31/25  2231   POTASSIUM mmol/L 4.1   < > 4.1   CHLORIDE mmol/L 99   < > 102   CO2 mmol/L 28   < > 24   BUN mg/dL 13   < > 12   CREATININE mg/dL 0.70   < > 0.57*   CALCIUM mg/dL 7.9*   < > 9.1   ALK PHOS U/L  --   --  110*   ALT U/L  --   --  13   AST U/L  --   --  14    < > = values in this interval not displayed.     Results from last 7 days   Lab Units 01/31/25  2231   INR  1.01       Cultures: I have personally reviewed pertinent cultures including:    Results from last 7 days   Lab Units 01/29/25  1507 01/28/25  1820   BLOOD CULTURE   --  No Growth After 4 Days.  No Growth After 4 Days.   GRAM STAIN RESULT  Rare Gram positive cocci in pairs*  No polys seen*  --      Results from last 7 days   Lab Units 01/29/25  1507   ANAEROBIC CULTURE  2+ Growth of Finegoldia magna*       Imaging: I have personally reviewed pertinent reports in PACS.  EKG, Pathology, and Other Studies: I have personally reviewed  "pertinent reports.        ** Please Note: Portions of the record may have been created with voice recognition software. Occasional wrong word or \"sound a like\" substitutions may have occurred due to the inherent limitations of voice recognition software. Read the chart carefully and recognize, using context, where substitutions have occurred. **    "

## 2025-02-02 NOTE — ASSESSMENT & PLAN NOTE
Lab Results   Component Value Date    HGBA1C 10.3 (H) 12/13/2024       Recent Labs     02/02/25  0007 02/02/25  0627 02/02/25  1147 02/02/25  1645   POCGLU 147* 156* 153* 114       Blood Sugar Average: Last 72 hrs:  (P) 148.3086378519430998    Plan:   - Lispro 10u QHS with sliding scale

## 2025-02-02 NOTE — ASSESSMENT & PLAN NOTE
Lab Results   Component Value Date    HGBA1C 10.3 (H) 12/13/2024       Recent Labs     02/01/25  1238 02/01/25  1719 02/02/25  0007 02/02/25  0627   POCGLU 116 163* 147* 156*       Blood Sugar Average: Last 72 hrs:  (P) 151.6

## 2025-02-02 NOTE — PROGRESS NOTES
Progress Note - Vascular Surgery   Name: Lizzy Acuna 49 y.o. female I MRN: [id number]  Unit/Bed#: [facility] 514-01 I Date of Admission: 1/31/2025   Date of Service: 2/2/2025 I Hospital Day: 2    Assessment & Plan  Diabetic foot infection  (HCC)  49 y.o. F w/ PMH of T2DM (A1c 10.3), tobacco use (quit Dec 2024) who was previously seen at [facility] in December with wound of left foot with cellulitis.   During previous admission vascular surgery was consulted and recommended LLE agram w/ intervention which was preformed by IR. Post-procedure pt underwent TMA with podiatry.  Now S/P Agram, Left SFA thrombectomy, placement of lysis catheter on 1/31  S/p lysis recheck on 2/2 with residual thrombus within L SFA.    Hgb 6.2 from 7.4, s/p 1u pRBC  WBC 10.9 from 9.9  Cr 0.7 from 0.5    Plan  Lysis recheck today  Cont ASA/statin  Cont heparin gtt, tPA per protocol  Plan to restart Plavix post procedure  Local wound care per Podiatry      Sepsis due to cellulitis (HCC)    Obesity (BMI 30.0-34.9)    Diabetes mellitus type 2 with complications (HCC)  Lab Results   Component Value Date    HGBA1C 10.3 (H) 12/13/2024       Recent Labs     02/01/25  1238 02/01/25  1719 02/02/25  0007 02/02/25  0627   POCGLU 116 163* 147* 156*       Blood Sugar Average: Last 72 hrs:  (P) 151.6    Hyponatremia    Cellulitis of left foot    PAD (peripheral artery disease) (HCC)      24 Hour Events : events noted above  Subjective : Patient seen and evaluated at bedside. Feels in better spirits this morning. Continues to report L foot pain which is stable. Tolerating diet. Reports she does not want any more procedures after this lysis is done.    Objective :  Temp:  [98.2 °F (36.8 °C)-99.9 °F (37.7 °C)] 99.5 °F (37.5 °C)  HR:  [] 91  BP: ()/(54-88) 136/63  Resp:  [9-29] 22  SpO2:  [96 %-100 %] 100 %  O2 Device: Nasal cannula  Nasal Cannula O2 Flow Rate (L/min):  [4 L/min] 4 L/min     I/O         01/31 0701 02/01 0700 02/01 0701 02/02 0700 02/02  "0701  02/03 0700    P.O. 0      I.V. (mL/kg) 18.5 (0.2) 2126.3 (24.3)     Blood   575    IV Piggyback 212.5 750.8     Total Intake(mL/kg) 231 (2.6) 2877.2 (32.9) 575 (6.6)    Urine (mL/kg/hr) 1075 (0.5) 1090 (0.5)     Emesis/NG output  50     Stool  0     Total Output 1075 1140     Net -844.1 +1737.2 +575           Unmeasured Urine Occurrence  0 x     Unmeasured Stool Occurrence  0 x           Lines/Drains/Airways       Active Status       Name Placement date Placement time Site Days    PICC Line 01/31/25 01/31/25 1937  --  1    Urethral Catheter Temperature probe 01/31/25 2200  -- 1                  Physical Exam  General: NAD  HENT: NCAT MMM  Neck: supple, no JVD  CV: nl rate  Lungs: nl wob. No resp distress  ABD: Soft, nontender, protuberant/nondistended. R groin access CDI without signs of swelling, fluctuance or erythema.  Pulse exam: Monophasic L PT signal.  Neuro: AAOx3. M/S intact to BLE      Lab Results: I have reviewed the following results:  CBC with diff:   Lab Results   Component Value Date    WBC 10.91 (H) 02/02/2025    HGB 6.2 (L) 02/02/2025    HCT 19.8 (L) 02/02/2025    MCV 92 02/02/2025     02/02/2025    RBC 2.16 (L) 02/02/2025    MCH 28.7 02/02/2025    MCHC 31.3 (L) 02/02/2025    RDW 13.7 02/02/2025    MPV 9.2 02/02/2025    NRBC 1 02/02/2025   ,   BMP/CMP:  Lab Results   Component Value Date    SODIUM 132 (L) 02/02/2025    K 4.1 02/02/2025    CL 99 02/02/2025    CO2 28 02/02/2025    BUN 13 02/02/2025    CREATININE 0.70 02/02/2025    CALCIUM 7.9 (L) 02/02/2025    AST 14 01/31/2025    ALT 13 01/31/2025    ALKPHOS 110 (H) 01/31/2025    EGFR 102 02/02/2025   ,   Lipid Panel: No results found for: \"CHOL\",   Coags:   Lab Results   Component Value Date    PTT 44 (H) 02/02/2025    INR 1.01 01/31/2025   ,   Blood Culture:   Lab Results   Component Value Date    BLOODCX No Growth After 4 Days. 01/28/2025    BLOODCX No Growth After 4 Days. 01/28/2025   ,   Urinalysis:   Lab Results   Component Value " Date    COLORU Straw 11/05/2018    CLARITYU Clear 11/05/2018    SPECGRAV 1.020 11/05/2018    PHUR 6.0 11/05/2018    LEUKOCYTESUR Negative 11/05/2018    NITRITE Negative 11/05/2018    GLUCOSEU 3+ (A) 11/05/2018    KETONESU Negative 11/05/2018    BILIRUBINUR Negative 11/05/2018    BLOODU Trace-Intact (A) 11/05/2018   ,   Urine Culture:   Lab Results   Component Value Date    URINECX 50,000-59,000 cfu/ml Mixed Contaminants X4 10/21/2016   ,   Wound Culure:   Lab Results   Component Value Date    WOUNDCULT 1+ Growth of Enterobacter gergoviae (A) 06/13/2018    WOUNDCULT (A) 06/13/2018     2+ Growth of Methicillin Resistant Staphylococcus aureus             VTE Prophylaxis: VTE covered by:  heparin (porcine), Intravenous, 700 Units/hr at 02/02/25 0214  heparin (porcine), Intravenous, 1,000 Units at 02/02/25 0003  heparin (porcine), Intravenous, 2,000 Units at 02/01/25 0734  heparin, Intravenous, 20 Units/hr at 02/01/25 6985

## 2025-02-02 NOTE — ASSESSMENT & PLAN NOTE
Underwent an elective R TMA approx 3 weeks ago and was doing well until last week when she was noticed to have dehiscence of her surgical wound.   The wound was debrided by her podiatrist in the office but ultimately worsened with evidence of cellulitis.   LLE foot infection diagnosed clinically without MRI findings of osteomyelitis.   Tissue culture: 2+ MRSA, 2+ Finegoldia magna No significant leukocytosis or fever this admission. Site continues to bleed likely due to triple therapy   A1c 10.3    Plan:   Wound care consulted   Long term wound vac application vs DPC pending vascular recs   ID following, Continue intravenous vancomycin and transition to PO when stable for discharge  Will attempt wound vac, podiatry following for limb salvaging options   Pain regimen of Tylenol 975 mg q6, gabapentin 300 mg daily, robaxin 500 mg q6, dilaudid 1 mg/mL PCA, dilaudid 0.5 mg q3 PRN

## 2025-02-02 NOTE — ASSESSMENT & PLAN NOTE
"Lab Results   Component Value Date    HGBA1C 10.3 (H) 12/13/2024       Recent Labs     02/02/25  0007 02/02/25  0627 02/02/25  1147 02/02/25  1645   POCGLU 147* 156* 153* 114       Blood Sugar Average: Last 72 hrs:  (P) 148.3338728481037754    See \"Cellulitis\" above     "

## 2025-02-02 NOTE — ASSESSMENT & PLAN NOTE
Lab Results   Component Value Date    HGBA1C 10.3 (H) 12/13/2024       Recent Labs     02/02/25  1645 02/02/25  2252 02/03/25  0011 02/03/25  0616   POCGLU 114 175* 183* 125       Blood Sugar Average: Last 72 hrs:  (P) 151.5431558123462623  - Lispro 10u QHS with sliding scale

## 2025-02-02 NOTE — PROGRESS NOTES
Progress Note - Critical Care/ICU   Name: Lizzy Acuna 49 y.o. female I MRN: [id number]  Unit/Bed#: [facility] 514-01 I Date of Admission: 1/31/2025   Date of Service: 2/2/2025 I Hospital Day: 2       Assessment & Plan   Neuro/Psych:  Diagnoses: acute post operative pain  Plan:  Neuro checks q4  Analgesia: Multimodal. Tylenol 975 mg q6, gabapentin 300 mg daily, robaxin 500 mg q6, dilaudid 1 mg/mL PCA, dilaudid 0.5 mg q3 PRN  APS consulted, appreciate recs    CV:  Diagnoses: HTN, HLD  Plan:  Continue atorvastatin 80 mg  Continuous cardiopulmonary monitoring. Maintain MAP >65, SBP <180 mmHg.    Pulm:  Diagnoses: No active issues  Plan:  Continuous pulse oximetry. Maintain O2 sat >92%.     GI:  Diagnoses: GERD  Plan:  Bowel regimen: Senokot S  GI prophylaxis: NPO today for lysis recheck but will restart regular diet again following    :  Diagnoses: no active issues  Creatinine trend: 0.7  Baseline creatinine: 0.7  Plan:  Monitor I/Os.  UOP: 1015 over last 24 hours    F/E/N:  Diagnoses: hyponatremia, 132, above baseline for this admission  Plan:   F: 75 mL/hr isolyte. Fluid resuscitation prn.  E: Monitor and replete electrolytes for Mg >2, Phos >3, K >4.  N: NPO until lysis recheck, then can resume regular diet    Heme/Onc:  Diagnoses:   Severe PAD s/p thrombectomy and lysis cath placement on 1/31 with lysis recheck today 2/2  Continue heparin gtt  Continue TPA via R fem sheath  Lysis recheck today  Continue aspirin 81 mg  Appreciate vascular surgery recs  Blood loss anemia 2/2 lysis, bleeding from LLE TMA  Hg trending down, 7 > 6.3 > 6.2 overnight  Received 2 units pRBCs  Fibrinogen 158, PTT 44, subtherapeutic  Active bleeding from left foot wound  Required multiple dressing changes over last 24 hours  Plan:  As above  VTE prophylaxis: covered by above    Endo:  Diagnoses: T2DM - last A1c 10.3 on 12/13/24  Plan:   Insulin: sliding scale. Monitor blood glucose.    ID:  Diagnoses: Sepsis, RLE foot infection without MRI  findings of osteomyelitis  Culture data: Tissue culture: 2+ MRSA, 2+ Finegoldia magna  Labs: WBC trending back up - 10.91 today  Temperature: afebrile over last 24 hours  Plan:  Infectious disease is following  Podiatry consulted for wound management  Abx: Vancomycin 1g q12  Vancomycin level 25.9 today  Monitor fever curve and WBC.    MSK/Skin:  Diagnoses: diabetic foot wound s/p TMA  Plan:  Podiatry consulted, appreciate recs  Suspect wound vac placement   PT/OT when appropriate. Encourage OOB and ambulation when appropriate. Local wound care prn.    LDAs:  Lines - PICC, PIVs, right fem arterial sheath  Drains - negron  Airways - n/a    Disposition: Critical care    ICU Core Measures     A: Assess, Prevent, and Manage Pain Has pain been assessed? Yes  Need for changes to pain regimen? No   B: Both SAT/SAT  N/A   C: Choice of Sedation RASS Goal: 0 Alert and Calm  Need for changes to sedation or analgesia regimen? No   D: Delirium CAM-ICU: Negative   E: Early Mobility  Plan for early mobility? Yes   F: Family Engagement Plan for family engagement today? Yes       Antibiotic Review: Patient on appropriate coverage based on culture data.     Review of Invasive Devices:    Negron Plan: Continue for accurate I/O monitoring for 48 hours  Central access plan: Patient requires PICC secondary to long term antibiotics      Prophylaxis:  VTE VTE covered by:  heparin (porcine), Intravenous, 700 Units/hr at 02/02/25 0214  heparin (porcine), Intravenous, 1,000 Units at 02/02/25 0003  heparin (porcine), Intravenous, 2,000 Units at 02/01/25 0734  heparin, Intravenous, 20 Units/hr at 02/01/25 2130       Stress Ulcer  not ordered         24 Hour Events : Large amount of oozing blood from left foot wound requiring multiple dressing changes, Hg 6.2 requiring transfusion of 2 units pRBCs    Subjective   Review of Systems: Review of Systems   Constitutional: Negative.    HENT: Negative.     Eyes: Negative.    Respiratory: Negative.      Cardiovascular: Negative.    Gastrointestinal: Negative.    Endocrine: Negative.    Genitourinary: Negative.    Musculoskeletal:  Positive for back pain.   Allergic/Immunologic: Negative.    Neurological: Negative.  Negative for weakness and light-headedness.   Psychiatric/Behavioral:  The patient is nervous/anxious.    All other systems reviewed and are negative.      Objective :                   Vitals I/O      Most Recent Min/Max in 24hrs   Temp 99.5 °F (37.5 °C) Temp  Min: 98.2 °F (36.8 °C)  Max: 99.5 °F (37.5 °C)   Pulse 92 Pulse  Min: 90  Max: 104   Resp (!) 10 Resp  Min: 10  Max: 29   /63 BP  Min: 96/56  Max: 189/88   O2 Sat 100 % SpO2  Min: 95 %  Max: 100 %      Intake/Output Summary (Last 24 hours) at 2/2/2025 0415  Last data filed at 2/2/2025 0400  Gross per 24 hour   Intake 2082.68 ml   Output 1140 ml   Net 942.68 ml       Diet NPO  Diet NPO; Sips with meds    Invasive Monitoring           Physical Exam   Physical Exam  Vitals and nursing note reviewed.   Eyes:      Pupils: Pupils are equal, round, and reactive to light.   Skin:     General: Skin is warm and dry.      Coloration: Skin is pale.   HENT:      Head: Normocephalic and atraumatic.      Mouth/Throat:      Mouth: Mucous membranes are moist.   Cardiovascular:      Rate and Rhythm: Normal rate and regular rhythm.   Musculoskeletal:         General: Amputation present.      Right lower leg: No edema.      Left lower leg: No edema.      Comments: Left foot TMA actively oozing blood. Bilateral LE immobilizers placed, R femoral sheath for TPA   Abdominal:      Palpations: Abdomen is soft.      Tenderness: There is no abdominal tenderness.   Constitutional:       General: She is not in acute distress.     Appearance: She is well-developed and well-nourished. She is ill-appearing. She is not toxic-appearing.      Interventions: She is not intubated.  Pulmonary:      Effort: Pulmonary effort is normal. No respiratory distress. She is not  intubated.   Psychiatric:         Mood and Affect: Mood is anxious.   Neurological:      General: No focal deficit present.      Mental Status: She is alert and oriented to person, place and time. Mental status is at baseline. She is calm.      Sensory: Sensation is intact.      Motor: gross motor function is at baseline for patient.   Genitourinary/Anorectal:  Ramos present.        Diagnostic Studies        Lab Results: I have reviewed the following results:     Medications:  Scheduled PRN   acetaminophen, 975 mg, Q6H JORGE  aspirin, 81 mg, Daily  atorvastatin, 80 mg, Daily With Dinner  chlorhexidine, 15 mL, Q12H JORGE  gabapentin, 300 mg, HS  insulin glargine, 10 Units, HS  insulin lispro, 1-6 Units, Q6H JORGE  methocarbamol, 500 mg, Q6H JORGE  senna-docusate sodium, 1 tablet, HS  vancomycin, 1,000 mg, Q12H      diphenhydrAMINE, 25 mg, Q6H PRN  heparin (porcine), 1,000 Units, Q6H PRN  heparin (porcine), 2,000 Units, Q6H PRN  HYDROmorphone, 0.5 mg, Q3H PRN  ondansetron, 4 mg, Q6H PRN       Continuous    alteplase (CATHFLO) 10 mg in sodium chloride 0.9 % 250 mL infusion, 1 mg/hr, Last Rate: 1 mg/hr (02/01/25 2253)  heparin (porcine), 300-2,000 Units/hr, Last Rate: 700 Units/hr (02/02/25 0214)  heparin, 20 Units/hr, Last Rate: 20 Units/hr (02/01/25 2130)  HYDROmorphone,   multi-electrolyte, 75 mL/hr, Last Rate: 75 mL/hr (02/01/25 0547)         Labs:   CBC    Recent Labs     01/31/25  0432 01/31/25 2231 02/02/25  0013 02/02/25  0344   WBC 13.64*   < > 10.35* 10.91*   HGB 9.0*   < > 6.3* 6.2*   HCT 28.9*   < > 19.8* 19.8*      < > 184 184   BANDSPCT 1  --   --   --     < > = values in this interval not displayed.     BMP    Recent Labs     01/31/25 2231 02/01/25  0558   SODIUM 134* 131*   K 4.1 4.1    99   CO2 24 23   AGAP 8 9   BUN 12 13   CREATININE 0.57* 0.56*   CALCIUM 9.1 8.2*       Coags    Recent Labs     01/31/25  0629 01/31/25  1214 01/31/25  2231 02/01/25  0000 02/01/25  2210 02/02/25  0013   INR 0.99   --  1.01  --   --   --    PTT 91*   < > 38*   < > 47* 101*    < > = values in this interval not displayed.        Additional Electrolytes  No recent results       Blood Gas    No recent results  No recent results LFTs  Recent Labs     01/31/25 2231   ALT 13   AST 14   ALKPHOS 110*   ALB 3.3*   TBILI 0.35       Infectious  No recent results  Glucose  Recent Labs     01/31/25  0432 01/31/25  2231 02/01/25  0558   GLUC 169* 119 138

## 2025-02-02 NOTE — PROGRESS NOTES
Lizzy Acuna is a 49 y.o. female who is currently ordered Vancomycin IV with management by the Pharmacy Consult service.  Relevant clinical data and objective / subjective history reviewed.  Vancomycin Assessment:  Indication and Goal AUC/Trough: Soft tissue (goal -600, trough >10), -600, trough >10  Clinical Status: critically ill  Micro:    anaerobic (tissue L foot): 2+ Finegoldia magna   tissue (L foot): 2+ MRSA, vancomycin CAYLA 1   blood:  no growth after 4 days  Renal Function:  SCr: 0.7 mg/dL  CrCl: 99.3 mL/min  Renal replacement: Not on dialysis  Days of Therapy: 6  Current Dose: 1000 mg IV Q12H  Vancomycin Plan:  New Dosin mg IV Q12H  Estimated AUC: 428 mcg*hr/mL  Estimated Trough: 12.8 mcg/mL  Next Level:  at 0600  Renal Function Monitoring: Daily BMP and UOP  Pharmacy will continue to follow closely for s/sx of nephrotoxicity, infusion reactions and appropriateness of therapy.  BMP and CBC will be ordered per protocol. We will continue to follow the patient’s culture results and clinical progress daily.    Laurence Dhillon, Pharmacist

## 2025-02-02 NOTE — ASSESSMENT & PLAN NOTE
Lab Results   Component Value Date    HGBA1C 10.3 (H) 12/13/2024       Recent Labs     02/01/25  1719 02/02/25  0007 02/02/25  0627 02/02/25  1147   POCGLU 163* 147* 156* 153*       Blood Sugar Average: Last 72 hrs:  (P) [id number]

## 2025-02-02 NOTE — QUICK NOTE
Vascular Surgery    Procedure Check:    S/P completion of LLE thrombolysis, SFA/Pop/AT PTA, PT/Peroneal chronically occluded.    Right groin puncture CDI, soft, no hematoma or bleeding.    Left ankle M/S intact, Pop/AT doppler signals present.    Left foot dressing saturated through multiple reinforced layers, dressing changed by me. Open TMA site packed w/ saline wet 2 inch packing, covered w/ 4x4 fluffs, Kerlix, ABD, light ACE. Wound bed appeared dry w/o actice bleeding, surrounding skin cool and mottled to mid foot.     Wound care going forward per Podiatry service.

## 2025-02-02 NOTE — ASSESSMENT & PLAN NOTE
"Transferred to [facility] for vascular surgery and IR intervention after Lower Extremity Dopplers showed a >75% stenosis in the distal superficial femoral artery. Full study results are listed below  \"There fernanda 50-75% stenosis in the tibio-peroneal trunk. Diffuse disease throughout the  remaining femoro-popliteal and tibio-peroneal segments.\"  The patient was seen by vascular and IR and thrombectomy was performed and lysis catheters placed in the LLE 1/31. Lysis catheter removal on 02/02.     Plan:  Continue heparin gtt, aspirin 81 mg, Plavix   "

## 2025-02-02 NOTE — PROGRESS NOTES
INTERNAL MEDICINE RESIDENCY TRANSFER ACCEPTANCE NOTE     Name: Lizzy Acuna   Age & Sex: 49 y.o. female   MRN: [id number]  Unit/Bed#: [facility] 514-01   Encounter: [id number]  Hospital Stay Days: 2    Accepting team: SOD Team B   Code Status: Level 1 - Full Code  Disposition: Patient requires Med Surg    ASSESSMENT/PLAN     Principal Problem:    Sepsis due to cellulitis (HCC)  Active Problems:    Diabetes mellitus type 2 with complications (HCC)    Hyponatremia    Cellulitis of left foot    Diabetic foot infection  (HCC)    PAD (peripheral artery disease) (HCC)    Cellulitis of left foot  Underwent an elective R TMA approx 3 weeks ago and was doing well until last week when she was noticed to have dehiscence of her surgical wound. The wound was debrided by her podiatrist in the office but ultimately worsened with evidence of cellulitis.   LLE foot infection diagnosed clinically without MRI findings of osteomyelitis. Tissue culture: 2+ MRSA, 2+ Finegoldia magna No significant leukocytosis or fever this admission. Site continues to bleed likely due to triple therapy     Plan:   - Wound care consulted     - long term wound vac application vs DPC pending vascular recs     - Local wound care as per note    - ID consulted   - Continue intravenous vancomycin  - Pharmacy follow-up for vancomycin dose management   - Pain regimen of Tylenol 975 mg q6, gabapentin 300 mg daily, robaxin 500 mg q6, dilaudid 1 mg/mL PCA, dilaudid 0.5 mg q3 PRN    Diabetes mellitus type 2 with complications (HCC)  Lab Results   Component Value Date    HGBA1C 10.3 (H) 12/13/2024       Recent Labs     02/02/25  0007 02/02/25  0627 02/02/25  1147 02/02/25  1645   POCGLU 147* 156* 153* 114       Blood Sugar Average: Last 72 hrs:  (P) [id number]    Plan:   - Lispro 10u QHS with sliding scale     Hyponatremia  Chronic euvolemic hyponatremia that was 133 on admission. Asymptomatic and no recent history of confusion, altered metal status,  "seizure, or coma.     Current differential includes SIADH in the setting of acute pain vs hypothyroidism     Plan   - Monitor BMP  - Low threshold to workup with serum and urine studies if hyponatremia worsens     - Hold off on ordering TSH in the acute setting because possible confounding via acute illness    Diabetic foot infection  (HCC)  Lab Results   Component Value Date    HGBA1C 10.3 (H) 12/13/2024       Recent Labs     02/02/25  0007 02/02/25  0627 02/02/25  1147 02/02/25  1645   POCGLU 147* 156* 153* 114       Blood Sugar Average: Last 72 hrs:  (P) 148.5164059318954416    See \"Cellulitis\" above     PAD (peripheral artery disease) ([facility])  Transferred to [facility] for vascular surgery and IR intervention after Lower Extremity Dopplers showed a >75% stenosis in the distal superficial femoral artery. Full study results are listed below  \"There fernanda 50-75% stenosis in the tibio-peroneal trunk. Diffuse disease throughout the  remaining femoro-popliteal and tibio-peroneal segments.\"  The patient was seen by vascular and IR and thrombectomy was performed and lysis catheters placed in the LLE 1/31. Lysis catheter removal on 02/02.     Plan:  Continue heparin gtt, aspirin 81 mg, Plavix     Blood loss anemia  Blood loss anemia 2/2 lysis, bleeding from LLE TMA.     Plan:   - Dressing and wound care per podiatry recs   - Recheck CBC and transfuse to maintain Hb > 7      VTE Pharmacologic Prophylaxis: VTE covered by:  heparin (porcine), Intravenous, 18 Units/kg/hr at 02/02/25 1723  heparin (porcine), Intravenous  heparin (porcine), Intravenous      VTE Mechanical Prophylaxis: sequential compression device    HOSPITAL COURSE     Lizzy Acuna is a 49 y.o. with a PMHx of HTN, HLD, PAD, and GERD who presented to the [facility] after being sent there by her podiatrist for a LLE soft tissue infection. The patient underwent an elective TMA approx 3 weeks ago and was doing well until last week when she was noticed to have dehiscence of her " surgical wound. The wound was debrided by her podiatrist in the office but ultimately worsened with evidence of cellulitis. The patient was transferred to [facility] for vascular surgery and possible IR intervention after ultrasounds showed greater than 75% stenosis of the SFA. The patient was seen by vascular and IR and thrombectomy was performed and lysis catheters placed in the LLE 1/31. Patient was seen by APS for pain control and placed on dilaudid PCA. The patient was seen by Podiatry given recent TMA with recs for local wound care and eventual plans for VAC or possible DPC. The patient is being followed by ID due to LLE wound infection and remains on vanco. Patient underwent lysis recheck on 2/1 and lysis catheters remained in place. Recheck on 2/2 revealed patent Left SFA, popliteal, tibioperoneal trunk, and anterior tibial arteries. Lysis catheters were removed.         SUBJECTIVE     Has eaten a bit since her procedure with minimal bowel movements. Has not been moving because of recent procedures but will be more active soon as activity restriction lifted. Some nausea and vomiting earlier today. No fever, chills, chest pain, abdominal pain, leg pain, diarrhea.     OBJECTIVE     Vitals:    02/02/25 1500 02/02/25 1505 02/02/25 1510 02/02/25 1600   BP: 120/70 113/60 134/60 123/73   BP Location: Left arm  Left arm Left arm   Pulse: 97 98 102 92   Resp: 16 16 16 20   Temp:    99.2 °F (37.3 °C)   TempSrc:    Bladder   SpO2: 97% 97% 98% 99%   Weight:       Height:         I/O last 24 hours:  In: 4534.6 [I.V.:2683.3; Blood:950; IV Piggyback:901.3]  Out: 1530 [Urine:1430; Emesis/NG output:100]    Physical Exam  Cardiovascular:      Rate and Rhythm: Normal rate and regular rhythm.      Pulses: Normal pulses.      Heart sounds: Normal heart sounds. No murmur heard.     No friction rub. No gallop.   Pulmonary:      Effort: Pulmonary effort is normal. No respiratory distress.      Breath sounds: Normal breath sounds. No  stridor. No wheezing, rhonchi or rales.   Abdominal:      General: Abdomen is flat. Bowel sounds are normal. There is no distension.      Palpations: Abdomen is soft. There is no mass.      Tenderness: There is no abdominal tenderness. There is no guarding.      Hernia: No hernia is present.   Musculoskeletal:      Right lower leg: No edema.      Left lower leg: No edema.   Skin:     Findings: No lesion.   Neurological:      General: No focal deficit present.      Mental Status: She is oriented to person, place, and time.       LABORATORY DATA     Labs: I have personally reviewed pertinent reports.    Results from last 7 days   Lab Units 02/02/25  1647 02/02/25  1149 02/02/25  1035 02/02/25  0344 01/31/25  2231 01/31/25  0432 01/30/25  0519 01/29/25  0530 01/28/25  1820   WBC Thousand/uL 10.45* 11.10*  --  10.91*   < > 13.64* 10.67*   < > 10.48*   HEMOGLOBIN g/dL 7.7* 7.5* 7.7* 6.2*   < > 9.0* 9.1*   < > 10.8*   HEMATOCRIT % 23.6* 23.6* 24.5* 19.8*   < > 28.9* 28.3*   < > 33.7*   PLATELETS Thousands/uL 160 167  --  184   < > 270 207   < > 267   SEGS PCT %  --   --   --  63  --   --  68  --  74   MONO PCT %  --   --   --  7  --  4 5  --  5   EOS PCT %  --   --   --  1  --  0 1  --  0    < > = values in this interval not displayed.      Results from last 7 days   Lab Units 02/02/25  0344 02/01/25  0558 01/31/25  2231 01/29/25  0530 01/28/25  1820   POTASSIUM mmol/L 4.1 4.1 4.1   < > 4.0   CHLORIDE mmol/L 99 99 102   < > 91*   CO2 mmol/L 28 23 24   < > 25   BUN mg/dL 13 13 12   < > 20   CREATININE mg/dL 0.70 0.56* 0.57*   < > 0.88   CALCIUM mg/dL 7.9* 8.2* 9.1   < > 9.9   ALK PHOS U/L  --   --  110*  --  106*   ALT U/L  --   --  13  --  20   AST U/L  --   --  14  --  14    < > = values in this interval not displayed.              Results from last 7 days   Lab Units 02/02/25  1647 02/02/25  1149 02/02/25  0559 02/01/25  0000 01/31/25  2231 01/31/25  1214 01/31/25  0629   INR  1.05  --   --   --  1.01  --  0.99   PTT  seconds 37* 48* 44*   < > 38*   < > 91*    < > = values in this interval not displayed.     Results from last 7 days   Lab Units 01/28/25  2113   LACTIC ACID mmol/L 1.1         Micro:  Lab Results   Component Value Date    BLOODCX No Growth After 4 Days. 01/28/2025    BLOODCX No Growth After 4 Days. 01/28/2025    BLOODCX No Growth After 5 Days. 12/13/2024    URINECX 50,000-59,000 cfu/ml Mixed Contaminants X4 10/21/2016    WOUNDCULT 1+ Growth of Enterobacter gergoviae (A) 06/13/2018    WOUNDCULT (A) 06/13/2018     2+ Growth of Methicillin Resistant Staphylococcus aureus    SPUTUMCULTUR 3+ Growth of Beta Hemolytic Streptococcus Group B (A) 11/05/2018    SPUTUMCULTUR 3+ Growth of 11/05/2018     IMAGING & DIAGNOSTIC TESTING     Imaging: I have personally reviewed pertinent reports.    IR TPA lysis check  Result Date: 2/2/2025  Impression: 1. Left deep femoral, superficial femoral, popliteal, tibioperoneal trunk, and anterior tibial arteries were patent. 2. Mild residual stenoses in the left superficial femoral and popliteal arteries treated with 4 mm balloon angioplasty. 3. Mild stenoses along the proximal left anterior tibial artery treated with 3 mm and 2.5 mm balloon angioplasties. 4. Left posterior tibial and peroneal arteries appeared chronically occluded distally. Plan: -Continue anticoagulation. -Continue aspirin and statin. Workstation performed: [id number]     IR TPA lysis check  Result Date: 2/1/2025  Impression: 1. Residual luminal irregularities in the left superficial femoral artery treated with 4 mm balloon angioplasty. 2. Post angioplasty arteriogram showed reocclusion of left SFA, likely due to residual thrombus. 3. Lysis catheter replaced extending from the left SFA into the popliteal artery. Plan: -tPA to run through the lysis catheter at 1 mg/h. -Return for lysis check tomorrow. Workstation performed: [id number]     IR lower extremity angiogram  Result Date: 1/31/2025  Impression: 1. Thrombosed  "left superficial femoral artery treated with aspiration thrombectomy and 4 mm balloon angioplasty with residual thrombus. 2. Thrombolysis initiated through a lysis catheter in the left SFA. 3. Right arm PICC placement through basilic vein access. Plan: -tPA to run at 1 mg/hr through the lysis catheter. -All blood draws and labwork to be done through PICC. -Return for lysis check tomorrow. Workstation performed: [id number]     IR PICC placement double lumen  Result Date: 1/31/2025  Impression: 1. Thrombosed left superficial femoral artery treated with aspiration thrombectomy and 4 mm balloon angioplasty with residual thrombus. 2. Thrombolysis initiated through a lysis catheter in the left SFA. 3. Right arm PICC placement through basilic vein access. Plan: -tPA to run at 1 mg/hr through the lysis catheter. -All blood draws and labwork to be done through PICC. -Return for lysis check tomorrow. Workstation performed: [id number]     CT head wo contrast  Result Date: 1/31/2025  Impression: 1. No intracranial hemorrhage seen. 2. Bilateral sphenoid sinusitis Workstation performed: [id number]     EKG, Pathology, and Other Studies: I have personally reviewed pertinent reports.     ALLERGIES     Allergies   Allergen Reactions    Codeine Other (See Comments)     Aggression-and nasty--\"took a cough syrup with codeine as a child\"     MEDICATIONS     Current Facility-Administered Medications   Medication Dose Route Frequency Provider Last Rate    acetaminophen  975 mg Oral Q6H [facility] Juanjo Simmons MD      aspirin  81 mg Oral Daily Juanjo Simmons MD      atorvastatin  80 mg Oral Daily With Dinner Juanjo Simmons MD      chlorhexidine  15 mL Mouth/Throat Q12H [facility] Linwood Delatorre PA-C      [START ON 2/3/2025] clopidogrel  75 mg Oral Daily Oscar Hyde PA-C      diazepam  5 mg Intravenous Q8H PRN Ankur Gallo MD      diphenhydrAMINE  25 mg Oral Q6H PRN Juanjo Simmons MD      gabapentin  300 mg Oral BID (AM & Afternoon) " "Ankur Gallo MD      gabapentin  600 mg Oral HS Ankur Gallo MD      heparin (porcine)  3-30 Units/kg/hr (Order-Specific) Intravenous Titrated Oscar Hyde PA-C 18 Units/kg/hr (02/02/25 1723)    heparin (porcine)  3,400 Units Intravenous Q6H PRN Oscar Hyde PA-C      heparin (porcine)  6,800 Units Intravenous Q6H PRN Oscar Hyde PA-C      HYDROmorphone   Intravenous Continuous Oscar Hyde PA-C      HYDROmorphone  0.5 mg Intravenous Q3H PRN Oscar Hyde PA-C      insulin glargine  10 Units Subcutaneous HS Juanjo Simmons MD      insulin lispro  1-6 Units Subcutaneous Q6H JORGE Juanjo Simmons MD      methocarbamol  750 mg Oral Q6H JORGE Ankur Gallo MD      ondansetron  4 mg Intravenous Q6H PRN Juanjo Simmons MD      senna-docusate sodium  1 tablet Oral HS Juanjo Simmons MD      vancomycin  750 mg Intravenous Q12H Kb Sr  mg (02/02/25 1323)     heparin (porcine), 3-30 Units/kg/hr (Order-Specific), Last Rate: 18 Units/kg/hr (02/02/25 1723)  HYDROmorphone,       diazepam, 5 mg, Q8H PRN  diphenhydrAMINE, 25 mg, Q6H PRN  heparin (porcine), 3,400 Units, Q6H PRN  heparin (porcine), 6,800 Units, Q6H PRN  HYDROmorphone, 0.5 mg, Q3H PRN  ondansetron, 4 mg, Q6H PRN        Portions of the record may have been created with voice recognition software.  Occasional wrong word or \"sound a like\" substitutions may have occurred due to the inherent limitations of voice recognition software.  Read the chart carefully and recognize, using context, where substitutions have occurred.    ==  Elke Ann MD  [facility]  Internal Medicine Residency PGY-I      "

## 2025-02-02 NOTE — ASSESSMENT & PLAN NOTE
Blood loss anemia 2/2 lysis, bleeding from LLE TMA.     Plan:   - Dressing and wound care per podiatry recs   - Recheck CBC and transfuse to maintain Hb > 7

## 2025-02-02 NOTE — ASSESSMENT & PLAN NOTE
Lab Results   Component Value Date    HGBA1C 10.3 (H) 12/13/2024       Recent Labs     02/01/25  1719 02/02/25  0007 02/02/25  0627 02/02/25  1147   POCGLU 163* 147* 156* 153*       Blood Sugar Average: Last 72 hrs:  (P) [id number]

## 2025-02-02 NOTE — ASSESSMENT & PLAN NOTE
Underwent an elective R TMA approx 3 weeks ago and was doing well until last week when she was noticed to have dehiscence of her surgical wound. The wound was debrided by her podiatrist in the office but ultimately worsened with evidence of cellulitis.   LLE foot infection diagnosed clinically without MRI findings of osteomyelitis. Tissue culture: 2+ MRSA, 2+ Finegoldia magna No significant leukocytosis or fever this admission. Site continues to bleed likely due to triple therapy     Plan:   - Wound care consulted     - long term wound vac application vs DPC pending vascular recs     - Local wound care as per note    - ID consulted   - Continue intravenous vancomycin  - Pharmacy follow-up for vancomycin dose management   - Pain regimen of Tylenol 975 mg q6, gabapentin 300 mg daily, robaxin 500 mg q6, dilaudid 1 mg/mL PCA, dilaudid 0.5 mg q3 PRN

## 2025-02-02 NOTE — ASSESSMENT & PLAN NOTE
49 y.o. F w/ PMH of T2DM (A1c 10.3), tobacco use (quit Dec 2024) who was previously seen at [facility] in December with wound of left foot with cellulitis.   During previous admission vascular surgery was consulted and recommended LLE agram w/ intervention which was preformed by IR. Post-procedure pt underwent TMA with podiatry.  Now S/P Agram, Left SFA thrombectomy, placement of lysis catheter on 1/31  S/p lysis recheck on 2/2 with residual thrombus within L SFA.    Hgb 6.2 from 7.4, s/p 1u pRBC  WBC 10.9 from 9.9  Cr 0.7 from 0.5    Plan  Lysis recheck today  Cont ASA/statin  Cont heparin gtt, tPA per protocol  Plan to restart Plavix post procedure  Local wound care per Podiatry

## 2025-02-03 ENCOUNTER — TELEPHONE (OUTPATIENT)
Age: 50
End: 2025-02-03

## 2025-02-03 PROBLEM — E66.9 OBESITY (BMI 30-39.9): Status: ACTIVE | Noted: 2025-02-03

## 2025-02-03 LAB
ABO GROUP BLD BPU: NORMAL
ABO GROUP BLD BPU: NORMAL
ANION GAP SERPL CALCULATED.3IONS-SCNC: 6 MMOL/L (ref 4–13)
APTT PPP: 57 SECONDS (ref 23–34)
APTT PPP: 77 SECONDS (ref 23–34)
APTT PPP: 80 SECONDS (ref 23–34)
APTT PPP: 96 SECONDS (ref 23–34)
ATRIAL RATE: 83 BPM
BACTERIA BLD CULT: NORMAL
BACTERIA BLD CULT: NORMAL
BASOPHILS # BLD AUTO: 0.01 THOUSANDS/ΜL (ref 0–0.1)
BASOPHILS NFR BLD AUTO: 0 % (ref 0–1)
BPU ID: NORMAL
BPU ID: NORMAL
BUN SERPL-MCNC: 11 MG/DL (ref 5–25)
CALCIUM SERPL-MCNC: 8 MG/DL (ref 8.4–10.2)
CHLORIDE SERPL-SCNC: 104 MMOL/L (ref 96–108)
CO2 SERPL-SCNC: 28 MMOL/L (ref 21–32)
CREAT SERPL-MCNC: 0.53 MG/DL (ref 0.6–1.3)
CROSSMATCH: NORMAL
CROSSMATCH: NORMAL
EOSINOPHIL # BLD AUTO: 0.1 THOUSAND/ΜL (ref 0–0.61)
EOSINOPHIL NFR BLD AUTO: 1 % (ref 0–6)
ERYTHROCYTE [DISTWIDTH] IN BLOOD BY AUTOMATED COUNT: 14.4 % (ref 11.6–15.1)
GFR SERPL CREATININE-BSD FRML MDRD: 112 ML/MIN/1.73SQ M
GLUCOSE SERPL-MCNC: 118 MG/DL (ref 65–140)
GLUCOSE SERPL-MCNC: 125 MG/DL (ref 65–140)
GLUCOSE SERPL-MCNC: 145 MG/DL (ref 65–140)
GLUCOSE SERPL-MCNC: 146 MG/DL (ref 65–140)
GLUCOSE SERPL-MCNC: 183 MG/DL (ref 65–140)
GLUCOSE SERPL-MCNC: 189 MG/DL (ref 65–140)
GLUCOSE SERPL-MCNC: 198 MG/DL (ref 65–140)
HCT VFR BLD AUTO: 21.2 % (ref 34.8–46.1)
HGB BLD-MCNC: 6.8 G/DL (ref 11.5–15.4)
HGB BLD-MCNC: 8.4 G/DL (ref 11.5–15.4)
IMM GRANULOCYTES # BLD AUTO: 0.19 THOUSAND/UL (ref 0–0.2)
IMM GRANULOCYTES NFR BLD AUTO: 2 % (ref 0–2)
LYMPHOCYTES # BLD AUTO: 2.85 THOUSANDS/ΜL (ref 0.6–4.47)
LYMPHOCYTES NFR BLD AUTO: 27 % (ref 14–44)
MAGNESIUM SERPL-MCNC: 1.5 MG/DL (ref 1.9–2.7)
MCH RBC QN AUTO: 29.1 PG (ref 26.8–34.3)
MCHC RBC AUTO-ENTMCNC: 32.1 G/DL (ref 31.4–37.4)
MCV RBC AUTO: 91 FL (ref 82–98)
MONOCYTES # BLD AUTO: 0.65 THOUSAND/ΜL (ref 0.17–1.22)
MONOCYTES NFR BLD AUTO: 6 % (ref 4–12)
NEUTROPHILS # BLD AUTO: 6.77 THOUSANDS/ΜL (ref 1.85–7.62)
NEUTS SEG NFR BLD AUTO: 64 % (ref 43–75)
NRBC BLD AUTO-RTO: 0 /100 WBCS
P AXIS: 46 DEGREES
PHOSPHATE SERPL-MCNC: 3 MG/DL (ref 2.7–4.5)
PLATELET # BLD AUTO: 169 THOUSANDS/UL (ref 149–390)
PMV BLD AUTO: 9.4 FL (ref 8.9–12.7)
POTASSIUM SERPL-SCNC: 4.1 MMOL/L (ref 3.5–5.3)
PR INTERVAL: 190 MS
QRS AXIS: 150 DEGREES
QRSD INTERVAL: 76 MS
QT INTERVAL: 392 MS
QTC INTERVAL: 460 MS
RBC # BLD AUTO: 2.34 MILLION/UL (ref 3.81–5.12)
SODIUM SERPL-SCNC: 138 MMOL/L (ref 135–147)
T WAVE AXIS: 91 DEGREES
UNIT DISPENSE STATUS: NORMAL
UNIT DISPENSE STATUS: NORMAL
UNIT PRODUCT CODE: NORMAL
UNIT PRODUCT CODE: NORMAL
UNIT PRODUCT VOLUME: 300 ML
UNIT PRODUCT VOLUME: 300 ML
UNIT RH: NORMAL
UNIT RH: NORMAL
VENTRICULAR RATE: 83 BPM
WBC # BLD AUTO: 10.57 THOUSAND/UL (ref 4.31–10.16)

## 2025-02-03 PROCEDURE — 85730 THROMBOPLASTIN TIME PARTIAL: CPT | Performed by: PHYSICIAN ASSISTANT

## 2025-02-03 PROCEDURE — 99233 SBSQ HOSP IP/OBS HIGH 50: CPT | Performed by: INTERNAL MEDICINE

## 2025-02-03 PROCEDURE — 99232 SBSQ HOSP IP/OBS MODERATE 35: CPT

## 2025-02-03 PROCEDURE — 85730 THROMBOPLASTIN TIME PARTIAL: CPT | Performed by: INTERNAL MEDICINE

## 2025-02-03 PROCEDURE — 84100 ASSAY OF PHOSPHORUS: CPT

## 2025-02-03 PROCEDURE — 99233 SBSQ HOSP IP/OBS HIGH 50: CPT | Performed by: ANESTHESIOLOGY

## 2025-02-03 PROCEDURE — 82948 REAGENT STRIP/BLOOD GLUCOSE: CPT

## 2025-02-03 PROCEDURE — 83735 ASSAY OF MAGNESIUM: CPT

## 2025-02-03 PROCEDURE — 99232 SBSQ HOSP IP/OBS MODERATE 35: CPT | Performed by: SURGERY

## 2025-02-03 PROCEDURE — G0545 PR INHERENT VISIT TO INPT: HCPCS | Performed by: INTERNAL MEDICINE

## 2025-02-03 PROCEDURE — 85025 COMPLETE CBC W/AUTO DIFF WBC: CPT

## 2025-02-03 PROCEDURE — 80048 BASIC METABOLIC PNL TOTAL CA: CPT

## 2025-02-03 PROCEDURE — 93005 ELECTROCARDIOGRAM TRACING: CPT

## 2025-02-03 PROCEDURE — P9040 RBC LEUKOREDUCED IRRADIATED: HCPCS

## 2025-02-03 PROCEDURE — 85018 HEMOGLOBIN: CPT | Performed by: STUDENT IN AN ORGANIZED HEALTH CARE EDUCATION/TRAINING PROGRAM

## 2025-02-03 PROCEDURE — 99233 SBSQ HOSP IP/OBS HIGH 50: CPT | Performed by: STUDENT IN AN ORGANIZED HEALTH CARE EDUCATION/TRAINING PROGRAM

## 2025-02-03 PROCEDURE — 93010 ELECTROCARDIOGRAM REPORT: CPT | Performed by: INTERNAL MEDICINE

## 2025-02-03 RX ORDER — SODIUM CHLORIDE, SODIUM GLUCONATE, SODIUM ACETATE, POTASSIUM CHLORIDE, MAGNESIUM CHLORIDE, SODIUM PHOSPHATE, DIBASIC, AND POTASSIUM PHOSPHATE .53; .5; .37; .037; .03; .012; .00082 G/100ML; G/100ML; G/100ML; G/100ML; G/100ML; G/100ML; G/100ML
100 INJECTION, SOLUTION INTRAVENOUS CONTINUOUS
Status: DISCONTINUED | OUTPATIENT
Start: 2025-02-03 | End: 2025-02-07

## 2025-02-03 RX ORDER — DIAZEPAM 5 MG/1
5 TABLET ORAL EVERY 8 HOURS PRN
Status: DISCONTINUED | OUTPATIENT
Start: 2025-02-03 | End: 2025-02-15 | Stop reason: HOSPADM

## 2025-02-03 RX ORDER — METOCLOPRAMIDE HYDROCHLORIDE 5 MG/ML
10 INJECTION INTRAMUSCULAR; INTRAVENOUS EVERY 6 HOURS PRN
Status: DISCONTINUED | OUTPATIENT
Start: 2025-02-03 | End: 2025-02-15 | Stop reason: HOSPADM

## 2025-02-03 RX ORDER — ONDANSETRON 2 MG/ML
4 INJECTION INTRAMUSCULAR; INTRAVENOUS ONCE
Status: COMPLETED | OUTPATIENT
Start: 2025-02-03 | End: 2025-02-03

## 2025-02-03 RX ORDER — MAGNESIUM SULFATE HEPTAHYDRATE 40 MG/ML
4 INJECTION, SOLUTION INTRAVENOUS ONCE
Status: COMPLETED | OUTPATIENT
Start: 2025-02-03 | End: 2025-02-03

## 2025-02-03 RX ADMIN — CLOPIDOGREL BISULFATE 75 MG: 75 TABLET, FILM COATED ORAL at 08:03

## 2025-02-03 RX ADMIN — SODIUM CHLORIDE, SODIUM GLUCONATE, SODIUM ACETATE, POTASSIUM CHLORIDE, MAGNESIUM CHLORIDE, SODIUM PHOSPHATE, DIBASIC, AND POTASSIUM PHOSPHATE 100 ML/HR: .53; .5; .37; .037; .03; .012; .00082 INJECTION, SOLUTION INTRAVENOUS at 22:28

## 2025-02-03 RX ADMIN — INSULIN LISPRO 2 UNITS: 100 INJECTION, SOLUTION INTRAVENOUS; SUBCUTANEOUS at 17:09

## 2025-02-03 RX ADMIN — MAGNESIUM SULFATE HEPTAHYDRATE 4 G: 40 INJECTION, SOLUTION INTRAVENOUS at 08:03

## 2025-02-03 RX ADMIN — GABAPENTIN 300 MG: 300 CAPSULE ORAL at 08:06

## 2025-02-03 RX ADMIN — ATORVASTATIN CALCIUM 80 MG: 80 TABLET, FILM COATED ORAL at 17:08

## 2025-02-03 RX ADMIN — METHOCARBAMOL 750 MG: 750 TABLET ORAL at 17:08

## 2025-02-03 RX ADMIN — ACETAMINOPHEN 975 MG: 325 TABLET, FILM COATED ORAL at 13:44

## 2025-02-03 RX ADMIN — GABAPENTIN 300 MG: 300 CAPSULE ORAL at 13:44

## 2025-02-03 RX ADMIN — VANCOMYCIN HYDROCHLORIDE 750 MG: 5 INJECTION, POWDER, LYOPHILIZED, FOR SOLUTION INTRAVENOUS at 03:00

## 2025-02-03 RX ADMIN — HEPARIN SODIUM 18 UNITS/KG/HR: 10000 INJECTION, SOLUTION INTRAVENOUS at 22:24

## 2025-02-03 RX ADMIN — METHOCARBAMOL 750 MG: 750 TABLET ORAL at 13:44

## 2025-02-03 RX ADMIN — GABAPENTIN 600 MG: 300 CAPSULE ORAL at 22:26

## 2025-02-03 RX ADMIN — METHOCARBAMOL 750 MG: 750 TABLET ORAL at 00:07

## 2025-02-03 RX ADMIN — Medication: at 20:07

## 2025-02-03 RX ADMIN — ASPIRIN 81 MG: 81 TABLET, COATED ORAL at 08:03

## 2025-02-03 RX ADMIN — ONDANSETRON 4 MG: 2 INJECTION INTRAMUSCULAR; INTRAVENOUS at 10:44

## 2025-02-03 RX ADMIN — VANCOMYCIN HYDROCHLORIDE 750 MG: 10 INJECTION, POWDER, LYOPHILIZED, FOR SOLUTION INTRAVENOUS at 22:30

## 2025-02-03 RX ADMIN — SODIUM CHLORIDE, SODIUM GLUCONATE, SODIUM ACETATE, POTASSIUM CHLORIDE, MAGNESIUM CHLORIDE, SODIUM PHOSPHATE, DIBASIC, AND POTASSIUM PHOSPHATE 100 ML/HR: .53; .5; .37; .037; .03; .012; .00082 INJECTION, SOLUTION INTRAVENOUS at 11:40

## 2025-02-03 RX ADMIN — ACETAMINOPHEN 975 MG: 325 TABLET, FILM COATED ORAL at 00:07

## 2025-02-03 RX ADMIN — CHLORHEXIDINE GLUCONATE 0.12% ORAL RINSE 15 ML: 1.2 LIQUID ORAL at 08:03

## 2025-02-03 RX ADMIN — INSULIN LISPRO 1 UNITS: 100 INJECTION, SOLUTION INTRAVENOUS; SUBCUTANEOUS at 00:12

## 2025-02-03 RX ADMIN — METOCLOPRAMIDE 10 MG: 5 INJECTION, SOLUTION INTRAMUSCULAR; INTRAVENOUS at 11:00

## 2025-02-03 RX ADMIN — INSULIN LISPRO 1 UNITS: 100 INJECTION, SOLUTION INTRAVENOUS; SUBCUTANEOUS at 11:53

## 2025-02-03 RX ADMIN — ACETAMINOPHEN 975 MG: 325 TABLET, FILM COATED ORAL at 06:33

## 2025-02-03 RX ADMIN — ONDANSETRON 4 MG: 2 INJECTION INTRAMUSCULAR; INTRAVENOUS at 10:20

## 2025-02-03 RX ADMIN — INSULIN GLARGINE 10 UNITS: 100 INJECTION, SOLUTION SUBCUTANEOUS at 22:27

## 2025-02-03 RX ADMIN — HEPARIN SODIUM 18 UNITS/KG/HR: 10000 INJECTION, SOLUTION INTRAVENOUS at 08:05

## 2025-02-03 RX ADMIN — HEPARIN SODIUM 3400 UNITS: 1000 INJECTION INTRAVENOUS; SUBCUTANEOUS at 00:21

## 2025-02-03 RX ADMIN — METHOCARBAMOL 750 MG: 750 TABLET ORAL at 06:34

## 2025-02-03 NOTE — TELEPHONE ENCOUNTER
Caller: Paige Johansen    Doctor and/or Office:     #: [phone]    Escalation: Care Paige is calling on behalf of patient Lizzy. She is asking if Dr. Ritchie could check on Lizzy. She says the last few conversations they had,Lizzy seems very off. She is also sick a lot and since Paige has no way of getting to [place] to check on her, she would like Dr. Ritchie to inquire to see how she is doing. Thank you

## 2025-02-03 NOTE — UTILIZATION REVIEW
Initial Clinical Review    Admission: Date/Time/Statement:   Admission Orders (From admission, onward)       Ordered        01/31/25 0039  INPATIENT ADMISSION  Once                          Orders Placed This Encounter   Procedures    INPATIENT ADMISSION     Standing Status:   Standing     Number of Occurrences:   1     Level of Care:   Med Surg [16]     Estimated length of stay:   More than 2 Midnights     Certification:   I certify that inpatient services are medically necessary for this patient for a duration of greater than two midnights. See H&P and MD Progress Notes for additional information about the patient's course of treatment.     ED Arrival Information       Patient not seen in ED                       No chief complaint on file.      Initial Presentation: 49 y.o. female w/ PMH of T2DM, obesity, HTN, HLD, severe PAD was transferred from [facility] to [facility] for a higher level of care.  Pt initially presented to [facility] w/ L,foot pain x 5 days. She was s/p L TMA on 01/03. She was cleared to walk by podiatry but reports that after resuming walking,she was having increased pain, redness and discharge from her left foot.  In the ED, found w/ sepsis 2/2 L food wound, treated w/ IV Cefepime, Vanco and Flagyl. w/u showed occluded L SFA stent. Started on hep gtt. Transferred to [facility] for potential endovascular recanalization of the SFA stent.   On arrival to [facility], aaox3, wound dressing in place on L foot.    Admit as Inpatient for evaluation and treatment of sepsis d/t L foot wound, occluded SFA stent.  Plan: Vascular and podiatry consulted. Plan for intervention of occluded SFA stent. F/u cxs. Cont IV abx. Mon WBC. Cont ASA, statin. Cont hep gtt. Consult ID. LWC.     Anticipated Length of Stay/Certification Statement: Patient will be admitted on an inpatient basis with an anticipated length of stay of greater than 2 midnights secondary to Foot wound.     ID Quick Notes: Pt w/ severe infection of TMA site.  Taken to OR for washout. Cultures with S. Aureus and anaerobes. On broad spectrum antibiotics. Hemodynamically stable without fever or WBC. Transferred to [facility] for vascular evaluation. Concern for occlusion of recently placed left SFA stent. A-gram underway. Continue current antibiotics and ID will see formally tomorrow.     IR Procedure Note:   Date: 1/31/2025   Procedure:   LLE thrombectomy  Right arm PICC placement  Anesthesia: conscious sedation and local   Findings:   Occluded left SFA, thrombectomy performed with residual thrombus.  Thrombolysis initiated with 1 mg/hr TPA.  Plan for for lysis check tomorrow.  Right arm PICC placement.  All blood draws and labs to be done through right arm PICC.    Critical Care Consult: Pt w/ >75% stenosis of the SFA,  underwent an IR thrombectomy with lysis cath placement today and presents to the ICU for closer monitoring. C/o extreme pain, describing it as a spasm/tight sensation in her L leg that has worsened since the procedure. Multimodal pain control. Start dilaudid PCA, 0cc cont, 0.2mg q10 mins with 1.2mg/hr lock out. Cont statin, asa. Tele. Cont hep gtt. Cont TPA via R fem sheath. Cont Vanco, Cefepime, flagyl. LWC.     Date: 02/01   Day 2:   CC Notes: Pt s/p IR thrombectomy and TPA lysis cath placement. C/o significant pain overnight, improved with dilaudid PCA. Plan to return to IR today for recheck. Cont TPA and heparin gtt. Mon serial PTT. Monitor fibrinogen per protocol, goal fibrinogen >150. Consider holding TPA or transfusing cryoprecipitate if fibrinogen falls below 150. continue abx per ID - MRSA from wound culture. Serial neuro exams. Local care at sheath site. Continue ICU care while thrombolysis continuing    ID Consult: Patient underwent I&D on 1/29/2025 with a large abscess found with purulent fluid and necrotic tissue drained and debrided.  Wound cultures are growing MRSA and Fine[place]ia.  MRI and operative findings not consistent with osteomyelitis. Cont  IV Vanco. [facility]. Serial exams.     IR Procedure Notes:   Date: 2/1/2025   Procedure: LLE lysis check   Anesthesia: conscious sedation and local   Findings:   Residual thrombus along left SFA treated with 4 mm balloon angioplasty.  No flow noted within left SFA following angioplasty.  Lysis re-started through lysis catheter in the left SFA to popliteal artery.  Return tomorrow for lysis check.      ED Treatment-Medication Administration - No Administrations Displayed (No Start Event Found)       None            Scheduled Medications:  acetaminophen, 975 mg, Oral, Q6H JORGE  aspirin, 81 mg, Oral, Daily  atorvastatin, 80 mg, Oral, Daily With Dinner  chlorhexidine, 15 mL, Mouth/Throat, Q12H JORGE  clopidogrel, 75 mg, Oral, Daily  gabapentin, 300 mg, Oral, BID (AM & Afternoon)  gabapentin, 600 mg, Oral, HS  insulin glargine, 10 Units, Subcutaneous, HS  insulin lispro, 1-6 Units, Subcutaneous, Q6H JORGE  magnesium sulfate, 4 g, Intravenous, Once  methocarbamol, 750 mg, Oral, Q6H JORGE  senna-docusate sodium, 1 tablet, Oral, HS  vancomycin, 750 mg, Intravenous, Q8H  ceFEPime (MAXIPIME) 2,000 mg in dextrose 5 % 50 mL IVPB  Dose: 2,000 mg  Freq: Every 12 hours Route: IV  Last Dose: Stopped (02/01/25 0000)  Start: 01/31/25 0700 End: 02/01/25 0922  metroNIDAZOLE (FLAGYL) IVPB (premix) 500 mg 100 mL  Dose: 500 mg  Freq: Every 8 hours Route: IV  Last Dose: 500 mg (02/01/25 0642)  Start: 01/31/25 0230 End: 02/01/25 0922      Continuous IV Infusions:  heparin (porcine), 3-30 Units/kg/hr (Order-Specific), Intravenous, Titrated  HYDROmorphone, , Intravenous, Continuous  alteplase (CATHFLO) 10 mg in sodium chloride 0.9 % 250 mL infusion  Rate: 25 mL/hr Dose: 1 mg/hr  Freq: Continuous Route: IK  Last Dose: Stopped (02/02/25 1453)  Start: 02/02/25 1245 End: 02/02/25 1548  alteplase (CATHFLO) 10 mg in sodium chloride 0.9 % 250 mL infusion  Rate: 25 mL/hr Dose: 1 mg/hr  Freq: Continuous Route: IK  Last Dose: Stopped (02/02/25 7741)  Start:  01/31/25 1900 End: 02/02/25 1149  heparin 1000 units in 500 mL infusion (premix) for sheath patency  Rate: 10 mL/hr Dose: 20 Units/hr  Freq: Continuous Route: IV  Last Dose: Stopped (02/02/25 1452)  Start: 02/01/25 2130 End: 02/02/25 1548  heparin 1000 units in 500 mL infusion (premix) for sheath patency  Rate: 10 mL/hr Dose: 20 Units/hr  Freq: Continuous Route: IV  Last Dose: Stopped (02/01/25 2129)  Start: 01/31/25 1900 End: 02/01/25 1859  multi-electrolyte (PLASMALYTE-A/ISOLYTE-S PH 7.4) IV solution  Rate: 75 mL/hr Dose: 75 mL/hr  Freq: Continuous Route: IV  Last Dose: Stopped (02/02/25 1633)  Start: 02/01/25 0445 End: 02/02/25 1628      PRN Meds:  diazepam, 5 mg, Intravenous, Q8H PRN  diphenhydrAMINE, 25 mg, Oral, Q6H PRN  heparin (porcine), 2,000 Units, Intravenous, Q6H PRN 02/01 x 2  heparin (porcine), 6,800 Units, Intravenous, Q6H PRN  HYDROmorphone, 0.5 mg, Intravenous, Q4H PRN 01/31 x 4, 02/01 x 2  ondansetron, 4 mg, Intravenous, Q6H PRN 01/31 x 1, 02/01 x 2      ED Triage Vitals   Temperature Pulse Respirations Blood Pressure SpO2 Pain Score   01/31/25 0442 01/31/25 0324 01/31/25 0442 01/31/25 0442 01/31/25 0806 01/31/25 0055   98.7 °F (37.1 °C) 94 18 130/80 96 % 10 - Worst Possible Pain     Weight (last 2 days)       Date/Time Weight    02/03/25 0600 90.4 (199.3)            Vital Signs (last 3 days)       Date/Time Temp Pulse Resp BP MAP (mmHg) SpO2 FiO2 (%) Calculated FIO2 (%) - Nasal Cannula Nasal Cannula O2 Flow Rate (L/min) O2 Device Cardiac (WDL) Patient Position - Orthostatic VS Kiko Coma Scale Score Pain    02/03/25 0830 97.8 °F (36.6 °C) 89 27 103/60 77 98 % 22 -- -- -- -- -- -- --    02/03/25 0815 -- 86 12 106/59 -- 98 % 22 -- -- -- -- -- -- --    02/03/25 0813 98.3 °F (36.8 °C) 86 14 112/60 77 99 % 22 -- -- -- -- -- -- --    02/03/25 0800 -- 88 19 -- -- 99 % 22 28 2 L/min Nasal cannula -- -- -- --    02/03/25 0633 -- -- -- -- -- -- -- -- -- -- -- -- -- 8    02/03/25 0611 98.4 °F (36.9 °C) --  16 134/63 90 -- -- -- -- None (Room air) -- Lying -- 7    02/03/25 0400 -- 97 18 152/70 101 -- 22 -- -- -- -- -- -- --    02/03/25 0007 -- -- -- -- -- -- -- -- -- -- -- -- -- 5    02/03/25 0000 98 °F (36.7 °C) 100 13 131/63 90 93 % 26 -- -- -- -- -- -- --    02/02/25 2300 -- 96 12 -- -- 99 % 56 -- -- -- -- -- -- --    02/02/25 2200 -- 97 20 144/67 96 98 % 21 -- -- -- -- -- -- --    02/02/25 2100 -- 104 17 98/64 75 95 % -- -- -- -- -- -- -- --    02/02/25 2000 98.3 °F (36.8 °C) 92 19 107/58 80 92 % -- -- -- None (Room air) -- Lying -- --    02/02/25 1945 -- -- -- -- -- -- -- -- -- -- -- -- 15 5    02/02/25 1858 -- 91 19 -- -- 99 % 21 -- -- -- -- -- -- --    02/02/25 1800 -- 93 19 114/59 82 100 % 21 36 4 L/min Nasal cannula -- Lying -- --    02/02/25 1600 99.2 °F (37.3 °C) 92 20 123/73 92 99 % 21 36 4 L/min Nasal cannula -- Lying 15 --    02/02/25 1510 -- 102 16 134/60 87 98 % 21 -- -- -- -- Lying -- --    02/02/25 1505 -- 98 16 113/60 -- 97 % 21 -- -- -- -- -- -- --    02/02/25 1500 -- 97 16 120/70 90 97 % 21 -- -- -- -- Lying -- --    02/02/25 1455 -- 100 16 121/68 -- 93 % 21 -- -- -- -- -- -- --    02/02/25 1450 -- 97 16 120/71 -- 96 % 21 -- -- -- -- -- -- --    02/02/25 1445 -- 91 16 121/73 -- 99 % 21 -- -- -- -- -- -- --    02/02/25 1440 -- 90 16 122/71 -- 99 % 21 -- -- -- -- -- -- --    02/02/25 1435 -- 90 16 135/73 -- 99 % 21 -- -- -- -- -- -- --    02/02/25 1430 -- 88 16 131/80 -- 100 % 21 -- -- -- -- -- -- --    02/02/25 1425 -- 90 16 116/69 -- 100 % 21 -- -- -- -- -- -- --    02/02/25 1420 -- 87 16 117/68 -- 100 % 22 -- -- Nasal cannula -- -- -- --    02/02/25 1415 -- 86 16 131/60 -- 100 % 22 36 4 L/min Nasal cannula -- -- -- --    02/02/25 1300 99.5 °F (37.5 °C) 85 16 108/58 77 100 % -- 36 4 L/min Nasal cannula -- Lying -- --    02/02/25 1200 99.5 °F (37.5 °C) -- -- -- -- 100 % -- 36 4 L/min Nasal cannula -- Lying 15 5    02/02/25 1126 -- -- -- -- -- -- -- -- -- -- -- -- -- 9    02/02/25 0930 99.5 °F (37.5  °C) 86 12 133/63 91 99 % -- 36 4 L/min Nasal cannula -- Lying -- 4    02/02/25 0900 99.7 °F (37.6 °C) 93 13 142/74 -- 100 % -- 36 4 L/min Nasal cannula -- -- -- --    02/02/25 0838 -- -- -- -- -- -- -- -- -- -- -- -- -- 10 - Worst Possible Pain    02/02/25 0830 99.5 °F (37.5 °C) 94 22 138/65 -- 100 % -- 36 4 L/min Nasal cannula -- -- -- --    02/02/25 0825 99.5 °F (37.5 °C) 93 16 129/72 -- 100 % -- -- -- -- -- -- -- --    02/02/25 0815 99.5 °F (37.5 °C) 92 16 136/63 -- 100 % -- 36 4 L/min Nasal cannula -- -- -- --    02/02/25 0800 99.5 °F (37.5 °C) 91 22 136/63 93 100 % -- 36 4 L/min Nasal cannula -- Lying -- 7    02/02/25 0730 99.5 °F (37.5 °C) -- -- -- -- 100 % -- 36 4 L/min Nasal cannula -- -- 15 8    02/02/25 0700 99.7 °F (37.6 °C) 87 9 117/58 83 100 % -- -- -- -- -- -- -- --    02/02/25 0648 99.7 °F (37.6 °C) 89 18 108/58 -- 100 % -- -- -- -- -- -- -- --    02/02/25 0628 99.7 °F (37.6 °C) 90 21 106/54 -- 100 % -- -- -- -- -- -- -- --    02/02/25 0618 99.7 °F (37.6 °C) 93 18 127/60 -- 100 % -- -- -- -- -- -- -- --    02/02/25 0617 -- -- -- -- -- -- -- -- -- -- -- -- -- 3    02/02/25 0605 99.9 °F (37.7 °C) 89 14 101/58 -- -- -- -- -- -- -- -- -- --    02/02/25 0400 99.1 °F (37.3 °C) 95 25 119/62 82 100 % -- 36 4 L/min Nasal cannula -- Lying 15 5    02/02/25 0004 -- -- -- -- -- -- -- -- -- -- -- -- -- 6    02/02/25 0000 99.5 °F (37.5 °C) 92 10 134/63 88 100 % -- 36 4 L/min Nasal cannula -- -- 15 No Pain    02/01/25 2300 99.5 °F (37.5 °C) 91 18 96/56 71 100 % -- -- -- -- -- -- -- --    02/01/25 2200 99.3 °F (37.4 °C) 91 21 101/55 72 100 % -- -- -- -- -- -- -- --    02/01/25 2000 99.1 °F (37.3 °C) 93 29 130/60 85 100 % -- 36 4 L/min Nasal cannula -- -- 15 3    02/01/25 1933 -- -- -- -- -- -- -- -- -- -- -- -- 15 4    02/01/25 1900 99 °F (37.2 °C) 101 21 120/58 82 100 % -- -- -- -- -- -- -- --    02/01/25 1800 98.2 °F (36.8 °C) 98 21 127/87 98 99 % -- 36 4 L/min Nasal cannula -- Lying 15 3    02/01/25 1700 98.2 °F  (36.8 °C) 93 12 133/67 95 96 % -- 36 4 L/min Nasal cannula -- Lying 15 2    02/01/25 1600 98.2 °F (36.8 °C) 100 14 144/86 106 98 % -- 36 4 L/min Nasal cannula -- Lying 15 No Pain    02/01/25 1500 98.6 °F (37 °C) 102 15 136/64 92 -- -- -- -- -- -- Lying 15 --    02/01/25 1400 -- 101 19 -- -- -- -- -- -- -- -- -- 15 --    02/01/25 1300 -- 97 15 122/71 91 -- -- -- -- -- -- -- 15 --    02/01/25 1230 -- -- -- -- -- 98 % -- 36 4 L/min Nasal cannula -- -- 15 4    02/01/25 1200 -- 102 -- 120/64 -- 100 % -- 36 4 L/min Nasal cannula -- -- -- --    02/01/25 1150 -- 97 18 120/64 -- 100 % -- 36 4 L/min Nasal cannula -- -- -- --    02/01/25 1136 -- 100 18 139/78 -- 98 % -- 36 4 L/min Nasal cannula -- -- -- --    02/01/25 1131 -- 99 18 135/74 -- 100 % -- 36 4 L/min Nasal cannula -- -- -- --    02/01/25 1126 -- 98 16 139/74 -- 100 % -- 36 4 L/min Nasal cannula -- -- -- --    02/01/25 1121 -- 100 16 142/77 -- 100 % -- 36 4 L/min Nasal cannula -- -- -- --    02/01/25 1116 -- 100 16 144/78 -- 100 % -- 36 4 L/min Nasal cannula -- -- -- --    02/01/25 1111 -- 99 16 145/78 -- -- -- 36 4 L/min Nasal cannula -- -- -- --    02/01/25 1106 -- 101 16 136/76 -- 100 % -- 36 4 L/min Nasal cannula -- -- -- --    02/01/25 1101 -- 100 18 138/79 -- 100 % -- 36 4 L/min Nasal cannula -- -- -- --    02/01/25 1100 -- -- 18 -- -- -- -- 36 4 L/min Nasal cannula -- -- -- --    02/01/25 1056 -- 100 18 128/75 -- 100 % -- 36 4 L/min Nasal cannula -- -- -- --    02/01/25 1051 -- 100 18 150/74 -- 100 % -- 36 4 L/min Nasal cannula -- -- -- --    02/01/25 1041 -- 96 18 136/75 -- 100 % -- 36 4 L/min Nasal cannula -- -- -- --    02/01/25 1036 -- 99 18 135/76 -- 100 % -- 36 4 L/min Nasal cannula -- -- -- --    02/01/25 1031 -- 96 18 134/71 -- 100 % -- 36 4 L/min Nasal cannula -- -- -- --    02/01/25 1026 -- 98 18 152/83 -- 100 % -- 36 4 L/min Nasal cannula -- -- -- --    02/01/25 1022 -- 100 -- 171/75 -- 100 % -- -- -- -- -- -- -- --    02/01/25 1021 -- 100 -- -- --  100 % -- -- -- -- -- -- -- --    02/01/25 0930 -- 104 29 189/88 126 100 % -- 36 4 L/min Nasal cannula -- Lying -- 10 - Worst Possible Pain    02/01/25 0801 99 °F (37.2 °C) 100 25 126/59 85 100 % -- 36 4 L/min Nasal cannula -- Lying 15 10 - Worst Possible Pain    02/01/25 0710 99 °F (37.2 °C) -- -- -- -- 100 % -- 36 4 L/min Nasal cannula -- -- 15 10 - Worst Possible Pain    02/01/25 0600 98.6 °F (37 °C) 90 16 155/70 100 100 % -- -- -- -- -- -- -- --    02/01/25 0500 98.2 °F (36.8 °C) 94 15 147/70 100 95 % -- -- -- -- -- -- -- --    02/01/25 0400 98.4 °F (36.9 °C) 86 12 126/69 92 92 % -- 36 4 L/min Nasal cannula -- Lying 15 --    02/01/25 0300 98.4 °F (36.9 °C) 87 13 114/60 83 94 % -- -- -- -- -- -- -- --    02/01/25 0200 99 °F (37.2 °C) 94 12 128/59 85 90 % -- -- -- -- -- -- -- --    02/01/25 0100 99.5 °F (37.5 °C) 101 18 144/81 101 98 % -- -- -- -- -- -- -- --    02/01/25 0040 -- -- -- -- -- -- -- -- -- -- -- -- -- 10 - Worst Possible Pain    02/01/25 0000 99.7 °F (37.6 °C) 107 20 -- -- 98 % -- 32 3 L/min Nasal cannula -- -- 15 --    01/31/25 2300 99.7 °F (37.6 °C) 102 12 -- -- 98 % -- -- -- -- -- -- -- --    01/31/25 2200 -- 113 22 134/77 110 97 % -- 28 2 L/min Nasal cannula -- Lying 15 --    01/31/25 2133 -- -- -- -- -- -- -- -- -- -- -- -- -- 10 - Worst Possible Pain    01/31/25 2100 -- 110 18 165/96 116 99 % 21 32 3 L/min Nasal cannula WDL -- 15 10 - Worst Possible Pain    01/31/25 2058 -- -- -- -- -- -- -- -- -- -- -- -- -- 10 - Worst Possible Pain    01/31/25 2045 -- 112 20 174/79 113 100 % 21 32 3 L/min Nasal cannula -- -- -- --    01/31/25 2042 -- -- -- -- -- -- -- -- -- -- -- -- -- 10 - Worst Possible Pain    01/31/25 2030 -- 100 20 157/74 107 100 % 21 32 3 L/min Nasal cannula -- -- -- --    01/31/25 2015 -- 99 18 146/67 97 94 % 21 32 3 L/min Nasal cannula -- -- -- --    01/31/25 2013 -- -- -- -- -- -- -- -- -- -- -- -- -- 6    01/31/25 2000 98.4 °F (36.9 °C) 98 18 156/78 110 97 % 21 32 3 L/min Nasal  [facility] -- 15 6    01/31/25 1940 -- 98 18 143/73 101 99 % 21 -- -- -- -- -- -- --    01/31/25 1935 -- 99 18 146/72 103 98 % 21 -- -- -- -- -- -- --    01/31/25 1930 -- 98 18 148/78 107 99 % 21 -- -- -- -- -- -- --    01/31/25 1925 -- 100 18 152/72 104 97 % 21 -- -- -- -- -- -- --    01/31/25 1920 -- 100 18 151/85 112 100 % 21 -- -- -- -- -- -- --    01/31/25 1915 -- 98 18 162/76 111 100 % 21 -- -- -- -- -- -- --    01/31/25 1910 -- 97 18 163/77 111 100 % 21 -- -- -- -- -- -- --    01/31/25 1905 -- 99 18 157/76 109 100 % 21 -- -- -- -- -- -- --    01/31/25 1900 -- 96 18 149/79 103 100 % 21 -- -- -- -- -- -- --    01/31/25 1855 -- 97 18 143/66 -- 100 % 21 -- -- -- -- -- -- --    01/31/25 1850 -- 98 18 149/72 -- 100 % 21 -- -- -- -- -- -- --    01/31/25 1845 -- 99 18 154/73 -- 100 % 21 -- -- -- -- -- -- --    01/31/25 1840 -- 97 18 150/84 -- 100 % 21 -- -- -- -- -- -- --    01/31/25 1835 -- 95 18 141/70 -- 100 % 21 -- -- -- -- -- -- --    01/31/25 1830 -- 95 18 141/67 -- 100 % 21 -- -- -- -- -- -- --    01/31/25 1825 -- 96 18 156/76 -- 100 % 21 -- -- -- -- -- -- --    01/31/25 1820 -- 95 18 144/72 -- 100 % 21 -- -- -- -- -- -- --    01/31/25 1815 -- 93 18 143/65 -- 100 % 21 -- -- -- -- -- -- --    01/31/25 1810 -- 94 18 134/75 -- 100 % 21 -- -- -- -- -- -- --    01/31/25 1805 -- 97 18 132/80 -- 100 % 21 -- -- -- -- -- -- --    01/31/25 1800 -- 98 18 126/60 -- 99 % 21 -- -- -- -- -- -- --    01/31/25 1755 -- 93 18 138/73 -- 100 % 21 -- -- -- -- -- -- --    01/31/25 1750 -- 89 18 139/74 -- 98 % 21 -- -- -- -- -- -- --    01/31/25 1745 -- 87 18 140/73 -- 100 % 21 -- -- -- -- -- -- --    01/31/25 1740 -- 91 18 133/65 -- 100 % 21 -- -- -- -- -- -- --    01/31/25 1735 -- 89 18 128/74 -- 100 % 21 -- -- -- -- -- -- --    01/31/25 1730 -- 90 18 137/68 94 97 % 21 32 3 L/min -- -- -- -- --    01/31/25 1725 -- 89 18 139/75 100 100 % 21 -- -- -- -- -- -- --    01/31/25 1720 -- 88 18 139/73 99 100 % 21 -- -- -- -- -- -- --     01/31/25 1715 -- 89 18 137/71 97 100 % 21 28 2 L/min Nasal cannula -- -- -- --    01/31/25 1710 -- 87 18 144/76 103 100 % 21 -- -- -- -- -- -- --    01/31/25 1705 -- 85 18 140/73 101 100 % 21 -- -- -- -- -- -- --    01/31/25 1700 -- 88 18 146/77 104 98 % 21 -- -- -- -- -- -- --    01/31/25 1655 -- 92 18 141/78 105 98 % 21 -- -- -- -- -- -- --    01/31/25 1650 -- 91 18 140/75 100 98 % 21 -- -- -- -- -- -- --    01/31/25 1645 -- 88 18 142/83 107 -- -- -- -- -- -- -- -- --    01/31/25 1640 -- 90 18 148/75 105 99 % 21 -- -- -- -- -- -- --    01/31/25 1024 -- -- -- -- -- -- -- -- -- -- -- -- -- 10 - Worst Possible Pain    01/31/25 08:06:44 98.1 °F (36.7 °C) 97 18 122/78 93 96 % -- -- -- -- -- -- -- --    01/31/25 0800 -- -- -- -- -- -- 21 -- -- -- -- -- 15 --    01/31/25 0618 -- -- -- -- -- -- -- -- -- -- -- -- -- 8    01/31/25 0442 98.7 °F (37.1 °C) -- 18 130/80 -- -- -- -- -- -- -- Lying -- 8 01/31/25 0433 -- -- -- -- -- -- -- -- -- None (Room air) -- -- -- 7    01/31/25 0324 -- 94 -- -- -- -- -- -- -- -- -- -- -- --    01/31/25 0308 -- -- -- -- -- -- -- -- -- None (Room air) -- -- 15 --    01/31/25 0304 -- -- -- -- -- -- -- -- -- -- -- -- -- 6    01/31/25 0300 -- -- -- -- -- -- -- -- -- None (Room air) -- -- -- --    01/31/25 0055 -- -- -- -- -- -- -- -- -- -- -- -- -- 10 - Worst Possible Pain              Pertinent Labs/Diagnostic Test Results:   Radiology:  IR TPA lysis check   Final Interpretation by Yamil Davidson MD (02/02 6266)      1. Left deep femoral, superficial femoral, popliteal, tibioperoneal trunk, and anterior tibial arteries were patent.   2. Mild residual stenoses in the left superficial femoral and popliteal arteries treated with 4 mm balloon angioplasty.   3. Mild stenoses along the proximal left anterior tibial artery treated with 3 mm and 2.5 mm balloon angioplasties.   4. Left posterior tibial and peroneal arteries appeared chronically occluded distally.      Plan:      -Continue anticoagulation.    -Continue aspirin and statin.            Workstation performed: [id number]         IR TPA lysis check   Final Interpretation by Yamil Davidson MD (02/01 3269)      1. Residual luminal irregularities in the left superficial femoral artery treated with 4 mm balloon angioplasty.   2. Post angioplasty arteriogram showed reocclusion of left SFA, likely due to residual thrombus.   3. Lysis catheter replaced extending from the left SFA into the popliteal artery.      Plan:      -tPA to run through the lysis catheter at 1 mg/h.   -Return for lysis check tomorrow.            Workstation performed: [id number]         IR lower extremity angiogram   Final Interpretation by Yamil Davidson MD (01/31 2142)      1. Thrombosed left superficial femoral artery treated with aspiration thrombectomy and 4 mm balloon angioplasty with residual thrombus.   2. Thrombolysis initiated through a lysis catheter in the left SFA.   3. Right arm PICC placement through basilic vein access.      Plan:      -tPA to run at 1 mg/hr through the lysis catheter.   -All blood draws and labwork to be done through PICC.   -Return for lysis check tomorrow.               Workstation performed: [id number]         IR PICC placement double lumen   Final Interpretation by Yamil Davidson MD (01/31 2142)      1. Thrombosed left superficial femoral artery treated with aspiration thrombectomy and 4 mm balloon angioplasty with residual thrombus.   2. Thrombolysis initiated through a lysis catheter in the left SFA.   3. Right arm PICC placement through basilic vein access.      Plan:      -tPA to run at 1 mg/hr through the lysis catheter.   -All blood draws and labwork to be done through PICC.   -Return for lysis check tomorrow.               Workstation performed: [id number]         CT head wo contrast   Final Interpretation by James Gimenez MD (01/31 0748)      1. No intracranial hemorrhage seen.   2. Bilateral sphenoid sinusitis                  Workstation  performed: [id number]           Cardiology:  No orders to display     GI:  No orders to display           Results from last 7 days   Lab Units 02/03/25  0624 02/02/25  1647 02/02/25  1149 02/02/25  1035 02/02/25  0344 01/31/25 2231 01/31/25  0432 01/30/25  0519   WBC Thousand/uL 10.57* 10.45* 11.10*  --  10.91*   < > 13.64* 10.67*   HEMOGLOBIN g/dL 6.8* 7.7* 7.5* 7.7* 6.2*   < > 9.0* 9.1*   HEMATOCRIT % 21.2* 23.6* 23.6* 24.5* 19.8*   < > 28.9* 28.3*   PLATELETS Thousands/uL 169 160 167  --  184   < > 270 207   TOTAL NEUT ABS Thousands/µL 6.77  --   --   --  6.79  --   --  7.40   BANDS PCT %  --   --   --   --   --   --  1  --     < > = values in this interval not displayed.         Results from last 7 days   Lab Units 02/03/25  0624 02/02/25 0344 02/01/25  0558 01/31/25 2231 01/31/25  0432   SODIUM mmol/L 138 132* 131* 134* 125*   POTASSIUM mmol/L 4.1 4.1 4.1 4.1 4.0   CHLORIDE mmol/L 104 99 99 102 94*   CO2 mmol/L 28 28 23 24 20*   ANION GAP mmol/L 6 5 9 8 11   BUN mg/dL 11 13 13 12 20   CREATININE mg/dL 0.53* 0.70 0.56* 0.57* 0.74   EGFR ml/min/1.73sq m 112 102 110 109 96   CALCIUM mg/dL 8.0* 7.9* 8.2* 9.1 9.2   MAGNESIUM mg/dL 1.5*  --   --   --   --    PHOSPHORUS mg/dL 3.0  --   --   --   --      Results from last 7 days   Lab Units 01/31/25 2231 01/28/25  1820   AST U/L 14 14   ALT U/L 13 20   ALK PHOS U/L 110* 106*   TOTAL PROTEIN g/dL 7.2 8.3   ALBUMIN g/dL 3.3* 3.8   TOTAL BILIRUBIN mg/dL 0.35 0.38     Results from last 7 days   Lab Units 02/03/25  0616 02/03/25  0011 02/02/25  2252 02/02/25  1645 02/02/25  1147 02/02/25  0627 02/02/25  0007 02/01/25  1719 02/01/25  1238 02/01/25  0557 01/31/25  2252 01/31/25  1211   POC GLUCOSE mg/dl 125 183* 175* 114 153* 156* 147* 163* 116 129 118 158*     Results from last 7 days   Lab Units 02/03/25  0624 02/02/25  0344 02/01/25  0558 01/31/25  2231 01/31/25  0432 01/30/25  0519 01/29/25  0530 01/28/25  1820   GLUCOSE RANDOM mg/dL 118 181* 138 119 169* 184* 203*  "316*             No results found for: \"BETA-HYDROXYBUTYRATE\"                           Results from last 7 days   Lab Units 02/03/25  0624 02/02/25  2310 02/02/25  1647 02/01/25  0000 01/31/25  2231 01/31/25  1214 01/31/25  0629   PROTIME seconds  --   --  14.1  --  13.6  --  13.4   INR   --   --  1.05  --  1.01  --  0.99   PTT seconds 96* 57* 37*   < > 38*   < > 91*    < > = values in this interval not displayed.         Results from last 7 days   Lab Units 01/29/25  0530 01/28/25  1820   PROCALCITONIN ng/ml 0.25 0.22     Results from last 7 days   Lab Units 01/28/25  2113 01/28/25  1820   LACTIC ACID mmol/L 1.1 3.0*                         Results from last 7 days   Lab Units 02/03/25  0803 02/03/25  0555   UNIT PRODUCT CODE  [id number]  [id number]   UNIT NUMBER  [id number]  [id number]   UNITABO  A A  A   UNITRH  NEG POS  POS   CROSSMATCH  Compatible Compatible  Compatible   UNIT DISPENSE STATUS  Issued Presumed Trans  Presumed Trans   UNIT PRODUCT VOL mL 350 300  300             Results from last 7 days   Lab Units 01/28/25  1820   CRP mg/L 95.2*   SED RATE mm/hour 104*                                             Results from last 7 days   Lab Units 01/29/25  1507 01/28/25  1820   BLOOD CULTURE   --  No Growth After 5 Days.  No Growth After 5 Days.   GRAM STAIN RESULT  Rare Gram positive cocci in pairs*  No polys seen*  --              Results from last 7 days   Lab Units 02/02/25  0344 01/31/25  2231 01/30/25  0519   VANCOMYCIN RM ug/mL 25.9* 10.8 18.2       Past Medical History:   Diagnosis Date    Advanced maternal age in multigravida, second trimester 11/30/2016    Transitioned From: Advanced maternal age in multigravida, first trimester      Back pain     used 2 percocet tid until current admission in 2/2017    Bone spur     in back per pt    Chronic hypertension with superimposed preeclampsia 02/14/2017    Depression     Diabetes mellitus (HCC)     Elevated BP " "without diagnosis of hypertension     \"with pain\"    Foot injury     Full dentures     does not wear    GERD (gastroesophageal reflux disease)     occas    Gestational diabetes     insulin dependent    Herniated cervical disc     Herniated lumbar intervertebral disc     History of pneumonia     Hx of degenerative disc disease     Hyperlipidemia     Obesity     Pre-eclampsia     Prior pregnancy with fetal demise     previous demise at 36 weeks    Toe pain     and foot pain     Present on Admission:   Cellulitis of left foot   Diabetes mellitus type 2 with complications (HCC)   Hyponatremia   (Resolved) Type II diabetes mellitus (HCC)   (Resolved) Sepsis due to cellulitis (HCC)   PAD (peripheral artery disease) (HCC)   Diabetic foot infection  (HCC)      Admitting Diagnosis: Sepsis due to cellulitis (HCC) [L03.90, A41.9]  Age/Sex: 49 y.o. female    Network Utilization Review Department  ATTENTION: Please call with any questions or concerns to [phone] and carefully listen to the prompts so that you are directed to the right person. All voicemails are confidential.   For Discharge needs, contact Care Management DC Support Team at [phone] opt. [phone]  Send all requests for admission clinical reviews, approved or denied determinations and any other requests to dedicated fax number below belonging to the campus where the patient is receiving treatment. List of dedicated fax numbers for the Facilities:  FACILITY NAME UR FAX NUMBER   ADMISSION DENIALS (Administrative/Medical Necessity) [phone]   DISCHARGE SUPPORT TEAM (NETWORK) [phone]   PARENT CHILD HEALTH (Maternity/NICU/Pediatrics) [phone]   [facility] [phone]   [facility] [phone]   [facility] [phone]   [facility] [phone]   [facility] [phone]   [facility] [phone] "   [facility] [phone]   [facility] [phone]   [facility] [phone]   [facility] [phone]   [facility] [phone]   [facility] [phone]

## 2025-02-03 NOTE — ASSESSMENT & PLAN NOTE
Blood loss anemia 2/2 lysis, bleeding from LLE TMA.       - Dressing and wound care per podiatry recs   - Recheck CBC and transfuse to maintain Hb > 7

## 2025-02-03 NOTE — PROGRESS NOTES
Vancomycin IV Pharmacy-to-Dose Consultation    Lizzy Acuna is a 49 y.o. female who is currently receiving Vancomycin IV with management by the Pharmacy Consult service.    Relevant clinical data and objective / subjective history reviewed.      Vancomycin Assessment:  Indication: Soft tissue (goal -600, trough >10)    Status: critically ill  Micro:   - 1/29 Anaerobic: Finegoldia magna  - 1/29 Tissue: MRSA  Renal Function:     Lab Results   Component Value Date    CREATININE 0.53 (L) 02/03/2025     Estimated Creatinine Clearance: 133.4 mL/min (A) (by C-G formula based on SCr of 0.53 mg/dL (L)).  Dialysis: no  Days of Therapy: 7  Current Dose: 750 mg IV q12h   Goal AUC / Trough: -600, trough >10   Last Level: 25.9 on 2/2  Model Fit:  Good  Assessment: WBC wnl, afebrile. ID following.       Vancomycin Plan:  New Dosing: change to 750 mg IV q8h   Predicted Trough / AUC: 15.1 / 482  Next Level: on 2/9 at 0600  Renal Function Monitoring: Daily BMP and UOP      Pharmacy will continue to follow closely for s/sx of nephrotoxicity, infusion reactions and appropriateness of therapy.  BMP and CBC will be ordered per protocol. We will continue to follow the patient’s culture results and clinical progress daily.       Rio Gu, PharmD, BCCCP  Critical Care Clinical Pharmacist  Available via Secure Chat

## 2025-02-03 NOTE — ASSESSMENT & PLAN NOTE
s/p left SFA thrombectomy on (01/31/25) however still with residual thrombus  s/p lysis check and angioplasty (02/01/25)   s/p lysis check with angioplasty (02/03/25)      - continues with DAPT/statin    - continues with heparin gtt    - s/p PRBC x2 units yesterday, 1 unit today      - continue dilaudid PCA at 0 / 0.2mg / q5min / 2.2mg hourly limit  (utilized 4.6mg IV in last 24hr)    - transition to PO once tolerating PO (likely PO dilaudid as oxycodone ineffective the past)     - continue IV dilaudid 0.5mg Q3H for breakthrough pain, wound dressing changes     Multimodal Analgesia:    - continue robaxin 750mg PO q6h jay     - continue tylenol 975mg PO q6h jay    - continue gabapentin 200mg/200mg/600mg    - modify IV valium to PO valium 5mg Q8H prn for muscle spasms     - consider starting duloxetine 30mg PO daily for musculoskeletal and neuropathic pain      - ondansetron and Reglan PRN for nausea    - consider aprepitant/fosaprepitant, may have to reach out to pharmacy

## 2025-02-03 NOTE — DISCHARGE INSTR - AVS FIRST PAGE
DISCHARGE INSTRUCTIONS  AMPUTATION    REHABILITATION:   You will require some form of rehabilitation after this procedure. This will assist with your return to daily activities by incorporating exercise and balance training. This may be in the form of visiting nurses and physical therapy in your home or more commonly, admission to a rehabilitation facility for a period of time before you return home.     ACTIVITY:  You cannot put any weight on the bottom of your residual limb. Physical therapy and rehab staff will direct progression of your activity based on your progress. Please follow their recommendations closely.    It is incredibly important to avoid falling on your residual limb as this can cause bleeding and the wound to open up.  Your surgeon will decide when you are ready to be referred to the prosthetist for a fitting.  This will occur sometime after your 4-week appointment and often later.    DIET:  Resume your normal diet.  Good nutrition is important for healing of your incision.    INCISION:  Wash incision daily with soap and water and thoroughly pat dry.  Apply clean, dry gauze and an ACE wrap daily to decrease swelling and get your stump ready to be fit for a prosthetic.  It is normal to have swelling or discoloration around the incision. If increasing redness or pain develops, call our office immediately.    You will have stitches or staples and these will be evaluated for removal at your first post-operative visit around 4 weeks after surgery. If any of your incisions are open and require dressing changes, you will be given instructions for your daily incision care. If you are not able to change the dressings, a visiting nurse will be arranged.  Do not bathe in a tub or swim until your incision is completely healed.  DO NOT put any powders, creams, ointments, or lotions on your incision.    FOLLOW UP APPOINTMENTS:  Making and keeping follow up appointments and ultrasound tests are important to your  recovery.  If you have difficulty making it to or keeping your follow up appointments, call the office.    If you have increased pain, fever >101.5, increased drainage, redness or a bad smell at your surgery site, new coldness/numbness of your arm or leg, please call us immediately and GO directly to the ER.    Appt bety/ Geraldine Andujar PA-C: 3/14/2025 at 1pm, [place] office      PLEASE CALL THE OFFICE IF YOU HAVE ANY QUESTIONS  [phone]  [phone] [address], [place] [address]  [address] [place] [address]  [address] [place] [address], [place] [address]  [address] [facility], 1st Floor, [place] [address]  [address] [facility], [address], [place] [address]  [address] [facility], [address], [place] [address]  [address] [facility], Entrance A, 2nd Floor, [place] [address]  [address] [facility], 1st Floor, [address], [place] [address]  [address], [address], [place] [address]  [address] [facility], [address], [place] [address]     _______________________________________________________________________________    DISCHARGE INSTRUCTIONS  ARTERIOGRAM/ANGIOPLASTY/STENT    ACTIVITY: On the evening following the procedure, you should be mostly resting.  Someone should remain with you during the evening and overnight following the procedure.     On the day after your procedure, limit your activity to walking.  Avoid heavy lifting (no more than 15 lbs) for the first three days. Walking up steps and normal activities may be resumed as you feel ready.   You should not drive a car for at least two days following discharge from the hospital. You may ride in a car.   If you have any questions regarding a particular activity, please discuss with your doctor or nurse before you are discharged.    DIET:  Resume your normal diet.  Drink more water than usual for the next 24 hours.    PROCEDURE SITE: You may have a procedure site in your groin, arm, or foot.  You may have  surgical glue at your procedure site.  The glue is used to cover the procedure site, assist in closure, and prevent contamination. This adhesive will darken and peel away on its own within one to two weeks. Do not pick at it.    You should shower daily.  Wash incision daily with soap and water, but do not rub or scrub the incision; rinse thoroughly and pat dry.  Do not bathe in a tub or swim for the first 2 week following your procedure or if you have any open wounds.  It is normal to have some bruising, swelling or discoloration around the procedure site.  IF increasing redness, pain, or a bulge develops, call our office immediately.    If present, you may remove the band-aid or “steri-strips” over your procedure site after two days.   If you notice any active bleeding at the site, apply pressure to the site and call our office ([phone]) or [phone].    FOLLOW UP STUDIES:  Your doctor will discuss whether further treatments or follow-up studies are necessary at your first post procedure visit.    FOLLOW UP APPOINTMENTS:  Making and keeping follow up appointments and ultrasound tests are important to your recovery.  If you have difficulty making it to or keeping your follow up appointments, call the office.    If you have increased pain, fever >101.5, increased drainage, redness or a bad smell at your surgery site, new coldness/numbness of your arm or leg, please call us immediately and GO directly to the ER.      PLEASE CALL THE OFFICE IF YOU HAVE ANY QUESTIONS  [phone]  [phone] [address], [address], [place] [address]  [address] [place] [address]  [address] [place] [address]  360 [facility], 1st Floor, [place] [address]  [address] [facility], [address], [place] [address]  [address] [facility], [address], [place] [address]  [address] [facility], Entrance A, 2nd Floor, [place] [address]  [address] [facility], 1st Floor, [address], [facility]  [place] [address]  [address] [facility], [place] [address]  [facility], [address], [place] [address]

## 2025-02-03 NOTE — ASSESSMENT & PLAN NOTE
Lab Results   Component Value Date    HGBA1C 10.3 (H) 12/13/2024       Recent Labs     02/02/25  1645 02/02/25  2252 02/03/25  0011 02/03/25  0616   POCGLU 114 175* 183* 125       Blood Sugar Average: Last 72 hrs:  (P) 151.5252066969332346

## 2025-02-03 NOTE — ASSESSMENT & PLAN NOTE
Lab Results   Component Value Date    HGBA1C 10.3 (H) 12/13/2024       Recent Labs     02/02/25  1645 02/02/25  2252 02/03/25  0011 02/03/25  0616   POCGLU 114 175* 183* 125       Blood Sugar Average: Last 72 hrs:  (P) [id number]

## 2025-02-03 NOTE — QUICK NOTE
Vascular Surgery    Called to re-examine patient due to pain in calf and concern for pulse exam.    Pt examines same as prior evaluation at 18:30.    Left calf and lower leg are warm, dorsi and plantar flexes at ankle, sensation intact, tender to palpation same as prior during dressing change, compartments are soft.     Left Pop & AT signal present by doppler. No PT or peroneal expected based on chronic occlusion as per arteriography.    Based on wound/foot examination earlier suspect Left foot may not be salvageable despite revascularization, patient is at high risk for requiring BKA.    Optimize analgesia.

## 2025-02-03 NOTE — PROGRESS NOTES
Progress Note - Acute Pain   Name: Lizzy Acuna 49 y.o. female I MRN: [id number]  Unit/Bed#: [facility] 514-01 I Date of Admission: 1/31/2025   Date of Service: 2/3/2025 I Hospital Day: 3    Assessment & Plan  PAD (peripheral artery disease) ([facility])  s/p left SFA thrombectomy on (01/31/25) however still with residual thrombus  s/p lysis check and angioplasty (02/01/25)   s/p lysis check with angioplasty (02/03/25)      - continues with DAPT/statin    - continues with heparin gtt    - s/p PRBC x2 units yesterday, 1 unit today      - continue dilaudid PCA at 0 / 0.2mg / q5min / 2.2mg hourly limit  (utilized 4.6mg IV in last 24hr)    - transition to PO once tolerating PO (likely PO dilaudid as oxycodone ineffective the past)     - continue IV dilaudid 0.5mg Q3H for breakthrough pain, wound dressing changes     Multimodal Analgesia:    - continue robaxin 750mg PO q6h jay     - continue tylenol 975mg PO q6h jay    - continue gabapentin 200mg/200mg/600mg    - modify IV valium to PO valium 5mg Q8H prn for muscle spasms     - consider starting duloxetine 30mg PO daily for musculoskeletal and neuropathic pain      - ondansetron and Reglan PRN for nausea    - consider aprepitant/fosaprepitant, may have to reach out to pharmacy      APS will continue to follow. Please contact Acute Pain Service - via SecureChat from [phone] with additional questions or concerns. See [employer] or [employer] for additional contacts and after hours information.     Subjective   Lizzy Acuna is a 49 y.o. female with PMHx of PAD who presented after elective TMA weeks prior, found to have wound dehiscence and evidence of cellulitis.  Patient was transferred to [facility] and underwent thrombectomy with lysis catheters on (01/31/25).  Since then, patient continues with pain of the LLE and most recently vascular surgery notes that she is a high risk for requiring BKA despite revascularization.    Today, patient was resting in the chair at bedside and had been  progressing well however over the course of the morning she has developed significant nausea and vomiting with >700cc of emesis.  Had initially planned for possible transition off dPCA to oral medications however now patient likely to be NPO until her nausea/vomiting improves.  Discussed that once she is tolerating PO, we can transition back to mainly oral regimen, and possible add a medication such as duloxetine in an effort to improve opioid sparing musculoskeletal and neuropathic pain.  Patient was understanding and in agreement with the plan.      Pain History  Current pain location(s):  Pain Score: 8  Pain Location/Orientation: Orientation: Left, Location: Foot  Pain Scale: Pain Assessment Tool: 0-10  24 hour history: see assessment    Opioid requirement previous 24 hours: dilaudid PCA = 4.6mg IV    Meds/Allergies   all current active meds have been reviewed and current meds:   Current Facility-Administered Medications:     acetaminophen (TYLENOL) tablet 975 mg, Q6H JORGE    aspirin (ECOTRIN LOW STRENGTH) EC tablet 81 mg, Daily    atorvastatin (LIPITOR) tablet 80 mg, Daily With Dinner    chlorhexidine (PERIDEX) 0.12 % oral rinse 15 mL, Q12H JORGE    clopidogrel (PLAVIX) tablet 75 mg, Daily    diazepam (VALIUM) injection 5 mg, Q8H PRN    diphenhydrAMINE (BENADRYL) tablet 25 mg, Q6H PRN    gabapentin (NEURONTIN) capsule 300 mg, BID (AM & Afternoon)    gabapentin (NEURONTIN) capsule 600 mg, HS    heparin (porcine) 25,000 units in 0.45% NaCl 250 mL infusion (premix), Titrated, Last Rate: 18 Units/kg/hr (02/03/25 0805)    heparin (porcine) injection 3,400 Units, Q6H PRN    heparin (porcine) injection 6,800 Units, Q6H PRN    HYDROmorphone (DILAUDID) 1 mg/mL 50 mL PCA, Continuous    HYDROmorphone (DILAUDID) injection 0.5 mg, Q3H PRN    insulin glargine (LANTUS) subcutaneous injection 10 Units 0.1 mL, HS    insulin lispro (HumALOG/ADMELOG) 100 units/mL subcutaneous injection 1-6 Units, Q6H JORGE **AND** Fingerstick Glucose  "(POCT), Q6H    magnesium sulfate 4 g/100 mL IVPB (premix) 4 g, Once, Last Rate: 4 g (02/03/25 0803)    methocarbamol (ROBAXIN) tablet 750 mg, Q6H JORGE    ondansetron (ZOFRAN) injection 4 mg, Q6H PRN    senna-docusate sodium (SENOKOT S) 8.6-50 mg per tablet 1 tablet, HS    vancomycin 750 mg in sodium chloride 0.9% 250 mL, Q8H  Allergies   Allergen Reactions    Codeine Other (See Comments)     Aggression-and nasty--\"took a cough syrup with codeine as a child\"     Objective :  Temp:  [97.8 °F (36.6 °C)-99.5 °F (37.5 °C)] 97.8 °F (36.6 °C)  HR:  [] 86  BP: ()/(58-80) 115/61  Resp:  [12-27] 15  SpO2:  [92 %-100 %] 97 %  O2 Device: Nasal cannula  Nasal Cannula O2 Flow Rate (L/min):  [2 L/min-4 L/min] 2 L/min  FiO2 (%):  [21-56] 21    Physical Exam  Vitals and nursing note reviewed.   Constitutional:       Appearance: She is not toxic-appearing or diaphoretic.   HENT:      Head: Normocephalic and atraumatic.      Nose: Nose normal.      Mouth/Throat:      Mouth: Mucous membranes are moist.      Pharynx: Oropharynx is clear.   Eyes:      General: No scleral icterus.     Conjunctiva/sclera: Conjunctivae normal.   Cardiovascular:      Rate and Rhythm: Normal rate and regular rhythm.   Pulmonary:      Effort: Pulmonary effort is normal. No respiratory distress.      Breath sounds: Normal breath sounds. No wheezing.   Abdominal:      General: Abdomen is flat. There is no distension.      Palpations: Abdomen is soft.      Tenderness: There is no abdominal tenderness.   Musculoskeletal:         General: Tenderness present.      Comments: s/p preivous left TMA   Neurological:      General: No focal deficit present.      Mental Status: She is alert and oriented to person, place, and time. Mental status is at baseline.   Psychiatric:         Mood and Affect: Mood normal.         Behavior: Behavior normal.         Thought Content: Thought content normal.         Judgment: Judgment normal.            Lab Results: I have " reviewed the following results:  Estimated Creatinine Clearance: 133.4 mL/min (A) (by C-G formula based on SCr of 0.53 mg/dL (L)).  Lab Results   Component Value Date    WBC 10.57 (H) 02/03/2025    HGB 6.8 (L) 02/03/2025    HCT 21.2 (L) 02/03/2025     02/03/2025         Component Value Date/Time    K 4.1 02/03/2025 0624     02/03/2025 0624    CO2 28 02/03/2025 0624    BUN 11 02/03/2025 0624    CREATININE 0.53 (L) 02/03/2025 0624         Component Value Date/Time    CALCIUM 8.0 (L) 02/03/2025 0624    ALKPHOS 110 (H) 01/31/2025 2231    AST 14 01/31/2025 2231    ALT 13 01/31/2025 2231    TP 7.2 01/31/2025 2231    ALB 3.3 (L) 01/31/2025 2231

## 2025-02-03 NOTE — PROGRESS NOTES
Progress Note - Infectious Disease   Name: Lizzy Acuna 49 y.o. female I MRN: [id number]  Unit/Bed#: [facility] 514-01 I Date of Admission: 1/31/2025   Date of Service: 2/3/2025 I Hospital Day: 3    Assessment & Plan  Sepsis due to cellulitis (HCC) (Resolved: 2/2/2025)  In the setting of a left forefoot abscess.  Clinically improved and seems to be tolerating the antibiotics without difficulty. Remains afebrile and hemodynamically stable.   -IV antibiotics as below  -Recheck CBC with differential and BMP to make sure no developing toxicities  -Supportive care  -Continue to monitor temperature/vitals  Cellulitis of left foot  Complicated by large abscess.  Patient underwent I&D on 1/29/2025 with a large abscess found with purulent fluid and necrotic tissue drained and debrided.  Wound cultures are growing MRSA and Finegoldia.  MRI and operative findings not consistent with osteomyelitis.  -Continue intravenous vancomycin (dosing per pharmacy) for now given nausea/vomiting  -Anticipate eventual transition to PO to complete a 7-10d total antibiotic course from I&D  -Appreciate podiatry recommendations  -Local wound care  -Serial exams  PAD (peripheral artery disease) (HCC)  Status post angiogram with intervention including left SFA thrombectomy and placement of lysis catheter on 1/31/2025.  Seems to have much improved perfusion.  -Ongoing anticoagulation  -Close vascular follow-up  Diabetes mellitus type 2 with complications (HCC)  Poor control with a hemoglobin A1c of 10.3.  Risk factor for recurrent infection.  -Tighten diabetic control  Obesity (BMI 30-39.9)  As a risk factor for recurrent infection. Could also affect antibiotic dosing.     I have discussed the above management plan in detail with the primary service.   Infectious Disease service will follow.    Antibiotics:  IV vancomycin D#7    Subjective   Patient has no fevers or chills. Reports ongoing nausea and active vomiting this morning. States that the nausea  worsens when she gets up from laying to sitting or standing. No abdominal pain or diarrhea. Also reporting ongoing left lower extremity pain, specifically in her calf. No pain currently in her foot. No cough, shortness of breath. Tolerating IV vancomycin.     Objective :  Temp:  [97.8 °F (36.6 °C)-99.5 °F (37.5 °C)] 97.8 °F (36.6 °C)  HR:  [] 86  BP: ()/(58-80) 115/61  Resp:  [12-27] 15  SpO2:  [92 %-100 %] 97 %  O2 Device: Nasal cannula  Nasal Cannula O2 Flow Rate (L/min):  [2 L/min-4 L/min] 2 L/min  FiO2 (%):  [21-56] 21    General:  No acute distress, chronically ill appearing, appears uncomfortable, nontoxic  Psychiatric:  Awake and alert, answers questions appropriately  Pulmonary:  Normal respiratory excursion without accessory muscle use, on 2L NC. Lungs grossly CTA bilaterally.   Heart: RRR, no murmurs appreciated  Abdomen:  Soft, nontender, non-distended  Extremities:  left foot with ACE bandage in place. Bruising noted to groin.   Skin:  No rashes noted to exposed skin      Lab Results: I have reviewed the following results:  Results from last 7 days   Lab Units 02/03/25  0624 02/02/25  1647 02/02/25  1149   WBC Thousand/uL 10.57* 10.45* 11.10*   HEMOGLOBIN g/dL 6.8* 7.7* 7.5*   PLATELETS Thousands/uL 169 160 167     Results from last 7 days   Lab Units 02/03/25  0624 02/02/25  0344 02/01/25  0558 01/31/25  2231 01/29/25  0530 01/28/25  1820   SODIUM mmol/L 138 132* 131* 134*   < > 129*   POTASSIUM mmol/L 4.1 4.1 4.1 4.1   < > 4.0   CHLORIDE mmol/L 104 99 99 102   < > 91*   CO2 mmol/L 28 28 23 24   < > 25   BUN mg/dL 11 13 13 12   < > 20   CREATININE mg/dL 0.53* 0.70 0.56* 0.57*   < > 0.88   EGFR ml/min/1.73sq m 112 102 110 109   < > 77   CALCIUM mg/dL 8.0* 7.9* 8.2* 9.1   < > 9.9   AST U/L  --   --   --  14  --  14   ALT U/L  --   --   --  13  --  20   ALK PHOS U/L  --   --   --  110*  --  106*   ALBUMIN g/dL  --   --   --  3.3*  --  3.8    < > = values in this interval not displayed.      Results from last 7 days   Lab Units 01/29/25  1507 01/28/25  1820   BLOOD CULTURE   --  No Growth After 5 Days.  No Growth After 5 Days.   GRAM STAIN RESULT  Rare Gram positive cocci in pairs*  No polys seen*  --      Results from last 7 days   Lab Units 01/29/25  0530 01/28/25  1820   PROCALCITONIN ng/ml 0.25 0.22     Results from last 7 days   Lab Units 01/28/25  1820   CRP mg/L 95.2*             Imaging Results Review: No pertinent imaging studies reviewed.  Other Study Results Review: No additional pertinent studies reviewed.      Giovana Lyn PA-C  [facility]

## 2025-02-03 NOTE — ASSESSMENT & PLAN NOTE
In the setting of a left forefoot abscess.  Clinically improved and seems to be tolerating the antibiotics without difficulty. Remains afebrile and hemodynamically stable.   -IV antibiotics as below  -Recheck CBC with differential and BMP to make sure no developing toxicities  -Supportive care  -Continue to monitor temperature/vitals

## 2025-02-03 NOTE — PROGRESS NOTES
"Progress Note - Internal Medicine   Name: Lizzy Acuna 49 y.o. female I MRN: [id number]  Unit/Bed#: [facility] 514-01 I Date of Admission: 1/31/2025   Date of Service: 2/3/2025 I Hospital Day: 3     Assessment & Plan  Cellulitis of left foot  Underwent an elective R TMA approx 3 weeks ago and was doing well until last week when she was noticed to have dehiscence of her surgical wound.   The wound was debrided by her podiatrist in the office but ultimately worsened with evidence of cellulitis.   LLE foot infection diagnosed clinically without MRI findings of osteomyelitis.   Tissue culture: 2+ MRSA, 2+ Finegoldia magna No significant leukocytosis or fever this admission. Site continues to bleed likely due to triple therapy   A1c 10.3    Plan:   Wound care consulted   Long term wound vac application vs DPC pending vascular recs   ID following, Continue intravenous vancomycin and transition to PO when stable for discharge  Will attempt wound vac, podiatry following for limb salvaging options   Pain regimen of Tylenol 975 mg q6, gabapentin 300 mg daily, robaxin 500 mg q6, dilaudid 1 mg/mL PCA, dilaudid 0.5 mg q3 PRN  PAD (peripheral artery disease) ([facility])  Transferred to [facility] for vascular surgery and IR intervention after Lower Extremity Dopplers showed a >75% stenosis in the distal superficial femoral artery. Full study results are listed below  \"There fernanda 50-75% stenosis in the tibio-peroneal trunk. Diffuse disease throughout the  remaining femoro-popliteal and tibio-peroneal segments.\"  Now S/P Agram, Left SFA thrombectomy, placement of lysis catheter on 1/31  S/p lysis recheck on 2/2 with residual thrombus within L SFA.  S/p lysis recheck w/ angioplasty of L SFA, pop, AT on 2/3.     Plan:  Continue heparin gtt, aspirin 81 mg, Plavix   Diabetes mellitus type 2 with complications (HCC)  Lab Results   Component Value Date    HGBA1C 10.3 (H) 12/13/2024       Recent Labs     02/02/25  1645 02/02/25  2252 02/03/25  0011 " 02/03/25  0616   POCGLU 114 175* 183* 125       Blood Sugar Average: Last 72 hrs:  (P) 151.8962681870043082  - Lispro 10u QHS with sliding scale   Hyponatremia  Chronic euvolemic hyponatremia that was 133 on admission. Asymptomatic and no recent history of confusion, altered metal status, seizure, or coma.    Current differential includes SIADH in the setting of acute pain      Monitor BMP  Low threshold to workup with serum and urine studies if hyponatremia worsens      Diabetic foot infection  (HCC)  Lab Results   Component Value Date    HGBA1C 10.3 (H) 12/13/2024       Recent Labs     02/02/25  1645 02/02/25  2252 02/03/25  0011 02/03/25  0616   POCGLU 114 175* 183* 125       Blood Sugar Average: Last 72 hrs:  (P) 151.3889545670701497    Blood loss anemia  Blood loss anemia 2/2 lysis, bleeding from LLE TMA.       - Dressing and wound care per podiatry recs   - Recheck CBC and transfuse to maintain Hb > 7    Disposition: Inpatient      Team: SOD TEAM B    Subjective   Patient seen and examined. No acute events overnight.     Objective :  Temp:  [98 °F (36.7 °C)-99.7 °F (37.6 °C)] 98.3 °F (36.8 °C)  HR:  [] 86  BP: ()/(58-80) 106/59  Resp:  [12-20] 12  SpO2:  [92 %-100 %] 98 %  O2 Device: Nasal cannula  Nasal Cannula O2 Flow Rate (L/min):  [2 L/min-4 L/min] 2 L/min  FiO2 (%):  [21-56] 22    I/O         02/01 0701  02/02 0700 02/02 0701  02/03 0700 02/03 0701  02/04 0700    P.O.  360 220    I.V. (mL/kg) 2126.3 (24.3) 730.9 (8.1) 66.7 (0.7)    Blood  950     IV Piggyback 750.8 150.4     Total Intake(mL/kg) 2877.2 (32.9) 2191.3 (24.2) 286.7 (3.2)    Urine (mL/kg/hr) 1090 (0.5) 1140 (0.5)     Emesis/NG output 50 250     Stool 0 0     Total Output 1140 1390     Net +1737.2 +801.3 +286.7           Unmeasured Urine Occurrence 0 x      Unmeasured Stool Occurrence 0 x 2 x           Weights:   IBW (Ideal Body Weight): 48.26 kg    Body mass index is 37.41 kg/m².  Weight (last 2 days)       Date/Time Weight     02/03/25 0600 90.4 (199.3)            Physical Exam  Vitals and nursing note reviewed.   Constitutional:       General: She is not in acute distress.     Appearance: She is well-developed.   HENT:      Head: Normocephalic and atraumatic.   Eyes:      Conjunctiva/sclera: Conjunctivae normal.   Cardiovascular:      Rate and Rhythm: Normal rate and regular rhythm.      Heart sounds: No murmur heard.  Pulmonary:      Effort: Pulmonary effort is normal. No respiratory distress.      Breath sounds: Normal breath sounds.   Abdominal:      Palpations: Abdomen is soft.      Tenderness: There is no abdominal tenderness.   Musculoskeletal:         General: No swelling.      Cervical back: Neck supple.      Right lower leg: Edema present.   Skin:     General: Skin is warm and dry.      Capillary Refill: Capillary refill takes less than 2 seconds.      Findings: Erythema and lesion present.   Neurological:      Mental Status: She is alert.   Psychiatric:         Mood and Affect: Mood normal.           Lab Results: I have reviewed the following results:  Recent Labs     01/31/25  2231 02/01/25  0000 02/02/25  1647 02/02/25  2310 02/03/25  0624   WBC 8.68   < > 10.45*  --  10.57*   HGB 8.2*   < > 7.7*  --  6.8*   HCT 25.3*   < > 23.6*  --  21.2*      < > 160  --  169   SODIUM 134*   < >  --   --  138   K 4.1   < >  --   --  4.1      < >  --   --  104   CO2 24   < >  --   --  28   BUN 12   < >  --   --  11   CREATININE 0.57*   < >  --   --  0.53*   GLUC 119   < >  --   --  118   MG  --   --   --   --  1.5*   PHOS  --   --   --   --  3.0   AST 14  --   --   --   --    ALT 13  --   --   --   --    ALB 3.3*  --   --   --   --    TBILI 0.35  --   --   --   --    ALKPHOS 110*  --   --   --   --    PTT 38*   < > 37*   < > 96*   INR 1.01  --  1.05  --   --     < > = values in this interval not displayed.             Currently Ordered Meds:   Current Facility-Administered Medications:     acetaminophen (TYLENOL) tablet 975 mg,  Q6H JORGE    aspirin (ECOTRIN LOW STRENGTH) EC tablet 81 mg, Daily    atorvastatin (LIPITOR) tablet 80 mg, Daily With Dinner    chlorhexidine (PERIDEX) 0.12 % oral rinse 15 mL, Q12H JORGE    clopidogrel (PLAVIX) tablet 75 mg, Daily    diazepam (VALIUM) injection 5 mg, Q8H PRN    diphenhydrAMINE (BENADRYL) tablet 25 mg, Q6H PRN    gabapentin (NEURONTIN) capsule 300 mg, BID (AM & Afternoon)    gabapentin (NEURONTIN) capsule 600 mg, HS    heparin (porcine) 25,000 units in 0.45% NaCl 250 mL infusion (premix), Titrated, Last Rate: 18 Units/kg/hr (02/03/25 0805)    heparin (porcine) injection 3,400 Units, Q6H PRN    heparin (porcine) injection 6,800 Units, Q6H PRN    HYDROmorphone (DILAUDID) 1 mg/mL 50 mL PCA, Continuous    HYDROmorphone (DILAUDID) injection 0.5 mg, Q3H PRN    insulin glargine (LANTUS) subcutaneous injection 10 Units 0.1 mL, HS    insulin lispro (HumALOG/ADMELOG) 100 units/mL subcutaneous injection 1-6 Units, Q6H JORGE **AND** Fingerstick Glucose (POCT), Q6H    magnesium sulfate 4 g/100 mL IVPB (premix) 4 g, Once, Last Rate: 4 g (02/03/25 0803)    methocarbamol (ROBAXIN) tablet 750 mg, Q6H JORGE    ondansetron (ZOFRAN) injection 4 mg, Q6H PRN    senna-docusate sodium (SENOKOT S) 8.6-50 mg per tablet 1 tablet, HS    vancomycin 750 mg in sodium chloride 0.9% 250 mL, Q8H  VTE Pharmacologic Prophylaxis: Heparin  VTE Mechanical Prophylaxis: sequential compression device    Administrative Statements     Portions of the record may have been created with voice recognition software.

## 2025-02-03 NOTE — ASSESSMENT & PLAN NOTE
49 y.o. F w/ PMH of T2DM (A1c 10.3), tobacco use (quit Dec 2024) who was previously seen at [facility] in December with wound of left foot with cellulitis.   During previous admission vascular surgery was consulted and recommended LLE agram w/ intervention which was preformed by IR. Post-procedure pt underwent TMA with podiatry.  Now S/P Agram, Left SFA thrombectomy, placement of lysis catheter on 1/31  S/p lysis recheck on 2/2 with residual thrombus within L SFA.  S/p lysis recheck w/ angioplasty of L SFA, pop, AT on 2/3.    Hgb 6.8 from 7.7  WBC 10.6 from 10.5  Cr 0.5 from 0.7    Plan  Cont DAPT/statin  Cont heparin gtt  Local wound care psb VAC per Podiatry

## 2025-02-03 NOTE — PROGRESS NOTES
Progress Note - Vascular Surgery   Name: Lizzy Acuna 49 y.o. female I MRN: [id number]  Unit/Bed#: [facility] 514-01 I Date of Admission: 1/31/2025   Date of Service: 2/3/2025 I Hospital Day: 3    Assessment & Plan  Diabetic foot infection  (HCC)  49 y.o. F w/ PMH of T2DM (A1c 10.3), tobacco use (quit Dec 2024) who was previously seen at [facility] in December with wound of left foot with cellulitis.   During previous admission vascular surgery was consulted and recommended LLE agram w/ intervention which was preformed by IR. Post-procedure pt underwent TMA with podiatry.  Now S/P Agram, Left SFA thrombectomy, placement of lysis catheter on 1/31  S/p lysis recheck on 2/2 with residual thrombus within L SFA.  S/p lysis recheck w/ angioplasty of L SFA, pop, AT on 2/3.    Hgb 6.8 from 7.7  WBC 10.6 from 10.5  Cr 0.5 from 0.7    Plan  Cont DAPT/statin  Cont heparin gtt  Local wound care psb VAC per Podiatry       Diabetes mellitus type 2 with complications (HCC)  Lab Results   Component Value Date    HGBA1C 10.3 (H) 12/13/2024       Recent Labs     02/02/25  1645 02/02/25  2252 02/03/25  0011 02/03/25  0616   POCGLU 114 175* 183* 125       Blood Sugar Average: Last 72 hrs:  (P) [id number]    Hyponatremia    Cellulitis of left foot    PAD (peripheral artery disease) (HCC)    Blood loss anemia      24 Hour Events : events noted above  Subjective : Seen and examined at bedside. Continues to report L leg-calf pain that's controlled with dPCA. Denies L foot pain. Able to move L ankle. Feels nauseous this morning, but overall in better spirits compared to prior days.    Objective :  Temp:  [98 °F (36.7 °C)-99.7 °F (37.6 °C)] 98.4 °F (36.9 °C)  HR:  [] 100  BP: ()/(58-80) 134/63  Resp:  [12-22] 16  SpO2:  [92 %-100 %] 93 %  O2 Device: None (Room air)  Nasal Cannula O2 Flow Rate (L/min):  [4 L/min] 4 L/min  FiO2 (%):  [21-56] 26     I/O         02/01 0701 02/02 0700 02/02 0701 02/03 0700 02/03 0701 02/04 0700     "P.O.  360     I.V. (mL/kg) 2126.3 (24.3) 728.7 (8.3)     Blood  950     IV Piggyback 750.8 150.4     Total Intake(mL/kg) 2877.2 (32.9) 2189.1 (25)     Urine (mL/kg/hr) 1090 (0.5) 1140 (0.5)     Emesis/NG output 50 250     Stool 0 0     Total Output 1140 1390     Net +1737.2 +799.1            Unmeasured Urine Occurrence 0 x      Unmeasured Stool Occurrence 0 x 2 x           Lines/Drains/Airways       Active Status       Name Placement date Placement time Site Days    PICC Line 01/31/25 01/31/25  1937  --  2                  Physical Exam  General: NAD  HENT: NCAT MMM  Neck: supple, no JVD  CV: nl rate  Lungs: nl wob. No resp distress  ABD: Soft, nontender, protuberant/nondistended. R groin access CDI without signs of swelling, fluctuance or erythema.  Pulse exam: Monophasic L AT signal.  Neuro: AAOx3. M/S intact to BLE      Lab Results: I have reviewed the following results:  CBC with diff:   Lab Results   Component Value Date    WBC 10.57 (H) 02/03/2025    HGB 6.8 (L) 02/03/2025    HCT 21.2 (L) 02/03/2025    MCV 91 02/03/2025     02/03/2025    RBC 2.34 (L) 02/03/2025    MCH 29.1 02/03/2025    MCHC 32.1 02/03/2025    RDW 14.4 02/03/2025    MPV 9.4 02/03/2025    NRBC 0 02/03/2025   ,   BMP/CMP:  Lab Results   Component Value Date    SODIUM 138 02/03/2025    K 4.1 02/03/2025     02/03/2025    CO2 28 02/03/2025    BUN 11 02/03/2025    CREATININE 0.53 (L) 02/03/2025    CALCIUM 8.0 (L) 02/03/2025    AST 14 01/31/2025    ALT 13 01/31/2025    ALKPHOS 110 (H) 01/31/2025    EGFR 112 02/03/2025   ,   Lipid Panel: No results found for: \"CHOL\",   Coags:   Lab Results   Component Value Date    PTT 96 (H) 02/03/2025    INR 1.05 02/02/2025   ,   Blood Culture:   Lab Results   Component Value Date    BLOODCX No Growth After 5 Days. 01/28/2025    BLOODCX No Growth After 5 Days. 01/28/2025   ,   Urinalysis:   Lab Results   Component Value Date    COLORU Straw 11/05/2018    CLARITYU Clear 11/05/2018    SPECGRAV 1.020 " 11/05/2018    PHUR 6.0 11/05/2018    LEUKOCYTESUR Negative 11/05/2018    NITRITE Negative 11/05/2018    GLUCOSEU 3+ (A) 11/05/2018    KETONESU Negative 11/05/2018    BILIRUBINUR Negative 11/05/2018    BLOODU Trace-Intact (A) 11/05/2018   ,   Urine Culture:   Lab Results   Component Value Date    URINECX 50,000-59,000 cfu/ml Mixed Contaminants X4 10/21/2016   ,   Wound Culure:   Lab Results   Component Value Date    WOUNDCULT 1+ Growth of Enterobacter gergoviae (A) 06/13/2018    WOUNDCULT (A) 06/13/2018     2+ Growth of Methicillin Resistant Staphylococcus aureus             VTE Prophylaxis: VTE covered by:  heparin (porcine), Intravenous, 20 Units/kg/hr at 02/03/25 0019  heparin (porcine), Intravenous, 3,400 Units at 02/03/25 0021  heparin (porcine), Intravenous

## 2025-02-03 NOTE — PLAN OF CARE
Problem: PAIN - ADULT  Goal: Verbalizes/displays adequate comfort level or baseline comfort level  Description: Interventions:  - Encourage patient to monitor pain and request assistance  - Assess pain using appropriate pain scale  - Administer analgesics based on type and severity of pain and evaluate response  - Implement non-pharmacological measures as appropriate and evaluate response  - Consider cultural and social influences on pain and pain management  - Notify physician/advanced practitioner if interventions unsuccessful or patient reports new pain  Outcome: Progressing     Problem: INFECTION - ADULT  Goal: Absence or prevention of progression during hospitalization  Description: INTERVENTIONS:  - Assess and monitor for signs and symptoms of infection  - Monitor lab/diagnostic results  - Monitor all insertion sites, i.e. indwelling lines, tubes, and drains  - Monitor endotracheal if appropriate and nasal secretions for changes in amount and color  - [place] appropriate cooling/warming therapies per order  - Administer medications as ordered  - Instruct and encourage patient and family to use good hand hygiene technique  - Identify and instruct in appropriate isolation precautions for identified infection/condition  Outcome: Progressing  Goal: Absence of fever/infection during neutropenic period  Description: INTERVENTIONS:  - Monitor WBC    Outcome: Progressing     Problem: SAFETY ADULT  Goal: Patient will remain free of falls  Description: INTERVENTIONS:  - Educate patient/family on patient safety including physical limitations  - Instruct patient to call for assistance with activity   - Consult OT/PT to assist with strengthening/mobility   - Keep Call bell within reach  - Keep bed low and locked with side rails adjusted as appropriate  - Keep care items and personal belongings within reach  - Initiate and maintain comfort rounds  - Make Fall Risk Sign visible to staff  - Offer Toileting every  Hours,  in advance of need  - Initiate/Maintain alarm  - Obtain necessary fall risk management equipment:   - Apply yellow socks and bracelet for high fall risk patients  - Consider moving patient to room near nurses station  Outcome: Progressing  Goal: Maintain or return to baseline ADL function  Description: INTERVENTIONS:  -  Assess patient's ability to carry out ADLs; assess patient's baseline for ADL function and identify physical deficits which impact ability to perform ADLs (bathing, care of mouth/teeth, toileting, grooming, dressing, etc.)  - Assess/evaluate cause of self-care deficits   - Assess range of motion  - Assess patient's mobility; develop plan if impaired  - Assess patient's need for assistive devices and provide as appropriate  - Encourage maximum independence but intervene and supervise when necessary  - Involve family in performance of ADLs  - Assess for home care needs following discharge   - Consider OT consult to assist with ADL evaluation and planning for discharge  - Provide patient education as appropriate  Outcome: Progressing  Goal: Maintains/Returns to pre admission functional level  Description: INTERVENTIONS:  - Perform AM-PAC 6 Click Basic Mobility/ Daily Activity assessment daily.  - Set and communicate daily mobility goal to care team and patient/family/caregiver.   - Collaborate with rehabilitation services on mobility goals if consulted  - Perform Range of Motion  times a day.  - Reposition patient every hours.  - Dangle patient  times a day  - Stand patient  times a day  - Ambulate patient  times a day  - Out of bed to chair  times a day   - Out of bed for meals  times a day  - Out of bed for toileting  - Record patient progress and toleration of activity level   Outcome: Progressing     Problem: DISCHARGE PLANNING  Goal: Discharge to home or other facility with appropriate resources  Description: INTERVENTIONS:  - Identify barriers to discharge w/patient and caregiver  - Arrange for  needed discharge resources and transportation as appropriate  - Identify discharge learning needs (meds, wound care, etc.)  - Arrange for interpretive services to assist at discharge as needed  - Refer to Case Management Department for coordinating discharge planning if the patient needs post-hospital services based on physician/advanced practitioner order or complex needs related to functional status, cognitive ability, or social support system  Outcome: Progressing     Problem: Knowledge Deficit  Goal: Patient/family/caregiver demonstrates understanding of disease process, treatment plan, medications, and discharge instructions  Description: Complete learning assessment and assess knowledge base.  Interventions:  - Provide teaching at level of understanding  - Provide teaching via preferred learning methods  Outcome: Progressing     Problem: NEUROSENSORY - ADULT  Goal: Achieves stable or improved neurological status  Description: INTERVENTIONS  - Monitor and report changes in neurological status  - Monitor vital signs such as temperature, blood pressure, glucose, and any other labs ordered   - Initiate measures to prevent increased intracranial pressure  - Monitor for seizure activity and implement precautions if appropriate      Outcome: Progressing  Goal: Remains free of injury related to seizures activity  Description: INTERVENTIONS  - Maintain airway, patient safety  and administer oxygen as ordered  - Monitor patient for seizure activity, document and report duration and description of seizure to physician/advanced practitioner  - If seizure occurs,  ensure patient safety during seizure  - Reorient patient post seizure  - Seizure pads on all 4 side rails  - Instruct patient/family to notify RN of any seizure activity including if an aura is experienced  - Instruct patient/family to call for assistance with activity based on nursing assessment  - Administer anti-seizure medications if ordered    Outcome:  Progressing  Goal: Achieves maximal functionality and self care  Description: INTERVENTIONS  - Monitor swallowing and airway patency with patient fatigue and changes in neurological status  - Encourage and assist patient to increase activity and self care.   - Encourage visually impaired, hearing impaired and aphasic patients to use assistive/communication devices  Outcome: Progressing     Problem: CARDIOVASCULAR - ADULT  Goal: Maintains optimal cardiac output and hemodynamic stability  Description: INTERVENTIONS:  - Monitor I/O, vital signs and rhythm  - Monitor for S/S and trends of decreased cardiac output  - Administer and titrate ordered vasoactive medications to optimize hemodynamic stability  - Assess quality of pulses, skin color and temperature  - Assess for signs of decreased coronary artery perfusion  - Instruct patient to report change in severity of symptoms  Outcome: Progressing  Goal: Absence of cardiac dysrhythmias or at baseline rhythm  Description: INTERVENTIONS:  - Continuous cardiac monitoring, vital signs, obtain 12 lead EKG if ordered  - Administer antiarrhythmic and heart rate control medications as ordered  - Monitor electrolytes and administer replacement therapy as ordered  Outcome: Progressing     Problem: RESPIRATORY - ADULT  Goal: Achieves optimal ventilation and oxygenation  Description: INTERVENTIONS:  - Assess for changes in respiratory status  - Assess for changes in mentation and behavior  - Position to facilitate oxygenation and minimize respiratory effort  - Oxygen administered by appropriate delivery if ordered  - Initiate smoking cessation education as indicated  - Encourage broncho-pulmonary hygiene including cough, deep breathe, Incentive Spirometry  - Assess the need for suctioning and aspirate as needed  - Assess and instruct to report SOB or any respiratory difficulty  - Respiratory Therapy support as indicated  Outcome: Progressing     Problem: GASTROINTESTINAL -  ADULT  Goal: Minimal or absence of nausea and/or vomiting  Description: INTERVENTIONS:  - Administer IV fluids if ordered to ensure adequate hydration  - Maintain NPO status until nausea and vomiting are resolved  - Nasogastric tube if ordered  - Administer ordered antiemetic medications as needed  - Provide nonpharmacologic comfort measures as appropriate  - Advance diet as tolerated, if ordered  - Consider nutrition services referral to assist patient with adequate nutrition and appropriate food choices  Outcome: Progressing  Goal: Maintains or returns to baseline bowel function  Description: INTERVENTIONS:  - Assess bowel function  - Encourage oral fluids to ensure adequate hydration  - Administer IV fluids if ordered to ensure adequate hydration  - Administer ordered medications as needed  - Encourage mobilization and activity  - Consider nutritional services referral to assist patient with adequate nutrition and appropriate food choices  Outcome: Progressing  Goal: Maintains adequate nutritional intake  Description: INTERVENTIONS:  - Monitor percentage of each meal consumed  - Identify factors contributing to decreased intake, treat as appropriate  - Assist with meals as needed  - Monitor I&O, weight, and lab values if indicated  - Obtain nutrition services referral as needed  Outcome: Progressing  Goal: Establish and maintain optimal ostomy function  Description: INTERVENTIONS:  - Assess bowel function  - Encourage oral fluids to ensure adequate hydration  - Administer IV fluids if ordered to ensure adequate hydration   - Administer ordered medications as needed  - Encourage mobilization and activity  - Nutrition services referral to assist patient with appropriate food choices  - Assess stoma site  - Consider wound care consult   Outcome: Progressing  Goal: Oral mucous membranes remain intact  Description: INTERVENTIONS  - Assess oral mucosa and hygiene practices  - Implement preventative oral hygiene  regimen  - Implement oral medicated treatments as ordered  - Initiate Nutrition services referral as needed  Outcome: Progressing     Problem: GENITOURINARY - ADULT  Goal: Maintains or returns to baseline urinary function  Description: INTERVENTIONS:  - Assess urinary function  - Encourage oral fluids to ensure adequate hydration if ordered  - Administer IV fluids as ordered to ensure adequate hydration  - Administer ordered medications as needed  - Offer frequent toileting  - Follow urinary retention protocol if ordered  Outcome: Progressing  Goal: Absence of urinary retention  Description: INTERVENTIONS:  - Assess patient’s ability to void and empty bladder  - Monitor I/O  - Bladder scan as needed  - Discuss with physician/AP medications to alleviate retention as needed  - Discuss catheterization for long term situations as appropriate  Outcome: Progressing  Goal: Urinary catheter remains patent  Description: INTERVENTIONS:  - Assess patency of urinary catheter  - If patient has a chronic negron, consider changing catheter if non-functioning  - Follow guidelines for intermittent irrigation of non-functioning urinary catheter  Outcome: Progressing     Problem: METABOLIC, FLUID AND ELECTROLYTES - ADULT  Goal: Electrolytes maintained within normal limits  Description: INTERVENTIONS:  - Monitor labs and assess patient for signs and symptoms of electrolyte imbalances  - Administer electrolyte replacement as ordered  - Monitor response to electrolyte replacements, including repeat lab results as appropriate  - Instruct patient on fluid and nutrition as appropriate  Outcome: Progressing  Goal: Fluid balance maintained  Description: INTERVENTIONS:  - Monitor labs   - Monitor I/O and WT  - Instruct patient on fluid and nutrition as appropriate  - Assess for signs & symptoms of volume excess or deficit  Outcome: Progressing  Goal: Glucose maintained within target range  Description: INTERVENTIONS:  - Monitor Blood Glucose as  ordered  - Assess for signs and symptoms of hyperglycemia and hypoglycemia  - Administer ordered medications to maintain glucose within target range  - Assess nutritional intake and initiate nutrition service referral as needed  Outcome: Progressing     Problem: SKIN/TISSUE INTEGRITY - ADULT  Goal: Skin Integrity remains intact(Skin Breakdown Prevention)  Description: Assess:  -Perform [place] assessment every  -Clean and moisturize skin every   -Inspect skin when repositioning, toileting, and assisting with ADLS  -Assess under medical devices such as  every   -Assess extremities for adequate circulation and sensation     Bed Management:  -Have minimal linens on bed & keep smooth, unwrinkled  -Change linens as needed when moist or perspiring  -Avoid sitting or lying in one position for more than hours while in bed  -Keep HOB at degrees     Toileting:  -Offer bedside commode  -Assess for incontinence every   -Use incontinent care products after each incontinent episode such as     Activity:  -Mobilize patient  times a day  -Encourage activity and walks on unit  -Encourage or provide ROM exercises   -Turn and reposition patient every  Hours  -Use appropriate equipment to lift or move patient in bed  -Instruct/ Assist with weight shifting every when out of bed in chair  -Consider limitation of chair time  hour intervals    Skin Care:  -Avoid use of baby powder, tape, friction and shearing, hot water or constrictive clothing  -Relieve pressure over bony prominences using   -Do not massage red bony areas    Next Steps:  -Teach patient strategies to minimize risks such as    -Consider consults to  interdisciplinary teams such as   Outcome: Progressing  Goal: Incision(s), wounds(s) or drain site(s) healing without S/S of infection  Description: INTERVENTIONS  - Assess and document dressing, incision, wound bed, drain sites and surrounding tissue  - Provide patient and family education  - Perform skin care/dressing changes  every   Outcome: Progressing  Goal: Pressure injury heals and does not worsen  Description: Interventions:  - Implement low air loss mattress or specialty surface (Criteria met)  - Apply silicone foam dressing  - Instruct/assist with weight shifting every  minutes when in chair   - Limit chair time to  hour intervals  - Use special pressure reducing interventions such as  when in chair   - Apply fecal or urinary incontinence containment device   - Perform passive or active ROM every   - Turn and reposition patient & offload bony prominences every  hours   - Utilize friction reducing device or surface for transfers   - Consider consults to  interdisciplinary teams such as  - Use incontinent care products after each incontinent episode such as   - Consider nutrition services referral as needed  Outcome: Progressing     Problem: HEMATOLOGIC - ADULT  Goal: Maintains hematologic stability  Description: INTERVENTIONS  - Assess for signs and symptoms of bleeding or hemorrhage  - Monitor labs  - Administer supportive blood products/factors as ordered and appropriate  Outcome: Progressing     Problem: MUSCULOSKELETAL - ADULT  Goal: Maintain or return mobility to safest level of function  Description: INTERVENTIONS:  - Assess patient's ability to carry out ADLs; assess patient's baseline for ADL function and identify physical deficits which impact ability to perform ADLs (bathing, care of mouth/teeth, toileting, grooming, dressing, etc.)  - Assess/evaluate cause of self-care deficits   - Assess range of motion  - Assess patient's mobility  - Assess patient's need for assistive devices and provide as appropriate  - Encourage maximum independence but intervene and supervise when necessary  - Involve family in performance of ADLs  - Assess for home care needs following discharge   - Consider OT consult to assist with ADL evaluation and planning for discharge  - Provide patient education as appropriate  Outcome:  Progressing  Goal: Maintain proper alignment of affected body part  Description: INTERVENTIONS:  - Support, maintain and protect limb and body alignment  - Provide patient/ family with appropriate education  Outcome: Progressing     Problem: Prexisting or High Potential for Compromised Skin Integrity  Goal: Skin integrity is maintained or improved  Description: INTERVENTIONS:  - Identify patients at risk for skin breakdown  - Assess and monitor skin integrity  - Assess and monitor nutrition and hydration status  - Monitor labs   - Assess for incontinence   - Turn and reposition patient  - Assist with mobility/ambulation  - Relieve pressure over bony prominences  - Avoid friction and shearing  - Provide appropriate hygiene as needed including keeping skin clean and dry  - Evaluate need for skin moisturizer/barrier cream  - Collaborate with interdisciplinary team   - Patient/family teaching  - Consider wound care consult   Outcome: Progressing

## 2025-02-03 NOTE — ASSESSMENT & PLAN NOTE
Chronic euvolemic hyponatremia that was 133 on admission. Asymptomatic and no recent history of confusion, altered metal status, seizure, or coma.    Current differential includes SIADH in the setting of acute pain      Monitor BMP  Low threshold to workup with serum and urine studies if hyponatremia worsens

## 2025-02-03 NOTE — PROGRESS NOTES
Progress Note - Critical Care/ICU   Name: Lizzy Acuna 49 y.o. female I MRN: [id number]  Unit/Bed#: [facility] 514-01 I Date of Admission: 1/31/2025   Date of Service: 2/3/2025 I Hospital Day: 3       Assessment & Plan   Neuro:   Acute post operative pain  Appreciate APS recommendations  Scheduled medications  Tylenol 975mg Q6  Gabapentin 300mg BID, 600mg qhs  Robaxin 750mg Q6  Prn medications  Valium 5mg Q8 prn  Dilaudid 0.5mg Q3 prn  Daily CAM-ICU, delirium precautions. Regulate sleep/wake cycle.     CV:   HLD  Continue home atorvastatin 80mg     Pulm:  No active issues  Goal SpO2 > 92%. Encourage IS, pulmonary toilet.     GI:   Bowel regimen  Senokot qhs    :   No active issues    F/E/N:   No continuous fluids  Replete electrolytes as needed for goal Mag > 2.0, Phos >3.0, K >4.0  Regular house diet    Heme/Onc:   Severe PAD s/p thrombectomy and lysis cath placement 1/31, lysis recheck 2/2 with removal of sheaths  Heparin gtt  Appreciate vascular surgery recommendations  ASA/Plavix  ABLA  Received 2u prbcs 2/2 for hgb of 6.2. Hgb 6.8 this morning, 1u prbcs transfused  Monitor bleeding from LLE TMA site     Endo:   Uncontrolled DM2  SSI algorithm 3 with Lantus 10u qhs  Last A1C 10.3  Goal -180    ID:   L foot abscess   Wound cultures with MRSA and Finegoldia. Continue vancomycin. ID following, appreciate recommendations. Trend fever curve/white count.     MSK/Skin:   Diabetic foot wound s/p TMA with podiatry 1/3  Appreciate podiatry recommendations  Likely long term wound vac with possible delayed primary closure  Local wound care  PT/OT as able - NWB LLE    Disposition: Med Surg    ICU Core Measures     A: Assess, Prevent, and Manage Pain Has pain been assessed? Yes  Need for changes to pain regimen? No   B: Both SAT/SAT  N/A   C: Choice of Sedation RASS Goal: N/A patient not on sedation  Need for changes to sedation or analgesia regimen? NA   D: Delirium CAM-ICU: Negative   E: Early Mobility  Plan for early  mobility? Yes   F: Family Engagement Plan for family engagement today? Yes       Antibiotic Review: Infectious disease consulted    Review of Invasive Devices:      Central access plan: No peripheral access able to be obtained.  Plan maintain PICC      Prophylaxis:  VTE VTE covered by:  heparin (porcine), Intravenous, 20 Units/kg/hr at 02/03/25 0019  heparin (porcine), Intravenous, 3,400 Units at 02/03/25 0021  heparin (porcine), Intravenous       Stress Ulcer  not ordered         24 Hour Events : Received 2u prbcs yesterday for hgb 6.2. This AM, hgb 6.8, 1u prbcs transfused. Returned to IR yesterday with removal of sheaths and discontinuation of TPA. Written to med surg under SOD and awaiting bed.   Subjective   Review of Systems: Review of Systems   Gastrointestinal:  Positive for nausea and vomiting.   Musculoskeletal:         LLE pain - extends from calf up into thigh       Objective :                   Vitals I/O      Most Recent Min/Max in 24hrs   Temp 98.4 °F (36.9 °C) Temp  Min: 98 °F (36.7 °C)  Max: 99.7 °F (37.6 °C)   Pulse 100 Pulse  Min: 85  Max: 104   Resp 16 Resp  Min: 9  Max: 22   /63 BP  Min: 98/64  Max: 144/67   O2 Sat 93 % SpO2  Min: 92 %  Max: 100 %      Intake/Output Summary (Last 24 hours) at 2/3/2025 0635  Last data filed at 2/3/2025 0401  Gross per 24 hour   Intake 2194.31 ml   Output 1465 ml   Net 729.31 ml       Diet Regular; Regular House    Invasive Monitoring           Physical Exam   Physical Exam  Vitals and nursing note reviewed.   Eyes:      Pupils: Pupils are equal, round, and reactive to light.   Skin:     General: Skin is dry.   HENT:      Head: Normocephalic and atraumatic.   Cardiovascular:      Rate and Rhythm: Normal rate and regular rhythm.      Heart sounds: Normal heart sounds.   Musculoskeletal:      Right lower leg: No edema.      Left lower leg: No edema.      Comments: LLE TMA with dressing in place. No obvious bleeding from site.   Calf tenderness to palpation,  soft  LLE cool to touch when compared to RLE   Abdominal: General: There is no distension.      Palpations: Abdomen is soft.      Tenderness: There is no abdominal tenderness.   Constitutional:       General: She is not in acute distress.     Appearance: She is not ill-appearing.   Pulmonary:      Effort: Pulmonary effort is normal.      Breath sounds: Normal breath sounds.   Psychiatric:         Behavior: Behavior is cooperative.   Neurological:      General: No focal deficit present.      Mental Status: She is alert and oriented to person, place, and time.          Diagnostic Studies        Lab Results: I have reviewed the following results:     Medications:  Scheduled PRN   acetaminophen, 975 mg, Q6H JORGE  aspirin, 81 mg, Daily  atorvastatin, 80 mg, Daily With Dinner  chlorhexidine, 15 mL, Q12H JORGE  clopidogrel, 75 mg, Daily  gabapentin, 300 mg, BID (AM & Afternoon)  gabapentin, 600 mg, HS  insulin glargine, 10 Units, HS  insulin lispro, 1-6 Units, Q6H JORGE  methocarbamol, 750 mg, Q6H JORGE  senna-docusate sodium, 1 tablet, HS  vancomycin, 750 mg, Q12H      diazepam, 5 mg, Q8H PRN  diphenhydrAMINE, 25 mg, Q6H PRN  heparin (porcine), 3,400 Units, Q6H PRN  heparin (porcine), 6,800 Units, Q6H PRN  HYDROmorphone, 0.5 mg, Q3H PRN  ondansetron, 4 mg, Q6H PRN       Continuous    heparin (porcine), 3-30 Units/kg/hr (Order-Specific), Last Rate: 20 Units/kg/hr (02/03/25 0019)  HYDROmorphone,          Labs:   CBC    Recent Labs     02/02/25  1149 02/02/25  1647   WBC 11.10* 10.45*   HGB 7.5* 7.7*   HCT 23.6* 23.6*    160     BMP    Recent Labs     02/02/25  0344   SODIUM 132*   K 4.1   CL 99   CO2 28   AGAP 5   BUN 13   CREATININE 0.70   CALCIUM 7.9*       Coags    Recent Labs     02/02/25  1647 02/02/25  2310   INR 1.05  --    PTT 37* 57*        Additional Electrolytes  No recent results       Blood Gas    No recent results  No recent results LFTs  No recent results    Infectious  No recent results  Glucose  Recent Labs      02/02/25  0344   GLUC 181*

## 2025-02-03 NOTE — ASSESSMENT & PLAN NOTE
Complicated by large abscess.  Patient underwent I&D on 1/29/2025 with a large abscess found with purulent fluid and necrotic tissue drained and debrided.  Wound cultures are growing MRSA and Finegoldia.  MRI and operative findings not consistent with osteomyelitis.  -Continue intravenous vancomycin (dosing per pharmacy) for now given nausea/vomiting  -Anticipate eventual transition to PO to complete a 7-10d total antibiotic course from I&D  -Appreciate podiatry recommendations  -Local wound care  -Serial exams

## 2025-02-03 NOTE — PLAN OF CARE
Problem: PAIN - ADULT  Goal: Verbalizes/displays adequate comfort level or baseline comfort level  Description: Interventions:  - Encourage patient to monitor pain and request assistance  - Assess pain using appropriate pain scale  - Administer analgesics based on type and severity of pain and evaluate response  - Implement non-pharmacological measures as appropriate and evaluate response  - Consider cultural and social influences on pain and pain management  - Notify physician/advanced practitioner if interventions unsuccessful or patient reports new pain  Outcome: Progressing     Problem: INFECTION - ADULT  Goal: Absence or prevention of progression during hospitalization  Description: INTERVENTIONS:  - Assess and monitor for signs and symptoms of infection  - Monitor lab/diagnostic results  - Monitor all insertion sites, i.e. indwelling lines, tubes, and drains  - Monitor endotracheal if appropriate and nasal secretions for changes in amount and color  - [place] appropriate cooling/warming therapies per order  - Administer medications as ordered  - Instruct and encourage patient and family to use good hand hygiene technique  - Identify and instruct in appropriate isolation precautions for identified infection/condition  Outcome: Progressing  Goal: Absence of fever/infection during neutropenic period  Description: INTERVENTIONS:  - Monitor WBC    Outcome: Progressing     Problem: SAFETY ADULT  Goal: Patient will remain free of falls  Description: INTERVENTIONS:  - Educate patient/family on patient safety including physical limitations  - Instruct patient to call for assistance with activity   - Consult OT/PT to assist with strengthening/mobility   - Keep Call bell within reach  - Keep bed low and locked with side rails adjusted as appropriate  - Keep care items and personal belongings within reach  - Initiate and maintain comfort rounds  - Make Fall Risk Sign visible to staff  - Apply yellow socks and bracelet  for high fall risk patients  - Consider moving patient to room near nurses station  Outcome: Progressing  Goal: Maintain or return to baseline ADL function  Description: INTERVENTIONS:  -  Assess patient's ability to carry out ADLs; assess patient's baseline for ADL function and identify physical deficits which impact ability to perform ADLs (bathing, care of mouth/teeth, toileting, grooming, dressing, etc.)  - Assess/evaluate cause of self-care deficits   - Assess range of motion  - Assess patient's mobility; develop plan if impaired  - Assess patient's need for assistive devices and provide as appropriate  - Encourage maximum independence but intervene and supervise when necessary  - Involve family in performance of ADLs  - Assess for home care needs following discharge   - Consider OT consult to assist with ADL evaluation and planning for discharge  - Provide patient education as appropriate  Outcome: Progressing  Goal: Maintains/Returns to pre admission functional level  Description: INTERVENTIONS:  - Perform AM-PAC 6 Click Basic Mobility/ Daily Activity assessment daily.  - Set and communicate daily mobility goal to care team and patient/family/caregiver.   - Collaborate with rehabilitation services on mobility goals if consulted  - Out of bed for toileting  - Record patient progress and toleration of activity level   Outcome: Progressing     Problem: DISCHARGE PLANNING  Goal: Discharge to home or other facility with appropriate resources  Description: INTERVENTIONS:  - Identify barriers to discharge w/patient and caregiver  - Arrange for needed discharge resources and transportation as appropriate  - Identify discharge learning needs (meds, wound care, etc.)  - Arrange for interpretive services to assist at discharge as needed  - Refer to Case Management Department for coordinating discharge planning if the patient needs post-hospital services based on physician/advanced practitioner order or complex needs  related to functional status, cognitive ability, or social support system  Outcome: Progressing     Problem: Knowledge Deficit  Goal: Patient/family/caregiver demonstrates understanding of disease process, treatment plan, medications, and discharge instructions  Description: Complete learning assessment and assess knowledge base.  Interventions:  - Provide teaching at level of understanding  - Provide teaching via preferred learning methods  Outcome: Progressing     Problem: NEUROSENSORY - ADULT  Goal: Achieves stable or improved neurological status  Description: INTERVENTIONS  - Monitor and report changes in neurological status  - Monitor vital signs such as temperature, blood pressure, glucose, and any other labs ordered   - Initiate measures to prevent increased intracranial pressure  - Monitor for seizure activity and implement precautions if appropriate      Outcome: Progressing  Goal: Remains free of injury related to seizures activity  Description: INTERVENTIONS  - Maintain airway, patient safety  and administer oxygen as ordered  - Monitor patient for seizure activity, document and report duration and description of seizure to physician/advanced practitioner  - If seizure occurs,  ensure patient safety during seizure  - Reorient patient post seizure  - Seizure pads on all 4 side rails  - Instruct patient/family to notify RN of any seizure activity including if an aura is experienced  - Instruct patient/family to call for assistance with activity based on nursing assessment  - Administer anti-seizure medications if ordered    Outcome: Progressing  Goal: Achieves maximal functionality and self care  Description: INTERVENTIONS  - Monitor swallowing and airway patency with patient fatigue and changes in neurological status  - Encourage and assist patient to increase activity and self care.   - Encourage visually impaired, hearing impaired and aphasic patients to use assistive/communication devices  Outcome:  Progressing     Problem: CARDIOVASCULAR - ADULT  Goal: Maintains optimal cardiac output and hemodynamic stability  Description: INTERVENTIONS:  - Monitor I/O, vital signs and rhythm  - Monitor for S/S and trends of decreased cardiac output  - Administer and titrate ordered vasoactive medications to optimize hemodynamic stability  - Assess quality of pulses, skin color and temperature  - Assess for signs of decreased coronary artery perfusion  - Instruct patient to report change in severity of symptoms  Outcome: Progressing  Goal: Absence of cardiac dysrhythmias or at baseline rhythm  Description: INTERVENTIONS:  - Continuous cardiac monitoring, vital signs, obtain 12 lead EKG if ordered  - Administer antiarrhythmic and heart rate control medications as ordered  - Monitor electrolytes and administer replacement therapy as ordered  Outcome: Progressing     Problem: RESPIRATORY - ADULT  Goal: Achieves optimal ventilation and oxygenation  Description: INTERVENTIONS:  - Assess for changes in respiratory status  - Assess for changes in mentation and behavior  - Position to facilitate oxygenation and minimize respiratory effort  - Oxygen administered by appropriate delivery if ordered  - Initiate smoking cessation education as indicated  - Encourage broncho-pulmonary hygiene including cough, deep breathe, Incentive Spirometry  - Assess the need for suctioning and aspirate as needed  - Assess and instruct to report SOB or any respiratory difficulty  - Respiratory Therapy support as indicated  Outcome: Progressing     Problem: GASTROINTESTINAL - ADULT  Goal: Minimal or absence of nausea and/or vomiting  Description: INTERVENTIONS:  - Administer IV fluids if ordered to ensure adequate hydration  - Maintain NPO status until nausea and vomiting are resolved  - Nasogastric tube if ordered  - Administer ordered antiemetic medications as needed  - Provide nonpharmacologic comfort measures as appropriate  - Advance diet as  tolerated, if ordered  - Consider nutrition services referral to assist patient with adequate nutrition and appropriate food choices  Outcome: Progressing  Goal: Maintains or returns to baseline bowel function  Description: INTERVENTIONS:  - Assess bowel function  - Encourage oral fluids to ensure adequate hydration  - Administer IV fluids if ordered to ensure adequate hydration  - Administer ordered medications as needed  - Encourage mobilization and activity  - Consider nutritional services referral to assist patient with adequate nutrition and appropriate food choices  Outcome: Progressing  Goal: Maintains adequate nutritional intake  Description: INTERVENTIONS:  - Monitor percentage of each meal consumed  - Identify factors contributing to decreased intake, treat as appropriate  - Assist with meals as needed  - Monitor I&O, weight, and lab values if indicated  - Obtain nutrition services referral as needed  Outcome: Progressing  Goal: Establish and maintain optimal ostomy function  Description: INTERVENTIONS:  - Assess bowel function  - Encourage oral fluids to ensure adequate hydration  - Administer IV fluids if ordered to ensure adequate hydration   - Administer ordered medications as needed  - Encourage mobilization and activity  - Nutrition services referral to assist patient with appropriate food choices  - Assess stoma site  - Consider wound care consult   Outcome: Progressing  Goal: Oral mucous membranes remain intact  Description: INTERVENTIONS  - Assess oral mucosa and hygiene practices  - Implement preventative oral hygiene regimen  - Implement oral medicated treatments as ordered  - Initiate Nutrition services referral as needed  Outcome: Progressing     Problem: GENITOURINARY - ADULT  Goal: Maintains or returns to baseline urinary function  Description: INTERVENTIONS:  - Assess urinary function  - Encourage oral fluids to ensure adequate hydration if ordered  - Administer IV fluids as ordered to  ensure adequate hydration  - Administer ordered medications as needed  - Offer frequent toileting  - Follow urinary retention protocol if ordered  Outcome: Progressing  Goal: Absence of urinary retention  Description: INTERVENTIONS:  - Assess patient’s ability to void and empty bladder  - Monitor I/O  - Bladder scan as needed  - Discuss with physician/AP medications to alleviate retention as needed  - Discuss catheterization for long term situations as appropriate  Outcome: Progressing  Goal: Urinary catheter remains patent  Description: INTERVENTIONS:  - Assess patency of urinary catheter  - If patient has a chronic negron, consider changing catheter if non-functioning  - Follow guidelines for intermittent irrigation of non-functioning urinary catheter  Outcome: Progressing     Problem: METABOLIC, FLUID AND ELECTROLYTES - ADULT  Goal: Electrolytes maintained within normal limits  Description: INTERVENTIONS:  - Monitor labs and assess patient for signs and symptoms of electrolyte imbalances  - Administer electrolyte replacement as ordered  - Monitor response to electrolyte replacements, including repeat lab results as appropriate  - Instruct patient on fluid and nutrition as appropriate  Outcome: Progressing  Goal: Fluid balance maintained  Description: INTERVENTIONS:  - Monitor labs   - Monitor I/O and WT  - Instruct patient on fluid and nutrition as appropriate  - Assess for signs & symptoms of volume excess or deficit  Outcome: Progressing  Goal: Glucose maintained within target range  Description: INTERVENTIONS:  - Monitor Blood Glucose as ordered  - Assess for signs and symptoms of hyperglycemia and hypoglycemia  - Administer ordered medications to maintain glucose within target range  - Assess nutritional intake and initiate nutrition service referral as needed  Outcome: Progressing     Problem: SKIN/TISSUE INTEGRITY - ADULT  Goal: Skin Integrity remains intact(Skin Breakdown Prevention)  Description:  Assess:  -Inspect skin when repositioning, toileting, and assisting with ADLS  -Assess extremities for adequate circulation and sensation     Bed Management:  -Have minimal linens on bed & keep smooth, unwrinkled  -Change linens as needed when moist or perspiring  Toileting:  -Offer bedside commode  Activity:  -Encourage activity and walks on unit  -Encourage or provide ROM exercises   -Use appropriate equipment to lift or move patient in bed  Skin Care:  -Avoid use of baby powder, tape, friction and shearing, hot water or constrictive clothing  -Do not massage red bony areas  Outcome: Progressing  Goal: Incision(s), wounds(s) or drain site(s) healing without S/S of infection  Description: INTERVENTIONS  - Assess and document dressing, incision, wound bed, drain sites and surrounding tissue  - Provide patient and family education  Outcome: Progressing  Goal: Pressure injury heals and does not worsen  Description: Interventions:  - Implement low air loss mattress or specialty surface (Criteria met)  - Apply silicone foam dressing  - Apply fecal or urinary incontinence containment device   - Utilize friction reducing device or surface for transfers   - Consider nutrition services referral as needed  Outcome: Progressing     Problem: HEMATOLOGIC - ADULT  Goal: Maintains hematologic stability  Description: INTERVENTIONS  - Assess for signs and symptoms of bleeding or hemorrhage  - Monitor labs  - Administer supportive blood products/factors as ordered and appropriate  Outcome: Progressing     Problem: MUSCULOSKELETAL - ADULT  Goal: Maintain or return mobility to safest level of function  Description: INTERVENTIONS:  - Assess patient's ability to carry out ADLs; assess patient's baseline for ADL function and identify physical deficits which impact ability to perform ADLs (bathing, care of mouth/teeth, toileting, grooming, dressing, etc.)  - Assess/evaluate cause of self-care deficits   - Assess range of motion  - Assess  patient's mobility  - Assess patient's need for assistive devices and provide as appropriate  - Encourage maximum independence but intervene and supervise when necessary  - Involve family in performance of ADLs  - Assess for home care needs following discharge   - Consider OT consult to assist with ADL evaluation and planning for discharge  - Provide patient education as appropriate  Outcome: Progressing  Goal: Maintain proper alignment of affected body part  Description: INTERVENTIONS:  - Support, maintain and protect limb and body alignment  - Provide patient/ family with appropriate education  Outcome: Progressing     Problem: Prexisting or High Potential for Compromised Skin Integrity  Goal: Skin integrity is maintained or improved  Description: INTERVENTIONS:  - Identify patients at risk for skin breakdown  - Assess and monitor skin integrity  - Assess and monitor nutrition and hydration status  - Monitor labs   - Assess for incontinence   - Turn and reposition patient  - Assist with mobility/ambulation  - Relieve pressure over bony prominences  - Avoid friction and shearing  - Provide appropriate hygiene as needed including keeping skin clean and dry  - Evaluate need for skin moisturizer/barrier cream  - Collaborate with interdisciplinary team   - Patient/family teaching  - Consider wound care consult   Outcome: Progressing

## 2025-02-03 NOTE — ASSESSMENT & PLAN NOTE
"Transferred to \[facility]\"" for vascular surgery and IR intervention after Lower Extremity Dopplers showed a >75% stenosis in the distal superficial femoral artery. Full study results are listed below  \"There fernanda 50-75% stenosis in the tibio-peroneal trunk. Diffuse disease throughout the  remaining femoro-popliteal and tibio-peroneal segments.\"  Now S/P Agram, Left SFA thrombectomy, placement of lysis catheter on 1/31  S/p lysis recheck on 2/2 with residual thrombus within L SFA.  S/p lysis recheck w/ angioplasty of L SFA, pop, AT on 2/3.     Plan:  Continue heparin gtt, aspirin 81 mg, Plavix   "

## 2025-02-03 NOTE — UTILIZATION REVIEW
NOTIFICATION OF INPATIENT ADMISSION      AUTHORIZATION REQUEST   SERVICING FACILITY:   [facility]  Address: [address] [place] [address]  Tax ID: [id number]  NPI: [id number] ATTENDING PROVIDER:  Attending Name and NPI#: Negin Shelton Md [[id number]]  Address: [address] [place] [address]  Phone: [phone]   ADMISSION INFORMATION:  Place of Service: Inpatient [facility]  Place of Service Code: 21  Inpatient Admission Date/Time: 1/31/25 12:33 AM  Discharge Date/Time: No discharge date for patient encounter.  Admitting Diagnosis Code/Description:  Sepsis due to cellulitis (HCC) [L03.90, A41.9]     UTILIZATION REVIEW CONTACT:  Amber Hahn, Utilization   Network Utilization Review Department  Phone: [phone]  Fax: [phone]  Email: [email or web address]  Contact for approvals/pending authorizations, clinical reviews, and discharge.     PHYSICIAN ADVISORY SERVICES:  Medical Necessity Denial & Rexx-bx-Vknu Review  Phone: [phone]  Fax: [phone]  Email: [email or web address]     DISCHARGE SUPPORT TEAM:  For Patients Discharge Needs & Updates  Phone: [phone] opt. 2 Fax: [phone]  Email: [email or web address]

## 2025-02-04 LAB
ABO GROUP BLD BPU: NORMAL
ANION GAP SERPL CALCULATED.3IONS-SCNC: 7 MMOL/L (ref 4–13)
APTT PPP: 78 SECONDS (ref 23–34)
BASOPHILS # BLD AUTO: 0.01 THOUSANDS/ΜL (ref 0–0.1)
BASOPHILS NFR BLD AUTO: 0 % (ref 0–1)
BPU ID: NORMAL
BUN SERPL-MCNC: 11 MG/DL (ref 5–25)
CALCIUM SERPL-MCNC: 7.8 MG/DL (ref 8.4–10.2)
CHLORIDE SERPL-SCNC: 102 MMOL/L (ref 96–108)
CO2 SERPL-SCNC: 28 MMOL/L (ref 21–32)
CREAT SERPL-MCNC: 0.59 MG/DL (ref 0.6–1.3)
CROSSMATCH: NORMAL
EOSINOPHIL # BLD AUTO: 0.08 THOUSAND/ΜL (ref 0–0.61)
EOSINOPHIL NFR BLD AUTO: 1 % (ref 0–6)
ERYTHROCYTE [DISTWIDTH] IN BLOOD BY AUTOMATED COUNT: 14.3 % (ref 11.6–15.1)
ERYTHROCYTE [DISTWIDTH] IN BLOOD BY AUTOMATED COUNT: 14.3 % (ref 11.6–15.1)
GFR SERPL CREATININE-BSD FRML MDRD: 108 ML/MIN/1.73SQ M
GLUCOSE SERPL-MCNC: 149 MG/DL (ref 65–140)
GLUCOSE SERPL-MCNC: 151 MG/DL (ref 65–140)
GLUCOSE SERPL-MCNC: 153 MG/DL (ref 65–140)
GLUCOSE SERPL-MCNC: 153 MG/DL (ref 65–140)
GLUCOSE SERPL-MCNC: 199 MG/DL (ref 65–140)
GLUCOSE SERPL-MCNC: 215 MG/DL (ref 65–140)
HCT VFR BLD AUTO: 23.5 % (ref 34.8–46.1)
HCT VFR BLD AUTO: 24 % (ref 34.8–46.1)
HGB BLD-MCNC: 7.5 G/DL (ref 11.5–15.4)
HGB BLD-MCNC: 7.8 G/DL (ref 11.5–15.4)
IMM GRANULOCYTES # BLD AUTO: 0.24 THOUSAND/UL (ref 0–0.2)
IMM GRANULOCYTES NFR BLD AUTO: 2 % (ref 0–2)
LYMPHOCYTES # BLD AUTO: 3.31 THOUSANDS/ΜL (ref 0.6–4.47)
LYMPHOCYTES NFR BLD AUTO: 29 % (ref 14–44)
MCH RBC QN AUTO: 29.2 PG (ref 26.8–34.3)
MCH RBC QN AUTO: 30.2 PG (ref 26.8–34.3)
MCHC RBC AUTO-ENTMCNC: 31.9 G/DL (ref 31.4–37.4)
MCHC RBC AUTO-ENTMCNC: 32.5 G/DL (ref 31.4–37.4)
MCV RBC AUTO: 91 FL (ref 82–98)
MCV RBC AUTO: 93 FL (ref 82–98)
MONOCYTES # BLD AUTO: 0.7 THOUSAND/ΜL (ref 0.17–1.22)
MONOCYTES NFR BLD AUTO: 6 % (ref 4–12)
NEUTROPHILS # BLD AUTO: 7.03 THOUSANDS/ΜL (ref 1.85–7.62)
NEUTS SEG NFR BLD AUTO: 62 % (ref 43–75)
NRBC BLD AUTO-RTO: 1 /100 WBCS
PLATELET # BLD AUTO: 181 THOUSANDS/UL (ref 149–390)
PLATELET # BLD AUTO: 191 THOUSANDS/UL (ref 149–390)
PMV BLD AUTO: 9.3 FL (ref 8.9–12.7)
PMV BLD AUTO: 9.6 FL (ref 8.9–12.7)
POTASSIUM SERPL-SCNC: 4.3 MMOL/L (ref 3.5–5.3)
PROCALCITONIN SERPL-MCNC: 0.14 NG/ML
RBC # BLD AUTO: 2.57 MILLION/UL (ref 3.81–5.12)
RBC # BLD AUTO: 2.58 MILLION/UL (ref 3.81–5.12)
SODIUM SERPL-SCNC: 137 MMOL/L (ref 135–147)
UNIT DISPENSE STATUS: NORMAL
UNIT PRODUCT CODE: NORMAL
UNIT PRODUCT VOLUME: 350 ML
UNIT RH: NORMAL
WBC # BLD AUTO: 10.81 THOUSAND/UL (ref 4.31–10.16)
WBC # BLD AUTO: 11.37 THOUSAND/UL (ref 4.31–10.16)

## 2025-02-04 PROCEDURE — 85730 THROMBOPLASTIN TIME PARTIAL: CPT | Performed by: SURGERY

## 2025-02-04 PROCEDURE — 99233 SBSQ HOSP IP/OBS HIGH 50: CPT | Performed by: ANESTHESIOLOGY

## 2025-02-04 PROCEDURE — 85025 COMPLETE CBC W/AUTO DIFF WBC: CPT | Performed by: PHYSICIAN ASSISTANT

## 2025-02-04 PROCEDURE — 84145 PROCALCITONIN (PCT): CPT

## 2025-02-04 PROCEDURE — 82948 REAGENT STRIP/BLOOD GLUCOSE: CPT

## 2025-02-04 PROCEDURE — 99232 SBSQ HOSP IP/OBS MODERATE 35: CPT

## 2025-02-04 PROCEDURE — 99024 POSTOP FOLLOW-UP VISIT: CPT | Performed by: PODIATRIST

## 2025-02-04 PROCEDURE — G0545 PR INHERENT VISIT TO INPT: HCPCS | Performed by: INTERNAL MEDICINE

## 2025-02-04 PROCEDURE — 80048 BASIC METABOLIC PNL TOTAL CA: CPT | Performed by: PHYSICIAN ASSISTANT

## 2025-02-04 PROCEDURE — 99232 SBSQ HOSP IP/OBS MODERATE 35: CPT | Performed by: INTERNAL MEDICINE

## 2025-02-04 PROCEDURE — 85027 COMPLETE CBC AUTOMATED: CPT

## 2025-02-04 RX ORDER — RIVAROXABAN 15 MG-20MG
KIT ORAL
Qty: 1 EACH | Refills: 0 | Status: SHIPPED | OUTPATIENT
Start: 2025-02-04 | End: 2025-02-15

## 2025-02-04 RX ORDER — HYDRALAZINE HYDROCHLORIDE 20 MG/ML
10 INJECTION INTRAMUSCULAR; INTRAVENOUS EVERY 6 HOURS PRN
Status: DISCONTINUED | OUTPATIENT
Start: 2025-02-04 | End: 2025-02-05

## 2025-02-04 RX ORDER — DULOXETIN HYDROCHLORIDE 30 MG/1
30 CAPSULE, DELAYED RELEASE ORAL DAILY
Status: DISCONTINUED | OUTPATIENT
Start: 2025-02-04 | End: 2025-02-15 | Stop reason: HOSPADM

## 2025-02-04 RX ADMIN — CLOPIDOGREL BISULFATE 75 MG: 75 TABLET, FILM COATED ORAL at 08:55

## 2025-02-04 RX ADMIN — HYDRALAZINE HYDROCHLORIDE 10 MG: 20 INJECTION INTRAMUSCULAR; INTRAVENOUS at 23:48

## 2025-02-04 RX ADMIN — INSULIN LISPRO 2 UNITS: 100 INJECTION, SOLUTION INTRAVENOUS; SUBCUTANEOUS at 23:55

## 2025-02-04 RX ADMIN — METHOCARBAMOL 750 MG: 750 TABLET ORAL at 00:11

## 2025-02-04 RX ADMIN — METHOCARBAMOL 750 MG: 750 TABLET ORAL at 05:00

## 2025-02-04 RX ADMIN — VANCOMYCIN HYDROCHLORIDE 750 MG: 10 INJECTION, POWDER, LYOPHILIZED, FOR SOLUTION INTRAVENOUS at 13:54

## 2025-02-04 RX ADMIN — VANCOMYCIN HYDROCHLORIDE 750 MG: 10 INJECTION, POWDER, LYOPHILIZED, FOR SOLUTION INTRAVENOUS at 05:02

## 2025-02-04 RX ADMIN — VANCOMYCIN HYDROCHLORIDE 750 MG: 10 INJECTION, POWDER, LYOPHILIZED, FOR SOLUTION INTRAVENOUS at 21:37

## 2025-02-04 RX ADMIN — ONDANSETRON 4 MG: 2 INJECTION INTRAMUSCULAR; INTRAVENOUS at 18:19

## 2025-02-04 RX ADMIN — METOCLOPRAMIDE 10 MG: 5 INJECTION, SOLUTION INTRAMUSCULAR; INTRAVENOUS at 20:28

## 2025-02-04 RX ADMIN — INSULIN LISPRO 1 UNITS: 100 INJECTION, SOLUTION INTRAVENOUS; SUBCUTANEOUS at 06:05

## 2025-02-04 RX ADMIN — SODIUM CHLORIDE, SODIUM GLUCONATE, SODIUM ACETATE, POTASSIUM CHLORIDE, MAGNESIUM CHLORIDE, SODIUM PHOSPHATE, DIBASIC, AND POTASSIUM PHOSPHATE 100 ML/HR: .53; .5; .37; .037; .03; .012; .00082 INJECTION, SOLUTION INTRAVENOUS at 16:53

## 2025-02-04 RX ADMIN — GABAPENTIN 300 MG: 300 CAPSULE ORAL at 13:49

## 2025-02-04 RX ADMIN — ACETAMINOPHEN 975 MG: 325 TABLET, FILM COATED ORAL at 05:00

## 2025-02-04 RX ADMIN — METHOCARBAMOL 750 MG: 750 TABLET ORAL at 12:17

## 2025-02-04 RX ADMIN — METHOCARBAMOL 750 MG: 750 TABLET ORAL at 17:12

## 2025-02-04 RX ADMIN — SODIUM CHLORIDE, SODIUM GLUCONATE, SODIUM ACETATE, POTASSIUM CHLORIDE, MAGNESIUM CHLORIDE, SODIUM PHOSPHATE, DIBASIC, AND POTASSIUM PHOSPHATE 100 ML/HR: .53; .5; .37; .037; .03; .012; .00082 INJECTION, SOLUTION INTRAVENOUS at 08:56

## 2025-02-04 RX ADMIN — CHLORHEXIDINE GLUCONATE 0.12% ORAL RINSE 15 ML: 1.2 LIQUID ORAL at 08:55

## 2025-02-04 RX ADMIN — GABAPENTIN 300 MG: 300 CAPSULE ORAL at 08:55

## 2025-02-04 RX ADMIN — ONDANSETRON 4 MG: 2 INJECTION INTRAMUSCULAR; INTRAVENOUS at 12:17

## 2025-02-04 RX ADMIN — ASPIRIN 81 MG: 81 TABLET, COATED ORAL at 08:55

## 2025-02-04 RX ADMIN — HEPARIN SODIUM 18 UNITS/KG/HR: 10000 INJECTION, SOLUTION INTRAVENOUS at 13:48

## 2025-02-04 RX ADMIN — DULOXETINE 30 MG: 30 CAPSULE, DELAYED RELEASE ORAL at 12:17

## 2025-02-04 RX ADMIN — ACETAMINOPHEN 975 MG: 325 TABLET, FILM COATED ORAL at 17:12

## 2025-02-04 RX ADMIN — INSULIN LISPRO 1 UNITS: 100 INJECTION, SOLUTION INTRAVENOUS; SUBCUTANEOUS at 12:19

## 2025-02-04 RX ADMIN — INSULIN GLARGINE 10 UNITS: 100 INJECTION, SOLUTION SUBCUTANEOUS at 21:37

## 2025-02-04 RX ADMIN — ACETAMINOPHEN 975 MG: 325 TABLET, FILM COATED ORAL at 00:11

## 2025-02-04 RX ADMIN — ACETAMINOPHEN 975 MG: 325 TABLET, FILM COATED ORAL at 12:17

## 2025-02-04 RX ADMIN — ATORVASTATIN CALCIUM 80 MG: 80 TABLET, FILM COATED ORAL at 16:51

## 2025-02-04 RX ADMIN — ONDANSETRON 4 MG: 2 INJECTION INTRAMUSCULAR; INTRAVENOUS at 04:59

## 2025-02-04 NOTE — PROGRESS NOTES
Vancomycin Pharmacy Consult    Lizzy Acuna is an 49 y.o. female who is currently receiving IV vancomycin for left foot SSTI.    Vancomycin Assessment:  1. ID Consult: Yes  2. Cultures:   1/28 Blood 2/2: NG  1/29 Culture Left Foot: 2+ MRSA  1/29 Anaerobic Left Foot: 2+ Finegoldia magna  3. Procalcitonin:   1/28: 0.22  1/29: 0.25  4. Renal Function:   SCr: 0.59  CrCl: >90 mL/min  UOP: 0.5 mL/kg/hr  5. Days of Therapy: 8  6. Current Dose: 750 mg IV q8h  7. Last Level: 25.9 (random 2/2 at 0344)  8. Goal AUC(24h): 400-600  9. Goal Random/Trough: 10-15    Vancomycin Plan:  1. Evaluation: continue regimen  2. New Dosing: continue 750 mg IV q8h  Predicted Trough / AUC(24h): 17.4 / 534  3. Next Level: random 2/7 at 0600      Pharmacy will continue to follow closely for s/sx of nephrotoxicity, infusion reactions, and appropriateness of therapy. BMP and CBC will be ordered per protocol. We will continue to follow the patient’s culture results and clinical progress daily.      Rip Nieves, PharmD  Internal Medicine Clinical Pharmacist Specialist  [phone]  [employer] Secure Chat/Teams

## 2025-02-04 NOTE — CASE MANAGEMENT
Case Management Progress Note    Patient name Lizzy Acuna  Location [facility] 511-01 MRN [id number]  : 1975 Date 2025       LOS (days): 4  Geometric Mean LOS (GMLOS) (days):   Days to GMLOS:        OBJECTIVE:        Current admission status: Inpatient  Preferred Pharmacy:   [address] [address]  Phone: [phone] Fax: [phone]    Primary Care Provider: NAOMY Basilio    Primary Insurance: [employer]  Secondary Insurance:     PROGRESS NOTE:  Price check at [address] [place] [address] pharmacy for Xaralto. No cost and will be available 2 am. Vascular Yee NAVARRETE made aware

## 2025-02-04 NOTE — PROGRESS NOTES
"Progress Note - Infectious Disease   Name: Lizzy Acuna 49 y.o. female I MRN: [id number]  Unit/Bed#: [facility] 511-01 I Date of Admission: 1/31/2025   Date of Service: 2/4/2025 I Hospital Day: 4    Assessment & Plan  Cellulitis of left foot  Complicated by large abscess.  Patient underwent I&D on 1/29/2025 with a large abscess found with purulent fluid and necrotic tissue drained and debrided.  Wound cultures are growing MRSA and Finegoldia.  MRI and operative findings not consistent with osteomyelitis.  -Will continue on IV vancomycin (dosing per pharmacy) for now pending repeat vascular intervention as below  -Anticipate eventual transition to PO doxycycline + cefadroxil to complete a 10d total antibiotic course from I&D (through 2/8/25)  -Appreciate podiatry recommendations  -Local wound care  -Serial exams  PAD (peripheral artery disease) (HCC)  Status post angiogram with intervention including left SFA thrombectomy and placement of lysis catheter on 1/31/2025.  Repeat imaging demonstrated residual stenosis of left lower extremity. Vascular planning for repeat arteriogram on Thursday, 2/6.   -Ongoing anticoagulation  -Close vascular follow-up  Diabetes mellitus type 2 with complications (HCC)  Poor control with a hemoglobin A1c of 10.3.  Risk factor for recurrent infection.  -Tighten diabetic control  Obesity (BMI 30-39.9)  As a risk factor for recurrent infection. Could also affect antibiotic dosing.     I have discussed the above management plan in detail with the primary service.   Infectious Disease service will follow.    Antibiotics:  IV vancomycin D#7 post-op    Subjective   Patient reports ongoing nausea, but no vomiting this morning. Still worse when she goes from sitting to standing and gets up/moves around. No fevers or chills. Reporting \"stiffness\" of her left lower extremity. No chest pain, SOB, cough, abdominal pain. No significant foot pain. Tolerating IV vancomycin.     Objective :  Temp:  [97.4 °F " (36.3 °C)-99 °F (37.2 °C)] 98.1 °F (36.7 °C)  HR:  [80-92] 89  BP: (119-156)/(56-91) 144/74  Resp:  [15-27] 15  SpO2:  [94 %-100 %] 99 %  O2 Device: None (Room air)  FiO2 (%):  [21] 21    General:  No acute distress, chronically ill appearing, appears more comfortable in bed  Psychiatric:  Awake and alert, answers questions appropriately  Pulmonary:  Normal respiratory excursion without accessory muscle use, on room air. Lungs grossly CTA bilaterally.   Heart: RRR, no murmurs appreciated  Abdomen:  Soft, nontender, non-distended  Extremities:  left foot with ACE bandage in place. Tenderness with palpation of left calf.   Skin:  No rashes noted to exposed skin      Lab Results: I have reviewed the following results:  Results from last 7 days   Lab Units 02/04/25  0448 02/03/25  1145 02/03/25  0624 02/02/25  1647   WBC Thousand/uL 11.37*  --  10.57* 10.45*   HEMOGLOBIN g/dL 7.5* 8.4* 6.8* 7.7*   PLATELETS Thousands/uL 181  --  169 160     Results from last 7 days   Lab Units 02/04/25  0448 02/03/25  0624 02/02/25  0344 02/01/25  0558 01/31/25  2231 01/29/25  0530 01/28/25  1820   SODIUM mmol/L 137 138 132*   < > 134*   < > 129*   POTASSIUM mmol/L 4.3 4.1 4.1   < > 4.1   < > 4.0   CHLORIDE mmol/L 102 104 99   < > 102   < > 91*   CO2 mmol/L 28 28 28   < > 24   < > 25   BUN mg/dL 11 11 13   < > 12   < > 20   CREATININE mg/dL 0.59* 0.53* 0.70   < > 0.57*   < > 0.88   EGFR ml/min/1.73sq m 108 112 102   < > 109   < > 77   CALCIUM mg/dL 7.8* 8.0* 7.9*   < > 9.1   < > 9.9   AST U/L  --   --   --   --  14  --  14   ALT U/L  --   --   --   --  13  --  20   ALK PHOS U/L  --   --   --   --  110*  --  106*   ALBUMIN g/dL  --   --   --   --  3.3*  --  3.8    < > = values in this interval not displayed.     Results from last 7 days   Lab Units 01/29/25  1507 01/28/25  1820   BLOOD CULTURE   --  No Growth After 5 Days.  No Growth After 5 Days.   GRAM STAIN RESULT  Rare Gram positive cocci in pairs*  No polys seen*  --      Results  from last 7 days   Lab Units 01/29/25  0530 01/28/25  1820   PROCALCITONIN ng/ml 0.25 0.22     Results from last 7 days   Lab Units 01/28/25  1820   CRP mg/L 95.2*             Imaging Results Review: No pertinent imaging studies reviewed.  Other Study Results Review: No additional pertinent studies reviewed.      Giovana Lyn PA-C  [facility]

## 2025-02-04 NOTE — PROGRESS NOTES
Progress Note - Acute Pain   Name: Lizzy Acuna 49 y.o. female I MRN: [id number]  Unit/Bed#: [facility] 511-01 I Date of Admission: 1/31/2025   Date of Service: 2/4/2025 I Hospital Day: 4    Assessment & Plan  PAD (peripheral artery disease) ([facility])  s/p left SFA thrombectomy on (01/31/25) however still with residual thrombus  s/p lysis check and angioplasty (02/01/25)   s/p lysis check with angioplasty (02/03/25)      - continues with DAPT/statin    - continues with heparin gtt    - now planned for repeat arteriorgram with possible interventions      - continue dilaudid PCA at 0 / 0.2mg / q5min / 2.2mg hourly limit for now    - transition to PO once tolerating PO (likely PO dilaudid as oxycodone ineffective the past)     - continue IV dilaudid 0.5mg Q3H for breakthrough pain, wound dressing changes     Multimodal Analgesia:    - continue robaxin 750mg PO q6h jay     - continue tylenol 975mg PO q6h jay    - continue gabapentin 200mg/200mg/600mg    - modify IV valium to PO valium 5mg Q8H prn for muscle spasms     - start duloxetine 30mg PO daily for musculoskeletal and neuropathic pain      - ondansetron and Reglan PRN for nausea    - consider aprepitant/fosaprepitant, may have to reach out to pharmacy      APS will continue to follow. Please contact Acute Pain Service - via SecureChat from [phone] with additional questions or concerns. See SecurePhotometicst or [employer]ruik for additional contacts and after hours information.     Subjective   Lizzy Acuna is a 49 y.o. female with PMHx of PAD who presented after elective TMA weeks prior, found to have wound dehiscence and evidence of cellulitis.  Patient was transferred to [facility] and underwent thrombectomy with lysis catheters on (01/31/25).  Since then, patient continues with pain of the LLE and most recently vascular surgery notes that she is a high risk for requiring BKA despite revascularization.     Today, patient was resting in her room and reports that she continues to improve  however started to have another bout of nausea this morning.  She had done well yesterday after severe N/V earlier in the morning and was hopeful it had passed, however now with another episode.  She reports the nausea occurs with standing upright, and otherwise she denies any symptoms.  She as encouraged as she ambulated further than previously, however concerned with the return of the nausea.  We discussed that we had again planned to transition from dPCA to PO regimen however now with ongoing nausea and upcoming procedure, we will hold off for the time being and continue with the current regimen, with plans for transition post-procedure.  Patient was understanding and in agreement with the plan.     Pain History  Current pain location(s):  Pain Score: 8  Pain Location/Orientation: Orientation: Left, Location: Leg  Pain Scale: Pain Assessment Tool: 0-10  24 hour history: see assessment    Opioid requirement previous 24 hours: dilaudid PCA 10mg     Meds/Allergies   all current active meds have been reviewed and current meds:   Current Facility-Administered Medications:     acetaminophen (TYLENOL) tablet 975 mg, Q6H JORGE    aspirin (ECOTRIN LOW STRENGTH) EC tablet 81 mg, Daily    atorvastatin (LIPITOR) tablet 80 mg, Daily With Dinner    chlorhexidine (PERIDEX) 0.12 % oral rinse 15 mL, Q12H JORGE    clopidogrel (PLAVIX) tablet 75 mg, Daily    diazepam (VALIUM) tablet 5 mg, Q8H PRN    gabapentin (NEURONTIN) capsule 300 mg, BID (AM & Afternoon)    gabapentin (NEURONTIN) capsule 600 mg, HS    heparin (porcine) 25,000 units in 0.45% NaCl 250 mL infusion (premix), Titrated, Last Rate: 18 Units/kg/hr (02/03/25 2424)    heparin (porcine) injection 3,400 Units, Q6H PRN    heparin (porcine) injection 6,800 Units, Q6H PRN    HYDROmorphone (DILAUDID) 1 mg/mL 50 mL PCA, Continuous    HYDROmorphone (DILAUDID) injection 0.5 mg, Q3H PRN    insulin glargine (LANTUS) subcutaneous injection 10 Units 0.1 mL, HS    insulin lispro  "(HumALOG/ADMELOG) 100 units/mL subcutaneous injection 1-6 Units, Q6H JORGE **AND** Fingerstick Glucose (POCT), Q6H    methocarbamol (ROBAXIN) tablet 750 mg, Q6H JORGE    metoclopramide (REGLAN) injection 10 mg, Q6H PRN    multi-electrolyte (PLASMALYTE-A/ISOLYTE-S PH 7.4) IV solution, Continuous, Last Rate: 100 mL/hr (02/04/25 0856)    ondansetron (ZOFRAN) injection 4 mg, Q6H PRN    senna-docusate sodium (SENOKOT S) 8.6-50 mg per tablet 1 tablet, HS    vancomycin 750 mg in sodium chloride 0.9% 250 mL, Q8H  Allergies   Allergen Reactions    Codeine Other (See Comments)     Aggression-and nasty--\"took a cough syrup with codeine as a child\"     Objective :  Temp:  [97.4 °F (36.3 °C)-99 °F (37.2 °C)] 98.1 °F (36.7 °C)  HR:  [83-92] 89  BP: (119-156)/(61-91) 144/74  Resp:  [15-20] 15  SpO2:  [94 %-100 %] 99 %  O2 Device: None (Room air)    Physical Exam  Vitals and nursing note reviewed.   Constitutional:       General: She is not in acute distress.     Appearance: Normal appearance. She is obese. She is not ill-appearing, toxic-appearing or diaphoretic.   HENT:      Head: Normocephalic and atraumatic.      Mouth/Throat:      Mouth: Mucous membranes are moist.      Pharynx: Oropharynx is clear.   Eyes:      General: No scleral icterus.     Conjunctiva/sclera: Conjunctivae normal.   Cardiovascular:      Rate and Rhythm: Normal rate and regular rhythm.   Pulmonary:      Effort: Pulmonary effort is normal. No respiratory distress.      Breath sounds: Normal breath sounds. No wheezing.   Abdominal:      General: Abdomen is flat. There is no distension.      Palpations: Abdomen is soft.      Tenderness: There is no abdominal tenderness.   Musculoskeletal:         General: Tenderness present.      Comments: S/p left TMA, LLE wrapped, elevated   Skin:     General: Skin is warm and dry.      Coloration: Skin is pale. Skin is not jaundiced.   Neurological:      General: No focal deficit present.      Mental Status: She is alert and " oriented to person, place, and time. Mental status is at baseline.   Psychiatric:         Mood and Affect: Mood normal.         Behavior: Behavior normal.         Thought Content: Thought content normal.         Judgment: Judgment normal.            Lab Results: I have reviewed the following results:  Estimated Creatinine Clearance: 122.2 mL/min (A) (by C-G formula based on SCr of 0.59 mg/dL (L)).  Lab Results   Component Value Date    WBC 11.37 (H) 02/04/2025    HGB 7.5 (L) 02/04/2025    HCT 23.5 (L) 02/04/2025     02/04/2025         Component Value Date/Time    K 4.3 02/04/2025 0448     02/04/2025 0448    CO2 28 02/04/2025 0448    BUN 11 02/04/2025 0448    CREATININE 0.59 (L) 02/04/2025 0448         Component Value Date/Time    CALCIUM 7.8 (L) 02/04/2025 0448    ALKPHOS 110 (H) 01/31/2025 2231    AST 14 01/31/2025 2231    ALT 13 01/31/2025 2231    TP 7.2 01/31/2025 2231    ALB 3.3 (L) 01/31/2025 2231

## 2025-02-04 NOTE — ASSESSMENT & PLAN NOTE
Lab Results   Component Value Date    HGBA1C 10.3 (H) 12/13/2024       Recent Labs     02/03/25  2128 02/03/25  2344 02/04/25  0600 02/04/25  1045   POCGLU 145* 146* 153* 153*       Blood Sugar Average: Last 72 hrs:  (P) 152.8125

## 2025-02-04 NOTE — PROGRESS NOTES
"Progress Note - Internal Medicine   Name: Lizzy Acuna 49 y.o. female I MRN: [id number]  Unit/Bed#: [facility] 511-01 I Date of Admission: 1/31/2025   Date of Service: 2/4/2025 I Hospital Day: 4     Assessment & Plan  Cellulitis of left foot  Underwent an elective R TMA approx 3 weeks ago and was doing well until last week when she was noticed to have dehiscence of her surgical wound.   The wound was debrided by her podiatrist in the office but ultimately worsened with evidence of cellulitis.   LLE foot infection diagnosed clinically without MRI findings of osteomyelitis.   Tissue culture: 2+ MRSA, 2+ Finegoldia magna No significant leukocytosis or fever this admission. Site continues to bleed likely due to triple therapy   A1c 10.3    Plan:   Podiatry and vascular surgery following   Wound care consulted   Will need long-term wound VAC for granulation in the plantar flap of the TMA site before possible DPC  ID following, Continue intravenous vancomycin and transition to PO when stable for discharge  Patient will have repeat arteriogram 2/6 for below knee/tibial intervention.  Will follow-up with the result for possible wound VAC application or podiatric intervention.  Will attempt wound vac, podiatry following for limb salvaging options   Pain regimen of Tylenol 975 mg q6, gabapentin 300 mg daily, robaxin 500 mg q6, dilaudid 1 mg/mL PCA, dilaudid 0.5 mg q3 PRN  PAD (peripheral artery disease) (HCC)  Transferred to [facility] for vascular surgery and IR intervention after Lower Extremity Dopplers showed a >75% stenosis in the distal superficial femoral artery. Full study results are listed below  \"There fernanda 50-75% stenosis in the tibio-peroneal trunk. Diffuse disease throughout the  remaining femoro-popliteal and tibio-peroneal segments.\"  Now S/P Agram, Left SFA thrombectomy, placement of lysis catheter on 1/31  S/p lysis recheck on 2/2 with residual thrombus within L SFA.  S/p lysis recheck w/ angioplasty of L SFA, pop, AT " on 2/3.     Plan:  Continue heparin gtt, aspirin 81 mg, Plavix   Diabetes mellitus type 2 with complications (HCC)  Lab Results   Component Value Date    HGBA1C 10.3 (H) 12/13/2024       Recent Labs     02/03/25 2128 02/03/25 2344 02/04/25 0600 02/04/25  1045   POCGLU 145* 146* 153* 153*       Blood Sugar Average: Last 72 hrs:  (P) 152.8125  - Lispro 10u QHS with sliding scale   Hyponatremia  Chronic euvolemic hyponatremia that was 133 on admission. Asymptomatic and no recent history of confusion, altered metal status, seizure, or coma.    Current differential includes SIADH in the setting of acute pain      Monitor BMP  Low threshold to workup with serum and urine studies if hyponatremia worsens      Diabetic foot infection  (HCC)  Lab Results   Component Value Date    HGBA1C 10.3 (H) 12/13/2024       Recent Labs     02/03/25 2128 02/03/25 2344 02/04/25 0600 02/04/25  1045   POCGLU 145* 146* 153* 153*       Blood Sugar Average: Last 72 hrs:  (P) 152.8125    Blood loss anemia  Blood loss anemia 2/2 lysis, bleeding from LLE TMA.       - Dressing and wound care per podiatry recs   - Recheck CBC and transfuse to maintain Hb > 7  Obesity (BMI 30-39.9)      Disposition: Inpatient      Team: SOD TEAM B    Subjective   Patient seen and examined. No acute events overnight.     Objective :  Temp:  [97.5 °F (36.4 °C)-99 °F (37.2 °C)] 98.1 °F (36.7 °C)  HR:  [83-92] 89  BP: (119-144)/(61-81) 144/74  Resp:  [15-20] 15  SpO2:  [94 %-100 %] 99 %  O2 Device: None (Room air)    I/O         02/02 0701 02/03 0700 02/03 0701 02/04 0700 02/04 0701 02/05 0700    P.O. 360 460 118    I.V. (mL/kg) 730.9 (8.1) 107.3 (1.1)     Blood 950 350     IV Piggyback 150.4 100     Total Intake(mL/kg) 2191.3 (24.2) 1017.3 (10.9) 118 (1.3)    Urine (mL/kg/hr) 1140 (0.5) 870 (0.4)     Emesis/NG output 250 750     Stool 0 0     Total Output 1390 1620     Net +801.3 -602.7 +118           Unmeasured Stool Occurrence 2 x 1 x           Weights:   IBW  (Ideal Body Weight): 48.26 kg    Body mass index is 38.69 kg/m².  Weight (last 2 days)       Date/Time Weight    02/04/25 0400 93.5 (206.13)    02/03/25 0600 90.4 (199.3)            Physical Exam  Vitals and nursing note reviewed.   Constitutional:       General: She is not in acute distress.     Appearance: She is well-developed.   HENT:      Head: Normocephalic and atraumatic.   Eyes:      Conjunctiva/sclera: Conjunctivae normal.   Cardiovascular:      Rate and Rhythm: Normal rate and regular rhythm.      Heart sounds: No murmur heard.  Pulmonary:      Effort: Pulmonary effort is normal. No respiratory distress.      Breath sounds: Normal breath sounds.   Abdominal:      Palpations: Abdomen is soft.      Tenderness: There is no abdominal tenderness.   Musculoskeletal:         General: No swelling.      Cervical back: Neck supple.      Right lower leg: Edema present.   Skin:     General: Skin is warm and dry.      Capillary Refill: Capillary refill takes less than 2 seconds.      Findings: Lesion present.   Neurological:      Mental Status: She is alert.   Psychiatric:         Mood and Affect: Mood normal.           Lab Results: I have reviewed the following results:  Recent Labs     02/02/25  1647 02/02/25  2310 02/03/25  0624 02/03/25  1145 02/04/25  0448   WBC 10.45*  --  10.57*  --  11.37*   HGB 7.7*  --  6.8*   < > 7.5*   HCT 23.6*  --  21.2*  --  23.5*     --  169  --  181   SODIUM  --   --  138  --  137   K  --   --  4.1  --  4.3   CL  --   --  104  --  102   CO2  --   --  28  --  28   BUN  --   --  11  --  11   CREATININE  --   --  0.53*  --  0.59*   GLUC  --   --  118  --  151*   MG  --   --  1.5*  --   --    PHOS  --   --  3.0  --   --    PTT 37*   < > 96*   < > 78*   INR 1.05  --   --   --   --     < > = values in this interval not displayed.             Currently Ordered Meds:   Current Facility-Administered Medications:     acetaminophen (TYLENOL) tablet 975 mg, Q6H JORGE    aspirin (ECOTRIN LOW  STRENGTH) EC tablet 81 mg, Daily    atorvastatin (LIPITOR) tablet 80 mg, Daily With Dinner    chlorhexidine (PERIDEX) 0.12 % oral rinse 15 mL, Q12H JORGE    clopidogrel (PLAVIX) tablet 75 mg, Daily    diazepam (VALIUM) tablet 5 mg, Q8H PRN    DULoxetine (CYMBALTA) delayed release capsule 30 mg, Daily    gabapentin (NEURONTIN) capsule 300 mg, BID (AM & Afternoon)    gabapentin (NEURONTIN) capsule 600 mg, HS    heparin (porcine) 25,000 units in 0.45% NaCl 250 mL infusion (premix), Titrated, Last Rate: 18 Units/kg/hr (02/03/25 2224)    heparin (porcine) injection 3,400 Units, Q6H PRN    heparin (porcine) injection 6,800 Units, Q6H PRN    HYDROmorphone (DILAUDID) 1 mg/mL 50 mL PCA, Continuous    HYDROmorphone (DILAUDID) injection 0.5 mg, Q3H PRN    insulin glargine (LANTUS) subcutaneous injection 10 Units 0.1 mL, HS    insulin lispro (HumALOG/ADMELOG) 100 units/mL subcutaneous injection 1-6 Units, Q6H JORGE **AND** Fingerstick Glucose (POCT), Q6H    methocarbamol (ROBAXIN) tablet 750 mg, Q6H JORGE    metoclopramide (REGLAN) injection 10 mg, Q6H PRN    multi-electrolyte (PLASMALYTE-A/ISOLYTE-S PH 7.4) IV solution, Continuous, Last Rate: 100 mL/hr (02/04/25 0856)    ondansetron (ZOFRAN) injection 4 mg, Q6H PRN    senna-docusate sodium (SENOKOT S) 8.6-50 mg per tablet 1 tablet, HS    vancomycin 750 mg in sodium chloride 0.9% 250 mL, Q8H  VTE Pharmacologic Prophylaxis: DAPT  VTE Mechanical Prophylaxis: sequential compression device    Administrative Statements     Portions of the record may have been created with voice recognition software.

## 2025-02-04 NOTE — ASSESSMENT & PLAN NOTE
Underwent an elective R TMA approx 3 weeks ago and was doing well until last week when she was noticed to have dehiscence of her surgical wound.   The wound was debrided by her podiatrist in the office but ultimately worsened with evidence of cellulitis.   LLE foot infection diagnosed clinically without MRI findings of osteomyelitis.   Tissue culture: 2+ MRSA, 2+ Finegoldia magna No significant leukocytosis or fever this admission. Site continues to bleed likely due to triple therapy   A1c 10.3    Plan:   Podiatry and vascular surgery following   Wound care consulted   Will need long-term wound VAC for granulation in the plantar flap of the TMA site before possible DPC  ID following, Continue intravenous vancomycin and transition to PO when stable for discharge  Patient will have repeat arteriogram 2/6 for below knee/tibial intervention.  Will follow-up with the result for possible wound VAC application or podiatric intervention.  Will attempt wound vac, podiatry following for limb salvaging options   Pain regimen of Tylenol 975 mg q6, gabapentin 300 mg daily, robaxin 500 mg q6, dilaudid 1 mg/mL PCA, dilaudid 0.5 mg q3 PRN

## 2025-02-04 NOTE — ASSESSMENT & PLAN NOTE
Complicated by large abscess.  Patient underwent I&D on 1/29/2025 with a large abscess found with purulent fluid and necrotic tissue drained and debrided.  Wound cultures are growing MRSA and Finegoldia.  MRI and operative findings not consistent with osteomyelitis.  -Will continue on IV vancomycin (dosing per pharmacy) for now pending repeat vascular intervention as below  -Anticipate eventual transition to PO doxycycline + cefadroxil to complete a 10d total antibiotic course from I&D (through 2/8/25)  -Appreciate podiatry recommendations  -Local wound care  -Serial exams

## 2025-02-04 NOTE — ASSESSMENT & PLAN NOTE
Lab Results   Component Value Date    HGBA1C 10.3 (H) 12/13/2024       Recent Labs     02/03/25  2128 02/03/25  2344 02/04/25  0600 02/04/25  1045   POCGLU 145* 146* 153* 153*       Blood Sugar Average: Last 72 hrs:  (P) 152.8125  - Lispro 10u QHS with sliding scale

## 2025-02-04 NOTE — ASSESSMENT & PLAN NOTE
"Transferred to [facility] for vascular surgery and IR intervention after Lower Extremity Dopplers showed a >75% stenosis in the distal superficial femoral artery. Full study results are listed below  \"There fernanda 50-75% stenosis in the tibio-peroneal trunk. Diffuse disease throughout the  remaining femoro-popliteal and tibio-peroneal segments.\"  Now S/P Agram, Left SFA thrombectomy, placement of lysis catheter on 1/31  S/p lysis recheck on 2/2 with residual thrombus within L SFA.  S/p lysis recheck w/ angioplasty of L SFA, pop, AT on 2/3.     Plan:  Continue heparin gtt, aspirin 81 mg, Plavix   "

## 2025-02-04 NOTE — ASSESSMENT & PLAN NOTE
Status post angiogram with intervention including left SFA thrombectomy and placement of lysis catheter on 1/31/2025.  Repeat imaging demonstrated residual stenosis of left lower extremity. Vascular planning for repeat arteriogram on Thursday, 2/6.   -Ongoing anticoagulation  -Close vascular follow-up

## 2025-02-04 NOTE — ASSESSMENT & PLAN NOTE
s/p left SFA thrombectomy on (01/31/25) however still with residual thrombus  s/p lysis check and angioplasty (02/01/25)   s/p lysis check with angioplasty (02/03/25)      - continues with DAPT/statin    - continues with heparin gtt    - now planned for repeat arteriorgram with possible interventions      - continue dilaudid PCA at 0 / 0.2mg / q5min / 2.2mg hourly limit for now    - transition to PO once tolerating PO (likely PO dilaudid as oxycodone ineffective the past)     - continue IV dilaudid 0.5mg Q3H for breakthrough pain, wound dressing changes     Multimodal Analgesia:    - continue robaxin 750mg PO q6h jay     - continue tylenol 975mg PO q6h jay    - continue gabapentin 200mg/200mg/600mg    - modify IV valium to PO valium 5mg Q8H prn for muscle spasms     - start duloxetine 30mg PO daily for musculoskeletal and neuropathic pain      - ondansetron and Reglan PRN for nausea    - consider aprepitant/fosaprepitant, may have to reach out to pharmacy

## 2025-02-04 NOTE — RESTORATIVE TECHNICIAN NOTE
Restorative Technician Note      Patient Name: Lizzy Acuna     Restorative Tech Visit Date: 02/04/25  Note Type: Mobility  Patient Position Upon Consult: Supine  Activity Performed: Ambulated  Assistive Device: Roller walker (NWB LLE)  Patient Position at End of Consult: Supine; All needs within reach; Bed/Chair alarm activated               [FreeTextEntry1] : 37 yo  with LMP pre  on 20\par \par Son Wei now 7 months old. Nursing BID.\par Recall mastisits on left treated x 3.  C Diff noted and treated with two coursed vancomycin.\par proctitis developed into colitis possibly. Colonoscopy next week. Dr Jimmy Quinones.\par \par Never filled Micronor. Not sexually active.\par Not interested in conception yet. will use condoms\par  [PapSmeardate] : 2020

## 2025-02-04 NOTE — QUICK NOTE
Plan for repeat arteriogram on Thursday, 2/6 for below knee/tibial intervention in effort for aggressive limb salvage given residual AT and TPT stenosis.   Continue plavix and hep gtt in interim.

## 2025-02-04 NOTE — PROGRESS NOTES
Podiatry - Progress Note  Patient: Lizzy Acuna 49 y.o. female   MRN: [id number]  PCP: NAOMY Basilio  Unit/Bed#: [facility] 511-01 Encounter: [id number]  Date Of Visit: 02/04/25    ASSESSMENT:    Lizzy Acuna is a 49 y.o. female with:    Left 4th and 5th digit gangrene  - s/p Left TMA (DOS: 1/3/2025)  - s/p Left foot I&D secondary to abscess (DOS: 1/29/2025)    Left TMA dehiscence   Cellulitis - POA   Sepsis due to cellulitis  Type II diabetes mellitus (HbA1c 10.3% 12/13/2024)   PAD  - s/p LLE agram with SFA angioplasty/covered stent placement (12/2024)  - s/p left SFA thrombectomy, placement of lysis catheter on 1/31       PLAN:    Patient was seen and evaluated at bedside with all questions and concerns addressed.  Patient is s/p a left TMA and I&D packed open.  Patient will need long-term wound VAC for granulation in the plantar flap of the TMA site before possible DPC.  The dorsal and plantar flaps start to show early signs of necrosis.  Patient will have repeat arteriogram 2/6 for below knee/tibial intervention.  Will follow-up with the result for possible wound VAC application or podiatric intervention.  Left TMA site shows deep wound with bone exposure.  The plantar flap was fibrotic.  No active bleeding was found.  The wound was dressed with alginate to the plantar flap, packing DSD.     patients' lab and vital signs were reviewed. Patient is afebrile with mild leukocytosis. Patient does not  meet the SIRS criteria. Will keep monitoring.  Antibiotic per ID.  Currently on vancomycin.  Continue local wound care, appreciate nursing assistance with dressing changes. Wound care order is in.  Podiatry will continue local wound care and follow-up with progress from vascular surgery team for will further plans.  Elevation and offloading on green foam wedges or pillows when non-ambulatory.  Rest of care per primary team.     Weightbearing status: Non-weightbearing left  foot    SUBJECTIVE:     The patient was  "seen, evaluated, and assessed at bedside today. The patient was awake, alert, and in no acute distress. No acute events overnight. The patient reports continuous pain to the left foot. Patient denies N/V/F/chills/SOB/CP.      OBJECTIVE:     Vitals:   /74   Pulse 85   Temp 98.3 °F (36.8 °C) (Oral)   Resp 15   Ht 5' 1.2\" (1.554 m)   Wt 93.5 kg (206 lb 2.1 oz)   LMP 2023   SpO2 98%   BMI 38.69 kg/m²     Temp (24hrs), Av °F (36.7 °C), Min:97.4 °F (36.3 °C), Max:99 °F (37.2 °C)      Physical Exam:     Lungs: Non labored breathing  Abdomen: Soft, non-tender.  Lower Extremity:  Cardiovascular status at baseline from admission.  Neurological status at baseline from admission.  Musculoskeletal status  at baseline from admission s/p left TMA packed open. No calf tenderness noted.         Wound #: 1  Location: Left TMA packed open side  Length 12cm: Width 5cm: Depth 5cm:   Deepest Tissue Noted in Base: Bone  Probe to Bone: Yes  Peripheral Skin Description: Attached  Granulation: 50% Fibrotic Tissue: 50% Necrotic Tissue: 0%   Drainage Amount: minimal, serous, bloody  Signs of Infection: No.       Clinical Images 25:                Additional Data:     Labs:    Results from last 7 days   Lab Units 25  0448   WBC Thousand/uL 11.37*   HEMOGLOBIN g/dL 7.5*   HEMATOCRIT % 23.5*   PLATELETS Thousands/uL 181   SEGS PCT % 62   LYMPHO PCT % 29   MONO PCT % 6   EOS PCT % 1     Results from last 7 days   Lab Units 25  0448 25  0558 25  2231   POTASSIUM mmol/L 4.3   < > 4.1   CHLORIDE mmol/L 102   < > 102   CO2 mmol/L 28   < > 24   BUN mg/dL 11   < > 12   CREATININE mg/dL 0.59*   < > 0.57*   CALCIUM mg/dL 7.8*   < > 9.1   ALK PHOS U/L  --   --  110*   ALT U/L  --   --  13   AST U/L  --   --  14    < > = values in this interval not displayed.     Results from last 7 days   Lab Units 25  1647   INR  1.05       * I Have Reviewed All Lab Data Listed Above.    Recent Cultures (last 7 " "days):     Results from last 7 days   Lab Units 01/29/25  1507 01/28/25  1820   BLOOD CULTURE   --  No Growth After 5 Days.  No Growth After 5 Days.   GRAM STAIN RESULT  Rare Gram positive cocci in pairs*  No polys seen*  --      Results from last 7 days   Lab Units 01/29/25  1507   ANAEROBIC CULTURE  2+ Growth of Finegoldia magna*       Imaging: I have personally reviewed pertinent films in PACS  EKG, Pathology, and Other Studies: I have personally reviewed pertinent reports.    ** Please Note: Portions of the record may have been created with voice recognition software. Occasional wrong word or \"sound a like\" substitutions may have occurred due to the inherent limitations of voice recognition software. Read the chart carefully and recognize, using context, where substitutions have occurred. **      "

## 2025-02-05 LAB
ALBUMIN SERPL BCG-MCNC: 3.2 G/DL (ref 3.5–5)
ALP SERPL-CCNC: 102 U/L (ref 34–104)
ALT SERPL W P-5'-P-CCNC: 14 U/L (ref 7–52)
ANION GAP SERPL CALCULATED.3IONS-SCNC: 11 MMOL/L (ref 4–13)
ANION GAP SERPL CALCULATED.3IONS-SCNC: 6 MMOL/L (ref 4–13)
ANISOCYTOSIS BLD QL SMEAR: PRESENT
APTT PPP: 65 SECONDS (ref 23–34)
AST SERPL W P-5'-P-CCNC: 24 U/L (ref 13–39)
BASOPHILS # BLD MANUAL: 0 THOUSAND/UL (ref 0–0.1)
BASOPHILS NFR MAR MANUAL: 0 % (ref 0–1)
BILIRUB SERPL-MCNC: 0.31 MG/DL (ref 0.2–1)
BUN SERPL-MCNC: 10 MG/DL (ref 5–25)
BUN SERPL-MCNC: 6 MG/DL (ref 5–25)
CALCIUM ALBUM COR SERPL-MCNC: 9 MG/DL (ref 8.3–10.1)
CALCIUM SERPL-MCNC: 8.2 MG/DL (ref 8.4–10.2)
CALCIUM SERPL-MCNC: 8.4 MG/DL (ref 8.4–10.2)
CHLORIDE SERPL-SCNC: 102 MMOL/L (ref 96–108)
CHLORIDE SERPL-SCNC: 94 MMOL/L (ref 96–108)
CO2 SERPL-SCNC: 27 MMOL/L (ref 21–32)
CO2 SERPL-SCNC: 27 MMOL/L (ref 21–32)
CREAT SERPL-MCNC: 0.45 MG/DL (ref 0.6–1.3)
CREAT SERPL-MCNC: 0.57 MG/DL (ref 0.6–1.3)
DACRYOCYTES BLD QL SMEAR: PRESENT
EOSINOPHIL # BLD MANUAL: 0 THOUSAND/UL (ref 0–0.4)
EOSINOPHIL NFR BLD MANUAL: 0 % (ref 0–6)
ERYTHROCYTE [DISTWIDTH] IN BLOOD BY AUTOMATED COUNT: 14.1 % (ref 11.6–15.1)
GFR SERPL CREATININE-BSD FRML MDRD: 109 ML/MIN/1.73SQ M
GFR SERPL CREATININE-BSD FRML MDRD: 118 ML/MIN/1.73SQ M
GLUCOSE SERPL-MCNC: 128 MG/DL (ref 65–140)
GLUCOSE SERPL-MCNC: 150 MG/DL (ref 65–140)
GLUCOSE SERPL-MCNC: 155 MG/DL (ref 65–140)
GLUCOSE SERPL-MCNC: 156 MG/DL (ref 65–140)
GLUCOSE SERPL-MCNC: 162 MG/DL (ref 65–140)
GLUCOSE SERPL-MCNC: 164 MG/DL (ref 65–140)
GLUCOSE SERPL-MCNC: 164 MG/DL (ref 65–140)
HCT VFR BLD AUTO: 25.7 % (ref 34.8–46.1)
HGB BLD-MCNC: 8.4 G/DL (ref 11.5–15.4)
LYMPHOCYTES # BLD AUTO: 1.82 THOUSAND/UL (ref 0.6–4.47)
LYMPHOCYTES # BLD AUTO: 12 % (ref 14–44)
MACROCYTES BLD QL AUTO: PRESENT
MCH RBC QN AUTO: 29.7 PG (ref 26.8–34.3)
MCHC RBC AUTO-ENTMCNC: 32.7 G/DL (ref 31.4–37.4)
MCV RBC AUTO: 91 FL (ref 82–98)
METAMYELOCYTE ABSOLUTE CT: 0.39 THOUSAND/UL (ref 0–0.1)
METAMYELOCYTES NFR BLD MANUAL: 3 % (ref 0–1)
MONOCYTES # BLD AUTO: 0.52 THOUSAND/UL (ref 0–1.22)
MONOCYTES NFR BLD: 4 % (ref 4–12)
NEUTROPHILS # BLD MANUAL: 10.29 THOUSAND/UL (ref 1.85–7.62)
NEUTS BAND NFR BLD MANUAL: 2 % (ref 0–8)
NEUTS SEG NFR BLD AUTO: 77 % (ref 43–75)
PLATELET # BLD AUTO: 235 THOUSANDS/UL (ref 149–390)
PLATELET BLD QL SMEAR: ADEQUATE
PMV BLD AUTO: 9.2 FL (ref 8.9–12.7)
POIKILOCYTOSIS BLD QL SMEAR: PRESENT
POLYCHROMASIA BLD QL SMEAR: PRESENT
POTASSIUM SERPL-SCNC: 4.2 MMOL/L (ref 3.5–5.3)
POTASSIUM SERPL-SCNC: 4.4 MMOL/L (ref 3.5–5.3)
PROT SERPL-MCNC: 6.7 G/DL (ref 6.4–8.4)
RBC # BLD AUTO: 2.83 MILLION/UL (ref 3.81–5.12)
RBC MORPH BLD: PRESENT
SODIUM SERPL-SCNC: 132 MMOL/L (ref 135–147)
SODIUM SERPL-SCNC: 135 MMOL/L (ref 135–147)
VARIANT LYMPHS # BLD AUTO: 2 %
WBC # BLD AUTO: 13.03 THOUSAND/UL (ref 4.31–10.16)

## 2025-02-05 PROCEDURE — 99232 SBSQ HOSP IP/OBS MODERATE 35: CPT | Performed by: SURGERY

## 2025-02-05 PROCEDURE — 99233 SBSQ HOSP IP/OBS HIGH 50: CPT | Performed by: PHYSICIAN ASSISTANT

## 2025-02-05 PROCEDURE — 82948 REAGENT STRIP/BLOOD GLUCOSE: CPT

## 2025-02-05 PROCEDURE — 99232 SBSQ HOSP IP/OBS MODERATE 35: CPT | Performed by: INTERNAL MEDICINE

## 2025-02-05 PROCEDURE — 85007 BL SMEAR W/DIFF WBC COUNT: CPT | Performed by: SURGERY

## 2025-02-05 PROCEDURE — 85730 THROMBOPLASTIN TIME PARTIAL: CPT | Performed by: SURGERY

## 2025-02-05 PROCEDURE — 85027 COMPLETE CBC AUTOMATED: CPT | Performed by: SURGERY

## 2025-02-05 PROCEDURE — 99232 SBSQ HOSP IP/OBS MODERATE 35: CPT

## 2025-02-05 PROCEDURE — G0545 PR INHERENT VISIT TO INPT: HCPCS | Performed by: INTERNAL MEDICINE

## 2025-02-05 PROCEDURE — 80053 COMPREHEN METABOLIC PANEL: CPT | Performed by: SURGERY

## 2025-02-05 PROCEDURE — 80048 BASIC METABOLIC PNL TOTAL CA: CPT

## 2025-02-05 RX ORDER — LABETALOL HYDROCHLORIDE 5 MG/ML
10 INJECTION, SOLUTION INTRAVENOUS EVERY 4 HOURS PRN
Status: DISCONTINUED | OUTPATIENT
Start: 2025-02-05 | End: 2025-02-14

## 2025-02-05 RX ORDER — PANTOPRAZOLE SODIUM 40 MG/10ML
40 INJECTION, POWDER, LYOPHILIZED, FOR SOLUTION INTRAVENOUS DAILY
Status: DISCONTINUED | OUTPATIENT
Start: 2025-02-05 | End: 2025-02-07

## 2025-02-05 RX ORDER — CHLORTHALIDONE 25 MG/1
12.5 TABLET ORAL DAILY
Status: DISCONTINUED | OUTPATIENT
Start: 2025-02-05 | End: 2025-02-15 | Stop reason: HOSPADM

## 2025-02-05 RX ORDER — HYDRALAZINE HYDROCHLORIDE 20 MG/ML
5 INJECTION INTRAMUSCULAR; INTRAVENOUS EVERY 6 HOURS PRN
Status: DISCONTINUED | OUTPATIENT
Start: 2025-02-05 | End: 2025-02-14

## 2025-02-05 RX ORDER — CHLORHEXIDINE GLUCONATE ORAL RINSE 1.2 MG/ML
15 SOLUTION DENTAL ONCE
Status: COMPLETED | OUTPATIENT
Start: 2025-02-06 | End: 2025-02-06

## 2025-02-05 RX ADMIN — DIAZEPAM 5 MG: 5 TABLET ORAL at 13:50

## 2025-02-05 RX ADMIN — METOCLOPRAMIDE 10 MG: 5 INJECTION, SOLUTION INTRAMUSCULAR; INTRAVENOUS at 05:46

## 2025-02-05 RX ADMIN — HEPARIN SODIUM 18 UNITS/KG/HR: 10000 INJECTION, SOLUTION INTRAVENOUS at 17:09

## 2025-02-05 RX ADMIN — GABAPENTIN 300 MG: 300 CAPSULE ORAL at 09:31

## 2025-02-05 RX ADMIN — ACETAMINOPHEN 975 MG: 325 TABLET, FILM COATED ORAL at 11:41

## 2025-02-05 RX ADMIN — ONDANSETRON 4 MG: 2 INJECTION INTRAMUSCULAR; INTRAVENOUS at 14:59

## 2025-02-05 RX ADMIN — Medication: at 17:26

## 2025-02-05 RX ADMIN — HEPARIN SODIUM 18 UNITS/KG/HR: 10000 INJECTION, SOLUTION INTRAVENOUS at 03:27

## 2025-02-05 RX ADMIN — METHOCARBAMOL 750 MG: 750 TABLET ORAL at 11:41

## 2025-02-05 RX ADMIN — VANCOMYCIN HYDROCHLORIDE 750 MG: 10 INJECTION, POWDER, LYOPHILIZED, FOR SOLUTION INTRAVENOUS at 05:01

## 2025-02-05 RX ADMIN — LABETALOL HYDROCHLORIDE 10 MG: 5 INJECTION, SOLUTION INTRAVENOUS at 17:06

## 2025-02-05 RX ADMIN — CHLORHEXIDINE GLUCONATE 0.12% ORAL RINSE 15 ML: 1.2 LIQUID ORAL at 22:41

## 2025-02-05 RX ADMIN — INSULIN LISPRO 1 UNITS: 100 INJECTION, SOLUTION INTRAVENOUS; SUBCUTANEOUS at 05:47

## 2025-02-05 RX ADMIN — INSULIN GLARGINE 10 UNITS: 100 INJECTION, SOLUTION SUBCUTANEOUS at 22:41

## 2025-02-05 RX ADMIN — ATORVASTATIN CALCIUM 80 MG: 80 TABLET, FILM COATED ORAL at 16:13

## 2025-02-05 RX ADMIN — CLOPIDOGREL BISULFATE 75 MG: 75 TABLET, FILM COATED ORAL at 09:31

## 2025-02-05 RX ADMIN — DULOXETINE 30 MG: 30 CAPSULE, DELAYED RELEASE ORAL at 09:31

## 2025-02-05 RX ADMIN — VANCOMYCIN HYDROCHLORIDE 750 MG: 10 INJECTION, POWDER, LYOPHILIZED, FOR SOLUTION INTRAVENOUS at 22:47

## 2025-02-05 RX ADMIN — ASPIRIN 81 MG: 81 TABLET, COATED ORAL at 09:31

## 2025-02-05 RX ADMIN — DIAZEPAM 5 MG: 5 TABLET ORAL at 22:41

## 2025-02-05 RX ADMIN — SODIUM CHLORIDE, SODIUM GLUCONATE, SODIUM ACETATE, POTASSIUM CHLORIDE, MAGNESIUM CHLORIDE, SODIUM PHOSPHATE, DIBASIC, AND POTASSIUM PHOSPHATE 100 ML/HR: .53; .5; .37; .037; .03; .012; .00082 INJECTION, SOLUTION INTRAVENOUS at 03:25

## 2025-02-05 RX ADMIN — HYDRALAZINE HYDROCHLORIDE 5 MG: 20 INJECTION INTRAMUSCULAR; INTRAVENOUS at 11:41

## 2025-02-05 RX ADMIN — PANTOPRAZOLE SODIUM 40 MG: 40 INJECTION, POWDER, FOR SOLUTION INTRAVENOUS at 11:41

## 2025-02-05 RX ADMIN — VANCOMYCIN HYDROCHLORIDE 750 MG: 10 INJECTION, POWDER, LYOPHILIZED, FOR SOLUTION INTRAVENOUS at 14:43

## 2025-02-05 RX ADMIN — CHLORHEXIDINE GLUCONATE 0.12% ORAL RINSE 15 ML: 1.2 LIQUID ORAL at 09:31

## 2025-02-05 RX ADMIN — CHLORTHALIDONE 12.5 MG: 25 TABLET ORAL at 11:41

## 2025-02-05 RX ADMIN — ONDANSETRON 4 MG: 2 INJECTION INTRAMUSCULAR; INTRAVENOUS at 03:15

## 2025-02-05 RX ADMIN — METOCLOPRAMIDE 10 MG: 5 INJECTION, SOLUTION INTRAMUSCULAR; INTRAVENOUS at 16:13

## 2025-02-05 RX ADMIN — TRIMETHOBENZAMIDE HYDROCHLORIDE 200 MG: 100 INJECTION INTRAMUSCULAR at 13:50

## 2025-02-05 NOTE — ASSESSMENT & PLAN NOTE
49 y.o. F w/ PMH of T2DM (A1c 10.3), tobacco use (quit Dec 2024) who was previously seen at [facility] in December with wound of left foot with cellulitis.   During previous admission vascular surgery was consulted and recommended LLE agram w/ intervention which was preformed by IR. Post-procedure pt underwent TMA with podiatry    Now S/P Agram, Left SFA thrombectomy, placement of lysis catheter on 1/31  S/p lysis recheck w/ angioplasty of L SFA, pop, AT on 2/3.    Plan  - Plan for repeat arteriogram on Thursday, 2/6 for below knee/tibial intervention in effort for aggressive limb salvage given residual AT and TPT stenosis.    - Continue plavix, may discontinue aspirin as no indication for triple therapy    - NPO midnight    - Pre-op labs, abx on call to OR   - Continue hep gtt   - Local wound care psb VAC per Podiatry   - Continue statin therapy   - Remainder of care per primary

## 2025-02-05 NOTE — PROGRESS NOTES
Progress Note - Infectious Disease   Name: Lizzy Acuna 49 y.o. female I MRN: [id number]  Unit/Bed#: [facility] 511-01 I Date of Admission: 1/31/2025   Date of Service: 2/5/2025 I Hospital Day: 5    Assessment & Plan  Cellulitis of left foot  Complicated by large abscess.  Patient underwent I&D on 1/29/2025 with a large abscess found with purulent fluid and necrotic tissue drained and debrided.  Wound cultures are growing MRSA and Finegoldia.  MRI and operative findings not consistent with osteomyelitis.  -Will continue on IV vancomycin (dosing per pharmacy) for now pending repeat vascular intervention as below  -Anticipate eventual transition to PO doxycycline + cefadroxil to complete a 10d total antibiotic course from I&D (through 2/8/25)  -Appreciate podiatry recommendations  -Local wound care  -Serial exams  PAD (peripheral artery disease) (HCC)  Status post angiogram with intervention including left SFA thrombectomy and placement of lysis catheter on 1/31/2025.  Repeat imaging demonstrated residual stenosis of left lower extremity. Vascular planning for repeat arteriogram on Thursday, 2/6.   -Ongoing anticoagulation  -Close vascular follow-up  Diabetes mellitus type 2 with complications (HCC)  Poor control with a hemoglobin A1c of 10.3.  Risk factor for recurrent infection.  -Tighten diabetic control  Obesity (BMI 30-39.9)  As a risk factor for recurrent infection. Could also affect antibiotic dosing.     I have discussed the above management plan in detail with the primary service.   Infectious Disease service will follow.    Antibiotics:  IV vancomycin D#8 post-op    Subjective   Having a lot of nausea this morning. States she took one bite of her breakfast and got super nauseous. Also reporting cold sweats. No documented fevers. Noted to be hypertensive. No diarrhea, CP, SOB, abdominal pain. Denies having any current left leg pain at this time. Tolerating IV vancomycin. No rashes.     Objective :  Temp:  [97.5  °F (36.4 °C)-98.6 °F (37 °C)] 97.5 °F (36.4 °C)  HR:  [] 99  BP: (144-188)/() 170/98  Resp:  [18-22] 18  SpO2:  [91 %-100 %] 95 %  O2 Device: None (Room air)  Nasal Cannula O2 Flow Rate (L/min):  [2 L/min] 2 L/min    General: chronically ill appearing, appears uncomfortable, nontoxic appearing  Psychiatric:  Awake and alert, answers questions appropriately  Pulmonary:  Normal respiratory excursion without accessory muscle use, on room air.   Heart: RRR  Abdomen:  Soft, nontender, non-distended  Extremities:  left foot with ACE bandage in place.  Skin:  No rashes noted to exposed skin      Lab Results: I have reviewed the following results:  Results from last 7 days   Lab Units 02/05/25  0314 02/04/25  2304 02/04/25  0448   WBC Thousand/uL 13.03* 10.81* 11.37*   HEMOGLOBIN g/dL 8.4* 7.8* 7.5*   PLATELETS Thousands/uL 235 191 181     Results from last 7 days   Lab Units 02/05/25  0314 02/04/25  0448 02/03/25  0624 02/01/25  0558 01/31/25  2231   SODIUM mmol/L 135 137 138   < > 134*   POTASSIUM mmol/L 4.4 4.3 4.1   < > 4.1   CHLORIDE mmol/L 102 102 104   < > 102   CO2 mmol/L 27 28 28   < > 24   BUN mg/dL 10 11 11   < > 12   CREATININE mg/dL 0.57* 0.59* 0.53*   < > 0.57*   EGFR ml/min/1.73sq m 109 108 112   < > 109   CALCIUM mg/dL 8.4 7.8* 8.0*   < > 9.1   AST U/L 24  --   --   --  14   ALT U/L 14  --   --   --  13   ALK PHOS U/L 102  --   --   --  110*   ALBUMIN g/dL 3.2*  --   --   --  3.3*    < > = values in this interval not displayed.     Results from last 7 days   Lab Units 01/29/25  1507   GRAM STAIN RESULT  Rare Gram positive cocci in pairs*  No polys seen*     Results from last 7 days   Lab Units 02/04/25  2304   PROCALCITONIN ng/ml 0.14                   Imaging Results Review: No pertinent imaging studies reviewed.  Other Study Results Review: No additional pertinent studies reviewed.      Giovana Lyn PA-C  [facility]

## 2025-02-05 NOTE — ASSESSMENT & PLAN NOTE
Lab Results   Component Value Date    HGBA1C 10.3 (H) 12/13/2024       Recent Labs     02/04/25  1544 02/04/25  2049 02/04/25  2350 02/05/25  0546   POCGLU 149* 199* 215* 156*       Blood Sugar Average: Last 72 hrs:  (P) [id number]  - Lispro 10u QHS with sliding scale

## 2025-02-05 NOTE — ASSESSMENT & PLAN NOTE
"Transferred to [facility] for vascular surgery and IR intervention after Lower Extremity Dopplers showed a >75% stenosis in the distal superficial femoral artery. Full study results are listed below  \"There fernanda 50-75% stenosis in the tibio-peroneal trunk. Diffuse disease throughout the  remaining femoro-popliteal and tibio-peroneal segments.\"  Now S/P Agram, Left SFA thrombectomy, placement of lysis catheter on 1/31  S/p lysis recheck on 2/2 with residual thrombus within L SFA.  S/p lysis recheck w/ angioplasty of L SFA, pop, AT on 2/3.     Plan:  Continue heparin gtt, aspirin 81 mg, Plavix   "

## 2025-02-05 NOTE — UTILIZATION REVIEW
Continued Stay Review    Date: 2/5                          Current Patient Class: Inpatient  Current Level of Care: Med Surg    HPI:49 y.o. female initially admitted on 1/31     Assessment/Plan:   1/31 Now S/P Agram, Left SFA thrombectomy, placement of lysis catheter   2/3  S/p lysis recheck w/ angioplasty of L SFA, pop, AT    Per Vascular Surgery; Plan for repeat arteriogram on Thursday, 2/6 for below knee/tibial intervention in effort for aggressive limb salvage given residual AT and TPT stenosis.   Continue plavix, may d/c aspirin as no indication for triple therapy   NPO midnight. Pre-op labs, abx on call to OR. Continue Iv Heparin drip.  Local wound care psb VAC per Podiatry. Statin    Progress notes; Continue Iv antibiotics. Iv Dilaudid PCA pump for pain control.  Pt will need long-term wound VAC for granulation in the plantar flap of the TMA site before possible DPC.  Plan for repeat arteriogram 2/6 for below knee/tibial intervention.   Continue Iv Heparin drip, aspirin and Plavix  Overnight, pt was very nauseous and became hypertensive with SBP's up to 188.  Mildly responding to antiemetics therapy. Start antihypertensive and changed to clear liquid diet for today    Per ID; Continue Iv antibiotics. Recheck CBC with diff and BMP. F/u with Vanco dose management for now    Medications:   Scheduled Medications:  acetaminophen, 975 mg, Oral, Q6H JORGE  aspirin, 81 mg, Oral, Daily  atorvastatin, 80 mg, Oral, Daily With Dinner  chlorhexidine, 15 mL, Mouth/Throat, Q12H JORGE  clopidogrel, 75 mg, Oral, Daily  DULoxetine, 30 mg, Oral, Daily  gabapentin, 300 mg, Oral, BID (AM & Afternoon)  gabapentin, 600 mg, Oral, HS  insulin glargine, 10 Units, Subcutaneous, HS  insulin lispro, 1-6 Units, Subcutaneous, Q6H JORGE  methocarbamol, 750 mg, Oral, Q6H JORGE  [Held by provider] senna-docusate sodium, 1 tablet, Oral, HS  vancomycin, 750 mg, Intravenous, Q8H      Continuous IV Infusions:  heparin (porcine), 3-30 Units/kg/hr  (Order-Specific), Intravenous, Titrated  HYDROmorphone, , Intravenous, Continuous  multi-electrolyte, 100 mL/hr, Intravenous, Continuous      PRN Meds:  diazepam, 5 mg, Oral, Q8H PRN  heparin (porcine), 3,400 Units, Intravenous, Q6H PRN  heparin (porcine), 6,800 Units, Intravenous, Q6H PRN  HYDROmorphone, 0.5 mg, Intravenous, Q3H PRN  metoclopramide, 10 mg, Intravenous, Q6H PRN  ondansetron, 4 mg, Intravenous, Q6H PRN      Discharge Plan: TBD    Vital Signs (last 3 days)       Date/Time Temp Pulse Resp BP MAP (mmHg) SpO2 FiO2 (%) Calculated FIO2 (%) - Nasal Cannula Nasal Cannula O2 Flow Rate (L/min) O2 Device Patient Position - Orthostatic VS Kiko Coma Scale Score Pain    02/05/25 07:35:37 97.5 °F (36.4 °C) 99 18 170/98 139 95 % -- -- -- None (Room air) Sitting -- --    02/05/25 05:10:22 -- 97 -- 181/88 119 92 % -- -- -- -- -- -- --    02/05/25 0039 -- 99 18 168/96 125 100 % -- 28 2 L/min Nasal cannula Lying -- 3    02/04/25 2344 -- -- -- 182/110 -- -- -- -- -- -- -- -- --    02/04/25 23:10:57 97.6 °F (36.4 °C) 102 -- 188/118 141 95 % -- -- -- -- -- -- --    02/04/25 2155 -- -- -- 178/104 -- -- -- -- -- -- -- -- --    02/04/25 21:16:56 -- 101 -- 178/86 117 91 % -- -- -- -- -- -- --    02/04/25 2045 97.9 °F (36.6 °C) 98 22 186/98 -- 92 % -- -- -- -- Lying -- 8    02/04/25 2030 -- -- -- -- -- 93 % -- -- -- None (Room air) -- 15 --    02/04/25 19:38:45 97.8 °F (36.6 °C) 98 18 173/101 125 94 % -- -- -- -- -- -- --    02/04/25 1712 -- -- -- -- -- -- -- -- -- -- -- -- 7 02/04/25 1655 -- -- -- -- -- -- -- -- -- -- -- -- 7 02/04/25 15:12:04 98.6 °F (37 °C) 102 -- 144/82 103 93 % -- -- -- None (Room air) Lying -- --    02/04/25 1405 -- -- -- -- -- -- -- -- -- -- -- -- 4 02/04/25 1217 -- -- -- -- -- -- -- -- -- -- -- -- 5    02/04/25 0930 -- -- -- -- -- -- -- -- -- -- -- 15 --    02/04/25 0900 -- 89 -- -- -- 99 % -- -- -- None (Room air) -- -- --    02/04/25 0800 -- 84 -- -- -- 100 % -- -- -- None (Room air) -- --  --    02/04/25 07:21:41 98.1 °F (36.7 °C) 85 15 144/74 97 98 % -- -- -- None (Room air) Lying -- --    02/04/25 06:01:43 98.3 °F (36.8 °C) 83 16 135/77 96 100 % -- -- -- None (Room air) Lying -- --    02/04/25 0500 -- -- -- -- -- -- -- -- -- -- -- -- 8 02/04/25 0438 -- -- -- -- -- -- -- -- -- -- -- -- 8 02/04/25 0011 -- -- -- -- -- -- -- -- -- -- -- -- 7 02/03/25 22:25:46 99 °F (37.2 °C) 85 20 119/61 80 99 % -- -- -- -- -- -- --    02/03/25 2007 -- -- -- -- -- -- -- -- -- -- -- -- 5 02/03/25 2000 -- -- -- -- -- 94 % -- -- -- None (Room air) -- 15 --    02/03/25 14:44:29 97.5 °F (36.4 °C) 92 16 141/81 101 99 % -- -- -- None (Room air) Lying -- --    02/03/25 1344 -- -- -- -- -- -- -- -- -- -- -- -- 4    02/03/25 1215 -- -- -- -- -- -- -- -- -- None (Room air) -- 15 3    02/03/25 12:08:43 97.4 °F (36.3 °C) 86 17 156/91 113 100 % -- -- -- Nasal cannula Sitting -- --    02/03/25 1100 -- 83 19 136/64 93 99 % -- -- -- -- -- -- --    02/03/25 1030 97.8 °F (36.6 °C) 82 23 124/56 80 97 % 21 -- -- -- -- -- --    02/03/25 1015 -- 81 20 136/58 84 99 % 21 -- -- -- -- -- --    02/03/25 1000 -- 82 27 119/57 -- 99 % 21 -- -- -- -- -- --    02/03/25 0945 -- 80 15 134/59 -- 97 % 21 -- -- -- -- -- --    02/03/25 0930 -- 80 11 128/62 -- 97 % 21 -- -- -- -- -- --    02/03/25 0900 98 °F (36.7 °C) 86 15 115/61 82 97 % 21 -- -- -- -- -- --    02/03/25 0845 -- 89 15 109/59 -- 97 % 22 -- -- -- -- -- --    02/03/25 0830 97.8 °F (36.6 °C) 89 27 103/60 77 98 % 22 -- -- -- -- -- --    02/03/25 0815 -- 86 12 106/59 -- 98 % 22 -- -- -- -- -- --    02/03/25 0813 98.3 °F (36.8 °C) 86 14 112/60 77 99 % 22 -- -- -- -- -- --    02/03/25 0800 -- 88 19 -- -- 99 % 22 28 2 L/min Nasal cannula -- 15 4    02/03/25 0633 -- -- -- -- -- -- -- -- -- -- -- -- 8    02/03/25 0611 98.4 °F (36.9 °C) -- 16 134/63 90 -- -- -- -- None (Room air) Lying -- 7    02/03/25 0400 -- 97 18 152/70 101 -- 22 -- -- -- -- -- --    02/03/25 0007 -- -- -- -- -- -- -- --  -- -- -- -- 5    02/03/25 0000 98 °F (36.7 °C) 100 13 131/63 90 93 % 26 -- -- -- -- -- --    02/02/25 2300 -- 96 12 -- -- 99 % 56 -- -- -- -- -- --    02/02/25 2200 -- 97 20 144/67 96 98 % 21 -- -- -- -- -- --    02/02/25 2100 -- 104 17 98/64 75 95 % -- -- -- -- -- -- --    02/02/25 2000 98.3 °F (36.8 °C) 92 19 107/58 80 92 % -- -- -- None (Room air) Lying -- --    02/02/25 1945 -- -- -- -- -- -- -- -- -- -- -- 15 5    02/02/25 1858 -- 91 19 -- -- 99 % 21 -- -- -- -- -- --    02/02/25 1800 -- 93 19 114/59 82 100 % 21 36 4 L/min Nasal cannula Lying -- --    02/02/25 1600 99.2 °F (37.3 °C) 92 20 123/73 92 99 % 21 36 4 L/min Nasal cannula Lying 15 --    02/02/25 1510 -- 102 16 134/60 87 98 % 21 -- -- -- Lying -- --    02/02/25 1505 -- 98 16 113/60 -- 97 % 21 -- -- -- -- -- --    02/02/25 1500 -- 97 16 120/70 90 97 % 21 -- -- -- Lying -- --    02/02/25 1455 -- 100 16 121/68 -- 93 % 21 -- -- -- -- -- --    02/02/25 1450 -- 97 16 120/71 -- 96 % 21 -- -- -- -- -- --    02/02/25 1445 -- 91 16 121/73 -- 99 % 21 -- -- -- -- -- --    02/02/25 1440 -- 90 16 122/71 -- 99 % 21 -- -- -- -- -- --    02/02/25 1435 -- 90 16 135/73 -- 99 % 21 -- -- -- -- -- --    02/02/25 1430 -- 88 16 131/80 -- 100 % 21 -- -- -- -- -- --    02/02/25 1425 -- 90 16 116/69 -- 100 % 21 -- -- -- -- -- --    02/02/25 1420 -- 87 16 117/68 -- 100 % 22 -- -- Nasal cannula -- -- --    02/02/25 1415 -- 86 16 131/60 -- 100 % 22 36 4 L/min Nasal cannula -- -- --    02/02/25 1300 99.5 °F (37.5 °C) 85 16 108/58 77 100 % -- 36 4 L/min Nasal cannula Lying -- --    02/02/25 1200 99.5 °F (37.5 °C) -- -- -- -- 100 % -- 36 4 L/min Nasal cannula Lying 15 5    02/02/25 1126 -- -- -- -- -- -- -- -- -- -- -- -- 9    02/02/25 0930 99.5 °F (37.5 °C) 86 12 133/63 91 99 % -- 36 4 L/min Nasal cannula Lying -- 4    02/02/25 0900 99.7 °F (37.6 °C) 93 13 142/74 -- 100 % -- 36 4 L/min Nasal cannula -- -- --    02/02/25 0838 -- -- -- -- -- -- -- -- -- -- -- -- 10 - Worst Possible Pain     02/02/25 0830 99.5 °F (37.5 °C) 94 22 138/65 -- 100 % -- 36 4 L/min Nasal cannula -- -- --    02/02/25 0825 99.5 °F (37.5 °C) 93 16 129/72 -- 100 % -- -- -- -- -- -- --    02/02/25 0815 99.5 °F (37.5 °C) 92 16 136/63 -- 100 % -- 36 4 L/min Nasal cannula -- -- --    02/02/25 0800 99.5 °F (37.5 °C) 91 22 136/63 93 100 % -- 36 4 L/min Nasal cannula Lying -- 7    02/02/25 0730 99.5 °F (37.5 °C) -- -- -- -- 100 % -- 36 4 L/min Nasal cannula -- 15 8    02/02/25 0700 99.7 °F (37.6 °C) 87 9 117/58 83 100 % -- -- -- -- -- -- --    02/02/25 0648 99.7 °F (37.6 °C) 89 18 108/58 -- 100 % -- -- -- -- -- -- --    02/02/25 0628 99.7 °F (37.6 °C) 90 21 106/54 -- 100 % -- -- -- -- -- -- --    02/02/25 0618 99.7 °F (37.6 °C) 93 18 127/60 -- 100 % -- -- -- -- -- -- --    02/02/25 0617 -- -- -- -- -- -- -- -- -- -- -- -- 3    02/02/25 0605 99.9 °F (37.7 °C) 89 14 101/58 -- -- -- -- -- -- -- -- --    02/02/25 0400 99.1 °F (37.3 °C) 95 25 119/62 82 100 % -- 36 4 L/min Nasal cannula Lying 15 5    02/02/25 0004 -- -- -- -- -- -- -- -- -- -- -- -- 6    02/02/25 0000 99.5 °F (37.5 °C) 92 10 134/63 88 100 % -- 36 4 L/min Nasal cannula -- 15 No Pain          Weight (last 2 days)       Date/Time Weight    02/05/25 0300 95.6 (210.76)    02/04/25 0400 93.5 (206.13)    02/03/25 0600 90.4 (199.3)            Pertinent Labs/Diagnostic Results:   Radiology:  IR TPA lysis check   Final Interpretation by Yamil Davidson MD (02/02 1607)      1. Left deep femoral, superficial femoral, popliteal, tibioperoneal trunk, and anterior tibial arteries were patent.   2. Mild residual stenoses in the left superficial femoral and popliteal arteries treated with 4 mm balloon angioplasty.   3. Mild stenoses along the proximal left anterior tibial artery treated with 3 mm and 2.5 mm balloon angioplasties.   4. Left posterior tibial and peroneal arteries appeared chronically occluded distally.      Plan:      -Continue anticoagulation.   -Continue aspirin and statin.             Workstation performed: [id number]         IR TPA lysis check   Final Interpretation by Yamil Davidson MD (02/01 4372)      1. Residual luminal irregularities in the left superficial femoral artery treated with 4 mm balloon angioplasty.   2. Post angioplasty arteriogram showed reocclusion of left SFA, likely due to residual thrombus.   3. Lysis catheter replaced extending from the left SFA into the popliteal artery.      Plan:      -tPA to run through the lysis catheter at 1 mg/h.   -Return for lysis check tomorrow.            Workstation performed: [id number]         IR lower extremity angiogram   Final Interpretation by Yamil Davidson MD (01/31 2142)      1. Thrombosed left superficial femoral artery treated with aspiration thrombectomy and 4 mm balloon angioplasty with residual thrombus.   2. Thrombolysis initiated through a lysis catheter in the left SFA.   3. Right arm PICC placement through basilic vein access.      Plan:      -tPA to run at 1 mg/hr through the lysis catheter.   -All blood draws and labwork to be done through PICC.   -Return for lysis check tomorrow.               Workstation performed: [id number]         IR PICC placement double lumen   Final Interpretation by Yamil Davidson MD (01/31 2142)      1. Thrombosed left superficial femoral artery treated with aspiration thrombectomy and 4 mm balloon angioplasty with residual thrombus.   2. Thrombolysis initiated through a lysis catheter in the left SFA.   3. Right arm PICC placement through basilic vein access.      Plan:      -tPA to run at 1 mg/hr through the lysis catheter.   -All blood draws and labwork to be done through PICC.   -Return for lysis check tomorrow.               Workstation performed: [id number]         CT head wo contrast   Final Interpretation by James Gimenez MD (01/31 8980)      1. No intracranial hemorrhage seen.   2. Bilateral sphenoid sinusitis                  Workstation performed: [id number]            Cardiology:  ECG 12 lead   Final Result by Jose Alberto Peñaloza MD (02/03 0965)   Normal sinus rhythm   Right axis deviation   Low voltage QRS   Abnormal ECG   When compared with ECG of 11-Nov-2018 17:45,   No signficant change   Confirmed by Jose Alberto Peñaloza ([id number]) on 2/3/2025 1:54:45 PM        GI:  No orders to display           Results from last 7 days   Lab Units 02/05/25 0314 02/04/25  2304 02/04/25 0448 02/03/25  1145 02/03/25  0624 02/02/25  1647 02/02/25  1035 02/02/25  0344 01/31/25  2231 01/31/25  0432   WBC Thousand/uL 13.03* 10.81* 11.37*  --  10.57* 10.45*   < > 10.91*   < > 13.64*   HEMOGLOBIN g/dL 8.4* 7.8* 7.5* 8.4* 6.8* 7.7*   < > 6.2*   < > 9.0*   HEMATOCRIT % 25.7* 24.0* 23.5*  --  21.2* 23.6*   < > 19.8*   < > 28.9*   PLATELETS Thousands/uL 235 191 181  --  169 160   < > 184   < > 270   TOTAL NEUT ABS Thousands/µL  --   --  7.03  --  6.77  --   --  6.79  --   --    BANDS PCT % 2  --   --   --   --   --   --   --   --  1    < > = values in this interval not displayed.         Results from last 7 days   Lab Units 02/05/25 0314 02/04/25 0448 02/03/25  0624 02/02/25  0344 02/01/25  0558   SODIUM mmol/L 135 137 138 132* 131*   POTASSIUM mmol/L 4.4 4.3 4.1 4.1 4.1   CHLORIDE mmol/L 102 102 104 99 99   CO2 mmol/L 27 28 28 28 23   ANION GAP mmol/L 6 7 6 5 9   BUN mg/dL 10 11 11 13 13   CREATININE mg/dL 0.57* 0.59* 0.53* 0.70 0.56*   EGFR ml/min/1.73sq m 109 108 112 102 110   CALCIUM mg/dL 8.4 7.8* 8.0* 7.9* 8.2*   MAGNESIUM mg/dL  --   --  1.5*  --   --    PHOSPHORUS mg/dL  --   --  3.0  --   --      Results from last 7 days   Lab Units 02/05/25  0314 01/31/25  2231   AST U/L 24 14   ALT U/L 14 13   ALK PHOS U/L 102 110*   TOTAL PROTEIN g/dL 6.7 7.2   ALBUMIN g/dL 3.2* 3.3*   TOTAL BILIRUBIN mg/dL 0.31 0.35     Results from last 7 days   Lab Units 02/05/25  0546 02/04/25  2350 02/04/25  2049 02/04/25  1544 02/04/25  1045 02/04/25  0600 02/03/25  2344 02/03/25  2128 02/03/25  1551 02/03/25  1145  02/03/25  0616 02/03/25  0011   POC GLUCOSE mg/dl 156* 215* 199* 149* 153* 153* 146* 145* 198* 189* 125 183*     Results from last 7 days   Lab Units 02/05/25  0314 02/04/25  0448 02/03/25  0624 02/02/25  0344 02/01/25  0558 01/31/25  2231 01/31/25  0432 01/30/25  0519   GLUCOSE RANDOM mg/dL 164* 151* 118 181* 138 119 169* 184*           Results from last 7 days   Lab Units 02/05/25  0314 02/04/25  0448 02/03/25  2051 02/02/25  2310 02/02/25  1647 02/01/25  0000 01/31/25  2231 01/31/25  1214 01/31/25  0629   PROTIME seconds  --   --   --   --  14.1  --  13.6  --  13.4   INR   --   --   --   --  1.05  --  1.01  --  0.99   PTT seconds 65* 78* 77*   < > 37*   < > 38*   < > 91*    < > = values in this interval not displayed.         Results from last 7 days   Lab Units 02/04/25  2304   PROCALCITONIN ng/ml 0.14       Results from last 7 days   Lab Units 02/04/25  0555 02/03/25  0555   UNIT PRODUCT CODE  [id number]  [id number]   UNIT NUMBER  [id number]  [id number]   UNITABO  A A  A   UNITRH  NEG POS  POS   CROSSMATCH  Compatible Compatible  Compatible   UNIT DISPENSE STATUS  Presumed Trans Presumed Trans  Presumed Trans   UNIT PRODUCT VOL mL 350 300  300       Results from last 7 days   Lab Units 01/29/25  1507   GRAM STAIN RESULT  Rare Gram positive cocci in pairs*  No polys seen*             Results from last 7 days   Lab Units 02/02/25 0344 01/31/25 2231 01/30/25  0519   VANCOMYCIN RM ug/mL 25.9* 10.8 18.2       Network Utilization Review Department  ATTENTION: Please call with any questions or concerns to [phone] and carefully listen to the prompts so that you are directed to the right person. All voicemails are confidential.   For Discharge needs, contact Care Management DC Support Team at [phone] opt. 2  Send all requests for admission clinical reviews, approved or denied determinations and any other requests to dedicated fax number below belonging to the campus  where the patient is receiving treatment. List of dedicated fax numbers for the Facilities:  FACILITY NAME UR FAX NUMBER   ADMISSION DENIALS (Administrative/Medical Necessity) [phone]   DISCHARGE SUPPORT TEAM (NETWORK) [phone]   PARENT CHILD HEALTH (Maternity/NICU/Pediatrics) [phone]   [facility] [phone]   [facility] [phone]   [facility] [phone]   [facility] [phone]   [facility] [phone]   [facility] [phone]   [facility] [phone]   [facility] [phone]   [facility] [phone]   Critical access hospital [phone]   [facility] [phone]   [facility] [phone]

## 2025-02-05 NOTE — PROGRESS NOTES
Progress Note - Vascular Surgery   Name: Lizzy Acuna 49 y.o. female I MRN: [id number]  Unit/Bed#: [facility] 511-01 I Date of Admission: 1/31/2025   Date of Service: 2/5/2025 I Hospital Day: 5    Assessment & Plan  Diabetic foot infection  (HCC)  49 y.o. F w/ PMH of T2DM (A1c 10.3), tobacco use (quit Dec 2024) who was previously seen at [facility] in December with wound of left foot with cellulitis.   During previous admission vascular surgery was consulted and recommended LLE agram w/ intervention which was preformed by IR. Post-procedure pt underwent TMA with podiatry    Now S/P Agram, Left SFA thrombectomy, placement of lysis catheter on 1/31  S/p lysis recheck w/ angioplasty of L SFA, pop, AT on 2/3.    Plan  - Plan for repeat arteriogram on Thursday, 2/6 for below knee/tibial intervention in effort for aggressive limb salvage given residual AT and TPT stenosis.    - Continue plavix, may discontinue aspirin as no indication for triple therapy    - NPO midnight    - Pre-op labs, abx on call to OR   - Continue hep gtt   - Local wound care psb VAC per Podiatry   - Continue statin therapy   - Remainder of care per primary       Diabetes mellitus type 2 with complications ([facility])  Lab Results   Component Value Date    HGBA1C 10.3 (H) 12/13/2024       Recent Labs     02/04/25  1544 02/04/25  2049 02/04/25  2350 02/05/25  0546   POCGLU 149* 199* 215* 156*       Blood Sugar Average: Last 72 hrs:  (P) 162.6258769177674807    Hyponatremia    Cellulitis of left foot    PAD (peripheral artery disease) ([facility])    Blood loss anemia    Obesity (BMI 30-39.9)      24 Hour Events : none  Subjective : Seen and examined at bedside. Denies L foot pain. Able to move L ankle. Mentions nausea, no other complaints.     Objective :  Temp:  [97.5 °F (36.4 °C)-98.6 °F (37 °C)] 97.5 °F (36.4 °C)  HR:  [] 99  BP: (144-188)/() 170/98  Resp:  [18-22] 18  SpO2:  [91 %-100 %] 95 %  O2 Device: None (Room air)  Nasal Cannula O2 Flow Rate (L/min):   "[2 L/min] 2 L/min     I/O         02/01 0701  02/02 0700 02/02 0701  02/03 0700 02/03 0701  02/04 0700    P.O.  360     I.V. (mL/kg) 2126.3 (24.3) 728.7 (8.3)     Blood  950     IV Piggyback 750.8 150.4     Total Intake(mL/kg) 2877.2 (32.9) 2189.1 (25)     Urine (mL/kg/hr) 1090 (0.5) 1140 (0.5)     Emesis/NG output 50 250     Stool 0 0     Total Output 1140 1390     Net +1737.2 +799.1            Unmeasured Urine Occurrence 0 x      Unmeasured Stool Occurrence 0 x 2 x           Lines/Drains/Airways       Active Status       Name Placement date Placement time Site Days    PICC Line 01/31/25 01/31/25  1937  --  4                  Physical Exam  General: NAD  HENT: NCAT MMM  Neck: supple, no JVD  CV: nl rate  Lungs: nl wob. No resp distress  ABD: Soft, nontender, protuberant/nondistended. R groin access CDI without signs of swelling, fluctuance or erythema.  Pulse exam: Monophasic L AT signal.  Neuro: AAOx3. M/S intact to BLE      Lab Results: I have reviewed the following results:  CBC with diff:   Lab Results   Component Value Date    WBC 13.03 (H) 02/05/2025    HGB 8.4 (L) 02/05/2025    HCT 25.7 (L) 02/05/2025    MCV 91 02/05/2025     02/05/2025    RBC 2.83 (L) 02/05/2025    MCH 29.7 02/05/2025    MCHC 32.7 02/05/2025    RDW 14.1 02/05/2025    MPV 9.2 02/05/2025    NRBC 1 02/04/2025   ,   BMP/CMP:  Lab Results   Component Value Date    SODIUM 135 02/05/2025    K 4.4 02/05/2025     02/05/2025    CO2 27 02/05/2025    BUN 10 02/05/2025    CREATININE 0.57 (L) 02/05/2025    CALCIUM 8.4 02/05/2025    AST 24 02/05/2025    ALT 14 02/05/2025    ALKPHOS 102 02/05/2025    EGFR 109 02/05/2025   ,   Lipid Panel: No results found for: \"CHOL\",   Coags:   Lab Results   Component Value Date    PTT 65 (H) 02/05/2025    INR 1.05 02/02/2025   ,   Blood Culture:   Lab Results   Component Value Date    BLOODCX No Growth After 5 Days. 01/28/2025    BLOODCX No Growth After 5 Days. 01/28/2025   ,   Urinalysis:   Lab Results "   Component Value Date    COLORU Straw 11/05/2018    CLARITYU Clear 11/05/2018    SPECGRAV 1.020 11/05/2018    PHUR 6.0 11/05/2018    LEUKOCYTESUR Negative 11/05/2018    NITRITE Negative 11/05/2018    GLUCOSEU 3+ (A) 11/05/2018    KETONESU Negative 11/05/2018    BILIRUBINUR Negative 11/05/2018    BLOODU Trace-Intact (A) 11/05/2018   ,   Urine Culture:   Lab Results   Component Value Date    URINECX 50,000-59,000 cfu/ml Mixed Contaminants X4 10/21/2016   ,   Wound Culure:   Lab Results   Component Value Date    WOUNDCULT 1+ Growth of Enterobacter gergoviae (A) 06/13/2018    WOUNDCULT (A) 06/13/2018     2+ Growth of Methicillin Resistant Staphylococcus aureus             VTE Prophylaxis: VTE covered by:  heparin (porcine), Intravenous, 18 Units/kg/hr at 02/05/25 0327  heparin (porcine), Intravenous, 3,400 Units at 02/03/25 0021  heparin (porcine), Intravenous

## 2025-02-05 NOTE — PROGRESS NOTES
"Progress Note - Internal Medicine   Name: Lizzy Acuna 49 y.o. female I MRN: [id number]  Unit/Bed#: [facility] 511-01 I Date of Admission: 1/31/2025   Date of Service: 2/5/2025 I Hospital Day: 5     Assessment & Plan  Cellulitis of left foot  Underwent an elective R TMA approx 3 weeks ago and was doing well until last week when she was noticed to have dehiscence of her surgical wound.   The wound was debrided by her podiatrist in the office but ultimately worsened with evidence of cellulitis.   LLE foot infection diagnosed clinically without MRI findings of osteomyelitis.   Tissue culture: 2+ MRSA, 2+ Finegoldia magna No significant leukocytosis or fever this admission. Site continues to bleed likely due to triple therapy   A1c 10.3    Plan:   Podiatry and vascular surgery following   Wound care consulted   Will need long-term wound VAC for granulation in the plantar flap of the TMA site before possible DPC  ID following, Continue intravenous vancomycin and transition to PO when stable for discharge  Patient will have repeat arteriogram 2/6 for below knee/tibial intervention.  Will follow-up with the result for possible wound VAC application or podiatric intervention.  Will attempt wound vac, podiatry following for limb salvaging options   Pain regimen of Tylenol 975 mg q6, gabapentin 300 mg daily, robaxin 500 mg q6, dilaudid 1 mg/mL PCA, dilaudid 0.5 mg q3 PRN  PAD (peripheral artery disease) (HCC)  Transferred to [facility] for vascular surgery and IR intervention after Lower Extremity Dopplers showed a >75% stenosis in the distal superficial femoral artery. Full study results are listed below  \"There fernanda 50-75% stenosis in the tibio-peroneal trunk. Diffuse disease throughout the  remaining femoro-popliteal and tibio-peroneal segments.\"  Now S/P Agram, Left SFA thrombectomy, placement of lysis catheter on 1/31  S/p lysis recheck on 2/2 with residual thrombus within L SFA.  S/p lysis recheck w/ angioplasty of L SFA, pop, AT " on 2/3.     Plan:  Continue heparin gtt, aspirin 81 mg, Plavix   Diabetes mellitus type 2 with complications (HCC)  Lab Results   Component Value Date    HGBA1C 10.3 (H) 12/13/2024       Recent Labs     02/04/25  1544 02/04/25  2049 02/04/25  2350 02/05/25  0546   POCGLU 149* 199* 215* 156*       Blood Sugar Average: Last 72 hrs:  (P) [id number]  - Lispro 10u QHS with sliding scale   Hyponatremia  Chronic euvolemic hyponatremia that was 133 on admission. Asymptomatic and no recent history of confusion, altered metal status, seizure, or coma.    Current differential includes SIADH in the setting of acute pain      Monitor BMP  Low threshold to workup with serum and urine studies if hyponatremia worsens      Diabetic foot infection  (HCC)  Lab Results   Component Value Date    HGBA1C 10.3 (H) 12/13/2024       Recent Labs     02/04/25  1544 02/04/25 2049 02/04/25  2350 02/05/25  0546   POCGLU 149* 199* 215* 156*       Blood Sugar Average: Last 72 hrs:  (P) [id number]    Blood loss anemia  Blood loss anemia 2/2 lysis, bleeding from LLE TMA.       - Dressing and wound care per podiatry recs   - Recheck CBC and transfuse to maintain Hb > 7  Obesity (BMI 30-39.9)      Disposition: Inpatient      Team: SOD TEAM B    Subjective   Patient seen and examined.  Overnight, patient was very nauseous and became hypertensive with SBP's up to 188.  Patient is mildly responding to antiemetic therapy.  Will start on antihypertensive and changed to clear liquid diet for today    Objective :  Temp:  [97.5 °F (36.4 °C)-98.6 °F (37 °C)] 97.5 °F (36.4 °C)  HR:  [] 99  BP: (144-188)/() 170/98  Resp:  [18-22] 18  SpO2:  [91 %-100 %] 95 %  O2 Device: None (Room air)  Nasal Cannula O2 Flow Rate (L/min):  [2 L/min] 2 L/min    I/O         02/03 0701  02/04 0700 02/04 0701 02/05 0700 02/05 0701  02/06 0700    P.O. 460 838     I.V. (mL/kg) 2331.2 (24.9) 1313.5 (13.7)     Blood 350      IV Piggyback 100      Total  Intake(mL/kg) 3241.2 (34.7) 2151.5 (22.5)     Urine (mL/kg/hr) 870 (0.4) 1775 (0.8)     Emesis/NG output 750 1     Stool 0 0     Total Output 1620 1776     Net +1621.2 +375.5            Unmeasured Urine Occurrence  2 x     Unmeasured Stool Occurrence 1 x 2 x     Unmeasured Emesis Occurrence  1 x           Weights:   IBW (Ideal Body Weight): 48.26 kg    Body mass index is 39.56 kg/m².  Weight (last 2 days)       Date/Time Weight    02/05/25 0300 95.6 (210.76)    02/04/25 0400 93.5 (206.13)    02/03/25 0600 90.4 (199.3)            Physical Exam  Vitals and nursing note reviewed.   Constitutional:       General: She is not in acute distress.     Appearance: She is well-developed.   HENT:      Head: Normocephalic and atraumatic.   Eyes:      Conjunctiva/sclera: Conjunctivae normal.   Cardiovascular:      Rate and Rhythm: Normal rate and regular rhythm.      Heart sounds: No murmur heard.  Pulmonary:      Effort: Pulmonary effort is normal. No respiratory distress.      Breath sounds: Normal breath sounds.   Abdominal:      Palpations: Abdomen is soft.      Tenderness: There is no abdominal tenderness.   Musculoskeletal:         General: No swelling.      Cervical back: Neck supple.      Right lower leg: Edema present.   Skin:     General: Skin is warm and dry.      Capillary Refill: Capillary refill takes less than 2 seconds.      Findings: Lesion present.   Neurological:      Mental Status: She is alert.   Psychiatric:         Mood and Affect: Mood normal.           Lab Results: I have reviewed the following results:  Recent Labs     02/02/25  1647 02/02/25  2310 02/03/25  0624 02/03/25  1145 02/05/25  0314   WBC 10.45*  --  10.57*   < > 13.03*   HGB 7.7*  --  6.8*   < > 8.4*   HCT 23.6*  --  21.2*   < > 25.7*     --  169   < > 235   BANDSPCT  --   --   --   --  2   SODIUM  --   --  138   < > 135   K  --   --  4.1   < > 4.4   CL  --   --  104   < > 102   CO2  --   --  28   < > 27   BUN  --   --  11   < > 10    CREATININE  --   --  0.53*   < > 0.57*   GLUC  --   --  118   < > 164*   MG  --   --  1.5*  --   --    PHOS  --   --  3.0  --   --    AST  --   --   --   --  24   ALT  --   --   --   --  14   ALB  --   --   --   --  3.2*   TBILI  --   --   --   --  0.31   ALKPHOS  --   --   --   --  102   PTT 37*   < > 96*   < > 65*   INR 1.05  --   --   --   --     < > = values in this interval not displayed.             Currently Ordered Meds:   Current Facility-Administered Medications:     acetaminophen (TYLENOL) tablet 975 mg, Q6H JORGE    aspirin (ECOTRIN LOW STRENGTH) EC tablet 81 mg, Daily    atorvastatin (LIPITOR) tablet 80 mg, Daily With Dinner    chlorhexidine (PERIDEX) 0.12 % oral rinse 15 mL, Q12H JORGE    clopidogrel (PLAVIX) tablet 75 mg, Daily    diazepam (VALIUM) tablet 5 mg, Q8H PRN    DULoxetine (CYMBALTA) delayed release capsule 30 mg, Daily    gabapentin (NEURONTIN) capsule 300 mg, BID (AM & Afternoon)    gabapentin (NEURONTIN) capsule 600 mg, HS    heparin (porcine) 25,000 units in 0.45% NaCl 250 mL infusion (premix), Titrated, Last Rate: 18 Units/kg/hr (02/05/25 0327)    heparin (porcine) injection 3,400 Units, Q6H PRN    heparin (porcine) injection 6,800 Units, Q6H PRN    HYDROmorphone (DILAUDID) 1 mg/mL 50 mL PCA, Continuous    HYDROmorphone (DILAUDID) injection 0.5 mg, Q3H PRN    insulin glargine (LANTUS) subcutaneous injection 10 Units 0.1 mL, HS    insulin lispro (HumALOG/ADMELOG) 100 units/mL subcutaneous injection 1-6 Units, Q6H JORGE **AND** Fingerstick Glucose (POCT), Q6H    methocarbamol (ROBAXIN) tablet 750 mg, Q6H JORGE    metoclopramide (REGLAN) injection 10 mg, Q6H PRN    multi-electrolyte (PLASMALYTE-A/ISOLYTE-S PH 7.4) IV solution, Continuous, Last Rate: 100 mL/hr (02/05/25 0325)    ondansetron (ZOFRAN) injection 4 mg, Q6H PRN    [Held by provider] senna-docusate sodium (SENOKOT S) 8.6-50 mg per tablet 1 tablet, HS    vancomycin 750 mg in sodium chloride 0.9% 250 mL, Q8H, Last Rate: 750 mg (02/05/25  0501)  VTE Pharmacologic Prophylaxis: DAPT  VTE Mechanical Prophylaxis: sequential compression device    Administrative Statements     Portions of the record may have been created with voice recognition software.

## 2025-02-05 NOTE — ASSESSMENT & PLAN NOTE
s/p left SFA thrombectomy on (01/31/25) however still with residual thrombus  s/p lysis check and angioplasty (02/01/25)   s/p lysis check with angioplasty (02/03/25)      - continues with DAPT/statin    - continues with heparin gtt    - now planned for repeat arteriorgram with possible interventions      - continue dilaudid PCA at 0 / 0.2mg / q5min / 2.2mg hourly limit for now    - transition to PO once tolerating PO (likely PO dilaudid as oxycodone ineffective the past)     - Discontinue as needed IV Dilaudid.  Can adjust PCA dosing if ineffective.    Multimodal Analgesia:    - continue robaxin 750mg PO q6h jay     - continue tylenol 975mg PO q6h jay    - continue gabapentin 300mg/300mg/600mg    - valium PO 5mg Q8H prn for muscle spasms     - duloxetine 30mg PO daily for musculoskeletal and neuropathic pain      - ondansetron and Reglan PRN for nausea    - consider aprepitant/fosaprepitant, may have to reach out to pharmacy

## 2025-02-05 NOTE — PROGRESS NOTES
Vancomycin Pharmacy Consult     Lizzy Acuna is an 49 y.o. female who is currently receiving IV vancomycin for left foot SSTI.     Vancomycin Assessment:  1. ID Consult: Yes  2. Cultures:   1/28 Blood 2/2: NG  1/29 Culture Left Foot: 2+ MRSA  1/29 Anaerobic Left Foot: 2+ Finegoldia magna  3. Procalcitonin:   1/28: 0.22  1/29: 0.25  4. Renal Function:   SCr: 0.57  CrCl: >90 mL/min  UOP: 0.3 mL/kg/hr  5. Days of Therapy: 9  6. Current Dose: 750 mg IV q8h  7. Last Level: 25.9 (random 2/2 at 0344)  8. Goal AUC(24h): 400-600  9. Goal Random/Trough: 10-15     Vancomycin Plan:  1. Evaluation: continue regimen  2. New Dosing: continue 750 mg IV q8h  Predicted Trough / AUC(24h): 16.8 / 517  3. Next Level: random 2/7 at 0600        Pharmacy will continue to follow closely for s/sx of nephrotoxicity, infusion reactions, and appropriateness of therapy. BMP and CBC will be ordered per protocol. We will continue to follow the patient’s culture results and clinical progress daily.        Rip Nieves, PharmD  Internal Medicine Clinical Pharmacist Specialist  [phone]  [employer] Secure Chat/Teams

## 2025-02-05 NOTE — PROGRESS NOTES
Progress Note - Acute Pain   Name: Lizzy Acuna 49 y.o. female I MRN: [id number]  Unit/Bed#: [facility] 511-01 I Date of Admission: 1/31/2025   Date of Service: 2/5/2025 I Hospital Day: 5    Assessment & Plan  PAD (peripheral artery disease) ([facility])  s/p left SFA thrombectomy on (01/31/25) however still with residual thrombus  s/p lysis check and angioplasty (02/01/25)   s/p lysis check with angioplasty (02/03/25)      - continues with DAPT/statin    - continues with heparin gtt    - now planned for repeat arteriorgram with possible interventions      - continue dilaudid PCA at 0 / 0.2mg / q5min / 2.2mg hourly limit for now    - transition to PO once tolerating PO (likely PO dilaudid as oxycodone ineffective the past)     - Discontinue as needed IV Dilaudid.  Can adjust PCA dosing if ineffective.    Multimodal Analgesia:    - continue robaxin 750mg PO q6h jay     - continue tylenol 975mg PO q6h jay    - continue gabapentin 300mg/300mg/600mg    - valium PO 5mg Q8H prn for muscle spasms     - duloxetine 30mg PO daily for musculoskeletal and neuropathic pain      - ondansetron and Reglan PRN for nausea    - consider aprepitant/fosaprepitant, may have to reach out to pharmacy      APS will continue to follow. Please contact Acute Pain Service - via SecureChat from [phone] with additional questions or concerns. See SecureChat or Amion for additional contacts and after hours information.     APS will follow peripherally for now and reengage following upcoming vascular procedure.    Subjective   Lizzy Acuna is a 49 y.o. female with PMHx of PAD who presented after elective TMA weeks prior, found to have wound dehiscence and evidence of cellulitis. Patient was transferred to [facility] and underwent thrombectomy with lysis catheters on (01/31/25). Since then, patient continues with pain of the LLE and most recently vascular surgery notes that she is a high risk for requiring BKA despite revascularization.     Pain History  Current  pain location(s):  Pain Score: 0  Pain Location/Orientation: Location: Back, Orientation: Left, Location: Leg  Pain Scale: Pain Assessment Tool: 0-10  24 hour history: Patient states that her left leg and foot pain is minimal.  Getting good relief from Dilaudid PCA.  Patient's main complaint is nausea and vomiting which persists despite Zofran and Reglan.    Opioid requirement previous 24 hours: Dilaudid 6.8 mg IV via PCA.  Meds/Allergies   all current active meds have been reviewed, current meds:   Current Facility-Administered Medications:     acetaminophen (TYLENOL) tablet 975 mg, Q6H JORGE    aspirin (ECOTRIN LOW STRENGTH) EC tablet 81 mg, Daily    atorvastatin (LIPITOR) tablet 80 mg, Daily With Dinner    chlorhexidine (PERIDEX) 0.12 % oral rinse 15 mL, Q12H JORGE    [START ON 2/6/2025] chlorhexidine (PERIDEX) 0.12 % oral rinse 15 mL, Once    chlorthalidone tablet 12.5 mg, Daily    clopidogrel (PLAVIX) tablet 75 mg, Daily    diazepam (VALIUM) tablet 5 mg, Q8H PRN    DULoxetine (CYMBALTA) delayed release capsule 30 mg, Daily    gabapentin (NEURONTIN) capsule 300 mg, BID (AM & Afternoon)    [Held by provider] gabapentin (NEURONTIN) capsule 600 mg, HS    heparin (porcine) 25,000 units in 0.45% NaCl 250 mL infusion (premix), Titrated, Last Rate: 18 Units/kg/hr (02/05/25 0327)    heparin (porcine) injection 3,400 Units, Q6H PRN    heparin (porcine) injection 6,800 Units, Q6H PRN    hydrALAZINE (APRESOLINE) injection 5 mg, Q6H PRN    HYDROmorphone (DILAUDID) 1 mg/mL 50 mL PCA, Continuous    HYDROmorphone (DILAUDID) injection 0.5 mg, Q3H PRN    insulin glargine (LANTUS) subcutaneous injection 10 Units 0.1 mL, HS    insulin lispro (HumALOG/ADMELOG) 100 units/mL subcutaneous injection 1-6 Units, Q6H JORGE **AND** Fingerstick Glucose (POCT), Q6H    methocarbamol (ROBAXIN) tablet 750 mg, Q6H JORGE    metoclopramide (REGLAN) injection 10 mg, Q6H PRN    multi-electrolyte (PLASMALYTE-A/ISOLYTE-S PH 7.4) IV solution, Continuous, Last  Rate: 100 mL/hr (02/05/25 0325)    ondansetron (ZOFRAN) injection 4 mg, Q6H PRN    pantoprazole (PROTONIX) injection 40 mg, Daily    [Held by provider] senna-docusate sodium (SENOKOT S) 8.6-50 mg per tablet 1 tablet, HS    trimethobenzamide (TIGAN) IM injection 200 mg, Q6H PRN    vancomycin 750 mg in sodium chloride 0.9% 250 mL, Q8H, Last Rate: 750 mg (02/05/25 0501), and PTA meds:   Prior to Admission Medications   Prescriptions Last Dose Informant Patient Reported? Taking?   Alcohol Swabs 70 % PADS  Self No No   Sig: May substitute brand based on insurance coverage. Check glucose TID.   Blood Glucose Monitoring Suppl (OneTouch Verio Reflect) w/Device KIT  Self No No   Sig: May substitute brand based on insurance coverage. Check glucose TID.   HYDROmorphone (DILAUDID) 4 mg tablet  Self Yes No   Insulin Glargine Solostar (Lantus SoloStar) 100 UNIT/ML SOPN  Self No No   Sig: Inject 0.1 mL (10 Units total) under the skin daily at bedtime   Insulin Pen Needle (BD Pen Needle Ana 2nd Gen) 32G X 4 MM MISC  Self No No   Sig: For use with insulin pen. Pharmacy may dispense brand covered by insurance.   Insulin Pen Needle (BD Pen Needle Ana 2nd Gen) 32G X 4 MM MISC  Self No No   Sig: For use with insulin pen. Pharmacy may dispense brand covered by insurance.   OneTouch Delica Lancets 33G MISC  Self No No   Sig: May substitute brand based on insurance coverage. Check glucose TID.   acetaminophen (TYLENOL) 325 mg tablet  Self No No   Sig: Take 3 tablets (975 mg total) by mouth every 6 (six) hours   Patient not taking: Reported on 1/23/2025   aspirin (ECOTRIN LOW STRENGTH) 81 mg EC tablet  Self No No   Sig: Take 1 tablet (81 mg total) by mouth daily   atorvastatin (LIPITOR) 80 mg tablet  Self No No   Sig: Take 1 tablet (80 mg total) by mouth daily with dinner   diphenhydrAMINE (BENADRYL) 25 mg capsule  Self Yes No   Sig: Take 25 mg by mouth every 6 (six) hours as needed for allergies   gabapentin (Neurontin) 300 mg capsule   "Self No No   Sig: Take 1 tablet in the morning, 1 in the afternoon and 2 tablets at night.   glucose blood (OneTouch Verio) test strip  Self No No   Sig: May substitute brand based on insurance coverage. Check glucose TID.   ibuprofen (MOTRIN) 600 mg tablet  Self Yes No   Sig: Take by mouth every 6 (six) hours as needed for mild pain   insulin aspart (NovoLOG FlexPen) 100 UNIT/ML injection pen  Self No No   Sig: Inject 5 Units under the skin 3 (three) times a day with meals   methocarbamol (ROBAXIN) 500 mg tablet  Self No No   Sig: Take 1 tablet (500 mg total) by mouth every 6 (six) hours   naloxone (NARCAN) 4 mg/0.1 mL nasal spray  Self No No   Sig: Administer 1 spray into a nostril. If no response after 2-3 minutes, give another dose in the other nostril using a new spray.   senna-docusate sodium (SENOKOT S) 8.6-50 mg per tablet  Self No No   Sig: Take 1 tablet by mouth daily at bedtime   Patient not taking: Reported on 1/28/2025      Facility-Administered Medications: None     Allergies   Allergen Reactions    Codeine Other (See Comments)     Aggression-and nasty--\"took a cough syrup with codeine as a child\"     Objective :  Temp:  [97.5 °F (36.4 °C)-98.6 °F (37 °C)] 97.5 °F (36.4 °C)  HR:  [] 96  BP: (144-203)/() 203/118  Resp:  [18-22] 20  SpO2:  [91 %-100 %] 93 %  O2 Device: None (Room air)  Nasal Cannula O2 Flow Rate (L/min):  [2 L/min] 2 L/min    Physical Exam  Vitals and nursing note reviewed.   Constitutional:       General: She is awake. She is not in acute distress.     Appearance: She is not ill-appearing, toxic-appearing or diaphoretic.   Skin:     General: Skin is warm and dry.   Neurological:      Mental Status: She is alert and oriented to person, place, and time.      GCS: GCS eye subscore is 4. GCS verbal subscore is 5. GCS motor subscore is 6.   Psychiatric:         Attention and Perception: Attention normal.         Speech: Speech normal.         Behavior: Behavior normal. Behavior " is cooperative.            Lab Results: I have reviewed the following results:  Estimated Creatinine Clearance: 128 mL/min (A) (by C-G formula based on SCr of 0.57 mg/dL (L)).  Lab Results   Component Value Date    WBC 13.03 (H) 02/05/2025    HGB 8.4 (L) 02/05/2025    HCT 25.7 (L) 02/05/2025     02/05/2025         Component Value Date/Time    K 4.4 02/05/2025 0314     02/05/2025 0314    CO2 27 02/05/2025 0314    BUN 10 02/05/2025 0314    CREATININE 0.57 (L) 02/05/2025 0314         Component Value Date/Time    CALCIUM 8.4 02/05/2025 0314    ALKPHOS 102 02/05/2025 0314    AST 24 02/05/2025 0314    ALT 14 02/05/2025 0314    TP 6.7 02/05/2025 0314    ALB 3.2 (L) 02/05/2025 0314       Imaging Results Review: No pertinent imaging studies reviewed.  Other Study Results Review: No additional pertinent studies reviewed.     Administrative Statements   I have spent a total time of 27 minutes in caring for this patient on the day of the visit/encounter including Risks and benefits of tx options, Instructions for management, Patient and family education, Importance of tx compliance, Risk factor reductions, Impressions, Counseling / Coordination of care, Documenting in the medical record, Reviewing / ordering tests, medicine, procedures  , Obtaining or reviewing history  , and Communicating with other healthcare professionals .

## 2025-02-05 NOTE — ASSESSMENT & PLAN NOTE
Lab Results   Component Value Date    HGBA1C 10.3 (H) 12/13/2024       Recent Labs     02/04/25  1544 02/04/25  2049 02/04/25  2350 02/05/25  0546   POCGLU 149* 199* 215* 156*       Blood Sugar Average: Last 72 hrs:  (P) [id number]

## 2025-02-05 NOTE — ASSESSMENT & PLAN NOTE
Lab Results   Component Value Date    HGBA1C 10.3 (H) 12/13/2024       Recent Labs     02/04/25  1544 02/04/25  2049 02/04/25  2350 02/05/25  0546   POCGLU 149* 199* 215* 156*       Blood Sugar Average: Last 72 hrs:  (P) [id number]

## 2025-02-06 ENCOUNTER — APPOINTMENT (INPATIENT)
Dept: RADIOLOGY | Facility: HOSPITAL | Age: 50
DRG: 169 | End: 2025-02-06
Payer: COMMERCIAL

## 2025-02-06 ENCOUNTER — ANESTHESIA EVENT (INPATIENT)
Dept: PERIOP | Facility: HOSPITAL | Age: 50
DRG: 169 | End: 2025-02-06
Payer: COMMERCIAL

## 2025-02-06 ENCOUNTER — ANESTHESIA (INPATIENT)
Dept: PERIOP | Facility: HOSPITAL | Age: 50
DRG: 169 | End: 2025-02-06
Payer: COMMERCIAL

## 2025-02-06 PROBLEM — I10 PRIMARY HYPERTENSION: Status: ACTIVE | Noted: 2025-02-06

## 2025-02-06 LAB
ALBUMIN SERPL BCG-MCNC: 3 G/DL (ref 3.5–5)
ALP SERPL-CCNC: 87 U/L (ref 34–104)
ALT SERPL W P-5'-P-CCNC: 12 U/L (ref 7–52)
ANION GAP SERPL CALCULATED.3IONS-SCNC: 8 MMOL/L (ref 4–13)
APTT PPP: 134 SECONDS (ref 23–34)
APTT PPP: 85 SECONDS (ref 23–34)
APTT PPP: 97 SECONDS (ref 23–34)
AST SERPL W P-5'-P-CCNC: 18 U/L (ref 13–39)
BASOPHILS # BLD AUTO: 0.02 THOUSANDS/ΜL (ref 0–0.1)
BASOPHILS NFR BLD AUTO: 0 % (ref 0–1)
BILIRUB SERPL-MCNC: 0.39 MG/DL (ref 0.2–1)
BUN SERPL-MCNC: 8 MG/DL (ref 5–25)
CALCIUM ALBUM COR SERPL-MCNC: 9.7 MG/DL (ref 8.3–10.1)
CALCIUM SERPL-MCNC: 8.9 MG/DL (ref 8.4–10.2)
CHLORIDE SERPL-SCNC: 96 MMOL/L (ref 96–108)
CO2 SERPL-SCNC: 32 MMOL/L (ref 21–32)
CREAT SERPL-MCNC: 0.85 MG/DL (ref 0.6–1.3)
EOSINOPHIL # BLD AUTO: 0.08 THOUSAND/ΜL (ref 0–0.61)
EOSINOPHIL NFR BLD AUTO: 1 % (ref 0–6)
ERYTHROCYTE [DISTWIDTH] IN BLOOD BY AUTOMATED COUNT: 14.2 % (ref 11.6–15.1)
GFR SERPL CREATININE-BSD FRML MDRD: 81 ML/MIN/1.73SQ M
GLUCOSE SERPL-MCNC: 100 MG/DL (ref 65–140)
GLUCOSE SERPL-MCNC: 105 MG/DL (ref 65–140)
GLUCOSE SERPL-MCNC: 106 MG/DL (ref 65–140)
GLUCOSE SERPL-MCNC: 131 MG/DL (ref 65–140)
GLUCOSE SERPL-MCNC: 83 MG/DL (ref 65–140)
GLUCOSE SERPL-MCNC: 92 MG/DL (ref 65–140)
GLUCOSE SERPL-MCNC: 99 MG/DL (ref 65–140)
HCT VFR BLD AUTO: 26.3 % (ref 34.8–46.1)
HGB BLD-MCNC: 8.3 G/DL (ref 11.5–15.4)
IMM GRANULOCYTES # BLD AUTO: 0.25 THOUSAND/UL (ref 0–0.2)
IMM GRANULOCYTES NFR BLD AUTO: 2 % (ref 0–2)
LYMPHOCYTES # BLD AUTO: 3.26 THOUSANDS/ΜL (ref 0.6–4.47)
LYMPHOCYTES NFR BLD AUTO: 32 % (ref 14–44)
MCH RBC QN AUTO: 29.5 PG (ref 26.8–34.3)
MCHC RBC AUTO-ENTMCNC: 31.6 G/DL (ref 31.4–37.4)
MCV RBC AUTO: 94 FL (ref 82–98)
MONOCYTES # BLD AUTO: 0.5 THOUSAND/ΜL (ref 0.17–1.22)
MONOCYTES NFR BLD AUTO: 5 % (ref 4–12)
NEUTROPHILS # BLD AUTO: 6.14 THOUSANDS/ΜL (ref 1.85–7.62)
NEUTS SEG NFR BLD AUTO: 60 % (ref 43–75)
NRBC BLD AUTO-RTO: 1 /100 WBCS
PLATELET # BLD AUTO: 266 THOUSANDS/UL (ref 149–390)
PMV BLD AUTO: 9.5 FL (ref 8.9–12.7)
POTASSIUM SERPL-SCNC: 3.9 MMOL/L (ref 3.5–5.3)
PROT SERPL-MCNC: 6.3 G/DL (ref 6.4–8.4)
RBC # BLD AUTO: 2.81 MILLION/UL (ref 3.81–5.12)
SODIUM SERPL-SCNC: 136 MMOL/L (ref 135–147)
WBC # BLD AUTO: 10.25 THOUSAND/UL (ref 4.31–10.16)

## 2025-02-06 PROCEDURE — 82948 REAGENT STRIP/BLOOD GLUCOSE: CPT

## 2025-02-06 PROCEDURE — 75710 ARTERY X-RAYS ARM/LEG: CPT | Performed by: SURGERY

## 2025-02-06 PROCEDURE — C1887 CATHETER, GUIDING: HCPCS | Performed by: SURGERY

## 2025-02-06 PROCEDURE — 99232 SBSQ HOSP IP/OBS MODERATE 35: CPT | Performed by: PHYSICIAN ASSISTANT

## 2025-02-06 PROCEDURE — C1760 CLOSURE DEV, VASC: HCPCS | Performed by: SURGERY

## 2025-02-06 PROCEDURE — 76937 US GUIDE VASCULAR ACCESS: CPT

## 2025-02-06 PROCEDURE — C1769 GUIDE WIRE: HCPCS | Performed by: SURGERY

## 2025-02-06 PROCEDURE — 0Y6J0Z2 DETACHMENT AT LEFT LOWER LEG, MID, OPEN APPROACH: ICD-10-PCS | Performed by: SURGERY

## 2025-02-06 PROCEDURE — G0545 PR INHERENT VISIT TO INPT: HCPCS | Performed by: INTERNAL MEDICINE

## 2025-02-06 PROCEDURE — 99233 SBSQ HOSP IP/OBS HIGH 50: CPT | Performed by: PHYSICIAN ASSISTANT

## 2025-02-06 PROCEDURE — C1894 INTRO/SHEATH, NON-LASER: HCPCS | Performed by: SURGERY

## 2025-02-06 PROCEDURE — NC001 PR NO CHARGE: Performed by: RADIOLOGY

## 2025-02-06 PROCEDURE — 85730 THROMBOPLASTIN TIME PARTIAL: CPT | Performed by: INTERNAL MEDICINE

## 2025-02-06 PROCEDURE — B41GYZZ FLUOROSCOPY OF LEFT LOWER EXTREMITY ARTERIES USING OTHER CONTRAST: ICD-10-PCS | Performed by: SURGERY

## 2025-02-06 PROCEDURE — 85730 THROMBOPLASTIN TIME PARTIAL: CPT | Performed by: STUDENT IN AN ORGANIZED HEALTH CARE EDUCATION/TRAINING PROGRAM

## 2025-02-06 PROCEDURE — 80053 COMPREHEN METABOLIC PANEL: CPT

## 2025-02-06 PROCEDURE — 99232 SBSQ HOSP IP/OBS MODERATE 35: CPT | Performed by: INTERNAL MEDICINE

## 2025-02-06 PROCEDURE — 047Q3ZZ DILATION OF LEFT ANTERIOR TIBIAL ARTERY, PERCUTANEOUS APPROACH: ICD-10-PCS | Performed by: SURGERY

## 2025-02-06 PROCEDURE — 37228 PR REVSC OPN/PRQ TIB/PERO W/ANGIOPLASTY UNI: CPT | Performed by: SURGERY

## 2025-02-06 PROCEDURE — 047N3ZZ DILATION OF LEFT POPLITEAL ARTERY, PERCUTANEOUS APPROACH: ICD-10-PCS | Performed by: SURGERY

## 2025-02-06 PROCEDURE — 99233 SBSQ HOSP IP/OBS HIGH 50: CPT

## 2025-02-06 PROCEDURE — 85730 THROMBOPLASTIN TIME PARTIAL: CPT | Performed by: SURGERY

## 2025-02-06 PROCEDURE — 85025 COMPLETE CBC W/AUTO DIFF WBC: CPT

## 2025-02-06 PROCEDURE — B410YZZ FLUOROSCOPY OF ABDOMINAL AORTA USING OTHER CONTRAST: ICD-10-PCS | Performed by: SURGERY

## 2025-02-06 PROCEDURE — C1725 CATH, TRANSLUMIN NON-LASER: HCPCS | Performed by: SURGERY

## 2025-02-06 DEVICE — STARCLOSE SE VASCULAR CLOSURE SYSTEM
Type: IMPLANTABLE DEVICE | Site: GROIN | Status: FUNCTIONAL
Brand: STARCLOSE SE

## 2025-02-06 RX ORDER — FENTANYL CITRATE/PF 50 MCG/ML
50 SYRINGE (ML) INJECTION
Status: DISCONTINUED | OUTPATIENT
Start: 2025-02-06 | End: 2025-02-06 | Stop reason: HOSPADM

## 2025-02-06 RX ORDER — PROPOFOL 10 MG/ML
INJECTION, EMULSION INTRAVENOUS AS NEEDED
Status: DISCONTINUED | OUTPATIENT
Start: 2025-02-06 | End: 2025-02-06

## 2025-02-06 RX ORDER — HEPARIN SODIUM 200 [USP'U]/100ML
INJECTION, SOLUTION INTRAVENOUS
Status: COMPLETED | OUTPATIENT
Start: 2025-02-06 | End: 2025-02-06

## 2025-02-06 RX ORDER — DIPHENHYDRAMINE HYDROCHLORIDE 50 MG/ML
12.5 INJECTION INTRAMUSCULAR; INTRAVENOUS ONCE AS NEEDED
Status: DISCONTINUED | OUTPATIENT
Start: 2025-02-06 | End: 2025-02-06 | Stop reason: HOSPADM

## 2025-02-06 RX ORDER — POTASSIUM CHLORIDE 14.9 MG/ML
20 INJECTION INTRAVENOUS ONCE
Status: COMPLETED | OUTPATIENT
Start: 2025-02-06 | End: 2025-02-06

## 2025-02-06 RX ORDER — FENTANYL CITRATE/PF 50 MCG/ML
25 SYRINGE (ML) INJECTION
Status: DISCONTINUED | OUTPATIENT
Start: 2025-02-06 | End: 2025-02-06 | Stop reason: HOSPADM

## 2025-02-06 RX ORDER — LIDOCAINE HYDROCHLORIDE 10 MG/ML
INJECTION, SOLUTION EPIDURAL; INFILTRATION; INTRACAUDAL; PERINEURAL AS NEEDED
Status: DISCONTINUED | OUTPATIENT
Start: 2025-02-06 | End: 2025-02-06

## 2025-02-06 RX ORDER — SODIUM CHLORIDE, SODIUM LACTATE, POTASSIUM CHLORIDE, CALCIUM CHLORIDE 600; 310; 30; 20 MG/100ML; MG/100ML; MG/100ML; MG/100ML
INJECTION, SOLUTION INTRAVENOUS CONTINUOUS PRN
Status: DISCONTINUED | OUTPATIENT
Start: 2025-02-06 | End: 2025-02-06

## 2025-02-06 RX ORDER — ONDANSETRON 2 MG/ML
INJECTION INTRAMUSCULAR; INTRAVENOUS AS NEEDED
Status: DISCONTINUED | OUTPATIENT
Start: 2025-02-06 | End: 2025-02-06

## 2025-02-06 RX ORDER — ONDANSETRON 2 MG/ML
4 INJECTION INTRAMUSCULAR; INTRAVENOUS ONCE AS NEEDED
Status: DISCONTINUED | OUTPATIENT
Start: 2025-02-06 | End: 2025-02-06 | Stop reason: HOSPADM

## 2025-02-06 RX ORDER — FENTANYL CITRATE 50 UG/ML
INJECTION, SOLUTION INTRAMUSCULAR; INTRAVENOUS AS NEEDED
Status: DISCONTINUED | OUTPATIENT
Start: 2025-02-06 | End: 2025-02-06

## 2025-02-06 RX ORDER — INSULIN LISPRO 100 [IU]/ML
1-6 INJECTION, SOLUTION INTRAVENOUS; SUBCUTANEOUS
Status: DISCONTINUED | OUTPATIENT
Start: 2025-02-07 | End: 2025-02-13

## 2025-02-06 RX ORDER — HEPARIN SODIUM 1000 [USP'U]/ML
INJECTION, SOLUTION INTRAVENOUS; SUBCUTANEOUS AS NEEDED
Status: DISCONTINUED | OUTPATIENT
Start: 2025-02-06 | End: 2025-02-06

## 2025-02-06 RX ORDER — HYDROMORPHONE HCL/PF 1 MG/ML
0.5 SYRINGE (ML) INJECTION
Status: DISCONTINUED | OUTPATIENT
Start: 2025-02-06 | End: 2025-02-06 | Stop reason: HOSPADM

## 2025-02-06 RX ORDER — IODIXANOL 320 MG/ML
INJECTION, SOLUTION INTRAVASCULAR AS NEEDED
Status: DISCONTINUED | OUTPATIENT
Start: 2025-02-06 | End: 2025-02-06 | Stop reason: HOSPADM

## 2025-02-06 RX ORDER — SODIUM CHLORIDE, SODIUM LACTATE, POTASSIUM CHLORIDE, CALCIUM CHLORIDE 600; 310; 30; 20 MG/100ML; MG/100ML; MG/100ML; MG/100ML
125 INJECTION, SOLUTION INTRAVENOUS CONTINUOUS
Status: CANCELLED | OUTPATIENT
Start: 2025-02-06

## 2025-02-06 RX ORDER — DEXAMETHASONE SODIUM PHOSPHATE 10 MG/ML
INJECTION, SOLUTION INTRAMUSCULAR; INTRAVENOUS AS NEEDED
Status: DISCONTINUED | OUTPATIENT
Start: 2025-02-06 | End: 2025-02-06

## 2025-02-06 RX ADMIN — ACETAMINOPHEN 975 MG: 325 TABLET, FILM COATED ORAL at 06:30

## 2025-02-06 RX ADMIN — METHOCARBAMOL 750 MG: 750 TABLET ORAL at 00:40

## 2025-02-06 RX ADMIN — METHOCARBAMOL 750 MG: 750 TABLET ORAL at 17:13

## 2025-02-06 RX ADMIN — ASPIRIN 81 MG: 81 TABLET, COATED ORAL at 11:57

## 2025-02-06 RX ADMIN — CHLORHEXIDINE GLUCONATE 15 ML: 1.2 SOLUTION ORAL at 06:30

## 2025-02-06 RX ADMIN — INSULIN GLARGINE 10 UNITS: 100 INJECTION, SOLUTION SUBCUTANEOUS at 22:21

## 2025-02-06 RX ADMIN — ACETAMINOPHEN 975 MG: 325 TABLET, FILM COATED ORAL at 17:13

## 2025-02-06 RX ADMIN — PANTOPRAZOLE SODIUM 40 MG: 40 INJECTION, POWDER, FOR SOLUTION INTRAVENOUS at 09:09

## 2025-02-06 RX ADMIN — DULOXETINE 30 MG: 30 CAPSULE, DELAYED RELEASE ORAL at 09:08

## 2025-02-06 RX ADMIN — SODIUM CHLORIDE, SODIUM LACTATE, POTASSIUM CHLORIDE, AND CALCIUM CHLORIDE: .6; .31; .03; .02 INJECTION, SOLUTION INTRAVENOUS at 17:46

## 2025-02-06 RX ADMIN — ONDANSETRON 4 MG: 2 INJECTION INTRAMUSCULAR; INTRAVENOUS at 19:09

## 2025-02-06 RX ADMIN — POTASSIUM CHLORIDE 20 MEQ: 14.9 INJECTION, SOLUTION INTRAVENOUS at 09:05

## 2025-02-06 RX ADMIN — GABAPENTIN 600 MG: 300 CAPSULE ORAL at 22:08

## 2025-02-06 RX ADMIN — CHLORHEXIDINE GLUCONATE 0.12% ORAL RINSE 15 ML: 1.2 LIQUID ORAL at 22:08

## 2025-02-06 RX ADMIN — GABAPENTIN 300 MG: 300 CAPSULE ORAL at 14:07

## 2025-02-06 RX ADMIN — PROPOFOL 120 MG: 10 INJECTION, EMULSION INTRAVENOUS at 17:57

## 2025-02-06 RX ADMIN — METHOCARBAMOL 750 MG: 750 TABLET ORAL at 06:30

## 2025-02-06 RX ADMIN — CLOPIDOGREL BISULFATE 75 MG: 75 TABLET, FILM COATED ORAL at 11:57

## 2025-02-06 RX ADMIN — VANCOMYCIN HYDROCHLORIDE 750 MG: 10 INJECTION, POWDER, LYOPHILIZED, FOR SOLUTION INTRAVENOUS at 06:31

## 2025-02-06 RX ADMIN — ACETAMINOPHEN 975 MG: 325 TABLET, FILM COATED ORAL at 11:57

## 2025-02-06 RX ADMIN — METHOCARBAMOL 750 MG: 750 TABLET ORAL at 23:08

## 2025-02-06 RX ADMIN — METHOCARBAMOL 750 MG: 750 TABLET ORAL at 11:57

## 2025-02-06 RX ADMIN — DEXAMETHASONE SODIUM PHOSPHATE 10 MG: 10 INJECTION, SOLUTION INTRAMUSCULAR; INTRAVENOUS at 17:57

## 2025-02-06 RX ADMIN — LIDOCAINE HYDROCHLORIDE 50 MG: 10 INJECTION, SOLUTION EPIDURAL; INFILTRATION; INTRACAUDAL; PERINEURAL at 17:57

## 2025-02-06 RX ADMIN — ATORVASTATIN CALCIUM 80 MG: 80 TABLET, FILM COATED ORAL at 17:13

## 2025-02-06 RX ADMIN — GABAPENTIN 300 MG: 300 CAPSULE ORAL at 09:08

## 2025-02-06 RX ADMIN — VANCOMYCIN HYDROCHLORIDE 750 MG: 10 INJECTION, POWDER, LYOPHILIZED, FOR SOLUTION INTRAVENOUS at 22:08

## 2025-02-06 RX ADMIN — HEPARIN SODIUM 16 UNITS/KG/HR: 10000 INJECTION, SOLUTION INTRAVENOUS at 11:07

## 2025-02-06 RX ADMIN — SODIUM CHLORIDE, SODIUM GLUCONATE, SODIUM ACETATE, POTASSIUM CHLORIDE, MAGNESIUM CHLORIDE, SODIUM PHOSPHATE, DIBASIC, AND POTASSIUM PHOSPHATE 100 ML/HR: .53; .5; .37; .037; .03; .012; .00082 INJECTION, SOLUTION INTRAVENOUS at 22:04

## 2025-02-06 RX ADMIN — ACETAMINOPHEN 975 MG: 325 TABLET, FILM COATED ORAL at 00:40

## 2025-02-06 RX ADMIN — CHLORTHALIDONE 12.5 MG: 25 TABLET ORAL at 09:10

## 2025-02-06 RX ADMIN — HEPARIN SODIUM 5000 UNITS: 1000 INJECTION INTRAVENOUS; SUBCUTANEOUS at 18:27

## 2025-02-06 RX ADMIN — ACETAMINOPHEN 975 MG: 325 TABLET, FILM COATED ORAL at 23:08

## 2025-02-06 RX ADMIN — VANCOMYCIN HYDROCHLORIDE 750 MG: 10 INJECTION, POWDER, LYOPHILIZED, FOR SOLUTION INTRAVENOUS at 14:07

## 2025-02-06 RX ADMIN — Medication: at 22:55

## 2025-02-06 NOTE — PROGRESS NOTES
Vancomycin Pharmacy Consult     Lizzy Acuna is an 49 y.o. female who is currently receiving IV vancomycin for left foot SSTI.     Vancomycin Assessment:  1. ID Consult: Yes  2. Cultures:   1/28 Blood 2/2: NG  1/29 Culture Left Foot: 2+ MRSA  1/29 Anaerobic Left Foot: 2+ Finegoldia magna  3. Procalcitonin:   1/28: 0.22  1/29: 0.25  2/4: 0.14  4. Renal Function:   SCr: 0.45  CrCl: >90 mL/min  UOP: 3.3 mL/kg/hr  5. Days of Therapy: 10  6. Current Dose: 750 mg IV q8h  7. Last Level: 25.9 (random 2/2 at 0344)  8. Goal AUC(24h): 400-600  9. Goal Random/Trough: 10-15     Vancomycin Plan:  1. Evaluation: continue regimen  2. New Dosing: continue 750 mg IV q8h  Predicted Trough / AUC(24h): 12.9 / 427  3. Next Level: random 2/7 at 0600        Pharmacy will continue to follow closely for s/sx of nephrotoxicity, infusion reactions, and appropriateness of therapy. BMP and CBC will be ordered per protocol. We will continue to follow the patient’s culture results and clinical progress daily.        Rip Nieves, PharmD  Internal Medicine Clinical Pharmacist Specialist  [phone]  [employer] Secure Chat/Teams

## 2025-02-06 NOTE — PROGRESS NOTES
Progress Note - Vascular Surgery   Name: Lizzy Acuna 49 y.o. female I MRN: [id number]  Unit/Bed#: [facility] 511-01 I Date of Admission: 1/31/2025   Date of Service: 2/6/2025 I Hospital Day: 6    Assessment & Plan  Diabetic foot infection  (HCC)  49 y.o. F w/ PMH of T2DM (A1c 10.3), tobacco use (quit Dec 2024) who was previously seen at [facility] in December with wound of left foot with cellulitis.   During previous admission vascular surgery was consulted and recommended LLE agram w/ intervention which was preformed by IR. Post-procedure pt underwent TMA with podiatry    Now S/P Agram, Left SFA thrombectomy, placement of lysis catheter on 1/31  S/p lysis recheck w/ angioplasty of L SFA, pop, AT on 2/3.    Plan  - Plan for repeat arteriogram today for below knee/tibial intervention in effort for aggressive limb salvage given residual AT and TPT stenosis.    - Continue plavix, may discontinue aspirin as no indication for triple therapy    - NPO    - Pre-op labs pending, abx on call to OR   - Continue hep gtt   - Local wound care psb VAC per Podiatry   - Continue statin therapy   - Remainder of care per primary       Diabetes mellitus type 2 with complications ([facility])  Lab Results   Component Value Date    HGBA1C 10.3 (H) 12/13/2024       Recent Labs     02/05/25  1227 02/05/25  1546 02/05/25  2133 02/06/25  0040   POCGLU 164* 162* 128 131       Blood Sugar Average: Last 72 hrs:  (P) 161.9098173785213646    Hyponatremia    Cellulitis of left foot    PAD (peripheral artery disease) (HCC)    Blood loss anemia    Obesity (BMI 30-39.9)      24 Hour Events : None  Subjective : No complaints, resting comfortably    Objective :  Temp:  [97.5 °F (36.4 °C)-98.1 °F (36.7 °C)] 98 °F (36.7 °C)  HR:  [] 84  BP: (122-203)/() 122/71  Resp:  [15-20] 17  SpO2:  [93 %-99 %] 99 %  O2 Device: None (Room air)     I/O         02/04 0701 02/05 0700 02/05 0701 02/06 0700    P.O. 838 238    I.V. (mL/kg) 1313.5 (13.7) 2560.4 (26.8)     Total Intake(mL/kg) 2151.5 (22.5) 2798.4 (29.3)    Urine (mL/kg/hr) 1775 (0.8) 7114 (3.1)    Emesis/NG output 1     Stool 0     Total Output 1776 7114    Net +375.5 -4315.6          Unmeasured Urine Occurrence 2 x     Unmeasured Stool Occurrence 2 x     Unmeasured Emesis Occurrence 1 x           Lines/Drains/Airways       Active Status       Name Placement date Placement time Site Days    PICC Line 01/31/25 Right 01/31/25 1937  --  5                  Physical Exam  Constitutional:       Appearance: She is obese.   HENT:      Head: Normocephalic and atraumatic.      Mouth/Throat:      Pharynx: Oropharynx is clear.   Eyes:      Conjunctiva/sclera: Conjunctivae normal.   Cardiovascular:      Rate and Rhythm: Normal rate and regular rhythm.      Heart sounds: Normal heart sounds.      Comments: Left AT doppler signal  Pulmonary:      Effort: Pulmonary effort is normal.      Breath sounds: Normal breath sounds.   Abdominal:      General: There is no distension.      Palpations: Abdomen is soft.      Tenderness: There is no abdominal tenderness.   Musculoskeletal:      Cervical back: Neck supple.      Right lower leg: No edema.      Left lower leg: No edema.   Skin:     General: Skin is warm and dry.      Comments: Left foot wrapped, CDI   Neurological:      General: No focal deficit present.      Mental Status: She is alert and oriented to person, place, and time.           Lab Results: I have reviewed the following results:  CBC with diff:   Lab Results   Component Value Date    WBC 13.03 (H) 02/05/2025    HGB 8.4 (L) 02/05/2025    HCT 25.7 (L) 02/05/2025    MCV 91 02/05/2025     02/05/2025    RBC 2.83 (L) 02/05/2025    MCH 29.7 02/05/2025    MCHC 32.7 02/05/2025    RDW 14.1 02/05/2025    MPV 9.2 02/05/2025    NRBC 1 02/04/2025   ,   BMP/CMP:  Lab Results   Component Value Date    SODIUM 132 (L) 02/05/2025    K 4.2 02/05/2025    CL 94 (L) 02/05/2025    CO2 27 02/05/2025    BUN 6 02/05/2025    CREATININE 0.45 (L)  "02/05/2025    CALCIUM 8.2 (L) 02/05/2025    AST 24 02/05/2025    ALT 14 02/05/2025    ALKPHOS 102 02/05/2025    EGFR 118 02/05/2025   ,   Lipid Panel: No results found for: \"CHOL\",   Coags:   Lab Results   Component Value Date    PTT 65 (H) 02/05/2025    INR 1.05 02/02/2025   ,   Blood Culture:   Lab Results   Component Value Date    BLOODCX No Growth After 5 Days. 01/28/2025    BLOODCX No Growth After 5 Days. 01/28/2025   ,   Urinalysis:   Lab Results   Component Value Date    COLORU Straw 11/05/2018    CLARITYU Clear 11/05/2018    SPECGRAV 1.020 11/05/2018    PHUR 6.0 11/05/2018    LEUKOCYTESUR Negative 11/05/2018    NITRITE Negative 11/05/2018    GLUCOSEU 3+ (A) 11/05/2018    KETONESU Negative 11/05/2018    BILIRUBINUR Negative 11/05/2018    BLOODU Trace-Intact (A) 11/05/2018   ,   Urine Culture:   Lab Results   Component Value Date    URINECX 50,000-59,000 cfu/ml Mixed Contaminants X4 10/21/2016   ,   Wound Culure:   Lab Results   Component Value Date    WOUNDCULT 1+ Growth of Enterobacter gergoviae (A) 06/13/2018    WOUNDCULT (A) 06/13/2018     2+ Growth of Methicillin Resistant Staphylococcus aureus       VTE Prophylaxis: Heparin  "

## 2025-02-06 NOTE — ASSESSMENT & PLAN NOTE
Status post angiogram with intervention including left SFA thrombectomy and placement of lysis catheter on 1/31/2025.  Repeat imaging demonstrated residual stenosis of left lower extremity. Vascular planning for repeat arteriogram on Thursday, 2/6.   -Ongoing anticoagulation  -Close vascular follow-up  -Patient for repeat angiogram today with anticipated intervention

## 2025-02-06 NOTE — PROGRESS NOTES
"Progress Note - Internal Medicine   Name: Lizzy Acuna 49 y.o. female I MRN: [id number]  Unit/Bed#: [facility] 511-01 I Date of Admission: 1/31/2025   Date of Service: 2/6/2025 I Hospital Day: 6     Assessment & Plan  Cellulitis of left foot  Underwent an elective R TMA approx 3 weeks ago and was doing well until last week when she was noticed to have dehiscence of her surgical wound.   The wound was debrided by her podiatrist in the office but ultimately worsened with evidence of cellulitis.   LLE foot infection diagnosed clinically without MRI findings of osteomyelitis.   Tissue culture: 2+ MRSA, 2+ Finegoldia magna No significant leukocytosis or fever this admission. Site continues to bleed likely due to triple therapy   A1c 10.3    Plan:   Podiatry and vascular surgery following   Wound care consulted   Will need long-term wound VAC for granulation in the plantar flap of the TMA site before possible DPC  ID following, Continue intravenous vancomycin and transition to PO when stable for discharge  Patient will have repeat arteriogram 2/6 for below knee/tibial intervention.  Will follow-up with the result for possible wound VAC application or podiatric intervention.  Will attempt wound vac, podiatry following for limb salvaging options   Pain regimen of Tylenol 975 mg q6, gabapentin 300 mg daily, robaxin 500 mg q6, dilaudid 1 mg/mL PCA, dilaudid 0.5 mg q3 PRN  Primary hypertension  Patient previously diagnosed with hypertension  However, on 2/5, became hypertensive with SBP's up to 200s  Started chlorthalidone 12.5 mg daily  Added on as needed hydralazine and labetalol    Plan  Continue chlorthalidone 12.5 mg daily  As needed hydralazine and labetalol  PAD (peripheral artery disease) ([facility])  Transferred to [facility] for vascular surgery and IR intervention after Lower Extremity Dopplers showed a >75% stenosis in the distal superficial femoral artery. Full study results are listed below  \"There fernanda 50-75% stenosis in the " "tibio-peroneal trunk. Diffuse disease throughout the  remaining femoro-popliteal and tibio-peroneal segments.\"  Now S/P Agram, Left SFA thrombectomy, placement of lysis catheter on 1/31  S/p lysis recheck on 2/2 with residual thrombus within L SFA.  S/p lysis recheck w/ angioplasty of L SFA, pop, AT on 2/3.     Plan:  Continue heparin gtt, aspirin 81 mg, Plavix   Diabetes mellitus type 2 with complications (HCC)  Lab Results   Component Value Date    HGBA1C 10.3 (H) 12/13/2024       Recent Labs     02/05/25  1546 02/05/25  2133 02/06/25  0040 02/06/25  0632   POCGLU 162* 128 131 100       Blood Sugar Average: Last 72 hrs:  (P) 158.0795336101772279  - Lispro 10u QHS with sliding scale   Hyponatremia  Chronic euvolemic hyponatremia that was 133 on admission. Asymptomatic and no recent history of confusion, altered metal status, seizure, or coma.    Current differential includes SIADH in the setting of acute pain      Monitor BMP  Low threshold to workup with serum and urine studies if hyponatremia worsens      Diabetic foot infection  (HCC)  Lab Results   Component Value Date    HGBA1C 10.3 (H) 12/13/2024       Recent Labs     02/05/25  1546 02/05/25  2133 02/06/25  0040 02/06/25  0632   POCGLU 162* 128 131 100       Blood Sugar Average: Last 72 hrs:  (P) 158.7401690778004137    Blood loss anemia  Blood loss anemia 2/2 lysis, bleeding from LLE TMA.       - Dressing and wound care per podiatry recs   - Recheck CBC and transfuse to maintain Hb > 7  Obesity (BMI 30-39.9)      Disposition: Inpatient pending procedures with Vascular and/or podiatry      Team: SOD TEAM B    Subjective   Patient seen and examined. No acute events overnight. Patient endorses marked improvement in nausea after holding qHS Gabapentin, and intitiating PPI w CLD     Objective :  Temp:  [97.3 °F (36.3 °C)-98.1 °F (36.7 °C)] 97.3 °F (36.3 °C)  HR:  [] 84  BP: (122-203)/() 123/72  Resp:  [15-20] 17  SpO2:  [93 %-99 %] 99 %  O2 Device: " None (Room air)    I/O         02/04 0701  02/05 0700 02/05 0701  02/06 0700 02/06 0701  02/07 0700    P.O. 838 238     I.V. (mL/kg) 1313.5 (13.7) 2560.6 (28.4)     Blood       IV Piggyback       Total Intake(mL/kg) 2151.5 (22.5) 2798.6 (31.1)     Urine (mL/kg/hr) 1775 (0.8) 7114 (3.3)     Emesis/NG output 1      Stool 0      Total Output 1776 7114     Net +375.5 -4315.4            Unmeasured Urine Occurrence 2 x      Unmeasured Stool Occurrence 2 x      Unmeasured Emesis Occurrence 1 x            Weights:   IBW (Ideal Body Weight): 48.26 kg    Body mass index is 37.29 kg/m².  Weight (last 2 days)       Date/Time Weight    02/06/25 0600 90.1 (198.63)    02/05/25 0300 95.6 (210.76)    02/04/25 0400 93.5 (206.13)            Physical Exam  Vitals and nursing note reviewed.   Constitutional:       General: She is not in acute distress.     Appearance: She is well-developed.   HENT:      Head: Normocephalic and atraumatic.   Eyes:      Conjunctiva/sclera: Conjunctivae normal.   Cardiovascular:      Rate and Rhythm: Normal rate and regular rhythm.      Heart sounds: No murmur heard.  Pulmonary:      Effort: Pulmonary effort is normal. No respiratory distress.      Breath sounds: Normal breath sounds.   Abdominal:      Palpations: Abdomen is soft.      Tenderness: There is no abdominal tenderness.   Musculoskeletal:         General: No swelling.      Cervical back: Neck supple.      Right lower leg: No edema.      Left lower leg: No edema.   Skin:     General: Skin is warm and dry.      Capillary Refill: Capillary refill takes less than 2 seconds.   Neurological:      Mental Status: She is alert.   Psychiatric:         Mood and Affect: Mood normal.           Lab Results: I have reviewed the following results:  Recent Labs     02/05/25  0314 02/05/25  1354 02/06/25  0636   WBC 13.03*  --  10.25*   HGB 8.4*  --  8.3*   HCT 25.7*  --  26.3*     --  266   BANDSPCT 2  --   --    SODIUM 135   < > 136   K 4.4   < > 3.9       < > 96   CO2 27   < > 32   BUN 10   < > 8   CREATININE 0.57*   < > 0.85   GLUC 164*   < > 99   AST 24  --  18   ALT 14  --  12   ALB 3.2*  --  3.0*   TBILI 0.31  --  0.39   ALKPHOS 102  --  87   PTT 65*  --  97*    < > = values in this interval not displayed.             Currently Ordered Meds:   Current Facility-Administered Medications:     acetaminophen (TYLENOL) tablet 975 mg, Q6H JORGE    aspirin (ECOTRIN LOW STRENGTH) EC tablet 81 mg, Daily    atorvastatin (LIPITOR) tablet 80 mg, Daily With Dinner    chlorhexidine (PERIDEX) 0.12 % oral rinse 15 mL, Q12H JORGE    chlorthalidone tablet 12.5 mg, Daily    clopidogrel (PLAVIX) tablet 75 mg, Daily    diazepam (VALIUM) tablet 5 mg, Q8H PRN    DULoxetine (CYMBALTA) delayed release capsule 30 mg, Daily    gabapentin (NEURONTIN) capsule 300 mg, BID (AM & Afternoon)    [Held by provider] gabapentin (NEURONTIN) capsule 600 mg, HS    heparin (porcine) 25,000 units in 0.45% NaCl 250 mL infusion (premix), Titrated, Last Rate: 18 Units/kg/hr (02/05/25 1709)    heparin (porcine) injection 3,400 Units, Q6H PRN    heparin (porcine) injection 6,800 Units, Q6H PRN    hydrALAZINE (APRESOLINE) injection 5 mg, Q6H PRN    HYDROmorphone (DILAUDID) 1 mg/mL 50 mL PCA, Continuous    HYDROmorphone (DILAUDID) injection 0.5 mg, Q3H PRN    insulin glargine (LANTUS) subcutaneous injection 10 Units 0.1 mL, HS    insulin lispro (HumALOG/ADMELOG) 100 units/mL subcutaneous injection 1-6 Units, Q6H JORGE **AND** Fingerstick Glucose (POCT), Q6H    labetalol (NORMODYNE) injection 10 mg, Q4H PRN    methocarbamol (ROBAXIN) tablet 750 mg, Q6H JORGE    metoclopramide (REGLAN) injection 10 mg, Q6H PRN    multi-electrolyte (PLASMALYTE-A/ISOLYTE-S PH 7.4) IV solution, Continuous, Last Rate: Stopped (02/05/25 0987)    ondansetron (ZOFRAN) injection 4 mg, Q6H PRN    pantoprazole (PROTONIX) injection 40 mg, Daily    potassium chloride 20 mEq IVPB (premix), Once    [Held by provider] senna-docusate sodium  (SENOKOT S) 8.6-50 mg per tablet 1 tablet, HS    trimethobenzamide (TIGAN) IM injection 200 mg, Q6H PRN    vancomycin 750 mg in sodium chloride 0.9% 250 mL, Q8H, Last Rate: 0 mg (02/06/25 0007)  VTE Pharmacologic Prophylaxis: DAPT  VTE Mechanical Prophylaxis: sequential compression device    Administrative Statements     Portions of the record may have been created with voice recognition software.

## 2025-02-06 NOTE — ASSESSMENT & PLAN NOTE
49 y.o. F w/ PMH of T2DM (A1c 10.3), tobacco use (quit Dec 2024) who was previously seen at [facility] in December with wound of left foot with cellulitis.   During previous admission vascular surgery was consulted and recommended LLE agram w/ intervention which was preformed by IR. Post-procedure pt underwent TMA with podiatry    Now S/P Agram, Left SFA thrombectomy, placement of lysis catheter on 1/31  S/p lysis recheck w/ angioplasty of L SFA, pop, AT on 2/3.    Plan  - Plan for repeat arteriogram today for below knee/tibial intervention in effort for aggressive limb salvage given residual AT and TPT stenosis.    - Continue plavix, may discontinue aspirin as no indication for triple therapy    - NPO    - Pre-op labs pending, abx on call to OR   - Continue hep gtt   - Local wound care psb VAC per Podiatry   - Continue statin therapy   - Remainder of care per primary

## 2025-02-06 NOTE — ASSESSMENT & PLAN NOTE
"Transferred to [facility] for vascular surgery and IR intervention after Lower Extremity Dopplers showed a >75% stenosis in the distal superficial femoral artery. Full study results are listed below  \"There fernanda 50-75% stenosis in the tibio-peroneal trunk. Diffuse disease throughout the  remaining femoro-popliteal and tibio-peroneal segments.\"  Now S/P Agram, Left SFA thrombectomy, placement of lysis catheter on 1/31  S/p lysis recheck on 2/2 with residual thrombus within L SFA.  S/p lysis recheck w/ angioplasty of L SFA, pop, AT on 2/3.     Plan:  Continue heparin gtt, aspirin 81 mg, Plavix   "

## 2025-02-06 NOTE — ASSESSMENT & PLAN NOTE
s/p left SFA thrombectomy on (01/31/25) however still with residual thrombus  s/p lysis check and angioplasty (02/01/25)   s/p lysis check with angioplasty (02/03/25)      - continues with DAPT/statin    - continues with heparin gtt    - now planned for repeat arteriorgram with possible interventions      - continue dilaudid PCA at 0 / 0.2mg / q5min / 2.2mg hourly limit for now    - transition to PO once tolerating PO (likely PO dilaudid as oxycodone ineffective the past)     - Discontinue as needed IV Dilaudid.  Can adjust PCA dosing if ineffective.    Multimodal Analgesia:    - continue robaxin 750mg PO q6h jay     - continue tylenol 975mg PO q6h jay    - continue gabapentin 300mg/300mg/600mg    - valium PO 5mg Q8H prn for muscle spasms     - duloxetine 30mg PO daily for musculoskeletal and neuropathic pain

## 2025-02-06 NOTE — PROGRESS NOTES
Progress Note - Infectious Disease   Name: Lizzy Acuna 49 y.o. female I MRN: [id number]  Unit/Bed#: [facility] 511-01 I Date of Admission: 1/31/2025   Date of Service: 2/6/2025 I Hospital Day: 6    Assessment & Plan  Cellulitis of left foot  Complicated by large abscess.  Patient underwent I&D on 1/29/2025 with a large abscess found with purulent fluid and necrotic tissue drained and debrided.  Wound cultures are growing MRSA and Finegoldia.  MRI and operative findings not consistent with osteomyelitis.  -Will continue on IV vancomycin (dosing per pharmacy) for now pending repeat vascular intervention as below  -Plan antibiotics through 2/8/2025 to complete 10 days post abscess drainage.  -Appreciate podiatry recommendations  -Local wound care  -Serial exams  -Revascularization as below  PAD (peripheral artery disease) (HCC)  Status post angiogram with intervention including left SFA thrombectomy and placement of lysis catheter on 1/31/2025.  Repeat imaging demonstrated residual stenosis of left lower extremity. Vascular planning for repeat arteriogram on Thursday, 2/6.   -Ongoing anticoagulation  -Close vascular follow-up  -Patient for repeat angiogram today with anticipated intervention  Diabetes mellitus type 2 with complications (HCC)  Poor control with a hemoglobin A1c of 10.3.  Risk factor for recurrent infection.  -Tighten diabetic control  Obesity (BMI 30-39.9)  As a risk factor for recurrent infection. Could also affect antibiotic dosing.     I have discussed with the primary service the above plan to continue the intravenous vancomycin.  The primary service agrees with the plan.    Antibiotics:  Intravenous vancomycin  Postop day 9    Subjective   Patient has no fever, chills, sweats; no nausea, vomiting, diarrhea; no cough, shortness of breath; no increased pain. No new symptoms.  She is feeling better overall but anxious about her procedure.    Objective :  Temp:  [97.3 °F (36.3 °C)-98.1 °F (36.7 °C)] 97.3  °F (36.3 °C)  HR:  [] 84  BP: (122-203)/() 123/72  Resp:  [15-20] 18  SpO2:  [93 %-99 %] 99 %  O2 Device: None (Room air)    General:  No acute distress  Psychiatric:  Awake and alert  Pulmonary:  Normal respiratory excursion without accessory muscle use  Abdomen:  Soft, nontender  Extremities: Left foot dressed with a dry dressing in place.  Skin:  No rashes      Lab Results: I have reviewed the following results:  Results from last 7 days   Lab Units 02/06/25  0636 02/05/25  0314 02/04/25  2304   WBC Thousand/uL 10.25* 13.03* 10.81*   HEMOGLOBIN g/dL 8.3* 8.4* 7.8*   PLATELETS Thousands/uL 266 235 191     Results from last 7 days   Lab Units 02/06/25  0636 02/05/25  1354 02/05/25  0314 02/01/25  0558 01/31/25  2231   SODIUM mmol/L 136 132* 135   < > 134*   POTASSIUM mmol/L 3.9 4.2 4.4   < > 4.1   CHLORIDE mmol/L 96 94* 102   < > 102   CO2 mmol/L 32 27 27   < > 24   BUN mg/dL 8 6 10   < > 12   CREATININE mg/dL 0.85 0.45* 0.57*   < > 0.57*   EGFR ml/min/1.73sq m 81 118 109   < > 109   CALCIUM mg/dL 8.9 8.2* 8.4   < > 9.1   AST U/L 18  --  24  --  14   ALT U/L 12  --  14  --  13   ALK PHOS U/L 87  --  102  --  110*   ALBUMIN g/dL 3.0*  --  3.2*  --  3.3*    < > = values in this interval not displayed.         Results from last 7 days   Lab Units 02/04/25  2304   PROCALCITONIN ng/ml 0.14

## 2025-02-06 NOTE — PROGRESS NOTES
Progress Note - Acute Pain   Name: Lizzy Acuna 49 y.o. female I MRN: [id number]  Unit/Bed#: [facility] 511-01 I Date of Admission: 1/31/2025   Date of Service: 2/6/2025 I Hospital Day: 6    Assessment & Plan  PAD (peripheral artery disease) ([facility])  s/p left SFA thrombectomy on (01/31/25) however still with residual thrombus  s/p lysis check and angioplasty (02/01/25)   s/p lysis check with angioplasty (02/03/25)      - continues with DAPT/statin    - continues with heparin gtt    - now planned for repeat arteriorgram with possible interventions      - continue dilaudid PCA at 0 / 0.2mg / q5min / 2.2mg hourly limit for now    - transition to PO once tolerating PO (likely PO dilaudid as oxycodone ineffective the past)     - Discontinue as needed IV Dilaudid.  Can adjust PCA dosing if ineffective.    Multimodal Analgesia:    - continue robaxin 750mg PO q6h jay     - continue tylenol 975mg PO q6h jay    - continue gabapentin 300mg/300mg/600mg    - valium PO 5mg Q8H prn for muscle spasms     - duloxetine 30mg PO daily for musculoskeletal and neuropathic pain          APS will continue to follow. Please contact Acute Pain Service - via SecureChat from [phone] with additional questions or concerns. See [email or web address] or [email or web address] for additional contacts and after hours information.     Subjective   Lizzy Acuna is a 49 y.o. female  with PMHx of PAD who presented after elective TMA weeks prior, found to have wound dehiscence and evidence of cellulitis. Patient was transferred to [facility] and underwent thrombectomy with lysis catheters on (01/31/25). Since then, patient continues with pain of the LLE and most recently vascular surgery notes that she is a high risk for requiring BKA despite revascularization.     Pain History  Current pain location(s):  Pain Score: 3  Pain Location/Orientation: Orientation: Left, Location: Leg  Pain Scale: Pain Assessment Tool: 0-10  24 hour history: Patient's pain remains well-controlled on Dilaudid PCA.   No evidence of excessive use.  Scheduled for further left lower extremity vascular intervention today.    Opioid requirement previous 24 hours: Dilaudid 1.4 mg IV via PCA.  Meds/Allergies   all current active meds have been reviewed, current meds:   Current Facility-Administered Medications:     acetaminophen (TYLENOL) tablet 975 mg, Q6H JORGE    aspirin (ECOTRIN LOW STRENGTH) EC tablet 81 mg, Daily    atorvastatin (LIPITOR) tablet 80 mg, Daily With Dinner    chlorhexidine (PERIDEX) 0.12 % oral rinse 15 mL, Q12H JORGE    chlorthalidone tablet 12.5 mg, Daily    clopidogrel (PLAVIX) tablet 75 mg, Daily    diazepam (VALIUM) tablet 5 mg, Q8H PRN    DULoxetine (CYMBALTA) delayed release capsule 30 mg, Daily    gabapentin (NEURONTIN) capsule 300 mg, BID (AM & Afternoon)    gabapentin (NEURONTIN) capsule 600 mg, HS    heparin (porcine) 25,000 units in 0.45% NaCl 250 mL infusion (premix), Titrated, Last Rate: 16 Units/kg/hr (02/06/25 0935)    heparin (porcine) injection 3,400 Units, Q6H PRN    heparin (porcine) injection 6,800 Units, Q6H PRN    hydrALAZINE (APRESOLINE) injection 5 mg, Q6H PRN    HYDROmorphone (DILAUDID) 1 mg/mL 50 mL PCA, Continuous    HYDROmorphone (DILAUDID) injection 0.5 mg, Q3H PRN    insulin glargine (LANTUS) subcutaneous injection 10 Units 0.1 mL, HS    insulin lispro (HumALOG/ADMELOG) 100 units/mL subcutaneous injection 1-6 Units, Q6H JORGE **AND** Fingerstick Glucose (POCT), Q6H    labetalol (NORMODYNE) injection 10 mg, Q4H PRN    methocarbamol (ROBAXIN) tablet 750 mg, Q6H JORGE    metoclopramide (REGLAN) injection 10 mg, Q6H PRN    multi-electrolyte (PLASMALYTE-A/ISOLYTE-S PH 7.4) IV solution, Continuous, Last Rate: Stopped (02/05/25 1605)    ondansetron (ZOFRAN) injection 4 mg, Q6H PRN    pantoprazole (PROTONIX) injection 40 mg, Daily    potassium chloride 20 mEq IVPB (premix), Once, Last Rate: 20 mEq (02/06/25 0905)    [Held by provider] senna-docusate sodium (SENOKOT S) 8.6-50 mg per tablet 1 tablet,  HS    trimethobenzamide (TIGAN) IM injection 200 mg, Q6H PRN    vancomycin 750 mg in sodium chloride 0.9% 250 mL, Q8H, Last Rate: 0 mg (02/06/25 0007), and PTA meds:   Prior to Admission Medications   Prescriptions Last Dose Informant Patient Reported? Taking?   Alcohol Swabs 70 % PADS  Self No No   Sig: May substitute brand based on insurance coverage. Check glucose TID.   Blood Glucose Monitoring Suppl (OneTouch Verio Reflect) w/Device KIT  Self No No   Sig: May substitute brand based on insurance coverage. Check glucose TID.   HYDROmorphone (DILAUDID) 4 mg tablet  Self Yes No   Insulin Glargine Solostar (Lantus SoloStar) 100 UNIT/ML SOPN  Self No No   Sig: Inject 0.1 mL (10 Units total) under the skin daily at bedtime   Insulin Pen Needle (BD Pen Needle Ana 2nd Gen) 32G X 4 MM MISC  Self No No   Sig: For use with insulin pen. Pharmacy may dispense brand covered by insurance.   Insulin Pen Needle (BD Pen Needle Ana 2nd Gen) 32G X 4 MM MISC  Self No No   Sig: For use with insulin pen. Pharmacy may dispense brand covered by insurance.   OneTouch Delica Lancets 33G MISC  Self No No   Sig: May substitute brand based on insurance coverage. Check glucose TID.   acetaminophen (TYLENOL) 325 mg tablet  Self No No   Sig: Take 3 tablets (975 mg total) by mouth every 6 (six) hours   Patient not taking: Reported on 1/23/2025   aspirin (ECOTRIN LOW STRENGTH) 81 mg EC tablet  Self No No   Sig: Take 1 tablet (81 mg total) by mouth daily   atorvastatin (LIPITOR) 80 mg tablet  Self No No   Sig: Take 1 tablet (80 mg total) by mouth daily with dinner   diphenhydrAMINE (BENADRYL) 25 mg capsule  Self Yes No   Sig: Take 25 mg by mouth every 6 (six) hours as needed for allergies   gabapentin (Neurontin) 300 mg capsule  Self No No   Sig: Take 1 tablet in the morning, 1 in the afternoon and 2 tablets at night.   glucose blood (OneTouch Verio) test strip  Self No No   Sig: May substitute brand based on insurance coverage. Check glucose  "TID.   ibuprofen (MOTRIN) 600 mg tablet  Self Yes No   Sig: Take by mouth every 6 (six) hours as needed for mild pain   insulin aspart (NovoLOG FlexPen) 100 UNIT/ML injection pen  Self No No   Sig: Inject 5 Units under the skin 3 (three) times a day with meals   methocarbamol (ROBAXIN) 500 mg tablet  Self No No   Sig: Take 1 tablet (500 mg total) by mouth every 6 (six) hours   naloxone (NARCAN) 4 mg/0.1 mL nasal spray  Self No No   Sig: Administer 1 spray into a nostril. If no response after 2-3 minutes, give another dose in the other nostril using a new spray.   senna-docusate sodium (SENOKOT S) 8.6-50 mg per tablet  Self No No   Sig: Take 1 tablet by mouth daily at bedtime   Patient not taking: Reported on 1/28/2025      Facility-Administered Medications: None     Allergies   Allergen Reactions    Codeine Other (See Comments)     Aggression-and nasty--\"took a cough syrup with codeine as a child\"     Objective :  Temp:  [97.3 °F (36.3 °C)-98.6 °F (37 °C)] 98.6 °F (37 °C)  HR:  [] 90  BP: (120-203)/() 120/71  Resp:  [15-20] 18  SpO2:  [93 %-99 %] 98 %  O2 Device: None (Room air)    Physical Exam  Vitals and nursing note reviewed.   Constitutional:       General: She is awake. She is not in acute distress.     Appearance: She is not ill-appearing, toxic-appearing or diaphoretic.   Skin:     General: Skin is warm and dry.   Neurological:      Mental Status: She is alert and oriented to person, place, and time.      GCS: GCS eye subscore is 4. GCS verbal subscore is 5. GCS motor subscore is 6.   Psychiatric:         Attention and Perception: Attention normal.         Speech: Speech normal.         Behavior: Behavior normal. Behavior is cooperative.            Lab Results: I have reviewed the following results:  Estimated Creatinine Clearance: 83.1 mL/min (by C-G formula based on SCr of 0.85 mg/dL).  Lab Results   Component Value Date    WBC 10.25 (H) 02/06/2025    HGB 8.3 (L) 02/06/2025    HCT 26.3 (L) " 02/06/2025     02/06/2025         Component Value Date/Time    K 3.9 02/06/2025 0636    CL 96 02/06/2025 0636    CO2 32 02/06/2025 0636    BUN 8 02/06/2025 0636    CREATININE 0.85 02/06/2025 0636         Component Value Date/Time    CALCIUM 8.9 02/06/2025 0636    ALKPHOS 87 02/06/2025 0636    AST 18 02/06/2025 0636    ALT 12 02/06/2025 0636    TP 6.3 (L) 02/06/2025 0636    ALB 3.0 (L) 02/06/2025 0636       Imaging Results Review: No pertinent imaging studies reviewed.  Other Study Results Review: No additional pertinent studies reviewed.     Administrative Statements   I have spent a total time of 30 minutes in caring for this patient on the day of the visit/encounter including Risks and benefits of tx options, Instructions for management, Patient and family education, Importance of tx compliance, Risk factor reductions, Impressions, Counseling / Coordination of care, Documenting in the medical record, Reviewing / ordering tests, medicine, procedures  , Obtaining or reviewing history  , and Communicating with other healthcare professionals .

## 2025-02-06 NOTE — PLAN OF CARE
Problem: PAIN - ADULT  Goal: Verbalizes/displays adequate comfort level or baseline comfort level  Description: Interventions:  - Encourage patient to monitor pain and request assistance  - Assess pain using appropriate pain scale  - Administer analgesics based on type and severity of pain and evaluate response  - Implement non-pharmacological measures as appropriate and evaluate response  - Consider cultural and social influences on pain and pain management  - Notify physician/advanced practitioner if interventions unsuccessful or patient reports new pain  Outcome: Progressing     Problem: INFECTION - ADULT  Goal: Absence or prevention of progression during hospitalization  Description: INTERVENTIONS:  - Assess and monitor for signs and symptoms of infection  - Monitor lab/diagnostic results  - Monitor all insertion sites, i.e. indwelling lines, tubes, and drains  - Monitor endotracheal if appropriate and nasal secretions for changes in amount and color  - [place] appropriate cooling/warming therapies per order  - Administer medications as ordered  - Instruct and encourage patient and family to use good hand hygiene technique  - Identify and instruct in appropriate isolation precautions for identified infection/condition  Outcome: Progressing  Goal: Absence of fever/infection during neutropenic period  Description: INTERVENTIONS:  - Monitor WBC    Outcome: Progressing     Problem: SAFETY ADULT  Goal: Patient will remain free of falls  Description: INTERVENTIONS:  - Educate patient/family on patient safety including physical limitations  - Instruct patient to call for assistance with activity   - Consult OT/PT to assist with strengthening/mobility   - Keep Call bell within reach  - Keep bed low and locked with side rails adjusted as appropriate  - Keep care items and personal belongings within reach  - Initiate and maintain comfort rounds  - Make Fall Risk Sign visible to staff  - Apply yellow socks and bracelet  for high fall risk patients  - Consider moving patient to room near nurses station  Outcome: Progressing

## 2025-02-06 NOTE — ASSESSMENT & PLAN NOTE
Lab Results   Component Value Date    HGBA1C 10.3 (H) 12/13/2024       Recent Labs     02/05/25  1546 02/05/25  2133 02/06/25  0040 02/06/25  0632   POCGLU 162* 128 131 100       Blood Sugar Average: Last 72 hrs:  (P) [id number]

## 2025-02-06 NOTE — ASSESSMENT & PLAN NOTE
Lab Results   Component Value Date    HGBA1C 10.3 (H) 12/13/2024       Recent Labs     02/05/25  1546 02/05/25  2133 02/06/25  0040 02/06/25  0632   POCGLU 162* 128 131 100       Blood Sugar Average: Last 72 hrs:  (P) [id number]  - Lispro 10u QHS with sliding scale

## 2025-02-06 NOTE — ASSESSMENT & PLAN NOTE
Patient previously diagnosed with hypertension  However, on 2/5, became hypertensive with SBP's up to 200s  Started chlorthalidone 12.5 mg daily  Added on as needed hydralazine and labetalol    Plan  Continue chlorthalidone 12.5 mg daily  As needed hydralazine and labetalol

## 2025-02-06 NOTE — ANESTHESIA PREPROCEDURE EVALUATION
Procedure:  LEFT lower extremity arteriogram w/ intervention (Left: Abdomen)    Relevant Problems   ANESTHESIA   (-) History of anesthesia complications      CARDIO   (+) PAD (peripheral artery disease) (HCC)   (+) Primary hypertension   (-) Chest pain   (-) BASS (dyspnea on exertion)      ENDO   (+) Diabetes mellitus type 2 with complications (HCC)      GI/HEPATIC   (+) GERD (gastroesophageal reflux disease) (Well controlled)      HEMATOLOGY   (+) Blood loss anemia      PULMONARY   (-) Shortness of breath   (-) Sleep apnea   (-) URI (upper respiratory infection)      Behavioral Health   (+) Tobacco abuse      Other   (+) Obesity (BMI 30-39.9)        Physical Exam    Airway    Mallampati score: II  TM Distance: >3 FB  Neck ROM: full     Dental       Cardiovascular      Pulmonary      Other Findings  post-pubertal.      Anesthesia Plan  ASA Score- 3     Anesthesia Type- general with ASA Monitors.         Additional Monitors:     Airway Plan: LMA.           Plan Factors-Exercise tolerance (METS): >4 METS.    Chart reviewed. EKG reviewed.  Existing labs reviewed. Patient summary reviewed.                  Induction- intravenous.    Postoperative Plan-     Perioperative Resuscitation Plan - Level 1 - Full Code.       Informed Consent- Anesthetic plan and risks discussed with patient.  I personally reviewed this patient with the CRNA. Discussed and agreed on the Anesthesia Plan with the CRNA..      NPO Status:  No vitals data found for the desired time range.

## 2025-02-06 NOTE — ASSESSMENT & PLAN NOTE
Complicated by large abscess.  Patient underwent I&D on 1/29/2025 with a large abscess found with purulent fluid and necrotic tissue drained and debrided.  Wound cultures are growing MRSA and Finegoldia.  MRI and operative findings not consistent with osteomyelitis.  -Will continue on IV vancomycin (dosing per pharmacy) for now pending repeat vascular intervention as below  -Plan antibiotics through 2/8/2025 to complete 10 days post abscess drainage.  -Appreciate podiatry recommendations  -Local wound care  -Serial exams  -Revascularization as below

## 2025-02-06 NOTE — ASSESSMENT & PLAN NOTE
Lab Results   Component Value Date    HGBA1C 10.3 (H) 12/13/2024       Recent Labs     02/05/25  1227 02/05/25  1546 02/05/25  2133 02/06/25  0040   POCGLU 164* 162* 128 131       Blood Sugar Average: Last 72 hrs:  (P) 161.2302879839420357

## 2025-02-07 LAB
ANION GAP SERPL CALCULATED.3IONS-SCNC: 6 MMOL/L (ref 4–13)
APTT PPP: 57 SECONDS (ref 23–34)
APTT PPP: 62 SECONDS (ref 23–34)
APTT PPP: 84 SECONDS (ref 23–34)
BUN SERPL-MCNC: 12 MG/DL (ref 5–25)
CALCIUM SERPL-MCNC: 8.7 MG/DL (ref 8.4–10.2)
CHLORIDE SERPL-SCNC: 97 MMOL/L (ref 96–108)
CO2 SERPL-SCNC: 30 MMOL/L (ref 21–32)
CREAT SERPL-MCNC: 0.79 MG/DL (ref 0.6–1.3)
ERYTHROCYTE [DISTWIDTH] IN BLOOD BY AUTOMATED COUNT: 14.3 % (ref 11.6–15.1)
GFR SERPL CREATININE-BSD FRML MDRD: 88 ML/MIN/1.73SQ M
GLUCOSE SERPL-MCNC: 174 MG/DL (ref 65–140)
GLUCOSE SERPL-MCNC: 185 MG/DL (ref 65–140)
GLUCOSE SERPL-MCNC: 187 MG/DL (ref 65–140)
GLUCOSE SERPL-MCNC: 188 MG/DL (ref 65–140)
GLUCOSE SERPL-MCNC: 201 MG/DL (ref 65–140)
HCT VFR BLD AUTO: 26.6 % (ref 34.8–46.1)
HGB BLD-MCNC: 8.2 G/DL (ref 11.5–15.4)
MCH RBC QN AUTO: 29.2 PG (ref 26.8–34.3)
MCHC RBC AUTO-ENTMCNC: 30.8 G/DL (ref 31.4–37.4)
MCV RBC AUTO: 95 FL (ref 82–98)
PLATELET # BLD AUTO: 271 THOUSANDS/UL (ref 149–390)
PMV BLD AUTO: 9.5 FL (ref 8.9–12.7)
POTASSIUM SERPL-SCNC: 4.6 MMOL/L (ref 3.5–5.3)
RBC # BLD AUTO: 2.81 MILLION/UL (ref 3.81–5.12)
SODIUM SERPL-SCNC: 133 MMOL/L (ref 135–147)
VANCOMYCIN SERPL-MCNC: 19.9 UG/ML (ref 10–20)
WBC # BLD AUTO: 10.64 THOUSAND/UL (ref 4.31–10.16)

## 2025-02-07 PROCEDURE — 85027 COMPLETE CBC AUTOMATED: CPT | Performed by: STUDENT IN AN ORGANIZED HEALTH CARE EDUCATION/TRAINING PROGRAM

## 2025-02-07 PROCEDURE — 80202 ASSAY OF VANCOMYCIN: CPT | Performed by: STUDENT IN AN ORGANIZED HEALTH CARE EDUCATION/TRAINING PROGRAM

## 2025-02-07 PROCEDURE — 99232 SBSQ HOSP IP/OBS MODERATE 35: CPT | Performed by: SURGERY

## 2025-02-07 PROCEDURE — 99233 SBSQ HOSP IP/OBS HIGH 50: CPT | Performed by: PHYSICIAN ASSISTANT

## 2025-02-07 PROCEDURE — 97167 OT EVAL HIGH COMPLEX 60 MIN: CPT

## 2025-02-07 PROCEDURE — 85730 THROMBOPLASTIN TIME PARTIAL: CPT | Performed by: INTERNAL MEDICINE

## 2025-02-07 PROCEDURE — 99024 POSTOP FOLLOW-UP VISIT: CPT | Performed by: PODIATRIST

## 2025-02-07 PROCEDURE — 80048 BASIC METABOLIC PNL TOTAL CA: CPT | Performed by: STUDENT IN AN ORGANIZED HEALTH CARE EDUCATION/TRAINING PROGRAM

## 2025-02-07 PROCEDURE — 97163 PT EVAL HIGH COMPLEX 45 MIN: CPT

## 2025-02-07 PROCEDURE — 99233 SBSQ HOSP IP/OBS HIGH 50: CPT

## 2025-02-07 PROCEDURE — G0545 PR INHERENT VISIT TO INPT: HCPCS | Performed by: INTERNAL MEDICINE

## 2025-02-07 PROCEDURE — 82948 REAGENT STRIP/BLOOD GLUCOSE: CPT

## 2025-02-07 PROCEDURE — 99232 SBSQ HOSP IP/OBS MODERATE 35: CPT | Performed by: INTERNAL MEDICINE

## 2025-02-07 RX ORDER — HYDROMORPHONE HCL IN WATER/PF 6 MG/30 ML
0.2 PATIENT CONTROLLED ANALGESIA SYRINGE INTRAVENOUS EVERY 6 HOURS PRN
Status: DISPENSED | OUTPATIENT
Start: 2025-02-07 | End: 2025-02-09

## 2025-02-07 RX ORDER — VANCOMYCIN HYDROCHLORIDE 1 G/200ML
10 INJECTION, SOLUTION INTRAVENOUS EVERY 12 HOURS
Status: DISCONTINUED | OUTPATIENT
Start: 2025-02-07 | End: 2025-02-12

## 2025-02-07 RX ORDER — PANTOPRAZOLE SODIUM 40 MG/1
40 TABLET, DELAYED RELEASE ORAL
Status: DISCONTINUED | OUTPATIENT
Start: 2025-02-08 | End: 2025-02-15 | Stop reason: HOSPADM

## 2025-02-07 RX ORDER — METHOCARBAMOL 500 MG/1
500 TABLET, FILM COATED ORAL EVERY 6 HOURS SCHEDULED
Status: DISCONTINUED | OUTPATIENT
Start: 2025-02-07 | End: 2025-02-15 | Stop reason: HOSPADM

## 2025-02-07 RX ADMIN — SODIUM CHLORIDE, SODIUM GLUCONATE, SODIUM ACETATE, POTASSIUM CHLORIDE, MAGNESIUM CHLORIDE, SODIUM PHOSPHATE, DIBASIC, AND POTASSIUM PHOSPHATE 100 ML/HR: .53; .5; .37; .037; .03; .012; .00082 INJECTION, SOLUTION INTRAVENOUS at 08:39

## 2025-02-07 RX ADMIN — ACETAMINOPHEN 975 MG: 325 TABLET, FILM COATED ORAL at 17:25

## 2025-02-07 RX ADMIN — HEPARIN SODIUM 3400 UNITS: 1000 INJECTION INTRAVENOUS; SUBCUTANEOUS at 08:40

## 2025-02-07 RX ADMIN — HEPARIN SODIUM 15 UNITS/KG/HR: 10000 INJECTION, SOLUTION INTRAVENOUS at 10:52

## 2025-02-07 RX ADMIN — ATORVASTATIN CALCIUM 80 MG: 80 TABLET, FILM COATED ORAL at 17:25

## 2025-02-07 RX ADMIN — METHOCARBAMOL 500 MG: 500 TABLET ORAL at 11:57

## 2025-02-07 RX ADMIN — INSULIN LISPRO 2 UNITS: 100 INJECTION, SOLUTION INTRAVENOUS; SUBCUTANEOUS at 21:02

## 2025-02-07 RX ADMIN — METHOCARBAMOL 500 MG: 500 TABLET ORAL at 17:25

## 2025-02-07 RX ADMIN — METHOCARBAMOL 500 MG: 500 TABLET ORAL at 23:44

## 2025-02-07 RX ADMIN — ACETAMINOPHEN 975 MG: 325 TABLET, FILM COATED ORAL at 05:49

## 2025-02-07 RX ADMIN — DIAZEPAM 5 MG: 5 TABLET ORAL at 23:44

## 2025-02-07 RX ADMIN — GABAPENTIN 300 MG: 300 CAPSULE ORAL at 08:31

## 2025-02-07 RX ADMIN — ASPIRIN 81 MG: 81 TABLET, COATED ORAL at 08:30

## 2025-02-07 RX ADMIN — CHLORHEXIDINE GLUCONATE 0.12% ORAL RINSE 15 ML: 1.2 LIQUID ORAL at 08:30

## 2025-02-07 RX ADMIN — CLOPIDOGREL BISULFATE 75 MG: 75 TABLET, FILM COATED ORAL at 08:30

## 2025-02-07 RX ADMIN — VANCOMYCIN HYDROCHLORIDE 750 MG: 10 INJECTION, POWDER, LYOPHILIZED, FOR SOLUTION INTRAVENOUS at 05:51

## 2025-02-07 RX ADMIN — PANTOPRAZOLE SODIUM 40 MG: 40 INJECTION, POWDER, FOR SOLUTION INTRAVENOUS at 08:32

## 2025-02-07 RX ADMIN — INSULIN LISPRO 1 UNITS: 100 INJECTION, SOLUTION INTRAVENOUS; SUBCUTANEOUS at 06:06

## 2025-02-07 RX ADMIN — HEPARIN SODIUM 13 UNITS/KG/HR: 10000 INJECTION, SOLUTION INTRAVENOUS at 08:35

## 2025-02-07 RX ADMIN — GABAPENTIN 300 MG: 300 CAPSULE ORAL at 14:45

## 2025-02-07 RX ADMIN — CHLORTHALIDONE 12.5 MG: 25 TABLET ORAL at 08:31

## 2025-02-07 RX ADMIN — GABAPENTIN 600 MG: 300 CAPSULE ORAL at 21:01

## 2025-02-07 RX ADMIN — VANCOMYCIN HYDROCHLORIDE 1000 MG: 1 INJECTION, SOLUTION INTRAVENOUS at 11:59

## 2025-02-07 RX ADMIN — INSULIN LISPRO 1 UNITS: 100 INJECTION, SOLUTION INTRAVENOUS; SUBCUTANEOUS at 17:26

## 2025-02-07 RX ADMIN — VANCOMYCIN HYDROCHLORIDE 1000 MG: 1 INJECTION, SOLUTION INTRAVENOUS at 23:45

## 2025-02-07 RX ADMIN — CHLORHEXIDINE GLUCONATE 0.12% ORAL RINSE 15 ML: 1.2 LIQUID ORAL at 20:58

## 2025-02-07 RX ADMIN — METHOCARBAMOL 750 MG: 750 TABLET ORAL at 05:49

## 2025-02-07 RX ADMIN — INSULIN GLARGINE 10 UNITS: 100 INJECTION, SOLUTION SUBCUTANEOUS at 21:00

## 2025-02-07 RX ADMIN — INSULIN LISPRO 1 UNITS: 100 INJECTION, SOLUTION INTRAVENOUS; SUBCUTANEOUS at 11:57

## 2025-02-07 RX ADMIN — DULOXETINE 30 MG: 30 CAPSULE, DELAYED RELEASE ORAL at 08:30

## 2025-02-07 RX ADMIN — ACETAMINOPHEN 975 MG: 325 TABLET, FILM COATED ORAL at 23:44

## 2025-02-07 RX ADMIN — SODIUM CHLORIDE, SODIUM GLUCONATE, SODIUM ACETATE, POTASSIUM CHLORIDE, MAGNESIUM CHLORIDE, SODIUM PHOSPHATE, DIBASIC, AND POTASSIUM PHOSPHATE 100 ML/HR: .53; .5; .37; .037; .03; .012; .00082 INJECTION, SOLUTION INTRAVENOUS at 10:53

## 2025-02-07 NOTE — PROGRESS NOTES
Progress Note - Acute Pain   Name: Lizzy Acuna 49 y.o. female I MRN: [id number]  Unit/Bed#: [facility] 511-01 I Date of Admission: 1/31/2025   Date of Service: 2/7/2025 I Hospital Day: 7    Assessment & Plan  PAD (peripheral artery disease) ([facility])  s/p left SFA thrombectomy on (01/31/25) however still with residual thrombus  s/p lysis check and angioplasty (02/01/25)   s/p lysis check with angioplasty (02/03/25)  S/p repeat angiogram left lower extremity (2/6/25)    Discontinue Dilaudid PCA.  Start oxycodone 2.5 mg p.o. every 4 hours as needed moderate or severe pain.  Start Dilaudid 0.2 mg IV every 6 hours as needed breakthrough pain x 24 hours.    Multimodal Analgesia:    - continue robaxin 750mg PO q6h jay     - continue tylenol 975mg PO q6h jay    - continue gabapentin 300mg/300mg/600mg    - valium PO 5mg Q8H prn for muscle spasms     - duloxetine 30mg PO daily for musculoskeletal and neuropathic pain          APS will sign off at this time. Thank you for the consult. All opioids and other analgesics to be written at discretion of primary team. Please contact Acute Pain Service - via SecureChat from [phone] with additional questions or concerns. See [email or web address] or [email or web address] for additional contacts and after hours information.    Subjective   Lizzy Acuna is a 49 y.o. female with PMHx of PAD who presented after elective TMA weeks prior, found to have wound dehiscence and evidence of cellulitis. Patient was transferred to [facility] and underwent thrombectomy with lysis catheters on (01/31/25). Since then, patient continues with pain of the LLE and most recently vascular surgery notes that she is a high risk for requiring BKA despite revascularization.     Pain History  Current pain location(s):  Pain Score: 2  Pain Location/Orientation: Location: Leg  Pain Scale: Pain Assessment Tool: 0-10  24 hour history: Patient underwent repeat vascular procedure yesterday.  Continues to have minimal to no pain and minimal use of  Dilaudid PCA.    Opioid requirement previous 24 hours: Dilaudid 6.8 mg IV via PCA.  Meds/Allergies   all current active meds have been reviewed, current meds:   Current Facility-Administered Medications:     acetaminophen (TYLENOL) tablet 975 mg, Q6H JORGE    aspirin (ECOTRIN LOW STRENGTH) EC tablet 81 mg, Daily    atorvastatin (LIPITOR) tablet 80 mg, Daily With Dinner    chlorhexidine (PERIDEX) 0.12 % oral rinse 15 mL, Q12H JORGE    chlorthalidone tablet 12.5 mg, Daily    clopidogrel (PLAVIX) tablet 75 mg, Daily    diazepam (VALIUM) tablet 5 mg, Q8H PRN    DULoxetine (CYMBALTA) delayed release capsule 30 mg, Daily    gabapentin (NEURONTIN) capsule 300 mg, BID (AM & Afternoon)    gabapentin (NEURONTIN) capsule 600 mg, HS    heparin (porcine) 25,000 units in 0.45% NaCl 250 mL infusion (premix), Titrated, Last Rate: 15 Units/kg/hr (02/07/25 1052)    heparin (porcine) injection 3,400 Units, Q6H PRN    heparin (porcine) injection 6,800 Units, Q6H PRN    hydrALAZINE (APRESOLINE) injection 5 mg, Q6H PRN    HYDROmorphone HCl (DILAUDID) injection 0.2 mg, Q6H PRN    insulin glargine (LANTUS) subcutaneous injection 10 Units 0.1 mL, HS    insulin lispro (HumALOG/ADMELOG) 100 units/mL subcutaneous injection 1-6 Units, 4x Daily (AC & HS) **AND** Fingerstick Glucose (POCT), 4x Daily AC and at bedtime    labetalol (NORMODYNE) injection 10 mg, Q4H PRN    methocarbamol (ROBAXIN) tablet 500 mg, Q6H JORGE    metoclopramide (REGLAN) injection 10 mg, Q6H PRN    multi-electrolyte (PLASMALYTE-A/ISOLYTE-S PH 7.4) IV solution, Continuous, Last Rate: Stopped (02/07/25 1152)    ondansetron (ZOFRAN) injection 4 mg, Q6H PRN    oxyCODONE (ROXICODONE) split tablet 2.5 mg, Q4H PRN    [START ON 2/8/2025] pantoprazole (PROTONIX) EC tablet 40 mg, Early Morning    [Held by provider] senna-docusate sodium (SENOKOT S) 8.6-50 mg per tablet 1 tablet, HS    trimethobenzamide (TIGAN) IM injection 200 mg, Q6H PRN    vancomycin (VANCOCIN) IVPB (premix in dextrose)  1,000 mg 200 mL, Q12H, Last Rate: 1,000 mg (02/07/25 1159), and PTA meds:   Prior to Admission Medications   Prescriptions Last Dose Informant Patient Reported? Taking?   Alcohol Swabs 70 % PADS  Self No No   Sig: May substitute brand based on insurance coverage. Check glucose TID.   Blood Glucose Monitoring Suppl (OneTouch Verio Reflect) w/Device KIT  Self No No   Sig: May substitute brand based on insurance coverage. Check glucose TID.   HYDROmorphone (DILAUDID) 4 mg tablet  Self Yes No   Insulin Glargine Solostar (Lantus SoloStar) 100 UNIT/ML SOPN  Self No No   Sig: Inject 0.1 mL (10 Units total) under the skin daily at bedtime   Insulin Pen Needle (BD Pen Needle Ana 2nd Gen) 32G X 4 MM MISC  Self No No   Sig: For use with insulin pen. Pharmacy may dispense brand covered by insurance.   Insulin Pen Needle (BD Pen Needle Ana 2nd Gen) 32G X 4 MM MISC  Self No No   Sig: For use with insulin pen. Pharmacy may dispense brand covered by insurance.   OneTouch Delica Lancets 33G MISC  Self No No   Sig: May substitute brand based on insurance coverage. Check glucose TID.   acetaminophen (TYLENOL) 325 mg tablet  Self No No   Sig: Take 3 tablets (975 mg total) by mouth every 6 (six) hours   Patient not taking: Reported on 1/23/2025   aspirin (ECOTRIN LOW STRENGTH) 81 mg EC tablet  Self No No   Sig: Take 1 tablet (81 mg total) by mouth daily   atorvastatin (LIPITOR) 80 mg tablet  Self No No   Sig: Take 1 tablet (80 mg total) by mouth daily with dinner   diphenhydrAMINE (BENADRYL) 25 mg capsule  Self Yes No   Sig: Take 25 mg by mouth every 6 (six) hours as needed for allergies   gabapentin (Neurontin) 300 mg capsule  Self No No   Sig: Take 1 tablet in the morning, 1 in the afternoon and 2 tablets at night.   glucose blood (OneTouch Verio) test strip  Self No No   Sig: May substitute brand based on insurance coverage. Check glucose TID.   ibuprofen (MOTRIN) 600 mg tablet  Self Yes No   Sig: Take by mouth every 6 (six) hours  "as needed for mild pain   insulin aspart (NovoLOG FlexPen) 100 UNIT/ML injection pen  Self No No   Sig: Inject 5 Units under the skin 3 (three) times a day with meals   methocarbamol (ROBAXIN) 500 mg tablet  Self No No   Sig: Take 1 tablet (500 mg total) by mouth every 6 (six) hours   naloxone (NARCAN) 4 mg/0.1 mL nasal spray  Self No No   Sig: Administer 1 spray into a nostril. If no response after 2-3 minutes, give another dose in the other nostril using a new spray.   senna-docusate sodium (SENOKOT S) 8.6-50 mg per tablet  Self No No   Sig: Take 1 tablet by mouth daily at bedtime   Patient not taking: Reported on 1/28/2025      Facility-Administered Medications: None     Allergies   Allergen Reactions    Codeine Other (See Comments)     Aggression-and nasty--\"took a cough syrup with codeine as a child\"     Objective :  Temp:  [97.5 °F (36.4 °C)-98 °F (36.7 °C)] 97.5 °F (36.4 °C)  HR:  [] 89  BP: (119-164)/(64-83) 148/69  Resp:  [18] 18  SpO2:  [87 %-99 %] 95 %  O2 Device: Nasal cannula  Nasal Cannula O2 Flow Rate (L/min):  [3 L/min] 3 L/min    Physical Exam  Vitals and nursing note reviewed.   Constitutional:       General: She is awake. She is not in acute distress.     Appearance: She is not ill-appearing, toxic-appearing or diaphoretic.   Skin:     General: Skin is warm and dry.   Neurological:      Mental Status: She is alert and oriented to person, place, and time.      GCS: GCS eye subscore is 4. GCS verbal subscore is 5. GCS motor subscore is 6.   Psychiatric:         Attention and Perception: Attention normal.         Speech: Speech normal.         Behavior: Behavior normal. Behavior is cooperative.            Lab Results: I have reviewed the following results:  Estimated Creatinine Clearance: 92 mL/min (by C-G formula based on SCr of 0.79 mg/dL).  Lab Results   Component Value Date    WBC 10.64 (H) 02/07/2025    HGB 8.2 (L) 02/07/2025    HCT 26.6 (L) 02/07/2025     02/07/2025       "   Component Value Date/Time    K 4.6 02/07/2025 0605    CL 97 02/07/2025 0605    CO2 30 02/07/2025 0605    BUN 12 02/07/2025 0605    CREATININE 0.79 02/07/2025 0605         Component Value Date/Time    CALCIUM 8.7 02/07/2025 0605    ALKPHOS 87 02/06/2025 0636    AST 18 02/06/2025 0636    ALT 12 02/06/2025 0636    TP 6.3 (L) 02/06/2025 0636    ALB 3.0 (L) 02/06/2025 0636       Imaging Results Review: No pertinent imaging studies reviewed.  Other Study Results Review: No additional pertinent studies reviewed.     Administrative Statements   I have spent a total time of 26 minutes in caring for this patient on the day of the visit/encounter including Risks and benefits of tx options, Instructions for management, Patient and family education, Importance of tx compliance, Risk factor reductions, Impressions, Counseling / Coordination of care, Documenting in the medical record, Reviewing / ordering tests, medicine, procedures  , Obtaining or reviewing history  , and Communicating with other healthcare professionals .

## 2025-02-07 NOTE — PROGRESS NOTES
Podiatry - Progress Note  Patient: Lizzy Acuna 49 y.o. female   MRN: [id number]  PCP: NAOMY Basilio  Unit/Bed#: [facility] 511-01 Encounter: [id number]  Date Of Visit: 02/07/25    ASSESSMENT:    Lizzy Acuna is a 49 y.o. 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  Patient is POD 1 of left lower extremity angiogram.  The left lower extremity angiogram 2/6/2025 was reviewed.  Patient has single-vessel runoff at anterior tibial artery, with a questionable perfusion to plantar flap.  Patient is already optimized from vascular standpoint in this hospital stay.  The left foot TMA site shows exposed the first metatarsal, fibrous plantar flap, necrotic changes on both dorsal and plantar flap skin edges, and very dry wound and plantar flap.  Dressing of packing DSD was applied.  Given the very dry skin flaps, fibrotic plantar flap, necrotic changes and poor blood flow to the plantar flap, the foot is considered non-slavageable. Will defer to attending's attestation about salvageability.   Patients' lab and vital signs were reviewed. Patient is afebrile with mild leukocytosis, consistent with POD status. Patient does not  meet the SIRS criteria. Will keep monitoring.  Continue IV vancomycin per recommendation from ID  Continue local wound care, appreciate nursing assistance with dressing changes. Wound care order is in.  Elevation and offloading on green foam wedges or pillows when non-ambulatory.  Rest of care per  "primary team.     Weightbearing status: Non-weightbearing left  foot    SUBJECTIVE:     The patient was seen, evaluated, and assessed at bedside today. The patient was awake, alert, and in no acute distress. No acute events overnight. The patient reports no pain or discomfort from the left foot and generally feeling better after the angiogram.  Patient is in very good mood.. Patient denies N/V/F/chills/SOB/CP.      OBJECTIVE:     Vitals:   /68   Pulse 90   Temp 97.5 °F (36.4 °C)   Resp 18   Ht 5' 1.2\" (1.554 m)   Wt 94.9 kg (209 lb 3.5 oz)   LMP 2023   SpO2 94%   BMI 39.27 kg/m²     Temp (24hrs), Av.1 °F (36.7 °C), Min:97.5 °F (36.4 °C), Max:98.7 °F (37.1 °C)      Physical Exam:     Lungs: Non labored breathing  Abdomen: Soft, non-tender.  Lower Extremity:  Cardiovascular status at baseline from admission.  Neurological status at baseline from admission.  Musculoskeletal status at baseline from admission with left TMA and washout. No calf tenderness noted.     Wound #: 1  Location: Left foot TMA site  Length 12cm: Width 5cm: Depth 5cm:   Deepest Tissue Noted in Base: bone  Probe to Bone: Yes  Peripheral Skin Description: Attached and necrotic  Granulation: 50% Fibrotic Tissue: 50% Necrotic Tissue: 0%   Drainage Amount: None  Signs of Infection: No.     Clinical Images 25:                        Additional Data:     Labs:    Results from last 7 days   Lab Units 25  0605 25  0636   WBC Thousand/uL 10.64* 10.25*   HEMOGLOBIN g/dL 8.2* 8.3*   HEMATOCRIT % 26.6* 26.3*   PLATELETS Thousands/uL 271 266   SEGS PCT %  --  60   LYMPHO PCT %  --  32   MONO PCT %  --  5   EOS PCT %  --  1     Results from last 7 days   Lab Units 25  0605 25  0636   POTASSIUM mmol/L 4.6 3.9   CHLORIDE mmol/L 97 96   CO2 mmol/L 30 32   BUN mg/dL 12 8   CREATININE mg/dL 0.79 0.85   CALCIUM mg/dL 8.7 8.9   ALK PHOS U/L  --  87   ALT U/L  --  12   AST U/L  --  18     Results from last 7 days " "  Lab Units 02/02/25  1647   INR  1.05       * I Have Reviewed All Lab Data Listed Above.    Recent Cultures (last 7 days):               Imaging: I have personally reviewed pertinent films in PACS  EKG, Pathology, and Other Studies: I have personally reviewed pertinent reports.    ** Please Note: Portions of the record may have been created with voice recognition software. Occasional wrong word or \"sound a like\" substitutions may have occurred due to the inherent limitations of voice recognition software. Read the chart carefully and recognize, using context, where substitutions have occurred. **      "

## 2025-02-07 NOTE — OCCUPATIONAL THERAPY NOTE
"    Occupational Therapy Evaluation     Patient Name: Lizzy Acuna  Today's Date: 2025  Problem List  Principal Problem:    Cellulitis of left foot  Active Problems:    Diabetes mellitus type 2 with complications (HCC)    Hyponatremia    Diabetic foot infection  (HCC)    PAD (peripheral artery disease) (HCC)    Blood loss anemia    Obesity (BMI 30-39.9)    Primary hypertension    Past Medical History  Past Medical History:   Diagnosis Date    Advanced maternal age in multigravida, second trimester 2016    Transitioned From: Advanced maternal age in multigravida, first trimester      Back pain     used 2 percocet tid until current admission in 2017    Bone spur     in back per pt    Chronic hypertension with superimposed preeclampsia 2017    Depression     Diabetes mellitus (HCC)     Elevated BP without diagnosis of hypertension     \"with pain\"    Foot injury     Full dentures     does not wear    GERD (gastroesophageal reflux disease)     occas    Gestational diabetes     insulin dependent    Herniated cervical disc     Herniated lumbar intervertebral disc     History of pneumonia     Hx of degenerative disc disease     Hyperlipidemia     Obesity     Pre-eclampsia     Prior pregnancy with fetal demise     previous demise at 36 weeks    Toe pain     and foot pain     Past Surgical History  Past Surgical History:   Procedure Laterality Date    ARTERIOGRAM Left 2025    Procedure: LEFT lower extremity arteriogram w/ popiteal and anterior tibial artery angioplasty;  Surgeon: Ottoniel Victoria MD;  Location: [facility];  Service: Vascular    BACK SURGERY       SECTION      INCISION AND DRAINAGE OF WOUND Left 2025    Procedure: INCISION AND DRAINAGE (I&D) EXTREMITY;  Surgeon: Leopoldo Ritchie DPM;  Location: [facility];  Service: Podiatry    IR LOWER EXTREMITY ANGIOGRAM  2024    IR LOWER EXTREMITY ANGIOGRAM  2025    IR LOWER EXTREMITY ANGIOGRAM  2025 "    IR PICC PLACEMENT DOUBLE LUMEN  2025    IR TPA LYSIS CHECK  2025    IR TPA LYSIS CHECK  2025    LAMINECTOMY      with disc removal, last assessed 16    OTHER SURGICAL HISTORY      Right ovary removal 2005 per pt recall at [facility] facillity    DE AMPUTATION FOOT TRANSMETARSAL Left 1/3/2025    Procedure: AMPUTATION TRANSMETATARSAL (TMA);  Surgeon: Leopoldo Ritchie DPM;  Location: [facility];  Service: Podiatry    DE  DELIVERY ONLY N/A 02/15/2017    Procedure:  SECTION ();  Surgeon: Tito Umaña MD;  Location: [facility];  Service: Obstetrics    DE LIG/TRNSXJ FALOPIAN TUBE  DEL/ABDML SURG Left 02/15/2017    Procedure: LIGATION/COAGULATION TUBAL;  Surgeon: Tito Umaña MD;  Location: [facility];  Service: Obstetrics    VASCULAR SURGERY  2024    WISDOM TOOTH EXTRACTION          25 1159   OT Last Visit   OT Visit Date 25   Note Type   Note type Evaluation   Pain Assessment   Pain Assessment Tool 0-10   Pain Score No Pain   Restrictions/Precautions   Weight Bearing Precautions Per Order Yes   LLE Weight Bearing Per Order (S)  NWB   Other Precautions Chair Alarm;Bed Alarm;Multiple lines;Telemetry;Fall Risk   Home Living   Type of Home House   Home Layout Two level;Stairs to enter with rails  (7 vs 10 LETY; full bathroom downstairs and bedroom upstairs (FFSU available w/ couch))   Bathroom Shower/Tub Walk-in shower   Bathroom Toilet Standard   Bathroom Equipment Grab bars in shower;Shower chair   Home Equipment Walker;Other (Comment)  (rollator - used PTA)   Prior Function   Level of [place] Independent with ADLs;Independent with functional mobility;Independent with IADLS   Lives With Family;Daughter   Receives Help From Family   IADLs Independent with driving;Independent with meal prep;Independent with medication management   Falls in the last 6 months 1 to 4  (Pt reports 1 fall.)   Vocational Part time employment   Lifestyle  "  Autonomy Pt reports I w/ ADLs, IADLs, and fxnl mobility w/ rollator at baseline; + driving.   Reciprocal Relationships Pt lives w/ her mother, daughters (7 and 18 y/o) and grandchild.   Intrinsic Gratification Pt enjoys watching movies and TV; also being active.   General   Family/Caregiver Present No   ADL   Eating Assistance 7  Independent   Grooming Assistance 5  Supervision/Setup   UB Bathing Assistance 5  Supervision/Setup   LB Bathing Assistance 4  Minimal Assistance   UB Dressing Assistance 5  Supervision/Setup   LB Dressing Assistance 4  Minimal Assistance   Toileting Assistance  4  Minimal Assistance   Bed Mobility   Supine to Sit 5  Supervision   Additional items HOB elevated;Bedrails   Sit to Supine 5  Supervision   Additional items HOB elevated;Bedrails   Additional Comments Pt supine in bed at end of OT evaluation w/ all needs within reach.   Transfers   Sit to Stand 5  Supervision   Additional items Increased time required;Verbal cues   Stand to Sit 5  Supervision   Additional items Increased time required;Verbal cues   Additional Comments w/ rollator; cues for brakes   Functional Mobility   Functional Mobility 5  Supervision   Additional Comments Pt \"hopped\" long household distance w/ S using rollator; increased cues for navigating obstacles - x1 seated rest break.   Additional items   (rollator)   Balance   Static Sitting Fair +   Dynamic Sitting Fair +   Static Standing Fair   Dynamic Standing Fair   Ambulatory Fair -   Activity Tolerance   Activity Tolerance Patient limited by fatigue   Medical Staff Made Aware PTLianet, due to pt's medical complexity and multiple comorbidities   Nurse Made Aware RN clearance prior to session   RUE Assessment   RUE Assessment WFL   LUE Assessment   LUE Assessment WFL   Hand Function   Gross Motor Coordination Functional   Fine Motor Coordination Functional   Cognition   Overall Cognitive Status WFL   Arousal/Participation Alert;Responsive;Cooperative "   Attention Within functional limits   Orientation Level Oriented X4   Memory Within functional limits   Following Commands Follows one step commands without difficulty   Comments Pt was identified by name and . Pt was pleasant, cooperative, and willing to participate in OT evaluation.   Assessment   Limitation Decreased ADL status;Decreased endurance;Decreased self-care trans;Decreased high-level ADLs   Assessment Pt is a 49 y.o. female seen for OT evaluation s/p admission to [facility] on 2025 due to worsening wound in L foot. Pt diagnosed with Cellulitis of left foot; s/p I&D on  and LE angiogram on . Per podiatry, pt NWB on LLE. Pt has a significant PMH impacting occupational performance including: Advanced maternal age in multigravida, second trimester, Back pain, Bone spur, Chronic hypertension with superimposed preeclampsia, Depression, Diabetes mellitus (HCC), Elevated BP without diagnosis of hypertension, Foot injury, Full dentures, GERD (gastroesophageal reflux disease), Gestational diabetes, Herniated cervical disc, Herniated lumbar intervertebral disc, History of pneumonia, degenerative disc disease, Hyperlipidemia, Obesity, Pre-eclampsia, Prior pregnancy with fetal demise, and Toe pain. Pt with active OT evaluation and treatment orders and activity orders. PTA, pt living with her mother, daughters, and grandchild in a 2 SH w/ 7 vs 10 LETY. Pt reports I w/ ADLs, IADLs, and fxnl mobility w/ rollator at baseline; + driving. Pt agreeable and willing to participate in OT evaluation. During evaluation, pt was I for eating, S for grooming and UB ADLs, and min A for LB ADLs and toileting. Pt also required S for bed mobility, transfers, and fxnl mobility w/ rollator. Pt required x1 seated rest break while ambulating due to fatigue. Performance deficits that affect the pt’s occupational performance during the initial evaluation include decreased ADL status, decreased activity tolerance,  decreased endurance, decreased standing tolerance, decreased standing balance, decreased transfer skills, decreased fxnl mobility, decreased safety awareness, and WBS restrictions. Based on pt’s functional performance and deficits the following occupations will be addressed in OT treatments in order to maximize pt’s independence and overall occupational performance: grooming, bathing/showering, toileting and toilet hygiene, dressing, and functional mobility. Goals are listed below.  From OT perspective, recommend Level III (Minimum Resource Intensity)(may progress to no needs w/ increased LOS) upon d/c when pt medically stable to d/c from acute care. Will continue to follow.   Goals   Patient Goals to go home   LTG Time Frame 10-14   Plan   Treatment Interventions ADL retraining;Functional transfer training;Endurance training;Patient/family training;Equipment evaluation/education;Compensatory technique education;Continued evaluation;Energy conservation;Activityengagement   Goal Expiration Date 02/21/25   OT Treatment Day 0   OT Frequency 2-3x/wk   Discharge Recommendation   Rehab Resource Intensity Level, OT III (Minimum Resource Intensity)  (may progress to no needs w/ prolonged hospital stay)   AM-PAC Daily Activity Inpatient   Lower Body Dressing 3   Bathing 3   Toileting 3   Upper Body Dressing 3   Grooming 3   Eating 4   Daily Activity Raw Score 19   Daily Activity Standardized Score (Calc for Raw Score >=11) 40.22   AM-PAC Applied Cognition Inpatient   Following a Speech/Presentation 4   Understanding Ordinary Conversation 4   Taking Medications 4   Remembering Where Things Are Placed or Put Away 4   Remembering List of 4-5 Errands 4   Taking Care of Complicated Tasks 4   Applied Cognition Raw Score 24   Applied Cognition Standardized Score 62.21       The patient's raw score on the AM-PAC Daily Activity Inpatient Short Form is 19. A raw score of greater than or equal to 19 suggests the patient may benefit  from discharge to home. Please refer to the recommendation of the Occupational Therapist for safe discharge planning.    Goals:      - Pt will complete UB ADLs w/ mod I to maximize independence and return home.    - Pt will complete LB ADLs w/ mod I to maximize independence and return home.     - Pt will complete toileting routine (transfers, hygiene, and clothing management) w/ mod I to maximize independence and return to prior level of function.    - Pt will complete bed mobility supine >< sit w/ mod I to maximize independence and return home.    - Pt will transfer to bed, chair, and toilet w/ mod I using AD / DME as needed to maximize independence and reduce burden of care.     - Pt will ambulate household distances w/ mod I using least restrictive device to maximize independence and return home.     - Pt will increase activity tolerance (and sitting tolerance) by eating all meals OOB in the chair.     - Pt will increase standing tolerance to 2-3 minutes to maximize independence w/ grooming tasks standing at the sink.     - Pt will tolerate therapeutic activities for greater than 30 minutes in order to increase tolerance for functional activities.     EDWARDO Celeste, OTR/L

## 2025-02-07 NOTE — ASSESSMENT & PLAN NOTE
Lab Results   Component Value Date    HGBA1C 10.3 (H) 12/13/2024       Recent Labs     02/06/25  1947 02/06/25 2053 02/07/25 0604 02/07/25  1106   POCGLU 92 105 188* 185*       Blood Sugar Average: Last 72 hrs:  (P) [id number]

## 2025-02-07 NOTE — ASSESSMENT & PLAN NOTE
"Transferred to \[facility]\"" for vascular surgery and IR intervention after Lower Extremity Dopplers showed a >75% stenosis in the distal superficial femoral artery. Full study results are listed below  \"There fernanda 50-75% stenosis in the tibio-peroneal trunk. Diffuse disease throughout the  remaining femoro-popliteal and tibio-peroneal segments.\"  Now S/P Agram, Left SFA thrombectomy, placement of lysis catheter on 1/31  S/p lysis recheck on 2/2 with residual thrombus within L SFA.  S/p lysis recheck w/ angioplasty of L SFA, pop, AT on 2/3.  2/6 - Repeat arteriogram with finding of recurrent pop occlusion, AT recannulization with distal pop, TPT, and AT POBA and serration angioplasty  Patient remains at very high risk for limb loss per vascular surgery     Plan:  Stopping aspirin per vascular surgery, no indication for triple therapy  Continue heparin gtt, Plavix   Transition to DOAC when deemed appropriate by podiatry  "

## 2025-02-07 NOTE — PLAN OF CARE
Problem: PAIN - ADULT  Goal: Verbalizes/displays adequate comfort level or baseline comfort level  Description: Interventions:  - Encourage patient to monitor pain and request assistance  - Assess pain using appropriate pain scale  - Administer analgesics based on type and severity of pain and evaluate response  - Implement non-pharmacological measures as appropriate and evaluate response  - Consider cultural and social influences on pain and pain management  - Notify physician/advanced practitioner if interventions unsuccessful or patient reports new pain  Outcome: Progressing     Problem: INFECTION - ADULT  Goal: Absence or prevention of progression during hospitalization  Description: INTERVENTIONS:  - Assess and monitor for signs and symptoms of infection  - Monitor lab/diagnostic results  - Monitor all insertion sites, i.e. indwelling lines, tubes, and drains  - Monitor endotracheal if appropriate and nasal secretions for changes in amount and color  - [place] appropriate cooling/warming therapies per order  - Administer medications as ordered  - Instruct and encourage patient and family to use good hand hygiene technique  - Identify and instruct in appropriate isolation precautions for identified infection/condition  Outcome: Progressing  Goal: Absence of fever/infection during neutropenic period  Description: INTERVENTIONS:  - Monitor WBC    Outcome: Progressing     Problem: SAFETY ADULT  Goal: Patient will remain free of falls  Description: INTERVENTIONS:  - Educate patient/family on patient safety including physical limitations  - Instruct patient to call for assistance with activity   - Consult OT/PT to assist with strengthening/mobility   - Keep Call bell within reach  - Keep bed low and locked with side rails adjusted as appropriate  - Keep care items and personal belongings within reach  - Initiate and maintain comfort rounds  - Make Fall Risk Sign visible to staff  - Offer Toileting every  Hours,  in advance of need  - Initiate/Maintain alarm  - Obtain necessary fall risk management equipment:   - Apply yellow socks and bracelet for high fall risk patients  - Consider moving patient to room near nurses station  Outcome: Progressing  Goal: Maintain or return to baseline ADL function  Description: INTERVENTIONS:  -  Assess patient's ability to carry out ADLs; assess patient's baseline for ADL function and identify physical deficits which impact ability to perform ADLs (bathing, care of mouth/teeth, toileting, grooming, dressing, etc.)  - Assess/evaluate cause of self-care deficits   - Assess range of motion  - Assess patient's mobility; develop plan if impaired  - Assess patient's need for assistive devices and provide as appropriate  - Encourage maximum independence but intervene and supervise when necessary  - Involve family in performance of ADLs  - Assess for home care needs following discharge   - Consider OT consult to assist with ADL evaluation and planning for discharge  - Provide patient education as appropriate  Outcome: Progressing  Goal: Maintains/Returns to pre admission functional level  Description: INTERVENTIONS:  - Perform AM-PAC 6 Click Basic Mobility/ Daily Activity assessment daily.  - Set and communicate daily mobility goal to care team and patient/family/caregiver.   - Collaborate with rehabilitation services on mobility goals if consulted  - Perform Range of Motion times a day.  - Reposition patient every  hours.  - Dangle patient  times a day  - Stand patient  times a day  - Ambulate patient  times a day  - Out of bed to chair  times a day   - Out of bed for meals  times a day  - Out of bed for toileting  - Record patient progress and toleration of activity level   Outcome: Progressing     Problem: DISCHARGE PLANNING  Goal: Discharge to home or other facility with appropriate resources  Description: INTERVENTIONS:  - Identify barriers to discharge w/patient and caregiver  - Arrange for  needed discharge resources and transportation as appropriate  - Identify discharge learning needs (meds, wound care, etc.)  - Arrange for interpretive services to assist at discharge as needed  - Refer to Case Management Department for coordinating discharge planning if the patient needs post-hospital services based on physician/advanced practitioner order or complex needs related to functional status, cognitive ability, or social support system  Outcome: Progressing     Problem: Knowledge Deficit  Goal: Patient/family/caregiver demonstrates understanding of disease process, treatment plan, medications, and discharge instructions  Description: Complete learning assessment and assess knowledge base.  Interventions:  - Provide teaching at level of understanding  - Provide teaching via preferred learning methods  Outcome: Progressing     Problem: NEUROSENSORY - ADULT  Goal: Achieves stable or improved neurological status  Description: INTERVENTIONS  - Monitor and report changes in neurological status  - Monitor vital signs such as temperature, blood pressure, glucose, and any other labs ordered   - Initiate measures to prevent increased intracranial pressure  - Monitor for seizure activity and implement precautions if appropriate      Outcome: Progressing  Goal: Remains free of injury related to seizures activity  Description: INTERVENTIONS  - Maintain airway, patient safety  and administer oxygen as ordered  - Monitor patient for seizure activity, document and report duration and description of seizure to physician/advanced practitioner  - If seizure occurs,  ensure patient safety during seizure  - Reorient patient post seizure  - Seizure pads on all 4 side rails  - Instruct patient/family to notify RN of any seizure activity including if an aura is experienced  - Instruct patient/family to call for assistance with activity based on nursing assessment  - Administer anti-seizure medications if ordered    Outcome:  Progressing  Goal: Achieves maximal functionality and self care  Description: INTERVENTIONS  - Monitor swallowing and airway patency with patient fatigue and changes in neurological status  - Encourage and assist patient to increase activity and self care.   - Encourage visually impaired, hearing impaired and aphasic patients to use assistive/communication devices  Outcome: Progressing     Problem: CARDIOVASCULAR - ADULT  Goal: Maintains optimal cardiac output and hemodynamic stability  Description: INTERVENTIONS:  - Monitor I/O, vital signs and rhythm  - Monitor for S/S and trends of decreased cardiac output  - Administer and titrate ordered vasoactive medications to optimize hemodynamic stability  - Assess quality of pulses, skin color and temperature  - Assess for signs of decreased coronary artery perfusion  - Instruct patient to report change in severity of symptoms  Outcome: Progressing  Goal: Absence of cardiac dysrhythmias or at baseline rhythm  Description: INTERVENTIONS:  - Continuous cardiac monitoring, vital signs, obtain 12 lead EKG if ordered  - Administer antiarrhythmic and heart rate control medications as ordered  - Monitor electrolytes and administer replacement therapy as ordered  Outcome: Progressing     Problem: RESPIRATORY - ADULT  Goal: Achieves optimal ventilation and oxygenation  Description: INTERVENTIONS:  - Assess for changes in respiratory status  - Assess for changes in mentation and behavior  - Position to facilitate oxygenation and minimize respiratory effort  - Oxygen administered by appropriate delivery if ordered  - Initiate smoking cessation education as indicated  - Encourage broncho-pulmonary hygiene including cough, deep breathe, Incentive Spirometry  - Assess the need for suctioning and aspirate as needed  - Assess and instruct to report SOB or any respiratory difficulty  - Respiratory Therapy support as indicated  Outcome: Progressing     Problem: GASTROINTESTINAL -  ADULT  Goal: Minimal or absence of nausea and/or vomiting  Description: INTERVENTIONS:  - Administer IV fluids if ordered to ensure adequate hydration  - Maintain NPO status until nausea and vomiting are resolved  - Nasogastric tube if ordered  - Administer ordered antiemetic medications as needed  - Provide nonpharmacologic comfort measures as appropriate  - Advance diet as tolerated, if ordered  - Consider nutrition services referral to assist patient with adequate nutrition and appropriate food choices  Outcome: Progressing  Goal: Maintains or returns to baseline bowel function  Description: INTERVENTIONS:  - Assess bowel function  - Encourage oral fluids to ensure adequate hydration  - Administer IV fluids if ordered to ensure adequate hydration  - Administer ordered medications as needed  - Encourage mobilization and activity  - Consider nutritional services referral to assist patient with adequate nutrition and appropriate food choices  Outcome: Progressing  Goal: Maintains adequate nutritional intake  Description: INTERVENTIONS:  - Monitor percentage of each meal consumed  - Identify factors contributing to decreased intake, treat as appropriate  - Assist with meals as needed  - Monitor I&O, weight, and lab values if indicated  - Obtain nutrition services referral as needed  Outcome: Progressing  Goal: Establish and maintain optimal ostomy function  Description: INTERVENTIONS:  - Assess bowel function  - Encourage oral fluids to ensure adequate hydration  - Administer IV fluids if ordered to ensure adequate hydration   - Administer ordered medications as needed  - Encourage mobilization and activity  - Nutrition services referral to assist patient with appropriate food choices  - Assess stoma site  - Consider wound care consult   Outcome: Progressing  Goal: Oral mucous membranes remain intact  Description: INTERVENTIONS  - Assess oral mucosa and hygiene practices  - Implement preventative oral hygiene  regimen  - Implement oral medicated treatments as ordered  - Initiate Nutrition services referral as needed  Outcome: Progressing     Problem: GENITOURINARY - ADULT  Goal: Maintains or returns to baseline urinary function  Description: INTERVENTIONS:  - Assess urinary function  - Encourage oral fluids to ensure adequate hydration if ordered  - Administer IV fluids as ordered to ensure adequate hydration  - Administer ordered medications as needed  - Offer frequent toileting  - Follow urinary retention protocol if ordered  Outcome: Progressing  Goal: Absence of urinary retention  Description: INTERVENTIONS:  - Assess patient’s ability to void and empty bladder  - Monitor I/O  - Bladder scan as needed  - Discuss with physician/AP medications to alleviate retention as needed  - Discuss catheterization for long term situations as appropriate  Outcome: Progressing  Goal: Urinary catheter remains patent  Description: INTERVENTIONS:  - Assess patency of urinary catheter  - If patient has a chronic negron, consider changing catheter if non-functioning  - Follow guidelines for intermittent irrigation of non-functioning urinary catheter  Outcome: Progressing     Problem: METABOLIC, FLUID AND ELECTROLYTES - ADULT  Goal: Electrolytes maintained within normal limits  Description: INTERVENTIONS:  - Monitor labs and assess patient for signs and symptoms of electrolyte imbalances  - Administer electrolyte replacement as ordered  - Monitor response to electrolyte replacements, including repeat lab results as appropriate  - Instruct patient on fluid and nutrition as appropriate  Outcome: Progressing  Goal: Fluid balance maintained  Description: INTERVENTIONS:  - Monitor labs   - Monitor I/O and WT  - Instruct patient on fluid and nutrition as appropriate  - Assess for signs & symptoms of volume excess or deficit  Outcome: Progressing  Goal: Glucose maintained within target range  Description: INTERVENTIONS:  - Monitor Blood Glucose as  ordered  - Assess for signs and symptoms of hyperglycemia and hypoglycemia  - Administer ordered medications to maintain glucose within target range  - Assess nutritional intake and initiate nutrition service referral as needed  Outcome: Progressing     Problem: SKIN/TISSUE INTEGRITY - ADULT  Goal: Skin Integrity remains intact(Skin Breakdown Prevention)  Description: Assess:  -Perform [place] assessment every   -Clean and moisturize skin every   -Inspect skin when repositioning, toileting, and assisting with ADLS  -Assess under medical devices such as  every   -Assess extremities for adequate circulation and sensation     Bed Management:  -Have minimal linens on bed & keep smooth, unwrinkled  -Change linens as needed when moist or perspiring  -Avoid sitting or lying in one position for more than hours while in bed  -Keep HOB at degrees     Toileting:  -Offer bedside commode  -Assess for incontinence every   -Use incontinent care products after each incontinent episode such as     Activity:  -Mobilize patient  times a day  -Encourage activity and walks on unit  -Encourage or provide ROM exercises   -Turn and reposition patient every  Hours  -Use appropriate equipment to lift or move patient in bed  -Instruct/ Assist with weight shifting every  when out of bed in chair  -Consider limitation of chair time  hour intervals    Skin Care:  -Avoid use of baby powder, tape, friction and shearing, hot water or constrictive clothing  -Relieve pressure over bony prominences using  -Do not massage red bony areas    Next Steps:  -Teach patient strategies to minimize risks such as    -Consider consults to  interdisciplinary teams such as   Outcome: Progressing  Goal: Incision(s), wounds(s) or drain site(s) healing without S/S of infection  Description: INTERVENTIONS  - Assess and document dressing, incision, wound bed, drain sites and surrounding tissue  - Provide patient and family education  - Perform skin care/dressing changes  every   Outcome: Progressing  Goal: Pressure injury heals and does not worsen  Description: Interventions:  - Implement low air loss mattress or specialty surface (Criteria met)  - Apply silicone foam dressing  - Instruct/assist with weight shifting every minutes when in chair   - Limit chair time to  hour intervals  - Use special pressure reducing interventions such as  when in chair   - Apply fecal or urinary incontinence containment device   - Perform passive or active ROM every   - Turn and reposition patient & offload bony prominences every  hours   - Utilize friction reducing device or surface for transfers   - Consider consults to  interdisciplinary teams such as  - Use incontinent care products after each incontinent episode such as  - Consider nutrition services referral as needed  Outcome: Progressing     Problem: HEMATOLOGIC - ADULT  Goal: Maintains hematologic stability  Description: INTERVENTIONS  - Assess for signs and symptoms of bleeding or hemorrhage  - Monitor labs  - Administer supportive blood products/factors as ordered and appropriate  Outcome: Progressing     Problem: MUSCULOSKELETAL - ADULT  Goal: Maintain or return mobility to safest level of function  Description: INTERVENTIONS:  - Assess patient's ability to carry out ADLs; assess patient's baseline for ADL function and identify physical deficits which impact ability to perform ADLs (bathing, care of mouth/teeth, toileting, grooming, dressing, etc.)  - Assess/evaluate cause of self-care deficits   - Assess range of motion  - Assess patient's mobility  - Assess patient's need for assistive devices and provide as appropriate  - Encourage maximum independence but intervene and supervise when necessary  - Involve family in performance of ADLs  - Assess for home care needs following discharge   - Consider OT consult to assist with ADL evaluation and planning for discharge  - Provide patient education as appropriate  Outcome: Progressing  Goal:  Maintain proper alignment of affected body part  Description: INTERVENTIONS:  - Support, maintain and protect limb and body alignment  - Provide patient/ family with appropriate education  Outcome: Progressing     Problem: Prexisting or High Potential for Compromised Skin Integrity  Goal: Skin integrity is maintained or improved  Description: INTERVENTIONS:  - Identify patients at risk for skin breakdown  - Assess and monitor skin integrity  - Assess and monitor nutrition and hydration status  - Monitor labs   - Assess for incontinence   - Turn and reposition patient  - Assist with mobility/ambulation  - Relieve pressure over bony prominences  - Avoid friction and shearing  - Provide appropriate hygiene as needed including keeping skin clean and dry  - Evaluate need for skin moisturizer/barrier cream  - Collaborate with interdisciplinary team   - Patient/family teaching  - Consider wound care consult   Outcome: Progressing

## 2025-02-07 NOTE — ANESTHESIA POSTPROCEDURE EVALUATION
Post-Op Assessment Note    CV Status:  Stable  Pain Score: 0    Pain management: adequate       Mental Status:  Awake   Hydration Status:  Stable   PONV Controlled:  None   Airway Patency:  Patent     Post Op Vitals Reviewed: Yes    No anethesia notable event occurred.    Staff: Anesthesiologist, CRNA           Last Filed PACU Vitals:  Vitals Value Taken Time   Temp 97.4    Pulse 94 02/06/25 1938   /77 02/06/25 1939   Resp 12    SpO2 91 % 02/06/25 1938   Vitals shown include unfiled device data.

## 2025-02-07 NOTE — QUICK NOTE
Vascular Surgery    Post-op Check:    S/P Repeat LLE Agram w/ intervention today    Recovering in room, no complaints, denies pain    AVSS    Right groin access site CDI, no bleeding, no hematoma    Left foot M/S intact at ankle, +AT/PT signals present

## 2025-02-07 NOTE — PROGRESS NOTES
Progress Note - Infectious Disease   Name: Lizzy Acuna 49 y.o. female I MRN: [id number]  Unit/Bed#: [facility] 511-01 I Date of Admission: 1/31/2025   Date of Service: 2/7/2025 I Hospital Day: 7    Assessment & Plan  Cellulitis of left foot  Complicated by large abscess.  Patient underwent I&D on 1/29/2025 with a large abscess found with purulent fluid and necrotic tissue drained and debrided.  Wound cultures are growing MRSA and Finegoldia.  MRI and operative findings not consistent with osteomyelitis.  -Discontinue intravenous vancomycin after last dose today as long as podiatry not planning any additional intervention.  -Pharmacy follow-up for vancomycin dose management  -Recheck CBC with differential and BMP to make sure no developing toxicities  -Close podiatry follow-up  -Local wound care  -Serial exams  -Revascularization as below  PAD (peripheral artery disease) (HCC)  Status post angiogram with intervention including left SFA thrombectomy and placement of lysis catheter on 1/31/2025.  Repeat imaging demonstrated residual stenosis of left lower extremity.  Patient status post repeat arteriogram on 2/6/2025 with intervention with apparent reestablished flow  -Ongoing anticoagulation  -Close vascular follow-up  Diabetes mellitus type 2 with complications (HCC)  Poor control with a hemoglobin A1c of 10.3.  Risk factor for recurrent infection.  -Tighten diabetic control  Obesity (BMI 30-39.9)  As a risk factor for recurrent infection. Could also affect antibiotic dosing.     I have discussed with the primary service the above plan to continue the intravenous vancomycin.  The primary service agrees with the plan.    Will see the patient again on 2//10/25 if not discharged.  Please message me over the weekend if questions.    Antibiotics:  Intravenous vancomycin  Postop day 10    Subjective   Patient has no fever, chills, sweats; no nausea, vomiting, diarrhea; no cough, shortness of breath; no pain. No new symptoms.   Underwent repeat angiogram with intervention yesterday.    Objective :  Temp:  [97.5 °F (36.4 °C)-98.7 °F (37.1 °C)] 97.5 °F (36.4 °C)  HR:  [] 90  BP: (119-164)/(64-83) 122/68  Resp:  [18-20] 18  SpO2:  [87 %-99 %] 94 %  O2 Device: Nasal cannula  Nasal Cannula O2 Flow Rate (L/min):  [3 L/min] 3 L/min    General:  No acute distress  Psychiatric:  Awake and alert  Pulmonary:  Normal respiratory excursion without accessory muscle use  Abdomen:  Soft, nontender  Extremities:  No edema  Skin:  No rashes      Lab Results: I have reviewed the following results:  Results from last 7 days   Lab Units 02/07/25  0605 02/06/25  0636 02/05/25  0314   WBC Thousand/uL 10.64* 10.25* 13.03*   HEMOGLOBIN g/dL 8.2* 8.3* 8.4*   PLATELETS Thousands/uL 271 266 235     Results from last 7 days   Lab Units 02/07/25  0605 02/06/25  0636 02/05/25  1354 02/05/25  0314 02/01/25  0558 01/31/25  2231   SODIUM mmol/L 133* 136 132* 135   < > 134*   POTASSIUM mmol/L 4.6 3.9 4.2 4.4   < > 4.1   CHLORIDE mmol/L 97 96 94* 102   < > 102   CO2 mmol/L 30 32 27 27   < > 24   BUN mg/dL 12 8 6 10   < > 12   CREATININE mg/dL 0.79 0.85 0.45* 0.57*   < > 0.57*   EGFR ml/min/1.73sq m 88 81 118 109   < > 109   CALCIUM mg/dL 8.7 8.9 8.2* 8.4   < > 9.1   AST U/L  --  18  --  24  --  14   ALT U/L  --  12  --  14  --  13   ALK PHOS U/L  --  87  --  102  --  110*   ALBUMIN g/dL  --  3.0*  --  3.2*  --  3.3*    < > = values in this interval not displayed.         Results from last 7 days   Lab Units 02/04/25  2304   PROCALCITONIN ng/ml 0.14

## 2025-02-07 NOTE — ASSESSMENT & PLAN NOTE
Complicated by large abscess.  Patient underwent I&D on 1/29/2025 with a large abscess found with purulent fluid and necrotic tissue drained and debrided.  Wound cultures are growing MRSA and Finegoldia.  MRI and operative findings not consistent with osteomyelitis.  -Discontinue intravenous vancomycin after last dose today as long as podiatry not planning any additional intervention.  -Pharmacy follow-up for vancomycin dose management  -Recheck CBC with differential and BMP to make sure no developing toxicities  -Close podiatry follow-up  -Local wound care  -Serial exams  -Revascularization as below

## 2025-02-07 NOTE — ASSESSMENT & PLAN NOTE
49 y.o. F w/ PMH of T2DM (A1c 10.3), tobacco use (quit Dec 2024) who was previously seen at [facility] in December with wound of left foot with cellulitis.   During previous admission vascular surgery was consulted and recommended LLE agram w/ intervention which was preformed by IR. Post-procedure pt underwent TMA with podiatry    Now S/P Agram, Left SFA thrombectomy, placement of lysis catheter on 1/31  S/p lysis recheck w/ angioplasty of L SFA, pop, AT on 2/3.  2/6 - Repeat arteriogram with finding of recurrent pop occlusion, AT recannulization with distal pop, TPT, and AT POBA and serration angioplasty     Plan  - Patient remains high risk limb loss   - Short interval occlusion with repeat AT recann, no evidence of intimal flap or significant stenotic lesion to indicate reason for reocclusion however AT appears patent on exam    - Single vessel run off via AT, questionable perfusion to posterior flap  - TMA salvage/wound care per podiatry team   - Continue plavix and statin    - Aspirin discontinued, no indication for triple therapy  - Continue hep gtt    - transition to DOAC once appropriate from podiatric perspective if no further intervention   - Remainder of care per primary, patient stable from vascular surgery perspective    ** Please contact the [facility] Vascular Surgery Resident/AP role for any questions or concerns that might arise

## 2025-02-07 NOTE — PLAN OF CARE
Problem: PHYSICAL THERAPY ADULT  Goal: Performs mobility at highest level of function for planned discharge setting.  See evaluation for individualized goals.  Description: Treatment/Interventions: ADL retraining, Functional transfer training, LE strengthening/ROM, Therapeutic exercise, Endurance training, Patient/family training, Equipment eval/education, Bed mobility, Gait training, Spoke to nursing, Spoke to case management, OT, Elevations  Equipment Recommended:  (owns)       See flowsheet documentation for full assessment, interventions and recommendations.  Outcome: Progressing  Note: Prognosis: Good  Problem List: Decreased strength, Decreased endurance, Impaired balance, Decreased mobility, Pain  Assessment: Pt is a 49 y.o. female seen for a high complexity PT evaluation due to Ongoing medical management for primary dx, Increased reliance on more restrictive AD compared to baseline, Decreased activity tolerance compared to baseline, Fall risk, Increased assistance needed from caregiver at current time, Current WBS. Patient is s/p admit to [facility] on 1/31/2025 for Sepsis due to cellulitis (HCC) (L03.90, A41.9). Patient  has a past medical history of Advanced maternal age in multigravida, second trimester, Back pain, Bone spur, Chronic hypertension with superimposed preeclampsia, Depression, Diabetes mellitus (HCC), Elevated BP without diagnosis of hypertension, Foot injury, Full dentures, GERD (gastroesophageal reflux disease), Gestational diabetes, Herniated cervical disc, Herniated lumbar intervertebral disc, History of pneumonia, degenerative disc disease, Hyperlipidemia, Obesity, Pre-eclampsia, Prior pregnancy with fetal demise, and Toe pain..     PT now consulted to assess functional mobility and needs for safe d/c planning. Prior to admission, pt independent with functional mobility, independent ADLs, and independent IADLs. Personal factors affecting status include hx of falls, fall risk,  steps to enter home, steps to negotiate within home, and medical status     Currently pt requires supervision for bed mobility, supervision for functional transfers with rollator  ; supervision for ambulation with rollator . Pt presents functioning below baseline and w/ overall mobility deficits 2* to: decreased strength, decreased endurance, decreased mobility, impaired balance. These impairments place pt at risk for falls.     Pt will continue to benefit from skilled PT interventions to address stated impairments; to maximize functional potential; for ongoing pt/family education; and DME needs. The patient's AM-PAC Basic Mobility Inpatient Short Form Raw Score Is 17. PT is currently recommending Level 3 - Minimum Resource Intensity on d/c from hospital. Will continue to follow as able.  Barriers to Discharge: Inaccessible home environment     Rehab Resource Intensity Level, PT: III (Minimum Resource Intensity)    See flowsheet documentation for full assessment.

## 2025-02-07 NOTE — ANESTHESIA POSTPROCEDURE EVALUATION
Post-Op Assessment Note    CV Status:  Stable    Pain management: adequate       Mental Status:  Alert and awake   Hydration Status:  Euvolemic   PONV Controlled:  Controlled   Airway Patency:  Patent     Post Op Vitals Reviewed: Yes    No anethesia notable event occurred.    Staff: Anesthesiologist, CRNA           Last Filed PACU Vitals:  Vitals Value Taken Time   Temp 97.6 °F (36.4 °C) 02/06/25 1945   Pulse 88 02/06/25 2015   /64 02/06/25 2015   Resp 18 02/06/25 2015   SpO2 99 % 02/06/25 2015       Modified Cail:     Vitals Value Taken Time   Activity 2 02/06/25 2015   Respiration 2 02/06/25 2015   Circulation 2 02/06/25 2015   Consciousness 2 02/06/25 2015   Oxygen Saturation 1 02/06/25 2015     Modified Cali Score: 9             Pt discharged 11-25-20 from [facility], [place], Erosive gouty arthritis. Pt does not have HFU appt scheduled at this time. Pt states she won't be able to come into the office for 2 weeks as she is in isolation since getting to [facility], [place] assisted living, she isn't even allowed to be in the same room as her . Pt is agreeable to telephone visit. TCM/HFU appt recommended by 12-2-20 as pt is a high risk for readmission. Please discuss with PCP and contact pt accordingly if telephone visit is appropriate. Thank you!     BOOK BY DATE (last date for TCM): 12-9-20

## 2025-02-07 NOTE — ASSESSMENT & PLAN NOTE
Lab Results   Component Value Date    HGBA1C 10.3 (H) 12/13/2024       Recent Labs     02/06/25  1543 02/06/25  1947 02/06/25 2053 02/07/25 0604   POCGLU 83 92 105 188*       Blood Sugar Average: Last 72 hrs:  (P) 143.3007683584088344

## 2025-02-07 NOTE — ASSESSMENT & PLAN NOTE
Lab Results   Component Value Date    HGBA1C 10.3 (H) 12/13/2024       Recent Labs     02/06/25  1947 02/06/25 2053 02/07/25 0604 02/07/25  1106   POCGLU 92 105 188* 185*       Blood Sugar Average: Last 72 hrs:  (P) 145.2358408564182277  - Lispro 10u QHS with sliding scale

## 2025-02-07 NOTE — PROGRESS NOTES
"Progress Note - Internal Medicine   Name: Lizzy Acuna 49 y.o. female I MRN: [id number]  Unit/Bed#: [facility] 511-01 I Date of Admission: 1/31/2025   Date of Service: 2/7/2025 I Hospital Day: 7     Assessment & Plan  Cellulitis of left foot  Underwent an elective R TMA approx 3 weeks ago and was doing well until last week when she was noticed to have dehiscence of her surgical wound.   The wound was debrided by her podiatrist in the office but ultimately worsened with evidence of cellulitis.   LLE foot infection diagnosed clinically without MRI findings of osteomyelitis.   Tissue culture: 2+ MRSA, 2+ Finegoldia magna No significant leukocytosis or fever this admission. Site continues to bleed likely due to triple therapy   A1c 10.3    Plan:   Podiatry and vascular surgery following   Wound care consulted   Will need long-term wound VAC for granulation in the plantar flap of the TMA site before possible DPC  ID following, Continue intravenous vancomycin and transition to PO when stable for discharge  Patient will have repeat arteriogram 2/6 for below knee/tibial intervention.  Will follow-up with the result for possible wound VAC application or podiatric intervention.  Will attempt wound vac, podiatry following for limb salvaging options   Pain regimen of Tylenol 975 mg q6, gabapentin 300 mg daily, robaxin 500 mg q6, dilaudid 1 mg/mL PCA, dilaudid 0.5 mg q3 PRN  PAD (peripheral artery disease) (HCC)  Transferred to [facility] for vascular surgery and IR intervention after Lower Extremity Dopplers showed a >75% stenosis in the distal superficial femoral artery. Full study results are listed below  \"There fernanda 50-75% stenosis in the tibio-peroneal trunk. Diffuse disease throughout the  remaining femoro-popliteal and tibio-peroneal segments.\"  Now S/P Agram, Left SFA thrombectomy, placement of lysis catheter on 1/31  S/p lysis recheck on 2/2 with residual thrombus within L SFA.  S/p lysis recheck w/ angioplasty of L SFA, pop, AT " on 2/3.  2/6 - Repeat arteriogram with finding of recurrent pop occlusion, AT recannulization with distal pop, TPT, and AT POBA and serration angioplasty  Patient remains at very high risk for limb loss per vascular surgery     Plan:  Stopping aspirin per vascular surgery, no indication for triple therapy  Continue heparin gtt, Plavix   Transition to DOAC when deemed appropriate by podiatry  Primary hypertension  Patient previously diagnosed with hypertension  However, on 2/5, became hypertensive with SBP's up to 200s  Started chlorthalidone 12.5 mg daily  Added on as needed hydralazine and labetalol    Plan  Continue chlorthalidone 12.5 mg daily  As needed hydralazine and labetalol  Diabetes mellitus type 2 with complications (HCC)  Lab Results   Component Value Date    HGBA1C 10.3 (H) 12/13/2024       Recent Labs     02/06/25 1947 02/06/25 2053 02/07/25  0604 02/07/25  1106   POCGLU 92 105 188* 185*       Blood Sugar Average: Last 72 hrs:  (P) 145.9915562514136482  - Lispro 10u QHS with sliding scale   Hyponatremia  Chronic euvolemic hyponatremia that was 133 on admission. Asymptomatic and no recent history of confusion, altered metal status, seizure, or coma.    Current differential includes SIADH in the setting of acute pain      Monitor BMP  Low threshold to workup with serum and urine studies if hyponatremia worsens      Diabetic foot infection  (HCC)  Lab Results   Component Value Date    HGBA1C 10.3 (H) 12/13/2024       Recent Labs     02/06/25 1947 02/06/25 2053 02/07/25  0604 02/07/25  1106   POCGLU 92 105 188* 185*       Blood Sugar Average: Last 72 hrs:  (P) 145.7288325604533481    Blood loss anemia  Blood loss anemia 2/2 lysis, bleeding from LLE TMA.       - Dressing and wound care per podiatry recs   - Recheck CBC and transfuse to maintain Hb > 7  Obesity (BMI 30-39.9)      Disposition: Inpatient     Team: SOD TEAM B    Subjective   Patient seen and examined. No acute events overnight.      Objective :  Temp:  [97.5 °F (36.4 °C)-98 °F (36.7 °C)] 97.5 °F (36.4 °C)  HR:  [] 89  BP: (119-164)/(64-83) 148/69  Resp:  [18] 18  SpO2:  [87 %-99 %] 95 %  O2 Device: Nasal cannula  Nasal Cannula O2 Flow Rate (L/min):  [3 L/min] 3 L/min    I/O         02/05 0701 02/06 0700 02/06 0701 02/07 0700 02/07 0701 02/08 0700    P.O. 238  0    I.V. (mL/kg) 2560.6 (28.4) 453.6 (4.8) 1281.7 (13.5)    Total Intake(mL/kg) 2798.6 (31.1) 453.6 (4.8) 1281.7 (13.5)    Urine (mL/kg/hr) 7114 (3.3) 2050 (0.9) 200 (0.5)    Emesis/NG output       Stool  0     Total Output 7114 2050 200    Net -4315.4 -1596.4 +1081.7           Unmeasured Stool Occurrence  2 x           Weights:   IBW (Ideal Body Weight): 48.26 kg    Body mass index is 39.27 kg/m².  Weight (last 2 days)       Date/Time Weight    02/07/25 0554 94.9 (209.22)    02/06/25 0600 90.1 (198.63)    02/05/25 0300 95.6 (210.76)            Physical Exam  Vitals and nursing note reviewed.   Constitutional:       General: She is not in acute distress.     Appearance: She is well-developed.   HENT:      Head: Normocephalic and atraumatic.   Eyes:      Conjunctiva/sclera: Conjunctivae normal.   Cardiovascular:      Rate and Rhythm: Normal rate and regular rhythm.      Heart sounds: No murmur heard.  Pulmonary:      Effort: Pulmonary effort is normal. No respiratory distress.      Breath sounds: Normal breath sounds.   Abdominal:      Palpations: Abdomen is soft.      Tenderness: There is no abdominal tenderness.   Musculoskeletal:         General: Deformity present. No swelling.      Cervical back: Neck supple.      Left lower leg: Edema present.   Skin:     General: Skin is warm and dry.      Capillary Refill: Capillary refill takes less than 2 seconds.      Findings: Lesion present.   Neurological:      Mental Status: She is alert.   Psychiatric:         Mood and Affect: Mood normal.           Lab Results: I have reviewed the following results:  Recent Labs      02/05/25  0314 02/05/25  1354 02/06/25  0636 02/06/25  1605 02/07/25  0605   WBC 13.03*  --  10.25*  --  10.64*   HGB 8.4*  --  8.3*  --  8.2*   HCT 25.7*  --  26.3*  --  26.6*     --  266  --  271   BANDSPCT 2  --   --   --   --    SODIUM 135   < > 136  --  133*   K 4.4   < > 3.9  --  4.6      < > 96  --  97   CO2 27   < > 32  --  30   BUN 10   < > 8  --  12   CREATININE 0.57*   < > 0.85  --  0.79   GLUC 164*   < > 99  --  187*   AST 24  --  18  --   --    ALT 14  --  12  --   --    ALB 3.2*  --  3.0*  --   --    TBILI 0.31  --  0.39  --   --    ALKPHOS 102  --  87  --   --    PTT 65*  --  97*   < > 57*    < > = values in this interval not displayed.             Currently Ordered Meds:   Current Facility-Administered Medications:     acetaminophen (TYLENOL) tablet 975 mg, Q6H JORGE    aspirin (ECOTRIN LOW STRENGTH) EC tablet 81 mg, Daily    atorvastatin (LIPITOR) tablet 80 mg, Daily With Dinner    chlorhexidine (PERIDEX) 0.12 % oral rinse 15 mL, Q12H JORGE    chlorthalidone tablet 12.5 mg, Daily    clopidogrel (PLAVIX) tablet 75 mg, Daily    diazepam (VALIUM) tablet 5 mg, Q8H PRN    DULoxetine (CYMBALTA) delayed release capsule 30 mg, Daily    gabapentin (NEURONTIN) capsule 300 mg, BID (AM & Afternoon)    gabapentin (NEURONTIN) capsule 600 mg, HS    heparin (porcine) 25,000 units in 0.45% NaCl 250 mL infusion (premix), Titrated, Last Rate: 15 Units/kg/hr (02/07/25 1052)    heparin (porcine) injection 3,400 Units, Q6H PRN    heparin (porcine) injection 6,800 Units, Q6H PRN    hydrALAZINE (APRESOLINE) injection 5 mg, Q6H PRN    HYDROmorphone HCl (DILAUDID) injection 0.2 mg, Q6H PRN    insulin glargine (LANTUS) subcutaneous injection 10 Units 0.1 mL, HS    insulin lispro (HumALOG/ADMELOG) 100 units/mL subcutaneous injection 1-6 Units, 4x Daily (AC & HS) **AND** Fingerstick Glucose (POCT), 4x Daily AC and at bedtime    labetalol (NORMODYNE) injection 10 mg, Q4H PRN    methocarbamol (ROBAXIN) tablet 500 mg, Q6H  JORGE    metoclopramide (REGLAN) injection 10 mg, Q6H PRN    multi-electrolyte (PLASMALYTE-A/ISOLYTE-S PH 7.4) IV solution, Continuous, Last Rate: 100 mL/hr (02/07/25 1053)    ondansetron (ZOFRAN) injection 4 mg, Q6H PRN    oxyCODONE (ROXICODONE) split tablet 2.5 mg, Q4H PRN    [START ON 2/8/2025] pantoprazole (PROTONIX) EC tablet 40 mg, Early Morning    [Held by provider] senna-docusate sodium (SENOKOT S) 8.6-50 mg per tablet 1 tablet, HS    trimethobenzamide (TIGAN) IM injection 200 mg, Q6H PRN    vancomycin (VANCOCIN) IVPB (premix in dextrose) 1,000 mg 200 mL, Q12H  VTE Pharmacologic Prophylaxis: Heparin   VTE Mechanical Prophylaxis: sequential compression device    Administrative Statements     Portions of the record may have been created with voice recognition software.

## 2025-02-07 NOTE — ASSESSMENT & PLAN NOTE
s/p left SFA thrombectomy on (01/31/25) however still with residual thrombus  s/p lysis check and angioplasty (02/01/25)   s/p lysis check with angioplasty (02/03/25)  S/p repeat angiogram left lower extremity (2/6/25)    Discontinue Dilaudid PCA.  Start oxycodone 2.5 mg p.o. every 4 hours as needed moderate or severe pain.  Start Dilaudid 0.2 mg IV every 6 hours as needed breakthrough pain x 24 hours.    Multimodal Analgesia:    - continue robaxin 750mg PO q6h jay     - continue tylenol 975mg PO q6h jay    - continue gabapentin 300mg/300mg/600mg    - valium PO 5mg Q8H prn for muscle spasms     - duloxetine 30mg PO daily for musculoskeletal and neuropathic pain

## 2025-02-07 NOTE — PLAN OF CARE
Problem: OCCUPATIONAL THERAPY ADULT  Goal: Performs self-care activities at highest level of function for planned discharge setting.  See evaluation for individualized goals.  Description: Treatment Interventions: ADL retraining, Functional transfer training, Endurance training, Patient/family training, Equipment evaluation/education, Compensatory technique education, Continued evaluation, Energy conservation, Activityengagement          See flowsheet documentation for full assessment, interventions and recommendations.   Note: Limitation: Decreased ADL status, Decreased endurance, Decreased self-care trans, Decreased high-level ADLs     Assessment: Pt is a 49 y.o. female seen for OT evaluation s/p admission to [facility] on 1/31/2025 due to worsening wound in L foot. Pt diagnosed with Cellulitis of left foot; s/p I&D on 1/29 and LE angiogram on 2/6. Per podiatry, pt NWB on LLE. Pt has a significant PMH impacting occupational performance including: Advanced maternal age in multigravida, second trimester, Back pain, Bone spur, Chronic hypertension with superimposed preeclampsia, Depression, Diabetes mellitus (HCC), Elevated BP without diagnosis of hypertension, Foot injury, Full dentures, GERD (gastroesophageal reflux disease), Gestational diabetes, Herniated cervical disc, Herniated lumbar intervertebral disc, History of pneumonia, degenerative disc disease, Hyperlipidemia, Obesity, Pre-eclampsia, Prior pregnancy with fetal demise, and Toe pain. Pt with active OT evaluation and treatment orders and activity orders. PTA, pt living with her mother, daughters, and grandchild in a 2 SH w/ 7 vs 10 LETY. Pt reports I w/ ADLs, IADLs, and fxnl mobility w/ rollator at baseline; + driving. Pt agreeable and willing to participate in OT evaluation. During evaluation, pt was I for eating, S for grooming and UB ADLs, and min A for LB ADLs and toileting. Pt also required S for bed mobility, transfers, and fxnl mobility  w/ rollator. Pt required x1 seated rest break while ambulating due to fatigue. Performance deficits that affect the pt’s occupational performance during the initial evaluation include decreased ADL status, decreased activity tolerance, decreased endurance, decreased standing tolerance, decreased standing balance, decreased transfer skills, decreased fxnl mobility, decreased safety awareness, and WBS restrictions. Based on pt’s functional performance and deficits the following occupations will be addressed in OT treatments in order to maximize pt’s independence and overall occupational performance: grooming, bathing/showering, toileting and toilet hygiene, dressing, and functional mobility. Goals are listed below.  From OT perspective, recommend Level III (Minimum Resource Intensity)(may progress to no needs w/ increased LOS) upon d/c when pt medically stable to d/c from acute care. Will continue to follow.     Rehab Resource Intensity Level, OT: III (Minimum Resource Intensity) (may progress to no needs w/ prolonged hospital stay)

## 2025-02-07 NOTE — QUICK NOTE
Please see Dr. Vo's attestation on podiatry note dated 2/7/2025     Met with patient bedside, patient alone resting comfortably in bed.  During dressing changes thus far, patient has not been willing to look at her foot.  I described to her the clinical appearance of the foot including the progressing concerning ischemic changes to the flaps at her open TMA site, particularly plantarly.  I discussed with her in detail regarding the arterial supply of her foot as well as the results of her most recent vascular procedure yesterday 2/6.  We discussed that perfusion to the posterior aspect and plantar flap is severely compromised due to no filling of the posterior tibial artery and minimal contribution of the peroneal artery.  We discussed that this compromised blood flow makes her current level of amputation unlikely to heal.     For reasons mentioned above, I discussed with her that podiatry team's recommendation is that she may be better served with a proximal amputation and prosthetic limb in order to live a more functional life.  I explained to her how a more proximal foot amputation would result in a likely nonfunctional foot with little chance of success of healing given her current arterial status.    Patient was distressed by this information, and expressed concern that a leg amputation would prevent her from ever returning to work as a .  She expressed disappointment that her podiatrist Dr. Ritchie was not able to be here at [facility] to advise her.  I offered to answer any questions for her and discuss any concerns she may have at this time.  She requested time to be alone to process the information and think about things. Podiatry to remain available for local wound care and any further discussions as needed.

## 2025-02-07 NOTE — PROGRESS NOTES
Progress Note - Vascular Surgery   Name: Lizzy Acuna 49 y.o. female I MRN: [id number]  Unit/Bed#: [facility] 511-01 I Date of Admission: 1/31/2025   Date of Service: 2/7/2025 I Hospital Day: 7    Assessment & Plan  Diabetic foot infection  (HCC)  49 y.o. F w/ PMH of T2DM (A1c 10.3), tobacco use (quit Dec 2024) who was previously seen at [facility] in December with wound of left foot with cellulitis.   During previous admission vascular surgery was consulted and recommended LLE agram w/ intervention which was preformed by IR. Post-procedure pt underwent TMA with podiatry    Now S/P Agram, Left SFA thrombectomy, placement of lysis catheter on 1/31  S/p lysis recheck w/ angioplasty of L SFA, pop, AT on 2/3.  2/6 - Repeat arteriogram with finding of recurrent pop occlusion, AT recannulization with distal pop, TPT, and AT POBA and serration angioplasty     Plan  - Patient remains high risk limb loss   - Short interval occlusion with repeat AT recann, no evidence of intimal flap or significant stenotic lesion to indicate reason for reocclusion however AT appears patent on exam    - Single vessel run off via AT, questionable perfusion to posterior flap  - TMA salvage/wound care per podiatry team   - Continue plavix and statin    - Aspirin discontinued, no indication for triple therapy  - Continue hep gtt    - transition to DOAC once appropriate from podiatric perspective if no further intervention   - Remainder of care per primary, patient stable from vascular surgery perspective    ** Please contact the [facility] Vascular Surgery Resident/AP role for any questions or concerns that might arise   Diabetes mellitus type 2 with complications (HCC)  Lab Results   Component Value Date    HGBA1C 10.3 (H) 12/13/2024       Recent Labs     02/06/25  1543 02/06/25  1947 02/06/25  2053 02/07/25  0604   POCGLU 83 92 105 188*       Blood Sugar Average: Last 72 hrs:  (P) [id number]    Hyponatremia    Cellulitis of left foot    PAD (peripheral  artery disease) (HCC)    Blood loss anemia    Obesity (BMI 30-39.9)    Primary hypertension      24 Hour Events : None  Subjective : No complaints, resting comfortably    Objective :  Temp:  [97.3 °F (36.3 °C)-98.7 °F (37.1 °C)] 97.8 °F (36.6 °C)  HR:  [] 103  BP: (119-164)/(64-83) 124/64  Resp:  [18-20] 18  SpO2:  [87 %-99 %] 96 %  O2 Device: Nasal cannula  Nasal Cannula O2 Flow Rate (L/min):  [3 L/min] 3 L/min     I/O         02/04 0701  02/05 0700 02/05 0701  02/06 0700    P.O. 838 238    I.V. (mL/kg) 1313.5 (13.7) 2560.4 (26.8)    Total Intake(mL/kg) 2151.5 (22.5) 2798.4 (29.3)    Urine (mL/kg/hr) 1775 (0.8) 7114 (3.1)    Emesis/NG output 1     Stool 0     Total Output 1776 7114    Net +375.5 -4315.6          Unmeasured Urine Occurrence 2 x     Unmeasured Stool Occurrence 2 x     Unmeasured Emesis Occurrence 1 x           Lines/Drains/Airways       Active Status       Name Placement date Placement time Site Days    PICC Line 01/31/25 Right 01/31/25 1937  --  6                  Physical Exam  Constitutional:       Appearance: She is obese.   HENT:      Head: Normocephalic and atraumatic.      Mouth/Throat:      Pharynx: Oropharynx is clear.   Eyes:      Conjunctiva/sclera: Conjunctivae normal.   Cardiovascular:      Rate and Rhythm: Normal rate and regular rhythm.      Heart sounds: Normal heart sounds.      Comments: Left AT doppler signal  Pulmonary:      Effort: Pulmonary effort is normal.      Breath sounds: Normal breath sounds.   Abdominal:      General: There is no distension.      Palpations: Abdomen is soft.      Tenderness: There is no abdominal tenderness.   Musculoskeletal:      Cervical back: Neck supple.      Right lower leg: No edema.      Left lower leg: No edema.   Skin:     General: Skin is warm and dry.      Comments: Left foot wrapped, CDI   Neurological:      General: No focal deficit present.      Mental Status: She is alert and oriented to person, place, and time.           Lab  "Results: I have reviewed the following results:  CBC with diff:   Lab Results   Component Value Date    WBC 10.64 (H) 02/07/2025    HGB 8.2 (L) 02/07/2025    HCT 26.6 (L) 02/07/2025    MCV 95 02/07/2025     02/07/2025    RBC 2.81 (L) 02/07/2025    MCH 29.2 02/07/2025    MCHC 30.8 (L) 02/07/2025    RDW 14.3 02/07/2025    MPV 9.5 02/07/2025    NRBC 1 02/06/2025   ,   BMP/CMP:  Lab Results   Component Value Date    SODIUM 133 (L) 02/07/2025    K 4.6 02/07/2025    CL 97 02/07/2025    CO2 30 02/07/2025    BUN 12 02/07/2025    CREATININE 0.79 02/07/2025    CALCIUM 8.7 02/07/2025    AST 18 02/06/2025    ALT 12 02/06/2025    ALKPHOS 87 02/06/2025    EGFR 88 02/07/2025   ,   Lipid Panel: No results found for: \"CHOL\",   Coags:   Lab Results   Component Value Date    PTT 57 (H) 02/07/2025    INR 1.05 02/02/2025   ,   Blood Culture:   Lab Results   Component Value Date    BLOODCX No Growth After 5 Days. 01/28/2025    BLOODCX No Growth After 5 Days. 01/28/2025   ,   Urinalysis:   Lab Results   Component Value Date    COLORU Straw 11/05/2018    CLARITYU Clear 11/05/2018    SPECGRAV 1.020 11/05/2018    PHUR 6.0 11/05/2018    LEUKOCYTESUR Negative 11/05/2018    NITRITE Negative 11/05/2018    GLUCOSEU 3+ (A) 11/05/2018    KETONESU Negative 11/05/2018    BILIRUBINUR Negative 11/05/2018    BLOODU Trace-Intact (A) 11/05/2018   ,   Urine Culture:   Lab Results   Component Value Date    URINECX 50,000-59,000 cfu/ml Mixed Contaminants X4 10/21/2016   ,   Wound Culure:   Lab Results   Component Value Date    WOUNDCULT 1+ Growth of Enterobacter gergoviae (A) 06/13/2018    WOUNDCULT (A) 06/13/2018     2+ Growth of Methicillin Resistant Staphylococcus aureus       VTE Prophylaxis: Heparin  "

## 2025-02-07 NOTE — PHYSICAL THERAPY NOTE
"   Physical Therapy Evaluation     Patient's Name: Lizzy Acuna    Admitting Diagnosis  Sepsis due to cellulitis ([facility]) [L03.90, A41.9]    Problem List  Patient Active Problem List   Diagnosis    Obesity (BMI 30.0-34.9)    Tobacco abuse    Chronic low back pain    Chronic sciatica    Chronic, continuous use of opioids    Diabetes mellitus type 2 with complications ([facility])    Hyponatremia    Cellulitis of left foot    Gangrene of left foot ([facility])    GERD (gastroesophageal reflux disease)    Dehiscence of operative wound, initial encounter    Hypersensitivity, initial encounter    Proteinuria    Diabetic foot infection  ([facility])    PAD (peripheral artery disease) ([facility])    Blood loss anemia    Obesity (BMI 30-39.9)    Primary hypertension       Past Medical History  Past Medical History:   Diagnosis Date    Advanced maternal age in multigravida, second trimester 11/30/2016    Transitioned From: Advanced maternal age in multigravida, first trimester      Back pain     used 2 percocet tid until current admission in 2/2017    Bone spur     in back per pt    Chronic hypertension with superimposed preeclampsia 02/14/2017    Depression     Diabetes mellitus ([facility])     Elevated BP without diagnosis of hypertension     \"with pain\"    Foot injury     Full dentures     does not wear    GERD (gastroesophageal reflux disease)     occas    Gestational diabetes     insulin dependent    Herniated cervical disc     Herniated lumbar intervertebral disc     History of pneumonia     Hx of degenerative disc disease     Hyperlipidemia     Obesity     Pre-eclampsia     Prior pregnancy with fetal demise     previous demise at 36 weeks    Toe pain     and foot pain       Past Surgical History  Past Surgical History:   Procedure Laterality Date    ARTERIOGRAM Left 2/6/2025    Procedure: LEFT lower extremity arteriogram w/ popiteal and anterior tibial artery angioplasty;  Surgeon: Ottoniel Victoria MD;  Location: [facility] MA[facility];  Service: " Vascular    BACK SURGERY       SECTION      INCISION AND DRAINAGE OF WOUND Left 2025    Procedure: INCISION AND DRAINAGE (I&D) EXTREMITY;  Surgeon: Leopoldo Ritchie DPM;  Location: [facility];  Service: Podiatry    IR LOWER EXTREMITY ANGIOGRAM  2024    IR LOWER EXTREMITY ANGIOGRAM  2025    IR LOWER EXTREMITY ANGIOGRAM  2025    IR PICC PLACEMENT DOUBLE LUMEN  2025    IR TPA LYSIS CHECK  2025    IR TPA LYSIS CHECK  2025    LAMINECTOMY      with disc removal, last assessed 16    OTHER SURGICAL HISTORY      Right ovary removal 2005 per pt recall at [facility]    UT AMPUTATION FOOT TRANSMETARSAL Left 1/3/2025    Procedure: AMPUTATION TRANSMETATARSAL (TMA);  Surgeon: Leopoldo Ritchie DPM;  Location: [facility];  Service: Podiatry    UT  DELIVERY ONLY N/A 02/15/2017    Procedure:  SECTION ();  Surgeon: Tito Umaña MD;  Location: [facility];  Service: Obstetrics    UT LIG/TRNSXJ FALOPIAN TUBE  DEL/ABDML SURG Left 02/15/2017    Procedure: LIGATION/COAGULATION TUBAL;  Surgeon: Tito Umaña MD;  Location: [facility] LD;  Service: Obstetrics    VASCULAR SURGERY  2024    WISDOM TOOTH EXTRACTION            25 1158   PT Last Visit   PT Visit Date 25   Note Type   Note type Evaluation   Pain Assessment   Pain Assessment Tool 0-10   Pain Score No Pain   Restrictions/Precautions   Weight Bearing Precautions Per Order (S)  Yes   LLE Weight Bearing Per Order (S)  NWB   Other Precautions Fall Risk;Bed Alarm;Chair Alarm;Multiple lines;Telemetry   Home Living   Type of Home House   Home Layout Two level;Access;Stairs to enter with rails  (7 vs 10 LETY -- full bath main level, bedroom upstairs)   Bathroom Shower/Tub Walk-in shower   Bathroom Toilet Standard   Bathroom Equipment Grab bars in shower;Shower chair   Bathroom Accessibility Accessible   Home Equipment Walker;Other (Comment)  (rollator -- patient uses  rollator on main level and in community, RW upstairs)   Prior Function   Level of [place] Independent with ADLs;Independent with functional mobility;Independent with IADLS   Lives With Family  (mother, daughters, grandchild)   Receives Help From Family   IADLs Independent with driving;Independent with meal prep;Independent with medication management   Falls in the last 6 months 1 to 4  (x1)   General   Family/Caregiver Present No   Cognition   Overall Cognitive Status WFL   Arousal/Participation Alert   Orientation Level Oriented X4   Memory Within functional limits   Following Commands Follows one step commands without difficulty   Comments Patient pleasant and cooperative   Subjective   Subjective Patient agreeable to PT eval   RUE Assessment   RUE Assessment WFL   LUE Assessment   LUE Assessment WFL   RLE Assessment   RLE Assessment X   Strength RLE   RLE Overall Strength 4/5   LLE Assessment   LLE Assessment X   Strength LLE   LLE Overall Strength 4-/5   Bed Mobility   Supine to Sit 5  Supervision   Sit to Supine 5  Supervision   Transfers   Sit to Stand 5  Supervision   Additional items Increased time required;Verbal cues   Stand to Sit 5  Supervision   Additional items Increased time required;Verbal cues   Additional Comments cues needed for rollator brakes   Ambulation/Elevation   Gait pattern   (hop to pattern)   Gait Assistance 5  Supervision   Additional items Verbal cues   Assistive Device   (rollator)   Distance 80'+40'   Balance   Static Sitting Good   Dynamic Sitting Fair +   Static Standing Fair   Dynamic Standing Fair -   Ambulatory Fair -   Endurance Deficit   Endurance Deficit Yes   Endurance Deficit Description fatigue, weakness   Activity Tolerance   Activity Tolerance Patient limited by fatigue   Medical Staff Made Aware OT   Nurse Made Aware RN cleared   Assessment   Prognosis Good   Problem List Decreased strength;Decreased endurance;Impaired balance;Decreased mobility;Pain   Assessment  Pt is a 49 y.o. female seen for a high complexity PT evaluation due to Ongoing medical management for primary dx, Increased reliance on more restrictive AD compared to baseline, Decreased activity tolerance compared to baseline, Fall risk, Increased assistance needed from caregiver at current time, Current WBS. Patient is s/p admit to [facility] on 1/31/2025 for Sepsis due to cellulitis (HCC) (L03.90, A41.9). Patient  has a past medical history of Advanced maternal age in multigravida, second trimester, Back pain, Bone spur, Chronic hypertension with superimposed preeclampsia, Depression, Diabetes mellitus (HCC), Elevated BP without diagnosis of hypertension, Foot injury, Full dentures, GERD (gastroesophageal reflux disease), Gestational diabetes, Herniated cervical disc, Herniated lumbar intervertebral disc, History of pneumonia, degenerative disc disease, Hyperlipidemia, Obesity, Pre-eclampsia, Prior pregnancy with fetal demise, and Toe pain..     PT now consulted to assess functional mobility and needs for safe d/c planning. Prior to admission, pt independent with functional mobility, independent ADLs, and independent IADLs. Personal factors affecting status include hx of falls, fall risk, steps to enter home, steps to negotiate within home, and medical status     Currently pt requires supervision for bed mobility, supervision for functional transfers with rollator  ; supervision for ambulation with rollator . Pt presents functioning below baseline and w/ overall mobility deficits 2* to: decreased strength, decreased endurance, decreased mobility, impaired balance. These impairments place pt at risk for falls.     Pt will continue to benefit from skilled PT interventions to address stated impairments; to maximize functional potential; for ongoing pt/family education; and DME needs. The patient's AM-PAC Basic Mobility Inpatient Short Form Raw Score Is 17. PT is currently recommending Level 3 - Minimum  Resource Intensity on d/c from hospital. Will continue to follow as able.   Barriers to Discharge Inaccessible home environment   Goals   Patient Goals to go home   STG Expiration Date 02/21/25   Short Term Goal #1 In 14 days, patient will 1) increase strength in BUE/BLE by 1/2 to 1 full grade for increased strength and stability needed for functional mobility 2) improve bed mobility to MI for improved mobility and decreased need for assist 3) sit EOB x30' with MI to facilitate trunk stability and safety for completion of ADL tasks 4) increase functional transfers to MI for improved safety and functional mobility 5) ambulate 250ft with MI using rollator for increased endurance and safety ambulating home and community environments 6) improve balance by 1 grade for improved safety and stability and decreased risk for falls. 7) ascend/descend at least 7 stairs using HR with MI in order to safely access home environment   PT Treatment Day 0   Plan   Treatment/Interventions ADL retraining;Functional transfer training;LE strengthening/ROM;Therapeutic exercise;Endurance training;Patient/family training;Equipment eval/education;Bed mobility;Gait training;Spoke to nursing;Spoke to case management;OT;Elevations   PT Frequency 3-5x/wk   Discharge Recommendation   Rehab Resource Intensity Level, PT III (Minimum Resource Intensity)   Equipment Recommended   (owns)   AM-PAC Basic Mobility Inpatient   Turning in Flat Bed Without Bedrails 3   Lying on Back to Sitting on Edge of Flat Bed Without Bedrails 3   Moving Bed to Chair 3   Standing Up From Chair Using Arms 3   Walk in Room 3   Climb 3-5 Stairs With Railing 2   Basic Mobility Inpatient Raw Score 17   Basic Mobility Standardized Score 39.67   [facility] Highest Level Of Mobility   -HLM Goal 5: Stand one or more mins   -HLM Achieved 7: Walk 25 feet or more   Modified McCune Scale   Modified McCune Scale 4   End of Consult   Patient Position at End of Consult All needs  within reach;Supine         Lianet Javier, PT, DPT

## 2025-02-07 NOTE — PROGRESS NOTES
Vancomycin Pharmacy Consult     Lizzy Acuna is an 49 y.o. female who is currently receiving IV vancomycin for left foot SSTI.     Vancomycin Assessment:  1. ID Consult: Yes  2. Cultures:    Blood : NG   Culture Left Foot: 2+ MRSA   Anaerobic Left Foot: 2+ Finegoldia magna  3. Procalcitonin:   : 0.22  : 0.25  : 0.14  4. Renal Function:   SCr: 0.79  CrCl: >90 mL/min  UOP: 0.9 mL/kg/hr  5. Days of Therapy: 11  6. Current Dose: 750 mg IV q8h  7. Last Level: 19.9 (random  at 0605)  8. Goal AUC(24h): 400-600  9. Goal Random/Trough: 10-15     Vancomycin Plan:  1. Evaluation: based on the level this AM, will adjust regimen  2. New Dosin mg IV q12h ( to start today at 1200)  Predicted Trough / AUC(24h): 16.6 / 545  3. Next Level: random  at 0600        Pharmacy will continue to follow closely for s/sx of nephrotoxicity, infusion reactions, and appropriateness of therapy. BMP and CBC will be ordered per protocol. We will continue to follow the patient’s culture results and clinical progress daily.        Rip Nieves, PharmD  Internal Medicine Clinical Pharmacist Specialist  [phone]  [employer] Secure Chat/Teams

## 2025-02-07 NOTE — OP NOTE
DATE OF PROCEDURE: 02/06/2025    PERFORMING SERVICE: Vascular and Endovascular Surgery    ATTENDING SURGEON: Ottoniel Victoria MD    PREOPERATIVE DIAGNOSIS: Critical limb threatening ischemia of the left lower extremity with a nonhealing transmetatarsal amputation wound    POSTOPERATIVE DIAGNOSIS: Same    PROCEDURE NAME:   Ultrasound guided access of the right common femoral artery  Aortogram with interpretation  Diagnostic left lower extremity arteriogram with interpretation  Balloon angioplasty of the left popliteal and anterior tibial arteries with a 2 mm x 100 mm [employer] angioplasty balloon  Balloon angioplasty of the left popliteal and anterior tibial arteries with a 3 mm x 150 mm [employer] Alejo angioplasty balloon  Balloon angioplasty of the left popliteal and anterior tibial arteries with a centimeter 3 mm x 120 mm cutting angioplasty balloon  StarClose application to the right common femoral arteriotomy.    ANESTHESIA:MAC sedation    EBL: Minimal    UOP: 0, no negron    IVF: 200 cc    SPECIMENS: None     COMPLICATIONS: None    DRAINS: None    INDICATION: Ongoing left lower extremity tissue loss following previous lysis and endovascular treatment of the left lower extremity.  The patient has decreased signals and warranted further endovascular evaluation.  There is a high likelihood of amputation at a more proximal segment.    INTRAOPERATIVE FINDINGS:   Aorto-iliac segment: Patent with no flow-limiting stenoses  Common femoral: Patent with no flow-limiting stenoses  SFA: Patent with mild atherosclerotic disease  Profunda: Patent with no flow-limiting stenoses  Popliteal and tibials: P2 popliteal artery occlusion extending throughout the tibial vessels with reconstitution of the distal peroneal segments in the distal anterior tibial artery.    ASSISTING SURGEON: Dr. Yoan Franco DO    DESCRIPTION OF PROCEDURE:   The patient was transported to the endovascular suite  where a timeout was performed confirming the patient, procedure and laterality.  Preoperative antibiotics were administered.  The patient was prepped and draped in usual sterile surgical fashion.  A second timeout was again performed.  Sedation was administered.  Lower extremity DP signals were monophasic.      The right groin was assessed via ultrasonography and a micropuncture kit was used to cannulate the common femoral artery artery.  Fluoroscopy was used to confirm wire trajectory.  A microsheath was used to introduce a [employer] wire.  The microsheath was removed and replaced with a 5 [place] sheath.  An omniflush catheter was introduced over the wire into the abdominal aorta.  An aortagram was performed.  There was no significant aorto iliac disease identified.  A glidewire was used to access the contralateral femoral artery.  The catheter was advanced to the region of the distal external iliac/common femoral artery.  An arteriogram demonstrated flow to the superficial femoral artery and profunda femoris.  The remaining left lower extremity arteriogram demonstrated the above-mentioned findings.      The decision was made to treat the acute P2 popliteal artery occlusion that extended into the anterior tibial artery.  The patient was administered 8000 u IV heparin.  The sheath was exchanged for a 6 Azeri 65 cm destination sheath.  A I18 Glidewire advantage was used to access the anterior tibial artery crossing the occluded segment.  The above-mentioned angioplasty balloon was positioned from the behind the popliteal artery into the anterior tibial artery.  The 2 mm balloon was inflated under fluoroscopy to nominal pressure for 2 minutes.  The sheath was utilized to perform a post treatment arteriogram which demonstrated a flow channel into the anterior tibial artery.  An additional angioplasty with a 3 mm diameter balloon was also completed with noted improvement.  Ongoing residual stenosis prompted the use of a  cutting balloon as documented above..  A completion arteriogram of the left lower extremity was performed demonstrating improved runoff to the foot however still concerning posterior flap with no filling of the posterior tibial artery and minimal contribution of the peroneal artery.  The wire and catheter system was removed under fluoroscopy after the sheath was downsized to a 6 fr short sheath.  0 mg protamine was infused.  A StarClose device was utilized for closure of the arteriotomy.  The closure device was used successfully.  After 2 minutes of post closure digital pressure no hematoma was appreciated.    The patient tolerated the procedure well.  A total of 65 cc of contrast was used.  The patient's lower extremity vascular exam was improved signal, multiphasic at the dorsalis pedis.      The patient was transported to the PACU in good condition.    Ottoniel Victoria MD    Vascular Quality [facility] - Peripheral Vascular Intervention     Urgency: Urgent    Functional Status:  Ambulatory and capable of all self care but unable to carry out any work activities. Up and about more than 50% of waking hours.   Ambulation: Amb w/ assistance = ambulation requires use of cane, walker, person, etc    Leg Symptoms    Right: Asymptomatic:  documented peripheral arterial disease without symptoms of claudication or ischemic pain      Treatment of Native Artery to Maintain Bypass Patency?:  No  Left: Non-healing Amputation:  ulcer, necrosis or lack of primary healing at site of prior amputation causing current tissue loss       Tissue Loss Severity: Grade 3, Extensive = extensive deep ulcer or necrosis (gangrene) of the forefoot and/or midfoot with exposed bone, joint or tendon, or full-thickness heel ulcer with or without involvement of the calcaneus (ie, extensive tissue loss: salvageable only with complex foot reconstruction or nontraditional TMA [eg, Chopart or Lisfranc amputation]).     Infection: Grade 0, None =  No symptoms or signs of infection.    COVID Information  COVID Symptoms Pre-Procedure: Asymptomatic    Treatment Delayed by Pandemic: None    Access   Number of Sites: 1     Access Site 1:     Side 1: Right    Site 1: Femoral Retrograde    Access Guidance 1:U/S    Largest Sheath Size 1: 6 Fr.    Closure Device 1: StarClose      Number of Closure Devices: 1     Closure Device Outcome: Closure device successful         Procedure  Fluoro Time: 20.7 minutes  Contrast Volume: 65 ml Visipaque  DAP: 21.55 Gy.cm2  CO2: no  Anticoagulant: Heparin bolus on top of drip, no protamine  If Creatinine is > 1.2 or missing, ELLIOT Prophylaxis none     Treatment Details  Indication: Occlusive Disease,    Completion Assessment  Artery 1 treated: Popliteal   Left               Outflow: AT,PT,Peroneal: 1             Segments treated: P2+P3                       Was this Site previously treated?: Yes, Other          TASC Grade: B          Total Treated Length: 10 cm          Total Occluded Length: 10 cm          Calcification: Severe (calcification on both sides of artery > half length of lesion)          Number of Treatment types (Devices):   2           Device 1          Treatment Type: Plain Balloon         Device 2          Treatment Type: Special Balloon,  Cutting Balloon                Diameter: 3 mm          Length: 120 mm          Concomitant: None          Technical result: Successful (stenosis <=30%)      None     Post Procedure  Procedure Complications: No      SIGNATURE: Ottoniel Victoria MD  DATE: February 7, 2025  TIME: 4:52 PM

## 2025-02-07 NOTE — ASSESSMENT & PLAN NOTE
Status post angiogram with intervention including left SFA thrombectomy and placement of lysis catheter on 1/31/2025.  Repeat imaging demonstrated residual stenosis of left lower extremity.  Patient status post repeat arteriogram on 2/6/2025 with intervention with apparent reestablished flow  -Ongoing anticoagulation  -Close vascular follow-up

## 2025-02-08 LAB
ANION GAP SERPL CALCULATED.3IONS-SCNC: 7 MMOL/L (ref 4–13)
APTT PPP: 57 SECONDS (ref 23–34)
APTT PPP: 63 SECONDS (ref 23–34)
APTT PPP: 67 SECONDS (ref 23–34)
BASOPHILS # BLD AUTO: 0.02 THOUSANDS/ΜL (ref 0–0.1)
BASOPHILS NFR BLD AUTO: 0 % (ref 0–1)
BUN SERPL-MCNC: 12 MG/DL (ref 5–25)
CALCIUM SERPL-MCNC: 8.9 MG/DL (ref 8.4–10.2)
CHLORIDE SERPL-SCNC: 97 MMOL/L (ref 96–108)
CO2 SERPL-SCNC: 33 MMOL/L (ref 21–32)
CREAT SERPL-MCNC: 0.7 MG/DL (ref 0.6–1.3)
EOSINOPHIL # BLD AUTO: 0.03 THOUSAND/ΜL (ref 0–0.61)
EOSINOPHIL NFR BLD AUTO: 0 % (ref 0–6)
ERYTHROCYTE [DISTWIDTH] IN BLOOD BY AUTOMATED COUNT: 14.5 % (ref 11.6–15.1)
GFR SERPL CREATININE-BSD FRML MDRD: 102 ML/MIN/1.73SQ M
GLUCOSE SERPL-MCNC: 132 MG/DL (ref 65–140)
GLUCOSE SERPL-MCNC: 137 MG/DL (ref 65–140)
GLUCOSE SERPL-MCNC: 149 MG/DL (ref 65–140)
GLUCOSE SERPL-MCNC: 152 MG/DL (ref 65–140)
GLUCOSE SERPL-MCNC: 177 MG/DL (ref 65–140)
HCT VFR BLD AUTO: 25.1 % (ref 34.8–46.1)
HGB BLD-MCNC: 7.8 G/DL (ref 11.5–15.4)
IMM GRANULOCYTES # BLD AUTO: 0.17 THOUSAND/UL (ref 0–0.2)
IMM GRANULOCYTES NFR BLD AUTO: 2 % (ref 0–2)
LYMPHOCYTES # BLD AUTO: 3.26 THOUSANDS/ΜL (ref 0.6–4.47)
LYMPHOCYTES NFR BLD AUTO: 36 % (ref 14–44)
MCH RBC QN AUTO: 29.1 PG (ref 26.8–34.3)
MCHC RBC AUTO-ENTMCNC: 31.1 G/DL (ref 31.4–37.4)
MCV RBC AUTO: 94 FL (ref 82–98)
MONOCYTES # BLD AUTO: 0.38 THOUSAND/ΜL (ref 0.17–1.22)
MONOCYTES NFR BLD AUTO: 4 % (ref 4–12)
NEUTROPHILS # BLD AUTO: 5.16 THOUSANDS/ΜL (ref 1.85–7.62)
NEUTS SEG NFR BLD AUTO: 58 % (ref 43–75)
NRBC BLD AUTO-RTO: 0 /100 WBCS
PLATELET # BLD AUTO: 273 THOUSANDS/UL (ref 149–390)
PMV BLD AUTO: 9.3 FL (ref 8.9–12.7)
POTASSIUM SERPL-SCNC: 4.1 MMOL/L (ref 3.5–5.3)
RBC # BLD AUTO: 2.68 MILLION/UL (ref 3.81–5.12)
SODIUM SERPL-SCNC: 137 MMOL/L (ref 135–147)
WBC # BLD AUTO: 9.02 THOUSAND/UL (ref 4.31–10.16)

## 2025-02-08 PROCEDURE — 85730 THROMBOPLASTIN TIME PARTIAL: CPT | Performed by: INTERNAL MEDICINE

## 2025-02-08 PROCEDURE — 80048 BASIC METABOLIC PNL TOTAL CA: CPT

## 2025-02-08 PROCEDURE — 82948 REAGENT STRIP/BLOOD GLUCOSE: CPT

## 2025-02-08 PROCEDURE — 99232 SBSQ HOSP IP/OBS MODERATE 35: CPT

## 2025-02-08 PROCEDURE — 85025 COMPLETE CBC W/AUTO DIFF WBC: CPT

## 2025-02-08 RX ORDER — OXYCODONE HYDROCHLORIDE 5 MG/1
5 TABLET ORAL EVERY 4 HOURS PRN
Refills: 0 | Status: DISCONTINUED | OUTPATIENT
Start: 2025-02-08 | End: 2025-02-13

## 2025-02-08 RX ORDER — OXYCODONE HYDROCHLORIDE 5 MG/1
5 TABLET ORAL EVERY 4 HOURS PRN
Refills: 0 | Status: DISCONTINUED | OUTPATIENT
Start: 2025-02-08 | End: 2025-02-08

## 2025-02-08 RX ORDER — OXYCODONE HCL 5 MG/5 ML
5 SOLUTION, ORAL ORAL EVERY 4 HOURS PRN
Refills: 0 | Status: DISCONTINUED | OUTPATIENT
Start: 2025-02-08 | End: 2025-02-08

## 2025-02-08 RX ADMIN — CHLORHEXIDINE GLUCONATE 0.12% ORAL RINSE 15 ML: 1.2 LIQUID ORAL at 09:12

## 2025-02-08 RX ADMIN — ACETAMINOPHEN 975 MG: 325 TABLET, FILM COATED ORAL at 06:00

## 2025-02-08 RX ADMIN — HEPARIN SODIUM 17 UNITS/KG/HR: 10000 INJECTION, SOLUTION INTRAVENOUS at 22:26

## 2025-02-08 RX ADMIN — METHOCARBAMOL 500 MG: 500 TABLET ORAL at 12:01

## 2025-02-08 RX ADMIN — ACETAMINOPHEN 975 MG: 325 TABLET, FILM COATED ORAL at 17:18

## 2025-02-08 RX ADMIN — DULOXETINE 30 MG: 30 CAPSULE, DELAYED RELEASE ORAL at 09:12

## 2025-02-08 RX ADMIN — Medication 2.5 MG: at 06:41

## 2025-02-08 RX ADMIN — DIAZEPAM 5 MG: 5 TABLET ORAL at 21:11

## 2025-02-08 RX ADMIN — PANTOPRAZOLE SODIUM 40 MG: 40 TABLET, DELAYED RELEASE ORAL at 06:01

## 2025-02-08 RX ADMIN — OXYCODONE HYDROCHLORIDE 5 MG: 5 SOLUTION ORAL at 12:30

## 2025-02-08 RX ADMIN — METHOCARBAMOL 500 MG: 500 TABLET ORAL at 06:01

## 2025-02-08 RX ADMIN — CHLORTHALIDONE 12.5 MG: 25 TABLET ORAL at 09:13

## 2025-02-08 RX ADMIN — HEPARIN SODIUM 3400 UNITS: 1000 INJECTION INTRAVENOUS; SUBCUTANEOUS at 07:35

## 2025-02-08 RX ADMIN — CLOPIDOGREL BISULFATE 75 MG: 75 TABLET, FILM COATED ORAL at 09:12

## 2025-02-08 RX ADMIN — VANCOMYCIN HYDROCHLORIDE 1000 MG: 1 INJECTION, SOLUTION INTRAVENOUS at 12:01

## 2025-02-08 RX ADMIN — VANCOMYCIN HYDROCHLORIDE 1000 MG: 1 INJECTION, SOLUTION INTRAVENOUS at 23:08

## 2025-02-08 RX ADMIN — HYDROMORPHONE HYDROCHLORIDE 0.2 MG: 0.2 INJECTION, SOLUTION INTRAMUSCULAR; INTRAVENOUS; SUBCUTANEOUS at 09:27

## 2025-02-08 RX ADMIN — METHOCARBAMOL 500 MG: 500 TABLET ORAL at 23:05

## 2025-02-08 RX ADMIN — METHOCARBAMOL 500 MG: 500 TABLET ORAL at 17:18

## 2025-02-08 RX ADMIN — HEPARIN SODIUM 17 UNITS/KG/HR: 10000 INJECTION, SOLUTION INTRAVENOUS at 07:36

## 2025-02-08 RX ADMIN — ACETAMINOPHEN 975 MG: 325 TABLET, FILM COATED ORAL at 12:01

## 2025-02-08 RX ADMIN — OXYCODONE HYDROCHLORIDE 5 MG: 5 SOLUTION ORAL at 17:22

## 2025-02-08 RX ADMIN — INSULIN LISPRO 1 UNITS: 100 INJECTION, SOLUTION INTRAVENOUS; SUBCUTANEOUS at 12:05

## 2025-02-08 RX ADMIN — HYDROMORPHONE HYDROCHLORIDE 0.2 MG: 0.2 INJECTION, SOLUTION INTRAMUSCULAR; INTRAVENOUS; SUBCUTANEOUS at 20:30

## 2025-02-08 RX ADMIN — GABAPENTIN 300 MG: 300 CAPSULE ORAL at 09:11

## 2025-02-08 RX ADMIN — ATORVASTATIN CALCIUM 80 MG: 80 TABLET, FILM COATED ORAL at 17:18

## 2025-02-08 RX ADMIN — INSULIN GLARGINE 10 UNITS: 100 INJECTION, SOLUTION SUBCUTANEOUS at 21:07

## 2025-02-08 RX ADMIN — ASPIRIN 81 MG: 81 TABLET, COATED ORAL at 09:12

## 2025-02-08 RX ADMIN — OXYCODONE HYDROCHLORIDE 5 MG: 5 TABLET ORAL at 23:05

## 2025-02-08 RX ADMIN — HEPARIN SODIUM 17 UNITS/KG/HR: 10000 INJECTION, SOLUTION INTRAVENOUS at 09:24

## 2025-02-08 RX ADMIN — GABAPENTIN 600 MG: 300 CAPSULE ORAL at 21:10

## 2025-02-08 RX ADMIN — GABAPENTIN 300 MG: 300 CAPSULE ORAL at 14:10

## 2025-02-08 RX ADMIN — ACETAMINOPHEN 975 MG: 325 TABLET, FILM COATED ORAL at 23:06

## 2025-02-08 NOTE — PROGRESS NOTES
Progress Note - Internal Medicine   Name: Lizzy Acuna 49 y.o. female I MRN: [id number]  Unit/Bed#: [facility] 511-01 I Date of Admission: 1/31/2025   Date of Service: 2/8/2025 I Hospital Day: 8     Assessment & Plan  Cellulitis of left foot complicated by Critical limb threatening ischemia of the left lower extremity with a nonhealing transmetatarsal amputation wound  Underwent an elective R TMA approx 3 weeks ago and was doing well until last week when she was noticed to have dehiscence of her surgical wound.   Wound was debrided by her podiatrist in the office but ultimately worsened with evidence of cellulitis.   LLE foot infection diagnosed clinically without MRI findings of osteomyelitis.   Tissue culture: 2+ MRSA, 2+ Finegoldia magna No significant leukocytosis or fever this admission. Site continues to bleed likely due to triple therapy   A1c 10.3  -L SFA stent s/p L TMA, nonhealing, s/p lysis and tibial intervention (AT PTA)  -despite adequate perfusion w/ AT runoff, patient continues to be extremely high risk for higher amputation 2/2 TMA dehiscence and extensive necrosis  S/P Balloon angioplasty 2/7 of the left popliteal and anterior tibial arteries   Plan:   Podiatry recommending proximal amputation in light of angiogram findings 2/6 with severely compromised blood flow to posterior aspect and plantar flap of foot  Continue Plavix/statin  Continue heparin gtt  Continue IV vanco and transition to PO when stable for discharge; ID following.  Pain regimen of Tylenol 975 mg q6, gabapentin 300 mg daily, robaxin 500 mg q6; oxy 2.5/5 for moderate/severe, dilaudid 0.5 mg q3h for breakthrough PRN; now off PCA pump  APS following for pain  Podiatry and vascular surgery following   Wound care   PAD (peripheral artery disease) (HCC)  Transferred to [facility] for vascular surgery and IR intervention after Lower Extremity Dopplers showed a >75% stenosis in the distal superficial femoral artery. Full study results are listed  "below  \"There fernanda 50-75% stenosis in the tibio-peroneal trunk. Diffuse disease throughout the  remaining femoro-popliteal and tibio-peroneal segments.\"  Now S/P Agram, Left SFA thrombectomy, placement of lysis catheter on 1/31  S/p lysis recheck on 2/2 with residual thrombus within L SFA.  S/p lysis recheck w/ angioplasty of L SFA, pop, AT on 2/3.  Repeat angiogram 2/6  with finding of recurrent pop occlusion, AT recannulization with distal pop, TPT, and AT POBA and serration angioplasty  Patient remains at very high risk for limb loss per vascular surgery  Plan:  Continue heparin gtt, Plavix   Transition to DOAC when deemed appropriate by podiatry  Immediate smoking cessation advised  Blood loss anemia  Blood loss anemia 2/2 lysis, bleeding from LLE TMA.    - Dressing and wound care per podiatry recs   - Recheck CBC and transfuse to maintain Hb > 7  Primary hypertension  Patient previously diagnosed with hypertension  However, on 2/5, became hypertensive with SBP's up to 200s  Started chlorthalidone 12.5 mg daily  Added on as needed hydralazine and labetalol  Plan:  Continue chlorthalidone 12.5 mg daily  As needed hydralazine and labetalol  Diabetes mellitus type 2 with complications (HCC)  Lab Results   Component Value Date    HGBA1C 10.3 (H) 12/13/2024       Blood Sugar Average: Last 72 hrs:  (P) 141.375  - Lantus 10u QHS with sliding scale   -Needs aggressive BS/DM control in setting of L foot wound/infection  -Goal -180 while inpatient  Hyponatremia  Chronic euvolemic hyponatremia that was 133 on admission. Asymptomatic and no recent history of confusion, altered metal status, seizure, or coma.   Current differential includes SIADH in the setting of acute pain   Plan:  Monitor BMP  Low threshold to workup with serum and urine studies if hyponatremia worsens      Obesity (BMI 30-39.9)  Aggressive lifestyle and dietary modifications advised including DM control    Disposition: Inpatient     Team: SOD TEAM " B    Subjective   Patient seen and examined. No acute events overnight. Patient states she has agreed to proceed with amputation, awaiting podiatry discussions. Endorsing 7/10 L foot pain.     Objective :  Temp:  [97.8 °F (36.6 °C)] 97.8 °F (36.6 °C)  HR:  [87-89] 88  BP: (145-169)/(68-94) 169/94  Resp:  [16-17] 17  SpO2:  [95 %-97 %] 97 %  O2 Device: None (Room air)    I/O         02/05 0701 02/06 0700 02/06 0701 02/07 0700 02/07 0701 02/08 0700    P.O. 238  0    I.V. (mL/kg) 2560.6 (28.4) 453.6 (4.8) 1281.7 (13.5)    Total Intake(mL/kg) 2798.6 (31.1) 453.6 (4.8) 1281.7 (13.5)    Urine (mL/kg/hr) 7114 (3.3) 2050 (0.9) 200 (0.5)    Emesis/NG output       Stool  0     Total Output 7114 2050 200    Net -4315.4 -1596.4 +1081.7           Unmeasured Stool Occurrence  2 x           Weights:   IBW (Ideal Body Weight): 48.26 kg    Body mass index is 39.31 kg/m².  Weight (last 2 days)       Date/Time Weight    02/08/25 0600 95 (209.44)    02/07/25 0554 94.9 (209.22)    02/06/25 0600 90.1 (198.63)            Physical Exam  Vitals and nursing note reviewed.   Constitutional:       General: She is not in acute distress.     Appearance: She is well-developed.   HENT:      Head: Normocephalic and atraumatic.   Eyes:      Conjunctiva/sclera: Conjunctivae normal.   Cardiovascular:      Rate and Rhythm: Normal rate and regular rhythm.      Heart sounds: No murmur heard.  Pulmonary:      Effort: Pulmonary effort is normal. No respiratory distress.      Breath sounds: Normal breath sounds.   Abdominal:      Palpations: Abdomen is soft.      Tenderness: There is no abdominal tenderness.   Musculoskeletal:         General: Deformity present. No swelling.      Cervical back: Neck supple.      Left lower leg: Edema present.   Skin:     General: Skin is warm and dry.      Capillary Refill: Capillary refill takes less than 2 seconds.      Findings: Lesion present.   Neurological:      Mental Status: She is alert.   Psychiatric:          Mood and Affect: Mood normal.           Lab Results: I have reviewed the following results:  Recent Labs     02/06/25  0636 02/06/25  1605 02/08/25  0440   WBC 10.25*   < > 9.02   HGB 8.3*   < > 7.8*   HCT 26.3*   < > 25.1*      < > 273   SODIUM 136   < > 137   K 3.9   < > 4.1   CL 96   < > 97   CO2 32   < > 33*   BUN 8   < > 12   CREATININE 0.85   < > 0.70   GLUC 99   < > 177*   AST 18  --   --    ALT 12  --   --    ALB 3.0*  --   --    TBILI 0.39  --   --    ALKPHOS 87  --   --    PTT 97*   < > 57*    < > = values in this interval not displayed.             Currently Ordered Meds:   Current Facility-Administered Medications:     acetaminophen (TYLENOL) tablet 975 mg, Q6H JORGE    aspirin (ECOTRIN LOW STRENGTH) EC tablet 81 mg, Daily    atorvastatin (LIPITOR) tablet 80 mg, Daily With Dinner    chlorhexidine (PERIDEX) 0.12 % oral rinse 15 mL, Q12H JORGE    chlorthalidone tablet 12.5 mg, Daily    clopidogrel (PLAVIX) tablet 75 mg, Daily    diazepam (VALIUM) tablet 5 mg, Q8H PRN    DULoxetine (CYMBALTA) delayed release capsule 30 mg, Daily    gabapentin (NEURONTIN) capsule 300 mg, BID (AM & Afternoon)    gabapentin (NEURONTIN) capsule 600 mg, HS    heparin (porcine) 25,000 units in 0.45% NaCl 250 mL infusion (premix), Titrated, Last Rate: 17 Units/kg/hr (02/08/25 0924)    heparin (porcine) injection 3,400 Units, Q6H PRN    heparin (porcine) injection 6,800 Units, Q6H PRN    hydrALAZINE (APRESOLINE) injection 5 mg, Q6H PRN    HYDROmorphone HCl (DILAUDID) injection 0.2 mg, Q6H PRN    insulin glargine (LANTUS) subcutaneous injection 10 Units 0.1 mL, HS    insulin lispro (HumALOG/ADMELOG) 100 units/mL subcutaneous injection 1-6 Units, 4x Daily (AC & HS) **AND** Fingerstick Glucose (POCT), 4x Daily AC and at bedtime    labetalol (NORMODYNE) injection 10 mg, Q4H PRN    methocarbamol (ROBAXIN) tablet 500 mg, Q6H JORGE    metoclopramide (REGLAN) injection 10 mg, Q6H PRN    ondansetron (ZOFRAN) injection 4 mg, Q6H PRN     oxyCODONE (ROXICODONE) oral solution 5 mg, Q4H PRN    oxyCODONE (ROXICODONE) split tablet 2.5 mg, Q4H PRN    pantoprazole (PROTONIX) EC tablet 40 mg, Early Morning    [Held by provider] senna-docusate sodium (SENOKOT S) 8.6-50 mg per tablet 1 tablet, HS    trimethobenzamide (TIGAN) IM injection 200 mg, Q6H PRN    vancomycin (VANCOCIN) IVPB (premix in dextrose) 1,000 mg 200 mL, Q12H, Last Rate: 1,000 mg (02/07/25 4466)  VTE Pharmacologic Prophylaxis: Heparin   VTE Mechanical Prophylaxis: sequential compression device    Administrative Statements     Portions of the record may have been created with voice recognition software.

## 2025-02-08 NOTE — ASSESSMENT & PLAN NOTE
Underwent an elective R TMA approx 3 weeks ago and was doing well until last week when she was noticed to have dehiscence of her surgical wound.   Wound was debrided by her podiatrist in the office but ultimately worsened with evidence of cellulitis.   LLE foot infection diagnosed clinically without MRI findings of osteomyelitis.   Tissue culture: 2+ MRSA, 2+ Finegoldia magna No significant leukocytosis or fever this admission. Site continues to bleed likely due to triple therapy   A1c 10.3  -L SFA stent s/p L TMA, nonhealing, s/p lysis and tibial intervention (AT PTA)  -despite adequate perfusion w/ AT runoff, patient continues to be extremely high risk for higher amputation 2/2 TMA dehiscence and extensive necrosis  S/P Balloon angioplasty 2/7 of the left popliteal and anterior tibial arteries   Plan:   Podiatry recommending proximal amputation in light of angiogram findings 2/6 with severely compromised blood flow to posterior aspect and plantar flap of foot  Continue Plavix/statin  Continue heparin gtt  Continue IV vanco and transition to PO when stable for discharge; ID following.  Pain regimen of Tylenol 975 mg q6, gabapentin 300 mg daily, robaxin 500 mg q6; oxy 2.5/5 for moderate/severe, dilaudid 0.5 mg q3h for breakthrough PRN; now off PCA pump  APS following for pain  Podiatry and vascular surgery following   Wound care

## 2025-02-08 NOTE — PLAN OF CARE
Problem: PAIN - ADULT  Goal: Verbalizes/displays adequate comfort level or baseline comfort level  Description: Interventions:  - Encourage patient to monitor pain and request assistance  - Assess pain using appropriate pain scale  - Administer analgesics based on type and severity of pain and evaluate response  - Implement non-pharmacological measures as appropriate and evaluate response  - Consider cultural and social influences on pain and pain management  - Notify physician/advanced practitioner if interventions unsuccessful or patient reports new pain  Outcome: Progressing     Problem: INFECTION - ADULT  Goal: Absence or prevention of progression during hospitalization  Description: INTERVENTIONS:  - Assess and monitor for signs and symptoms of infection  - Monitor lab/diagnostic results  - Monitor all insertion sites, i.e. indwelling lines, tubes, and drains  - Monitor endotracheal if appropriate and nasal secretions for changes in amount and color  - [facility] appropriate cooling/warming therapies per order  - Administer medications as ordered  - Instruct and encourage patient and family to use good hand hygiene technique  - Identify and instruct in appropriate isolation precautions for identified infection/condition  Outcome: Progressing  Goal: Absence of fever/infection during neutropenic period  Description: INTERVENTIONS:  - Monitor WBC    Outcome: Progressing     Problem: SAFETY ADULT  Goal: Patient will remain free of falls  Description: INTERVENTIONS:  - Educate patient/family on patient safety including physical limitations  - Instruct patient to call for assistance with activity   - Consult OT/PT to assist with strengthening/mobility   - Keep Call bell within reach  - Keep bed low and locked with side rails adjusted as appropriate  - Keep care items and personal belongings within reach  - Initiate and maintain comfort rounds  - Make Fall Risk Sign visible to staff  - Offer Toileting every 2 Hours,  in advance of need  - Initiate/Maintain necessary alarms  - Obtain necessary fall risk management equipment  - Apply yellow socks and bracelet for high fall risk patients  - Consider moving patient to room near nurses station  Outcome: Progressing  Goal: Maintain or return to baseline ADL function  Description: INTERVENTIONS:  -  Assess patient's ability to carry out ADLs; assess patient's baseline for ADL function and identify physical deficits which impact ability to perform ADLs (bathing, care of mouth/teeth, toileting, grooming, dressing, etc.)  - Assess/evaluate cause of self-care deficits   - Assess range of motion  - Assess patient's mobility; develop plan if impaired  - Assess patient's need for assistive devices and provide as appropriate  - Encourage maximum independence but intervene and supervise when necessary  - Involve family in performance of ADLs  - Assess for home care needs following discharge   - Consider OT consult to assist with ADL evaluation and planning for discharge  - Provide patient education as appropriate  Outcome: Progressing  Goal: Maintains/Returns to pre admission functional level  Description: INTERVENTIONS:  - Perform AM-PAC 6 Click Basic Mobility/ Daily Activity assessment daily.  - Set and communicate daily mobility goal to care team and patient/family/caregiver.   - Collaborate with rehabilitation services on mobility goals if consulted  - Perform Range of Motion 3 times a day.  - Reposition patient every 2 hours.  - Dangle patient 3 times a day  - Stand patient 3 times a day  - Ambulate patient 3 times a day  - Out of bed to chair 3 times a day   - Out of bed for meals 3 times a day  - Out of bed for toileting  - Record patient progress and toleration of activity level   Outcome: Progressing     Problem: DISCHARGE PLANNING  Goal: Discharge to home or other facility with appropriate resources  Description: INTERVENTIONS:  - Identify barriers to discharge w/patient and  caregiver  - Arrange for needed discharge resources and transportation as appropriate  - Identify discharge learning needs (meds, wound care, etc.)  - Arrange for interpretive services to assist at discharge as needed  - Refer to Case Management Department for coordinating discharge planning if the patient needs post-hospital services based on physician/advanced practitioner order or complex needs related to functional status, cognitive ability, or social support system  Outcome: Progressing     Problem: Knowledge Deficit  Goal: Patient/family/caregiver demonstrates understanding of disease process, treatment plan, medications, and discharge instructions  Description: Complete learning assessment and assess knowledge base.  Interventions:  - Provide teaching at level of understanding  - Provide teaching via preferred learning methods  Outcome: Progressing     Problem: NEUROSENSORY - ADULT  Goal: Achieves stable or improved neurological status  Description: INTERVENTIONS  - Monitor and report changes in neurological status  - Monitor vital signs such as temperature, blood pressure, glucose, and any other labs ordered   - Initiate measures to prevent increased intracranial pressure  - Monitor for seizure activity and implement precautions if appropriate      Outcome: Progressing  Goal: Remains free of injury related to seizures activity  Description: INTERVENTIONS  - Maintain airway, patient safety  and administer oxygen as ordered  - Monitor patient for seizure activity, document and report duration and description of seizure to physician/advanced practitioner  - If seizure occurs,  ensure patient safety during seizure  - Reorient patient post seizure  - Seizure pads on all 4 side rails  - Instruct patient/family to notify RN of any seizure activity including if an aura is experienced  - Instruct patient/family to call for assistance with activity based on nursing assessment  - Administer anti-seizure medications if  ordered    Outcome: Progressing  Goal: Achieves maximal functionality and self care  Description: INTERVENTIONS  - Monitor swallowing and airway patency with patient fatigue and changes in neurological status  - Encourage and assist patient to increase activity and self care.   - Encourage visually impaired, hearing impaired and aphasic patients to use assistive/communication devices  Outcome: Progressing     Problem: CARDIOVASCULAR - ADULT  Goal: Maintains optimal cardiac output and hemodynamic stability  Description: INTERVENTIONS:  - Monitor I/O, vital signs and rhythm  - Monitor for S/S and trends of decreased cardiac output  - Administer and titrate ordered vasoactive medications to optimize hemodynamic stability  - Assess quality of pulses, skin color and temperature  - Assess for signs of decreased coronary artery perfusion  - Instruct patient to report change in severity of symptoms  Outcome: Progressing  Goal: Absence of cardiac dysrhythmias or at baseline rhythm  Description: INTERVENTIONS:  - Continuous cardiac monitoring, vital signs, obtain 12 lead EKG if ordered  - Administer antiarrhythmic and heart rate control medications as ordered  - Monitor electrolytes and administer replacement therapy as ordered  Outcome: Progressing     Problem: RESPIRATORY - ADULT  Goal: Achieves optimal ventilation and oxygenation  Description: INTERVENTIONS:  - Assess for changes in respiratory status  - Assess for changes in mentation and behavior  - Position to facilitate oxygenation and minimize respiratory effort  - Oxygen administered by appropriate delivery if ordered  - Initiate smoking cessation education as indicated  - Encourage broncho-pulmonary hygiene including cough, deep breathe, Incentive Spirometry  - Assess the need for suctioning and aspirate as needed  - Assess and instruct to report SOB or any respiratory difficulty  - Respiratory Therapy support as indicated  Outcome: Progressing     Problem:  GASTROINTESTINAL - ADULT  Goal: Minimal or absence of nausea and/or vomiting  Description: INTERVENTIONS:  - Administer IV fluids if ordered to ensure adequate hydration  - Maintain NPO status until nausea and vomiting are resolved  - Nasogastric tube if ordered  - Administer ordered antiemetic medications as needed  - Provide nonpharmacologic comfort measures as appropriate  - Advance diet as tolerated, if ordered  - Consider nutrition services referral to assist patient with adequate nutrition and appropriate food choices  Outcome: Progressing  Goal: Maintains or returns to baseline bowel function  Description: INTERVENTIONS:  - Assess bowel function  - Encourage oral fluids to ensure adequate hydration  - Administer IV fluids if ordered to ensure adequate hydration  - Administer ordered medications as needed  - Encourage mobilization and activity  - Consider nutritional services referral to assist patient with adequate nutrition and appropriate food choices  Outcome: Progressing  Goal: Maintains adequate nutritional intake  Description: INTERVENTIONS:  - Monitor percentage of each meal consumed  - Identify factors contributing to decreased intake, treat as appropriate  - Assist with meals as needed  - Monitor I&O, weight, and lab values if indicated  - Obtain nutrition services referral as needed  Outcome: Progressing  Goal: Establish and maintain optimal ostomy function  Description: INTERVENTIONS:  - Assess bowel function  - Encourage oral fluids to ensure adequate hydration  - Administer IV fluids if ordered to ensure adequate hydration   - Administer ordered medications as needed  - Encourage mobilization and activity  - Nutrition services referral to assist patient with appropriate food choices  - Assess stoma site  - Consider wound care consult   Outcome: Progressing  Goal: Oral mucous membranes remain intact  Description: INTERVENTIONS  - Assess oral mucosa and hygiene practices  - Implement preventative  oral hygiene regimen  - Implement oral medicated treatments as ordered  - Initiate Nutrition services referral as needed  Outcome: Progressing     Problem: GENITOURINARY - ADULT  Goal: Maintains or returns to baseline urinary function  Description: INTERVENTIONS:  - Assess urinary function  - Encourage oral fluids to ensure adequate hydration if ordered  - Administer IV fluids as ordered to ensure adequate hydration  - Administer ordered medications as needed  - Offer frequent toileting  - Follow urinary retention protocol if ordered  Outcome: Progressing  Goal: Absence of urinary retention  Description: INTERVENTIONS:  - Assess patient’s ability to void and empty bladder  - Monitor I/O  - Bladder scan as needed  - Discuss with physician/AP medications to alleviate retention as needed  - Discuss catheterization for long term situations as appropriate  Outcome: Progressing  Goal: Urinary catheter remains patent  Description: INTERVENTIONS:  - Assess patency of urinary catheter  - If patient has a chronic negron, consider changing catheter if non-functioning  - Follow guidelines for intermittent irrigation of non-functioning urinary catheter  Outcome: Progressing     Problem: METABOLIC, FLUID AND ELECTROLYTES - ADULT  Goal: Electrolytes maintained within normal limits  Description: INTERVENTIONS:  - Monitor labs and assess patient for signs and symptoms of electrolyte imbalances  - Administer electrolyte replacement as ordered  - Monitor response to electrolyte replacements, including repeat lab results as appropriate  - Instruct patient on fluid and nutrition as appropriate  Outcome: Progressing  Goal: Fluid balance maintained  Description: INTERVENTIONS:  - Monitor labs   - Monitor I/O and WT  - Instruct patient on fluid and nutrition as appropriate  - Assess for signs & symptoms of volume excess or deficit  Outcome: Progressing  Goal: Glucose maintained within target range  Description: INTERVENTIONS:  - Monitor  Blood Glucose as ordered  - Assess for signs and symptoms of hyperglycemia and hypoglycemia  - Administer ordered medications to maintain glucose within target range  - Assess nutritional intake and initiate nutrition service referral as needed  Outcome: Progressing     Problem: SKIN/TISSUE INTEGRITY - ADULT  Goal: Skin Integrity remains intact(Skin Breakdown Prevention)  Description: Assess:  -Perform Juan Jose assessment every shift  -Clean and moisturize skin every shift  -Inspect skin when repositioning, toileting, and assisting with ADLS  -Assess under medical devices such as IVs every shift  -Assess extremities for adequate circulation and sensation     Bed Management:  -Have minimal linens on bed & keep smooth, unwrinkled  -Change linens as needed when moist or perspiring  -Avoid sitting or lying in one position for more than 4 hours while in bed  -Keep HOB at 30 degrees     Toileting:  -Offer bedside commode  -Assess for incontinence  -Use incontinent care products after each incontinent episode    Activity:  -Mobilize patient 3 times a day  -Encourage activity and walks on unit  -Encourage or provide ROM exercises   -Turn and reposition patient every 2 Hours  -Use appropriate equipment to lift or move patient in bed  -Instruct/ Assist with weight shifting every 2 when out of bed in chair  -Consider limitation of chair time 4 hour intervals    Skin Care:  -Avoid use of baby powder, tape, friction and shearing, hot water or constrictive clothing  -Relieve pressure over bony prominences  -Do not massage red bony areas    Next Steps:  -Teach patient strategies to minimize risks   -Consider consults to  interdisciplinary teams  Outcome: Progressing  Goal: Incision(s), wounds(s) or drain site(s) healing without S/S of infection  Description: INTERVENTIONS  - Assess and document dressing, incision, wound bed, drain sites and surrounding tissue  - Provide patient and family education  - Perform skin care/dressing  changes  Outcome: Progressing  Goal: Pressure injury heals and does not worsen  Description: Interventions:  - Implement low air loss mattress or specialty surface (Criteria met)  - Apply silicone foam dressing  - Instruct/assist with weight shifting every 90 minutes when in chair   - Limit chair time to 4 hour intervals  - Use special pressure reducing interventions when in chair   - Apply fecal or urinary incontinence containment device   - Perform passive or active ROM  - Turn and reposition patient & offload bony prominences   - Utilize friction reducing device or surface for transfers   - Consider consults to  interdisciplinary teams   - Use incontinent care products after each incontinent episode   - Consider nutrition services referral as needed  Outcome: Progressing     Problem: HEMATOLOGIC - ADULT  Goal: Maintains hematologic stability  Description: INTERVENTIONS  - Assess for signs and symptoms of bleeding or hemorrhage  - Monitor labs  - Administer supportive blood products/factors as ordered and appropriate  Outcome: Progressing     Problem: MUSCULOSKELETAL - ADULT  Goal: Maintain or return mobility to safest level of function  Description: INTERVENTIONS:  - Assess patient's ability to carry out ADLs; assess patient's baseline for ADL function and identify physical deficits which impact ability to perform ADLs (bathing, care of mouth/teeth, toileting, grooming, dressing, etc.)  - Assess/evaluate cause of self-care deficits   - Assess range of motion  - Assess patient's mobility  - Assess patient's need for assistive devices and provide as appropriate  - Encourage maximum independence but intervene and supervise when necessary  - Involve family in performance of ADLs  - Assess for home care needs following discharge   - Consider OT consult to assist with ADL evaluation and planning for discharge  - Provide patient education as appropriate  Outcome: Progressing  Goal: Maintain proper alignment of affected  body part  Description: INTERVENTIONS:  - Support, maintain and protect limb and body alignment  - Provide patient/ family with appropriate education  Outcome: Progressing     Problem: Prexisting or High Potential for Compromised Skin Integrity  Goal: Skin integrity is maintained or improved  Description: INTERVENTIONS:  - Identify patients at risk for skin breakdown  - Assess and monitor skin integrity  - Assess and monitor nutrition and hydration status  - Monitor labs   - Assess for incontinence   - Turn and reposition patient  - Assist with mobility/ambulation  - Relieve pressure over bony prominences  - Avoid friction and shearing  - Provide appropriate hygiene as needed including keeping skin clean and dry  - Evaluate need for skin moisturizer/barrier cream  - Collaborate with interdisciplinary team   - Patient/family teaching  - Consider wound care consult   Outcome: Progressing

## 2025-02-08 NOTE — ASSESSMENT & PLAN NOTE
Chronic euvolemic hyponatremia that was 133 on admission. Asymptomatic and no recent history of confusion, altered metal status, seizure, or coma.   Current differential includes SIADH in the setting of acute pain   Plan:  Monitor BMP  Low threshold to workup with serum and urine studies if hyponatremia worsens

## 2025-02-08 NOTE — ASSESSMENT & PLAN NOTE
Lab Results   Component Value Date    HGBA1C 10.3 (H) 12/13/2024       Blood Sugar Average: Last 72 hrs:  (P) 141.375  - Lantus 10u QHS with sliding scale   -Needs aggressive BS/DM control in setting of L foot wound/infection  -Goal -180 while inpatient

## 2025-02-08 NOTE — ASSESSMENT & PLAN NOTE
Patient previously diagnosed with hypertension  However, on 2/5, became hypertensive with SBP's up to 200s  Started chlorthalidone 12.5 mg daily  Added on as needed hydralazine and labetalol  Plan:  Continue chlorthalidone 12.5 mg daily  As needed hydralazine and labetalol

## 2025-02-08 NOTE — ASSESSMENT & PLAN NOTE
"Transferred to [facility] for vascular surgery and IR intervention after Lower Extremity Dopplers showed a >75% stenosis in the distal superficial femoral artery. Full study results are listed below  \"There fernanda 50-75% stenosis in the tibio-peroneal trunk. Diffuse disease throughout the  remaining femoro-popliteal and tibio-peroneal segments.\"  Now S/P Agram, Left SFA thrombectomy, placement of lysis catheter on 1/31  S/p lysis recheck on 2/2 with residual thrombus within L SFA.  S/p lysis recheck w/ angioplasty of L SFA, pop, AT on 2/3.  Repeat angiogram 2/6  with finding of recurrent pop occlusion, AT recannulization with distal pop, TPT, and AT POBA and serration angioplasty  Patient remains at very high risk for limb loss per vascular surgery  Plan:  Continue heparin gtt, Plavix   Transition to DOAC when deemed appropriate by podiatry  Immediate smoking cessation advised  "

## 2025-02-08 NOTE — PROGRESS NOTES
Vancomycin Pharmacy Consult     Lizzy Acuna is an 49 y.o. female who is currently receiving IV vancomycin for left foot SSTI.     Vancomycin Assessment:  1. Consults: ID - Yes, Podiatry - yes  2. Cultures:   2/3 Blood RA/LA: NG x 5 days  2/1 Culture Left Foot: 2+ MRSA  1/31 Anaerobic Left Foot: 2+ Finegoldia magna  3. Procalcitonin:   1/28: 0.22  1/29: 0.25  2/4: 0.14  4. Renal Function:   SCr: 0.7 (was 0.79)  CrCl: 104 mL/min    5. Days of Therapy: 12  6. Current Dose: 1000 mg IV q12h  7. Last Level: 19.9 (random 2/7 at 0605)  8. Goal AUC(24h): 400-600  9. Goal Random/Trough: 10-15     Vancomycin Plan:  1. Evaluation: no change recommended  2. New Dosing: no change, keep at vancomycin 1000 mg IV q12h   Predicted Trough / AUC(24h): 14.2 / 484  3. Next Level: random 2/12 at 0600        Pharmacy will continue to follow closely for s/sx of nephrotoxicity, infusion reactions, and appropriateness of therapy. BMP and CBC will be ordered per protocol. We will continue to follow the patient’s culture results and clinical progress daily.     Linwood Dumont, R.Ph., Pharmacist

## 2025-02-09 LAB
ANION GAP SERPL CALCULATED.3IONS-SCNC: 11 MMOL/L (ref 4–13)
APTT PPP: 71 SECONDS (ref 23–34)
BASOPHILS # BLD AUTO: 0.02 THOUSANDS/ΜL (ref 0–0.1)
BASOPHILS NFR BLD AUTO: 0 % (ref 0–1)
BUN SERPL-MCNC: 11 MG/DL (ref 5–25)
CALCIUM SERPL-MCNC: 9.7 MG/DL (ref 8.4–10.2)
CHLORIDE SERPL-SCNC: 95 MMOL/L (ref 96–108)
CO2 SERPL-SCNC: 32 MMOL/L (ref 21–32)
CREAT SERPL-MCNC: 0.67 MG/DL (ref 0.6–1.3)
EOSINOPHIL # BLD AUTO: 0.07 THOUSAND/ΜL (ref 0–0.61)
EOSINOPHIL NFR BLD AUTO: 1 % (ref 0–6)
ERYTHROCYTE [DISTWIDTH] IN BLOOD BY AUTOMATED COUNT: 14.6 % (ref 11.6–15.1)
GFR SERPL CREATININE-BSD FRML MDRD: 104 ML/MIN/1.73SQ M
GLUCOSE SERPL-MCNC: 114 MG/DL (ref 65–140)
GLUCOSE SERPL-MCNC: 116 MG/DL (ref 65–140)
GLUCOSE SERPL-MCNC: 144 MG/DL (ref 65–140)
GLUCOSE SERPL-MCNC: 144 MG/DL (ref 65–140)
GLUCOSE SERPL-MCNC: 164 MG/DL (ref 65–140)
HCT VFR BLD AUTO: 27.2 % (ref 34.8–46.1)
HGB BLD-MCNC: 8.6 G/DL (ref 11.5–15.4)
IMM GRANULOCYTES # BLD AUTO: 0.12 THOUSAND/UL (ref 0–0.2)
IMM GRANULOCYTES NFR BLD AUTO: 1 % (ref 0–2)
LYMPHOCYTES # BLD AUTO: 3.15 THOUSANDS/ΜL (ref 0.6–4.47)
LYMPHOCYTES NFR BLD AUTO: 33 % (ref 14–44)
MCH RBC QN AUTO: 29.3 PG (ref 26.8–34.3)
MCHC RBC AUTO-ENTMCNC: 31.6 G/DL (ref 31.4–37.4)
MCV RBC AUTO: 93 FL (ref 82–98)
MONOCYTES # BLD AUTO: 0.49 THOUSAND/ΜL (ref 0.17–1.22)
MONOCYTES NFR BLD AUTO: 5 % (ref 4–12)
NEUTROPHILS # BLD AUTO: 5.81 THOUSANDS/ΜL (ref 1.85–7.62)
NEUTS SEG NFR BLD AUTO: 60 % (ref 43–75)
NRBC BLD AUTO-RTO: 0 /100 WBCS
PLATELET # BLD AUTO: 282 THOUSANDS/UL (ref 149–390)
PMV BLD AUTO: 9 FL (ref 8.9–12.7)
POTASSIUM SERPL-SCNC: 4.4 MMOL/L (ref 3.5–5.3)
RBC # BLD AUTO: 2.94 MILLION/UL (ref 3.81–5.12)
SODIUM SERPL-SCNC: 138 MMOL/L (ref 135–147)
WBC # BLD AUTO: 9.66 THOUSAND/UL (ref 4.31–10.16)

## 2025-02-09 PROCEDURE — 99233 SBSQ HOSP IP/OBS HIGH 50: CPT

## 2025-02-09 PROCEDURE — 82948 REAGENT STRIP/BLOOD GLUCOSE: CPT

## 2025-02-09 PROCEDURE — 80048 BASIC METABOLIC PNL TOTAL CA: CPT

## 2025-02-09 PROCEDURE — 85730 THROMBOPLASTIN TIME PARTIAL: CPT | Performed by: INTERNAL MEDICINE

## 2025-02-09 PROCEDURE — 85025 COMPLETE CBC W/AUTO DIFF WBC: CPT

## 2025-02-09 RX ORDER — HYDROMORPHONE HCL/PF 1 MG/ML
0.5 SYRINGE (ML) INJECTION ONCE
Status: COMPLETED | OUTPATIENT
Start: 2025-02-09 | End: 2025-02-09

## 2025-02-09 RX ADMIN — HYDROMORPHONE HYDROCHLORIDE 0.2 MG: 0.2 INJECTION, SOLUTION INTRAMUSCULAR; INTRAVENOUS; SUBCUTANEOUS at 02:31

## 2025-02-09 RX ADMIN — DIAZEPAM 5 MG: 5 TABLET ORAL at 23:28

## 2025-02-09 RX ADMIN — CHLORTHALIDONE 12.5 MG: 25 TABLET ORAL at 08:40

## 2025-02-09 RX ADMIN — CHLORHEXIDINE GLUCONATE 0.12% ORAL RINSE 15 ML: 1.2 LIQUID ORAL at 08:39

## 2025-02-09 RX ADMIN — OXYCODONE HYDROCHLORIDE 5 MG: 5 TABLET ORAL at 21:08

## 2025-02-09 RX ADMIN — GABAPENTIN 600 MG: 300 CAPSULE ORAL at 21:08

## 2025-02-09 RX ADMIN — PANTOPRAZOLE SODIUM 40 MG: 40 TABLET, DELAYED RELEASE ORAL at 05:07

## 2025-02-09 RX ADMIN — METHOCARBAMOL 500 MG: 500 TABLET ORAL at 17:26

## 2025-02-09 RX ADMIN — INSULIN LISPRO 1 UNITS: 100 INJECTION, SOLUTION INTRAVENOUS; SUBCUTANEOUS at 11:12

## 2025-02-09 RX ADMIN — VANCOMYCIN HYDROCHLORIDE 1000 MG: 1 INJECTION, SOLUTION INTRAVENOUS at 11:12

## 2025-02-09 RX ADMIN — INSULIN GLARGINE 10 UNITS: 100 INJECTION, SOLUTION SUBCUTANEOUS at 21:09

## 2025-02-09 RX ADMIN — ACETAMINOPHEN 975 MG: 325 TABLET, FILM COATED ORAL at 05:06

## 2025-02-09 RX ADMIN — HEPARIN SODIUM 17 UNITS/KG/HR: 10000 INJECTION, SOLUTION INTRAVENOUS at 08:52

## 2025-02-09 RX ADMIN — METHOCARBAMOL 500 MG: 500 TABLET ORAL at 05:07

## 2025-02-09 RX ADMIN — ACETAMINOPHEN 975 MG: 325 TABLET, FILM COATED ORAL at 17:26

## 2025-02-09 RX ADMIN — METHOCARBAMOL 500 MG: 500 TABLET ORAL at 23:28

## 2025-02-09 RX ADMIN — ACETAMINOPHEN 975 MG: 325 TABLET, FILM COATED ORAL at 23:28

## 2025-02-09 RX ADMIN — ACETAMINOPHEN 975 MG: 325 TABLET, FILM COATED ORAL at 11:11

## 2025-02-09 RX ADMIN — HYDROMORPHONE HYDROCHLORIDE 0.5 MG: 1 INJECTION, SOLUTION INTRAMUSCULAR; INTRAVENOUS; SUBCUTANEOUS at 22:47

## 2025-02-09 RX ADMIN — ATORVASTATIN CALCIUM 80 MG: 80 TABLET, FILM COATED ORAL at 16:05

## 2025-02-09 RX ADMIN — GABAPENTIN 300 MG: 300 CAPSULE ORAL at 08:39

## 2025-02-09 RX ADMIN — VANCOMYCIN HYDROCHLORIDE 1000 MG: 1 INJECTION, SOLUTION INTRAVENOUS at 23:29

## 2025-02-09 RX ADMIN — ASPIRIN 81 MG: 81 TABLET, COATED ORAL at 08:39

## 2025-02-09 RX ADMIN — OXYCODONE HYDROCHLORIDE 5 MG: 5 TABLET ORAL at 05:07

## 2025-02-09 RX ADMIN — DULOXETINE 30 MG: 30 CAPSULE, DELAYED RELEASE ORAL at 08:39

## 2025-02-09 RX ADMIN — OXYCODONE HYDROCHLORIDE 5 MG: 5 TABLET ORAL at 16:05

## 2025-02-09 RX ADMIN — METHOCARBAMOL 500 MG: 500 TABLET ORAL at 11:11

## 2025-02-09 RX ADMIN — GABAPENTIN 300 MG: 300 CAPSULE ORAL at 13:16

## 2025-02-09 RX ADMIN — CLOPIDOGREL BISULFATE 75 MG: 75 TABLET, FILM COATED ORAL at 08:39

## 2025-02-09 RX ADMIN — OXYCODONE HYDROCHLORIDE 5 MG: 5 TABLET ORAL at 10:12

## 2025-02-09 NOTE — ASSESSMENT & PLAN NOTE
Underwent an elective R TMA approx 3 weeks ago and was doing well until last week when she was noticed to have dehiscence of her surgical wound.   Wound was debrided by her podiatrist in the office but ultimately worsened with evidence of cellulitis.   LLE foot infection diagnosed clinically without MRI findings of osteomyelitis.   Tissue culture: 2+ MRSA, 2+ Finegoldia magna No significant leukocytosis or fever this admission. Site continues to bleed likely due to triple therapy   A1c 10.3  -L SFA stent s/p L TMA, nonhealing, s/p lysis and tibial intervention (AT PTA)  -despite adequate perfusion w/ AT runoff, patient continues to be extremely high risk for higher amputation 2/2 TMA dehiscence and extensive necrosis  S/P Balloon angioplasty 2/7 of the left popliteal and anterior tibial arteries   Plan:   Podiatry recommending proximal amputation in light of angiogram findings 2/6 with severely compromised blood flow to posterior aspect and plantar flap of foot  Limb was deemed unsalvageable by vascular and podiatry.  Left BKA tentatively scheduled for 2/11  Continue Plavix/statin  Continue heparin gtt  Continue IV vanco and transition to PO when stable for discharge; ID following.  Pain regimen of Tylenol 975 mg q6, gabapentin 300 mg daily, robaxin 500 mg q6; oxy 2.5/5 for moderate/severe, dilaudid 0.5 mg q3h for breakthrough PRN; now off PCA pump  APS following for pain  Wound care

## 2025-02-09 NOTE — ASSESSMENT & PLAN NOTE
"Transferred to [facility] for vascular surgery and IR intervention after Lower Extremity Dopplers showed a >75% stenosis in the distal superficial femoral artery. Full study results are listed below  \"There fernanda 50-75% stenosis in the tibio-peroneal trunk. Diffuse disease throughout the  remaining femoro-popliteal and tibio-peroneal segments.\"  Now S/P Agram, Left SFA thrombectomy, placement of lysis catheter on 1/31  S/p lysis recheck on 2/2 with residual thrombus within L SFA.  S/p lysis recheck w/ angioplasty of L SFA, pop, AT on 2/3.  Repeat angiogram 2/6  with finding of recurrent pop occlusion, AT recannulization with distal pop, TPT, and AT POBA and serration angioplasty  Patient remains at very high risk for limb loss per vascular surgery  Plan:  See plan above  Continue heparin gtt, Plavix   Transition to DOAC when deemed appropriate by podiatry  Immediate smoking cessation advised  "

## 2025-02-09 NOTE — PROGRESS NOTES
Vancomycin Pharmacy Consult     Lizzy Acuna is an 49 y.o. female who is currently receiving IV vancomycin for left foot SSTI.     Vancomycin Assessment: no new labs as of 2/9. Keep on vanco for now pending possible new intervention.  1. Consults: ID - Yes, Podiatry - yes  2. Cultures:   2/3 Blood RA/LA: NG x 5 days  2/1 Culture Left Foot: 2+ MRSA  1/31 Anaerobic Left Foot: 2+ Finegoldia magna  3. Procalcitonin:   1/28: 0.22  1/29: 0.25  2/4: 0.14  4. Renal Function:   SCr: 0.67 (was 0.7)  CrCl: 102.3 mL/min (was 104 mL/min)    5. Days of Therapy: 13  6. Current Dose: 1000 mg IV q12h  7. Last Level: 19.9 (random 2/7 at 0605)  8. Goal AUC(24h): 400-600  9. Goal Random/Trough: 10-15     Vancomycin Plan:  1. Evaluation: no change recommended  2. New Dosing: no change, keep at vancomycin 1000 mg IV q12h   Predicted Trough / AUC(24h): 13.3 / 476  3. Next Level: random 2/12 at 0600        Pharmacy will continue to follow closely for s/sx of nephrotoxicity, infusion reactions, and appropriateness of therapy. BMP and CBC will be ordered per protocol. We will continue to follow the patient’s culture results and clinical progress daily.     Linwood Dumont, R.Ph., Pharmacist

## 2025-02-09 NOTE — ASSESSMENT & PLAN NOTE
Lab Results   Component Value Date    HGBA1C 10.3 (H) 12/13/2024       Recent Labs     02/08/25  1552 02/08/25  2040 02/09/25  0624 02/09/25  1104   POCGLU 149* 137 116 164*       Blood Sugar Average: Last 72 hrs:  (P) 138.4375

## 2025-02-09 NOTE — PROGRESS NOTES
Progress Note - Internal Medicine   Name: Lizzy Acuna 49 y.o. female I MRN: [id number]  Unit/Bed#: [facility] 511-01 I Date of Admission: 1/31/2025   Date of Service: 2/9/2025 I Hospital Day: 9     Assessment & Plan  Cellulitis of left foot complicated by Critical limb threatening ischemia of the left lower extremity with a nonhealing transmetatarsal amputation wound  Underwent an elective R TMA approx 3 weeks ago and was doing well until last week when she was noticed to have dehiscence of her surgical wound.   Wound was debrided by her podiatrist in the office but ultimately worsened with evidence of cellulitis.   LLE foot infection diagnosed clinically without MRI findings of osteomyelitis.   Tissue culture: 2+ MRSA, 2+ Finegoldia magna No significant leukocytosis or fever this admission. Site continues to bleed likely due to triple therapy   A1c 10.3  -L SFA stent s/p L TMA, nonhealing, s/p lysis and tibial intervention (AT PTA)  -despite adequate perfusion w/ AT runoff, patient continues to be extremely high risk for higher amputation 2/2 TMA dehiscence and extensive necrosis  S/P Balloon angioplasty 2/7 of the left popliteal and anterior tibial arteries   Plan:   Podiatry recommending proximal amputation in light of angiogram findings 2/6 with severely compromised blood flow to posterior aspect and plantar flap of foot  Limb was deemed unsalvageable by vascular and podiatry.  Left BKA tentatively scheduled for 2/11  Continue Plavix/statin  Continue heparin gtt  Continue IV vanco and transition to PO when stable for discharge; ID following.  Pain regimen of Tylenol 975 mg q6, gabapentin 300 mg daily, robaxin 500 mg q6; oxy 2.5/5 for moderate/severe, dilaudid 0.5 mg q3h for breakthrough PRN; now off PCA pump  APS following for pain  Wound care   PAD (peripheral artery disease) (HCC)  Transferred to [facility] for vascular surgery and IR intervention after Lower Extremity Dopplers showed a >75% stenosis in the distal  "superficial femoral artery. Full study results are listed below  \"There fernanda 50-75% stenosis in the tibio-peroneal trunk. Diffuse disease throughout the  remaining femoro-popliteal and tibio-peroneal segments.\"  Now S/P Agram, Left SFA thrombectomy, placement of lysis catheter on 1/31  S/p lysis recheck on 2/2 with residual thrombus within L SFA.  S/p lysis recheck w/ angioplasty of L SFA, pop, AT on 2/3.  Repeat angiogram 2/6  with finding of recurrent pop occlusion, AT recannulization with distal pop, TPT, and AT POBA and serration angioplasty  Patient remains at very high risk for limb loss per vascular surgery  Plan:  See plan above  Continue heparin gtt, Plavix   Transition to DOAC when deemed appropriate by podiatry  Immediate smoking cessation advised  Blood loss anemia  Blood loss anemia 2/2 lysis, bleeding from LLE TMA.    - Dressing and wound care per podiatry recs   - Recheck CBC and transfuse to maintain Hb > 7  Primary hypertension  Patient previously diagnosed with hypertension  However, on 2/5, became hypertensive with SBP's up to 200s  Started chlorthalidone 12.5 mg daily  Added on as needed hydralazine and labetalol  Plan:  Continue chlorthalidone 12.5 mg daily  As needed hydralazine and labetalol  Diabetes mellitus type 2 with complications (HCC)  Lab Results   Component Value Date    HGBA1C 10.3 (H) 12/13/2024       Blood Sugar Average: Last 72 hrs:  (P) 138.4375  - Lantus 10u QHS with sliding scale   -Needs aggressive BS/DM control in setting of L foot wound/infection  -Goal -180 while inpatient  Hyponatremia  Chronic euvolemic hyponatremia that was 133 on admission. Asymptomatic and no recent history of confusion, altered metal status, seizure, or coma.   Current differential includes SIADH in the setting of acute pain   Plan:  Monitor BMP  Low threshold to workup with serum and urine studies if hyponatremia worsens      Obesity (BMI 30-39.9)  Aggressive lifestyle and dietary modifications " advised including DM control  Diabetic foot infection  (HCC)  Lab Results   Component Value Date    HGBA1C 10.3 (H) 12/13/2024       Recent Labs     02/08/25  1552 02/08/25  2040 02/09/25  0624 02/09/25  1104   POCGLU 149* 137 116 164*       Blood Sugar Average: Last 72 hrs:  (P) 138.4375      Disposition: Inpatient pending procedure with vascular surgery    Team: CHRISTIAN TEAM B    Subjective   Patient seen and examined. No acute events overnight.  Patient was in depressed mood and withdrawn.  Understandably, patient is still very sad over the news that her left limb is nonsalvageable.  Antidepressants/anxiolytics were offered, patient refused.    Objective :  Temp:  [97.5 °F (36.4 °C)-98.2 °F (36.8 °C)] 97.6 °F (36.4 °C)  HR:  [88] 88  BP: (136-178)/(66-92) 138/66  Resp:  [18-20] 20  SpO2:  [94 %] 94 %    I/O         02/07 0701 02/08 0700 02/08 0701 02/09 0700 02/09 0701  02/10 0700    P.O. 960 580 118    I.V. (mL/kg) 2353.9 (24.8) 484.2 (5.7) 25.6 (0.3)    IV Piggyback  200     Total Intake(mL/kg) 3313.9 (34.9) 1264.2 (14.8) 143.6 (1.7)    Urine (mL/kg/hr) 2750 (1.2) 7325 (3.6) 800 (1.5)    Stool       Total Output 2750 7325 800    Net +563.9 -6060.8 -656.4           Unmeasured Urine Occurrence 1 x            Weights:   IBW (Ideal Body Weight): 48.26 kg    Body mass index is 35.29 kg/m².  Weight (last 2 days)       Date/Time Weight    02/09/25 0300 85.3 (188)    02/08/25 0600 95 (209.44)    02/07/25 0554 94.9 (209.22)            Physical Exam  Vitals and nursing note reviewed.   Constitutional:       General: She is not in acute distress.     Appearance: She is well-developed.   HENT:      Head: Normocephalic and atraumatic.   Eyes:      Conjunctiva/sclera: Conjunctivae normal.   Cardiovascular:      Rate and Rhythm: Normal rate and regular rhythm.      Heart sounds: No murmur heard.  Pulmonary:      Effort: Pulmonary effort is normal. No respiratory distress.      Breath sounds: Normal breath sounds.   Abdominal:       Palpations: Abdomen is soft.      Tenderness: There is no abdominal tenderness.   Musculoskeletal:         General: No swelling.      Cervical back: Neck supple.      Left lower leg: Edema present.   Skin:     General: Skin is warm and dry.      Capillary Refill: Capillary refill takes less than 2 seconds.      Findings: Lesion present.   Neurological:      Mental Status: She is alert.   Psychiatric:         Mood and Affect: Mood is depressed.           Lab Results: I have reviewed the following results:  Recent Labs     02/09/25  0513   WBC 9.66   HGB 8.6*   HCT 27.2*      SODIUM 138   K 4.4   CL 95*   CO2 32   BUN 11   CREATININE 0.67   GLUC 114   PTT 71*             Currently Ordered Meds:   Current Facility-Administered Medications:     acetaminophen (TYLENOL) tablet 975 mg, Q6H JORGE    aspirin (ECOTRIN LOW STRENGTH) EC tablet 81 mg, Daily    atorvastatin (LIPITOR) tablet 80 mg, Daily With Dinner    chlorhexidine (PERIDEX) 0.12 % oral rinse 15 mL, Q12H JORGE    chlorthalidone tablet 12.5 mg, Daily    clopidogrel (PLAVIX) tablet 75 mg, Daily    diazepam (VALIUM) tablet 5 mg, Q8H PRN    DULoxetine (CYMBALTA) delayed release capsule 30 mg, Daily    gabapentin (NEURONTIN) capsule 300 mg, BID (AM & Afternoon)    gabapentin (NEURONTIN) capsule 600 mg, HS    heparin (porcine) 25,000 units in 0.45% NaCl 250 mL infusion (premix), Titrated, Last Rate: 17 Units/kg/hr (02/09/25 0852)    heparin (porcine) injection 3,400 Units, Q6H PRN    heparin (porcine) injection 6,800 Units, Q6H PRN    hydrALAZINE (APRESOLINE) injection 5 mg, Q6H PRN    insulin glargine (LANTUS) subcutaneous injection 10 Units 0.1 mL, HS    insulin lispro (HumALOG/ADMELOG) 100 units/mL subcutaneous injection 1-6 Units, 4x Daily (AC & HS) **AND** Fingerstick Glucose (POCT), 4x Daily AC and at bedtime    labetalol (NORMODYNE) injection 10 mg, Q4H PRN    methocarbamol (ROBAXIN) tablet 500 mg, Q6H JORGE    metoclopramide (REGLAN) injection 10 mg, Q6H  PRN    ondansetron (ZOFRAN) injection 4 mg, Q6H PRN    oxyCODONE (ROXICODONE) IR tablet 5 mg, Q4H PRN    oxyCODONE (ROXICODONE) split tablet 2.5 mg, Q4H PRN    pantoprazole (PROTONIX) EC tablet 40 mg, Early Morning    [Held by provider] senna-docusate sodium (SENOKOT S) 8.6-50 mg per tablet 1 tablet, HS    trimethobenzamide (TIGAN) IM injection 200 mg, Q6H PRN    vancomycin (VANCOCIN) IVPB (premix in dextrose) 1,000 mg 200 mL, Q12H, Last Rate: 1,000 mg (02/09/25 1112)  VTE Pharmacologic Prophylaxis: DAPT  VTE Mechanical Prophylaxis: sequential compression device    Administrative Statements     Portions of the record may have been created with voice recognition software.

## 2025-02-09 NOTE — ASSESSMENT & PLAN NOTE
Lab Results   Component Value Date    HGBA1C 10.3 (H) 12/13/2024       Blood Sugar Average: Last 72 hrs:  (P) 138.4375  - Lantus 10u QHS with sliding scale   -Needs aggressive BS/DM control in setting of L foot wound/infection  -Goal -180 while inpatient

## 2025-02-10 ENCOUNTER — ANESTHESIA EVENT (INPATIENT)
Dept: PERIOP | Facility: HOSPITAL | Age: 50
DRG: 169 | End: 2025-02-10
Payer: COMMERCIAL

## 2025-02-10 LAB
ABO GROUP BLD: NORMAL
ALBUMIN SERPL BCG-MCNC: 3.4 G/DL (ref 3.5–5)
ALP SERPL-CCNC: 78 U/L (ref 34–104)
ALT SERPL W P-5'-P-CCNC: 5 U/L (ref 7–52)
ANION GAP SERPL CALCULATED.3IONS-SCNC: 9 MMOL/L (ref 4–13)
APTT PPP: 70 SECONDS (ref 23–34)
AST SERPL W P-5'-P-CCNC: 11 U/L (ref 13–39)
BASOPHILS # BLD AUTO: 0.02 THOUSANDS/ΜL (ref 0–0.1)
BASOPHILS NFR BLD AUTO: 0 % (ref 0–1)
BILIRUB SERPL-MCNC: 0.51 MG/DL (ref 0.2–1)
BLD GP AB SCN SERPL QL: NEGATIVE
BUN SERPL-MCNC: 15 MG/DL (ref 5–25)
CALCIUM ALBUM COR SERPL-MCNC: 10.1 MG/DL (ref 8.3–10.1)
CALCIUM SERPL-MCNC: 9.6 MG/DL (ref 8.4–10.2)
CHLORIDE SERPL-SCNC: 95 MMOL/L (ref 96–108)
CO2 SERPL-SCNC: 31 MMOL/L (ref 21–32)
CREAT SERPL-MCNC: 0.76 MG/DL (ref 0.6–1.3)
EOSINOPHIL # BLD AUTO: 0.11 THOUSAND/ΜL (ref 0–0.61)
EOSINOPHIL NFR BLD AUTO: 1 % (ref 0–6)
ERYTHROCYTE [DISTWIDTH] IN BLOOD BY AUTOMATED COUNT: 14.6 % (ref 11.6–15.1)
GFR SERPL CREATININE-BSD FRML MDRD: 93 ML/MIN/1.73SQ M
GLUCOSE SERPL-MCNC: 126 MG/DL (ref 65–140)
GLUCOSE SERPL-MCNC: 130 MG/DL (ref 65–140)
GLUCOSE SERPL-MCNC: 130 MG/DL (ref 65–140)
GLUCOSE SERPL-MCNC: 135 MG/DL (ref 65–140)
GLUCOSE SERPL-MCNC: 167 MG/DL (ref 65–140)
HCT VFR BLD AUTO: 29.1 % (ref 34.8–46.1)
HGB BLD-MCNC: 9.2 G/DL (ref 11.5–15.4)
IMM GRANULOCYTES # BLD AUTO: 0.09 THOUSAND/UL (ref 0–0.2)
IMM GRANULOCYTES NFR BLD AUTO: 1 % (ref 0–2)
LYMPHOCYTES # BLD AUTO: 3.22 THOUSANDS/ΜL (ref 0.6–4.47)
LYMPHOCYTES NFR BLD AUTO: 31 % (ref 14–44)
MCH RBC QN AUTO: 29 PG (ref 26.8–34.3)
MCHC RBC AUTO-ENTMCNC: 31.6 G/DL (ref 31.4–37.4)
MCV RBC AUTO: 92 FL (ref 82–98)
MONOCYTES # BLD AUTO: 0.52 THOUSAND/ΜL (ref 0.17–1.22)
MONOCYTES NFR BLD AUTO: 5 % (ref 4–12)
NEUTROPHILS # BLD AUTO: 6.54 THOUSANDS/ΜL (ref 1.85–7.62)
NEUTS SEG NFR BLD AUTO: 62 % (ref 43–75)
NRBC BLD AUTO-RTO: 0 /100 WBCS
PLATELET # BLD AUTO: 294 THOUSANDS/UL (ref 149–390)
PMV BLD AUTO: 9 FL (ref 8.9–12.7)
POTASSIUM SERPL-SCNC: 4.6 MMOL/L (ref 3.5–5.3)
PROT SERPL-MCNC: 7.1 G/DL (ref 6.4–8.4)
RBC # BLD AUTO: 3.17 MILLION/UL (ref 3.81–5.12)
RH BLD: POSITIVE
SODIUM SERPL-SCNC: 135 MMOL/L (ref 135–147)
SPECIMEN EXPIRATION DATE: NORMAL
WBC # BLD AUTO: 10.5 THOUSAND/UL (ref 4.31–10.16)

## 2025-02-10 PROCEDURE — 99255 IP/OBS CONSLTJ NEW/EST HI 80: CPT

## 2025-02-10 PROCEDURE — 85730 THROMBOPLASTIN TIME PARTIAL: CPT | Performed by: INTERNAL MEDICINE

## 2025-02-10 PROCEDURE — 80053 COMPREHEN METABOLIC PANEL: CPT

## 2025-02-10 PROCEDURE — 99232 SBSQ HOSP IP/OBS MODERATE 35: CPT | Performed by: INTERNAL MEDICINE

## 2025-02-10 PROCEDURE — 86900 BLOOD TYPING SEROLOGIC ABO: CPT | Performed by: PHYSICIAN ASSISTANT

## 2025-02-10 PROCEDURE — 99233 SBSQ HOSP IP/OBS HIGH 50: CPT | Performed by: INTERNAL MEDICINE

## 2025-02-10 PROCEDURE — 86901 BLOOD TYPING SEROLOGIC RH(D): CPT | Performed by: PHYSICIAN ASSISTANT

## 2025-02-10 PROCEDURE — 85025 COMPLETE CBC W/AUTO DIFF WBC: CPT

## 2025-02-10 PROCEDURE — 97116 GAIT TRAINING THERAPY: CPT

## 2025-02-10 PROCEDURE — 86850 RBC ANTIBODY SCREEN: CPT | Performed by: PHYSICIAN ASSISTANT

## 2025-02-10 PROCEDURE — G0545 PR INHERENT VISIT TO INPT: HCPCS | Performed by: INTERNAL MEDICINE

## 2025-02-10 PROCEDURE — 86923 COMPATIBILITY TEST ELECTRIC: CPT

## 2025-02-10 PROCEDURE — 82948 REAGENT STRIP/BLOOD GLUCOSE: CPT

## 2025-02-10 PROCEDURE — 99222 1ST HOSP IP/OBS MODERATE 55: CPT | Performed by: INTERNAL MEDICINE

## 2025-02-10 RX ORDER — POLYETHYLENE GLYCOL 3350 17 G/17G
17 POWDER, FOR SOLUTION ORAL DAILY
Status: DISCONTINUED | OUTPATIENT
Start: 2025-02-10 | End: 2025-02-15 | Stop reason: HOSPADM

## 2025-02-10 RX ORDER — HYDROMORPHONE HCL IN WATER/PF 6 MG/30 ML
0.2 PATIENT CONTROLLED ANALGESIA SYRINGE INTRAVENOUS EVERY 4 HOURS PRN
Status: DISCONTINUED | OUTPATIENT
Start: 2025-02-10 | End: 2025-02-11

## 2025-02-10 RX ORDER — CHLORHEXIDINE GLUCONATE ORAL RINSE 1.2 MG/ML
15 SOLUTION DENTAL ONCE
Status: COMPLETED | OUTPATIENT
Start: 2025-02-11 | End: 2025-02-11

## 2025-02-10 RX ORDER — HYDROMORPHONE HCL IN WATER/PF 6 MG/30 ML
0.2 PATIENT CONTROLLED ANALGESIA SYRINGE INTRAVENOUS EVERY 4 HOURS PRN
Status: DISCONTINUED | OUTPATIENT
Start: 2025-02-10 | End: 2025-02-10

## 2025-02-10 RX ORDER — CEFAZOLIN SODIUM 2 G/50ML
2000 SOLUTION INTRAVENOUS ONCE
Status: DISCONTINUED | OUTPATIENT
Start: 2025-02-10 | End: 2025-02-10

## 2025-02-10 RX ORDER — INSULIN GLARGINE 100 [IU]/ML
7 INJECTION, SOLUTION SUBCUTANEOUS
Status: DISCONTINUED | OUTPATIENT
Start: 2025-02-10 | End: 2025-02-13

## 2025-02-10 RX ORDER — CEFAZOLIN SODIUM 2 G/50ML
2000 SOLUTION INTRAVENOUS ONCE
Status: COMPLETED | OUTPATIENT
Start: 2025-02-11 | End: 2025-02-11

## 2025-02-10 RX ORDER — SODIUM CHLORIDE 9 MG/ML
50 INJECTION, SOLUTION INTRAVENOUS CONTINUOUS
Status: DISCONTINUED | OUTPATIENT
Start: 2025-02-11 | End: 2025-02-12

## 2025-02-10 RX ADMIN — OXYCODONE HYDROCHLORIDE 5 MG: 5 TABLET ORAL at 17:03

## 2025-02-10 RX ADMIN — HEPARIN SODIUM 17 UNITS/KG/HR: 10000 INJECTION, SOLUTION INTRAVENOUS at 00:54

## 2025-02-10 RX ADMIN — ACETAMINOPHEN 975 MG: 325 TABLET, FILM COATED ORAL at 05:42

## 2025-02-10 RX ADMIN — VANCOMYCIN HYDROCHLORIDE 1000 MG: 1 INJECTION, SOLUTION INTRAVENOUS at 12:07

## 2025-02-10 RX ADMIN — DULOXETINE 30 MG: 30 CAPSULE, DELAYED RELEASE ORAL at 08:11

## 2025-02-10 RX ADMIN — GABAPENTIN 300 MG: 300 CAPSULE ORAL at 08:11

## 2025-02-10 RX ADMIN — METHOCARBAMOL 500 MG: 500 TABLET ORAL at 11:12

## 2025-02-10 RX ADMIN — POLYETHYLENE GLYCOL 3350 17 G: 17 POWDER, FOR SOLUTION ORAL at 12:04

## 2025-02-10 RX ADMIN — CHLORHEXIDINE GLUCONATE 0.12% ORAL RINSE 15 ML: 1.2 LIQUID ORAL at 08:11

## 2025-02-10 RX ADMIN — OXYCODONE HYDROCHLORIDE 5 MG: 5 TABLET ORAL at 21:55

## 2025-02-10 RX ADMIN — OXYCODONE HYDROCHLORIDE 5 MG: 5 TABLET ORAL at 01:19

## 2025-02-10 RX ADMIN — OXYCODONE HYDROCHLORIDE 5 MG: 5 TABLET ORAL at 05:42

## 2025-02-10 RX ADMIN — GABAPENTIN 300 MG: 300 CAPSULE ORAL at 14:10

## 2025-02-10 RX ADMIN — ATORVASTATIN CALCIUM 80 MG: 80 TABLET, FILM COATED ORAL at 17:03

## 2025-02-10 RX ADMIN — ACETAMINOPHEN 975 MG: 325 TABLET, FILM COATED ORAL at 18:00

## 2025-02-10 RX ADMIN — HYDROMORPHONE HYDROCHLORIDE 0.2 MG: 0.2 INJECTION, SOLUTION INTRAMUSCULAR; INTRAVENOUS; SUBCUTANEOUS at 14:10

## 2025-02-10 RX ADMIN — CLOPIDOGREL BISULFATE 75 MG: 75 TABLET, FILM COATED ORAL at 08:11

## 2025-02-10 RX ADMIN — PANTOPRAZOLE SODIUM 40 MG: 40 TABLET, DELAYED RELEASE ORAL at 05:42

## 2025-02-10 RX ADMIN — METHOCARBAMOL 500 MG: 500 TABLET ORAL at 05:42

## 2025-02-10 RX ADMIN — GABAPENTIN 600 MG: 300 CAPSULE ORAL at 21:55

## 2025-02-10 RX ADMIN — INSULIN GLARGINE 7 UNITS: 100 INJECTION, SOLUTION SUBCUTANEOUS at 21:55

## 2025-02-10 RX ADMIN — CHLORTHALIDONE 12.5 MG: 25 TABLET ORAL at 08:13

## 2025-02-10 RX ADMIN — HEPARIN SODIUM 17 UNITS/KG/HR: 10000 INJECTION, SOLUTION INTRAVENOUS at 18:04

## 2025-02-10 RX ADMIN — SENNOSIDES AND DOCUSATE SODIUM 1 TABLET: 50; 8.6 TABLET ORAL at 21:55

## 2025-02-10 RX ADMIN — INSULIN LISPRO 1 UNITS: 100 INJECTION, SOLUTION INTRAVENOUS; SUBCUTANEOUS at 16:49

## 2025-02-10 RX ADMIN — ASPIRIN 81 MG: 81 TABLET, COATED ORAL at 08:11

## 2025-02-10 RX ADMIN — ACETAMINOPHEN 975 MG: 325 TABLET, FILM COATED ORAL at 11:12

## 2025-02-10 RX ADMIN — DIAZEPAM 5 MG: 5 TABLET ORAL at 21:55

## 2025-02-10 RX ADMIN — METHOCARBAMOL 500 MG: 500 TABLET ORAL at 17:03

## 2025-02-10 NOTE — PHYSICAL THERAPY NOTE
PHYSICAL THERAPY NOTE          Patient Name: Lizzy Acuna  Today's Date: 2/10/2025       02/10/25 1313   PT Last Visit   PT Visit Date 02/10/25   Note Type   Note Type Treatment   Pain Assessment   Pain Assessment Tool 0-10   Pain Score 8   Pain Location/Orientation Orientation: Left;Location: Foot   Pain Onset/Description Onset: Ongoing;Frequency: Constant/Continuous;Descriptor: Discomfort   Effect of Pain on Daily Activities limits comfort and mobility   Patient's Stated Pain Goal No pain   Hospital Pain Intervention(s) Repositioned;Ambulation/increased activity;Emotional support   Restrictions/Precautions   Weight Bearing Precautions Per Order Yes   LLE Weight Bearing Per Order NWB   Other Precautions Multiple lines;Telemetry;Fall Risk;WBS   General   Chart Reviewed Yes   Family/Caregiver Present No   Cognition   Overall Cognitive Status WFL   Arousal/Participation Alert;Cooperative   Attention Within functional limits   Orientation Level Oriented X4   Memory Within functional limits   Following Commands Follows one step commands without difficulty   Comments Patient is pleasant and cooperative   Subjective   Subjective Patient agreeable to PT tx   Bed Mobility   Supine to Sit 5  Supervision   Additional items Verbal cues   Sit to Supine 5  Supervision   Additional items Verbal cues   Transfers   Sit to Stand 5  Supervision   Additional items Verbal cues   Stand to Sit 5  Supervision   Additional items Verbal cues   Additional Comments rollator   Ambulation/Elevation   Gait pattern   (hop to gait pattern)   Gait Assistance 5  Supervision   Additional items Verbal cues   Assistive Device   (rollator)   Distance 90'   Balance   Static Sitting Good   Dynamic Sitting Fair +   Static Standing Fair   Dynamic Standing Fair   Ambulatory Fair -   Endurance Deficit   Endurance Deficit Yes   Endurance Deficit Description fatigue, weakness    Activity Tolerance   Activity Tolerance Patient tolerated treatment well;Patient limited by fatigue;Patient limited by pain   Nurse Made Aware RN cleared   Assessment   Prognosis Good   Problem List Decreased strength;Decreased endurance;Impaired balance;Decreased mobility;Pain   Assessment Patient received in bed. Patient agreeable to therapy. Patient performs bed mobility with supervision. Patient performs functional transfers with supervision using rollator. Patient performs ambulation with supervision using rollator, 90'. Patient utilizes hop to pattern with knee on rollator in order to maintain NWB LLE. Increased time needed to complete activity due to pain and fatigue.  Patient left in bed with all needs met and call bell/personal items within reach. The patient's AM-[facility] Basic Mobility Inpatient Short Form Raw Score is 17 showing further need for skilled PT services in order to improve functional mobility, decrease need for assistance, and return to OF. PT is recommending Level 3 - Minimum Resource Intensity on d/c from hospital.  Will continue to follow as able.   Barriers to Discharge Inaccessible home environment   Goals   Patient Goals to improve quality of life   PT Treatment Day 1   Plan   Treatment/Interventions Functional transfer training;ADL retraining;LE strengthening/ROM;Elevations;Therapeutic exercise;Endurance training;Patient/family training;Equipment eval/education;Bed mobility;Gait training;Spoke to nursing;Spoke to case management;OT   Progress Progressing toward goals   PT Frequency 3-5x/wk   Discharge Recommendation   Rehab Resource Intensity Level, PT III (Minimum Resource Intensity)   AM-PAC Basic Mobility Inpatient   Turning in Flat Bed Without Bedrails 3   Lying on Back to Sitting on Edge of Flat Bed Without Bedrails 3   Moving Bed to Chair 3   Standing Up From Chair Using Arms 3   Walk in Room 3   Climb 3-5 Stairs With Railing 2   Basic Mobility Inpatient Raw Score 17   Basic  Mobility Standardized Score 39.67   [facility] Highest Level Of Mobility   -[facility] Goal 5: Stand one or more mins   -HL Achieved 7: Walk 25 feet or more   End of Consult   Patient Position at End of Consult All needs within reach;Supine     Lianet Javier, PT, DPT

## 2025-02-10 NOTE — PLAN OF CARE
Problem: PHYSICAL THERAPY ADULT  Goal: Performs mobility at highest level of function for planned discharge setting.  See evaluation for individualized goals.  Description: Treatment/Interventions: ADL retraining, Functional transfer training, LE strengthening/ROM, Therapeutic exercise, Endurance training, Patient/family training, Equipment eval/education, Bed mobility, Gait training, Spoke to nursing, Spoke to case management, OT, Elevations  Equipment Recommended:  (owns)       See flowsheet documentation for full assessment, interventions and recommendations.  Outcome: Progressing  Note: Prognosis: Good  Problem List: Decreased strength, Decreased endurance, Impaired balance, Decreased mobility, Pain  Assessment: Patient received in bed. Patient agreeable to therapy. Patient performs bed mobility with supervision. Patient performs functional transfers with supervision using rollator. Patient performs ambulation with supervision using rollator, 90'. Patient utilizes hop to pattern with knee on rollator in order to maintain NWB LLE. Increased time needed to complete activity due to pain and fatigue.  Patient left in bed with all needs met and call bell/personal items within reach. The patient's AM-PAC Basic Mobility Inpatient Short Form Raw Score is 17 showing further need for skilled PT services in order to improve functional mobility, decrease need for assistance, and return to PLOF. PT is recommending Level 3 - Minimum Resource Intensity on d/c from hospital.  Will continue to follow as able.  Barriers to Discharge: Inaccessible home environment     Rehab Resource Intensity Level, PT: III (Minimum Resource Intensity)    See flowsheet documentation for full assessment.

## 2025-02-10 NOTE — ASSESSMENT & PLAN NOTE
Lab Results   Component Value Date    HGBA1C 10.3 (H) 12/13/2024       Recent Labs     02/09/25  1547 02/09/25  2052 02/10/25  0630 02/10/25  1104   POCGLU 144* 144* 130 126       Blood Sugar Average: Last 72 hrs:  (P) 153  - Current management by internal medicine  - Monitor for signs and symptoms of hypoglycemia   - Current meds: per primary  - Consistent carb diabetic diet  - Recommend close monitoring and optimal management during recovery/rehab phase as well

## 2025-02-10 NOTE — ASSESSMENT & PLAN NOTE
-  on appropriate nutrition and activity  - Adjustments accordingly during skilled therapy   - Monitor skin closely for breakdown, wounds, rashes; keep clean and dry, turning Q2H   - Follow-up with PCP after d/c

## 2025-02-10 NOTE — OCCUPATIONAL THERAPY NOTE
Occupational Therapy Cancellation Note        Patient Name: Lizzy Acuna  Today's Date: 2/10/2025         02/10/25 1415   OT Last Visit   OT Visit Date 02/10/25   Note Type   Note Type Cancelled Session   Cancel Reasons Other  (Attempted to see Pt this PM. Pt having wound care done at this time. Will continue to follow and see as able/appropriate.)     Chandni Gamez, BRUNO, OTR/L

## 2025-02-10 NOTE — ASSESSMENT & PLAN NOTE
Lab Results   Component Value Date    HGBA1C 10.3 (H) 12/13/2024       Blood Sugar Average: Last 72 hrs:  (P) 153  - Lantus 7U hs   -SSI  -Needs aggressive BS/DM control in setting of L foot wound/infection  -Goal -180 while inpatient

## 2025-02-10 NOTE — ASSESSMENT & PLAN NOTE
Lab Results   Component Value Date    HGBA1C 10.3 (H) 12/13/2024       Recent Labs     02/09/25  1547 02/09/25  2052 02/10/25  0630 02/10/25  1104   POCGLU 144* 144* 130 126       Blood Sugar Average: Last 72 hrs:  (P) 153

## 2025-02-10 NOTE — ASSESSMENT & PLAN NOTE
Blood loss anemia 2/2 lysis, bleeding from LLE TMA. Hb now stable   - Dressing and wound care per podiatry recs   - Recheck CBC and transfuse to maintain Hb > 7

## 2025-02-10 NOTE — PROGRESS NOTES
INTERNAL MEDICINE RESIDENCY PROGRESS NOTE     Name: Lizzy Acuna   Age & Sex: 49 y.o. female   MRN: [id number]  Unit/Bed#: [facility] 511-01   Encounter: [id number]  Team: SOD Team B     PATIENT INFORMATION     Name: Lizzy Acuna   Age & Sex: 49 y.o. female   MRN: [id number]  Hospital Stay Days: 10    ASSESSMENT/PLAN     Principal Problem:    Cellulitis of left foot complicated by Critical limb threatening ischemia of the left lower extremity with a nonhealing transmetatarsal amputation wound  Active Problems:    Diabetes mellitus type 2 with complications ([facility])    Hyponatremia    Diabetic foot infection  (HCC)    PAD (peripheral artery disease) ([facility])    Blood loss anemia    Obesity (BMI 30-39.9)    Primary hypertension      * Cellulitis of left foot complicated by Critical limb threatening ischemia of the left lower extremity with a nonhealing transmetatarsal amputation wound  Assessment & Plan  Underwent an elective R TMA approx 3 weeks ago and was doing well until last week when she was noticed to have dehiscence of her surgical wound.   Wound was debrided by her podiatrist in the office but ultimately worsened with evidence of cellulitis.   LLE foot infection diagnosed clinically without MRI findings of osteomyelitis.   Tissue culture: 2+ MRSA, 2+ Finegoldia magna No significant leukocytosis or fever this admission. Site continues to bleed likely due to triple therapy   A1c 10.3  -L SFA stent s/p L TMA, nonhealing, s/p lysis and tibial intervention (AT PTA)  -despite adequate perfusion w/ AT runoff, patient continues to be extremely high risk for higher amputation 2/2 TMA dehiscence and extensive necrosis  S/P Balloon angioplasty 2/7 of the left popliteal and anterior tibial arteries   Plan:   Podiatry following, planning for L BKA 2/11   Continue Plavix/statin  Continue heparin gtt  ID following - continue IV vancomycin until post-BKA   Current pain regimen: Tylenol 975 mg q6h, duloxetine 30mg daily,  gabapentin 300 mg bid plus 600mg hs, robaxin 500 mg q6h; oxy 2.5/5 for moderate/severe  APS following previously when pt on PCA but now transitioned off  Wound care     Primary hypertension  Assessment & Plan  Patient previously diagnosed with hypertension  However, on 2/5, became hypertensive with SBP's up to 200s  Started chlorthalidone 12.5 mg daily  Added on as needed hydralazine and labetalol  Plan:  Continue chlorthalidone 12.5 mg daily  As needed hydralazine and labetalol    Obesity (BMI 30-39.9)  Assessment & Plan  Aggressive lifestyle and dietary modifications advised including DM control    Blood loss anemia  Assessment & Plan  Blood loss anemia 2/2 lysis, bleeding from LLE TMA. Hb now stable   - Dressing and wound care per podiatry recs   - Recheck CBC and transfuse to maintain Hb > 7    PAD (peripheral artery disease) (HCC)  Assessment & Plan  See principal problem    Hyponatremia  Assessment & Plan  Chronic euvolemic hyponatremia that was 133 on admission. Asymptomatic and no recent history of confusion, altered metal status, seizure, or coma.   Current differential includes SIADH in the setting of acute infection   Plan:  Monitor BMP  Low threshold to workup with serum and urine studies if hyponatremia worsens      Diabetes mellitus type 2 with complications (HCC)  Assessment & Plan  Lab Results   Component Value Date    HGBA1C 10.3 (H) 12/13/2024       Blood Sugar Average: Last 72 hrs:  (P) 153  - Lantus reduced to 7U hs due to fasting BG below goal and pt will be NPO prior to surgery tomorrow  -SSI  -Needs aggressive BS/DM control in setting of L foot wound/infection  -Goal -180 while inpatient        Disposition: continue inpatient care     SUBJECTIVE     Patient seen and examined. No acute events overnight. Pt feels anxious about upcoming surgery, but otherwise has no new complaints or concerns.     OBJECTIVE     Vitals:    02/09/25 2208 02/10/25 0300 02/10/25 0749 02/10/25 0750   BP: 131/82   126/71 126/71   BP Location:       Pulse: 83  78 81   Resp: 20      Temp: 98.6 °F (37 °C)  98.4 °F (36.9 °C) 98.4 °F (36.9 °C)   TempSrc:       SpO2: 94%  92% 95%   Weight:  83.8 kg (184 lb 11.2 oz)     Height:          Temperature:   Temp (24hrs), Av.4 °F (36.9 °C), Min:98 °F (36.7 °C), Max:98.6 °F (37 °C)    Temperature: 98.4 °F (36.9 °C)  Intake & Output:  I/O          0700 02/09 0701  02/10 0700 02/10 0701  02/11 07    P.O. 580 698 720    I.V. (mL/kg) 484.2 (5.7) 94.5 (1.1) 331.3 (4)    IV Piggyback 200 460     Total Intake(mL/kg) 1264.2 (14.8) 1252.5 (14.9) 1051.3 (12.5)    Urine (mL/kg/hr) 7325 (3.6) 4500 (2.2) 0 (0)    Total Output 7325 4500 0    Net -6060.8 -3247.5 +1051.3                 Weights:   IBW (Ideal Body Weight): 48.26 kg    Body mass index is 34.67 kg/m².  Weight (last 2 days)       Date/Time Weight    02/10/25 0300 83.8 (184.7)    25 0300 85.3 (188)    25 0600 95 (209.44)          Physical Exam  LABORATORY DATA     Labs: I have personally reviewed pertinent reports.  Results from last 7 days   Lab Units 02/10/25  0547 25  0513 25  0440   WBC Thousand/uL 10.50* 9.66 9.02   HEMOGLOBIN g/dL 9.2* 8.6* 7.8*   HEMATOCRIT % 29.1* 27.2* 25.1*   PLATELETS Thousands/uL 294 282 273   SEGS PCT % 62 60 58   MONO PCT % 5 5 4   EOS PCT % 1 1 0      Results from last 7 days   Lab Units 02/10/25  0547 25  0513 25  0440 25  0605 25  0636 25  1354 25  0314   POTASSIUM mmol/L 4.6 4.4 4.1   < > 3.9   < > 4.4   CHLORIDE mmol/L 95* 95* 97   < > 96   < > 102   CO2 mmol/L 31 32 33*   < > 32   < > 27   BUN mg/dL 15 11 12   < > 8   < > 10   CREATININE mg/dL 0.76 0.67 0.70   < > 0.85   < > 0.57*   CALCIUM mg/dL 9.6 9.7 8.9   < > 8.9   < > 8.4   ALK PHOS U/L 78  --   --   --  87  --  102   ALT U/L 5*  --   --   --  12  --  14   AST U/L 11*  --   --   --  18  --  24    < > = values in this interval not displayed.              Results from last  7 days   Lab Units 02/10/25  0547 02/09/25  0513 02/08/25 2047   PTT seconds 70* 71* 63*               IMAGING & DIAGNOSTIC TESTING     Radiology Results: I have personally reviewed pertinent reports.  IR TPA lysis check  Result Date: 2/2/2025  Impression: 1. Left deep femoral, superficial femoral, popliteal, tibioperoneal trunk, and anterior tibial arteries were patent. 2. Mild residual stenoses in the left superficial femoral and popliteal arteries treated with 4 mm balloon angioplasty. 3. Mild stenoses along the proximal left anterior tibial artery treated with 3 mm and 2.5 mm balloon angioplasties. 4. Left posterior tibial and peroneal arteries appeared chronically occluded distally. Plan: -Continue anticoagulation. -Continue aspirin and statin. Workstation performed: [id number]     IR TPA lysis check  Result Date: 2/1/2025  Impression: 1. Residual luminal irregularities in the left superficial femoral artery treated with 4 mm balloon angioplasty. 2. Post angioplasty arteriogram showed reocclusion of left SFA, likely due to residual thrombus. 3. Lysis catheter replaced extending from the left SFA into the popliteal artery. Plan: -tPA to run through the lysis catheter at 1 mg/h. -Return for lysis check tomorrow. Workstation performed: [id number]     IR lower extremity angiogram  Result Date: 1/31/2025  Impression: 1. Thrombosed left superficial femoral artery treated with aspiration thrombectomy and 4 mm balloon angioplasty with residual thrombus. 2. Thrombolysis initiated through a lysis catheter in the left SFA. 3. Right arm PICC placement through basilic vein access. Plan: -tPA to run at 1 mg/hr through the lysis catheter. -All blood draws and labwork to be done through PICC. -Return for lysis check tomorrow. Workstation performed: [id number]     IR PICC placement double lumen  Result Date: 1/31/2025  Impression: 1. Thrombosed left superficial femoral artery treated with aspiration thrombectomy and 4 mm  balloon angioplasty with residual thrombus. 2. Thrombolysis initiated through a lysis catheter in the left SFA. 3. Right arm PICC placement through basilic vein access. Plan: -tPA to run at 1 mg/hr through the lysis catheter. -All blood draws and labwork to be done through PICC. -Return for lysis check tomorrow. Workstation performed: [id number]     CT head wo contrast  Result Date: 1/31/2025  Impression: 1. No intracranial hemorrhage seen. 2. Bilateral sphenoid sinusitis Workstation performed: [id number]     Other Diagnostic Testing: I have personally reviewed pertinent reports.    ACTIVE MEDICATIONS       Current Facility-Administered Medications:     acetaminophen (TYLENOL) tablet 975 mg, Q6H JORGE    aspirin (ECOTRIN LOW STRENGTH) EC tablet 81 mg, Daily    atorvastatin (LIPITOR) tablet 80 mg, Daily With Dinner    chlorhexidine (PERIDEX) 0.12 % oral rinse 15 mL, Q12H JORGE    chlorthalidone tablet 12.5 mg, Daily    clopidogrel (PLAVIX) tablet 75 mg, Daily    diazepam (VALIUM) tablet 5 mg, Q8H PRN    DULoxetine (CYMBALTA) delayed release capsule 30 mg, Daily    gabapentin (NEURONTIN) capsule 300 mg, BID (AM & Afternoon)    gabapentin (NEURONTIN) capsule 600 mg, HS    heparin (porcine) 25,000 units in 0.45% NaCl 250 mL infusion (premix), Titrated, Last Rate: 17 Units/kg/hr (02/10/25 0054)    heparin (porcine) injection 3,400 Units, Q6H PRN    heparin (porcine) injection 6,800 Units, Q6H PRN    hydrALAZINE (APRESOLINE) injection 5 mg, Q6H PRN    HYDROmorphone HCl (DILAUDID) injection 0.2 mg, Q4H PRN    insulin glargine (LANTUS) subcutaneous injection 7 Units 0.07 mL, HS    insulin lispro (HumALOG/ADMELOG) 100 units/mL subcutaneous injection 1-6 Units, 4x Daily (AC & HS) **AND** Fingerstick Glucose (POCT), 4x Daily AC and at bedtime    labetalol (NORMODYNE) injection 10 mg, Q4H PRN    methocarbamol (ROBAXIN) tablet 500 mg, Q6H JORGE    metoclopramide (REGLAN) injection 10 mg, Q6H PRN    ondansetron (ZOFRAN) injection 4  "mg, Q6H PRN    oxyCODONE (ROXICODONE) IR tablet 5 mg, Q4H PRN    oxyCODONE (ROXICODONE) split tablet 2.5 mg, Q4H PRN    pantoprazole (PROTONIX) EC tablet 40 mg, Early Morning    polyethylene glycol (MIRALAX) packet 17 g, Daily    senna-docusate sodium (SENOKOT S) 8.6-50 mg per tablet 1 tablet, HS    trimethobenzamide (TIGAN) IM injection 200 mg, Q6H PRN    vancomycin (VANCOCIN) IVPB (premix in dextrose) 1,000 mg 200 mL, Q12H, Last Rate: 1,000 mg (02/10/25 1207)    VTE Pharmacologic Prophylaxis: VTE covered by:  heparin (porcine), Intravenous, 17 Units/kg/hr at 02/10/25 0054  heparin (porcine), Intravenous, 3,400 Units at 02/08/25 0735  heparin (porcine), Intravenous      VTE Mechanical Prophylaxis: sequential compression device    Portions of the record may have been created with voice recognition software.  Occasional wrong word or \"sound a like\" substitutions may have occurred due to the inherent limitations of voice recognition software.  Read the chart carefully and recognize, using context, where substitutions have occurred.  ==  Sharron Stoddard MD  [facility]  Internal Medicine Residency PGY-2       "

## 2025-02-10 NOTE — ASSESSMENT & PLAN NOTE
Chronic euvolemic hyponatremia that was 133 on admission. Asymptomatic and no recent history of confusion, altered metal status, seizure, or coma.   Current differential includes SIADH in the setting of acute infection   Plan:  Monitor BMP  Low threshold to workup with serum and urine studies if hyponatremia worsens

## 2025-02-10 NOTE — PROGRESS NOTES
Progress Note - Infectious Disease   Name: Lizzy Acuna 49 y.o. female I MRN: [id number]  Unit/Bed#: [facility] 511-01 I Date of Admission: 1/31/2025   Date of Service: 2/10/2025 I Hospital Day: 10    Assessment & Plan  Cellulitis of left foot complicated by Critical limb threatening ischemia of the left lower extremity with a nonhealing transmetatarsal amputation wound  Complicated by large abscess.  Patient underwent I&D on 1/29/2025 with a large abscess found with purulent fluid and necrotic tissue drained and debrided.  Wound cultures are growing MRSA and Finegoldia.  MRI and operative findings not consistent with osteomyelitis.  -Continue intravenous vancomycin until left lower extremity BKA done tomorrow  -Pharmacy follow-up for vancomycin dose management  -Recheck CBC with differential and BMP to make sure no developing toxicities  -Close podiatry follow-up  -Local wound care  -Serial exams  -Revascularization as below  PAD (peripheral artery disease) (HCC)  Status post angiogram with intervention including left SFA thrombectomy and placement of lysis catheter on 1/31/2025.  Repeat imaging demonstrated residual stenosis of left lower extremity.  Patient status post repeat arteriogram on 2/6/2025 with intervention with apparent reestablished flow.  Still unable to successfully reestablish flow  -Ongoing anticoagulation  -Close vascular follow-up  -Patient for left lower extremity BKA tomorrow  Diabetes mellitus type 2 with complications (HCC)  Poor control with a hemoglobin A1c of 10.3.  Risk factor for recurrent infection.  -Tighten diabetic control  Obesity (BMI 30-39.9)  As a risk factor for recurrent infection. Could also affect antibiotic dosing.     I have discussed with the primary service the above plan to continue the intravenous vancomycin until BKA.  The primary service agrees with the plan.    Antibiotics:  IV vancomycin  Postop day 13    Subjective   Patient has no fever, chills, sweats; no nausea,  vomiting, diarrhea; no cough, shortness of breath; no pain. No new symptoms.  She is discouraged with her situation.    Objective :  Temp:  [98 °F (36.7 °C)-98.6 °F (37 °C)] 98.4 °F (36.9 °C)  HR:  [78-85] 81  BP: (126-169)/(71-90) 126/71  Resp:  [17-20] 20  SpO2:  [92 %-97 %] 95 %  O2 Device: None (Room air)    General:  No acute distress  Psychiatric:  Awake and alert  Pulmonary:  Normal respiratory excursion without accessory muscle use  Abdomen:  Soft, nontender  Extremities: Left foot dressed with dry dressing in place.  Skin:  No rashes      Lab Results: I have reviewed the following results:  Results from last 7 days   Lab Units 02/10/25  0547 02/09/25  0513 02/08/25  0440   WBC Thousand/uL 10.50* 9.66 9.02   HEMOGLOBIN g/dL 9.2* 8.6* 7.8*   PLATELETS Thousands/uL 294 282 273     Results from last 7 days   Lab Units 02/10/25  0547 02/09/25  0513 02/08/25  0440 02/07/25  0605 02/06/25  0636 02/05/25  1354 02/05/25  0314   SODIUM mmol/L 135 138 137   < > 136   < > 135   POTASSIUM mmol/L 4.6 4.4 4.1   < > 3.9   < > 4.4   CHLORIDE mmol/L 95* 95* 97   < > 96   < > 102   CO2 mmol/L 31 32 33*   < > 32   < > 27   BUN mg/dL 15 11 12   < > 8   < > 10   CREATININE mg/dL 0.76 0.67 0.70   < > 0.85   < > 0.57*   EGFR ml/min/1.73sq m 93 104 102   < > 81   < > 109   CALCIUM mg/dL 9.6 9.7 8.9   < > 8.9   < > 8.4   AST U/L 11*  --   --   --  18  --  24   ALT U/L 5*  --   --   --  12  --  14   ALK PHOS U/L 78  --   --   --  87  --  102   ALBUMIN g/dL 3.4*  --   --   --  3.0*  --  3.2*    < > = values in this interval not displayed.         Results from last 7 days   Lab Units 02/04/25  2304   PROCALCITONIN ng/ml 0.14

## 2025-02-10 NOTE — ASSESSMENT & PLAN NOTE
- Overall management per vasc sx during acute course  - Antithrombotic: aspirin, plavix  - Statin  - Optimal blood pressure and blood sugar control

## 2025-02-10 NOTE — CONSULTS
Consultation - General Cardiology Team 2  Lizzy Acuna 49 y.o. female MRN: [id number]  Unit/Bed#: [facility] 511-01 Encounter: [id number]      Inpatient consult to Cardiology  Consult performed by: Vilma Duggan MD  Consult ordered by: Catalina Abdul PA-C        PCP: NAOMY Basilio     Physician Requesting Consult: Dewey Mathew MD  Reason for Consult / Principal Problem: risk stratification for L BKA     Assessment & Plan     This is a 49-year-old female with past medical history of hypertension, diabetes, PAD, and obesity who presented with cellulitis of the left foot.  She is tentatively scheduled for left BKA 2/11.  Cardiology was consulted for risk stratification for the procedure.      Assessment:  Risk Factor Score (Yes=1, No=0)   Hx of TIA/CVA 0   Hx of prior ischemic heart disease (AMI, unstable angina, Q waves on EKG, CABG) 0   Hx of congestive heart failure 0   Serum Creatinine >2 mg/dl 0   Insulin dependent diabetes mellitus 1   Total Score 1     Risk of MACE (30-day)     Points Risk % (95% CI), Abundio, 2017 Risk % (95% CI) Kevin, 1999   0 3.9 (2.8-5.4) 0.4 (0.05-1.5)   1 6 (4.9-7.4) 0.9 (0.3-2.1)   2 10.1 (8.1-12.6) 6.6 (3.9-10.3)   3 or more 15 (11.1-20) >11 (5.8-18.4)       Advice    In patients with elevated risk (RCRI >/= 2), assess functional capacity (METs/DASI).   If >4 METs functional capacity, may proceed to surgery. If unknown/poor (<4 METs) functional capacity consider, may need preoperative cardiac testing depending on symptoms and if testing will .       Plan:  Patient has a score of 1 on the RCRI while correlates to 6% preoperative risk. She also was able to climb three flights of stairs, walk two miles, and work as a , putting her >4 METs functional capacity. The patient is low risk for an intermediate risk surgery and is medically optimized from a cardiac standpoint. Patient may proceed with the surgery without need for further cardiac  "testing.      HPI: Lizzy Acuna is a 49 y.o. year old female with a history of hypertension, diabetes, PAD, and obesity who presented with cellulitis of left foot.  Podiatry and vascular surgery have evaluated and left limb was deemed unsalvageable.  Left BKA is tentatively scheduled for 2/11. She denies chest pain, palpitations, and dyspnea. Before the symptoms of her left leg started, she was able to climb three flights of stairs, walk two miles, and work as a . She denies history of MI, heart failure, and stroke.  She is a past smoker with 20-pack-year history who quit 2 months ago.      Review of Systems   Constitutional:  Negative for chills and fever.   HENT:  Negative for ear pain and sore throat.    Eyes:  Negative for pain and visual disturbance.   Respiratory:  Negative for cough and shortness of breath.    Cardiovascular:  Negative for chest pain and palpitations.   Gastrointestinal:  Negative for abdominal pain and vomiting.   Genitourinary:  Negative for dysuria and hematuria.   Musculoskeletal:  Negative for arthralgias and back pain.   Skin:  Negative for color change and rash.   Neurological:  Negative for syncope and headaches.   All other systems reviewed and are negative.      Historical Information   Past Medical History:   Diagnosis Date    Advanced maternal age in multigravida, second trimester 11/30/2016    Transitioned From: Advanced maternal age in multigravida, first trimester      Back pain     used 2 percocet tid until current admission in 2/2017    Bone spur     in back per pt    Chronic hypertension with superimposed preeclampsia 02/14/2017    Depression     Diabetes mellitus (HCC)     Elevated BP without diagnosis of hypertension     \"with pain\"    Foot injury     Full dentures     does not wear    GERD (gastroesophageal reflux disease)     occas    Gestational diabetes     insulin dependent    Herniated cervical disc     Herniated lumbar intervertebral disc     History " of pneumonia     Hx of degenerative disc disease     Hyperlipidemia     Obesity     Pre-eclampsia     Prior pregnancy with fetal demise     previous demise at 36 weeks    Toe pain     and foot pain     Past Surgical History:   Procedure Laterality Date    ARTERIOGRAM Left 2025    Procedure: LEFT lower extremity arteriogram w/ popiteal and anterior tibial artery angioplasty;  Surgeon: Ottoniel Victoria MD;  Location: [facility];  Service: Vascular    BACK SURGERY       SECTION      INCISION AND DRAINAGE OF WOUND Left 2025    Procedure: INCISION AND DRAINAGE (I&D) EXTREMITY;  Surgeon: Leopoldo Ritchie DPM;  Location: [facility];  Service: Podiatry    IR LOWER EXTREMITY ANGIOGRAM  2024    IR LOWER EXTREMITY ANGIOGRAM  2025    IR LOWER EXTREMITY ANGIOGRAM  2025    IR PICC PLACEMENT DOUBLE LUMEN  2025    IR TPA LYSIS CHECK  2025    IR TPA LYSIS CHECK  2025    LAMINECTOMY      with disc removal, last assessed 16    OTHER SURGICAL HISTORY      Right ovary removal 2005 per pt recall at [facility] facillity    AZ AMPUTATION FOOT TRANSMETARSAL Left 1/3/2025    Procedure: AMPUTATION TRANSMETATARSAL (TMA);  Surgeon: Leopoldo Ritchie DPM;  Location: [facility];  Service: Podiatry    AZ  DELIVERY ONLY N/A 02/15/2017    Procedure:  SECTION ();  Surgeon: Tito Umaña MD;  Location: [facility];  Service: Obstetrics    AZ LIG/TRNSXJ FALOPIAN TUBE  DEL/ABDML SURG Left 02/15/2017    Procedure: LIGATION/COAGULATION TUBAL;  Surgeon: Tito Umaña MD;  Location: [facility];  Service: Obstetrics    VASCULAR SURGERY  2024    WISDOM TOOTH EXTRACTION       Social History     Substance and Sexual Activity   Alcohol Use Yes    Comment: 1-2 drinks a year     Social History     Substance and Sexual Activity   Drug Use Not Currently     Social History     Tobacco Use   Smoking Status Former    Current packs/day: 0.50    Types:  Cigarettes   Smokeless Tobacco Never   Tobacco Comments    Per pt last cigarette 12/13/24--quit cold turkey     Family History:     Meds/Allergies   Hospital Medications:   Current Facility-Administered Medications:     acetaminophen (TYLENOL) tablet 975 mg, Q6H JORGE    aspirin (ECOTRIN LOW STRENGTH) EC tablet 81 mg, Daily    atorvastatin (LIPITOR) tablet 80 mg, Daily With Dinner    chlorhexidine (PERIDEX) 0.12 % oral rinse 15 mL, Q12H JORGE    chlorthalidone tablet 12.5 mg, Daily    clopidogrel (PLAVIX) tablet 75 mg, Daily    diazepam (VALIUM) tablet 5 mg, Q8H PRN    DULoxetine (CYMBALTA) delayed release capsule 30 mg, Daily    gabapentin (NEURONTIN) capsule 300 mg, BID (AM & Afternoon)    gabapentin (NEURONTIN) capsule 600 mg, HS    heparin (porcine) 25,000 units in 0.45% NaCl 250 mL infusion (premix), Titrated, Last Rate: 17 Units/kg/hr (02/10/25 0054)    heparin (porcine) injection 3,400 Units, Q6H PRN    heparin (porcine) injection 6,800 Units, Q6H PRN    hydrALAZINE (APRESOLINE) injection 5 mg, Q6H PRN    insulin glargine (LANTUS) subcutaneous injection 7 Units 0.07 mL, HS    insulin lispro (HumALOG/ADMELOG) 100 units/mL subcutaneous injection 1-6 Units, 4x Daily (AC & HS) **AND** Fingerstick Glucose (POCT), 4x Daily AC and at bedtime    labetalol (NORMODYNE) injection 10 mg, Q4H PRN    methocarbamol (ROBAXIN) tablet 500 mg, Q6H JORGE    metoclopramide (REGLAN) injection 10 mg, Q6H PRN    ondansetron (ZOFRAN) injection 4 mg, Q6H PRN    oxyCODONE (ROXICODONE) IR tablet 5 mg, Q4H PRN    oxyCODONE (ROXICODONE) split tablet 2.5 mg, Q4H PRN    pantoprazole (PROTONIX) EC tablet 40 mg, Early Morning    [Held by provider] senna-docusate sodium (SENOKOT S) 8.6-50 mg per tablet 1 tablet, HS    trimethobenzamide (TIGAN) IM injection 200 mg, Q6H PRN    vancomycin (VANCOCIN) IVPB (premix in dextrose) 1,000 mg 200 mL, Q12H, Last Rate: Stopped (02/10/25 0108)  Home Medications:   Medications Prior to Admission:     acetaminophen  "(TYLENOL) 325 mg tablet    Alcohol Swabs 70 % PADS    aspirin (ECOTRIN LOW STRENGTH) 81 mg EC tablet    atorvastatin (LIPITOR) 80 mg tablet    Blood Glucose Monitoring Suppl (OneTouch Verio Reflect) w/Device KIT    diphenhydrAMINE (BENADRYL) 25 mg capsule    gabapentin (Neurontin) 300 mg capsule    glucose blood (OneTouch Verio) test strip    HYDROmorphone (DILAUDID) 4 mg tablet    ibuprofen (MOTRIN) 600 mg tablet    insulin aspart (NovoLOG FlexPen) 100 UNIT/ML injection pen    Insulin Glargine Solostar (Lantus SoloStar) 100 UNIT/ML SOPN    Insulin Pen Needle (BD Pen Needle Ana 2nd Gen) 32G X 4 MM MISC    Insulin Pen Needle (BD Pen Needle Ana 2nd Gen) 32G X 4 MM MISC    methocarbamol (ROBAXIN) 500 mg tablet    naloxone (NARCAN) 4 mg/0.1 mL nasal spray    Oneuch Delica Lancets 33G MISC    senna-docusate sodium (SENOKOT S) 8.6-50 mg per tablet    Allergies   Allergen Reactions    Codeine Other (See Comments)     Aggression-and nasty--\"took a cough syrup with codeine as a child\"       Objective   Vitals: Blood pressure 126/71, pulse 81, temperature 98.4 °F (36.9 °C), resp. rate 20, height 5' 1.2\" (1.554 m), weight 83.8 kg (184 lb 11.2 oz), last menstrual period 12/01/2023, SpO2 95%, not currently breastfeeding.  Orthostatic Blood Pressures      Flowsheet Row Most Recent Value   Blood Pressure 126/71 filed at 02/10/2025 0750   Patient Position - Orthostatic VS Lying filed at 02/09/2025 1509              Invasive Devices       Peripherally Inserted Central Catheter Line  Duration             PICC Line 01/31/25 Right 9 days                    Physical Exam  GEN: Lizzy Acuna appears well, alert and oriented x 3, pleasant and cooperative   HEENT:  Normocephalic, atraumatic, anicteric, moist mucous membranes  NECK: No JVD or carotid bruits   HEART: regular rhythm, regular rate, normal S1 and S2, no murmurs, clicks, gallops or rubs   LUNGS: Clear to auscultation bilaterally; no wheezes, rales, or rhonchi; respiration " nonlabored   ABDOMEN:  Normoactive bowel sounds, soft, no tenderness, no distention  EXTREMITIES: peripheral pulses palpable; no edema  NEURO: no gross focal findings; cranial nerves grossly intact   SKIN:  Dry, intact, warm to touch    Lab Results: I have personally reviewed pertinent lab results.        Results from last 7 days   Lab Units 02/10/25  0547 02/09/25  0513 02/08/25  0440   POTASSIUM mmol/L 4.6 4.4 4.1   CO2 mmol/L 31 32 33*   CHLORIDE mmol/L 95* 95* 97   BUN mg/dL 15 11 12   CREATININE mg/dL 0.76 0.67 0.70     Results from last 7 days   Lab Units 02/10/25  0547 02/09/25  0513 02/08/25  0440   HEMOGLOBIN g/dL 9.2* 8.6* 7.8*   HEMATOCRIT % 29.1* 27.2* 25.1*   PLATELETS Thousands/uL 294 282 273     Results from last 7 days   Lab Units 02/10/25  0547 02/09/25  0513 02/08/25  2047   PTT seconds 70* 71* 63*             Imaging:     Holter/Telemetry:   None    ECHO: No results found for this or any previous visit.      CATH/STRESS TEST:   None    EKG:   Date: 2/3/25  Interpretation: normal sinus rhythm, right axis deviation, low voltage QRS    VTE Prophylaxis: VTE covered by:  heparin (porcine), Intravenous, 17 Units/kg/hr at 02/10/25 0054  heparin (porcine), Intravenous, 3,400 Units at 02/08/25 0735  heparin (porcine), Intravenous          Vilma Duggan MD PGY-1  [facility]      ==========================================================================================    Counseling / Coordination of Care  Total floor / unit time spent today 30 minutes minutes.  Greater than 50% of total time was spent with the patient and / or family counseling and / or coordination of care.        ** Please Note: Fluency DirectDictation voice to text software may have been used in the creation of this document. **

## 2025-02-10 NOTE — ASSESSMENT & PLAN NOTE
Complicated by large abscess.  Patient underwent I&D on 1/29/2025 with a large abscess found with purulent fluid and necrotic tissue drained and debrided.  Wound cultures are growing MRSA and Finegoldia.  MRI and operative findings not consistent with osteomyelitis.  -Continue intravenous vancomycin until left lower extremity BKA done tomorrow  -Pharmacy follow-up for vancomycin dose management  -Recheck CBC with differential and BMP to make sure no developing toxicities  -Close podiatry follow-up  -Local wound care  -Serial exams  -Revascularization as below

## 2025-02-10 NOTE — UTILIZATION REVIEW
Continued Stay Review    Date: 02/10                          Current Patient Class: Inpatient  Current Level of Care: MS    HPI:49 y.o. female initially admitted on 01/31   Current Diagnosis: Cellulitis of L foot complicated by critical limb ischemia of the LLE w/ a non healing transmetatarsal amputation wound      Assessment/Plan: No acute ovn events. Pt reports that he is felling anxious about the upcoming surgery. Podiatry ff and planning for L BKA on 02/11. NPO at [facility]. ID ff, cont IV Vanco until post BKA. Cont pain control regimen. Cont hep gtt. Cont Plavix, statin. Wound care. Lantus reduced to 7U hs due to fasting BG below goal and pt will be NPO prior to surgery tomorrow. SSI.    Cardiology Consult: From a cardiac standpoint, there are no contraindications to proceeding with surgery as planned as low cardiac risk.       Medications:   Scheduled Medications:  acetaminophen, 975 mg, Oral, Q6H JORGE  aspirin, 81 mg, Oral, Daily  atorvastatin, 80 mg, Oral, Daily With Dinner  [START ON 2/11/2025] cefazolin, 2,000 mg, Intravenous, Once  chlorhexidine, 15 mL, Mouth/Throat, Q12H JORGE  [START ON 2/11/2025] chlorhexidine, 15 mL, Swish & Spit, Once  chlorthalidone, 12.5 mg, Oral, Daily  clopidogrel, 75 mg, Oral, Daily  DULoxetine, 30 mg, Oral, Daily  gabapentin, 300 mg, Oral, BID (AM & Afternoon)  gabapentin, 600 mg, Oral, HS  insulin glargine, 7 Units, Subcutaneous, HS  insulin lispro, 1-6 Units, Subcutaneous, 4x Daily (AC & HS)  methocarbamol, 500 mg, Oral, Q6H JORGE  pantoprazole, 40 mg, Oral, Early Morning  polyethylene glycol, 17 g, Oral, Daily  senna-docusate sodium, 1 tablet, Oral, HS  vancomycin, 10 mg/kg, Intravenous, Q12H      Continuous IV Infusions:  heparin (porcine), 3-30 Units/kg/hr (Order-Specific), Intravenous, Titrated  [START ON 2/11/2025] sodium chloride, 50 mL/hr, Intravenous, Continuous      PRN Meds:  diazepam, 5 mg, Oral, Q8H PRN  heparin (porcine), 3,400 Units, Intravenous, Q6H PRN  heparin  (porcine), 6,800 Units, Intravenous, Q6H PRN  hydrALAZINE, 5 mg, Intravenous, Q6H PRN  HYDROmorphone, 0.2 mg, Intravenous, Q4H PRN 02/10 x 1  labetalol, 10 mg, Intravenous, Q4H PRN  metoclopramide, 10 mg, Intravenous, Q6H PRN  ondansetron, 4 mg, Intravenous, Q6H PRN  oxyCODONE, 5 mg, Oral, Q4H PRN 02/10 x 3  oxyCODONE, 2.5 mg, Oral, Q4H PRN  trimethobenzamide, 200 mg, Intramuscular, Q6H PRN      Discharge Plan: TBD    Vital Signs (last 3 days)       Date/Time Temp Pulse Resp BP MAP (mmHg) SpO2 O2 Device Patient Position - Orthostatic VS [place] Coma Scale Score Pain    02/10/25 1703 -- -- -- -- -- -- -- -- -- 7    02/10/25 15:21:19 98 °F (36.7 °C) -- 15 127/62 84 -- -- -- -- --    02/10/25 1410 -- -- -- -- -- -- -- -- -- 8    02/10/25 1313 -- -- -- -- -- -- -- -- -- 8    02/10/25 1112 -- -- -- -- -- -- -- -- -- 6    02/10/25 0820 -- -- -- -- -- -- None (Room air) -- -- 4    02/10/25 07:50:09 98.4 °F (36.9 °C) 81 -- 126/71 89 95 % -- -- -- --    02/10/25 07:49:22 98.4 °F (36.9 °C) 78 -- 126/71 89 92 % -- -- -- --    02/10/25 0542 -- -- -- -- -- -- -- -- -- 7    02/10/25 0119 -- -- -- -- -- -- -- -- -- 7 02/09/25 2247 -- -- -- -- -- -- -- -- -- 10 - Worst Possible Pain    02/09/25 22:08:07 98.6 °F (37 °C) 83 20 131/82 98 94 % -- -- -- --    02/09/25 2108 -- -- -- -- -- -- -- -- -- 10 - Worst Possible Pain    02/09/25 2054 -- -- -- -- -- -- -- -- -- 10 - Worst Possible Pain    02/09/25 2000 -- -- -- -- -- -- -- -- 15 --    02/09/25 1726 -- -- -- -- -- -- -- -- -- 10 - Worst Possible Pain    02/09/25 1605 -- -- -- -- -- -- -- -- -- 8    02/09/25 15:09:17 98 °F (36.7 °C) 85 17 169/90 116 97 % None (Room air) Lying -- --    02/09/25 1111 -- -- -- -- -- -- -- -- -- 8    02/09/25 1012 -- -- -- -- -- -- -- -- -- 7 02/09/25 0930 -- -- -- -- -- -- -- -- 15 --    02/09/25 08:32:22 97.6 °F (36.4 °C) 88 -- 138/66 90 94 % -- -- -- --    02/09/25 0506 -- -- -- -- -- -- -- -- -- 7 02/09/25 0231 -- -- -- -- -- -- -- -- -- 9     02/08/25 2305 -- -- -- -- -- -- -- -- -- 9    02/08/25 22:14:21 98.2 °F (36.8 °C) -- 20 155/77 103 -- -- -- -- --    02/08/25 2030 -- -- -- -- -- -- -- -- -- 8 02/08/25 2000 -- -- -- -- -- -- -- -- 15 --    02/08/25 17:58:55 -- -- -- 136/68 91 -- -- -- -- --    02/08/25 1722 -- -- -- -- -- -- -- -- -- 8 02/08/25 1718 -- -- -- -- -- -- -- -- -- 8 02/08/25 15:13:19 97.5 °F (36.4 °C) -- 18 178/92 121 -- -- Lying -- --    02/08/25 1230 -- -- -- -- -- -- -- -- -- 10 - Worst Possible Pain    02/08/25 0927 -- -- -- -- -- -- -- -- -- 6    02/08/25 0800 -- -- -- -- -- -- -- -- 15 --    02/08/25 07:37:30 97.8 °F (36.6 °C) 88 17 169/94 119 97 % None (Room air) Lying -- --    02/08/25 0641 -- -- -- -- -- -- -- -- -- 8 02/07/25 22:45:03 -- 87 -- 145/68 94 95 % -- -- -- --    02/07/25 2000 -- -- -- -- -- -- -- -- 15 No Pain    02/07/25 15:43:24 -- 87 16 150/78 102 95 % -- -- -- --    02/07/25 1159 -- -- -- -- -- -- -- -- -- No Pain    02/07/25 1158 -- -- -- -- -- -- -- -- -- No Pain    02/07/25 11:08:45 -- 89 -- 148/69 95 95 % -- -- -- --    02/07/25 0830 -- -- -- -- -- -- None (Room air) -- 15 No Pain    02/07/25 07:55:13 97.5 °F (36.4 °C) 90 -- 122/68 86 94 % -- -- -- --    02/07/25 0549 -- -- -- -- -- -- -- -- -- 2          Weight (last 2 days)       Date/Time Weight    02/10/25 0300 83.8 (184.7)    02/09/25 0300 85.3 (188)    02/08/25 0600 95 (209.44)            Pertinent Labs/Diagnostic Results:   Radiology:  IR lower extremity angiogram   Final Interpretation by Nicholas Heard MD (02/07 0907)      Fluoroscopy provided for procedure guidance.      Acute occlusion of the popliteal artery and proximal anterior tibial artery successfully recannulated and treated with angioplasty resulting in single vessel runoff to the foot.                  Workstation performed: [id number]         IR TPA lysis check   Final Interpretation by Yamil Davidson MD (02/02 8581)      1. Left deep femoral, superficial femoral,  popliteal, tibioperoneal trunk, and anterior tibial arteries were patent.   2. Mild residual stenoses in the left superficial femoral and popliteal arteries treated with 4 mm balloon angioplasty.   3. Mild stenoses along the proximal left anterior tibial artery treated with 3 mm and 2.5 mm balloon angioplasties.   4. Left posterior tibial and peroneal arteries appeared chronically occluded distally.      Plan:      -Continue anticoagulation.   -Continue aspirin and statin.            Workstation performed: [id number]         IR TPA lysis check   Final Interpretation by Yamil Davidson MD (02/01 6725)      1. Residual luminal irregularities in the left superficial femoral artery treated with 4 mm balloon angioplasty.   2. Post angioplasty arteriogram showed reocclusion of left SFA, likely due to residual thrombus.   3. Lysis catheter replaced extending from the left SFA into the popliteal artery.      Plan:      -tPA to run through the lysis catheter at 1 mg/h.   -Return for lysis check tomorrow.            Workstation performed: [id number]         IR lower extremity angiogram   Final Interpretation by Yamil Davidson MD (01/31 2142)      1. Thrombosed left superficial femoral artery treated with aspiration thrombectomy and 4 mm balloon angioplasty with residual thrombus.   2. Thrombolysis initiated through a lysis catheter in the left SFA.   3. Right arm PICC placement through basilic vein access.      Plan:      -tPA to run at 1 mg/hr through the lysis catheter.   -All blood draws and labwork to be done through PICC.   -Return for lysis check tomorrow.               Workstation performed: [id number]         IR PICC placement double lumen   Final Interpretation by Yamil Davidson MD (01/31 2142)      1. Thrombosed left superficial femoral artery treated with aspiration thrombectomy and 4 mm balloon angioplasty with residual thrombus.   2. Thrombolysis initiated through a lysis catheter in the left SFA.   3. Right arm PICC  placement through basilic vein access.      Plan:      -tPA to run at 1 mg/hr through the lysis catheter.   -All blood draws and labwork to be done through PICC.   -Return for lysis check tomorrow.               Workstation performed: [id number]         CT head wo contrast   Final Interpretation by James Gimenez MD (01/31 0748)      1. No intracranial hemorrhage seen.   2. Bilateral sphenoid sinusitis                  Workstation performed: [id number]           Cardiology:  ECG 12 lead   Final Result by Jose Alberto Peñaloza MD (02/03 1354)   Normal sinus rhythm   Right axis deviation   Low voltage QRS   Abnormal ECG   When compared with ECG of 11-Nov-2018 17:45,   No signficant change   Confirmed by Jose Alberto Peñaloza ([id number]) on 2/3/2025 1:54:45 PM        GI:  No orders to display           Results from last 7 days   Lab Units 02/10/25  0547 02/09/25  0513 02/08/25 0440 02/07/25  0605 02/06/25  0636 02/05/25  0314   WBC Thousand/uL 10.50* 9.66 9.02 10.64* 10.25* 13.03*   HEMOGLOBIN g/dL 9.2* 8.6* 7.8* 8.2* 8.3* 8.4*   HEMATOCRIT % 29.1* 27.2* 25.1* 26.6* 26.3* 25.7*   PLATELETS Thousands/uL 294 282 273 271 266 235   TOTAL NEUT ABS Thousands/µL 6.54 5.81 5.16  --  6.14  --    BANDS PCT %  --   --   --   --   --  2         Results from last 7 days   Lab Units 02/10/25  0547 02/09/25  0513 02/08/25  0440 02/07/25  0605 02/06/25  0636   SODIUM mmol/L 135 138 137 133* 136   POTASSIUM mmol/L 4.6 4.4 4.1 4.6 3.9   CHLORIDE mmol/L 95* 95* 97 97 96   CO2 mmol/L 31 32 33* 30 32   ANION GAP mmol/L 9 11 7 6 8   BUN mg/dL 15 11 12 12 8   CREATININE mg/dL 0.76 0.67 0.70 0.79 0.85   EGFR ml/min/1.73sq m 93 104 102 88 81   CALCIUM mg/dL 9.6 9.7 8.9 8.7 8.9     Results from last 7 days   Lab Units 02/10/25  0547 02/06/25  0636 02/05/25  0314   AST U/L 11* 18 24   ALT U/L 5* 12 14   ALK PHOS U/L 78 87 102   TOTAL PROTEIN g/dL 7.1 6.3* 6.7   ALBUMIN g/dL 3.4* 3.0* 3.2*   TOTAL BILIRUBIN mg/dL 0.51 0.39 0.31     Results from last 7 days  "  Lab Units 02/10/25  1613 02/10/25  1104 02/10/25  0630 02/09/25  2052 02/09/25  1547 02/09/25  1104 02/09/25  0624 02/08/25  2040 02/08/25  1552 02/08/25  1046 02/08/25  0649 02/07/25  2047   POC GLUCOSE mg/dl 167* 126 130 144* 144* 164* 116 137 149* 152* 132 201*     Results from last 7 days   Lab Units 02/10/25  0547 02/09/25  0513 02/08/25  0440 02/07/25  0605 02/06/25  0636 02/05/25  1354 02/05/25  0314 02/04/25  0448   GLUCOSE RANDOM mg/dL 130 114 177* 187* 99 150* 164* 151*             No results found for: \"BETA-HYDROXYBUTYRATE\"                           Results from last 7 days   Lab Units 02/10/25  0547 02/09/25  0513 02/08/25  2047   PTT seconds 70* 71* 63*         Results from last 7 days   Lab Units 02/04/25  2304   PROCALCITONIN ng/ml 0.14                             Results from last 7 days   Lab Units 02/04/25  0555   UNIT PRODUCT CODE  [id number]   UNIT NUMBER  [id number]   UNITABO  A   UNITRH  NEG   CROSSMATCH  Compatible   UNIT DISPENSE STATUS  Presumed Trans   UNIT PRODUCT VOL mL 350                                                                     Results from last 7 days   Lab Units 02/07/25  0605   VANCOMYCIN RM ug/mL 19.9       Network Utilization Review Department  ATTENTION: Please call with any questions or concerns to [phone] and carefully listen to the prompts so that you are directed to the right person. All voicemails are confidential.   For Discharge needs, contact Care Management DC Support Team at [phone] opt. [phone]  Send all requests for admission clinical reviews, approved or denied determinations and any other requests to dedicated fax number below belonging to the campus where the patient is receiving treatment. List of dedicated fax numbers for the Facilities:  FACILITY NAME UR FAX NUMBER   ADMISSION DENIALS (Administrative/Medical Necessity) [phone]   DISCHARGE SUPPORT TEAM (NETWORK) [phone]   PARENT CHILD HEALTH (Maternity/NICU/Pediatrics) [phone] "   [facility] [phone]   [facility] [phone]   [facility] [phone]   [facility] [phone]   [facility] [phone]   [facility] [phone]   [facility] [phone]   [facility] [phone]   [facility] [phone]   [facility] [phone]   [facility] [phone]   [facility] [phone]

## 2025-02-10 NOTE — RESTORATIVE TECHNICIAN NOTE
Restorative Technician Note      Patient Name: Lizzy Acuna     Restorative Tech Visit Date: 02/10/25  Note Type: Mobility  Patient Position Upon Consult: Other (Comment) (in restroom)  Mobility / Activity Provided: NWB LLE  Activity Performed: Ambulated  Assistive Device: Other (Comment) (personal rollator)  Patient Position at End of Consult: Supine; All needs within reach; Bed/Chair alarm activated

## 2025-02-10 NOTE — ASSESSMENT & PLAN NOTE
Underwent an elective R TMA approx 3 weeks ago and was doing well until last week when she was noticed to have dehiscence of her surgical wound.   Wound was debrided by her podiatrist in the office but ultimately worsened with evidence of cellulitis.   LLE foot infection diagnosed clinically without MRI findings of osteomyelitis.   Tissue culture: 2+ MRSA, 2+ Finegoldia magna No significant leukocytosis or fever this admission. Site continues to bleed likely due to triple therapy   A1c 10.3  -L SFA stent s/p L TMA, nonhealing, s/p lysis and tibial intervention (AT PTA)  -despite adequate perfusion w/ AT runoff, patient continues to be extremely high risk for higher amputation 2/2 TMA dehiscence and extensive necrosis  S/P Balloon angioplasty 2/7 of the left popliteal and anterior tibial arteries   S/p L BKA with podiatry on 2/11    Plan:   Okay from podiatry standpoint for discharge, will monitor overnight as nerve block still providing relief and need to ensure pain controlled prior to discharge  Continue Plavix/statin  Continue heparin gtt  ID following - final dose of vancomycin today then d/c abx as source control achieved   Current pain regimen: Tylenol 975 mg q6h, duloxetine 30mg daily, gabapentin 300 mg bid plus 600mg hs, robaxin 500 mg q6h; oxy 2.5/5 for moderate/severe  APS following previously when pt on PCA but now transitioned off  Wound care

## 2025-02-10 NOTE — ASSESSMENT & PLAN NOTE
- Current management at discretion of primary team   - Ensure optimal management during rehab process  - Monitor vitals with and without activity; monitor for orthostasis  - Monitor hemoglobin, electrolytes, kidney function, hydration status   - Current meds: per other providers

## 2025-02-10 NOTE — ASSESSMENT & PLAN NOTE
- Mgmt per primary team and recommend optimal mgmt during rehab process  - Monitor H/H, vitals, signs/symptoms of acute bleeding

## 2025-02-10 NOTE — CONSULTS
CONSULTATION - PMR   Name: Lizzy Acuna 49 y.o. female I MRN: [id number]  Unit/Bed#: [facility] 511-01 I Date of Admission: 1/31/2025   Date of Service: 2/10/2025 I Hospital Day: 10     Referred by:  Catalina Abdul PA-C  For:  PAD, pending BKA   Assessment & Plan  Cellulitis of left foot complicated by Critical limb threatening ischemia of the left lower extremity with a nonhealing transmetatarsal amputation wound  - Remains on Vanco pending sx   - Monitor labs, vitals, exam closely   Pending L BKA 2/11  - NWB surgical limb  - Optimal ROM to avoid/decrease risk of contractures  - Monitor incision/area for infection or dehiscence/impaired healing   - It is normal to have some swelling or discoloration around the incision initially post-operatively. If develops increasing redness, tenderness/pain, discharge, or dehiscence develops contact surgical service   - Wound care of incision site per surgery team   - Monitor for signs/symptoms of VTE, atelectasis, acute blood loss anemia, or other infection   - Patient (if applicable caregiver) training and education on amputation, possible phantom symptoms, recovery process  - Neuropsychology consult if available particularly during rehab course, supportive counseling  - Optimal pain control  - Monitor contralateral limb closely for concerning s/s   - Fall Precautions   - Consider Orthotic order for residual limb protector if appropriate in rehab setting   - Recommend acute comprehensive interdisciplinary inpatient rehabilitation to include intensive skilled therapies (PT, OT) as outlined with oversight and management by rehabilitation physician as well as inpatient rehab level nursing, case management and weekly interdisciplinary team meetings.   - Follow-up with PMR and Sx after discharge    Prior Level of Function and Social history:    Patient lives with mother, daughters 7 and 19 who can help after rehab, and grandchildren 2 SH with 7 v 10 LETY with full bath on main level  and bedroom on 2nd fl.  Independent with ADLs  Independent with ambulation    Current Level of Function:    Pending Post-op assessment by PT/OT  (Pre-op OT min assist LBB, LBB, toileting; PT supervision transfers and short distance ambulation)     Disposition recommendation:  [facility]/IRF  Patient is EXPECTED to be a good candidate for transfer to [facility]C/IRF pending:    - Follow-up PT, OT evals showing adequate functional need and tolerance for ARC/IRF but this is expected   - Medical clearance/stability  - Facility acceptance, insurance authorization, and bed availability  - Significant changes (worsening or significant improvements) in functional status, in the interim, that would warrant dispo to different location      Diabetes mellitus type 2 with complications (HCC)  Lab Results   Component Value Date    HGBA1C 10.3 (H) 12/13/2024       Recent Labs     02/09/25  1547 02/09/25  2052 02/10/25  0630 02/10/25  1104   POCGLU 144* 144* 130 126       Blood Sugar Average: Last 72 hrs:  (P) 153  - Current management by internal medicine  - Monitor for signs and symptoms of hypoglycemia   - Current meds: per primary  - Consistent carb diabetic diet  - Recommend close monitoring and optimal management during recovery/rehab phase as well     Hyponatremia    Diabetic foot infection  (HCC)  Lab Results   Component Value Date    HGBA1C 10.3 (H) 12/13/2024       Recent Labs     02/09/25  1547 02/09/25  2052 02/10/25  0630 02/10/25  1104   POCGLU 144* 144* 130 126       Blood Sugar Average: Last 72 hrs:  (P) 153    PAD (peripheral artery disease) (HCC)  - Overall management per vasc sx during acute course  - Antithrombotic: aspirin, plavix  - Statin  - Optimal blood pressure and blood sugar control    Blood loss anemia  - Mgmt per primary team and recommend optimal mgmt during rehab process  - Monitor H/H, vitals, signs/symptoms of acute bleeding    Obesity (BMI 30-39.9)  -  on appropriate nutrition and activity  - Adjustments  accordingly during skilled therapy   - Monitor skin closely for breakdown, wounds, rashes; keep clean and dry, turning Q2H   - Follow-up with PCP after d/c    Primary hypertension  - Current management at discretion of primary team   - Ensure optimal management during rehab process  - Monitor vitals with and without activity; monitor for orthostasis  - Monitor hemoglobin, electrolytes, kidney function, hydration status   - Current meds: per other providers      Encounter for skin care  - Increased risk of skin wounds/breakdown/rashes due to recent immobility and co-morbidities   - Turn patient Q2H in bed (use pillows or wedges), encourage wt shifts every 10-15 minutes in chair  - Patient and if available caregiver education   - Hydragaurd (or other appropriate barrier cream) to buttocks and sacrum BID & PRN   - Allevyn foam to heels and/or float heels when in bed   - Optimal bowel/bladder hygiene; keep skin clean and dry   - EHOB waffle cushion to chair/WC when OOB  - Rec nursing to document in chart and Notify MD if buttock, sacrum, heel, or other skin site develops erythema, skin breakdown, or rashes as soon as possible.  If patient is soiling themselves with urine or stool notify MD.  If you are unable to maintain skin integrity and prevent erythema due to frequency of soiling notify MD as soon as possible as well  - Consult would care for any wounds or significant concerns for skin integrity     VTE prophylaxis   As outlined by primary team      Other medical issues:     As per primary service (and relevant consultants if applicable)    ==================================================================    Chief Complaint:  L foot pain     History of Present Illness:   Lizzy Acuna is a 49 y.o. female with PMH of DM2, HTN, HLD, obesity, PAD s/p L TMA 1/3/2025 who presented with worsening L foot pain/wound and met sepsis criteria who was started on broad spectrum abx, IVF believed to be 2/2 L diabetic foot  "wound.  Patient underwent L SFA thrombectomy, placement of lysis catheter on 1/31 and lysis recheck with angioplasty of L SFA, pop, and AT on 2/3, L pop artery and prox AT angioplasty and recannulation for acute occlusion on 2/6 by IR, followed by arteriogram with balloon angioplasty of L pop, AT, including angioplasty of L pop and AT with cutting angioplasty balloon 2/7 by [facility] sx.  Residual foot eventually deemed non-salvageable with plan for BKA 2/11.      On eval, patient reports L foot pain with intermittent increased pain.  She denies R foot pain, lightheadedness, SOB, fever, chills, or other new complaints.      Review of Systems:     Complete review of systems obtained.    Please see HPI for details with other significant symptoms or history listed here:    Otherwise, 14 point review of systems completed and was otherwise unremarkable.    Allergies   Allergen Reactions    Codeine Other (See Comments)     Aggression-and nasty--\"took a cough syrup with codeine as a child\"       Current Facility-Administered Medications:     acetaminophen (TYLENOL) tablet 975 mg, 975 mg, Oral, Q6H JORGE, Yoan Franco DO, 975 mg at 02/10/25 1112    aspirin (ECOTRIN LOW STRENGTH) EC tablet 81 mg, 81 mg, Oral, Daily, Yoan Franco DO, 81 mg at 02/10/25 0811    atorvastatin (LIPITOR) tablet 80 mg, 80 mg, Oral, Daily With Dinner, Yoan Franco DO, 80 mg at 02/09/25 1605    chlorhexidine (PERIDEX) 0.12 % oral rinse 15 mL, 15 mL, Mouth/Throat, Q12H JORGE, Yoan Franco DO, 15 mL at 02/10/25 0811    chlorthalidone tablet 12.5 mg, 12.5 mg, Oral, Daily, Yoan Franco DO, 12.5 mg at 02/10/25 0813    clopidogrel (PLAVIX) tablet 75 mg, 75 mg, Oral, Daily, Yoan Franco DO, 75 mg at 02/10/25 0811    diazepam (VALIUM) tablet 5 mg, 5 mg, Oral, Q8H PRN, Yoan Franco DO, 5 mg at 02/09/25 3138    DULoxetine (CYMBALTA) delayed release capsule 30 mg, 30 mg, Oral, Daily, Yoan Franco DO, 30 mg at 02/10/25 0811    gabapentin (NEURONTIN) capsule 300 " mg, 300 mg, Oral, BID (AM & Afternoon), Yoan Franco DO, 300 mg at 02/10/25 0811    gabapentin (NEURONTIN) capsule 600 mg, 600 mg, Oral, HS, Yoan Franco DO, 600 mg at 02/09/25 2108    heparin (porcine) 25,000 units in 0.45% NaCl 250 mL infusion (premix), 3-30 Units/kg/hr (Order-Specific), Intravenous, Titrated, Yoan Franco DO, Last Rate: 14.5 mL/hr at 02/10/25 0054, 17 Units/kg/hr at 02/10/25 0054    heparin (porcine) injection 3,400 Units, 3,400 Units, Intravenous, Q6H PRN, Yoan Franco DO, 3,400 Units at 02/08/25 0735    heparin (porcine) injection 6,800 Units, 6,800 Units, Intravenous, Q6H PRN, Yoan Franco DO    hydrALAZINE (APRESOLINE) injection 5 mg, 5 mg, Intravenous, Q6H PRN, Yoan Franco DO, 5 mg at 02/05/25 1141    HYDROmorphone HCl (DILAUDID) injection 0.2 mg, 0.2 mg, Intravenous, Q4H PRN, Sharron Stoddard MD    insulin glargine (LANTUS) subcutaneous injection 7 Units 0.07 mL, 7 Units, Subcutaneous, HS, Sharron Stoddard MD    insulin lispro (HumALOG/ADMELOG) 100 units/mL subcutaneous injection 1-6 Units, 1-6 Units, Subcutaneous, 4x Daily (AC & HS), 1 Units at 02/09/25 1112 **AND** Fingerstick Glucose (POCT), , , 4x Daily AC and at bedtime, Brandon Bull MD    labetalol (NORMODYNE) injection 10 mg, 10 mg, Intravenous, Q4H PRN, Yoan Franco DO, 10 mg at 02/05/25 1706    methocarbamol (ROBAXIN) tablet 500 mg, 500 mg, Oral, Q6H JORGE, James Da Silva PA-C, 500 mg at 02/10/25 1112    metoclopramide (REGLAN) injection 10 mg, 10 mg, Intravenous, Q6H PRN, Yoan Franco DO, 10 mg at 02/05/25 1613    ondansetron (ZOFRAN) injection 4 mg, 4 mg, Intravenous, Q6H PRN, Yoan Franco DO, 4 mg at 02/05/25 1459    oxyCODONE (ROXICODONE) IR tablet 5 mg, 5 mg, Oral, Q4H PRN, Jane Laura MD, 5 mg at 02/10/25 0542    oxyCODONE (ROXICODONE) split tablet 2.5 mg, 2.5 mg, Oral, Q4H PRN, Jane Laura MD    pantoprazole (PROTONIX) EC tablet 40 mg, 40 mg, Oral, Early Morning, Kerry Hernandez MD, 40 mg  "at 02/10/25 0542    polyethylene glycol (MIRALAX) packet 17 g, 17 g, Oral, Daily, Sharron Stoddard MD, 17 g at 02/10/25 1204    senna-docusate sodium (SENOKOT S) 8.6-50 mg per tablet 1 tablet, 1 tablet, Oral, HS, Sharron Stoddard MD, 1 tablet at 25 0056    trimethobenzamide (TIGAN) IM injection 200 mg, 200 mg, Intramuscular, Q6H PRN, Yoan Franco,     vancomycin (VANCOCIN) IVPB (premix in dextrose) 1,000 mg 200 mL, 10 mg/kg, Intravenous, Q12H, Kb Sr MD, Last Rate: 200 mL/hr at 02/10/25 1207, 1,000 mg at 02/10/25 1207    Past Medical History:   Diagnosis Date    Advanced maternal age in multigravida, second trimester 2016    Transitioned From: Advanced maternal age in multigravida, first trimester      Back pain     used 2 percocet tid until current admission in 2017    Bone spur     in back per pt    Chronic hypertension with superimposed preeclampsia 2017    Depression     Diabetes mellitus (HCC)     Elevated BP without diagnosis of hypertension     \"with pain\"    Foot injury     Full dentures     does not wear    GERD (gastroesophageal reflux disease)     occas    Gestational diabetes     insulin dependent    Herniated cervical disc     Herniated lumbar intervertebral disc     History of pneumonia     Hx of degenerative disc disease     Hyperlipidemia     Obesity     Pre-eclampsia     Prior pregnancy with fetal demise     previous demise at 36 weeks    Toe pain     and foot pain       Past Surgical History:   Procedure Laterality Date    ARTERIOGRAM Left 2025    Procedure: LEFT lower extremity arteriogram w/ popiteal and anterior tibial artery angioplasty;  Surgeon: Ottoniel Victoria MD;  Location: [facility];  Service: Vascular    BACK SURGERY       SECTION      INCISION AND DRAINAGE OF WOUND Left 2025    Procedure: INCISION AND DRAINAGE (I&D) EXTREMITY;  Surgeon: Leopoldo Ritchie DPM;  Location: [facility];  Service: Podiatry    IR LOWER EXTREMITY " ANGIOGRAM  2024    IR LOWER EXTREMITY ANGIOGRAM  2025    IR LOWER EXTREMITY ANGIOGRAM  2025    IR PICC PLACEMENT DOUBLE LUMEN  2025    IR TPA LYSIS CHECK  2025    IR TPA LYSIS CHECK  2025    LAMINECTOMY      with disc removal, last assessed 16    OTHER SURGICAL HISTORY      Right ovary removal 2005 per pt recall at [facility] facillity    WA AMPUTATION FOOT TRANSMETARSAL Left 1/3/2025    Procedure: AMPUTATION TRANSMETATARSAL (TMA);  Surgeon: Leopoldo Ritchie DPM;  Location: [facility];  Service: Podiatry    WA  DELIVERY ONLY N/A 02/15/2017    Procedure:  SECTION ();  Surgeon: Tito Umaña MD;  [facility]: [facility];  Service: Obstetrics    WA LIG/TRNSXJ FALOPIAN TUBE  DEL/ABDML SURG Left 02/15/2017    Procedure: LIGATION/COAGULATION TUBAL;  Surgeon: Tito Umaña MD;  Location: [facility];  Service: Obstetrics    VASCULAR SURGERY  2024    WISDOM TOOTH EXTRACTION        Family History   Problem Relation Age of Onset    Diabetes Mother     COPD Mother     Heart disease Mother     Stroke Father     Heart disease Father        Social History available currently in EMR:  (See additional SH as outlined above)   Social History     Socioeconomic History    Marital status: Unknown     Spouse name: Not on file    Number of children: Not on file    Years of education: Not on file    Highest education level: Not on file   Occupational History    Occupation: unemployed   Tobacco Use    Smoking status: Former     Current packs/day: 0.50     Types: Cigarettes    Smokeless tobacco: Never    Tobacco comments:     Per pt last cigarette 24--quit cold turkey   Vaping Use    Vaping status: Never Used   Substance and Sexual Activity    Alcohol use: Yes     Comment: 1-2 drinks a year    Drug use: Not Currently    Sexual activity: Not on file     Comment: defer   Other Topics Concern    Not on file   Social History Narrative    Lack of access to  transportation     Social Drivers of Health     Financial Resource Strain: Not on file   Food Insecurity: No Food Insecurity (2025)    Nursing - Inadequate Food Risk Classification     Worried About Running Out of Food in the Last Year: Not on file     Ran Out of Food in the Last Year: Not on file     Ran Out of Food in the Last Year: Never true   Transportation Needs: No Transportation Needs (2025)    Nursing - Transportation Risk Classification     Lack of Transportation: Not on file     Lack of Transportation: No   Physical Activity: Not on file   Stress: Not on file   Social Connections: Not on file   Intimate Partner Violence: Unknown (2025)    Nursing IPS     Feels Physically and Emotionally Safe: Not on file     Physically Hurt by Someone: Not on file     Humiliated or Emotionally Abused by Someone: Not on file     Physically Hurt by Someone: No     Hurt or Threatened by Someone: No   Housing Stability: Unknown (2025)    Nursing: Inadequate Housing Risk Classification     Has Housing: Not on file     Worried About Losing Housing: Not on file     Unable to Get Utilities: Not on file     Unable to Pay for Housing in the Last Year: No     Has Housin       Physical Examination:   Temp:  [98 °F (36.7 °C)-98.6 °F (37 °C)] 98.4 °F (36.9 °C)  HR:  [78-85] 81  BP: (126-169)/(71-90) 126/71  Resp:  [17-20] 20  SpO2:  [92 %-97 %] 95 %  O2 Device: None (Room air)  General: Awake, alert in NAD  HENT: NCAT, MMM  Respiratory: Unlabored breathing  Cardiovascular: Regular rate   Gastrointestinal: Soft, non-distended  Genitourinary: No negron  SkiN/MSK/Extremities:  No calf edema, no calf tenderness to palpation; L foot with dressing in place  Neurologic/Psych:   MENTAL STATUS: awake, oriented to person, place, time, and situation  Affect: Euthymic   Strength/MMT:  grossly intact     Data:  Lab Results   Component Value Date    HGB 9.2 (L) 02/10/2025    HCT 29.1 (L) 02/10/2025    WBC 10.50 (H) 02/10/2025     K 4.6 02/10/2025    CL 95 (L) 02/10/2025    CREATININE 0.76 02/10/2025    BUN 15 02/10/2025       IR TPA lysis check  Result Date: 2/2/2025  Impression: 1. Left deep femoral, superficial femoral, popliteal, tibioperoneal trunk, and anterior tibial arteries were patent. 2. Mild residual stenoses in the left superficial femoral and popliteal arteries treated with 4 mm balloon angioplasty. 3. Mild stenoses along the proximal left anterior tibial artery treated with 3 mm and 2.5 mm balloon angioplasties. 4. Left posterior tibial and peroneal arteries appeared chronically occluded distally. Plan: -Continue anticoagulation. -Continue aspirin and statin. Workstation performed: [id number]     IR TPA lysis check  Result Date: 2/1/2025  Impression: 1. Residual luminal irregularities in the left superficial femoral artery treated with 4 mm balloon angioplasty. 2. Post angioplasty arteriogram showed reocclusion of left SFA, likely due to residual thrombus. 3. Lysis catheter replaced extending from the left SFA into the popliteal artery. Plan: -tPA to run through the lysis catheter at 1 mg/h. -Return for lysis check tomorrow. Workstation performed: [id number]     IR lower extremity angiogram  Result Date: 1/31/2025  Impression: 1. Thrombosed left superficial femoral artery treated with aspiration thrombectomy and 4 mm balloon angioplasty with residual thrombus. 2. Thrombolysis initiated through a lysis catheter in the left SFA. 3. Right arm PICC placement through basilic vein access. Plan: -tPA to run at 1 mg/hr through the lysis catheter. -All blood draws and labwork to be done through PICC. -Return for lysis check tomorrow. Workstation performed: [id number]     IR PICC placement double lumen  Result Date: 1/31/2025  Impression: 1. Thrombosed left superficial femoral artery treated with aspiration thrombectomy and 4 mm balloon angioplasty with residual thrombus. 2. Thrombolysis initiated through a lysis catheter in the  left SFA. 3. Right arm PICC placement through basilic vein access. Plan: -tPA to run at 1 mg/hr through the lysis catheter. -All blood draws and labwork to be done through PICC. -Return for lysis check tomorrow. Workstation performed: [id number]     CT head wo contrast  Result Date: 1/31/2025  Impression: 1. No intracranial hemorrhage seen. 2. Bilateral sphenoid sinusitis Workstation performed: [id number]       Pertinent labs and imaging reviewed.      Patient seen on date of service listed.    65 minutes or greater spent for this encounter which included a combination of face-to-face time with patient and non-face-to-face time which in part specifically includes management of pending amputation.  Face-to-face time included extended discussion with patient regarding current condition, medical history, mood, medical/rehabilitation management, and disposition.  Non face-to-face time included coordination of care with patient's co-managing AP and/or physician(s) thru communication and/or review of their recent documentation as well as reviewing vitals, bowel/bladder function, recent labs, diagnostic imaging, and notes from therapy, CM, and nursing.      Thank you for allowing the PM&R service to participate in the care of this patient. We will continue to follow Lizzy Acuna's progress with you. Please do not hesitate to call with questions or concerns.    Chris Newell MD, MS  [facility]  Physical Medicine and Rehabilitation  Brain Injury Medicine

## 2025-02-10 NOTE — PROGRESS NOTES
Vancomycin IV Pharmacy-to-Dose Consultation    Lizzy Acuna is a 49 y.o. female who is currently ordered Vancomycin IV with management by the Pharmacy Consult service.    Relevant clinical data and objective / subjective history reviewed.    Vancomycin Assessment:    Indication and Goal AUC/Trough: SSTI (goal -600, trough >10)    Clinical Status: stable    Micro:   1/29 L foot tissue anaerobic culture: 2+ Finegoldia magna  1/29 L foot tissue culture: 2+ MRSA  1/28 BC x 2: no growth after 5 days    Renal Function:  SCr: 0.76 mg/dL  CrCl: 80 mL/min  Renal replacement: none    Days of Therapy: 14    Current Dose: 1000 mg IV q12h    Vancomycin Plan:    Continue current dose    Estimated AUC: 538 mcg*hr/mL    Estimated Trough: 15.7 mcg/mL    Next Level: 2/12 at 0600    Renal Function Monitoring: Daily BMP and UOP    Pharmacy will continue to follow closely for s/sx of nephrotoxicity, infusion reactions and appropriateness of therapy.  BMP and CBC will be ordered per protocol. We will continue to follow the patient’s culture results and clinical progress daily.    Blanca Donis, PharmD  Pharmacist

## 2025-02-10 NOTE — ASSESSMENT & PLAN NOTE
Status post angiogram with intervention including left SFA thrombectomy and placement of lysis catheter on 1/31/2025.  Repeat imaging demonstrated residual stenosis of left lower extremity.  Patient status post repeat arteriogram on 2/6/2025 with intervention with apparent reestablished flow.  Still unable to successfully reestablish flow  -Ongoing anticoagulation  -Close vascular follow-up  -Patient for left lower extremity BKA tomorrow

## 2025-02-10 NOTE — ASSESSMENT & PLAN NOTE
- Remains on Vanco pending sx   - Monitor labs, vitals, exam closely   Pending L BKA 2/11  - NWB surgical limb  - Optimal ROM to avoid/decrease risk of contractures  - Monitor incision/area for infection or dehiscence/impaired healing   - It is normal to have some swelling or discoloration around the incision initially post-operatively. If develops increasing redness, tenderness/pain, discharge, or dehiscence develops contact surgical service   - Wound care of incision site per surgery team   - Monitor for signs/symptoms of VTE, atelectasis, acute blood loss anemia, or other infection   - Patient (if applicable caregiver) training and education on amputation, possible phantom symptoms, recovery process  - Neuropsychology consult if available particularly during rehab course, supportive counseling  - Optimal pain control  - Monitor contralateral limb closely for concerning s/s   - Fall Precautions   - Consider Orthotic order for residual limb protector if appropriate in rehab setting   - Recommend acute comprehensive interdisciplinary inpatient rehabilitation to include intensive skilled therapies (PT, OT) as outlined with oversight and management by rehabilitation physician as well as inpatient rehab level nursing, case management and weekly interdisciplinary team meetings.   - Follow-up with PMR and Sx after discharge    Prior Level of Function and Social history:    Patient lives with mother, daughters 7 and 19 who can help after rehab, and grandchildren 2 SH with 7 v 10 LETY with full bath on main level and bedroom on 2nd fl.  Independent with ADLs  Independent with ambulation    Current Level of Function:    Pending Post-op assessment by PT/OT  (Pre-op OT min assist LBB, LBB, toileting; PT supervision transfers and short distance ambulation)     Disposition recommendation:  [facility]/IRF  Patient is EXPECTED to be a good candidate for transfer to [facility]/IRF pending:    - Follow-up PT, OT evals showing adequate functional  need and tolerance for [facility]/IRF but this is expected   - Medical clearance/stability  - Facility acceptance, insurance authorization, and bed availability  - Significant changes (worsening or significant improvements) in functional status, in the interim, that would warrant dispo to different location

## 2025-02-11 ENCOUNTER — APPOINTMENT (INPATIENT)
Dept: RADIOLOGY | Facility: HOSPITAL | Age: 50
DRG: 169 | End: 2025-02-11
Payer: COMMERCIAL

## 2025-02-11 ENCOUNTER — ANESTHESIA (INPATIENT)
Dept: PERIOP | Facility: HOSPITAL | Age: 50
DRG: 169 | End: 2025-02-11
Payer: COMMERCIAL

## 2025-02-11 LAB
2HR DELTA HS TROPONIN: 52 NG/L
ANION GAP SERPL CALCULATED.3IONS-SCNC: 10 MMOL/L (ref 4–13)
APTT PPP: 71 SECONDS (ref 23–34)
BASOPHILS # BLD AUTO: 0.01 THOUSANDS/ΜL (ref 0–0.1)
BASOPHILS NFR BLD AUTO: 0 % (ref 0–1)
BUN SERPL-MCNC: 19 MG/DL (ref 5–25)
CALCIUM SERPL-MCNC: 8.7 MG/DL (ref 8.4–10.2)
CARDIAC TROPONIN I PNL SERPL HS: 14 NG/L (ref ?–50)
CARDIAC TROPONIN I PNL SERPL HS: 66 NG/L (ref ?–50)
CHLORIDE SERPL-SCNC: 98 MMOL/L (ref 96–108)
CO2 SERPL-SCNC: 26 MMOL/L (ref 21–32)
CREAT SERPL-MCNC: 0.81 MG/DL (ref 0.6–1.3)
EOSINOPHIL # BLD AUTO: 0.1 THOUSAND/ΜL (ref 0–0.61)
EOSINOPHIL NFR BLD AUTO: 1 % (ref 0–6)
ERYTHROCYTE [DISTWIDTH] IN BLOOD BY AUTOMATED COUNT: 14.8 % (ref 11.6–15.1)
GFR SERPL CREATININE-BSD FRML MDRD: 86 ML/MIN/1.73SQ M
GLUCOSE SERPL-MCNC: 118 MG/DL (ref 65–140)
GLUCOSE SERPL-MCNC: 138 MG/DL (ref 65–140)
GLUCOSE SERPL-MCNC: 151 MG/DL (ref 65–140)
GLUCOSE SERPL-MCNC: 158 MG/DL (ref 65–140)
GLUCOSE SERPL-MCNC: 158 MG/DL (ref 65–140)
GLUCOSE SERPL-MCNC: 216 MG/DL (ref 65–140)
GLUCOSE SERPL-MCNC: 274 MG/DL (ref 65–140)
HCT VFR BLD AUTO: 27.9 % (ref 34.8–46.1)
HGB BLD-MCNC: 8.8 G/DL (ref 11.5–15.4)
IMM GRANULOCYTES # BLD AUTO: 0.06 THOUSAND/UL (ref 0–0.2)
IMM GRANULOCYTES NFR BLD AUTO: 1 % (ref 0–2)
LYMPHOCYTES # BLD AUTO: 3.07 THOUSANDS/ΜL (ref 0.6–4.47)
LYMPHOCYTES NFR BLD AUTO: 26 % (ref 14–44)
MCH RBC QN AUTO: 29 PG (ref 26.8–34.3)
MCHC RBC AUTO-ENTMCNC: 31.5 G/DL (ref 31.4–37.4)
MCV RBC AUTO: 92 FL (ref 82–98)
MONOCYTES # BLD AUTO: 0.39 THOUSAND/ΜL (ref 0.17–1.22)
MONOCYTES NFR BLD AUTO: 3 % (ref 4–12)
NEUTROPHILS # BLD AUTO: 8.19 THOUSANDS/ΜL (ref 1.85–7.62)
NEUTS SEG NFR BLD AUTO: 69 % (ref 43–75)
NRBC BLD AUTO-RTO: 0 /100 WBCS
PLATELET # BLD AUTO: 239 THOUSANDS/UL (ref 149–390)
PLATELET # BLD AUTO: 288 THOUSANDS/UL (ref 149–390)
PMV BLD AUTO: 9.2 FL (ref 8.9–12.7)
PMV BLD AUTO: 9.2 FL (ref 8.9–12.7)
POTASSIUM SERPL-SCNC: 4.2 MMOL/L (ref 3.5–5.3)
RBC # BLD AUTO: 3.03 MILLION/UL (ref 3.81–5.12)
SODIUM SERPL-SCNC: 134 MMOL/L (ref 135–147)
WBC # BLD AUTO: 11.82 THOUSAND/UL (ref 4.31–10.16)

## 2025-02-11 PROCEDURE — 74174 CTA ABD&PLVS W/CONTRAST: CPT

## 2025-02-11 PROCEDURE — 84484 ASSAY OF TROPONIN QUANT: CPT

## 2025-02-11 PROCEDURE — 82948 REAGENT STRIP/BLOOD GLUCOSE: CPT

## 2025-02-11 PROCEDURE — 85025 COMPLETE CBC W/AUTO DIFF WBC: CPT

## 2025-02-11 PROCEDURE — G0545 PR INHERENT VISIT TO INPT: HCPCS | Performed by: INTERNAL MEDICINE

## 2025-02-11 PROCEDURE — 85049 AUTOMATED PLATELET COUNT: CPT | Performed by: SURGERY

## 2025-02-11 PROCEDURE — 85730 THROMBOPLASTIN TIME PARTIAL: CPT | Performed by: INTERNAL MEDICINE

## 2025-02-11 PROCEDURE — 93005 ELECTROCARDIOGRAM TRACING: CPT

## 2025-02-11 PROCEDURE — 88311 DECALCIFY TISSUE: CPT | Performed by: STUDENT IN AN ORGANIZED HEALTH CARE EDUCATION/TRAINING PROGRAM

## 2025-02-11 PROCEDURE — 99232 SBSQ HOSP IP/OBS MODERATE 35: CPT | Performed by: INTERNAL MEDICINE

## 2025-02-11 PROCEDURE — 80048 BASIC METABOLIC PNL TOTAL CA: CPT | Performed by: INTERNAL MEDICINE

## 2025-02-11 PROCEDURE — 71275 CT ANGIOGRAPHY CHEST: CPT

## 2025-02-11 PROCEDURE — NC001 PR NO CHARGE: Performed by: SURGERY

## 2025-02-11 PROCEDURE — 88307 TISSUE EXAM BY PATHOLOGIST: CPT | Performed by: STUDENT IN AN ORGANIZED HEALTH CARE EDUCATION/TRAINING PROGRAM

## 2025-02-11 PROCEDURE — 27880 AMPUTATION OF LOWER LEG: CPT | Performed by: SURGERY

## 2025-02-11 RX ORDER — SODIUM CHLORIDE, SODIUM LACTATE, POTASSIUM CHLORIDE, CALCIUM CHLORIDE 600; 310; 30; 20 MG/100ML; MG/100ML; MG/100ML; MG/100ML
INJECTION, SOLUTION INTRAVENOUS CONTINUOUS PRN
Status: DISCONTINUED | OUTPATIENT
Start: 2025-02-11 | End: 2025-02-11

## 2025-02-11 RX ORDER — PROPOFOL 10 MG/ML
INJECTION, EMULSION INTRAVENOUS AS NEEDED
Status: DISCONTINUED | OUTPATIENT
Start: 2025-02-11 | End: 2025-02-11

## 2025-02-11 RX ORDER — HYDROMORPHONE HCL IN WATER/PF 6 MG/30 ML
0.2 PATIENT CONTROLLED ANALGESIA SYRINGE INTRAVENOUS
Status: DISCONTINUED | OUTPATIENT
Start: 2025-02-11 | End: 2025-02-11 | Stop reason: HOSPADM

## 2025-02-11 RX ORDER — LIDOCAINE 50 MG/G
1 PATCH TOPICAL DAILY
Status: DISCONTINUED | OUTPATIENT
Start: 2025-02-12 | End: 2025-02-15 | Stop reason: HOSPADM

## 2025-02-11 RX ORDER — EPHEDRINE SULFATE 50 MG/ML
INJECTION INTRAVENOUS AS NEEDED
Status: DISCONTINUED | OUTPATIENT
Start: 2025-02-11 | End: 2025-02-11

## 2025-02-11 RX ORDER — ROCURONIUM BROMIDE 10 MG/ML
INJECTION, SOLUTION INTRAVENOUS AS NEEDED
Status: DISCONTINUED | OUTPATIENT
Start: 2025-02-11 | End: 2025-02-11

## 2025-02-11 RX ORDER — NITROGLYCERIN 0.4 MG/1
0.4 TABLET SUBLINGUAL
Status: DISCONTINUED | OUTPATIENT
Start: 2025-02-11 | End: 2025-02-15 | Stop reason: HOSPADM

## 2025-02-11 RX ORDER — DEXAMETHASONE SODIUM PHOSPHATE 10 MG/ML
INJECTION, SOLUTION INTRAMUSCULAR; INTRAVENOUS AS NEEDED
Status: DISCONTINUED | OUTPATIENT
Start: 2025-02-11 | End: 2025-02-11

## 2025-02-11 RX ORDER — FENTANYL CITRATE 50 UG/ML
INJECTION, SOLUTION INTRAMUSCULAR; INTRAVENOUS AS NEEDED
Status: DISCONTINUED | OUTPATIENT
Start: 2025-02-11 | End: 2025-02-11

## 2025-02-11 RX ORDER — HYDROMORPHONE HCL IN WATER/PF 6 MG/30 ML
0.2 PATIENT CONTROLLED ANALGESIA SYRINGE INTRAVENOUS EVERY 4 HOURS PRN
Status: DISCONTINUED | OUTPATIENT
Start: 2025-02-11 | End: 2025-02-11

## 2025-02-11 RX ORDER — DIPHENHYDRAMINE HYDROCHLORIDE 50 MG/ML
12.5 INJECTION INTRAMUSCULAR; INTRAVENOUS ONCE AS NEEDED
Status: DISCONTINUED | OUTPATIENT
Start: 2025-02-11 | End: 2025-02-11 | Stop reason: HOSPADM

## 2025-02-11 RX ORDER — ONDANSETRON 2 MG/ML
INJECTION INTRAMUSCULAR; INTRAVENOUS AS NEEDED
Status: DISCONTINUED | OUTPATIENT
Start: 2025-02-11 | End: 2025-02-11

## 2025-02-11 RX ORDER — HEPARIN SODIUM 5000 [USP'U]/ML
5000 INJECTION, SOLUTION INTRAVENOUS; SUBCUTANEOUS EVERY 8 HOURS SCHEDULED
Status: DISCONTINUED | OUTPATIENT
Start: 2025-02-11 | End: 2025-02-15 | Stop reason: HOSPADM

## 2025-02-11 RX ORDER — BUPIVACAINE HYDROCHLORIDE 5 MG/ML
INJECTION, SOLUTION EPIDURAL; INTRACAUDAL
Status: COMPLETED | OUTPATIENT
Start: 2025-02-11 | End: 2025-02-11

## 2025-02-11 RX ORDER — LIDOCAINE HYDROCHLORIDE 10 MG/ML
INJECTION, SOLUTION EPIDURAL; INFILTRATION; INTRACAUDAL; PERINEURAL AS NEEDED
Status: DISCONTINUED | OUTPATIENT
Start: 2025-02-11 | End: 2025-02-11

## 2025-02-11 RX ORDER — HYDROMORPHONE HCL IN WATER/PF 6 MG/30 ML
0.2 PATIENT CONTROLLED ANALGESIA SYRINGE INTRAVENOUS EVERY 4 HOURS PRN
Status: DISCONTINUED | OUTPATIENT
Start: 2025-02-11 | End: 2025-02-12

## 2025-02-11 RX ORDER — MIDAZOLAM HYDROCHLORIDE 2 MG/2ML
INJECTION, SOLUTION INTRAMUSCULAR; INTRAVENOUS AS NEEDED
Status: DISCONTINUED | OUTPATIENT
Start: 2025-02-11 | End: 2025-02-11

## 2025-02-11 RX ADMIN — GABAPENTIN 600 MG: 300 CAPSULE ORAL at 21:02

## 2025-02-11 RX ADMIN — METHOCARBAMOL 500 MG: 500 TABLET ORAL at 11:45

## 2025-02-11 RX ADMIN — METOCLOPRAMIDE 10 MG: 5 INJECTION, SOLUTION INTRAMUSCULAR; INTRAVENOUS at 14:52

## 2025-02-11 RX ADMIN — SENNOSIDES AND DOCUSATE SODIUM 1 TABLET: 50; 8.6 TABLET ORAL at 21:02

## 2025-02-11 RX ADMIN — SODIUM CHLORIDE 50 ML/HR: 0.9 INJECTION, SOLUTION INTRAVENOUS at 16:25

## 2025-02-11 RX ADMIN — BUPIVACAINE 5 ML: 13.3 INJECTION, SUSPENSION, LIPOSOMAL INFILTRATION at 12:40

## 2025-02-11 RX ADMIN — VANCOMYCIN HYDROCHLORIDE 1000 MG: 1 INJECTION, SOLUTION INTRAVENOUS at 23:32

## 2025-02-11 RX ADMIN — DEXAMETHASONE SODIUM PHOSPHATE 10 MG: 10 INJECTION, SOLUTION INTRAMUSCULAR; INTRAVENOUS at 14:02

## 2025-02-11 RX ADMIN — CLOPIDOGREL BISULFATE 75 MG: 75 TABLET, FILM COATED ORAL at 09:07

## 2025-02-11 RX ADMIN — ACETAMINOPHEN 975 MG: 325 TABLET, FILM COATED ORAL at 23:32

## 2025-02-11 RX ADMIN — METHOCARBAMOL 500 MG: 500 TABLET ORAL at 00:22

## 2025-02-11 RX ADMIN — ACETAMINOPHEN 975 MG: 325 TABLET, FILM COATED ORAL at 11:45

## 2025-02-11 RX ADMIN — LIDOCAINE HYDROCHLORIDE 100 MG: 10 INJECTION, SOLUTION EPIDURAL; INFILTRATION; INTRACAUDAL; PERINEURAL at 14:02

## 2025-02-11 RX ADMIN — BUPIVACAINE HYDROCHLORIDE 15 ML: 5 INJECTION, SOLUTION EPIDURAL; INTRACAUDAL at 12:45

## 2025-02-11 RX ADMIN — MIDAZOLAM 2 MG: 1 INJECTION INTRAMUSCULAR; INTRAVENOUS at 12:40

## 2025-02-11 RX ADMIN — HYDROMORPHONE HYDROCHLORIDE 0.2 MG: 0.2 INJECTION, SOLUTION INTRAMUSCULAR; INTRAVENOUS; SUBCUTANEOUS at 23:32

## 2025-02-11 RX ADMIN — LABETALOL HYDROCHLORIDE 10 MG: 5 INJECTION, SOLUTION INTRAVENOUS at 19:56

## 2025-02-11 RX ADMIN — SUGAMMADEX 200 MG: 100 INJECTION, SOLUTION INTRAVENOUS at 14:59

## 2025-02-11 RX ADMIN — ASPIRIN 81 MG: 81 TABLET, COATED ORAL at 09:07

## 2025-02-11 RX ADMIN — ACETAMINOPHEN 975 MG: 325 TABLET, FILM COATED ORAL at 00:22

## 2025-02-11 RX ADMIN — METHOCARBAMOL 500 MG: 500 TABLET ORAL at 23:32

## 2025-02-11 RX ADMIN — MORPHINE SULFATE 2 MG: 2 INJECTION, SOLUTION INTRAMUSCULAR; INTRAVENOUS at 18:59

## 2025-02-11 RX ADMIN — MORPHINE SULFATE 2 MG: 2 INJECTION, SOLUTION INTRAMUSCULAR; INTRAVENOUS at 20:00

## 2025-02-11 RX ADMIN — EPHEDRINE SULFATE 5 MG: 50 INJECTION, SOLUTION INTRAVENOUS at 14:07

## 2025-02-11 RX ADMIN — CEFAZOLIN SODIUM 2000 MG: 2 SOLUTION INTRAVENOUS at 14:14

## 2025-02-11 RX ADMIN — NITROGLYCERIN 0.4 MG: 0.4 TABLET SUBLINGUAL at 18:47

## 2025-02-11 RX ADMIN — ACETAMINOPHEN 975 MG: 325 TABLET, FILM COATED ORAL at 05:32

## 2025-02-11 RX ADMIN — PHENYLEPHRINE HYDROCHLORIDE 40 MCG/MIN: 10 INJECTION INTRAVENOUS at 14:20

## 2025-02-11 RX ADMIN — CHLORHEXIDINE GLUCONATE 15 ML: 1.2 SOLUTION ORAL at 09:08

## 2025-02-11 RX ADMIN — VANCOMYCIN HYDROCHLORIDE 1000 MG: 1 INJECTION, SOLUTION INTRAVENOUS at 00:31

## 2025-02-11 RX ADMIN — NITROGLYCERIN 0.4 MG: 0.4 TABLET SUBLINGUAL at 18:42

## 2025-02-11 RX ADMIN — ATORVASTATIN CALCIUM 80 MG: 80 TABLET, FILM COATED ORAL at 16:26

## 2025-02-11 RX ADMIN — BUPIVACAINE HYDROCHLORIDE 15 ML: 5 INJECTION, SOLUTION EPIDURAL; INTRACAUDAL; PERINEURAL at 12:40

## 2025-02-11 RX ADMIN — HEPARIN SODIUM 5000 UNITS: 5000 INJECTION INTRAVENOUS; SUBCUTANEOUS at 16:26

## 2025-02-11 RX ADMIN — PANTOPRAZOLE SODIUM 40 MG: 40 TABLET, DELAYED RELEASE ORAL at 05:32

## 2025-02-11 RX ADMIN — DULOXETINE 30 MG: 30 CAPSULE, DELAYED RELEASE ORAL at 09:07

## 2025-02-11 RX ADMIN — OXYCODONE HYDROCHLORIDE 5 MG: 5 TABLET ORAL at 09:23

## 2025-02-11 RX ADMIN — EPHEDRINE SULFATE 10 MG: 50 INJECTION, SOLUTION INTRAVENOUS at 14:23

## 2025-02-11 RX ADMIN — HEPARIN SODIUM 5000 UNITS: 5000 INJECTION INTRAVENOUS; SUBCUTANEOUS at 23:32

## 2025-02-11 RX ADMIN — IOHEXOL 100 ML: 350 INJECTION, SOLUTION INTRAVENOUS at 19:32

## 2025-02-11 RX ADMIN — VANCOMYCIN HYDROCHLORIDE 1000 MG: 1 INJECTION, SOLUTION INTRAVENOUS at 11:46

## 2025-02-11 RX ADMIN — SODIUM CHLORIDE 50 ML/HR: 0.9 INJECTION, SOLUTION INTRAVENOUS at 00:24

## 2025-02-11 RX ADMIN — ONDANSETRON 4 MG: 2 INJECTION INTRAMUSCULAR; INTRAVENOUS at 14:45

## 2025-02-11 RX ADMIN — INSULIN GLARGINE 7 UNITS: 100 INJECTION, SOLUTION SUBCUTANEOUS at 21:02

## 2025-02-11 RX ADMIN — NITROGLYCERIN 0.4 MG: 0.4 TABLET SUBLINGUAL at 18:53

## 2025-02-11 RX ADMIN — DIAZEPAM 5 MG: 5 TABLET ORAL at 23:32

## 2025-02-11 RX ADMIN — FENTANYL CITRATE 50 MCG: 50 INJECTION INTRAMUSCULAR; INTRAVENOUS at 14:02

## 2025-02-11 RX ADMIN — INSULIN LISPRO 4 UNITS: 100 INJECTION, SOLUTION INTRAVENOUS; SUBCUTANEOUS at 21:04

## 2025-02-11 RX ADMIN — GABAPENTIN 300 MG: 300 CAPSULE ORAL at 09:07

## 2025-02-11 RX ADMIN — PROPOFOL 150 MG: 10 INJECTION, EMULSION INTRAVENOUS at 14:02

## 2025-02-11 RX ADMIN — CHLORTHALIDONE 12.5 MG: 25 TABLET ORAL at 09:08

## 2025-02-11 RX ADMIN — METHOCARBAMOL 500 MG: 500 TABLET ORAL at 17:31

## 2025-02-11 RX ADMIN — METHOCARBAMOL 500 MG: 500 TABLET ORAL at 05:32

## 2025-02-11 RX ADMIN — ACETAMINOPHEN 975 MG: 325 TABLET, FILM COATED ORAL at 17:30

## 2025-02-11 RX ADMIN — FENTANYL CITRATE 50 MCG: 50 INJECTION INTRAMUSCULAR; INTRAVENOUS at 13:56

## 2025-02-11 RX ADMIN — OXYCODONE HYDROCHLORIDE 5 MG: 5 TABLET ORAL at 21:46

## 2025-02-11 RX ADMIN — ROCURONIUM BROMIDE 70 MG: 10 INJECTION, SOLUTION INTRAVENOUS at 14:02

## 2025-02-11 RX ADMIN — OXYCODONE HYDROCHLORIDE 5 MG: 5 TABLET ORAL at 02:33

## 2025-02-11 NOTE — ASSESSMENT & PLAN NOTE
Status post angiogram with intervention including left SFA thrombectomy and placement of lysis catheter on 1/31/2025.  Repeat imaging demonstrated residual stenosis of left lower extremity.  Patient status post repeat arteriogram on 2/6/2025 with intervention with apparent reestablished flow.  Still unable to successfully reestablish flow  -Ongoing anticoagulation  -Close vascular follow-up  -Patient for left lower extremity BKA today

## 2025-02-11 NOTE — RESTORATIVE TECHNICIAN NOTE
Restorative Technician Note      Patient Name: Lizzy Acuna     Restorative Tech Visit Date: 02/11/25  Note Type: Mobility  Patient Position Upon Consult: Supine  Mobility / Activity Provided: NWB LLE  Activity Performed: Ambulated  Assistive Device: Other (Comment) (personal rollator)  Patient Position at End of Consult: Supine; All needs within reach

## 2025-02-11 NOTE — ASSESSMENT & PLAN NOTE
Complicated by large abscess.  Patient underwent I&D on 1/29/2025 with a large abscess found with purulent fluid and necrotic tissue drained and debrided.  Wound cultures are growing MRSA and Finegoldia.  MRI and operative findings not consistent with osteomyelitis.  -Continue intravenous vancomycin until left lower extremity BKA   -Pharmacy follow-up for vancomycin dose management  -Recheck CBC with differential and BMP to make sure no developing toxicities  -Local wound care  -Serial exams

## 2025-02-11 NOTE — ANESTHESIA PROCEDURE NOTES
Peripheral Block    Patient location during procedure: holding area  Start time: 2/11/2025 12:45 PM  Reason for block: at surgeon's request and post-op pain management  Staffing  Performed by: Nico Acevedo MD  Authorized by: Nico Acevedo MD    Preanesthetic Checklist  Completed: patient identified, IV checked, site marked, risks and benefits discussed, surgical consent, monitors and equipment checked, pre-op evaluation and timeout performed  Peripheral Block  Patient position: supine  Prep: ChloraPrep  Patient monitoring: frequent blood pressure checks, continuous pulse oximetry and heart rate  Block type: Popliteal  Laterality: left  Injection technique: single-shot  Procedures: ultrasound guided, Ultrasound guidance required for the procedure to increase accuracy and safety of medication placement and decrease risk of complications.  Ultrasound permanent image saved  bupivacaine (PF) (MARCAINE) 0.5 % injection 20 mL - Perineural   15 mL - 2/11/2025 12:45:00 PM  bupivacaine liposomal (EXPAREL) 1.3 % injection 20 mL - Perineural   15 mL - 2/11/2025 12:45:00 PM  Needle  Needle type: Stimuplex   Needle gauge: 20 G  Needle length: 4 in  Needle localization: anatomical landmarks and ultrasound guidance  Assessment  Injection assessment: incremental injection, frequent aspiration, injected with ease, negative aspiration, negative for heart rate change, no paresthesia on injection, no symptoms of intraneural/intravenous injection and needle tip visualized at all times  Paresthesia pain: none  Post-procedure:  site cleaned  patient tolerated the procedure well with no immediate complications

## 2025-02-11 NOTE — ANESTHESIA PREPROCEDURE EVALUATION
Procedure:  Left BKA (Left: Leg Lower)    Relevant Problems   CARDIO   (+) PAD (peripheral artery disease) (HCC)   (+) Primary hypertension      ENDO   (+) Diabetes mellitus type 2 with complications (HCC)      GI/HEPATIC   (+) GERD (gastroesophageal reflux disease)      HEMATOLOGY   (+) Blood loss anemia      MUSCULOSKELETAL   (+) Chronic low back pain   (+) Chronic sciatica      NEURO/PSYCH   (+) Chronic low back pain   (+) Chronic sciatica   (+) Chronic, continuous use of opioids      Lab Results   Component Value Date    WBC 11.82 (H) 02/11/2025    HGB 8.8 (L) 02/11/2025    HCT 27.9 (L) 02/11/2025    MCV 92 02/11/2025     02/11/2025     Lab Results   Component Value Date    SODIUM 134 (L) 02/11/2025    K 4.2 02/11/2025    CL 98 02/11/2025    CO2 26 02/11/2025    AGAP 10 02/11/2025    BUN 19 02/11/2025    CREATININE 0.81 02/11/2025    GLUC 158 (H) 02/11/2025    CALCIUM 8.7 02/11/2025    AST 11 (L) 02/10/2025    ALT 5 (L) 02/10/2025    ALKPHOS 78 02/10/2025    TP 7.1 02/10/2025    TBILI 0.51 02/10/2025    EGFR 86 02/11/2025     Lab Results   Component Value Date    PTT 71 (H) 02/11/2025     Lab Results   Component Value Date    INR 1.05 02/02/2025    INR 1.01 01/31/2025    INR 0.99 01/31/2025    PROTIME 14.1 02/02/2025    PROTIME 13.6 01/31/2025    PROTIME 13.4 01/31/2025       Physical Exam    Airway    Mallampati score: III  TM Distance: <3 FB  Neck ROM: full     Dental       Cardiovascular  Cardiovascular exam normal    Pulmonary  Pulmonary exam normal     Other Findings  post-pubertal.      Anesthesia Plan  ASA Score- 3     Anesthesia Type- general with ASA Monitors.         Additional Monitors:     Airway Plan: ETT.    Comment: GA/ETT with preoperative adductor canal and popliteal nerve blocks.       Plan Factors-    Chart reviewed. EKG reviewed.  Existing labs reviewed. Patient summary reviewed.          Obstructive sleep apnea risk education given perioperatively.        Induction-  intravenous.    Postoperative Plan-     Perioperative Resuscitation Plan - Level 1 - Full Code.       Informed Consent- Anesthetic plan and risks discussed with patient.  I personally reviewed this patient with the CRNA. Discussed and agreed on the Anesthesia Plan with the CRNA..      NPO Status:  Vitals Value Taken Time   Date of last liquid 02/06/25 02/06/25 1725   Time of last liquid 2000 02/06/25 1725   Date of last solid 02/06/25 02/06/25 1725   Time of last solid 2000 02/06/25 1725

## 2025-02-11 NOTE — ASSESSMENT & PLAN NOTE
49 y.o. F w/ PMH of T2DM (A1c 10.3), tobacco use (quit Dec 2024) who was previously seen at [facility] in December with wound of left foot with cellulitis.   During previous admission vascular surgery was consulted and recommended LLE agram w/ intervention which was preformed by IR. Post-procedure pt underwent TMA with podiatry    Now S/P Agram, Left SFA thrombectomy, placement of lysis catheter on 1/31  S/p lysis recheck w/ angioplasty of L SFA, pop, AT on 2/3.  2/6 - Repeat arteriogram with finding of recurrent pop occlusion, AT recannulization with distal pop, TPT, and AT POBA and serration angioplasty     Plan  - OR 2/11 for left BKA  - Continue plavix and statin    - Aspirin discontinued, no indication for triple therapy  - Continue hep gtt. Hold prior to OR 2/11   - transition to DOAC once appropriate from podiatric perspective if no further intervention   - Remainder of care per primary, patient stable from vascular surgery perspective    ** Please contact the [facility] Vascular Surgery Resident/AP role for any questions or concerns that might arise

## 2025-02-11 NOTE — PROGRESS NOTES
INTERNAL MEDICINE RESIDENCY PROGRESS NOTE     Name: Lizzy Acuna   Age & Sex: 49 y.o. female   MRN: [id number]  Unit/Bed#: [facility] 511-01   Encounter: [id number]  Team: SOD Team B     PATIENT INFORMATION     Name: Lizzy Acuna   Age & Sex: 49 y.o. female   MRN: [id number]  Hospital Stay Days: 11    ASSESSMENT/PLAN     Principal Problem:    Cellulitis of left foot complicated by Critical limb threatening ischemia of the left lower extremity with a nonhealing transmetatarsal amputation wound  Active Problems:    Diabetes mellitus type 2 with complications (HCC)    Hyponatremia    Diabetic foot infection  (HCC)    PAD (peripheral artery disease) ([facility])    Blood loss anemia    Obesity (BMI 30-39.9)    Primary hypertension      * Cellulitis of left foot complicated by Critical limb threatening ischemia of the left lower extremity with a nonhealing transmetatarsal amputation wound  Assessment & Plan  Underwent an elective R TMA approx 3 weeks ago and was doing well until last week when she was noticed to have dehiscence of her surgical wound.   Wound was debrided by her podiatrist in the office but ultimately worsened with evidence of cellulitis.   LLE foot infection diagnosed clinically without MRI findings of osteomyelitis.   Tissue culture: 2+ MRSA, 2+ Finegoldia magna No significant leukocytosis or fever this admission. Site continues to bleed likely due to triple therapy   A1c 10.3  -L SFA stent s/p L TMA, nonhealing, s/p lysis and tibial intervention (AT PTA)  -despite adequate perfusion w/ AT runoff, patient continues to be extremely high risk for higher amputation 2/2 TMA dehiscence and extensive necrosis  S/P Balloon angioplasty 2/7 of the left popliteal and anterior tibial arteries   Plan:   Podiatry and vascular following, L BKA today  Continue Plavix/statin  Continue heparin gtt  ID following - continue IV vancomycin until post-BKA   Current pain regimen: Tylenol 975 mg q6h, duloxetine 30mg daily,  gabapentin 300 mg bid plus 600mg hs, robaxin 500 mg q6h; oxy 2.5/5 for moderate/severe  APS following previously when pt on PCA but now transitioned off  Wound care     Primary hypertension  Assessment & Plan  Patient previously diagnosed with hypertension  However, on 2/5, became hypertensive with SBP's up to 200s  Started chlorthalidone 12.5 mg daily  Added on as needed hydralazine and labetalol  Plan:  Continue chlorthalidone 12.5 mg daily  As needed hydralazine and labetalol    Obesity (BMI 30-39.9)  Assessment & Plan  Aggressive lifestyle and dietary modifications advised including DM control    Blood loss anemia  Assessment & Plan  Blood loss anemia 2/2 lysis, bleeding from LLE TMA. Hb now stable   - Dressing and wound care per podiatry recs   - Recheck CBC and transfuse to maintain Hb > 7    PAD (peripheral artery disease) (HCC)  Assessment & Plan  See principal problem    Hyponatremia  Assessment & Plan  Chronic euvolemic hyponatremia that was 133 on admission. Asymptomatic and no recent history of confusion, altered metal status, seizure, or coma.   Current differential includes SIADH in the setting of acute infection   Plan:  Monitor BMP  Low threshold to workup with serum and urine studies if hyponatremia worsens      Diabetes mellitus type 2 with complications (HCC)  Assessment & Plan  Lab Results   Component Value Date    HGBA1C 10.3 (H) 12/13/2024       Blood Sugar Average: Last 72 hrs:  (P) 153  - Lantus reduced to 7U hs due to fasting BG below goal and pt will be NPO prior to surgery tomorrow  -SSI  -Needs aggressive BS/DM control in setting of L foot wound/infection  -Goal -180 while inpatient        Disposition: continue inpatient care, dispo planning to come pending post-op course    SUBJECTIVE     Patient seen and examined. No acute events overnight. Pt anxious to get procedure done. No new complaints.     OBJECTIVE     Vitals:    02/10/25 1521 02/10/25 2259 02/11/25 0525 02/11/25 0721    BP: 127/62 134/81  133/79   BP Location:  Left arm  Left arm   Pulse:  100  91   Resp: 15 20  16   Temp: 98 °F (36.7 °C) 98.1 °F (36.7 °C)  97.8 °F (36.6 °C)   TempSrc:  Oral  Oral   SpO2:  98%  97%   Weight:   83.2 kg (183 lb 6.4 oz)    Height:          Temperature:   Temp (24hrs), Av °F (36.7 °C), Min:97.8 °F (36.6 °C), Max:98.1 °F (36.7 °C)    Temperature: 97.8 °F (36.6 °C)  Intake & Output:  I/O          0701  02/10 0700 02/10 07 07 07 0700    P.O. 698 1200 0    I.V. (mL/kg) 94.5 (1.1) 832.8 (10)     IV Piggyback 460 200     Total Intake(mL/kg) 1252.5 (14.9) 2232.8 (26.8) 0 (0)    Urine (mL/kg/hr) 4500 (2.2) 1950 (1) 300 (0.8)    Total Output 4500 1950 300    Net -3247.5 +282.8 -300                 Weights:   IBW (Ideal Body Weight): 48.26 kg    Body mass index is 34.43 kg/m².  Weight (last 2 days)       Date/Time Weight    25 0525 83.2 (183.4)    02/10/25 0300 83.8 (184.7)    25 0300 85.3 (188)          Physical Exam  Vitals reviewed.   Constitutional:       General: She is not in acute distress.  HENT:      Head: Normocephalic and atraumatic.      Right Ear: External ear normal.      Left Ear: External ear normal.      Nose: Nose normal.      Mouth/Throat:      Mouth: Mucous membranes are moist.   Eyes:      Conjunctiva/sclera: Conjunctivae normal.   Cardiovascular:      Rate and Rhythm: Normal rate and regular rhythm.      Heart sounds: Normal heart sounds.   Pulmonary:      Effort: Pulmonary effort is normal. No respiratory distress.      Breath sounds: Normal breath sounds.   Abdominal:      General: Bowel sounds are normal. There is no distension.      Palpations: Abdomen is soft.      Tenderness: There is no abdominal tenderness. There is no guarding.   Musculoskeletal:         General: No swelling.      Right lower leg: No edema.      Left lower leg: No edema.   Skin:     General: Skin is warm and dry.   Neurological:      Mental Status: She is alert and  oriented to person, place, and time. Mental status is at baseline.      Cranial Nerves: No cranial nerve deficit.   Psychiatric:         Mood and Affect: Mood normal.         Behavior: Behavior normal.       LABORATORY DATA     Labs: I have personally reviewed pertinent reports.  Results from last 7 days   Lab Units 02/11/25  0536 02/10/25  0547 02/09/25  0513   WBC Thousand/uL 11.82* 10.50* 9.66   HEMOGLOBIN g/dL 8.8* 9.2* 8.6*   HEMATOCRIT % 27.9* 29.1* 27.2*   PLATELETS Thousands/uL 288 294 282   SEGS PCT % 69 62 60   MONO PCT % 3* 5 5   EOS PCT % 1 1 1      Results from last 7 days   Lab Units 02/11/25  0536 02/10/25  0547 02/09/25  0513 02/07/25  0605 02/06/25  0636 02/05/25  1354 02/05/25  0314   POTASSIUM mmol/L 4.2 4.6 4.4   < > 3.9   < > 4.4   CHLORIDE mmol/L 98 95* 95*   < > 96   < > 102   CO2 mmol/L 26 31 32   < > 32   < > 27   BUN mg/dL 19 15 11   < > 8   < > 10   CREATININE mg/dL 0.81 0.76 0.67   < > 0.85   < > 0.57*   CALCIUM mg/dL 8.7 9.6 9.7   < > 8.9   < > 8.4   ALK PHOS U/L  --  78  --   --  87  --  102   ALT U/L  --  5*  --   --  12  --  14   AST U/L  --  11*  --   --  18  --  24    < > = values in this interval not displayed.              Results from last 7 days   Lab Units 02/11/25  0536 02/10/25  0547 02/09/25  0513   PTT seconds 71* 70* 71*               IMAGING & DIAGNOSTIC TESTING     Radiology Results: I have personally reviewed pertinent reports.  IR TPA lysis check  Result Date: 2/2/2025  Impression: 1. Left deep femoral, superficial femoral, popliteal, tibioperoneal trunk, and anterior tibial arteries were patent. 2. Mild residual stenoses in the left superficial femoral and popliteal arteries treated with 4 mm balloon angioplasty. 3. Mild stenoses along the proximal left anterior tibial artery treated with 3 mm and 2.5 mm balloon angioplasties. 4. Left posterior tibial and peroneal arteries appeared chronically occluded distally. Plan: -Continue anticoagulation. -Continue aspirin and  statin. Workstation performed: [id number]     IR TPA lysis check  Result Date: 2/1/2025  Impression: 1. Residual luminal irregularities in the left superficial femoral artery treated with 4 mm balloon angioplasty. 2. Post angioplasty arteriogram showed reocclusion of left SFA, likely due to residual thrombus. 3. Lysis catheter replaced extending from the left SFA into the popliteal artery. Plan: -tPA to run through the lysis catheter at 1 mg/h. -Return for lysis check tomorrow. Workstation performed: [id number]     IR lower extremity angiogram  Result Date: 1/31/2025  Impression: 1. Thrombosed left superficial femoral artery treated with aspiration thrombectomy and 4 mm balloon angioplasty with residual thrombus. 2. Thrombolysis initiated through a lysis catheter in the left SFA. 3. Right arm PICC placement through basilic vein access. Plan: -tPA to run at 1 mg/hr through the lysis catheter. -All blood draws and labwork to be done through PICC. -Return for lysis check tomorrow. Workstation performed: [id number]     IR PICC placement double lumen  Result Date: 1/31/2025  Impression: 1. Thrombosed left superficial femoral artery treated with aspiration thrombectomy and 4 mm balloon angioplasty with residual thrombus. 2. Thrombolysis initiated through a lysis catheter in the left SFA. 3. Right arm PICC placement through basilic vein access. Plan: -tPA to run at 1 mg/hr through the lysis catheter. -All blood draws and labwork to be done through PICC. -Return for lysis check tomorrow. Workstation performed: [id number]     CT head wo contrast  Result Date: 1/31/2025  Impression: 1. No intracranial hemorrhage seen. 2. Bilateral sphenoid sinusitis Workstation performed: [id number]     Other Diagnostic Testing: I have personally reviewed pertinent reports.    ACTIVE MEDICATIONS       Current Facility-Administered Medications:     acetaminophen (TYLENOL) tablet 975 mg, Q6H JORGE    aspirin (ECOTRIN LOW STRENGTH) EC tablet  "81 mg, Daily    atorvastatin (LIPITOR) tablet 80 mg, Daily With Dinner    bupivacaine liposomal (EXPAREL) 1.3 % injection 20 mL, Once    ceFAZolin (ANCEF) IVPB (premix in dextrose) 2,000 mg 50 mL, Once    chlorhexidine (PERIDEX) 0.12 % oral rinse 15 mL, Q12H JORGE    chlorthalidone tablet 12.5 mg, Daily    clopidogrel (PLAVIX) tablet 75 mg, Daily    diazepam (VALIUM) tablet 5 mg, Q8H PRN    DULoxetine (CYMBALTA) delayed release capsule 30 mg, Daily    gabapentin (NEURONTIN) capsule 300 mg, BID (AM & Afternoon)    gabapentin (NEURONTIN) capsule 600 mg, HS    hydrALAZINE (APRESOLINE) injection 5 mg, Q6H PRN    HYDROmorphone HCl (DILAUDID) injection 0.2 mg, Q4H PRN    insulin glargine (LANTUS) subcutaneous injection 7 Units 0.07 mL, HS    insulin lispro (HumALOG/ADMELOG) 100 units/mL subcutaneous injection 1-6 Units, 4x Daily (AC & HS) **AND** Fingerstick Glucose (POCT), 4x Daily AC and at bedtime    labetalol (NORMODYNE) injection 10 mg, Q4H PRN    methocarbamol (ROBAXIN) tablet 500 mg, Q6H JORGE    metoclopramide (REGLAN) injection 10 mg, Q6H PRN    ondansetron (ZOFRAN) injection 4 mg, Q6H PRN    oxyCODONE (ROXICODONE) IR tablet 5 mg, Q4H PRN    oxyCODONE (ROXICODONE) split tablet 2.5 mg, Q4H PRN    pantoprazole (PROTONIX) EC tablet 40 mg, Early Morning    polyethylene glycol (MIRALAX) packet 17 g, Daily    senna-docusate sodium (SENOKOT S) 8.6-50 mg per tablet 1 tablet, HS    sodium chloride 0.9 % infusion, Continuous, Last Rate: 50 mL/hr (02/11/25 0024)    trimethobenzamide (TIGAN) IM injection 200 mg, Q6H PRN    vancomycin (VANCOCIN) IVPB (premix in dextrose) 1,000 mg 200 mL, Q12H, Last Rate: Stopped (02/11/25 0525)    VTE Pharmacologic Prophylaxis: VTE covered by:    None      VTE Mechanical Prophylaxis: sequential compression device    Portions of the record may have been created with voice recognition software.  Occasional wrong word or \"sound a like\" substitutions may have occurred due to the inherent limitations " of voice recognition software.  Read the chart carefully and recognize, using context, where substitutions have occurred.  ==  Sharron Stoddard MD  [facility]  Internal Medicine Residency PGY-2

## 2025-02-11 NOTE — ANESTHESIA PROCEDURE NOTES
Peripheral Block    Patient location during procedure: holding area  Start time: 2/11/2025 12:40 PM  Reason for block: at surgeon's request and post-op pain management  Staffing  Performed by: Nico Acevedo MD  Authorized by: Nico Acevedo MD    Preanesthetic Checklist  Completed: patient identified, IV checked, site marked, risks and benefits discussed, surgical consent, monitors and equipment checked, pre-op evaluation and timeout performed  Peripheral Block  Patient position: supine  Prep: ChloraPrep  Patient monitoring: frequent blood pressure checks, continuous pulse oximetry and heart rate  Block type: Adductor Canal  Laterality: left  Injection technique: single-shot  Procedures: ultrasound guided, Ultrasound guidance required for the procedure to increase accuracy and safety of medication placement and decrease risk of complications.  Ultrasound permanent image saved  bupivacaine (PF) (MARCAINE) 0.5 % injection 20 mL - Perineural   15 mL - 2/11/2025 12:40:00 PM  bupivacaine liposomal (EXPAREL) 1.3 % injection 20 mL - Perineural   5 mL - 2/11/2025 12:40:00 PM  Needle  Needle type: Stimuplex   Needle gauge: 20 G  Needle length: 4 in  Needle localization: anatomical landmarks and ultrasound guidance  Assessment  Injection assessment: incremental injection, frequent aspiration, injected with ease, negative aspiration, negative for heart rate change, no paresthesia on injection, no symptoms of intraneural/intravenous injection and needle tip visualized at all times  Paresthesia pain: none  Post-procedure:  site cleaned  patient tolerated the procedure well with no immediate complications

## 2025-02-11 NOTE — ASSESSMENT & PLAN NOTE
Lab Results   Component Value Date    HGBA1C 10.3 (H) 12/13/2024       Recent Labs     02/10/25  1104 02/10/25  1613 02/10/25  2035 02/11/25  0619   POCGLU 126 167* 135 138       Blood Sugar Average: Last 72 hrs:  (P) [id number]

## 2025-02-11 NOTE — PROGRESS NOTES
Vancomycin Pharmacy Consult     Lizzy Acuna is an 49 y.o. female who is currently receiving IV vancomycin for left foot SSTI.     Vancomycin Assessment:  1. ID Consult: Yes  2. Cultures:   1/28 Blood 2/2: NG  1/29 Culture Left Foot: 2+ MRSA  1/29 Anaerobic Left Foot: 2+ Finegoldia magna  3. Procalcitonin:   1/28: 0.22  1/29: 0.25  2/4: 0.14  4. Renal Function:   SCr: 0.81  CrCl: 83 mL/min  UOP: 1 mL/kg/hr  5. Days of Therapy: 15  6. Current Dose: 1000 mg IV q12h  7. Last Level: 19.9 (random 2/7 at 0605)  8. Goal AUC(24h): 400-600  9. Goal Random/Trough: 10-15     Vancomycin Plan:  1. Evaluation: continue regimen  2. New Dosing: continue 1000 mg IV q12h  Predicted Trough / AUC(24h): 17 / 572  3. Next Level: random 2/12 at 0600        Pharmacy will continue to follow closely for s/sx of nephrotoxicity, infusion reactions, and appropriateness of therapy. BMP and CBC will be ordered per protocol. We will continue to follow the patient’s culture results and clinical progress daily.        Rip Nieves, PharmD  Internal Medicine Clinical Pharmacist Specialist  [phone]  [employer] Secure Chat/Teams

## 2025-02-11 NOTE — OP NOTE
OPERATIVE REPORT  PATIENT NAME: Lizzy Acuna    :  1975  MRN: [id number]  Pt Location: [facility] OR ROOM 07    SURGERY DATE: 2025    Surgeons and Role:     * Shane Arauz DO - Primary     * Mariam Heart MD - Assisting    Preop Diagnosis:  Diabetic foot ulcer (HCC) [E11.621, L97.509]  PAD (peripheral artery disease) (HCC) [I73.9]    Post-Op Diagnosis Codes:     * Diabetic foot ulcer (HCC) [E11.621, L97.509]     * PAD (peripheral artery disease) (HCC) [I73.9]    Procedure(s):  Left - Left BKA    Specimen(s):  ID Type Source Tests Collected by Time Destination   1 : lef lower leg Tissue Leg, Left TISSUE EXAM Shane Arauz DO 2025 1438        Estimated Blood Loss:   Minimal    Anesthesia Type:   General    Operative Indications:  48 yo F h/o DM and tobacco use presented with left foot wound and cellulitis now s/p multiple endovascular interventions as well as left TMA, however, the TMA has continued to be nonhealing.  She was, therefore, offered left BKA.    Complications:   None    Procedure and Technique:  The patient was brought to the operative suite and placed in supine position.  Anesthesia was induced and titrated to effect and the patient was intubated.  Preoperative antibiotics were administered.  A tourniquet was placed on the left upper leg with appropriate padding.  She was then prepped and draped in usual sterile fashion and a timeout was performed per hospital policy correctly identifying the patient, procedure, and laterality.    An Esmarch was utilized to wrap and drain the lower leg to the level of the knee and the tourniquet was inflated. An incision was created at a point approximately 10 cm below the tibial tuberosity and was extended laterally approximately one third of the circumference on either side of the midpoint.  The incision was then extended distally down the leg to the level of the ankle.  Dissection was then carried down through the muscular fascia using electrocautery.   The muscles of the anterior and lateral compartments were then divided using cautery.  The muscles were divided surrounding the tibia until the tibia could be dissected free circumferentially.  A periosteal elevator was then used to free all connective tissues and muscles from the tibia circumferentially to approximately 1 cm proximal to the skin incision.  A Gigli saw was then used to transect the tibia at this point.  Bovie electrocautery was then used to transect the muscle surrounding the fibula and blunt dissection was utilized to remove all connective tissue from the fibula to approximately 1 cm proximal to the tibial transection point.  Once the fibula was circumferentially freed of all tissues, it was transected using a bone cutter.  The posterior amputation flap was then completed with an amputation knife.  All 3 vascular bundles including the posterior tibial bundle, the peroneal bundle, and the anterior tibial bundle were identified and suture-ligated using 2-0 Vicryl suture.  The tibial nerve was also identified and was sharply divided and allowed to retract.  The anterior edge of the tibia was then filed smooth.  The tourniquet was then released and the wound was thoroughly irrigated.  All bleeding points were controlled using Bovie electrocautery or figure-of-eight Vicryl sutures.  Once were satisfied with our hemostasis, the posterior amputation flap was measured and trimmed to appropriate length to allow a tension-free closure.  The fascia was then closed using interrupted 2-0 Vicryl sutures and the skin was closed with skin staples.  The skin was then cleaned and dried and a sterile dressing of Xeroform, 4 x 4 gauze, ABD pad, Kerlix, and Ace wrap was applied.  A knee immobilizer was also applied.    The patient was then allowed to emerge from anesthesia and was extubated.  She tolerated the procedure with no immediate postoperative complications.  All counts were correct as reported to me.  She was  taken to PACU in stable condition.  Of note, Dr. Arauz was present for the entirety of the procedure.      SIGNATURE: Mariam Heart MD  DATE: February 11, 2025  TIME: 3:17 PM

## 2025-02-11 NOTE — PROGRESS NOTES
Progress Note - Vascular Surgery   Name: Lizzy Acuna 49 y.o. female I MRN: [id number]  Unit/Bed#: [facility] 511-01 I Date of Admission: 1/31/2025   Date of Service: 2/11/2025 I Hospital Day: 11    Assessment & Plan  Diabetic foot infection  (HCC)  49 y.o. F w/ PMH of T2DM (A1c 10.3), tobacco use (quit Dec 2024) who was previously seen at [facility] in December with wound of left foot with cellulitis.   During previous admission vascular surgery was consulted and recommended LLE agram w/ intervention which was preformed by IR. Post-procedure pt underwent TMA with podiatry    Now S/P Agram, Left SFA thrombectomy, placement of lysis catheter on 1/31  S/p lysis recheck w/ angioplasty of L SFA, pop, AT on 2/3.  2/6 - Repeat arteriogram with finding of recurrent pop occlusion, AT recannulization with distal pop, TPT, and AT POBA and serration angioplasty     Plan  - OR 2/11 for left BKA  - Continue plavix and statin    - Aspirin discontinued, no indication for triple therapy  - Continue hep gtt. Hold prior to OR 2/11   - transition to DOAC once appropriate from podiatric perspective if no further intervention   - Remainder of care per primary, patient stable from vascular surgery perspective    ** Please contact the [facility] Vascular Surgery Resident/AP role for any questions or concerns that might arise   Diabetes mellitus type 2 with complications (HCC)  Lab Results   Component Value Date    HGBA1C 10.3 (H) 12/13/2024       Recent Labs     02/10/25  1104 02/10/25  1613 02/10/25  2035 02/11/25  0619   POCGLU 126 167* 135 138       Blood Sugar Average: Last 72 hrs:  (P) [id number]    Hyponatremia    Cellulitis of left foot complicated by Critical limb threatening ischemia of the left lower extremity with a nonhealing transmetatarsal amputation wound    PAD (peripheral artery disease) (HCC)    Blood loss anemia    Obesity (BMI 30-39.9)    Primary hypertension      Subjective : No acute events or new complaints    Objective  ":  Temp:  [97.8 °F (36.6 °C)-98.1 °F (36.7 °C)] 97.8 °F (36.6 °C)  HR:  [] 91  BP: (127-134)/(62-81) 133/79  Resp:  [15-20] 16  SpO2:  [97 %-98 %] 97 %  O2 Device: None (Room air)     I/O         02/09 0701  02/10 0700 02/10 0701 02/11 0700 02/11 0701 02/12 0700    P.O. 698 1200 0    I.V. (mL/kg) 94.5 (1.1) 832.8 (10)     IV Piggyback 460 200     Total Intake(mL/kg) 1252.5 (14.9) 2232.8 (26.8) 0 (0)    Urine (mL/kg/hr) 4500 (2.2) 1950 (1)     Total Output 4500 1950     Net -3247.5 +282.8 0                 Lines/Drains/Airways       Active Status       Name Placement date Placement time Site Days    PICC Line 01/31/25 Right 01/31/25 1937  --  10                  Physical Exam  Physical Exam:  General: NAD  CV: nl rate  Lungs: nl wob. No resp distress.  Chest: no lesions  ABD: Soft, ND, non-tender  Extrem: No CCE. Left DP signal      Lab Results: I have reviewed the following results:  CBC with diff:   Lab Results   Component Value Date    WBC 11.82 (H) 02/11/2025    HGB 8.8 (L) 02/11/2025    HCT 27.9 (L) 02/11/2025    MCV 92 02/11/2025     02/11/2025    RBC 3.03 (L) 02/11/2025    MCH 29.0 02/11/2025    MCHC 31.5 02/11/2025    RDW 14.8 02/11/2025    MPV 9.2 02/11/2025    NRBC 0 02/11/2025   ,   BMP/CMP:  Lab Results   Component Value Date    SODIUM 134 (L) 02/11/2025    K 4.2 02/11/2025    CL 98 02/11/2025    CO2 26 02/11/2025    BUN 19 02/11/2025    CREATININE 0.81 02/11/2025    CALCIUM 8.7 02/11/2025    AST 11 (L) 02/10/2025    ALT 5 (L) 02/10/2025    ALKPHOS 78 02/10/2025    EGFR 86 02/11/2025   ,   Lipid Panel: No results found for: \"CHOL\",   Coags:   Lab Results   Component Value Date    PTT 71 (H) 02/11/2025    INR 1.05 02/02/2025   ,   Blood Culture:   Lab Results   Component Value Date    BLOODCX No Growth After 5 Days. 01/28/2025    BLOODCX No Growth After 5 Days. 01/28/2025   ,   Urinalysis:   Lab Results   Component Value Date    COLORU Straw 11/05/2018    CLARITYU Clear 11/05/2018    " SPECGRAV 1.020 11/05/2018    PHUR 6.0 11/05/2018    LEUKOCYTESUR Negative 11/05/2018    NITRITE Negative 11/05/2018    GLUCOSEU 3+ (A) 11/05/2018    KETONESU Negative 11/05/2018    BILIRUBINUR Negative 11/05/2018    BLOODU Trace-Intact (A) 11/05/2018   ,   Urine Culture:   Lab Results   Component Value Date    URINECX 50,000-59,000 cfu/ml Mixed Contaminants X4 10/21/2016   ,   Wound Culure:   Lab Results   Component Value Date    WOUNDCULT 1+ Growth of Enterobacter gergoviae (A) 06/13/2018    WOUNDCULT (A) 06/13/2018     2+ Growth of Methicillin Resistant Staphylococcus aureus

## 2025-02-11 NOTE — ANESTHESIA POSTPROCEDURE EVALUATION
Post-Op Assessment Note    CV Status:  Stable    Pain management: adequate    Multimodal analgesia used between 6 hours prior to anesthesia start to PACU discharge    Mental Status:  Alert and awake   Hydration Status:  Euvolemic   PONV Controlled:  Controlled   Airway Patency:  Patent     Post Op Vitals Reviewed: Yes    No anethesia notable event occurred.    Staff: Anesthesiologist           Last Filed PACU Vitals:  Vitals Value Taken Time   Temp     Pulse     BP     Resp     SpO2         Modified Cali:     Vitals Value Taken Time   Activity 2 02/11/25 1545   Respiration 2 02/11/25 1545   Circulation 2 02/11/25 1545   Consciousness 2 02/11/25 1545   Oxygen Saturation 2 02/11/25 1545     Modified Cali Score: 10

## 2025-02-11 NOTE — PHYSICAL THERAPY NOTE
PHYSICAL THERAPY NOTE          Patient Name: Lizzy Acuna  Today's Date: 2/11/2025 02/11/25 1238   PT Last Visit   PT Visit Date 02/11/25   Note Type   Note Type Cancelled Session   Cancel Reasons Patient to operating room       Pt off floor at OR for BKA. PT will plan to re-evaluate post op as able.    Lianet Javier, PT, DPT

## 2025-02-11 NOTE — PROGRESS NOTES
Progress Note - Infectious Disease   Name: Lizzy Acuna 49 y.o. female I MRN: [id number]  Unit/Bed#: [facility] 511-01 I Date of Admission: 1/31/2025   Date of Service: 2/11/2025 I Hospital Day: 11    Assessment & Plan  Cellulitis of left foot complicated by Critical limb threatening ischemia of the left lower extremity with a nonhealing transmetatarsal amputation wound  Complicated by large abscess.  Patient underwent I&D on 1/29/2025 with a large abscess found with purulent fluid and necrotic tissue drained and debrided.  Wound cultures are growing MRSA and Finegoldia.  MRI and operative findings not consistent with osteomyelitis.  -Continue intravenous vancomycin until left lower extremity BKA   -Pharmacy follow-up for vancomycin dose management  -Recheck CBC with differential and BMP to make sure no developing toxicities  -Local wound care  -Serial exams  PAD (peripheral artery disease) (HCC)  Status post angiogram with intervention including left SFA thrombectomy and placement of lysis catheter on 1/31/2025.  Repeat imaging demonstrated residual stenosis of left lower extremity.  Patient status post repeat arteriogram on 2/6/2025 with intervention with apparent reestablished flow.  Still unable to successfully reestablish flow  -Ongoing anticoagulation  -Close vascular follow-up  -Patient for left lower extremity BKA today  Diabetes mellitus type 2 with complications (HCC)  Poor control with a hemoglobin A1c of 10.3.  Risk factor for recurrent infection.  -Tighten diabetic control  Obesity (BMI 30-39.9)  As a risk factor for recurrent infection. Could also affect antibiotic dosing.     I have discussed the above management plan in detail with the primary service.   Infectious Disease service will follow.    Antibiotics:  IV vancomycin  Post op day 14    Subjective   Patient has no fever, chills, sweats; no nausea, vomiting, diarrhea; no cough, shortness of breath; no pain. No new symptoms. Anxiously awaiting her  surgery today. Otherwise, has no complaints.     Objective :  Temp:  [97.8 °F (36.6 °C)-98.1 °F (36.7 °C)] 97.8 °F (36.6 °C)  HR:  [] 91  BP: (127-134)/(62-81) 133/79  Resp:  [15-20] 16  SpO2:  [97 %-98 %] 97 %  O2 Device: None (Room air)    General:  No acute distress, awake, nontoxic appearing  Psychiatric:  Awake and alert, answers questions appropriately  Pulmonary:  Normal respiratory excursion without accessory muscle use, on room air  Heart: RRR  Abdomen:  Soft, nontender, non-distended.  Extremities:  left foot dressed with dry dressing in place. No drainage noted.   Skin:  No rashes noted to exposed skin.      Lab Results: I have reviewed the following results:  Results from last 7 days   Lab Units 02/11/25  0536 02/10/25  0547 02/09/25  0513   WBC Thousand/uL 11.82* 10.50* 9.66   HEMOGLOBIN g/dL 8.8* 9.2* 8.6*   PLATELETS Thousands/uL 288 294 282     Results from last 7 days   Lab Units 02/11/25  0536 02/10/25  0547 02/09/25  0513 02/07/25  0605 02/06/25  0636 02/05/25  1354 02/05/25  0314   SODIUM mmol/L 134* 135 138   < > 136   < > 135   POTASSIUM mmol/L 4.2 4.6 4.4   < > 3.9   < > 4.4   CHLORIDE mmol/L 98 95* 95*   < > 96   < > 102   CO2 mmol/L 26 31 32   < > 32   < > 27   BUN mg/dL 19 15 11   < > 8   < > 10   CREATININE mg/dL 0.81 0.76 0.67   < > 0.85   < > 0.57*   EGFR ml/min/1.73sq m 86 93 104   < > 81   < > 109   CALCIUM mg/dL 8.7 9.6 9.7   < > 8.9   < > 8.4   AST U/L  --  11*  --   --  18  --  24   ALT U/L  --  5*  --   --  12  --  14   ALK PHOS U/L  --  78  --   --  87  --  102   ALBUMIN g/dL  --  3.4*  --   --  3.0*  --  3.2*    < > = values in this interval not displayed.         Results from last 7 days   Lab Units 02/04/25  2304   PROCALCITONIN ng/ml 0.14                 Imaging Results Review: No pertinent imaging studies reviewed.  Other Study Results Review: No additional pertinent studies reviewed.      Giovana Lyn PA-C  [facility]

## 2025-02-12 ENCOUNTER — ANESTHESIA EVENT (OUTPATIENT)
Dept: ANESTHESIOLOGY | Facility: HOSPITAL | Age: 50
End: 2025-02-12

## 2025-02-12 ENCOUNTER — ANESTHESIA (OUTPATIENT)
Dept: ANESTHESIOLOGY | Facility: HOSPITAL | Age: 50
End: 2025-02-12

## 2025-02-12 ENCOUNTER — DOCUMENTATION (OUTPATIENT)
Dept: VASCULAR SURGERY | Facility: CLINIC | Age: 50
End: 2025-02-12

## 2025-02-12 PROBLEM — E04.1 THYROID NODULE: Status: ACTIVE | Noted: 2025-02-12

## 2025-02-12 PROBLEM — E27.9 ADRENAL NODULE (HCC): Status: ACTIVE | Noted: 2025-02-12

## 2025-02-12 LAB
4HR DELTA HS TROPONIN: 91 NG/L
ANION GAP SERPL CALCULATED.3IONS-SCNC: 10 MMOL/L (ref 4–13)
ATRIAL RATE: 119 BPM
ATRIAL RATE: 121 BPM
ATRIAL RATE: 127 BPM
BASOPHILS # BLD AUTO: 0.01 THOUSANDS/ΜL (ref 0–0.1)
BASOPHILS NFR BLD AUTO: 0 % (ref 0–1)
BUN SERPL-MCNC: 18 MG/DL (ref 5–25)
CALCIUM SERPL-MCNC: 8.7 MG/DL (ref 8.4–10.2)
CARDIAC TROPONIN I PNL SERPL HS: 105 NG/L (ref ?–50)
CHLORIDE SERPL-SCNC: 99 MMOL/L (ref 96–108)
CO2 SERPL-SCNC: 24 MMOL/L (ref 21–32)
CREAT SERPL-MCNC: 0.84 MG/DL (ref 0.6–1.3)
EOSINOPHIL # BLD AUTO: 0.03 THOUSAND/ΜL (ref 0–0.61)
EOSINOPHIL NFR BLD AUTO: 0 % (ref 0–6)
ERYTHROCYTE [DISTWIDTH] IN BLOOD BY AUTOMATED COUNT: 14.7 % (ref 11.6–15.1)
GFR SERPL CREATININE-BSD FRML MDRD: 82 ML/MIN/1.73SQ M
GLUCOSE SERPL-MCNC: 198 MG/DL (ref 65–140)
GLUCOSE SERPL-MCNC: 214 MG/DL (ref 65–140)
GLUCOSE SERPL-MCNC: 220 MG/DL (ref 65–140)
GLUCOSE SERPL-MCNC: 234 MG/DL (ref 65–140)
GLUCOSE SERPL-MCNC: 246 MG/DL (ref 65–140)
HCT VFR BLD AUTO: 26.9 % (ref 34.8–46.1)
HGB BLD-MCNC: 8.6 G/DL (ref 11.5–15.4)
IMM GRANULOCYTES # BLD AUTO: 0.08 THOUSAND/UL (ref 0–0.2)
IMM GRANULOCYTES NFR BLD AUTO: 1 % (ref 0–2)
LYMPHOCYTES # BLD AUTO: 1.55 THOUSANDS/ΜL (ref 0.6–4.47)
LYMPHOCYTES NFR BLD AUTO: 16 % (ref 14–44)
MCH RBC QN AUTO: 29.8 PG (ref 26.8–34.3)
MCHC RBC AUTO-ENTMCNC: 32 G/DL (ref 31.4–37.4)
MCV RBC AUTO: 93 FL (ref 82–98)
MONOCYTES # BLD AUTO: 0.38 THOUSAND/ΜL (ref 0.17–1.22)
MONOCYTES NFR BLD AUTO: 4 % (ref 4–12)
NEUTROPHILS # BLD AUTO: 7.69 THOUSANDS/ΜL (ref 1.85–7.62)
NEUTS SEG NFR BLD AUTO: 79 % (ref 43–75)
NRBC BLD AUTO-RTO: 0 /100 WBCS
P AXIS: 59 DEGREES
P AXIS: 60 DEGREES
P AXIS: 60 DEGREES
PLATELET # BLD AUTO: 252 THOUSANDS/UL (ref 149–390)
PMV BLD AUTO: 9.1 FL (ref 8.9–12.7)
POTASSIUM SERPL-SCNC: 4.4 MMOL/L (ref 3.5–5.3)
PR INTERVAL: 162 MS
PR INTERVAL: 164 MS
PR INTERVAL: 166 MS
QRS AXIS: -66 DEGREES
QRS AXIS: -67 DEGREES
QRS AXIS: -70 DEGREES
QRSD INTERVAL: 70 MS
QRSD INTERVAL: 72 MS
QRSD INTERVAL: 76 MS
QT INTERVAL: 302 MS
QT INTERVAL: 306 MS
QT INTERVAL: 326 MS
QTC INTERVAL: 435 MS
QTC INTERVAL: 439 MS
QTC INTERVAL: 459 MS
RBC # BLD AUTO: 2.89 MILLION/UL (ref 3.81–5.12)
SODIUM SERPL-SCNC: 133 MMOL/L (ref 135–147)
T WAVE AXIS: 106 DEGREES
T WAVE AXIS: 50 DEGREES
T WAVE AXIS: 97 DEGREES
TESTOST SERPL-MSCNC: <10 NG/DL (ref 0–75)
VANCOMYCIN SERPL-MCNC: 29.6 UG/ML (ref 10–20)
VENTRICULAR RATE: 119 BPM
VENTRICULAR RATE: 121 BPM
VENTRICULAR RATE: 127 BPM
WBC # BLD AUTO: 9.74 THOUSAND/UL (ref 4.31–10.16)

## 2025-02-12 PROCEDURE — G0545 PR INHERENT VISIT TO INPT: HCPCS | Performed by: INTERNAL MEDICINE

## 2025-02-12 PROCEDURE — 97164 PT RE-EVAL EST PLAN CARE: CPT

## 2025-02-12 PROCEDURE — 97530 THERAPEUTIC ACTIVITIES: CPT

## 2025-02-12 PROCEDURE — NC001 PR NO CHARGE

## 2025-02-12 PROCEDURE — 99232 SBSQ HOSP IP/OBS MODERATE 35: CPT | Performed by: PHYSICIAN ASSISTANT

## 2025-02-12 PROCEDURE — 82948 REAGENT STRIP/BLOOD GLUCOSE: CPT

## 2025-02-12 PROCEDURE — NC001 PR NO CHARGE: Performed by: PHYSICIAN ASSISTANT

## 2025-02-12 PROCEDURE — 80202 ASSAY OF VANCOMYCIN: CPT | Performed by: SURGERY

## 2025-02-12 PROCEDURE — 99232 SBSQ HOSP IP/OBS MODERATE 35: CPT

## 2025-02-12 PROCEDURE — NC001 PR NO CHARGE: Performed by: SURGERY

## 2025-02-12 PROCEDURE — 99232 SBSQ HOSP IP/OBS MODERATE 35: CPT | Performed by: INTERNAL MEDICINE

## 2025-02-12 PROCEDURE — 85025 COMPLETE CBC W/AUTO DIFF WBC: CPT | Performed by: SURGERY

## 2025-02-12 PROCEDURE — 84403 ASSAY OF TOTAL TESTOSTERONE: CPT

## 2025-02-12 PROCEDURE — 93010 ELECTROCARDIOGRAM REPORT: CPT | Performed by: INTERNAL MEDICINE

## 2025-02-12 PROCEDURE — 97168 OT RE-EVAL EST PLAN CARE: CPT

## 2025-02-12 PROCEDURE — 99233 SBSQ HOSP IP/OBS HIGH 50: CPT | Performed by: PHYSICIAN ASSISTANT

## 2025-02-12 PROCEDURE — 80048 BASIC METABOLIC PNL TOTAL CA: CPT | Performed by: SURGERY

## 2025-02-12 RX ORDER — HYDROMORPHONE HCL/PF 1 MG/ML
0.5 SYRINGE (ML) INJECTION ONCE
Status: COMPLETED | OUTPATIENT
Start: 2025-02-12 | End: 2025-02-12

## 2025-02-12 RX ORDER — HYDROMORPHONE HCL/PF 1 MG/ML
0.5 SYRINGE (ML) INJECTION EVERY 4 HOURS PRN
Status: DISCONTINUED | OUTPATIENT
Start: 2025-02-12 | End: 2025-02-14

## 2025-02-12 RX ADMIN — GABAPENTIN 600 MG: 300 CAPSULE ORAL at 21:32

## 2025-02-12 RX ADMIN — OXYCODONE HYDROCHLORIDE 5 MG: 5 TABLET ORAL at 19:10

## 2025-02-12 RX ADMIN — METHOCARBAMOL 500 MG: 500 TABLET ORAL at 05:59

## 2025-02-12 RX ADMIN — ACETAMINOPHEN 975 MG: 325 TABLET, FILM COATED ORAL at 17:42

## 2025-02-12 RX ADMIN — PANTOPRAZOLE SODIUM 40 MG: 40 TABLET, DELAYED RELEASE ORAL at 05:59

## 2025-02-12 RX ADMIN — HEPARIN SODIUM 5000 UNITS: 5000 INJECTION INTRAVENOUS; SUBCUTANEOUS at 14:33

## 2025-02-12 RX ADMIN — LIDOCAINE 5% 1 PATCH: 700 PATCH TOPICAL at 09:08

## 2025-02-12 RX ADMIN — GABAPENTIN 300 MG: 300 CAPSULE ORAL at 09:10

## 2025-02-12 RX ADMIN — SODIUM CHLORIDE 50 ML/HR: 0.9 INJECTION, SOLUTION INTRAVENOUS at 02:33

## 2025-02-12 RX ADMIN — VANCOMYCIN HYDROCHLORIDE 750 MG: 10 INJECTION, POWDER, LYOPHILIZED, FOR SOLUTION INTRAVENOUS at 12:09

## 2025-02-12 RX ADMIN — HEPARIN SODIUM 5000 UNITS: 5000 INJECTION INTRAVENOUS; SUBCUTANEOUS at 21:33

## 2025-02-12 RX ADMIN — OXYCODONE HYDROCHLORIDE 5 MG: 5 TABLET ORAL at 23:26

## 2025-02-12 RX ADMIN — DIAZEPAM 5 MG: 5 TABLET ORAL at 23:26

## 2025-02-12 RX ADMIN — INSULIN LISPRO 3 UNITS: 100 INJECTION, SOLUTION INTRAVENOUS; SUBCUTANEOUS at 21:33

## 2025-02-12 RX ADMIN — ACETAMINOPHEN 975 MG: 325 TABLET, FILM COATED ORAL at 05:59

## 2025-02-12 RX ADMIN — CHLORHEXIDINE GLUCONATE 0.12% ORAL RINSE 15 ML: 1.2 LIQUID ORAL at 09:13

## 2025-02-12 RX ADMIN — INSULIN LISPRO 2 UNITS: 100 INJECTION, SOLUTION INTRAVENOUS; SUBCUTANEOUS at 17:12

## 2025-02-12 RX ADMIN — METHOCARBAMOL 500 MG: 500 TABLET ORAL at 11:04

## 2025-02-12 RX ADMIN — METHOCARBAMOL 500 MG: 500 TABLET ORAL at 17:12

## 2025-02-12 RX ADMIN — ATORVASTATIN CALCIUM 80 MG: 80 TABLET, FILM COATED ORAL at 17:12

## 2025-02-12 RX ADMIN — ACETAMINOPHEN 975 MG: 325 TABLET, FILM COATED ORAL at 11:04

## 2025-02-12 RX ADMIN — CHLORTHALIDONE 12.5 MG: 25 TABLET ORAL at 09:11

## 2025-02-12 RX ADMIN — POLYETHYLENE GLYCOL 3350 17 G: 17 POWDER, FOR SOLUTION ORAL at 09:11

## 2025-02-12 RX ADMIN — CHLORHEXIDINE GLUCONATE 0.12% ORAL RINSE 15 ML: 1.2 LIQUID ORAL at 21:32

## 2025-02-12 RX ADMIN — HYDROMORPHONE HYDROCHLORIDE 0.2 MG: 0.2 INJECTION, SOLUTION INTRAMUSCULAR; INTRAVENOUS; SUBCUTANEOUS at 20:07

## 2025-02-12 RX ADMIN — HEPARIN SODIUM 5000 UNITS: 5000 INJECTION INTRAVENOUS; SUBCUTANEOUS at 06:00

## 2025-02-12 RX ADMIN — GABAPENTIN 300 MG: 300 CAPSULE ORAL at 14:32

## 2025-02-12 RX ADMIN — INSULIN LISPRO 2 UNITS: 100 INJECTION, SOLUTION INTRAVENOUS; SUBCUTANEOUS at 08:51

## 2025-02-12 RX ADMIN — INSULIN LISPRO 3 UNITS: 100 INJECTION, SOLUTION INTRAVENOUS; SUBCUTANEOUS at 11:05

## 2025-02-12 RX ADMIN — HYDROMORPHONE HYDROCHLORIDE 0.5 MG: 1 INJECTION, SOLUTION INTRAMUSCULAR; INTRAVENOUS; SUBCUTANEOUS at 21:37

## 2025-02-12 RX ADMIN — ASPIRIN 81 MG: 81 TABLET, COATED ORAL at 09:10

## 2025-02-12 RX ADMIN — DULOXETINE 30 MG: 30 CAPSULE, DELAYED RELEASE ORAL at 09:10

## 2025-02-12 RX ADMIN — METHOCARBAMOL 500 MG: 500 TABLET ORAL at 23:26

## 2025-02-12 RX ADMIN — INSULIN GLARGINE 7 UNITS: 100 INJECTION, SOLUTION SUBCUTANEOUS at 21:33

## 2025-02-12 NOTE — ASSESSMENT & PLAN NOTE
Lab Results   Component Value Date    HGBA1C 10.3 (H) 12/13/2024       Recent Labs     02/11/25  1827 02/11/25 2027 02/12/25  0702 02/12/25  1058   POCGLU 216* 274* 198* 234*       Blood Sugar Average: Last 72 hrs:  (P) 163.3125

## 2025-02-12 NOTE — ASSESSMENT & PLAN NOTE
- S/P BKA 2/11  - Monitor labs, vitals, exam closely   - NWB surgical limb  - Optimal ROM to avoid/decrease risk of contractures  - Monitor incision/area for infection or dehiscence/impaired healing   - It is normal to have some swelling or discoloration around the incision initially post-operatively. If develops increasing redness, tenderness/pain, discharge, or dehiscence develops contact surgical service   - Wound care of incision site per surgery team   - Monitor for signs/symptoms of VTE, atelectasis, acute blood loss anemia, or other infection   - Patient (if applicable caregiver) training and education on amputation, possible phantom symptoms, recovery process  - Neuropsychology consult if available particularly during rehab course, supportive counseling  - Optimal pain control  - Monitor contralateral limb closely for concerning s/s   - Fall Precautions   - Consider Orthotic order for residual limb protector if appropriate in rehab setting   - Recommend acute comprehensive interdisciplinary inpatient rehabilitation to include intensive skilled therapies (PT, OT) as outlined with oversight and management by rehabilitation physician as well as inpatient rehab level nursing, case management and weekly interdisciplinary team meetings.   - Follow-up with PMR and Sx after discharge    Prior Level of Function and Social history:    Patient lives with mother, daughters 7 and 19 who can help after rehab, and grandchildren 2 SH with 7 v 10 LETY with full bath on main level and bedroom on 2nd fl.  Independent with ADLs  Independent with ambulation    Current Level of Function:    PT Transfers and ambulation 3 ft min assist  OT Mod assist LBD, LBB, toileting; Min assist UBB, UBD       Disposition recommendation:  [facility]  Patient is a good candidate for transfer to [facility]/IRF pending:    - Medical clearance/stability  - Facility acceptance, insurance authorization, and bed availability  - Significant changes (worsening or  significant improvements) in functional status, in the interim, that would warrant dispo to different location    The patient with following culmination of conditions is expected to significantly benefit from direct rehabilitation physician oversight to optimally monitor and manage in post-acute rehab setting.  Optimal functional outcomes expected using coordinated interdisciplinary team approach (PMR MD, skilled nursing, PT, OT) while providing intensive inpatient rehabilitation.  The patient is expected to adequately participate in (tolerate) and benefit significantly from intensive level of therapies offered in acute rehabilitation setting.  The patient is also expected to benefit from increased nursing oversight and medical management not available in subacute/SNF setting.    S/P L below knee amputation   DM2  Morbid obesity   Peripheral arterial disease  HTN  Anemia  Pain

## 2025-02-12 NOTE — QUICK NOTE
Vascular Surgery    Post-op Check:    POD#0 Left BKA    Pt recovering in room, currently comfortable, back pain resolved    CTA noted- no acute findings, Troponin 14    AVSS    Left BKA site CDI, knee-immobilizer in place

## 2025-02-12 NOTE — PHYSICAL THERAPY NOTE
"Physical Therapy Re-Evaluation     Patient's Name: Lizzy Acuna    Admitting Diagnosis  Sepsis due to cellulitis ([facility]) [L03.90, A41.9]    Problem List  Patient Active Problem List   Diagnosis    Obesity (BMI 30.0-34.9)    Tobacco abuse    Chronic low back pain    Chronic sciatica    Chronic, continuous use of opioids    Diabetes mellitus type 2 with complications ([facility])    Hyponatremia    Cellulitis of left foot complicated by Critical limb threatening ischemia of the left lower extremity with a nonhealing transmetatarsal amputation wound    Gangrene of left foot ([facility])    GERD (gastroesophageal reflux disease)    Dehiscence of operative wound, initial encounter    Hypersensitivity, initial encounter    Proteinuria    Diabetic foot infection  ([facility])    PAD (peripheral artery disease) ([facility])    Blood loss anemia    Obesity (BMI 30-39.9)    Primary hypertension       Past Medical History  Past Medical History:   Diagnosis Date    Advanced maternal age in multigravida, second trimester 11/30/2016    Transitioned From: Advanced maternal age in multigravida, first trimester      Back pain     used 2 percocet tid until current admission in 2/2017    Bone spur     in back per pt    Chronic hypertension with superimposed preeclampsia 02/14/2017    Depression     Diabetes mellitus ([facility])     Elevated BP without diagnosis of hypertension     \"with pain\"    Foot injury     Full dentures     does not wear    GERD (gastroesophageal reflux disease)     occas    Gestational diabetes     insulin dependent    Herniated cervical disc     Herniated lumbar intervertebral disc     History of pneumonia     Hx of degenerative disc disease     Hyperlipidemia     Obesity     Pre-eclampsia     Prior pregnancy with fetal demise     previous demise at 36 weeks    Toe pain     and foot pain       Past Surgical History  Past Surgical History:   Procedure Laterality Date    ARTERIOGRAM Left 2/6/2025    Procedure: LEFT lower extremity arteriogram w/ " popiteal and anterior tibial artery angioplasty;  Surgeon: Ottoniel Victoria MD;  [facility]: [facility];  Service: Vascular    BACK SURGERY       SECTION      INCISION AND DRAINAGE OF WOUND Left 2025    Procedure: INCISION AND DRAINAGE (I&D) EXTREMITY;  Surgeon: Leopoldo Ritchie DPM;  Location: [facility];  Service: Podiatry    IR LOWER EXTREMITY ANGIOGRAM  2024    IR LOWER EXTREMITY ANGIOGRAM  2025    IR LOWER EXTREMITY ANGIOGRAM  2025    IR PICC PLACEMENT DOUBLE LUMEN  2025    IR TPA LYSIS CHECK  2025    IR TPA LYSIS CHECK  2025    LAMINECTOMY      with disc removal, last assessed 16    OTHER SURGICAL HISTORY      Right [facility]    [place] AMPUTATION FOOT TRANSMETARSAL Left 1/3/2025    Procedure: AMPUTATION TRANSMETATARSAL (TMA);  Surgeon: Leopoldo Ritchie DPM;  Location: [facility];  Service: Podiatry    NY  DELIVERY ONLY N/A 02/15/2017    Procedure:  SECTION ();  Surgeon: Tito Umaña MD;  Location: [facility];  Service: Obstetrics    [place] LIG/TRNSXJ FALOPIAN TUBE  DEL/ABDML SURG Left 02/15/2017    Procedure: LIGATION/COAGULATION TUBAL;  Surgeon: Tito Umaña MD;  Location: [facility];  Service: Obstetrics    VASCULAR SURGERY  2024    WISDOM TOOTH EXTRACTION            25 1100   PT Last Visit   PT Visit Date 25   Note Type   Note type Re-Evaluation   Pain Assessment   Pain Assessment Tool 0-10   Pain Score No Pain   Restrictions/Precautions   Weight Bearing Precautions Per Order (S)  Yes   LLE Weight Bearing Per Order (S)  NWB  (s/p L BKA)   Braces or Orthoses Knee immobilizer  (LLE)   Other Precautions Fall Risk;Multiple lines;WBS;Bed Alarm;Chair Alarm  (L BKA)   Home Living   Additional Comments see IE for home set up   Prior Function   Comments see IE for PLOF   General   Family/Caregiver Present No   Cognition   Overall Cognitive Status WFL    Arousal/Participation Alert   Attention Within functional limits   Orientation Level Oriented X4   Memory Within functional limits   Following Commands Follows one step commands without difficulty   Comments Patient pleasant and cooperative. Good insight and safety awareness   Subjective   Subjective Patient agreeable to PT eval   RUE Assessment   RUE Assessment WFL   LUE Assessment   LUE Assessment WFL   RLE Assessment   RLE Assessment X   Strength RLE   RLE Overall Strength 4-/5   LLE Assessment   LLE Assessment   (in KI, BKA)   Bed Mobility   Supine to Sit 5  Supervision   Additional items Increased time required;Verbal cues   Transfers   Sit to Stand 4  Minimal assistance   Additional items Assist x 1;Increased time required;Verbal cues   Stand to Sit 4  Minimal assistance   Additional items Assist x 1;Increased time required;Verbal cues   Toilet transfer 4  Minimal assistance   Additional items Assist x 1;Increased time required;Verbal cues;Commode   Additional Comments RW   Ambulation/Elevation   Gait pattern   (hop to gait pattern)   Gait Assistance 4  Minimal assist   Additional items Assist x 1;Verbal cues   Assistive Device Rolling walker   Distance 3'  ((3' tx))   Balance   Static Sitting Good   Dynamic Sitting Fair +   Static Standing Fair   Dynamic Standing Fair   Ambulatory Fair -   Endurance Deficit   Endurance Deficit Yes   Endurance Deficit Description fatigue, weakness   Activity Tolerance   Activity Tolerance Patient limited by fatigue   Medical Staff Made Aware OT, OTS   Nurse Made Aware RN cleared   Assessment   Prognosis Good   Problem List Decreased strength;Decreased endurance;Impaired balance;Decreased mobility;Pain   Assessment Pt is a 49 y.o. female seen for a PT re-evaluation due to Ongoing medical management for primary dx, Increased reliance on more restrictive AD compared to baseline, Decreased activity tolerance compared to baseline, Fall risk, Increased assistance needed from  caregiver at current time, Current WBS, s/p surgical intervention. Patient was originally admitted to [facility] on 1/31/2025 for Sepsis due to cellulitis (HCC) (L03.90, A41.9). Patient is now s/p L BKA. Patient  has a past medical history of Advanced maternal age in multigravida, second trimester, Back pain, Bone spur, Chronic hypertension with superimposed preeclampsia, Depression, Diabetes mellitus (HCC), Elevated BP without diagnosis of hypertension, Foot injury, Full dentures, GERD (gastroesophageal reflux disease), Gestational diabetes, Herniated cervical disc, Herniated lumbar intervertebral disc, History of pneumonia, degenerative disc disease, Hyperlipidemia, Obesity, Pre-eclampsia, Prior pregnancy with fetal demise, and Toe pain..     PT re-consulted to assess functional mobility and needs for safe d/c planning. Please see IE for PLOF and home set up.     Currently pt requires supervision for bed mobility, minimal assistance x1 for functional transfers with rolling walker ; minimal assistance x1 for ambulation with rolling walker. Pt functioning below baseline and w/ overall mobility deficits 2* to: decreased strength, decreased endurance, decreased mobility, impaired balance. These impairments place pt at risk for falls.     Pt will continue to benefit from skilled PT interventions to address stated impairments; to maximize functional potential; for ongoing pt/family education; and DME needs. The patient's AM-PAC Basic Mobility Inpatient Short Form Raw Score Is 17. PT is currently recommending Level 1 - Maximum Resource Intensity on d/c from hospital. Will continue to follow as able.   Goals   Patient Goals to go to the bathroom   STG Expiration Date 02/26/25   Short Term Goal #1 In 14 days, patient will 1) increase strength in BUE/BLE by 1/2 to 1 full grade for increased strength and stability needed for functional mobility 2) improve bed mobility to MI for improved mobility and decreased need  for assist 3) sit EOB x30' with MI to facilitate trunk stability and safety for completion of ADL tasks 4) increase functional transfers to MI for improved safety and functional mobility 5) ambulate 250ft with MI using LRAD for increased endurance and safety ambulating home and community environments 6) improve balance by 1 grade for improved safety and stability and decreased risk for falls. 7) ascend/descend at least 7 stairs using HR with MI in order to safely access home environment   PT Treatment Day 2   Plan   Treatment/Interventions ADL retraining;Functional transfer training;LE strengthening/ROM;Therapeutic exercise;Endurance training;Patient/family training;Equipment eval/education;Bed mobility;Gait training;Spoke to nursing;Spoke to case management;OT;Elevations   PT Frequency 3-5x/wk   Discharge Recommendation   Rehab Resource Intensity Level, PT I (Maximum Resource Intensity)   AM-PAC Basic Mobility Inpatient   Turning in Flat Bed Without Bedrails 3   Lying on Back to Sitting on Edge of Flat Bed Without Bedrails 3   Moving Bed to Chair 3   Standing Up From Chair Using Arms 3   Walk in Room 3   Climb 3-5 Stairs With Railing 2   Basic Mobility Inpatient Raw Score 17   Basic Mobility Standardized Score 39.67   [facility] Highest Level Of Mobility   JH-HLM Goal 5: Stand one or more mins   JH-HLM Achieved 5: Stand (1 or more minutes)   Modified Curtis Scale   Modified Curtis Scale 4   Additional Treatment Session   Start Time 1052   End Time 1100   Treatment Assessment Additional tx session for performance of toilet transfer and toiletting activity at [facility]. Patient performs transfer to toilet utilizing hop to gait pattern with RW. Patient requires Min A for transfer and hops. Patient attempts to perform self hygiene, but with difficulty due to KI on LLE. Patient assisted with hygiene. Patient with +void. Patient transfers again with Min A at RW to bedside chair. Patient left in bedside chair following  activity.   Equipment Use RW, BSC   End of Consult   Patient Position at End of Consult All needs within reach;Bed/Chair alarm activated;Bedside chair         Lianet Javier, PT, DPT

## 2025-02-12 NOTE — PLAN OF CARE
Problem: PHYSICAL THERAPY ADULT  Goal: Performs mobility at highest level of function for planned discharge setting.  See evaluation for individualized goals.  Description: Treatment/Interventions: ADL retraining, Functional transfer training, LE strengthening/ROM, Therapeutic exercise, Endurance training, Patient/family training, Equipment eval/education, Bed mobility, Gait training, Spoke to nursing, Spoke to case management, OT, Elevations  Equipment Recommended:  (owns)       See flowsheet documentation for full assessment, interventions and recommendations.  Outcome: Progressing  Note: Prognosis: Good  Problem List: Decreased strength, Decreased endurance, Impaired balance, Decreased mobility, Pain  Assessment: Pt is a 49 y.o. female seen for a PT re-evaluation due to Ongoing medical management for primary dx, Increased reliance on more restrictive AD compared to baseline, Decreased activity tolerance compared to baseline, Fall risk, Increased assistance needed from caregiver at current time, Current WBS, s/p surgical intervention. Patient was originally admitted to [facility] on 1/31/2025 for Sepsis due to cellulitis (HCC) (L03.90, A41.9). Patient is now s/p L BKA. Patient  has a past medical history of Advanced maternal age in multigravida, second trimester, Back pain, Bone spur, Chronic hypertension with superimposed preeclampsia, Depression, Diabetes mellitus (HCC), Elevated BP without diagnosis of hypertension, Foot injury, Full dentures, GERD (gastroesophageal reflux disease), Gestational diabetes, Herniated cervical disc, Herniated lumbar intervertebral disc, History of pneumonia, degenerative disc disease, Hyperlipidemia, Obesity, Pre-eclampsia, Prior pregnancy with fetal demise, and Toe pain..     PT re-consulted to assess functional mobility and needs for safe d/c planning. Please see IE for PLOF and home set up.     Currently pt requires supervision for bed mobility, minimal assistance x1  for functional transfers with rolling walker ; minimal assistance x1 for ambulation with rolling walker. Pt functioning below baseline and w/ overall mobility deficits 2* to: decreased strength, decreased endurance, decreased mobility, impaired balance. These impairments place pt at risk for falls.     Pt will continue to benefit from skilled PT interventions to address stated impairments; to maximize functional potential; for ongoing pt/family education; and DME needs. The patient's AM-PAC Basic Mobility Inpatient Short Form Raw Score Is 17. PT is currently recommending Level 1 - Maximum Resource Intensity on d/c from hospital. Will continue to follow as able.  Barriers to Discharge: Inaccessible home environment     Rehab Resource Intensity Level, PT: I (Maximum Resource Intensity)    See flowsheet documentation for full assessment.

## 2025-02-12 NOTE — PROGRESS NOTES
Progress Note - Infectious Disease   Name: Lizzy Acuna 49 y.o. female I MRN: [id number]  Unit/Bed#: [facility] 511-01 I Date of Admission: 1/31/2025   Date of Service: 2/12/2025 I Hospital Day: 12    Assessment & Plan  Cellulitis of left foot complicated by Critical limb threatening ischemia of the left lower extremity with a nonhealing transmetatarsal amputation wound  Complicated by large abscess.  Patient underwent I&D on 1/29/2025 with a large abscess found with purulent fluid and necrotic tissue drained and debrided.  Wound cultures are growing MRSA and Finegoldia.  MRI and operative findings not consistent with osteomyelitis. Now s/p BKA on 2/11.   -Will give 1 dose of vancomycin this AM, then stop and monitor off antibiotics.   -Recheck CBC with differential and BMP to make sure no developing toxicities  -Local wound care  -Serial exams  PAD (peripheral artery disease) (HCC)  Status post angiogram with intervention including left SFA thrombectomy and placement of lysis catheter on 1/31/2025.  Repeat imaging demonstrated residual stenosis of left lower extremity.  Patient status post repeat arteriogram on 2/6/2025 with intervention with apparent reestablished flow.  Still unable to successfully reestablish flow. Now s/p BKA on 2/11.   -Close vascular follow-up  Diabetes mellitus type 2 with complications (HCC)  Poor control with a hemoglobin A1c of 10.3.  Risk factor for recurrent infection.  -Tighten diabetic control  Obesity (BMI 30-39.9)  As a risk factor for recurrent infection. Could also affect antibiotic dosing.     I have discussed the above management plan in detail with the primary service. They agree with the above antibiotic plan.   Infectious Disease service will follow.    Antibiotics:  IV vancomycin  Post op day 1    Subjective   Patient underwent BKA yesterday. After BKA, she reported 10/10 pain between her shoulder blades that radiated to her jaw. Work-up including EKG and CTA were unremarkable.  No current pain at present. No fevers or chills. No nausea, vomiting, diarrhea; no cough, shortness of breath; no pain in left lower extremity. Discussed giving one last dose of vancomycin this morning, then stopping antibiotics and monitoring off.     Objective :  Temp:  [97.1 °F (36.2 °C)-98.6 °F (37 °C)] 97.6 °F (36.4 °C)  HR:  [] 78  BP: (117-194)/() 135/73  Resp:  [12-18] 18  SpO2:  [94 %-100 %] 97 %  O2 Device: None (Room air)  Nasal Cannula O2 Flow Rate (L/min):  [2 L/min] 2 L/min    General:  No acute distress, awake, nontoxic appearing  Psychiatric:  Awake and alert, answers questions appropriately  Pulmonary:  Normal respiratory excursion without accessory muscle use, on room air  Heart: RRR  Abdomen:  Soft, nontender, non-distended.  Extremities:  left BKA wrapped in post-op bandages. No weeping through bandages.  Skin:  No rashes noted to exposed skin.      Lab Results: I have reviewed the following results:  Results from last 7 days   Lab Units 02/12/25  0608 02/11/25  1640 02/11/25  0536 02/10/25  0547   WBC Thousand/uL 9.74  --  11.82* 10.50*   HEMOGLOBIN g/dL 8.6*  --  8.8* 9.2*   PLATELETS Thousands/uL 252 239 288 294     Results from last 7 days   Lab Units 02/12/25  0608 02/11/25  0536 02/10/25  0547 02/07/25  0605 02/06/25  0636   SODIUM mmol/L 133* 134* 135   < > 136   POTASSIUM mmol/L 4.4 4.2 4.6   < > 3.9   CHLORIDE mmol/L 99 98 95*   < > 96   CO2 mmol/L 24 26 31   < > 32   BUN mg/dL 18 19 15   < > 8   CREATININE mg/dL 0.84 0.81 0.76   < > 0.85   EGFR ml/min/1.73sq m 82 86 93   < > 81   CALCIUM mg/dL 8.7 8.7 9.6   < > 8.9   AST U/L  --   --  11*  --  18   ALT U/L  --   --  5*  --  12   ALK PHOS U/L  --   --  78  --  87   ALBUMIN g/dL  --   --  3.4*  --  3.0*    < > = values in this interval not displayed.                           Imaging Results Review: No pertinent imaging studies reviewed.  Other Study Results Review: No additional pertinent studies reviewed.      Giovana FRANK  CAROLEE Lyn  [facility]

## 2025-02-12 NOTE — PROGRESS NOTES
Vascular Nurse Navigator Post Op Education    Met with patient at bedside to introduce myself as Vascular Nurse Navigator and explained my role.  Patient is appropriate and accepting to education. Patient was educated with Review of written materials provided, Teachback, Explanation, Demonstration, and Question & Answer on expectations of post op care and recovery on Left BKA. Patient is a former smoker, quit 2 months ago, as such Smoking effects on the lungs, tobacco triggers, and Smoking cessation was reviewed. Education provided to patient on infection prevention, activity limitations, when to call the office, importance of follow up, and incisional care.  Discharge instruction handout provided to patient to review.  Provided patient with a list of prosthetic providers and information on outpatient therapy.

## 2025-02-12 NOTE — TREATMENT PLAN
Patient is now s/p left BKA on 2/11/25. Right foot was checked today and noted to be free of wounds or other podiatric concerns. Patient will continue to follow with Dr. Ritchie for outpatient needs. Podiatry is signing off at this time.

## 2025-02-12 NOTE — QUICK NOTE
Notified by nurse that patient returned from CT scan in 10/10 pain between her shoulder blades and radiating to her jaw. Vitals at the time /103 . She was given her PRN labetalol 10 mg as well as an additional dose of morphine 2 mg; pressures responded. EKG taken at the time was negative for new pathology. Repeat /90, HR 98 with improvement in pain as well. CTA did not show acute cardiopulmonary pathology, negative for dissection. Initial trops negative.     Patient met and evaluated at bedside. Reiterates that she has a sharp constant pain between her shoulder blades. Says the pain got significantly better but used the commode which triggered the pain again, though less severe. PE notable for reproducible pain mid back between shoulder blades, does not radiate on exam; tachycardic but otherwise benign PE.     Patient has extensive pain regimen in place, followed by APS. Switched her dilaudid to also be available for breakthrough pain. Patient's changes in vitals seem to correlate with episodes of pain per patient and nursing. With negative CTA and EKG, less concern for aortic dissection but will follow trops closely, continue to monitor patient on telemetry.

## 2025-02-12 NOTE — ASSESSMENT & PLAN NOTE
Complicated by large abscess.  Patient underwent I&D on 1/29/2025 with a large abscess found with purulent fluid and necrotic tissue drained and debrided.  Wound cultures are growing MRSA and Finegoldia.  MRI and operative findings not consistent with osteomyelitis. Now s/p BKA on 2/11.   -Will give 1 dose of vancomycin this AM, then stop and monitor off antibiotics.   -Recheck CBC with differential and BMP to make sure no developing toxicities  -Local wound care  -Serial exams

## 2025-02-12 NOTE — PROGRESS NOTES
Vancomycin Pharmacy Consult     Lizzy Acuna is an 49 y.o. female who is currently receiving IV vancomycin for left foot SSTI.     Vancomycin Assessment:  1. ID Consult: Yes  2. Cultures:    Blood : NG   Culture Left Foot: 2+ MRSA   Anaerobic Left Foot: 2+ Finegoldia magna  3. Procalcitonin:   : 0.22  : 0.25  : 0.14  4. Renal Function:   SCr: 0.84  CrCl: 80 mL/min  UOP: 1.8 mL/kg/hr  5. Days of Therapy: 16  6. Current Dose: 1000 mg IV q12h  7. Last Level: 29.6 (random  at 0608)  8. Goal AUC(24h): 400-600  9. Goal Random/Trough: 10-15     Vancomycin Plan:  1. Evaluation: based on the level this AM, will decrease dose  2. New Dosin mg IV q12h (to start today at 1200)  Predicted Trough / AUC(24h): 16.5 / 523  3. Next Level: random  at 0600        Pharmacy will continue to follow closely for s/sx of nephrotoxicity, infusion reactions, and appropriateness of therapy. BMP and CBC will be ordered per protocol. We will continue to follow the patient’s culture results and clinical progress daily.        Rip Nieves, PharmD  Internal Medicine Clinical Pharmacist Specialist  [phone]  [employer] Chat/[employer]

## 2025-02-12 NOTE — ASSESSMENT & PLAN NOTE
Lab Results   Component Value Date    HGBA1C 10.3 (H) 12/13/2024       Blood Sugar Average: Last 72 hrs:  (P) 163.3125  -Lantus 10U hs, lispro 5U tid ac  -SSI  -Needs aggressive BS/DM control in setting of L foot wound/infection  -Goal -180, hypoglycemia protocol   -Carb control diet

## 2025-02-12 NOTE — PROGRESS NOTES
Progress Note - PMR   Name: Lizzy Acuna 49 y.o. female I MRN: [id number]  Unit/Bed#: [facility] 511-01 I Date of Admission: 1/31/2025   Date of Service: 2/12/2025 I Hospital Day: 12    Assessment & Plan  Cellulitis of left foot complicated by Critical limb threatening ischemia of the left lower extremity with a nonhealing transmetatarsal amputation wound  - S/P BKA 2/11  - Monitor labs, vitals, exam closely   - NWB surgical limb  - Optimal ROM to avoid/decrease risk of contractures  - Monitor incision/area for infection or dehiscence/impaired healing   - It is normal to have some swelling or discoloration around the incision initially post-operatively. If develops increasing redness, tenderness/pain, discharge, or dehiscence develops contact surgical service   - Wound care of incision site per surgery team   - Monitor for signs/symptoms of VTE, atelectasis, acute blood loss anemia, or other infection   - Patient (if applicable caregiver) training and education on amputation, possible phantom symptoms, recovery process  - Neuropsychology consult if available particularly during rehab course, supportive counseling  - Optimal pain control  - Monitor contralateral limb closely for concerning s/s   - Fall Precautions   - Consider Orthotic order for residual limb protector if appropriate in rehab setting   - Recommend acute comprehensive interdisciplinary inpatient rehabilitation to include intensive skilled therapies (PT, OT) as outlined with oversight and management by rehabilitation physician as well as inpatient rehab level nursing, case management and weekly interdisciplinary team meetings.   - Follow-up with PMR and Sx after discharge    Prior Level of Function and Social history:    Patient lives with mother, daughters 7 and 19 who can help after rehab, and grandchildren 2 SH with 7 v 10 LETY with full bath on main level and bedroom on 2nd fl.  Independent with ADLs  Independent with ambulation    Current Level of  Function:    PT Transfers and ambulation 3 ft min assist  OT Mod assist LBD, LBB, toileting; Min assist UBB, UBD       Disposition recommendation:  [facility]F  Patient is a good candidate for transfer to [facility]/IRF pending:    - Medical clearance/stability  - Facility acceptance, insurance authorization, and bed availability  - Significant changes (worsening or significant improvements) in functional status, in the interim, that would warrant dispo to different location    The patient with following culmination of conditions is expected to significantly benefit from direct rehabilitation physician oversight to optimally monitor and manage in post-acute rehab setting.  Optimal functional outcomes expected using coordinated interdisciplinary team approach (PMR MD, skilled nursing, PT, OT) while providing intensive inpatient rehabilitation.  The patient is expected to adequately participate in (tolerate) and benefit significantly from intensive level of therapies offered in acute rehabilitation setting.  The patient is also expected to benefit from increased nursing oversight and medical management not available in subacute/SNF setting.    S/P L below knee amputation   DM2  Morbid obesity   Peripheral arterial disease  HTN  Anemia  Pain  Diabetes mellitus type 2 with complications (HCC)  Lab Results   Component Value Date    HGBA1C 10.3 (H) 12/13/2024       Recent Labs     02/11/25  1827 02/11/25 2027 02/12/25  0702 02/12/25  1058   POCGLU 216* 274* 198* 234*       Blood Sugar Average: Last 72 hrs:  (P) 163.3125  - Current management by internal medicine  - Monitor for signs and symptoms of hypoglycemia   - Current meds: per primary  - Consistent carb diabetic diet  - Recommend close monitoring and optimal management during recovery/rehab phase as well     Hyponatremia  - Management currently per primary providers (and subspecialists at their discretion)   - Monitor intermittently   Diabetic foot infection  (HCC)  Lab  Results   Component Value Date    HGBA1C 10.3 (H) 12/13/2024       Recent Labs     02/11/25  1827 02/11/25 2027 02/12/25  0702 02/12/25  1058   POCGLU 216* 274* 198* 234*       Blood Sugar Average: Last 72 hrs:  (P) 163.3125    PAD (peripheral artery disease) (HCC)  - Overall management per vasc sx during acute course  - Antithrombotic: aspirin, plavix  - Statin  - Optimal blood pressure and blood sugar control    Blood loss anemia  - Mgmt per primary team and recommend optimal mgmt during rehab process  - Monitor H/H, vitals, signs/symptoms of acute bleeding    Obesity (BMI 30-39.9)  -  on appropriate nutrition and activity  - Adjustments accordingly during skilled therapy   - Monitor skin closely for breakdown, wounds, rashes; keep clean and dry, turning Q2H   - Follow-up with PCP after d/c    Primary hypertension  - Current management at discretion of primary team   - Ensure optimal management during rehab process  - Monitor vitals with and without activity; monitor for orthostasis  - Monitor hemoglobin, electrolytes, kidney function, hydration status   - Current meds: per other providers    Thyroid nodule    Adrenal nodule (HCC)      Pain  - Mgmt currently per other providers - APS  - Optimal mgmt now and in rehab setting to optimize pain control but limit risks of side-effects  - APAP 975mg TID   - Robaxin 500mg q6h  - Oxycodone 2.5-5mg Q4H PRN  - Gabapentin 300-300-600  - Cymbalta 30mg qday  - PRN Valium for spasms   - Monitor for oversedation, AMS/delirium, and respiratory depression   - If being administered - consider hold opiates, muscle relaxants, benzodiazepines, and gabapentin for oversedation, AMS, or RR<12.  - Counseled on and continue to encourage deep breathing/relaxation/behavioral pain management techniques      Transient back pain  - B/n shoulder blades 2/11   - CTA to eval for dissection unremarkable  - monitor for reoccurrence  - Mgmt per primary     Subjective   On eval, patient  reports pain between shoulder blades now improved.  She reports pain controlled with blocked but is worried when that wears off.  She reports recent BM and denies lightheadedness, SOB, fever, chills  Notes some phantom sensation.    Chief Complaint: f/u amputation    Objective :  Temp:  [97.1 °F (36.2 °C)-98.6 °F (37 °C)] 98.1 °F (36.7 °C)  HR:  [] 82  BP: ()/() 92/70  Resp:  [12-18] 16  SpO2:  [94 %-100 %] 97 %  O2 Device: None (Room air)  Nasal Cannula O2 Flow Rate (L/min):  [2 L/min] 2 L/min      Physical Exam    General: Awake, alert in NAD  HENT: NCAT, MMM  Respiratory: Unlabored breathing  Cardiovascular: Regular rate   Gastrointestinal: Soft, non-distended  Genitourinary: No negron  SkiN/MSK/Extremities:  No calf edema, no calf tenderness to palpation on R; S/P L BKA with dressing in place  Neurologic/Psych:   MENTAL STATUS: awake, oriented to person, place, time, and situation  Affect: Euthymic   Strength: near full throughout s/p L BKA      Scheduled Meds:  Current Facility-Administered Medications   Medication Dose Route Frequency Provider Last Rate    acetaminophen  975 mg Oral Q6H JORGE Heart MD      aspirin  81 mg Oral Daily Mariam Heart MD      atorvastatin  80 mg Oral Daily With Dinner Mariam Heart MD      chlorhexidine  15 mL Mouth/Throat Q12H [facility] Mariam Heart MD      chlorthalidone  12.5 mg Oral Daily Mariam Heart MD      diazepam  5 mg Oral Q8H PRN Mariam Heart MD      DULoxetine  30 mg Oral Daily Mariam Heart MD      gabapentin  300 mg Oral BID (AM & Afternoon) Mariam Heart MD      gabapentin  600 mg Oral HS Mariam Heart MD      heparin (porcine)  5,000 Units Subcutaneous Q8H [facility] Mariam Heart MD      hydrALAZINE  5 mg Intravenous Q6H PRN Mariam Heart MD      HYDROmorphone  0.2 mg Intravenous Q4H PRN Aylin Brown MD      insulin glargine  7 Units Subcutaneous HS Mariam Heart MD      insulin lispro  1-6 Units Subcutaneous 4x Daily (AC & HS) Mariam Heart  MD      labetalol  10 mg Intravenous Q4H PRN Mariam Heart MD      lidocaine  1 patch Topical Daily Aylin Brown MD      methocarbamol  500 mg Oral Q6H [facility] Mariam Heart MD      metoclopramide  10 mg Intravenous Q6H PRN Mariam Heart MD      nitroglycerin  0.4 mg Sublingual Q5 Min PRN Fabian Milton MD      ondansetron  4 mg Intravenous Q6H PRN Mariam Heart MD      oxyCODONE  5 mg Oral Q4H PRN Mariam Heart MD      oxyCODONE  2.5 mg Oral Q4H PRN Mariam Heart MD      pantoprazole  40 mg Oral Early Morning Mariam Heart MD      polyethylene glycol  17 g Oral Daily Mariam Heart MD      senna-docusate sodium  1 tablet Oral HS Mariam Heart MD      trimethobenzamide  200 mg Intramuscular Q6H PRN Mariam Heart MD           Lab Results: I have reviewed the following results:  Results from last 7 days   Lab Units 02/12/25  0608 02/11/25  1640 02/11/25  0536 02/10/25  0547   HEMOGLOBIN g/dL 8.6*  --  8.8* 9.2*   HEMATOCRIT % 26.9*  --  27.9* 29.1*   WBC Thousand/uL 9.74  --  11.82* 10.50*   PLATELETS Thousands/uL 252 239 288 294     Results from last 7 days   Lab Units 02/12/25  0608 02/11/25  0536 02/10/25  0547 02/07/25  0605 02/06/25  0636   BUN mg/dL 18 19 15   < > 8   SODIUM mmol/L 133* 134* 135   < > 136   POTASSIUM mmol/L 4.4 4.2 4.6   < > 3.9   CHLORIDE mmol/L 99 98 95*   < > 96   CREATININE mg/dL 0.84 0.81 0.76   < > 0.85   AST U/L  --   --  11*  --  18   ALT U/L  --   --  5*  --  12    < > = values in this interval not displayed.

## 2025-02-12 NOTE — ASSESSMENT & PLAN NOTE
49 y.o. F w/ PMH of T2DM (A1c 10.3), tobacco use (quit Dec 2024) who was previously seen at [facility] in December with wound of left foot with cellulitis.   During previous admission vascular surgery was consulted and recommended LLE agram w/ intervention which was preformed by IR. Post-procedure pt underwent TMA with podiatry    Now S/P Agram, Left SFA thrombectomy, placement of lysis catheter on 1/31  S/p lysis recheck w/ angioplasty of L SFA, pop, AT on 2/3.  2/6 - Repeat arteriogram with finding of recurrent pop occlusion, AT recannulization with distal pop, TPT, and AT POBA and serration angioplasty     Plan  - BKA wound care  - Continue plavix and statin    - Aspirin discontinued, no indication for triple therapy  -- transition to DOAC once appropriate from podiatric perspective if no further intervention   - Remainder of care per primary, patient stable from vascular surgery perspective    ** Please contact the [facility] Vascular Surgery Resident/AP role for any questions or concerns that might arise

## 2025-02-12 NOTE — ASSESSMENT & PLAN NOTE
- Management currently per primary providers (and subspecialists at their discretion)   - Monitor intermittently

## 2025-02-12 NOTE — OCCUPATIONAL THERAPY NOTE
"    Occupational Therapy Re-Evaluation     Patient Name: Lizzy Acuna  Today's Date: 2025  Problem List  Principal Problem:    Cellulitis of left foot complicated by Critical limb threatening ischemia of the left lower extremity with a nonhealing transmetatarsal amputation wound  Active Problems:    Diabetes mellitus type 2 with complications (HCC)    Hyponatremia    Diabetic foot infection  (HCC)    PAD (peripheral artery disease) (HCC)    Blood loss anemia    Obesity (BMI 30-39.9)    Primary hypertension    Thyroid nodule    Adrenal nodule (HCC)    Past Medical History  Past Medical History:   Diagnosis Date    Advanced maternal age in multigravida, second trimester 2016    Transitioned From: Advanced maternal age in multigravida, first trimester      Back pain     used 2 percocet tid until current admission in 2017    Bone spur     in back per pt    Chronic hypertension with superimposed preeclampsia 2017    Depression     Diabetes mellitus (HCC)     Elevated BP without diagnosis of hypertension     \"with pain\"    Foot injury     Full dentures     does not wear    GERD (gastroesophageal reflux disease)     occas    Gestational diabetes     insulin dependent    Herniated cervical disc     Herniated lumbar intervertebral disc     History of pneumonia     Hx of degenerative disc disease     Hyperlipidemia     Obesity     Pre-eclampsia     Prior pregnancy with fetal demise     previous demise at 36 weeks    Toe pain     and foot pain     Past Surgical History  Past Surgical History:   Procedure Laterality Date    ARTERIOGRAM Left 2025    Procedure: LEFT lower extremity arteriogram w/ popiteal and anterior tibial artery angioplasty;  Surgeon: Ottoniel Victoria MD;  Location: [facility];  Service: Vascular    BACK SURGERY       SECTION      INCISION AND DRAINAGE OF WOUND Left 2025    Procedure: INCISION AND DRAINAGE (I&D) EXTREMITY;  Surgeon: Leopoldo Ritchie, " DIAZ;  Location: [facility];  Service: Podiatry    IR LOWER EXTREMITY ANGIOGRAM  2024    IR LOWER EXTREMITY ANGIOGRAM  2025    IR LOWER EXTREMITY ANGIOGRAM  2025    IR PICC PLACEMENT DOUBLE LUMEN  2025    IR TPA LYSIS CHECK  2025    IR TPA LYSIS CHECK  2025    LAMINECTOMY      with disc removal, last assessed 16    LEG AMPUTATION THROUGH LOWER TIBIA AND FIBULA Left 2025    Procedure: Left BKA;  Surgeon: Shane Arauz DO;  Location: [facility];  Service: Vascular    OTHER SURGICAL HISTORY      Right ovary removal 2005 per pt recall at [facility]    CO AMPUTATION FOOT TRANSMETARSAL Left 1/3/2025    Procedure: AMPUTATION TRANSMETATARSAL (TMA);  Surgeon: Leopoldo Ritchie DPM;  Location: [facility];  Service: Podiatry    CO  DELIVERY ONLY N/A 02/15/2017    Procedure:  SECTION ();  Surgeon: Tito Umaña MD;  Location: [facility];  Service: Obstetrics    CO LIG/TRNSXJ FALOPIAN TUBE  DEL/ABDML SURG Left 02/15/2017    Procedure: LIGATION/COAGULATION TUBAL;  Surgeon: Tito Umaña MD;  Location: [facility];  Service: Obstetrics    VASCULAR SURGERY  2024    WISDOM TOOTH EXTRACTION              25 1056   OT Last Visit   OT Visit Date 25   Note Type   Note type Re-Evaluation   Pain Assessment   Pain Assessment Tool 0-10   Pain Score No Pain   Restrictions/Precautions   Weight Bearing Precautions Per Order Yes   LLE Weight Bearing Per Order NWB  (s/p L BKA 25)   Braces or Orthoses Knee immobilizer  (LLE)   Other Precautions Bed Alarm;Chair Alarm;Fall Risk;WBS  (s/p L BKA )   Home Living   Additional Comments see IE   Prior Function   Comments see IE   Lifestyle   Autonomy Pt reports I w/ ADLs, IADLs, and fxnl mobility w/ rollator at baseline; + driving.   Reciprocal Relationships Pt lives w/ her mother, daughters (7 and 18 y/o) and grandchild.   Intrinsic Gratification Pt enjoys watching movies and TV; also  being active.   ADL   Where Assessed Edge of bed   Eating Assistance 5  Supervision/Setup   Grooming Assistance 5  Supervision/Setup   UB Bathing Assistance 4  Minimal Assistance   LB Bathing Assistance 3  Moderate Assistance   UB Dressing Assistance 4  Minimal Assistance   LB Dressing Assistance 3  Moderate Assistance   Toileting Assistance  3  Moderate Assistance   Functional Assistance 3  Moderate Assistance   Bed Mobility   Supine to Sit 5  Supervision   Additional items Increased time required   Additional Comments found in bed, left in chair w all needs in reach and alarm on.   Transfers   Sit to Stand 4  Minimal assistance   Additional items Assist x 1;Increased time required   Stand to Sit 4  Minimal assistance   Additional items Assist x 1;Increased time required   Toilet transfer 4  Minimal assistance   Additional items Assist x 1;Commode   Additional Comments rw, vc for hand placement   Functional Mobility   Functional Mobility 4  Minimal assistance   Additional Comments ax1, hops from EOB>commode @ bedside >chair.   Additional items Rolling walker   Balance   Static Sitting Good   Dynamic Sitting Fair +   Static Standing Fair   Dynamic Standing Fair -   Ambulatory Fair -   Activity Tolerance   Activity Tolerance Patient limited by fatigue   Medical Staff Made Aware dpt 2' pts med complexity, comorbidities and regression from baseline.   Nurse Made Aware cleared   RUE Assessment   RUE Assessment WFL   LUE Assessment   LUE Assessment WFL   Hand Function   Gross Motor Coordination Functional   Fine Motor Coordination Functional   Cognition   Overall Cognitive Status WFL   Arousal/Participation Alert;Responsive;Cooperative   Attention Within functional limits   Orientation Level Oriented X4   Memory Within functional limits   Following Commands Follows all commands and directions without difficulty   Comments pt cooperative w G safety awareness and insight to condition t/o session.   Assessment   Limitation  Decreased ADL status;Decreased endurance;Decreased self-care trans;Decreased high-level ADLs   Prognosis Good   Assessment Pt seen today for re-eval 2' undergoing L BKA 2/11/25. POD 1 w active OT eval and treat orders. Please see IE for PLOF and home set up. Currently, pt is Min Ax1 for UB ADL, Mod Ax1 for LB ADL, and completed transfers/FM w Min Ax1. Pt is limited at this time 2* decreased endurance/activtiy tolerance, decreased cognition, decreased ADL/High-level ADL status, decreased self-care trans, decreased safety awareness, limited home support and is a fall risk. This impacts pt's ability to complete UB and LB dressing and bathing, toileting, transfers, functional mobility, community mobility, home and health maintenance, and safe engagement in typical daily routine. The patient's raw score on the -PAC Daily Activity inpatient short form is 18, standardized score is 38.66, less than 39.4. Patients at this level are likely to benefit from discharge to post-acute rehabilitation services. Please refer to the recommendation of the Occupational Therapist for safe discharge planning.  From OT standpoint, pt should D/C to level 1  when medically stable. Pt will benefit from continued acute OT services 2-3 x/wk for 10-14 days to meet goals.   Goals   Patient Goals get stronger   LTG Time Frame 10-14   Plan   Treatment Interventions ADL retraining;Functional transfer training;Endurance training;Patient/family training;Equipment evaluation/education;Compensatory technique education;Energy conservation;Activityengagement   Goal Expiration Date 02/26/25   OT Frequency 2-3x/wk   Discharge Recommendation   Rehab Resource Intensity Level, OT I (Maximum Resource Intensity)   AM-PAC Daily Activity Inpatient   Lower Body Dressing 2   Bathing 2   Toileting 2   Upper Body Dressing 4   Grooming 4   Eating 4   Daily Activity Raw Score 18   Daily Activity Standardized Score (Calc for Raw Score >=11) 38.66   AM-PAC Applied  Cognition Inpatient   Following a Speech/Presentation 4   Understanding Ordinary Conversation 4   Taking Medications 4   Remembering Where Things Are Placed or Put Away 4   Remembering List of 4-5 Errands 4   Taking Care of Complicated Tasks 4   Applied Cognition Raw Score 24   Applied Cognition Standardized Score 62.21   Modified Janessa Scale   Modified Janessa Scale 4   End of Consult   Education Provided Yes   Patient Position at End of Consult Bedside chair;Bed/Chair alarm activated;All needs within reach   Nurse Communication Nurse aware of consult     Pt will complete functional mobility with Mod I using appropriate DME as needed.     Pt will complete UB dressing and bathing with Mod I using appropriate DME as needed.     Pt will complete LB dressing and bathing with Mod I using appropriate DME as needed.    Pt will complete transfers with Mod I using appropriate DME as needed.     Pt will complete toileting with Mod I using appropriate DME as needed.     Pt will utilize energy conservation techniques throughout functional activity/ADL s/p skilled education.     Pt will demonstrate increased safety awareness during functional tasks/ADL's s/p skilled education.     Pt will increase activity tolerance to 30 minutes in order to complete ADL's/ functional tasks, using appropriate DME as needed.     Miroslava Rueda, BRUNO, OTR/L

## 2025-02-12 NOTE — PROGRESS NOTES
Peripheral Nerve Block Follow-up Note - Acute Pain Service    Lizzy Acuna 49 y.o. female MRN: [id number]  Unit/Bed#: [facility] 511-01 Encounter: [id number]      Assessment:   Principal Problem:    Cellulitis of left foot complicated by Critical limb threatening ischemia of the left lower extremity with a nonhealing transmetatarsal amputation wound  Active Problems:    Diabetes mellitus type 2 with complications ([facility])    Hyponatremia    Diabetic foot infection  ([facility])    PAD (peripheral artery disease) ([facility])    Blood loss anemia    Obesity (BMI 30-39.9)    Primary hypertension    Lizzy Acuna is a 49 y.o. year old female status post left BKA, POD 1.    Plan:   - Left adductor and popliteal block is functioning appropriately with expected sensory deficits  - Recommend  hydromorphone 0.2 mg IV every 4 hours as needed breakthrough pain.  oxycodone 2.5 mg/5 mg PO q4hrs PRN for moderate/severe pain    - Multimodal analgesia with:  - Tylenol 975 mg PO q8hrs standing  - Robaxin 500 mg PO q6hrs   - Lidocaine patches to affected areas 12 hours on, 12 hours off  Duloxetine 30 mg p.o. daily.    Valium 5 mg p.o. every 8 hours as needed muscle spasm.  Gabapentin 300 mg p.o. twice daily and 600 mg p.o. at bedtime.    APS will sign off at this time. Thank you for the consult. All opioids and other analgesics to be written at discretion of primary team. Please contact Acute Pain Service - [facility] via [employer] from [phone] with additional questions or concerns. See [employer] or [place] for additional contacts and after hours information.    Pain History  Current pain location(s): No pain  Pain Scale:   No pain  Quality: No pain  24 hour history: Patient is status post left BKA, POD 1.  Patient had preoperative left adductor canal and popliteal peripheral nerve blocks with Exparel.  Patient continues to have excellent analgesia and denies any pain whatsoever.  She does note phantom sensation without pain.    Opioid requirement previous 24  hours: Fentanyl 50 mcg IV x 2 in PACU, morphine 2 mg IV x 2, oxycodone 5 mg p.o. x 1, Dilaudid 0.2 mg IV x 2.    Meds/Allergies   all current active meds have been reviewed, current meds:   Current Facility-Administered Medications:     acetaminophen (TYLENOL) tablet 975 mg, Q6H JORGE    aspirin (ECOTRIN LOW STRENGTH) EC tablet 81 mg, Daily    atorvastatin (LIPITOR) tablet 80 mg, Daily With Dinner    chlorhexidine (PERIDEX) 0.12 % oral rinse 15 mL, Q12H JORGE    chlorthalidone tablet 12.5 mg, Daily    diazepam (VALIUM) tablet 5 mg, Q8H PRN    DULoxetine (CYMBALTA) delayed release capsule 30 mg, Daily    gabapentin (NEURONTIN) capsule 300 mg, BID (AM & Afternoon)    gabapentin (NEURONTIN) capsule 600 mg, HS    heparin (porcine) subcutaneous injection 5,000 Units, Q8H JORGE **AND** [COMPLETED] Platelet count, Once    hydrALAZINE (APRESOLINE) injection 5 mg, Q6H PRN    HYDROmorphone HCl (DILAUDID) injection 0.2 mg, Q4H PRN    insulin glargine (LANTUS) subcutaneous injection 7 Units 0.07 mL, HS    insulin lispro (HumALOG/ADMELOG) 100 units/mL subcutaneous injection 1-6 Units, 4x Daily (AC & HS) **AND** Fingerstick Glucose (POCT), 4x Daily AC and at bedtime    labetalol (NORMODYNE) injection 10 mg, Q4H PRN    lactated ringers bolus 500 mL, Once PRN **AND** lactated ringers bolus 500 mL, Once PRN    lidocaine (LIDODERM) 5 % patch 1 patch, Daily    methocarbamol (ROBAXIN) tablet 500 mg, Q6H JORGE    metoclopramide (REGLAN) injection 10 mg, Q6H PRN    nitroglycerin (NITROSTAT) SL tablet 0.4 mg, Q5 Min PRN    ondansetron (ZOFRAN) injection 4 mg, Q6H PRN    oxyCODONE (ROXICODONE) IR tablet 5 mg, Q4H PRN    oxyCODONE (ROXICODONE) split tablet 2.5 mg, Q4H PRN    pantoprazole (PROTONIX) EC tablet 40 mg, Early Morning    polyethylene glycol (MIRALAX) packet 17 g, Daily    senna-docusate sodium (SENOKOT S) 8.6-50 mg per tablet 1 tablet, HS    sodium chloride 0.9 % bolus 500 mL, Once PRN **AND** sodium chloride 0.9 % bolus 500 mL, Once  PRN    sodium chloride 0.9 % infusion, Continuous, Last Rate: 50 mL/hr (02/12/25 0233)    trimethobenzamide (TIGAN) IM injection 200 mg, Q6H PRN    vancomycin 750 mg in sodium chloride 0.9% 250 mL, Q12H, and PTA meds:   Prior to Admission Medications   Prescriptions Last Dose Informant Patient Reported? Taking?   Alcohol Swabs 70 % PADS  Self No No   Sig: May substitute brand based on insurance coverage. Check glucose TID.   Blood Glucose Monitoring Suppl (OneTouch Verio Reflect) w/Device KIT  Self No No   Sig: May substitute brand based on insurance coverage. Check glucose TID.   HYDROmorphone (DILAUDID) 4 mg tablet  Self Yes No   Insulin Glargine Solostar (Lantus SoloStar) 100 UNIT/ML SOPN  Self No No   Sig: Inject 0.1 mL (10 Units total) under the skin daily at bedtime   Insulin Pen Needle (BD Pen Needle Ana 2nd Gen) 32G X 4 MM MISC  Self No No   Sig: For use with insulin pen. Pharmacy may dispense brand covered by insurance.   Insulin Pen Needle (BD Pen Needle Ana 2nd Gen) 32G X 4 MM MISC  Self No No   Sig: For use with insulin pen. Pharmacy may dispense brand covered by insurance.   OneTouch Delica Lancets 33G MISC  Self No No   Sig: May substitute brand based on insurance coverage. Check glucose TID.   acetaminophen (TYLENOL) 325 mg tablet  Self No No   Sig: Take 3 tablets (975 mg total) by mouth every 6 (six) hours   Patient not taking: Reported on 1/23/2025   aspirin (ECOTRIN LOW STRENGTH) 81 mg EC tablet  Self No No   Sig: Take 1 tablet (81 mg total) by mouth daily   atorvastatin (LIPITOR) 80 mg tablet  Self No No   Sig: Take 1 tablet (80 mg total) by mouth daily with dinner   diphenhydrAMINE (BENADRYL) 25 mg capsule  Self Yes No   Sig: Take 25 mg by mouth every 6 (six) hours as needed for allergies   gabapentin (Neurontin) 300 mg capsule  Self No No   Sig: Take 1 tablet in the morning, 1 in the afternoon and 2 tablets at night.   glucose blood (OneTouch Verio) test strip  Self No No   Sig: May substitute  "brand based on insurance coverage. Check glucose TID.   ibuprofen (MOTRIN) 600 mg tablet  Self Yes No   Sig: Take by mouth every 6 (six) hours as needed for mild pain   insulin aspart (NovoLOG FlexPen) 100 UNIT/ML injection pen  Self No No   Sig: Inject 5 Units under the skin 3 (three) times a day with meals   methocarbamol (ROBAXIN) 500 mg tablet  Self No No   Sig: Take 1 tablet (500 mg total) by mouth every 6 (six) hours   naloxone (NARCAN) 4 mg/0.1 mL nasal spray  Self No No   Sig: Administer 1 spray into a nostril. If no response after 2-3 minutes, give another dose in the other nostril using a new spray.   senna-docusate sodium (SENOKOT S) 8.6-50 mg per tablet  Self No No   Sig: Take 1 tablet by mouth daily at bedtime   Patient not taking: Reported on 1/28/2025      Facility-Administered Medications: None       Allergies   Allergen Reactions    Codeine Other (See Comments)     Aggression-and nasty--\"took a cough syrup with codeine as a child\"       Objective     Temp:  [97.1 °F (36.2 °C)-98.6 °F (37 °C)] 98.1 °F (36.7 °C)  HR:  [] 82  BP: ()/() 92/70  Resp:  [12-18] 16  SpO2:  [94 %-100 %] 97 %  O2 Device: None (Room air)  Nasal Cannula O2 Flow Rate (L/min):  [2 L/min] 2 L/min    Physical Exam  Vitals and nursing note reviewed.   Constitutional:       General: She is awake. She is not in acute distress.     Appearance: She is not ill-appearing, toxic-appearing or diaphoretic.   Skin:     General: Skin is warm and dry.   Neurological:      Mental Status: She is alert and oriented to person, place, and time.      GCS: GCS eye subscore is 4. GCS verbal subscore is 5. GCS motor subscore is 6.   Psychiatric:         Attention and Perception: Attention normal.         Speech: Speech normal.         Behavior: Behavior normal. Behavior is cooperative.           Lab Results:   Results from last 7 days   Lab Units 02/12/25  0608   WBC Thousand/uL 9.74   HEMOGLOBIN g/dL 8.6*   HEMATOCRIT % 26.9* "   PLATELETS Thousands/uL 252      Results from last 7 days   Lab Units 02/12/25  0608 02/11/25  0536 02/10/25  0547   POTASSIUM mmol/L 4.4   < > 4.6   CHLORIDE mmol/L 99   < > 95*   CO2 mmol/L 24   < > 31   BUN mg/dL 18   < > 15   CREATININE mg/dL 0.84   < > 0.76   CALCIUM mg/dL 8.7   < > 9.6   ALK PHOS U/L  --   --  78   ALT U/L  --   --  5*   AST U/L  --   --  11*    < > = values in this interval not displayed.       Imaging Studies: Results Review Statement: No pertinent imaging studies reviewed.  EKG, Pathology, and Other Studies: Results Review Statement: No pertinent imaging studies reviewed.    Counseling / Coordination of Care  Total floor / unit time spent today 30 minutes. Greater than 50% of total time was spent with the patient and / or family counseling and / or coordination of care. A description of the counseling / coordination of care: Patient interview, physical examination, review of medical records, review of imaging and laboratory data, development of pain management plan, discussion of pain management plan with patient and primary service.    Please note that the APS provides consultative services regarding pain management only.  With the exception of ketamine, peripheral nerve catheters, and epidural infusions (and except when indicated), final decisions regarding starting or changing doses of analgesic medications are at the discretion of the consulting service.  Off hours consultation and/or medication management is generally not available.    James Bowen PA-C  Acute Pain Service

## 2025-02-12 NOTE — CASE MANAGEMENT
Case Management Progress Note    Patient name Lizzy Acuna  Location [facility] 511-01 MRN [id number]  : 1975 Date 2025       LOS (days): 12  Geometric Mean LOS (GMLOS) (days): 6.4  Days to GMLOS:-5.9        OBJECTIVE:        Current admission status: Inpatient  Preferred Pharmacy:   [address] [address]  Phone: [phone] Fax: [phone]    Primary Care Provider: NAOMY Basilio    Primary Insurance: [employer]  Secondary Insurance:     PROGRESS NOTE:    CM continues to follow for DCP  Await therapy recommendation post L BKA.

## 2025-02-12 NOTE — PROGRESS NOTES
INTERNAL MEDICINE RESIDENCY PROGRESS NOTE     Name: Lizzy Acuna   Age & Sex: 49 y.o. female   MRN: [id number]  Unit/Bed#: [facility] 511-01   Encounter: [id number]  Team: SOD Team B     PATIENT INFORMATION     Name: Lizzy Acuna   Age & Sex: 49 y.o. female   MRN: [id number]  Hospital Stay Days: 12    ASSESSMENT/PLAN     Principal Problem:    Cellulitis of left foot complicated by Critical limb threatening ischemia of the left lower extremity with a nonhealing transmetatarsal amputation wound  Active Problems:    Diabetes mellitus type 2 with complications (HCC)    Hyponatremia    Diabetic foot infection  (HCC)    PAD (peripheral artery disease) ([facility])    Blood loss anemia    Obesity (BMI 30-39.9)    Primary hypertension    Thyroid nodule    Adrenal nodule (HCC)      * Cellulitis of left foot complicated by Critical limb threatening ischemia of the left lower extremity with a nonhealing transmetatarsal amputation wound  Assessment & Plan  Underwent an elective R TMA approx 3 weeks ago and was doing well until last week when she was noticed to have dehiscence of her surgical wound.   Wound was debrided by her podiatrist in the office but ultimately worsened with evidence of cellulitis.   LLE foot infection diagnosed clinically without MRI findings of osteomyelitis.   Tissue culture: 2+ MRSA, 2+ Finegoldia magna No significant leukocytosis or fever this admission. Site continues to bleed likely due to triple therapy   A1c 10.3  -L SFA stent s/p L TMA, nonhealing, s/p lysis and tibial intervention (AT PTA)  -despite adequate perfusion w/ AT runoff, patient continues to be extremely high risk for higher amputation 2/2 TMA dehiscence and extensive necrosis  S/P Balloon angioplasty 2/7 of the left popliteal and anterior tibial arteries   S/p L BKA with podiatry on 2/11    Plan:   Okay from podiatry standpoint for discharge, will monitor overnight as nerve block still providing relief and need to ensure pain controlled  prior to discharge  Continue Plavix/statin  Continue heparin gtt  ID following - final dose of vancomycin today then d/c abx as source control achieved   Current pain regimen: Tylenol 975 mg q6h, duloxetine 30mg daily, gabapentin 300 mg bid plus 600mg hs, robaxin 500 mg q6h; oxy 2.5/5 for moderate/severe  APS following previously when pt on PCA but now transitioned off  Wound care     Adrenal nodule (HCC)  Assessment & Plan  14mm right adrenal nodule noted incidentally on imaging stable since 2018, c/w adenoma.   Recommended for biochemical evaluation for adenomas >1cm to r/o functioning adenoma, does not appear that this has been done previously. Will start with AM cortisol and testosterone level for evaluation.     Thyroid nodule  Assessment & Plan  Incidental finding on imaging, will need outpatient thyroid US     Primary hypertension  Assessment & Plan  Patient previously diagnosed with hypertension  However, on 2/5, became hypertensive with SBP's up to 200s  Started chlorthalidone 12.5 mg daily  Added on as needed hydralazine and labetalol  Plan:  Continue chlorthalidone 12.5 mg daily  As needed hydralazine and labetalol    Obesity (BMI 30-39.9)  Assessment & Plan  Aggressive lifestyle and dietary modifications advised including DM control    Blood loss anemia  Assessment & Plan  Blood loss anemia 2/2 lysis, bleeding from LLE TMA. Hb now stable   - Dressing and wound care per podiatry recs   - Recheck CBC and transfuse to maintain Hb > 7    PAD (peripheral artery disease) (HCC)  Assessment & Plan  See principal problem    Hyponatremia  Assessment & Plan  Chronic euvolemic hyponatremia that was 133 on admission. Asymptomatic and no recent history of confusion, altered metal status, seizure, or coma.   Current differential includes SIADH in the setting of acute infection   Plan:  Monitor BMP  Low threshold to workup with serum and urine studies if hyponatremia worsens      Diabetes mellitus type 2 with  complications (HCC)  Assessment & Plan  Lab Results   Component Value Date    HGBA1C 10.3 (H) 2024       Blood Sugar Average: Last 72 hrs:  (P) 163.3125  - Lantus 7U hs   -SSI  -Needs aggressive BS/DM control in setting of L foot wound/infection  -Goal -180 while inpatient        Disposition: anticipate discharge in 24-48hr      SUBJECTIVE     Patient seen and examined. Overnight pt having sharp pain between shoulder blades, cardiac w/u was unremarkable, CTA to evaluate for dissection was also unremarkable. Pain eventually resolved and pt pain free this morning. Has nerve block in place after BKA and states no pain in leg. No other new complaints.     OBJECTIVE     Vitals:    25 0408 25 0500 25 0738 25 1100   BP: 117/68  135/73 92/70   BP Location:   Left arm Left arm   Pulse: 85  78 82   Resp:   18 16   Temp: (!) 97.1 °F (36.2 °C)  97.6 °F (36.4 °C) 98.1 °F (36.7 °C)   TempSrc:   Oral Oral   SpO2: 95%  97% 97%   Weight:  84.1 kg (185 lb 6.4 oz)     Height:          Temperature:   Temp (24hrs), Av.9 °F (36.6 °C), Min:97.1 °F (36.2 °C), Max:98.6 °F (37 °C)    Temperature: 98.1 °F (36.7 °C)  Intake & Output:  I/O         02/10 07 0700  0701   0700  07 0700    P.O. 1200 480 480    I.V. (mL/kg) 832.8 (10) 506.7 (6) 422.5 (5)    IV Piggyback 200 250     Total Intake(mL/kg) 2232.8 (26.8) 1236.7 (14.7) 902.5 (10.7)    Urine (mL/kg/hr) 1950 (1) 3725 (1.8) 400 (0.9)    Total Output 1950 3725 400    Net +282.8 -2488.3 +502.5                 Weights:   IBW (Ideal Body Weight): 48.26 kg    Body mass index is 34.8 kg/m².  Weight (last 2 days)       Date/Time Weight    25 0500 84.1 (185.4)    25 0525 83.2 (183.4)    02/10/25 0300 83.8 (184.7)          Physical Exam  Vitals reviewed.   Constitutional:       General: She is not in acute distress.     Appearance: She is not ill-appearing.   HENT:      Head: Normocephalic and atraumatic.      Right  Ear: External ear normal.      Left Ear: External ear normal.      Nose: Nose normal.      Mouth/Throat:      Mouth: Mucous membranes are moist.   Eyes:      Conjunctiva/sclera: Conjunctivae normal.   Cardiovascular:      Rate and Rhythm: Normal rate and regular rhythm.      Heart sounds: Normal heart sounds.   Pulmonary:      Effort: Pulmonary effort is normal. No respiratory distress.      Breath sounds: Normal breath sounds.   Abdominal:      General: Bowel sounds are normal. There is no distension.      Palpations: Abdomen is soft.      Tenderness: There is no abdominal tenderness. There is no guarding.   Musculoskeletal:         General: No swelling.      Right lower leg: No edema.      Comments: L BKA   Skin:     General: Skin is warm and dry.   Neurological:      Mental Status: She is alert and oriented to person, place, and time. Mental status is at baseline.      Cranial Nerves: No cranial nerve deficit.   Psychiatric:         Mood and Affect: Mood normal.         Behavior: Behavior normal.       LABORATORY DATA     Labs: I have personally reviewed pertinent reports.  Results from last 7 days   Lab Units 02/12/25  0608 02/11/25  1640 02/11/25  0536 02/10/25  0547   WBC Thousand/uL 9.74  --  11.82* 10.50*   HEMOGLOBIN g/dL 8.6*  --  8.8* 9.2*   HEMATOCRIT % 26.9*  --  27.9* 29.1*   PLATELETS Thousands/uL 252 239 288 294   SEGS PCT % 79*  --  69 62   MONO PCT % 4  --  3* 5   EOS PCT % 0  --  1 1      Results from last 7 days   Lab Units 02/12/25  0608 02/11/25  0536 02/10/25  0547 02/07/25  0605 02/06/25  0636   POTASSIUM mmol/L 4.4 4.2 4.6   < > 3.9   CHLORIDE mmol/L 99 98 95*   < > 96   CO2 mmol/L 24 26 31   < > 32   BUN mg/dL 18 19 15   < > 8   CREATININE mg/dL 0.84 0.81 0.76   < > 0.85   CALCIUM mg/dL 8.7 8.7 9.6   < > 8.9   ALK PHOS U/L  --   --  78  --  87   ALT U/L  --   --  5*  --  12   AST U/L  --   --  11*  --  18    < > = values in this interval not displayed.              Results from last 7 days    Lab Units 02/11/25  0536 02/10/25  0547 02/09/25  0513   PTT seconds 71* 70* 71*               IMAGING & DIAGNOSTIC TESTING     Radiology Results: I have personally reviewed pertinent reports.  IR TPA lysis check  Result Date: 2/2/2025  Impression: 1. Left deep femoral, superficial femoral, popliteal, tibioperoneal trunk, and anterior tibial arteries were patent. 2. Mild residual stenoses in the left superficial femoral and popliteal arteries treated with 4 mm balloon angioplasty. 3. Mild stenoses along the proximal left anterior tibial artery treated with 3 mm and 2.5 mm balloon angioplasties. 4. Left posterior tibial and peroneal arteries appeared chronically occluded distally. Plan: -Continue anticoagulation. -Continue aspirin and statin. Workstation performed: [id number]     IR TPA lysis check  Result Date: 2/1/2025  Impression: 1. Residual luminal irregularities in the left superficial femoral artery treated with 4 mm balloon angioplasty. 2. Post angioplasty arteriogram showed reocclusion of left SFA, likely due to residual thrombus. 3. Lysis catheter replaced extending from the left SFA into the popliteal artery. Plan: -tPA to run through the lysis catheter at 1 mg/h. -Return for lysis check tomorrow. Workstation performed: [id number]     IR lower extremity angiogram  Result Date: 1/31/2025  Impression: 1. Thrombosed left superficial femoral artery treated with aspiration thrombectomy and 4 mm balloon angioplasty with residual thrombus. 2. Thrombolysis initiated through a lysis catheter in the left SFA. 3. Right arm PICC placement through basilic vein access. Plan: -tPA to run at 1 mg/hr through the lysis catheter. -All blood draws and labwork to be done through PICC. -Return for lysis check tomorrow. Workstation performed: [id number]     IR PICC placement double lumen  Result Date: 1/31/2025  Impression: 1. Thrombosed left superficial femoral artery treated with aspiration thrombectomy and 4 mm balloon  angioplasty with residual thrombus. 2. Thrombolysis initiated through a lysis catheter in the left SFA. 3. Right arm PICC placement through basilic vein access. Plan: -tPA to run at 1 mg/hr through the lysis catheter. -All blood draws and labwork to be done through PICC. -Return for lysis check tomorrow. Workstation performed: [id number]     CT head wo contrast  Result Date: 1/31/2025  Impression: 1. No intracranial hemorrhage seen. 2. Bilateral sphenoid sinusitis Workstation performed: [id number]     Other Diagnostic Testing: I have personally reviewed pertinent reports.    ACTIVE MEDICATIONS       Current Facility-Administered Medications:     acetaminophen (TYLENOL) tablet 975 mg, Q6H JORGE    aspirin (ECOTRIN LOW STRENGTH) EC tablet 81 mg, Daily    atorvastatin (LIPITOR) tablet 80 mg, Daily With Dinner    chlorhexidine (PERIDEX) 0.12 % oral rinse 15 mL, Q12H JORGE    chlorthalidone tablet 12.5 mg, Daily    diazepam (VALIUM) tablet 5 mg, Q8H PRN    DULoxetine (CYMBALTA) delayed release capsule 30 mg, Daily    gabapentin (NEURONTIN) capsule 300 mg, BID (AM & Afternoon)    gabapentin (NEURONTIN) capsule 600 mg, HS    heparin (porcine) subcutaneous injection 5,000 Units, Q8H JORGE **AND** [COMPLETED] Platelet count, Once    hydrALAZINE (APRESOLINE) injection 5 mg, Q6H PRN    HYDROmorphone HCl (DILAUDID) injection 0.2 mg, Q4H PRN    insulin glargine (LANTUS) subcutaneous injection 7 Units 0.07 mL, HS    insulin lispro (HumALOG/ADMELOG) 100 units/mL subcutaneous injection 1-6 Units, 4x Daily (AC & HS) **AND** Fingerstick Glucose (POCT), 4x Daily AC and at bedtime    labetalol (NORMODYNE) injection 10 mg, Q4H PRN    lactated ringers bolus 500 mL, Once PRN **AND** lactated ringers bolus 500 mL, Once PRN    lidocaine (LIDODERM) 5 % patch 1 patch, Daily    methocarbamol (ROBAXIN) tablet 500 mg, Q6H JORGE    metoclopramide (REGLAN) injection 10 mg, Q6H PRN    nitroglycerin (NITROSTAT) SL tablet 0.4 mg, Q5 Min PRN    ondansetron  "(ZOFRAN) injection 4 mg, Q6H PRN    oxyCODONE (ROXICODONE) IR tablet 5 mg, Q4H PRN    oxyCODONE (ROXICODONE) split tablet 2.5 mg, Q4H PRN    pantoprazole (PROTONIX) EC tablet 40 mg, Early Morning    polyethylene glycol (MIRALAX) packet 17 g, Daily    senna-docusate sodium (SENOKOT S) 8.6-50 mg per tablet 1 tablet, HS    sodium chloride 0.9 % bolus 500 mL, Once PRN **AND** sodium chloride 0.9 % bolus 500 mL, Once PRN    sodium chloride 0.9 % infusion, Continuous, Last Rate: 50 mL/hr (02/12/25 0233)    trimethobenzamide (TIGAN) IM injection 200 mg, Q6H PRN    vancomycin 750 mg in sodium chloride 0.9% 250 mL, Q12H, Last Rate: 750 mg (02/12/25 1209)    VTE Pharmacologic Prophylaxis: VTE covered by:  heparin (porcine), Subcutaneous, 5,000 Units at 02/12/25 0600      VTE Mechanical Prophylaxis: sequential compression device    Portions of the record may have been created with voice recognition software.  Occasional wrong word or \"sound a like\" substitutions may have occurred due to the inherent limitations of voice recognition software.  Read the chart carefully and recognize, using context, where substitutions have occurred.  ==  Sharron Stoddard MD    Internal Medicine Residency PGY-2       "

## 2025-02-12 NOTE — ASSESSMENT & PLAN NOTE
14mm right adrenal nodule noted incidentally on imaging stable since 2018, c/w adenoma.   Recommended for biochemical evaluation for adenomas >1cm to r/o functioning adenoma, does not appear that this has been done previously.   Testosterone level wnl, f/u AM cortisol level.

## 2025-02-12 NOTE — PLAN OF CARE
Problem: OCCUPATIONAL THERAPY ADULT  Goal: Performs self-care activities at highest level of function for planned discharge setting.  See evaluation for individualized goals.  Description: Treatment Interventions: ADL retraining, Functional transfer training, Endurance training, Patient/family training, Equipment evaluation/education, Compensatory technique education, Continued evaluation, Energy conservation, Activityengagement          See flowsheet documentation for full assessment, interventions and recommendations.   Outcome: Progressing  Note: Limitation: Decreased ADL status, Decreased endurance, Decreased self-care trans, Decreased high-level ADLs  Prognosis: Good  Assessment: Pt seen today for re-eval 2' undergoing L BKA 2/11/25. POD 1 w active OT eval and treat orders. Please see IE for PLOF and home set up. Currently, pt is Min Ax1 for UB ADL, Mod Ax1 for LB ADL, and completed transfers/FM w Min Ax1. Pt is limited at this time 2* decreased endurance/activtiy tolerance, decreased cognition, decreased ADL/High-level ADL status, decreased self-care trans, decreased safety awareness, limited home support and is a fall risk. This impacts pt's ability to complete UB and LB dressing and bathing, toileting, transfers, functional mobility, community mobility, home and health maintenance, and safe engagement in typical daily routine. The patient's raw score on the AM-PAC Daily Activity inpatient short form is 18, standardized score is 38.66, less than 39.4. Patients at this level are likely to benefit from discharge to post-acute rehabilitation services. Please refer to the recommendation of the Occupational Therapist for safe discharge planning.  From OT standpoint, pt should D/C to level 1  when medically stable. Pt will benefit from continued acute OT services 2-3 x/wk for 10-14 days to meet goals.     Rehab Resource Intensity Level, OT: I (Maximum Resource Intensity)

## 2025-02-12 NOTE — CASE MANAGEMENT
Case Management Discharge Planning Note    Patient name Lizzy Acuna  Location [facility]-01 MRN [id number]  : 1975 Date 2025       Current Admission Date: 2025  Current Admission Diagnosis:Cellulitis of left foot complicated by Critical limb threatening ischemia of the left lower extremity with a nonhealing transmetatarsal amputation wound   Patient Active Problem List    Diagnosis Date Noted Date Diagnosed    Thyroid nodule 2025     Adrenal nodule (HCC) 2025     Primary hypertension 2025     Obesity (BMI 30-39.9) 2025     Blood loss anemia 2025     PAD (peripheral artery disease) ([facility]) 2025     Diabetic foot infection  ([facility]) 2025     Dehiscence of operative wound, initial encounter 2025     Hypersensitivity, initial encounter 2025     GERD (gastroesophageal reflux disease)      Cellulitis of left foot complicated by Critical limb threatening ischemia of the left lower extremity with a nonhealing transmetatarsal amputation wound 2024     Gangrene of left foot ([facility]) 2024     Diabetes mellitus type 2 with complications ([facility]) 2018     Hyponatremia 2018     Chronic sciatica 2018     Tobacco abuse 02/15/2017     Obesity (BMI 30.0-34.9) 2017     Chronic low back pain 2016     Chronic, continuous use of opioids 2016     Proteinuria 2011       LOS (days): 12  Geometric Mean LOS (GMLOS) (days): 6.4  Days to GMLOS:-6.2     OBJECTIVE:  Risk of Unplanned Readmission Score: 31.64         Current admission status: Inpatient   Preferred Pharmacy:   [address] [address]  Phone: [phone] Fax: [phone]    Primary Care Provider: NAOMY Basilio    Primary Insurance: Tout  Secondary Insurance:     DISCHARGE DETAILS:    Discharge planning discussed with:: patient  Freedom of Choice:  Yes  Comments - Freedom of Choice: Therapy is recommending Level I rehab. Met w/ pt to discuss same. She expresses she is very concerned about discharging directly home because of stairs. She lives in [place] and would like to go to [facility]. Referral sent via [facility]. PMR consult ordered. Will need auth        Other Referral/Resources/Interventions Provided:  Interventions: Acute Rehab         Treatment Team Recommendation: Acute Rehab  Discharge Destination Plan:: Acute Rehab

## 2025-02-12 NOTE — ASSESSMENT & PLAN NOTE
Lab Results   Component Value Date    HGBA1C 10.3 (H) 12/13/2024       Recent Labs     02/11/25  1827 02/11/25 2027 02/12/25  0702 02/12/25  1058   POCGLU 216* 274* 198* 234*       Blood Sugar Average: Last 72 hrs:  (P) 163.3125  - Current management by internal medicine  - Monitor for signs and symptoms of hypoglycemia   - Current meds: per primary  - Consistent carb diabetic diet  - Recommend close monitoring and optimal management during recovery/rehab phase as well

## 2025-02-12 NOTE — QUICK NOTE
Received notification at 6:30 PM by nursing that patient had acute onset of bilateral shoulder pain rated 10/10. Was noted to be diaphoretic with tachycardia of approximately 120 and hypertensive to 180/103.    During evaluation, patient reported feeling acute onset of severe bilateral shoulder pain that came on suddenly. Reports no prior history of any similar pain and describes it as a sharp, stabbing sensation between her shoulder blades that radiates up to her neck. She denied feeling short of breath, fevers, nausea/vomiting or abdominal pain.    Repeat vitals showed T=97.9, BQ=937, XB=958/111 and SpO2=99% on 2 L NC    On physical exam, patient noted to have fine crackles in the bilateral lung fields (Left > Right), S1 and S2 present with no new murmurs heard, abdomen soft and non-tender to palpation and warm to touch in upper and lower extremities with radial pulses +2 and regular, right posterior tibial pulse +1 and regular.    EKG at performed at bedside shows sinus tachycardia with new left axis deviation and T-wave inversion in V1 compared to prior EKG.    Assessment  Given the acute onset of the pain associated with the vital instability, would rule out possible aortic dissection first given history of PAD and hypertension. Can also consider ACS, PE. Patient was given Nitro 0.4 mg x2 as well Morphine 2 mg with subjective improvement in pain as well as improvement in BP to 131/83. At this time will need further work-up.    Plan  STAT CTA Dissection Protocol ordered  Will continue Nitro PRN  Will trend Troponin levels  Monitor on Telemetry  Would recommend repeat EKG if pain reoccurs

## 2025-02-12 NOTE — ASSESSMENT & PLAN NOTE
Status post angiogram with intervention including left SFA thrombectomy and placement of lysis catheter on 1/31/2025.  Repeat imaging demonstrated residual stenosis of left lower extremity.  Patient status post repeat arteriogram on 2/6/2025 with intervention with apparent reestablished flow.  Still unable to successfully reestablish flow. Now s/p BKA on 2/11.   -Close vascular follow-up

## 2025-02-12 NOTE — PROGRESS NOTES
Progress Note - Vascular Surgery   Name: Lizzy Acuna 49 y.o. female I MRN: [id number]  Unit/Bed#: [facility]-01 I Date of Admission: 1/31/2025   Date of Service: 2/12/2025 I Hospital Day: 12    Assessment & Plan  Diabetic foot infection  (HCC)  49 y.o. F w/ PMH of T2DM (A1c 10.3), tobacco use (quit Dec 2024) who was previously seen at [facility] in December with wound of left foot with cellulitis.   During previous admission vascular surgery was consulted and recommended LLE agram w/ intervention which was preformed by IR. Post-procedure pt underwent TMA with podiatry    Now S/P Agram, Left SFA thrombectomy, placement of lysis catheter on 1/31  S/p lysis recheck w/ angioplasty of L SFA, pop, AT on 2/3.  2/6 - Repeat arteriogram with finding of recurrent pop occlusion, AT recannulization with distal pop, TPT, and AT POBA and serration angioplasty     Plan  - A wound care  - Continue plavix and statin    - Aspirin discontinued, no indication for triple therapy  -- transition to DOAC once appropriate from podiatric perspective if no further intervention   - Remainder of care per primary, patient stable from vascular surgery perspective    ** Please contact the [facility] Vascular Surgery Resident/AP role for any questions or concerns that might arise   Diabetes mellitus type 2 with complications (HCC)  Lab Results   Component Value Date    HGBA1C 10.3 (H) 12/13/2024       Recent Labs     02/11/25  2027 02/12/25  0702 02/12/25  1058 02/12/25  1545   POCGLU 274* 198* 234* 220*       Blood Sugar Average: Last 72 hrs:  (P) 166.5705054699214178    Hyponatremia    Cellulitis of left foot complicated by Critical limb threatening ischemia of the left lower extremity with a nonhealing transmetatarsal amputation wound    PAD (peripheral artery disease) (HCC)    Blood loss anemia    Obesity (BMI 30-39.9)    Primary hypertension    Thyroid nodule    Adrenal nodule (HCC)        Subjective : Pain well controlled, no acute events    Objective  ":  Temp:  [97.1 °F (36.2 °C)-98.3 °F (36.8 °C)] 97.7 °F (36.5 °C)  HR:  [] 81  BP: ()/() 152/73  Resp:  [16-18] 18  SpO2:  [94 %-100 %] 97 %  O2 Device: None (Room air)  Nasal Cannula O2 Flow Rate (L/min):  [2 L/min] 2 L/min     I/O         02/10 0701  02/11 0700 02/11 0701 02/12 0700 02/12 0701 02/13 0700    P.O. 1200 480 480    I.V. (mL/kg) 832.8 (10) 506.7 (6) 597.5 (7.1)    IV Piggyback 200 250     Total Intake(mL/kg) 2232.8 (26.8) 1236.7 (14.7) 1077.5 (12.8)    Urine (mL/kg/hr) 1950 (1) 3725 (1.8) 925 (1.1)    Total Output 1950 3725 925    Net +282.8 -2488.3 +152.5                 Lines/Drains/Airways       Active Status       Name Placement date Placement time Site Days    PICC Line 01/31/25 Right 01/31/25 1937  --  11                  Physical Exam  Pulse exam:  Physical Exam:  General: NAD  CV: nl rate  Lungs: nl wob. No resp distress.  Chest: no lesions  ABD: Soft, ND, appropriately tender  Extrem: stump dressing CDI. No gross deformity or swelling        Lab Results: I have reviewed the following results:  CBC with diff:   Lab Results   Component Value Date    WBC 9.74 02/12/2025    HGB 8.6 (L) 02/12/2025    HCT 26.9 (L) 02/12/2025    MCV 93 02/12/2025     02/12/2025    RBC 2.89 (L) 02/12/2025    MCH 29.8 02/12/2025    MCHC 32.0 02/12/2025    RDW 14.7 02/12/2025    MPV 9.1 02/12/2025    NRBC 0 02/12/2025   ,   BMP/CMP:  Lab Results   Component Value Date    SODIUM 133 (L) 02/12/2025    K 4.4 02/12/2025    CL 99 02/12/2025    CO2 24 02/12/2025    BUN 18 02/12/2025    CREATININE 0.84 02/12/2025    CALCIUM 8.7 02/12/2025    AST 11 (L) 02/10/2025    ALT 5 (L) 02/10/2025    ALKPHOS 78 02/10/2025    EGFR 82 02/12/2025   ,   Lipid Panel: No results found for: \"CHOL\",   Coags:   Lab Results   Component Value Date    PTT 71 (H) 02/11/2025    INR 1.05 02/02/2025   ,   Blood Culture:   Lab Results   Component Value Date    BLOODCX No Growth After 5 Days. 01/28/2025    BLOODCX No Growth " After 5 Days. 01/28/2025   ,   Urinalysis:   Lab Results   Component Value Date    COLORU Straw 11/05/2018    CLARITYU Clear 11/05/2018    SPECGRAV 1.020 11/05/2018    PHUR 6.0 11/05/2018    LEUKOCYTESUR Negative 11/05/2018    NITRITE Negative 11/05/2018    GLUCOSEU 3+ (A) 11/05/2018    KETONESU Negative 11/05/2018    BILIRUBINUR Negative 11/05/2018    BLOODU Trace-Intact (A) 11/05/2018   ,   Urine Culture:   Lab Results   Component Value Date    URINECX 50,000-59,000 cfu/ml Mixed Contaminants X4 10/21/2016   ,   Wound Culure:   Lab Results   Component Value Date    WOUNDCULT 1+ Growth of Enterobacter gergoviae (A) 06/13/2018    WOUNDCULT (A) 06/13/2018     2+ Growth of Methicillin Resistant Staphylococcus aureus

## 2025-02-12 NOTE — DISCHARGE INSTR - OTHER ORDERS
L BKA incisional care:  Wash incision daily w/ soap and water. Pat dry thoroughly   Betadine paint   ABD pad  Ace wrap to assist with edema control/stump shrinkage

## 2025-02-12 NOTE — ASSESSMENT & PLAN NOTE
Underwent an elective R TMA approx 3 weeks ago and was doing well until last week when she was noticed to have dehiscence of her surgical wound.   Wound was debrided by her podiatrist in the office but ultimately worsened with evidence of cellulitis.   LLE foot infection diagnosed clinically without MRI findings of osteomyelitis.   Tissue culture: 2+ MRSA, 2+ Finegoldia magna No significant leukocytosis or fever this admission. Site continues to bleed likely due to triple therapy   A1c 10.3  -L SFA stent s/p L TMA, nonhealing, s/p lysis and tibial intervention (AT PTA)  -despite adequate perfusion w/ AT runoff, patient continues to be extremely high risk for higher amputation 2/2 TMA dehiscence and extensive necrosis  S/P Balloon angioplasty 2/7 of the left popliteal and anterior tibial arteries   S/p L BKA with podiatry on 2/11  Off vancomycin per ID as source control achieved.    Plan:   Okay from podiatry standpoint for discharge  Continue Plavix/statin  Continue heparin gtt  Current pain regimen: Tylenol 975 mg q6h, duloxetine 30mg daily, gabapentin 300 mg bid plus 600mg hs, robaxin 500 mg q6h; oxy 2.5/5 for moderate/severe  Will involve APS again as pt now having uncontrolled pain after nerve block wore off  Wound care

## 2025-02-12 NOTE — ASSESSMENT & PLAN NOTE
Lab Results   Component Value Date    HGBA1C 10.3 (H) 12/13/2024       Recent Labs     02/11/25 2027 02/12/25  0702 02/12/25  1058 02/12/25  1545   POCGLU 274* 198* 234* 220*       Blood Sugar Average: Last 72 hrs:  (P) 166.9553823374445721

## 2025-02-13 LAB
ABO GROUP BLD BPU: NORMAL
ABO GROUP BLD BPU: NORMAL
BPU ID: NORMAL
BPU ID: NORMAL
CORTIS AM PEAK SERPL-MCNC: 11.7 UG/DL (ref 6.7–22.6)
CROSSMATCH: NORMAL
CROSSMATCH: NORMAL
GLUCOSE SERPL-MCNC: 163 MG/DL (ref 65–140)
GLUCOSE SERPL-MCNC: 193 MG/DL (ref 65–140)
GLUCOSE SERPL-MCNC: 200 MG/DL (ref 65–140)
GLUCOSE SERPL-MCNC: 201 MG/DL (ref 65–140)
UNIT DISPENSE STATUS: NORMAL
UNIT DISPENSE STATUS: NORMAL
UNIT PRODUCT CODE: NORMAL
UNIT PRODUCT CODE: NORMAL
UNIT PRODUCT VOLUME: 300 ML
UNIT PRODUCT VOLUME: 300 ML
UNIT RH: NORMAL
UNIT RH: NORMAL

## 2025-02-13 PROCEDURE — 99233 SBSQ HOSP IP/OBS HIGH 50: CPT | Performed by: NURSE PRACTITIONER

## 2025-02-13 PROCEDURE — G0545 PR INHERENT VISIT TO INPT: HCPCS | Performed by: INTERNAL MEDICINE

## 2025-02-13 PROCEDURE — 99232 SBSQ HOSP IP/OBS MODERATE 35: CPT | Performed by: INTERNAL MEDICINE

## 2025-02-13 PROCEDURE — 82533 TOTAL CORTISOL: CPT

## 2025-02-13 PROCEDURE — NC001 PR NO CHARGE: Performed by: INTERNAL MEDICINE

## 2025-02-13 PROCEDURE — 82948 REAGENT STRIP/BLOOD GLUCOSE: CPT

## 2025-02-13 PROCEDURE — 99024 POSTOP FOLLOW-UP VISIT: CPT | Performed by: SURGERY

## 2025-02-13 RX ORDER — OXYCODONE HYDROCHLORIDE 5 MG/1
5 TABLET ORAL EVERY 4 HOURS PRN
Status: DISCONTINUED | OUTPATIENT
Start: 2025-02-13 | End: 2025-02-15 | Stop reason: HOSPADM

## 2025-02-13 RX ORDER — NALOXONE HYDROCHLORIDE 0.4 MG/ML
0.1 INJECTION, SOLUTION INTRAMUSCULAR; INTRAVENOUS; SUBCUTANEOUS
Status: DISCONTINUED | OUTPATIENT
Start: 2025-02-13 | End: 2025-02-15 | Stop reason: HOSPADM

## 2025-02-13 RX ORDER — INSULIN LISPRO 100 [IU]/ML
5 INJECTION, SOLUTION INTRAVENOUS; SUBCUTANEOUS
Status: DISCONTINUED | OUTPATIENT
Start: 2025-02-13 | End: 2025-02-15 | Stop reason: HOSPADM

## 2025-02-13 RX ORDER — OXYCODONE HYDROCHLORIDE 10 MG/1
10 TABLET ORAL EVERY 4 HOURS PRN
Status: DISCONTINUED | OUTPATIENT
Start: 2025-02-13 | End: 2025-02-15 | Stop reason: HOSPADM

## 2025-02-13 RX ORDER — INSULIN LISPRO 100 [IU]/ML
1-6 INJECTION, SOLUTION INTRAVENOUS; SUBCUTANEOUS
Status: DISCONTINUED | OUTPATIENT
Start: 2025-02-13 | End: 2025-02-15 | Stop reason: HOSPADM

## 2025-02-13 RX ORDER — INSULIN GLARGINE 100 [IU]/ML
10 INJECTION, SOLUTION SUBCUTANEOUS
Status: DISCONTINUED | OUTPATIENT
Start: 2025-02-13 | End: 2025-02-15 | Stop reason: HOSPADM

## 2025-02-13 RX ADMIN — INSULIN GLARGINE 10 UNITS: 100 INJECTION, SOLUTION SUBCUTANEOUS at 21:09

## 2025-02-13 RX ADMIN — HEPARIN SODIUM 5000 UNITS: 5000 INJECTION INTRAVENOUS; SUBCUTANEOUS at 14:07

## 2025-02-13 RX ADMIN — DULOXETINE 30 MG: 30 CAPSULE, DELAYED RELEASE ORAL at 08:54

## 2025-02-13 RX ADMIN — INSULIN LISPRO 2 UNITS: 100 INJECTION, SOLUTION INTRAVENOUS; SUBCUTANEOUS at 07:06

## 2025-02-13 RX ADMIN — INSULIN LISPRO 2 UNITS: 100 INJECTION, SOLUTION INTRAVENOUS; SUBCUTANEOUS at 17:14

## 2025-02-13 RX ADMIN — OXYCODONE HYDROCHLORIDE 5 MG: 5 TABLET ORAL at 11:31

## 2025-02-13 RX ADMIN — OXYCODONE HYDROCHLORIDE 5 MG: 5 TABLET ORAL at 16:05

## 2025-02-13 RX ADMIN — SENNOSIDES AND DOCUSATE SODIUM 1 TABLET: 50; 8.6 TABLET ORAL at 21:08

## 2025-02-13 RX ADMIN — METHOCARBAMOL 500 MG: 500 TABLET ORAL at 11:14

## 2025-02-13 RX ADMIN — POLYETHYLENE GLYCOL 3350 17 G: 17 POWDER, FOR SOLUTION ORAL at 08:55

## 2025-02-13 RX ADMIN — CHLORHEXIDINE GLUCONATE 0.12% ORAL RINSE 15 ML: 1.2 LIQUID ORAL at 21:08

## 2025-02-13 RX ADMIN — INSULIN LISPRO 1 UNITS: 100 INJECTION, SOLUTION INTRAVENOUS; SUBCUTANEOUS at 11:15

## 2025-02-13 RX ADMIN — CHLORTHALIDONE 12.5 MG: 25 TABLET ORAL at 08:57

## 2025-02-13 RX ADMIN — HYDROMORPHONE HYDROCHLORIDE 0.5 MG: 1 INJECTION, SOLUTION INTRAMUSCULAR; INTRAVENOUS; SUBCUTANEOUS at 17:14

## 2025-02-13 RX ADMIN — ACETAMINOPHEN 975 MG: 325 TABLET, FILM COATED ORAL at 11:14

## 2025-02-13 RX ADMIN — PANTOPRAZOLE SODIUM 40 MG: 40 TABLET, DELAYED RELEASE ORAL at 05:12

## 2025-02-13 RX ADMIN — METHOCARBAMOL 500 MG: 500 TABLET ORAL at 05:12

## 2025-02-13 RX ADMIN — HYDROMORPHONE HYDROCHLORIDE 0.5 MG: 1 INJECTION, SOLUTION INTRAMUSCULAR; INTRAVENOUS; SUBCUTANEOUS at 05:09

## 2025-02-13 RX ADMIN — INSULIN LISPRO 5 UNITS: 100 INJECTION, SOLUTION INTRAVENOUS; SUBCUTANEOUS at 11:15

## 2025-02-13 RX ADMIN — OXYCODONE HYDROCHLORIDE 5 MG: 5 TABLET ORAL at 03:20

## 2025-02-13 RX ADMIN — ACETAMINOPHEN 975 MG: 325 TABLET, FILM COATED ORAL at 05:12

## 2025-02-13 RX ADMIN — OXYCODONE HYDROCHLORIDE 10 MG: 10 TABLET ORAL at 21:08

## 2025-02-13 RX ADMIN — GABAPENTIN 300 MG: 300 CAPSULE ORAL at 14:07

## 2025-02-13 RX ADMIN — INSULIN LISPRO 5 UNITS: 100 INJECTION, SOLUTION INTRAVENOUS; SUBCUTANEOUS at 17:14

## 2025-02-13 RX ADMIN — HYDROMORPHONE HYDROCHLORIDE 0.5 MG: 1 INJECTION, SOLUTION INTRAMUSCULAR; INTRAVENOUS; SUBCUTANEOUS at 08:52

## 2025-02-13 RX ADMIN — ACETAMINOPHEN 975 MG: 325 TABLET, FILM COATED ORAL at 17:14

## 2025-02-13 RX ADMIN — GABAPENTIN 600 MG: 300 CAPSULE ORAL at 21:08

## 2025-02-13 RX ADMIN — INSULIN LISPRO 5 UNITS: 100 INJECTION, SOLUTION INTRAVENOUS; SUBCUTANEOUS at 09:10

## 2025-02-13 RX ADMIN — CHLORHEXIDINE GLUCONATE 0.12% ORAL RINSE 15 ML: 1.2 LIQUID ORAL at 08:54

## 2025-02-13 RX ADMIN — OXYCODONE HYDROCHLORIDE 5 MG: 5 TABLET ORAL at 07:21

## 2025-02-13 RX ADMIN — GABAPENTIN 300 MG: 300 CAPSULE ORAL at 08:54

## 2025-02-13 RX ADMIN — ASPIRIN 81 MG: 81 TABLET, COATED ORAL at 08:54

## 2025-02-13 RX ADMIN — HYDROMORPHONE HYDROCHLORIDE 0.5 MG: 1 INJECTION, SOLUTION INTRAMUSCULAR; INTRAVENOUS; SUBCUTANEOUS at 00:44

## 2025-02-13 RX ADMIN — METHOCARBAMOL 500 MG: 500 TABLET ORAL at 17:14

## 2025-02-13 RX ADMIN — ATORVASTATIN CALCIUM 80 MG: 80 TABLET, FILM COATED ORAL at 16:05

## 2025-02-13 RX ADMIN — HEPARIN SODIUM 5000 UNITS: 5000 INJECTION INTRAVENOUS; SUBCUTANEOUS at 21:08

## 2025-02-13 RX ADMIN — HEPARIN SODIUM 5000 UNITS: 5000 INJECTION INTRAVENOUS; SUBCUTANEOUS at 05:12

## 2025-02-13 NOTE — PLAN OF CARE
Problem: PAIN - ADULT  Goal: Verbalizes/displays adequate comfort level or baseline comfort level  Description: Interventions:  - Encourage patient to monitor pain and request assistance  - Assess pain using appropriate pain scale  - Administer analgesics based on type and severity of pain and evaluate response  - Implement non-pharmacological measures as appropriate and evaluate response  - Consider cultural and social influences on pain and pain management  - Notify physician/advanced practitioner if interventions unsuccessful or patient reports new pain  Outcome: Progressing     Problem: INFECTION - ADULT  Goal: Absence or prevention of progression during hospitalization  Description: INTERVENTIONS:  - Assess and monitor for signs and symptoms of infection  - Monitor lab/diagnostic results  - Monitor all insertion sites, i.e. indwelling lines, tubes, and drains  - Monitor endotracheal if appropriate and nasal secretions for changes in amount and color  - [place] appropriate cooling/warming therapies per order  - Administer medications as ordered  - Instruct and encourage patient and family to use good hand hygiene technique  - Identify and instruct in appropriate isolation precautions for identified infection/condition  Outcome: Progressing  Goal: Absence of fever/infection during neutropenic period  Description: INTERVENTIONS:  - Monitor WBC    Outcome: Progressing     Problem: SAFETY ADULT  Goal: Patient will remain free of falls  Description: INTERVENTIONS:  - Educate patient/family on patient safety including physical limitations  - Instruct patient to call for assistance with activity   - Consult OT/PT to assist with strengthening/mobility   - Keep Call bell within reach  - Keep bed low and locked with side rails adjusted as appropriate  - Keep care items and personal belongings within reach  - Initiate and maintain comfort rounds  - Make Fall Risk Sign visible to staff  - Offer Toileting every 2 Hours,  in advance of need  - Initiate/Maintain bed alarm  - Obtain necessary fall risk management equipment: non-slip socks  - Apply yellow socks and bracelet for high fall risk patients  - Consider moving patient to room near nurses station  Outcome: Progressing  Goal: Maintain or return to baseline ADL function  Description: INTERVENTIONS:  -  Assess patient's ability to carry out ADLs; assess patient's baseline for ADL function and identify physical deficits which impact ability to perform ADLs (bathing, care of mouth/teeth, toileting, grooming, dressing, etc.)  - Assess/evaluate cause of self-care deficits   - Assess range of motion  - Assess patient's mobility; develop plan if impaired  - Assess patient's need for assistive devices and provide as appropriate  - Encourage maximum independence but intervene and supervise when necessary  - Involve family in performance of ADLs  - Assess for home care needs following discharge   - Consider OT consult to assist with ADL evaluation and planning for discharge  - Provide patient education as appropriate  Outcome: Progressing  Goal: Maintains/Returns to pre admission functional level  Description: INTERVENTIONS:  - Perform AM-PAC 6 Click Basic Mobility/ Daily Activity assessment daily.  - Set and communicate daily mobility goal to care team and patient/family/caregiver.   - Collaborate with rehabilitation services on mobility goals if consulted  - Perform Range of Motion 4 times a day.  - Reposition patient every 2 hours.  - Dangle patient 3 times a day  - Stand patient 3 times a day  - Ambulate patient 3 times a day  - Out of bed to chair 3 times a day   - Out of bed for meals 3 times a day  - Out of bed for toileting  - Record patient progress and toleration of activity level   Outcome: Progressing     Problem: DISCHARGE PLANNING  Goal: Discharge to home or other facility with appropriate resources  Description: INTERVENTIONS:  - Identify barriers to discharge w/patient and  caregiver  - Arrange for needed discharge resources and transportation as appropriate  - Identify discharge learning needs (meds, wound care, etc.)  - Arrange for interpretive services to assist at discharge as needed  - Refer to Case Management Department for coordinating discharge planning if the patient needs post-hospital services based on physician/advanced practitioner order or complex needs related to functional status, cognitive ability, or social support system  Outcome: Progressing     Problem: Knowledge Deficit  Goal: Patient/family/caregiver demonstrates understanding of disease process, treatment plan, medications, and discharge instructions  Description: Complete learning assessment and assess knowledge base.  Interventions:  - Provide teaching at level of understanding  - Provide teaching via preferred learning methods  Outcome: Progressing     Problem: NEUROSENSORY - ADULT  Goal: Achieves stable or improved neurological status  Description: INTERVENTIONS  - Monitor and report changes in neurological status  - Monitor vital signs such as temperature, blood pressure, glucose, and any other labs ordered   - Initiate measures to prevent increased intracranial pressure  - Monitor for seizure activity and implement precautions if appropriate      Outcome: Progressing  Goal: Remains free of injury related to seizures activity  Description: INTERVENTIONS  - Maintain airway, patient safety  and administer oxygen as ordered  - Monitor patient for seizure activity, document and report duration and description of seizure to physician/advanced practitioner  - If seizure occurs,  ensure patient safety during seizure  - Reorient patient post seizure  - Seizure pads on all 4 side rails  - Instruct patient/family to notify RN of any seizure activity including if an aura is experienced  - Instruct patient/family to call for assistance with activity based on nursing assessment  - Administer anti-seizure medications if  ordered    Outcome: Progressing  Goal: Achieves maximal functionality and self care  Description: INTERVENTIONS  - Monitor swallowing and airway patency with patient fatigue and changes in neurological status  - Encourage and assist patient to increase activity and self care.   - Encourage visually impaired, hearing impaired and aphasic patients to use assistive/communication devices  Outcome: Progressing     Problem: CARDIOVASCULAR - ADULT  Goal: Maintains optimal cardiac output and hemodynamic stability  Description: INTERVENTIONS:  - Monitor I/O, vital signs and rhythm  - Monitor for S/S and trends of decreased cardiac output  - Administer and titrate ordered vasoactive medications to optimize hemodynamic stability  - Assess quality of pulses, skin color and temperature  - Assess for signs of decreased coronary artery perfusion  - Instruct patient to report change in severity of symptoms  Outcome: Progressing  Goal: Absence of cardiac dysrhythmias or at baseline rhythm  Description: INTERVENTIONS:  - Continuous cardiac monitoring, vital signs, obtain 12 lead EKG if ordered  - Administer antiarrhythmic and heart rate control medications as ordered  - Monitor electrolytes and administer replacement therapy as ordered  Outcome: Progressing     Problem: RESPIRATORY - ADULT  Goal: Achieves optimal ventilation and oxygenation  Description: INTERVENTIONS:  - Assess for changes in respiratory status  - Assess for changes in mentation and behavior  - Position to facilitate oxygenation and minimize respiratory effort  - Oxygen administered by appropriate delivery if ordered  - Initiate smoking cessation education as indicated  - Encourage broncho-pulmonary hygiene including cough, deep breathe, Incentive Spirometry  - Assess the need for suctioning and aspirate as needed  - Assess and instruct to report SOB or any respiratory difficulty  - Respiratory Therapy support as indicated  Outcome: Progressing     Problem:  GASTROINTESTINAL - ADULT  Goal: Minimal or absence of nausea and/or vomiting  Description: INTERVENTIONS:  - Administer IV fluids if ordered to ensure adequate hydration  - Maintain NPO status until nausea and vomiting are resolved  - Nasogastric tube if ordered  - Administer ordered antiemetic medications as needed  - Provide nonpharmacologic comfort measures as appropriate  - Advance diet as tolerated, if ordered  - Consider nutrition services referral to assist patient with adequate nutrition and appropriate food choices  Outcome: Progressing  Goal: Maintains or returns to baseline bowel function  Description: INTERVENTIONS:  - Assess bowel function  - Encourage oral fluids to ensure adequate hydration  - Administer IV fluids if ordered to ensure adequate hydration  - Administer ordered medications as needed  - Encourage mobilization and activity  - Consider nutritional services referral to assist patient with adequate nutrition and appropriate food choices  Outcome: Progressing  Goal: Maintains adequate nutritional intake  Description: INTERVENTIONS:  - Monitor percentage of each meal consumed  - Identify factors contributing to decreased intake, treat as appropriate  - Assist with meals as needed  - Monitor I&O, weight, and lab values if indicated  - Obtain nutrition services referral as needed  Outcome: Progressing  Goal: Establish and maintain optimal ostomy function  Description: INTERVENTIONS:  - Assess bowel function  - Encourage oral fluids to ensure adequate hydration  - Administer IV fluids if ordered to ensure adequate hydration   - Administer ordered medications as needed  - Encourage mobilization and activity  - Nutrition services referral to assist patient with appropriate food choices  - Assess stoma site  - Consider wound care consult   Outcome: Progressing  Goal: Oral mucous membranes remain intact  Description: INTERVENTIONS  - Assess oral mucosa and hygiene practices  - Implement preventative  oral hygiene regimen  - Implement oral medicated treatments as ordered  - Initiate Nutrition services referral as needed  Outcome: Progressing     Problem: GENITOURINARY - ADULT  Goal: Maintains or returns to baseline urinary function  Description: INTERVENTIONS:  - Assess urinary function  - Encourage oral fluids to ensure adequate hydration if ordered  - Administer IV fluids as ordered to ensure adequate hydration  - Administer ordered medications as needed  - Offer frequent toileting  - Follow urinary retention protocol if ordered  Outcome: Progressing  Goal: Absence of urinary retention  Description: INTERVENTIONS:  - Assess patient’s ability to void and empty bladder  - Monitor I/O  - Bladder scan as needed  - Discuss with physician/AP medications to alleviate retention as needed  - Discuss catheterization for long term situations as appropriate  Outcome: Progressing  Goal: Urinary catheter remains patent  Description: INTERVENTIONS:  - Assess patency of urinary catheter  - If patient has a chronic negron, consider changing catheter if non-functioning  - Follow guidelines for intermittent irrigation of non-functioning urinary catheter  Outcome: Progressing     Problem: METABOLIC, FLUID AND ELECTROLYTES - ADULT  Goal: Electrolytes maintained within normal limits  Description: INTERVENTIONS:  - Monitor labs and assess patient for signs and symptoms of electrolyte imbalances  - Administer electrolyte replacement as ordered  - Monitor response to electrolyte replacements, including repeat lab results as appropriate  - Instruct patient on fluid and nutrition as appropriate  Outcome: Progressing  Goal: Fluid balance maintained  Description: INTERVENTIONS:  - Monitor labs   - Monitor I/O and WT  - Instruct patient on fluid and nutrition as appropriate  - Assess for signs & symptoms of volume excess or deficit  Outcome: Progressing  Goal: Glucose maintained within target range  Description: INTERVENTIONS:  - Monitor  Blood Glucose as ordered  - Assess for signs and symptoms of hyperglycemia and hypoglycemia  - Administer ordered medications to maintain glucose within target range  - Assess nutritional intake and initiate nutrition service referral as needed  Outcome: Progressing     Problem: HEMATOLOGIC - ADULT  Goal: Maintains hematologic stability  Description: INTERVENTIONS  - Assess for signs and symptoms of bleeding or hemorrhage  - Monitor labs  - Administer supportive blood products/factors as ordered and appropriate  Outcome: Progressing     Problem: MUSCULOSKELETAL - ADULT  Goal: Maintain or return mobility to safest level of function  Description: INTERVENTIONS:  - Assess patient's ability to carry out ADLs; assess patient's baseline for ADL function and identify physical deficits which impact ability to perform ADLs (bathing, care of mouth/teeth, toileting, grooming, dressing, etc.)  - Assess/evaluate cause of self-care deficits   - Assess range of motion  - Assess patient's mobility  - Assess patient's need for assistive devices and provide as appropriate  - Encourage maximum independence but intervene and supervise when necessary  - Involve family in performance of ADLs  - Assess for home care needs following discharge   - Consider OT consult to assist with ADL evaluation and planning for discharge  - Provide patient education as appropriate  Outcome: Progressing  Goal: Maintain proper alignment of affected body part  Description: INTERVENTIONS:  - Support, maintain and protect limb and body alignment  - Provide patient/ family with appropriate education  Outcome: Progressing     Problem: Prexisting or High Potential for Compromised Skin Integrity  Goal: Skin integrity is maintained or improved  Description: INTERVENTIONS:  - Identify patients at risk for skin breakdown  - Assess and monitor skin integrity  - Assess and monitor nutrition and hydration status  - Monitor labs   - Assess for incontinence   - Turn and  reposition patient  - Assist with mobility/ambulation  - Relieve pressure over bony prominences  - Avoid friction and shearing  - Provide appropriate hygiene as needed including keeping skin clean and dry  - Evaluate need for skin moisturizer/barrier cream  - Collaborate with interdisciplinary team   - Patient/family teaching  - Consider wound care consult   Outcome: Progressing

## 2025-02-13 NOTE — CASE MANAGEMENT
[facility] received request for authorization from Care Manager.  Authorization request submitted for: Acute Rehab  Facility Name: [facility]   NPI: [id number]  Facility MD: Dr. Ashley Depadua  NPI: [id number]  Authorization initiated by contacting insurance:  [employer]   Via: Fax  Clinicals submitted via fax # [phone]  Pending Reference #: N/A    Care Manager notified: Lizzy Prakash        Updates to authorization status will be noted in chart. Please reach out to CM for updates on any clinical information.

## 2025-02-13 NOTE — ASSESSMENT & PLAN NOTE
49 y.o. F w/ PMH of T2DM (A1c 10.3), tobacco use (quit Dec 2024) who was previously seen at [facility] in December with wound of left foot with cellulitis.   During previous admission vascular surgery was consulted and recommended LLE agram w/ intervention which was preformed by IR. Post-procedure pt underwent TMA with podiatry    Now S/P Agram, Left SFA thrombectomy, placement of lysis catheter on 1/31  S/p lysis recheck w/ angioplasty of L SFA, pop, AT on 2/3.  2/6 - Repeat arteriogram with finding of recurrent pop occlusion, AT recannulization with distal pop, TPT, and AT POBA and serration angioplasty   2/11- L BKA with Dr. Arauz    POD 2: Dressing down, moderate ecchymosis to superior aspect of stump. Knee immobilizer reapplied after dressing.      Plan  - BKA wound care, dressing changes daily with betadine paint, gauze, ABD pad, kerlex and Ace bandage  - Continue ASA and statin   - Plavix discontinued, no indication for triple therapy  -- Transition to DOAC once appropriate from podiatric perspective if no further intervention   - Follow up with Vascular office for staple removal in 4 weeks, appt made.  - Remainder of care per primary, patient stable from vascular surgery perspective  -Vascular to sign off  ** Please contact the [facility] Vascular Surgery Resident/AP role for any questions or concerns that might arise

## 2025-02-13 NOTE — PROGRESS NOTES
Progress Note - Infectious Disease   Name: Lizzy Acuna 49 y.o. female I MRN: [id number]  Unit/Bed#: [facility] 511-01 I Date of Admission: 1/31/2025   Date of Service: 2/13/2025 I Hospital Day: 13    Assessment & Plan  Cellulitis of left foot complicated by Critical limb threatening ischemia of the left lower extremity with a nonhealing transmetatarsal amputation wound  Complicated by large abscess.  Patient underwent I&D on 1/29/2025 with a large abscess found with purulent fluid and necrotic tissue drained and debrided.  Wound cultures are growing MRSA and Finegoldia.  MRI and operative findings not consistent with osteomyelitis. 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  Patient stable off antibiotics.   -No additional antibiotics needed  -Recheck CBC with differential and BMP to make sure no developing toxicities  -Local wound care  -Serial exams  PAD (peripheral artery disease) (HCC)  Status post angiogram with intervention including left SFA thrombectomy and placement of lysis catheter on 1/31/2025.  Repeat imaging demonstrated residual stenosis of left lower extremity.  Patient status post repeat arteriogram on 2/6/2025 with intervention with apparent reestablished flow.  Still unable to successfully reestablish flow. Now s/p BKA on 2/11.   -Close vascular follow-up  Diabetes mellitus type 2 with complications (HCC)  Poor control with a hemoglobin A1c of 10.3.  Risk factor for recurrent infection.  -Tighten diabetic control  Obesity (BMI 30-39.9)  As a risk factor for recurrent infection. Could also affect antibiotic dosing.     I have discussed with the primary service the above plan to continue the monitoring off all antibiotics.  The primary service agrees with the plan.    No active infectious disease issues.  Will sign off for now.  Please message me if any questions.    Antibiotics:  None  Postop day 2    Subjective   Patient has no fever, chills, sweats; no nausea, vomiting, diarrhea; no cough, shortness  of breath; no pain. No new symptoms.    Objective :  Temp:  [97.3 °F (36.3 °C)-98.1 °F (36.7 °C)] 97.5 °F (36.4 °C)  HR:  [] 94  BP: ()/(70-88) 104/76  Resp:  [16-19] 19  SpO2:  [96 %-97 %] 96 %  O2 Device: None (Room air)    General:  No acute distress  Psychiatric:  Awake and alert  Pulmonary:  Normal respiratory excursion without accessory muscle use  Abdomen:  Soft, nontender  Extremities: Left lower extremity dressed status post BKA  Skin:  No rashes      Lab Results: I have reviewed the following results:  Results from last 7 days   Lab Units 02/12/25  0608 02/11/25  1640 02/11/25  0536 02/10/25  0547   WBC Thousand/uL 9.74  --  11.82* 10.50*   HEMOGLOBIN g/dL 8.6*  --  8.8* 9.2*   PLATELETS Thousands/uL 252 239 288 294     Results from last 7 days   Lab Units 02/12/25  0608 02/11/25  0536 02/10/25  0547   SODIUM mmol/L 133* 134* 135   POTASSIUM mmol/L 4.4 4.2 4.6   CHLORIDE mmol/L 99 98 95*   CO2 mmol/L 24 26 31   BUN mg/dL 18 19 15   CREATININE mg/dL 0.84 0.81 0.76   EGFR ml/min/1.73sq m 82 86 93   CALCIUM mg/dL 8.7 8.7 9.6   AST U/L  --   --  11*   ALT U/L  --   --  5*   ALK PHOS U/L  --   --  78   ALBUMIN g/dL  --   --  3.4*

## 2025-02-13 NOTE — ASSESSMENT & PLAN NOTE
s/p left SFA thrombectomy on (01/31/25) however still with residual thrombus  s/p lysis check and angioplasty (02/01/25)   s/p lysis check with angioplasty (02/03/25)  S/p repeat angiogram left lower extremity (2/6/25)  S/p left BKA on 2/11/2025 with left adductor and popliteal block     Increase oxycodone to 5/10 mg p.o. every 4 hours as needed for moderate/ severe pain.  Discontinue IV dilaudid     Multimodal Analgesia:    - continue robaxin 500mg PO q6h jay     - continue tylenol 975mg PO q6h jay    - continue gabapentin 300mg/300mg/600mg    - valium PO 5mg Q8H prn for muscle spasms     - duloxetine 30mg PO daily for musculoskeletal and neuropathic pain

## 2025-02-13 NOTE — PLAN OF CARE
Problem: PAIN - ADULT  Goal: Verbalizes/displays adequate comfort level or baseline comfort level  Description: Interventions:  - Encourage patient to monitor pain and request assistance  - Assess pain using appropriate pain scale  - Administer analgesics based on type and severity of pain and evaluate response  - Implement non-pharmacological measures as appropriate and evaluate response  - Consider cultural and social influences on pain and pain management  - Notify physician/advanced practitioner if interventions unsuccessful or patient reports new pain  Outcome: Progressing     Problem: INFECTION - ADULT  Goal: Absence or prevention of progression during hospitalization  Description: INTERVENTIONS:  - Assess and monitor for signs and symptoms of infection  - Monitor lab/diagnostic results  - Monitor all insertion sites, i.e. indwelling lines, tubes, and drains  - Monitor endotracheal if appropriate and nasal secretions for changes in amount and color  - Benson appropriate cooling/warming therapies per order  - Administer medications as ordered  - Instruct and encourage patient and family to use good hand hygiene technique  - Identify and instruct in appropriate isolation precautions for identified infection/condition  Outcome: Progressing  Goal: Absence of fever/infection during neutropenic period  Description: INTERVENTIONS:  - Monitor WBC    Outcome: Progressing     Problem: SAFETY ADULT  Goal: Patient will remain free of falls  Description: INTERVENTIONS:  - Educate patient/family on patient safety including physical limitations  - Instruct patient to call for assistance with activity   - Consult OT/PT to assist with strengthening/mobility   - Keep Call bell within reach  - Keep bed low and locked with side rails adjusted as appropriate  - Keep care items and personal belongings within reach  - Initiate and maintain comfort rounds  - Make Fall Risk Sign visible to staff  - Offer Toileting every  Hours,  in advance of need  - Initiate/Maintain alarm  - Obtain necessary fall risk management equipment:   - Apply yellow socks and bracelet for high fall risk patients  - Consider moving patient to room near nurses station  Outcome: Progressing  Goal: Maintain or return to baseline ADL function  Description: INTERVENTIONS:  -  Assess patient's ability to carry out ADLs; assess patient's baseline for ADL function and identify physical deficits which impact ability to perform ADLs (bathing, care of mouth/teeth, toileting, grooming, dressing, etc.)  - Assess/evaluate cause of self-care deficits   - Assess range of motion  - Assess patient's mobility; develop plan if impaired  - Assess patient's need for assistive devices and provide as appropriate  - Encourage maximum independence but intervene and supervise when necessary  - Involve family in performance of ADLs  - Assess for home care needs following discharge   - Consider OT consult to assist with ADL evaluation and planning for discharge  - Provide patient education as appropriate  Outcome: Progressing  Goal: Maintains/Returns to pre admission functional level  Description: INTERVENTIONS:  - Perform AM-PAC 6 Click Basic Mobility/ Daily Activity assessment daily.  - Set and communicate daily mobility goal to care team and patient/family/caregiver.   - Collaborate with rehabilitation services on mobility goals if consulted  - Perform Range of Motion times a day.  - Reposition patient every  hours.  - Dangle patient  times a day  - Stand patient  times a day  - Ambulate patient  times a day  - Out of bed to chair  times a day   - Out of bed for meals  times a day  - Out of bed for toileting  - Record patient progress and toleration of activity level   Outcome: Progressing     Problem: DISCHARGE PLANNING  Goal: Discharge to home or other facility with appropriate resources  Description: INTERVENTIONS:  - Identify barriers to discharge w/patient and caregiver  - Arrange for  needed discharge resources and transportation as appropriate  - Identify discharge learning needs (meds, wound care, etc.)  - Arrange for interpretive services to assist at discharge as needed  - Refer to Case Management Department for coordinating discharge planning if the patient needs post-hospital services based on physician/advanced practitioner order or complex needs related to functional status, cognitive ability, or social support system  Outcome: Progressing     Problem: Knowledge Deficit  Goal: Patient/family/caregiver demonstrates understanding of disease process, treatment plan, medications, and discharge instructions  Description: Complete learning assessment and assess knowledge base.  Interventions:  - Provide teaching at level of understanding  - Provide teaching via preferred learning methods  Outcome: Progressing     Problem: NEUROSENSORY - ADULT  Goal: Achieves stable or improved neurological status  Description: INTERVENTIONS  - Monitor and report changes in neurological status  - Monitor vital signs such as temperature, blood pressure, glucose, and any other labs ordered   - Initiate measures to prevent increased intracranial pressure  - Monitor for seizure activity and implement precautions if appropriate      Outcome: Progressing  Goal: Remains free of injury related to seizures activity  Description: INTERVENTIONS  - Maintain airway, patient safety  and administer oxygen as ordered  - Monitor patient for seizure activity, document and report duration and description of seizure to physician/advanced practitioner  - If seizure occurs,  ensure patient safety during seizure  - Reorient patient post seizure  - Seizure pads on all 4 side rails  - Instruct patient/family to notify RN of any seizure activity including if an aura is experienced  - Instruct patient/family to call for assistance with activity based on nursing assessment  - Administer anti-seizure medications if ordered    Outcome:  Progressing  Goal: Achieves maximal functionality and self care  Description: INTERVENTIONS  - Monitor swallowing and airway patency with patient fatigue and changes in neurological status  - Encourage and assist patient to increase activity and self care.   - Encourage visually impaired, hearing impaired and aphasic patients to use assistive/communication devices  Outcome: Progressing     Problem: CARDIOVASCULAR - ADULT  Goal: Maintains optimal cardiac output and hemodynamic stability  Description: INTERVENTIONS:  - Monitor I/O, vital signs and rhythm  - Monitor for S/S and trends of decreased cardiac output  - Administer and titrate ordered vasoactive medications to optimize hemodynamic stability  - Assess quality of pulses, skin color and temperature  - Assess for signs of decreased coronary artery perfusion  - Instruct patient to report change in severity of symptoms  Outcome: Progressing  Goal: Absence of cardiac dysrhythmias or at baseline rhythm  Description: INTERVENTIONS:  - Continuous cardiac monitoring, vital signs, obtain 12 lead EKG if ordered  - Administer antiarrhythmic and heart rate control medications as ordered  - Monitor electrolytes and administer replacement therapy as ordered  Outcome: Progressing     Problem: RESPIRATORY - ADULT  Goal: Achieves optimal ventilation and oxygenation  Description: INTERVENTIONS:  - Assess for changes in respiratory status  - Assess for changes in mentation and behavior  - Position to facilitate oxygenation and minimize respiratory effort  - Oxygen administered by appropriate delivery if ordered  - Initiate smoking cessation education as indicated  - Encourage broncho-pulmonary hygiene including cough, deep breathe, Incentive Spirometry  - Assess the need for suctioning and aspirate as needed  - Assess and instruct to report SOB or any respiratory difficulty  - Respiratory Therapy support as indicated  Outcome: Progressing     Problem: GASTROINTESTINAL -  ADULT  Goal: Minimal or absence of nausea and/or vomiting  Description: INTERVENTIONS:  - Administer IV fluids if ordered to ensure adequate hydration  - Maintain NPO status until nausea and vomiting are resolved  - Nasogastric tube if ordered  - Administer ordered antiemetic medications as needed  - Provide nonpharmacologic comfort measures as appropriate  - Advance diet as tolerated, if ordered  - Consider nutrition services referral to assist patient with adequate nutrition and appropriate food choices  Outcome: Progressing  Goal: Maintains or returns to baseline bowel function  Description: INTERVENTIONS:  - Assess bowel function  - Encourage oral fluids to ensure adequate hydration  - Administer IV fluids if ordered to ensure adequate hydration  - Administer ordered medications as needed  - Encourage mobilization and activity  - Consider nutritional services referral to assist patient with adequate nutrition and appropriate food choices  Outcome: Progressing  Goal: Maintains adequate nutritional intake  Description: INTERVENTIONS:  - Monitor percentage of each meal consumed  - Identify factors contributing to decreased intake, treat as appropriate  - Assist with meals as needed  - Monitor I&O, weight, and lab values if indicated  - Obtain nutrition services referral as needed  Outcome: Progressing  Goal: Establish and maintain optimal ostomy function  Description: INTERVENTIONS:  - Assess bowel function  - Encourage oral fluids to ensure adequate hydration  - Administer IV fluids if ordered to ensure adequate hydration   - Administer ordered medications as needed  - Encourage mobilization and activity  - Nutrition services referral to assist patient with appropriate food choices  - Assess stoma site  - Consider wound care consult   Outcome: Progressing  Goal: Oral mucous membranes remain intact  Description: INTERVENTIONS  - Assess oral mucosa and hygiene practices  - Implement preventative oral hygiene  regimen  - Implement oral medicated treatments as ordered  - Initiate Nutrition services referral as needed  Outcome: Progressing     Problem: GENITOURINARY - ADULT  Goal: Maintains or returns to baseline urinary function  Description: INTERVENTIONS:  - Assess urinary function  - Encourage oral fluids to ensure adequate hydration if ordered  - Administer IV fluids as ordered to ensure adequate hydration  - Administer ordered medications as needed  - Offer frequent toileting  - Follow urinary retention protocol if ordered  Outcome: Progressing  Goal: Absence of urinary retention  Description: INTERVENTIONS:  - Assess patient’s ability to void and empty bladder  - Monitor I/O  - Bladder scan as needed  - Discuss with physician/AP medications to alleviate retention as needed  - Discuss catheterization for long term situations as appropriate  Outcome: Progressing  Goal: Urinary catheter remains patent  Description: INTERVENTIONS:  - Assess patency of urinary catheter  - If patient has a chronic negron, consider changing catheter if non-functioning  - Follow guidelines for intermittent irrigation of non-functioning urinary catheter  Outcome: Progressing     Problem: METABOLIC, FLUID AND ELECTROLYTES - ADULT  Goal: Electrolytes maintained within normal limits  Description: INTERVENTIONS:  - Monitor labs and assess patient for signs and symptoms of electrolyte imbalances  - Administer electrolyte replacement as ordered  - Monitor response to electrolyte replacements, including repeat lab results as appropriate  - Instruct patient on fluid and nutrition as appropriate  Outcome: Progressing  Goal: Fluid balance maintained  Description: INTERVENTIONS:  - Monitor labs   - Monitor I/O and WT  - Instruct patient on fluid and nutrition as appropriate  - Assess for signs & symptoms of volume excess or deficit  Outcome: Progressing  Goal: Glucose maintained within target range  Description: INTERVENTIONS:  - Monitor Blood Glucose as  ordered  - Assess for signs and symptoms of hyperglycemia and hypoglycemia  - Administer ordered medications to maintain glucose within target range  - Assess nutritional intake and initiate nutrition service referral as needed  Outcome: Progressing     Problem: SKIN/TISSUE INTEGRITY - ADULT  Goal: Skin Integrity remains intact(Skin Breakdown Prevention)  Description: Assess:  -Perform Juan Jose assessment every  -Clean and moisturize skin every  -Inspect skin when repositioning, toileting, and assisting with ADLS  -Assess under medical devices such as every   -Assess extremities for adequate circulation and sensation     Bed Management:  -Have minimal linens on bed & keep smooth, unwrinkled  -Change linens as needed when moist or perspiring  -Avoid sitting or lying in one position for more than  hours while in bed  -Keep HOB at degrees     Toileting:  -Offer bedside commode  -Assess for incontinence every   -Use incontinent care products after each incontinent episode such as    Activity:  -Mobilize patient  times a day  -Encourage activity and walks on unit  -Encourage or provide ROM exercises   -Turn and reposition patient every  Hours  -Use appropriate equipment to lift or move patient in bed  -Instruct/ Assist with weight shifting every  when out of bed in chair  -Consider limitation of chair time  hour intervals    Skin Care:  -Avoid use of baby powder, tape, friction and shearing, hot water or constrictive clothing  -Relieve pressure over bony prominences using  -Do not massage red bony areas    Next Steps:  -Teach patient strategies to minimize risks such as    -Consider consults to  interdisciplinary teams such as   Outcome: Progressing  Goal: Incision(s), wounds(s) or drain site(s) healing without S/S of infection  Description: INTERVENTIONS  - Assess and document dressing, incision, wound bed, drain sites and surrounding tissue  - Provide patient and family education  - Perform skin care/dressing changes  every   Outcome: Progressing  Goal: Pressure injury heals and does not worsen  Description: Interventions:  - Implement low air loss mattress or specialty surface (Criteria met)  - Apply silicone foam dressing  - Instruct/assist with weight shifting every  minutes when in chair   - Limit chair time to hour intervals  - Use special pressure reducing interventions such as  when in chair   - Apply fecal or urinary incontinence containment device   - Perform passive or active ROM every   - Turn and reposition patient & offload bony prominences every  hours   - Utilize friction reducing device or surface for transfers   - Consider consults to  interdisciplinary teams such as   - Use incontinent care products after each incontinent episode such as   - Consider nutrition services referral as needed  Outcome: Progressing     Problem: HEMATOLOGIC - ADULT  Goal: Maintains hematologic stability  Description: INTERVENTIONS  - Assess for signs and symptoms of bleeding or hemorrhage  - Monitor labs  - Administer supportive blood products/factors as ordered and appropriate  Outcome: Progressing     Problem: MUSCULOSKELETAL - ADULT  Goal: Maintain or return mobility to safest level of function  Description: INTERVENTIONS:  - Assess patient's ability to carry out ADLs; assess patient's baseline for ADL function and identify physical deficits which impact ability to perform ADLs (bathing, care of mouth/teeth, toileting, grooming, dressing, etc.)  - Assess/evaluate cause of self-care deficits   - Assess range of motion  - Assess patient's mobility  - Assess patient's need for assistive devices and provide as appropriate  - Encourage maximum independence but intervene and supervise when necessary  - Involve family in performance of ADLs  - Assess for home care needs following discharge   - Consider OT consult to assist with ADL evaluation and planning for discharge  - Provide patient education as appropriate  Outcome:  Progressing  Goal: Maintain proper alignment of affected body part  Description: INTERVENTIONS:  - Support, maintain and protect limb and body alignment  - Provide patient/ family with appropriate education  Outcome: Progressing     Problem: Prexisting or High Potential for Compromised Skin Integrity  Goal: Skin integrity is maintained or improved  Description: INTERVENTIONS:  - Identify patients at risk for skin breakdown  - Assess and monitor skin integrity  - Assess and monitor nutrition and hydration status  - Monitor labs   - Assess for incontinence   - Turn and reposition patient  - Assist with mobility/ambulation  - Relieve pressure over bony prominences  - Avoid friction and shearing  - Provide appropriate hygiene as needed including keeping skin clean and dry  - Evaluate need for skin moisturizer/barrier cream  - Collaborate with interdisciplinary team   - Patient/family teaching  - Consider wound care consult   Outcome: Progressing

## 2025-02-13 NOTE — ASSESSMENT & PLAN NOTE
Complicated by large abscess.  Patient underwent I&D on 1/29/2025 with a large abscess found with purulent fluid and necrotic tissue drained and debrided.  Wound cultures are growing MRSA and Finegoldia.  MRI and operative findings not consistent with osteomyelitis. 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  Patient stable off antibiotics.   -No additional antibiotics needed  -Recheck CBC with differential and BMP to make sure no developing toxicities  -Local wound care  -Serial exams

## 2025-02-13 NOTE — PROGRESS NOTES
"PHYSICAL MEDICINE AND REHABILITATION   PREADMISSION ASSESSMENT     Projected IGC and Rehabilitation Diagnoses:  Impairment of mobility, safety and Activities of Daily Living (ADLs) due to Amputation:  05.4  Unilateral Lower Limb Below the Knee  Etiologic: Cellulitis of the left foot complicated by critical limb threatening ischemia of LLE with non healing TMA wound s/p left BKA  Date of Onset: 1/31/25   Date of surgery: 2/11/25-s/p left BKA with left adductor and popliteal block     PATIENT INFORMATION  Name: Lizzy Acuna Phone #: [phone] (home)   Address: [address] [place] [id number]  YOB: 1975 Age: 49 y.o. SS#   Marital Status: Unknown  Ethnicity: Not  or  or [place]  Employment Status:  Not Employed  Extended Emergency Contact Information  Primary Emergency Contact: Paige Johansen  Address: [address] [place] [address]  Mobile Phone: [phone]  Relation: Mother  Advance Directive: Level 1 Full code (no ACP docs)    INSURANCE/COVERAGE:     Primary Payor: [employer] / Plan: [employer] / Product Type: Medicaid HMO /  #[id number] Secondary Payer: Self Pay   Payer Contact:  Payer Contact:   Contact Phone:  Contact Phone:     Authorization #: [id number]   Coverage Dates: 2/14/25-2/21/25  LCD: 2/21/25  Submit next review to: [phone]              MEDICARE #:   Medicare Days:   Medical Record #: [id number]    REFERRAL SOURCE:   Referring provider: Dewey Mathew MD  Referring facility: [facility]  Room: [facility] 511/[facility] 511-01  PCP: NAOMY Basilio PCP phone number: [phone]    MEDICAL INFORMATION  HPI: Per PM&R consult \"Lizzy Acuna is a 49 y.o. female with PMH of DM2, HTN, HLD, obesity, PAD s/p L TMA 1/3/2025 who presented with worsening L foot pain/wound and met sepsis criteria who was started on broad spectrum abx, IVF believed to be " "2/2 L diabetic foot wound.  Patient underwent L SFA thrombectomy, placement of lysis catheter on 1/31 and lysis recheck with angioplasty of L SFA, pop, and AT on 2/3, L pop artery and prox AT angioplasty and recannulation for acute occlusion on 2/6 by IR, followed by arteriogram with balloon angioplasty of L pop, AT, including angioplasty of L pop and AT with cutting angioplasty balloon 2/7 by [facility] sx\".  Residual foot was eventually deemed non-salvageable with and patient returned to the OR on 2/11 and is s/p left BKA.  Patient became hypertensive on 2/5 with SBP in the 200s.  She was started on chlorthalidone with improvement.  Additionally, she developed blood loss anemia secondary to lysis, bleeding from TMA.  Hemoglobin now stable.  She was also hyponatremic.  She does have a h/o chronic hyponatremia.  Current differentials include SIADH in the setting of infection.  Patient worked with therapy and was recommended for rehab following her hospitalization.  She has demonstrated that she can tolerate three hours of therapy, five days a week and is now medically cleared for discharge to the [facility].      Past Medical History:   Past Surgical History:   Allergies:     Past Medical History:   Diagnosis Date    Advanced maternal age in multigravida, second trimester 11/30/2016    Transitioned From: Advanced maternal age in multigravida, first trimester      Back pain     used 2 percocet tid until current admission in 2/2017    Bone spur     in back per pt    Chronic hypertension with superimposed preeclampsia 02/14/2017    Depression     Diabetes mellitus (HCC)     Elevated BP without diagnosis of hypertension     \"with pain\"    Foot injury     Full dentures     does not wear    GERD (gastroesophageal reflux disease)     occas    Gestational diabetes     insulin dependent    Herniated cervical disc     Herniated lumbar intervertebral disc     History of pneumonia     Hx of degenerative disc disease     Hyperlipidemia     " "Obesity     Pre-eclampsia     Prior pregnancy with fetal demise     previous demise at 36 weeks    Toe pain     and foot pain    Past Surgical History:   Procedure Laterality Date    ARTERIOGRAM Left 2025    Procedure: LEFT lower extremity arteriogram w/ popiteal and anterior tibial artery angioplasty;  Surgeon: Ottoniel Victoria MD;  [facility];  Service: Vascular    BACK SURGERY       SECTION      INCISION AND DRAINAGE OF WOUND Left 2025    Procedure: INCISION AND DRAINAGE (I&D) EXTREMITY;  Surgeon: Leopoldo Ritchie DPM;  Location: [facility];  Service: Podiatry    IR LOWER EXTREMITY ANGIOGRAM  2024    IR LOWER EXTREMITY ANGIOGRAM  2025    IR LOWER EXTREMITY ANGIOGRAM  2025    IR PICC PLACEMENT DOUBLE LUMEN  2025    IR TPA LYSIS CHECK  2025    IR TPA LYSIS CHECK  2025    LAMINECTOMY      with disc removal, last assessed 16    LEG AMPUTATION THROUGH LOWER TIBIA AND FIBULA Left 2025    Procedure: Left BKA;  Surgeon: Shane Arauz DO;  Location: [facility];  Service: Vascular    OTHER SURGICAL HISTORY      Right ovary removal 2005 per pt recall at [facility]    AZ AMPUTATION FOOT TRANSMETARSAL Left 1/3/2025    Procedure: AMPUTATION TRANSMETATARSAL (TMA);  Surgeon: Leopoldo Ritchie DPM;  Location: [facility];  Service: Podiatry    AZ  DELIVERY ONLY N/A 02/15/2017    Procedure:  SECTION ();  Surgeon: Tito Umaña MD;  Location: [facility];  Service: Obstetrics    AZ LIG/TRNSXJ FALOPIAN TUBE  DEL/ABDML SURG Left 02/15/2017    Procedure: LIGATION/COAGULATION TUBAL;  Surgeon: Tito Umaña MD;  Location: [facility];  Service: Obstetrics    VASCULAR SURGERY  2024    WISDOM TOOTH EXTRACTION       Allergies   Allergen Reactions    Codeine Other (See Comments)     Aggression-and nasty--\"took a cough syrup with codeine as a child\"         Medical/functional conditions requiring " inpatient rehabilitation:   Cellulitis of the left foot complicated by critical limb threatening ischemia of LLE with non healing TMA wound s/p left BKA  Type 2 DM  Hyponatremia  PAD  Blood loss anemia  Obesity  Hyperlipidemia  Primary HTN  GERD  Depression  Post-op pain  Impaired mobility  Impaired self care    Risk for medical/clinical complications: risk for decreased healing, risk for infection, risk for falls, risk for uncontrolled pain, risk for skin breakdown, risk for complications with incision, risk for contractures, risk for DVT/PE, risk for hypo/hyperglycemic episodes, risk for hypo/hypertensive episodes     Comorbidities/Surgeries in the last 100 days:   Type 2 DM  Hyponatremia  PAD  Obesity  Hyperlipidemia  Primary HTN  GERD   Depression  1/3/25-s/p left TMA  1/31/25-s/p left SFA thrombectomy however still with residual thrombus  2/1/25-s/p lysis check and angioplasty  2/3/25-s/p lysis check with angioplasty  2/6/25-s/p repeat angiogram left lower extremity  2/11/25-s/p left BKA with left adductor and popliteal block     CURRENT VITAL SIGNS:   Temp:  [98.1 °F (36.7 °C)-98.6 °F (37 °C)] 98.1 °F (36.7 °C)  HR:  [83-89] 83  BP: (104-137)/(72-75) 104/72  Resp:  [20] 20  SpO2:  [92 %-93 %] 92 %   Intake/Output Summary (Last 24 hours) at 2/14/2025 1630  Last data filed at 2/14/2025 1316  Gross per 24 hour   Intake 480 ml   Output 1400 ml   Net -920 ml        LABORATORY RESULTS:      Lab Results   Component Value Date    HGB 8.6 (L) 02/12/2025    HCT 26.9 (L) 02/12/2025    WBC 9.74 02/12/2025     Lab Results   Component Value Date    BUN 18 02/12/2025    K 4.4 02/12/2025    CL 99 02/12/2025    CREATININE 0.84 02/12/2025     Lab Results   Component Value Date    PROTIME 14.1 02/02/2025    INR 1.05 02/02/2025        DIAGNOSTIC STUDIES:  IR TPA lysis check  Result Date: 2/2/2025  Impression: 1. Left deep femoral, superficial femoral, popliteal, tibioperoneal trunk, and anterior tibial arteries were patent. 2.  Mild residual stenoses in the left superficial femoral and popliteal arteries treated with 4 mm balloon angioplasty. 3. Mild stenoses along the proximal left anterior tibial artery treated with 3 mm and 2.5 mm balloon angioplasties. 4. Left posterior tibial and peroneal arteries appeared chronically occluded distally. Plan: -Continue anticoagulation. -Continue aspirin and statin. Workstation performed: [id number]     IR TPA lysis check  Result Date: 2/1/2025  Impression: 1. Residual luminal irregularities in the left superficial femoral artery treated with 4 mm balloon angioplasty. 2. Post angioplasty arteriogram showed reocclusion of left SFA, likely due to residual thrombus. 3. Lysis catheter replaced extending from the left SFA into the popliteal artery. Plan: -tPA to run through the lysis catheter at 1 mg/h. -Return for lysis check tomorrow. Workstation performed: [id number]     IR lower extremity angiogram  Result Date: 1/31/2025  Impression: 1. Thrombosed left superficial femoral artery treated with aspiration thrombectomy and 4 mm balloon angioplasty with residual thrombus. 2. Thrombolysis initiated through a lysis catheter in the left SFA. 3. Right arm PICC placement through basilic vein access. Plan: -tPA to run at 1 mg/hr through the lysis catheter. -All blood draws and labwork to be done through PICC. -Return for lysis check tomorrow. Workstation performed: [id number]     IR PICC placement double lumen  Result Date: 1/31/2025  Impression: 1. Thrombosed left superficial femoral artery treated with aspiration thrombectomy and 4 mm balloon angioplasty with residual thrombus. 2. Thrombolysis initiated through a lysis catheter in the left SFA. 3. Right arm PICC placement through basilic vein access. Plan: -tPA to run at 1 mg/hr through the lysis catheter. -All blood draws and labwork to be done through PICC. -Return for lysis check tomorrow. Workstation performed: [id number]     CT head wo  contrast  Result Date: 1/31/2025  Impression: 1. No intracranial hemorrhage seen. 2. Bilateral sphenoid sinusitis Workstation performed: [id number]       PRECAUTIONS/SPECIAL NEEDS:  Weight Bearing Precautions:   NWB LLE , Splints/Braces: KI LLE, Isolation Precautions:  Contact, Anticoagulation:  aspirin 81 mg orally every day, Blood Sugar Management: as per MD orders, Edema Management, Pain Management, Dietary Restrictions: Regular diet, Visually Impaired, Language Preference: English, and fall precautions    MEDICATIONS:     Current Facility-Administered Medications:     acetaminophen (TYLENOL) tablet 975 mg, 975 mg, Oral, Q6H JORGE, Mariam Heart MD, 975 mg at 02/14/25 1109    aspirin (ECOTRIN LOW STRENGTH) EC tablet 81 mg, 81 mg, Oral, Daily, Mariam Heart MD, 81 mg at 02/14/25 0808    atorvastatin (LIPITOR) tablet 80 mg, 80 mg, Oral, Daily With Dinner, Mariam Heart MD, 80 mg at 02/14/25 1600    chlorthalidone tablet 12.5 mg, 12.5 mg, Oral, Daily, Mariam Heart MD, 12.5 mg at 02/14/25 0810    diazepam (VALIUM) tablet 5 mg, 5 mg, Oral, Q8H PRN, Mariam Heart MD, 5 mg at 02/14/25 0105    DULoxetine (CYMBALTA) delayed release capsule 30 mg, 30 mg, Oral, Daily, Mariam Heart MD, 30 mg at 02/14/25 0808    gabapentin (NEURONTIN) capsule 300 mg, 300 mg, Oral, BID (AM & Afternoon), Mariam Heart MD, 300 mg at 02/14/25 1242    gabapentin (NEURONTIN) capsule 600 mg, 600 mg, Oral, HS, Mariam Heart MD, 600 mg at 02/13/25 2108    heparin (porcine) subcutaneous injection 5,000 Units, 5,000 Units, Subcutaneous, Q8H JORGE, 5,000 Units at 02/14/25 1242 **AND** [COMPLETED] Platelet count, , , Once, Mariam Heart MD    insulin glargine (LANTUS) subcutaneous injection 10 Units 0.1 mL, 10 Units, Subcutaneous, HS, Sharron Stoddard MD, 10 Units at 02/13/25 2109    insulin lispro (HumALOG/ADMELOG) 100 units/mL subcutaneous injection 1-6 Units, 1-6 Units, Subcutaneous, TID With Meals, 2 Units at 02/14/25 1602 **AND** Fingerstick  Glucose (POCT), , , TID AC, Sharron Stoddard MD    insulin lispro (HumALOG/ADMELOG) 100 units/mL subcutaneous injection 5 Units, 5 Units, Subcutaneous, TID With Meals, Sharron Stoddard MD, 5 Units at 02/14/25 1602    lidocaine (LIDODERM) 5 % patch 1 patch, 1 patch, Topical, Daily, Aylin Brown MD, 1 patch at 02/12/25 0908    methocarbamol (ROBAXIN) tablet 500 mg, 500 mg, Oral, Q6H JORGE, Mariam Heart MD, 500 mg at 02/14/25 1109    metoclopramide (REGLAN) injection 10 mg, 10 mg, Intravenous, Q6H PRN, Mariam Heart MD, 10 mg at 02/11/25 1452    naloxone (NARCAN) injection 0.1 mg, 0.1 mg, Intravenous, Q1MIN PRN, NAOMY Lemus    nitroglycerin (NITROSTAT) SL tablet 0.4 mg, 0.4 mg, Sublingual, Q5 Min PRN, Fabian Milton MD, 0.4 mg at 02/11/25 1853    ondansetron (ZOFRAN) injection 4 mg, 4 mg, Intravenous, Q6H PRN, Mariam Heart MD, 4 mg at 02/05/25 1459    oxyCODONE (ROXICODONE) immediate release tablet 10 mg, 10 mg, Oral, Q4H PRN, Aylin Brown MD, 10 mg at 02/14/25 1237    oxyCODONE (ROXICODONE) IR tablet 5 mg, 5 mg, Oral, Q4H PRN, Aylin Brown MD    pantoprazole (PROTONIX) EC tablet 40 mg, 40 mg, Oral, Early Morning, Mariam Heart MD, 40 mg at 02/14/25 0525    polyethylene glycol (MIRALAX) packet 17 g, 17 g, Oral, Daily, Mariam Heart MD, 17 g at 02/14/25 0809    senna-docusate sodium (SENOKOT S) 8.6-50 mg per tablet 1 tablet, 1 tablet, Oral, HS, Mariam Heart MD, 1 tablet at 02/13/25 2108    trimethobenzamide (TIGAN) IM injection 200 mg, 200 mg, Intramuscular, Q6H PRN, Mariam Heart MD    SKIN INTEGRITY:   Right groin catheter entry/exit site  Left BKA site    PRIOR LEVEL OF FUNCTION:  She lives in a(n) single family home  Lizzy Acuna has unknown marital status and lives with their family.  Self Care: Independent, Indoor Mobility: Independent, Stairs (in/outdoor): Independent, and Cognition: Independent    FALLS IN THE LAST 6 MONTHS: 1    HOME ENVIRONMENT:  The living area:  patient lives in a two level  "home  There are Greater than 10 steps to enter the home.    The patient will have 24 hour supervision/physical assistance available upon discharge.    PREVIOUS DME:  Equipment in home (previous DME): Shower Chair, Grab Bars, Rolling Walker, and rollator    FUNCTIONAL STATUS:***  Physical Therapy Occupational Therapy Speech Therapy   02/14/25 1510    PT Last Visit   PT Visit Date 02/14/25   Note Type   Note Type Treatment for insurance authorization   End of Consult   Patient Position at End of Consult Supine;All needs within reach;Bed/Chair alarm activated   Pain Assessment   Pain Assessment Tool 0-10   Pain Score 8   Pain Location/Orientation Orientation: Left;Location: Leg   Pain Onset/Description Onset: Ongoing   Effect of Pain on Daily Activities limits functional mobility   Patient's Stated Pain Goal No pain   Hospital Pain Intervention(s) Repositioned;Ambulation/increased activity;Emotional support;Rest   Restrictions/Precautions   Weight Bearing Precautions Per Order Yes   LLE Weight Bearing Per Order (S)  NWB  (s/p L BKA)   Braces or Orthoses Knee immobilizer   Other Precautions Chair Alarm;Bed Alarm;Fall Risk;Pain;WBS;Contact/isolation  (L BKA)   General   Chart Reviewed Yes   Response to Previous Treatment Patient with no complaints from previous session.   Family/Caregiver Present No   Cognition   Overall Cognitive Status WFL   Arousal/Participation Alert;Responsive;Cooperative   Attention Within functional limits   Orientation Level Oriented X4   Memory Within functional limits   Following Commands Follows one step commands without difficulty   Comments patient pleasant and cooperative, good insight of functional deficits and safety awareness, highly motivated to participate   Subjective   Subjective \"I want to go to rehab today\"   Bed Mobility   Supine to Sit 5  Supervision   Additional items HOB elevated;Bedrails;Increased time required;Verbal cues   Sit to Supine 5  Supervision   Additional items HOB " elevated;Bedrails;Increased time required;Verbal cues   Additional Comments sonia found and left supine in bed with alarm and all needs met   Transfers   Sit to Stand 5  Supervision   Additional items Increased time required;Verbal cues   Stand to Sit 5  Supervision   Additional items Increased time required;Verbal cues   Additional Comments x2 STS with RW   Ambulation/Elevation   Gait pattern Decreased foot clearance;Short stride;Step to  (RLE hops)   Gait Assistance 4  Minimal assist   Additional items Assist x 1;Verbal cues;Tactile cues   Assistive Device Rolling walker   Distance 40'x2   Stair Management Assistance Not tested   Ambulation/Elevation Additional Comments Patient demonstrated the ability to ambulate 40'x2 with RW, requiring intermittent verbal cues for RW management and hallway navigation to improve patient safety and reduce risk of falls. Patient required intermittent standing rest breaks due to generalized LE weakness and decreased activity tolerance. Further ambulation limited at this time due to increase in LLE pain.   Balance   Static Sitting Good   Dynamic Sitting Fair +   Static Standing Fair -   Dynamic Standing Poor +   Ambulatory Poor   Endurance Deficit   Endurance Deficit Yes   Endurance Deficit Description generalized weakness, fatigue, pain, s/p L BKA   Activity Tolerance   Activity Tolerance Patient limited by fatigue;Patient limited by pain   Medical Staff Made Aware KASSI Ball   Nurse Made Aware RN cleared and updated   Assessment   Prognosis Good   Problem List Decreased strength;Decreased endurance;Impaired balance;Decreased mobility;Pain   Assessment PT initiated treatment session in order to assist patient in achieving goals to improve transfers, gait training, and overall activity tolerance. Patient demonstrated progress toward achieving functional mobility goals as evidenced by improving activity tolerance, ambulation, and overall mobility. Patient pleasant, cooperative, and  agreeable to today's treatment session. Patient received supine in bed. Patient is currently supervision bed mobility, transfers, and MinAx1 ambulation. Throughout treatment session patient required both verbal and tactile cuing to improve safety, efficiency, and mechanics of mobility in addition to hands on assistance for all aspects of functional mobility. Additionally, pt required increased time to execute specific mobility tasks with rest breaks in between secondary to gross fatigue and weakness. Patient demonstrated the ability to ambulate 40'x2 with RLE hops and RW, requiring intermittent verbal cues for RW management and hallway navigation to improve patient safety and reduce risk of falls. Patient required intermittent standing rest breaks due to generalized LE weakness and decreased activity tolerance. Further ambulation limited at this time due to increase in LLE pain. Patient left supine in bed (per patient request) with alarm and all needs met.         Patient will benefit from continued skilled physical therapy to address gait / balance dysfunction, decreased activity tolerance, and generalized weakness. The patients AM-PAC Basic Mobility Inpatient Short From Raw Score is 16 .  Based on AM-PAC scoring and patient presentation, PT currently recommending Level I (Maximum Resource Intensity). Please also refer to the recommendation of the Physical Therapist for safe discharge planning.          CARE SCORES:  Self Care:  Eating: {ARC Pre Admission Screenin}  Oral hygiene: {ARC Pre Admission Screenin}  Toilet hygiene: {ARC Pre Admission Screenin}  Shower/bathing self: {ARC Pre Admission Screenin}  Upper body dressing: {ARC Pre Admission Screenin}  Lower body dressing: {ARC Pre Admission Screenin}  Putting on/taking off footwear: {ARC Pre Admission Screenin}  Transfers:  Roll left and right: 09: Not applicable  Sit to lyin: Supervision or touching   assistance  Lying to sitting on side of bed: 04: Supervision or touching  assistance  Sit to stand: 04: Supervision or touching  assistance  Chair/bed to chair transfer: 04: Supervision or touching  assistance  Toilet transfer: 09: Not applicable  Mobility:  Walk 10 ft: 03: Partial/moderate assistance  Walk 50 ft with two turns: 03: Partial/moderate assistance  Walk 150ft: 88: Not attempted due to medical conditions or safety concerns    CURRENT GAP IN FUNCTION  Prior to Admission: Functional Status: Patient was independent with mobility/ambulation, transfers, ADL's, IADL's.    Expected functional outcomes: It is expected that with skilled acute rehabilitation services the patient will progress to Independent for self care and Independent for mobility     Estimated length of stay: 10 to 14 days    Anticipated Post-Discharge Disposition/Treatment  Disposition: Return to previous home/apartment.  Outpatient Services: Physical Therapy (PT) and Occupational Therapy (OT)    BARRIERS TO DISCHARGE  Home Accessibility;Decreased strength;Decreased endurance;Impaired balance;Decreased mobility;Pain; Decreased ADL status;Decreased self-care trans;Decreased high-level ADLs     INTERVENTIONS FOR DISCHARGE  ADL retraining;Functional transfer training;LE strengthening/ROM;Therapeutic exercise;Endurance training;Patient/family training;Bed mobility;Gait training;Compensatory technique education;Continued evaluation;Energy conservation;Activity engagement     REQUIRED THERAPY:  Patient will require PT and OT 90 minutes each per day, five days per week to achieve rehab goals.     REQUIRED FUNCTIONAL AND MEDICAL MANAGEMENT FOR INPATIENT REHABILITATION:  Skin:  There are no pressure sores currently, patient need close monitoring of her skin for any breakdown secondary to decreased mobility.  She will also need close monitoring of her left BKA site for any complications , Pain Management: Overall pain is moderately controlled, Deep Vein  "Thrombosis (DVT) Prophylaxis:  low molecular weight heparin and SCD RLE, Diabetes Management: continue sliding scale insulin, patient to do finger sticks as ordered, SLIM to continue to manage diabetes, and other medical conditions, PT and OT interventions, any labs, consults, imaging and medication adjustments patient may need while on [facility]    RECOMMENDED LEVEL OF CARE:   Per PM&R consult \"Lizzy Acuna is a 49 y.o. female with PMH of DM2, HTN, HLD, obesity, PAD s/p L TMA 1/3/2025 who presented with worsening L foot pain/wound and met sepsis criteria who was started on broad spectrum abx, IVF believed to be 2/2 L diabetic foot wound.  Patient underwent L SFA thrombectomy, placement of lysis catheter on 1/31 and lysis recheck with angioplasty of L SFA, pop, and AT on 2/3, L pop artery and prox AT angioplasty and recannulation for acute occlusion on 2/6 by IR, followed by arteriogram with balloon angioplasty of L pop, AT, including angioplasty of L pop and AT with cutting angioplasty balloon 2/7 by [facility] sx\".  Residual foot was eventually deemed non-salvageable with and patient returned to the OR on 2/11 and is s/p left BKA.  Patient became hypertensive on 2/5 with SBP in the 200s.  She was started on chlorthalidone with improvement.  Additionally, she developed blood loss anemia secondary to lysis, bleeding from TMA.  Hemoglobin now stable.  She was also hyponatremic.  She does have a h/o chronic hyponatremia.  Current differentials include SIADH in the setting of infection.  Patient worked with therapy and was recommended for rehab following her hospitalization.  She has demonstrated that she can tolerate three hours of therapy, five days a week and is now medically cleared for discharge to the [facility].      Prior to admission, patient independent with her ADLs and IADLs and independently used a rollator at baseline She is now functioning below her baseline requiring *** to *** assistance to complete her ADLs.  With PT, " she is requiring supervision for bed mobility and transfers.  She ambulated 40 feet x 2 with a RW and min x 1 assistance.  Gait pattern: decreased foot clearance, short stride, step to (RLE hops)    Patient requires close oversight by a physician, PM&R management, 24/7 rehabilitation nursing care and specialized interdisciplinary therapy services in preparation for discharge which can only be provided in the inpatient acute rehabilitation setting.  While on ARC, this patient will need and close monitoring of her VS, I/Os, skin, pain level, blood sugars, LLE BKA site and her overall condition.  Inpatient acute rehabilitation is recommended for this patient to maximize her overall strength, endurance, self-care and mobility upon discharge back to her home with the support of her family.

## 2025-02-13 NOTE — PROGRESS NOTES
Progress Note - Acute Pain   Name: Lizzy Acuna 49 y.o. female I MRN: [id number]  Unit/Bed#: [facility] 511-01 I Date of Admission: 1/31/2025   Date of Service: 2/13/2025 I Hospital Day: 13    Assessment & Plan  PAD (peripheral artery disease) ([facility])  s/p left SFA thrombectomy on (01/31/25) however still with residual thrombus  s/p lysis check and angioplasty (02/01/25)   s/p lysis check with angioplasty (02/03/25)  S/p repeat angiogram left lower extremity (2/6/25)  S/p left BKA on 2/11/2025 with left adductor and popliteal block     Increase oxycodone to 5/10 mg p.o. every 4 hours as needed for moderate/ severe pain.  Discontinue IV dilaudid     Multimodal Analgesia:    - continue robaxin 500mg PO q6h jay     - continue tylenol 975mg PO q6h jay    - continue gabapentin 300mg/300mg/600mg    - valium PO 5mg Q8H prn for muscle spasms     - duloxetine 30mg PO daily for musculoskeletal and neuropathic pain        APS will sign off at this time. Thank you for the consult. All opioids and other analgesics to be written at discretion of primary team. Please contact Acute Pain Service - via SecureChat from [phone] with additional questions or concerns. See [employer] or [employer] for additional contacts and after hours information.    Subjective   Lizzy Acuna is a 49 y.o. female with PMH of DM type 2, tobacco use, PVD requiring left BKA due to multiple failed thrombectomies.   Patient initially had minimal pain and she received single shot nerve blocks.     This afternoon the acute pain service was reconsulted for for evaluation of pain management regimen as patient had reported severe pain last night.    Upon my assessment this afternoon the patient was resting in bed, attempting to nap.  She was reporting 5/10 pain in her left lower extremity.  She states she woke up last night and felt as if the nerve block wore off and had excruciating pain in her left lower extremity.  She does feel the p.o. oxycodone is helping control  "her pain but she did have to use a few doses of IV Dilaudid overnight and this morning because she could not get comfortable.  We discussed increasing the current p.o. opioid regimen to 5 and 10 mg for moderate and severe pain.  The patient was in agreement.  She is pending discharge to [facility] so we also discussed discontinuing the IV Dilaudid which patient was in agreement with.    I discussed with the patient the role of opioids in pain management including frequency of administration and dosing. Educated patient on potential side effects such as sedation, dizziness, nausea, vomiting, constipation, physical dependence, tolerance, addiction, and respiratory depression. The patient verbalized understanding, and all questions and concerns were addressed and answered to the patient/family's satisfaction.     Pain History  Current pain location(s):  Pain Score: 9  Pain Location/Orientation: Orientation: Left, Location: Leg, Location: Incision  Pain Scale: Pain Assessment Tool: 0-10  24 hour history: Nerve block wore off, patient was unable to achieve adequate pain control with PO dosing of oxycodone. Required multiple doses of IV dilaudid.     Opioid requirement previous 24 hours:   15 mg PO oxycodone   1.7 mg IV dilaudid     Meds/Allergies   all current active meds have been reviewed  Allergies   Allergen Reactions    Codeine Other (See Comments)     Aggression-and nasty--\"took a cough syrup with codeine as a child\"     Objective :  Temp:  [97.3 °F (36.3 °C)-97.8 °F (36.6 °C)] 97.5 °F (36.4 °C)  HR:  [] 94  BP: (104-169)/(73-88) 104/76  Resp:  [18-19] 19  SpO2:  [96 %-97 %] 96 %  O2 Device: None (Room air)    Physical Exam  Constitutional:       Appearance: Normal appearance.   HENT:      Head: Normocephalic and atraumatic.      Right Ear: External ear normal.      Left Ear: External ear normal.      Nose: Nose normal.      Mouth/Throat:      Mouth: Mucous membranes are moist.      Pharynx: Oropharynx is clear. "   Eyes:      Conjunctiva/sclera: Conjunctivae normal.   Cardiovascular:      Rate and Rhythm: Normal rate and regular rhythm.   Pulmonary:      Effort: Pulmonary effort is normal.   Abdominal:      Palpations: Abdomen is soft.   Musculoskeletal:         General: Normal range of motion.      Cervical back: Normal range of motion.      Comments: Left BKA - stump is clean/dry   Skin:     General: Skin is warm and dry.      Capillary Refill: Capillary refill takes less than 2 seconds.   Neurological:      General: No focal deficit present.      Mental Status: She is alert and oriented to person, place, and time.   Psychiatric:         Behavior: Behavior normal.         Thought Content: Thought content normal.            Lab Results: I have reviewed the following results:  Estimated Creatinine Clearance: 80.4 mL/min (by C-G formula based on SCr of 0.84 mg/dL).  Lab Results   Component Value Date    WBC 9.74 02/12/2025    HGB 8.6 (L) 02/12/2025    HCT 26.9 (L) 02/12/2025     02/12/2025         Component Value Date/Time    K 4.4 02/12/2025 0608    CL 99 02/12/2025 0608    CO2 24 02/12/2025 0608    BUN 18 02/12/2025 0608    CREATININE 0.84 02/12/2025 0608         Component Value Date/Time    CALCIUM 8.7 02/12/2025 0608    ALKPHOS 78 02/10/2025 0547    AST 11 (L) 02/10/2025 0547    ALT 5 (L) 02/10/2025 0547    TP 7.1 02/10/2025 0547    ALB 3.4 (L) 02/10/2025 0547       Imaging Results Review: No pertinent imaging studies reviewed.  Other Study Results Review: No additional pertinent studies reviewed.     Administrative Statements   I have spent a total time of 20 minutes in caring for this patient on the day of the visit/encounter including Prognosis, Risks and benefits of tx options, Instructions for management, Patient and family education, Importance of tx compliance, Counseling / Coordination of care, Reviewing / ordering tests, medicine, procedures  , and Communicating with other healthcare professionals .

## 2025-02-13 NOTE — ARC ADMISSION
ARC  met with patient at bedside. Reviewed ARC program, acute rehab criteria and approval process, insurance authorization process, as well as ARC locations and preferences. Patient's preferred ARC location is [facility] .  ARC Rehab folder left with patient and all questions answered. Patient was made aware that ARC Reviewer will keep their  updated regarding referral status.

## 2025-02-13 NOTE — ASSESSMENT & PLAN NOTE
Lab Results   Component Value Date    HGBA1C 10.3 (H) 12/13/2024       Recent Labs     02/12/25  1058 02/12/25  1545 02/12/25 2054 02/13/25  0616   POCGLU 234* 220* 246* 201*       Blood Sugar Average: Last 72 hrs:  (P) 180.8

## 2025-02-13 NOTE — PROGRESS NOTES
Patient:    MRN:  [id number]    Lj Request ID:  [id number]    Level of care reserved:  Inpatient Rehab Facility    Partner Reserved:  [facility] ([place]), [address] [address] [address] [phone]    Clinical needs requested:    Geography searched:  30 miles around [id number]    Start of Service:    Request sent:  1:25pm EST on 2/12/2025 by Lizzy Prakash    Partner reserved:  1:45pm EST on 2/13/2025 by Lizzy Prakash    Choice list shared:  1:45pm EST on 2/13/2025 by Lizzy Prakash

## 2025-02-13 NOTE — PROGRESS NOTES
Progress Note - Vascular Surgery   Name: Lizzy Acuna 49 y.o. female I MRN: [id number]  Unit/Bed#: [facility] 511-01 I Date of Admission: 1/31/2025   Date of Service: 2/13/2025 I Hospital Day: 13    Assessment & Plan  Diabetic foot infection  (HCC)  49 y.o. F w/ PMH of T2DM (A1c 10.3), tobacco use (quit Dec 2024) who was previously seen at [facility] in December with wound of left foot with cellulitis.   During previous admission vascular surgery was consulted and recommended LLE agram w/ intervention which was preformed by IR. Post-procedure pt underwent TMA with podiatry    Now S/P Agram, Left SFA thrombectomy, placement of lysis catheter on 1/31  S/p lysis recheck w/ angioplasty of L SFA, pop, AT on 2/3.  2/6 - Repeat arteriogram with finding of recurrent pop occlusion, AT recannulization with distal pop, TPT, and AT POBA and serration angioplasty   2/11- L BKA with Dr. Arauz    POD 2: Dressing down, moderate ecchymosis to superior aspect of stump. Knee immobilizer reapplied after dressing.      Plan  - BKA wound care, dressing changes daily with betadine paint, gauze, ABD pad, kerlex and Ace bandage  - Continue ASA and statin   - Plavix discontinued, no indication for triple therapy  -- Transition to DOAC once appropriate from podiatric perspective if no further intervention   - Follow up with Vascular office for staple removal in 4 weeks, appt made.  - Remainder of care per primary, patient stable from vascular surgery perspective  -Vascular to sign off  ** Please contact the [facility] Vascular Surgery Resident/AP role for any questions or concerns that might arise   Diabetes mellitus type 2 with complications (HCC)  Lab Results   Component Value Date    HGBA1C 10.3 (H) 12/13/2024       Recent Labs     02/12/25  1058 02/12/25  1545 02/12/25 2054 02/13/25  0616   POCGLU 234* 220* 246* 201*       Blood Sugar Average: Last 72 hrs:  (P) 180.8    Hyponatremia    Cellulitis of left foot complicated by Critical limb threatening  ischemia of the left lower extremity with a nonhealing transmetatarsal amputation wound    PAD (peripheral artery disease) (HCC)    Blood loss anemia    Obesity (BMI 30-39.9)    Primary hypertension    Thyroid nodule    Adrenal nodule (HCC)      24 Hour Events :   Subjective : Pt reports moderate pain in L BKA stump site, she is aware that the block is wearing off today. Able to ambulate to restroom with assistance. Pt tolerating PO intake and voiding. PICC intact.    Objective :  Temp:  [97.3 °F (36.3 °C)-98.1 °F (36.7 °C)] 97.5 °F (36.4 °C)  HR:  [] 94  BP: ()/(70-88) 104/76  Resp:  [16-19] 19  SpO2:  [96 %-97 %] 96 %  O2 Device: None (Room air)     I/O         02/11 0701  02/12 0700 02/12 0701  02/13 0700 02/13 0701 02/14 0700    P.O. 480 1080     I.V. (mL/kg) 506.7 (6) 597.5 (7.2)     IV Piggyback 250      Total Intake(mL/kg) 1236.7 (14.7) 1677.5 (20.2)     Urine (mL/kg/hr) 3725 (1.8) 2625 (1.3)     Total Output 3725 2625     Net -2488.3 -947.5                  Lines/Drains/Airways       Active Status       Name Placement date Placement time Site Days    PICC Line 01/31/25 Right 01/31/25 1937  --  12                  Physical Exam  HENT:      Head: Normocephalic.   Cardiovascular:      Rate and Rhythm: Normal rate.   Pulmonary:      Effort: Pulmonary effort is normal.   Abdominal:      Palpations: Abdomen is soft.   Musculoskeletal:      Comments: L BKA, staples intact, moderate ecchymosis/hematoma   Skin:     General: Skin is warm and dry.      Findings: Bruising present. No erythema.   Neurological:      General: No focal deficit present.      Mental Status: She is alert and oriented to person, place, and time. Mental status is at baseline.             Lab Results: I have reviewed the following results:  CBC with diff:   Lab Results   Component Value Date    WBC 9.74 02/12/2025    HGB 8.6 (L) 02/12/2025    HCT 26.9 (L) 02/12/2025    MCV 93 02/12/2025     02/12/2025    RBC 2.89 (L)  "02/12/2025    MCH 29.8 02/12/2025    MCHC 32.0 02/12/2025    RDW 14.7 02/12/2025    MPV 9.1 02/12/2025    NRBC 0 02/12/2025   ,   BMP/CMP:  Lab Results   Component Value Date    SODIUM 133 (L) 02/12/2025    K 4.4 02/12/2025    CL 99 02/12/2025    CO2 24 02/12/2025    BUN 18 02/12/2025    CREATININE 0.84 02/12/2025    CALCIUM 8.7 02/12/2025    AST 11 (L) 02/10/2025    ALT 5 (L) 02/10/2025    ALKPHOS 78 02/10/2025    EGFR 82 02/12/2025   ,   Lipid Panel: No results found for: \"CHOL\",   Coags:   Lab Results   Component Value Date    PTT 71 (H) 02/11/2025    INR 1.05 02/02/2025   ,   Blood Culture:   Lab Results   Component Value Date    BLOODCX No Growth After 5 Days. 01/28/2025    BLOODCX No Growth After 5 Days. 01/28/2025   ,   Urinalysis:   Lab Results   Component Value Date    COLORU Straw 11/05/2018    CLARITYU Clear 11/05/2018    SPECGRAV 1.020 11/05/2018    PHUR 6.0 11/05/2018    LEUKOCYTESUR Negative 11/05/2018    NITRITE Negative 11/05/2018    GLUCOSEU 3+ (A) 11/05/2018    KETONESU Negative 11/05/2018    BILIRUBINUR Negative 11/05/2018    BLOODU Trace-Intact (A) 11/05/2018   ,   Urine Culture:   Lab Results   Component Value Date    URINECX 50,000-59,000 cfu/ml Mixed Contaminants X4 10/21/2016   ,   Wound Culure:   Lab Results   Component Value Date    WOUNDCULT 1+ Growth of Enterobacter gergoviae (A) 06/13/2018    WOUNDCULT (A) 06/13/2018     2+ Growth of Methicillin Resistant Staphylococcus aureus       Imaging Results Review: No pertinent imaging studies reviewed.  Other Study Results Review: No additional pertinent studies reviewed.    VTE Prophylaxis: Heparin  "

## 2025-02-13 NOTE — CASE MANAGEMENT
Case Management Progress Note    Patient name Lizzy Acuna  Location [facility] 511/[facility] 511-01 MRN [id number]  : 1975 Date 2025       LOS (days): 13  Geometric Mean LOS (GMLOS) (days): 6.4  Days to GMLOS:-7.2        OBJECTIVE:        Current admission status: Inpatient  Preferred Pharmacy:   [address] [address]  Phone: [phone] Fax: [phone]    Primary Care Provider: NAOMY Basilio    Primary Insurance: [employer]  Secondary Insurance:     PROGRESS NOTE:  Pt has been accepted at [facility].   Tasked to DC support team for auth  [facility] does have a bed tomorrow if Auth is received.   Pt made aware.

## 2025-02-13 NOTE — PROGRESS NOTES
INTERNAL MEDICINE RESIDENCY PROGRESS NOTE     Name: Lizzy Acuna   Age & Sex: 49 y.o. female   MRN: [id number]  Unit/Bed#: [facility] 511-01   Encounter: [id number]  Team: SOD Team B     PATIENT INFORMATION     Name: Lizzy Acuna   Age & Sex: 49 y.o. female   MRN: [id number]  Hospital Stay Days: 13    ASSESSMENT/PLAN     Principal Problem:    Cellulitis of left foot complicated by Critical limb threatening ischemia of the left lower extremity with a nonhealing transmetatarsal amputation wound  Active Problems:    Diabetes mellitus type 2 with complications (HCC)    Hyponatremia    Diabetic foot infection  (HCC)    PAD (peripheral artery disease) ([facility])    Blood loss anemia    Obesity (BMI 30-39.9)    Primary hypertension    Thyroid nodule    Adrenal nodule (HCC)      * Cellulitis of left foot complicated by Critical limb threatening ischemia of the left lower extremity with a nonhealing transmetatarsal amputation wound  Assessment & Plan  Underwent an elective R TMA approx 3 weeks ago and was doing well until last week when she was noticed to have dehiscence of her surgical wound.   Wound was debrided by her podiatrist in the office but ultimately worsened with evidence of cellulitis.   LLE foot infection diagnosed clinically without MRI findings of osteomyelitis.   Tissue culture: 2+ MRSA, 2+ Finegoldia magna No significant leukocytosis or fever this admission. Site continues to bleed likely due to triple therapy   A1c 10.3  -L SFA stent s/p L TMA, nonhealing, s/p lysis and tibial intervention (AT PTA)  -despite adequate perfusion w/ AT runoff, patient continues to be extremely high risk for higher amputation 2/2 TMA dehiscence and extensive necrosis  S/P Balloon angioplasty 2/7 of the left popliteal and anterior tibial arteries   S/p L BKA with podiatry on 2/11  Off vancomycin per ID as source control achieved.    Plan:   Okay from podiatry standpoint for discharge  Continue Plavix/statin  Continue heparin  gtt  Current pain regimen: Tylenol 975 mg q6h, duloxetine 30mg daily, gabapentin 300 mg bid plus 600mg hs, robaxin 500 mg q6h; oxy 2.5/5 for moderate/severe  Will involve APS again as pt now having uncontrolled pain after nerve block wore off  Wound care     Adrenal nodule (HCC)  Assessment & Plan  14mm right adrenal nodule noted incidentally on imaging stable since 2018, c/w adenoma.   Recommended for biochemical evaluation for adenomas >1cm to r/o functioning adenoma, does not appear that this has been done previously.   Testosterone level wnl, f/u AM cortisol level.    Thyroid nodule  Assessment & Plan  Incidental finding on imaging, will need outpatient thyroid US     Primary hypertension  Assessment & Plan  Patient previously diagnosed with hypertension  However, on 2/5, became hypertensive with SBP's up to 200s  Started chlorthalidone 12.5 mg daily  Added on as needed hydralazine and labetalol  Plan:  Continue chlorthalidone 12.5 mg daily  As needed hydralazine and labetalol    Obesity (BMI 30-39.9)  Assessment & Plan  Aggressive lifestyle and dietary modifications advised including DM control    Blood loss anemia  Assessment & Plan  Blood loss anemia 2/2 lysis, bleeding from LLE TMA. Hb now stable   - Dressing and wound care per podiatry recs   - Recheck CBC and transfuse to maintain Hb > 7    PAD (peripheral artery disease) (HCC)  Assessment & Plan  See principal problem    Hyponatremia  Assessment & Plan  Chronic euvolemic hyponatremia that was 133 on admission. Asymptomatic and no recent history of confusion, altered metal status, seizure, or coma.   Current differential includes SIADH in the setting of acute infection   Plan:  Monitor BMP  Low threshold to workup with serum and urine studies if hyponatremia worsens      Diabetes mellitus type 2 with complications (HCC)  Assessment & Plan  Lab Results   Component Value Date    HGBA1C 10.3 (H) 12/13/2024       Blood Sugar Average: Last 72 hrs:  (P)  163.3125  -Lantus 10U hs, lispro 5U tid ac  -SSI  -Needs aggressive BS/DM control in setting of L foot wound/infection  -Goal -180, hypoglycemia protocol   -Carb control diet        Disposition: continue inpatient care until pain under better control, approved for [facility] when bed available      SUBJECTIVE     Patient seen and examined. No acute events overnight. Pt c/o increased pain today since nerve block wore off overnight. No other new complaints.     OBJECTIVE     Vitals:    25 2209 25 0153 25 0740 25 1513   BP: 158/86  104/76 150/86   BP Location:   Left arm    Pulse: 96  94 98   Resp:   19    Temp: 97.8 °F (36.6 °C)  97.5 °F (36.4 °C) 97.6 °F (36.4 °C)   TempSrc:   Oral    SpO2: 96%  96% 94%   Weight:  83.1 kg (183 lb 3.2 oz)     Height:          Temperature:   Temp (24hrs), Av.6 °F (36.4 °C), Min:97.3 °F (36.3 °C), Max:97.8 °F (36.6 °C)    Temperature: 97.6 °F (36.4 °C)  Intake & Output:  I/O          07 07 07 07 07 0700    P.O. 480 1080 0    I.V. (mL/kg) 506.7 (6) 597.5 (7.2)     IV Piggyback 250      Total Intake(mL/kg) 1236.7 (14.7) 1677.5 (20.2) 0 (0)    Urine (mL/kg/hr) 3725 (1.8) 2625 (1.3) 400 (0.6)    Total Output 3725 2625 400    Net -2488.3 -947.5 -400                 Weights:   IBW (Ideal Body Weight): 48.26 kg    Body mass index is 34.39 kg/m².  Weight (last 2 days)       Date/Time Weight    25 0153 83.1 (183.2)    25 0500 84.1 (185.4)    25 0525 83.2 (183.4)          Physical Exam  Vitals reviewed.   Constitutional:       General: She is in acute distress (pain).      Appearance: She is not ill-appearing.   HENT:      Head: Normocephalic and atraumatic.      Right Ear: External ear normal.      Left Ear: External ear normal.      Nose: Nose normal.      Mouth/Throat:      Mouth: Mucous membranes are moist.   Eyes:      Conjunctiva/sclera: Conjunctivae normal.   Cardiovascular:      Rate and Rhythm:  Normal rate and regular rhythm.      Heart sounds: Normal heart sounds.   Pulmonary:      Effort: Pulmonary effort is normal. No respiratory distress.      Breath sounds: Normal breath sounds.   Abdominal:      General: Bowel sounds are normal. There is no distension.      Palpations: Abdomen is soft.      Tenderness: There is no abdominal tenderness. There is no guarding.   Musculoskeletal:         General: No swelling.      Right lower leg: No edema.      Comments: L BKA   Skin:     General: Skin is warm and dry.   Neurological:      Mental Status: She is alert and oriented to person, place, and time. Mental status is at baseline.      Cranial Nerves: No cranial nerve deficit.   Psychiatric:         Mood and Affect: Mood normal.         Behavior: Behavior normal.       LABORATORY DATA     Labs: I have personally reviewed pertinent reports.  Results from last 7 days   Lab Units 02/12/25  0608 02/11/25  1640 02/11/25  0536 02/10/25  0547   WBC Thousand/uL 9.74  --  11.82* 10.50*   HEMOGLOBIN g/dL 8.6*  --  8.8* 9.2*   HEMATOCRIT % 26.9*  --  27.9* 29.1*   PLATELETS Thousands/uL 252 239 288 294   SEGS PCT % 79*  --  69 62   MONO PCT % 4  --  3* 5   EOS PCT % 0  --  1 1      Results from last 7 days   Lab Units 02/12/25  0608 02/11/25  0536 02/10/25  0547   POTASSIUM mmol/L 4.4 4.2 4.6   CHLORIDE mmol/L 99 98 95*   CO2 mmol/L 24 26 31   BUN mg/dL 18 19 15   CREATININE mg/dL 0.84 0.81 0.76   CALCIUM mg/dL 8.7 8.7 9.6   ALK PHOS U/L  --   --  78   ALT U/L  --   --  5*   AST U/L  --   --  11*              Results from last 7 days   Lab Units 02/11/25  0536 02/10/25  0547 02/09/25  0513   PTT seconds 71* 70* 71*               IMAGING & DIAGNOSTIC TESTING     Radiology Results: I have personally reviewed pertinent reports.  IR TPA lysis check  Result Date: 2/2/2025  Impression: 1. Left deep femoral, superficial femoral, popliteal, tibioperoneal trunk, and anterior tibial arteries were patent. 2. Mild residual stenoses in  the left superficial femoral and popliteal arteries treated with 4 mm balloon angioplasty. 3. Mild stenoses along the proximal left anterior tibial artery treated with 3 mm and 2.5 mm balloon angioplasties. 4. Left posterior tibial and peroneal arteries appeared chronically occluded distally. Plan: -Continue anticoagulation. -Continue aspirin and statin. Workstation performed: [id number]     IR TPA lysis check  Result Date: 2/1/2025  Impression: 1. Residual luminal irregularities in the left superficial femoral artery treated with 4 mm balloon angioplasty. 2. Post angioplasty arteriogram showed reocclusion of left SFA, likely due to residual thrombus. 3. Lysis catheter replaced extending from the left SFA into the popliteal artery. Plan: -tPA to run through the lysis catheter at 1 mg/h. -Return for lysis check tomorrow. Workstation performed: [id number]     IR lower extremity angiogram  Result Date: 1/31/2025  Impression: 1. Thrombosed left superficial femoral artery treated with aspiration thrombectomy and 4 mm balloon angioplasty with residual thrombus. 2. Thrombolysis initiated through a lysis catheter in the left SFA. 3. Right arm PICC placement through basilic vein access. Plan: -tPA to run at 1 mg/hr through the lysis catheter. -All blood draws and labwork to be done through PICC. -Return for lysis check tomorrow. Workstation performed: [id number]     IR PICC placement double lumen  Result Date: 1/31/2025  Impression: 1. Thrombosed left superficial femoral artery treated with aspiration thrombectomy and 4 mm balloon angioplasty with residual thrombus. 2. Thrombolysis initiated through a lysis catheter in the left SFA. 3. Right arm PICC placement through basilic vein access. Plan: -tPA to run at 1 mg/hr through the lysis catheter. -All blood draws and labwork to be done through PICC. -Return for lysis check tomorrow. Workstation performed: [id number]     CT head wo contrast  Result Date:  1/31/2025  Impression: 1. No intracranial hemorrhage seen. 2. Bilateral sphenoid sinusitis Workstation performed: [id number]     Other Diagnostic Testing: I have personally reviewed pertinent reports.    ACTIVE MEDICATIONS       Current Facility-Administered Medications:     acetaminophen (TYLENOL) tablet 975 mg, Q6H JORGE    aspirin (ECOTRIN LOW STRENGTH) EC tablet 81 mg, Daily    atorvastatin (LIPITOR) tablet 80 mg, Daily With Dinner    chlorhexidine (PERIDEX) 0.12 % oral rinse 15 mL, Q12H JORGE    chlorthalidone tablet 12.5 mg, Daily    diazepam (VALIUM) tablet 5 mg, Q8H PRN    DULoxetine (CYMBALTA) delayed release capsule 30 mg, Daily    gabapentin (NEURONTIN) capsule 300 mg, BID (AM & Afternoon)    gabapentin (NEURONTIN) capsule 600 mg, HS    heparin (porcine) subcutaneous injection 5,000 Units, Q8H JORGE **AND** [COMPLETED] Platelet count, Once    hydrALAZINE (APRESOLINE) injection 5 mg, Q6H PRN    HYDROmorphone (DILAUDID) injection 0.5 mg, Q4H PRN    insulin glargine (LANTUS) subcutaneous injection 10 Units 0.1 mL, HS    insulin lispro (HumALOG/ADMELOG) 100 units/mL subcutaneous injection 1-6 Units, TID With Meals **AND** Fingerstick Glucose (POCT), TID AC    insulin lispro (HumALOG/ADMELOG) 100 units/mL subcutaneous injection 5 Units, TID With Meals    labetalol (NORMODYNE) injection 10 mg, Q4H PRN    lidocaine (LIDODERM) 5 % patch 1 patch, Daily    methocarbamol (ROBAXIN) tablet 500 mg, Q6H JORGE    metoclopramide (REGLAN) injection 10 mg, Q6H PRN    naloxone (NARCAN) injection 0.1 mg, Q1MIN PRN    nitroglycerin (NITROSTAT) SL tablet 0.4 mg, Q5 Min PRN    ondansetron (ZOFRAN) injection 4 mg, Q6H PRN    oxyCODONE (ROXICODONE) IR tablet 5 mg, Q4H PRN    oxyCODONE (ROXICODONE) split tablet 2.5 mg, Q4H PRN    pantoprazole (PROTONIX) EC tablet 40 mg, Early Morning    polyethylene glycol (MIRALAX) packet 17 g, Daily    senna-docusate sodium (SENOKOT S) 8.6-50 mg per tablet 1 tablet, HS    trimethobenzamide (TIGAN) IM  "injection 200 mg, Q6H PRN    VTE Pharmacologic Prophylaxis: VTE covered by:  heparin (porcine), Subcutaneous, 5,000 Units at 02/13/25 1407      VTE Mechanical Prophylaxis: sequential compression device    Portions of the record may have been created with voice recognition software.  Occasional wrong word or \"sound a like\" substitutions may have occurred due to the inherent limitations of voice recognition software.  Read the chart carefully and recognize, using context, where substitutions have occurred.  ==  Sharron Stoddard MD  [facility]  Internal Medicine Residency PGY-2       "

## 2025-02-14 LAB
GLUCOSE SERPL-MCNC: 147 MG/DL (ref 65–140)
GLUCOSE SERPL-MCNC: 193 MG/DL (ref 65–140)
GLUCOSE SERPL-MCNC: 210 MG/DL (ref 65–140)
GLUCOSE SERPL-MCNC: 240 MG/DL (ref 65–140)

## 2025-02-14 PROCEDURE — 97116 GAIT TRAINING THERAPY: CPT

## 2025-02-14 PROCEDURE — 99232 SBSQ HOSP IP/OBS MODERATE 35: CPT | Performed by: INTERNAL MEDICINE

## 2025-02-14 PROCEDURE — 82948 REAGENT STRIP/BLOOD GLUCOSE: CPT

## 2025-02-14 RX ADMIN — DIAZEPAM 5 MG: 5 TABLET ORAL at 01:05

## 2025-02-14 RX ADMIN — SENNOSIDES AND DOCUSATE SODIUM 1 TABLET: 50; 8.6 TABLET ORAL at 22:29

## 2025-02-14 RX ADMIN — OXYCODONE HYDROCHLORIDE 10 MG: 10 TABLET ORAL at 04:22

## 2025-02-14 RX ADMIN — ACETAMINOPHEN 975 MG: 325 TABLET, FILM COATED ORAL at 11:09

## 2025-02-14 RX ADMIN — ACETAMINOPHEN 975 MG: 325 TABLET, FILM COATED ORAL at 00:13

## 2025-02-14 RX ADMIN — PANTOPRAZOLE SODIUM 40 MG: 40 TABLET, DELAYED RELEASE ORAL at 05:25

## 2025-02-14 RX ADMIN — POLYETHYLENE GLYCOL 3350 17 G: 17 POWDER, FOR SOLUTION ORAL at 08:09

## 2025-02-14 RX ADMIN — METHOCARBAMOL 500 MG: 500 TABLET ORAL at 11:09

## 2025-02-14 RX ADMIN — GABAPENTIN 600 MG: 300 CAPSULE ORAL at 22:29

## 2025-02-14 RX ADMIN — ASPIRIN 81 MG: 81 TABLET, COATED ORAL at 08:08

## 2025-02-14 RX ADMIN — ACETAMINOPHEN 975 MG: 325 TABLET, FILM COATED ORAL at 23:04

## 2025-02-14 RX ADMIN — HYDROMORPHONE HYDROCHLORIDE 0.5 MG: 1 INJECTION, SOLUTION INTRAMUSCULAR; INTRAVENOUS; SUBCUTANEOUS at 00:13

## 2025-02-14 RX ADMIN — INSULIN LISPRO 5 UNITS: 100 INJECTION, SOLUTION INTRAVENOUS; SUBCUTANEOUS at 11:10

## 2025-02-14 RX ADMIN — METHOCARBAMOL 500 MG: 500 TABLET ORAL at 23:04

## 2025-02-14 RX ADMIN — DULOXETINE 30 MG: 30 CAPSULE, DELAYED RELEASE ORAL at 08:08

## 2025-02-14 RX ADMIN — METHOCARBAMOL 500 MG: 500 TABLET ORAL at 00:13

## 2025-02-14 RX ADMIN — GABAPENTIN 300 MG: 300 CAPSULE ORAL at 12:42

## 2025-02-14 RX ADMIN — OXYCODONE HYDROCHLORIDE 10 MG: 10 TABLET ORAL at 17:03

## 2025-02-14 RX ADMIN — HEPARIN SODIUM 5000 UNITS: 5000 INJECTION INTRAVENOUS; SUBCUTANEOUS at 22:29

## 2025-02-14 RX ADMIN — GABAPENTIN 300 MG: 300 CAPSULE ORAL at 08:08

## 2025-02-14 RX ADMIN — DIAZEPAM 5 MG: 5 TABLET ORAL at 23:04

## 2025-02-14 RX ADMIN — CHLORHEXIDINE GLUCONATE 0.12% ORAL RINSE 15 ML: 1.2 LIQUID ORAL at 08:09

## 2025-02-14 RX ADMIN — ACETAMINOPHEN 975 MG: 325 TABLET, FILM COATED ORAL at 05:24

## 2025-02-14 RX ADMIN — CHLORTHALIDONE 12.5 MG: 25 TABLET ORAL at 08:10

## 2025-02-14 RX ADMIN — METHOCARBAMOL 500 MG: 500 TABLET ORAL at 17:00

## 2025-02-14 RX ADMIN — INSULIN GLARGINE 10 UNITS: 100 INJECTION, SOLUTION SUBCUTANEOUS at 22:29

## 2025-02-14 RX ADMIN — ACETAMINOPHEN 975 MG: 325 TABLET, FILM COATED ORAL at 17:00

## 2025-02-14 RX ADMIN — OXYCODONE HYDROCHLORIDE 10 MG: 10 TABLET ORAL at 08:32

## 2025-02-14 RX ADMIN — INSULIN LISPRO 3 UNITS: 100 INJECTION, SOLUTION INTRAVENOUS; SUBCUTANEOUS at 11:10

## 2025-02-14 RX ADMIN — INSULIN LISPRO 2 UNITS: 100 INJECTION, SOLUTION INTRAVENOUS; SUBCUTANEOUS at 16:02

## 2025-02-14 RX ADMIN — HEPARIN SODIUM 5000 UNITS: 5000 INJECTION INTRAVENOUS; SUBCUTANEOUS at 12:42

## 2025-02-14 RX ADMIN — INSULIN LISPRO 5 UNITS: 100 INJECTION, SOLUTION INTRAVENOUS; SUBCUTANEOUS at 08:10

## 2025-02-14 RX ADMIN — METHOCARBAMOL 500 MG: 500 TABLET ORAL at 05:24

## 2025-02-14 RX ADMIN — OXYCODONE HYDROCHLORIDE 10 MG: 10 TABLET ORAL at 12:37

## 2025-02-14 RX ADMIN — ATORVASTATIN CALCIUM 80 MG: 80 TABLET, FILM COATED ORAL at 16:00

## 2025-02-14 RX ADMIN — INSULIN LISPRO 5 UNITS: 100 INJECTION, SOLUTION INTRAVENOUS; SUBCUTANEOUS at 16:02

## 2025-02-14 RX ADMIN — OXYCODONE HYDROCHLORIDE 10 MG: 10 TABLET ORAL at 22:29

## 2025-02-14 RX ADMIN — HEPARIN SODIUM 5000 UNITS: 5000 INJECTION INTRAVENOUS; SUBCUTANEOUS at 05:24

## 2025-02-14 NOTE — ASSESSMENT & PLAN NOTE
14mm right adrenal nodule noted incidentally on imaging stable since 2018, c/w adenoma.   Recommended for biochemical evaluation for adenomas >1cm to r/o functioning adenoma, does not appear that this has been done previously.   Testosterone and AM cortisol levels wnl.

## 2025-02-14 NOTE — CASE MANAGEMENT
Case Management Progress Note    Patient name Lizzy Acuna  Location [facility] 511/[facility] 511-01 MRN [id number]  : 1975 Date 2025       LOS (days): 14  Geometric Mean LOS (GMLOS) (days): 6.4  Days to GMLOS:-8.2        OBJECTIVE:        Current admission status: Inpatient  Preferred Pharmacy:   [address] [address]  Phone: [phone] Fax: [phone]    Primary Care Provider: NAOMY Basilio    Primary Insurance: [employer]  Secondary Insurance:     PROGRESS NOTE:  [facility]ELV[facility] AGUILERA requested pt be at their facility by 1630 today. No transportation is available today to transport her.  [facility] liaison reports they will need updated therapy notes since the notes will be greater than 48hours. PT was able to re-eval pt today. OT will see pt first thing in AM.  [facility] is requesting early afternoon arriva.   Request updated in round trip for 12 noon . Pt, nursing, Dr. Stoddard and [facility] update.  Transport is arranged for 12 noon 2/15/25 with [facility].   Met w/ pt to discuss same and she is in agreement.

## 2025-02-14 NOTE — PROGRESS NOTES
INTERNAL MEDICINE RESIDENCY PROGRESS NOTE     Name: Lizzy Acuna   Age & Sex: 49 y.o. female   MRN: [id number]  Unit/Bed#: [facility] 511-01   Encounter: [id number]  Team: SOD Team B     PATIENT INFORMATION     Name: Lizzy Acuna   Age & Sex: 49 y.o. female   MRN: [id number]  Hospital Stay Days: 14    ASSESSMENT/PLAN     Principal Problem:    Cellulitis of left foot complicated by Critical limb threatening ischemia of the left lower extremity with a nonhealing transmetatarsal amputation wound  Active Problems:    Diabetes mellitus type 2 with complications (HCC)    Hyponatremia    Diabetic foot infection  (HCC)    PAD (peripheral artery disease) ([facility])    Blood loss anemia    Obesity (BMI 30-39.9)    Primary hypertension    Thyroid nodule    Adrenal nodule (HCC)      * Cellulitis of left foot complicated by Critical limb threatening ischemia of the left lower extremity with a nonhealing transmetatarsal amputation wound  Assessment & Plan  Underwent an elective R TMA approx 3 weeks ago and was doing well until last week when she was noticed to have dehiscence of her surgical wound.   Wound was debrided by her podiatrist in the office but ultimately worsened with evidence of cellulitis.   LLE foot infection diagnosed clinically without MRI findings of osteomyelitis.   Tissue culture: 2+ MRSA, 2+ Finegoldia magna No significant leukocytosis or fever this admission. Site continues to bleed likely due to triple therapy   A1c 10.3  -L SFA stent s/p L TMA, nonhealing, s/p lysis and tibial intervention (AT PTA)  -despite adequate perfusion w/ AT runoff, patient continues to be extremely high risk for higher amputation 2/2 TMA dehiscence and extensive necrosis  S/P Balloon angioplasty 2/7 of the left popliteal and anterior tibial arteries   S/p L BKA with podiatry on 2/11  Off vancomycin per ID as source control achieved.    Plan:   Stable for discharge, pending insurance approval for ARC  Continue Plavix/statin  Current  pain regimen: Tylenol 975 mg q6h, duloxetine 30mg daily, gabapentin 300 mg bid plus 600mg hs, robaxin 500 mg q6h; oxy 5/10 for moderate/severe  Wound care     Adrenal nodule (HCC)  Assessment & Plan  14mm right adrenal nodule noted incidentally on imaging stable since 2018, c/w adenoma.   Recommended for biochemical evaluation for adenomas >1cm to r/o functioning adenoma, does not appear that this has been done previously.   Testosterone and AM cortisol levels wnl.    Thyroid nodule  Assessment & Plan  Incidental finding on imaging, will need outpatient thyroid US     Primary hypertension  Assessment & Plan  Patient previously diagnosed with hypertension  However, on 2/5, became hypertensive with SBP's up to 200s  Started chlorthalidone 12.5 mg daily with good control overall     Plan:  Continue chlorthalidone 12.5 mg daily    Obesity (BMI 30-39.9)  Assessment & Plan  Aggressive lifestyle and dietary modifications advised including DM control    Blood loss anemia  Assessment & Plan  Blood loss anemia 2/2 lysis, bleeding from LLE TMA. Hb now stable    PAD (peripheral artery disease) (HCC)  Assessment & Plan  See principal problem    Hyponatremia  Assessment & Plan  Chronic euvolemic hyponatremia that was 133 on admission. Asymptomatic and no recent history of confusion, altered metal status, seizure, or coma.   Current differential includes SIADH in the setting of acute infection  Now improved     Diabetes mellitus type 2 with complications (HCC)  Assessment & Plan  Lab Results   Component Value Date    HGBA1C 10.3 (H) 12/13/2024       Blood Sugar Average: Last 72 hrs:  (P) 193.5625  -Lantus 10U hs, lispro 5U tid ac  -SSI  -Needs aggressive BS/DM control in setting of L foot wound/infection  -Goal -180, hypoglycemia protocol   -Carb control diet        Disposition: pending insurance approval for ARC     SUBJECTIVE     Patient seen and examined. No acute events overnight. Pt with significant improvement in pain  today. No other new complaints.     OBJECTIVE     Vitals:    25 1513 25 2231 25 0225 25 0422   BP: 150/86 137/75 104/72    BP Location:       Pulse: 98 89 83    Resp:  20 20    Temp: 97.6 °F (36.4 °C) 98.6 °F (37 °C) 98.1 °F (36.7 °C)    TempSrc:       SpO2: 94% 93% 92%    Weight:    81.1 kg (178 lb 12.7 oz)   Height:          Temperature:   Temp (24hrs), Av.1 °F (36.7 °C), Min:97.6 °F (36.4 °C), Max:98.6 °F (37 °C)    Temperature: 98.1 °F (36.7 °C)  Intake & Output:  I/O          0701   0700  0701   0700  0701  02/15 0700    P.O. 1080 240 240    I.V. (mL/kg) 597.5 (7.2)      IV Piggyback       Total Intake(mL/kg) 1677.5 (20.2) 240 (3) 240 (3)    Urine (mL/kg/hr) 2625 (1.3) 1700 (0.9)     Total Output 2625 1700     Net -947.5 -1460 +240                 Weights:   IBW (Ideal Body Weight): 48.26 kg    Body mass index is 33.56 kg/m².  Weight (last 2 days)       Date/Time Weight    25 0422 81.1 (178.79)    25 0153 83.1 (183.2)    25 0500 84.1 (185.4)          Physical Exam  Vitals reviewed.   Constitutional:       General: She is not in acute distress.     Appearance: She is not ill-appearing.   HENT:      Head: Normocephalic and atraumatic.      Right Ear: External ear normal.      Left Ear: External ear normal.      Nose: Nose normal.      Mouth/Throat:      Mouth: Mucous membranes are moist.   Eyes:      Conjunctiva/sclera: Conjunctivae normal.   Cardiovascular:      Rate and Rhythm: Normal rate and regular rhythm.      Heart sounds: Normal heart sounds.   Pulmonary:      Effort: Pulmonary effort is normal. No respiratory distress.      Breath sounds: Normal breath sounds.   Abdominal:      General: Bowel sounds are normal. There is no distension.      Palpations: Abdomen is soft.      Tenderness: There is no abdominal tenderness. There is no guarding.   Musculoskeletal:         General: No swelling.      Right lower leg: No edema.      Comments:  L BKA   Skin:     General: Skin is warm and dry.   Neurological:      Mental Status: She is alert and oriented to person, place, and time. Mental status is at baseline.      Cranial Nerves: No cranial nerve deficit.   Psychiatric:         Mood and Affect: Mood normal.         Behavior: Behavior normal.       LABORATORY DATA     Labs: I have personally reviewed pertinent reports.  Results from last 7 days   Lab Units 02/12/25  0608 02/11/25  1640 02/11/25  0536 02/10/25  0547   WBC Thousand/uL 9.74  --  11.82* 10.50*   HEMOGLOBIN g/dL 8.6*  --  8.8* 9.2*   HEMATOCRIT % 26.9*  --  27.9* 29.1*   PLATELETS Thousands/uL 252 239 288 294   SEGS PCT % 79*  --  69 62   MONO PCT % 4  --  3* 5   EOS PCT % 0  --  1 1      Results from last 7 days   Lab Units 02/12/25  0608 02/11/25  0536 02/10/25  0547   POTASSIUM mmol/L 4.4 4.2 4.6   CHLORIDE mmol/L 99 98 95*   CO2 mmol/L 24 26 31   BUN mg/dL 18 19 15   CREATININE mg/dL 0.84 0.81 0.76   CALCIUM mg/dL 8.7 8.7 9.6   ALK PHOS U/L  --   --  78   ALT U/L  --   --  5*   AST U/L  --   --  11*              Results from last 7 days   Lab Units 02/11/25  0536 02/10/25  0547 02/09/25  0513   PTT seconds 71* 70* 71*               IMAGING & DIAGNOSTIC TESTING     Radiology Results: I have personally reviewed pertinent reports.  IR TPA lysis check  Result Date: 2/2/2025  Impression: 1. Left deep femoral, superficial femoral, popliteal, tibioperoneal trunk, and anterior tibial arteries were patent. 2. Mild residual stenoses in the left superficial femoral and popliteal arteries treated with 4 mm balloon angioplasty. 3. Mild stenoses along the proximal left anterior tibial artery treated with 3 mm and 2.5 mm balloon angioplasties. 4. Left posterior tibial and peroneal arteries appeared chronically occluded distally. Plan: -Continue anticoagulation. -Continue aspirin and statin. Workstation performed: [id number]     IR TPA lysis check  Result Date: 2/1/2025  Impression: 1. Residual luminal  irregularities in the left superficial femoral artery treated with 4 mm balloon angioplasty. 2. Post angioplasty arteriogram showed reocclusion of left SFA, likely due to residual thrombus. 3. Lysis catheter replaced extending from the left SFA into the popliteal artery. Plan: -tPA to run through the lysis catheter at 1 mg/h. -Return for lysis check tomorrow. Workstation performed: [id number]     IR lower extremity angiogram  Result Date: 1/31/2025  Impression: 1. Thrombosed left superficial femoral artery treated with aspiration thrombectomy and 4 mm balloon angioplasty with residual thrombus. 2. Thrombolysis initiated through a lysis catheter in the left SFA. 3. Right arm PICC placement through basilic vein access. Plan: -tPA to run at 1 mg/hr through the lysis catheter. -All blood draws and labwork to be done through PICC. -Return for lysis check tomorrow. Workstation performed: [id number]     IR PICC placement double lumen  Result Date: 1/31/2025  Impression: 1. Thrombosed left superficial femoral artery treated with aspiration thrombectomy and 4 mm balloon angioplasty with residual thrombus. 2. Thrombolysis initiated through a lysis catheter in the left SFA. 3. Right arm PICC placement through basilic vein access. Plan: -tPA to run at 1 mg/hr through the lysis catheter. -All blood draws and labwork to be done through PICC. -Return for lysis check tomorrow. Workstation performed: [id number]     CT head wo contrast  Result Date: 1/31/2025  Impression: 1. No intracranial hemorrhage seen. 2. Bilateral sphenoid sinusitis Workstation performed: [id number]     Other Diagnostic Testing: I have personally reviewed pertinent reports.    ACTIVE MEDICATIONS       Current Facility-Administered Medications:     acetaminophen (TYLENOL) tablet 975 mg, Q6H JORGE    aspirin (ECOTRIN LOW STRENGTH) EC tablet 81 mg, Daily    atorvastatin (LIPITOR) tablet 80 mg, Daily With Dinner    chlorthalidone tablet 12.5 mg, Daily     "diazepam (VALIUM) tablet 5 mg, Q8H PRN    DULoxetine (CYMBALTA) delayed release capsule 30 mg, Daily    gabapentin (NEURONTIN) capsule 300 mg, BID (AM & Afternoon)    gabapentin (NEURONTIN) capsule 600 mg, HS    heparin (porcine) subcutaneous injection 5,000 Units, Q8H JORGE **AND** [COMPLETED] Platelet count, Once    insulin glargine (LANTUS) subcutaneous injection 10 Units 0.1 mL, HS    insulin lispro (HumALOG/ADMELOG) 100 units/mL subcutaneous injection 1-6 Units, TID With Meals **AND** Fingerstick Glucose (POCT), TID AC    insulin lispro (HumALOG/ADMELOG) 100 units/mL subcutaneous injection 5 Units, TID With Meals    lidocaine (LIDODERM) 5 % patch 1 patch, Daily    methocarbamol (ROBAXIN) tablet 500 mg, Q6H JORGE    metoclopramide (REGLAN) injection 10 mg, Q6H PRN    naloxone (NARCAN) injection 0.1 mg, Q1MIN PRN    nitroglycerin (NITROSTAT) SL tablet 0.4 mg, Q5 Min PRN    ondansetron (ZOFRAN) injection 4 mg, Q6H PRN    oxyCODONE (ROXICODONE) immediate release tablet 10 mg, Q4H PRN    oxyCODONE (ROXICODONE) IR tablet 5 mg, Q4H PRN    pantoprazole (PROTONIX) EC tablet 40 mg, Early Morning    polyethylene glycol (MIRALAX) packet 17 g, Daily    senna-docusate sodium (SENOKOT S) 8.6-50 mg per tablet 1 tablet, HS    trimethobenzamide (TIGAN) IM injection 200 mg, Q6H PRN    VTE Pharmacologic Prophylaxis: VTE covered by:  heparin (porcine), Subcutaneous, 5,000 Units at 02/14/25 1242      VTE Mechanical Prophylaxis: sequential compression device    Portions of the record may have been created with voice recognition software.  Occasional wrong word or \"sound a like\" substitutions may have occurred due to the inherent limitations of voice recognition software.  Read the chart carefully and recognize, using context, where substitutions have occurred.  ==  Sharron Stoddard MD  [facility]  Internal Medicine Residency PGY-2       "

## 2025-02-14 NOTE — ASSESSMENT & PLAN NOTE
Incidental finding on imaging, will need outpatient thyroid US, this was discussed with pt and ordered on discharge

## 2025-02-14 NOTE — CASE MANAGEMENT
[facility] has received APPROVED authorization.  Insurance:   [employer]   Auth obtained via fax.  Authorization received for: Acute Rehab  Facility: [facility]   Authorization #:[id number]   Start of Care:2/14  Next Review Date:2/21  Continued Stay Care Coordinator:   N/A  Submit next review to: [phone]      Care Manager notified:Lizzy CORONADO     Please reach out to  for updates on any clinical information.

## 2025-02-14 NOTE — ASSESSMENT & PLAN NOTE
Chronic euvolemic hyponatremia that was 133 on admission. Asymptomatic and no recent history of confusion, altered metal status, seizure, or coma.   Current differential includes SIADH in the setting of acute infection  Now improved

## 2025-02-14 NOTE — ASSESSMENT & PLAN NOTE
Patient previously diagnosed with hypertension  However, on 2/5, became hypertensive with SBP's up to 200s  Started chlorthalidone 12.5 mg daily with good control overall     Plan:  Continue chlorthalidone 12.5 mg daily

## 2025-02-14 NOTE — CASE MANAGEMENT
Case Management Discharge Planning Note    Patient name Lizzy Acuna  Location [facility] 511/[facility] 511-01 MRN [id number]  : 1975 Date 2025       Current Admission Date: 2025  Current Admission Diagnosis:Cellulitis of left foot complicated by Critical limb threatening ischemia of the left lower extremity with a nonhealing transmetatarsal amputation wound   Patient Active Problem List    Diagnosis Date Noted Date Diagnosed    Thyroid nodule 2025     Adrenal nodule (HCC) 2025     Primary hypertension 2025     Obesity (BMI 30-39.9) 2025     Blood loss anemia 2025     PAD (peripheral artery disease) ([facility]) 2025     Diabetic foot infection  ([facility]) 2025     Dehiscence of operative wound, initial encounter 2025     Hypersensitivity, initial encounter 2025     GERD (gastroesophageal reflux disease)      Cellulitis of left foot complicated by Critical limb threatening ischemia of the left lower extremity with a nonhealing transmetatarsal amputation wound 2024     Gangrene of left foot ([facility]) 2024     Diabetes mellitus type 2 with complications ([facility]) 2018     Hyponatremia 2018     Chronic sciatica 2018     Tobacco abuse 02/15/2017     Obesity (BMI 30.0-34.9) 2017     Chronic low back pain 2016     Chronic, continuous use of opioids 2016     Proteinuria 2011       LOS (days): 14  Geometric Mean LOS (GMLOS) (days): 6.4  Days to GMLOS:-8.1     OBJECTIVE:  Risk of Unplanned Readmission Score: 29.72         Current admission status: Inpatient   Preferred Pharmacy:   [address] [address]  Phone: [phone] Fax: [phone]    Primary Care Provider: NAOMY Basilio    Primary Insurance: [employer]  Secondary Insurance:     DISCHARGE DETAILS:                                                                                                                Facility Insurance Auth Number: [id number]

## 2025-02-14 NOTE — CASE MANAGEMENT
Called [facility] at [phone] to check status of IRF auth request. Spoke with Catherine RUEDA stated auth is still pending at this time  Pending auth # [id number]   notified   Lizzy Prakash

## 2025-02-14 NOTE — PLAN OF CARE
Problem: PHYSICAL THERAPY ADULT  Goal: Performs mobility at highest level of function for planned discharge setting.  See evaluation for individualized goals.  Description: Treatment/Interventions: ADL retraining, Functional transfer training, LE strengthening/ROM, Therapeutic exercise, Endurance training, Patient/family training, Equipment eval/education, Bed mobility, Gait training, Spoke to nursing, Spoke to case management, OT, Elevations  Equipment Recommended:  (owns)       See flowsheet documentation for full assessment, interventions and recommendations.  Outcome: Progressing  Note: Prognosis: Good  Problem List: Decreased strength, Decreased endurance, Impaired balance, Decreased mobility, Pain  Assessment: PT initiated treatment session in order to assist patient in achieving goals to improve transfers, gait training, and overall activity tolerance. Patient demonstrated progress toward achieving functional mobility goals as evidenced by improving activity tolerance, ambulation, and overall mobility. Patient pleasant, cooperative, and agreeable to today's treatment session. Patient received supine in bed. Patient is currently supervision bed mobility, transfers, and MinAx1 ambulation. Throughout treatment session patient required both verbal and tactile cuing to improve safety, efficiency, and mechanics of mobility in addition to hands on assistance for all aspects of functional mobility. Additionally, pt required increased time to execute specific mobility tasks with rest breaks in between secondary to gross fatigue and weakness. Patient demonstrated the ability to ambulate 40'x2 with RW, requiring intermittent verbal cues for RW management and hallway navigation to improve patient safety and reduce risk of falls. Patient required intermittent standing rest breaks due to generalized LE weakness and decreased activity tolerance. Further ambulation limited at this time due to increase in LLE pain. Patient  left supine in bed (per patient request) with alarm and all needs met. Patient will benefit from continued skilled physical therapy to address gait / balance dysfunction, decreased activity tolerance, and generalized weakness. The patients AM-PAC Basic Mobility Inpatient Short From Raw Score is 17 .  Based on AM-PAC scoring and patient presentation, PT currently recommending Level I (Maximum Resource Intensity). Please also refer to the recommendation of the Physical Therapist for safe discharge planning.  Barriers to Discharge: Inaccessible home environment     Rehab Resource Intensity Level, PT: I (Maximum Resource Intensity)    See flowsheet documentation for full assessment.

## 2025-02-14 NOTE — PLAN OF CARE
Problem: PAIN - ADULT  Goal: Verbalizes/displays adequate comfort level or baseline comfort level  Description: Interventions:  - Encourage patient to monitor pain and request assistance  - Assess pain using appropriate pain scale  - Administer analgesics based on type and severity of pain and evaluate response  - Implement non-pharmacological measures as appropriate and evaluate response  - Consider cultural and social influences on pain and pain management  - Notify physician/advanced practitioner if interventions unsuccessful or patient reports new pain  Outcome: Progressing     Problem: INFECTION - ADULT  Goal: Absence or prevention of progression during hospitalization  Description: INTERVENTIONS:  - Assess and monitor for signs and symptoms of infection  - Monitor lab/diagnostic results  - Monitor all insertion sites, i.e. indwelling lines, tubes, and drains  - Monitor endotracheal if appropriate and nasal secretions for changes in amount and color  - [place] appropriate cooling/warming therapies per order  - Administer medications as ordered  - Instruct and encourage patient and family to use good hand hygiene technique  - Identify and instruct in appropriate isolation precautions for identified infection/condition  Outcome: Progressing  Goal: Absence of fever/infection during neutropenic period  Description: INTERVENTIONS:  - Monitor WBC    Outcome: Progressing     Problem: SAFETY ADULT  Goal: Patient will remain free of falls  Description: INTERVENTIONS:  - Educate patient/family on patient safety including physical limitations  - Instruct patient to call for assistance with activity   - Consult OT/PT to assist with strengthening/mobility   - Keep Call bell within reach  - Keep bed low and locked with side rails adjusted as appropriate  - Keep care items and personal belongings within reach  - Initiate and maintain comfort rounds  - Make Fall Risk Sign visible to staff  - Offer Toileting every  Hours,  in advance of need  - Initiate/Maintain alarm  - Obtain necessary fall risk management equipment:   - Apply yellow socks and bracelet for high fall risk patients  - Consider moving patient to room near nurses station  Outcome: Progressing  Goal: Maintain or return to baseline ADL function  Description: INTERVENTIONS:  -  Assess patient's ability to carry out ADLs; assess patient's baseline for ADL function and identify physical deficits which impact ability to perform ADLs (bathing, care of mouth/teeth, toileting, grooming, dressing, etc.)  - Assess/evaluate cause of self-care deficits   - Assess range of motion  - Assess patient's mobility; develop plan if impaired  - Assess patient's need for assistive devices and provide as appropriate  - Encourage maximum independence but intervene and supervise when necessary  - Involve family in performance of ADLs  - Assess for home care needs following discharge   - Consider OT consult to assist with ADL evaluation and planning for discharge  - Provide patient education as appropriate  Outcome: Progressing  Goal: Maintains/Returns to pre admission functional level  Description: INTERVENTIONS:  - Perform AM-PAC 6 Click Basic Mobility/ Daily Activity assessment daily.  - Set and communicate daily mobility goal to care team and patient/family/caregiver.   - Collaborate with rehabilitation services on mobility goals if consulted  - Perform Range of Motion  times a day.  - Reposition patient every  hours.  - Dangle patient  times a day  - Stand patient  times a day  - Ambulate patient  times a day  - Out of bed to chair  times a day   - Out of bed for meals times a day  - Out of bed for toileting  - Record patient progress and toleration of activity level   Outcome: Progressing     Problem: DISCHARGE PLANNING  Goal: Discharge to home or other facility with appropriate resources  Description: INTERVENTIONS:  - Identify barriers to discharge w/patient and caregiver  - Arrange for  needed discharge resources and transportation as appropriate  - Identify discharge learning needs (meds, wound care, etc.)  - Arrange for interpretive services to assist at discharge as needed  - Refer to Case Management Department for coordinating discharge planning if the patient needs post-hospital services based on physician/advanced practitioner order or complex needs related to functional status, cognitive ability, or social support system  Outcome: Progressing     Problem: Knowledge Deficit  Goal: Patient/family/caregiver demonstrates understanding of disease process, treatment plan, medications, and discharge instructions  Description: Complete learning assessment and assess knowledge base.  Interventions:  - Provide teaching at level of understanding  - Provide teaching via preferred learning methods  Outcome: Progressing     Problem: NEUROSENSORY - ADULT  Goal: Achieves stable or improved neurological status  Description: INTERVENTIONS  - Monitor and report changes in neurological status  - Monitor vital signs such as temperature, blood pressure, glucose, and any other labs ordered   - Initiate measures to prevent increased intracranial pressure  - Monitor for seizure activity and implement precautions if appropriate      Outcome: Progressing  Goal: Remains free of injury related to seizures activity  Description: INTERVENTIONS  - Maintain airway, patient safety  and administer oxygen as ordered  - Monitor patient for seizure activity, document and report duration and description of seizure to physician/advanced practitioner  - If seizure occurs,  ensure patient safety during seizure  - Reorient patient post seizure  - Seizure pads on all 4 side rails  - Instruct patient/family to notify RN of any seizure activity including if an aura is experienced  - Instruct patient/family to call for assistance with activity based on nursing assessment  - Administer anti-seizure medications if ordered    Outcome:  Progressing  Goal: Achieves maximal functionality and self care  Description: INTERVENTIONS  - Monitor swallowing and airway patency with patient fatigue and changes in neurological status  - Encourage and assist patient to increase activity and self care.   - Encourage visually impaired, hearing impaired and aphasic patients to use assistive/communication devices  Outcome: Progressing     Problem: CARDIOVASCULAR - ADULT  Goal: Maintains optimal cardiac output and hemodynamic stability  Description: INTERVENTIONS:  - Monitor I/O, vital signs and rhythm  - Monitor for S/S and trends of decreased cardiac output  - Administer and titrate ordered vasoactive medications to optimize hemodynamic stability  - Assess quality of pulses, skin color and temperature  - Assess for signs of decreased coronary artery perfusion  - Instruct patient to report change in severity of symptoms  Outcome: Progressing  Goal: Absence of cardiac dysrhythmias or at baseline rhythm  Description: INTERVENTIONS:  - Continuous cardiac monitoring, vital signs, obtain 12 lead EKG if ordered  - Administer antiarrhythmic and heart rate control medications as ordered  - Monitor electrolytes and administer replacement therapy as ordered  Outcome: Progressing     Problem: RESPIRATORY - ADULT  Goal: Achieves optimal ventilation and oxygenation  Description: INTERVENTIONS:  - Assess for changes in respiratory status  - Assess for changes in mentation and behavior  - Position to facilitate oxygenation and minimize respiratory effort  - Oxygen administered by appropriate delivery if ordered  - Initiate smoking cessation education as indicated  - Encourage broncho-pulmonary hygiene including cough, deep breathe, Incentive Spirometry  - Assess the need for suctioning and aspirate as needed  - Assess and instruct to report SOB or any respiratory difficulty  - Respiratory Therapy support as indicated  Outcome: Progressing     Problem: GASTROINTESTINAL -  ADULT  Goal: Minimal or absence of nausea and/or vomiting  Description: INTERVENTIONS:  - Administer IV fluids if ordered to ensure adequate hydration  - Maintain NPO status until nausea and vomiting are resolved  - Nasogastric tube if ordered  - Administer ordered antiemetic medications as needed  - Provide nonpharmacologic comfort measures as appropriate  - Advance diet as tolerated, if ordered  - Consider nutrition services referral to assist patient with adequate nutrition and appropriate food choices  Outcome: Progressing  Goal: Maintains or returns to baseline bowel function  Description: INTERVENTIONS:  - Assess bowel function  - Encourage oral fluids to ensure adequate hydration  - Administer IV fluids if ordered to ensure adequate hydration  - Administer ordered medications as needed  - Encourage mobilization and activity  - Consider nutritional services referral to assist patient with adequate nutrition and appropriate food choices  Outcome: Progressing  Goal: Maintains adequate nutritional intake  Description: INTERVENTIONS:  - Monitor percentage of each meal consumed  - Identify factors contributing to decreased intake, treat as appropriate  - Assist with meals as needed  - Monitor I&O, weight, and lab values if indicated  - Obtain nutrition services referral as needed  Outcome: Progressing  Goal: Establish and maintain optimal ostomy function  Description: INTERVENTIONS:  - Assess bowel function  - Encourage oral fluids to ensure adequate hydration  - Administer IV fluids if ordered to ensure adequate hydration   - Administer ordered medications as needed  - Encourage mobilization and activity  - Nutrition services referral to assist patient with appropriate food choices  - Assess stoma site  - Consider wound care consult   Outcome: Progressing  Goal: Oral mucous membranes remain intact  Description: INTERVENTIONS  - Assess oral mucosa and hygiene practices  - Implement preventative oral hygiene  regimen  - Implement oral medicated treatments as ordered  - Initiate Nutrition services referral as needed  Outcome: Progressing     Problem: GENITOURINARY - ADULT  Goal: Maintains or returns to baseline urinary function  Description: INTERVENTIONS:  - Assess urinary function  - Encourage oral fluids to ensure adequate hydration if ordered  - Administer IV fluids as ordered to ensure adequate hydration  - Administer ordered medications as needed  - Offer frequent toileting  - Follow urinary retention protocol if ordered  Outcome: Progressing  Goal: Absence of urinary retention  Description: INTERVENTIONS:  - Assess patient’s ability to void and empty bladder  - Monitor I/O  - Bladder scan as needed  - Discuss with physician/AP medications to alleviate retention as needed  - Discuss catheterization for long term situations as appropriate  Outcome: Progressing  Goal: Urinary catheter remains patent  Description: INTERVENTIONS:  - Assess patency of urinary catheter  - If patient has a chronic negron, consider changing catheter if non-functioning  - Follow guidelines for intermittent irrigation of non-functioning urinary catheter  Outcome: Progressing     Problem: METABOLIC, FLUID AND ELECTROLYTES - ADULT  Goal: Electrolytes maintained within normal limits  Description: INTERVENTIONS:  - Monitor labs and assess patient for signs and symptoms of electrolyte imbalances  - Administer electrolyte replacement as ordered  - Monitor response to electrolyte replacements, including repeat lab results as appropriate  - Instruct patient on fluid and nutrition as appropriate  Outcome: Progressing  Goal: Fluid balance maintained  Description: INTERVENTIONS:  - Monitor labs   - Monitor I/O and WT  - Instruct patient on fluid and nutrition as appropriate  - Assess for signs & symptoms of volume excess or deficit  Outcome: Progressing  Goal: Glucose maintained within target range  Description: INTERVENTIONS:  - Monitor Blood Glucose as  ordered  - Assess for signs and symptoms of hyperglycemia and hypoglycemia  - Administer ordered medications to maintain glucose within target range  - Assess nutritional intake and initiate nutrition service referral as needed  Outcome: Progressing     Problem: SKIN/TISSUE INTEGRITY - ADULT  Goal: Skin Integrity remains intact(Skin Breakdown Prevention)  Description: Assess:  -Perform [place] assessment every   -Clean and moisturize skin every   -Inspect skin when repositioning, toileting, and assisting with ADLS  -Assess under medical devices such as  every   -Assess extremities for adequate circulation and sensation     Bed Management:  -Have minimal linens on bed & keep smooth, unwrinkled  -Change linens as needed when moist or perspiring  -Avoid sitting or lying in one position for more than  hours while in bed  -Keep HOB at degrees     Toileting:  -Offer bedside commode  -Assess for incontinence every   -Use incontinent care products after each incontinent episode such as     Activity:  -Mobilize patient  times a day  -Encourage activity and walks on unit  -Encourage or provide ROM exercises   -Turn and reposition patient every  Hours  -Use appropriate equipment to lift or move patient in bed  -Instruct/ Assist with weight shifting every  when out of bed in chair  -Consider limitation of chair time hour intervals    Skin Care:  -Avoid use of baby powder, tape, friction and shearing, hot water or constrictive clothing  -Relieve pressure over bony prominences using   -Do not massage red bony areas    Next Steps:  -Teach patient strategies to minimize risks such as    -Consider consults to  interdisciplinary teams such as   Outcome: Progressing  Goal: Incision(s), wounds(s) or drain site(s) healing without S/S of infection  Description: INTERVENTIONS  - Assess and document dressing, incision, wound bed, drain sites and surrounding tissue  - Provide patient and family education  - Perform skin care/dressing changes  every   Outcome: Progressing  Goal: Pressure injury heals and does not worsen  Description: Interventions:  - Implement low air loss mattress or specialty surface (Criteria met)  - Apply silicone foam dressing  - Instruct/assist with weight shifting every  minutes when in chair   - Limit chair time to  hour intervals  - Use special pressure reducing interventions such as  when in chair   - Apply fecal or urinary incontinence containment device   - Perform passive or active ROM every   - Turn and reposition patient & offload bony prominences every  hours   - Utilize friction reducing device or surface for transfers   - Consider consults to  interdisciplinary teams such as   - Use incontinent care products after each incontinent episode such as   - Consider nutrition services referral as needed  Outcome: Progressing     Problem: HEMATOLOGIC - ADULT  Goal: Maintains hematologic stability  Description: INTERVENTIONS  - Assess for signs and symptoms of bleeding or hemorrhage  - Monitor labs  - Administer supportive blood products/factors as ordered and appropriate  Outcome: Progressing     Problem: MUSCULOSKELETAL - ADULT  Goal: Maintain or return mobility to safest level of function  Description: INTERVENTIONS:  - Assess patient's ability to carry out ADLs; assess patient's baseline for ADL function and identify physical deficits which impact ability to perform ADLs (bathing, care of mouth/teeth, toileting, grooming, dressing, etc.)  - Assess/evaluate cause of self-care deficits   - Assess range of motion  - Assess patient's mobility  - Assess patient's need for assistive devices and provide as appropriate  - Encourage maximum independence but intervene and supervise when necessary  - Involve family in performance of ADLs  - Assess for home care needs following discharge   - Consider OT consult to assist with ADL evaluation and planning for discharge  - Provide patient education as appropriate  Outcome:  Progressing  Goal: Maintain proper alignment of affected body part  Description: INTERVENTIONS:  - Support, maintain and protect limb and body alignment  - Provide patient/ family with appropriate education  Outcome: Progressing     Problem: Prexisting or High Potential for Compromised Skin Integrity  Goal: Skin integrity is maintained or improved  Description: INTERVENTIONS:  - Identify patients at risk for skin breakdown  - Assess and monitor skin integrity  - Assess and monitor nutrition and hydration status  - Monitor labs   - Assess for incontinence   - Turn and reposition patient  - Assist with mobility/ambulation  - Relieve pressure over bony prominences  - Avoid friction and shearing  - Provide appropriate hygiene as needed including keeping skin clean and dry  - Evaluate need for skin moisturizer/barrier cream  - Collaborate with interdisciplinary team   - Patient/family teaching  - Consider wound care consult   Outcome: Progressing

## 2025-02-14 NOTE — CASE MANAGEMENT
Case Management Discharge Planning Note    Patient name Lizzy Acuna  Location [facility]-01 MRN [id number]  : 1975 Date 2025       Current Admission Date: 2025  Current Admission Diagnosis:Cellulitis of left foot complicated by Critical limb threatening ischemia of the left lower extremity with a nonhealing transmetatarsal amputation wound   Patient Active Problem List    Diagnosis Date Noted Date Diagnosed    Thyroid nodule 2025     Adrenal nodule (HCC) 2025     Primary hypertension 2025     Obesity (BMI 30-39.9) 2025     Blood loss anemia 2025     PAD (peripheral artery disease) (Former[facility]) 2025     Diabetic foot infection  ([facility]) 2025     Dehiscence of operative wound, initial encounter 2025     Hypersensitivity, initial encounter 2025     GERD (gastroesophageal reflux disease)      Cellulitis of left foot complicated by Critical limb threatening ischemia of the left lower extremity with a nonhealing transmetatarsal amputation wound 2024     Gangrene of left foot ([facility]) 2024     Diabetes mellitus type 2 with complications ([facility]) 2018     Hyponatremia 2018     Chronic sciatica 2018     Tobacco abuse 02/15/2017     Obesity (BMI 30.0-34.9) 2017     Chronic low back pain 2016     Chronic, continuous use of opioids 2016     Proteinuria 2011       LOS (days): 14  Geometric Mean LOS (GMLOS) (days): 6.4  Days to GMLOS:-8.2     OBJECTIVE:  Risk of Unplanned Readmission Score: 29.72         Current admission status: Inpatient   Preferred Pharmacy:   [address] [address]  Phone: [phone] Fax: [phone]    Primary Care Provider: NAOMY Basilio    Primary Insurance: [employer]  Secondary Insurance:     DISCHARGE DETAILS:    Discharge planning discussed with:: patient        CM contacted  family/caregiver?: No- see comments (pt will contact family)     Other Referral/Resources/Interventions Provided:  Interventions: Acute Rehab, Transportation         Treatment Team Recommendation: Acute Rehab  Discharge Destination Plan:: Acute Rehab  Transport at Discharge : [facility] Ambulance           ETA of Transport (Date): 02/14/25        Additional Comments: D/C support has received authorization for pt to go to [facility] today. CM contacted [facility] liaison, Krissy Baldwin and they do have a  be today. CM requested S transport via [facility] for 1445. Dr. Stoddard aware. Pt is aware and very happy to hear-she is agreeable to  dcp. CM contacted PT  re: Left leg immobizer too big but they only have one size.        **await transport time

## 2025-02-14 NOTE — ASSESSMENT & PLAN NOTE
Underwent an elective R TMA approx 3 weeks ago and was doing well until last week when she was noticed to have dehiscence of her surgical wound.   Wound was debrided by her podiatrist in the office but ultimately worsened with evidence of cellulitis.   LLE foot infection diagnosed clinically without MRI findings of osteomyelitis.   Tissue culture: 2+ MRSA, 2+ Finegoldia magna No significant leukocytosis or fever this admission. Site continues to bleed likely due to triple therapy   A1c 10.3  -L SFA stent s/p L TMA, nonhealing, s/p lysis and tibial intervention (AT PTA)  -despite adequate perfusion w/ AT runoff, patient continues to be extremely high risk for higher amputation 2/2 TMA dehiscence and extensive necrosis  S/P Balloon angioplasty 2/7 of the left popliteal and anterior tibial arteries   S/p L BKA with podiatry on 2/11  Off vancomycin per ID as source control achieved.    Plan:   Stable for discharge  Continue Plavix/statin  Current pain regimen: Tylenol 975 mg q6h, duloxetine 30mg daily, gabapentin 300 mg bid plus 600mg hs, robaxin 500 mg q6h; oxy 5/10 for moderate/severe  Wound care

## 2025-02-14 NOTE — PHYSICAL THERAPY NOTE
"                                                                                  PHYSICAL THERAPY NOTE          Patient Name: Lizzy Acuna  Today's Date: 2/14/2025 02/14/25 1510   PT Last Visit   PT Visit Date 02/14/25   Note Type   Note Type Treatment for insurance authorization   End of Consult   Patient Position at End of Consult Supine;All needs within reach;Bed/Chair alarm activated   Pain Assessment   Pain Assessment Tool 0-10   Pain Score 8   Pain Location/Orientation Orientation: Left;Location: Leg   Pain Onset/Description Onset: Ongoing   Effect of Pain on Daily Activities limits functional mobility   Patient's Stated Pain Goal No pain   Hospital Pain Intervention(s) Repositioned;Ambulation/increased activity;Emotional support;Rest   Restrictions/Precautions   Weight Bearing Precautions Per Order Yes   LLE Weight Bearing Per Order (S)  NWB  (s/p L BKA)   Braces or Orthoses Knee immobilizer   Other Precautions Chair Alarm;Bed Alarm;Fall Risk;Pain;WBS;Contact/isolation  (L BKA)   General   Chart Reviewed Yes   Response to Previous Treatment Patient with no complaints from previous session.   Family/Caregiver Present No   Cognition   Overall Cognitive Status WFL   Arousal/Participation Alert;Responsive;Cooperative   Attention Within functional limits   Orientation Level Oriented X4   Memory Within functional limits   Following Commands Follows one step commands without difficulty   Comments patient pleasant and cooperative, good insight of functional deficits and safety awareness, highly motivated to participate   Subjective   Subjective \"I want to go to rehab today\"   Bed Mobility   Supine to Sit 5  Supervision   Additional items HOB elevated;Bedrails;Increased time required;Verbal cues   Sit to Supine 5  Supervision   Additional items HOB elevated;Bedrails;Increased time required;Verbal cues   Additional Comments patinet found and left supine in bed with alarm and all needs met   Transfers   Sit to " Stand 5  Supervision   Additional items Increased time required;Verbal cues   Stand to Sit 5  Supervision   Additional items Increased time required;Verbal cues   Additional Comments x2 STS with RW   Ambulation/Elevation   Gait pattern Decreased foot clearance;Short stride;Step to  (RLE hops)   Gait Assistance 4  Minimal assist   Additional items Assist x 1;Verbal cues;Tactile cues   Assistive Device Rolling walker   Distance 40'x2   Stair Management Assistance Not tested   Ambulation/Elevation Additional Comments Patient demonstrated the ability to ambulate 40'x2 with RW, requiring intermittent verbal cues for RW management and hallway navigation to improve patient safety and reduce risk of falls. Patient required intermittent standing rest breaks due to generalized LE weakness and decreased activity tolerance. Further ambulation limited at this time due to increase in LLE pain.   Balance   Static Sitting Good   Dynamic Sitting Fair +   Static Standing Fair -   Dynamic Standing Poor +   Ambulatory Poor   Endurance Deficit   Endurance Deficit Yes   Endurance Deficit Description generalized weakness, fatigue, pain, s/p L BKA   Activity Tolerance   Activity Tolerance Patient limited by fatigue;Patient limited by pain   Medical Staff Made Aware KASSI Ball   Nurse Made Aware RN cleared and updated   Assessment   Prognosis Good   Problem List Decreased strength;Decreased endurance;Impaired balance;Decreased mobility;Pain   Assessment PT initiated treatment session in order to assist patient in achieving goals to improve transfers, gait training, and overall activity tolerance. Patient demonstrated progress toward achieving functional mobility goals as evidenced by improving activity tolerance, ambulation, and overall mobility. Patient pleasant, cooperative, and agreeable to today's treatment session. Patient received supine in bed. Patient is currently supervision bed mobility, transfers, and MinAx1 ambulation. Throughout  treatment session patient required both verbal and tactile cuing to improve safety, efficiency, and mechanics of mobility in addition to hands on assistance for all aspects of functional mobility. Additionally, pt required increased time to execute specific mobility tasks with rest breaks in between secondary to gross fatigue and weakness. Patient demonstrated the ability to ambulate 40'x2 with RLE hops and RW, requiring intermittent verbal cues for RW management and hallway navigation to improve patient safety and reduce risk of falls. Patient required intermittent standing rest breaks due to generalized LE weakness and decreased activity tolerance. Further ambulation limited at this time due to increase in LLE pain. Patient left supine in bed (per patient request) with alarm and all needs met.       Patient will benefit from continued skilled physical therapy to address gait / balance dysfunction, decreased activity tolerance, and generalized weakness. The patients AM-PAC Basic Mobility Inpatient Short From Raw Score is 16 .  Based on AM-PAC scoring and patient presentation, PT currently recommending Level I (Maximum Resource Intensity). Please also refer to the recommendation of the Physical Therapist for safe discharge planning.   Barriers to Discharge Inaccessible home environment   Goals   Patient Goals to go to rehab   STG Expiration Date 02/26/25   PT Treatment Day 3   Plan   Treatment/Interventions ADL retraining;Functional transfer training;LE strengthening/ROM;Therapeutic exercise;Endurance training;Patient/family training;Bed mobility;Gait training;Spoke to nursing;Spoke to case management;OT   Progress Progressing toward goals   PT Frequency 3-5x/wk   Discharge Recommendation   Rehab Resource Intensity Level, PT I (Maximum Resource Intensity)   Equipment Recommended Walker   Walker Package Recommended Wheeled walker   Change/add to Walker Package? No   AM-PAC Basic Mobility Inpatient   Turning in Flat  Bed Without Bedrails 3   Lying on Back to Sitting on Edge of Flat Bed Without Bedrails 3   Moving Bed to Chair 3   Standing Up From Chair Using Arms 3   Walk in Room 2   Climb 3-5 Stairs With Railing 2   Basic Mobility Inpatient Raw Score 16   Basic Mobility Standardized Score 38.32   [facility] Highest Level Of Mobility   -[facility] Goal 5: Stand one or more mins   -HLM Achieved 7: Walk 25 feet or more   Education   Education Provided Mobility training;Assistive device   Patient Demonstrates acceptance/verbal understanding   End of Consult   Patient Position at End of Consult Bedside chair;Bed/Chair alarm activated;All needs within reach     Inés Jara PT, DPT

## 2025-02-14 NOTE — ASSESSMENT & PLAN NOTE
Lab Results   Component Value Date    HGBA1C 10.3 (H) 12/13/2024       Blood Sugar Average: Last 72 hrs:  (P) 193.5625  -Resume prior home regimen of Lantus 10U hs and lispro 5U tid ac  -Carb control diet  -PCP f/u for further adjustments

## 2025-02-15 ENCOUNTER — HOSPITAL ENCOUNTER (INPATIENT)
Facility: HOSPITAL | Age: 50
LOS: 13 days | Discharge: HOME HEALTH | DRG: 862 | End: 2025-02-28
Attending: FAMILY MEDICINE | Admitting: FAMILY MEDICINE
Payer: COMMERCIAL

## 2025-02-15 VITALS
WEIGHT: 179.9 LBS | RESPIRATION RATE: 16 BRPM | HEART RATE: 94 BPM | DIASTOLIC BLOOD PRESSURE: 71 MMHG | BODY MASS INDEX: 33.96 KG/M2 | OXYGEN SATURATION: 96 % | SYSTOLIC BLOOD PRESSURE: 122 MMHG | HEIGHT: 61 IN | TEMPERATURE: 98.3 F

## 2025-02-15 DIAGNOSIS — L08.9 DIABETIC FOOT INFECTION  (HCC): ICD-10-CM

## 2025-02-15 DIAGNOSIS — Z89.512 HX OF BKA, LEFT (HCC): ICD-10-CM

## 2025-02-15 DIAGNOSIS — E11.628 DIABETIC FOOT INFECTION  (HCC): ICD-10-CM

## 2025-02-15 DIAGNOSIS — K59.00 CONSTIPATION, UNSPECIFIED CONSTIPATION TYPE: ICD-10-CM

## 2025-02-15 DIAGNOSIS — L03.116 CELLULITIS OF LEFT FOOT: Primary | ICD-10-CM

## 2025-02-15 DIAGNOSIS — I10 PRIMARY HYPERTENSION: ICD-10-CM

## 2025-02-15 DIAGNOSIS — F11.90 OPIOID USE: ICD-10-CM

## 2025-02-15 DIAGNOSIS — M62.838 MUSCLE SPASM: ICD-10-CM

## 2025-02-15 DIAGNOSIS — E11.9 DIABETES MELLITUS (HCC): ICD-10-CM

## 2025-02-15 DIAGNOSIS — Z89.512 S/P BKA (BELOW KNEE AMPUTATION) UNILATERAL, LEFT (HCC): ICD-10-CM

## 2025-02-15 DIAGNOSIS — I96 GANGRENE OF TOE OF LEFT FOOT (HCC): ICD-10-CM

## 2025-02-15 DIAGNOSIS — E11.8 DIABETES MELLITUS TYPE 2 WITH COMPLICATIONS (HCC): ICD-10-CM

## 2025-02-15 DIAGNOSIS — I25.10 CORONARY ARTERY DISEASE: ICD-10-CM

## 2025-02-15 DIAGNOSIS — L60.2 OVERGROWN TOENAILS: ICD-10-CM

## 2025-02-15 DIAGNOSIS — F41.9 ANXIETY: ICD-10-CM

## 2025-02-15 PROBLEM — F43.22 ADJUSTMENT DISORDER WITH ANXIETY: Status: ACTIVE | Noted: 2025-02-15

## 2025-02-15 PROBLEM — Z91.89 AT RISK FOR VENOUS THROMBOEMBOLISM (VTE): Status: ACTIVE | Noted: 2025-02-15

## 2025-02-15 PROBLEM — G54.7 PHANTOM LIMB SYNDROME (HCC): Status: ACTIVE | Noted: 2025-02-15

## 2025-02-15 PROBLEM — R52 ACUTE PAIN: Status: ACTIVE | Noted: 2025-02-15

## 2025-02-15 LAB
GLUCOSE SERPL-MCNC: 177 MG/DL (ref 65–140)
GLUCOSE SERPL-MCNC: 180 MG/DL (ref 65–140)
GLUCOSE SERPL-MCNC: 229 MG/DL (ref 65–140)
GLUCOSE SERPL-MCNC: 234 MG/DL (ref 65–140)
GLUCOSE SERPL-MCNC: 254 MG/DL (ref 65–140)

## 2025-02-15 PROCEDURE — 99238 HOSP IP/OBS DSCHRG MGMT 30/<: CPT | Performed by: INTERNAL MEDICINE

## 2025-02-15 PROCEDURE — 99255 IP/OBS CONSLTJ NEW/EST HI 80: CPT | Performed by: PHYSICAL MEDICINE & REHABILITATION

## 2025-02-15 PROCEDURE — 82948 REAGENT STRIP/BLOOD GLUCOSE: CPT

## 2025-02-15 PROCEDURE — NC001 PR NO CHARGE: Performed by: PHYSICAL MEDICINE & REHABILITATION

## 2025-02-15 PROCEDURE — 97530 THERAPEUTIC ACTIVITIES: CPT

## 2025-02-15 RX ORDER — CHLORTHALIDONE 25 MG/1
12.5 TABLET ORAL DAILY
Status: ON HOLD
Start: 2025-02-15

## 2025-02-15 RX ORDER — DOCUSATE SODIUM 100 MG/1
100 CAPSULE, LIQUID FILLED ORAL 2 TIMES DAILY
Status: DISCONTINUED | OUTPATIENT
Start: 2025-02-15 | End: 2025-02-19

## 2025-02-15 RX ORDER — OXYCODONE HYDROCHLORIDE 10 MG/1
10 TABLET ORAL EVERY 4 HOURS PRN
Refills: 0 | Status: DISCONTINUED | OUTPATIENT
Start: 2025-02-15 | End: 2025-02-15

## 2025-02-15 RX ORDER — HYDROMORPHONE HYDROCHLORIDE 4 MG/1
4 TABLET ORAL EVERY 4 HOURS PRN
Refills: 0 | Status: DISCONTINUED | OUTPATIENT
Start: 2025-02-15 | End: 2025-02-26

## 2025-02-15 RX ORDER — DULOXETIN HYDROCHLORIDE 30 MG/1
30 CAPSULE, DELAYED RELEASE ORAL DAILY
Status: ON HOLD
Start: 2025-02-15

## 2025-02-15 RX ORDER — CHLORTHALIDONE 25 MG/1
12.5 TABLET ORAL DAILY
Status: DISCONTINUED | OUTPATIENT
Start: 2025-02-16 | End: 2025-02-28 | Stop reason: HOSPADM

## 2025-02-15 RX ORDER — DULOXETIN HYDROCHLORIDE 30 MG/1
30 CAPSULE, DELAYED RELEASE ORAL DAILY
Status: DISCONTINUED | OUTPATIENT
Start: 2025-02-16 | End: 2025-02-26

## 2025-02-15 RX ORDER — PANTOPRAZOLE SODIUM 40 MG/1
40 TABLET, DELAYED RELEASE ORAL
Status: ON HOLD
Start: 2025-02-16

## 2025-02-15 RX ORDER — ONDANSETRON 4 MG/1
4 TABLET, ORALLY DISINTEGRATING ORAL EVERY 6 HOURS PRN
Status: DISCONTINUED | OUTPATIENT
Start: 2025-02-15 | End: 2025-02-28 | Stop reason: HOSPADM

## 2025-02-15 RX ORDER — POLYETHYLENE GLYCOL 3350 17 G/17G
17 POWDER, FOR SOLUTION ORAL DAILY
Status: ON HOLD
Start: 2025-02-15

## 2025-02-15 RX ORDER — INSULIN GLARGINE 100 [IU]/ML
10 INJECTION, SOLUTION SUBCUTANEOUS
Status: DISCONTINUED | OUTPATIENT
Start: 2025-02-15 | End: 2025-02-20

## 2025-02-15 RX ORDER — INSULIN LISPRO 100 [IU]/ML
5 INJECTION, SOLUTION INTRAVENOUS; SUBCUTANEOUS
Status: DISCONTINUED | OUTPATIENT
Start: 2025-02-15 | End: 2025-02-20

## 2025-02-15 RX ORDER — ACETAMINOPHEN 325 MG/1
975 TABLET ORAL EVERY 6 HOURS SCHEDULED
Status: DISCONTINUED | OUTPATIENT
Start: 2025-02-15 | End: 2025-02-19

## 2025-02-15 RX ORDER — LIDOCAINE 50 MG/G
1 PATCH TOPICAL DAILY PRN
Status: ON HOLD
Start: 2025-02-15

## 2025-02-15 RX ORDER — OXYCODONE HYDROCHLORIDE 5 MG/1
5 TABLET ORAL EVERY 4 HOURS PRN
Refills: 0 | Status: DISCONTINUED | OUTPATIENT
Start: 2025-02-15 | End: 2025-02-15

## 2025-02-15 RX ORDER — SENNOSIDES 8.6 MG
2 TABLET ORAL
Status: DISCONTINUED | OUTPATIENT
Start: 2025-02-15 | End: 2025-02-28 | Stop reason: HOSPADM

## 2025-02-15 RX ORDER — HYDROMORPHONE HYDROCHLORIDE 2 MG/1
2 TABLET ORAL EVERY 4 HOURS PRN
Refills: 0 | Status: DISCONTINUED | OUTPATIENT
Start: 2025-02-15 | End: 2025-02-26

## 2025-02-15 RX ORDER — LIDOCAINE 50 MG/G
1 PATCH TOPICAL DAILY PRN
Status: DISCONTINUED | OUTPATIENT
Start: 2025-02-15 | End: 2025-02-28 | Stop reason: HOSPADM

## 2025-02-15 RX ORDER — HEPARIN SODIUM 5000 [USP'U]/ML
5000 INJECTION, SOLUTION INTRAVENOUS; SUBCUTANEOUS EVERY 8 HOURS SCHEDULED
Status: DISCONTINUED | OUTPATIENT
Start: 2025-02-15 | End: 2025-02-28 | Stop reason: HOSPADM

## 2025-02-15 RX ORDER — POLYETHYLENE GLYCOL 3350 17 G/17G
17 POWDER, FOR SOLUTION ORAL DAILY PRN
Status: DISCONTINUED | OUTPATIENT
Start: 2025-02-15 | End: 2025-02-17

## 2025-02-15 RX ORDER — GABAPENTIN 300 MG/1
300 CAPSULE ORAL
Status: DISCONTINUED | OUTPATIENT
Start: 2025-02-15 | End: 2025-02-19

## 2025-02-15 RX ORDER — AMOXICILLIN 250 MG
1 CAPSULE ORAL
Status: DISCONTINUED | OUTPATIENT
Start: 2025-02-15 | End: 2025-02-15

## 2025-02-15 RX ORDER — GABAPENTIN 300 MG/1
600 CAPSULE ORAL
Status: DISCONTINUED | OUTPATIENT
Start: 2025-02-16 | End: 2025-02-19

## 2025-02-15 RX ORDER — DIAZEPAM 5 MG/1
5 TABLET ORAL EVERY 8 HOURS PRN
Status: ON HOLD
Start: 2025-02-15 | End: 2025-02-25

## 2025-02-15 RX ORDER — LIDOCAINE 50 MG/G
1 PATCH TOPICAL DAILY
Status: DISCONTINUED | OUTPATIENT
Start: 2025-02-16 | End: 2025-02-28 | Stop reason: HOSPADM

## 2025-02-15 RX ORDER — BISACODYL 10 MG
10 SUPPOSITORY, RECTAL RECTAL DAILY PRN
Status: DISCONTINUED | OUTPATIENT
Start: 2025-02-15 | End: 2025-02-28 | Stop reason: HOSPADM

## 2025-02-15 RX ORDER — OXYCODONE HYDROCHLORIDE 5 MG/1
5 TABLET ORAL EVERY 4 HOURS PRN
Status: ON HOLD
Start: 2025-02-15 | End: 2025-02-25

## 2025-02-15 RX ORDER — GABAPENTIN 300 MG/1
CAPSULE ORAL
Status: ON HOLD
Start: 2025-02-15

## 2025-02-15 RX ORDER — ASPIRIN 81 MG/1
81 TABLET ORAL DAILY
Status: DISCONTINUED | OUTPATIENT
Start: 2025-02-16 | End: 2025-02-28 | Stop reason: HOSPADM

## 2025-02-15 RX ORDER — OXYCODONE HYDROCHLORIDE 5 MG/1
2.5 TABLET ORAL EVERY 4 HOURS PRN
Status: ON HOLD
Start: 2025-02-15 | End: 2025-02-25

## 2025-02-15 RX ORDER — INSULIN LISPRO 100 [IU]/ML
1-6 INJECTION, SOLUTION INTRAVENOUS; SUBCUTANEOUS
Status: DISCONTINUED | OUTPATIENT
Start: 2025-02-15 | End: 2025-02-21

## 2025-02-15 RX ORDER — PANTOPRAZOLE SODIUM 40 MG/1
40 TABLET, DELAYED RELEASE ORAL
Status: DISCONTINUED | OUTPATIENT
Start: 2025-02-16 | End: 2025-02-28 | Stop reason: HOSPADM

## 2025-02-15 RX ORDER — DIAZEPAM 5 MG/1
5 TABLET ORAL EVERY 8 HOURS PRN
Status: DISCONTINUED | OUTPATIENT
Start: 2025-02-15 | End: 2025-02-25

## 2025-02-15 RX ORDER — LIDOCAINE 50 MG/G
1 PATCH TOPICAL DAILY
Status: DISCONTINUED | OUTPATIENT
Start: 2025-02-16 | End: 2025-02-15

## 2025-02-15 RX ORDER — POLYETHYLENE GLYCOL 3350 17 G/17G
17 POWDER, FOR SOLUTION ORAL DAILY
Status: DISCONTINUED | OUTPATIENT
Start: 2025-02-16 | End: 2025-02-28 | Stop reason: HOSPADM

## 2025-02-15 RX ORDER — ATORVASTATIN CALCIUM 80 MG/1
80 TABLET, FILM COATED ORAL
Status: DISCONTINUED | OUTPATIENT
Start: 2025-02-15 | End: 2025-02-28 | Stop reason: HOSPADM

## 2025-02-15 RX ORDER — NITROGLYCERIN 0.4 MG/1
0.4 TABLET SUBLINGUAL
Status: DISCONTINUED | OUTPATIENT
Start: 2025-02-15 | End: 2025-02-28 | Stop reason: HOSPADM

## 2025-02-15 RX ORDER — METHOCARBAMOL 500 MG/1
500 TABLET, FILM COATED ORAL EVERY 6 HOURS SCHEDULED
Status: DISCONTINUED | OUTPATIENT
Start: 2025-02-15 | End: 2025-02-18

## 2025-02-15 RX ADMIN — INSULIN LISPRO 5 UNITS: 100 INJECTION, SOLUTION INTRAVENOUS; SUBCUTANEOUS at 08:46

## 2025-02-15 RX ADMIN — DIAZEPAM 5 MG: 5 TABLET ORAL at 22:04

## 2025-02-15 RX ADMIN — OXYCODONE HYDROCHLORIDE 10 MG: 10 TABLET ORAL at 04:51

## 2025-02-15 RX ADMIN — INSULIN LISPRO 1 UNITS: 100 INJECTION, SOLUTION INTRAVENOUS; SUBCUTANEOUS at 17:13

## 2025-02-15 RX ADMIN — PANTOPRAZOLE SODIUM 40 MG: 40 TABLET, DELAYED RELEASE ORAL at 05:38

## 2025-02-15 RX ADMIN — DULOXETINE 30 MG: 30 CAPSULE, DELAYED RELEASE ORAL at 08:44

## 2025-02-15 RX ADMIN — METHOCARBAMOL 500 MG: 500 TABLET ORAL at 23:53

## 2025-02-15 RX ADMIN — GABAPENTIN 300 MG: 300 CAPSULE ORAL at 14:24

## 2025-02-15 RX ADMIN — HEPARIN SODIUM 5000 UNITS: 5000 INJECTION INTRAVENOUS; SUBCUTANEOUS at 14:24

## 2025-02-15 RX ADMIN — CHLORTHALIDONE 12.5 MG: 25 TABLET ORAL at 08:47

## 2025-02-15 RX ADMIN — OXYCODONE HYDROCHLORIDE 10 MG: 10 TABLET ORAL at 14:24

## 2025-02-15 RX ADMIN — OXYCODONE HYDROCHLORIDE 10 MG: 10 TABLET ORAL at 08:59

## 2025-02-15 RX ADMIN — GABAPENTIN 300 MG: 300 CAPSULE ORAL at 08:44

## 2025-02-15 RX ADMIN — HYDROMORPHONE HYDROCHLORIDE 4 MG: 4 TABLET ORAL at 17:08

## 2025-02-15 RX ADMIN — ACETAMINOPHEN 975 MG: 325 TABLET ORAL at 11:59

## 2025-02-15 RX ADMIN — ASPIRIN 81 MG: 81 TABLET, COATED ORAL at 08:44

## 2025-02-15 RX ADMIN — LIDOCAINE 5% 1 PATCH: 700 PATCH TOPICAL at 17:08

## 2025-02-15 RX ADMIN — INSULIN LISPRO 5 UNITS: 100 INJECTION, SOLUTION INTRAVENOUS; SUBCUTANEOUS at 12:25

## 2025-02-15 RX ADMIN — METHOCARBAMOL 500 MG: 500 TABLET ORAL at 05:38

## 2025-02-15 RX ADMIN — SENNOSIDES 17.2 MG: 8.6 TABLET, FILM COATED ORAL at 21:17

## 2025-02-15 RX ADMIN — HEPARIN SODIUM 5000 UNITS: 5000 INJECTION INTRAVENOUS; SUBCUTANEOUS at 05:38

## 2025-02-15 RX ADMIN — METHOCARBAMOL 500 MG: 500 TABLET ORAL at 17:08

## 2025-02-15 RX ADMIN — HYDROMORPHONE HYDROCHLORIDE 4 MG: 4 TABLET ORAL at 21:17

## 2025-02-15 RX ADMIN — DOCUSATE SODIUM 100 MG: 100 CAPSULE, LIQUID FILLED ORAL at 17:08

## 2025-02-15 RX ADMIN — ATORVASTATIN CALCIUM 80 MG: 80 TABLET, FILM COATED ORAL at 17:08

## 2025-02-15 RX ADMIN — INSULIN LISPRO 5 UNITS: 100 INJECTION, SOLUTION INTRAVENOUS; SUBCUTANEOUS at 17:13

## 2025-02-15 RX ADMIN — INSULIN LISPRO 1 UNITS: 100 INJECTION, SOLUTION INTRAVENOUS; SUBCUTANEOUS at 08:46

## 2025-02-15 RX ADMIN — DOCUSATE SODIUM 100 MG: 100 CAPSULE, LIQUID FILLED ORAL at 14:24

## 2025-02-15 RX ADMIN — ACETAMINOPHEN 975 MG: 325 TABLET, FILM COATED ORAL at 05:38

## 2025-02-15 RX ADMIN — HEPARIN SODIUM 5000 UNITS: 5000 INJECTION INTRAVENOUS; SUBCUTANEOUS at 21:17

## 2025-02-15 RX ADMIN — METHOCARBAMOL 500 MG: 500 TABLET ORAL at 11:59

## 2025-02-15 RX ADMIN — INSULIN LISPRO 3 UNITS: 100 INJECTION, SOLUTION INTRAVENOUS; SUBCUTANEOUS at 12:26

## 2025-02-15 RX ADMIN — ACETAMINOPHEN 975 MG: 325 TABLET ORAL at 17:08

## 2025-02-15 RX ADMIN — ACETAMINOPHEN 975 MG: 325 TABLET ORAL at 23:53

## 2025-02-15 RX ADMIN — INSULIN GLARGINE 10 UNITS: 100 INJECTION, SOLUTION SUBCUTANEOUS at 21:17

## 2025-02-15 NOTE — ASSESSMENT & PLAN NOTE
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  2/11 - L BKA with Dr. Arauz  Contracture prevention (has knee immobilizer)   - Vascular did not comment on limb protector stating \"ace is fine\"   - Quad strengthening  Edema/shaping management    - ACE wrap ATC   - Shrinkers when cleared by vascular   Incisional Care/education   - Mom may have to perform  Phantom limb sensation (see that entry)  PT/OT 3-5 hours/day, 5-7 days/week   Pre-prosthetic f/u to be arranged with PMR   F/U with Vascular for 4 week POV (~3/11)  "

## 2025-02-15 NOTE — H&P
"Amanda Acuna#  :1975 F  MRN:[id number]    CSN:[id number]  Adm Date: 2/15/2025 1126  11:26 AM   ATT PHY: Oscar Salas Md  [facility]         Chief Complaint: Cellulitis of the left foot complicated by critical limb threatening ischemia of LLE with non healing TMA wound s/p left BKA       History of Presenting Illness: Lizzy Acuna is a(n) 49 y.o. year old female who is admitted to [facility].  Patient originally presented to [facility] ED on  due to worsening left foot pain/wound.  She is s/p L TMA 1/3/25.  Patient met sepsis criteria and was started on broad spectrum antibiotics and IVF.  MRI showed fluid collection within the dorsal soft tissues.  Duplex showing new occlusions in the SFA  and stent and stenoses of the SFA and TPT.  Patient transferred to [place].  Podiatry and vascular surgery consulted.  Patient underwent L SFA thrombectomy , placement of lysis catheter , lysis recheck and angioplasty  and 2/3, repeat arteriogram  showing recurrent pop occlusion, arteriogram with balloon angioplasty , L BKA .  ID following for antibiotic recommendations.  APS consulted for pain regimen. Hospitalization further complicated by acute blood loss anemia requiring multiple transfusions, hyponatremia, hypertension requiring new medication chlorthalidon .  Now stable.  PT OT consulted and recommended patient for inpatient acute rehab.     Patient examined at bedside.  Patient reporting 8/10 pain.  Last BM yesterday although small and hard.  Patient denies chest pain, shortness of breath, dizziness, HA.     Recheck labs Monday AM.     Allergies   Allergen Reactions    Codeine Other (See Comments)     Aggression-and nasty--\"took a cough syrup with codeine as a child\"       Current Facility-Administered Medications on File Prior to Encounter   Medication Dose Route Frequency Provider Last Rate Last Admin "    [DISCONTINUED] acetaminophen (TYLENOL) tablet 975 mg  975 mg Oral Q6H [facility] Mariam Heart MD   975 mg at 02/15/25 0538    [DISCONTINUED] aspirin (ECOTRIN LOW STRENGTH) EC tablet 81 mg  81 mg Oral Daily Mariam Heart MD   81 mg at 02/15/25 0844    [DISCONTINUED] atorvastatin (LIPITOR) tablet 80 mg  80 mg Oral Daily With Dinner Mariam Heart MD   80 mg at 02/14/25 1600    [DISCONTINUED] chlorthalidone tablet 12.5 mg  12.5 mg Oral Daily Mariam Heart MD   12.5 mg at 02/15/25 0847    [DISCONTINUED] diazepam (VALIUM) tablet 5 mg  5 mg Oral Q8H PRN Mariam Heart MD   5 mg at 02/14/25 2304    [DISCONTINUED] DULoxetine (CYMBALTA) delayed release capsule 30 mg  30 mg Oral Daily Mariam eHart MD   30 mg at 02/15/25 0844    [DISCONTINUED] gabapentin (NEURONTIN) capsule 300 mg  300 mg Oral BID (AM & Afternoon) Mariam Heart MD   300 mg at 02/15/25 0844    [DISCONTINUED] gabapentin (NEURONTIN) capsule 600 mg  600 mg Oral HS Mariam Heart MD   600 mg at 02/14/25 2229    [DISCONTINUED] heparin (porcine) subcutaneous injection 5,000 Units  5,000 Units Subcutaneous Q8H [facility] Mariam Heart MD   5,000 Units at 02/15/25 0538    [DISCONTINUED] insulin glargine (LANTUS) subcutaneous injection 10 Units 0.1 mL  10 Units Subcutaneous HS Sharron Stoddard MD   10 Units at 02/14/25 2229    [DISCONTINUED] insulin lispro (HumALOG/ADMELOG) 100 units/mL subcutaneous injection 1-6 Units  1-6 Units Subcutaneous TID With Meals Sharron Stoddard MD   1 Units at 02/15/25 0846    [DISCONTINUED] insulin lispro (HumALOG/ADMELOG) 100 units/mL subcutaneous injection 5 Units  5 Units Subcutaneous TID With Meals Sharron Stoddard MD   5 Units at 02/15/25 0846    [DISCONTINUED] lidocaine (LIDODERM) 5 % patch 1 patch  1 patch Topical Daily Aylin Brown MD   1 patch at 02/12/25 0908    [DISCONTINUED] methocarbamol (ROBAXIN) tablet 500 mg  500 mg Oral Q6H [facility] Mariam Heart MD   500 mg at 02/15/25 0538    [DISCONTINUED] metoclopramide (REGLAN) injection 10 mg  10 mg  Intravenous Q6H PRN Mariam Heart MD   10 mg at 02/11/25 1452    [DISCONTINUED] naloxone (NARCAN) injection 0.1 mg  0.1 mg Intravenous Q1MIN PRN NAOMY Lemus        [DISCONTINUED] nitroglycerin (NITROSTAT) SL tablet 0.4 mg  0.4 mg Sublingual Q5 Min PRN Fabian Milton MD   0.4 mg at 02/11/25 1853    [DISCONTINUED] ondansetron (ZOFRAN) injection 4 mg  4 mg Intravenous Q6H PRN Mariam Heart MD   4 mg at 02/05/25 1459    [DISCONTINUED] oxyCODONE (ROXICODONE) immediate release tablet 10 mg  10 mg Oral Q4H PRN Aylin Brown MD   10 mg at 02/15/25 0859    [DISCONTINUED] oxyCODONE (ROXICODONE) IR tablet 5 mg  5 mg Oral Q4H PRN Aylni Brown MD        [DISCONTINUED] pantoprazole (PROTONIX) EC tablet 40 mg  40 mg Oral Early Morning Mariam Heart MD   40 mg at 02/15/25 0538    [DISCONTINUED] polyethylene glycol (MIRALAX) packet 17 g  17 g Oral Daily Mariam Heart MD   17 g at 02/14/25 0809    [DISCONTINUED] senna-docusate sodium (SENOKOT S) 8.6-50 mg per tablet 1 tablet  1 tablet Oral HS Mariam Heart MD   1 tablet at 02/14/25 2229    [DISCONTINUED] trimethobenzamide (TIGAN) IM injection 200 mg  200 mg Intramuscular Q6H PRN Mariam Heart MD         Current Outpatient Medications on File Prior to Encounter   Medication Sig Dispense Refill    acetaminophen (TYLENOL) 325 mg tablet Take 3 tablets (975 mg total) by mouth every 6 (six) hours 360 tablet 0    Alcohol Swabs 70 % PADS May substitute brand based on insurance coverage. Check glucose TID. 100 each 0    aspirin (ECOTRIN LOW STRENGTH) 81 mg EC tablet Take 1 tablet (81 mg total) by mouth daily 30 tablet 0    atorvastatin (LIPITOR) 80 mg tablet Take 1 tablet (80 mg total) by mouth daily with dinner 30 tablet 0    Blood Glucose Monitoring Suppl (OneTouch Verio Reflect) w/Device KIT May substitute brand based on insurance coverage. Check glucose TID. 1 kit 0    chlorthalidone 25 mg tablet Take 0.5 tablets (12.5 mg total) by mouth daily      diazepam (VALIUM) 5 mg  tablet Take 1 tablet (5 mg total) by mouth every 8 (eight) hours as needed for anxiety or muscle spasms for up to 10 days      diphenhydrAMINE (BENADRYL) 25 mg capsule Take 25 mg by mouth every 6 (six) hours as needed for allergies      DULoxetine (CYMBALTA) 30 mg delayed release capsule Take 1 capsule (30 mg total) by mouth daily      gabapentin (Neurontin) 300 mg capsule Take 1 capsule (300 mg total) by mouth 2 (two) times a day AND 2 capsules (600 mg total) daily at bedtime. Take 1 tablet in the morning, 1 in the afternoon and 2 tablets at night..      glucose blood (OneTouch Verio) test strip May substitute brand based on insurance coverage. Check glucose TID. 100 each 0    HYDROmorphone (DILAUDID) 4 mg tablet       ibuprofen (MOTRIN) 600 mg tablet Take by mouth every 6 (six) hours as needed for mild pain      insulin aspart (NovoLOG FlexPen) 100 UNIT/ML injection pen Inject 5 Units under the skin 3 (three) times a day with meals 6 mL 0    Insulin Glargine Solostar (Lantus SoloStar) 100 UNIT/ML SOPN Inject 0.1 mL (10 Units total) under the skin daily at bedtime 3 mL 0    Insulin Pen Needle (BD Pen Needle Ana 2nd Gen) 32G X 4 MM MISC For use with insulin pen. Pharmacy may dispense brand covered by insurance. 100 each 0    Insulin Pen Needle (BD Pen Needle Ana 2nd Gen) 32G X 4 MM MISC For use with insulin pen. Pharmacy may dispense brand covered by insurance. 100 each 0    lidocaine (LIDODERM) 5 % Apply 1 patch topically over 12 hours daily as needed (pain) Remove & Discard patch within 12 hours or as directed by MD      methocarbamol (ROBAXIN) 500 mg tablet Take 1 tablet (500 mg total) by mouth every 6 (six) hours 30 tablet 0    naloxone (NARCAN) 4 mg/0.1 mL nasal spray Administer 1 spray into a nostril. If no response after 2-3 minutes, give another dose in the other nostril using a new spray. 1 each 0    OneTouch Delica Lancets 33G MISC May substitute brand based on insurance coverage. Check glucose TID. 100  each 0    oxyCODONE (ROXICODONE) 5 immediate release tablet Take 1 tablet (5 mg total) by mouth every 4 (four) hours as needed for severe pain for up to 10 days Max Daily Amount: 30 mg      oxyCODONE (ROXICODONE) 5 immediate release tablet Take 0.5 tablets (2.5 mg total) by mouth every 4 (four) hours as needed for severe pain or moderate pain for up to 10 days Max Daily Amount: 30 mg      [START ON 2/16/2025] pantoprazole (PROTONIX) 40 mg tablet Take 1 tablet (40 mg total) by mouth daily in the early morning      polyethylene glycol (MIRALAX) 17 g packet Take 17 g by mouth daily      senna-docusate sodium (SENOKOT S) 8.6-50 mg per tablet Take 1 tablet by mouth daily at bedtime 30 tablet 0    [DISCONTINUED] gabapentin (Neurontin) 300 mg capsule Take 1 tablet in the morning, 1 in the afternoon and 2 tablets at night. 120 capsule 2    [DISCONTINUED] rivaroxaban (Xarelto Starter Pack) 15 & 20 MG starter pack As directed: 15mg po BID (Day #0-21) then 20mg po daily (Day #22- forward) 1 each 0    [DISCONTINUED] rivaroxaban (Xarelto) 20 mg tablet Take 1 tablet (20 mg total) by mouth daily with breakfast Start after completion of starter pack 30 tablet 3       Active Ambulatory Problems     Diagnosis Date Noted    Obesity (BMI 30.0-34.9) 02/09/2017    Tobacco abuse 02/15/2017    Chronic low back pain 11/30/2016    Chronic sciatica 06/19/2018    Chronic, continuous use of opioids 11/30/2016    Diabetes mellitus type 2 with complications (HCC) 11/05/2018    Hyponatremia 11/05/2018    Cellulitis of left foot complicated by Critical limb threatening ischemia of the left lower extremity with a nonhealing transmetatarsal amputation wound 12/13/2024    Gangrene of left foot (HCC) 12/13/2024    GERD (gastroesophageal reflux disease)     Dehiscence of operative wound, initial encounter 01/23/2025    Hypersensitivity, initial encounter 01/23/2025    Proteinuria 04/18/2011    Diabetic foot infection  (HCC) 01/28/2025    PAD (peripheral  artery disease) ([facility]) 2025    Blood loss anemia 2025    Obesity (BMI 30-39.9) 2025    Primary hypertension 2025    Thyroid nodule 2025    Adrenal nodule ([facility]) 2025     Resolved Ambulatory Problems     Diagnosis Date Noted    31 weeks gestation of pregnancy 2017    Poor fetal growth affecting management of mother in third trimester 2017    Severe pre-eclampsia in third trimester 2017    S/P  section 02/15/2017    Advanced maternal age in multigravida, second trimester 2016    Chronic hypertension with superimposed preeclampsia 2017    Intrauterine growth restriction affecting antepartum care of mother in third trimester 2017    Low back pain 2018    Maternal morbid obesity in second trimester, antepartum ([facility]) 2016    Abscess of scalp 2018    Sepsis due to cellulitis ([facility]) 2018    Elevated d-dimer 2018    Type II diabetes mellitus ([facility]) 2025     Past Medical History:   Diagnosis Date    Back pain     Bone spur     Depression     Diabetes mellitus ([facility])     Elevated BP without diagnosis of hypertension     Foot injury     Full dentures     Gestational diabetes     Herniated cervical disc     Herniated lumbar intervertebral disc     History of pneumonia     Hx of degenerative disc disease     Hyperlipidemia     Obesity     Pre-eclampsia     Prior pregnancy with fetal demise     Toe pain        Past Surgical History:   Procedure Laterality Date    ARTERIOGRAM Left 2025    Procedure: LEFT lower extremity arteriogram w/ popiteal and anterior tibial artery angioplasty;  Surgeon: Ottoniel Victoria MD;  Location: [facility];  Service: Vascular    BACK SURGERY       SECTION      INCISION AND DRAINAGE OF WOUND Left 2025    Procedure: INCISION AND DRAINAGE (I&D) EXTREMITY;  Surgeon: Leopoldo Ritchie DPM;  Location: [facility];  Service: Podiatry    IR LOWER EXTREMITY ANGIOGRAM   2024    IR LOWER EXTREMITY ANGIOGRAM  2025    IR LOWER EXTREMITY ANGIOGRAM  2025    IR PICC PLACEMENT DOUBLE LUMEN  2025    IR TPA LYSIS CHECK  2025    IR TPA LYSIS CHECK  2025    LAMINECTOMY      with disc removal, last assessed 16    LEG AMPUTATION THROUGH LOWER TIBIA AND FIBULA Left 2025    Procedure: Left BKA;  Surgeon: Shane Arauz DO;  Location: [facility];  Service: Vascular    OTHER SURGICAL HISTORY      Right ovary removal 2005 per pt recall at [facility] facilty    CA AMPUTATION FOOT TRANSMETARSAL Left 1/3/2025    Procedure: AMPUTATION TRANSMETATARSAL (TMA);  Surgeon: Leopoldo Ritchie DPM;  Location: [facility];  Service: Podiatry    CA  DELIVERY ONLY N/A 02/15/2017    Procedure:  SECTION ();  Surgeon: Tito Umaña MD;  Location: [facility];  Service: Obstetrics    CA LIG/TRNSXJ FALOPIAN TUBE  DEL/ABDML SURG Left 02/15/2017    Procedure: LIGATION/COAGULATION TUBAL;  Surgeon: Tito Umaña MD;  Location: [facility] LD;  Service: Obstetrics    VASCULAR SURGERY  2024    WISDOM TOOTH EXTRACTION         Social History:   Social History     Socioeconomic History    Marital status: Unknown     Spouse name: Not on file    Number of children: Not on file    Years of education: Not on file    Highest education level: Not on file   Occupational History    Occupation: unemployed   Tobacco Use    Smoking status: Former     Current packs/day: 0.50     Types: Cigarettes    Smokeless tobacco: Never    Tobacco comments:     Per pt last cigarette 24--quit cold turkey   Vaping Use    Vaping status: Never Used   Substance and Sexual Activity    Alcohol use: Yes     Comment: 1-2 drinks a year    Drug use: Not Currently    Sexual activity: Not on file     Comment: defer   Other Topics Concern    Not on file   Social History Narrative    Lack of access to transportation     Social Drivers of Health     Financial Resource Strain: Not  on file   Food Insecurity: No Food Insecurity (2/15/2025)    Nursing - Inadequate Food Risk Classification     Worried About Running Out of Food in the Last Year: Not on file     Ran Out of Food in the Last Year: Not on file     Ran Out of Food in the Last Year: Never true   Transportation Needs: No Transportation Needs (2/15/2025)    PRAPARE - Transportation     Lack of Transportation (Medical): No     Lack of Transportation (Non-Medical): No   Physical Activity: Not on file   Stress: Not on file   Social Connections: Not on file   Intimate Partner Violence: Unknown (2/15/2025)    Nursing IPS     Feels Physically and Emotionally Safe: Not on file     Physically Hurt by Someone: Not on file     Humiliated or Emotionally Abused by Someone: Not on file     Physically Hurt by Someone: No     Hurt or Threatened by Someone: No   Housing Stability: Unknown (2/15/2025)    Nursing: Inadequate Housing Risk Classification     Has Housing: Not on file     Worried About Losing Housing: Not on file     Unable to Get Utilities: Not on file     Unable to Pay for Housing in the Last Year: No     Has Housin       Family History:   Family History   Problem Relation Age of Onset    Diabetes Mother     COPD Mother     Heart disease Mother     Stroke Father     Heart disease Father        Review of Systems   Constitutional:  Negative for fatigue and fever.   HENT: Negative.     Respiratory:  Negative for shortness of breath.    Cardiovascular:  Negative for chest pain, palpitations and leg swelling.   Gastrointestinal:  Positive for constipation. Negative for abdominal pain, diarrhea and nausea.   Genitourinary: Negative.    Musculoskeletal:  Positive for arthralgias and gait problem.   Neurological:  Positive for weakness. Negative for dizziness and light-headedness.   Psychiatric/Behavioral: Negative.         Physical Exam   Vitals: Blood pressure 129/61, pulse 94, temperature 97.6 °F (36.4 °C), temperature source Temporal, resp.  "rate 17, height 5' 1\" (1.549 m), weight 82.3 kg (181 lb 7 oz), last menstrual period 12/01/2023, SpO2 96%, not currently breastfeeding.,Body mass index is 34.28 kg/m².  Constitutional: Awake, alert, in no acute distress. L BKA.   Head: Normocephalic and atraumatic.   Mouth/Throat: Oropharynx is clear and moist.    Eyes: Conjunctivae and EOM are normal.   Neck: Neck supple. No thyromegaly present.   Cardiovascular: Normal rate, regular rhythm and normal heart sounds.    Pulmonary/Chest: Effort normal and breath sounds normal.   Abdominal: Soft. Bowel sounds are normal. There is no tenderness. There is no rebound and no guarding.   Neurological: No focal deficits.   Musculoskeletal:  Nontender spine.  Skin: Skin is warm and dry. No edema.     Assessment     Lizzy Acuna is a(n) 49 y.o. 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    Cardiac w/ HTN, HLD, CAD.   Continue Chlorthalidone 12.5 mg daily, atorvastatin 80 mg daily, and ASA 81 mg daily.   Type 2 DM.  Hgb A1c 10.3 on 12/13/24.   Continue home regimen of Lantus 10 units HS, Humalog 5u TID w/ meals.  SSI w/ POCT AC HS.  Carb controlled diet.   GERD.  Continue Protonix 40 mg daily.   Depression.  Not on home meds.  APS started pt on Cymbalta 30 mg daily for phantom limb pain.  Also on Valium 5 mg q8h as needed for anxiety/muscle spasms.   ABLA.  Hgb 8.6 on 2/12.  S/p multiple transfusions on acute care.  Cont to monitor.  Transfuse Hgb < 7.   Hyponatremia.  Na 133 on 2/12.  Likely SIADH in the setting of acute infection.  Cont to monitor.   Adrenal nodule.  Stable since 2018.  Recommend f/u outpt.   Thyroid nodule.  Incidental finding.  Recommend outpt thyroid US.  Follow up with PCP/endo.   Pain and bowel regimen.  As per physiatry.   Cellulitis of L foot s/p BKA.  Continue ASA and statin.  Patient receiving intensive PT OT as per physiatry.     Prognosis: " Fair.    Discharge Plan: In progress.    Advanced Directives: I have discussed in detail with the patient the advanced directives. The patient does not have an appointed POA or living will. Emergency contact is mother, Paige Johansen, [phone]. When discussing cardiac and pulmonary resuscitation efforts with the patient, the patient wishes to be  FULL CODE.    I have spent more than 50 minutes gathering data, doing physical examination, and discussing the advanced directives, which was witnessed by caring staff.    The patient was discussed with Dr. aSlas and he is in agreement with the above note.

## 2025-02-15 NOTE — TREATMENT PLAN
Individualized Plan of Care - [facility] [place]  Lizzy Acuna 49 y.o. female MRN: [id number]  Unit/Bed#: -01 Encounter: [id number]     PATIENT INFORMATION  ADMISSION DATE: 2/15/2025 11:26 AM SUYAP[facility] CATEGORY:Amputation:  05.4  Unilateral Lower Limb Below the Knee   ADMISSION DIAGNOSIS: Hx of left BKA (HCC) [Z89.512]  EXPECTED LOS: 2 weeks     MEDICAL/FUNCTIONAL PROGNOSIS  Based on my assessment of the patient's medical conditions and current functional status, the prognosis for attaining medical and functional goals or the IRF stay is:  Good    Medical Goals: Patient will be able to manage medical conditions and comorbid conditions with medications and follow up upon completion of rehab program.    1. Adequate pain control   - Wean down narcotic medication as appropriate   - Monitor for worsening phantom limb pain and address appropriately.   2. Prevention of VTE  3. Adequate secretion management, and monitoring of respiratory status  4. Bowel/bladder management   - Regularity and continence   5. Maintain appropriate nutrition and hydration for healing and hemodynamic stability  6. Emotional adaptation to impairment   - Has some anxiety; provide non-pharmacologic strategies   - Minimize use of overly sedating meds  7. Monitor for polypharmacy  8. Fall prevention  9. Patient and family caregiver training/education   - Contracture prevention   - Pre-prosthetic care   - Incision care  10. Skin protection and prevention of pressure injury  11. Appropriate management of new and chronic comorbidites and sequelae of her acute hospitalization.    - HTN, ABLA, Pseudohyponatremia, T2DM (poorly controlled)   - Sequelae of BKA as above   12. Prevention of delirium  13. Arrange appropriate outpatient f/u  14. Incisional care to prevent infection/dehiscence    ANTICIPATED DISCHARGE DISPOSITION AND SERVICES  COMMUNITY SETTING: Home - independent/modified independent    ANTICIPATED FOLLOW-UP SERVICE:   Home Health  Services: PT, OT, and Nursing vs. Just PT/OT      DISCIPLINE SPECIFIC PLANS:  Required Disciplines & Services: Rehabillitation Nursing    REQUIRED THERAPY:  Therapy Hours per Day Days per Week   Physical Therapy 1-1.5 5-6   Occupational Therapy 1-1.5 5-6   NOTE: Additional therapy time(s) or changes to allocation of therapies as appropriate to meet patient needs and to achieve functional goals.      Patient will participate in above therapy regimen consisting of PT and OT due to the following medical procedure/condition:Amputation:  05.4  Unilateral Lower Limb Below the Knee    ANTICIPATED FUNCTIONAL OUTCOMES:  ADL:  mod Ind -Brock   Bladder/Bowel:  mod Ind   Transfers:  mod Ind   Locomotion:  mod Ind (primary wheelchair, some hopping)   Cognitive:  mod INd     DISCHARGE PLANNING NEEDS  Equipment needs: Discharge needs to be reviewed with team  She has a commode, shower chair, rollator, and rolling walker  Friend is giving a wheelchair, but should be assessed while at rehab for appropriateness/fit.   Possibly a limb protector.     REHAB ANTICIPATED PARTICIPATION RESTRICTIONS:  JJ SHEEHAN

## 2025-02-15 NOTE — ASSESSMENT & PLAN NOTE
Required multiple transfusions while inpatient.  Hemodynamically stable currently.  2/12 Hgb 8.6 --> 2/17 hgb 9.0  Transfuse as appropriate.

## 2025-02-15 NOTE — CONSULTS
PHYSICAL MEDICINE AND REHABILITATION [facility] REHAB CONSULT  Lizzy cAuna 49 y.o. female MRN: [id number]  Unit/Bed#: [facility] 218-01 Encounter: [id number]     Rehab Diagnosis: Impairment of mobility, safety and Activities of Daily Living (ADLs) due to Amputation:  05.4  Unilateral Lower Limb Below the Knee  Etiologic Diagnosis: L BKA    History of Present Illness:   Lizzy Acuna is a 49 y.o. female with medical history of T2DM with neuropathy, depression, HLD, obesity, PAD s/p LLE SFA balloon angioplasty and stenting in 12/2024 who presented to the [facility] 1/28 with L foot pain for 5 days. She had a L TMA on 1/3 by podiatry, and had developed increased pain, redness, and discharge. She was seen at her podiatrist's office who sent her to the ER for IV abx and further work-up. In the hospital met sepsis criteria. MRI showed fluid collection within her dorsal soft tissue. Vascular consulted after LEADs showed an occluded L SFA stent. They recommended transfer to [place] and starting heparin gtt. Dr. Ritchie performed I&D of her left foot on 1/29 and on 1/30 she was transferred to [place]. IR was consulted, and Dr. Davidson performed catheter placement and initiated thrombolysis with TPA on 1/31. PICC was also placed. On 2/1, residual thrombus along the L SFA was treated with 4mm balloon angioplasty, with no flow noted within L SFA following angioplasty. Lysis was restarted. C/B ABLA with 2/2 Hgb 6.2 - given 2 units pRBC. Her surgical cultures grew MRSA and Finegoldia. Abx were narrowed to vancomycin by ID. APS was consulted and started dilaudid PCA and IV valium. 2/2 lysis check with patent l SFA, popliteal, tibioperoneal trunk and TARA. Mild residual stenoses along L SFA and popliteal artery were treated with 4mm balloon angioplasty and stenosis along TARA was treated with 2.5 and 3mm balloon angioplasty. L PT and peroneal arteries were chronically occluded. TPA was discontinued. She was  transferred out of the ICU on 2/3. 2/4 required 1 unit pRBC for ABLA. On 2/6, she had repeat arteriogram with Dr. Victoria with finding of recurrent pop occlusion, AT recannulization with distal pop, TPT, and AT POBA an serration angioplasty. Despite this, with compromised blood flow to posterior aspect and plantar flap of foot. Still concern that perfusion would not be sufficient to salvage foot. She was recommended for BKA. On 2/11, Dr. Arauz performed L BKA. She had L adducotr and popliteal blocks. Post-op with acute bilateral shoulder pain, diaphoresis, tachycardic to 120 and hypertensive to 180/103. EKG showed LAD and TWI in V1. ACS r/o initiated. CTA dissection protocol (negative for dissection). This resolved - no acute findings. Abx were discontinued.     Subjective:  Overall doing well. She notes being very squeamish about being able to take care of her residual limb, but feels that as long as the incision looks like it's healing ok, and isn't draining too much, she may be able to try. In the past, her mother did her wound care, and was very helpful. She is nervous about developing a knee flexion contracture, but notes that the immobilizer doesn't fit her well. She has been doing knee extension exercises in bed. She denies any new CP, SOB, fevers, chills, N/V, abdominal pain. No further back or scapular pain. No new cough. She is a bit constipated which is not typical for her, last BM recorded was 2/12, she thinks she may have had one yesterday, but felt to be insufficient. She denies any issues with bladder function. Her pain overall is controlled, but she does have some phantom sensation - although not painful - just feels like the foot is there. Her main discomfort is knee flexion tightness/stiffness. She states she used to bump down her stairs and then hop up them using the railing. She has a rollator, rolling walker, shower chair, commode at home. She has a friend she says who is giving her a  "wheelchair. She is very eager to transfer to rehab today and be closer to her kids for visits.     Review of Systems: A 10-point review of systems was performed. Negative except as listed above.    Plan:     * S/P BKA (below knee amputation) unilateral, left (HCC)  Assessment & Plan  2/2 Infection after TMA in setting of T2DM/Neuropathy/PAD with acute ischemia.  2/11 - L BKA with Dr. Arauz  Contracture prevention (has knee immobilizer)   - Vascular did not comment on limb protector stating \"ace is fine\"   - Quad strengthening  Edema/shaping management    - ACE wrap ATC   - Shrinkers when cleared by vascular   Incisional Care/education   - Mom may have to perform  Phantom limb sensation (see that entry)  PT/OT 3-5 hours/day, 5-7 days/week   Pre-prosthetic f/u to be arranged with PMR   F/U with Vascular for 4 week POV (~3/11)    Adjustment disorder with anxiety  Assessment & Plan  Of note started by APS on cymbalta for phantom pain  Has valium ordered 5mg Q8hr PRN for anxiety but also muscle spasm   - Would try to wean down/off.  Emphasize non-pharmacologic strategies  Monitor and adjust as appropriate.     Acute pain  Assessment & Plan   Tylenol scheduled (monitor LFTs)  Cymbalta 30mg daily  Gabapentin 300-300-600  Robaxin 500mg Q6hr  Valium PRN muscle spasms  Oxycodone 5-10mg Q4hr PRN.  Monitor and adjust as appropriate.     Constipation  Assessment & Plan  Last recorded BM 2/12.  Docusate BID, senna 2 tabs at night, and miralax daily.   Adding PRN suppository and lactulose.    Phantom limb syndrome (HCC)  Assessment & Plan  Desensitization techniques with therapy  On cymbalta and gabapentin  Monitor and adjust as appropriate  Will need f/u with PMR after discharge.     At risk for venous thromboembolism (VTE)  Assessment & Plan  HSQ, SCDs.   Will not need to go home on HSQ    Adrenal nodule (HCC)  Assessment & Plan  Noted incidentally  14mm R adrenal nodule stable since 2018 - in keeping with benign " adenoma  Biochemical evaluation suggested for adrenal adenomas > 1cm to rule out functioning adenoma  Recommend outpatient f/u with PCP for additional work-up    Thyroid nodule  Assessment & Plan  Noted incidentally  Enlarged multinodular thyroid gland  Will check TSH with next set of labs.  Outpatient f/u with non-emergent ultrasound     Primary hypertension  Assessment & Plan  Home: None  Here: Chlorthalidone 12.5mg daily  Monitor and adjust as appropriate.    Blood loss anemia  Assessment & Plan  2/12 Hgb 8.6  Required multiple transfusions while inpatient.  Hemodynamically stable currently.  Recheck on 2/17  Transfuse as appropriate.     PAD (peripheral artery disease) (HCC)  Assessment & Plan  S/p L SFA stent c/b acute occlusion this hospitalization  S/p multiple angioplasty (2/1, 2/2, 2/6) and lysis (1/31-2/2)   Per Vascular : ASA/Statin only  Monitor neurovascular exam  F/u Vascular outpatient     GERD (gastroesophageal reflux disease)  Assessment & Plan  Continue PPI    Hyponatremia  Assessment & Plan  Pseudohyponatremia in setting of hyperglycemia  Currently asymptomatic  Recheck on 2/17.   Not currently on FR or salt tabs.     Diabetes mellitus type 2 with complications ([facility])  Assessment & Plan  Lab Results   Component Value Date    HGBA1C 10.3 (H) 12/13/2024       Recent Labs     02/14/25  2029 02/15/25  0613 02/15/25  1145 02/15/25  1148   POCGLU 210* 180* 254* 234*       Blood Sugar Average: Last 72 hrs:  (P) 234    Home: Lantus 10 units QHS, Aspart 5 units TID with meals  Here: Same, CDI/Accuchecks  Diabetic Diet  Monitor and adjust as per primary team    Tobacco abuse  Assessment & Plan  Cessation counseling  Nicotine patch if needed.    Obesity (BMI 30.0-34.9)  Assessment & Plan  Considerations in therapy.         Health Maintenance  #Delirium/Sleep: At risk. Optimize sleep/wake, pain, bowel, bladder management. Avoid deliriogenic meds and physical restraint. Environmental/behavioral interventions.    #Bladder: Voiding and continent  #Skin/Pressure Injury Prevention: Turn Q2hr in bed, with weight shifts [id number] in wheelchair. Float heels in bed.  #GI Prophylaxis: Not indicated  #Code Status: Full Code  #FEN: Diabetic Diet.  #Dispo: ELOS 2 weeks with goals to discharge home mostly modified Ind at wheelchair level and for some hopping. She may need some assistance with residual limb care.     60 minutes ([id number]) or greater spent for this encounter which included a combination of face-to-face time with the patient and non-face-to-face time which in part specifically includes management of acute sequelae and chronic comorbidities as detailed above.  Face-to-face time included extended discussion with patient regarding current condition, medical history, mood, medical/rehabilitation management, and disposition.  Non face-to-face time included coordination of care with patient's co-managing AP and/or physician(s) thru communication and review of their recent documentation as well as reviewing vitals, bowel/bladder function, recent labs, diagnostic imaging, and notes from therapy, CM, and nursing.     Drug regimen reviewed, all potential adverse effects identified and addressed:    Scheduled Meds:  Current Facility-Administered Medications   Medication Dose Route Frequency Provider Last Rate    acetaminophen  975 mg Oral Q6H JORGE Abby De La Torre PA-C      [START ON 2/16/2025] aspirin  81 mg Oral Daily Abby De La Torre PA-C      atorvastatin  80 mg Oral Daily With Dinner Abby De La Torre PA-C      [START ON 2/16/2025] chlorthalidone  12.5 mg Oral Daily Abby De La Torre PA-C      diazepam  5 mg Oral Q8H PRN Abby De La Torre PA-C      [START ON 2/16/2025] DULoxetine  30 mg Oral Daily Abby De La Torre PA-C      gabapentin  300 mg Oral BID (AM & Afternoon) Abby De La Torre PA-C      [START ON 2/16/2025] gabapentin  600 mg Oral HS Abby De La Torre PA-C      heparin  (porcine)  5,000 Units Subcutaneous Q8H JORGE Abby De La Torre PA-C      insulin glargine  10 Units Subcutaneous HS Abby De La Torre PA-C      insulin lispro  1-6 Units Subcutaneous TID With Meals Abby De La Torre PA-C      insulin lispro  5 Units Subcutaneous TID With Meals Abby De La Torre PA-C      [START ON 2/16/2025] lidocaine  1 patch Topical Daily Abby De La Torre PA-C      methocarbamol  500 mg Oral Q6H JORGE Abby De La Torre PA-C      nitroglycerin  0.4 mg Sublingual Q5 Min PRN Abby De La Torre PA-C      ondansetron  4 mg Oral Q6H PRN Abby De La Torre PA-C      oxyCODONE  10 mg Oral Q4H PRN Abby De La Torre PA-C      oxyCODONE  5 mg Oral Q4H PRN Abby De La Torre PA-C      [START ON 2/16/2025] pantoprazole  40 mg Oral Early Morning Abby De La Torre PA-C      [START ON 2/16/2025] polyethylene glycol  17 g Oral Daily Abby De La Torre PA-C      senna-docusate sodium  1 tablet Oral HS Abby De La Torre PA-C      trimethobenzamide  200 mg Intramuscular Q6H PRN Abby De La Torre PA-C          Restrictions include:  left lower extremity non-weight bearing Fall precautions      Functional History/Home Set-up - Prior to Admission:    Lives with her mother, daughters, and grandchildren.  2SH 7-10 LETY  Uses rollator on main level and community, RW upstairs.  Was independent with ADLs, functional mobility, and IADLs.  Was driving, doing meal prep and med management.     Functional Status Upon Admission to [facility]:  Mobility: Sup bed mobility, Brock 40'x2 with RW ambulation/hops.  Transfers: Sup  ADLs: Sup eating, grooming, Brock UB bathing/dressing, modA LB bathing/dressing, modA toileting      Physical Exam:  Temp:  [97.6 °F (36.4 °C)-98.9 °F (37.2 °C)] 97.6 °F (36.4 °C)  HR:  [89-94] 94  Resp:  [16-17] 17  BP: (112-129)/(61-71) 129/61  SpO2:  [91 %-96 %] 96 %    Gen: No acute distress, Well-nourished, well-appearing.  HEENT: Moist  "mucus membranes, Normocephalic/Atraumatic  Cardiovascular: Regular rate, rhythm, S1/S2. Distal pulses palpable  Heme/Extr: No edema  Pulmonary: Non-labored breathing. Lungs CTAB  : No negron  GI: Soft, non-tender, non-distended. BS+  MSK: Full knee extension. Bulbous L residual limb  Integumentary: Skin is warm, dry. No rashes or ulcers. Staples in place. No dehiscence. Groin site healing well. Heels and sacrum intact.   Neuro: AAOx3, Speech is intact. Appropriate to questioning. Tone is normal.   MMT:   Strength:   Right  Left  Site  Right  Left  Site    5 5  S Ab: Shoulder Abductors  5  4  HF: Hip Flexors    5 5  EF: Elbow Flexors  5  >3 KF: Knee Flexors    5  5  EE: Elbow Extensors  5  NA KE: Knee Extensors    5  5  WE: Wrist Extensors  5  NA DR: Dorsi Flexors    5  5  FF: Finger Flexors  5  NA PF: Plantar Flexors    5  4+ HI: Hand Intrinsics  NT  NA EHL: Extensor Hallucis Longus   Psych: Normal mood and affect.     Laboratory:    Results from last 7 days   Lab Units 02/12/25  0608 02/11/25  0536 02/10/25  0547   HEMOGLOBIN g/dL 8.6* 8.8* 9.2*   HEMATOCRIT % 26.9* 27.9* 29.1*   WBC Thousand/uL 9.74 11.82* 10.50*     Results from last 7 days   Lab Units 02/12/25  0608 02/11/25  0536 02/10/25  0547   BUN mg/dL 18 19 15   SODIUM mmol/L 133* 134* 135   POTASSIUM mmol/L 4.4 4.2 4.6   CHLORIDE mmol/L 99 98 95*   CREATININE mg/dL 0.84 0.81 0.76   AST U/L  --   --  11*   ALT U/L  --   --  5*            Wt Readings from Last 1 Encounters:   02/15/25 82.3 kg (181 lb 7 oz)     Estimated body mass index is 34.28 kg/m² as calculated from the following:    Height as of this encounter: 5' 1\" (1.549 m).    Weight as of this encounter: 82.3 kg (181 lb 7 oz).    Imaging: reviewed   1/31 CTH:  1. No intracranial hemorrhage seen.  2. Bilateral sphenoid sinusitis    2/11 CTA Dissection Protocol:  No acute findings in the chest, abdomen or pelvis. No acute aortic findings.     Enlarged multinodular thyroid gland. Follow-up with " nonurgent thyroid ultrasound.     14 mm right adrenal nodule stable since 2018 in keeping with a benign adenoma. biochemical evaluation is suggested for adrenal adenomas greater than 1 cm to rule out functioning adenoma, if not performed already.    2/6 IR LE Angiogram:  Acute occlusion of the popliteal artery and proximal anterior tibial artery successfully recannulated and treated with angioplasty resulting in single vessel runoff to the foot.      Rehabilitation Prognosis: good     Tolerance for three hours of therapy a day: good     Family/Patient Goals:  Patient/family's goals: Return to previous home/apartment.    Patient will receive PT and OT 90 minutes each per day, five days per week to achieve rehab goals or participate in 900 minutes of therapy within a 7 day week period.    Mobility Goals: Modified [place] (sup/CGA for stairs)  Transfer Goals: Modified [place]  Activities of Daily Living (ADLs) Goals: [place] (may need some assist with residual limb care.    Discharge Planning:  Rehabilitation and discharge goals discussed with the patient and/or family.    Case Managment and Social Work to review patient/family resources and to coordinate Discharge Planning.    Estimated length of stay: 2 weeks    Patient and Family Education and Training:  Rehabilitation and discharge goals discussed with the patient and/or family.  Patient/family education/training needs to be discussed in weekly team meeting.    Equipment/DME needs: Therapy teams to assess and evaluate for additional equipment/DME needs throughout rehabilitation stay    Past Medical History:   Past Surgical History:   Family History:   Social history:   Past Medical History:   Diagnosis Date    Advanced maternal age in multigravida, second trimester 11/30/2016    Transitioned From: Advanced maternal age in multigravida, first trimester      Back pain     used 2 percocet tid until current admission in 2/2017    Bone spur     in back  "per pt    Chronic hypertension with superimposed preeclampsia 2017    Depression     Diabetes mellitus (HCC)     Elevated BP without diagnosis of hypertension     \"with pain\"    Foot injury     Full dentures     does not wear    GERD (gastroesophageal reflux disease)     occas    Gestational diabetes     insulin dependent    Herniated cervical disc     Herniated lumbar intervertebral disc     History of pneumonia     Hx of degenerative disc disease     Hyperlipidemia     Obesity     Pre-eclampsia     Prior pregnancy with fetal demise     previous demise at 36 weeks    Toe pain     and foot pain    Past Surgical History:   Procedure Laterality Date    ARTERIOGRAM Left 2025    Procedure: LEFT lower extremity arteriogram w/ popiteal and anterior tibial artery angioplasty;  Surgeon: Ottoniel Victoria MD;  [facility]: [facility];  Service: Vascular    BACK SURGERY       SECTION      INCISION AND DRAINAGE OF WOUND Left 2025    Procedure: INCISION AND DRAINAGE (I&D) EXTREMITY;  Surgeon: Leopoldo Ritchie DPM;  Location: [facility];  Service: Podiatry    IR LOWER EXTREMITY ANGIOGRAM  2024    IR LOWER EXTREMITY ANGIOGRAM  2025    IR LOWER EXTREMITY ANGIOGRAM  2025    IR PICC PLACEMENT DOUBLE LUMEN  2025    IR TPA LYSIS CHECK  2025    IR TPA LYSIS CHECK  2025    LAMINECTOMY      with disc removal, last assessed 16    LEG AMPUTATION THROUGH LOWER TIBIA AND FIBULA Left 2025    Procedure: Left BKA;  Surgeon: Shane Arauz DO;  [facility]: [facility];  Service: Vascular    OTHER SURGICAL HISTORY      Right [facility] 2005 [facility]    MN AMPUTATION FOOT TRANSMETARSAL Left 1/3/2025    Procedure: AMPUTATION TRANSMETATARSAL (TMA);  Surgeon: Leopoldo Ritchie DPM;  Location: [facility];  Service: Podiatry    MN  DELIVERY ONLY N/A 02/15/2017    Procedure:  SECTION ();  Surgeon: Tito" MD Chasidy;  Location: [facility] ;  Service: Obstetrics    MT LIG/TRNSXJ FALOPIAN TUBE  DEL/ABDML SURG Left 02/15/2017    Procedure: LIGATION/COAGULATION TUBAL;  Surgeon: Tito Umaña MD;  Location: [facility];  Service: Obstetrics    VASCULAR SURGERY  2024    WISDOM TOOTH EXTRACTION       Family History   Problem Relation Age of Onset    Diabetes Mother     COPD Mother     Heart disease Mother     Stroke Father     Heart disease Father       Social History     Socioeconomic History    Marital status: Unknown     Spouse name: Not on file    Number of children: Not on file    Years of education: Not on file    Highest education level: Not on file   Occupational History    Occupation: unemployed   Tobacco Use    Smoking status: Former     Current packs/day: 0.50     Types: Cigarettes    Smokeless tobacco: Never    Tobacco comments:     Per pt last cigarette 24--quit cold turkey   Vaping Use    Vaping status: Never Used   Substance and Sexual Activity    Alcohol use: Yes     Comment: 1-2 drinks a year    Drug use: Not Currently    Sexual activity: Not on file     Comment: defer   Other Topics Concern    Not on file   Social History Narrative    Lack of access to transportation     Social Drivers of Health     Financial Resource Strain: Not on file   Food Insecurity: No Food Insecurity (2025)    Nursing - Inadequate Food Risk Classification     Worried About Running Out of Food in the Last Year: Not on file     Ran Out of Food in the Last Year: Not on file     Ran Out of Food in the Last Year: Never true   Transportation Needs: No Transportation Needs (2025)    Nursing - Transportation Risk Classification     Lack of Transportation: Not on file     Lack of Transportation: No   Physical Activity: Not on file   Stress: Not on file   Social Connections: Not on file   Intimate Partner Violence: Unknown (2025)    Nursing IPS     Feels Physically and Emotionally Safe: Not on file     Physically  "Hurt by Someone: Not on file     Humiliated or Emotionally Abused by Someone: Not on file     Physically Hurt by Someone: No     Hurt or Threatened by Someone: No   Housing Stability: Unknown (2025)    Nursing: Inadequate Housing Risk Classification     Has Housing: Not on file     Worried About Losing Housing: Not on file     Unable to Get Utilities: Not on file     Unable to Pay for Housing in the Last Year: No     Has Housin          Current Medical Diagnosis Allergies   Patient Active Problem List   Diagnosis    Obesity (BMI 30.0-34.9)    Tobacco abuse    Chronic low back pain    Chronic sciatica    Chronic, continuous use of opioids    Diabetes mellitus type 2 with complications (HCC)    Hyponatremia    Cellulitis of left foot complicated by Critical limb threatening ischemia of the left lower extremity with a nonhealing transmetatarsal amputation wound    Gangrene of left foot (HCC)    GERD (gastroesophageal reflux disease)    Dehiscence of operative wound, initial encounter    Hypersensitivity, initial encounter    Proteinuria    Diabetic foot infection  (HCC)    PAD (peripheral artery disease) ([facility])    Blood loss anemia    Obesity (BMI 30-39.9)    Primary hypertension    Thyroid nodule    Adrenal nodule (HCC)    Allergies   Allergen Reactions    Codeine Other (See Comments)     Aggression-and nasty--\"took a cough syrup with codeine as a child\"           Medical Necessity Criteria for [facility] Admission: ABLA, Acute pain, Phantom limb pain, constipation, Pseudohyponatremia, T2DM. At risk for: dehiscence, infection, non-healing, contracture, skin breakdown, VTE, adjustment disorder, falls.will need education on pre-prosthetic residual limb care and prosthetic timeline/process. In addition, the preadmission screen, post-admission physical evaluation, overall plan of care and admissions order demonstrate a reasonable expectation that the following criteria were met at the time of admission to the [facility].  The " patient requires active and ongoing therapeutic intervention of multiple therapy disciplines (physical therapy, occupational therapy, speech-language pathology, or prosthetics/orthotics), one of which is physical or occupational therapy.    Patient requires an intensive rehabilitation therapy program, as defined in Chapter 1, section 110.2.2 of the CMS Medicare Policy Manual. This intensive rehabilitation therapy program will consist of at least 3 hours of therapy per day at least 5 days per week or at least 15 hours of intensive rehabilitation therapy within a 7 consecutive day period, beginning with the date of admission to the [facility].    The patient is reasonably expected to actively participate in, and benefit significantly from, the intensive rehabilitation therapy program as defined in Chapter 1, section 110.2.2 of the CMS Medicare Policy Manual at this time of admission to the [facility]. She can reasonably be expected to make measurable improvement (that will be of practical value to improve the patient’s functional capacity or adaptation to impairments) as a result of the rehabilitation treatment, as defined in section 110.3, and such improvement can be expected to be made within the prescribed period of time. As noted in the CMS Medicare Policy Manual, the patient need not be expected to achieve complete independence in the domain of self-care nor be expected to return to his or her prior level of functioning in order to meet this standard.  The patient must require physician supervision by a rehabilitation physician. As such, a rehabilitation physician will conduct face-to-face visits with the patient at least 3 days per week throughout the patient’s stay in the [facility] to assess the patient both medically and functionally, as well as to modify the course of treatment as needed to maximize the patient’s capacity to benefit from the rehabilitation process.  The patient requires an intensive and coordinated  interdisciplinary approach to providing rehabilitation, as defined in Chapter 1, section 110.2.5 of the CMS Medicare Policy Manual. This will be achieved through periodic team conferences, conducted at least once in a 7-day period, and comprising of an interdisciplinary team of medical professionals consisting of: a rehabilitation physician, registered nurse,  and/or , and a licensed/certified therapist from each therapy discipline involved in treating the patient.       ** Please Note: Fluency Direct voice to text software may have been used in the creation of this document. **

## 2025-02-15 NOTE — PLAN OF CARE

## 2025-02-15 NOTE — DISCHARGE SUMMARY
INTERNAL MEDICINE RESIDENCY DISCHARGE SUMMARY     Lizzy Acuna   49 y.o. female  MRN: [id number]  Room/Bed: [facility] 511/[facility] 511-01     [facility]   Encounter: [id number]    Principal Problem:    Cellulitis of left foot complicated by Critical limb threatening ischemia of the left lower extremity with a nonhealing transmetatarsal amputation wound  Active Problems:    Diabetes mellitus type 2 with complications (HCC)    Hyponatremia    Diabetic foot infection  (HCC)    PAD (peripheral artery disease) ([facility])    Blood loss anemia    Obesity (BMI 30-39.9)    Primary hypertension    Thyroid nodule    Adrenal nodule (HCC)      * Cellulitis of left foot complicated by Critical limb threatening ischemia of the left lower extremity with a nonhealing transmetatarsal amputation wound  Assessment & Plan  Underwent an elective R TMA approx 3 weeks ago and was doing well until last week when she was noticed to have dehiscence of her surgical wound.   Wound was debrided by her podiatrist in the office but ultimately worsened with evidence of cellulitis.   LLE foot infection diagnosed clinically without MRI findings of osteomyelitis.   Tissue culture: 2+ MRSA, 2+ Finegoldia magna No significant leukocytosis or fever this admission. Site continues to bleed likely due to triple therapy   A1c 10.3  -L SFA stent s/p L TMA, nonhealing, s/p lysis and tibial intervention (AT PTA)  -despite adequate perfusion w/ AT runoff, patient continues to be extremely high risk for higher amputation 2/2 TMA dehiscence and extensive necrosis  S/P Balloon angioplasty 2/7 of the left popliteal and anterior tibial arteries   S/p L BKA with podiatry on 2/11  Off vancomycin per ID as source control achieved.    Plan:   Stable for discharge  Continue Plavix/statin  Current pain regimen: Tylenol 975 mg q6h, duloxetine 30mg daily, gabapentin 300 mg bid plus 600mg hs, robaxin 500 mg q6h; oxy 5/10 for  moderate/severe  Wound care     Adrenal nodule (HCC)  Assessment & Plan  14mm right adrenal nodule noted incidentally on imaging stable since 2018, c/w adenoma.   Recommended for biochemical evaluation for adenomas >1cm to r/o functioning adenoma, does not appear that this has been done previously.   Testosterone and AM cortisol levels wnl.    Thyroid nodule  Assessment & Plan  Incidental finding on imaging, will need outpatient thyroid US, this was discussed with pt and ordered on discharge    Primary hypertension  Assessment & Plan  Patient previously diagnosed with hypertension  However, on 2/5, became hypertensive with SBP's up to 200s  Started chlorthalidone 12.5 mg daily with good control overall     Plan:  Continue chlorthalidone 12.5 mg daily    Obesity (BMI 30-39.9)  Assessment & Plan  Aggressive lifestyle and dietary modifications advised including DM control    Blood loss anemia  Assessment & Plan  Blood loss anemia 2/2 lysis, bleeding from LLE TMA. Hb now stable    PAD (peripheral artery disease) (HCC)  Assessment & Plan  See principal problem    Hyponatremia  Assessment & Plan  Chronic euvolemic hyponatremia that was 133 on admission. Asymptomatic and no recent history of confusion, altered metal status, seizure, or coma.   Current differential includes SIADH in the setting of acute infection  Now improved     Diabetes mellitus type 2 with complications (HCC)  Assessment & Plan  Lab Results   Component Value Date    HGBA1C 10.3 (H) 12/13/2024       Blood Sugar Average: Last 72 hrs:  (P) 193.5625  -Resume prior home regimen of Lantus 10U hs and lispro 5U tid ac  -Carb control diet  -PCP f/u for further adjustments        DETAILS OF HOSPITAL COURSE     48 yo female, PMH T2DM, HTN, obesity, PAD with recent TMA of left foot. She presented due to nonhealing of TMA. She was also found to have cellulitis, MRI showed no evidence of osteomyelitis. Wound culture grew MRSA, so pt was on vancomycin with ID  following. Vascular and podiatry teams were following as well. Angiogram with lysis catheter placement on 1/31 with subsequent angioplasty of L SFA, pop, and AT on 2/3. Repeat angiogram 2/6 showed reocclusion of popliteal. Pt ultimately underwent L BKA on 2/11. APS was following intermittently during admission for help with pain control. PMR was consulted and recommended ARC. Pt stable for discharge from vascular and podiatry standpoints. Her stay was otherwise c/b pain between shoulder blades with diaphoresis after surgery on 2/11, cardiac workup and imaging to r/o other concerning etiology such as dissection was unremarkable and pain resolved by next morning with no recurrences.     Pt seen and examined on day of discharge. She continued to have some pain but overall controlled. No new complaints.     Physical Exam  Vitals reviewed.   Constitutional:       General: She is not in acute distress.  HENT:      Head: Normocephalic and atraumatic.      Right Ear: External ear normal.      Left Ear: External ear normal.      Nose: Nose normal.      Mouth/Throat:      Mouth: Mucous membranes are moist.   Eyes:      Conjunctiva/sclera: Conjunctivae normal.   Cardiovascular:      Rate and Rhythm: Normal rate and regular rhythm.      Heart sounds: Normal heart sounds.   Pulmonary:      Effort: Pulmonary effort is normal. No respiratory distress.      Breath sounds: Normal breath sounds.   Abdominal:      General: Bowel sounds are normal. There is no distension.      Palpations: Abdomen is soft.      Tenderness: There is no abdominal tenderness. There is no guarding.   Musculoskeletal:         General: No swelling.      Right lower leg: No edema.      Comments: L BKA   Skin:     General: Skin is warm and dry.   Neurological:      Mental Status: She is alert and oriented to person, place, and time. Mental status is at baseline.      Cranial Nerves: No cranial nerve deficit.   Psychiatric:         Mood and Affect: Mood normal.          Behavior: Behavior normal.           DISCHARGE INFORMATION     PCP at Discharge: Needs to establish with new physician at prior [facility]     Admitting Provider: Negin Shelton MD  Admission Date: 1/31/2025    Discharge Provider: Negin Shelton MD  Discharge Date: 2/15/25    [facility]  Discharge Condition: good  Discharge with Lines: no    Discharge Diet: diabetic diet  Activity Restrictions:  non weight bearing to stump  Test Results Pending at Discharge: none    Discharge Diagnoses:  Principal Problem:    Cellulitis of left foot complicated by Critical limb threatening ischemia of the left lower extremity with a nonhealing transmetatarsal amputation wound  Active Problems:    Diabetes mellitus type 2 with complications (HCC)    Hyponatremia    Diabetic foot infection  (HCC)    PAD (peripheral artery disease) (HCC)    Blood loss anemia    Obesity (BMI 30-39.9)    Primary hypertension    Thyroid nodule    Adrenal nodule (HCC)  Resolved Problems:    Sepsis due to cellulitis (HCC)    Type II diabetes mellitus (HCC)      Consulting Providers:      Diagnostic & Therapeutic Procedures Performed:  No results found.    Code Status: Level 1 - Full Code  Advance Directive & Living Will: <no information>  Power of :    POLST:      Medications:  Discharge Medication List as of 2/15/2025  9:00 AM        Discharge Medication List as of 2/15/2025  9:00 AM        START taking these medications    Details   chlorthalidone 25 mg tablet Take 0.5 tablets (12.5 mg total) by mouth daily, Starting Sat 2/15/2025, No Print      diazepam (VALIUM) 5 mg tablet Take 1 tablet (5 mg total) by mouth every 8 (eight) hours as needed for anxiety or muscle spasms for up to 10 days, Starting Sat 2/15/2025, Until Tue 2/25/2025 at 2359, No Print      DULoxetine (CYMBALTA) 30 mg delayed release capsule Take 1 capsule (30 mg total) by mouth daily, Starting Sat 2/15/2025, No Print      lidocaine (LIDODERM)  5 % Apply 1 patch topically over 12 hours daily as needed (pain) Remove & Discard patch within 12 hours or as directed by MD, Starting Sat 2/15/2025, No Print      !! oxyCODONE (ROXICODONE) 5 immediate release tablet Take 1 tablet (5 mg total) by mouth every 4 (four) hours as needed for severe pain for up to 10 days Max Daily Amount: 30 mg, Starting Sat 2/15/2025, Until Tue 2/25/2025 at 2359, No Print      !! oxyCODONE (ROXICODONE) 5 immediate release tablet Take 0.5 tablets (2.5 mg total) by mouth every 4 (four) hours as needed for severe pain or moderate pain for up to 10 days Max Daily Amount: 30 mg, Starting Sat 2/15/2025, Until Tue 2/25/2025 at 2359, No Print      pantoprazole (PROTONIX) 40 mg tablet Take 1 tablet (40 mg total) by mouth daily in the early morning, Starting Sun 2/16/2025, No Print      polyethylene glycol (MIRALAX) 17 g packet Take 17 g by mouth daily, Starting Sat 2/15/2025, No Print       !! - Potential duplicate medications found. Please discuss with provider.        Discharge Medication List as of 2/15/2025  9:00 AM        CONTINUE these medications which have NOT CHANGED    Details   acetaminophen (TYLENOL) 325 mg tablet Take 3 tablets (975 mg total) by mouth every 6 (six) hours, Starting Wed 12/25/2024, Normal      Alcohol Swabs 70 % PADS May substitute brand based on insurance coverage. Check glucose TID., Normal      aspirin (ECOTRIN LOW STRENGTH) 81 mg EC tablet Take 1 tablet (81 mg total) by mouth daily, Starting Wed 12/25/2024, Normal      atorvastatin (LIPITOR) 80 mg tablet Take 1 tablet (80 mg total) by mouth daily with dinner, Starting Wed 12/25/2024, Normal      Blood Glucose Monitoring Suppl (OneTouch Verio Reflect) w/Device KIT May substitute brand based on insurance coverage. Check glucose TID., Normal      diphenhydrAMINE (BENADRYL) 25 mg capsule Take 25 mg by mouth every 6 (six) hours as needed for allergies, Historical Med      glucose blood (OneTouch Verio) test strip May  "substitute brand based on insurance coverage. Check glucose TID., Normal      HYDROmorphone (DILAUDID) 4 mg tablet Historical Med      ibuprofen (MOTRIN) 600 mg tablet Take by mouth every 6 (six) hours as needed for mild pain, Historical Med      insulin aspart (NovoLOG FlexPen) 100 UNIT/ML injection pen Inject 5 Units under the skin 3 (three) times a day with meals, Starting Wed 12/25/2024, Normal      Insulin Glargine Solostar (Lantus SoloStar) 100 UNIT/ML SOPN Inject 0.1 mL (10 Units total) under the skin daily at bedtime, Starting Wed 12/25/2024, Normal      !! Insulin Pen Needle (BD Pen Needle Ana 2nd Gen) 32G X 4 MM MISC For use with insulin pen. Pharmacy may dispense brand covered by insurance., Normal      !! Insulin Pen Needle (BD Pen Needle Ana 2nd Gen) 32G X 4 MM MISC For use with insulin pen. Pharmacy may dispense brand covered by insurance., Normal      methocarbamol (ROBAXIN) 500 mg tablet Take 1 tablet (500 mg total) by mouth every 6 (six) hours, Starting Wed 12/25/2024, Normal      naloxone (NARCAN) 4 mg/0.1 mL nasal spray Administer 1 spray into a nostril. If no response after 2-3 minutes, give another dose in the other nostril using a new spray., Normal      OneTouch Delica Lancets 33G MISC May substitute brand based on insurance coverage. Check glucose TID., Normal      senna-docusate sodium (SENOKOT S) 8.6-50 mg per tablet Take 1 tablet by mouth daily at bedtime, Starting Wed 12/25/2024, Normal       !! - Potential duplicate medications found. Please discuss with provider.          Allergies:  Allergies   Allergen Reactions    Codeine Other (See Comments)     Aggression-and nasty--\"took a cough syrup with codeine as a child\"       FOLLOW-UP     PCP Outpatient Follow-up:  1-2 weeks    Consulting Providers Follow-up:  Podiatry, vascular surgery      Active Issues Requiring Follow-up:   F/u for BKA; nonurgent thyroid US    Discharge Statement:   I spent 1 hour minutes discharging the patient. This " "time was spent on the day of discharge. I had direct contact with the patient on the day of discharge. Additional documentation is required if more than 30 minutes were spent on discharge.    Portions of the record may have been created with voice recognition software.  Occasional wrong word or \"sound a like\" substitutions may have occurred due to the inherent limitations of voice recognition software.  Read the chart carefully and recognize, using context, where substitutions have occurred.    ==  Sharron Stoddard MD  [facility]  Internal Medicine Resident PGY-2   "

## 2025-02-15 NOTE — ASSESSMENT & PLAN NOTE
Tylenol scheduled (monitor LFTs)  Cymbalta 30mg daily  Gabapentin 300-300-600  Robaxin 500mg Q6hr  Valium PRN muscle spasms  Oxycodone 5-10mg Q4hr PRN.  Monitor and adjust as appropriate.

## 2025-02-15 NOTE — ASSESSMENT & PLAN NOTE
Of note started by APS on cymbalta for phantom pain  Has valium ordered 5mg Q8hr PRN for anxiety but also muscle spasm   - Would try to wean down/off.  Emphasize non-pharmacologic strategies  Monitor and adjust as appropriate.

## 2025-02-15 NOTE — ASSESSMENT & PLAN NOTE
Desensitization techniques with therapy  On cymbalta and gabapentin  Monitor and adjust as appropriate  Will need f/u with PMR after discharge.

## 2025-02-15 NOTE — OCCUPATIONAL THERAPY NOTE
Occupational Therapy Progress Note     Patient Name: Lizzy Acuna  Today's Date: 2/15/2025  Problem List  Principal Problem:    Cellulitis of left foot complicated by Critical limb threatening ischemia of the left lower extremity with a nonhealing transmetatarsal amputation wound  Active Problems:    Diabetes mellitus type 2 with complications (HCC)    Hyponatremia    Diabetic foot infection  (HCC)    PAD (peripheral artery disease) (HCC)    Blood loss anemia    Obesity (BMI 30-39.9)    Primary hypertension    Thyroid nodule    Adrenal nodule (HCC)        02/15/25 0905   OT Last Visit   OT Visit Date 02/15/25   Note Type   Note Type Treatment   Pain Assessment   Pain Assessment Tool 0-10   Pain Score 8   Pain Location/Orientation Orientation: Left;Location: Leg   Hospital Pain Intervention(s) Repositioned;Ambulation/increased activity   Restrictions/Precautions   Weight Bearing Precautions Per Order Yes   LLE Weight Bearing Per Order (S)  NWB   Braces or Orthoses Knee immobilizer   Other Precautions WBS;Fall Risk;Pain;Contact/isolation   Lifestyle   Autonomy Pt reports I w/ ADLs, IADLs, and fxnl mobility w/ rollator at baseline; + driving.   Reciprocal Relationships Pt lives w/ her mother, daughters (7 and 20 y/o) and grandchild.   Intrinsic Gratification Pt enjoys watching movies and TV; also being active.   ADL   Where Assessed Edge of bed   Grooming Assistance 5  Supervision/Setup   UB Bathing Assistance 5  Supervision/Setup   LB Bathing Assistance 3  Moderate Assistance   UB Dressing Assistance 5  Supervision/Setup   LB Dressing Assistance 3  Moderate Assistance   Toileting Assistance  3  Moderate Assistance   Bed Mobility   Supine to Sit 5  Supervision   Additional items Increased time required;Verbal cues   Sit to Supine 5  Supervision   Additional items Increased time required;Verbal cues   Transfers   Sit to Stand 5  Supervision   Additional items Increased time required;Verbal cues   Stand to Sit 5   Supervision   Additional items Increased time required;Verbal cues   Additional Comments with rw   Functional Mobility   Functional Mobility 4  Minimal assistance   Additional Comments Pt requires MIN A to hop short household distances with rw   Additional items Rolling walker   Cognition   Overall Cognitive Status WFL   Arousal/Participation Alert;Responsive;Cooperative   Attention Within functional limits   Orientation Level Oriented X4   Memory Within functional limits   Following Commands Follows one step commands without difficulty   Comments Pt agreeable to therapy, pt with good safety awareness   Additional Activities   Additional Activities Comments Pt educated on phantom limb   Activity Tolerance   Activity Tolerance Patient tolerated treatment well   Medical Staff Made Aware RN Cleared   Assessment   Assessment Pt was seen on 5/15/2025 to address ADL retraining, functional transfer training, and activity tolerance/endurance. Pt with active OT orders and activity orders. Pt demonstrating improvements and currently requires supervision to complete bed mobility and functional transfers. Pt requires MIN A to hop short household distances with rw. Pt educated on phantom limb. Pt is limited by decreased ADL status, functional transfers, functional mobility, and activity tolerance. Pt supine in bed at beginning of session and seated EOB at end of session with all items within reach. The patient's raw score on the -PAC Daily Activity Inpatient Short Form is 17. A raw score of less than 19 suggests the patient may benefit from discharge to post-acute rehabilitation services. Please refer to the recommendation of the Occupational Therapist for safe discharge planning. Recommend Level I maximum intensity OT services at d/c to maximize pt function.   Plan   Treatment Interventions ADL retraining;Functional transfer training;Endurance training;UE strengthening/ROM;Patient/family training;Neuromuscular  reeducation;Equipment evaluation/education;Compensatory technique education;Continued evaluation;Energy conservation;Activityengagement   Goal Expiration Date 02/21/25   OT Treatment Day 1   OT Frequency 2-3x/wk   Discharge Recommendation   Rehab Resource Intensity Level, OT I (Maximum Resource Intensity)   AM-PAC Daily Activity Inpatient   Lower Body Dressing 2   Bathing 3   Toileting 2   Upper Body Dressing 3   Grooming 3   Eating 4   Daily Activity Raw Score 17   Daily Activity Standardized Score (Calc for Raw Score >=11) 37.26   AM-PAC Applied Cognition Inpatient   Following a Speech/Presentation 4   Understanding Ordinary Conversation 4   Taking Medications 4   Remembering Where Things Are Placed or Put Away 4   Remembering List of 4-5 Errands 4   Taking Care of Complicated Tasks 4   Applied Cognition Raw Score 24   Applied Cognition Standardized Score 62.21   End of Consult   Education Provided Yes   Patient Position at End of Consult Seated edge of bed;All needs within reach   Nurse Communication Nurse aware of consult     EDWARDO Holliday, OTR/L

## 2025-02-15 NOTE — ASSESSMENT & PLAN NOTE
S/p L SFA stent c/b acute occlusion this hospitalization  S/p multiple angioplasty (2/1, 2/2, 2/6) and lysis (1/31-2/2)   Per Vascular : ASA/Statin only  Monitor neurovascular exam  F/u Vascular outpatient    room air

## 2025-02-15 NOTE — PLAN OF CARE
Problem: OCCUPATIONAL THERAPY ADULT  Goal: Performs self-care activities at highest level of function for planned discharge setting.  See evaluation for individualized goals.  Description: Treatment Interventions: ADL retraining, Functional transfer training, Endurance training, Patient/family training, Equipment evaluation/education, Compensatory technique education, Continued evaluation, Energy conservation, Activityengagement          See flowsheet documentation for full assessment, interventions and recommendations.   Outcome: Progressing  Note: Limitation: Decreased ADL status, Decreased endurance, Decreased self-care trans, Decreased high-level ADLs  Prognosis: Good  Assessment: Pt was seen on 5/15/2025 to address ADL retraining, functional transfer training, and activity tolerance/endurance. Pt with active OT orders and activity orders. Pt demonstrating improvements and currently requires supervision to complete bed mobility and functional transfers. Pt requires MIN A to hop short household distances with rw. Pt educated on phantom limb. Pt is limited by decreased ADL status, functional transfers, functional mobility, and activity tolerance. Pt supine in bed at beginning of session and seated EOB at end of session with all items within reach. The patient's raw score on the -PAC Daily Activity Inpatient Short Form is 17. A raw score of less than 19 suggests the patient may benefit from discharge to post-acute rehabilitation services. Please refer to the recommendation of the Occupational Therapist for safe discharge planning. Recommend Level I maximum intensity OT services at d/c to maximize pt function.     Rehab Resource Intensity Level, OT: I (Maximum Resource Intensity)

## 2025-02-15 NOTE — ASSESSMENT & PLAN NOTE
Lab Results   Component Value Date    HGBA1C 10.3 (H) 12/13/2024       Recent Labs     02/16/25  1605 02/16/25  2131 02/17/25  0746 02/17/25  1133   POCGLU 237* 277* 193* 270*       Blood Sugar Average: Last 72 hrs:  (P) 230.6992059560222767    Home: Lantus 10 units QHS, Aspart 5 units TID with meals  Here: Same, CDI/Accuchecks  Diabetic Diet  Monitor and adjust as per primary team

## 2025-02-15 NOTE — ASSESSMENT & PLAN NOTE
Noted incidentally  Enlarged multinodular thyroid gland  Will check TSH with next set of labs.  Outpatient f/u with non-emergent ultrasound

## 2025-02-15 NOTE — ASSESSMENT & PLAN NOTE
Noted incidentally  14mm R adrenal nodule stable since 2018 - in keeping with benign adenoma  Biochemical evaluation suggested for adrenal adenomas > 1cm to rule out functioning adenoma  Recommend outpatient f/u with PCP for additional work-up

## 2025-02-15 NOTE — PROGRESS NOTES
"PHYSICAL MEDICINE AND REHABILITATION   PREADMISSION ASSESSMENT     Projected IGC and Rehabilitation Diagnoses:  Impairment of mobility, safety and Activities of Daily Living (ADLs) due to Amputation:  05.4  Unilateral Lower Limb Below the Knee  Etiologic: Cellulitis of the left foot complicated by critical limb threatening ischemia of LLE with non healing TMA wound s/p left BKA  Date of Onset: 1/31/25   Date of surgery: 2/11/25-s/p left BKA with left adductor and popliteal block     PATIENT INFORMATION  Name: Lizzy Acuna Phone #: [phone] (home)   Address: [address] [place] [id number]  YOB: 1975 Age: 49 y.o. SS#   Marital Status: Unknown  Ethnicity: Not  or  or Scottish  Employment Status:  Not Employed  Extended Emergency Contact Information  Primary Emergency Contact: Paige Johansen  Address: [address] [place] [address]  Mobile Phone: [phone]  Relation: Mother  Advance Directive: Level 1 Full code (no ACP docs)    INSURANCE/COVERAGE:     Primary Payor: [employer] / Plan: [employer] / Product Type: Medicaid HMO /  #[id number] Secondary Payer: Self Pay   Payer Contact:  Payer Contact:   Contact Phone:  Contact Phone:     Authorization #: [id number]   Coverage Dates: 2/14/25-2/21/25  LCD: 2/21/25  Submit next review to: [phone]              MEDICARE #:   Medicare Days:   Medical Record #: [id number]    REFERRAL SOURCE:   Referring provider: Dewey Mathew MD  Referring facility: [facility]  Room: [facility] 511/[facility] 511-01  PCP: NAOMY Basilio PCP phone number: [phone]    MEDICAL INFORMATION  HPI: Per PM&R consult \"Lizzy Acuna is a 49 y.o. female with PMH of DM2, HTN, HLD, obesity, PAD s/p L TMA 1/3/2025 who presented with worsening L foot pain/wound and met sepsis criteria who was started on broad spectrum abx, IVF believed to be " "2/2 L diabetic foot wound.  Patient underwent L SFA thrombectomy, placement of lysis catheter on 1/31 and lysis recheck with angioplasty of L SFA, pop, and AT on 2/3, L pop artery and prox AT angioplasty and recannulation for acute occlusion on 2/6 by IR, followed by arteriogram with balloon angioplasty of L pop, AT, including angioplasty of L pop and AT with cutting angioplasty balloon 2/7 by [facility] sx\".  Residual foot was eventually deemed non-salvageable with and patient returned to the OR on 2/11 and is s/p left BKA.  Patient became hypertensive on 2/5 with SBP in the 200s.  She was started on chlorthalidone with improvement.  Additionally, she developed blood loss anemia secondary to lysis, bleeding from TMA.  Hemoglobin now stable.  She was also hyponatremic.  She does have a h/o chronic hyponatremia.  Current differentials include SIADH in the setting of infection.  Patient worked with therapy and was recommended for rehab following her hospitalization.  She has demonstrated that she can tolerate three hours of therapy, five days a week and is now medically cleared for discharge to the [facility].      Past Medical History:   Past Surgical History:   Allergies:     Past Medical History:   Diagnosis Date    Advanced maternal age in multigravida, second trimester 11/30/2016    Transitioned From: Advanced maternal age in multigravida, first trimester      Back pain     used 2 percocet tid until current admission in 2/2017    Bone spur     in back per pt    Chronic hypertension with superimposed preeclampsia 02/14/2017    Depression     Diabetes mellitus (HCC)     Elevated BP without diagnosis of hypertension     \"with pain\"    Foot injury     Full dentures     does not wear    GERD (gastroesophageal reflux disease)     occas    Gestational diabetes     insulin dependent    Herniated cervical disc     Herniated lumbar intervertebral disc     History of pneumonia     Hx of degenerative disc disease     Hyperlipidemia     " "Obesity     Pre-eclampsia     Prior pregnancy with fetal demise     previous demise at 36 weeks    Toe pain     and foot pain    Past Surgical History:   Procedure Laterality Date    ARTERIOGRAM Left 2025    Procedure: LEFT lower extremity arteriogram w/ popiteal and anterior tibial artery angioplasty;  Surgeon: Ottoniel Victoria MD;  [facility];  Service: Vascular    BACK SURGERY       SECTION      INCISION AND DRAINAGE OF WOUND Left 2025    Procedure: INCISION AND DRAINAGE (I&D) EXTREMITY;  Surgeon: Leopoldo Ritchie DPM;  Location: [facility];  Service: Podiatry    IR LOWER EXTREMITY ANGIOGRAM  2024    IR LOWER EXTREMITY ANGIOGRAM  2025    IR LOWER EXTREMITY ANGIOGRAM  2025    IR PICC PLACEMENT DOUBLE LUMEN  2025    IR TPA LYSIS CHECK  2025    IR TPA LYSIS CHECK  2025    LAMINECTOMY      with disc removal, last assessed 16    LEG AMPUTATION THROUGH LOWER TIBIA AND FIBULA Left 2025    Procedure: Left BKA;  Surgeon: Shane Arauz DO;  [facility]: [facility];  Service: Vascular    OTHER SURGICAL HISTORY      [facility]    LA AMPUTATION FOOT TRANSMETARSAL Left 1/3/2025    Procedure: AMPUTATION TRANSMETATARSAL (TMA);  Surgeon: Leopoldo Ritchie DPM;  Location: [facility];  Service: Podiatry    LA  DELIVERY ONLY N/A 02/15/2017    Procedure:  SECTION ();  Surgeon: Tito mUaña MD;  [facility];  Service: Obstetrics    LA LIG/TRNSXJ FALOPIAN TUBE  DEL/ABDML SURG Left 02/15/2017    Procedure: LIGATION/COAGULATION TUBAL;  Surgeon: Tito Umaña MD;  [facility]: [facility];  Service: Obstetrics    VASCULAR SURGERY  2024    WISDOM TOOTH EXTRACTION       Allergies   Allergen Reactions    Codeine Other (See Comments)     Aggression-and nasty--\"took a cough syrup with codeine as a child\"         Medical/functional conditions requiring " inpatient rehabilitation:   Cellulitis of the left foot complicated by critical limb threatening ischemia of LLE with non healing TMA wound s/p left BKA  Type 2 DM  Hyponatremia  PAD  Blood loss anemia  Obesity  Hyperlipidemia  Primary HTN  GERD  Depression  Post-op pain  Impaired mobility  Impaired self care    Risk for medical/clinical complications: risk for decreased healing, risk for infection, risk for falls, risk for uncontrolled pain, risk for skin breakdown, risk for complications with incision, risk for contractures, risk for DVT/PE, risk for hypo/hyperglycemic episodes, risk for hypo/hypertensive episodes     Comorbidities/Surgeries in the last 100 days:   Type 2 DM  Hyponatremia  PAD  Obesity  Hyperlipidemia  Primary HTN  GERD   Depression  1/3/25-s/p left TMA  1/31/25-s/p left SFA thrombectomy however still with residual thrombus  2/1/25-s/p lysis check and angioplasty  2/3/25-s/p lysis check with angioplasty  2/6/25-s/p repeat angiogram left lower extremity  2/11/25-s/p left BKA with left adductor and popliteal block     CURRENT VITAL SIGNS:   Temp:  [98.3 °F (36.8 °C)-98.9 °F (37.2 °C)] 98.3 °F (36.8 °C)  HR:  [89-94] 94  BP: (112-122)/(66-71) 122/71  Resp:  [16-17] 16  SpO2:  [91 %-96 %] 96 %  O2 Device: None (Room air)   Intake/Output Summary (Last 24 hours) at 2/15/2025 1039  Last data filed at 2/15/2025 0407  Gross per 24 hour   Intake 480 ml   Output 1100 ml   Net -620 ml        LABORATORY RESULTS:      Lab Results   Component Value Date    HGB 8.6 (L) 02/12/2025    HCT 26.9 (L) 02/12/2025    WBC 9.74 02/12/2025     Lab Results   Component Value Date    BUN 18 02/12/2025    K 4.4 02/12/2025    CL 99 02/12/2025    CREATININE 0.84 02/12/2025     Lab Results   Component Value Date    PROTIME 14.1 02/02/2025    INR 1.05 02/02/2025        DIAGNOSTIC STUDIES:  IR TPA lysis check  Result Date: 2/2/2025  Impression: 1. Left deep femoral, superficial femoral, popliteal, tibioperoneal trunk, and anterior  tibial arteries were patent. 2. Mild residual stenoses in the left superficial femoral and popliteal arteries treated with 4 mm balloon angioplasty. 3. Mild stenoses along the proximal left anterior tibial artery treated with 3 mm and 2.5 mm balloon angioplasties. 4. Left posterior tibial and peroneal arteries appeared chronically occluded distally. Plan: -Continue anticoagulation. -Continue aspirin and statin. Workstation performed: [id number]     IR TPA lysis check  Result Date: 2/1/2025  Impression: 1. Residual luminal irregularities in the left superficial femoral artery treated with 4 mm balloon angioplasty. 2. Post angioplasty arteriogram showed reocclusion of left SFA, likely due to residual thrombus. 3. Lysis catheter replaced extending from the left SFA into the popliteal artery. Plan: -tPA to run through the lysis catheter at 1 mg/h. -Return for lysis check tomorrow. Workstation performed: [id number]     IR lower extremity angiogram  Result Date: 1/31/2025  Impression: 1. Thrombosed left superficial femoral artery treated with aspiration thrombectomy and 4 mm balloon angioplasty with residual thrombus. 2. Thrombolysis initiated through a lysis catheter in the left SFA. 3. Right arm PICC placement through basilic vein access. Plan: -tPA to run at 1 mg/hr through the lysis catheter. -All blood draws and labwork to be done through PICC. -Return for lysis check tomorrow. Workstation performed: [id number]     IR PICC placement double lumen  Result Date: 1/31/2025  Impression: 1. Thrombosed left superficial femoral artery treated with aspiration thrombectomy and 4 mm balloon angioplasty with residual thrombus. 2. Thrombolysis initiated through a lysis catheter in the left SFA. 3. Right arm PICC placement through basilic vein access. Plan: -tPA to run at 1 mg/hr through the lysis catheter. -All blood draws and labwork to be done through PICC. -Return for lysis check tomorrow. Workstation performed:  [id number]     CT head wo contrast  Result Date: 1/31/2025  Impression: 1. No intracranial hemorrhage seen. 2. Bilateral sphenoid sinusitis Workstation performed: [id number]       PRECAUTIONS/SPECIAL NEEDS:  Weight Bearing Precautions:   NWB LLE , Splints/Braces: KI LLE, Isolation Precautions:  Contact, Anticoagulation:  aspirin 81 mg orally every day, Blood Sugar Management: as per MD orders, Edema Management, Pain Management, Dietary Restrictions: Regular diet, Visually Impaired, Language Preference: English, and fall precautions    MEDICATIONS:     Current Facility-Administered Medications:     acetaminophen (TYLENOL) tablet 975 mg, 975 mg, Oral, Q6H JORGE, Mariam Heart MD, 975 mg at 02/15/25 0538    aspirin (ECOTRIN LOW STRENGTH) EC tablet 81 mg, 81 mg, Oral, Daily, Mariam Heart MD, 81 mg at 02/15/25 0844    atorvastatin (LIPITOR) tablet 80 mg, 80 mg, Oral, Daily With Dinner, Mariam Heart MD, 80 mg at 02/14/25 1600    chlorthalidone tablet 12.5 mg, 12.5 mg, Oral, Daily, Mariam Heart MD, 12.5 mg at 02/15/25 0847    diazepam (VALIUM) tablet 5 mg, 5 mg, Oral, Q8H PRN, Mariam Heart MD, 5 mg at 02/14/25 2304    DULoxetine (CYMBALTA) delayed release capsule 30 mg, 30 mg, Oral, Daily, Mariam Heart MD, 30 mg at 02/15/25 0844    gabapentin (NEURONTIN) capsule 300 mg, 300 mg, Oral, BID (AM & Afternoon), Mariam Heart MD, 300 mg at 02/15/25 0844    gabapentin (NEURONTIN) capsule 600 mg, 600 mg, Oral, HS, Mariam Heart MD, 600 mg at 02/14/25 2229    heparin (porcine) subcutaneous injection 5,000 Units, 5,000 Units, Subcutaneous, Q8H JORGE, 5,000 Units at 02/15/25 0538 **AND** [COMPLETED] Platelet count, , , Once, Mariam Heart MD    insulin glargine (LANTUS) subcutaneous injection 10 Units 0.1 mL, 10 Units, Subcutaneous, HS, Sharron Stoddard MD, 10 Units at 02/14/25 2229    insulin lispro (HumALOG/ADMELOG) 100 units/mL subcutaneous injection 1-6 Units, 1-6 Units, Subcutaneous, TID With Meals, 1 Units at 02/15/25  0846 **AND** Fingerstick Glucose (POCT), , , TID AC, Sharron Stoddard MD    insulin lispro (HumALOG/ADMELOG) 100 units/mL subcutaneous injection 5 Units, 5 Units, Subcutaneous, TID With Meals, Sharron Stoddard MD, 5 Units at 02/15/25 0846    lidocaine (LIDODERM) 5 % patch 1 patch, 1 patch, Topical, Daily, Aylin Brown MD, 1 patch at 02/12/25 0908    methocarbamol (ROBAXIN) tablet 500 mg, 500 mg, Oral, Q6H JORGE, Mariam Heart MD, 500 mg at 02/15/25 0538    metoclopramide (REGLAN) injection 10 mg, 10 mg, Intravenous, Q6H PRN, Mariam Heart MD, 10 mg at 02/11/25 1452    naloxone (NARCAN) injection 0.1 mg, 0.1 mg, Intravenous, Q1MIN PRN, NAOMY Lemus    nitroglycerin (NITROSTAT) SL tablet 0.4 mg, 0.4 mg, Sublingual, Q5 Min PRN, Fabian Milton MD, 0.4 mg at 02/11/25 1853    ondansetron (ZOFRAN) injection 4 mg, 4 mg, Intravenous, Q6H PRN, Mariam Heart MD, 4 mg at 02/05/25 1459    oxyCODONE (ROXICODONE) immediate release tablet 10 mg, 10 mg, Oral, Q4H PRN, Aylin Brown MD, 10 mg at 02/15/25 0859    oxyCODONE (ROXICODONE) IR tablet 5 mg, 5 mg, Oral, Q4H PRN, Aylin Brown MD    pantoprazole (PROTONIX) EC tablet 40 mg, 40 mg, Oral, Early Morning, Mariam Heart MD, 40 mg at 02/15/25 0538    polyethylene glycol (MIRALAX) packet 17 g, 17 g, Oral, Daily, Mariam Heart MD, 17 g at 02/14/25 0809    senna-docusate sodium (SENOKOT S) 8.6-50 mg per tablet 1 tablet, 1 tablet, Oral, HS, Mariam Heart MD, 1 tablet at 02/14/25 2229    trimethobenzamide (TIGAN) IM injection 200 mg, 200 mg, Intramuscular, Q6H PRN, Mariam Heart MD    SKIN INTEGRITY:   Right groin catheter entry/exit site  Left BKA site    PRIOR LEVEL OF FUNCTION:  She lives in a(n) single family home  Lizzy Acuna has unknown marital status and lives with their family.  Self Care: Independent, Indoor Mobility: Independent, Stairs (in/outdoor): Independent, and Cognition: Independent    FALLS IN THE LAST 6 MONTHS: 1    HOME ENVIRONMENT:  The living area:  patient  "lives in a two level home  There are Greater than 10 steps to enter the home.    The patient will have 24 hour supervision/physical assistance available upon discharge.    PREVIOUS DME:  Equipment in home (previous DME): Shower Chair, Grab Bars, Rolling Walker, and rollator    FUNCTIONAL STATUS:  Physical Therapy Occupational Therapy Speech Therapy   02/14/25 1510    PT Last Visit   PT Visit Date 02/14/25   Note Type   Note Type Treatment for insurance authorization   End of Consult   Patient Position at End of Consult Supine;All needs within reach;Bed/Chair alarm activated   Pain Assessment   Pain Assessment Tool 0-10   Pain Score 8   Pain Location/Orientation Orientation: Left;Location: Leg   Pain Onset/Description Onset: Ongoing   Effect of Pain on Daily Activities limits functional mobility   Patient's Stated Pain Goal No pain   Hospital Pain Intervention(s) Repositioned;Ambulation/increased activity;Emotional support;Rest   Restrictions/Precautions   Weight Bearing Precautions Per Order Yes   LLE Weight Bearing Per Order (S)  NWB  (s/p L BKA)   Braces or Orthoses Knee immobilizer   Other Precautions Chair Alarm;Bed Alarm;Fall Risk;Pain;WBS;Contact/isolation  (L BKA)   General   Chart Reviewed Yes   Response to Previous Treatment Patient with no complaints from previous session.   Family/Caregiver Present No   Cognition   Overall Cognitive Status WFL   Arousal/Participation Alert;Responsive;Cooperative   Attention Within functional limits   Orientation Level Oriented X4   Memory Within functional limits   Following Commands Follows one step commands without difficulty   Comments patient pleasant and cooperative, good insight of functional deficits and safety awareness, highly motivated to participate   Subjective   Subjective \"I want to go to rehab today\"   Bed Mobility   Supine to Sit 5  Supervision   Additional items HOB elevated;Bedrails;Increased time required;Verbal cues   Sit to Supine 5  Supervision "   Additional items HOB elevated;Bedrails;Increased time required;Verbal cues   Additional Comments sonia found and left supine in bed with alarm and all needs met   Transfers   Sit to Stand 5  Supervision   Additional items Increased time required;Verbal cues   Stand to Sit 5  Supervision   Additional items Increased time required;Verbal cues   Additional Comments x2 STS with RW   Ambulation/Elevation   Gait pattern Decreased foot clearance;Short stride;Step to  (RLE hops)   Gait Assistance 4  Minimal assist   Additional items Assist x 1;Verbal cues;Tactile cues   Assistive Device Rolling walker   Distance 40'x2   Stair Management Assistance Not tested   Ambulation/Elevation Additional Comments Patient demonstrated the ability to ambulate 40'x2 with RW, requiring intermittent verbal cues for RW management and hallway navigation to improve patient safety and reduce risk of falls. Patient required intermittent standing rest breaks due to generalized LE weakness and decreased activity tolerance. Further ambulation limited at this time due to increase in LLE pain.   Balance   Static Sitting Good   Dynamic Sitting Fair +   Static Standing Fair -   Dynamic Standing Poor +   Ambulatory Poor   Endurance Deficit   Endurance Deficit Yes   Endurance Deficit Description generalized weakness, fatigue, pain, s/p L BKA   Activity Tolerance   Activity Tolerance Patient limited by fatigue;Patient limited by pain   Medical Staff Made Aware KASSI Ball   Nurse Made Aware RN cleared and updated   Assessment   Prognosis Good   Problem List Decreased strength;Decreased endurance;Impaired balance;Decreased mobility;Pain   Assessment PT initiated treatment session in order to assist patient in achieving goals to improve transfers, gait training, and overall activity tolerance. Patient demonstrated progress toward achieving functional mobility goals as evidenced by improving activity tolerance, ambulation, and overall mobility. Patient  pleasant, cooperative, and agreeable to today's treatment session. Patient received supine in bed. Patient is currently supervision bed mobility, transfers, and MinAx1 ambulation. Throughout treatment session patient required both verbal and tactile cuing to improve safety, efficiency, and mechanics of mobility in addition to hands on assistance for all aspects of functional mobility. Additionally, pt required increased time to execute specific mobility tasks with rest breaks in between secondary to gross fatigue and weakness. Patient demonstrated the ability to ambulate 40'x2 with RLE hops and RW, requiring intermittent verbal cues for RW management and hallway navigation to improve patient safety and reduce risk of falls. Patient required intermittent standing rest breaks due to generalized LE weakness and decreased activity tolerance. Further ambulation limited at this time due to increase in LLE pain. Patient left supine in bed (per patient request) with alarm and all needs met.         Patient will benefit from continued skilled physical therapy to address gait / balance dysfunction, decreased activity tolerance, and generalized weakness. The patients AM-PAC Basic Mobility Inpatient Short From Raw Score is 16 .  Based on AM-PAC scoring and patient presentation, PT currently recommending Level I (Maximum Resource Intensity). Please also refer to the recommendation of the Physical Therapist for safe discharge planning.    02/15/25 0905    OT Last Visit   OT Visit Date 02/15/25   Note Type   Note Type Treatment   Pain Assessment   Pain Assessment Tool 0-10   Pain Score 8   Pain Location/Orientation Orientation: Left;Location: Leg   Hospital Pain Intervention(s) Repositioned;Ambulation/increased activity   Restrictions/Precautions   Weight Bearing Precautions Per Order Yes   LLE Weight Bearing Per Order (S)  NWB   Braces or Orthoses Knee immobilizer   Other Precautions WBS;Fall Risk;Pain;Contact/isolation    Lifestyle   Autonomy Pt reports I w/ ADLs, IADLs, and fxnl mobility w/ rollator at baseline; + driving.   Reciprocal Relationships Pt lives w/ her mother, daughters (7 and 18 y/o) and grandchild.   Intrinsic Gratification Pt enjoys watching movies and TV; also being active.   ADL   Where Assessed Edge of bed   Grooming Assistance 5  Supervision/Setup   UB Bathing Assistance 5  Supervision/Setup   LB Bathing Assistance 3  Moderate Assistance   UB Dressing Assistance 5  Supervision/Setup   LB Dressing Assistance 3  Moderate Assistance   Toileting Assistance  3  Moderate Assistance   Bed Mobility   Supine to Sit 5  Supervision   Additional items Increased time required;Verbal cues   Sit to Supine 5  Supervision   Additional items Increased time required;Verbal cues   Transfers   Sit to Stand 5  Supervision   Additional items Increased time required;Verbal cues   Stand to Sit 5  Supervision   Additional items Increased time required;Verbal cues   Additional Comments with rw   Functional Mobility   Functional Mobility 4  Minimal assistance   Additional Comments Pt requires MIN A to hop short household distances with rw   Additional items Rolling walker   Cognition   Overall Cognitive Status WFL   Arousal/Participation Alert;Responsive;Cooperative   Attention Within functional limits   Orientation Level Oriented X4   Memory Within functional limits   Following Commands Follows one step commands without difficulty   Comments Pt agreeable to therapy, pt with good safety awareness   Additional Activities   Additional Activities Comments Pt educated on phantom limb   Activity Tolerance   Activity Tolerance Patient tolerated treatment well   Medical Staff Made Aware RN Cleared   Assessment   Assessment Pt was seen on 5/15/2025 to address ADL retraining, functional transfer training, and activity tolerance/endurance. Pt with active OT orders and activity orders. Pt demonstrating improvements and currently requires supervision  to complete bed mobility and functional transfers. Pt requires MIN A to hop short household distances with rw. Pt educated on phantom limb. Pt is limited by decreased ADL status, functional transfers, functional mobility, and activity tolerance. Pt supine in bed at beginning of session and seated EOB at end of session with all items within reach. The patient's raw score on the -PAC Daily Activity Inpatient Short Form is 17. A raw score of less than 19 suggests the patient may benefit from discharge to post-acute rehabilitation services. Please refer to the recommendation of the Occupational Therapist for safe discharge planning. Recommend Level I maximum intensity OT services at d/c to maximize pt function.         CARE SCORES:  Self Care:  Eatin: Supervision or touching  assistance  Oral hygiene: 04: Supervision or touching  assistance  Toilet hygiene: 03: Partial/moderate assistance  Shower/bathing self: 03: Partial/moderate assistance  Upper body dressin: Supervision or touching  assistance  Lower body dressin: Partial/moderate assistance  Putting on/taking off footwear: 03: Partial/moderate assistance  Transfers:  Roll left and right: 09: Not applicable  Sit to lyin: Supervision or touching  assistance  Lying to sitting on side of bed: 04: Supervision or touching  assistance  Sit to stand: 04: Supervision or touching  assistance  Chair/bed to chair transfer: 04: Supervision or touching  assistance  Toilet transfer: 09: Not applicable  Mobility:  Walk 10 ft: 03: Partial/moderate assistance  Walk 50 ft with two turns: 03: Partial/moderate assistance  Walk 150ft: 88: Not attempted due to medical conditions or safety concerns    CURRENT GAP IN FUNCTION  Prior to Admission: Functional Status: Patient was independent with mobility/ambulation, transfers, ADL's, IADL's.    Expected functional outcomes: It is expected that with skilled acute rehabilitation services the patient will progress to  "Independent for self care and Independent for mobility     Estimated length of stay: 10 to 14 days    Anticipated Post-Discharge Disposition/Treatment  Disposition: Return to previous home/apartment.  Outpatient Services: Physical Therapy (PT) and Occupational Therapy (OT)    BARRIERS TO DISCHARGE  Home Accessibility;Decreased strength;Decreased endurance;Impaired balance;Decreased mobility;Pain; Decreased ADL status;Decreased self-care trans;Decreased high-level ADLs     INTERVENTIONS FOR DISCHARGE  ADL retraining;Functional transfer training;LE strengthening/ROM;Therapeutic exercise;Endurance training;Patient/family training;Bed mobility;Gait training;Compensatory technique education;Continued evaluation;Energy conservation;Activity engagement     REQUIRED THERAPY:  Patient will require PT and OT 90 minutes each per day, five days per week to achieve rehab goals.     REQUIRED FUNCTIONAL AND MEDICAL MANAGEMENT FOR INPATIENT REHABILITATION:  Skin:  There are no pressure sores currently, patient need close monitoring of her skin for any breakdown secondary to decreased mobility.  She will also need close monitoring of her left BKA site for any complications , Pain Management: Overall pain is moderately controlled, Deep Vein Thrombosis (DVT) Prophylaxis:  low molecular weight heparin and SCD RLE, Diabetes Management: continue sliding scale insulin, patient to do finger sticks as ordered, SLIM to continue to manage diabetes, and other medical conditions, PT and OT interventions, any labs, consults, imaging and medication adjustments patient may need while on ARC    RECOMMENDED LEVEL OF CARE:   Per PM&R consult \"Lizzy Acuna is a 49 y.o. female with PMH of DM2, HTN, HLD, obesity, PAD s/p L TMA 1/3/2025 who presented with worsening L foot pain/wound and met sepsis criteria who was started on broad spectrum abx, IVF believed to be 2/2 L diabetic foot wound.  Patient underwent L SFA thrombectomy, placement of lysis " "catheter on 1/31 and lysis recheck with angioplasty of L SFA, pop, and AT on 2/3, L pop artery and prox AT angioplasty and recannulation for acute occlusion on 2/6 by IR, followed by arteriogram with balloon angioplasty of L pop, AT, including angioplasty of L pop and AT with cutting angioplasty balloon 2/7 by [facility] sx\".  Residual foot was eventually deemed non-salvageable with and patient returned to the OR on 2/11 and is s/p left BKA.  Patient became hypertensive on 2/5 with SBP in the 200s.  She was started on chlorthalidone with improvement.  Additionally, she developed blood loss anemia secondary to lysis, bleeding from TMA.  Hemoglobin now stable.  She was also hyponatremic.  She does have a h/o chronic hyponatremia.  Current differentials include SIADH in the setting of infection.  Patient worked with therapy and was recommended for rehab following her hospitalization.  She has demonstrated that she can tolerate three hours of therapy, five days a week and is now medically cleared for discharge to the [facility].      Prior to admission, patient independent with her ADLs and IADLs and independently used a rollator at baseline She is now functioning below her baseline requiring Supervision to Mod assistance to complete her ADLs.  With PT, she is requiring supervision for bed mobility and transfers.  She ambulated 40 feet x 2 with a RW and min x 1 assistance.  Gait pattern: decreased foot clearance, short stride, step to (RLE hops)    Patient requires close oversight by a physician, PM&R management, 24/7 rehabilitation nursing care and specialized interdisciplinary therapy services in preparation for discharge which can only be provided in the inpatient acute rehabilitation setting.  While on [facility], this patient will need and close monitoring of her VS, I/Os, skin, pain level, blood sugars, LLE BKA site and her overall condition.  Inpatient acute rehabilitation is recommended for this patient to maximize her overall " strength, endurance, self-care and mobility upon discharge back to her home with the support of her family.

## 2025-02-15 NOTE — QUICK NOTE
PMR On Call    Contacted by nursing, patient with 10/10 pain. Previously was behind the knee, now on top. Oxycodone 10mg didn't help. Will order venous duplex to r/o DVT, but low suspicion. Rotating opioids to dilaudid oral. Will also add lidoderm for knee. Per nursing popliteal pulse palpable. Asked them to continue neurovascular checks Q4hr.     Monitor for now.    Ashley de Padua, MD  Physical Medicine and Rehabilitation

## 2025-02-15 NOTE — QUICK NOTE
PMR On Call    Contacted by nursing, patient with 10/10 pain. Previously was behind the knee, now on top. Oxycodone 10mg didn't help. Will order venous duplex to r/o DVT, but low suspicion. Rotating opioids to dilaudid oral. Will also add lidoderm for knee.    Monitor for now.    Ashley de Padua, MD  Physical Medicine and Rehabilitation

## 2025-02-16 LAB
GLUCOSE SERPL-MCNC: 211 MG/DL (ref 65–140)
GLUCOSE SERPL-MCNC: 237 MG/DL (ref 65–140)
GLUCOSE SERPL-MCNC: 245 MG/DL (ref 65–140)
GLUCOSE SERPL-MCNC: 277 MG/DL (ref 65–140)

## 2025-02-16 PROCEDURE — 97535 SELF CARE MNGMENT TRAINING: CPT

## 2025-02-16 PROCEDURE — 97167 OT EVAL HIGH COMPLEX 60 MIN: CPT

## 2025-02-16 PROCEDURE — 97163 PT EVAL HIGH COMPLEX 45 MIN: CPT | Performed by: PHYSICAL THERAPIST

## 2025-02-16 PROCEDURE — 97530 THERAPEUTIC ACTIVITIES: CPT

## 2025-02-16 PROCEDURE — 97542 WHEELCHAIR MNGMENT TRAINING: CPT | Performed by: PHYSICAL THERAPIST

## 2025-02-16 PROCEDURE — 82948 REAGENT STRIP/BLOOD GLUCOSE: CPT

## 2025-02-16 PROCEDURE — 97530 THERAPEUTIC ACTIVITIES: CPT | Performed by: PHYSICAL THERAPIST

## 2025-02-16 RX ADMIN — HYDROMORPHONE HYDROCHLORIDE 4 MG: 4 TABLET ORAL at 20:35

## 2025-02-16 RX ADMIN — INSULIN LISPRO 5 UNITS: 100 INJECTION, SOLUTION INTRAVENOUS; SUBCUTANEOUS at 16:26

## 2025-02-16 RX ADMIN — DIAZEPAM 5 MG: 5 TABLET ORAL at 21:28

## 2025-02-16 RX ADMIN — ASPIRIN 81 MG: 81 TABLET, COATED ORAL at 08:33

## 2025-02-16 RX ADMIN — HYDROMORPHONE HYDROCHLORIDE 4 MG: 4 TABLET ORAL at 12:07

## 2025-02-16 RX ADMIN — INSULIN GLARGINE 10 UNITS: 100 INJECTION, SOLUTION SUBCUTANEOUS at 21:28

## 2025-02-16 RX ADMIN — HYDROMORPHONE HYDROCHLORIDE 4 MG: 4 TABLET ORAL at 01:18

## 2025-02-16 RX ADMIN — ACETAMINOPHEN 975 MG: 325 TABLET ORAL at 17:25

## 2025-02-16 RX ADMIN — INSULIN LISPRO 2 UNITS: 100 INJECTION, SOLUTION INTRAVENOUS; SUBCUTANEOUS at 08:34

## 2025-02-16 RX ADMIN — INSULIN LISPRO 5 UNITS: 100 INJECTION, SOLUTION INTRAVENOUS; SUBCUTANEOUS at 12:07

## 2025-02-16 RX ADMIN — POLYETHYLENE GLYCOL 3350 17 G: 17 POWDER, FOR SOLUTION ORAL at 08:33

## 2025-02-16 RX ADMIN — METHOCARBAMOL 500 MG: 500 TABLET ORAL at 17:26

## 2025-02-16 RX ADMIN — HEPARIN SODIUM 5000 UNITS: 5000 INJECTION INTRAVENOUS; SUBCUTANEOUS at 05:58

## 2025-02-16 RX ADMIN — DOCUSATE SODIUM 100 MG: 100 CAPSULE, LIQUID FILLED ORAL at 08:33

## 2025-02-16 RX ADMIN — DULOXETINE HYDROCHLORIDE 30 MG: 30 CAPSULE, DELAYED RELEASE ORAL at 08:33

## 2025-02-16 RX ADMIN — ACETAMINOPHEN 975 MG: 325 TABLET ORAL at 05:57

## 2025-02-16 RX ADMIN — ATORVASTATIN CALCIUM 80 MG: 80 TABLET, FILM COATED ORAL at 16:26

## 2025-02-16 RX ADMIN — GABAPENTIN 300 MG: 300 CAPSULE ORAL at 14:37

## 2025-02-16 RX ADMIN — DOCUSATE SODIUM 100 MG: 100 CAPSULE, LIQUID FILLED ORAL at 17:26

## 2025-02-16 RX ADMIN — GABAPENTIN 600 MG: 300 CAPSULE ORAL at 21:28

## 2025-02-16 RX ADMIN — CHLORTHALIDONE 12.5 MG: 25 TABLET ORAL at 08:33

## 2025-02-16 RX ADMIN — INSULIN LISPRO 3 UNITS: 100 INJECTION, SOLUTION INTRAVENOUS; SUBCUTANEOUS at 12:06

## 2025-02-16 RX ADMIN — METHOCARBAMOL 500 MG: 500 TABLET ORAL at 05:58

## 2025-02-16 RX ADMIN — HEPARIN SODIUM 5000 UNITS: 5000 INJECTION INTRAVENOUS; SUBCUTANEOUS at 14:37

## 2025-02-16 RX ADMIN — INSULIN LISPRO 5 UNITS: 100 INJECTION, SOLUTION INTRAVENOUS; SUBCUTANEOUS at 08:34

## 2025-02-16 RX ADMIN — METHOCARBAMOL 500 MG: 500 TABLET ORAL at 11:44

## 2025-02-16 RX ADMIN — INSULIN LISPRO 3 UNITS: 100 INJECTION, SOLUTION INTRAVENOUS; SUBCUTANEOUS at 16:26

## 2025-02-16 RX ADMIN — HEPARIN SODIUM 5000 UNITS: 5000 INJECTION INTRAVENOUS; SUBCUTANEOUS at 21:27

## 2025-02-16 RX ADMIN — PANTOPRAZOLE SODIUM 40 MG: 40 TABLET, DELAYED RELEASE ORAL at 05:58

## 2025-02-16 RX ADMIN — HYDROMORPHONE HYDROCHLORIDE 4 MG: 4 TABLET ORAL at 16:30

## 2025-02-16 RX ADMIN — GABAPENTIN 300 MG: 300 CAPSULE ORAL at 08:33

## 2025-02-16 RX ADMIN — HYDROMORPHONE HYDROCHLORIDE 4 MG: 4 TABLET ORAL at 05:58

## 2025-02-16 RX ADMIN — SENNOSIDES 17.2 MG: 8.6 TABLET, FILM COATED ORAL at 21:28

## 2025-02-16 NOTE — PROGRESS NOTES
02/16/25 0700   Patient Data   Rehab Impairment Amputation:  05.4  Unilateral Lower Limb Below the Knee   Etiologic Diagnosis Per EMR:  48 yo female, PMH T2DM, HTN, obesity, PAD with recent TMA of left foot. She presented due to nonhealing of TMA. She was also found to have cellulitis, MRI showed no evidence of osteomyelitis. Wound culture grew MRSA, so pt was on vancomycin with ID following. Vascular and podiatry teams were following as well. Angiogram with lysis catheter placement on 1/31 with subsequent angioplasty of L SFA, pop, and AT on 2/3. Repeat angiogram 2/6 showed reocclusion of popliteal. Pt ultimately underwent L BKA on 2/11. APS was following intermittently during admission for help with pain control. PMR was consulted and recommended ARC. Pt stable for discharge from vascular and podiatry standpoints. Her stay was otherwise c/b pain between shoulder blades with diaphoresis after surgery on 2/11, cardiac workup and imaging to r/o other concerning etiology such as dissection was unremarkable and pain resolved by next morning with no recurrences.   Support System   Relationship Mother, 18 y/o daughter; 7 y/o son; 1.6 y/o grand child   Home Setup   Home Modification Comment Street parking, 2 steps, no HRs to sidewalk.  Sidewalk to 4 steps with left HR, side walk to 7 steps with left HR.  2 story house, FF to bedrooms.  Full bathroom on first floor with shower chair.  Used rollator previously.  Mother’s bedroom/living area is on first floor where living room was converted.  Pt plans to stay on this level if needed and sleep in a recliner until able to do up/down stairs.  Does not presently have support to put in additional hand rails as ex- would be the individual to assist with this and he is presently incarcerated.  Mother unable to assist with mobility as she has health issues and was moved to first floor set up because of near miss fall concerns.  Grandson is home with patient, does not go to  day care at this time.  Pt has not returned to work since December.  Previously employed at [employer] in [place].   Available Equipment Rollator;Roller Walker;Shower Chair  (Pt reports rollator is her mother's)   Baseline Information   Transportation Family/friends drive   Prior Device(s) Used Rollator  (Pt used rollator as knee scooter.)   Prior Level of Function   Indoor-Mobility (Ambulation) 3. Independent - Patient completed the activities by him/herself, with or without an assistive device, with no assistance from a helper.   Stairs 3. Independent - Patient completed the activities by him/herself, with or without an assistive device, with no assistance from a helper.   Prior Device Used D. Walker   Falls in the Last Year   Number of falls in the past 12 months 0   Restrictions/Precautions   Precautions Bed/chair alarms;Fall Risk;Contact/isolation;Pain;Supervision on toilet/commode  (MRSA left foot; NWBing left LE)   LLE Weight Bearing Per Order NWB   Pain Assessment   Pain Assessment Tool 0-10   Pain Score 8   Pain Location/Orientation Orientation: Left;Location: Foot   Pain Onset/Description   (Denies phantom sensations and instead voices significant pain)   Transfer Bed/Chair/Wheelchair   Positioning Concerns Skin Integrity   Limitations Noted In Balance;Coordination;Endurance;Pain Management;UE Strength;LE Strength   Findings Direct squat pivot bed to/from wheelchair   Type of Assistance Needed Physical assistance   Physical Assistance Level 26%-50%   Comment min A, transfer to left side   Chair/Bed-to-Chair Transfer CARE Score 3   Roll Left and Right   Type of Assistance Needed Supervision   Physical Assistance Level No physical assistance   Roll Left and Right CARE Score 4   Sit to Lying   Type of Assistance Needed Incidental touching   Physical Assistance Level No physical assistance   Comment CGA   Sit to Lying CARE Score 4   Lying to Sitting on Side of Bed   Type of Assistance Needed Physical  "assistance   Physical Assistance Level 25% or less   Lying to Sitting on Side of Bed CARE Score 3   Sit to Stand   Type of Assistance Needed Physical assistance   Physical Assistance Level 26%-50%   Comment min A, cues for hand placement   Sit to Stand CARE Score 3   Car Transfer   Comment SUV   Reason if not Attempted Environmental limitations   Car Transfer CARE Score 10   Ambulation   Findings Pain limited this session, able to perform sit to stand with static standing tolerance of 1 mins, however, ambulation deferred due to \"hopping\" increasing pain.   Walk 10 Feet   Reason if not Attempted Medical concerns   Walk 10 Feet CARE Score 88   Walk 50 Feet with Two Turns   Reason if not Attempted Medical concerns   Walk 50 Feet with Two Turns CARE Score 88   Walk 150 Feet   Reason if not Attempted Medical concerns   Walk 150 Feet CARE Score 88   Walking 10 Feet on Uneven Surfaces   Reason if not Attempted Medical concerns   Walking 10 Feet on Uneven Surfaces CARE Score 88   Wheelchair mobility   Method Right upper extremity;Left upper extremity   Assistance Provided For Locking Brakes;Obstacles;Remove Leg Rest;Replace Leg Rest   Distance Level Surface (feet) 300 ft   Distance Wheeled 3% Grade 20 ft   Findings Amp board placed   Type of Wheelchair Used 1. Manual   Wheel 50 Feet with Two Turns   Type of Assistance Needed Supervision   Physical Assistance Level No physical assistance   Wheel 50 Feet with Two Turns CARE Score 4   Wheel 150 Feet   Type of Assistance Needed Supervision   Physical Assistance Level No physical assistance   Wheel 150 Feet CARE Score 4   4 Steps   Reason if not Attempted Safety concerns   4 Steps CARE Score 88   12 Steps   Reason if not Attempted Safety concerns   12 Steps CARE Score 88   RLE Assessment   RLE Assessment X   Strength RLE   R Hip Flexion 3/5   R Hip Extension 3/5   R Hip ABduction 3/5   R Hip ADduction 3/5   R Knee Flexion 3+/5   R Knee Extension 3+/5   R Ankle Dorsiflexion 4-/5 "   R Ankle Plantar Flexion 4-/5   Strength LLE   L Hip Flexion 3-/5   L Hip Extension 3-/5   L Hip ABduction 3-/5   L Hip ADduction 3/5   L Knee Flexion 3-/5   L Knee Extension 3-/5   Coordination   Movements are Fluid and Coordinated 0   Coordination and Movement Description Increased co-contraction with pain limitation   Sensation   Additional Comments Hypersensitive to touch   Cognition   Overall Cognitive Status WFL   Orientation Level Oriented X4   Objective Measure   PT Findings Dressing taken down with RN, applied dressing and wrapped.  Handout provided   Discharge Information   Patient's Discharge Plan To return home   Patient's Rehab Expectations To be more independent   Impressions Pt is 49 year old female seen for PT evaluation on 2/16/25 s/p admit to [facility] on 2/15/25 w/ left BKA.  Orders placed at admission.  Performed at least 2 patient identifiers during session: Name and wristband. Comorbidities affecting pt's physical performance at time of assessment include: obesity, DM II, GERD, PAD, anemia, HTN, adrenal nodule, anxiety, sciatica.. LOF prior to admission was independent with AE/AD. Personal factors affecting pt at time of IE include: weakness, decreased ROM, balance, endurance, NWBing L LE, pain. Please find objective findings from PT assessment regarding body systems outlined above with impairments and limitations including weakness, impaired balance, decreased endurance, pain, decreased activity tolerance and fall risk, as well as mobility assessment.  Pt's clinical presentation warrants participation in multidisciplinary acute rehab program at 3 or greater hours of therapy per day. Pt to benefit from continued PT tx to address deficits as defined above and maximize level of functional independent mobility and consistency.  PT for improved safe return home with maximized functional mobility.   PT Therapy Minutes   PT Time In 0700   PT Time Out 0815   PT Total Time (minutes) 75   PT Mode  of treatment - Individual (minutes) 75   PT Mode of treatment - Concurrent (minutes) 0   PT Mode of treatment - Group (minutes) 0   PT Mode of treatment - Co-treat (minutes) 0   PT Mode of Treatment - Total time(minutes) 75 minutes   PT Cumulative Minutes 75   Cumulative Minutes   Cumulative therapy minutes 75     Patient remains OOB in chair, all needs in reach.  Alarm in place and activated.  Encouraged use of call bell, patient verbalizes understanding.

## 2025-02-16 NOTE — NURSING NOTE
Pt C/O pain 10/10 to left BKA. Neurovas checks completed-WNL. Doppler used for confirmation of palpable pulse-present. Unwrapped site and inspected-WNL. New dressing applied. Scheduled tylenol and robaxin admin as ordered-pt not due for PRN dilaudid at this time. Ice placed at pain site. Repositioned. Will continue to monitor. Bed alarm intact.

## 2025-02-16 NOTE — NURSING NOTE
Pt resting in bed, c/o pain 8/10 to LBKA. Medicated for pain as ordered. PRN ice to stump. Pt states she slept a little bit overnight. Neurovascular checks WNL-refer to flowsheets. Cont b&b, SPT mod ax1. Continuing to monitor, bed alarm intact for safety.

## 2025-02-16 NOTE — PLAN OF CARE
Problem: PAIN - ADULT  Goal: Verbalizes/displays adequate comfort level or baseline comfort level  Description: Interventions:  - Encourage patient to monitor pain and request assistance  - Assess pain using appropriate pain scale  - Administer analgesics based on type and severity of pain and evaluate response  - Implement non-pharmacological measures as appropriate and evaluate response  - Consider cultural and social influences on pain and pain management  - Notify physician/advanced practitioner if interventions unsuccessful or patient reports new pain  Outcome: Progressing     Problem: INFECTION - ADULT  Goal: Absence or prevention of progression during hospitalization  Description: INTERVENTIONS:  - Assess and monitor for signs and symptoms of infection  - Monitor lab/diagnostic results  - Monitor all insertion sites, i.e. indwelling lines, tubes, and drains  - Monitor endotracheal if appropriate and nasal secretions for changes in amount and color  - Natalia appropriate cooling/warming therapies per order  - Administer medications as ordered  - Instruct and encourage patient and family to use good hand hygiene technique  - Identify and instruct in appropriate isolation precautions for identified infection/condition  Outcome: Progressing     Problem: SAFETY ADULT  Goal: Patient will remain free of falls  Description: INTERVENTIONS:  - Educate patient/family on patient safety including physical limitations  - Instruct patient to call for assistance with activity   - Consult OT/PT to assist with strengthening/mobility   - Keep Call bell within reach  - Keep bed low and locked with side rails adjusted as appropriate  - Keep care items and personal belongings within reach  - Initiate and maintain comfort rounds  - Make Fall Risk Sign visible to staff  - Apply yellow socks and bracelet for high fall risk patients  - Consider moving patient to room near nurses station  Outcome: Progressing  Goal: Maintain or  return to baseline ADL function  Description: INTERVENTIONS:  -  Assess patient's ability to carry out ADLs; assess patient's baseline for ADL function and identify physical deficits which impact ability to perform ADLs (bathing, care of mouth/teeth, toileting, grooming, dressing, etc.)  - Assess/evaluate cause of self-care deficits   - Assess range of motion  - Assess patient's mobility; develop plan if impaired  - Assess patient's need for assistive devices and provide as appropriate  - Encourage maximum independence but intervene and supervise when necessary  - Involve family in performance of ADLs  - Assess for home care needs following discharge   - Consider OT consult to assist with ADL evaluation and planning for discharge  - Provide patient education as appropriate  Outcome: Progressing     Problem: DISCHARGE PLANNING  Goal: Discharge to home or other facility with appropriate resources  Description: INTERVENTIONS:  - Identify barriers to discharge w/patient and caregiver  - Arrange for needed discharge resources and transportation as appropriate  - Identify discharge learning needs (meds, wound care, etc.)  - Arrange for interpretive services to assist at discharge as needed  - Refer to Case Management Department for coordinating discharge planning if the patient needs post-hospital services based on physician/advanced practitioner order or complex needs related to functional status, cognitive ability, or social support system  Outcome: Progressing     Problem: Knowledge Deficit  Goal: Patient/family/caregiver demonstrates understanding of disease process, treatment plan, medications, and discharge instructions  Description: Complete learning assessment and assess knowledge base.  Interventions:  - Provide teaching at level of understanding  - Provide teaching via preferred learning methods  Outcome: Progressing

## 2025-02-16 NOTE — PROGRESS NOTES
02/16/25 1230   Patient Data   Rehab Impairment Impairment of mobility, safety and Activities of Daily Living (ADLs) due to Amputation:  05.4  Unilateral Lower Limb Below the Knee   Etiologic Diagnosis L BKA   Support System   Name   (Pt reported resides with mother, two daughter (19 years old and 8 years old) and grandson (1.5 years old))   Able to provide physical help? Yes  (Daughter)   Home Setup   Type of Home Multi Level   Method of Entry   (Street parking, 2 steps with no railing, sidewalk, 4 steps with handrail on left, then 7 steps to porch/home entrance with handrail on left)   Number of Stairs in Home 14   In Home Hand Rail Left   First Floor Bathroom Full   First Floor Bathroom Accessibility Grab bars in tub/shower;Built in shower seat;Shower chair  (Purchased grab bar for by toilet but needs to be installed)   First Floor Setup Available Yes  (Pt reported bedroom on second floor however reported can stay on first floor temporarily)   Available Equipment Roller Walker;Rollator;Shower Chair  (Pt reported rollator is her motheres)   Baseline Information   Vocation Work Full Time  (Has not returned to work since December)   Transportation Family/friends drive   Prior Device(s) Used Rollator  (Pt reported used rollator as knee scooter)   Prior IADL Participation   Money Management Manage Checkbook   Meal Preparation Full Participation   Laundry Full Participation   Home Cleaning Full Participation   Prior Level of Function   Self-Care 3. Independent - Patient completed the activities by him/herself, with or without an assistive device, with no assistance from a helper.   Indoor-Mobility (Ambulation) 3. Independent - Patient completed the activities by him/herself, with or without an assistive device, with no assistance from a helper.   Stairs 3. Independent - Patient completed the activities by him/herself, with or without an assistive device, with no assistance from a helper.   Prior Device Used DShakila  Walker   Falls in the Last Year   Number of falls in the past 12 months 0   Psychosocial   Psychosocial (WDL) WDL   Patient Behaviors/Mood Calm;Cooperative   Restrictions/Precautions   Precautions Bed/chair alarms;Fall Risk;Contact/isolation;Pain;Supervision on toilet/commode  (L LE NWB)   LLE Weight Bearing Per Order NWB   Pain Assessment   Pain Assessment Tool 0-10   Pain Score 7   Pain Location/Orientation Orientation: Left;Location: Leg   Oral Hygiene   Type of Assistance Needed Set-up / clean-up   Comment Seated at sink; Mouthwash   Oral Hygiene CARE Score 5   Grooming   Able To Initiate Tasks;Comb/Brush Hair;Wash/Dry Face;Brush/Clean Teeth;Wash/Dry Hands   Limitation Noted In Safety;Strength   Tub/Shower Transfer   Limitations Noted In Balance;Endurance;Problem Solving;ROM;Safety;Sequencing;UE Strength;LE Strength   Adaptive Equipment Grab Bars;Seat with out Back   Assessed Shower   Findings Direct squat pivot transfer WC<>shower seat with Min A   Shower/Bathe Self   Type of Assistance Needed Physical assistance   Physical Assistance Level 26%-50%   Comment CHG solution; L residual limb covered with plastic and maintained dry   Shower/Bathe Self CARE Score 3   Bathing   Assessed Bath Style Shower   Anticipated D/C Bath Style Shower;Sponge Bath   Able to Gather/Transport No   Able to Adjust Water Temperature Yes   Able to Wash/Rinse/Dry (body part) Left Arm;Right Arm;L Upper Leg;R Upper Leg;Chest;Abdomen;Perineal Area   Limitations Noted in Balance;Endurance;ROM;Safety;Strength   Positioning Seated;Standing   Adaptive Equipment Shower Bars;Shower Seat;Hand Held Shower   Dressing/Undressing Clothing   Remove UB Clothes Other  (Hosiptal gown)   Don UB Clothes Pullover Shirt   Type of Assistance Needed Physical assistance   Physical Assistance Level 25% or less   Upper Body Dressing CARE Score 3   Remove LB Clothes Pants;Undergarment;Socks   Don LB Clothes Pants;Undergarment;Socks   Type of Assistance Needed  Physical assistance   Physical Assistance Level 51%-75%   Lower Body Dressing CARE Score 2   Limitations Noted In Balance;Coordination;Endurance;Safety;Sequencing;Strength;ROM   Positioning Supported Sit;Standing   Putting On/Taking Off Footwear   Type of Assistance Needed Physical assistance   Physical Assistance Level 51%-75%   Comment R sock   Putting On/Taking Off Footwear CARE Score 2   Toileting Hygiene   Type of Assistance Needed Physical assistance   Physical Assistance Level 26%-50%   Toileting Hygiene CARE Score 3   Toilet Transfer   Surface Assessed Standard Toilet   Transfer Technique Stand Pivot   Limitations Noted In Balance;Endurance;ROM;Safety;Sequencing;UE Strength;LE Strength   Adaptive Equipment Grab Bar   Type of Assistance Needed Physical assistance   Physical Assistance Level 26%-50%   Toilet Transfer CARE Score 3   Toileting   Able to Pull Clothing down no, up no.  (Assist to fully pull down/up)   Manage Hygiene Bladder;Bowel   Limitations Noted In Balance;Coordination;ROM;Safety;Sequencing;UE Strength;LE Strength   Adaptive Equipment Grab Bar   Roll Left and Right   Type of Assistance Needed Supervision   Roll Left and Right CARE Score 4   Sit to Lying   Type of Assistance Needed Incidental touching   Comment CGA   Sit to Lying CARE Score 4   Lying to Sitting on Side of Bed   Type of Assistance Needed Physical assistance   Physical Assistance Level 25% or less   Lying to Sitting on Side of Bed CARE Score 3   Sit to Stand   Type of Assistance Needed Physical assistance   Physical Assistance Level 26%-50%   Comment Min A   Sit to Stand CARE Score 3   RUE Assessment   RUE Assessment WFL   LUE Assessment   LUE Assessment WFL   Coordination   Movements are Fluid and Coordinated 0   Sensation   Light Touch No apparent deficits  (B/L UE)   Cognition   Overall Cognitive Status WFL   Arousal/Participation Alert;Responsive;Cooperative   Attention Within functional limits   Discharge Information    Impressions Pt is a 49 year old female currently presented for Occupational Therapy Evaluation. Pt presented s/p L BKA on 2/11; Pt had L TMA on 1/3. Pt with PMH that can impact functional performance can include T2DM, HTN, obesity and PAD; Pt currently NWB with L LE. Pt indicated PLOF Independent with self care, functional transfers and mobility and employed. At this time Pt required assist with ADL routine with up to Min A with UE dressing, up to Mod/Max A with LE dressing; functional transfer up to Min/Mod A; Education regarding overall safety and proper hand placement. Pt would benefit from particiaption with OT services to address barriers with limitations with overall strength, balance, activity tolerence and safety to allow Pt to complete self care routines, transfers and mobiltiy with icnreased safety and indepdence and help support progress towards return to PLOF.   OT Therapy Minutes   OT Time In 1230   OT Time Out 1415   OT Total Time (minutes) 105   OT Mode of treatment - Individual (minutes) 105

## 2025-02-17 LAB
ALBUMIN SERPL BCG-MCNC: 3.7 G/DL (ref 3.5–5)
ALP SERPL-CCNC: 127 U/L (ref 34–104)
ALT SERPL W P-5'-P-CCNC: 32 U/L (ref 7–52)
ANION GAP SERPL CALCULATED.3IONS-SCNC: 11 MMOL/L (ref 4–13)
AST SERPL W P-5'-P-CCNC: 43 U/L (ref 13–39)
BASOPHILS # BLD AUTO: 0.02 THOUSANDS/ΜL (ref 0–0.1)
BASOPHILS NFR BLD AUTO: 0 % (ref 0–1)
BILIRUB SERPL-MCNC: 0.47 MG/DL (ref 0.2–1)
BUN SERPL-MCNC: 20 MG/DL (ref 5–25)
CALCIUM SERPL-MCNC: 10 MG/DL (ref 8.4–10.2)
CHLORIDE SERPL-SCNC: 91 MMOL/L (ref 96–108)
CO2 SERPL-SCNC: 28 MMOL/L (ref 21–32)
CREAT SERPL-MCNC: 0.75 MG/DL (ref 0.6–1.3)
EOSINOPHIL # BLD AUTO: 0.08 THOUSAND/ΜL (ref 0–0.61)
EOSINOPHIL NFR BLD AUTO: 1 % (ref 0–6)
ERYTHROCYTE [DISTWIDTH] IN BLOOD BY AUTOMATED COUNT: 13.6 % (ref 11.6–15.1)
GFR SERPL CREATININE-BSD FRML MDRD: 94 ML/MIN/1.73SQ M
GLUCOSE P FAST SERPL-MCNC: 211 MG/DL (ref 65–99)
GLUCOSE SERPL-MCNC: 193 MG/DL (ref 65–140)
GLUCOSE SERPL-MCNC: 211 MG/DL (ref 65–140)
GLUCOSE SERPL-MCNC: 213 MG/DL (ref 65–140)
GLUCOSE SERPL-MCNC: 216 MG/DL (ref 65–140)
GLUCOSE SERPL-MCNC: 270 MG/DL (ref 65–140)
HCT VFR BLD AUTO: 27.9 % (ref 34.8–46.1)
HGB BLD-MCNC: 9 G/DL (ref 11.5–15.4)
IMM GRANULOCYTES # BLD AUTO: 0.06 THOUSAND/UL (ref 0–0.2)
IMM GRANULOCYTES NFR BLD AUTO: 1 % (ref 0–2)
LYMPHOCYTES # BLD AUTO: 1.9 THOUSANDS/ΜL (ref 0.6–4.47)
LYMPHOCYTES NFR BLD AUTO: 22 % (ref 14–44)
MCH RBC QN AUTO: 29.7 PG (ref 26.8–34.3)
MCHC RBC AUTO-ENTMCNC: 32.3 G/DL (ref 31.4–37.4)
MCV RBC AUTO: 92 FL (ref 82–98)
MONOCYTES # BLD AUTO: 0.5 THOUSAND/ΜL (ref 0.17–1.22)
MONOCYTES NFR BLD AUTO: 6 % (ref 4–12)
NEUTROPHILS # BLD AUTO: 5.96 THOUSANDS/ΜL (ref 1.85–7.62)
NEUTS SEG NFR BLD AUTO: 70 % (ref 43–75)
NRBC BLD AUTO-RTO: 0 /100 WBCS
PLATELET # BLD AUTO: 294 THOUSANDS/UL (ref 149–390)
PMV BLD AUTO: 10.3 FL (ref 8.9–12.7)
POTASSIUM SERPL-SCNC: 3.9 MMOL/L (ref 3.5–5.3)
PROT SERPL-MCNC: 8.1 G/DL (ref 6.4–8.4)
RBC # BLD AUTO: 3.03 MILLION/UL (ref 3.81–5.12)
SODIUM SERPL-SCNC: 130 MMOL/L (ref 135–147)
TSH SERPL DL<=0.05 MIU/L-ACNC: 0.62 UIU/ML (ref 0.45–4.5)
WBC # BLD AUTO: 8.52 THOUSAND/UL (ref 4.31–10.16)

## 2025-02-17 PROCEDURE — 80053 COMPREHEN METABOLIC PANEL: CPT | Performed by: PHYSICAL MEDICINE & REHABILITATION

## 2025-02-17 PROCEDURE — 90677 PCV20 VACCINE IM: CPT | Performed by: FAMILY MEDICINE

## 2025-02-17 PROCEDURE — 99233 SBSQ HOSP IP/OBS HIGH 50: CPT | Performed by: PHYSICAL MEDICINE & REHABILITATION

## 2025-02-17 PROCEDURE — 97530 THERAPEUTIC ACTIVITIES: CPT

## 2025-02-17 PROCEDURE — 85025 COMPLETE CBC W/AUTO DIFF WBC: CPT | Performed by: PHYSICAL MEDICINE & REHABILITATION

## 2025-02-17 PROCEDURE — 90471 IMMUNIZATION ADMIN: CPT | Performed by: FAMILY MEDICINE

## 2025-02-17 PROCEDURE — 82948 REAGENT STRIP/BLOOD GLUCOSE: CPT

## 2025-02-17 PROCEDURE — 97110 THERAPEUTIC EXERCISES: CPT

## 2025-02-17 PROCEDURE — 88307 TISSUE EXAM BY PATHOLOGIST: CPT | Performed by: STUDENT IN AN ORGANIZED HEALTH CARE EDUCATION/TRAINING PROGRAM

## 2025-02-17 PROCEDURE — 97542 WHEELCHAIR MNGMENT TRAINING: CPT

## 2025-02-17 PROCEDURE — 84443 ASSAY THYROID STIM HORMONE: CPT | Performed by: PHYSICAL MEDICINE & REHABILITATION

## 2025-02-17 PROCEDURE — 88311 DECALCIFY TISSUE: CPT | Performed by: STUDENT IN AN ORGANIZED HEALTH CARE EDUCATION/TRAINING PROGRAM

## 2025-02-17 RX ADMIN — SENNOSIDES 17.2 MG: 8.6 TABLET, FILM COATED ORAL at 21:30

## 2025-02-17 RX ADMIN — PANTOPRAZOLE SODIUM 40 MG: 40 TABLET, DELAYED RELEASE ORAL at 05:04

## 2025-02-17 RX ADMIN — INSULIN LISPRO 5 UNITS: 100 INJECTION, SOLUTION INTRAVENOUS; SUBCUTANEOUS at 17:02

## 2025-02-17 RX ADMIN — METHOCARBAMOL 500 MG: 500 TABLET ORAL at 00:14

## 2025-02-17 RX ADMIN — DOCUSATE SODIUM 100 MG: 100 CAPSULE, LIQUID FILLED ORAL at 17:01

## 2025-02-17 RX ADMIN — HYDROMORPHONE HYDROCHLORIDE 2 MG: 2 TABLET ORAL at 02:59

## 2025-02-17 RX ADMIN — HYDROMORPHONE HYDROCHLORIDE 4 MG: 4 TABLET ORAL at 09:54

## 2025-02-17 RX ADMIN — PNEUMOCOCCAL 20-VALENT CONJUGATE VACCINE 0.5 ML
2.2; 2.2; 2.2; 2.2; 2.2; 2.2; 2.2; 2.2; 2.2; 2.2; 2.2; 2.2; 2.2; 2.2; 2.2; 2.2; 4.4; 2.2; 2.2; 2.2 INJECTION, SUSPENSION INTRAMUSCULAR at 09:51

## 2025-02-17 RX ADMIN — GABAPENTIN 600 MG: 300 CAPSULE ORAL at 21:31

## 2025-02-17 RX ADMIN — DIAZEPAM 5 MG: 5 TABLET ORAL at 23:28

## 2025-02-17 RX ADMIN — INSULIN LISPRO 4 UNITS: 100 INJECTION, SOLUTION INTRAVENOUS; SUBCUTANEOUS at 12:05

## 2025-02-17 RX ADMIN — HYDROMORPHONE HYDROCHLORIDE 4 MG: 4 TABLET ORAL at 22:29

## 2025-02-17 RX ADMIN — DULOXETINE HYDROCHLORIDE 30 MG: 30 CAPSULE, DELAYED RELEASE ORAL at 09:51

## 2025-02-17 RX ADMIN — CHLORTHALIDONE 12.5 MG: 25 TABLET ORAL at 09:51

## 2025-02-17 RX ADMIN — DOCUSATE SODIUM 100 MG: 100 CAPSULE, LIQUID FILLED ORAL at 09:51

## 2025-02-17 RX ADMIN — HYDROMORPHONE HYDROCHLORIDE 4 MG: 4 TABLET ORAL at 14:25

## 2025-02-17 RX ADMIN — ACETAMINOPHEN 975 MG: 325 TABLET ORAL at 00:14

## 2025-02-17 RX ADMIN — HYDROMORPHONE HYDROCHLORIDE 4 MG: 4 TABLET ORAL at 18:38

## 2025-02-17 RX ADMIN — HEPARIN SODIUM 5000 UNITS: 5000 INJECTION INTRAVENOUS; SUBCUTANEOUS at 05:04

## 2025-02-17 RX ADMIN — GABAPENTIN 300 MG: 300 CAPSULE ORAL at 14:24

## 2025-02-17 RX ADMIN — INSULIN LISPRO 5 UNITS: 100 INJECTION, SOLUTION INTRAVENOUS; SUBCUTANEOUS at 12:05

## 2025-02-17 RX ADMIN — INSULIN LISPRO 5 UNITS: 100 INJECTION, SOLUTION INTRAVENOUS; SUBCUTANEOUS at 07:58

## 2025-02-17 RX ADMIN — ACETAMINOPHEN 975 MG: 325 TABLET ORAL at 17:01

## 2025-02-17 RX ADMIN — HEPARIN SODIUM 5000 UNITS: 5000 INJECTION INTRAVENOUS; SUBCUTANEOUS at 14:24

## 2025-02-17 RX ADMIN — INSULIN GLARGINE 10 UNITS: 100 INJECTION, SOLUTION SUBCUTANEOUS at 21:34

## 2025-02-17 RX ADMIN — INSULIN LISPRO 2 UNITS: 100 INJECTION, SOLUTION INTRAVENOUS; SUBCUTANEOUS at 17:02

## 2025-02-17 RX ADMIN — ACETAMINOPHEN 975 MG: 325 TABLET ORAL at 05:04

## 2025-02-17 RX ADMIN — METHOCARBAMOL 500 MG: 500 TABLET ORAL at 05:04

## 2025-02-17 RX ADMIN — ATORVASTATIN CALCIUM 80 MG: 80 TABLET, FILM COATED ORAL at 17:01

## 2025-02-17 RX ADMIN — HEPARIN SODIUM 5000 UNITS: 5000 INJECTION INTRAVENOUS; SUBCUTANEOUS at 21:30

## 2025-02-17 RX ADMIN — ASPIRIN 81 MG: 81 TABLET, COATED ORAL at 09:51

## 2025-02-17 RX ADMIN — INSULIN LISPRO 2 UNITS: 100 INJECTION, SOLUTION INTRAVENOUS; SUBCUTANEOUS at 07:59

## 2025-02-17 RX ADMIN — GABAPENTIN 300 MG: 300 CAPSULE ORAL at 09:51

## 2025-02-17 RX ADMIN — POLYETHYLENE GLYCOL 3350 17 G: 17 POWDER, FOR SOLUTION ORAL at 09:51

## 2025-02-17 RX ADMIN — ACETAMINOPHEN 975 MG: 325 TABLET ORAL at 12:04

## 2025-02-17 RX ADMIN — METHOCARBAMOL 500 MG: 500 TABLET ORAL at 23:25

## 2025-02-17 RX ADMIN — METHOCARBAMOL 500 MG: 500 TABLET ORAL at 17:01

## 2025-02-17 RX ADMIN — METHOCARBAMOL 500 MG: 500 TABLET ORAL at 12:04

## 2025-02-17 NOTE — PROGRESS NOTES
02/17/25 1028   Pain Assessment   Pain Assessment Tool 0-10   Pain Score 5   Pain Location/Orientation Orientation: Left;Location: Leg  (residual limb)   Restrictions/Precautions   Precautions Bed/chair alarms;Fall Risk;Contact/isolation;Pain;Supervision on toilet/commode  (MRSA L foot; NWB LLE)   LLE Weight Bearing Per Order NWB   Cognition   Overall Cognitive Status WFL   Arousal/Participation Alert;Responsive;Cooperative   Attention Within functional limits   Orientation Level Oriented X4   Memory Within functional limits   Following Commands Follows all commands and directions without difficulty   Wheel 50 Feet with Two Turns   Type of Assistance Needed Supervision;Verbal cues   Comment S level and unlevel surfaces; amp board in place   Wheel 50 Feet with Two Turns CARE Score 4   Wheel 150 Feet   Type of Assistance Needed Supervision;Verbal cues   Comment S level and unlevel surfaces; amp board in place   Wheel 150 Feet CARE Score 4   Wheelchair mobility   Method Right upper extremity;Left upper extremity   Assistance Provided For Locking Brakes;Obstacles;Remove Leg Rest;Replace Leg Rest   Distance Level Surface (feet) 250 ft  (198' 250')   Distance Wheeled 3% Grade 24 ft   Findings S level and unlevel surfaces; amp board in place   Does the patient use a wheelchair? 1. Yes   Type of Wheelchair Used 1. Manual   Therapeutic Interventions   Strengthening seated ther ex   Other wheelchair mobility; residual limb wrapping;  patient given unit First Step booklet to review; will assist patient with ordering own booklet for home   Assessment   Treatment Assessment Patient agreeable to therapy session.   Pain in residual limb 5/10.   Cues for general safety.    Completed ther ex for general LE strengthening as well as contracture prevention.   Propels wheelchair with S over level and unlevel surfaces.   Reviewed residual limb wrapping and it's importance in shaping limb in prep for future prosthesis.    Patient given  [facility] unit FIrst Step manual.  Will assist patient in ordering her own for home later today.   Patient propelled wheelchair to OT gym.   PT Barriers   Physical Impairment Decreased strength;Decreased range of motion;Decreased endurance;Impaired balance;Decreased mobility;Impaired judgement;Decreased safety awareness;Orthopedic restrictions;Pain   Functional Limitation Wheelchair management;Walking;Transfers;Standing;Stair negotiation;Ramp negotiation;Car transfers   Plan   Treatment/Interventions Functional transfer training;LE strengthening/ROM;Therapeutic exercise  (wheelchair mobility)   Progress Progressing toward goals   PT Therapy Minutes   PT Time In 1028   PT Time Out 1130   PT Total Time (minutes) 62   PT Mode of treatment - Individual (minutes) 62   PT Mode of treatment - Concurrent (minutes) 0   PT Mode of treatment - Group (minutes) 0   PT Mode of treatment - Co-treat (minutes) 0   PT Mode of Treatment - Total time(minutes) 62 minutes   PT Cumulative Minutes 137

## 2025-02-17 NOTE — PROGRESS NOTES
02/17/25 1130   Pain Assessment   Pain Assessment Tool 0-10   Pain Score 5   Pain Location/Orientation Orientation: Left;Location: Leg   Pain Onset/Description Onset: Ongoing   Restrictions/Precautions   Precautions Bed/chair alarms;Fall Risk;Pain;Limb alert;Supervision on toilet/commode   LLE Weight Bearing Per Order NWB   Braces or Orthoses Knee immobilizer   Exercise Tools   Exercise Tools Yes   UE Ergometer 6 F/B minimal resistance   Cognition   Overall Cognitive Status WFL   Arousal/Participation Alert;Cooperative   Orientation Level Oriented X4   Activity Tolerance   Activity Tolerance Patient tolerated treatment well   Assessment   Treatment Assessment Pt participated in 30 minutes of concurrent tx addressing similar goals with a pt with similar deficits. Pt tolerated well. Pt able to Indep self propel w/c around unit. Plan is to continue with skilled OT to further progress pt toward OT goals.   Prognosis Good   Problem List Decreased strength;Decreased endurance;Impaired balance;Decreased mobility;Decreased skin integrity;Orthopedic restrictions;Pain   Plan   Treatment/Interventions Therapeutic exercise;Endurance training;OT   Progress Improving as expected   OT Therapy Minutes   OT Time In 1130   OT Time Out 1200   OT Total Time (minutes) 30   OT Mode of treatment - Individual (minutes) 0   OT Mode of treatment - Concurrent (minutes) 30   OT Mode of treatment - Group (minutes) 0   OT Mode of treatment - Co-treat (minutes) 0   OT Mode of Treatment - Total time(minutes) 30 minutes   OT Cumulative Minutes 30   Therapy Time missed   Time missed? No

## 2025-02-17 NOTE — PROGRESS NOTES
02/17/25 1300   Pain Assessment   Pain Assessment Tool 0-10   Pain Score 5   Pain Location/Orientation Orientation: Left;Location: Leg   Restrictions/Precautions   Precautions Bed/chair alarms;Fall Risk;Limb alert;Pain;Supervision on toilet/commode   LLE Weight Bearing Per Order NWB   Braces or Orthoses Knee immobilizer   Exercise Tools   Hand Gripper 5# digi x 30 b hands   Other Exercise Tool 1 2# dowel 2x15 reps in 6 planes of motion   Cognition   Overall Cognitive Status WFL   Arousal/Participation Alert;Cooperative   Attention Within functional limits   Orientation Level Oriented X4   Memory Within functional limits   Following Commands Follows all commands and directions without difficulty   Additional Activities   Additional Activities Comments w/c mobility MI   Activity Tolerance   Activity Tolerance Patient tolerated treatment well   Assessment   Treatment Assessment Reviewed First Step: A guide for adapting to limb loss: Stages of journey/clinical care team/what to expect in rehab/after your surgery/coming home from the hospital. Pt receptive to all information. OT also reviewed tech via use of video for how to manage stairs through the use of a tub seat. OT placed a blanket roll along L hip to faciliate good alignment in w/c. Reviewed pain mgmt techniques through the use of mirroring and slow tapping. Provided and instr in use of reacher for floor level item retrieval. Pt acknowledged same with good teach back. Pt then participated in B UE TE for generalized strength and endurance. Pt demon good tolerance to same. Plan is to continue with skilled OT as per POC.   Prognosis Good   Problem List Decreased strength;Decreased endurance;Decreased mobility;Impaired balance;Decreased skin integrity;Orthopedic restrictions;Pain   Plan   Treatment/Interventions Therapeutic exercise;Endurance training;Patient/family training;Compensatory technique education;Equipment eval/education;Spoke to nursing;OT   Progress  Progressing toward goals   OT Therapy Minutes   OT Time In 1300   OT Time Out 1400   OT Total Time (minutes) 60   OT Mode of treatment - Individual (minutes) 60   OT Mode of treatment - Concurrent (minutes) 0   OT Mode of treatment - Group (minutes) 0   OT Mode of treatment - Co-treat (minutes) 0   OT Mode of Treatment - Total time(minutes) 60 minutes   OT Cumulative Minutes 90   Therapy Time missed   Time missed? No

## 2025-02-17 NOTE — PCC OCCUPATIONAL THERAPY
02/17/25 Pt participating in ADLs, iADLs w/c level, safety with functional txfs and mobility, TE/TA for generalized strength and endurance. Pts LOF noted above Pt is making positive gains toward OT goals. Pt barriers include: phantom pain L residual limb, decreased safety and balance, decreased strength and ROM. Plan is to continue with skilled OT to further progress pt toward OT goals and prepare pt for safe d/c to home with family.     02/24/25 Pt participating in ADLs, iADLs w/c level, safety with functional txfs and mobility, TE/TA for generalized strength and endurance and d/c planning. Pts LOF noted above. Pt is making positive gains toward OT goals. Pt barriers include: phantom pain L residual limb, decreased safety and balance, decreased strength and ROM. Plan is to continue with skilled OT to further progress pt toward OT goals and prepare pt for safe d/c to home with family.

## 2025-02-17 NOTE — PROGRESS NOTES
"Progress Note - Lizzy Acuna 49 y.o. female MRN: [id number]    Unit/Bed#: -01 Encounter: [id number]        Subjective:   Patient seen and examined at bedside after reviewing the chart and discussing the case with the caring staff.      Patient examined at bedside.  Patient has no acute issues.  Patient is requesting pneumonia vaccine.    On review of patient's labs from 2/17/2025.  Patient's CMP showed sodium 130 with glucose 211 and alk phos 127.  Patient's CBC showed hemoglobin 9.0 with hematocrit 27.9.    Physical Exam   Vitals: Blood pressure 143/82, pulse 78, temperature (!) 96.1 °F (35.6 °C), temperature source Temporal, resp. rate 16, height 5' 1\" (1.549 m), weight 82.3 kg (181 lb 7 oz), last menstrual period 12/01/2023, SpO2 97%, not currently breastfeeding.,Body mass index is 34.28 kg/m².  Constitutional: He appears well-developed.   HEENT: PERR, EOMI, MMM  Cardiovascular: Normal rate and regular rhythm.    Pulmonary/Chest: Effort normal and breath sounds normal.   Abdomen: Soft, + BS, NT    Assessment/Plan:  Lizzy Acuna is a(n) 49 y.o. 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     Cardiac w/ HTN, HLD, CAD.   Continue Chlorthalidone 12.5 mg daily, atorvastatin 80 mg daily, and ASA 81 mg daily.   Type 2 DM.  Hgb A1c 10.3 on 12/13/24.   Continue home regimen of Lantus 10 units HS, Humalog 5u TID w/ meals.  SSI w/ POCT AC HS.  Carb controlled diet.   GERD.  Continue Protonix 40 mg daily.   Depression.  Not on home meds.  APS started pt on Cymbalta 30 mg daily for phantom limb pain.  Also on Valium 5 mg q8h as needed for anxiety/muscle spasms.   ABLA.  Hgb 8.6 -> 9.0 on 2/17/2025.  S/p multiple transfusions on acute care.  Cont to monitor.  Transfuse Hgb < 7.   Hyponatremia.  Na 133 -> 130 on 2/17/2025.  Likely SIADH in the setting of acute infection.  Cont to monitor.   Adrenal nodule.  Stable since " 2018.  Recommend f/u outpt.   Thyroid nodule.  Incidental finding.  Recommend outpt thyroid US.  Follow up with PCP/endo.   Pain and bowel regimen.  As per physiatry.   Cellulitis of L foot s/p BKA.  Continue ASA and statin.  Patient receiving intensive PT OT as per physiatry.      Anticipated date of discharge.  TBD.

## 2025-02-17 NOTE — PROGRESS NOTES
"Progress Note - PMR   Name: Lizzy Acuna 49 y.o. female I MRN: [id number]  Unit/Bed#: -01 I Date of Admission: 2/15/2025   Date of Service: 2/17/2025 I Hospital Day: 2     Assessment & Plan  S/P BKA (below knee amputation) unilateral, left (HCC)  2/2 Infection after TMA in setting of T2DM/Neuropathy/PAD with acute ischemia.  2/11 - L BKA with Dr. Arauz  Contracture prevention (has knee immobilizer)   - Vascular did not comment on limb protector stating \"ace is fine\"   - Quad strengthening  Edema/shaping management    - ACE wrap ATC   - Shrinkers when cleared by vascular   Incisional Care/education   - Mom may have to perform  Phantom limb sensation (see that entry)  PT/OT 3-5 hours/day, 5-7 days/week   Pre-prosthetic f/u to be arranged with PMR   F/U with Vascular for 4 week POV (~3/11)  Obesity (BMI 30.0-34.9)  Considerations in therapy.   Tobacco abuse  Cessation counseling  Nicotine patch if needed.  Diabetes mellitus type 2 with complications (HCC)  Lab Results   Component Value Date    HGBA1C 10.3 (H) 12/13/2024       Recent Labs     02/16/25  1605 02/16/25  2131 02/17/25  0746 02/17/25  1133   POCGLU 237* 277* 193* 270*       Blood Sugar Average: Last 72 hrs:  (P) [id number]    Home: Lantus 10 units QHS, Aspart 5 units TID with meals  Here: Same, CDI/Accuchecks  Diabetic Diet  Monitor and adjust as per primary team  Hyponatremia    Currently asymptomatic  2/17 130. Repeat BMP on 2/17   Not currently on FR or salt tabs.   GERD (gastroesophageal reflux disease)  Continue PPI  PAD (peripheral artery disease) ([facility])  S/p L SFA stent c/b acute occlusion this hospitalization  S/p multiple angioplasty (2/1, 2/2, 2/6) and lysis (1/31-2/2)   Per Vascular : ASA/Statin only  Monitor neurovascular exam  F/u Vascular outpatient   Blood loss anemia    Required multiple transfusions while inpatient.  Hemodynamically stable currently.  2/12 Hgb 8.6 --> 2/17 hgb 9.0  Transfuse as appropriate.   Primary " hypertension  Home: None  Here: Chlorthalidone 12.5mg daily  Monitor and adjust as appropriate.  Thyroid nodule  Noted incidentally  Enlarged multinodular thyroid gland  Will check TSH with next set of labs.  Outpatient f/u with non-emergent ultrasound   Adrenal nodule (HCC)  Noted incidentally  14mm R adrenal nodule stable since 2018 - in keeping with benign adenoma  Biochemical evaluation suggested for adrenal adenomas > 1cm to rule out functioning adenoma  Recommend outpatient f/u with PCP for additional work-up  At risk for venous thromboembolism (VTE)  HSQ, SCDs.   Will not need to go home on HSQ  Phantom limb syndrome (HCC)  Desensitization techniques with therapy  On cymbalta and gabapentin  Monitor and adjust as appropriate  Will need f/u with PMR after discharge.   Constipation    Docusate BID, senna 2 tabs at night, and miralax daily.   Adding PRN suppository and lactulose.  Acute pain   Tylenol scheduled (monitor LFTs)  Cymbalta 30mg daily  Gabapentin 300-300-600  Robaxin 500mg Q6hr  Valium PRN muscle spasms  Oxycodone 5-10mg Q4hr PRN.  Monitor and adjust as appropriate.   Adjustment disorder with anxiety  Of note started by APS on cymbalta for phantom pain  Has valium ordered 5mg Q8hr PRN for anxiety but also muscle spasm   - Would try to wean down/off.  Emphasize non-pharmacologic strategies  Monitor and adjust as appropriate.     Subjective   Lizzy Acuna is a 49 y.o. female with medical history of T2DM with neuropathy, depression, HLD, obesity, PAD s/p LLE SFA balloon angioplasty and stenting in 12/2024 who [facility] 1/28 with L foot pain for 5 days. She had a L TMA on 1/3 by podiatry, and had developed increased pain, redness, and discharge. She was seen at her podiatrist's office who sent her to the ER for IV abx and further work-up. In the hospital met sepsis criteria. MRI showed fluid collection within her dorsal soft tissue. Vascular consulted  after LEADs showed an occluded L SFA stent. They recommended transfer to [place] and starting heparin gtt. Dr. Ritchie performed I&D of her left foot on 1/29 and on 1/30 she was transferred to [place]. IR was consulted, and Dr. Davidson performed catheter placement and initiated thrombolysis with TPA on 1/31. PICC was also placed. On 2/1, residual thrombus along the L SFA was treated with 4mm balloon angioplasty, with no flow noted within L SFA following angioplasty. Lysis was restarted. C/B ABLA with 2/2 Hgb 6.2 - given 2 units pRBC. Her surgical cultures grew MRSA and Finegoldia. Abx were narrowed to vancomycin by ID. APS was consulted and started dilaudid PCA and IV valium. 2/2 lysis check with patent l SFA, popliteal, tibioperoneal trunk and TARA. Mild residual stenoses along L SFA and popliteal artery were treated with 4mm balloon angioplasty and stenosis along TARA was treated with 2.5 and 3mm balloon angioplasty. L PT and peroneal arteries were chronically occluded. TPA was discontinued. She was transferred out of the ICU on 2/3. 2/4 required 1 unit pRBC for ABLA. On 2/6, she had repeat arteriogram with Dr. Victoria with finding of recurrent pop occlusion, AT recannulization with distal pop, TPT, and AT POBA an serration angioplasty. Despite this, with compromised blood flow to posterior aspect and plantar flap of foot. Still concern that perfusion would not be sufficient to salvage foot. She was recommended for BKA. On 2/11, Dr. Arauz performed L BKA. She had L adducotr and popliteal blocks. Post-op with acute bilateral shoulder pain, diaphoresis, tachycardic to 120 and hypertensive to 180/103. EKG showed LAD and TWI in V1. ACS r/o initiated. CTA dissection protocol (negative for dissection). This resolved - no acute findings. Abx were discontinued.     Chief Complaint: f/u amputation    Interval:   Seen and evaluated patient in PT gym. She feels well. She is currently having phantom sensations and nociceptive  pain but is managing well on the current regimen. She is eating and voiding   Last BM 2/14 on 17.2 mg senna qHS and daily miralax   2/17: hgb increased to 9.0, sodium 130, alk phos 127 AST 43. Repeat BMP tomorrow     Objective :  Temp:  [96.1 °F (35.6 °C)-96.8 °F (36 °C)] 96.1 °F (35.6 °C)  HR:  [78-86] 78  BP: (143-149)/(67-82) 143/82  Resp:  [16-18] 16  SpO2:  [94 %-97 %] 97 %  O2 Device: None (Room air)    Functional Update:  Physical Therapy Occupational Therapy       Wheel 50 Feet with Two Turns   Type of Assistance Needed Supervision;Verbal cues   Comment S level and unlevel surfaces; amp board in place   Wheel 50 Feet with Two Turns CARE Score 4   Wheel 150 Feet   Type of Assistance Needed Supervision;Verbal cues   Comment S level and unlevel surfaces; amp board in place   Wheel 150 Feet CARE Score 4   Wheelchair mobility   Method Right upper extremity;Left upper extremity   Assistance Provided For Locking Brakes;Obstacles;Remove Leg Rest;Replace Leg Rest   Distance Level Surface (feet) 250 ft  (198' 250')   Distance Wheeled 3% Grade 24 ft   Findings S level and unlevel surfaces; amp board in place    Eating: Independent  Grooming: Supervision  Bathing: Moderate Assistance  Bathing: Moderate Assistance  Upper Body Dressing: Minimal Assistance  Lower Body Dressing: Maximum Assistance  Toileting: Moderate Assistance  Tub/Shower Transfer: Minimal Assistance  Toilet Transfer: Moderate Assistance  Cognition: Within Defined Limits  Orientation: Person, Situation, Place, Time                 Physical Exam  Vitals and nursing note reviewed.   Constitutional:       General: She is not in acute distress.     Appearance: She is obese. She is not ill-appearing, toxic-appearing or diaphoretic.   HENT:      Head: Normocephalic and atraumatic.      Right Ear: External ear normal.      Left Ear: External ear normal.      Nose: Nose normal.      Mouth/Throat:      Mouth: Mucous membranes are moist.   Cardiovascular:      Rate  and Rhythm: Normal rate.   Pulmonary:      Effort: Pulmonary effort is normal. No respiratory distress.   Abdominal:      General: There is no distension.   Musculoskeletal:      Comments: LLE residual limb wrapped in clean, dry ace bandage    Skin:     General: Skin is warm and dry.   Neurological:      Mental Status: She is oriented to person, place, and time.           Scheduled Meds:  Current Facility-Administered Medications   Medication Dose Route Frequency Provider Last Rate    acetaminophen  975 mg Oral Q6H JORGE Abby De La Torre PA-C      aspirin  81 mg Oral Daily Abby De La Torre PA-C      atorvastatin  80 mg Oral Daily With Dinner Abby De La Torre PA-C      bisacodyl  10 mg Rectal Daily PRN Ashley Depadua, MD      chlorthalidone  12.5 mg Oral Daily Abby De La Torre PA-C      diazepam  5 mg Oral Q8H PRN Abby De La Torre PA-C      docusate sodium  100 mg Oral BID Ashley Depadua, MD      DULoxetine  30 mg Oral Daily Abby De La Torre PA-C      gabapentin  300 mg Oral BID (AM & Afternoon) Abby De La Torre PA-C      gabapentin  600 mg Oral HS Abby De La Torre PA-C      heparin (porcine)  5,000 Units Subcutaneous Q8H JORGE Abby De La Torre PA-C      HYDROmorphone  2 mg Oral Q4H PRN Ashley Depadua, MD      HYDROmorphone  4 mg Oral Q4H PRN Ashley Depadua, MD      insulin glargine  10 Units Subcutaneous HS Abby De La Torre PA-C      insulin lispro  1-6 Units Subcutaneous TID With Meals Abby De La Torre PA-C      insulin lispro  5 Units Subcutaneous TID With Meals Abby De La Torre PA-C      lidocaine  1 patch Topical Daily PRN Abby De La Torre PA-C      lidocaine  1 patch Topical Daily Ashley Depadua, MD      methocarbamol  500 mg Oral Q6H JORGE Abby De La Torre PA-C      nitroglycerin  0.4 mg Sublingual Q5 Min PRN Abby De La Torre PA-C      ondansetron  4 mg Oral Q6H PRN Abby De La Torre PA-C      pantoprazole   40 mg Oral Early Morning Abby De La Torre PA-C      polyethylene glycol  17 g Oral Daily Abby De La Torre PA-C      polyethylene glycol  17 g Oral Daily PRN Ashley Depadua, MD      senna  2 tablet Oral HS Ashley Depadua, MD      trimethobenzamide  200 mg Intramuscular Q6H PRN Abby De La Torre PA-C           Lab Results: I have reviewed the following results:  Results from last 7 days   Lab Units 02/17/25  0459 02/12/25  0608 02/11/25  1640 02/11/25  0536   HEMOGLOBIN g/dL 9.0* 8.6*  --  8.8*   HEMATOCRIT % 27.9* 26.9*  --  27.9*   WBC Thousand/uL 8.52 9.74  --  11.82*   PLATELETS Thousands/uL 294 252 239 288     Results from last 7 days   Lab Units 02/17/25  0459 02/12/25  0608 02/11/25  0536   BUN mg/dL 20 18 19   SODIUM mmol/L 130* 133* 134*   POTASSIUM mmol/L 3.9 4.4 4.2   CHLORIDE mmol/L 91* 99 98   CREATININE mg/dL 0.75 0.84 0.81   AST U/L 43*  --   --    ALT U/L 32  --   --             Laurence Dumont PA-C  Physical Medicine and Rehabilitation  [facility]

## 2025-02-18 LAB
ANION GAP SERPL CALCULATED.3IONS-SCNC: 10 MMOL/L (ref 4–13)
BUN SERPL-MCNC: 23 MG/DL (ref 5–25)
CALCIUM SERPL-MCNC: 9.9 MG/DL (ref 8.4–10.2)
CHLORIDE SERPL-SCNC: 91 MMOL/L (ref 96–108)
CO2 SERPL-SCNC: 29 MMOL/L (ref 21–32)
CREAT SERPL-MCNC: 0.81 MG/DL (ref 0.6–1.3)
GFR SERPL CREATININE-BSD FRML MDRD: 86 ML/MIN/1.73SQ M
GLUCOSE P FAST SERPL-MCNC: 227 MG/DL (ref 65–99)
GLUCOSE SERPL-MCNC: 210 MG/DL (ref 65–140)
GLUCOSE SERPL-MCNC: 227 MG/DL (ref 65–140)
GLUCOSE SERPL-MCNC: 235 MG/DL (ref 65–140)
GLUCOSE SERPL-MCNC: 243 MG/DL (ref 65–140)
GLUCOSE SERPL-MCNC: 243 MG/DL (ref 65–140)
POTASSIUM SERPL-SCNC: 4.4 MMOL/L (ref 3.5–5.3)
SODIUM SERPL-SCNC: 130 MMOL/L (ref 135–147)

## 2025-02-18 PROCEDURE — 97530 THERAPEUTIC ACTIVITIES: CPT

## 2025-02-18 PROCEDURE — 97110 THERAPEUTIC EXERCISES: CPT

## 2025-02-18 PROCEDURE — 99232 SBSQ HOSP IP/OBS MODERATE 35: CPT

## 2025-02-18 PROCEDURE — 97535 SELF CARE MNGMENT TRAINING: CPT

## 2025-02-18 PROCEDURE — 80048 BASIC METABOLIC PNL TOTAL CA: CPT

## 2025-02-18 PROCEDURE — 82948 REAGENT STRIP/BLOOD GLUCOSE: CPT

## 2025-02-18 RX ORDER — METHOCARBAMOL 500 MG/1
750 TABLET, FILM COATED ORAL EVERY 6 HOURS SCHEDULED
Status: DISCONTINUED | OUTPATIENT
Start: 2025-02-18 | End: 2025-02-28 | Stop reason: HOSPADM

## 2025-02-18 RX ADMIN — HEPARIN SODIUM 5000 UNITS: 5000 INJECTION INTRAVENOUS; SUBCUTANEOUS at 05:43

## 2025-02-18 RX ADMIN — DULOXETINE HYDROCHLORIDE 30 MG: 30 CAPSULE, DELAYED RELEASE ORAL at 08:03

## 2025-02-18 RX ADMIN — GABAPENTIN 300 MG: 300 CAPSULE ORAL at 08:03

## 2025-02-18 RX ADMIN — ACETAMINOPHEN 975 MG: 325 TABLET ORAL at 23:09

## 2025-02-18 RX ADMIN — CHLORTHALIDONE 12.5 MG: 25 TABLET ORAL at 08:03

## 2025-02-18 RX ADMIN — INSULIN LISPRO 3 UNITS: 100 INJECTION, SOLUTION INTRAVENOUS; SUBCUTANEOUS at 08:05

## 2025-02-18 RX ADMIN — HYDROMORPHONE HYDROCHLORIDE 4 MG: 4 TABLET ORAL at 08:06

## 2025-02-18 RX ADMIN — SENNOSIDES 17.2 MG: 8.6 TABLET, FILM COATED ORAL at 21:00

## 2025-02-18 RX ADMIN — ACETAMINOPHEN 975 MG: 325 TABLET ORAL at 00:04

## 2025-02-18 RX ADMIN — HEPARIN SODIUM 5000 UNITS: 5000 INJECTION INTRAVENOUS; SUBCUTANEOUS at 14:21

## 2025-02-18 RX ADMIN — HYDROMORPHONE HYDROCHLORIDE 4 MG: 4 TABLET ORAL at 20:56

## 2025-02-18 RX ADMIN — HEPARIN SODIUM 5000 UNITS: 5000 INJECTION INTRAVENOUS; SUBCUTANEOUS at 21:00

## 2025-02-18 RX ADMIN — ACETAMINOPHEN 975 MG: 325 TABLET ORAL at 17:12

## 2025-02-18 RX ADMIN — PANTOPRAZOLE SODIUM 40 MG: 40 TABLET, DELAYED RELEASE ORAL at 05:47

## 2025-02-18 RX ADMIN — DOCUSATE SODIUM 100 MG: 100 CAPSULE, LIQUID FILLED ORAL at 17:12

## 2025-02-18 RX ADMIN — METHOCARBAMOL 500 MG: 500 TABLET ORAL at 05:43

## 2025-02-18 RX ADMIN — ASPIRIN 81 MG: 81 TABLET, COATED ORAL at 08:03

## 2025-02-18 RX ADMIN — HYDROMORPHONE HYDROCHLORIDE 4 MG: 4 TABLET ORAL at 16:17

## 2025-02-18 RX ADMIN — INSULIN LISPRO 3 UNITS: 100 INJECTION, SOLUTION INTRAVENOUS; SUBCUTANEOUS at 12:10

## 2025-02-18 RX ADMIN — METHOCARBAMOL 500 MG: 500 TABLET ORAL at 12:10

## 2025-02-18 RX ADMIN — INSULIN LISPRO 5 UNITS: 100 INJECTION, SOLUTION INTRAVENOUS; SUBCUTANEOUS at 16:16

## 2025-02-18 RX ADMIN — HYDROMORPHONE HYDROCHLORIDE 4 MG: 4 TABLET ORAL at 12:12

## 2025-02-18 RX ADMIN — GABAPENTIN 300 MG: 300 CAPSULE ORAL at 14:21

## 2025-02-18 RX ADMIN — INSULIN LISPRO 2 UNITS: 100 INJECTION, SOLUTION INTRAVENOUS; SUBCUTANEOUS at 16:16

## 2025-02-18 RX ADMIN — ATORVASTATIN CALCIUM 80 MG: 80 TABLET, FILM COATED ORAL at 16:16

## 2025-02-18 RX ADMIN — DOCUSATE SODIUM 100 MG: 100 CAPSULE, LIQUID FILLED ORAL at 08:03

## 2025-02-18 RX ADMIN — INSULIN LISPRO 5 UNITS: 100 INJECTION, SOLUTION INTRAVENOUS; SUBCUTANEOUS at 08:05

## 2025-02-18 RX ADMIN — GABAPENTIN 600 MG: 300 CAPSULE ORAL at 21:00

## 2025-02-18 RX ADMIN — METHOCARBAMOL TABLETS 750 MG: 500 TABLET, COATED ORAL at 23:09

## 2025-02-18 RX ADMIN — INSULIN GLARGINE 10 UNITS: 100 INJECTION, SOLUTION SUBCUTANEOUS at 21:00

## 2025-02-18 RX ADMIN — LIDOCAINE 5% 1 PATCH: 700 PATCH TOPICAL at 08:04

## 2025-02-18 RX ADMIN — ACETAMINOPHEN 975 MG: 325 TABLET ORAL at 05:46

## 2025-02-18 RX ADMIN — POLYETHYLENE GLYCOL 3350 17 G: 17 POWDER, FOR SOLUTION ORAL at 08:03

## 2025-02-18 RX ADMIN — METHOCARBAMOL TABLETS 750 MG: 500 TABLET, COATED ORAL at 17:12

## 2025-02-18 RX ADMIN — DIAZEPAM 5 MG: 5 TABLET ORAL at 21:05

## 2025-02-18 RX ADMIN — ACETAMINOPHEN 975 MG: 325 TABLET ORAL at 12:10

## 2025-02-18 RX ADMIN — INSULIN LISPRO 5 UNITS: 100 INJECTION, SOLUTION INTRAVENOUS; SUBCUTANEOUS at 12:11

## 2025-02-18 NOTE — PROGRESS NOTES
02/18/25 1230   Pain Assessment   Pain Assessment Tool 0-10   Pain Score 7   Pain Location/Orientation Orientation: Left;Location: Leg   Restrictions/Precautions   Precautions Bed/chair alarms;Contact/isolation;Fall Risk;Pain;Supervision on toilet/commode   LLE Weight Bearing Per Order NWB   Grooming   Able To Comb/Brush Hair;Wash/Dry Face;Wash/Dry Hands   Shower/Bathe Self   Type of Assistance Needed Supervision;Verbal cues;Adaptive equipment   Physical Assistance Level No physical assistance   Comment weight shifted on seat;use of CHG soap;covered L LE prior to shower   Shower/Bathe Self CARE Score 4   Bathing   Assessed Bath Style Shower   Able to Adjust Water Temperature Yes   Able to Wash/Rinse/Dry (body part) Left Arm;Right Arm;R Upper Leg;R Lower Leg/Foot;Chest;Abdomen;Perineal Area;Buttocks   Limitations Noted in Balance;Endurance;Strength   Positioning Seated   Adaptive Equipment Shower Bars;Shower Seat;Hand Held Shower   Tub/Shower Transfer   Limitations Noted In Balance;Endurance   Adaptive Equipment Grab Bars;Seat with out Back   Assessed Shower   Findings SPT CGA   Upper Body Dressing   Type of Assistance Needed Supervision   Physical Assistance Level No physical assistance   Upper Body Dressing CARE Score 4   Lower Body Dressing   Type of Assistance Needed Incidental touching;Verbal cues   Lower Body Dressing CARE Score 4   Putting On/Taking Off Footwear   Type of Assistance Needed Supervision;Verbal cues   Physical Assistance Level No physical assistance   Putting On/Taking Off Footwear CARE Score 4   Dressing/Undressing Clothing   Remove UB Clothes Pullover Shirt   Don UB Clothes Pullover Shirt   Remove LB Clothes Pants;Undergarment;Socks   Don LB Clothes Shoes;Socks;Undergarment;Pants   Limitations Noted In Balance;Endurance;Strength   Positioning Supported Sit;Standing   Lying to Sitting on Side of Bed   Type of Assistance Needed Supervision;Verbal cues   Physical Assistance Level No physical  assistance   Lying to Sitting on Side of Bed CARE Score 4   Sit to Stand   Type of Assistance Needed Incidental touching;Verbal cues   Sit to Stand CARE Score 4   Bed-Chair Transfer   Type of Assistance Needed Incidental touching;Verbal cues   Comment SPT   Chair/Bed-to-Chair Transfer CARE Score 4   Transfer Bed/Chair/Wheelchair   Limitations Noted In Balance;LE Strength   Toileting Hygiene   Type of Assistance Needed Incidental touching;Verbal cues;Adaptive equipment   Comment grab bar   Toileting Hygiene CARE Score 4   Toileting   Able to Pull Clothing down yes, up yes.   Manage Hygiene Bladder;Bowel   Limitations Noted In Balance;LE Strength   Adaptive Equipment Grab Bar   Toilet Transfer   Type of Assistance Needed Incidental touching;Verbal cues   Toilet Transfer CARE Score 4   Toilet Transfer   Surface Assessed Raised Toilet   Transfer Technique Stand Pivot   Limitations Noted In Balance;LE Strength   Adaptive Equipment Grab Bar   Exercise Tools   Hand Gripper 5# digi x 30 b hands   Other Exercise Tool 1 2# dowel 3x10 reps in 6 planes of motion   Cognition   Overall Cognitive Status WFL   Arousal/Participation Alert;Cooperative   Attention Within functional limits   Orientation Level Oriented X4   Memory Within functional limits   Following Commands Follows all commands and directions without difficulty   Additional Activities   Additional Activities Comments w/c mobility MI   Activity Tolerance   Activity Tolerance Patient tolerated treatment well;Patient limited by pain   Assessment   Treatment Assessment OT tx addressed ADLs/iADLs via w/c level, safety with functional txfs, and TE for strength and endurance as noted in respective sections of note. Pt is making positive gains toward OT goals. Pt experiencing increase in pain in L residual limb. Reviewed DME for home s/u. Barriers: NWB L LE, pain, edema, compromised skin integrity, decreased balance and strength. Plan is to continue with skilled OT as per POC  to progress pt toward OT goals.   Prognosis Good   Problem List Decreased strength;Decreased endurance;Impaired balance;Decreased mobility;Impaired sensation;Decreased skin integrity;Orthopedic restrictions;Pain   Plan   Treatment/Interventions ADL retraining;Functional transfer training;Therapeutic exercise;Endurance training;Patient/family training;Equipment eval/education;Bed mobility;Compensatory technique education;Spoke to nursing;OT   Progress Progressing toward goals   OT Therapy Minutes   OT Time In 1230   OT Time Out 1410   OT Total Time (minutes) 100   OT Mode of treatment - Individual (minutes) 100   OT Mode of treatment - Concurrent (minutes) 0   OT Mode of treatment - Group (minutes) 0   OT Mode of treatment - Co-treat (minutes) 0   OT Mode of Treatment - Total time(minutes) 100 minutes   OT Cumulative Minutes 190   Therapy Time missed   Time missed? No

## 2025-02-18 NOTE — PROGRESS NOTES
02/17/25 1400   Pain Assessment   Pain Assessment Tool 0-10   Pain Score 8   Pain Location/Orientation Orientation: Left;Location: Leg  (residual limb)   Restrictions/Precautions   Precautions Bed/chair alarms;Fall Risk;Contact/isolation;Pain;Supervision on toilet/commode  (MRSA L foot; NWB LLE)   LLE Weight Bearing Per Order NWB   Cognition   Overall Cognitive Status WFL   Orientation Level Oriented X4   Roll Left and Right   Type of Assistance Needed Supervision;Verbal cues   Comment bed rail; increased time to complete due to pain   Roll Left and Right CARE Score 4   Sit to Lying   Type of Assistance Needed Supervision   Comment bed rail; increased time to complete due to pain   Sit to Lying CARE Score 4   Sit to Stand   Type of Assistance Needed Physical assistance;Verbal cues   Physical Assistance Level 25% or less   Comment min A STS with SW   Sit to Stand CARE Score 3   Bed-Chair Transfer   Type of Assistance Needed Physical assistance;Verbal cues   Physical Assistance Level 25% or less   Comment min A direct SPT wheelchair>bed using bed rail and arm rest of wheelchair for support   Chair/Bed-to-Chair Transfer CARE Score 3   Therapeutic Interventions   Strengthening supine/sidelying ther ex   Balance transfer training   Assessment   Treatment Assessment Patient agreeable to therapy session.  Pain in LLE residual limb 8/10.  Cues for general safety as well as NWB LLE.   Changed pt's wheelchair to one that has anti- tippers and more accomodating to height/weight.   Completed ther ex for general LE strengthening and contracture prevention; transfer training focusing on sequence and technique for improved balance and safety with functional transfers.  Patient left in bed with alarms on and all needs within reach.   PT Barriers   Physical Impairment Decreased strength;Decreased range of motion;Decreased endurance;Impaired balance;Decreased mobility;Impaired judgement;Decreased safety awareness;Orthopedic  restrictions;Pain   Functional Limitation Wheelchair management;Walking;Transfers;Standing;Stair negotiation;Ramp negotiation;Car transfers   Plan   Treatment/Interventions Functional transfer training;LE strengthening/ROM;Therapeutic exercise;Bed mobility   Progress Progressing toward goals   PT Therapy Minutes   PT Time In 1400   PT Time Out 1430   PT Total Time (minutes) 30   PT Mode of treatment - Individual (minutes) 24   PT Mode of treatment - Concurrent (minutes) 6   PT Mode of treatment - Group (minutes) 0   PT Mode of treatment - Co-treat (minutes) 0   PT Mode of Treatment - Total time(minutes) 30 minutes   PT Cumulative Minutes 167   Therapy Time missed   Time missed? No

## 2025-02-18 NOTE — PCC NURSING
2/18/25 Pt admitted to [facility] on 2/15/25. On 2/11/25 had Left BKA . Hx of Diabetes ACHS scheduled lantus. Alert and Oriented. Lungs clear HRR. Dressing change daily cleaned with peroxide, painted with betadine, cathy, and ace wrap. Neur vascular checks daily. Scheduled dilated for pain.      2/24/25 Alert and oriented. Lungs clear/decreased on RA. HR regular. Nonpitting edema to LLE. L BKA incision dressed with betadine, ABD, and ace wrap daily. Continent of bowel and bladder. Pt takes scheduled tylenol, Robaxin and prn dilaudid for pain management. Pt has been free from falls while in [facility]. LC

## 2025-02-18 NOTE — ASSESSMENT & PLAN NOTE
Lab Results   Component Value Date    HGBA1C 10.3 (H) 12/13/2024       Recent Labs     02/17/25  1623 02/17/25  2113 02/18/25  0743 02/18/25  1110   POCGLU 213* 216* 235* 243*       Blood Sugar Average: Last 72 hrs:  (P) [id number]    Home: Lantus 10 units QHS, Aspart 5 units TID with meals  Here: Same, CDI/Accuchecks  Diabetic Diet  Monitor and adjust as per primary team

## 2025-02-18 NOTE — ASSESSMENT & PLAN NOTE
Tylenol scheduled (monitor LFTs)  Cymbalta 30mg daily  Gabapentin 300-300-600  Robaxin 750mg Q6hr  Valium PRN muscle spasms  2-4 mg Dilaudid every 4 hours   Monitor and adjust as appropriate.

## 2025-02-18 NOTE — PCC PHYSICAL THERAPY
"2/19/25:  Patient seen by ARU PT making steady progress.  Presents following left BKA with decreased ROM/strength, decreased balance and safety, decreased endurance, and pain.   Patient S/min A bed mobility, min A transfers with SW, min A direct SPT bed to wheelchair.  Ambulation NT due to pain limitations.  Wheelchair mobility S for 250 ft.  Stairs TBA in upcoming sessions.  Patient would benefit from continued inpatient ARC PT to increase function, safety, and increased independence in prep for safe d/c to home.     2/26/25:  Patient seen by ARU PT making steady progress.  Presents following left BKA with decreased ROM/strength, decreased balance and safety, decreased endurance, and pain.   Patient MI bed mobility, mod I STS with RW and direct sit pivot transfers with no AD, S direct SPT bed to wheelchair.  Ambulation cg with RW up to 77 feet, wheelchair mobility MI for 250 ft.  Negotiates 2\" curb step backwards with RW and then sits on chair on top of second step; descends by standing from chair and hopping down 2\" step forward with walker to wheelchair.  For regular steps, patient uses shower seat on step and stands with B/L UE on single rail with CG while therapist moves seat up/down steps.  Patient would benefit from continued inpatient ARC PT to increase function, safety, and increased independence in prep for safe d/c to home.   "

## 2025-02-18 NOTE — PROGRESS NOTES
"Progress Note - Lizzy Acuna 49 y.o. female MRN: [id number]    Unit/Bed#: -01 Encounter: [id number]        Subjective:   Patient seen and examined at bedside after reviewing the chart and discussing the case with the caring staff.      Patient examined at bedside.  Patient has no acute issues.     Repeat CMP on Thursday, 2/20/2025 to follow-up with hyponatremia.    Physical Exam   Vitals: Blood pressure 149/66, pulse 84, temperature (!) 96.4 °F (35.8 °C), temperature source Temporal, resp. rate 18, height 5' 1\" (1.549 m), weight 82.3 kg (181 lb 7 oz), last menstrual period 12/01/2023, SpO2 96%, not currently breastfeeding.,Body mass index is 34.28 kg/m².  Constitutional: He appears well-developed.   HEENT: PERR, EOMI, MMM  Cardiovascular: Normal rate and regular rhythm.    Pulmonary/Chest: Effort normal and breath sounds normal.   Abdomen: Soft, + BS, NT    Assessment/Plan:  Lizzy Acuna is a(n) 49 y.o. 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     Cardiac w/ HTN, HLD, CAD.   Continue Chlorthalidone 12.5 mg daily, atorvastatin 80 mg daily, and ASA 81 mg daily.   Type 2 DM.  Hgb A1c 10.3 on 12/13/24.   Continue home regimen of Lantus 10 units HS, Humalog 5u TID w/ meals.  SSI w/ POCT AC HS.  Carb controlled diet.   GERD.  Continue Protonix 40 mg daily.   Depression.  Not on home meds.  APS started pt on Cymbalta 30 mg daily for phantom limb pain.  Also on Valium 5 mg q8h as needed for anxiety/muscle spasms.   ABLA.  Hgb 8.6 -> 9.0 on 2/17/2025.  S/p multiple transfusions on acute care.  Cont to monitor.  Transfuse Hgb < 7.   Hyponatremia.  Na 133 -> 130 on 2/17/2025.  Likely SIADH in the setting of acute infection.  I will put the patient on fluid restriction 1800 cc in 24 hours 2/18/2025.  Cont to monitor.  Repeat CMP on Thursday.  Adrenal nodule.  Stable since 2018.  Recommend f/u outpt.   Thyroid nodule.  " Incidental finding.  Recommend outpt thyroid US.  Follow up with PCP/endo.   Pain and bowel regimen.  As per physiatry.   Cellulitis of L foot s/p BKA.  Continue ASA and statin.  Patient receiving intensive PT OT as per physiatry.      Anticipated date of discharge.  TBD.

## 2025-02-18 NOTE — PROGRESS NOTES
02/18/25 1410   Pain Assessment   Pain Assessment Tool 0-10   Pain Score 7   Pain Location/Orientation Orientation: Left;Location: Leg  (residual limb)   Restrictions/Precautions   Precautions Bed/chair alarms;Fall Risk;Contact/isolation;Pain;Supervision on toilet/commode   LLE Weight Bearing Per Order NWB   Cognition   Overall Cognitive Status WFL   Orientation Level Oriented X4   Roll Left and Right   Type of Assistance Needed Supervision   Comment bed rail   Roll Left and Right CARE Score 4   Sit to Lying   Type of Assistance Needed Supervision   Comment bed rail   Sit to Lying CARE Score 4   Bed-Chair Transfer   Type of Assistance Needed Incidental touching;Supervision;Verbal cues   Comment cg/close S sit pivot transfer wheelchair>bed using bed rail and arm rest of wheelchair for support   Chair/Bed-to-Chair Transfer CARE Score 4   Therapeutic Interventions   Balance transfer training   Assessment   Treatment Assessment Patient agreeable to therapy session.  Pain in residual limb remains.  Completed transfer back to bed from wheelchair.  Ordered First Step booklet from home via [employer] website.  Patient left in bed with alarms on and all needs within reach.   PT Barriers   Physical Impairment Decreased strength;Decreased range of motion;Decreased endurance;Impaired balance;Decreased mobility;Impaired judgement;Decreased safety awareness;Orthopedic restrictions;Pain   Functional Limitation Wheelchair management;Walking;Transfers;Standing;Stair negotiation;Ramp negotiation;Car transfers   Plan   Treatment/Interventions Functional transfer training;Bed mobility   Progress Progressing toward goals   PT Therapy Minutes   PT Time In 1410   PT Time Out 1427   PT Total Time (minutes) 17   PT Mode of treatment - Individual (minutes) 17   PT Mode of treatment - Concurrent (minutes) 0   PT Mode of treatment - Group (minutes) 0   PT Mode of treatment - Co-treat (minutes) 0   PT Mode of Treatment - Total  time(minutes) 17 minutes   PT Cumulative Minutes 274   Therapy Time missed   Time missed? No        02/18/25 1410   Pain Assessment   Pain Assessment Tool 0-10   Pain Score 7   Pain Location/Orientation Orientation: Left;Location: Leg  (residual limb)   Restrictions/Precautions   Precautions Bed/chair alarms;Fall Risk;Contact/isolation;Pain;Supervision on toilet/commode   LLE Weight Bearing Per Order NWB   Cognition   Overall Cognitive Status WFL   Orientation Level Oriented X4   Roll Left and Right   Type of Assistance Needed Supervision   Comment bed rail   Roll Left and Right CARE Score 4   Sit to Lying   Type of Assistance Needed Supervision   Comment bed rail   Sit to Lying CARE Score 4   Bed-Chair Transfer   Type of Assistance Needed Incidental touching;Supervision;Verbal cues   Comment cg/close S sit pivot transfer wheelchair>bed using bed rail and arm rest of wheelchair for support   Chair/Bed-to-Chair Transfer CARE Score 4   Therapeutic Interventions   Balance transfer training   Assessment   Treatment Assessment Patient agreeable to therapy session.  Pain in residual limb remains.  Completed transfer back to bed from wheelchair.  Ordered First Step booklet from home via [employer] website.  Patient left in bed with alarms on and all needs within reach.   PT Barriers   Physical Impairment Decreased strength;Decreased range of motion;Decreased endurance;Impaired balance;Decreased mobility;Impaired judgement;Decreased safety awareness;Orthopedic restrictions;Pain   Functional Limitation Wheelchair management;Walking;Transfers;Standing;Stair negotiation;Ramp negotiation;Car transfers   Plan   Treatment/Interventions Functional transfer training;Bed mobility   Progress Progressing toward goals   PT Therapy Minutes   PT Time In 1410   PT Time Out 1427   PT Total Time (minutes) 17   PT Mode of treatment - Individual (minutes) 17   PT Mode of treatment - Concurrent (minutes) 0   PT Mode of treatment - Group  (minutes) 0   PT Mode of treatment - Co-treat (minutes) 0   PT Mode of Treatment - Total time(minutes) 17 minutes   PT Cumulative Minutes 274   Therapy Time missed   Time missed? No

## 2025-02-18 NOTE — PROGRESS NOTES
02/18/25 0900   Pain Assessment   Pain Assessment Tool 0-10   Pain Score 9   Pain Location/Orientation Orientation: Left;Location: Leg  (residual limb)   Restrictions/Precautions   Precautions Bed/chair alarms;Fall Risk;Contact/isolation;Pain;Supervision on toilet/commode  (MRSA L foor; NWB LLE)   LLE Weight Bearing Per Order NWB   Cognition   Overall Cognitive Status WFL   Arousal/Participation Alert;Responsive;Cooperative   Attention Within functional limits   Orientation Level Oriented X4   Memory Within functional limits   Following Commands Follows all commands and directions without difficulty   Roll Left and Right   Type of Assistance Needed Independent   Comment bed rail; increased time to complete   Roll Left and Right CARE Score 6   Sit to Lying   Type of Assistance Needed Supervision;Verbal cues   Comment bed rail; increased time to complete   Sit to Lying CARE Score 4   Lying to Sitting on Side of Bed   Type of Assistance Needed Supervision;Verbal cues   Comment close S; bed rail; increased time to complete   Lying to Sitting on Side of Bed CARE Score 4   Sit to Stand   Type of Assistance Needed Physical assistance;Incidental touching;Verbal cues   Physical Assistance Level 25% or less   Comment cg/occ MIN A with SW   Sit to Stand CARE Score 3   Therapeutic Interventions   Strengthening supine, seated, sidelying ther ex   Balance transfer training   Other residual limb wrapping   Assessment   Treatment Assessment Patient agreeable to therapy session.  Pain 9/10 initially in residual limb that decreased to 6-7/10 t/o therapy session.   Patient reports she didn't sleep last night due to increased pain.  Declined hopping this session.   May attempt later today.  Completed ther ex for general LE strengthening as well as contracture prevention; transfer training focusing on sequence and technique for improved balance and safety.   Reviewed residual limb wrapping.  Patient able to explain reasons for  wrapping in prep for prosthesis and was able to assist with explaining how to wrap with cues from PT.  Patient remained in bed with alarms on and all needs within reach.   PT Barriers   Physical Impairment Decreased strength;Decreased range of motion;Decreased endurance;Impaired balance;Decreased mobility;Impaired judgement;Decreased safety awareness;Orthopedic restrictions;Pain   Functional Limitation Wheelchair management;Walking;Transfers;Standing;Stair negotiation;Ramp negotiation;Car transfers   Plan   Treatment/Interventions Functional transfer training;LE strengthening/ROM;Therapeutic exercise;Bed mobility   Progress Progressing toward goals   PT Therapy Minutes   PT Time In 0900   PT Time Out 1030   PT Total Time (minutes) 90   PT Mode of treatment - Individual (minutes) 90   PT Mode of treatment - Concurrent (minutes) 0   PT Mode of treatment - Group (minutes) 0   PT Mode of treatment - Co-treat (minutes) 0   PT Mode of Treatment - Total time(minutes) 90 minutes   PT Cumulative Minutes 257   Therapy Time missed   Time missed? No

## 2025-02-18 NOTE — PCC CARE MANAGEMENT
Patient admitted to [facility] on 2/15/25 after inpatient hospital stay cellulitis, non healing TMA,wound 2/11 left BKA . Patient lives with her mother, 2 children ages 19 and 8, and grandson age 1.5 years old in 2  with 14 LETY. Payton t;s mother lives on the first floor, full bath with walk in shower on first floor. Full flight of stairs to second floor and bedrooms. Patient independent  PTA, including driving. Patient stated she can also stay on first floor if needed.  2/26/25 d/c date planned for 2/28, [facility] reserved for RN/PT/OT.insurance NRD 2/28

## 2025-02-18 NOTE — CASE MANAGEMENT
CM met with patient, explained rehab routine and CM role with introduction. Patient reported she lives with her mother , her 2 children 19 years of age, 8 year old, and 1.5 year old grandson in 2  with 14 LETY and full flight of stairs to second floor. There is a full bath with walk in shower on the first floor. Patient independent PTA, including driving. Patient has a cane and walker. Patient has remote outpt therapy experience, no experience with [facility] or [facility]. Patient has prescription coverage and gets medications at [facility]. CM explained team meeting and insurance review process. Patient experiencing pain at this time, CM ended admission interview, notified patient's nurse of patient's discomfort. CM will follow for discharge needs.

## 2025-02-18 NOTE — PROGRESS NOTES
"Progress Note - PMR   Name: Lizzy Acuna 49 y.o. female I MRN: [id number]  Unit/Bed#: -01 I Date of Admission: 2/15/2025   Date of Service: 2/18/2025 I Hospital Day: 3     Assessment & Plan  S/P BKA (below knee amputation) unilateral, left (HCC)  2/2 Infection after TMA in setting of T2DM/Neuropathy/PAD with acute ischemia.  2/11 - L BKA with Dr. Arauz  Contracture prevention (has knee immobilizer)   - Vascular did not comment on limb protector stating \"ace is fine\"   - Quad strengthening  Edema/shaping management    - ACE wrap ATC   - Shrinkers when cleared by vascular   Incisional Care/education   - Mom may have to perform  Phantom limb sensation (see that entry)  PT/OT 3-5 hours/day, 5-7 days/week   Pre-prosthetic f/u to be arranged with PMR   F/U with Vascular for 4 week POV (~3/11)  Obesity (BMI 30.0-34.9)  Considerations in therapy.   Tobacco abuse  Cessation counseling  Nicotine patch if needed.  Diabetes mellitus type 2 with complications ([facility])  Lab Results   Component Value Date    HGBA1C 10.3 (H) 12/13/2024       Recent Labs     02/17/25  1623 02/17/25  2113 02/18/25  0743 02/18/25  1110   POCGLU 213* 216* 235* 243*       Blood Sugar Average: Last 72 hrs:  (P) 229.8024937733015235    Home: Lantus 10 units QHS, Aspart 5 units TID with meals  Here: Same, CDI/Accuchecks  Diabetic Diet  Monitor and adjust as per primary team  Hyponatremia    Currently asymptomatic  2/17 130 and 2/18 130   Discussed with IM: 1.8L/day FR started on 2/18   GERD (gastroesophageal reflux disease)  Continue PPI  PAD (peripheral artery disease) ([facility])  S/p L SFA stent c/b acute occlusion this hospitalization  S/p multiple angioplasty (2/1, 2/2, 2/6) and lysis (1/31-2/2)   Per Vascular : ASA/Statin only  Monitor neurovascular exam  F/u Vascular outpatient   Blood loss anemia    Required multiple transfusions while inpatient.  Hemodynamically stable currently.  2/12 Hgb 8.6 --> 2/17 hgb 9.0  Transfuse as appropriate.   Primary " hypertension  Home: None  Here: Chlorthalidone 12.5mg daily  Monitor and adjust as appropriate.  Thyroid nodule  Noted incidentally  Enlarged multinodular thyroid gland  Will check TSH with next set of labs.  Outpatient f/u with non-emergent ultrasound   Adrenal nodule (HCC)  Noted incidentally  14mm R adrenal nodule stable since 2018 - in keeping with benign adenoma  Biochemical evaluation suggested for adrenal adenomas > 1cm to rule out functioning adenoma  Recommend outpatient f/u with PCP for additional work-up  At risk for venous thromboembolism (VTE)  HSQ, SCDs.   Will not need to go home on HSQ  Phantom limb syndrome (HCC)  Desensitization techniques with therapy  On cymbalta and gabapentin  Monitor and adjust as appropriate  Will need f/u with PMR after discharge.   Constipation    Docusate BID, senna 2 tabs at night, and miralax daily.   Adding PRN suppository and lactulose.  Acute pain   Tylenol scheduled (monitor LFTs)  Cymbalta 30mg daily  Gabapentin 300-300-600  Robaxin 750mg Q6hr  Valium PRN muscle spasms  2-4 mg Dilaudid every 4 hours   Monitor and adjust as appropriate.   Adjustment disorder with anxiety  Of note started by APS on cymbalta for phantom pain  Has valium ordered 5mg Q8hr PRN for anxiety but also muscle spasm   - Would try to wean down/off.  Emphasize non-pharmacologic strategies  Monitor and adjust as appropriate.     Subjective   Lizyz Acuna is a 49 y.o. female with medical history of T2DM with neuropathy, depression, HLD, obesity, PAD s/p LLE SFA balloon angioplasty and stenting in 12/2024 who presented to the [facility] 1/28 with L foot pain for 5 days. She had a L TMA on 1/3 by podiatry, and had developed increased pain, redness, and discharge. She was seen at her podiatrist's office who sent her to the ER for IV abx and further work-up. In the hospital met sepsis criteria. MRI showed fluid collection within her dorsal soft tissue. Vascular  consulted after LEADs showed an occluded L SFA stent. They recommended transfer to [place] and starting heparin gtt. Dr. Ritchie performed I&D of her left foot on 1/29 and on 1/30 she was transferred to [place]. IR was consulted, and Dr. Davidson performed catheter placement and initiated thrombolysis with TPA on 1/31. PICC was also placed. On 2/1, residual thrombus along the L SFA was treated with 4mm balloon angioplasty, with no flow noted within L SFA following angioplasty. Lysis was restarted. C/B ABLA with 2/2 Hgb 6.2 - given 2 units pRBC. Her surgical cultures grew MRSA and Finegoldia. Abx were narrowed to vancomycin by ID. APS was consulted and started dilaudid PCA and IV valium. 2/2 lysis check with patent l SFA, popliteal, tibioperoneal trunk and TARA. Mild residual stenoses along L SFA and popliteal artery were treated with 4mm balloon angioplasty and stenosis along TARA was treated with 2.5 and 3mm balloon angioplasty. L PT and peroneal arteries were chronically occluded. TPA was discontinued. She was transferred out of the ICU on 2/3. 2/4 required 1 unit pRBC for ABLA. On 2/6, she had repeat arteriogram with Dr. Victoria with finding of recurrent pop occlusion, AT recannulization with distal pop, TPT, and AT POBA an serration angioplasty. Despite this, with compromised blood flow to posterior aspect and plantar flap of foot. Still concern that perfusion would not be sufficient to salvage foot. She was recommended for BKA. On 2/11, Dr. Arauz performed L BKA. She had L adducotr and popliteal blocks. Post-op with acute bilateral shoulder pain, diaphoresis, tachycardic to 120 and hypertensive to 180/103. EKG showed LAD and TWI in V1. ACS r/o initiated. CTA dissection protocol (negative for dissection). This resolved - no acute findings. Abx were discontinued.     Chief Complaint: f/u amputation    Interval:   Seen and evaluated patient in OT  gym. She is currently having significant nociceptive incisional pain,  increased methocarbamol to 750 mg q6 hours. She is already on a robust pain regimen: Tylenol scheduled, Cymbalta 30mg daily, Gabapentin 300-300-600, Robaxin 750mg Q6hr, Valium PRN muscle spasms and 2-4 mg Dilaudid every 4 hours. Continue to monitor.   She denies chest pain, SOB and abdominal pain    She is eating and voiding   Last BM 2/18 on 17.2 mg senna qHS and daily miralax   Started on 1.8 L/day FR. Repeat CMP on Thursday.     Objective :  Temp:  [96.4 °F (35.8 °C)-97.5 °F (36.4 °C)] 96.4 °F (35.8 °C)  HR:  [84-92] 84  BP: (140-149)/(63-66) 149/66  Resp:  [18-20] 18  SpO2:  [96 %-97 %] 96 %  O2 Device: None (Room air)    Functional Update:  Physical Therapy Occupational Therapy    Weight Bearing Status: Non-weight bearing (left LE)  Transfers: Minimal Assistance  Bed Mobility: Supervision  Ambulation:  (TBA)  Wheelchair Mobility Distance: 250 ft  Wheelchair Mobility: Supervision  Number of Stairs: 0  Assistive Device for Stairs:  (TBA)  Ramp:  (TBA)  Discharge Recommendations: Home with:  DC Home with:: Family Support, Home Physical Therapy (Possible first floor set up)   Eating: Independent  Grooming: Supervision  Bathing: Moderate Assistance  Bathing: Moderate Assistance  Upper Body Dressing: Minimal Assistance  Lower Body Dressing: Maximum Assistance  Toileting: Moderate Assistance  Tub/Shower Transfer: Minimal Assistance  Toilet Transfer: Moderate Assistance  Cognition: Within Defined Limits  Orientation: Person, Situation, Place, Time            Physical Exam  Vitals and nursing note reviewed.   Constitutional:       General: She is not in acute distress.     Appearance: She is obese. She is not ill-appearing, toxic-appearing or diaphoretic.   HENT:      Head: Normocephalic and atraumatic.      Right Ear: External ear normal.      Left Ear: External ear normal.      Nose: Nose normal.      Mouth/Throat:      Mouth: Mucous membranes are moist.   Cardiovascular:      Comments: Palpable left popliteal pulse    Pulmonary:      Effort: Pulmonary effort is normal. No respiratory distress.   Abdominal:      General: There is no distension.   Musculoskeletal:      Comments: LLE residual limb wrapped in clean, dry ace bandage    Skin:     General: Skin is warm and dry.   Neurological:      Mental Status: She is oriented to person, place, and time.         Scheduled Meds:  Current Facility-Administered Medications   Medication Dose Route Frequency Provider Last Rate    acetaminophen  975 mg Oral Q6H JORGE Abby De La Torre PA-C      aspirin  81 mg Oral Daily Abby De La Torre PA-C      atorvastatin  80 mg Oral Daily With Dinner Abby De La Torre PA-C      bisacodyl  10 mg Rectal Daily PRN Ashley Depadua, MD      chlorthalidone  12.5 mg Oral Daily Abby De La Torre PA-C      diazepam  5 mg Oral Q8H PRN Abby De La Torre PA-C      docusate sodium  100 mg Oral BID Ashley Depadua, MD      DULoxetine  30 mg Oral Daily Abby De La Torre PA-C      gabapentin  300 mg Oral BID (AM & Afternoon) Abby De La Torre PA-C      gabapentin  600 mg Oral HS Abby De La Torre PA-C      heparin (porcine)  5,000 Units Subcutaneous Q8H JORGE Abby De La Torre PA-C      HYDROmorphone  2 mg Oral Q4H PRN Ashley Depadua, MD      HYDROmorphone  4 mg Oral Q4H PRN Ashley Depadua, MD      insulin glargine  10 Units Subcutaneous HS Abby De La Torre PA-C      insulin lispro  1-6 Units Subcutaneous TID With Meals Abby De La Torre PA-C      insulin lispro  5 Units Subcutaneous TID With Meals Abby De La Torre PA-C      lidocaine  1 patch Topical Daily PRN Abby De La Torre PA-C      lidocaine  1 patch Topical Daily Ashley Depadua, MD      methocarbamol  750 mg Oral Q6H [facility] Laurence Dumont PA-C      nitroglycerin  0.4 mg Sublingual Q5 Min PRN Abby De La Torre PA-C      ondansetron  4 mg Oral Q6H PRN Abby De La Torre PA-C      pantoprazole  40 mg Oral Early Morning Abby  Hany De La Torre PA-C      polyethylene glycol  17 g Oral Daily Abby De La Torre PA-C      senna  2 tablet Oral HS Ashley Depadua, MD      trimethobenzamide  200 mg Intramuscular Q6H PRN Abby De La Torre PA-C           Lab Results: I have reviewed the following results:  Results from last 7 days   Lab Units 02/17/25  0459 02/12/25  0608 02/11/25  1640   HEMOGLOBIN g/dL 9.0* 8.6*  --    HEMATOCRIT % 27.9* 26.9*  --    WBC Thousand/uL 8.52 9.74  --    PLATELETS Thousands/uL 294 252 239     Results from last 7 days   Lab Units 02/18/25  0542 02/17/25  0459 02/12/25  0608   BUN mg/dL 23 20 18   SODIUM mmol/L 130* 130* 133*   POTASSIUM mmol/L 4.4 3.9 4.4   CHLORIDE mmol/L 91* 91* 99   CREATININE mg/dL 0.81 0.75 0.84   AST U/L  --  43*  --    ALT U/L  --  32  --             Laurence Dumont PA-C  Physical Medicine and Rehabilitation  [facility]

## 2025-02-18 NOTE — ASSESSMENT & PLAN NOTE
S/p L SFA stent c/b acute occlusion this hospitalization  S/p multiple angioplasty (2/1, 2/2, 2/6) and lysis (1/31-2/2)   Per Vascular : ASA/Statin only  Monitor neurovascular exam  F/u Vascular outpatient

## 2025-02-19 ENCOUNTER — HOME HEALTH ADMISSION (OUTPATIENT)
Dept: HOME HEALTH SERVICES | Facility: HOME HEALTHCARE | Age: 50
End: 2025-02-19
Payer: COMMERCIAL

## 2025-02-19 LAB
GLUCOSE SERPL-MCNC: 198 MG/DL (ref 65–140)
GLUCOSE SERPL-MCNC: 224 MG/DL (ref 65–140)
GLUCOSE SERPL-MCNC: 258 MG/DL (ref 65–140)

## 2025-02-19 PROCEDURE — 97535 SELF CARE MNGMENT TRAINING: CPT

## 2025-02-19 PROCEDURE — 97110 THERAPEUTIC EXERCISES: CPT

## 2025-02-19 PROCEDURE — 97116 GAIT TRAINING THERAPY: CPT

## 2025-02-19 PROCEDURE — 97530 THERAPEUTIC ACTIVITIES: CPT

## 2025-02-19 PROCEDURE — 82948 REAGENT STRIP/BLOOD GLUCOSE: CPT

## 2025-02-19 PROCEDURE — 99233 SBSQ HOSP IP/OBS HIGH 50: CPT | Performed by: PHYSICAL MEDICINE & REHABILITATION

## 2025-02-19 PROCEDURE — 97542 WHEELCHAIR MNGMENT TRAINING: CPT

## 2025-02-19 RX ORDER — ACETAMINOPHEN 325 MG/1
975 TABLET ORAL 3 TIMES DAILY
Status: DISCONTINUED | OUTPATIENT
Start: 2025-02-19 | End: 2025-02-28 | Stop reason: HOSPADM

## 2025-02-19 RX ORDER — ACETAMINOPHEN 325 MG/1
975 TABLET ORAL 3 TIMES DAILY
Status: DISCONTINUED | OUTPATIENT
Start: 2025-02-19 | End: 2025-02-19

## 2025-02-19 RX ORDER — GABAPENTIN 300 MG/1
600 CAPSULE ORAL 3 TIMES DAILY
Status: DISCONTINUED | OUTPATIENT
Start: 2025-02-19 | End: 2025-02-20

## 2025-02-19 RX ADMIN — METHOCARBAMOL TABLETS 750 MG: 500 TABLET, COATED ORAL at 05:16

## 2025-02-19 RX ADMIN — HEPARIN SODIUM 5000 UNITS: 5000 INJECTION INTRAVENOUS; SUBCUTANEOUS at 13:50

## 2025-02-19 RX ADMIN — GABAPENTIN 300 MG: 300 CAPSULE ORAL at 08:14

## 2025-02-19 RX ADMIN — HYDROMORPHONE HYDROCHLORIDE 4 MG: 4 TABLET ORAL at 18:28

## 2025-02-19 RX ADMIN — ASPIRIN 81 MG: 81 TABLET, COATED ORAL at 08:14

## 2025-02-19 RX ADMIN — METHOCARBAMOL TABLETS 750 MG: 500 TABLET, COATED ORAL at 12:06

## 2025-02-19 RX ADMIN — DIAZEPAM 5 MG: 5 TABLET ORAL at 22:56

## 2025-02-19 RX ADMIN — METHOCARBAMOL TABLETS 750 MG: 500 TABLET, COATED ORAL at 17:27

## 2025-02-19 RX ADMIN — INSULIN LISPRO 2 UNITS: 100 INJECTION, SOLUTION INTRAVENOUS; SUBCUTANEOUS at 12:09

## 2025-02-19 RX ADMIN — HYDROMORPHONE HYDROCHLORIDE 4 MG: 4 TABLET ORAL at 06:37

## 2025-02-19 RX ADMIN — ATORVASTATIN CALCIUM 80 MG: 80 TABLET, FILM COATED ORAL at 17:27

## 2025-02-19 RX ADMIN — HYDROMORPHONE HYDROCHLORIDE 4 MG: 4 TABLET ORAL at 22:56

## 2025-02-19 RX ADMIN — GABAPENTIN 600 MG: 300 CAPSULE ORAL at 15:20

## 2025-02-19 RX ADMIN — INSULIN LISPRO 5 UNITS: 100 INJECTION, SOLUTION INTRAVENOUS; SUBCUTANEOUS at 12:09

## 2025-02-19 RX ADMIN — INSULIN LISPRO 5 UNITS: 100 INJECTION, SOLUTION INTRAVENOUS; SUBCUTANEOUS at 08:13

## 2025-02-19 RX ADMIN — DULOXETINE HYDROCHLORIDE 30 MG: 30 CAPSULE, DELAYED RELEASE ORAL at 08:14

## 2025-02-19 RX ADMIN — ACETAMINOPHEN 975 MG: 325 TABLET ORAL at 12:15

## 2025-02-19 RX ADMIN — HYDROMORPHONE HYDROCHLORIDE 4 MG: 4 TABLET ORAL at 14:35

## 2025-02-19 RX ADMIN — HEPARIN SODIUM 5000 UNITS: 5000 INJECTION INTRAVENOUS; SUBCUTANEOUS at 05:15

## 2025-02-19 RX ADMIN — ACETAMINOPHEN 975 MG: 325 TABLET ORAL at 23:00

## 2025-02-19 RX ADMIN — SENNOSIDES 17.2 MG: 8.6 TABLET, FILM COATED ORAL at 21:02

## 2025-02-19 RX ADMIN — METHOCARBAMOL TABLETS 750 MG: 500 TABLET, COATED ORAL at 23:00

## 2025-02-19 RX ADMIN — LIDOCAINE 5% 1 PATCH: 700 PATCH TOPICAL at 08:13

## 2025-02-19 RX ADMIN — HYDROMORPHONE HYDROCHLORIDE 4 MG: 4 TABLET ORAL at 01:54

## 2025-02-19 RX ADMIN — INSULIN GLARGINE 10 UNITS: 100 INJECTION, SOLUTION SUBCUTANEOUS at 21:02

## 2025-02-19 RX ADMIN — INSULIN LISPRO 2 UNITS: 100 INJECTION, SOLUTION INTRAVENOUS; SUBCUTANEOUS at 08:12

## 2025-02-19 RX ADMIN — INSULIN LISPRO 5 UNITS: 100 INJECTION, SOLUTION INTRAVENOUS; SUBCUTANEOUS at 17:29

## 2025-02-19 RX ADMIN — DOCUSATE SODIUM 100 MG: 100 CAPSULE, LIQUID FILLED ORAL at 08:14

## 2025-02-19 RX ADMIN — GABAPENTIN 600 MG: 300 CAPSULE ORAL at 21:02

## 2025-02-19 RX ADMIN — ACETAMINOPHEN 975 MG: 325 TABLET ORAL at 05:15

## 2025-02-19 RX ADMIN — PANTOPRAZOLE SODIUM 40 MG: 40 TABLET, DELAYED RELEASE ORAL at 05:15

## 2025-02-19 RX ADMIN — CHLORTHALIDONE 12.5 MG: 25 TABLET ORAL at 08:13

## 2025-02-19 RX ADMIN — HYDROMORPHONE HYDROCHLORIDE 4 MG: 4 TABLET ORAL at 10:40

## 2025-02-19 RX ADMIN — POLYETHYLENE GLYCOL 3350 17 G: 17 POWDER, FOR SOLUTION ORAL at 08:16

## 2025-02-19 RX ADMIN — INSULIN LISPRO 4 UNITS: 100 INJECTION, SOLUTION INTRAVENOUS; SUBCUTANEOUS at 17:27

## 2025-02-19 RX ADMIN — HEPARIN SODIUM 5000 UNITS: 5000 INJECTION INTRAVENOUS; SUBCUTANEOUS at 21:02

## 2025-02-19 NOTE — ASSESSMENT & PLAN NOTE
Lab Results   Component Value Date    HGBA1C 10.3 (H) 12/13/2024       Recent Labs     02/18/25  1610 02/18/25 2008 02/19/25  0709 02/19/25  1110   POCGLU 210* 243* 198* 224*       Blood Sugar Average: Last 72 hrs:  (P) 229.2101769947849376    Home: Lantus 10 units QHS, Aspart 5 units TID with meals  Here: Same, CDI/Accuchecks  Diabetic Diet  Monitor and adjust as per primary team

## 2025-02-19 NOTE — ASSESSMENT & PLAN NOTE
Tylenol scheduled (monitor LFTs)  Cymbalta 30mg daily  Gabapentin 600 TID   Robaxin 750mg Q6hr  Valium PRN muscle spasms  2-4 mg Dilaudid every 4 hours   Monitor and adjust as appropriate.

## 2025-02-19 NOTE — PROGRESS NOTES
"Progress Note - Lizzy Acuna 49 y.o. female MRN: [id number]    Unit/Bed#: -01 Encounter: [id number]        Subjective:   Patient seen and examined at bedside after reviewing the chart and discussing the case with the caring staff.    Patient examined at bedside.  Patient complains of ongoing significant pain to left lower limb.  Otherwise denies any acute symptoms.     Repeat CMP on Thursday, 2/20/25 to follow-up with hyponatremia.    Physical Exam   Vitals: Blood pressure 143/78, pulse 87, temperature 98.1 °F (36.7 °C), temperature source Temporal, resp. rate 14, height 5' 1\" (1.549 m), weight 82.8 kg (182 lb 8.7 oz), last menstrual period 12/01/2023, SpO2 98%, not currently breastfeeding.,Body mass index is 34.49 kg/m².  Constitutional: Patient in no acute distress.  HEENT: PERR, EOMI, MMM.  Cardiovascular: Normal rate and regular rhythm.    Pulmonary/Chest: Effort normal and breath sounds normal.   Abdomen: Soft, + BS, NT.    Assessment/Plan:  Lizzy Acuna is a(n) 49 y.o. 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     Cardiac w/ HTN, HLD, CAD.  Continue chlorthalidone 12.5 mg daily, atorvastatin 80 mg daily, and ASA 81 mg daily.   Type 2 DM.   Hgb A1c 10.3 on 12/13/24.  Continue home regimen of Lantus 10 units HS, Humalog 5u TID w/ meals.  SSI w/ POCT AC HS.  Carb controlled diet.   GERD.  Continue Protonix 40 mg daily.   Depression.  Not on home meds.  APS started pt on Cymbalta 30 mg daily for phantom limb pain.  Also on Valium 5 mg q8h as needed for anxiety/muscle spasms.   ABLA.  Hgb 8.6 -> 9.0 on 2/17/25.  S/p multiple transfusions on acute care.  Cont to monitor.  Transfuse Hgb < 7.   Adrenal nodule.  Stable since 2018.  Recommend f/u outpt.   Thyroid nodule.  Incidental finding.  Recommend outpt thyroid US.  Follow up with PCP/endo.   Hyponatremia.  Na 133 -> 130 on 2/17/25.  Likely SIADH in the " Patient admitted from home due to a fall while in the bathroom; injuring his low back. Radiograph show fracture, however MD recommend conservative treatment, with use of back brace during ambulation. Instructions not to ambulate patient with severe pain.  fall precautions  Patient presents w/ injury to back with pain which limits patient's mobility and function.  Will need commode-confined to one room without bathroom facilities   Per physical therapy evaluation pt will need rolling walker  for safe ambulation and commode to facilitate safe toilet transfers.     Marleni Templeton NPI [id number] setting of acute infection.  Placed on 1.8L fluid restriction on 2/18/25.  Cont to monitor.    Pain and bowel regimen.  As per physiatry.   Cellulitis of L foot s/p BKA.  Continue ASA and statin.  Patient receiving intensive PT OT as per physiatry.      Anticipated date of discharge.  Friday 2/28/25    The patient was discussed with Dr. Salas and he is in agreement with the above note.   Patient admitted from home due to a fall while in the bathroom; injuring his low back. Radiograph CT shows fracture, however MD recommend conservative treatment, with use of back brace during ambulation. Instructions not to ambulate patient with severe pain.  fall precautions  Patient presents w/ injury to back with pain which limits patient's mobility and function.  Will need commode-confined to one room without bathroom facilities   Per physical therapy evaluation pt will need rolling walker  for safe ambulation and commode to facilitate safe toilet transfers.     Marleni Templeton NPI [id number]

## 2025-02-19 NOTE — PROGRESS NOTES
02/19/25 0900   Pain Assessment   Pain Assessment Tool 0-10   Pain Score 10 - Worst Possible Pain   Pain Location/Orientation Orientation: Left;Location: Leg   Pain Onset/Description Onset: Ongoing;Descriptor: Throbbing;Descriptor: Aching   Restrictions/Precautions   Precautions Bed/chair alarms;Fall Risk;Limb alert;Pain;Supervision on toilet/commode;Fluid restriction  (1800)   LLE Weight Bearing Per Order NWB   Oral Hygiene   Type of Assistance Needed Set-up / clean-up   Physical Assistance Level No physical assistance   Comment w/c at sink   Oral Hygiene CARE Score 5   Grooming   Able To Initiate Tasks;Acquire Items;Comb/Brush Hair;Wash/Dry Face;Brush/Clean Teeth;Wash/Dry Hands   Findings w/c level   Shower/Bathe Self   Type of Assistance Needed Supervision;Verbal cues;Adaptive equipment   Physical Assistance Level No physical assistance   Comment weight shifting while seated; covered residual limb prior to shower; use of CHG bath   Shower/Bathe Self CARE Score 4   Bathing   Assessed Bath Style Shower   Able to Adjust Water Temperature Yes   Able to Wash/Rinse/Dry (body part) Left Arm;Right Arm;L Upper Leg;R Upper Leg;R Lower Leg/Foot;Chest;Abdomen;Perineal Area;Buttocks   Limitations Noted in Balance;Strength   Positioning Seated   Adaptive Equipment Shower Bars;Shower Seat;Hand Held Shower   Tub/Shower Transfer   Limitations Noted In Balance;Endurance;LE Strength   Adaptive Equipment Grab Bars;Seat with out Back   Assessed Shower   Findings CGA SPT   Upper Body Dressing   Type of Assistance Needed Set-up / clean-up   Physical Assistance Level No physical assistance   Upper Body Dressing CARE Score 5   Lower Body Dressing   Type of Assistance Needed Incidental touching;Verbal cues   Lower Body Dressing CARE Score 4   Putting On/Taking Off Footwear   Type of Assistance Needed Supervision;Verbal cues   Physical Assistance Level No physical assistance   Putting On/Taking Off Footwear CARE Score 4    Dressing/Undressing Clothing   Able to  Obtain Clothing;Store removed clothing   Remove UB Clothes Pullover Shirt   Don UB Clothes Pullover Shirt;Bra   Remove LB Clothes Undergarment;Socks;Pants   Don LB Clothes Undergarment;Socks;Shoes   Limitations Noted In Balance;Endurance;Strength   Positioning Supported Sit;Standing   Sit to Lying   Type of Assistance Needed Supervision   Physical Assistance Level No physical assistance   Sit to Lying CARE Score 4   Lying to Sitting on Side of Bed   Type of Assistance Needed Supervision   Physical Assistance Level No physical assistance   Lying to Sitting on Side of Bed CARE Score 4   Sit to Stand   Type of Assistance Needed Supervision   Physical Assistance Level No physical assistance   Sit to Stand CARE Score 4   Bed-Chair Transfer   Type of Assistance Needed Supervision   Physical Assistance Level No physical assistance   Comment sit pivot   Chair/Bed-to-Chair Transfer CARE Score 4   Transfer Bed/Chair/Wheelchair   Positioning Concerns Skin Integrity   Limitations Noted In Balance;LE Strength;Pain Management   Light Housekeeping   Light Housekeeping Level Wheelchair   Light Housekeeping Level of Assistance Distant supervision   Exercise Tools   Hand Gripper 9# digi x 30 B hands   Other Exercise Tool 1 3# dowel 2x15 reps in 6 planes of motion   Other Exercise Tool 2 yellow t bar 2x15 reps upward then downward   Cognition   Overall Cognitive Status WFL   Arousal/Participation Alert;Cooperative   Attention Within functional limits   Orientation Level Oriented X4   Memory Within functional limits   Following Commands Follows all commands and directions without difficulty   Additional Activities   Additional Activities Comments w/c mobility MI   Activity Tolerance   Activity Tolerance Patient limited by pain   Assessment   Treatment Assessment OT tx addressed ADLs/iADLs via w/c level, safety with functional txfs, and TE for strength and endurance as noted in respective  sections of note. Pt is making positive gains toward OT goals. Pt experiencing increase in pain in L residual limb. Reviewed DME for home s/u. Barriers: NWB L LE, pain, edema, compromised skin integrity, decreased balance and strength. Plan is to continue with skilled OT as per POC to progress pt toward OT goals.   Prognosis Good   Problem List Decreased skin integrity;Orthopedic restrictions;Pain;Decreased strength;Impaired balance;Decreased mobility   Plan   Treatment/Interventions ADL retraining;Functional transfer training;Therapeutic exercise;Endurance training;Patient/family training;Equipment eval/education;Bed mobility;Compensatory technique education;Spoke to nursing;OT   Progress Progressing toward goals   OT Therapy Minutes   OT Time In 0900   OT Time Out 1030   OT Total Time (minutes) 90   OT Mode of treatment - Individual (minutes) 90   OT Mode of treatment - Concurrent (minutes) 0   OT Mode of treatment - Group (minutes) 0   OT Mode of treatment - Co-treat (minutes) 0   OT Mode of Treatment - Total time(minutes) 90 minutes   OT Cumulative Minutes 280   Therapy Time missed   Time missed? No

## 2025-02-19 NOTE — PROGRESS NOTES
"Progress Note - PMR   Name: Lizzy Acuna 49 y.o. female I MRN: [id number]  Unit/Bed#: -01 I Date of Admission: 2/15/2025   Date of Service: 2/19/2025 I Hospital Day: 4     Assessment & Plan  S/P BKA (below knee amputation) unilateral, left (HCC)  2/2 Infection after TMA in setting of T2DM/Neuropathy/PAD with acute ischemia.  2/11 - L BKA with Dr. Arauz  Contracture prevention (has knee immobilizer)   - Vascular did not comment on limb protector stating \"ace is fine\"   - Quad strengthening  Edema/shaping management    - ACE wrap ATC   - Shrinkers when cleared by vascular   Incisional Care/education   - Mom may have to perform  Phantom limb sensation (see that entry)  PT/OT 3-5 hours/day, 5-7 days/week   Pre-prosthetic f/u to be arranged with PMR   F/U with Vascular for 4 week POV (~3/11)  Obesity (BMI 30.0-34.9)  Considerations in therapy.   Tobacco abuse  Cessation counseling  Nicotine patch if needed.  Diabetes mellitus type 2 with complications ([facility])  Lab Results   Component Value Date    HGBA1C 10.3 (H) 12/13/2024       Recent Labs     02/18/25  1610 02/18/25 2008 02/19/25  0709 02/19/25  1110   POCGLU 210* 243* 198* 224*       Blood Sugar Average: Last 72 hrs:  (P) [id number]    Home: Lantus 10 units QHS, Aspart 5 units TID with meals  Here: Same, CDI/Accuchecks  Diabetic Diet  Monitor and adjust as per primary team  Hyponatremia    Currently asymptomatic  2/17 130 and 2/18 130   Discussed with IM: 1.8L/day FR started on 2/18   GERD (gastroesophageal reflux disease)  Continue PPI  PAD (peripheral artery disease) ([facility])  S/p L SFA stent c/b acute occlusion this hospitalization  S/p multiple angioplasty (2/1, 2/2, 2/6) and lysis (1/31-2/2)   Per Vascular : ASA/Statin only  Monitor neurovascular exam  F/u Vascular outpatient   Blood loss anemia    Required multiple transfusions while inpatient.  Hemodynamically stable currently.  2/12 Hgb 8.6 --> 2/17 hgb 9.0  Transfuse as appropriate.   Primary " hypertension  Home: None  Here: Chlorthalidone 12.5mg daily  Monitor and adjust as appropriate.  Thyroid nodule  Noted incidentally  Enlarged multinodular thyroid gland  Will check TSH with next set of labs.  Outpatient f/u with non-emergent ultrasound   Adrenal nodule (HCC)  Noted incidentally  14mm R adrenal nodule stable since 2018 - in keeping with benign adenoma  Biochemical evaluation suggested for adrenal adenomas > 1cm to rule out functioning adenoma  Recommend outpatient f/u with PCP for additional work-up  At risk for venous thromboembolism (VTE)  HSQ, SCDs.   Will not need to go home on HSQ  Phantom limb syndrome (HCC)  Desensitization techniques with therapy  On cymbalta and gabapentin  Monitor and adjust as appropriate  Will need f/u with PMR after discharge.   Constipation    Docusate BID, senna 2 tabs at night, and miralax daily.   Adding PRN suppository and lactulose.  Acute pain   Tylenol scheduled (monitor LFTs)  Cymbalta 30mg daily  Gabapentin 600 TID   Robaxin 750mg Q6hr  Valium PRN muscle spasms  2-4 mg Dilaudid every 4 hours   Monitor and adjust as appropriate.   Adjustment disorder with anxiety  Of note started by APS on cymbalta for phantom pain  Has valium ordered 5mg Q8hr PRN for anxiety but also muscle spasm   - Would try to wean down/off.  Emphasize non-pharmacologic strategies  Monitor and adjust as appropriate.     Subjective   Lizzy Acuna is a 49 y.o. female with medical history of T2DM with neuropathy, depression, HLD, obesity, PAD s/p LLE SFA balloon angioplasty and stenting in 12/2024 who presented to the [facility] 1/28 with L foot pain for 5 days. She had a L TMA on 1/3 by podiatry, and had developed increased pain, redness, and discharge. She was seen at her podiatrist's office who sent her to the ER for IV abx and further work-up. In the hospital met sepsis criteria. MRI showed fluid collection within her dorsal soft tissue. Vascular consulted  after LEADs showed an occluded L SFA stent. They recommended transfer to [place] and starting heparin gtt. Dr. Ritchie performed I&D of her left foot on 1/29 and on 1/30 she was transferred to [place]. IR was consulted, and Dr. Davidson performed catheter placement and initiated thrombolysis with TPA on 1/31. PICC was also placed. On 2/1, residual thrombus along the L SFA was treated with 4mm balloon angioplasty, with no flow noted within L SFA following angioplasty. Lysis was restarted. C/B ABLA with 2/2 Hgb 6.2 - given 2 units pRBC. Her surgical cultures grew MRSA and Finegoldia. Abx were narrowed to vancomycin by ID. APS was consulted and started dilaudid PCA and IV valium. 2/2 lysis check with patent l SFA, popliteal, tibioperoneal trunk and TARA. Mild residual stenoses along L SFA and popliteal artery were treated with 4mm balloon angioplasty and stenosis along TARA was treated with 2.5 and 3mm balloon angioplasty. L PT and peroneal arteries were chronically occluded. TPA was discontinued. She was transferred out of the ICU on 2/3. 2/4 required 1 unit pRBC for ABLA. On 2/6, she had repeat arteriogram with Dr. Victoria with finding of recurrent pop occlusion, AT recannulization with distal pop, TPT, and AT POBA an serration angioplasty. Despite this, with compromised blood flow to posterior aspect and plantar flap of foot. Still concern that perfusion would not be sufficient to salvage foot. She was recommended for BKA. On 2/11, Dr. Arauz performed L BKA. She had L adducotr and popliteal blocks. Post-op with acute bilateral shoulder pain, diaphoresis, tachycardic to 120 and hypertensive to 180/103. EKG showed LAD and TWI in V1. ACS r/o initiated. CTA dissection protocol (negative for dissection). This resolved - no acute findings. Abx were discontinued.     Chief Complaint: f/u amputation    Interval:   Seen and evaluated patient in PT gym. She continues to endorse significant LLE residual limb pain. In effort to  avoid opioid escalation, gabapentin has been increased to 600 mg TID.    She is eating and voiding   Last BM 2/18 on 17.2 mg senna qHS and daily miralax   Started on 1.8 L/day FR. Repeat CMP on Thursday.   This patient was discussed by the interdisciplinary team in weekly case conference today. The care of the patient was extensively discussed with all care providers and an appropriate rehabilitation plan was formulated unique for this patient. Barriers were identified preventing progression of therapy and appropriate interventions were discussed with each discipline. Please see the team note for input from all disciplines regarding barriers, intervention, and discharge planning.  Anticipated Discharge Date:  Feb 28, 2025 with [facility] for PT, OT, and nursing.       Objective :  Temp:  [97.4 °F (36.3 °C)-98.1 °F (36.7 °C)] 98.1 °F (36.7 °C)  HR:  [87-89] 87  BP: (142-143)/(72-78) 143/78  Resp:  [14-16] 14  SpO2:  [98 %] 98 %  O2 Device: None (Room air)    Functional Update:  Physical Therapy Occupational Therapy    Weight Bearing Status: Non-weight bearing (left LE)  Transfers: Minimal Assistance  Bed Mobility: Supervision  Ambulation:  (TBA)  Wheelchair Mobility Distance: 250 ft  Wheelchair Mobility: Supervision  Number of Stairs: 0  Assistive Device for Stairs:  (TBA)  Ramp:  (TBA)  Discharge Recommendations: Home with:  DC Home with:: Family Support, Home Physical Therapy (Possible first floor set up)   Eating: Independent  Grooming: Supervision  Bathing: Moderate Assistance  Bathing: Moderate Assistance  Upper Body Dressing: Minimal Assistance  Lower Body Dressing: Maximum Assistance  Toileting: Moderate Assistance  Tub/Shower Transfer: Minimal Assistance  Toilet Transfer: Moderate Assistance  Cognition: Within Defined Limits  Orientation: Person, Situation, Place, Time                 Physical Exam  Vitals and nursing note reviewed.   Constitutional:       General: She is not in acute distress.     Appearance: She  is obese. She is not ill-appearing, toxic-appearing or diaphoretic.   HENT:      Head: Normocephalic and atraumatic.      Right Ear: External ear normal.      Left Ear: External ear normal.      Nose: Nose normal.      Mouth/Throat:      Mouth: Mucous membranes are moist.   Pulmonary:      Effort: Pulmonary effort is normal. No respiratory distress.   Abdominal:      General: There is no distension.   Musculoskeletal:      Comments: LLE residual limb wrapped in clean, dry ace bandage    Skin:     General: Skin is warm and dry.   Neurological:      Mental Status: She is oriented to person, place, and time.         Scheduled Meds:  Current Facility-Administered Medications   Medication Dose Route Frequency Provider Last Rate    acetaminophen  975 mg Oral TID Cristopher Hui MD      aspirin  81 mg Oral Daily Abby De La Torre PA-C      atorvastatin  80 mg Oral Daily With Dinner Abby De La Torre PA-C      bisacodyl  10 mg Rectal Daily PRN Ashley Depadua, MD      chlorthalidone  12.5 mg Oral Daily Abby De La Torre PA-C      diazepam  5 mg Oral Q8H PRN Abby De La Torre PA-C      docusate sodium  100 mg Oral BID Ashley Depadua, MD      DULoxetine  30 mg Oral Daily Abby De La Torre PA-C      gabapentin  300 mg Oral BID (AM & Afternoon) Abby De La Torre PA-C      gabapentin  600 mg Oral HS Abby De La Torre PA-C      heparin (porcine)  5,000 Units Subcutaneous Q8H [facility] Abby De La Torre PA-C      HYDROmorphone  2 mg Oral Q4H PRN Ashley Depadua, MD      HYDROmorphone  4 mg Oral Q4H PRN Ashley Depadua, MD      insulin glargine  10 Units Subcutaneous HS Abby De La Torre PA-C      insulin lispro  1-6 Units Subcutaneous TID With Meals Abby De La Torre PA-C      insulin lispro  5 Units Subcutaneous TID With Meals Abby De La Torre PA-C      lidocaine  1 patch Topical Daily PRN Abby De La Torre PA-C      lidocaine  1 patch Topical Daily Ashley Depadua,  MD      methocarbamol  750 mg Oral Q6H [facility] Laurence Dumont PA-C      nitroglycerin  0.4 mg Sublingual Q5 Min PRN Abby De La Torre PA-C      ondansetron  4 mg Oral Q6H PRN Abby De La Torre PA-C      pantoprazole  40 mg Oral Early Morning Abby De La Torre PA-C      polyethylene glycol  17 g Oral Daily Abby De La Torre PA-C      senna  2 tablet Oral HS Ashley Depadua, MD      trimethobenzamide  200 mg Intramuscular Q6H PRN Abby De La Torre PA-C           Lab Results: I have reviewed the following results:  Results from last 7 days   Lab Units 02/17/25  0459   HEMOGLOBIN g/dL 9.0*   HEMATOCRIT % 27.9*   WBC Thousand/uL 8.52   PLATELETS Thousands/uL 294     Results from last 7 days   Lab Units 02/18/25  0542 02/17/25  0459   BUN mg/dL 23 20   SODIUM mmol/L 130* 130*   POTASSIUM mmol/L 4.4 3.9   CHLORIDE mmol/L 91* 91*   CREATININE mg/dL 0.81 0.75   AST U/L  --  43*   ALT U/L  --  32            Laurence Dumont PA-C  Physical Medicine and Rehabilitation  [facility]

## 2025-02-19 NOTE — PLAN OF CARE
Problem: INFECTION - ADULT  Goal: Absence or prevention of progression during hospitalization  Description: INTERVENTIONS:  - Assess and monitor for signs and symptoms of infection  - Monitor lab/diagnostic results  - Monitor all insertion sites, i.e. indwelling lines, tubes, and drains  - Monitor endotracheal if appropriate and nasal secretions for changes in amount and color  - [place] appropriate cooling/warming therapies per order  - Administer medications as ordered  - Instruct and encourage patient and family to use good hand hygiene technique  - Identify and instruct in appropriate isolation precautions for identified infection/condition  Outcome: Progressing     Problem: PAIN - ADULT  Goal: Verbalizes/displays adequate comfort level or baseline comfort level  Description: Interventions:  - Encourage patient to monitor pain and request assistance  - Assess pain using appropriate pain scale  - Administer analgesics based on type and severity of pain and evaluate response  - Implement non-pharmacological measures as appropriate and evaluate response  - Consider cultural and social influences on pain and pain management  - Notify physician/advanced practitioner if interventions unsuccessful or patient reports new pain  Outcome: Progressing

## 2025-02-19 NOTE — TEAM CONFERENCE
Acute RehabilitationTeam Conference Note  Date: 2/19/2025   Time: 11:49 AM       Patient Name:  Lizzy Acuna       Medical Record Number: [id number]   YOB: 1975  Sex: Female          Room/Bed:  [facility] 218/[facility] 218-01  Payor Info:  Payor: paymio / Plan: paymio / Product Type: Medicaid HMO /      Admitting Diagnosis: Hx of left BKA ([facility]) [Z89.512]   Admit Date/Time:  2/15/2025 11:26 AM  Admission Comments: No comment available     Primary Diagnosis:  S/P BKA (below knee amputation) unilateral, left ([facility])  Principal Problem: S/P BKA (below knee amputation) unilateral, left (HCC)    Patient Active Problem List    Diagnosis Date Noted    S/P BKA (below knee amputation) unilateral, left ([facility]) 02/15/2025    At risk for venous thromboembolism (VTE) 02/15/2025    Phantom limb syndrome ([facility]) 02/15/2025    Constipation 02/15/2025    Acute pain 02/15/2025    Adjustment disorder with anxiety 02/15/2025    Thyroid nodule 02/12/2025    Adrenal nodule ([facility]) 02/12/2025    Primary hypertension 02/06/2025    Obesity (BMI 30-39.9) 02/03/2025    Blood loss anemia 02/02/2025    PAD (peripheral artery disease) ([facility]) 01/30/2025    Diabetic foot infection  ([facility]) 01/28/2025    Dehiscence of operative wound, initial encounter 01/23/2025    Hypersensitivity, initial encounter 01/23/2025    GERD (gastroesophageal reflux disease)     Cellulitis of left foot complicated by Critical limb threatening ischemia of the left lower extremity with a nonhealing transmetatarsal amputation wound 12/13/2024    Gangrene of left foot ([facility]) 12/13/2024    Diabetes mellitus type 2 with complications ([facility]) 11/05/2018    Hyponatremia 11/05/2018    Chronic sciatica 06/19/2018    Tobacco abuse 02/15/2017    Obesity (BMI 30.0-34.9) 02/09/2017    Chronic low back pain 11/30/2016    Chronic, continuous use of opioids 11/30/2016    Proteinuria 04/18/2011       Physical Therapy:    Weight Bearing Status: Non-weight bearing (left  LE)  Transfers: Minimal Assistance  Bed Mobility: Supervision  Ambulation:  (TBA)  Wheelchair Mobility Distance: 250 ft  Wheelchair Mobility: Supervision  Number of Stairs: 0  Assistive Device for Stairs:  (TBA)  Ramp:  (TBA)  Discharge Recommendations: Home with:  DC Home with:: Family Support, Home Physical Therapy (Possible first floor set up)    2/19/25:  Patient seen by ARU PT making steady progress.  Presents following left BKA with decreased ROM/strength, decreased balance and safety, decreased endurance, and pain.   Patient S/min A bed mobility, min A transfers with SW, min A direct SPT bed to wheelchair.  Ambulation NT due to pain limitations.  Wheelchair mobility S for 250 ft.  Stairs TBA in upcoming sessions.  Patient would benefit from continued inpatient ARC PT to increase function, safety, and increased independence in prep for safe d/c to home.     Occupational Therapy:  Eating: Independent  Grooming: Supervision  Bathing: Moderate Assistance  Bathing: Moderate Assistance  Upper Body Dressing: Minimal Assistance  Lower Body Dressing: Maximum Assistance  Toileting: Moderate Assistance  Tub/Shower Transfer: Minimal Assistance  Toilet Transfer: Moderate Assistance  Cognition: Within Defined Limits  Orientation: Person, Situation, Place, Time  Discharge Recommendations: Home with:  DC Home with:: First Floor Setup, Family Support, Home Occupational Therapy       02/17/25 Pt participating in ADLs, iADLs w/c level, safety with functional txfs and mobility, TE/TA for generalized strength and endurance. Pts LOF noted above Pt is making positive gains toward OT goals. Pt barriers include: phantom pain L residual limb, decreased safety and balance, decreased strength and ROM. Plan is to continue with skilled OT to further progress pt toward OT goals and prepare pt for safe d/c to home with family.     Speech Therapy:           No notes on file    Nursing Notes:  Appetite: Good  Diet Type: Diabetic                       Diet Patient/Family Education Complete: Yes    Type of Wound (LDA): Wound           Incision (Active)           Type of Wound Patient/Family Education: Yes  Bladder: 4 - Minimal Assistance        Bowel: 4 - Minimal Assistance     Bowel Patient/Family Education: Yes  Pain Location/Orientation: Orientation: Left, Location: Leg  Pain Score: 10                       Hospital Pain Intervention(s): Medication (See MAR)  Pain Patient/Family Education: Yes  Medication Management/Safety  Injectable: Insulin  Safe Administration: Yes  Medication Patient/Family Education Complete: Yes    2/18/25 Pt admitted to [facility] on 2/15/25. On 2/11/25 had Left BKA . Hx of Diabetes ACHS scheduled lantus. Alert and Oriented. Lungs clear HRR. Dressing change daily cleaned with peroxide, painted with betadine, cathy, and ace wrap. Neur vascular checks daily. Scheduled dilated for pain.      Case Management:     Discharge Planning  Living Arrangements: Lives w/ Parent(s), Lives w/ Children  Support Systems: Self, Daughter, Parent  Assistance Needed: TBD  Type of Current Residence: Private residence  Current Home Care Services: No  Patient admitted to [facility] on 2/15/25 after inpatient hospital stay cellulitis, non healing TMA,wound 2/11 left BKA . Patient lives with her mother, 2 children ages 19 and 8, and grandson age 1.5 years old in 2  with 14 LETY. Payton t;s mother lives on the first floor, full bath with walk in shower on first floor. Full flight of stairs to second floor and bedrooms. Patient independent  PTA, including driving. Patient stated she can also stay on first floor if needed.    Is the patient actively participating in therapies? yes  List any modifications to the treatment plan: na    Barriers Interventions   BKA, acute blood loss anemia, pain, PAD, DM, hyponatremia Medical management and oversight, reqs insulin- uses pens at home   BKA incision Local care   Decreased balance ADL, transfer, gait training   Decreased  strength Therapeutic exercise, therapeutic activity         Is the patient making expected progress toward goals? yes  List any update or changes to goals: na    Medical Goals: Patient will be able to manage medical conditions and comorbid conditions with medications and follow up upon completion of rehab program    Weekly Team Goals:   Rehab Team Goals  ADL Team Goal: Patient will be independent with ADLs with least restrictive device upon completion of rehab program  Bowel/Bladder Team Goal: Patient will be independent with bladder/bowel management with least restrictive device upon completion of rehab program  Transfer Team Goal: Patient will be independent with transfers with least restrictive device upon completion of rehab program  Locomotion Team Goal: Patient will be independent with locomotion with least restrictive device upon completion of rehab program    Mod I bed mobility, transfers, and wc mobility  S self care    Health and wellness:  to be able to return to work, complete activities with family/grandchildren    Discussion: Plan for return home with family support with [facility] for PT, OT, and nursing.    Anticipated Discharge Date:  Feb 28, 2025  [facility] Team Members Present:  The following team members are supervising care for this patient and were present during this Weekly Team Conference.    MD Mariella Mandujano, RUMA Davila LPN and RUMA Lay, PT  Rossy Glasgow, OTR/L and Camila Meza OTR/L

## 2025-02-19 NOTE — PROGRESS NOTES
02/19/25 1230   Pain Assessment   Pain Assessment Tool 0-10   Pain Score 10 - Worst Possible Pain   Pain Location/Orientation Orientation: Left;Location: Leg  (residual limb)   Restrictions/Precautions   Precautions Bed/chair alarms;Fall Risk;Limb alert;Pain;Supervision on toilet/commode;Fluid restriction  (1800 ml FR)   LLE Weight Bearing Per Order NWB   Cognition   Overall Cognitive Status WFL   Orientation Level Oriented X4   Roll Left and Right   Type of Assistance Needed Independent   Comment bed rail; increased time due to pain   Roll Left and Right CARE Score 6   Sit to Lying   Type of Assistance Needed Supervision   Comment bed rail; increased time due to pain   Sit to Lying CARE Score 4   Lying to Sitting on Side of Bed   Type of Assistance Needed Supervision;Verbal cues   Comment bed rail; increased time due to pain   Lying to Sitting on Side of Bed CARE Score 4   Sit to Stand   Type of Assistance Needed Incidental touching;Verbal cues   Comment cg RW vs SW   Sit to Stand CARE Score 4   Bed-Chair Transfer   Type of Assistance Needed Physical assistance;Verbal cues   Physical Assistance Level 25% or less   Comment min A SPT with SW vs RW; CG/close S direct sit pivot transfer wheelchair<>bed using bed rail and arm rest of wheelchair for support   Chair/Bed-to-Chair Transfer CARE Score 3   Ambulation   Primary Mode of Locomotion Prior to Admission Walk   Distance Walked (feet) 4 ft   Assist Device Roller Walker   Gait Pattern Antalgic;Inconsistant Pauly;Slow Pauly;Decreased foot clearance;Hopping   Limitations Noted In Balance;Device Management;Endurance;Posture;Safety;Speed;Strength   Provided Assistance with: Balance;Trunk Support   Walk Assist Level Minimum Assist;Chair Follow   Findings min A x 1 plus SBA x 1 with 3rd person for wheelchair follow; declined further gait training   Does the patient walk? 2. Yes   Wheel 50 Feet with Two Turns   Type of Assistance Needed Supervision;Verbal cues  "  Comment S level and unlevel surfaces   Wheel 50 Feet with Two Turns CARE Score 4   Wheel 150 Feet   Type of Assistance Needed Supervision;Verbal cues   Comment S level and unlevel surfaces   Wheel 150 Feet CARE Score 4   Wheelchair mobility   Method Left upper extremity   Assistance Provided For Remove Leg Rest;Replace Leg Rest   Distance Level Surface (feet) 200 ft  (200' 121')   Distance Wheeled 3% Grade 24 ft   Findings S level and unlevel surfaces   Does the patient use a wheelchair? 1. Yes   Type of Wheelchair Used 1. Manual   Curb or Single Stair   Style negotiated Curb   Type of Assistance Needed Physical assistance;Verbal cues   Physical Assistance Level 25% or less   Comment min A x 1 plus SBA of another; patient hopped up backwards onto 2\" curb step using SW then sat in chair that was sitting on second step (which was 2\" higher than first step); to descend, patient stood from chair ( on second step) and hopped down 2\" step with SW with MIN A and SPT back to wheelchair   1 Step (Curb) CARE Score 3   Stairs   Type Curb   # of Steps 1   Weight Bearing Precautions NWB;LLE   Assist Devices Walker   Findings min A x 1 plus SBA of another; patient hopped up backwards onto 2\" curb step using SW then sat in chair that was sitting on second step (which was 2\" higher than first step); to descend, patient stood from chair ( on second step) and hopped down 2\" step with SW with MIN A and SPT back to wheelchair   Therapeutic Interventions   Strengthening seated and supine ther ex   Balance gait and transfer training   Other stair negotiation   Assessment   Treatment Assessment Patient agreeable to therapy session.   Reports 10/10 pain L residual limb, severely limiting patient with all tasks during therapy session.    Patient able to hop 4' with RW, limited by pain, and declined further gait training.  Able to negotiate up/down 2-2\" steps as stated above, which mimics her first 2 steps that she needs to negotiate to " enter/exit home.   Declined further training on steps with single HR ( has 4 + 7 to enter house) due to pain.  Patient completed ther ex for general LE strengthening and contracture prevention.  Returned to bed with alarms on and all needs within reach.   PT Barriers   Physical Impairment Decreased strength;Decreased range of motion;Decreased endurance;Impaired balance;Decreased mobility;Impaired judgement;Decreased safety awareness;Orthopedic restrictions;Pain   Functional Limitation Wheelchair management;Walking;Transfers;Standing;Stair negotiation;Ramp negotiation;Car transfers   Plan   Treatment/Interventions Functional transfer training;LE strengthening/ROM;Elevations;Therapeutic exercise;Bed mobility;Gait training   Progress Slow progress, decreased activity tolerance  (primarily pain)   PT Therapy Minutes   PT Time In 1230   PT Time Out 1400   PT Total Time (minutes) 90   PT Mode of treatment - Individual (minutes) 90   PT Mode of treatment - Concurrent (minutes) 0   PT Mode of treatment - Group (minutes) 0   PT Mode of treatment - Co-treat (minutes) 0   PT Mode of Treatment - Total time(minutes) 90 minutes   PT Cumulative Minutes 364   Therapy Time missed   Time missed? No       Sitting Balance: 1      2. Sitting Reach: 2        3. Chair to Chair Transfer 90: 0        4. Arises from Chair - Single Effort: 1        5. Arises from Chair - Multiple Efforts: 0        6. Immediate Standing Balance (1st - 5 sec): 1        7. Standing Balance: 30 sec: 1        8. AMPPro Only     9. Standing Balance: Standing Reach: 0        10. Standing Balance: Nudge Test: 0      11. Standing Balance: Eyes Closed: 0        12. Standing Balance: Picking Object Off the Floor: 0        13. Stand to Sit: 1        14. Initiation of Gait: 0         15. Hopping 8 Meters: 0  0    16. Step Continuity: 0   0    17. Turnin    0    18. Variable Pauly: 0        19. Hopping Over an Obstacle: 0        20. Stairs: 0    0    21. Assistive  Device Selection: 1 8/39       K0 = 0-8

## 2025-02-20 ENCOUNTER — TELEPHONE (OUTPATIENT)
Age: 50
End: 2025-02-20

## 2025-02-20 LAB
ALBUMIN SERPL BCG-MCNC: 3.7 G/DL (ref 3.5–5)
ALP SERPL-CCNC: 130 U/L (ref 34–104)
ALT SERPL W P-5'-P-CCNC: 26 U/L (ref 7–52)
ANION GAP SERPL CALCULATED.3IONS-SCNC: 9 MMOL/L (ref 4–13)
AST SERPL W P-5'-P-CCNC: 25 U/L (ref 13–39)
BILIRUB SERPL-MCNC: 0.33 MG/DL (ref 0.2–1)
BUN SERPL-MCNC: 27 MG/DL (ref 5–25)
CALCIUM SERPL-MCNC: 10.2 MG/DL (ref 8.4–10.2)
CHLORIDE SERPL-SCNC: 93 MMOL/L (ref 96–108)
CO2 SERPL-SCNC: 30 MMOL/L (ref 21–32)
CREAT SERPL-MCNC: 0.75 MG/DL (ref 0.6–1.3)
GFR SERPL CREATININE-BSD FRML MDRD: 94 ML/MIN/1.73SQ M
GLUCOSE P FAST SERPL-MCNC: 240 MG/DL (ref 65–99)
GLUCOSE SERPL-MCNC: 195 MG/DL (ref 65–140)
GLUCOSE SERPL-MCNC: 198 MG/DL (ref 65–140)
GLUCOSE SERPL-MCNC: 209 MG/DL (ref 65–140)
GLUCOSE SERPL-MCNC: 240 MG/DL (ref 65–140)
GLUCOSE SERPL-MCNC: 293 MG/DL (ref 65–140)
POTASSIUM SERPL-SCNC: 4.3 MMOL/L (ref 3.5–5.3)
PROT SERPL-MCNC: 8.2 G/DL (ref 6.4–8.4)
SODIUM SERPL-SCNC: 132 MMOL/L (ref 135–147)

## 2025-02-20 PROCEDURE — 99233 SBSQ HOSP IP/OBS HIGH 50: CPT | Performed by: PHYSICAL MEDICINE & REHABILITATION

## 2025-02-20 PROCEDURE — 97110 THERAPEUTIC EXERCISES: CPT

## 2025-02-20 PROCEDURE — 80053 COMPREHEN METABOLIC PANEL: CPT | Performed by: FAMILY MEDICINE

## 2025-02-20 PROCEDURE — 97535 SELF CARE MNGMENT TRAINING: CPT

## 2025-02-20 PROCEDURE — 97116 GAIT TRAINING THERAPY: CPT

## 2025-02-20 PROCEDURE — 82948 REAGENT STRIP/BLOOD GLUCOSE: CPT

## 2025-02-20 PROCEDURE — 97530 THERAPEUTIC ACTIVITIES: CPT

## 2025-02-20 PROCEDURE — 97542 WHEELCHAIR MNGMENT TRAINING: CPT

## 2025-02-20 RX ORDER — INSULIN GLARGINE 100 [IU]/ML
15 INJECTION, SOLUTION SUBCUTANEOUS
Status: DISCONTINUED | OUTPATIENT
Start: 2025-02-20 | End: 2025-02-21

## 2025-02-20 RX ORDER — INSULIN LISPRO 100 [IU]/ML
8 INJECTION, SOLUTION INTRAVENOUS; SUBCUTANEOUS
Status: DISCONTINUED | OUTPATIENT
Start: 2025-02-20 | End: 2025-02-21

## 2025-02-20 RX ORDER — GABAPENTIN 300 MG/1
600 CAPSULE ORAL 3 TIMES DAILY
Status: DISCONTINUED | OUTPATIENT
Start: 2025-02-20 | End: 2025-02-28 | Stop reason: HOSPADM

## 2025-02-20 RX ADMIN — ACETAMINOPHEN 975 MG: 325 TABLET ORAL at 08:39

## 2025-02-20 RX ADMIN — GABAPENTIN 600 MG: 300 CAPSULE ORAL at 08:38

## 2025-02-20 RX ADMIN — DULOXETINE HYDROCHLORIDE 30 MG: 30 CAPSULE, DELAYED RELEASE ORAL at 08:37

## 2025-02-20 RX ADMIN — METHOCARBAMOL TABLETS 750 MG: 500 TABLET, COATED ORAL at 17:15

## 2025-02-20 RX ADMIN — INSULIN LISPRO 5 UNITS: 100 INJECTION, SOLUTION INTRAVENOUS; SUBCUTANEOUS at 08:40

## 2025-02-20 RX ADMIN — HYDROMORPHONE HYDROCHLORIDE 4 MG: 4 TABLET ORAL at 05:53

## 2025-02-20 RX ADMIN — HEPARIN SODIUM 5000 UNITS: 5000 INJECTION INTRAVENOUS; SUBCUTANEOUS at 22:07

## 2025-02-20 RX ADMIN — INSULIN LISPRO 2 UNITS: 100 INJECTION, SOLUTION INTRAVENOUS; SUBCUTANEOUS at 08:37

## 2025-02-20 RX ADMIN — INSULIN LISPRO 2 UNITS: 100 INJECTION, SOLUTION INTRAVENOUS; SUBCUTANEOUS at 17:17

## 2025-02-20 RX ADMIN — POLYETHYLENE GLYCOL 3350 17 G: 17 POWDER, FOR SOLUTION ORAL at 08:35

## 2025-02-20 RX ADMIN — HYDROMORPHONE HYDROCHLORIDE 4 MG: 4 TABLET ORAL at 10:12

## 2025-02-20 RX ADMIN — INSULIN LISPRO 8 UNITS: 100 INJECTION, SOLUTION INTRAVENOUS; SUBCUTANEOUS at 11:51

## 2025-02-20 RX ADMIN — ATORVASTATIN CALCIUM 80 MG: 80 TABLET, FILM COATED ORAL at 17:17

## 2025-02-20 RX ADMIN — PANTOPRAZOLE SODIUM 40 MG: 40 TABLET, DELAYED RELEASE ORAL at 05:53

## 2025-02-20 RX ADMIN — HEPARIN SODIUM 5000 UNITS: 5000 INJECTION INTRAVENOUS; SUBCUTANEOUS at 14:23

## 2025-02-20 RX ADMIN — HEPARIN SODIUM 5000 UNITS: 5000 INJECTION INTRAVENOUS; SUBCUTANEOUS at 05:53

## 2025-02-20 RX ADMIN — ASPIRIN 81 MG: 81 TABLET, COATED ORAL at 08:39

## 2025-02-20 RX ADMIN — HYDROMORPHONE HYDROCHLORIDE 4 MG: 4 TABLET ORAL at 22:10

## 2025-02-20 RX ADMIN — GABAPENTIN 600 MG: 300 CAPSULE ORAL at 14:25

## 2025-02-20 RX ADMIN — GABAPENTIN 600 MG: 300 CAPSULE ORAL at 20:22

## 2025-02-20 RX ADMIN — ACETAMINOPHEN 975 MG: 325 TABLET ORAL at 22:07

## 2025-02-20 RX ADMIN — INSULIN LISPRO 8 UNITS: 100 INJECTION, SOLUTION INTRAVENOUS; SUBCUTANEOUS at 17:17

## 2025-02-20 RX ADMIN — INSULIN LISPRO 4 UNITS: 100 INJECTION, SOLUTION INTRAVENOUS; SUBCUTANEOUS at 11:51

## 2025-02-20 RX ADMIN — METHOCARBAMOL TABLETS 750 MG: 500 TABLET, COATED ORAL at 11:50

## 2025-02-20 RX ADMIN — LIDOCAINE 5% 1 PATCH: 700 PATCH TOPICAL at 08:37

## 2025-02-20 RX ADMIN — INSULIN GLARGINE 15 UNITS: 100 INJECTION, SOLUTION SUBCUTANEOUS at 22:07

## 2025-02-20 RX ADMIN — ACETAMINOPHEN 975 MG: 325 TABLET ORAL at 17:16

## 2025-02-20 RX ADMIN — HYDROMORPHONE HYDROCHLORIDE 4 MG: 4 TABLET ORAL at 14:23

## 2025-02-20 RX ADMIN — METHOCARBAMOL TABLETS 750 MG: 500 TABLET, COATED ORAL at 05:53

## 2025-02-20 RX ADMIN — SENNOSIDES 17.2 MG: 8.6 TABLET, FILM COATED ORAL at 22:07

## 2025-02-20 RX ADMIN — CHLORTHALIDONE 12.5 MG: 25 TABLET ORAL at 08:38

## 2025-02-20 RX ADMIN — DIAZEPAM 5 MG: 5 TABLET ORAL at 22:10

## 2025-02-20 NOTE — PROGRESS NOTES
02/20/25 0700   Pain Assessment   Pain Assessment Tool 0-10   Pain Score 6   Pain Location/Orientation Orientation: Lower;Location: Leg   Pain Onset/Description Onset: Ongoing   Restrictions/Precautions   Precautions Bed/chair alarms;Fall Risk;Fluid restriction;Limb alert;Pain;Supervision on toilet/commode  (1800)   LLE Weight Bearing Per Order NWB   Oral Hygiene   Type of Assistance Needed Independent   Physical Assistance Level No physical assistance   Comment w/c level   Oral Hygiene CARE Score 6   Grooming   Able To Initiate Tasks;Acquire Items;Wash/Dry Face;Comb/Brush Hair;Brush/Clean Teeth;Wash/Dry Hands   Findings w/c level   Shower/Bathe Self   Type of Assistance Needed Supervision;Adaptive equipment   Physical Assistance Level No physical assistance   Comment weight shifting seated; CHG bath; covered residual limb   Shower/Bathe Self CARE Score 4   Bathing   Assessed Bath Style Shower   Anticipated D/C Bath Style Shower;Sponge Bath   Able to Wash/Rinse/Dry (body part) Left Arm;Right Arm;Buttocks;Perineal Area;Abdomen;Chest;R Lower Leg/Foot;R Upper Leg;L Upper Leg   Limitations Noted in Balance;Strength   Positioning Seated   Adaptive Equipment Shower Bars;Shower Seat;Hand Held Shower   Tub/Shower Transfer   Limitations Noted In Balance;Endurance;LE Strength   Adaptive Equipment Grab Bars;Seat with out Back   Assessed Shower   Findings CS SPT   Upper Body Dressing   Type of Assistance Needed Independent   Physical Assistance Level No physical assistance   Comment w/c level   Upper Body Dressing CARE Score 6   Lower Body Dressing   Type of Assistance Needed Supervision;Verbal cues   Physical Assistance Level No physical assistance   Lower Body Dressing CARE Score 4   Putting On/Taking Off Footwear   Type of Assistance Needed Supervision;Verbal cues   Physical Assistance Level No physical assistance   Putting On/Taking Off Footwear CARE Score 4   Picking Up Object   Reason if not Attempted Safety concerns    Picking Up Object CARE Score 88   Dressing/Undressing Clothing   Able to  Obtain Clothing;Store removed clothing   Remove UB Clothes Pullover Shirt;Bra   Don UB Clothes Bra;Pullover Shirt   Remove LB Clothes Undergarment;Socks;Pants   Don LB Clothes Pants;Undergarment;Socks   Limitations Noted In Balance;Strength;Endurance   Positioning Supported Sit;Standing   Sit to Lying   Type of Assistance Needed Independent   Physical Assistance Level No physical assistance   Sit to Lying CARE Score 6   Lying to Sitting on Side of Bed   Type of Assistance Needed Independent   Physical Assistance Level No physical assistance   Lying to Sitting on Side of Bed CARE Score 6   Sit to Stand   Type of Assistance Needed Supervision   Physical Assistance Level No physical assistance   Sit to Stand CARE Score 4   Bed-Chair Transfer   Type of Assistance Needed Supervision   Physical Assistance Level No physical assistance   Chair/Bed-to-Chair Transfer CARE Score 4   Transfer Bed/Chair/Wheelchair   Positioning Concerns Skin Integrity   Limitations Noted In Balance;LE Strength   Light Housekeeping   Light Housekeeping Level Wheelchair   Light Housekeeping Level of Assistance Distant supervision   Light Housekeeping s/u items for ADL   Exercise Tools   UE Ergometer 5 f/b moderate resistance   Hand Gripper 9# digi x 30 B hands   Other Exercise Tool 1 red t bar 2x15 reps upward then downward   Other Exercise Tool 2 2# DB x 30 reps in 6 planes of motion   Cognition   Overall Cognitive Status WFL   Arousal/Participation Alert;Cooperative   Attention Within functional limits   Orientation Level Oriented X4   Memory Within functional limits   Following Commands Follows all commands and directions without difficulty   Additional Activities   Additional Activities Comments w/c mobility MI   Activity Tolerance   Activity Tolerance Patient tolerated treatment well   Assessment   Treatment Assessment OT tx addressed ADLs/iADLs via w/c level, safety  with functional txfs, and TE for strength and endurance as noted in respective sections of note. Pt demon consistent S with ADLs through the use of compensatory strategies and demon good safety. Reviewed portions of first step amp book with pt acknowledging understanding of same. Pt is making positive gains toward OT goals. Pt experiencing increase in pain in L residual limb. Reviewed DME for home s/u. Barriers: NWB L LE, pain, edema, compromised skin integrity, decreased balance and strength. Plan is to continue with skilled OT as per POC to progress pt toward OT goals.   Prognosis Good   Problem List Decreased strength;Decreased skin integrity;Orthopedic restrictions;Pain;Impaired balance;Decreased mobility;Decreased endurance   Plan   Treatment/Interventions ADL retraining;Functional transfer training;Therapeutic exercise;Endurance training;Patient/family training;Equipment eval/education;Bed mobility;Compensatory technique education;Spoke to nursing;OT   Progress Progressing toward goals   OT Therapy Minutes   OT Time In 0700   OT Time Out 0830   OT Total Time (minutes) 90   OT Mode of treatment - Individual (minutes) 90   OT Mode of treatment - Concurrent (minutes) 0   OT Mode of treatment - Group (minutes) 0   OT Mode of treatment - Co-treat (minutes) 0   OT Mode of Treatment - Total time(minutes) 90 minutes   OT Cumulative Minutes 370   Therapy Time missed   Time missed? No

## 2025-02-20 NOTE — PROGRESS NOTES
"Progress Note - PMR   Name: Lizzy Acuna 49 y.o. female I MRN: [id number]  Unit/Bed#: -01 I Date of Admission: 2/15/2025   Date of Service: 2/20/2025 I Hospital Day: 5     Assessment & Plan  S/P BKA (below knee amputation) unilateral, left (HCC)  2/2 Infection after TMA in setting of T2DM/Neuropathy/PAD with acute ischemia.  2/11 - L BKA with Dr. Arauz  Contracture prevention (has knee immobilizer)   - Vascular did not comment on limb protector stating \"ace is fine\"   - Quad strengthening  Edema/shaping management    - ACE wrap ATC   - Shrinkers when cleared by vascular   Incisional Care/education   - Mom may have to perform  Phantom limb sensation (see that entry)  PT/OT 3-5 hours/day, 5-7 days/week   Pre-prosthetic f/u to be arranged with PMR   F/U with Vascular for 4 week POV (~3/11)  Obesity (BMI 30.0-34.9)  Considerations in therapy.   Tobacco abuse  Cessation counseling  Nicotine patch if needed.  Diabetes mellitus type 2 with complications (HCC)  Lab Results   Component Value Date    HGBA1C 10.3 (H) 12/13/2024       Recent Labs     02/19/25  0709 02/19/25  1110 02/19/25  1624 02/20/25  0744   POCGLU 198* 224* 258* 209*       Blood Sugar Average: Last 72 hrs:  (P) 226    Home: Lantus 10 units QHS, Aspart 5 units TID with meals  Here: Same, CDI/Accuchecks  Diabetic Diet  Monitor and adjust as per primary team  Hyponatremia    Currently asymptomatic  2/17 130 and 2/18 130   Discussed with IM: 1.8L/day FR started on 2/18   GERD (gastroesophageal reflux disease)  Continue PPI  PAD (peripheral artery disease) (HCC)  S/p L SFA stent c/b acute occlusion this hospitalization  S/p multiple angioplasty (2/1, 2/2, 2/6) and lysis (1/31-2/2)   Per Vascular : ASA/Statin only  Monitor neurovascular exam  F/u Vascular outpatient   Blood loss anemia    Required multiple transfusions while inpatient.  Hemodynamically stable currently.  2/12 Hgb 8.6 --> 2/17 hgb 9.0  Transfuse as appropriate.   Primary " hypertension  Home: None  Here: Chlorthalidone 12.5mg daily  Monitor and adjust as appropriate.  Thyroid nodule  Noted incidentally  Enlarged multinodular thyroid gland  Will check TSH with next set of labs.  Outpatient f/u with non-emergent ultrasound   Adrenal nodule (HCC)  Noted incidentally  14mm R adrenal nodule stable since 2018 - in keeping with benign adenoma  Biochemical evaluation suggested for adrenal adenomas > 1cm to rule out functioning adenoma  Recommend outpatient f/u with PCP for additional work-up  At risk for venous thromboembolism (VTE)  HSQ, SCDs.   Will not need to go home on HSQ  Phantom limb syndrome (HCC)  Desensitization techniques with therapy  On cymbalta and gabapentin  Monitor and adjust as appropriate  Will need f/u with PMR after discharge.   Constipation    Docusate BID, senna 2 tabs at night, and miralax daily.   Adding PRN suppository and lactulose.  Acute pain   Tylenol scheduled (monitor LFTs)  Cymbalta 30mg daily  Gabapentin 600 TID   Robaxin 750mg Q6hr  Valium PRN muscle spasms  2-4 mg Dilaudid every 4 hours   Monitor and adjust as appropriate.   Adjustment disorder with anxiety  Of note started by APS on cymbalta for phantom pain  Has valium ordered 5mg Q8hr PRN for anxiety but also muscle spasm   - Would try to wean down/off.  Emphasize non-pharmacologic strategies  Monitor and adjust as appropriate.     Subjective   Lizzy Acuna is a 49 y.o. female with medical history of T2DM with neuropathy, depression, HLD, obesity, PAD s/p LLE SFA balloon angioplasty and stenting in 12/2024 who presented to the [facility] 1/28 with L foot pain for 5 days. She had a L TMA on 1/3 by podiatry, and had developed increased pain, redness, and discharge. She was seen at her podiatrist's office who sent her to the ER for IV abx and further work-up. In the hospital met sepsis criteria. MRI showed fluid collection within her dorsal soft tissue. Vascular consulted  after LEADs showed an occluded L SFA stent. They recommended transfer to [place] and starting heparin gtt. Dr. Ritchie performed I&D of her left foot on 1/29 and on 1/30 she was transferred to [place]. IR was consulted, and Dr. Davidson performed catheter placement and initiated thrombolysis with TPA on 1/31. PICC was also placed. On 2/1, residual thrombus along the L SFA was treated with 4mm balloon angioplasty, with no flow noted within L SFA following angioplasty. Lysis was restarted. C/B ABLA with 2/2 Hgb 6.2 - given 2 units pRBC. Her surgical cultures grew MRSA and Finegoldia. Abx were narrowed to vancomycin by ID. APS was consulted and started dilaudid PCA and IV valium. 2/2 lysis check with patent l SFA, popliteal, tibioperoneal trunk and TARA. Mild residual stenoses along L SFA and popliteal artery were treated with 4mm balloon angioplasty and stenosis along TARA was treated with 2.5 and 3mm balloon angioplasty. L PT and peroneal arteries were chronically occluded. TPA was discontinued. She was transferred out of the ICU on 2/3. 2/4 required 1 unit pRBC for ABLA. On 2/6, she had repeat arteriogram with Dr. Victoria with finding of recurrent pop occlusion, AT recannulization with distal pop, TPT, and AT POBA an serration angioplasty. Despite this, with compromised blood flow to posterior aspect and plantar flap of foot. Still concern that perfusion would not be sufficient to salvage foot. She was recommended for BKA. On 2/11, Dr. Arauz performed L BKA. She had L adducotr and popliteal blocks. Post-op with acute bilateral shoulder pain, diaphoresis, tachycardic to 120 and hypertensive to 180/103. EKG showed LAD and TWI in V1. ACS r/o initiated. CTA dissection protocol (negative for dissection). This resolved - no acute findings. Abx were discontinued.     Chief Complaint: f/u amputation    Interval:     Seen and evaluated patient in PT gym. She endorses an improvement in pain and sleep since yesterday.   Today's  CMP showed an improvement in hyponatremia at 132. Alk phos post-operatively elevated at 130 today and 127 on 2/18. Continue to monitor   Last BM 2/19   Anticipated Discharge Date:  Feb 28, 2025 with [facility] for PT, OT, and nursing.     Objective :  Temp:  [96.5 °F (35.8 °C)-96.8 °F (36 °C)] 96.5 °F (35.8 °C)  HR:  [] 104  BP: (130-174)/(60-82) 174/82  Resp:  [18-20] 18  SpO2:  [95 %-96 %] 96 %  O2 Device: None (Room air)    Functional Update:  Physical Therapy Occupational Therapy    Weight Bearing Status: Non-weight bearing (left LE)  Transfers: Minimal Assistance  Bed Mobility: Supervision  Ambulation:  (TBA)  Wheelchair Mobility Distance: 250 ft  Wheelchair Mobility: Supervision  Number of Stairs: 0  Assistive Device for Stairs:  (TBA)  Ramp:  (TBA)  Discharge Recommendations: Home with:  DC Home with:: Family Support, Home Physical Therapy (Possible first floor set up)   Eating: Independent  Grooming: Supervision  Bathing: Moderate Assistance  Bathing: Moderate Assistance  Upper Body Dressing: Minimal Assistance  Lower Body Dressing: Maximum Assistance  Toileting: Moderate Assistance  Tub/Shower Transfer: Minimal Assistance  Toilet Transfer: Moderate Assistance  Cognition: Within Defined Limits  Orientation: Person, Situation, Place, Time                   Physical Exam  Vitals and nursing note reviewed.   Constitutional:       General: She is not in acute distress.     Appearance: She is obese. She is not ill-appearing, toxic-appearing or diaphoretic.   HENT:      Head: Normocephalic and atraumatic.      Right Ear: External ear normal.      Left Ear: External ear normal.      Nose: Nose normal.      Mouth/Throat:      Mouth: Mucous membranes are moist.   Pulmonary:      Effort: Pulmonary effort is normal. No respiratory distress.   Abdominal:      General: There is no distension.   Musculoskeletal:      Comments: LLE residual limb wrapped in clean, dry ace bandage    Skin:     General: Skin is warm and dry.    Neurological:      Mental Status: She is oriented to person, place, and time.           Scheduled Meds:  Current Facility-Administered Medications   Medication Dose Route Frequency Provider Last Rate    acetaminophen  975 mg Oral TID Cristopher Hui MD      aspirin  81 mg Oral Daily Abby De La Torre PA-C      atorvastatin  80 mg Oral Daily With Dinner Abby De La Torre PA-C      bisacodyl  10 mg Rectal Daily PRN Ashley Depadua, MD      chlorthalidone  12.5 mg Oral Daily Abby De La Torre PA-C      diazepam  5 mg Oral Q8H PRN Abby De La Torre PA-C      DULoxetine  30 mg Oral Daily Abby De La Torre PA-C      gabapentin  600 mg Oral TID Laurence Dumont PA-C      heparin (porcine)  5,000 Units Subcutaneous Q8H JORGE Abby De La Torre PA-C      HYDROmorphone  2 mg Oral Q4H PRN Ashley Depadua, MD      HYDROmorphone  4 mg Oral Q4H PRN Ashley Depadua, MD      insulin glargine  10 Units Subcutaneous HS Abby De La Torre PA-C      insulin lispro  1-6 Units Subcutaneous TID With Meals Abby De La Torre PA-C      insulin lispro  5 Units Subcutaneous TID With Meals Abby De La Torre PA-C      lidocaine  1 patch Topical Daily PRN Abby De La Torre PA-C      lidocaine  1 patch Topical Daily Ashley Depadua, MD      methocarbamol  750 mg Oral Q6H [facility] Laurence Dumont PA-C      nitroglycerin  0.4 mg Sublingual Q5 Min PRN Abby De La Torre PA-C      ondansetron  4 mg Oral Q6H PRN Abby De La Torre PA-C      pantoprazole  40 mg Oral Early Morning Abby De La Torre PA-C      polyethylene glycol  17 g Oral Daily Abby De La Torre PA-C      senna  2 tablet Oral HS Ashley Depadua, MD      trimethobenzamide  200 mg Intramuscular Q6H PRN Abby De La Torre PA-C           Lab Results: I have reviewed the following results:  Results from last 7 days   Lab Units 02/17/25  0459   HEMOGLOBIN g/dL 9.0*   HEMATOCRIT % 27.9*   WBC Thousand/uL 8.52   PLATELETS  Thousands/uL 294     Results from last 7 days   Lab Units 02/20/25  0558 02/18/25  0542 02/17/25  0459   BUN mg/dL 27* 23 20   SODIUM mmol/L 132* 130* 130*   POTASSIUM mmol/L 4.3 4.4 3.9   CHLORIDE mmol/L 93* 91* 91*   CREATININE mg/dL 0.75 0.81 0.75   AST U/L 25  --  43*   ALT U/L 26  --  32            Laurence Dumont PA-C  Physical Medicine and Rehabilitation  [facility]

## 2025-02-20 NOTE — CASE MANAGEMENT
2/19/25CM met with patient, reviewed team meeting information , including planned d/c date of 2/28 at this point, with recommendations for home care. Home care referral sent to [facility] for RN/PT/OT through [facility] via [facility]. Patient stated she was unsure she wanted homecare, as the patient's mother resides on the first floor, is unsure of room enough for therapy. CM inquired who could transport her to outpt therapy, she stated her mother, or potentially her daughter's boyfriend, who has been using patient's car. CM will continue to follow.  2/20 clinical review update sent to [facility] via [employer], awaiting determination.

## 2025-02-20 NOTE — PROGRESS NOTES
02/20/25 0930   Pain Assessment   Pain Assessment Tool 0-10   Pain Score 6   Pain Location/Orientation Orientation: Left;Location: Leg  (residual limb)   Restrictions/Precautions   Precautions Bed/chair alarms;Fall Risk;Limb alert;Pain;Supervision on toilet/commode;Fluid restriction;Contact/isolation   LLE Weight Bearing Per Order NWB   Cognition   Overall Cognitive Status WFL   Arousal/Participation Alert;Responsive;Cooperative   Attention Within functional limits   Orientation Level Oriented X4   Memory Within functional limits   Following Commands Follows all commands and directions without difficulty   Roll Left and Right   Type of Assistance Needed Independent   Comment bed rail   Roll Left and Right CARE Score 6   Sit to Lying   Type of Assistance Needed Independent   Comment bed rail   Sit to Lying CARE Score 6   Lying to Sitting on Side of Bed   Type of Assistance Needed Independent   Comment bed rail; increased time to complete   Lying to Sitting on Side of Bed CARE Score 6   Sit to Stand   Type of Assistance Needed Incidental touching;Verbal cues   Comment cg STS with RW   Sit to Stand CARE Score 4   Bed-Chair Transfer   Type of Assistance Needed Incidental touching;Verbal cues   Comment cg direct SPT bed<>wheelchair using bed rail and arm rest of wheelchair for support   Chair/Bed-to-Chair Transfer CARE Score 4   Ambulation   Primary Mode of Locomotion Prior to Admission Walk   Distance Walked (feet) 8 ft  (4' 5' 8')   Assist Device Roller Walker   Gait Pattern Antalgic;Inconsistant Pauly;Slow Pauly;Decreased foot clearance;Hopping   Limitations Noted In Balance;Device Management;Endurance;Posture;Safety;Speed;Strength   Provided Assistance with: Balance;Trunk Support   Walk Assist Level Contact Guard;Minimum Assist   Findings cg/min A level surfaces with chair follow   Does the patient walk? 2. Yes   Wheel 50 Feet with Two Turns   Type of Assistance Needed Supervision;Verbal cues   Comment S/mod I  "level and unlevel surfaces   Wheel 50 Feet with Two Turns CARE Score 4   Wheel 150 Feet   Type of Assistance Needed Supervision;Verbal cues   Comment S/mod I level and unlevel surfaces   Wheel 150 Feet CARE Score 4   Wheelchair mobility   Method Right upper extremity;Left upper extremity   Assistance Provided For Locking Brakes;Obstacles;Remove Leg Rest;Replace Leg Rest   Distance Level Surface (feet) 217 ft  (217' 156')   Distance Wheeled 3% Grade 24 ft   Findings S/mod I level and unlevel surfaces   Does the patient use a wheelchair? 1. Yes   Type of Wheelchair Used 1. Manual   Curb or Single Stair   Style negotiated Single stair   Type of Assistance Needed Physical assistance;Verbal cues   Physical Assistance Level Total assistance   Comment negotiated up and down 3 steps using shower seat with no back; patient sits on bench and brings R foot up, then uses R HR and L UE on person in front of her to stand while second person moves seat up and stabilizes while patient sits down; to descend, patient stand with R HR and LUE on person in front of her while second person moves seat down; requires MIN A to stand as well as to scoot back onto seat.   1 Step (Curb) CARE Score 1   Stairs   Type Stairs   # of Steps 3   Weight Bearing Precautions NWB;LLE   Assist Devices Single Rail  (shower seat with no back)   Therapeutic Interventions   Strengthening seated ther ex   Balance gait and transfer training   Other wheelchair mobility, stair negotiation   Assessment   Treatment Assessment Patient agreeable to therapy session.   Pain in L residual limb remains, however, patient does report that it is \"better.\"   Was able to sleep last night.    Trialed patient on steps using shower seat method (see description in note above) with positive results.   Completed ther ex for general LE strengthening; gait and transfer training focusing on sequence and technique for improved balance and safety with functional mobility using RW.   " Propels wheelchair with S/MOD I over level and unlevel surfaces.  Reviewed  LLE stretches to decrease tightness in posteror thigh.  Patient appropriate for concurrent therapy to increase motivation and encouragement among pts with similar deficits while completing therapy session.  Returned to room in bed with alarms on and all needs within reach.   PT Barriers   Physical Impairment Decreased strength;Decreased range of motion;Decreased endurance;Impaired balance;Decreased mobility;Impaired judgement;Decreased safety awareness;Orthopedic restrictions;Pain   Functional Limitation Wheelchair management;Walking;Transfers;Standing;Stair negotiation;Ramp negotiation;Car transfers   Plan   Treatment/Interventions Functional transfer training;LE strengthening/ROM;Elevations;Therapeutic exercise;Bed mobility;Gait training   Progress Progressing toward goals   PT Therapy Minutes   PT Time In 0930   PT Time Out 1100   PT Total Time (minutes) 90   PT Mode of treatment - Individual (minutes) 60   PT Mode of treatment - Concurrent (minutes) 30   PT Mode of treatment - Group (minutes) 0   PT Mode of treatment - Co-treat (minutes) 0   PT Mode of Treatment - Total time(minutes) 90 minutes   PT Cumulative Minutes 454   Therapy Time missed   Time missed? No

## 2025-02-20 NOTE — PROGRESS NOTES
"Progress Note - Lizzy Acuna 49 y.o. female MRN: [id number]    Unit/Bed#: -01 Encounter: [id number]        Subjective:   Patient seen and examined at bedside after reviewing the chart and discussing the case with the caring staff.    Patient examined at bedside.  Patient reports her pain is better controlled today.  Denies any acute symptoms.      Labs 2/20:  sodium 132  Recheck BMP in AM  Patient's blood sugars have been running mostly over 200s    Physical Exam   Vitals: Blood pressure (!) 174/82, pulse 104, temperature (!) 96.5 °F (35.8 °C), temperature source Temporal, resp. rate 18, height 5' 1\" (1.549 m), weight 82.8 kg (182 lb 8.7 oz), last menstrual period 12/01/2023, SpO2 96%, not currently breastfeeding.,Body mass index is 34.49 kg/m².  Constitutional: Patient in no acute distress.  HEENT: PERR, EOMI, MMM.  Cardiovascular: Normal rate and regular rhythm.    Pulmonary/Chest: Effort normal and breath sounds normal.   Abdomen: Soft, + BS, NT.    Assessment/Plan:  Lizzy Acuna is a(n) 49 y.o. 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     Cardiac w/ HTN, HLD, CAD.  Continue chlorthalidone 12.5 mg daily, atorvastatin 80 mg daily, and ASA 81 mg daily.   Type 2 DM.   Hgb A1c 10.3 on 12/13/24.  Home: Lantus 10 units HS, Humalog 5u TID.  Here:  Lantus 15u nightly, Humalog 8u TID.  SSI w/ POCT AC HS.  Carb controlled diet.   GERD.  Continue Protonix 40 mg daily.   Depression.  Not on home meds.  APS started pt on Cymbalta 30 mg daily for phantom limb pain.  Also on Valium 5 mg q8h as needed for anxiety/muscle spasms.   ABLA.  Hgb 8.6 -> 9.0 on 2/17/25.  S/p multiple transfusions on acute care.  Cont to monitor.  Transfuse Hgb < 7.   Adrenal nodule.  Stable since 2018.  Recommend f/u outpt.   Thyroid nodule.  Incidental finding.  Recommend outpt thyroid US.  Follow up with PCP/endo.   Hyponatremia.  Na 133 -> 130 " -> 132 on 2/20/25.  Likely SIADH in the setting of acute infection.  Placed on 1.8L fluid restriction on 2/18/25.  Cont to monitor.    Pain and bowel regimen.  As per physiatry.   Cellulitis of L foot s/p BKA.  Continue ASA and statin.  Patient receiving intensive PT OT as per physiatry.      Anticipated date of discharge.  Friday 2/28/25    The patient was discussed with Dr. Salas and he is in agreement with the above note.

## 2025-02-20 NOTE — ASSESSMENT & PLAN NOTE
Lab Results   Component Value Date    HGBA1C 10.3 (H) 12/13/2024       Recent Labs     02/19/25  0709 02/19/25  1110 02/19/25  1624 02/20/25  0744   POCGLU 198* 224* 258* 209*       Blood Sugar Average: Last 72 hrs:  (P) 226    Home: Lantus 10 units QHS, Aspart 5 units TID with meals  Here: Same, CDI/Accuchecks  Diabetic Diet  Monitor and adjust as per primary team

## 2025-02-21 LAB
ANION GAP SERPL CALCULATED.3IONS-SCNC: 13 MMOL/L (ref 4–13)
BUN SERPL-MCNC: 30 MG/DL (ref 5–25)
CALCIUM SERPL-MCNC: 10.5 MG/DL (ref 8.4–10.2)
CHLORIDE SERPL-SCNC: 92 MMOL/L (ref 96–108)
CO2 SERPL-SCNC: 27 MMOL/L (ref 21–32)
CREAT SERPL-MCNC: 0.88 MG/DL (ref 0.6–1.3)
GFR SERPL CREATININE-BSD FRML MDRD: 77 ML/MIN/1.73SQ M
GLUCOSE SERPL-MCNC: 187 MG/DL (ref 65–140)
GLUCOSE SERPL-MCNC: 191 MG/DL (ref 65–140)
GLUCOSE SERPL-MCNC: 200 MG/DL (ref 65–140)
GLUCOSE SERPL-MCNC: 248 MG/DL (ref 65–140)
GLUCOSE SERPL-MCNC: 249 MG/DL (ref 65–140)
POTASSIUM SERPL-SCNC: 4.6 MMOL/L (ref 3.5–5.3)
SODIUM SERPL-SCNC: 132 MMOL/L (ref 135–147)

## 2025-02-21 PROCEDURE — 97530 THERAPEUTIC ACTIVITIES: CPT

## 2025-02-21 PROCEDURE — 97110 THERAPEUTIC EXERCISES: CPT

## 2025-02-21 PROCEDURE — 82948 REAGENT STRIP/BLOOD GLUCOSE: CPT

## 2025-02-21 PROCEDURE — 99232 SBSQ HOSP IP/OBS MODERATE 35: CPT | Performed by: PHYSICAL MEDICINE & REHABILITATION

## 2025-02-21 PROCEDURE — 97535 SELF CARE MNGMENT TRAINING: CPT

## 2025-02-21 PROCEDURE — 80048 BASIC METABOLIC PNL TOTAL CA: CPT

## 2025-02-21 PROCEDURE — 97116 GAIT TRAINING THERAPY: CPT

## 2025-02-21 RX ORDER — INSULIN GLARGINE 100 [IU]/ML
18 INJECTION, SOLUTION SUBCUTANEOUS
Status: DISCONTINUED | OUTPATIENT
Start: 2025-02-21 | End: 2025-02-23

## 2025-02-21 RX ORDER — INSULIN LISPRO 100 [IU]/ML
1-6 INJECTION, SOLUTION INTRAVENOUS; SUBCUTANEOUS
Status: DISCONTINUED | OUTPATIENT
Start: 2025-02-21 | End: 2025-02-28 | Stop reason: HOSPADM

## 2025-02-21 RX ORDER — INSULIN LISPRO 100 [IU]/ML
10 INJECTION, SOLUTION INTRAVENOUS; SUBCUTANEOUS
Status: DISCONTINUED | OUTPATIENT
Start: 2025-02-21 | End: 2025-02-28 | Stop reason: HOSPADM

## 2025-02-21 RX ADMIN — INSULIN LISPRO 1 UNITS: 100 INJECTION, SOLUTION INTRAVENOUS; SUBCUTANEOUS at 08:13

## 2025-02-21 RX ADMIN — HEPARIN SODIUM 5000 UNITS: 5000 INJECTION INTRAVENOUS; SUBCUTANEOUS at 21:22

## 2025-02-21 RX ADMIN — HYDROMORPHONE HYDROCHLORIDE 4 MG: 4 TABLET ORAL at 21:22

## 2025-02-21 RX ADMIN — HYDROMORPHONE HYDROCHLORIDE 4 MG: 4 TABLET ORAL at 07:14

## 2025-02-21 RX ADMIN — INSULIN LISPRO 8 UNITS: 100 INJECTION, SOLUTION INTRAVENOUS; SUBCUTANEOUS at 11:38

## 2025-02-21 RX ADMIN — PANTOPRAZOLE SODIUM 40 MG: 40 TABLET, DELAYED RELEASE ORAL at 06:03

## 2025-02-21 RX ADMIN — POLYETHYLENE GLYCOL 3350 17 G: 17 POWDER, FOR SOLUTION ORAL at 08:11

## 2025-02-21 RX ADMIN — SENNOSIDES 17.2 MG: 8.6 TABLET, FILM COATED ORAL at 21:22

## 2025-02-21 RX ADMIN — METHOCARBAMOL TABLETS 750 MG: 500 TABLET, COATED ORAL at 00:15

## 2025-02-21 RX ADMIN — INSULIN LISPRO 10 UNITS: 100 INJECTION, SOLUTION INTRAVENOUS; SUBCUTANEOUS at 17:19

## 2025-02-21 RX ADMIN — DIAZEPAM 5 MG: 5 TABLET ORAL at 21:22

## 2025-02-21 RX ADMIN — GABAPENTIN 600 MG: 300 CAPSULE ORAL at 20:14

## 2025-02-21 RX ADMIN — INSULIN LISPRO 8 UNITS: 100 INJECTION, SOLUTION INTRAVENOUS; SUBCUTANEOUS at 08:13

## 2025-02-21 RX ADMIN — LIDOCAINE 5% 1 PATCH: 700 PATCH TOPICAL at 08:06

## 2025-02-21 RX ADMIN — ACETAMINOPHEN 975 MG: 325 TABLET ORAL at 07:14

## 2025-02-21 RX ADMIN — ASPIRIN 81 MG: 81 TABLET, COATED ORAL at 08:07

## 2025-02-21 RX ADMIN — ACETAMINOPHEN 975 MG: 325 TABLET ORAL at 15:42

## 2025-02-21 RX ADMIN — ACETAMINOPHEN 975 MG: 325 TABLET ORAL at 23:38

## 2025-02-21 RX ADMIN — INSULIN GLARGINE 18 UNITS: 100 INJECTION, SOLUTION SUBCUTANEOUS at 21:22

## 2025-02-21 RX ADMIN — GABAPENTIN 600 MG: 300 CAPSULE ORAL at 15:42

## 2025-02-21 RX ADMIN — DULOXETINE HYDROCHLORIDE 30 MG: 30 CAPSULE, DELAYED RELEASE ORAL at 08:07

## 2025-02-21 RX ADMIN — HYDROMORPHONE HYDROCHLORIDE 4 MG: 4 TABLET ORAL at 15:42

## 2025-02-21 RX ADMIN — INSULIN LISPRO 2 UNITS: 100 INJECTION, SOLUTION INTRAVENOUS; SUBCUTANEOUS at 17:19

## 2025-02-21 RX ADMIN — HEPARIN SODIUM 5000 UNITS: 5000 INJECTION INTRAVENOUS; SUBCUTANEOUS at 14:22

## 2025-02-21 RX ADMIN — METHOCARBAMOL TABLETS 750 MG: 500 TABLET, COATED ORAL at 23:37

## 2025-02-21 RX ADMIN — GABAPENTIN 600 MG: 300 CAPSULE ORAL at 07:14

## 2025-02-21 RX ADMIN — METHOCARBAMOL TABLETS 750 MG: 500 TABLET, COATED ORAL at 06:03

## 2025-02-21 RX ADMIN — INSULIN LISPRO 3 UNITS: 100 INJECTION, SOLUTION INTRAVENOUS; SUBCUTANEOUS at 11:37

## 2025-02-21 RX ADMIN — HYDROMORPHONE HYDROCHLORIDE 4 MG: 4 TABLET ORAL at 02:51

## 2025-02-21 RX ADMIN — HYDROMORPHONE HYDROCHLORIDE 4 MG: 4 TABLET ORAL at 11:36

## 2025-02-21 RX ADMIN — CHLORTHALIDONE 12.5 MG: 25 TABLET ORAL at 08:07

## 2025-02-21 RX ADMIN — METHOCARBAMOL TABLETS 750 MG: 500 TABLET, COATED ORAL at 11:36

## 2025-02-21 RX ADMIN — METHOCARBAMOL TABLETS 750 MG: 500 TABLET, COATED ORAL at 17:20

## 2025-02-21 RX ADMIN — ATORVASTATIN CALCIUM 80 MG: 80 TABLET, FILM COATED ORAL at 15:42

## 2025-02-21 RX ADMIN — HEPARIN SODIUM 5000 UNITS: 5000 INJECTION INTRAVENOUS; SUBCUTANEOUS at 06:03

## 2025-02-21 NOTE — PROGRESS NOTES
"Progress Note - Lizzy Acuna 49 y.o. female MRN: [id number]    Unit/Bed#: [facility] 218-01 Encounter: [id number]        Subjective:   Patient seen and examined at bedside after reviewing the chart and discussing the case with the caring staff.    Patient examined at bedside.  Patient denies any acute symptoms.      Labs 2/21:  sodium 132    Patient's blood sugars slightly improved but mostly over 180s    Physical Exam   Vitals: Blood pressure 141/74, pulse 85, temperature (!) 97.4 °F (36.3 °C), temperature source Temporal, resp. rate 14, height 5' 1\" (1.549 m), weight 82.8 kg (182 lb 8.7 oz), last menstrual period 12/01/2023, SpO2 95%, not currently breastfeeding.,Body mass index is 34.49 kg/m².  Constitutional: Patient in no acute distress.  HEENT: PERR, EOMI, MMM.  Cardiovascular: Normal rate and regular rhythm.    Pulmonary/Chest: Effort normal and breath sounds normal.   Abdomen: Soft, + BS, NT.    Assessment/Plan:  Lizzy Acuna is a(n) 49 y.o. 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     Cardiac w/ HTN, HLD, CAD.  Continue chlorthalidone 12.5 mg daily, atorvastatin 80 mg daily, and ASA 81 mg daily.   Type 2 DM.   Hgb A1c 10.3% on 12/13/24.  Home: Lantus 10u nightly, Humalog 5u TID.  Here:  Lantus 18u nightly, Humalog 10u TID.  SSI w/ POCT AC HS.  Carb controlled diet.   GERD.  Continue Protonix 40 mg daily.   Depression.  Not on home meds.  APS started pt on Cymbalta 30 mg daily for phantom limb pain.  Also on Valium 5 mg q8h as needed for anxiety/muscle spasms.   ABLA.  Hgb 8.6 -> 9.0 on 2/17/25.  S/p multiple transfusions on acute care.  Cont to monitor.  Transfuse Hgb < 7.   Adrenal nodule.  Stable since 2018.  Recommend f/u outpt.   Thyroid nodule.  Incidental finding.  Recommend outpt thyroid US.  Follow up with PCP/endo.   Hyponatremia.  Na 133 -> 130 -> 132 -> 132 on 2/21/25.  Likely SIADH in the setting of " acute infection.  Placed on 1.8L fluid restriction on 2/18/25.  Cont to monitor.    Pain and bowel regimen.  As per physiatry.   Cellulitis of L foot s/p BKA.  Continue ASA and statin.  Patient receiving intensive PT OT as per physiatry.      Anticipated date of discharge.  Friday 2/28/25    The patient was discussed with Dr. Salas and he is in agreement with the above note.

## 2025-02-21 NOTE — ASSESSMENT & PLAN NOTE
Lab Results   Component Value Date    HGBA1C 10.3 (H) 12/13/2024       Recent Labs     02/20/25  1122 02/20/25  1649 02/20/25 2021 02/21/25  0703   POCGLU 293* 198* 195* 187*       Blood Sugar Average: Last 72 hrs:  (P) [id number]    Home: Lantus 10 units QHS, Aspart 5 units TID with meals  Here: Same, CDI/Accuchecks  Diabetic Diet  Monitor and adjust as per primary team

## 2025-02-21 NOTE — PROGRESS NOTES
"   02/21/25 1031   Pain Assessment   Pain Assessment Tool 0-10   Pain Score 6   Pain Location/Orientation Orientation: Left  (residual limb)   Pain Radiating Towards Pt also reports phantom pain in L toes  (Reports pain in L thigh to L residual limb.)   Pain Onset/Description Onset: Ongoing   Effect of Pain on Daily Activities Decreased activity tolerance   Patient's Stated Pain Goal No pain   Hospital Pain Intervention(s) Medication (See MAR);Repositioned;Elevated   Restrictions/Precautions   Precautions Bed/chair alarms;Fall Risk;Supervision on toilet/commode   LLE Weight Bearing Per Order NWB   Cognition   Overall Cognitive Status WFL   Arousal/Participation Alert;Cooperative   Attention Within functional limits   Orientation Level Oriented X4   Memory Within functional limits   Following Commands Follows all commands and directions without difficulty   Subjective   Subjective \"I can get to my chair either direction.\"   Roll Left and Right   Type of Assistance Needed Independent   Physical Assistance Level No physical assistance   Comment bed rail   Roll Left and Right CARE Score 6   Lying to Sitting on Side of Bed   Type of Assistance Needed Independent   Physical Assistance Level No physical assistance   Comment bedrail, HOB elevated.   Lying to Sitting on Side of Bed CARE Score 6   Sit to Stand   Type of Assistance Needed Supervision   Physical Assistance Level No physical assistance   Sit to Stand CARE Score 4   Bed-Chair Transfer   Type of Assistance Needed Supervision   Physical Assistance Level No physical assistance   Chair/Bed-to-Chair Transfer CARE Score 4   Transfer Bed/Chair/Wheelchair   Limitations Noted In Balance;LE Strength;Sensation;Endurance   Adaptive Equipment Walker   Sit Pivot Supervision;Contact Guard   Stand Pivot Supervision;Contact Guard   Sit to Stand Supervision;Contact Guard   Stand to Sit Contact Guard;Supervision   Walk 10 Feet   Reason if not Attempted Safety concerns   Walk 10 " Feet CARE Score 88   Walk 50 Feet with Two Turns   Reason if not Attempted Safety concerns   Walk 50 Feet with Two Turns CARE Score 88   Walk 150 Feet   Reason if not Attempted Safety concerns   Walk 150 Feet CARE Score 88   Walking 10 Feet on Uneven Surfaces   Reason if not Attempted Safety concerns   Walking 10 Feet on Uneven Surfaces CARE Score 88   Ambulation   Primary Mode of Locomotion Prior to Admission Walk   Distance Walked (feet) 6 ft  (9')   Assist Device Roller Walker   Gait Pattern Antalgic;Decreased foot clearance   Limitations Noted In Balance;Device Management;Speed;Strength   Provided Assistance with: Balance;Trunk Support   Walk Assist Level Contact Guard   Findings CGA with use of RW.  w/c follow provided.   Does the patient walk? 2. Yes   Wheel 50 Feet with Two Turns   Type of Assistance Needed Supervision;Independent   Physical Assistance Level No physical assistance   Comment S/Mod I   Wheel 50 Feet with Two Turns CARE Score -   Wheel 150 Feet   Type of Assistance Needed Independent;Supervision   Physical Assistance Level No physical assistance   Comment S/Mod I   Wheel 150 Feet CARE Score -   Wheelchair mobility   Method Right upper extremity;Left upper extremity   Assistance Provided For Locking Brakes   Distance Level Surface (feet) 235 ft   Findings S/Mod I level surfaces   Does the patient use a wheelchair? 1. Yes   Type of Wheelchair Used 1. Manual   Curb or Single Stair   Style negotiated Single stair   4 Steps   Reason if not Attempted Safety concerns   4 Steps CARE Score 88   12 Steps   Reason if not Attempted Safety concerns   12 Steps CARE Score 88   Stairs   Type Stairs   # of Steps 3   Weight Bearing Precautions LLE;NWB   Assist Devices Single Rail  (2 hands on L rail)   Findings Pt ascended/descended 3 steps with 2 hands on the L rail. Pt required min to CGA to complete. Assist required to move offset shower chair to assist.  Pt reports that she feels more comfortable  ascending/descending with 2 hands on the L rail.   Picking Up Object   Reason if not Attempted Safety concerns   Picking Up Object CARE Score 88   Toilet Transfer   Type of Assistance Needed Supervision   Physical Assistance Level No physical assistance   Comment S with use of grab bar   Toilet Transfer CARE Score 4   Toilet Transfer   Surface Assessed Raised Toilet   Limitations Noted In Balance;LE Strength   Adaptive Equipment Grab Bar   Positioning Concerns Grab Bars   Findings Pt able to manage clothing management with one arm technique while holding on the rail.  Pt I with hygiene.   Therapeutic Interventions   Strengthening Seated ex - 2.5# R LE. Manual resistance for L hip flex.  Standing ex - L LE - 3 x 10 - hip hikes ,3 x 10 hip abduction, x5, x7, x 10 hip extension with knee extension.  Limited by pain in L residual limb with hip ext.   Balance gait, transfers   Other w/c mobility, steps   Assessment   Treatment Assessment Pt motivated to participate.  Practiced ascending/descending steps sideways with 2 hands on the L rail.  Assist provided from a second person to move  shower chair.  Pt required min to CGA to complete. Pt reports that she feels more comfortable ascending/descending steps sideways.  Pt continues to make good progress.  Able to gait train up to 9' with the RW with CGA.  Pt limited by pain and generalized weakness.  She will benefit from continued PT to progress gait, transfers, balance, strengthening, steps, and safety in order to maximize function and decrease risk for falls.   Problem List Decreased strength;Decreased endurance;Impaired balance;Decreased mobility;Pain   Barriers to Discharge Inaccessible home environment   PT Barriers   Physical Impairment Decreased strength;Decreased endurance;Impaired balance;Decreased mobility;Pain   Functional Limitation Standing;Transfers;Walking;Wheelchair management;Stair negotiation   Plan   Treatment/Interventions Functional transfer training;LE  strengthening/ROM;Elevations;Therapeutic exercise;Endurance training;Patient/family training;Bed mobility;Gait training   Progress Progressing toward goals   PT Therapy Minutes   PT Time In 1031   PT Time Out 1201   PT Total Time (minutes) 90   PT Mode of treatment - Individual (minutes) 90   PT Mode of treatment - Concurrent (minutes) 0   PT Mode of treatment - Group (minutes) 0   PT Mode of treatment - Co-treat (minutes) 0   PT Mode of Treatment - Total time(minutes) 90 minutes   PT Cumulative Minutes 544   Therapy Time missed   Time missed? No

## 2025-02-21 NOTE — NUTRITION
"   25 1603   Biochemical Data,Medical Tests, and Procedures   Biochemical Data/Medical Tests/Procedures Lab values reviewed;Meds reviewed   Labs (Comment)  Na:132, Cl:93, BUN:27, B, ALP:130. 2 H&H:9.0/27.9. 213 A1c:10.3%   Meds (Comment) ASA, lipitor, valium, neurontin, heparin, lantus, humalog, zofran, protonix, tigan   Nutrition-Focused Physical Exam   Nutrition-Focused Physical Exam Findings RN skin assessment reviewed  (Wound right groin, surgical wound amputation left leg per documentation.)   Nutrition-Focused Physical Exam Findings Nonpitting LLE edema. Missing teeth. LBM .   Medical-Related Concerns Advanced maternal age in multigravida, second trimester 2016 Transitioned From: Advanced maternal age in multigravida, first trimester     Back pain  used 2 percocet tid until current admission in 2017   Bone spur  in back per pt   Chronic hypertension with superimposed preeclampsia 2017    Depression     Diabetes mellitus (HCC)     Elevated BP without diagnosis of hypertension  \"with pain\"   Foot injury     Full dentures  does not wear   GERD (gastroesophageal reflux disease)  occas   Gestational diabetes  insulin dependent   Herniated cervical disc     Herniated lumbar intervertebral disc     History of pneumonia     Hx of degenerative disc disease     Hyperlipidemia     Obesity     Pre-eclampsia     Prior pregnancy with fetal demise  previous demise at 36 weeks   Toe pain   Adequacy of Intake   Nutrition Modality PO   Feeding Route   PO Independent   Current PO Intake   Current Diet Order CCD 2, 1800mL fluid restriction   Current Meal Intake %   Estimated calorie intake compared to estimated need Nutrient needs met.   PES Statement   Problem Clinical   Biochemical (2) Altered nutrition-related laboratory values (specify) NC-2.2  (A1c%)   Related to Other (Comment)  (DM management)   As evidenced by: Abnormal lab (Specify)  (A1c:10.3%)   Recommendations/Interventions "   Malnutrition/BMI Present No  (does not meet criteria)   Summary Length of stay. CCD 2 diet thin liquids with 1800mL fluid restriction. Meal completions 100%. Reports good appetite. No diet plan. Reports she eats breakfast and dinner, often skipping lunch. Resides with family. Reports she checks her BG in the morning, after meals and before bed. 2/19/#; 2/15/#; 1/29/#; 12/13/#; 9/23/#. Weight stable despite recent amputation. Wound right groin, surgical wound amputation left leg per documentation. Nonpitting LLE edema. Missing teeth. LBM 2/19. Current diet explained. Patient declined diet education offered.   Interventions/Recommendations Continue current diet order   Education Assessment   Education Patient declined nutrition education   Patient Nutrition Goals   Goal Glucose WNL;Avoid weight gain   Goal Status Initiated   Timeframe to complete goal by next f/u   Nutrition Complexity Risk   Nutrition complexity level Low risk   Follow up date 02/28/25

## 2025-02-21 NOTE — PLAN OF CARE
Problem: PAIN - ADULT  Goal: Verbalizes/displays adequate comfort level or baseline comfort level  Description: Interventions:  - Encourage patient to monitor pain and request assistance  - Assess pain using appropriate pain scale  - Administer analgesics based on type and severity of pain and evaluate response  - Implement non-pharmacological measures as appropriate and evaluate response  - Consider cultural and social influences on pain and pain management  - Notify physician/advanced practitioner if interventions unsuccessful or patient reports new pain  Outcome: Progressing     Problem: INFECTION - ADULT  Goal: Absence or prevention of progression during hospitalization  Description: INTERVENTIONS:  - Assess and monitor for signs and symptoms of infection  - Monitor lab/diagnostic results  - Monitor all insertion sites, i.e. indwelling lines, tubes, and drains  - Monitor endotracheal if appropriate and nasal secretions for changes in amount and color  - [place]ang appropriate cooling/warming therapies per order  - Administer medications as ordered  - Instruct and encourage patient and family to use good hand hygiene technique  - Identify and instruct in appropriate isolation precautions for identified infection/condition  Outcome: Progressing     Problem: SAFETY ADULT  Goal: Patient will remain free of falls  Description: INTERVENTIONS:  - Educate patient/family on patient safety including physical limitations  - Instruct patient to call for assistance with activity   - Consult OT/PT to assist with strengthening/mobility   - Keep Call bell within reach  - Keep bed low and locked with side rails adjusted as appropriate  - Keep care items and personal belongings within reach  - Initiate and maintain comfort rounds  - Make Fall Risk Sign visible to staff  - Offer Toileting every 2 Hours, in advance of need  - Initiate/Maintain bed/chair alarm  - Apply yellow socks and bracelet for high fall risk patients  -  Consider moving patient to room near nurses station  Outcome: Progressing

## 2025-02-21 NOTE — PROGRESS NOTES
"Progress Note - PMR   Name: Lizzy Acuna 49 y.o. female I MRN: [id number]  Unit/Bed#: -01 I Date of Admission: 2/15/2025   Date of Service: 2/21/2025 I Hospital Day: 6     Assessment & Plan  S/P BKA (below knee amputation) unilateral, left (HCC)  2/2 Infection after TMA in setting of T2DM/Neuropathy/PAD with acute ischemia.  2/11 - L BKA with Dr. Arauz  Contracture prevention (has knee immobilizer)   - Vascular did not comment on limb protector stating \"ace is fine\"   - Quad strengthening  Edema/shaping management    - ACE wrap ATC   - Shrinkers when cleared by vascular   Incisional Care/education   - Mom may have to perform  Phantom limb sensation (see that entry)  PT/OT 3-5 hours/day, 5-7 days/week   Pre-prosthetic f/u to be arranged with PMR   F/U with Vascular for 4 week POV (~3/11)  Obesity (BMI 30.0-34.9)  Considerations in therapy.   Tobacco abuse  Cessation counseling  Nicotine patch if needed.  Diabetes mellitus type 2 with complications (HCC)  Lab Results   Component Value Date    HGBA1C 10.3 (H) 12/13/2024       Recent Labs     02/20/25  1122 02/20/25  1649 02/20/25 2021 02/21/25  0703   POCGLU 293* 198* 195* 187*       Blood Sugar Average: Last 72 hrs:  (P) [id number]    Home: Lantus 10 units QHS, Aspart 5 units TID with meals  Here: Same, CDI/Accuchecks  Diabetic Diet  Monitor and adjust as per primary team  Hyponatremia    Currently asymptomatic  2/17 130 and 2/18 130   Discussed with IM: 1.8L/day FR started on 2/18   GERD (gastroesophageal reflux disease)  Continue PPI  PAD (peripheral artery disease) ([facility])  S/p L SFA stent c/b acute occlusion this hospitalization  S/p multiple angioplasty (2/1, 2/2, 2/6) and lysis (1/31-2/2)   Per Vascular : ASA/Statin only  Monitor neurovascular exam  F/u Vascular outpatient   Blood loss anemia    Required multiple transfusions while inpatient.  Hemodynamically stable currently.  2/12 Hgb 8.6 --> 2/17 hgb 9.0  Transfuse as appropriate.   Primary " hypertension  Home: None  Here: Chlorthalidone 12.5mg daily  Monitor and adjust as appropriate.  Thyroid nodule  Noted incidentally  Enlarged multinodular thyroid gland  Will check TSH with next set of labs.  Outpatient f/u with non-emergent ultrasound   Adrenal nodule (HCC)  Noted incidentally  14mm R adrenal nodule stable since 2018 - in keeping with benign adenoma  Biochemical evaluation suggested for adrenal adenomas > 1cm to rule out functioning adenoma  Recommend outpatient f/u with PCP for additional work-up  At risk for venous thromboembolism (VTE)  HSQ, SCDs.   Will not need to go home on HSQ  Phantom limb syndrome (HCC)  Desensitization techniques with therapy  On cymbalta and gabapentin  Monitor and adjust as appropriate  Will need f/u with PMR after discharge.   Constipation    Docusate BID, senna 2 tabs at night, and miralax daily.   Adding PRN suppository and lactulose.  Acute pain   Tylenol scheduled (monitor LFTs)  Cymbalta 30mg daily  Gabapentin 600 TID   Robaxin 750mg Q6hr  Valium PRN muscle spasms  2-4 mg Dilaudid every 4 hours   Monitor and adjust as appropriate.   Adjustment disorder with anxiety  Of note started by APS on cymbalta for phantom pain  Has valium ordered 5mg Q8hr PRN for anxiety but also muscle spasm   - Would try to wean down/off.  Emphasize non-pharmacologic strategies  Monitor and adjust as appropriate.     Subjective   Lizzy Acuna is a 49 y.o. female with medical history of T2DM with neuropathy, depression, HLD, obesity, PAD s/p LLE SFA balloon angioplasty and stenting in 12/2024 who presented to the [facility] 1/28 with L foot pain for 5 days. She had a L TMA on 1/3 by podiatry, and had developed increased pain, redness, and discharge. She was seen at her podiatrist's office who sent her to the ER for IV abx and further work-up. In the hospital met sepsis criteria. MRI showed fluid collection within her dorsal soft tissue. Vascular consulted  after LEADs showed an occluded L SFA stent. They recommended transfer to [facility] and starting heparin gtt. Dr. Ritchie performed I&D of her left foot on 1/29 and on 1/30 she was transferred to [facility]. IR was consulted, and Dr. Davidson performed catheter placement and initiated thrombolysis with TPA on 1/31. PICC was also placed. On 2/1, residual thrombus along the L SFA was treated with 4mm balloon angioplasty, with no flow noted within L SFA following angioplasty. Lysis was restarted. C/B ABLA with 2/2 Hgb 6.2 - given 2 units pRBC. Her surgical cultures grew MRSA and Finegoldia. Abx were narrowed to vancomycin by ID. APS was consulted and started dilaudid PCA and IV valium. 2/2 lysis check with patent l SFA, popliteal, tibioperoneal trunk and TARA. Mild residual stenoses along L SFA and popliteal artery were treated with 4mm balloon angioplasty and stenosis along TARA was treated with 2.5 and 3mm balloon angioplasty. L PT and peroneal arteries were chronically occluded. TPA was discontinued. She was transferred out of the ICU on 2/3. 2/4 required 1 unit pRBC for ABLA. On 2/6, she had repeat arteriogram with Dr. Victoria with finding of recurrent pop occlusion, AT recannulization with distal pop, TPT, and AT POBA an serration angioplasty. Despite this, with compromised blood flow to posterior aspect and plantar flap of foot. Still concern that perfusion would not be sufficient to salvage foot. She was recommended for BKA. On 2/11, Dr. Arauz performed L BKA. She had L adducotr and popliteal blocks. Post-op with acute bilateral shoulder pain, diaphoresis, tachycardic to 120 and hypertensive to 180/103. EKG showed LAD and TWI in V1. ACS r/o initiated. CTA dissection protocol (negative for dissection). This resolved - no acute findings. Abx were discontinued.     Chief Complaint: f/u amputation    Interval:   Seen and evaluated patient at bedside. Pain and sleep have overall improved since increasing gabapentin   2/21  sodium: 132. Repeat labs on Monday 2/24    Last BM 2/19   Anticipated Discharge Date:  Feb 28, 2025 with [facility] for PT, OT, and nursing.     Objective :  Temp:  [96.9 °F (36.1 °C)-97.4 °F (36.3 °C)] 97.4 °F (36.3 °C)  HR:  [84-85] 85  BP: (123-141)/(72-74) 141/74  Resp:  [14] 14  SpO2:  [95 %-96 %] 95 %  O2 Device: None (Room air)    Functional Update:  Physical Therapy Occupational Therapy    Weight Bearing Status: Non-weight bearing (left LE)  Transfers: Minimal Assistance  Bed Mobility: Supervision  Ambulation:  (TBA)  Wheelchair Mobility Distance: 250 ft  Wheelchair Mobility: Supervision  Number of Stairs: 0  Assistive Device for Stairs:  (TBA)  Ramp:  (TBA)  Discharge Recommendations: Home with:  DC Home with:: Family Support, Home Physical Therapy (Possible first floor set up)   Eating: Independent  Grooming: Supervision  Bathing: Moderate Assistance  Bathing: Moderate Assistance  Upper Body Dressing: Minimal Assistance  Lower Body Dressing: Maximum Assistance  Toileting: Moderate Assistance  Tub/Shower Transfer: Minimal Assistance  Toilet Transfer: Moderate Assistance  Cognition: Within Defined Limits  Orientation: Person, Situation, Place, Time                   Physical Exam  Vitals and nursing note reviewed.   Constitutional:       General: She is not in acute distress.     Appearance: She is obese. She is not ill-appearing, toxic-appearing or diaphoretic.   HENT:      Head: Normocephalic and atraumatic.      Right Ear: External ear normal.      Left Ear: External ear normal.      Nose: Nose normal.      Mouth/Throat:      Mouth: Mucous membranes are moist.   Cardiovascular:      Pulses:           Popliteal pulses are 2+ on the left side.   Pulmonary:      Effort: Pulmonary effort is normal. No respiratory distress.   Abdominal:      General: There is no distension.   Musculoskeletal:      Comments: LLE residual limb wrapped in clean, dry ace bandage    Skin:     General: Skin is warm and dry.   Neurological:       Mental Status: She is oriented to person, place, and time.         Scheduled Meds:  Current Facility-Administered Medications   Medication Dose Route Frequency Provider Last Rate    acetaminophen  975 mg Oral TID Cristopher Hui MD      aspirin  81 mg Oral Daily Abby De La Torre PA-C      atorvastatin  80 mg Oral Daily With Dinner Abby De La Torre PA-C      bisacodyl  10 mg Rectal Daily PRN Ashley Depadua, MD      chlorthalidone  12.5 mg Oral Daily Abby De La Torre PA-C      diazepam  5 mg Oral Q8H PRN Abby De La Torre PA-C      DULoxetine  30 mg Oral Daily Abby De La Torre PA-C      gabapentin  600 mg Oral TID Laurence Dumont PA-C      heparin (porcine)  5,000 Units Subcutaneous Q8H JORGE Abby De La Torre PA-C      HYDROmorphone  2 mg Oral Q4H PRN Ashley Depadua, MD      HYDROmorphone  4 mg Oral Q4H PRN Ashley Depadua, MD      insulin glargine  15 Units Subcutaneous HS Kinsey Lizarraga PA-C      insulin lispro  1-6 Units Subcutaneous TID With Meals Abby De La Torre PA-C      insulin lispro  8 Units Subcutaneous TID With Meals Kinsey Lizarraga PA-C      lidocaine  1 patch Topical Daily PRN Abby De La Torre PA-C      lidocaine  1 patch Topical Daily Ashley Depadua, MD      methocarbamol  750 mg Oral Q6H [facility] Laurence Dumont PA-C      nitroglycerin  0.4 mg Sublingual Q5 Min PRN Abby De La Torre PA-C      ondansetron  4 mg Oral Q6H PRN Abby De La Torre PA-C      pantoprazole  40 mg Oral Early Morning Abby De La Torre PA-C      polyethylene glycol  17 g Oral Daily Abby De La Torre PA-C      senna  2 tablet Oral HS Ashley Depadua, MD      trimethobenzamide  200 mg Intramuscular Q6H PRN Abyb De La Torre PA-C           Lab Results: I have reviewed the following results:  Results from last 7 days   Lab Units 02/17/25  0459   HEMOGLOBIN g/dL 9.0*   HEMATOCRIT % 27.9*   WBC Thousand/uL 8.52   PLATELETS Thousands/uL 294      Results from last 7 days   Lab Units 02/20/25  0558 02/18/25  0542 02/17/25  0459   BUN mg/dL 27* 23 20   SODIUM mmol/L 132* 130* 130*   POTASSIUM mmol/L 4.3 4.4 3.9   CHLORIDE mmol/L 93* 91* 91*   CREATININE mg/dL 0.75 0.81 0.75   AST U/L 25  --  43*   ALT U/L 26  --  32            Laurence Dumont PA-C  Physical Medicine and Rehabilitation  [facility]

## 2025-02-21 NOTE — PROGRESS NOTES
02/21/25 1230   Pain Assessment   Pain Assessment Tool 0-10   Pain Location/Orientation Orientation: Left;Location: Leg   Restrictions/Precautions   Precautions Bed/chair alarms;Fall Risk;Pain;Supervision on toilet/commode   LLE Weight Bearing Per Order NWB   Putting On/Taking Off Footwear   Type of Assistance Needed Set-up / clean-up   Physical Assistance Level No physical assistance   Putting On/Taking Off Footwear CARE Score 5   Sit to Lying   Type of Assistance Needed Independent   Physical Assistance Level No physical assistance   Sit to Lying CARE Score 6   Lying to Sitting on Side of Bed   Type of Assistance Needed Independent   Physical Assistance Level No physical assistance   Lying to Sitting on Side of Bed CARE Score 6   Sit to Stand   Type of Assistance Needed Supervision   Physical Assistance Level No physical assistance   Sit to Stand CARE Score 4   Bed-Chair Transfer   Type of Assistance Needed Supervision   Physical Assistance Level No physical assistance   Comment SPT   Chair/Bed-to-Chair Transfer CARE Score 4   Transfer Bed/Chair/Wheelchair   Positioning Concerns Skin Integrity   Limitations Noted In Balance;LE Strength   Exercise Tools   UE Ergometer 6 F/B moderate resistance   Cognition   Overall Cognitive Status WFL   Arousal/Participation Alert;Cooperative   Attention Within functional limits   Orientation Level Oriented X4   Memory Within functional limits   Following Commands Follows all commands and directions without difficulty   Additional Activities   Additional Activities Comments w/c mobility MI   Activity Tolerance   Activity Tolerance Patient tolerated treatment well   Assessment   Treatment Assessment Pt brief session addressed B UE/activity tolerance/wheelchair mobility and functional txfs. Pt tolerated session well. Pt barriers remain same as prvious session. Plan is to contineu with skilled OT.   Prognosis Good   Problem List Decreased strength;Decreased endurance;Impaired  balance;Decreased mobility;Pain;Orthopedic restrictions;Decreased skin integrity   Plan   Treatment/Interventions Functional transfer training;ADL retraining;Therapeutic exercise;Endurance training;Patient/family training;Equipment eval/education;Bed mobility;Compensatory technique education;Spoke to nursing;OT   Progress Progressing toward goals   OT Therapy Minutes   OT Time In 1330   OT Time Out 1400   OT Total Time (minutes) 30   OT Mode of treatment - Individual (minutes) 30   OT Mode of treatment - Concurrent (minutes) 0   OT Mode of treatment - Group (minutes) 0   OT Mode of treatment - Co-treat (minutes) 0   OT Mode of Treatment - Total time(minutes) 30 minutes   OT Cumulative Minutes 493   Therapy Time missed   Time missed? No

## 2025-02-21 NOTE — PROGRESS NOTES
02/21/25 0655   Pain Assessment   Pain Assessment Tool 0-10   Pain Score 8   Pain Location/Orientation Orientation: Left;Location: Leg   Pain Onset/Description Onset: Ongoing   Restrictions/Precautions   Precautions Bed/chair alarms;Fall Risk;Limb alert;Supervision on toilet/commode   LLE Weight Bearing Per Order NWB   Oral Hygiene   Type of Assistance Needed Independent   Physical Assistance Level No physical assistance   Comment w/c level   Oral Hygiene CARE Score 6   Grooming   Able To Initiate Tasks;Comb/Brush Hair;Wash/Dry Face;Brush/Clean Teeth;Wash/Dry Hands;Acquire Items   Findings w/c level   Shower/Bathe Self   Type of Assistance Needed Set-up / clean-up;Adaptive equipment   Physical Assistance Level No physical assistance   Comment CHG bath;NWB;weight shifting on seat;covered with plastic prior to shower   Shower/Bathe Self CARE Score 5   Bathing   Assessed Bath Style Shower   Able to Adjust Water Temperature Yes   Able to Wash/Rinse/Dry (body part) Left Arm;Right Arm;L Upper Leg;R Upper Leg;R Lower Leg/Foot;Chest;Abdomen;Perineal Area;Buttocks   Limitations Noted in Balance;Strength   Positioning Seated   Adaptive Equipment Hand Held Shower;Shower Seat;Shower Bars   Tub/Shower Transfer   Limitations Noted In Balance;LE Strength   Adaptive Equipment Grab Bars;Seat with out Back   Assessed Shower   Findings CS SPT   Upper Body Dressing   Type of Assistance Needed Independent   Physical Assistance Level No physical assistance   Upper Body Dressing CARE Score 6   Lower Body Dressing   Type of Assistance Needed Supervision   Physical Assistance Level No physical assistance   Lower Body Dressing CARE Score 4   Putting On/Taking Off Footwear   Type of Assistance Needed Set-up / clean-up   Physical Assistance Level No physical assistance   Putting On/Taking Off Footwear CARE Score 5   Dressing/Undressing Clothing   Able to  Obtain Clothing;Store removed clothing   Remove UB Clothes Pullover Shirt;Bra   Don UB  Clothes Pullover Shirt   Remove LB Clothes Undergarment;Socks;Pants   Don LB Clothes Pants;Undergarment;Socks;Shoes   Limitations Noted In Balance;Strength   Positioning Supported Sit;Standing   Lying to Sitting on Side of Bed   Type of Assistance Needed Independent   Physical Assistance Level No physical assistance   Lying to Sitting on Side of Bed CARE Score 6   Sit to Stand   Type of Assistance Needed Supervision   Physical Assistance Level No physical assistance   Sit to Stand CARE Score 4   Bed-Chair Transfer   Type of Assistance Needed Supervision   Physical Assistance Level No physical assistance   Chair/Bed-to-Chair Transfer CARE Score 4   Transfer Bed/Chair/Wheelchair   Limitations Noted In Balance;LE Strength   Light Housekeeping   Light Housekeeping Level Wheelchair   Light Housekeeping Level of Assistance Modified independent   Light Housekeeping s/u items for ADL   Exercise Tools   Hand Gripper 9# digi x 30 B hands   Other Exercise Tool 1 red t bar x 30 Up/down   Other Exercise Tool 2 t band 2x15 in 4 planes   Other Exercise Tool 3 3# dowel 2x15 in 6 planes of motion   Cognition   Overall Cognitive Status WFL   Arousal/Participation Alert;Cooperative   Attention Within functional limits   Orientation Level Oriented X4   Memory Within functional limits   Following Commands Follows all commands and directions without difficulty   Additional Activities   Additional Activities   (functional reacher use to floor level for item retrieval)   Additional Activities Comments w/c mobility MI   Activity Tolerance   Activity Tolerance Patient tolerated treatment well   Assessment   Treatment Assessment OT tx addressed ADLs/iADLs via w/c level, safety with functional txfs, and TE for strength and endurance as noted in respective sections of note. Pt demon consistent S with ADLs through the use of compensatory strategies. pt demon good safety. Reviewed portions of first step amp book with pt acknowledging  understanding of same. Pt is making positive gains toward OT goals.  Barriers: NWB L LE, pain, edema, compromised skin integrity, decreased balance and strength. Plan is to continue with skilled OT as per POC to progress pt toward OT goals.   Prognosis Good   Problem List Decreased strength;Impaired balance;Decreased mobility;Pain;Orthopedic restrictions;Decreased skin integrity   Plan   Treatment/Interventions ADL retraining;Functional transfer training;Therapeutic exercise;Endurance training;Patient/family training;Equipment eval/education;Bed mobility;Compensatory technique education;Spoke to nursing;OT   Progress Progressing toward goals   OT Therapy Minutes   OT Time In 0655   OT Time Out 0828   OT Total Time (minutes) 93   OT Mode of treatment - Individual (minutes) 93   OT Mode of treatment - Concurrent (minutes) 0   OT Mode of treatment - Group (minutes) 0   OT Mode of treatment - Co-treat (minutes) 0   OT Mode of Treatment - Total time(minutes) 93 minutes   OT Cumulative Minutes 463   Therapy Time missed   Time missed? No

## 2025-02-22 LAB
GLUCOSE SERPL-MCNC: 179 MG/DL (ref 65–140)
GLUCOSE SERPL-MCNC: 189 MG/DL (ref 65–140)
GLUCOSE SERPL-MCNC: 205 MG/DL (ref 65–140)
GLUCOSE SERPL-MCNC: 206 MG/DL (ref 65–140)

## 2025-02-22 PROCEDURE — 97110 THERAPEUTIC EXERCISES: CPT

## 2025-02-22 PROCEDURE — 97530 THERAPEUTIC ACTIVITIES: CPT

## 2025-02-22 PROCEDURE — 82948 REAGENT STRIP/BLOOD GLUCOSE: CPT

## 2025-02-22 PROCEDURE — 97535 SELF CARE MNGMENT TRAINING: CPT

## 2025-02-22 RX ORDER — CEPHALEXIN 500 MG/1
500 CAPSULE ORAL EVERY 6 HOURS SCHEDULED
Status: DISCONTINUED | OUTPATIENT
Start: 2025-02-22 | End: 2025-02-24

## 2025-02-22 RX ADMIN — GABAPENTIN 600 MG: 300 CAPSULE ORAL at 14:29

## 2025-02-22 RX ADMIN — CEPHALEXIN 500 MG: 500 CAPSULE ORAL at 15:48

## 2025-02-22 RX ADMIN — METHOCARBAMOL TABLETS 750 MG: 500 TABLET, COATED ORAL at 11:40

## 2025-02-22 RX ADMIN — INSULIN LISPRO 2 UNITS: 100 INJECTION, SOLUTION INTRAVENOUS; SUBCUTANEOUS at 11:59

## 2025-02-22 RX ADMIN — ACETAMINOPHEN 975 MG: 325 TABLET ORAL at 15:48

## 2025-02-22 RX ADMIN — ACETAMINOPHEN 975 MG: 325 TABLET ORAL at 23:52

## 2025-02-22 RX ADMIN — INSULIN GLARGINE 18 UNITS: 100 INJECTION, SOLUTION SUBCUTANEOUS at 21:12

## 2025-02-22 RX ADMIN — INSULIN LISPRO 10 UNITS: 100 INJECTION, SOLUTION INTRAVENOUS; SUBCUTANEOUS at 08:42

## 2025-02-22 RX ADMIN — HEPARIN SODIUM 5000 UNITS: 5000 INJECTION INTRAVENOUS; SUBCUTANEOUS at 21:12

## 2025-02-22 RX ADMIN — ASPIRIN 81 MG: 81 TABLET, COATED ORAL at 08:41

## 2025-02-22 RX ADMIN — INSULIN LISPRO 2 UNITS: 100 INJECTION, SOLUTION INTRAVENOUS; SUBCUTANEOUS at 08:42

## 2025-02-22 RX ADMIN — ATORVASTATIN CALCIUM 80 MG: 80 TABLET, FILM COATED ORAL at 15:52

## 2025-02-22 RX ADMIN — SENNOSIDES 17.2 MG: 8.6 TABLET, FILM COATED ORAL at 21:12

## 2025-02-22 RX ADMIN — METHOCARBAMOL TABLETS 750 MG: 500 TABLET, COATED ORAL at 17:17

## 2025-02-22 RX ADMIN — METHOCARBAMOL TABLETS 750 MG: 500 TABLET, COATED ORAL at 23:52

## 2025-02-22 RX ADMIN — GABAPENTIN 600 MG: 300 CAPSULE ORAL at 08:41

## 2025-02-22 RX ADMIN — LIDOCAINE 5% 1 PATCH: 700 PATCH TOPICAL at 11:40

## 2025-02-22 RX ADMIN — DIAZEPAM 5 MG: 5 TABLET ORAL at 21:12

## 2025-02-22 RX ADMIN — HYDROMORPHONE HYDROCHLORIDE 4 MG: 4 TABLET ORAL at 21:12

## 2025-02-22 RX ADMIN — HYDROMORPHONE HYDROCHLORIDE 4 MG: 4 TABLET ORAL at 15:53

## 2025-02-22 RX ADMIN — ACETAMINOPHEN 975 MG: 325 TABLET ORAL at 08:41

## 2025-02-22 RX ADMIN — CHLORTHALIDONE 12.5 MG: 25 TABLET ORAL at 08:41

## 2025-02-22 RX ADMIN — LIDOCAINE 5% 1 PATCH: 700 PATCH TOPICAL at 08:41

## 2025-02-22 RX ADMIN — GABAPENTIN 600 MG: 300 CAPSULE ORAL at 21:00

## 2025-02-22 RX ADMIN — METHOCARBAMOL TABLETS 750 MG: 500 TABLET, COATED ORAL at 05:29

## 2025-02-22 RX ADMIN — DULOXETINE HYDROCHLORIDE 30 MG: 30 CAPSULE, DELAYED RELEASE ORAL at 08:41

## 2025-02-22 RX ADMIN — CEPHALEXIN 500 MG: 500 CAPSULE ORAL at 23:52

## 2025-02-22 RX ADMIN — HEPARIN SODIUM 5000 UNITS: 5000 INJECTION INTRAVENOUS; SUBCUTANEOUS at 14:29

## 2025-02-22 RX ADMIN — HEPARIN SODIUM 5000 UNITS: 5000 INJECTION INTRAVENOUS; SUBCUTANEOUS at 05:29

## 2025-02-22 RX ADMIN — INSULIN LISPRO 10 UNITS: 100 INJECTION, SOLUTION INTRAVENOUS; SUBCUTANEOUS at 16:31

## 2025-02-22 RX ADMIN — INSULIN LISPRO 10 UNITS: 100 INJECTION, SOLUTION INTRAVENOUS; SUBCUTANEOUS at 11:58

## 2025-02-22 RX ADMIN — INSULIN LISPRO 1 UNITS: 100 INJECTION, SOLUTION INTRAVENOUS; SUBCUTANEOUS at 16:31

## 2025-02-22 RX ADMIN — HYDROMORPHONE HYDROCHLORIDE 4 MG: 4 TABLET ORAL at 11:40

## 2025-02-22 RX ADMIN — PANTOPRAZOLE SODIUM 40 MG: 40 TABLET, DELAYED RELEASE ORAL at 05:29

## 2025-02-22 RX ADMIN — HYDROMORPHONE HYDROCHLORIDE 4 MG: 4 TABLET ORAL at 02:21

## 2025-02-22 RX ADMIN — HYDROMORPHONE HYDROCHLORIDE 4 MG: 4 TABLET ORAL at 06:35

## 2025-02-22 NOTE — PLAN OF CARE
Problem: PAIN - ADULT  Goal: Verbalizes/displays adequate comfort level or baseline comfort level  Description: Interventions:  - Encourage patient to monitor pain and request assistance  - Assess pain using appropriate pain scale  - Administer analgesics based on type and severity of pain and evaluate response  - Implement non-pharmacological measures as appropriate and evaluate response  - Notify physician/advanced practitioner if interventions unsuccessful or patient reports new pain  Outcome: Progressing     Problem: SAFETY ADULT  Goal: Patient will remain free of falls  Description: INTERVENTIONS:  - Educate patient/family on patient safety including physical limitations  - Instruct patient to call for assistance with activity   - Consult OT/PT to assist with strengthening/mobility   - Keep Call bell within reach  - Keep bed low and locked with side rails adjusted as appropriate  - Keep care items and personal belongings within reach  - Initiate and maintain comfort rounds  - Make Fall Risk Sign visible to staff  - Offer Toileting every 2 Hours, in advance of need  - Initiate/Maintain bed/chair alarm  - Apply yellow socks and bracelet for high fall risk patients  - Consider moving patient to room near nurses station  Outcome: Progressing

## 2025-02-22 NOTE — NURSING NOTE
Pt c/o of 7/10 pain throughout shift. PRN medication given and repositioned with slight relief. BKA dressing changed. Periwound increased purple color w/ nonpit edema. Photographed, MD notified, see new orders.

## 2025-02-22 NOTE — PROGRESS NOTES
02/22/25 0700   Pain Assessment   Pain Assessment Tool 0-10   Pain Score 5   Pain Location/Orientation Orientation: Left  (residual limb)   Restrictions/Precautions   Precautions Bed/chair alarms;Fall Risk;Pain;Supervision on toilet/commode   LLE Weight Bearing Per Order NWB   ROM Restrictions No   Eating   Type of Assistance Needed Independent   Physical Assistance Level No physical assistance   Eating CARE Score 6   Grooming   Able To Initiate Tasks;Acquire Items;Comb/Brush Hair;Wash/Dry Face   Limitation Noted In Safety   Shower/Bathe Self   Type of Assistance Needed Set-up / clean-up   Physical Assistance Level No physical assistance   Comment CHG solutoin used   Shower/Bathe Self CARE Score 5   Bathing   Assessed Bath Style Shower   Anticipated D/C Bath Style Shower;Sponge Bath   Able to Gather/Transport Yes   Able to Adjust Water Temperature Yes   Able to Wash/Rinse/Dry (body part) Right Arm;Left Arm;L Upper Leg;R Upper Leg;R Lower Leg/Foot;Chest;Abdomen;Perineal Area;Buttocks  (LLE amputated)   Limitations Noted in Balance;Endurance;Safety;Timeliness   Positioning Seated   Adaptive Equipment Shower Bars;Shower Seat;Hand Held Shower   Tub/Shower Transfer   Limitations Noted In Balance;Problem Solving;Safety;LE Strength   Adaptive Equipment Grab Bars;Seat with out Back   Assessed Shower   Findings supervision   Upper Body Dressing   Type of Assistance Needed Independent   Physical Assistance Level No physical assistance   Upper Body Dressing CARE Score 6   Lower Body Dressing   Type of Assistance Needed Supervision   Physical Assistance Level No physical assistance   Lower Body Dressing CARE Score 4   Putting On/Taking Off Footwear   Type of Assistance Needed Supervision   Physical Assistance Level No physical assistance   Putting On/Taking Off Footwear CARE Score 4   Dressing/Undressing Clothing   Able to  Obtain Clothing   Remove UB Clothes Pullover Shirt   Don UB Clothes Pullover Shirt;Bra   Remove LB  Clothes Pants;Undergarment;Socks   Don LB Clothes Pants;Undergarment;Socks;Shoes   Limitations Noted In Balance;Endurance;Safety;Strength;ROM   Positioning Supported Sit;Standing   Lying to Sitting on Side of Bed   Type of Assistance Needed Independent   Physical Assistance Level No physical assistance   Lying to Sitting on Side of Bed CARE Score 6   Sit to Stand   Type of Assistance Needed Supervision   Physical Assistance Level No physical assistance   Sit to Stand CARE Score 4   Bed-Chair Transfer   Type of Assistance Needed Supervision   Physical Assistance Level No physical assistance   Chair/Bed-to-Chair Transfer CARE Score 4   Transfer Bed/Chair/Wheelchair   Limitations Noted In Balance;LE Strength   Exercise Tools   Other Exercise Tool 1 sanderbox x30 all planes of motion using bilat UE   Other Exercise Tool 2 UE strengthening in bilat UE using 2# DB, x30: elbow flexion/extension, protraction/retraction, intrenal/external rotation, pronation/supination, shoulder flexio/extension, shoulder ABD/ADD   Cognition   Overall Cognitive Status WFL   Arousal/Participation Alert;Responsive;Cooperative   Attention Within functional limits   Orientation Level Oriented X4   Memory Within functional limits   Following Commands Follows all commands and directions without difficulty   Assessment   Treatment Assessment Pt agreeable to OT session this AM. Received lying supine in bed. ADL session completed; current LOF and details listed in respective sections. Pt is overall supervision/set up for ADL tasks and fxl transfers including w/c mobility in hallway using bilat UE. Pt maintained NWB LLE effectively; LLE covered in bag in shower to keep dressings dry. UE ROM/strength exercises completed after ADL with good tolerance. Pt reported moderate pain in LLE during session that increased upon movement. Pt is a motivated participant in therapy and is eager to progress towards goals. Pt's barriers to d/c include decreased  strength throughout, decreased ROM, decreased balance, decreased activity tolerance, decreased safety awareness, and NWB LLE at amputation site ; all affect independence in self care and fxl transfers. Pt would benefit from continued skilled OT services in order to address listed barriers and prepare for safe d/c.   Prognosis Good   Problem List Decreased strength;Decreased endurance;Impaired balance;Decreased mobility;Pain;Orthopedic restrictions;Decreased skin integrity   Plan   Treatment/Interventions ADL retraining;Functional transfer training;LE strengthening/ROM;Endurance training;Therapeutic exercise;Patient/family training;Equipment eval/education;Compensatory technique education;OT   Progress Progressing toward goals   OT Therapy Minutes   OT Time In 0700   OT Time Out 0830   OT Total Time (minutes) 90   OT Mode of treatment - Individual (minutes) 90

## 2025-02-22 NOTE — PROGRESS NOTES
"Progress Note - Lizzy Acuna 49 y.o. female MRN: [id number]    Unit/Bed#: [facility] 218-01 Encounter: [id number]        Subjective:   Patient seen and examined at bedside after reviewing the chart and discussing the case with the caring staff.    Patient examined at bedside.  Patient denies any acute symptoms.      Labs 2/21:  sodium 132    Patient's blood sugars slightly improved but mostly over 180-200's.    Physical Exam   Vitals: Blood pressure 167/80, pulse 97, temperature (!) 96.5 °F (35.8 °C), temperature source Temporal, resp. rate 16, height 5' 1\" (1.549 m), weight 82.8 kg (182 lb 8.7 oz), last menstrual period 12/01/2023, SpO2 97%, not currently breastfeeding.,Body mass index is 34.49 kg/m².  Constitutional: Patient in no acute distress.  HEENT: PERR, EOMI, MMM.  Cardiovascular: Normal rate and regular rhythm.    Pulmonary/Chest: Effort normal and breath sounds normal.   Abdomen: Soft, + BS, NT.    Assessment/Plan:  Lizzy Acuna is a(n) 49 y.o. 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     Cardiac w/ HTN, HLD, CAD.  Continue chlorthalidone 12.5 mg daily, atorvastatin 80 mg daily, and ASA 81 mg daily.   Type 2 DM.   Hgb A1c 10.3% on 12/13/24.  Home: Lantus 10u nightly, Humalog 5u TID.  Here:  Lantus 18u nightly, Humalog 10u TID.  SSI w/ POCT AC HS.  Carb controlled diet.   GERD.  Continue Protonix 40 mg daily.   Depression.  Not on home meds.  APS started pt on Cymbalta 30 mg daily for phantom limb pain.  Also on Valium 5 mg q8h as needed for anxiety/muscle spasms.   ABLA.  Hgb 8.6 -> 9.0 on 2/17/25.  S/p multiple transfusions on acute care.  Cont to monitor.  Transfuse Hgb < 7.   Adrenal nodule.  Stable since 2018.  Recommend f/u outpt.   Thyroid nodule.  Incidental finding.  Recommend outpt thyroid US.  Follow up with PCP/endo.   Hyponatremia.  Na 133 -> 130 -> 132 -> 132 on 2/21/25.  Likely SIADH in the setting " of acute infection.  Placed on 1.8L fluid restriction on 2/18/25.  Cont to monitor.    Pain and bowel regimen.  As per physiatry.   Cellulitis of L foot s/p BKA.  Continue ASA and statin.  Patient receiving intensive PT OT as per physiatry.      Anticipated date of discharge.  Friday 2/28/25.

## 2025-02-23 LAB
GLUCOSE SERPL-MCNC: 180 MG/DL (ref 65–140)
GLUCOSE SERPL-MCNC: 224 MG/DL (ref 65–140)
GLUCOSE SERPL-MCNC: 245 MG/DL (ref 65–140)
GLUCOSE SERPL-MCNC: 246 MG/DL (ref 65–140)

## 2025-02-23 PROCEDURE — 97110 THERAPEUTIC EXERCISES: CPT

## 2025-02-23 PROCEDURE — 97542 WHEELCHAIR MNGMENT TRAINING: CPT

## 2025-02-23 PROCEDURE — 97530 THERAPEUTIC ACTIVITIES: CPT

## 2025-02-23 PROCEDURE — 97112 NEUROMUSCULAR REEDUCATION: CPT

## 2025-02-23 PROCEDURE — 82948 REAGENT STRIP/BLOOD GLUCOSE: CPT

## 2025-02-23 RX ORDER — INSULIN GLARGINE 100 [IU]/ML
22 INJECTION, SOLUTION SUBCUTANEOUS
Status: DISCONTINUED | OUTPATIENT
Start: 2025-02-23 | End: 2025-02-28 | Stop reason: HOSPADM

## 2025-02-23 RX ADMIN — METHOCARBAMOL TABLETS 750 MG: 500 TABLET, COATED ORAL at 23:18

## 2025-02-23 RX ADMIN — CEPHALEXIN 500 MG: 500 CAPSULE ORAL at 12:09

## 2025-02-23 RX ADMIN — CEPHALEXIN 500 MG: 500 CAPSULE ORAL at 23:17

## 2025-02-23 RX ADMIN — INSULIN LISPRO 10 UNITS: 100 INJECTION, SOLUTION INTRAVENOUS; SUBCUTANEOUS at 08:20

## 2025-02-23 RX ADMIN — ACETAMINOPHEN 975 MG: 325 TABLET ORAL at 16:59

## 2025-02-23 RX ADMIN — PANTOPRAZOLE SODIUM 40 MG: 40 TABLET, DELAYED RELEASE ORAL at 05:50

## 2025-02-23 RX ADMIN — DULOXETINE HYDROCHLORIDE 30 MG: 30 CAPSULE, DELAYED RELEASE ORAL at 08:19

## 2025-02-23 RX ADMIN — LIDOCAINE 5% 1 PATCH: 700 PATCH TOPICAL at 08:19

## 2025-02-23 RX ADMIN — INSULIN LISPRO 10 UNITS: 100 INJECTION, SOLUTION INTRAVENOUS; SUBCUTANEOUS at 17:03

## 2025-02-23 RX ADMIN — HYDROMORPHONE HYDROCHLORIDE 4 MG: 4 TABLET ORAL at 16:59

## 2025-02-23 RX ADMIN — HEPARIN SODIUM 5000 UNITS: 5000 INJECTION INTRAVENOUS; SUBCUTANEOUS at 05:50

## 2025-02-23 RX ADMIN — INSULIN LISPRO 2 UNITS: 100 INJECTION, SOLUTION INTRAVENOUS; SUBCUTANEOUS at 17:04

## 2025-02-23 RX ADMIN — METHOCARBAMOL TABLETS 750 MG: 500 TABLET, COATED ORAL at 17:00

## 2025-02-23 RX ADMIN — ACETAMINOPHEN 975 MG: 325 TABLET ORAL at 08:19

## 2025-02-23 RX ADMIN — HYDROMORPHONE HYDROCHLORIDE 4 MG: 4 TABLET ORAL at 21:34

## 2025-02-23 RX ADMIN — METHOCARBAMOL TABLETS 750 MG: 500 TABLET, COATED ORAL at 05:50

## 2025-02-23 RX ADMIN — INSULIN LISPRO 10 UNITS: 100 INJECTION, SOLUTION INTRAVENOUS; SUBCUTANEOUS at 12:09

## 2025-02-23 RX ADMIN — SENNOSIDES 17.2 MG: 8.6 TABLET, FILM COATED ORAL at 21:30

## 2025-02-23 RX ADMIN — HYDROMORPHONE HYDROCHLORIDE 4 MG: 4 TABLET ORAL at 05:50

## 2025-02-23 RX ADMIN — GABAPENTIN 600 MG: 300 CAPSULE ORAL at 16:59

## 2025-02-23 RX ADMIN — CEPHALEXIN 500 MG: 500 CAPSULE ORAL at 05:50

## 2025-02-23 RX ADMIN — INSULIN GLARGINE 22 UNITS: 100 INJECTION, SOLUTION SUBCUTANEOUS at 21:34

## 2025-02-23 RX ADMIN — HEPARIN SODIUM 5000 UNITS: 5000 INJECTION INTRAVENOUS; SUBCUTANEOUS at 16:59

## 2025-02-23 RX ADMIN — HEPARIN SODIUM 5000 UNITS: 5000 INJECTION INTRAVENOUS; SUBCUTANEOUS at 21:30

## 2025-02-23 RX ADMIN — INSULIN LISPRO 1 UNITS: 100 INJECTION, SOLUTION INTRAVENOUS; SUBCUTANEOUS at 08:20

## 2025-02-23 RX ADMIN — CEPHALEXIN 500 MG: 500 CAPSULE ORAL at 17:00

## 2025-02-23 RX ADMIN — INSULIN LISPRO 3 UNITS: 100 INJECTION, SOLUTION INTRAVENOUS; SUBCUTANEOUS at 12:10

## 2025-02-23 RX ADMIN — GABAPENTIN 600 MG: 300 CAPSULE ORAL at 21:35

## 2025-02-23 RX ADMIN — ACETAMINOPHEN 975 MG: 325 TABLET ORAL at 23:18

## 2025-02-23 RX ADMIN — ATORVASTATIN CALCIUM 80 MG: 80 TABLET, FILM COATED ORAL at 16:59

## 2025-02-23 RX ADMIN — ASPIRIN 81 MG: 81 TABLET, COATED ORAL at 08:19

## 2025-02-23 RX ADMIN — METHOCARBAMOL TABLETS 750 MG: 500 TABLET, COATED ORAL at 12:09

## 2025-02-23 RX ADMIN — CHLORTHALIDONE 12.5 MG: 25 TABLET ORAL at 08:19

## 2025-02-23 RX ADMIN — GABAPENTIN 600 MG: 300 CAPSULE ORAL at 08:19

## 2025-02-23 NOTE — PLAN OF CARE
Problem: SAFETY ADULT  Goal: Patient will remain free of falls  Description: INTERVENTIONS:  - Educate patient/family on patient safety including physical limitations  - Instruct patient to call for assistance with activity   - Consult OT/PT to assist with strengthening/mobility   - Keep Call bell within reach  - Keep bed low and locked with side rails adjusted as appropriate  - Keep care items and personal belongings within reach  - Initiate and maintain comfort rounds  - Make Fall Risk Sign visible to staff  - Offer Toileting every 2 Hours, in advance of need  - Initiate/Maintain bed/chair alarm  - Apply yellow socks and bracelet for high fall risk patients  - Consider moving patient to room near nurses station  Outcome: Progressing     Problem: Knowledge Deficit  Goal: Patient/family/caregiver demonstrates understanding of disease process, treatment plan, medications, and discharge instructions  Description: Complete learning assessment and assess knowledge base.  Interventions:  - Provide teaching at level of understanding  - Provide teaching via preferred learning methods  Outcome: Progressing

## 2025-02-23 NOTE — QUICK NOTE
PMR On-call Quick note:    Notified by nursing residual limb with subtle jay-incisional erythema without drainage and slight darkening of skin.  Vitals stable and she continues with residual limb pain.      Photos reviewed in EMR.  There has been some darkening/bruising appearance since 2/13 post-op photo.  This may be evolving or she could have have additional bruising.  It is normal to have some discoloration after amputation.  With that said, she is at risk for impaired healing given hx of DM2, PAD and underwent significant LLE angioplasty prior to BKA by [facility] smarina.  She remains on aspirin.      Plan:  Will empirically start Keflex 500mg BID   Avoid overly tightening ace wrap  Monitor incision/residual limb closely with daily photos for now  Continue optimal DM mgmt  Continue aspirin, statin   If worsens will contact adriennec sx

## 2025-02-23 NOTE — QUICK NOTE
PMR On-call Quick note:    Follow-up photos from today reviewed and patient discussed with nursing.  Pain in residual limb somewhat better and nursing felt it looked a little better.  On my eval I would say overall similar appearance.  Patient remains afebrile with stable vitals.      Plan:  Primary rehab team follow-up tomorrow  Continue empiric Keflex 500mg BID   Avoid overly tightening ace wrap  Monitor incision/residual limb closely with daily photos for now  Continue optimal DM mgmt  Continue aspirin, statin   CBC tomorrow am planned  [facility]

## 2025-02-23 NOTE — PROGRESS NOTES
"Progress Note - Lizzy Acuna 49 y.o. female MRN: [id number]    Unit/Bed#: [facility] 218-01 Encounter: [id number]        Subjective:   Patient seen and examined at bedside after reviewing the chart and discussing the case with the caring staff.    Patient examined at bedside.  Patient denies any acute symptoms.      Patient's blood sugars slightly improved but mostly over 180-200's.    Physical Exam   Vitals: Blood pressure 157/74, pulse 85, temperature (!) 96.3 °F (35.7 °C), temperature source Temporal, resp. rate 16, height 5' 1\" (1.549 m), weight 82.8 kg (182 lb 8.7 oz), last menstrual period 12/01/2023, SpO2 96%, not currently breastfeeding.,Body mass index is 34.49 kg/m².  Constitutional: Patient in no acute distress.  HEENT: PERR, EOMI, MMM.  Cardiovascular: Normal rate and regular rhythm.    Pulmonary/Chest: Effort normal and breath sounds normal.   Abdomen: Soft, + BS, NT.    Assessment/Plan:  Lizzy Acuna is a(n) 49 y.o. 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     Cardiac w/ HTN, HLD, CAD.  Continue chlorthalidone 12.5 mg daily, atorvastatin 80 mg daily, and ASA 81 mg daily.   Type 2 DM.   Hgb A1c 10.3% on 12/13/24.  Home: Lantus 10u nightly, Humalog 5u TID.  Here: I will increase Lantus 22u nightly to 2/23/2025, Humalog 10u TID.  SSI w/ POCT AC HS.  Carb controlled diet.   GERD.  Continue Protonix 40 mg daily.   Depression.  Not on home meds.  APS started pt on Cymbalta 30 mg daily for phantom limb pain.  Also on Valium 5 mg q8h as needed for anxiety/muscle spasms.   ABLA.  Hgb 8.6 -> 9.0 on 2/17/25.  S/p multiple transfusions on acute care.  Cont to monitor.  Transfuse Hgb < 7.   Adrenal nodule.  Stable since 2018.  Recommend f/u outpt.   Thyroid nodule.  Incidental finding.  Recommend outpt thyroid US.  Follow up with PCP/endo.   Hyponatremia.  Na 133 -> 130 -> 132 -> 132 on 2/21/25.  Likely SIADH in the " setting of acute infection.  Placed on 1.8L fluid restriction on 2/18/25.  Cont to monitor.    Pain and bowel regimen.  As per physiatry.   Cellulitis of L foot s/p BKA.  Continue ASA and statin.  Patient receiving intensive PT OT as per physiatry.      Anticipated date of discharge.  Friday 2/28/25.

## 2025-02-23 NOTE — PROGRESS NOTES
02/23/25 1100   Pain Assessment   Pain Assessment Tool 0-10   Pain Score 7   Pain Location/Orientation Orientation: Left;Location: Leg  (phantom pains)   Restrictions/Precautions   Precautions Bed/chair alarms;Fall Risk;Supervision on toilet/commode   LLE Weight Bearing Per Order NWB   ROM Restrictions No   Braces or Orthoses Knee immobilizer   Cognition   Orientation Level Oriented X4   Roll Left and Right   Type of Assistance Needed Independent   Roll Left and Right CARE Score 6   Sit to Lying   Type of Assistance Needed Independent   Sit to Lying CARE Score 6   Lying to Sitting on Side of Bed   Type of Assistance Needed Independent   Lying to Sitting on Side of Bed CARE Score 6   Sit to Stand   Type of Assistance Needed Supervision   Physical Assistance Level No physical assistance   Sit to Stand CARE Score 4   Bed-Chair Transfer   Type of Assistance Needed Supervision   Physical Assistance Level No physical assistance   Comment SPT   Chair/Bed-to-Chair Transfer CARE Score 4   Transfer Bed/Chair/Wheelchair   Stand Pivot Supervision   Sit to Stand Supervision   Stand to Sit Supervision   Supine to Sit Modified Independent   Sit to Supine Modified Independent   Wheel 50 Feet with Two Turns   Type of Assistance Needed Independent   Physical Assistance Level No physical assistance   Wheel 50 Feet with Two Turns CARE Score 6   Wheel 150 Feet   Type of Assistance Needed Independent   Physical Assistance Level No physical assistance   Wheel 150 Feet CARE Score 6   Wheelchair mobility   Method Right upper extremity;Left upper extremity   Distance Level Surface (feet) 175 ft   Findings Mod I   Therapeutic Interventions   Strengthening seatred TE performed in    Balance Standing balance static and with OH reaching   Other  mobility   Assessment   Treatment Assessment Patient agreeable to PT session tokate, requests no hopping due to pain. Static standing performed at side rail, discomfort with pain once in stance in  limb, practiced static standing and trial with some OH reaching. Completed WC mobility without assistance for propelling, obstacles or use of brakes.   Problem List Decreased strength;Decreased endurance;Impaired balance;Decreased mobility;Pain;Orthopedic restrictions;Decreased skin integrity   PT Barriers   Physical Impairment Decreased strength;Decreased endurance;Impaired balance;Decreased mobility;Pain   Functional Limitation Standing;Transfers;Wheelchair management   Plan   Treatment/Interventions LE strengthening/ROM;Therapeutic exercise  (WC mobility, standing balance)   Progress Progressing toward goals   PT Therapy Minutes   PT Time In 1055   PT Time Out 1155   PT Total Time (minutes) 60   PT Mode of treatment - Individual (minutes) 15   PT Mode of treatment - Concurrent (minutes) 45   PT Mode of treatment - Group (minutes) 0   PT Mode of treatment - Co-treat (minutes) 0   PT Mode of Treatment - Total time(minutes) 60 minutes   PT Cumulative Minutes 604   Therapy Time missed   Time missed? No

## 2025-02-24 LAB
ALBUMIN SERPL BCG-MCNC: 3.6 G/DL (ref 3.5–5)
ALP SERPL-CCNC: 107 U/L (ref 34–104)
ALT SERPL W P-5'-P-CCNC: 22 U/L (ref 7–52)
ANION GAP SERPL CALCULATED.3IONS-SCNC: 9 MMOL/L (ref 4–13)
AST SERPL W P-5'-P-CCNC: 21 U/L (ref 13–39)
BASOPHILS # BLD AUTO: 0.03 THOUSANDS/ÂΜL (ref 0–0.1)
BASOPHILS NFR BLD AUTO: 0 % (ref 0–1)
BILIRUB SERPL-MCNC: 0.31 MG/DL (ref 0.2–1)
BUN SERPL-MCNC: 30 MG/DL (ref 5–25)
CALCIUM SERPL-MCNC: 9.9 MG/DL (ref 8.4–10.2)
CHLORIDE SERPL-SCNC: 94 MMOL/L (ref 96–108)
CO2 SERPL-SCNC: 29 MMOL/L (ref 21–32)
CREAT SERPL-MCNC: 0.73 MG/DL (ref 0.6–1.3)
EOSINOPHIL # BLD AUTO: 0.17 THOUSAND/ÂΜL (ref 0–0.61)
EOSINOPHIL NFR BLD AUTO: 2 % (ref 0–6)
ERYTHROCYTE [DISTWIDTH] IN BLOOD BY AUTOMATED COUNT: 13.5 % (ref 11.6–15.1)
GFR SERPL CREATININE-BSD FRML MDRD: 97 ML/MIN/1.73SQ M
GLUCOSE P FAST SERPL-MCNC: 188 MG/DL (ref 65–99)
GLUCOSE SERPL-MCNC: 130 MG/DL (ref 65–140)
GLUCOSE SERPL-MCNC: 169 MG/DL (ref 65–140)
GLUCOSE SERPL-MCNC: 169 MG/DL (ref 65–140)
GLUCOSE SERPL-MCNC: 188 MG/DL (ref 65–140)
GLUCOSE SERPL-MCNC: 191 MG/DL (ref 65–140)
HCT VFR BLD AUTO: 27.8 % (ref 34.8–46.1)
HGB BLD-MCNC: 8.7 G/DL (ref 11.5–15.4)
IMM GRANULOCYTES # BLD AUTO: 0.05 THOUSAND/UL (ref 0–0.2)
IMM GRANULOCYTES NFR BLD AUTO: 1 % (ref 0–2)
LYMPHOCYTES # BLD AUTO: 2.6 THOUSANDS/ÂΜL (ref 0.6–4.47)
LYMPHOCYTES NFR BLD AUTO: 35 % (ref 14–44)
MCH RBC QN AUTO: 28.7 PG (ref 26.8–34.3)
MCHC RBC AUTO-ENTMCNC: 31.3 G/DL (ref 31.4–37.4)
MCV RBC AUTO: 92 FL (ref 82–98)
MONOCYTES # BLD AUTO: 0.45 THOUSAND/ÂΜL (ref 0.17–1.22)
MONOCYTES NFR BLD AUTO: 6 % (ref 4–12)
NEUTROPHILS # BLD AUTO: 4.2 THOUSANDS/ÂΜL (ref 1.85–7.62)
NEUTS SEG NFR BLD AUTO: 56 % (ref 43–75)
NRBC BLD AUTO-RTO: 0 /100 WBCS
PLATELET # BLD AUTO: 366 THOUSANDS/UL (ref 149–390)
PMV BLD AUTO: 9.5 FL (ref 8.9–12.7)
POTASSIUM SERPL-SCNC: 4.3 MMOL/L (ref 3.5–5.3)
PROT SERPL-MCNC: 8.4 G/DL (ref 6.4–8.4)
RBC # BLD AUTO: 3.03 MILLION/UL (ref 3.81–5.12)
SODIUM SERPL-SCNC: 132 MMOL/L (ref 135–147)
WBC # BLD AUTO: 7.5 THOUSAND/UL (ref 4.31–10.16)

## 2025-02-24 PROCEDURE — 85025 COMPLETE CBC W/AUTO DIFF WBC: CPT

## 2025-02-24 PROCEDURE — 99233 SBSQ HOSP IP/OBS HIGH 50: CPT | Performed by: PHYSICAL MEDICINE & REHABILITATION

## 2025-02-24 PROCEDURE — 97530 THERAPEUTIC ACTIVITIES: CPT

## 2025-02-24 PROCEDURE — 97116 GAIT TRAINING THERAPY: CPT

## 2025-02-24 PROCEDURE — 97110 THERAPEUTIC EXERCISES: CPT

## 2025-02-24 PROCEDURE — 82948 REAGENT STRIP/BLOOD GLUCOSE: CPT

## 2025-02-24 PROCEDURE — 80053 COMPREHEN METABOLIC PANEL: CPT

## 2025-02-24 PROCEDURE — 97535 SELF CARE MNGMENT TRAINING: CPT

## 2025-02-24 RX ADMIN — HYDROMORPHONE HYDROCHLORIDE 4 MG: 4 TABLET ORAL at 18:03

## 2025-02-24 RX ADMIN — CHLORTHALIDONE 12.5 MG: 25 TABLET ORAL at 08:06

## 2025-02-24 RX ADMIN — GABAPENTIN 600 MG: 300 CAPSULE ORAL at 20:10

## 2025-02-24 RX ADMIN — HEPARIN SODIUM 5000 UNITS: 5000 INJECTION INTRAVENOUS; SUBCUTANEOUS at 21:31

## 2025-02-24 RX ADMIN — DIAZEPAM 5 MG: 5 TABLET ORAL at 23:10

## 2025-02-24 RX ADMIN — HYDROMORPHONE HYDROCHLORIDE 4 MG: 4 TABLET ORAL at 05:49

## 2025-02-24 RX ADMIN — CEPHALEXIN 500 MG: 500 CAPSULE ORAL at 05:50

## 2025-02-24 RX ADMIN — ACETAMINOPHEN 975 MG: 325 TABLET ORAL at 08:06

## 2025-02-24 RX ADMIN — PANTOPRAZOLE SODIUM 40 MG: 40 TABLET, DELAYED RELEASE ORAL at 05:49

## 2025-02-24 RX ADMIN — ATORVASTATIN CALCIUM 80 MG: 80 TABLET, FILM COATED ORAL at 17:13

## 2025-02-24 RX ADMIN — HYDROMORPHONE HYDROCHLORIDE 4 MG: 4 TABLET ORAL at 09:53

## 2025-02-24 RX ADMIN — METHOCARBAMOL TABLETS 750 MG: 500 TABLET, COATED ORAL at 17:12

## 2025-02-24 RX ADMIN — HEPARIN SODIUM 5000 UNITS: 5000 INJECTION INTRAVENOUS; SUBCUTANEOUS at 05:49

## 2025-02-24 RX ADMIN — ASPIRIN 81 MG: 81 TABLET, COATED ORAL at 08:06

## 2025-02-24 RX ADMIN — INSULIN LISPRO 1 UNITS: 100 INJECTION, SOLUTION INTRAVENOUS; SUBCUTANEOUS at 08:10

## 2025-02-24 RX ADMIN — SENNOSIDES 17.2 MG: 8.6 TABLET, FILM COATED ORAL at 21:30

## 2025-02-24 RX ADMIN — GABAPENTIN 600 MG: 300 CAPSULE ORAL at 08:06

## 2025-02-24 RX ADMIN — DULOXETINE HYDROCHLORIDE 30 MG: 30 CAPSULE, DELAYED RELEASE ORAL at 08:06

## 2025-02-24 RX ADMIN — INSULIN GLARGINE 22 UNITS: 100 INJECTION, SOLUTION SUBCUTANEOUS at 21:32

## 2025-02-24 RX ADMIN — HYDROMORPHONE HYDROCHLORIDE 4 MG: 4 TABLET ORAL at 13:56

## 2025-02-24 RX ADMIN — METHOCARBAMOL TABLETS 750 MG: 500 TABLET, COATED ORAL at 05:49

## 2025-02-24 RX ADMIN — METHOCARBAMOL TABLETS 750 MG: 500 TABLET, COATED ORAL at 11:58

## 2025-02-24 RX ADMIN — GABAPENTIN 600 MG: 300 CAPSULE ORAL at 14:01

## 2025-02-24 RX ADMIN — ACETAMINOPHEN 975 MG: 325 TABLET ORAL at 17:14

## 2025-02-24 RX ADMIN — INSULIN LISPRO 2 UNITS: 100 INJECTION, SOLUTION INTRAVENOUS; SUBCUTANEOUS at 11:59

## 2025-02-24 RX ADMIN — ACETAMINOPHEN 975 MG: 325 TABLET ORAL at 23:10

## 2025-02-24 RX ADMIN — INSULIN LISPRO 10 UNITS: 100 INJECTION, SOLUTION INTRAVENOUS; SUBCUTANEOUS at 11:59

## 2025-02-24 RX ADMIN — INSULIN LISPRO 10 UNITS: 100 INJECTION, SOLUTION INTRAVENOUS; SUBCUTANEOUS at 17:12

## 2025-02-24 RX ADMIN — HEPARIN SODIUM 5000 UNITS: 5000 INJECTION INTRAVENOUS; SUBCUTANEOUS at 13:55

## 2025-02-24 RX ADMIN — METHOCARBAMOL TABLETS 750 MG: 500 TABLET, COATED ORAL at 23:10

## 2025-02-24 RX ADMIN — INSULIN LISPRO 10 UNITS: 100 INJECTION, SOLUTION INTRAVENOUS; SUBCUTANEOUS at 08:10

## 2025-02-24 RX ADMIN — LIDOCAINE 5% 1 PATCH: 700 PATCH TOPICAL at 08:06

## 2025-02-24 RX ADMIN — HYDROMORPHONE HYDROCHLORIDE 4 MG: 4 TABLET ORAL at 01:17

## 2025-02-24 NOTE — PROGRESS NOTES
"Progress Note - PMR   Name: Lizzy Acuna 49 y.o. female I MRN: [id number]  Unit/Bed#: -01 I Date of Admission: 2/15/2025   Date of Service: 2/24/2025 I Hospital Day: 9     Assessment & Plan  S/P BKA (below knee amputation) unilateral, left (HCC)  2/2 Infection after TMA in setting of T2DM/Neuropathy/PAD with acute ischemia.  2/11 - L BKA with Dr. Arauz  Contracture prevention (has knee immobilizer)   - Vascular did not comment on limb protector stating \"ace is fine\"   - Quad strengthening  Edema/shaping management    - ACE wrap ATC   - Shrinkers when cleared by vascular   Incisional Care/education   - Mom may have to perform  Phantom limb sensation (see that entry)  PT/OT 3-5 hours/day, 5-7 days/week   Pre-prosthetic f/u to be arranged with PMR   F/U with Vascular for 4 week POV (~3/11)  Obesity (BMI 30.0-34.9)  Considerations in therapy.   Tobacco abuse  Cessation counseling  Nicotine patch if needed.  Diabetes mellitus type 2 with complications ([facility])  Lab Results   Component Value Date    HGBA1C 10.3 (H) 12/13/2024       Recent Labs     02/23/25  1111 02/23/25  1610 02/23/25  1958 02/24/25  0716   POCGLU 246* 224* 245* 169*       Blood Sugar Average: Last 72 hrs:  (P) 205.6721459753780892    Home: Lantus 10 units QHS, Aspart 5 units TID with meals  Here: Same, CDI/Accuchecks  Diabetic Diet  Monitor and adjust as per primary team  Hyponatremia    Currently asymptomatic  2/17 130 and 2/18 130   Discussed with IM: 1.8L/day FR started on 2/18   GERD (gastroesophageal reflux disease)  Continue PPI  PAD (peripheral artery disease) ([facility])  S/p L SFA stent c/b acute occlusion this hospitalization  S/p multiple angioplasty (2/1, 2/2, 2/6) and lysis (1/31-2/2)   Per Vascular : ASA/Statin only  Monitor neurovascular exam  F/u Vascular outpatient   Blood loss anemia    Required multiple transfusions while inpatient.  Hemodynamically stable currently.  2/12 Hgb 8.6 --> 2/17 hgb 9.0  Transfuse as appropriate.   Primary " hypertension  Home: None  Here: Chlorthalidone 12.5mg daily  Monitor and adjust as appropriate.  Thyroid nodule  Noted incidentally  Enlarged multinodular thyroid gland  Will check TSH with next set of labs.  Outpatient f/u with non-emergent ultrasound   Adrenal nodule (HCC)  Noted incidentally  14mm R adrenal nodule stable since 2018 - in keeping with benign adenoma  Biochemical evaluation suggested for adrenal adenomas > 1cm to rule out functioning adenoma  Recommend outpatient f/u with PCP for additional work-up  At risk for venous thromboembolism (VTE)  HSQ, SCDs.   Will not need to go home on HSQ  Phantom limb syndrome (HCC)  Desensitization techniques with therapy  On cymbalta and gabapentin  Monitor and adjust as appropriate  Will need f/u with PMR after discharge.   Constipation    Docusate BID, senna 2 tabs at night, and miralax daily.   Adding PRN suppository and lactulose.  Acute pain   Tylenol scheduled (monitor LFTs)  Cymbalta 30mg daily  Gabapentin 600 TID   Robaxin 750mg Q6hr  Valium PRN muscle spasms  2-4 mg Dilaudid every 4 hours   Monitor and adjust as appropriate.   Adjustment disorder with anxiety  Of note started by APS on cymbalta for phantom pain  Has valium ordered 5mg Q8hr PRN for anxiety but also muscle spasm   - Would try to wean down/off.  Emphasize non-pharmacologic strategies  Monitor and adjust as appropriate.     Subjective   Lizzy Acuna is a 49 y.o. female with medical history of T2DM with neuropathy, depression, HLD, obesity, PAD s/p LLE SFA balloon angioplasty and stenting in 12/2024 who presented to the [facility] 1/28 with L foot pain for 5 days. She had a L TMA on 1/3 by podiatry, and had developed increased pain, redness, and discharge. She was seen at her podiatrist's office who sent her to the ER for IV abx and further work-up. In the hospital met sepsis criteria. MRI showed fluid collection within her dorsal soft tissue. Vascular consulted  after LEADs showed an occluded L SFA stent. They recommended transfer to [place] and starting heparin gtt. Dr. Ritchie performed I&D of her left foot on 1/29 and on 1/30 she was transferred to [place]. IR was consulted, and Dr. Davidson performed catheter placement and initiated thrombolysis with TPA on 1/31. PICC was also placed. On 2/1, residual thrombus along the L SFA was treated with 4mm balloon angioplasty, with no flow noted within L SFA following angioplasty. Lysis was restarted. C/B ABLA with 2/2 Hgb 6.2 - given 2 units pRBC. Her surgical cultures grew MRSA and Finegoldia. Abx were narrowed to vancomycin by ID. APS was consulted and started dilaudid PCA and IV valium. 2/2 lysis check with patent l SFA, popliteal, tibioperoneal trunk and TARA. Mild residual stenoses along L SFA and popliteal artery were treated with 4mm balloon angioplasty and stenosis along TARA was treated with 2.5 and 3mm balloon angioplasty. L PT and peroneal arteries were chronically occluded. TPA was discontinued. She was transferred out of the ICU on 2/3. 2/4 required 1 unit pRBC for ABLA. On 2/6, she had repeat arteriogram with Dr. Victoria with finding of recurrent pop occlusion, AT recannulization with distal pop, TPT, and AT POBA an serration angioplasty. Despite this, with compromised blood flow to posterior aspect and plantar flap of foot. Still concern that perfusion would not be sufficient to salvage foot. She was recommended for BKA. On 2/11, Dr. Arauz performed L BKA. She had L adducotr and popliteal blocks. Post-op with acute bilateral shoulder pain, diaphoresis, tachycardic to 120 and hypertensive to 180/103. EKG showed LAD and TWI in V1. ACS r/o initiated. CTA dissection protocol (negative for dissection). This resolved - no acute findings. Abx were discontinued.     Chief Complaint: f/u amputation    Interval:     Seen and evaluated patient at bedside. Pain has been consistent, will continue to monitor and consider further  medication adjustments.   Concern for darkening appearance of residual limb over weekend. Keflex started empirically. Upon today's examination, residual limb appears bruised yet stable without any drainage, increased pain, jay-incisional erythema, fluctuance or dehiscence. Keflex discontinued. Continue to monitor   2/24 labs  sodium: 132 (has remained at 132 since 2/20), down-trending alk phos, hgb 8.7   Last BM 2/23   Anticipated Discharge Date:  Feb 28, 2025 with [facility] for PT, OT, and nursing.     Objective :  Temp:  [96.5 °F (35.8 °C)-97.9 °F (36.6 °C)] 96.5 °F (35.8 °C)  HR:  [84-89] 84  BP: (131-147)/(63-79) 147/79  Resp:  [16-17] 17  SpO2:  [95 %-99 %] 99 %  O2 Device: None (Room air)    Functional Update:  Physical Therapy Occupational Therapy    Weight Bearing Status: Non-weight bearing (Left LE)  Transfers: Supervision, Incidental Touching  Bed Mobility: Independent  Amulation Distance (ft): 9 feet  Ambulation: Total Assistance (min A with w/c follow)  Assistive Device for Ambulation: Roller Walker  Wheelchair Mobility Distance: 150 ft  Wheelchair Mobility: Independent  Number of Stairs: 1  Assistive Device for Stairs: Bilateral Hand Rails  Stair Assistance: Assist of 2  Ramp:  (TBA)  Discharge Recommendations: Home with:  DC Home with:: Family Support, Home Physical Therapy, First Floor Setup (Possible first floor set up)   Eating: Independent  Grooming: Independent  Bathing: Supervision  Bathing: Supervision  Upper Body Dressing: Independent  Lower Body Dressing: Supervision  Toileting: Independent  Tub/Shower Transfer: Supervision  Toilet Transfer: Independent  Cognition: Within Defined Limits  Orientation: Person, Situation, Place, Time                   Physical Exam  Vitals and nursing note reviewed.   Constitutional:       General: She is not in acute distress.     Appearance: She is obese. She is not ill-appearing, toxic-appearing or diaphoretic.   HENT:      Head: Normocephalic and atraumatic.       Right Ear: External ear normal.      Left Ear: External ear normal.      Nose: Nose normal.      Mouth/Throat:      Mouth: Mucous membranes are moist.   Pulmonary:      Effort: Pulmonary effort is normal. No respiratory distress.   Abdominal:      General: There is no distension.   Musculoskeletal:      Comments: Slight edema and bruising of residual limb, incision stable    Skin:     General: Skin is warm and dry.   Neurological:      General: No focal deficit present.         Scheduled Meds:  Current Facility-Administered Medications   Medication Dose Route Frequency Provider Last Rate    acetaminophen  975 mg Oral TID Cristopher Hui MD      aspirin  81 mg Oral Daily Abby De La Torre PA-C      atorvastatin  80 mg Oral Daily With Dinner Abby De La Torre PA-C      bisacodyl  10 mg Rectal Daily PRN Ashley Depadua, MD      chlorthalidone  12.5 mg Oral Daily Abby De La Torre PA-C      diazepam  5 mg Oral Q8H PRN Abby De La Torre PA-C      DULoxetine  30 mg Oral Daily Abby De La Torre PA-C      gabapentin  600 mg Oral TID Laurence Dumont PA-C      heparin (porcine)  5,000 Units Subcutaneous Q8H [facility] Abby De La Torre PA-C      HYDROmorphone  2 mg Oral Q4H PRN Ashley Depadua, MD      HYDROmorphone  4 mg Oral Q4H PRN Ashley Depadua, MD      insulin glargine  22 Units Subcutaneous HS Oscar Salas MD      insulin lispro  1-6 Units Subcutaneous TID With Meals Kinsey Lizarraga PA-C      insulin lispro  10 Units Subcutaneous TID With Meals Kinsey Lizarraga PA-C      lidocaine  1 patch Topical Daily PRN Abby De La Torre PA-C      lidocaine  1 patch Topical Daily Ashley Depadua, MD      methocarbamol  750 mg Oral Q6H [facility] Laurence Dumont PA-C      nitroglycerin  0.4 mg Sublingual Q5 Min PRN Abby De La Torre PA-C      ondansetron  4 mg Oral Q6H PRN Abby De La Torre PA-C      pantoprazole  40 mg Oral Early Morning Abby De La Torre PA-C      polyethylene  glycol  17 g Oral Daily Abby De La Torre PA-C      senna  2 tablet Oral HS Ashley Depadua, MD      trimethobenzamide  200 mg Intramuscular Q6H PRN Abby De La Torre PA-C           Lab Results: I have reviewed the following results:  Results from last 7 days   Lab Units 02/24/25  0555   HEMOGLOBIN g/dL 8.7*   HEMATOCRIT % 27.8*   WBC Thousand/uL 7.50   PLATELETS Thousands/uL 366     Results from last 7 days   Lab Units 02/24/25  0555 02/21/25  0952 02/20/25  0558   BUN mg/dL 30* 30* 27*   SODIUM mmol/L 132* 132* 132*   POTASSIUM mmol/L 4.3 4.6 4.3   CHLORIDE mmol/L 94* 92* 93*   CREATININE mg/dL 0.73 0.88 0.75   AST U/L 21  --  25   ALT U/L 22  --  26            Laurence Dumont PA-C  Physical Medicine and Rehabilitation  [facility]

## 2025-02-24 NOTE — PROGRESS NOTES
02/24/25 1400   Pain Assessment   Pain Assessment Tool 0-10   Pain Score 8   Pain Location/Orientation Orientation: Left;Location: Leg  (residual limb)   Restrictions/Precautions   Precautions Fall Risk;Pain   LLE Weight Bearing Per Order NWB   Cognition   Overall Cognitive Status WFL   Orientation Level Oriented X4   Therapeutic Interventions   Strengthening supine ther ex   Assessment   Treatment Assessment Patient agreeable to therapy session.   Pain in L LE remains.    Completed ther ex for general LE strengthening as well as contracture prevention.   PT Barriers   Physical Impairment Decreased strength;Decreased range of motion;Decreased endurance;Impaired balance;Decreased mobility;Impaired judgement;Decreased safety awareness;Orthopedic restrictions;Pain   Functional Limitation Wheelchair management;Walking;Transfers;Standing;Stair negotiation;Ramp negotiation;Car transfers   Plan   Treatment/Interventions LE strengthening/ROM;Therapeutic exercise   Progress Progressing toward goals   PT Therapy Minutes   PT Time In 1400   PT Time Out 1430   PT Total Time (minutes) 30   PT Mode of treatment - Individual (minutes) 30   PT Mode of treatment - Concurrent (minutes) 0   PT Mode of treatment - Group (minutes) 0   PT Mode of treatment - Co-treat (minutes) 0   PT Mode of Treatment - Total time(minutes) 30 minutes   PT Cumulative Minutes 720   Therapy Time missed   Time missed? No

## 2025-02-24 NOTE — ASSESSMENT & PLAN NOTE
Lab Results   Component Value Date    HGBA1C 10.3 (H) 12/13/2024       Recent Labs     02/23/25  1111 02/23/25  1610 02/23/25  1958 02/24/25  0716   POCGLU 246* 224* 245* 169*       Blood Sugar Average: Last 72 hrs:  (P) 205.7038673190502110    Home: Lantus 10 units QHS, Aspart 5 units TID with meals  Here: Same, CDI/Accuchecks  Diabetic Diet  Monitor and adjust as per primary team

## 2025-02-24 NOTE — NURSING NOTE
Pt MOD-I in room. Medicated twice for pain of left leg. Incision clean dry and staples intact. Continent of bowel and bladder.

## 2025-02-24 NOTE — PLAN OF CARE
Problem: PAIN - ADULT  Goal: Verbalizes/displays adequate comfort level or baseline comfort level  Description: Interventions:  - Encourage patient to monitor pain and request assistance  - Assess pain using appropriate pain scale  - Administer analgesics based on type and severity of pain and evaluate response  - Implement non-pharmacological measures as appropriate and evaluate response  - Consider cultural and social influences on pain and pain management  - Notify physician/advanced practitioner if interventions unsuccessful or patient reports new pain  Outcome: Progressing     Problem: INFECTION - ADULT  Goal: Absence or prevention of progression during hospitalization  Description: INTERVENTIONS:  - Assess and monitor for signs and symptoms of infection  - Monitor lab/diagnostic results  - Monitor all insertion sites, i.e. indwelling lines, tubes, and drains  - Monitor endotracheal if appropriate and nasal secretions for changes in amount and color  - [place] appropriate cooling/warming therapies per order  - Administer medications as ordered  - Instruct and encourage patient and family to use good hand hygiene technique  - Identify and instruct in appropriate isolation precautions for identified infection/condition  Outcome: Progressing     Problem: Nutrition/Hydration-ADULT  Goal: Nutrient/Hydration intake appropriate for improving, restoring or maintaining nutritional needs  Description: Monitor and assess patient's nutrition/hydration status for malnutrition. Collaborate with interdisciplinary team and initiate plan and interventions as ordered.  Monitor patient's weight and dietary intake as ordered or per policy. Utilize nutrition screening tool and intervene as necessary. Determine patient's food preferences and provide high-protein, high-caloric foods as appropriate.     INTERVENTIONS:  - Monitor oral intake, urinary output, labs, and treatment plans  - Assess nutrition and hydration status and  recommend course of action  - Evaluate amount of meals eaten  - Assist patient with eating if necessary   - Allow adequate time for meals  - Recommend/ encourage appropriate diets, oral nutritional supplements, and vitamin/mineral supplements  - Order, calculate, and assess calorie counts as needed  - Recommend, monitor, and adjust tube feedings and TPN/PPN based on assessed needs  - Assess need for intravenous fluids  - Provide specific nutrition/hydration education as appropriate  - Include patient/family/caregiver in decisions related to nutrition  Outcome: Progressing

## 2025-02-24 NOTE — PROGRESS NOTES
02/24/25 1230   Pain Assessment   Pain Assessment Tool 0-10   Pain Score 5   Pain Location/Orientation Orientation: Left;Location: Leg   Restrictions/Precautions   Precautions Fall Risk;Pain   LLE Weight Bearing Per Order NWB   Exercise Tools   Hand Gripper 9# digi x 30 B hands   Other Exercise Tool 2 3# dowel 2x15 reps in 6 planes of motion   Cognition   Overall Cognitive Status WFL   Orientation Level Oriented X4   Assessment   Treatment Assessment OT brief session addressed B UE TE for generalized strength and endurance. Pt tolerated well. Barriers remain same as previous session. Plan is to continue with skilled OT as per POC.   Prognosis Good   Plan   Treatment/Interventions Endurance training;Therapeutic exercise;Functional transfer training;Bed mobility;Patient/family training;Compensatory technique education;Spoke to nursing;OT   Progress Progressing toward goals   OT Therapy Minutes   OT Time In 1235   OT Time Out 1305   OT Total Time (minutes) 30   OT Mode of treatment - Individual (minutes) 30   OT Mode of treatment - Concurrent (minutes) 0   OT Mode of treatment - Group (minutes) 0   OT Mode of treatment - Co-treat (minutes) 0   OT Mode of Treatment - Total time(minutes) 30 minutes   OT Cumulative Minutes 613   Therapy Time missed   Time missed? No

## 2025-02-24 NOTE — PROGRESS NOTES
02/24/25 0700   Pain Assessment   Pain Assessment Tool 0-10   Pain Score 7   Pain Location/Orientation Orientation: Left;Location: Leg   Pain Onset/Description Onset: Ongoing   Restrictions/Precautions   Precautions Bed/chair alarms;Fall Risk;Pain;Fluid restriction   LLE Weight Bearing Per Order NWB   Oral Hygiene   Type of Assistance Needed Independent   Physical Assistance Level No physical assistance   Comment w/c level   Oral Hygiene CARE Score 6   Grooming   Able To Initiate Tasks;Comb/Brush Hair;Wash/Dry Face;Brush/Clean Teeth;Wash/Dry Hands   Findings w/c level   Shower/Bathe Self   Type of Assistance Needed Set-up / clean-up;Adaptive equipment   Physical Assistance Level No physical assistance   Comment CHG;wrapped prior to shower;maintained NWB;weight shifted   Shower/Bathe Self CARE Score 5   Bathing   Assessed Bath Style Shower   Anticipated D/C Bath Style Shower;Sponge Bath   Able to Gather/Transport Yes   Able to Adjust Water Temperature Yes   Able to Wash/Rinse/Dry (body part) Left Arm;Right Arm;L Upper Leg;R Upper Leg;Chest;Abdomen;Perineal Area;Buttocks;R Lower Leg/Foot   Limitations Noted in Balance;Endurance;Strength   Positioning Seated   Adaptive Equipment Shower Bars;Shower Seat;Hand Held Shower   Tub/Shower Transfer   Limitations Noted In Balance;LE Strength   Adaptive Equipment Grab Bars;Seat with out Back   Assessed Shower   Findings S   Upper Body Dressing   Type of Assistance Needed Independent   Physical Assistance Level No physical assistance   Comment w/c level   Upper Body Dressing CARE Score 6   Lower Body Dressing   Type of Assistance Needed Set-up / clean-up   Physical Assistance Level No physical assistance   Comment w/c level   Lower Body Dressing CARE Score 5   Putting On/Taking Off Footwear   Type of Assistance Needed Set-up / clean-up   Physical Assistance Level No physical assistance   Putting On/Taking Off Footwear CARE Score 5   Picking Up Object   Type of Assistance  Needed Supervision;Verbal cues;Adaptive equipment   Picking Up Object CARE Score 4   Dressing/Undressing Clothing   Able to  Store removed clothing;Obtain Clothing   Remove UB Clothes Pullover Shirt   Don UB Clothes Pullover Shirt;Bra   Remove LB Clothes Pants;Undergarment;Socks   Don LB Clothes Shoes;Socks;Undergarment;Pants   Limitations Noted In Balance;Strength   Positioning Supported Sit;Standing   Sit to Lying   Type of Assistance Needed Independent   Physical Assistance Level No physical assistance   Sit to Lying CARE Score 6   Lying to Sitting on Side of Bed   Type of Assistance Needed Independent   Physical Assistance Level No physical assistance   Lying to Sitting on Side of Bed CARE Score 6   Sit to Stand   Type of Assistance Needed Independent   Physical Assistance Level No physical assistance   Sit to Stand CARE Score 6   Bed-Chair Transfer   Type of Assistance Needed Set-up / clean-up   Physical Assistance Level No physical assistance   Chair/Bed-to-Chair Transfer CARE Score 5   Transfer Bed/Chair/Wheelchair   Positioning Concerns Skin Integrity   Limitations Noted In Balance;LE Strength   Findings SPT   Toileting Hygiene   Type of Assistance Needed Independent   Physical Assistance Level No physical assistance   Toileting Hygiene CARE Score 6   Toileting   Able to Pull Clothing down yes, up yes.   Manage Hygiene Bladder   Limitations Noted In Balance;LE Strength   Adaptive Equipment Grab Bar   Toilet Transfer   Type of Assistance Needed Independent;Adaptive equipment   Physical Assistance Level No physical assistance   Toilet Transfer CARE Score 6   Toilet Transfer   Surface Assessed Raised Toilet   Transfer Technique Stand Pivot   Limitations Noted In Balance;LE Strength   Kitchen Mobility   Kitchen-Mobility Level Wheelchair   Kitchen Activity Retrieve items;Transport items   Light Housekeeping   Light Housekeeping Level Wheelchair   Light Housekeeping Level of Assistance Modified independent    Cognition   Overall Cognitive Status WFL   Arousal/Participation Alert;Cooperative   Attention Within functional limits   Orientation Level Oriented X4   Memory Within functional limits   Following Commands Follows all commands and directions without difficulty   Additional Activities   Additional Activities Comments w/c mobility MI   Activity Tolerance   Activity Tolerance Patient tolerated treatment well   Assessment   Treatment Assessment OT tx addressed ADL via shower level, iADLs w/c level, TE/TA for generalized strength and endurance; current LOF and details listed in respective sections. Pt is overall supervision/set up for ADL tasks and fxl transfers including w/c mobility in hallway using bilat UE. Pt maintained NWB LLE effectively; LLE covered in bag in shower to keep dressings dry. UE ROM/strength exercises completed after ADL with good tolerance. Pt reported moderate pain in LLE during session that increased upon movement. Pt is a motivated participant in therapy and is eager to progress towards goals. Reviewed general guidelines for amp during session which included: laundry tasks, kitchen accessibility and mobility and med mgmt, UE HEP, home s/u at w/c level, pain mgmt, home therapy services. Home assessment review completed and it was determined pt is in need of polyfly w/c with cushion and amp board and standard leg rest and a 3in1 commode.  Pt's barriers to d/c include decreased strength throughout, decreased ROM, decreased balance, decreased activity tolerance, decreased safety awareness, and NWB LLE at amputation site ; all affect independence in self care and fxl transfers. Pt would benefit from continued skilled OT services in order to address listed barriers and prepare for safe d/c.   Prognosis Good   Problem List Decreased strength;Decreased endurance;Impaired balance;Decreased mobility;Pain;Orthopedic restrictions;Decreased skin integrity   Plan   Treatment/Interventions ADL  retraining;Functional transfer training;Therapeutic exercise;Endurance training;Patient/family training;Equipment eval/education;Bed mobility;Compensatory technique education;Spoke to nursing;OT   Progress Progressing toward goals   OT Therapy Minutes   OT Time In 0700   OT Time Out 0830   OT Total Time (minutes) 90   OT Mode of treatment - Individual (minutes) 90   OT Mode of treatment - Concurrent (minutes) 0   OT Mode of treatment - Group (minutes) 0   OT Mode of treatment - Co-treat (minutes) 0   OT Mode of Treatment - Total time(minutes) 90 minutes   OT Cumulative Minutes 583   Therapy Time missed   Time missed? No

## 2025-02-24 NOTE — PROGRESS NOTES
"Progress Note - Lizzy Acuna 49 y.o. female MRN: [id number]    Unit/Bed#: -01 Encounter: [id number]        Subjective:   Patient seen and examined at bedside after reviewing the chart and discussing the case with the caring staff.    Patient examined at bedside.  Patient denies any acute symptoms.      Patient's blood sugars this morning was 169.    Patient's labs were reviewed from 2/24/2025.  Patient's CMP showed sodium 132 and glucose 188..  Patient's CBC showed hemoglobin 8.7 with hematocrit 27.8.    Physical Exam   Vitals: Blood pressure 147/79, pulse 84, temperature (!) 96.5 °F (35.8 °C), temperature source Temporal, resp. rate 17, height 5' 1\" (1.549 m), weight 82.8 kg (182 lb 8.7 oz), last menstrual period 12/01/2023, SpO2 99%, not currently breastfeeding.,Body mass index is 34.49 kg/m².  Constitutional: Patient in no acute distress.  HEENT: PERR, EOMI, MMM.  Cardiovascular: Normal rate and regular rhythm.    Pulmonary/Chest: Effort normal and breath sounds normal.   Abdomen: Soft, + BS, NT.    Assessment/Plan:  Lizzy Acuna is a(n) 49 y.o. 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     Cardiac w/ HTN, HLD, CAD.  Continue chlorthalidone 12.5 mg daily, atorvastatin 80 mg daily, and ASA 81 mg daily.   Type 2 DM.   Hgb A1c 10.3% on 12/13/24.  Home: Lantus 10u nightly, Humalog 5u TID.  Here: I will increase Lantus 22u nightly to 2/23/2025, Humalog 10u TID.  SSI w/ POCT AC HS.  Carb controlled diet.   GERD.  Continue Protonix 40 mg daily.   Depression.  Not on home meds.  APS started pt on Cymbalta 30 mg daily for phantom limb pain.  Also on Valium 5 mg q8h as needed for anxiety/muscle spasms.   ABLA.  Hgb 8.6 -> 9.0 -> 8.7 on 2/24/2025.  S/p multiple transfusions on acute care.  Cont to monitor.  Transfuse Hgb < 7.   Adrenal nodule.  Stable since 2018.  Recommend f/u outpt.   Thyroid nodule.  Incidental " finding.  Recommend outpt thyroid US.  Follow up with PCP/endo.   Hyponatremia.  Na 133 -> 130 -> 132 -> 132 -> 132 on 2/24/2025.  Likely SIADH in the setting of acute infection.  Placed on 1.8L fluid restriction on 2/18/25.  Cont to monitor.    Pain and bowel regimen.  As per physiatry.   Cellulitis of L foot s/p BKA.  Continue ASA and statin.  Patient receiving intensive PT OT as per physiatry.      Anticipated date of discharge.  Friday 2/28/25.

## 2025-02-24 NOTE — PROGRESS NOTES
02/24/25 1030   Pain Assessment   Pain Assessment Tool 0-10   Pain Score 6   Pain Location/Orientation Orientation: Left;Location: Leg  (residual limb)   Restrictions/Precautions   Precautions Bed/chair alarms;Fall Risk;Pain;Fluid restriction   LLE Weight Bearing Per Order NWB   Cognition   Overall Cognitive Status WFL   Arousal/Participation Alert;Responsive;Cooperative   Attention Within functional limits   Orientation Level Oriented X4   Memory Within functional limits   Following Commands Follows all commands and directions without difficulty   Roll Left and Right   Type of Assistance Needed Independent   Comment bed rail   Roll Left and Right CARE Score 6   Sit to Lying   Type of Assistance Needed Independent   Comment bed rail   Sit to Lying CARE Score 6   Lying to Sitting on Side of Bed   Type of Assistance Needed Independent   Comment bed rail   Lying to Sitting on Side of Bed CARE Score 6   Sit to Stand   Type of Assistance Needed Supervision   Comment S STS with RW   Sit to Stand CARE Score 4   Bed-Chair Transfer   Type of Assistance Needed Independent   Comment mod I direct sit pivot wheelchair<>bed, wheelchair<>commode, wheelchair<>shower chair to ascend/descend steps; close S SPT with RW   Chair/Bed-to-Chair Transfer CARE Score 6   Walk 10 Feet   Type of Assistance Needed Incidental touching;Verbal cues   Comment cg level surfaces with RW   Walk 10 Feet CARE Score 4   Walk 50 Feet with Two Turns   Type of Assistance Needed Incidental touching;Verbal cues   Comment cg level surfaces with RW   Walk 50 Feet with Two Turns CARE Score 4   Walk 150 Feet   Reason if not Attempted Safety concerns   Walk 150 Feet CARE Score 88   Walking 10 Feet on Uneven Surfaces   Reason if not Attempted Safety concerns   Walking 10 Feet on Uneven Surfaces CARE Score 88   Ambulation   Primary Mode of Locomotion Prior to Admission Walk   Distance Walked (feet) 77 ft  (58' 77')   Assist Device Roller Walker   Gait Pattern  "Antalgic;Inconsistant Pauly;Slow Pauly;Decreased foot clearance;Hopping   Limitations Noted In Balance;Device Management;Endurance;Posture;Safety;Speed;Strength   Provided Assistance with: Balance   Walk Assist Level Contact Guard   Findings cg level surfaces with RW   Does the patient walk? 2. Yes   Wheel 50 Feet with Two Turns   Type of Assistance Needed Independent   Wheel 50 Feet with Two Turns CARE Score 6   Wheel 150 Feet   Type of Assistance Needed Independent   Wheel 150 Feet CARE Score 6   Curb or Single Stair   Style negotiated Curb   Type of Assistance Needed Incidental touching   Physical Assistance Level 25% or less   Comment cg hopping up 2\" step backwards with RW and sitting in chair placed on second step; to descend, stood up from chair using RW and hopped down 2\" step to wheelchair   1 Step (Curb) CARE Score 3   4 Steps   Type of Assistance Needed Incidental touching;Verbal cues   Comment cg 6 steps using tub bench sideways on steps for patient to sit on; uses B UE on single HR to stand while second person moved bench up/down steps;   4 Steps CARE Score 4   Stairs   Type Curb;Stairs   # of Steps   (6 regular steps; 2-2\" curb steps)   Weight Bearing Precautions NWB;LLE   Assist Devices Single Rail;Roller Walker   Findings cg hopping up 2\" step backwards with RW and sitting in chair placed on second step; to descend, stood up from chair using RW and hopped down 2\" step to wheelchair; cg 6 steps using tub bench sideways on steps for patient to sit on; uses B UE on single HR to stand while second person moved bench up/down steps;   Toilet Transfer   Type of Assistance Needed Independent   Comment direct sit pivot wheelchair<>commode   Toilet Transfer CARE Score 6   Therapeutic Interventions   Strengthening seated ther ex   Balance gait and transfer training   Other stair negotiation; residual limb wrapping   Assessment   Treatment Assessment Patient agreeable to therapy session.  Pain in L residual " limb remains, but less than last week.   Cues for general safety as well as task completion.    Patient now MOD I in room wheelchair level.  Completed ther ex for general LE strengthening; gait and transfer training focusing on sequence and technique for improved balance and safety with functional mobility using walker.  Propels wheelchair mod I level and unlevel surfaces.   Negotiated curb steps  as well as regular steps as stated above cg with cues for sequence/technique.  Patient able to direct PT on wrapping of residual limb and its importance in shaping limb in prep for future prosthesis.   PT Barriers   Physical Impairment Decreased strength;Decreased range of motion;Decreased endurance;Impaired balance;Decreased mobility;Impaired judgement;Decreased safety awareness;Orthopedic restrictions;Pain   Functional Limitation Wheelchair management;Walking;Transfers;Standing;Stair negotiation;Ramp negotiation;Car transfers   Plan   Treatment/Interventions Functional transfer training;LE strengthening/ROM;Elevations;Therapeutic exercise;Bed mobility;Gait training   Progress Progressing toward goals   PT Therapy Minutes   PT Time In 1030   PT Time Out 1156   PT Total Time (minutes) 86   PT Mode of treatment - Individual (minutes) 86   PT Mode of treatment - Concurrent (minutes) 0   PT Mode of treatment - Group (minutes) 0   PT Mode of treatment - Co-treat (minutes) 0   PT Mode of Treatment - Total time(minutes) 86 minutes   PT Cumulative Minutes 690   Therapy Time missed   Time missed? No       Functional Reach 10.25 inches using RW for support

## 2025-02-25 LAB
GLUCOSE SERPL-MCNC: 136 MG/DL (ref 65–140)
GLUCOSE SERPL-MCNC: 169 MG/DL (ref 65–140)
GLUCOSE SERPL-MCNC: 174 MG/DL (ref 65–140)
GLUCOSE SERPL-MCNC: 224 MG/DL (ref 65–140)

## 2025-02-25 PROCEDURE — 97535 SELF CARE MNGMENT TRAINING: CPT

## 2025-02-25 PROCEDURE — 97110 THERAPEUTIC EXERCISES: CPT

## 2025-02-25 PROCEDURE — 99232 SBSQ HOSP IP/OBS MODERATE 35: CPT | Performed by: PHYSICAL MEDICINE & REHABILITATION

## 2025-02-25 PROCEDURE — 97530 THERAPEUTIC ACTIVITIES: CPT

## 2025-02-25 PROCEDURE — 82948 REAGENT STRIP/BLOOD GLUCOSE: CPT

## 2025-02-25 RX ORDER — DIAZEPAM 5 MG/1
5 TABLET ORAL
Status: DISCONTINUED | OUTPATIENT
Start: 2025-02-25 | End: 2025-02-28 | Stop reason: HOSPADM

## 2025-02-25 RX ORDER — HYDROXYZINE HYDROCHLORIDE 25 MG/1
25 TABLET, FILM COATED ORAL EVERY 6 HOURS PRN
Status: DISCONTINUED | OUTPATIENT
Start: 2025-02-25 | End: 2025-02-28 | Stop reason: HOSPADM

## 2025-02-25 RX ADMIN — PANTOPRAZOLE SODIUM 40 MG: 40 TABLET, DELAYED RELEASE ORAL at 05:17

## 2025-02-25 RX ADMIN — METHOCARBAMOL TABLETS 750 MG: 500 TABLET, COATED ORAL at 23:46

## 2025-02-25 RX ADMIN — INSULIN LISPRO 2 UNITS: 100 INJECTION, SOLUTION INTRAVENOUS; SUBCUTANEOUS at 09:41

## 2025-02-25 RX ADMIN — DULOXETINE HYDROCHLORIDE 30 MG: 30 CAPSULE, DELAYED RELEASE ORAL at 09:36

## 2025-02-25 RX ADMIN — HEPARIN SODIUM 5000 UNITS: 5000 INJECTION INTRAVENOUS; SUBCUTANEOUS at 14:43

## 2025-02-25 RX ADMIN — METHOCARBAMOL TABLETS 750 MG: 500 TABLET, COATED ORAL at 12:15

## 2025-02-25 RX ADMIN — METHOCARBAMOL TABLETS 750 MG: 500 TABLET, COATED ORAL at 05:17

## 2025-02-25 RX ADMIN — INSULIN LISPRO 10 UNITS: 100 INJECTION, SOLUTION INTRAVENOUS; SUBCUTANEOUS at 12:17

## 2025-02-25 RX ADMIN — HEPARIN SODIUM 5000 UNITS: 5000 INJECTION INTRAVENOUS; SUBCUTANEOUS at 21:18

## 2025-02-25 RX ADMIN — INSULIN LISPRO 10 UNITS: 100 INJECTION, SOLUTION INTRAVENOUS; SUBCUTANEOUS at 17:51

## 2025-02-25 RX ADMIN — GABAPENTIN 600 MG: 300 CAPSULE ORAL at 19:14

## 2025-02-25 RX ADMIN — HEPARIN SODIUM 5000 UNITS: 5000 INJECTION INTRAVENOUS; SUBCUTANEOUS at 05:18

## 2025-02-25 RX ADMIN — HYDROMORPHONE HYDROCHLORIDE 4 MG: 4 TABLET ORAL at 14:09

## 2025-02-25 RX ADMIN — METHOCARBAMOL TABLETS 750 MG: 500 TABLET, COATED ORAL at 17:51

## 2025-02-25 RX ADMIN — ACETAMINOPHEN 975 MG: 325 TABLET ORAL at 17:49

## 2025-02-25 RX ADMIN — GABAPENTIN 600 MG: 300 CAPSULE ORAL at 14:44

## 2025-02-25 RX ADMIN — ACETAMINOPHEN 975 MG: 325 TABLET ORAL at 09:35

## 2025-02-25 RX ADMIN — CHLORTHALIDONE 12.5 MG: 25 TABLET ORAL at 09:36

## 2025-02-25 RX ADMIN — SENNOSIDES 17.2 MG: 8.6 TABLET, FILM COATED ORAL at 21:18

## 2025-02-25 RX ADMIN — HYDROMORPHONE HYDROCHLORIDE 4 MG: 4 TABLET ORAL at 09:39

## 2025-02-25 RX ADMIN — ATORVASTATIN CALCIUM 80 MG: 80 TABLET, FILM COATED ORAL at 17:51

## 2025-02-25 RX ADMIN — ACETAMINOPHEN 975 MG: 325 TABLET ORAL at 23:46

## 2025-02-25 RX ADMIN — DIAZEPAM 5 MG: 5 TABLET ORAL at 23:46

## 2025-02-25 RX ADMIN — INSULIN LISPRO 2 UNITS: 100 INJECTION, SOLUTION INTRAVENOUS; SUBCUTANEOUS at 12:18

## 2025-02-25 RX ADMIN — GABAPENTIN 600 MG: 300 CAPSULE ORAL at 09:36

## 2025-02-25 RX ADMIN — INSULIN LISPRO 10 UNITS: 100 INJECTION, SOLUTION INTRAVENOUS; SUBCUTANEOUS at 09:41

## 2025-02-25 RX ADMIN — HYDROMORPHONE HYDROCHLORIDE 2 MG: 2 TABLET ORAL at 03:48

## 2025-02-25 RX ADMIN — INSULIN GLARGINE 22 UNITS: 100 INJECTION, SOLUTION SUBCUTANEOUS at 21:19

## 2025-02-25 RX ADMIN — LIDOCAINE 5% 1 PATCH: 700 PATCH TOPICAL at 09:35

## 2025-02-25 RX ADMIN — ASPIRIN 81 MG: 81 TABLET, COATED ORAL at 09:36

## 2025-02-25 RX ADMIN — HYDROMORPHONE HYDROCHLORIDE 4 MG: 4 TABLET ORAL at 19:14

## 2025-02-25 NOTE — PLAN OF CARE
Problem: PAIN - ADULT  Goal: Verbalizes/displays adequate comfort level or baseline comfort level  Description: Interventions:  - Encourage patient to monitor pain and request assistance  - Assess pain using appropriate pain scale  - Administer analgesics based on type and severity of pain and evaluate response  - Implement non-pharmacological measures as appropriate and evaluate response  - Consider cultural and social influences on pain and pain management  - Notify physician/advanced practitioner if interventions unsuccessful or patient reports new pain  Outcome: Progressing     Problem: INFECTION - ADULT  Goal: Absence or prevention of progression during hospitalization  Description: INTERVENTIONS:  - Assess and monitor for signs and symptoms of infection  - Monitor lab/diagnostic results  - Monitor all insertion sites, i.e. indwelling lines, tubes, and drains  - Monitor endotracheal if appropriate and nasal secretions for changes in amount and color  - [place] appropriate cooling/warming therapies per order  - Administer medications as ordered  - Instruct and encourage patient and family to use good hand hygiene technique  - Identify and instruct in appropriate isolation precautions for identified infection/condition  Outcome: Progressing     Problem: SAFETY ADULT  Goal: Patient will remain free of falls  Description: INTERVENTIONS:  - Educate patient/family on patient safety including physical limitations  - Instruct patient to call for assistance with activity   - Consult OT/PT to assist with strengthening/mobility   - Keep Call bell within reach  - Keep bed low and locked with side rails adjusted as appropriate  - Keep care items and personal belongings within reach  - Initiate and maintain comfort rounds  - Make Fall Risk Sign visible to staff  - Apply yellow socks and bracelet for high fall risk patients  - Consider moving patient to room near nurses station  Outcome: Progressing  Goal: Maintain or  return to baseline ADL function  Description: INTERVENTIONS:  -  Assess patient's ability to carry out ADLs; assess patient's baseline for ADL function and identify physical deficits which impact ability to perform ADLs (bathing, care of mouth/teeth, toileting, grooming, dressing, etc.)  - Assess/evaluate cause of self-care deficits   - Assess range of motion  - Assess patient's mobility; develop plan if impaired  - Assess patient's need for assistive devices and provide as appropriate  - Encourage maximum independence but intervene and supervise when necessary  - Involve family in performance of ADLs  - Assess for home care needs following discharge   - Consider OT consult to assist with ADL evaluation and planning for discharge  - Provide patient education as appropriate  Outcome: Progressing    Problem: DISCHARGE PLANNING  Goal: Discharge to home or other facility with appropriate resources  Description: INTERVENTIONS:  - Identify barriers to discharge w/patient and caregiver  - Arrange for needed discharge resources and transportation as appropriate  - Identify discharge learning needs (meds, wound care, etc.)  - Arrange for interpretive services to assist at discharge as needed  - Refer to Case Management Department for coordinating discharge planning if the patient needs post-hospital services based on physician/advanced practitioner order or complex needs related to functional status, cognitive ability, or social support system  Outcome: Progressing     Problem: Knowledge Deficit  Goal: Patient/family/caregiver demonstrates understanding of disease process, treatment plan, medications, and discharge instructions  Description: Complete learning assessment and assess knowledge base.  Interventions:  - Provide teaching at level of understanding  - Provide teaching via preferred learning methods  Outcome: Progressing     Problem: Nutrition/Hydration-ADULT  Goal: Nutrient/Hydration intake appropriate for  improving, restoring or maintaining nutritional needs  Description: Monitor and assess patient's nutrition/hydration status for malnutrition. Collaborate with interdisciplinary team and initiate plan and interventions as ordered.  Monitor patient's weight and dietary intake as ordered or per policy. Utilize nutrition screening tool and intervene as necessary. Determine patient's food preferences and provide high-protein, high-caloric foods as appropriate.     INTERVENTIONS:  - Monitor oral intake, urinary output, labs, and treatment plans  - Assess nutrition and hydration status and recommend course of action  - Evaluate amount of meals eaten  - Assist patient with eating if necessary   - Allow adequate time for meals  - Recommend/ encourage appropriate diets, oral nutritional supplements, and vitamin/mineral supplements  - Order, calculate, and assess calorie counts as needed  - Recommend, monitor, and adjust tube feedings and TPN/PPN based on assessed needs  - Assess need for intravenous fluids  - Provide specific nutrition/hydration education as appropriate  - Include patient/family/caregiver in decisions related to nutrition  Outcome: Progressing

## 2025-02-25 NOTE — CASE MANAGEMENT
KASSI called [facility], spoke with Nancy, inquired of clinical review sent on  on 2/20 for 2/21. Tre reported approval of additional ARC days, NRD 2/28.

## 2025-02-25 NOTE — PROGRESS NOTES
"Progress Note - PMR   Name: Lizzy Acuna 49 y.o. female I MRN: [id number]  Unit/Bed#: -01 I Date of Admission: 2/15/2025   Date of Service: 2/25/2025 I Hospital Day: 10     Assessment & Plan  S/P BKA (below knee amputation) unilateral, left (HCC)  2/2 Infection after TMA in setting of T2DM/Neuropathy/PAD with acute ischemia.  2/11 - L BKA with Dr. Arauz  Contracture prevention (has knee immobilizer)   - Vascular did not comment on limb protector stating \"ace is fine\"   - Quad strengthening  Edema/shaping management    - ACE wrap ATC   - Shrinkers when cleared by vascular   Incisional Care/education   - Mom may have to perform  Phantom limb sensation (see that entry)  PT/OT 3-5 hours/day, 5-7 days/week   Pre-prosthetic f/u to be arranged with PMR   F/U with Vascular for 4 week POV (~3/11)  Obesity (BMI 30.0-34.9)  Considerations in therapy  Tobacco abuse  Cessation counseling  Nicotine patch if needed.  Diabetes mellitus type 2 with complications ([facility])  Lab Results   Component Value Date    HGBA1C 10.3 (H) 12/13/2024       Recent Labs     02/24/25  1601 02/24/25 2008 02/25/25  0803 02/25/25  1144   POCGLU 130 169* 224* 169*       Blood Sugar Average: Last 72 hrs:  (P) [id number]    Home: Lantus 10 units QHS, Aspart 5 units TID with meals  Here: Same, CDI/Accuchecks  Diabetic Diet  Monitor and adjust as per primary team  Hyponatremia  Currently asymptomatic  2/20 to 2/24: 132   Discussed with IM: 1.8L/day FR started on 2/18   GERD (gastroesophageal reflux disease)  Continue PPI  PAD (peripheral artery disease) ([facility])  S/p L SFA stent c/b acute occlusion this hospitalization  S/p multiple angioplasty (2/1, 2/2, 2/6) and lysis (1/31-2/2)   Per Vascular : ASA/Statin only  Monitor neurovascular exam  F/u Vascular outpatient   Blood loss anemia    Required multiple transfusions while inpatient.  Hemodynamically stable currently.  2/12 Hgb 8.6 --> 2/17 hgb 9.0  Transfuse as appropriate.   Primary " hypertension  Home: None  Here: Chlorthalidone 12.5mg daily  Monitor and adjust as appropriate.  Thyroid nodule  Noted incidentally  Enlarged multinodular thyroid gland  Will check TSH with next set of labs.  Outpatient f/u with non-emergent ultrasound   Adrenal nodule (HCC)  Noted incidentally  14mm R adrenal nodule stable since 2018 - in keeping with benign adenoma  Biochemical evaluation suggested for adrenal adenomas > 1cm to rule out functioning adenoma  Recommend outpatient f/u with PCP for additional work-up  At risk for venous thromboembolism (VTE)  HSQ, SCDs.   Will not need to go home on HSQ  Phantom limb syndrome (HCC)  Desensitization techniques with therapy  On cymbalta and gabapentin  Monitor and adjust as appropriate  Will need f/u with PMR after discharge.   Constipation    Docusate BID, senna 2 tabs at night, and miralax daily.   Adding PRN suppository and lactulose.  Acute pain   Tylenol scheduled (monitor LFTs)  Cymbalta 30mg daily  Gabapentin 600 TID   Robaxin 750mg Q6hr  Valium PRN qHs muscle spasms  2-4 mg Dilaudid every 4 hours   Monitor and adjust as appropriate.   Adjustment disorder with anxiety  Of note started by APS on cymbalta for phantom pain  Valium PRN at bedtime   Emphasize non-pharmacologic strategies  Monitor and adjust as appropriate.     Subjective   Lizzy Acuna is a 49 y.o. female with medical history of T2DM with neuropathy, depression, HLD, obesity, PAD s/p LLE SFA balloon angioplasty and stenting in 12/2024 who presented to the [facility] 1/28 with L foot pain for 5 days. She had a L TMA on 1/3 by podiatry, and had developed increased pain, redness, and discharge. She was seen at her podiatrist's office who sent her to the ER for IV abx and further work-up. In the hospital met sepsis criteria. MRI showed fluid collection within her dorsal soft tissue. Vascular consulted after LEADs showed an occluded L SFA stent. They recommended transfer  to [facility] and starting heparin gtt. Dr. Ritchie performed I&D of her left foot on 1/29 and on 1/30 she was transferred to [facility]. IR was consulted, and Dr. Davidson performed catheter placement and initiated thrombolysis with TPA on 1/31. PICC was also placed. On 2/1, residual thrombus along the L SFA was treated with 4mm balloon angioplasty, with no flow noted within L SFA following angioplasty. Lysis was restarted. C/B ABLA with 2/2 Hgb 6.2 - given 2 units pRBC. Her surgical cultures grew MRSA and Finegoldia. Abx were narrowed to vancomycin by ID. APS was consulted and started dilaudid PCA and IV valium. 2/2 lysis check with patent l SFA, popliteal, tibioperoneal trunk and TARA. Mild residual stenoses along L SFA and popliteal artery were treated with 4mm balloon angioplasty and stenosis along TARA was treated with 2.5 and 3mm balloon angioplasty. L PT and peroneal arteries were chronically occluded. TPA was discontinued. She was transferred out of the ICU on 2/3. 2/4 required 1 unit pRBC for ABLA. On 2/6, she had repeat arteriogram with Dr. Victoria with finding of recurrent pop occlusion, AT recannulization with distal pop, TPT, and AT POBA an serration angioplasty. Despite this, with compromised blood flow to posterior aspect and plantar flap of foot. Still concern that perfusion would not be sufficient to salvage foot. She was recommended for BKA. On 2/11, Dr. Arauz performed L BKA. She had L adducotr and popliteal blocks. Post-op with acute bilateral shoulder pain, diaphoresis, tachycardic to 120 and hypertensive to 180/103. EKG showed LAD and TWI in V1. ACS r/o initiated. CTA dissection protocol (negative for dissection). This resolved - no acute findings. Abx were discontinued.     Chief Complaint: f/u amputation    Interval:   Seen and evaluated patient at bedside. Pain has been consistent, will continue to monitor and consider further medication adjustments.   Residual limb appears stable. ABDs have been a  bit bulky so switched to 4x4s.   Valium q8h PRN modified to PRN at bedtime, as patient has only been taking it at bedtime   Last BM 2/24, continent of bowel and bladder. She has been eating 100% of meals.   Anticipated Discharge Date:  Feb 28, 2025 with [facility] for PT, OT, and nursing.       Objective :  Temp:  [97.6 °F (36.4 °C)-98.1 °F (36.7 °C)] 97.6 °F (36.4 °C)  HR:  [89-93] 89  BP: (110-123)/(59-69) 110/59  Resp:  [18-20] 18  SpO2:  [96 %-97 %] 97 %  O2 Device: None (Room air)    Functional Update:  Physical Therapy Occupational Therapy    Weight Bearing Status: Non-weight bearing (Left LE)  Transfers: Supervision, Incidental Touching  Bed Mobility: Independent  Amulation Distance (ft): 9 feet  Ambulation: Total Assistance (min A with w/c follow)  Assistive Device for Ambulation: Roller Walker  Wheelchair Mobility Distance: 150 ft  Wheelchair Mobility: Independent  Number of Stairs: 1  Assistive Device for Stairs: Bilateral Hand Rails  Stair Assistance: Assist of 2  Ramp:  (TBA)  Discharge Recommendations: Home with:  DC Home with:: Family Support, Home Physical Therapy, First Floor Setup (Possible first floor set up)   Eating: Independent  Grooming: Independent  Bathing: Supervision  Bathing: Supervision  Upper Body Dressing: Independent  Lower Body Dressing: Supervision  Toileting: Independent  Tub/Shower Transfer: Supervision  Toilet Transfer: Independent  Cognition: Within Defined Limits  Orientation: Person, Situation, Place, Time                   Physical Exam  Vitals and nursing note reviewed.   Constitutional:       General: She is not in acute distress.     Appearance: She is obese. She is not ill-appearing, toxic-appearing or diaphoretic.   HENT:      Head: Normocephalic and atraumatic.      Right Ear: External ear normal.      Left Ear: External ear normal.      Nose: Nose normal.      Mouth/Throat:      Mouth: Mucous membranes are moist.   Pulmonary:      Effort: Pulmonary effort is normal. No  respiratory distress.   Abdominal:      General: There is no distension.   Musculoskeletal:      Comments: Slight edema and bruising of residual limb, incision without dehiscence or drainage.   No fluctuance or significant tenderness to palpation    Skin:     General: Skin is warm and dry.   Neurological:      General: No focal deficit present.         Scheduled Meds:  Current Facility-Administered Medications   Medication Dose Route Frequency Provider Last Rate    acetaminophen  975 mg Oral TID Cristopher Hui MD      aspirin  81 mg Oral Daily Abby De La Torre PA-C      atorvastatin  80 mg Oral Daily With Dinner Abby De La Torre PA-C      bisacodyl  10 mg Rectal Daily PRN Ashley Depadua, MD      chlorthalidone  12.5 mg Oral Daily Abby De La Torre PA-C      diazepam  5 mg Oral Q8H PRN Abby De La Torre PA-C      DULoxetine  30 mg Oral Daily Abby De La Torre PA-C      gabapentin  600 mg Oral TID Laurence Dumont PA-C      heparin (porcine)  5,000 Units Subcutaneous Q8H JORGE Abby De La Torre PA-C      HYDROmorphone  2 mg Oral Q4H PRN Ashley Depadua, MD      HYDROmorphone  4 mg Oral Q4H PRN Ashley Depadua, MD      hydrOXYzine HCL  25 mg Oral Q6H PRN Abby De La Torre PA-C      insulin glargine  22 Units Subcutaneous HS Oscar Salas MD      insulin lispro  1-6 Units Subcutaneous TID With Meals Kinsey L CAROLEE Lizarraga      insulin lispro  10 Units Subcutaneous TID With Meals Kinsey L CAROLEE Lizarraga      lidocaine  1 patch Topical Daily PRN Abby De La Torre PA-C      lidocaine  1 patch Topical Daily Ashley Depadua, MD      methocarbamol  750 mg Oral Q6H JORGE Laurence Dumont PA-C      nitroglycerin  0.4 mg Sublingual Q5 Min PRN Abby De La Torre PA-C      ondansetron  4 mg Oral Q6H PRN Abby De La Torre PA-C      pantoprazole  40 mg Oral Early Morning Abby De La Torre PA-C      polyethylene glycol  17 g Oral Daily Abby De La Torre PA-C       senna  2 tablet Oral HS Ashley Depadua, MD      trimethobenzamide  200 mg Intramuscular Q6H PRN Abby De La Torre PA-C           Lab Results: I have reviewed the following results:  Results from last 7 days   Lab Units 02/24/25  0555   HEMOGLOBIN g/dL 8.7*   HEMATOCRIT % 27.8*   WBC Thousand/uL 7.50   PLATELETS Thousands/uL 366     Results from last 7 days   Lab Units 02/24/25  0555 02/21/25  0952 02/20/25  0558   BUN mg/dL 30* 30* 27*   SODIUM mmol/L 132* 132* 132*   POTASSIUM mmol/L 4.3 4.6 4.3   CHLORIDE mmol/L 94* 92* 93*   CREATININE mg/dL 0.73 0.88 0.75   AST U/L 21  --  25   ALT U/L 22  --  26            COTY EdwardsC  Physical Medicine and Rehabilitation  [facility]

## 2025-02-25 NOTE — ASSESSMENT & PLAN NOTE
Of note started by APS on cymbalta for phantom pain  Valium PRN at bedtime   Emphasize non-pharmacologic strategies  Monitor and adjust as appropriate.

## 2025-02-25 NOTE — ASSESSMENT & PLAN NOTE
Lab Results   Component Value Date    HGBA1C 10.3 (H) 12/13/2024       Recent Labs     02/24/25  1601 02/24/25 2008 02/25/25  0803 02/25/25  1144   POCGLU 130 169* 224* 169*       Blood Sugar Average: Last 72 hrs:  (P) [id number]    Home: Lantus 10 units QHS, Aspart 5 units TID with meals  Here: Same, CDI/Accuchecks  Diabetic Diet  Monitor and adjust as per primary team

## 2025-02-25 NOTE — PROGRESS NOTES
"Progress Note - Lizzy Acuna 49 y.o. female MRN: [id number]    Unit/Bed#: -01 Encounter: [id number]        Subjective:   Patient seen and examined at bedside after reviewing the chart and discussing the case with the caring staff.    Patient examined at bedside.  Patient complaining of trouble sleeping at night.  She is able to fall asleep but cannot stay asleep.  Often is unable to fall back asleep at all.  Melatonin does not work for her.  Notes Valium was helping at previous hospital but not anymore.  Reports it is her pain that wakes her up.  Denies being too hot or cold, restless, etc.     Physical Exam   Vitals: Blood pressure 110/59, pulse 89, temperature 97.6 °F (36.4 °C), temperature source Tympanic, resp. rate 18, height 5' 1\" (1.549 m), weight 82.8 kg (182 lb 8.7 oz), last menstrual period 12/01/2023, SpO2 97%, not currently breastfeeding.,Body mass index is 34.49 kg/m².  Constitutional: Patient in no acute distress.  HEENT: PERR, EOMI, MMM.  Cardiovascular: Normal rate and regular rhythm.    Pulmonary/Chest: Effort normal and breath sounds normal.   Abdomen: Soft, + BS, NT.    Assessment/Plan:  Lizzy Acuna is a(n) 49 y.o. 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     Cardiac w/ HTN, HLD, CAD.  Continue chlorthalidone 12.5 mg daily, atorvastatin 80 mg daily, and ASA 81 mg daily.   Type 2 DM.   Hgb A1c 10.3% on 12/13/24.  Home: Lantus 10u nightly, Humalog 5u TID.  Here: I will increase Lantus 22u nightly to 2/23/2025, Humalog 10u TID.  SSI w/ POCT AC HS.  Carb controlled diet.   GERD.  Continue Protonix 40 mg daily.   Depression.  Not on home meds.  APS started pt on Cymbalta 30 mg daily for phantom limb pain.  Also on Valium 5 mg q8h as needed for anxiety/muscle spasms.   ABLA.  Hgb 8.6 -> 9.0 -> 8.7 on 2/24/2025.  S/p multiple transfusions on acute care.  Cont to monitor.  Transfuse Hgb < 7. "   Adrenal nodule.  Stable since 2018.  Recommend f/u outpt.   Thyroid nodule.  Incidental finding.  Recommend outpt thyroid US.  Follow up with PCP/endo.   Hyponatremia.  Na 133 -> 130 -> 132 -> 132 on 2/24/2025.  Likely SIADH in the setting of acute infection.  Placed on 1.8L fluid restriction on 2/18/25.  Cont to monitor.    Insomnia.  Likely due to pain.  Patient has Valium 5 mg q8h prn.  Atarax 25 mg as needed added 2/25.   Pain and bowel regimen.  As per physiatry.   Cellulitis of L foot s/p BKA.  Continue ASA and statin.  Patient receiving intensive PT OT as per physiatry.      Anticipated date of discharge.  Friday 2/28/25.    The patient was discussed with Dr. Salas and he is in agreement with the above note.

## 2025-02-25 NOTE — ASSESSMENT & PLAN NOTE
Tylenol scheduled (monitor LFTs)  Cymbalta 30mg daily  Gabapentin 600 TID   Robaxin 750mg Q6hr  Valium PRN qHs muscle spasms  2-4 mg Dilaudid every 4 hours   Monitor and adjust as appropriate.

## 2025-02-25 NOTE — PROGRESS NOTES
02/25/25 1323   Pain Assessment   Pain Assessment Tool 0-10   Pain Score 8   Pain Location/Orientation Orientation: Left;Location: Leg   Pain Onset/Description Onset: Ongoing   Restrictions/Precautions   Precautions Fall Risk;Pain;Fluid restriction   LLE Weight Bearing Per Order NWB   Exercise Tools   Other Exercise Tool 1 precious box 5# x 30 reps in 4 planes of motion   Other Exercise Tool 2 2# DB 2x15 reps in 6 planes of motion   Cognition   Overall Cognitive Status WFL   Orientation Level Oriented X4   Activity Tolerance   Activity Tolerance Patient tolerated treatment well   Assessment   Treatment Assessment OT brief session addressed B UE TE for generalized strength and endurance for functional activity performance. Pt tolerated well. Plan is to continue with skilled OT as per POC.   Prognosis Good   Problem List Impaired balance;Decreased mobility;Decreased skin integrity;Orthopedic restrictions;Pain   Plan   Treatment/Interventions Therapeutic exercise;Endurance training;OT   Progress Progressing toward goals   OT Therapy Minutes   OT Time In 1323   OT Time Out 1353   OT Total Time (minutes) 30   OT Mode of treatment - Individual (minutes) 30   OT Mode of treatment - Concurrent (minutes) 0   OT Mode of treatment - Group (minutes) 0   OT Mode of treatment - Co-treat (minutes) 0   OT Mode of Treatment - Total time(minutes) 30 minutes   OT Cumulative Minutes 733   Therapy Time missed   Time missed? No

## 2025-02-25 NOTE — PROGRESS NOTES
02/25/25 0700   Pain Assessment   Pain Assessment Tool 0-10   Pain Score 6   Pain Location/Orientation Orientation: Left;Location: Leg   Pain Onset/Description Onset: Ongoing   Restrictions/Precautions   Precautions Fall Risk;Pain;Fluid restriction   LLE Weight Bearing Per Order NWB   Oral Hygiene   Type of Assistance Needed Independent   Physical Assistance Level No physical assistance   Oral Hygiene CARE Score 6   Grooming   Able To Wash/Dry Hands;Initiate Tasks;Brush/Clean Teeth;Wash/Dry Face;Comb/Brush Hair;Acquire Items   Findings w/c level   Shower/Bathe Self   Type of Assistance Needed Independent;Adaptive equipment   Physical Assistance Level No physical assistance   Comment CHG;wrapped prior to shower;maintained NWB;weight shifted   Shower/Bathe Self CARE Score 6   Bathing   Assessed Bath Style Shower   Anticipated D/C Bath Style Shower;Sponge Bath   Able to Gather/Transport Yes   Able to Adjust Water Temperature Yes   Able to Wash/Rinse/Dry (body part) Left Arm;Right Arm;L Upper Leg;R Upper Leg;Chest;Abdomen;Perineal Area;Buttocks;R Lower Leg/Foot   Limitations Noted in Balance   Positioning Seated   Adaptive Equipment Shower Bars;Shower Seat;Hand Held Shower   Tub/Shower Transfer   Limitations Noted In Balance;LE Strength   Adaptive Equipment Grab Bars;Seat with out Back   Assessed Shower   Findings DS   Upper Body Dressing   Type of Assistance Needed Independent   Physical Assistance Level No physical assistance   Upper Body Dressing CARE Score 6   Lower Body Dressing   Type of Assistance Needed Set-up / clean-up   Physical Assistance Level No physical assistance   Lower Body Dressing CARE Score 5   Putting On/Taking Off Footwear   Type of Assistance Needed Set-up / clean-up   Physical Assistance Level No physical assistance   Putting On/Taking Off Footwear CARE Score 5   Picking Up Object   Type of Assistance Needed Supervision;Adaptive equipment;Verbal cues   Physical Assistance Level No physical  assistance   Picking Up Object CARE Score 4   Dressing/Undressing Clothing   Able to  Store removed clothing;Obtain Clothing   Remove UB Clothes Pullover Shirt;Bra   Don UB Clothes Bra;Pullover Shirt   Remove LB Clothes Pants;Undergarment;Socks   Don LB Clothes Shoes;Socks;Undergarment;Pants   Limitations Noted In Balance;Strength   Positioning Supported Sit;Standing   Lying to Sitting on Side of Bed   Type of Assistance Needed Independent   Physical Assistance Level No physical assistance   Lying to Sitting on Side of Bed CARE Score 6   Sit to Stand   Type of Assistance Needed Independent   Physical Assistance Level No physical assistance   Sit to Stand CARE Score 6   Bed-Chair Transfer   Type of Assistance Needed Independent   Physical Assistance Level No physical assistance   Comment SPT   Chair/Bed-to-Chair Transfer CARE Score 6   Transfer Bed/Chair/Wheelchair   Limitations Noted In Balance;LE Strength   Light Housekeeping   Light Housekeeping Level Wheelchair   Light Housekeeping laundry task to load wash machine; also s/u items for ADL with use of grabber for item retrieval from higher shelf in closet   Exercise Tools   Hand Gripper 9# digi x 30 B hands   Other Exercise Tool 1 red t bar; upward/downward x 30 reps   Other Exercise Tool 2 Red t band 3x10 reps in 4 planes of motion   Cognition   Overall Cognitive Status WFL   Arousal/Participation Alert;Cooperative   Attention Within functional limits   Orientation Level Oriented X4   Memory Within functional limits   Following Commands Follows all commands and directions without difficulty   Additional Activities   Additional Activities Comments w/c mobility MI   Activity Tolerance   Activity Tolerance Patient tolerated treatment well   Assessment   Treatment Assessment OT tx addressed ADL via shower level, iADLs w/c level, TE/TA for generalized strength and endurance; current LOF and details listed in respective sections. Pt is overall MI/set up for ADL tasks.  Fxl transfers including w/c mobility in hallway using bilat UE is MI. Pt maintained NWB LLE effectively; LLE covered in bag in shower to keep dressings dry. UE ROM/strength exercises completed after ADL with good tolerance. Pt reported moderate pain in LLE during session that increased upon movement. Pt is a motivated participant in therapy and is eager to progress towards goals. Pt's barriers to d/c include decreased strength throughout, decreased ROM, decreased balance, decreased activity tolerance, decreased safety awareness, and NWB LLE at amputation site ; all affect independence in self care and fxl transfers. Pt would benefit from continued skilled OT services in order to address listed barriers and prepare for safe d/c.   Prognosis Good   Problem List Impaired balance;Decreased mobility;Decreased skin integrity;Impaired sensation;Pain;Orthopedic restrictions;Decreased strength   Plan   Treatment/Interventions ADL retraining;Functional transfer training;Therapeutic exercise;Endurance training;Patient/family training;Equipment eval/education;Bed mobility;Compensatory technique education;Spoke to nursing;OT   Progress Progressing toward goals   OT Therapy Minutes   OT Time In 0700   OT Time Out 0830   OT Total Time (minutes) 90   OT Mode of treatment - Individual (minutes) 90   OT Mode of treatment - Concurrent (minutes) 0   OT Mode of treatment - Group (minutes) 0   OT Mode of treatment - Co-treat (minutes) 0   OT Mode of Treatment - Total time(minutes) 90 minutes   OT Cumulative Minutes 703   Therapy Time missed   Time missed? No

## 2025-02-25 NOTE — PROGRESS NOTES
02/25/25 1030   Pain Assessment   Pain Assessment Tool 0-10   Pain Score 7   Pain Location/Orientation Orientation: Left;Location: Leg  (residual limb)   Restrictions/Precautions   Precautions Fall Risk;Pain;Fluid restriction   LLE Weight Bearing Per Order NWB   Cognition   Overall Cognitive Status WFL   Orientation Level Oriented X4   Roll Left and Right   Type of Assistance Needed Independent   Roll Left and Right CARE Score 6   Sit to Lying   Type of Assistance Needed Independent   Sit to Lying CARE Score 6   Lying to Sitting on Side of Bed   Type of Assistance Needed Independent   Lying to Sitting on Side of Bed CARE Score 6   Sit to Stand   Type of Assistance Needed Independent   Comment mod I STS from wheelchair using RW; cg STS from shower chair on steps using B/L UE on single HR   Sit to Stand CARE Score 6   Bed-Chair Transfer   Type of Assistance Needed Independent   Comment mod I direct sit pivot level surfaces; cg/min A sit pivot wheelchair <> shower chair on steps   Chair/Bed-to-Chair Transfer CARE Score 6   Wheel 50 Feet with Two Turns   Type of Assistance Needed Independent   Comment mod I level and unlevel surfaces   Wheel 50 Feet with Two Turns CARE Score 6   Wheel 150 Feet   Type of Assistance Needed Independent   Comment mod I level and unlevel surfaces   Wheel 150 Feet CARE Score 6   Curb or Single Stair   Style negotiated Single stair   Type of Assistance Needed Physical assistance;Incidental touching;Verbal cues   Physical Assistance Level 25% or less   Comment cg/min A direct sit pivot wheelchair<>shower seat on steps; cg STS using B/L UE on single rail while PT move shower seat down/up steps; cues for sequence and technique   1 Step (Curb) CARE Score 3   4 Steps   Type of Assistance Needed Physical assistance;Incidental touching;Verbal cues   Physical Assistance Level 25% or less   Comment cg/min A direct sit pivot wheelchair<>shower seat on steps; cg STS using B/L UE on single rail while  PT move shower seat down/up steps; cues for sequence and technique   4 Steps CARE Score 3   Stairs   Type Stairs   # of Steps 8   Weight Bearing Precautions NWB;LLE   Assist Devices Single Rail  (shower seat)   Findings cg/min A direct sit pivot wheelchair<>shower seat on steps; cg STS using B/L UE on single rail while PT move shower seat down/up steps; cues for sequence and technique   Therapeutic Interventions   Strengthening seated, standing, supine ther ex   Balance transfer training   Other stair negotiation; residual limb wrapping   Assessment   Treatment Assessment Patient agreeable to therapy session.   Pain in L residual limb 7/10.    Cues for general safety as well as task sequence.   Had no problems overnight with MOD I in room at wheelchair level.   Completed ther ex for general LE strengthening as well as contracture prevention; transfer training focusing on sequence and technique for improved balance and safety with functional transfers.   Remains MOD I wheelchair mobility.  Declined ambulation/hopping today.  Completed 8 steps in stairwell as described above with SBA of second person for safety.   Reviewed residual limb wrapping with patient able to direct PT on how to wrap and benefit of wrapping for edema control as well as shaping for furture prosthesis.    Patient to call family for training on steps and car transfers tomorrow or thursday.  Returned to room in bed with all needs within reach.   PT Barriers   Physical Impairment Decreased strength;Decreased range of motion;Decreased endurance;Impaired balance;Decreased mobility;Impaired judgement;Decreased safety awareness;Orthopedic restrictions;Pain   Functional Limitation Wheelchair management;Walking;Transfers;Standing;Stair negotiation;Ramp negotiation;Car transfers   Plan   Treatment/Interventions Functional transfer training;LE strengthening/ROM;Elevations;Bed mobility   Progress Progressing toward goals   PT Therapy Minutes   PT Time In  1030   PT Time Out 1200   PT Total Time (minutes) 90   PT Mode of treatment - Individual (minutes) 90   PT Mode of treatment - Concurrent (minutes) 0   PT Mode of treatment - Group (minutes) 0   PT Mode of treatment - Co-treat (minutes) 0   PT Mode of Treatment - Total time(minutes) 90 minutes   PT Cumulative Minutes 810   Therapy Time missed   Time missed? No

## 2025-02-26 ENCOUNTER — TELEPHONE (OUTPATIENT)
Age: 50
End: 2025-02-26

## 2025-02-26 LAB
GLUCOSE SERPL-MCNC: 165 MG/DL (ref 65–140)
GLUCOSE SERPL-MCNC: 181 MG/DL (ref 65–140)
GLUCOSE SERPL-MCNC: 184 MG/DL (ref 65–140)
GLUCOSE SERPL-MCNC: 185 MG/DL (ref 65–140)

## 2025-02-26 PROCEDURE — 97110 THERAPEUTIC EXERCISES: CPT

## 2025-02-26 PROCEDURE — 97530 THERAPEUTIC ACTIVITIES: CPT

## 2025-02-26 PROCEDURE — 82948 REAGENT STRIP/BLOOD GLUCOSE: CPT

## 2025-02-26 PROCEDURE — 99233 SBSQ HOSP IP/OBS HIGH 50: CPT | Performed by: PHYSICAL MEDICINE & REHABILITATION

## 2025-02-26 PROCEDURE — 97116 GAIT TRAINING THERAPY: CPT

## 2025-02-26 PROCEDURE — 97535 SELF CARE MNGMENT TRAINING: CPT

## 2025-02-26 RX ORDER — HYDROMORPHONE HYDROCHLORIDE 4 MG/1
4 TABLET ORAL EVERY 6 HOURS PRN
Refills: 0 | Status: DISCONTINUED | OUTPATIENT
Start: 2025-02-26 | End: 2025-02-28 | Stop reason: HOSPADM

## 2025-02-26 RX ORDER — HYDROMORPHONE HYDROCHLORIDE 2 MG/1
2 TABLET ORAL EVERY 6 HOURS PRN
Refills: 0 | Status: DISCONTINUED | OUTPATIENT
Start: 2025-02-26 | End: 2025-02-28 | Stop reason: HOSPADM

## 2025-02-26 RX ORDER — DULOXETIN HYDROCHLORIDE 60 MG/1
60 CAPSULE, DELAYED RELEASE ORAL DAILY
Status: DISCONTINUED | OUTPATIENT
Start: 2025-02-27 | End: 2025-02-28 | Stop reason: HOSPADM

## 2025-02-26 RX ADMIN — HEPARIN SODIUM 5000 UNITS: 5000 INJECTION INTRAVENOUS; SUBCUTANEOUS at 06:14

## 2025-02-26 RX ADMIN — GABAPENTIN 600 MG: 300 CAPSULE ORAL at 21:06

## 2025-02-26 RX ADMIN — HEPARIN SODIUM 5000 UNITS: 5000 INJECTION INTRAVENOUS; SUBCUTANEOUS at 14:09

## 2025-02-26 RX ADMIN — INSULIN LISPRO 1 UNITS: 100 INJECTION, SOLUTION INTRAVENOUS; SUBCUTANEOUS at 17:42

## 2025-02-26 RX ADMIN — METHOCARBAMOL TABLETS 750 MG: 500 TABLET, COATED ORAL at 06:14

## 2025-02-26 RX ADMIN — HYDROMORPHONE HYDROCHLORIDE 4 MG: 4 TABLET ORAL at 15:56

## 2025-02-26 RX ADMIN — INSULIN LISPRO 2 UNITS: 100 INJECTION, SOLUTION INTRAVENOUS; SUBCUTANEOUS at 08:43

## 2025-02-26 RX ADMIN — ATORVASTATIN CALCIUM 80 MG: 80 TABLET, FILM COATED ORAL at 17:39

## 2025-02-26 RX ADMIN — GABAPENTIN 600 MG: 300 CAPSULE ORAL at 08:17

## 2025-02-26 RX ADMIN — HYDROMORPHONE HYDROCHLORIDE 4 MG: 4 TABLET ORAL at 08:43

## 2025-02-26 RX ADMIN — INSULIN LISPRO 10 UNITS: 100 INJECTION, SOLUTION INTRAVENOUS; SUBCUTANEOUS at 08:43

## 2025-02-26 RX ADMIN — METHOCARBAMOL TABLETS 750 MG: 500 TABLET, COATED ORAL at 17:39

## 2025-02-26 RX ADMIN — METHOCARBAMOL TABLETS 750 MG: 500 TABLET, COATED ORAL at 12:08

## 2025-02-26 RX ADMIN — INSULIN LISPRO 10 UNITS: 100 INJECTION, SOLUTION INTRAVENOUS; SUBCUTANEOUS at 17:42

## 2025-02-26 RX ADMIN — LIDOCAINE 5% 1 PATCH: 700 PATCH TOPICAL at 08:17

## 2025-02-26 RX ADMIN — METHOCARBAMOL TABLETS 750 MG: 500 TABLET, COATED ORAL at 23:41

## 2025-02-26 RX ADMIN — GABAPENTIN 600 MG: 300 CAPSULE ORAL at 14:09

## 2025-02-26 RX ADMIN — CHLORTHALIDONE 12.5 MG: 25 TABLET ORAL at 08:16

## 2025-02-26 RX ADMIN — DULOXETINE HYDROCHLORIDE 30 MG: 30 CAPSULE, DELAYED RELEASE ORAL at 08:16

## 2025-02-26 RX ADMIN — ACETAMINOPHEN 975 MG: 325 TABLET ORAL at 08:16

## 2025-02-26 RX ADMIN — INSULIN LISPRO 10 UNITS: 100 INJECTION, SOLUTION INTRAVENOUS; SUBCUTANEOUS at 12:10

## 2025-02-26 RX ADMIN — ACETAMINOPHEN 975 MG: 325 TABLET ORAL at 17:39

## 2025-02-26 RX ADMIN — ACETAMINOPHEN 975 MG: 325 TABLET ORAL at 23:41

## 2025-02-26 RX ADMIN — HEPARIN SODIUM 5000 UNITS: 5000 INJECTION INTRAVENOUS; SUBCUTANEOUS at 21:07

## 2025-02-26 RX ADMIN — INSULIN LISPRO 1 UNITS: 100 INJECTION, SOLUTION INTRAVENOUS; SUBCUTANEOUS at 12:10

## 2025-02-26 RX ADMIN — INSULIN GLARGINE 22 UNITS: 100 INJECTION, SOLUTION SUBCUTANEOUS at 21:06

## 2025-02-26 RX ADMIN — HYDROMORPHONE HYDROCHLORIDE 4 MG: 4 TABLET ORAL at 03:51

## 2025-02-26 RX ADMIN — DIAZEPAM 5 MG: 5 TABLET ORAL at 23:41

## 2025-02-26 RX ADMIN — HYDROMORPHONE HYDROCHLORIDE 4 MG: 4 TABLET ORAL at 23:41

## 2025-02-26 RX ADMIN — ASPIRIN 81 MG: 81 TABLET, COATED ORAL at 08:17

## 2025-02-26 RX ADMIN — PANTOPRAZOLE SODIUM 40 MG: 40 TABLET, DELAYED RELEASE ORAL at 06:14

## 2025-02-26 RX ADMIN — SENNOSIDES 17.2 MG: 8.6 TABLET, FILM COATED ORAL at 21:06

## 2025-02-26 NOTE — DISCHARGE INSTR - AVS FIRST PAGE
DISCHARGE INSTRUCTIONS: [facility] AC[facility] REHABILIT[facility] [place]    Bring these instructions with you to your Outpatient Physician appointments so they can order and follow-up any additional lab work or imaging recommended at time of discharge.    Resume follow-up with all your prior providers that you have established care prior to this hospitalization.  Discuss with primary care physician (PCP) if you have additional questions.     It  is you or your caregivers responsibility to obtain follow-up MEDICATION REFILLS  As indicated through your Primary Care Physician (PCP) and other outpatient specialty provider(s) after discharge.  Please follow-up with your PCP as soon as possible after discharge to set-up follow-up management and when appropriate refills.      You remain a fall and injury risk which could be severe.  - Your risk of fall has decreased however since admission to acute rehab.  Caregiver training has been completed with our staff.  - Appropriate supervision +/- assistance as instructed during your rehab course is recommended to decrease risk of fall and injury.    - Continue skilled therapy as discussed after discharge to further decrease this risk    If you (or your health care proxy) have any questions or concerns regarding your acute rehabilitation stay including issues with medications, rehabilitation, and follow-up plan, please call:          [facility] at [facility] at [phone]    Should you develop fevers, chills, new weakness, changes in sensation, difficulty speaking, facial weakness, confusion, shortness of breath, chest pain, or other concerning symptoms please call 911 and/or obtain transportation to nearest ER immediately.    Should you develop worsening pain, swelling, or drainage notify your surgeon right away or obtain transportation to nearest ER for evaluation.    Should you develop feelings of significant hopelessness,  "severe depression, severe anxiety, or suicide you should call 911 or obtain transportation to nearest ER.    24-7 [employer] suicide and crisis lifeline call \"320\"  National Suicide Prevention Lifeline is 1-442.593.1485 and is available 24 hrs/7 days a week.   [facility] Crisis [phone] is available 24 hrs/7 days for mental health  [facility] Crisis [phone] is available 24 hrs/7 days for mental health   [facility] [phone]    PHYSICIANS to see: PCP, vascular, PM&R   Please see your doctors listed in the follow up providers section of your discharge paperwork, and take the discharge paperwork with you to your appointments.    Home health has been ordered for you:  Your home health agency should reach out to you or your family soon to arrange follow-up.    (If [facility] health is your provider their phone number is [phone])    If you are unable to get in touch with home health you may contact your  at [phone]. [place]    SKIN CARE INSTRUCTIONS to follow:  Monitor incision(s) for increased redness, swelling, pain/tenderness, discharge/pus and promptly notify your surgeon should these develop.  - Should you develop significant pain, swelling, or drainage obtain transportation to nearest emergency room for immediate evaluation if unable to reach your surgeon promptly   - Should you develop uncontrolled pain, fever, chills, sweats, changes in strength, sensation, or color of this area obtain transportation to nearest emergency room for immediate evaluation.      Monitor skin for increased redness or breadown and promptly notify your physician should these develop    If instructed while in [facility] - be sure you stand +/- walk every 1-2 hours and if advised use appropriate supervision/assistance to optimally offload your buttock/sacral region.  While seated or lying in bed shift positions and from side to side often.  Can use barrier " type cream such as Hydragaurd 2 times per day and as needed.    Turn patient (yourself) fully every 2 hours while in bed.      WOUND CARE INSTRUCTIONS to follow:  Home health nursing will assist you with wound management.  You may contact them with issues as well once this service is set-up.    If [facility] is your provider their phone number is [phone].    If you are unable to get in touch with home health you may contact your  at [phone].      Due to the following you are at increased risk of skin breakdown/wounds/worsening wounds:  - Impaired mobility  - Medical co-morbidities    Buttocks/Sacrum  Turn as full as possible off sacrum/buttocks every 2 hours  Use Cushion while in chair or wheelchair  Weight shift every 10-15 minutes while in chair  Keep skin clean and dry as possible.  Remove wet or soiled clothing/linens promptly  Use barrier cream or similar 2 times per day   Monitor skin for increased breakdown which you are at risk of and notify nursing, PCP, or other physician providers should this occur right away    Heels/bony edges of feet  Float heels off edge of pillow for added pressure relief.  Monitor heels and bony protuberances and notify physician or nursing for any increased redness, bogginess, or breakdown        WEIGHTBEARING/ACTIVITY PRECAUTIONS to follow: Maintain NONWEIGHTBEARING in left leg/lower extremity    Driving restrictions:    You are recommended against driving until cleared by an outpatient physician .     **You should NOT operate a motor vehicle while under the influence of an opiate medication, muscle relaxant, or other sedative.  Doing so may lead to an accident resulting in serious injury or death to yourself or others.  You have agreed to avoid driving when under the influence of this medication.      Work restrictions:  You should NOT return to work until cleared by an outpatient physician.    You should not operate heavy machinery until cleared  by an outpatient physician.      Alcohol restrictions:  You are recommended to not drink alcohol at this time unless cleared by an outpatient physician.  Drinking alcohol in your current functional condition can increase your risk of injury which could be severe.  Drinking alcohol given your current health problems can lead to increased medical complications which could be severe.    Combining alcohol with your current medications can increase your risk of injury which could be severe.      Smoking restrictions:  You are recommended to not smoke nicotine.  Smoking increases your risk of heart attack, stroke, emphysema/COPD, and lung cancer.        MEDICATIONS:  Please see a full list of your medications outlined in the After Visit Summary that is attached to these Discharge Instructions.  Please note changes may have been made to your medications please refer to your discharge paperwork for your current medications and take this list with you to all your doctors appointments for your doctors to review.  Please do not resume a home medication unless the medication reconciliation sheet indicates to do so, please do not assume that a medication that you were given a prescription for is the same as a medication you have at home based on both medications having the same name as dosages and frequency may have changed.      Unless specifically noted in your medication list provided to you in your discharge paper work do not resume prior vitamins, minerals, or supplements you may have been taking prior to your hospitalization unless instructed by an outpatient physician in the future.  Discuss with your primary care at next visit if applicable.      Sedating Medications and Pain Medications with increased risk of complications:    Your goal will be to wean off of opiate medications and then onto acetaminophen only and then off of acetaminophen as well if possible.    There are risks associated with opioid medications,  including dependence, addiction and tolerance. The patient understands and agrees to use these medications only as prescribed. Potential side effects of the medications include, but are not limited to, constipation, drowsiness, addiction, impaired judgment and risk of fatal overdose if not taken as prescribed. You should not drive after taking this medication.  The patient was warned against driving while taking sedation medications.  Sharing medications is a felony. At this point in time, the patient is showing no signs of addiction, abuse, diversion or suicidal ideation.    Hydromorphone (opiate) pain medication has been used to help your acute pain.  You tolerated this medication adequately during your recent hospital stay.       You will be given a limited supply of opiate pain medications on discharge; should you require additional refills/medications, your PCP and/or surgeon may prescribe at his/her discretion.   This can be quite helpful particularly for severe acute pain.      There are risks associated with opioid medications. They can increase your risk of falling, injury, and breathing difficulties which can be severe and even life-threatening.  Note it is advisable to limit use of opiate pain medications as they can become habit forming and lead to addiction.  Other potential side effects of the medication include, but are not limited to, constipation, drowsiness, impaired judgment and risk of fatal overdose if not taken as prescribed. You should not drive while taking this medication  The patient was warned against driving while taking sedation medications.  Sharing medications is a felony. At this point in time, the patient is showing no signs of addiction, abuse, diversion or suicidal ideation.  The patient (you/or relevant caregiver) understands and agrees to use this medication only as prescribed.    Diazepam (benzodiazepine) sedating medication has been used to help your anxiety and muscle spasms .   You tolerated this medication adequately during your recent hospital stay.      You will be given a limited supply of benzodiazepines (anxiety medication) which is an anxiety medication typically used to control acute short term anxiety or panic attacks.     This medication is habit forming and can lead to addiction and dependence.  This medication increases your risk of fall with injury which can be severe and even life-threatening.  Discuss with your outpatient prescriber possibility of weaning off this medication at their discretion.    You will be given a very limited supply of both opiate pain medications and  benzodiazepines (anxiety medication) both of which can increase your risk of fall and severe and even life-threatening injury.  Taking both medications together further increase your risk of fall and even life-threatening injury as well as respiratory depression (slowing and stopping of breathing).  Physicians at times will carefully use these medications in combination but this must be done with close oversight.  You have been carefully monitored during your rehab course on these medications without signs of increased confusion, respiratory depression, or worsening balance.  Nevertheless, this risk remains.  Should you develop confusion, difficulty staying awake, imbalance or other concerning symptoms do NOT take these medications and notify your physician right away or obtain transportation to nearest emergency room.  You have been discussed risks and benefits of these medications and at this time have agreed to risks associated with these medications.      There are risks associated with benzodiazepine medications, including dependence, addiction and tolerance. The patient understands and agrees to use these medications only as prescribed. Potential side effects of the medications include, but are not limited to, constipation, drowsiness, addiction, impaired judgment and risk of fatal overdose if not  taken as prescribed. You should not drive after taking this medication.  The patient was warned against driving while taking sedation medications.  Sharing medications is a felony. At this point in time, the patient is showing no signs of addiction, abuse, diversion or suicidal ideation.    Methocarbamol (Robaxin) pain/muscle spasm medication has been used to help your acute pain and spasms.  You tolerated this medication adequately during your recent hospital stay.     - Do not take with alcohol (or marijuana/cannabis) while on this medication as this can cause increased confusion, breathing problems, falls, and severe injury.  - Follow-up with your primary care physician within 1-2 weeks as well as relevant specialty physician for additional management of your medical conditions, potential refills, or adjustments of this medication.        Gabapentin has been used to help pain.  You tolerated this medication adequately during your recent hospital stay.     - Take 600 mg 3 times per day.  - Do not stop this medication abruptly as this can cause seizures.  - Do not take with alcohol (or marijuana/cannabis) while on this medication as this can cause increased confusion, breathing problems, falls, and severe injury.  - This medication can increase risk of confusion, fall, and injury although you did tolerate adequately during rehab course.  - Follow-up with your primary care physician within one week as well as  for additional management of your medical conditions, potential refills, or adjustments of this medication.        NSAID Warning:  Please avoid NSAID (including but not limited to advil, aleve, motrin, naproxen, ibuprofen, mobic, meloxicam, diclofenac etc) medications as NSAID medications may increase your risk of bleeding (which can be life-threatening), stroke, worsening kidney disease, heart attack, developing/worsening GI ulcers, worsening heart failure, worsening liver (cirrhosis) disease, potentially  delay bone healing.      Acetaminophen (Tylenol) Dosing Warning:    You may have up to 3000 mg of acetaminophen (Tylenol) from all sources spread out over a 24 hours period.  Do not have more than that, as this can increase your risk of liver injury which can be serious.    Risk of serotonin syndrome  Taking Cymbalta may increase risk of serotonin syndrome which can lead to anxiety, restlessness, confusion, and agitation.  It may also cause sweating, elevated heart rate, diarrhea, and hypertension.  This risk is low and you the benefits from these medications currently appear to outweigh the risks.  Nevertheless, if you notice any symptoms notify your physician or obtain transportation to nearest emergency room for evaluation (or call 911) .      MEDICAL MANAGEMENT AT HOME specific to you:    Urinary retention risk:  You may have had or are at risk for urinary retention (bladder filling up with excessive urine and inability to adequately expel it).  Urinary retention can lead to significant kidney injury and infection which can be serious.  If you do not urinate for 8 hours or more or you have the urge to urinate and are unable to, please obtain transportation to nearest emergency room (or urologist office), for likely placement of negron.  If you develop fever, chills, sweats, confusion, or other concerning symptoms seek medical management right away.  Follow-up with primary care and if needed urology after discharge.    Bowel management (constipation risk):  - Ideally you would have 1 well formed BM every 1-2 days.  Adjust bowel regimen based on that goal or as close to that as possible  - Start with taking miralax 1 time per day and senna twice daily if you become constipated   - You may also add Colace 100mg twice daily as a stool softener and Senna/Senakot 1-2 tabs daily  - If still constipated you can increase miralax to twice per day and if needed take either oral or by rectum dulcolax (but not at the same  time)  - If unable to go for more than 4 days notify your physician for additional recommendations    - If you develop significant abdominal pain, nausea/vomiting, or other concerns seek medical attention right away.          Please note a summary of your hospital stay with relevant information for your doctors will try to be sent to them.  Please confirm with your doctors at your follow up visits that they have received this summary and have them contact [facility] Medical Records if they have not received them along with any other medical records they may require.     [facility] Phone Number:  [phone]

## 2025-02-26 NOTE — CASE MANAGEMENT
Case Management met with patient to review d/c recommendations from team meeting.   Patient making nice gains and is on track to d/c on Friday, 02/28/2025.   Family training scheduled for 1230 on 02/27/2025.  DME scheduled for delivery on 02/27/2025.   [facility].    Patient has vascular surgery follow-up 03/14/2025 at 1:00pm, new PCP appointment on 03/13/2025 at 2:20pm.   CM to follow for any d/c planning needs.

## 2025-02-26 NOTE — TELEPHONE ENCOUNTER
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    Warm transfer to office clerical for further assistance with scheduling.

## 2025-02-26 NOTE — PROGRESS NOTES
02/25/25 1400   Pain Assessment   Pain Assessment Tool 0-10   Pain Score 8   Pain Location/Orientation Orientation: Left;Location: Leg  (residual limb)   Restrictions/Precautions   Precautions Fall Risk;Pain;Fluid restriction   LLE Weight Bearing Per Order NWB   Cognition   Overall Cognitive Status WFL   Orientation Level Oriented X4   Therapeutic Interventions   Strengthening sidelying and prone ther ex   Assessment   Treatment Assessment Patient agreeable to therapy session.   Pain in L LE residual limb remains.  Completed ther ex for general LE strenghtening as well as contracture prevention.  Patient remains in bed with all needs within reach.   Patient spoke with family and they will be coming in for training on Thursday at 1230.   PT Barriers   Physical Impairment Decreased strength;Decreased range of motion;Decreased endurance;Impaired balance;Decreased mobility;Impaired judgement;Decreased safety awareness;Orthopedic restrictions;Pain   Functional Limitation Walking;Transfers;Standing;Stair negotiation;Ramp negotiation;Car transfers   Plan   Treatment/Interventions LE strengthening/ROM;Therapeutic exercise;Bed mobility   Progress Progressing toward goals   PT Therapy Minutes   PT Time In 1400   PT Time Out 1425   PT Total Time (minutes) 25   PT Mode of treatment - Individual (minutes) 25   PT Mode of treatment - Concurrent (minutes) 0   PT Mode of treatment - Group (minutes) 0   PT Mode of treatment - Co-treat (minutes) 0   PT Mode of Treatment - Total time(minutes) 25 minutes   PT Cumulative Minutes 835   Therapy Time missed   Time missed? No

## 2025-02-26 NOTE — ASSESSMENT & PLAN NOTE
Tylenol scheduled (monitor LFTs)  Cymbalta 60 mg daily  Gabapentin 600 TID   Robaxin 750mg Q6hr  Valium PRN qHs muscle spasms  2-4 mg Dilaudid every 6 hours PRN  Monitor and adjust as appropriate.

## 2025-02-26 NOTE — ASSESSMENT & PLAN NOTE
Lab Results   Component Value Date    HGBA1C 10.3 (H) 12/13/2024       Recent Labs     02/25/25  1144 02/25/25  1605 02/25/25 2014 02/26/25  0659   POCGLU 169* 136 174* 181*       Blood Sugar Average: Last 72 hrs:  (P) [id number]    Home: Lantus 10 units QHS, Aspart 5 units TID with meals  Here: Same, CDI/Accuchecks  Diabetic Diet  Monitor and adjust as per primary team

## 2025-02-26 NOTE — PROGRESS NOTES
02/26/25 0700   Pain Assessment   Pain Assessment Tool 0-10   Pain Score 4   Pain Location/Orientation Orientation: Left;Location: Leg   Pain Onset/Description Onset: Ongoing   Restrictions/Precautions   Precautions Fall Risk;Pain;Fluid restriction   LLE Weight Bearing Per Order NWB   Oral Hygiene   Type of Assistance Needed Independent   Physical Assistance Level No physical assistance   Oral Hygiene CARE Score 6   Grooming   Able To Initiate Tasks;Acquire Items;Comb/Brush Hair;Wash/Dry Face;Brush/Clean Teeth;Wash/Dry Hands   Findings w/c level   Shower/Bathe Self   Type of Assistance Needed Independent;Adaptive equipment   Physical Assistance Level No physical assistance   Comment CHG;Pt wrapped prior to shower;maintained NWB;weight shifted   Shower/Bathe Self CARE Score 6   Bathing   Assessed Bath Style Shower   Able to Gather/Transport Yes   Able to Adjust Water Temperature Yes   Able to Wash/Rinse/Dry (body part) Left Arm;Right Arm;L Upper Leg;R Upper Leg;R Lower Leg/Foot;Chest;Abdomen;Perineal Area;Buttocks   Limitations Noted in Balance   Positioning Seated   Adaptive Equipment Shower Bars;Shower Seat;Hand Held Shower   Tub/Shower Transfer   Limitations Noted In Balance;LE Strength   Adaptive Equipment Grab Bars;Seat with out Back   Assessed Shower   Findings DS   Upper Body Dressing   Type of Assistance Needed Independent   Physical Assistance Level No physical assistance   Upper Body Dressing CARE Score 6   Lower Body Dressing   Type of Assistance Needed Independent   Physical Assistance Level No physical assistance   Lower Body Dressing CARE Score 6   Putting On/Taking Off Footwear   Type of Assistance Needed Independent   Physical Assistance Level No physical assistance   Putting On/Taking Off Footwear CARE Score 6   Picking Up Object   Type of Assistance Needed Supervision;Adaptive equipment   Picking Up Object CARE Score 4   Dressing/Undressing Clothing   Able to  Obtain Clothing;Store removed  clothing   Remove UB Clothes Pullover Shirt;Bra   Don UB Clothes Pullover Shirt   Remove LB Clothes Pants;Undergarment;Socks   Don LB Clothes Shoes;Socks;Undergarment;Pants   Limitations Noted In Balance;Strength   Positioning Supported Sit;Standing   Lying to Sitting on Side of Bed   Type of Assistance Needed Independent   Physical Assistance Level No physical assistance   Lying to Sitting on Side of Bed CARE Score 6   Sit to Stand   Type of Assistance Needed Independent   Physical Assistance Level No physical assistance   Sit to Stand CARE Score 6   Bed-Chair Transfer   Type of Assistance Needed Independent   Physical Assistance Level No physical assistance   Chair/Bed-to-Chair Transfer CARE Score 6   Transfer Bed/Chair/Wheelchair   Limitations Noted In Balance;LE Strength   Findings SPT   Toileting Hygiene   Type of Assistance Needed Independent   Physical Assistance Level No physical assistance   Toileting Hygiene CARE Score 6   Toilet Transfer   Type of Assistance Needed Independent   Physical Assistance Level No physical assistance   Toilet Transfer CARE Score 6   Toilet Transfer   Surface Assessed Raised Toilet   Limitations Noted In Balance;LE Strength   Kitchen Mobility   Kitchen-Mobility Level Wheelchair   Kitchen Activity Retrieve items;Transport items   Light Housekeeping   Light Housekeeping Level Wheelchair   Light Housekeeping Level of Assistance Modified independent   Light Housekeeping laundry task to load wash machine; also s/u items for ADL with use of grabber for item retrieval from higher shelf in closet   Health Management   Health Management pt reporting s/u for home medications which has been successful for pt to manage on her own.   Exercise Tools   UE Ergometer 6 F/B moderate resistance   Hand Gripper 9# digi x 30 B hands   Other Exercise Tool 1 red T bar x 30 up/downward   Other Exercise Tool 2 3# dowel x30 reps in 6 planes of motion   Cognition   Overall Cognitive Status [facility]    Arousal/Participation Alert;Cooperative   Attention Within functional limits   Orientation Level Oriented X4   Memory Within functional limits   Following Commands Follows all commands and directions without difficulty   Additional Activities   Additional Activities Comments w/c mobility MI   Activity Tolerance   Activity Tolerance Patient tolerated treatment well   Assessment   Treatment Assessment OT tx addressed ADL via shower level, iADLs w/c level, TE/TA for generalized strength and endurance; current LOF and details listed in respective sections. Pt is overall MI/set up for ADL tasks. Fxl transfers including w/c mobility in hallway using bilat UE is MI. Pt maintained NWB LLE effectively; LLE covered by pt prior to  shower to keep dressings dry. UE ROM/strength exercises completed after ADL with good tolerance. Pt reported minimal  pain in LLE during session that increased upon movement. Pt is a motivated participant in therapy and is eager to progress towards goals. Pt's barriers to d/c include decreased strength throughout, decreased ROM, decreased balance, NWB LLE at amputation site. D/C planning ongoing in prep for d/c to home 2/28.   Prognosis Good   Problem List Decreased mobility;Impaired balance;Decreased skin integrity;Orthopedic restrictions;Pain   Plan   Treatment/Interventions ADL retraining;Functional transfer training;Therapeutic exercise;Endurance training;Patient/family training;Bed mobility;Equipment eval/education;Compensatory technique education;Spoke to nursing;OT   Progress Progressing toward goals   OT Therapy Minutes   OT Time In 0700   OT Time Out 0830   OT Total Time (minutes) 90   OT Mode of treatment - Individual (minutes) 90   OT Mode of treatment - Concurrent (minutes) 0   OT Mode of treatment - Group (minutes) 0   OT Mode of treatment - Co-treat (minutes) 0   OT Mode of Treatment - Total time(minutes) 90 minutes   OT Cumulative Minutes 823   Therapy Time missed   Time missed? No

## 2025-02-26 NOTE — PROGRESS NOTES
"Progress Note - PMR   Name: Lizzy Acuna 49 y.o. female I MRN: [id number]  Unit/Bed#: -01 I Date of Admission: 2/15/2025   Date of Service: 2/26/2025 I Hospital Day: 11     Assessment & Plan  S/P BKA (below knee amputation) unilateral, left (HCC)  2/2 Infection after TMA in setting of T2DM/Neuropathy/PAD with acute ischemia.  2/11 - L BKA with Dr. Arauz  Contracture prevention (has knee immobilizer)   - Vascular did not comment on limb protector stating \"ace is fine\"   - Quad strengthening  Edema/shaping management    - ACE wrap ATC   - Shrinkers when cleared by vascular   Incisional Care/education   - Mom may have to perform  Phantom limb sensation (see that entry)  PT/OT 3-5 hours/day, 5-7 days/week   Pre-prosthetic f/u to be arranged with PMR   F/U with Vascular for 4 week POV (~3/11)  Obesity (BMI 30.0-34.9)  Considerations in therapy  Tobacco abuse  Cessation counseling  Nicotine patch if needed.  Diabetes mellitus type 2 with complications ([facility])  Lab Results   Component Value Date    HGBA1C 10.3 (H) 12/13/2024       Recent Labs     02/25/25  1144 02/25/25  1605 02/25/25 2014 02/26/25  0659   POCGLU 169* 136 174* 181*       Blood Sugar Average: Last 72 hrs:  (P) [id number]    Home: Lantus 10 units QHS, Aspart 5 units TID with meals  Here: Same, CDI/Accuchecks  Diabetic Diet  Monitor and adjust as per primary team  Hyponatremia  Currently asymptomatic  2/20 to 2/24: 132   Discussed with IM: 1.8L/day FR started on 2/18   GERD (gastroesophageal reflux disease)  Continue PPI  PAD (peripheral artery disease) ([facility])  S/p L SFA stent c/b acute occlusion this hospitalization  S/p multiple angioplasty (2/1, 2/2, 2/6) and lysis (1/31-2/2)   Per Vascular : ASA/Statin only  Monitor neurovascular exam  F/u Vascular outpatient   Blood loss anemia    Required multiple transfusions while inpatient.  Hemodynamically stable currently.  2/12 Hgb 8.6 --> 2/17 hgb 9.0  Transfuse as appropriate.   Primary " hypertension  Home: None  Here: Chlorthalidone 12.5mg daily  Monitor and adjust as appropriate.  Thyroid nodule  Noted incidentally  Enlarged multinodular thyroid gland  Will check TSH with next set of labs.  Outpatient f/u with non-emergent ultrasound   Adrenal nodule (HCC)  Noted incidentally  14mm R adrenal nodule stable since 2018 - in keeping with benign adenoma  Biochemical evaluation suggested for adrenal adenomas > 1cm to rule out functioning adenoma  Recommend outpatient f/u with PCP for additional work-up  At risk for venous thromboembolism (VTE)  HSQ, SCDs.   Will not need to go home on HSQ  Phantom limb syndrome (HCC)  Desensitization techniques with therapy  On cymbalta and gabapentin  Monitor and adjust as appropriate  Will need f/u with PMR after discharge.   Constipation    Docusate BID, senna 2 tabs at night, and miralax daily.   Adding PRN suppository and lactulose.  Acute pain   Tylenol scheduled (monitor LFTs)  Cymbalta 60 mg daily  Gabapentin 600 TID   Robaxin 750mg Q6hr  Valium PRN qHs muscle spasms  2-4 mg Dilaudid every 6 hours PRN  Monitor and adjust as appropriate.   Adjustment disorder with anxiety  Of note started by APS on cymbalta for phantom pain  Valium PRN at bedtime   Emphasize non-pharmacologic strategies  Monitor and adjust as appropriate.     Subjective   Lizzy Acuna is a 49 y.o. female with medical history of T2DM with neuropathy, depression, HLD, obesity, PAD s/p LLE SFA balloon angioplasty and stenting in 12/2024 who presented to the [facility] 1/28 with L foot pain for 5 days. She had a L TMA on 1/3 by podiatry, and had developed increased pain, redness, and discharge. She was seen at her podiatrist's office who sent her to the ER for IV abx and further work-up. In the hospital met sepsis criteria. MRI showed fluid collection within her dorsal soft tissue. Vascular consulted after LEADs showed an occluded L SFA stent. They recommended  transfer to [place] and starting heparin gtt. Dr. Ritchie performed I&D of her left foot on 1/29 and on 1/30 she was transferred to [place]. IR was consulted, and Dr. Davidson performed catheter placement and initiated thrombolysis with TPA on 1/31. PICC was also placed. On 2/1, residual thrombus along the L SFA was treated with 4mm balloon angioplasty, with no flow noted within L SFA following angioplasty. Lysis was restarted. C/B ABLA with 2/2 Hgb 6.2 - given 2 units pRBC. Her surgical cultures grew MRSA and Finegoldia. Abx were narrowed to vancomycin by ID. APS was consulted and started dilaudid PCA and IV valium. 2/2 lysis check with patent l SFA, popliteal, tibioperoneal trunk and TARA. Mild residual stenoses along L SFA and popliteal artery were treated with 4mm balloon angioplasty and stenosis along TARA was treated with 2.5 and 3mm balloon angioplasty. L PT and peroneal arteries were chronically occluded. TPA was discontinued. She was transferred out of the ICU on 2/3. 2/4 required 1 unit pRBC for ABLA. On 2/6, she had repeat arteriogram with Dr. Victoria with finding of recurrent pop occlusion, AT recannulization with distal pop, TPT, and AT POBA an serration angioplasty. Despite this, with compromised blood flow to posterior aspect and plantar flap of foot. Still concern that perfusion would not be sufficient to salvage foot. She was recommended for BKA. On 2/11, Dr. Arauz performed L BKA. She had L adducotr and popliteal blocks. Post-op with acute bilateral shoulder pain, diaphoresis, tachycardic to 120 and hypertensive to 180/103. EKG showed LAD and TWI in V1. ACS r/o initiated. CTA dissection protocol (negative for dissection). This resolved - no acute findings. Abx were discontinued.        Chief Complaint: f/u amputation    Interval:   Seen and evaluated patient at bedside. She endorses worsening phantom pain, stating that she can feel her toes. Desensitization techniques offer minimal relief. Cymbalta  increased to 60 mg daily. Dilaudid dosing spaced out to every 6 hours. She denies nausea, abdominal pain, and SOB. CM arranging PCP follow-up.   Anticipated Discharge Date: Feb 28, 2025 with [facility] for PT, OT, and nursing.   Last BM 2/24, continent of bowel and bladder.     Objective :  Temp:  [96.6 °F (35.9 °C)-96.7 °F (35.9 °C)] 96.6 °F (35.9 °C)  HR:  [85-96] 85  BP: (103-115)/(54-64) 115/64  Resp:  [16-18] 16  SpO2:  [98 %] 98 %  O2 Device: None (Room air)    Functional Update:  Physical Therapy Occupational Therapy    Weight Bearing Status: Non-weight bearing (Left LE)  Transfers: Independent  Bed Mobility: Independent  Amulation Distance (ft): 77 feet  Ambulation: Incidental Touching (min A with w/c follow)  Assistive Device for Ambulation: Roller Walker  Wheelchair Mobility Distance: 150 ft  Wheelchair Mobility: Independent  Number of Stairs: 1  Assistive Device for Stairs: Bilateral Hand Rails  Stair Assistance: Assist of 2  Ramp:  (TBA)  Discharge Recommendations: Home with:  DC Home with:: Family Support, Home Physical Therapy, First Floor Setup (Possible first floor set up)   Eating: Independent  Grooming: Independent  Bathing: Supervision  Bathing: Supervision  Upper Body Dressing: Independent  Lower Body Dressing: Supervision  Toileting: Independent  Tub/Shower Transfer: Supervision  Toilet Transfer: Independent  Cognition: Within Defined Limits  Orientation: Person, Situation, Place, Time                 Physical Exam  Vitals and nursing note reviewed.   Constitutional:       General: She is not in acute distress.     Appearance: She is obese. She is not ill-appearing, toxic-appearing or diaphoretic.   HENT:      Head: Normocephalic and atraumatic.      Right Ear: External ear normal.      Left Ear: External ear normal.      Nose: Nose normal.      Mouth/Throat:      Mouth: Mucous membranes are moist.   Pulmonary:      Effort: Pulmonary effort is normal. No respiratory distress.   Musculoskeletal:       Comments: Residual limb wrapped in a clean and dry ace bandage    Skin:     General: Skin is warm and dry.   Neurological:      General: No focal deficit present.         Scheduled Meds:  Current Facility-Administered Medications   Medication Dose Route Frequency Provider Last Rate    acetaminophen  975 mg Oral TID Cristopher Hui MD      aspirin  81 mg Oral Daily Abby De La Torre PA-C      atorvastatin  80 mg Oral Daily With Dinner Abby De La Torre PA-C      bisacodyl  10 mg Rectal Daily PRN Ashley Depadua, MD      chlorthalidone  12.5 mg Oral Daily Abby De La Torre PA-C      diazepam  5 mg Oral HS PRN Laurence Dumont PA-C      [START ON 2/27/2025] DULoxetine  60 mg Oral Daily Laurence Dumont PA-C      gabapentin  600 mg Oral TID Laurence Dumont PA-C      heparin (porcine)  5,000 Units Subcutaneous Q8H JORGE Abby De La oTrre PA-C      HYDROmorphone  4 mg Oral Q6H PRN Laurence Dumont PA-C      Or    HYDROmorphone  2 mg Oral Q6H PRN Laurence Dumont PA-C      hydrOXYzine HCL  25 mg Oral Q6H PRN Abby De La Torre PA-C      insulin glargine  22 Units Subcutaneous HS Oscar Salas MD      insulin lispro  1-6 Units Subcutaneous TID With Meals Kinsey Lizarraga PA-C      insulin lispro  10 Units Subcutaneous TID With Meals Kinsey Lizarraga PA-C      lidocaine  1 patch Topical Daily PRN Abby De La Torre PA-C      lidocaine  1 patch Topical Daily Ashley Depadua, MD      methocarbamol  750 mg Oral Q6H JORGE Laurence Dumont PA-C      nitroglycerin  0.4 mg Sublingual Q5 Min PRN Abby De La Torre PA-C      ondansetron  4 mg Oral Q6H PRN Abby De La Torre PA-C      pantoprazole  40 mg Oral Early Morning Abby De La Torre PA-C      polyethylene glycol  17 g Oral Daily Abby De La Torre PA-C      senna  2 tablet Oral HS Ashley Depadua, MD      trimethobenzamide  200 mg Intramuscular Q6H PRN Abby De La Torre PA-C           Lab Results: I have reviewed the  following results:  Results from last 7 days   Lab Units 02/24/25  0555   HEMOGLOBIN g/dL 8.7*   HEMATOCRIT % 27.8*   WBC Thousand/uL 7.50   PLATELETS Thousands/uL 366     Results from last 7 days   Lab Units 02/24/25  0555 02/21/25  0952 02/20/25  0558   BUN mg/dL 30* 30* 27*   SODIUM mmol/L 132* 132* 132*   POTASSIUM mmol/L 4.3 4.6 4.3   CHLORIDE mmol/L 94* 92* 93*   CREATININE mg/dL 0.73 0.88 0.75   AST U/L 21 -- 25   ALT U/L 22 -- 26            Laurence Dumont PA-C  Physical Medicine and Rehabilitation  [facility]

## 2025-02-26 NOTE — TEAM CONFERENCE
Acute RehabilitationTeam Conference Note  Date: 2/26/2025   Time: 11:23 AM       Patient Name:  Lizzy Acuna       Medical Record Number: [id number]   YOB: 1975  Sex: Female          Room/Bed:  [facility] 218/[facility] 218-01  Payor Info:  Payor: [employer] / Plan: [place] / Product Type: Medicaid HMO /      Admitting Diagnosis: Hx of left BKA ([facility]) [Z89.512]   Admit Date/Time:  2/15/2025 11:26 AM  Admission Comments: No comment available     Primary Diagnosis:  S/P BKA (below knee amputation) unilateral, left (HCC)  Principal Problem: S/P BKA (below knee amputation) unilateral, left (HCC)    Patient Active Problem List    Diagnosis Date Noted    S/P BKA (below knee amputation) unilateral, left ([facility]) 02/15/2025    At risk for venous thromboembolism (VTE) 02/15/2025    Phantom limb syndrome ([facility]) 02/15/2025    Constipation 02/15/2025    Acute pain 02/15/2025    Adjustment disorder with anxiety 02/15/2025    Thyroid nodule 02/12/2025    Adrenal nodule ([facility]) 02/12/2025    Primary hypertension 02/06/2025    Obesity (BMI 30-39.9) 02/03/2025    Blood loss anemia 02/02/2025    PAD (peripheral artery disease) ([facility]) 01/30/2025    Diabetic foot infection  ([facility]) 01/28/2025    Dehiscence of operative wound, initial encounter 01/23/2025    Hypersensitivity, initial encounter 01/23/2025    GERD (gastroesophageal reflux disease)     Cellulitis of left foot complicated by Critical limb threatening ischemia of the left lower extremity with a nonhealing transmetatarsal amputation wound 12/13/2024    Gangrene of left foot ([facility]) 12/13/2024    Diabetes mellitus type 2 with complications ([facility]) 11/05/2018    Hyponatremia 11/05/2018    Chronic sciatica 06/19/2018    Tobacco abuse 02/15/2017    Obesity (BMI 30.0-34.9) 02/09/2017    Chronic low back pain 11/30/2016    Chronic, continuous use of opioids 11/30/2016    Proteinuria 04/18/2011       Physical Therapy:    Weight Bearing Status: Non-weight bearing (Left  "LE)  Transfers: Independent  Bed Mobility: Independent  Amulation Distance (ft): 77 feet  Ambulation: Incidental Touching (min A with w/c follow)  Assistive Device for Ambulation: Roller Walker  Wheelchair Mobility Distance: 150 ft  Wheelchair Mobility: Independent  Number of Stairs: 1  Assistive Device for Stairs: Bilateral Hand Rails  Stair Assistance: Assist of 2  Ramp:  (TBA)  Discharge Recommendations: Home with:  DC Home with:: Family Support, Home Physical Therapy, First Floor Setup (Possible first floor set up)    2/19/25:  Patient seen by ARU PT making steady progress.  Presents following left BKA with decreased ROM/strength, decreased balance and safety, decreased endurance, and pain.   Patient S/min A bed mobility, min A transfers with SW, min A direct SPT bed to wheelchair.  Ambulation NT due to pain limitations.  Wheelchair mobility S for 250 ft.  Stairs TBA in upcoming sessions.  Patient would benefit from continued inpatient ARC PT to increase function, safety, and increased independence in prep for safe d/c to home.     2/26/25:  Patient seen by ARU PT making steady progress.  Presents following left BKA with decreased ROM/strength, decreased balance and safety, decreased endurance, and pain.   Patient MI bed mobility, mod I STS with RW and direct sit pivot transfers with no AD, S direct SPT bed to wheelchair.  Ambulation cg with RW up to 77 feet, wheelchair mobility MI for 250 ft.  Negotiates 2\" curb step backwards with RW and then sits on chair on top of second step; descends by standing from chair and hopping down 2\" step forward with walker to wheelchair.  For regular steps, patient uses shower seat on step and stands with B/L UE on single rail with CG while therapist moves seat up/down steps.  Patient would benefit from continued inpatient ARC PT to increase function, safety, and increased independence in prep for safe d/c to home.     Occupational Therapy:  Eating: Independent  Grooming: " Independent  Bathing: Supervision  Bathing: Supervision  Upper Body Dressing: Independent  Lower Body Dressing: Supervision  Toileting: Independent  Tub/Shower Transfer: Supervision  Toilet Transfer: Independent  Cognition: Within Defined Limits  Orientation: Person, Situation, Place, Time  Discharge Recommendations: Home with:  DC Home with:: Home Occupational Therapy, First Floor Setup, Family Support       02/17/25 Pt participating in ADLs, iADLs w/c level, safety with functional txfs and mobility, TE/TA for generalized strength and endurance. Pts LOF noted above Pt is making positive gains toward OT goals. Pt barriers include: phantom pain L residual limb, decreased safety and balance, decreased strength and ROM. Plan is to continue with skilled OT to further progress pt toward OT goals and prepare pt for safe d/c to home with family.     02/24/25 Pt participating in ADLs, iADLs w/c level, safety with functional txfs and mobility, TE/TA for generalized strength and endurance and d/c planning. Pts LOF noted above. Pt is making positive gains toward OT goals. Pt barriers include: phantom pain L residual limb, decreased safety and balance, decreased strength and ROM. Plan is to continue with skilled OT to further progress pt toward OT goals and prepare pt for safe d/c to home with family.     Speech Therapy:           No notes on file    Nursing Notes:  Appetite: Good  Diet Type: Diabetic                      Diet Patient/Family Education Complete: Yes    Type of Wound (LDA): Wound           Incision (Active)           Type of Wound Patient/Family Education: Yes  Bladder: 6 - Modified [place]     Bladder Patient/Family Education: Yes  Bowel: 6 - Modified [place]     Bowel Patient/Family Education: Yes  Pain Location/Orientation: Orientation: Left, Location: Leg (phantom limb pain)  Pain Score: 8                       Hospital Pain Intervention(s): Medication (See MAR)  Pain Patient/Family Education:  Yes  Medication Management/Safety  Injectable: Insulin  Safe Administration: Yes  Medication Patient/Family Education Complete: Yes    2/18/25 Pt admitted to [facility] on 2/15/25. On 2/11/25 had Left BKA . Hx of Diabetes ACHS scheduled lantus. Alert and Oriented. Lungs clear HRR. Dressing change daily cleaned with peroxide, painted with betadine, cathy, and ace wrap. Neur vascular checks daily. Scheduled dilated for pain.      2/24/25 Alert and oriented. Lungs clear/decreased on RA. HR regular. Nonpitting edema to LLE. L BKA incision dressed with betadine, ABD, and ace wrap daily. Continent of bowel and bladder. Pt takes scheduled tylenol, Robaxin and prn dilaudid for pain management. Pt has been free from falls while in [facility]. LC    Case Management:     Discharge Planning  Living Arrangements: Lives w/ Parent(s), Lives w/ Children  Support Systems: Self, Daughter, Parent  Assistance Needed: TBD  Type of Current Residence: Private residence  Current Home Care Services: No  Patient admitted to [facility] on 2/15/25 after inpatient hospital stay cellulitis, non healing TMA,wound 2/11 left BKA . Patient lives with her mother, 2 children ages 19 and 8, and grandson age 1.5 years old in 2  with 14 LETY. Payton t;s mother lives on the first floor, full bath with walk in shower on first floor. Full flight of stairs to second floor and bedrooms. Patient independent  PTA, including driving. Patient stated she can also stay on first floor if needed.  2[facility]26[facility]25 [facility]    Is the patient actively participating in therapies? yes  List any modifications to the treatment plan:     Barriers Interventions   LBKA 2/11/25, pain improving, hyponatremia stable, Left residual incision Medical oversite, medication management, pt education focusing on wound care/ management LLE   Decreased endurance, strength/ ROM with NWB LLE, balance, safety and increased pain ADL training, transfer/  training                 Is the patient making expected progress toward goals? yes  List any update or changes to goals:   Pt will be Mod I bed mobility/ transfers, supervision gait longer distances and Mod I w/c mobility and short distance mobility.  Pt will be mod I ADLs using w/c for primary mobility Mod I.  Pt will increase activity tolerance to allow for return to work when able.    Medical Goals: [facility]    Weekly Team Goals:   Rehab Team Goals  ADL Team Goal: Patient will be independent with ADLs with least restrictive device upon completion of rehab program  Bowel/Bladder Team Goal: Patient will be independent with bladder/bowel management with least restrictive device upon completion of rehab program  Transfer Team Goal: Patient will be independent with transfers with least restrictive device upon completion of rehab program  Locomotion Team Goal: Patient will be independent with locomotion with least restrictive device upon completion of rehab program    Discussion: Pt participates in rehab program progressing nicely towards stated goals. Pt is Mod I bed mobility and direct pivot transfers/ sit to stand with RW, CGA with RW 77', mod I w/c mobility and CGA/ min A stair navigation. Pt is Supervision bathing, LB dressing and Mod I other areas of self care. Recommend w/c, drop arm commode and shower chair with and without back during transition to home    Anticipated Discharge Date:  Home with family support Friday February 28th, 2025  [facility] Team Members Present:  The following team members are supervising care for this patient and were present during this Weekly Team Conference.    MD Mariella Mandujano, RN  Maren Schoenberger, RUMA M[place], PT  Rossy Glasgow OTR/L

## 2025-02-26 NOTE — PROGRESS NOTES
02/26/25 0930   Pain Assessment   Pain Assessment Tool 0-10   Pain Score 8   Pain Location/Orientation Orientation: Left;Location: Leg  (phantom limb pain)   Restrictions/Precautions   Precautions Fall Risk;Fluid restriction;Contact/isolation;Pain   LLE Weight Bearing Per Order NWB   Cognition   Overall Cognitive Status WFL   Arousal/Participation Alert;Responsive;Cooperative   Attention Within functional limits   Orientation Level Oriented X4   Memory Within functional limits   Following Commands Follows all commands and directions without difficulty   Roll Left and Right   Type of Assistance Needed Independent   Comment using bed rail   Roll Left and Right CARE Score 6   Therapeutic Interventions   Strengthening seated, supine, sidelying ther ex   Assessment   Treatment Assessment Patient agreeable to therapy session.   Phantom limb pain LLE.   Completed ther ex for general LE strengthening as well as contracture prevention.   Will see patient this PM for further functional mobility training.   Patient remains in bed with all needs in reach.   PT Barriers   Physical Impairment Decreased strength;Decreased range of motion;Decreased endurance;Impaired balance;Decreased mobility;Impaired judgement;Decreased safety awareness;Orthopedic restrictions;Pain   Functional Limitation Walking;Transfers;Standing;Stair negotiation;Ramp negotiation;Car transfers   Plan   Treatment/Interventions LE strengthening/ROM;Therapeutic exercise   Progress Progressing toward goals   PT Therapy Minutes   PT Time In 0930   PT Time Out 1030   PT Total Time (minutes) 60   PT Mode of treatment - Individual (minutes) 60   PT Mode of treatment - Concurrent (minutes) 0   PT Mode of treatment - Group (minutes) 0   PT Mode of treatment - Co-treat (minutes) 0   PT Mode of Treatment - Total time(minutes) 60 minutes   PT Cumulative Minutes 895   Therapy Time missed   Time missed? No

## 2025-02-26 NOTE — PROGRESS NOTES
"Progress Note - Lizzy Acuna 49 y.o. female MRN: [id number]    Unit/Bed#: [facility] 218-01 Encounter: [id number]        Subjective:   Patient seen and examined at bedside after reviewing the chart and discussing the case with the caring staff.    Patient examined at bedside.  Patient was experiencing phantom pain earlier today but after the application of ice pack got improved.     Physical Exam   Vitals: Blood pressure 115/64, pulse 85, temperature (!) 96.6 °F (35.9 °C), temperature source Temporal, resp. rate 16, height 5' 1\" (1.549 m), weight 81.8 kg (180 lb 5.4 oz), last menstrual period 12/01/2023, SpO2 98%, not currently breastfeeding.,Body mass index is 34.07 kg/m².  Constitutional: Patient in no acute distress.  HEENT: PERR, EOMI, MMM.  Cardiovascular: Normal rate and regular rhythm.    Pulmonary/Chest: Effort normal and breath sounds normal.   Abdomen: Soft, + BS, NT.    Assessment/Plan:  Lizzy Acuna is a(n) 49 y.o. 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     Cardiac w/ HTN, HLD, CAD.  Continue chlorthalidone 12.5 mg daily, atorvastatin 80 mg daily, and ASA 81 mg daily.   Type 2 DM.   Hgb A1c 10.3% on 12/13/24.  Home: Lantus 10u nightly, Humalog 5u TID.  Here: I will increase Lantus 22u nightly to 2/23/2025, Humalog 10u TID.  SSI w/ POCT AC HS.  Carb controlled diet.   GERD.  Continue Protonix 40 mg daily.   Depression.  Not on home meds.  APS started pt on Cymbalta 30 mg daily for phantom limb pain.  Also on Valium 5 mg q8h as needed for anxiety/muscle spasms.   ABLA.  Hgb 8.6 -> 9.0 -> 8.7 on 2/24/2025.  S/p multiple transfusions on acute care.  Cont to monitor.  Transfuse Hgb < 7.   Adrenal nodule.  Stable since 2018.  Recommend f/u outpt.   Thyroid nodule.  Incidental finding.  Recommend outpt thyroid US.  Follow up with PCP/endo.   Hyponatremia.  Na 133 -> 130 -> 132 -> 132 on 2/24/2025.  Likely SIADH in " the setting of acute infection.  Placed on 1.8L fluid restriction on 2/18/25.  Cont to monitor.    Insomnia.  Likely due to pain.  Patient has Valium 5 mg q8h prn.  Atarax 25 mg as needed added 2/25.   Pain and bowel regimen.  As per physiatry.   Cellulitis of L foot s/p BKA.  Continue ASA and statin.  Patient receiving intensive PT OT as per physiatry.      Anticipated date of discharge.  Friday 2/28/25.

## 2025-02-26 NOTE — PLAN OF CARE
Problem: PAIN - ADULT  Goal: Verbalizes/displays adequate comfort level or baseline comfort level  Description: Interventions:  - Encourage patient to monitor pain and request assistance  - Assess pain using appropriate pain scale  - Administer analgesics based on type and severity of pain and evaluate response  - Implement non-pharmacological measures as appropriate and evaluate response  - Consider cultural and social influences on pain and pain management  - Notify physician/advanced practitioner if interventions unsuccessful or patient reports new pain  Outcome: Progressing     Problem: INFECTION - ADULT  Goal: Absence or prevention of progression during hospitalization  Description: INTERVENTIONS:  - Assess and monitor for signs and symptoms of infection  - Monitor lab/diagnostic results  - Monitor all insertion sites, i.e. indwelling lines, tubes, and drains  - Monitor endotracheal if appropriate and nasal secretions for changes in amount and color  - [place] appropriate cooling/warming therapies per order  - Administer medications as ordered  - Instruct and encourage patient and family to use good hand hygiene technique  - Identify and instruct in appropriate isolation precautions for identified infection/condition  Outcome: Progressing     Problem: SAFETY ADULT  Goal: Patient will remain free of falls  Description: INTERVENTIONS:  - Educate patient/family on patient safety including physical limitations  - Instruct patient to call for assistance with activity   - Consult OT/PT to assist with strengthening/mobility   - Keep Call bell within reach  - Keep bed low and locked with side rails adjusted as appropriate  - Keep care items and personal belongings within reach  - Initiate and maintain comfort rounds  - Make Fall Risk Sign visible to staff  - Apply yellow socks and bracelet for high fall risk patients  - Consider moving patient to room near nurses station  Outcome: Progressing  Goal: Maintain or  return to baseline ADL function  Description: INTERVENTIONS:  -  Assess patient's ability to carry out ADLs; assess patient's baseline for ADL function and identify physical deficits which impact ability to perform ADLs (bathing, care of mouth/teeth, toileting, grooming, dressing, etc.)  - Assess/evaluate cause of self-care deficits   - Assess range of motion  - Assess patient's mobility; develop plan if impaired  - Assess patient's need for assistive devices and provide as appropriate  - Encourage maximum independence but intervene and supervise when necessary  - Involve family in performance of ADLs  - Assess for home care needs following discharge   - Consider OT consult to assist with ADL evaluation and planning for discharge  - Provide patient education as appropriate  Outcome: Progressing     Problem: DISCHARGE PLANNING  Goal: Discharge to home or other facility with appropriate resources  Description: INTERVENTIONS:  - Identify barriers to discharge w/patient and caregiver  - Arrange for needed discharge resources and transportation as appropriate  - Identify discharge learning needs (meds, wound care, etc.)  - Arrange for interpretive services to assist at discharge as needed  - Refer to Case Management Department for coordinating discharge planning if the patient needs post-hospital services based on physician/advanced practitioner order or complex needs related to functional status, cognitive ability, or social support system  Outcome: Progressing     Problem: Knowledge Deficit  Goal: Patient/family/caregiver demonstrates understanding of disease process, treatment plan, medications, and discharge instructions  Description: Complete learning assessment and assess knowledge base.  Interventions:  - Provide teaching at level of understanding  - Provide teaching via preferred learning methods  Outcome: Progressing     Problem: Nutrition/Hydration-ADULT  Goal: Nutrient/Hydration intake appropriate for  improving, restoring or maintaining nutritional needs  Description: Monitor and assess patient's nutrition/hydration status for malnutrition. Collaborate with interdisciplinary team and initiate plan and interventions as ordered.  Monitor patient's weight and dietary intake as ordered or per policy. Utilize nutrition screening tool and intervene as necessary. Determine patient's food preferences and provide high-protein, high-caloric foods as appropriate.     INTERVENTIONS:  - Monitor oral intake, urinary output, labs, and treatment plans  - Assess nutrition and hydration status and recommend course of action  - Evaluate amount of meals eaten  - Assist patient with eating if necessary   - Allow adequate time for meals  - Recommend/ encourage appropriate diets, oral nutritional supplements, and vitamin/mineral supplements  - Provide specific nutrition/hydration education as appropriate  - Include patient/family/caregiver in decisions related to nutrition  Outcome: Progressing

## 2025-02-26 NOTE — PROGRESS NOTES
02/26/25 1230   Pain Assessment   Pain Assessment Tool 0-10   Pain Score 8   Pain Location/Orientation Orientation: Left;Location: Leg  (residual limb/phantom limb pain)   Restrictions/Precautions   Precautions Fall Risk;Fluid restriction;Contact/isolation;Pain   LLE Weight Bearing Per Order NWB   Cognition   Overall Cognitive Status WFL   Orientation Level Oriented X4   Roll Left and Right   Type of Assistance Needed Independent   Roll Left and Right CARE Score 6   Sit to Lying   Type of Assistance Needed Independent   Sit to Lying CARE Score 6   Lying to Sitting on Side of Bed   Type of Assistance Needed Independent   Lying to Sitting on Side of Bed CARE Score 6   Sit to Stand   Type of Assistance Needed Independent   Comment with RW   Sit to Stand CARE Score 6   Bed-Chair Transfer   Type of Assistance Needed Independent   Comment mod I sit pivot using no AD; CG SPT with RW   Chair/Bed-to-Chair Transfer CARE Score 6   Walk 10 Feet   Type of Assistance Needed Incidental touching;Supervision;Verbal cues   Comment cg/close S level and unlevel surfaces with RW   Walk 10 Feet CARE Score 4   Walk 50 Feet with Two Turns   Type of Assistance Needed Incidental touching;Supervision;Verbal cues   Comment cg/close S level and unlevel surfaces with RW   Walk 50 Feet with Two Turns CARE Score 4   Walking 10 Feet on Uneven Surfaces   Type of Assistance Needed Incidental touching;Supervision;Verbal cues   Comment cg/close S level and unlevel surfaces with RW   Walking 10 Feet on Uneven Surfaces CARE Score 4   Ambulation   Primary Mode of Locomotion Prior to Admission Walk   Distance Walked (feet) 83 ft  (83' 68')   Assist Device Roller Walker   Gait Pattern Antalgic;Inconsistant Pauly;Slow Pauly;Decreased foot clearance;Hopping   Limitations Noted In Balance;Endurance;Safety;Strength   Provided Assistance with: Balance   Walk Assist Level Contact Guard;Close Supervision   Findings cg/close S level and unlevel surfaces with  "RW   Does the patient walk? 2. Yes   Wheel 50 Feet with Two Turns   Type of Assistance Needed Independent   Wheel 50 Feet with Two Turns CARE Score 6   Wheel 150 Feet   Type of Assistance Needed Independent   Wheel 150 Feet CARE Score 6   Curb or Single Stair   Style negotiated Curb   Type of Assistance Needed Incidental touching;Verbal cues   Comment CGA; patient hopped up backwards onto 2\" curb step using RW then sat in chair that was sitting on second step (which was 2\" higher than first step); to descend, patient stood from chair ( on second step) and hopped down 2\" step with RW   1 Step (Curb) CARE Score 4   Stairs   Type Curb   # of Steps 2   Weight Bearing Precautions Fall Risk;NWB;LLE   Assist Devices Roller Walker   Findings CGA; patient hopped up backwards onto 2\" curb step using RW then sat in chair that was sitting on second step (which was 2\" higher than first step); to descend, patient stood from chair ( on second step) and hopped down 2\" step with RW   Therapeutic Interventions   Balance gait and transfer training   Other stair negotiation; residual limb wrapping   Assessment   Treatment Assessment Patient agreeable to therapy session.  Pain in LLE remains.  Cues for general safety.   Completed gait and transfer trainin with RW, focusing on sequence and technique for improved balance and safety with functional mobility using walker.  Negotiated 2-2\" curb steps (as stated above) with CG and cues.    Reviewed residual limb wrapping with patient able to verbally instruct PT on how to wrap and purpose for wrapping in prep for prosthesis.   Patient left in bed with all needs within reach.   PT Barriers   Physical Impairment Decreased strength;Decreased range of motion;Decreased endurance;Impaired balance;Decreased mobility;Impaired judgement;Decreased safety awareness;Orthopedic restrictions;Pain   Functional Limitation Wheelchair management;Walking;Transfers;Standing;Stair negotiation;Ramp negotiation;Car " transfers   Plan   Treatment/Interventions Functional transfer training;Elevations;Bed mobility;Gait training   Progress Progressing toward goals   PT Therapy Minutes   PT Time In 1230   PT Time Out 1317   PT Total Time (minutes) 47   PT Mode of treatment - Individual (minutes) 47   PT Mode of treatment - Concurrent (minutes) 0   PT Mode of treatment - Group (minutes) 0   PT Mode of treatment - Co-treat (minutes) 0   PT Mode of Treatment - Total time(minutes) 47 minutes   PT Cumulative Minutes 942   Therapy Time missed   Time missed? No

## 2025-02-27 LAB
DME PARACHUTE DELIVERY DATE ACTUAL: NORMAL
DME PARACHUTE DELIVERY DATE EXPECTED: NORMAL
DME PARACHUTE DELIVERY DATE REQUESTED: NORMAL
DME PARACHUTE ITEM DESCRIPTION: NORMAL
DME PARACHUTE ORDER STATUS: NORMAL
DME PARACHUTE SUPPLIER NAME: NORMAL
DME PARACHUTE SUPPLIER PHONE: NORMAL
GLUCOSE SERPL-MCNC: 149 MG/DL (ref 65–140)
GLUCOSE SERPL-MCNC: 157 MG/DL (ref 65–140)
GLUCOSE SERPL-MCNC: 177 MG/DL (ref 65–140)
GLUCOSE SERPL-MCNC: 188 MG/DL (ref 65–140)

## 2025-02-27 PROCEDURE — 99232 SBSQ HOSP IP/OBS MODERATE 35: CPT | Performed by: PHYSICAL MEDICINE & REHABILITATION

## 2025-02-27 PROCEDURE — 97110 THERAPEUTIC EXERCISES: CPT

## 2025-02-27 PROCEDURE — 99221 1ST HOSP IP/OBS SF/LOW 40: CPT | Performed by: PODIATRIST

## 2025-02-27 PROCEDURE — 97530 THERAPEUTIC ACTIVITIES: CPT

## 2025-02-27 PROCEDURE — 82948 REAGENT STRIP/BLOOD GLUCOSE: CPT

## 2025-02-27 PROCEDURE — 97535 SELF CARE MNGMENT TRAINING: CPT

## 2025-02-27 PROCEDURE — 0HBRXZZ EXCISION OF TOE NAIL, EXTERNAL APPROACH: ICD-10-PCS | Performed by: PODIATRIST

## 2025-02-27 PROCEDURE — 97116 GAIT TRAINING THERAPY: CPT

## 2025-02-27 PROCEDURE — 11720 DEBRIDE NAIL 1-5: CPT | Performed by: PODIATRIST

## 2025-02-27 RX ORDER — INSULIN GLARGINE 100 [IU]/ML
22 INJECTION, SOLUTION SUBCUTANEOUS
Qty: 10 ML | Refills: 0 | Status: SHIPPED | OUTPATIENT
Start: 2025-02-27

## 2025-02-27 RX ORDER — ATORVASTATIN CALCIUM 80 MG/1
80 TABLET, FILM COATED ORAL
Qty: 30 TABLET | Refills: 0 | Status: SHIPPED | OUTPATIENT
Start: 2025-02-27

## 2025-02-27 RX ORDER — INSULIN LISPRO 100 [IU]/ML
10 INJECTION, SOLUTION INTRAVENOUS; SUBCUTANEOUS
Qty: 9 ML | Refills: 0 | Status: SHIPPED | OUTPATIENT
Start: 2025-02-28

## 2025-02-27 RX ORDER — DULOXETIN HYDROCHLORIDE 60 MG/1
60 CAPSULE, DELAYED RELEASE ORAL DAILY
Qty: 30 CAPSULE | Refills: 0 | Status: SHIPPED | OUTPATIENT
Start: 2025-02-28

## 2025-02-27 RX ORDER — NITROGLYCERIN 0.4 MG/1
0.4 TABLET SUBLINGUAL
Qty: 30 TABLET | Refills: 0 | Status: SHIPPED | OUTPATIENT
Start: 2025-02-27

## 2025-02-27 RX ORDER — CHLORTHALIDONE 25 MG/1
12.5 TABLET ORAL DAILY
Qty: 30 TABLET | Refills: 0 | Status: SHIPPED | OUTPATIENT
Start: 2025-02-27

## 2025-02-27 RX ORDER — HYDROXYZINE HYDROCHLORIDE 25 MG/1
25 TABLET, FILM COATED ORAL EVERY 6 HOURS PRN
Qty: 30 TABLET | Refills: 0 | Status: SHIPPED | OUTPATIENT
Start: 2025-02-27

## 2025-02-27 RX ORDER — ASPIRIN 81 MG/1
81 TABLET ORAL DAILY
Qty: 30 TABLET | Refills: 0 | Status: SHIPPED | OUTPATIENT
Start: 2025-02-27

## 2025-02-27 RX ADMIN — ACETAMINOPHEN 975 MG: 325 TABLET ORAL at 17:12

## 2025-02-27 RX ADMIN — SENNOSIDES 17.2 MG: 8.6 TABLET, FILM COATED ORAL at 21:20

## 2025-02-27 RX ADMIN — ACETAMINOPHEN 975 MG: 325 TABLET ORAL at 08:34

## 2025-02-27 RX ADMIN — HYDROMORPHONE HYDROCHLORIDE 4 MG: 4 TABLET ORAL at 09:05

## 2025-02-27 RX ADMIN — HEPARIN SODIUM 5000 UNITS: 5000 INJECTION INTRAVENOUS; SUBCUTANEOUS at 21:25

## 2025-02-27 RX ADMIN — DIAZEPAM 5 MG: 5 TABLET ORAL at 23:07

## 2025-02-27 RX ADMIN — HYDROMORPHONE HYDROCHLORIDE 4 MG: 4 TABLET ORAL at 15:21

## 2025-02-27 RX ADMIN — ACETAMINOPHEN 975 MG: 325 TABLET ORAL at 23:07

## 2025-02-27 RX ADMIN — LIDOCAINE 5% 1 PATCH: 700 PATCH TOPICAL at 08:34

## 2025-02-27 RX ADMIN — METHOCARBAMOL TABLETS 750 MG: 500 TABLET, COATED ORAL at 23:07

## 2025-02-27 RX ADMIN — GABAPENTIN 600 MG: 300 CAPSULE ORAL at 08:32

## 2025-02-27 RX ADMIN — ASPIRIN 81 MG: 81 TABLET, COATED ORAL at 08:33

## 2025-02-27 RX ADMIN — INSULIN GLARGINE 22 UNITS: 100 INJECTION, SOLUTION SUBCUTANEOUS at 21:25

## 2025-02-27 RX ADMIN — INSULIN LISPRO 10 UNITS: 100 INJECTION, SOLUTION INTRAVENOUS; SUBCUTANEOUS at 08:34

## 2025-02-27 RX ADMIN — INSULIN LISPRO 1 UNITS: 100 INJECTION, SOLUTION INTRAVENOUS; SUBCUTANEOUS at 17:12

## 2025-02-27 RX ADMIN — INSULIN LISPRO 10 UNITS: 100 INJECTION, SOLUTION INTRAVENOUS; SUBCUTANEOUS at 17:12

## 2025-02-27 RX ADMIN — METHOCARBAMOL TABLETS 750 MG: 500 TABLET, COATED ORAL at 12:26

## 2025-02-27 RX ADMIN — HEPARIN SODIUM 5000 UNITS: 5000 INJECTION INTRAVENOUS; SUBCUTANEOUS at 06:08

## 2025-02-27 RX ADMIN — DULOXETINE HYDROCHLORIDE 60 MG: 60 CAPSULE, DELAYED RELEASE ORAL at 08:32

## 2025-02-27 RX ADMIN — PANTOPRAZOLE SODIUM 40 MG: 40 TABLET, DELAYED RELEASE ORAL at 06:08

## 2025-02-27 RX ADMIN — GABAPENTIN 600 MG: 300 CAPSULE ORAL at 15:23

## 2025-02-27 RX ADMIN — CHLORTHALIDONE 12.5 MG: 25 TABLET ORAL at 08:32

## 2025-02-27 RX ADMIN — HEPARIN SODIUM 5000 UNITS: 5000 INJECTION INTRAVENOUS; SUBCUTANEOUS at 14:06

## 2025-02-27 RX ADMIN — HYDROXYZINE HYDROCHLORIDE 25 MG: 25 TABLET ORAL at 01:54

## 2025-02-27 RX ADMIN — ATORVASTATIN CALCIUM 80 MG: 80 TABLET, FILM COATED ORAL at 17:12

## 2025-02-27 RX ADMIN — HYDROMORPHONE HYDROCHLORIDE 4 MG: 4 TABLET ORAL at 21:23

## 2025-02-27 RX ADMIN — METHOCARBAMOL TABLETS 750 MG: 500 TABLET, COATED ORAL at 06:08

## 2025-02-27 RX ADMIN — INSULIN LISPRO 1 UNITS: 100 INJECTION, SOLUTION INTRAVENOUS; SUBCUTANEOUS at 08:31

## 2025-02-27 RX ADMIN — INSULIN LISPRO 10 UNITS: 100 INJECTION, SOLUTION INTRAVENOUS; SUBCUTANEOUS at 12:26

## 2025-02-27 RX ADMIN — METHOCARBAMOL TABLETS 750 MG: 500 TABLET, COATED ORAL at 17:13

## 2025-02-27 RX ADMIN — GABAPENTIN 600 MG: 300 CAPSULE ORAL at 21:20

## 2025-02-27 NOTE — PROGRESS NOTES
02/27/25 0700   Pain Assessment   Pain Assessment Tool 0-10   Pain Score 6   Pain Location/Orientation Orientation: Left;Location: Leg   Pain Onset/Description Onset: Ongoing   Restrictions/Precautions   Precautions Fall Risk;Fluid restriction;Pain   LLE Weight Bearing Per Order NWB   Oral Hygiene   Type of Assistance Needed Independent   Physical Assistance Level No physical assistance   Comment w/c level   Oral Hygiene CARE Score 6   Grooming   Able To Initiate Tasks;Acquire Items;Comb/Brush Hair;Wash/Dry Face;Brush/Clean Teeth;Wash/Dry Hands   Findings w/c level   Shower/Bathe Self   Type of Assistance Needed Independent;Adaptive equipment   Physical Assistance Level No physical assistance   Comment CHG;Pt wrapped prior to shower;maintained NWB;weight shifted   Shower/Bathe Self CARE Score 6   Bathing   Assessed Bath Style Shower   Able to Wash/Rinse/Dry (body part) R Upper Leg;L Upper Leg;Right Arm;Left Arm;R Lower Leg/Foot;Chest;Abdomen;Perineal Area;Buttocks   Limitations Noted in Balance   Positioning Seated   Adaptive Equipment Shower Bars;Shower Seat;Hand Held Shower   Tub/Shower Transfer   Limitations Noted In LE Strength;Balance   Adaptive Equipment Grab Bars;Seat with out Back   Findings MI   Upper Body Dressing   Type of Assistance Needed Independent   Physical Assistance Level No physical assistance   Upper Body Dressing CARE Score 6   Lower Body Dressing   Type of Assistance Needed Independent   Physical Assistance Level No physical assistance   Lower Body Dressing CARE Score 6   Putting On/Taking Off Footwear   Type of Assistance Needed Independent   Physical Assistance Level No physical assistance   Putting On/Taking Off Footwear CARE Score 6   Picking Up Object   Type of Assistance Needed Independent;Adaptive equipment   Picking Up Object CARE Score 6   Dressing/Undressing Clothing   Able to  Obtain Clothing;Store removed clothing   Remove UB Clothes Pullover Shirt   Don UB Clothes Pullover  Shirt;Bra   Remove LB Clothes Undergarment;Socks;Pants   Don LB Clothes Pants;Undergarment;Socks;Shoes   Limitations Noted In Balance;Strength   Positioning Supported Sit;Standing   Lying to Sitting on Side of Bed   Type of Assistance Needed Independent   Physical Assistance Level No physical assistance   Lying to Sitting on Side of Bed CARE Score 6   Sit to Stand   Type of Assistance Needed Independent   Physical Assistance Level No physical assistance   Sit to Stand CARE Score 6   Bed-Chair Transfer   Type of Assistance Needed Independent   Physical Assistance Level No physical assistance   Chair/Bed-to-Chair Transfer CARE Score 6   Toileting Hygiene   Type of Assistance Needed Independent   Physical Assistance Level No physical assistance   Toileting Hygiene CARE Score 6   Toilet Transfer   Type of Assistance Needed Independent;Adaptive equipment   Physical Assistance Level No physical assistance   Toilet Transfer CARE Score 6   Toilet Transfer   Transfer Technique Stand Pivot   Kitchen Mobility   Kitchen-Mobility Level Wheelchair   Kitchen Activity Retrieve items;Transport items   Kitchen Mobility Comments pt able to navigate obstacles in kitchen and successful to retrieve items w/c level, good safety demon; pt is matt to stand at counter op for cupboard level item retrieval   Light Housekeeping   Light Housekeeping Level Wheelchair   Light Housekeeping Level of Assistance Modified independent   Exercise Tools   Hand Gripper 9# digi x 30 B hands   Other Exercise Tool 1 2# DB 2x15 reps in elbow flex, forearm sup/pro, wrist flex/ext, shld flex/ext, shoulder rotation, chest press   Other Exercise Tool 2 red t bar: 2x15 reps in 2 planes   Cognition   Overall Cognitive Status WFL   Arousal/Participation Alert;Cooperative   Attention Within functional limits   Orientation Level Oriented X4   Memory Within functional limits   Following Commands Follows all commands and directions without difficulty   Additional  Activities   Additional Activities Comments w/c mobility MI   Activity Tolerance   Activity Tolerance Patient tolerated treatment well   Assessment   Treatment Assessment OT tx addressed ADL via shower level, iADLs w/c level, TE/TA for generalized strength and endurance; current LOF and details listed in respective sections. Pt is overall MI for ADL tasks. Fxl transfers including w/c mobility in hallway using bilat UE is MI. Pt maintained NWB LLE effectively; LLE covered by pt prior to  shower to keep dressings dry. UE ROM/strength exercises completed after ADL with good tolerance. Pt reported minimal  pain in LLE during session that increased upon movement. Pt is a motivated participant in therapy and is eager to progress towards goals. Reviewed general guidelines for amp care which included residual limb care, skin inspection and limb care. Written HEP provided and revieweed.  Pt's barriers to d/c include decreased strength throughout, decreased ROM, decreased balance, NWB LLE at amputation site. D/C planning ongoing in prep for d/c to home 2/28.   Prognosis Good   Problem List Decreased mobility;Impaired balance;Decreased strength;Impaired sensation;Orthopedic restrictions;Decreased skin integrity;Pain   Plan   Treatment/Interventions ADL retraining;Functional transfer training;Therapeutic exercise;Endurance training;Patient/family training;Bed mobility;Equipment eval/education;Compensatory technique education;Spoke to nursing;OT   Progress Progressing toward goals   Discharge Recommendation   Discharge Summary Pt has met OT goals. [facility] services advised. Pt has the following DME: w/c ordered, 3in1, shower seat, RW, grab bars in shower. Pt has supportive family to assist with tasks.   OT Therapy Minutes   OT Time In 0700   OT Time Out 0830   OT Total Time (minutes) 90   OT Mode of treatment - Individual (minutes) 90   OT Mode of treatment - Concurrent (minutes) 0   OT Mode of treatment - Group (minutes) 0   OT Mode  of treatment - Co-treat (minutes) 0   OT Mode of Treatment - Total time(minutes) 90 minutes   OT Cumulative Minutes 913   Therapy Time missed   Time missed? No

## 2025-02-27 NOTE — DISCHARGE SUMMARY
Discharge Summary   Lizzy Acuna 49 y.o. female MRN: [id number]  Unit/Bed#: [facility] 218-01 Encounter: [id number]    Admission Date: 2/15/2025     Discharge Date:  2/28/2025    Etiologic/Rehabilitation Diagnosis: Impairment of mobility, safety and Activities of Daily Living (ADLs) due to Orthopedic Disorders:  08.9  Other Orthopedic left BKA with cellulitis of the left foot    HPI: Lizzy Acuna is a(n) 49 y.o. year old female who is admitted to [facility].  Patient originally presented to [facility] ED on 1/28 due to worsening left foot pain/wound.  She is s/p L TMA 1/3/25.  Patient met sepsis criteria and was started on broad spectrum antibiotics and IVF.  MRI showed fluid collection within the dorsal soft tissues.  Duplex showing new occlusions in the SFA  and stent and stenoses of the SFA and TPT.  Patient transferred to [place].  Podiatry and vascular surgery consulted.  Patient underwent L SFA thrombectomy 1/31, placement of lysis catheter 1/31, lysis recheck and angioplasty 2/1 and 2/3, repeat arteriogram 2/6 showing recurrent pop occlusion, arteriogram with balloon angioplasty 2/7, L BKA 2/11.  ID following for antibiotic recommendations.  APS consulted for pain regimen. Hospitalization further complicated by acute blood loss anemia requiring multiple transfusions, hyponatremia, hypertension requiring new medication chlorthalidon .  Now stable.  PT OT consulted and recommended patient for inpatient acute rehab.   During the course of patient's stay at inpatient rehabilitation unit patient received treatment for the following medical conditions.     Cardiac w/ HTN, HLD, CAD.  Continue chlorthalidone 12.5 mg daily, atorvastatin 80 mg daily, and ASA 81 mg daily.   Type 2 DM.   Hgb A1c 10.3% on 12/13/24.  Home: Lantus 10u nightly, Humalog 5u TID.  Here: I will increase Lantus 22u nightly to 2/23/2025, Humalog 10u TID.  SSI w/ POCT AC HS.  Carb controlled diet.   GERD.  Continue Protonix 40  "mg daily.   Depression.  Not on home meds.  APS started pt on Cymbalta 30 mg daily for phantom limb pain.  Also on Valium 5 mg q8h as needed for anxiety/muscle spasms.   ABLA.  Hgb 8.6 -> 9.0 -> 8.7 on 2/24/2025.  S/p multiple transfusions on acute care.  Cont to monitor.  Transfuse Hgb < 7.   Adrenal nodule.  Stable since 2018.  Recommend f/u outpt.   Thyroid nodule.  Incidental finding.  Recommend outpt thyroid US.  Follow up with PCP/endo.   Hyponatremia.  Na 133 -> 130 -> 132 -> 132 on 2/24/2025.  Likely SIADH in the setting of acute infection.  Placed on 1.8L fluid restriction on 2/18/25.  Cont to monitor.    Insomnia.  Likely due to pain.  Patient has Valium 5 mg q8h prn.  Atarax 25 mg as needed added 2/25.   Pain and bowel regimen.  As per physiatry.   Cellulitis of L foot s/p BKA.  Continue ASA and statin.  Patient receiving intensive PT OT as per physiatry.       No new Assessment & Plan notes have been filed under this hospital service since the last note was generated.  Service: Geriatric Medicine    Physiatry Additions/Comorbidities:    S/P BKA (below knee amputation) unilateral, left (HCC)  2/2 Infection after TMA in setting of T2DM/Neuropathy/PAD with acute ischemia.  2/11 - L BKA with Dr. Arauz  Contracture prevention (has knee immobilizer)               - Vascular did not comment on limb protector stating \"ace is fine\"               - Quad strengthening  Edema/shaping management                - ACE wrap ATC               - Shrinkers when cleared by vascular   Incisional Care/education completed   Phantom limb sensation (see that entry)  [facility] PT OT RN    Eventual PMR follow-up   F/U with Vascular for 4 week POV on 3/14     Diabetes mellitus type 2 with complications (HCC)        Lab Results   Component Value Date     HGBA1C 10.3 (H) 12/13/2024                Recent Labs     02/26/25  1625 02/26/25  2034 02/27/25  0700 02/27/25  1103   POCGLU 165* 184* 177* 149*         Blood Sugar Average: Last 72 " hrs:  (P) [id number]     Home: Lantus 10 units QHS, Aspart 5 units TID with meals  Here: Same, CDI/Accuchecks  Diabetic Diet  Follow-up with PCP  Hyponatremia  Asymptomatic while in [facility]   2/20 to 2/24: 132   Discussed with IM: 1.8L/day FR started on 2/18   Follow-up with PCP  GERD (gastroesophageal reflux disease)  Continue PPI  Follow-up with PCP  PAD (peripheral artery disease) ([facility])  S/p L SFA stent c/b acute occlusion this hospitalization  S/p multiple angioplasty (2/1, 2/2, 2/6) and lysis (1/31-2/2)   Per Vascular : ASA/Statin only  Neurovascular exam stable while in ARC   F/u Vascular outpatient   Blood loss anemia   Required multiple transfusions while inpatient.  Hemodynamically stable while in ARC   2/12 Hgb 8.6 --> 2/24 hgb 8.7   Follow-up with PCP  Primary hypertension  Home: None  Here: Chlorthalidone 12.5mg daily  Follow-up with PCP  Thyroid nodule  Noted incidentally  Enlarged multinodular thyroid gland  TSH WNL   Outpatient f/u with non-emergent ultrasound   Follow-up with PCP  Adrenal nodule (HCC)  Noted incidentally  14mm R adrenal nodule stable since 2018 - in keeping with benign adenoma  Biochemical evaluation suggested for adrenal adenomas > 1cm to rule out functioning adenoma  Recommend outpatient f/u with PCP for additional work-up  Phantom limb syndrome (HCC)  Desensitization techniques with therapy  On cymbalta and gabapentin  Follow-up with PMR after discharge.   Constipation   Docusate BID, senna 2 tabs at night, and miralax daily.   Acute pain   Tylenol scheduled (monitor LFTs)  Cymbalta 60 mg daily  Gabapentin 600 TID   Robaxin 750mg Q6hr  Valium PRN qHs muscle spasms  Dilaudid PRN taper   Follow-up with PCP   Adjustment disorder with anxiety  Of note started by APS on Cymbalta for phantom pain  Valium PRN at bedtime   Follow-up with PCP    Acute Rehabilitation Center Course: Patient participated in a comprehensive interdisciplinary inpatient rehabilitation program which  "included involvment of Rehab MD, therapies (PT, OT, and/or SLP), RN, CM, SW, dietary, and psychology services.     Significant Findings, Care, Treatment and Services Provided: Acute comprehensive interdisciplinary inpatient rehabilitation including PT, OT, SLP, RN, CM, SW, dietary, psychology, etc.    Functional Status Upon Admission to [facility]:  Mobility: Sup bed mobility, Brock 40'x2 with RW ambulation/hops.  Transfers: Sup  ADLs: Sup eating, grooming, Brock UB bathing/dressing, modA LB bathing/dressing, modA toileting     Functional Status Upon Discharge from [facility]:   Physical Therapy Occupational Therapy   Weight Bearing Status: Non-weight bearing (Left LE)  Transfers: Independent  Bed Mobility: Independent  Amulation Distance (ft): 77 feet  Ambulation: Incidental Touching  Assistive Device for Ambulation: Roller Walker  Wheelchair Mobility Distance: 200 ft  Wheelchair Mobility: Independent  Number of Stairs: 8 (regular steps using shower seat and b/l UE on single rail to stand while PT moves shower seat up/down steps; cg negotiating up2\" curb step backwards with RW and then sits on chair sitting on second step; stands from chair and hops down 2\" step with RW)  Assistive Device for Stairs:  (single HR and shower seat)  Stair Assistance: Incidental Touching  Ramp:  (TBA)  Discharge Recommendations: Home with:  DC Home with::  (Possible first floor set up)   Eating: Independent  Grooming: Independent  Bathing: Supervision  Bathing: Supervision  Upper Body Dressing: Independent  Lower Body Dressing: Supervision  Toileting: Independent  Tub/Shower Transfer: Supervision  Toilet Transfer: Independent  Cognition: Within Defined Limits  Orientation: Person, Situation, Place, Time           Discharge Diagnosis: Impairment of mobility, safety and Activities of Daily Living (ADLs) due to Amputation:  05.4  Unilateral Lower Limb Below the Knee    Discharge Medications:   See after visit summary for reconciled discharge medications " provided to patient and family.      Condition at Discharge: fair     Discharge instructions/Information to patient and family:   See after visit summary for information provided to patient and family.      Provisions for Follow-Up Care:  See after visit summary for information related to follow-up care and any pertinent home health orders.      Future Appointments   Date Time Provider Department Center   3/3/2025 To Be Determined Mariella Gray Dom, OT [facility]N [facility]   3/13/2025  2:20 PM NAOMY Johnson [facility]   3/14/2025  1:00 PM CAROLEE Feranndez [facility]   4/25/2025 11:30 AM Sarah Walker MD [facility]       Disposition: Home [facility] and family support     Planned Readmission: No    Discharge Statement   I spent 45 minutes discharging the patient. This time was spent on the day of discharge. I had direct contact with the patient on the day of discharge. Greater than 50% of the total time was spent examining patient, answering all patient questions, arranging and discussing plan of care with patient as well as directly providing post-discharge instructions. Additional time then spent on discharge activities.    Discharge Medications:  See after visit summary for reconciled discharge medications provided to patient and family.      Facility Administered Medications Prior to Discharge:    Current Facility-Administered Medications   Medication Dose Route Frequency Provider Last Rate    acetaminophen  975 mg Oral TID Cristopher Hui MD      aspirin  81 mg Oral Daily Abby De La Torre PA-C      atorvastatin  80 mg Oral Daily With Dinner Abby De La Torre PA-C      bisacodyl  10 mg Rectal Daily PRN Ashley Depadua, MD      chlorthalidone  12.5 mg Oral Daily Abby De La Torre PA-C      diazepam  5 mg Oral HS PRN Laurence Dumont PA-C      DULoxetine  60 mg Oral Daily Laurence Dumont PA-C      gabapentin  600 mg Oral TID Laurence  CAROLEE Dumont      heparin (porcine)  5,000 Units Subcutaneous Q8H [facility] Abby De La Torre PA-C      HYDROmorphone  4 mg Oral Q6H PRN Laurence Dumont PA-C      Or    HYDROmorphone  2 mg Oral Q6H PRN Laurence Dumont PA-C      hydrOXYzine HCL  25 mg Oral Q6H PRN Abby De La Torre PA-C      insulin glargine  22 Units Subcutaneous HS Oscar Salas MD      insulin lispro  1-6 Units Subcutaneous TID With Meals Kinsey Lizarraga PA-C      insulin lispro  10 Units Subcutaneous TID With Meals Kinsey Lizarraga PA-C      lidocaine  1 patch Topical Daily PRN Abby De La Torre PA-C      lidocaine  1 patch Topical Daily Ashley Depadua, MD      methocarbamol  750 mg Oral Q6H [facility] Laurence Dumont PA-C      nitroglycerin  0.4 mg Sublingual Q5 Min PRN Abby De La Torre PA-C      ondansetron  4 mg Oral Q6H PRN Abby De La Torre PA-C      pantoprazole  40 mg Oral Early Morning Abby De La Torre PA-C      polyethylene glycol  17 g Oral Daily Abby De La Torre PA-C      senna  2 tablet Oral HS Ashley Depadua, MD      trimethobenzamide  200 mg Intramuscular Q6H PRN Abby De La Torre PA-C

## 2025-02-27 NOTE — PROGRESS NOTES
02/27/25 1230   Pain Assessment   Pain Assessment Tool 0-10   Pain Score 6   Pain Location/Orientation Orientation: Left;Location: Leg  (residual limb/phantom limb pain)   Restrictions/Precautions   Precautions Fall Risk;Fluid restriction;Pain   LLE Weight Bearing Per Order NWB   Cognition   Overall Cognitive Status WFL   Arousal/Participation Alert;Responsive;Cooperative   Attention Within functional limits   Orientation Level Oriented X4   Memory Within functional limits   Following Commands Follows all commands and directions without difficulty   Roll Left and Right   Type of Assistance Needed Independent   Roll Left and Right CARE Score 6   Sit to Lying   Type of Assistance Needed Independent   Sit to Lying CARE Score 6   Lying to Sitting on Side of Bed   Type of Assistance Needed Independent   Lying to Sitting on Side of Bed CARE Score 6   Sit to Stand   Type of Assistance Needed Independent   Comment mod I from regular surfaces using RW; cg from shower seat when negotiating steps using b/l ue on handrail   Sit to Stand CARE Score 6   Bed-Chair Transfer   Type of Assistance Needed Independent   Comment mod I level surface to surface transfers usng no AD; CG SPT using RW; CG wheelchair>shower seat on steps using HR, min A shower seat>wheelchair using HR   Chair/Bed-to-Chair Transfer CARE Score 6   Car Transfer   Type of Assistance Needed Incidental touching;Supervision;Verbal cues   Comment cg/S SPT using car frame to pivot to and from wheelchair to enter/exit car   Car Transfer CARE Score 4   Walk 10 Feet   Type of Assistance Needed Incidental touching;Verbal cues   Comment cg level and unlevel surfaces with RW   Walk 10 Feet CARE Score 4   Walk 50 Feet with Two Turns   Type of Assistance Needed Incidental touching;Verbal cues   Comment cg level and unlevel surfaces with RW   Walk 50 Feet with Two Turns CARE Score 4   Walking 10 Feet on Uneven Surfaces   Type of Assistance Needed Incidental touching;Verbal  "cues   Comment cg level and unlevel surfaces with RW   Walking 10 Feet on Uneven Surfaces CARE Score 4   Ambulation   Primary Mode of Locomotion Prior to Admission Walk   Distance Walked (feet) 64 ft  (64' 21')   Assist Device Roller Walker   Gait Pattern Antalgic;Inconsistant Pauly;Slow Pauly;Decreased foot clearance;Hopping   Limitations Noted In Balance;Device Management;Endurance;Posture;Safety;Speed;Strength   Provided Assistance with: Balance   Walk Assist Level Contact Guard   Findings cg level and unlevel surfaces with RW   Does the patient walk? 2. Yes   Wheel 50 Feet with Two Turns   Type of Assistance Needed Independent   Wheel 50 Feet with Two Turns CARE Score 6   Wheel 150 Feet   Type of Assistance Needed Independent   Wheel 150 Feet CARE Score 6   Curb or Single Stair   Style negotiated Curb   Type of Assistance Needed Incidental touching;Verbal cues   Comment CGA; patient hopped up backwards onto 2\" curb step using RW then sat in chair that was sitting on second step (which was 2\" higher than first step); to descend, patient stood from chair ( on second step) and hopped down 2\" step with RW   1 Step (Curb) CARE Score 4   4 Steps   Type of Assistance Needed Incidental touching;Verbal cues   Comment cg direct stand pivot wheelchair>shower seat on steps using HR; cg STS using B/L UE on single rail while PT move shower seat down/up steps; min A direct SPT shower seat to wheelchair using HR cues for sequence and technique   4 Steps CARE Score 4   12 Steps   Reason if not Attempted Activity not applicable   12 Steps CARE Score 9   Stairs   Type Curb;Stairs   # of Steps   (2-2\" curb steps; 8 regular steps)   Weight Bearing Precautions NWB;LLE;Fall Risk   Assist Devices Roller Walker;Single Rail   Findings CGA; patient hopped up backwards onto 2\" curb step using RW then sat in chair that was sitting on second step (which was 2\" higher than first step); to descend, patient stood from chair ( on second " "step) and hopped down 2\" step with RW; cg direct stand pivot wheelchair>shower seat on steps using HR; cg STS using B/L UE on single rail while PT move shower seat down/up steps; min A direct SPT shower seat to wheelchair using HR cues for sequence and technique   Therapeutic Interventions   Strengthening seated and supine ther ex   Balance gait and transfer training   Other stair negotiation; car transfer, residual limb wrapping   Assessment   Treatment Assessment Patient agreeable to therapy session.    Reports pain in L residual limb 6/10.   Pt's dtr and mother present for family training.  Reviewed transfers (from multiple surfaces) and stair negotiation with family.    Family comfortable with TEN needed to complete these tasks in safe manner.  Patient will need CG to hop with RW, however, patient states she will be \"in my chair almost all of the time.\"    Patient completed ther ex for general LE strengthening as well as contracture prevention; gait and transfer training focusing on sequence and technique for improved balance and safety with functional mobility using walker.    Negotiated steps (as stated above) with CG and cues for safety and sequence.    Reviewed residual limb wrapping with family.  Handouts provided earlier in patient stay.      Reassessed AMPnoPRO and functional reach test ( see details below).   Patient improved AMPnoPRO to 18/39 and functional reach test remained the same.   Patient returned to bed with all needs within reach.   PT Barriers   Physical Impairment Decreased strength;Decreased range of motion;Decreased endurance;Impaired balance;Decreased mobility;Impaired judgement;Decreased safety awareness;Orthopedic restrictions;Pain   Functional Limitation Walking;Transfers;Standing;Stair negotiation;Ramp negotiation;Car transfers   Plan   Treatment/Interventions Functional transfer training;LE strengthening/ROM;Elevations;Therapeutic exercise;Bed mobility;Gait training   Progress " Progressing toward goals   PT Therapy Minutes   PT Time In 1230   PT Time Out 1400   PT Total Time (minutes) 90   PT Mode of treatment - Individual (minutes) 90   PT Mode of treatment - Concurrent (minutes) 0   PT Mode of treatment - Group (minutes) 0   PT Mode of treatment - Co-treat (minutes) 0   PT Mode of Treatment - Total time(minutes) 90 minutes   PT Cumulative Minutes 1032   Therapy Time missed   Time missed? No     Sitting Balance: 1        1 = Can sit upright independently for 60s     2. Sitting Reach: 2        2 = Reaches forward and successfully grasps items     3. Chair to Chair Transfer 90: 2        2 = Performs independently     4. Arises from Chair - Single Effort: 1        1 = Able, uses arms / assistive device to help         5. Arises from Chair - Multiple Efforts: 2        2 = Able to rise in one attempt     6. Immediate Standing Balance (1st - 5 sec): 1        1 = Able, but requires use of arms for support         7. Standing Balance: 30 sec: 1        1 = Able, but requires use of arms for support         8. AMPPro Only     9. Standing Balance: Standing Reach: 1        1 = Able, but requires use of arms for support       10. Standing Balance: Nudge Test: 0    0 = Begins to fall, needs catching         11. Standing Balance: Eyes Closed: 0    0 = Unsteady or uses arm for support         12. Standing Balance: Picking Object Off the Floor: 0    0 = Unable         13. Stand to Sit: 1        1 = Able, but uses arms for support        14. Initiation of Gait: 1        1 = No hesitancy     15. Hopping 8 Meters: 2        1 = Advances minimum of 30 cm each hop         1 = Clears foot on every step     16. Step Continuity: 1         1 = Hops appear continuous      17. Turnin    0 = Unable to turn without physical assist       18. Variable Warner: 0    0 = Unable to vary warner         19. Hopping Over an Obstacle: 1      1 = Able, but catches foot or appears unsafe         20. Stairs: 0    Ascending      0 = Unable         Descending     0 = Unable         21. Assistive Device Selection: 1        1 = Wheelchair       18/39     K1 = 9-20         FUNCTIONAL REACH TEST:  10.25inches        Reviewed First Step Booklet with family.    Patient has information for  clinic once cleared by surgeon to begin evaluation for future prosthesis.

## 2025-02-27 NOTE — CASE MANAGEMENT
Home care referral sent to [facility] , and accepted for RN/PT/OT. This information placed on discharge instructions.

## 2025-02-27 NOTE — PROGRESS NOTES
"Progress Note - PMR   Name: Lizzy Acuna 49 y.o. female I MRN: [id number]  Unit/Bed#: -01 I Date of Admission: 2/15/2025   Date of Service: 2/27/2025 I Hospital Day: 12     Assessment & Plan  S/P BKA (below knee amputation) unilateral, left (HCC)  2/2 Infection after TMA in setting of T2DM/Neuropathy/PAD with acute ischemia.  2/11 - L BKA with Dr. Arauz  Contracture prevention (has knee immobilizer)   - Vascular did not comment on limb protector stating \"ace is fine\"   - Quad strengthening  Edema/shaping management    - ACE wrap ATC   - Shrinkers when cleared by vascular   Incisional Care/education   - Mom may have to perform  Phantom limb sensation (see that entry)  PT/OT 3-5 hours/day, 5-7 days/week   Pre-prosthetic f/u to be arranged with PMR   F/U with Vascular for 4 week POV (~3/11)  Obesity (BMI 30.0-34.9)  Considerations in therapy  Tobacco abuse  Cessation counseling  Nicotine patch if needed.  Diabetes mellitus type 2 with complications ([facility])  Lab Results   Component Value Date    HGBA1C 10.3 (H) 12/13/2024       Recent Labs     02/26/25  1625 02/26/25  2034 02/27/25  0700 02/27/25  1103   POCGLU 165* 184* 177* 149*       Blood Sugar Average: Last 72 hrs:  (P) 171.6435480686229517    Home: Lantus 10 units QHS, Aspart 5 units TID with meals  Here: Same, CDI/Accuchecks  Diabetic Diet  Monitor and adjust as per primary team  Hyponatremia  Currently asymptomatic  2/20 to 2/24: 132   Discussed with IM: 1.8L/day FR started on 2/18   GERD (gastroesophageal reflux disease)  Continue PPI  PAD (peripheral artery disease) ([facility])  S/p L SFA stent c/b acute occlusion this hospitalization  S/p multiple angioplasty (2/1, 2/2, 2/6) and lysis (1/31-2/2)   Per Vascular : ASA/Statin only  Monitor neurovascular exam  F/u Vascular outpatient   Blood loss anemia    Required multiple transfusions while inpatient.  Hemodynamically stable currently.  2/12 Hgb 8.6 --> 2/17 hgb 9.0  Transfuse as appropriate.   Primary " hypertension  Home: None  Here: Chlorthalidone 12.5mg daily  Monitor and adjust as appropriate.  Thyroid nodule  Noted incidentally  Enlarged multinodular thyroid gland  Will check TSH with next set of labs.  Outpatient f/u with non-emergent ultrasound   Adrenal nodule (HCC)  Noted incidentally  14mm R adrenal nodule stable since 2018 - in keeping with benign adenoma  Biochemical evaluation suggested for adrenal adenomas > 1cm to rule out functioning adenoma  Recommend outpatient f/u with PCP for additional work-up  At risk for venous thromboembolism (VTE)  HSQ, SCDs.   Will not need to go home on HSQ  Phantom limb syndrome (HCC)  Desensitization techniques with therapy  On cymbalta and gabapentin  Monitor and adjust as appropriate  Will need f/u with PMR after discharge.   Constipation    Docusate BID, senna 2 tabs at night, and miralax daily.   Adding PRN suppository and lactulose.  Acute pain   Tylenol scheduled (monitor LFTs)  Cymbalta 60 mg daily  Gabapentin 600 TID   Robaxin 750mg Q6hr  Valium PRN qHs muscle spasms  2-4 mg Dilaudid every 6 hours PRN  Monitor and adjust as appropriate.   Adjustment disorder with anxiety  Of note started by APS on cymbalta for phantom pain  Valium PRN at bedtime   Emphasize non-pharmacologic strategies  Monitor and adjust as appropriate.     Subjective   Lizzy Acuna is a 49 y.o. female with medical history of T2DM with neuropathy, depression, HLD, obesity, PAD s/p LLE SFA balloon angioplasty and stenting in 12/2024 who presented to the [facility] 1/28 with L foot pain for 5 days. She had a L TMA on 1/3 by podiatry, and had developed increased pain, redness, and discharge. She was seen at her podiatrist's office who sent her to the ER for IV abx and further work-up. In the hospital met sepsis criteria. MRI showed fluid collection within her dorsal soft tissue. Vascular consulted after LEADs showed an occluded L SFA stent. They recommended  transfer to [place] and starting heparin gtt. Dr. Ritchie performed I&D of her left foot on 1/29 and on 1/30 she was transferred to [place]. IR was consulted, and Dr. Davidson performed catheter placement and initiated thrombolysis with TPA on 1/31. PICC was also placed. On 2/1, residual thrombus along the L SFA was treated with 4mm balloon angioplasty, with no flow noted within L SFA following angioplasty. Lysis was restarted. C/B ABLA with 2/2 Hgb 6.2 - given 2 units pRBC. Her surgical cultures grew MRSA and Finegoldia. Abx were narrowed to vancomycin by ID. APS was consulted and started dilaudid PCA and IV valium. 2/2 lysis check with patent l SFA, popliteal, tibioperoneal trunk and TARA. Mild residual stenoses along L SFA and popliteal artery were treated with 4mm balloon angioplasty and stenosis along TARA was treated with 2.5 and 3mm balloon angioplasty. L PT and peroneal arteries were chronically occluded. TPA was discontinued. She was transferred out of the ICU on 2/3. 2/4 required 1 unit pRBC for ABLA. On 2/6, she had repeat arteriogram with Dr. Victoria with finding of recurrent pop occlusion, AT recannulization with distal pop, TPT, and AT POBA an serration angioplasty. Despite this, with compromised blood flow to posterior aspect and plantar flap of foot. Still concern that perfusion would not be sufficient to salvage foot. She was recommended for BKA. On 2/11, Dr. Arauz performed L BKA. She had L adducotr and popliteal blocks. Post-op with acute bilateral shoulder pain, diaphoresis, tachycardic to 120 and hypertensive to 180/103. EKG showed LAD and TWI in V1. ACS r/o initiated. CTA dissection protocol (negative for dissection). This resolved - no acute findings. Abx were discontinued.        Chief Complaint: f/u amputation    Interval:   Seen and evaluated patient at bedside.  She denies any acute symptoms aside from LLE pain.   Anticipated Discharge Date: Feb 28, 2025 with [facility] for PT, OT, and nursing.  "  Last BM 2/25, continent of bowel and bladder.        Objective :  Temp:  [96.5 °F (35.8 °C)-97 °F (36.1 °C)] 97 °F (36.1 °C)  HR:  [80-83] 80  BP: (118-127)/(62-72) 127/72  Resp:  [16-18] 16  SpO2:  [97 %-98 %] 97 %  O2 Device: None (Room air)    Functional Update:  Physical Therapy Occupational Therapy   Weight Bearing Status: Non-weight bearing (Left LE)  Transfers: Independent  Bed Mobility: Independent  Amulation Distance (ft): 77 feet  Ambulation: Incidental Touching  Assistive Device for Ambulation: Roller Walker  Wheelchair Mobility Distance: 200 ft  Wheelchair Mobility: Independent  Number of Stairs: 8 (regular steps using shower seat and b/l UE on single rail to stand while PT moves shower seat up/down steps; cg negotiating up2\" curb step backwards with RW and then sits on chair sitting on second step; stands from chair and hops down 2\" step with RW)  Assistive Device for Stairs:  (single HR and shower seat)  Stair Assistance: Incidental Touching  Ramp:  (TBA)  Discharge Recommendations: Home with:  DC Home with::  (Possible first floor set up)   Eating: Independent  Grooming: Independent  Bathing: Supervision  Bathing: Supervision  Upper Body Dressing: Independent  Lower Body Dressing: Supervision  Toileting: Independent  Tub/Shower Transfer: Supervision  Toilet Transfer: Independent  Cognition: Within Defined Limits  Orientation: Person, Situation, Place, Time           Physical Exam  Vitals and nursing note reviewed.   Constitutional:       General: She is not in acute distress.     Appearance: She is obese. She is not ill-appearing, toxic-appearing or diaphoretic.   HENT:      Head: Normocephalic and atraumatic.      Right Ear: External ear normal.      Left Ear: External ear normal.      Nose: Nose normal.      Mouth/Throat:      Mouth: Mucous membranes are moist.   Pulmonary:      Effort: Pulmonary effort is normal. No respiratory distress.   Musculoskeletal:      Comments: Residual limb wrapped in " a clean and dry ace bandage    Skin:     General: Skin is warm and dry.   Neurological:      General: No focal deficit present.           Scheduled Meds:  Current Facility-Administered Medications   Medication Dose Route Frequency Provider Last Rate    acetaminophen  975 mg Oral TID Cristopher Hui MD      aspirin  81 mg Oral Daily Abby De La Torre PA-C      atorvastatin  80 mg Oral Daily With Dinner Abby De La Torre PA-C      bisacodyl  10 mg Rectal Daily PRN Ashley Depadua, MD      chlorthalidone  12.5 mg Oral Daily Abby De La Torre PA-C      diazepam  5 mg Oral HS PRN Laurence Dumont PA-C      DULoxetine  60 mg Oral Daily Laurence Dumont PA-C      gabapentin  600 mg Oral TID Laurence Dumont PA-C      heparin (porcine)  5,000 Units Subcutaneous Q8H JORGE Abby De La Torre PA-C      HYDROmorphone  4 mg Oral Q6H PRN Laurence Dumont PA-C      Or    HYDROmorphone  2 mg Oral Q6H PRN Laurence Dumont PA-C      hydrOXYzine HCL  25 mg Oral Q6H PRN Abby De La Torre PA-C      insulin glargine  22 Units Subcutaneous HS Oscar Salas MD      insulin lispro  1-6 Units Subcutaneous TID With Meals Kinsey Lizarraga PA-C      insulin lispro  10 Units Subcutaneous TID With Meals Kinsey Lizarraga PA-C      lidocaine  1 patch Topical Daily PRN Abby De La Torre PA-C      lidocaine  1 patch Topical Daily Ashley Depadua, MD      methocarbamol  750 mg Oral Q6H [facility] Laurence Dumont PA-C      nitroglycerin  0.4 mg Sublingual Q5 Min PRN Abby De La Torre PA-C      ondansetron  4 mg Oral Q6H PRN Abby De La Torre PA-C      pantoprazole  40 mg Oral Early Morning Abby De La Torre PA-C      polyethylene glycol  17 g Oral Daily Abby De La Torre PA-C      senna  2 tablet Oral HS Ashley Depadua, MD      trimethobenzamide  200 mg Intramuscular Q6H PRN Abby De La Torre PA-C           Lab Results: I have reviewed the following results:  Results from last 7 days   Lab Units  02/24/25  0555   HEMOGLOBIN g/dL 8.7*   HEMATOCRIT % 27.8*   WBC Thousand/uL 7.50   PLATELETS Thousands/uL 366     Results from last 7 days   Lab Units 02/24/25  0555 02/21/25  0952   BUN mg/dL 30* 30*   SODIUM mmol/L 132* 132*   POTASSIUM mmol/L 4.3 4.6   CHLORIDE mmol/L 94* 92*   CREATININE mg/dL 0.73 0.88   AST U/L 21  --    ALT U/L 22  --             Laurence Dumont PA-C  Physical Medicine and Rehabilitation  [facility]

## 2025-02-27 NOTE — PROGRESS NOTES
"Progress Note - Lizzy Acuna 49 y.o. female MRN: [id number]    Unit/Bed#: [facility] 218-01 Encounter: [id number]        Subjective:   Patient seen and examined at bedside after reviewing the chart and discussing the case with the caring staff.    Patient examined at bedside.  Patient reports that she had some leg pain apart from her phantom pain which has improved.    Physical Exam   Vitals: Blood pressure 127/72, pulse 80, temperature (!) 97 °F (36.1 °C), temperature source Temporal, resp. rate 16, height 5' 1\" (1.549 m), weight 81.8 kg (180 lb 5.4 oz), last menstrual period 12/01/2023, SpO2 97%, not currently breastfeeding.,Body mass index is 34.07 kg/m².  Constitutional: Patient in no acute distress.  HEENT: PERR, EOMI, MMM.  Cardiovascular: Normal rate and regular rhythm.    Pulmonary/Chest: Effort normal and breath sounds normal.   Abdomen: Soft, + BS, NT.    Assessment/Plan:  Lizzy Acuna is a(n) 49 y.o. 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     Cardiac w/ HTN, HLD, CAD.  Continue chlorthalidone 12.5 mg daily, atorvastatin 80 mg daily, and ASA 81 mg daily.   Type 2 DM.   Hgb A1c 10.3% on 12/13/24.  Home: Lantus 10u nightly, Humalog 5u TID.  Here: I will increase Lantus 22u nightly to 2/23/2025, Humalog 10u TID.  SSI w/ POCT AC HS.  Carb controlled diet.   GERD.  Continue Protonix 40 mg daily.   Depression.  Not on home meds.  APS started pt on Cymbalta 30 mg daily for phantom limb pain.  Also on Valium 5 mg q8h as needed for anxiety/muscle spasms.   ABLA.  Hgb 8.6 -> 9.0 -> 8.7 on 2/24/2025.  S/p multiple transfusions on acute care.  Cont to monitor.  Transfuse Hgb < 7.   Adrenal nodule.  Stable since 2018.  Recommend f/u outpt.   Thyroid nodule.  Incidental finding.  Recommend outpt thyroid US.  Follow up with PCP/endo.   Hyponatremia.  Na 133 -> 130 -> 132 -> 132 on 2/24/2025.  Likely SIADH in the setting of " acute infection.  Placed on 1.8L fluid restriction on 2/18/25.  Cont to monitor.    Insomnia.  Likely due to pain.  Patient has Valium 5 mg q8h prn.  Atarax 25 mg as needed added 2/25.   Pain and bowel regimen.  As per physiatry.   Cellulitis of L foot s/p BKA.  Continue ASA and statin.  Patient receiving intensive PT OT as per physiatry.      Anticipated date of discharge.  Friday 2/28/25.

## 2025-02-27 NOTE — ASSESSMENT & PLAN NOTE
Lab Results   Component Value Date    HGBA1C 10.3 (H) 12/13/2024       Recent Labs     02/26/25  1625 02/26/25 2034 02/27/25  0700 02/27/25  1103   POCGLU 165* 184* 177* 149*       Blood Sugar Average: Last 72 hrs:  (P) [id number]    Home: Lantus 10 units QHS, Aspart 5 units TID with meals  Here: Same, CDI/Accuchecks  Diabetic Diet  Monitor and adjust as per primary team

## 2025-02-28 ENCOUNTER — APPOINTMENT (EMERGENCY)
Dept: RADIOLOGY | Facility: HOSPITAL | Age: 50
End: 2025-02-28
Payer: COMMERCIAL

## 2025-02-28 ENCOUNTER — HOSPITAL ENCOUNTER (EMERGENCY)
Facility: HOSPITAL | Age: 50
Discharge: HOME | End: 2025-03-01
Attending: EMERGENCY MEDICINE
Payer: COMMERCIAL

## 2025-02-28 VITALS
BODY MASS INDEX: 34.05 KG/M2 | HEART RATE: 83 BPM | DIASTOLIC BLOOD PRESSURE: 68 MMHG | WEIGHT: 180.34 LBS | HEIGHT: 61 IN | RESPIRATION RATE: 16 BRPM | OXYGEN SATURATION: 96 % | SYSTOLIC BLOOD PRESSURE: 142 MMHG | TEMPERATURE: 97 F

## 2025-02-28 DIAGNOSIS — T87.89: ICD-10-CM
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
## 2025-02-28 DIAGNOSIS — M79.605: ICD-10-CM

## 2025-02-28 LAB
GLUCOSE SERPL-MCNC: 171 MG/DL (ref 65–140)
GLUCOSE SERPL-MCNC: 198 MG/DL (ref 65–140)

## 2025-02-28 PROCEDURE — 97530 THERAPEUTIC ACTIVITIES: CPT

## 2025-02-28 PROCEDURE — 82948 REAGENT STRIP/BLOOD GLUCOSE: CPT

## 2025-02-28 PROCEDURE — 73564 X-RAY EXAM KNEE 4 OR MORE: CPT

## 2025-02-28 PROCEDURE — 99285 EMERGENCY DEPT VISIT HI MDM: CPT | Performed by: EMERGENCY MEDICINE

## 2025-02-28 PROCEDURE — 99284 EMERGENCY DEPT VISIT MOD MDM: CPT

## 2025-02-28 PROCEDURE — 97535 SELF CARE MNGMENT TRAINING: CPT

## 2025-02-28 RX ORDER — DIAZEPAM 5 MG/1
5 TABLET ORAL
Qty: 14 TABLET | Refills: 0 | Status: SHIPPED | OUTPATIENT
Start: 2025-02-28 | End: 2025-03-14

## 2025-02-28 RX ORDER — METHOCARBAMOL 750 MG/1
750 TABLET, FILM COATED ORAL EVERY 6 HOURS SCHEDULED
Qty: 56 TABLET | Refills: 0 | Status: SHIPPED | OUTPATIENT
Start: 2025-02-28 | End: 2025-03-14

## 2025-02-28 RX ORDER — GABAPENTIN 600 MG/1
600 TABLET ORAL 3 TIMES DAILY
Qty: 90 TABLET | Refills: 0 | Status: SHIPPED | OUTPATIENT
Start: 2025-02-28

## 2025-02-28 RX ORDER — HYDROMORPHONE HYDROCHLORIDE 2 MG/1
4 TABLET ORAL ONCE
Refills: 0 | Status: COMPLETED | OUTPATIENT
Start: 2025-02-28 | End: 2025-02-28

## 2025-02-28 RX ORDER — LIDOCAINE 50 MG/G
1 PATCH TOPICAL DAILY
Start: 2025-03-01

## 2025-02-28 RX ORDER — ACETAMINOPHEN 325 MG/1
650 TABLET ORAL EVERY 6 HOURS PRN
Start: 2025-02-28

## 2025-02-28 RX ORDER — HYDROMORPHONE HYDROCHLORIDE 2 MG/1
TABLET ORAL
Qty: 56 TABLET | Refills: 0 | Status: SHIPPED | OUTPATIENT
Start: 2025-02-28 | End: 2025-03-13

## 2025-02-28 RX ORDER — SENNOSIDES 8.6 MG
17.2 TABLET ORAL
Qty: 30 TABLET | Refills: 0 | Status: SHIPPED | OUTPATIENT
Start: 2025-02-28

## 2025-02-28 RX ADMIN — DULOXETINE HYDROCHLORIDE 60 MG: 60 CAPSULE, DELAYED RELEASE ORAL at 08:15

## 2025-02-28 RX ADMIN — GABAPENTIN 600 MG: 300 CAPSULE ORAL at 08:15

## 2025-02-28 RX ADMIN — HYDROMORPHONE HYDROCHLORIDE 4 MG: 2 TABLET ORAL at 23:45

## 2025-02-28 RX ADMIN — ACETAMINOPHEN 975 MG: 325 TABLET ORAL at 08:15

## 2025-02-28 RX ADMIN — INSULIN LISPRO 10 UNITS: 100 INJECTION, SOLUTION INTRAVENOUS; SUBCUTANEOUS at 08:19

## 2025-02-28 RX ADMIN — INSULIN LISPRO 1 UNITS: 100 INJECTION, SOLUTION INTRAVENOUS; SUBCUTANEOUS at 08:19

## 2025-02-28 RX ADMIN — ASPIRIN 81 MG: 81 TABLET, COATED ORAL at 08:15

## 2025-02-28 RX ADMIN — METHOCARBAMOL TABLETS 750 MG: 500 TABLET, COATED ORAL at 05:41

## 2025-02-28 RX ADMIN — HEPARIN SODIUM 5000 UNITS: 5000 INJECTION INTRAVENOUS; SUBCUTANEOUS at 05:41

## 2025-02-28 RX ADMIN — HYDROMORPHONE HYDROCHLORIDE 4 MG: 4 TABLET ORAL at 08:26

## 2025-02-28 RX ADMIN — CHLORTHALIDONE 12.5 MG: 25 TABLET ORAL at 08:15

## 2025-02-28 RX ADMIN — LIDOCAINE 5% 1 PATCH: 700 PATCH TOPICAL at 08:19

## 2025-02-28 RX ADMIN — PANTOPRAZOLE SODIUM 40 MG: 40 TABLET, DELAYED RELEASE ORAL at 05:41

## 2025-02-28 NOTE — PHYSICAL THERAPY NOTE
"PT DISCHARGE SUMMARY:      Patient has met max benefit for inpatient [facility] PT.   Patient MOD I bed mobility, MOD I  STS with RW, MOD I direct sit pivot transfers level surfaces, CG SPT with RW; CG ambulation up to 83' level and unlevel surfaces with RW; MOD I wheelchair mobility level and unlevel surfaces; CG hopping backwards up 2\" curb step with RW and then sitting on chair atop second step (which is 2\" higher than first step) and CG standing from chair and hopping down 2\" step forward with RW; for regular steps to enter/exit home patient is cg direct stand pivot wheelchair>shower seat on steps using HR; cg STS using B/L UE on single rail while PT move shower seat down/up steps; min A direct SPT shower seat to wheelchair using HR cues for sequence and technique.  Spoke with dtr before d/c today and reviewed that she could bump patient up the 2 curb steps in wheelchair if she felt that would be easier.  Patient also reporting that wheelchair may be able to go through the yard to alternate entry where there is only 1 step.   Patient completed car transfer with close S using door frame for support.  For d/c to home today with family and continued PT services.  "

## 2025-02-28 NOTE — PROGRESS NOTES
02/28/25 1218   Pain Assessment   Pain Assessment Tool 0-10   Pain Score 5   Pain Location/Orientation Orientation: Left;Location: Knee  (phantom limb pain)   Restrictions/Precautions   Precautions Fall Risk;Fluid restriction;Pain  (1800ml FR)   LLE Weight Bearing Per Order NWB   Cognition   Overall Cognitive Status WFL   Orientation Level Oriented X4   Roll Left and Right   Type of Assistance Needed Independent   Roll Left and Right CARE Score 6   Sit to Lying   Type of Assistance Needed Independent   Sit to Lying CARE Score 6   Lying to Sitting on Side of Bed   Type of Assistance Needed Independent   Lying to Sitting on Side of Bed CARE Score 6   Sit to Stand   Type of Assistance Needed Independent   Comment RW   Sit to Stand CARE Score 6   Bed-Chair Transfer   Type of Assistance Needed Independent   Comment direct sit pivot transfer level surfaces   Chair/Bed-to-Chair Transfer CARE Score 6   Car Transfer   Type of Assistance Needed Supervision;Verbal cues   Comment close S using door frame for support   Car Transfer CARE Score 4   Therapeutic Interventions   Balance car transfer   Assessment   Treatment Assessment Patient agreeable to therapy session.  Pain in residual limb remains.  Completed car transfer with close S.    All belongings and DME sent with patient   Plan   Treatment/Interventions Functional transfer training   PT Therapy Minutes   PT Time In 1218   PT Time Out 1230   PT Total Time (minutes) 12   PT Mode of treatment - Individual (minutes) 12   PT Mode of treatment - Concurrent (minutes) 0   PT Mode of treatment - Group (minutes) 0   PT Mode of treatment - Co-treat (minutes) 0   PT Mode of Treatment - Total time(minutes) 12 minutes   PT Cumulative Minutes 1044   Therapy Time missed   Time missed? No

## 2025-02-28 NOTE — PROGRESS NOTES
02/28/25 1030   Pain Assessment   Pain Assessment Tool 0-10   Pain Score No Pain   Restrictions/Precautions   Precautions Fall Risk;Fluid restriction;Pain  (1800 ml FR)   LLE Weight Bearing Per Order NWB   ROM Restrictions No   Shower/Bathe Self   Type of Assistance Needed Independent   Comment CHG;Pt wrapped prior to shower with plastic and RN changes dressing directly following shower; maintained NWB;weight shifted; education to complete dressing change after shower in case leakage occurs   Shower/Bathe Self CARE Score 6   Bathing   Assessed Bath Style Shower   Anticipated D/C Bath Style Shower;Sponge Bath   Able to Gather/Transport Yes   Able to Adjust Water Temperature Yes   Able to Wash/Rinse/Dry (body part) Left Arm;Right Arm;L Upper Leg;R Upper Leg;R Lower Leg/Foot;Chest;Abdomen;Perineal Area;Buttocks   Limitations Noted in Endurance;ROM;Strength  (NWB new L BKA)   Positioning Standing;Seated   Adaptive Equipment Tub Bench;Shower Bars;Hand Held Shower   Tub/Shower Transfer   Limitations Noted In Balance;Endurance;Problem Solving;ROM;Safety;LE Strength   Adaptive Equipment Seat with Back   Assessed Shower   Findings supervision pivotting to seat   Upper Body Dressing   Type of Assistance Needed Independent   Comment w/c mobility to collect/ transport clothing   Upper Body Dressing CARE Score 6   Lower Body Dressing   Type of Assistance Needed Independent   Comment w/c mobility to collect/ transport clothing   Lower Body Dressing CARE Score 6   Putting On/Taking Off Footwear   Type of Assistance Needed Independent   Comment w/c mobility to collect/ transport clothing   Putting On/Taking Off Footwear CARE Score 6   Picking Up Object   Type of Assistance Needed Independent   Comment supporting with one limb and reaching with the other   Picking Up Object CARE Score 6   Dressing/Undressing Clothing   Remove UB Clothes Pullover Shirt   Don UB Clothes Pullover Shirt   Remove LB Clothes Shorts;Undergarment;Socks    Don LB Clothes Shorts;Undergarment;Socks;Shoes   Limitations Noted In Endurance;Strength;ROM  (NWB new L BKA)   Positioning Supported Sit;Standing   Lying to Sitting on Side of Bed   Type of Assistance Needed Independent   Lying to Sitting on Side of Bed CARE Score 6   Sit to Stand   Type of Assistance Needed Independent   Sit to Stand CARE Score 6   Bed-Chair Transfer   Type of Assistance Needed Independent   Chair/Bed-to-Chair Transfer CARE Score 6   Light Housekeeping   Light Housekeeping Level Wheelchair   Light Housekeeping Level of Assistance Modified independent   Cognition   Orientation Level Oriented X4   Additional Activities   Additional Activities Other (Comment)   Additional Activities Comments Mod I w/c mobility in room,  hallway   Other Comments   Assessment OT demonstrated how to fold w/c and discussed flipping chair onto back seat floor board to avoid having mother lift in and out of low SUV.   Assessment   Treatment Assessment Pt presents in bed agreeable to OT session inclduing transfers/ mobility and ADLs with light homemkaing using w/c as promary mode of locomotion. Pt tolerates sesison without complaints and is eager for discahrge to home with family support. Pt is Mod I all aspects of ADLs with w/c use for mobility. Discharge planned later this day.   Problem List Decreased strength;Decreased range of motion;Decreased skin integrity;Orthopedic restrictions;Pain  (NWB new L BKA)   Plan   Treatment/Interventions ADL retraining;Functional transfer training;Therapeutic exercise;Endurance training;Patient/family training;Equipment eval/education;Compensatory technique education   Progress Progressing toward goals   Discharge Recommendation   Discharge Summary Pt participates in ADLs, transfers/ mobility, light homemaking and BUE therex. Pt has met OT goals. [facility] services advised. Pt has the following DME: w/c ordered, 3in1, shower seat, RW, grab bars in shower. Pt has supportive family to assist  with tasks. Recommendation of care of incision until staples removed reviewed and pt has plastic bags and tape at home for use during shower.   OT Therapy Minutes   OT Time In 1030   OT Time Out 1130   OT Total Time (minutes) 60   OT Mode of treatment - Individual (minutes) 60   Therapy Time missed   Time missed? No

## 2025-02-28 NOTE — CONSULTS
"[facility] Podiatry - Consultation    Patient Information:   Lizzy Acuna 49 y.o. female MRN: [id number]  Unit/Bed#: -01 Encounter: [id number]  PCP: NAOMY Basilio  Date of Admission:  2/15/2025  Date of Consultation: 02/27/25  Requesting Physician: Oscar Salas MD      ASSESSMENT:    Lizzy Acuna is a 49 y.o. female with:    Painful thick nails on her right foot  B-K amputation on her left  Diabetic type II with peripheral arterial disease    PLAN:    Debride mycotic nails and thin the nail plates x 5 with the use of a nail nipper manually and an electric Dremel bur was used to reduce the thickness of the nail beds and smoothed the distal aspect of the nails.   Rest of care per primary team.      Weight Bearing Status: FWB    SUBJECTIVE    History of Present Illness:    Lizzy Acuna is a 49 y.o. female with past medical history of type II diabetic with peripheral arterial disease, recent BKA amputation of left leg who presents with painful thick nails on her right foot.     Review of Systems:    Constitutional: Negative.    HENT: Negative.    Eyes: Negative.    Respiratory: Negative.    Cardiovascular: Negative.    Gastrointestinal: Negative.    Musculoskeletal: Negative  Skin: Mycotic nails x 5  Neurological: Negative  Psych: Negative.     Past Medical and Surgical History:     Past Medical History:   Diagnosis Date    Advanced maternal age in multigravida, second trimester 11/30/2016    Transitioned From: Advanced maternal age in multigravida, first trimester      Back pain     used 2 percocet tid until current admission in 2/2017    Bone spur     in back per pt    Chronic hypertension with superimposed preeclampsia 02/14/2017    Depression     Diabetes mellitus (HCC)     Elevated BP without diagnosis of hypertension     \"with pain\"    Foot injury     Full dentures     does not wear    GERD (gastroesophageal reflux disease)     occas    Gestational diabetes     insulin dependent    Herniated " cervical disc     Herniated lumbar intervertebral disc     History of pneumonia     Hx of degenerative disc disease     Hyperlipidemia     Obesity     Pre-eclampsia     Prior pregnancy with fetal demise     previous demise at 36 weeks    Toe pain     and foot pain       Past Surgical History:   Procedure Laterality Date    ARTERIOGRAM Left 2025    Procedure: LEFT lower extremity arteriogram w/ popiteal and anterior tibial artery angioplasty;  Surgeon: Ottoniel Victoria MD;  Location: [facility];  Service: Vascular    BACK SURGERY       SECTION      INCISION AND DRAINAGE OF WOUND Left 2025    Procedure: INCISION AND DRAINAGE (I&D) EXTREMITY;  Surgeon: Leopoldo Ritchie DPM;  Location: [facility];  Service: Podiatry    IR LOWER EXTREMITY ANGIOGRAM  2024    IR LOWER EXTREMITY ANGIOGRAM  2025    IR LOWER EXTREMITY ANGIOGRAM  2025    IR PICC PLACEMENT DOUBLE LUMEN  2025    IR TPA LYSIS CHECK  2025    IR TPA LYSIS CHECK  2025    LAMINECTOMY      with disc removal, last assessed 16    LEG AMPUTATION THROUGH LOWER TIBIA AND FIBULA Left 2025    Procedure: Left BKA;  Surgeon: Shane Arauz DO;  Location: [facility];  Service: Vascular    OTHER SURGICAL HISTORY      Right ovary [facility] 2005 [facility]    RI AMPUTATION FOOT TRANSMETARSAL Left 1/3/2025    Procedure: AMPUTATION TRANSMETATARSAL (TMA);  Surgeon: Leopoldo Ritchie DPM;  Location: [facility];  Service: Podiatry    RI  DELIVERY ONLY N/A 02/15/2017    Procedure:  SECTION ();  Surgeon: Tito Umaña MD;  [facility]: [facility];  Service: Obstetrics    RI LIG/TRNSXJ FALOPIAN TUBE  DEL/ABDML SURG Left 02/15/2017    Procedure: LIGATION/COAGULATION TUBAL;  Surgeon: Tito Umaña MD;  Location: [facility];  Service: Obstetrics    VASCULAR SURGERY  2024    WISDOM TOOTH EXTRACTION         Meds/Allergies:      Medications Prior to  "Admission:     acetaminophen (TYLENOL) 325 mg tablet    Alcohol Swabs 70 % PADS    Blood Glucose Monitoring Suppl (OneTouch Verio Reflect) w/Device KIT    diazepam (VALIUM) 5 mg tablet    diphenhydrAMINE (BENADRYL) 25 mg capsule    DULoxetine (CYMBALTA) 30 mg delayed release capsule    gabapentin (Neurontin) 300 mg capsule    glucose blood (OneTouch Verio) test strip    HYDROmorphone (DILAUDID) 4 mg tablet    ibuprofen (MOTRIN) 600 mg tablet    insulin aspart (NovoLOG FlexPen) 100 UNIT/ML injection pen    Insulin Glargine Solostar (Lantus SoloStar) 100 UNIT/ML SOPN    Insulin Pen Needle (BD Pen Needle Ana 2nd Gen) 32G X 4 MM MISC    Insulin Pen Needle (BD Pen Needle Ana 2nd Gen) 32G X 4 MM MISC    lidocaine (LIDODERM) 5 %    methocarbamol (ROBAXIN) 500 mg tablet    naloxone (NARCAN) 4 mg/0.1 mL nasal spray    Oneuch Delica Lancets 33G MISC    [] oxyCODONE (ROXICODONE) 5 immediate release tablet    [] oxyCODONE (ROXICODONE) 5 immediate release tablet    pantoprazole (PROTONIX) 40 mg tablet    polyethylene glycol (MIRALAX) 17 g packet    senna-docusate sodium (SENOKOT S) 8.6-50 mg per tablet    [DISCONTINUED] aspirin (ECOTRIN LOW STRENGTH) 81 mg EC tablet    [DISCONTINUED] atorvastatin (LIPITOR) 80 mg tablet    [DISCONTINUED] chlorthalidone 25 mg tablet    Allergies   Allergen Reactions    Codeine Other (See Comments)     Aggression-and nasty--\"took a cough syrup with codeine as a child\"       Social History:     Marital Status: Unknown    Substance Use History:   Social History     Substance and Sexual Activity   Alcohol Use Yes    Comment: 1-2 drinks a year     Social History     Tobacco Use   Smoking Status Former    Current packs/day: 0.50    Types: Cigarettes   Smokeless Tobacco Never   Tobacco Comments    Per pt last cigarette 24--quit cold turkey     Social History     Substance and Sexual Activity   Drug Use Not Currently       Family History:    Family History   Problem Relation Age of " "Onset    Diabetes Mother     COPD Mother     Heart disease Mother     Stroke Father     Heart disease Father          OBJECTIVE:    Vitals:   Blood Pressure: 130/65 (02/27/25 1850)  Pulse: (!) 53 (02/27/25 1850)  Temperature: (!) 97.2 °F (36.2 °C) (02/27/25 1850)  Temp Source: Temporal (02/27/25 1850)  Respirations: 16 (02/27/25 1850)  Height: 5' 1\" (154.9 cm) (02/21/25 1601)  Weight - Scale: 81.8 kg (180 lb 5.4 oz) (02/26/25 0500)  SpO2: 100 % (02/27/25 1850)    Physical Exam:     General Appearance: Alert, cooperative, no distress.  HEENT: Head normocephalic, atraumatic, without obvious abnormality.  Heart: Normal rate and rhythm.  Lungs: Non-labored breathing. No respiratory distress.  Abdomen: Without distension.  Psychiatric: AAOx3  Lower Extremity:  Vascular:    DP/PT pedal pulses on the right are weak. CRT brisk at the digits. Pedal hair is absent. trace edema noted at right lower extremities. Skin temperature is within normal limits on the right extremity    Musculoskeletal:  MMT is 4/5 in all muscle compartments right. Passive ROM at the 1st MPJ and ankle joint are Normal right with the leg extended. Active ROM at the lesser digits is intact. No Pain on palpation gross deformities noted.     Dermatological:  Nails are brittle elongated hypertrophic white discoloration with subungual debris x 5.  There is an increased thickness and the nails are approximately 2 mm.  Small amount of dry skin is present on the plantar aspect of the right heel.  Skin is of normal appearance, temperature, and turgor with no open lesions or ulcerations noted.     Neurological:  Gross sensation is intact. Protective sensation is intact. Patient Denies numbness and/or paresthesias.      Additional Data:     Lab Results: I have personally reviewed pertinent labs including:    Results from last 7 days   Lab Units 02/24/25  0555   WBC Thousand/uL 7.50   HEMOGLOBIN g/dL 8.7*   HEMATOCRIT % 27.8*   PLATELETS Thousands/uL 366   SEGS PCT " "% 56   LYMPHO PCT % 35   MONO PCT % 6   EOS PCT % 2     Results from last 7 days   Lab Units 02/24/25  0555   POTASSIUM mmol/L 4.3   CHLORIDE mmol/L 94*   CO2 mmol/L 29   BUN mg/dL 30*   CREATININE mg/dL 0.73   CALCIUM mg/dL 9.9   ALK PHOS U/L 107*   ALT U/L 22   AST U/L 21           Cultures: I have personally reviewed pertinent cultures including:              Imaging: I have personally reviewed pertinent films in PACS.  EKG, Pathology, and Other Studies: I have personally reviewed pertinent reports.        ** Please Note: Portions of the record may have been created with voice recognition software. Occasional wrong word or \"sound a like\" substitutions may have occurred due to the inherent limitations of voice recognition software. Read the chart carefully and recognize, using context, where substitutions have occurred. **   "

## 2025-02-28 NOTE — PLAN OF CARE
Problem: PAIN - ADULT  Goal: Verbalizes/displays adequate comfort level or baseline comfort level  Description: Interventions:  - Encourage patient to monitor pain and request assistance  - Assess pain using appropriate pain scale  - Administer analgesics based on type and severity of pain and evaluate response  - Implement non-pharmacological measures as appropriate and evaluate response  - Consider cultural and social influences on pain and pain management  - Notify physician/advanced practitioner if interventions unsuccessful or patient reports new pain  Outcome: Adequate for Discharge     Problem: INFECTION - ADULT  Goal: Absence or prevention of progression during hospitalization  Description: INTERVENTIONS:  - Assess and monitor for signs and symptoms of infection  - Monitor lab/diagnostic results  - Monitor all insertion sites, i.e. indwelling lines, tubes, and drains  - Monitor endotracheal if appropriate and nasal secretions for changes in amount and color  - [employer] appropriate cooling/warming therapies per order  - Administer medications as ordered  - Instruct and encourage patient and family to use good hand hygiene technique  - Identify and instruct in appropriate isolation precautions for identified infection/condition  Outcome: Adequate for Discharge     Problem: SAFETY ADULT  Goal: Patient will remain free of falls  Description: INTERVENTIONS:  - Educate patient/family on patient safety including physical limitations  - Instruct patient to call for assistance with activity   - Consult OT/PT to assist with strengthening/mobility   - Keep Call bell within reach  - Keep bed low and locked with side rails adjusted as appropriate  - Keep care items and personal belongings within reach  - Initiate and maintain comfort rounds  - Make Fall Risk Sign visible to staff  - Apply yellow socks and bracelet for high fall risk patients  - Consider moving patient to room near nurses station  Outcome: Adequate for  Discharge  Goal: Maintain or return to baseline ADL function  Description: INTERVENTIONS:  -  Assess patient's ability to carry out ADLs; assess patient's baseline for ADL function and identify physical deficits which impact ability to perform ADLs (bathing, care of mouth/teeth, toileting, grooming, dressing, etc.)  - Assess/evaluate cause of self-care deficits   - Assess range of motion  - Assess patient's mobility; develop plan if impaired  - Assess patient's need for assistive devices and provide as appropriate  - Encourage maximum independence but intervene and supervise when necessary  - Involve family in performance of ADLs  - Assess for home care needs following discharge   - Consider OT consult to assist with ADL evaluation and planning for discharge  - Provide patient education as appropriate  Outcome: Adequate for Discharge       Problem: DISCHARGE PLANNING  Goal: Discharge to home or other facility with appropriate resources  Description: INTERVENTIONS:  - Identify barriers to discharge w/patient and caregiver  - Arrange for needed discharge resources and transportation as appropriate  - Identify discharge learning needs (meds, wound care, etc.)  - Arrange for interpretive services to assist at discharge as needed  - Refer to Case Management Department for coordinating discharge planning if the patient needs post-hospital services based on physician/advanced practitioner order or complex needs related to functional status, cognitive ability, or social support system  Outcome: Adequate for Discharge     Problem: Knowledge Deficit  Goal: Patient/family/caregiver demonstrates understanding of disease process, treatment plan, medications, and discharge instructions  Description: Complete learning assessment and assess knowledge base.  Interventions:  - Provide teaching at level of understanding  - Provide teaching via preferred learning methods  Outcome: Adequate for Discharge     Problem:  Nutrition/Hydration-ADULT  Goal: Nutrient/Hydration intake appropriate for improving, restoring or maintaining nutritional needs  Description: Monitor and assess patient's nutrition/hydration status for malnutrition. Collaborate with interdisciplinary team and initiate plan and interventions as ordered.  Monitor patient's weight and dietary intake as ordered or per policy. Utilize nutrition screening tool and intervene as necessary. Determine patient's food preferences and provide high-protein, high-caloric foods as appropriate.     INTERVENTIONS:  - Monitor oral intake, urinary output, labs, and treatment plans  - Assess nutrition and hydration status and recommend course of action  - Evaluate amount of meals eaten  - Assist patient with eating if necessary   - Allow adequate time for meals  - Recommend/ encourage appropriate diets, oral nutritional supplements, and vitamin/mineral supplements  - Provide specific nutrition/hydration education as appropriate  - Include patient/family/caregiver in decisions related to nutrition  Outcome: Adequate for Discharge

## 2025-02-28 NOTE — PROGRESS NOTES
02/27/25 1230   Pain Assessment   Pain Assessment Tool 0-10   Pain Score 6   Pain Location/Orientation Orientation: Left;Location: Leg  (residual limb/phantom limb pain)   Restrictions/Precautions   Precautions Fall Risk;Fluid restriction;Pain   LLE Weight Bearing Per Order NWB   Cognition   Overall Cognitive Status WFL   Arousal/Participation Alert;Responsive;Cooperative   Attention Within functional limits   Orientation Level Oriented X4   Memory Within functional limits   Following Commands Follows all commands and directions without difficulty   Roll Left and Right   Type of Assistance Needed Independent   Roll Left and Right CARE Score 6   Sit to Lying   Type of Assistance Needed Independent   Sit to Lying CARE Score 6   Lying to Sitting on Side of Bed   Type of Assistance Needed Independent   Lying to Sitting on Side of Bed CARE Score 6   Sit to Stand   Type of Assistance Needed Independent   Comment mod I from regular surfaces using RW; cg from shower seat when negotiating steps using b/l ue on handrail   Sit to Stand CARE Score 6   Bed-Chair Transfer   Type of Assistance Needed Independent   Comment mod I level surface to surface transfers usng no AD; CG SPT using RW; CG wheelchair>shower seat on steps using HR, min A shower seat>wheelchair using HR   Chair/Bed-to-Chair Transfer CARE Score 6   Car Transfer   Type of Assistance Needed Incidental touching;Supervision;Verbal cues   Comment cg/S SPT using car frame to pivot to and from wheelchair to enter/exit car   Car Transfer CARE Score 4   Walk 10 Feet   Type of Assistance Needed Incidental touching;Verbal cues   Comment cg level and unlevel surfaces with RW   Walk 10 Feet CARE Score 4   Walk 50 Feet with Two Turns   Type of Assistance Needed Incidental touching;Verbal cues   Comment cg level and unlevel surfaces with RW   Walk 50 Feet with Two Turns CARE Score 4   Walking 10 Feet on Uneven Surfaces   Type of Assistance Needed Incidental touching;Verbal  "cues   Comment cg level and unlevel surfaces with RW   Walking 10 Feet on Uneven Surfaces CARE Score 4   Ambulation   Primary Mode of Locomotion Prior to Admission Walk   Distance Walked (feet) 64 ft  (64' 21')   Assist Device Roller Walker   Gait Pattern Antalgic;Inconsistant Pauly;Slow Pauly;Decreased foot clearance;Hopping   Limitations Noted In Balance;Device Management;Endurance;Posture;Safety;Speed;Strength   Provided Assistance with: Balance   Walk Assist Level Contact Guard   Findings cg level and unlevel surfaces with RW   Does the patient walk? 2. Yes   Wheel 50 Feet with Two Turns   Type of Assistance Needed Independent   Wheel 50 Feet with Two Turns CARE Score 6   Wheel 150 Feet   Type of Assistance Needed Independent   Wheel 150 Feet CARE Score 6   Curb or Single Stair   Style negotiated Curb   Type of Assistance Needed Incidental touching;Verbal cues   Comment CGA; patient hopped up backwards onto 2\" curb step using RW then sat in chair that was sitting on second step (which was 2\" higher than first step); to descend, patient stood from chair ( on second step) and hopped down 2\" step with RW   1 Step (Curb) CARE Score 4   4 Steps   Type of Assistance Needed Incidental touching;Verbal cues   Comment cg direct stand pivot wheelchair>shower seat on steps using HR; cg STS using B/L UE on single rail while PT move shower seat down/up steps; min A direct SPT shower seat to wheelchair using HR cues for sequence and technique   4 Steps CARE Score 4   12 Steps   Reason if not Attempted Activity not applicable   12 Steps CARE Score 9   Stairs   Type Curb;Stairs   # of Steps   (2-2\" curb steps; 8 regular steps)   Weight Bearing Precautions NWB;LLE;Fall Risk   Assist Devices Roller Walker;Single Rail   Findings CGA; patient hopped up backwards onto 2\" curb step using RW then sat in chair that was sitting on second step (which was 2\" higher than first step); to descend, patient stood from chair ( on second " "step) and hopped down 2\" step with RW; cg direct stand pivot wheelchair>shower seat on steps using HR; cg STS using B/L UE on single rail while PT move shower seat down/up steps; min A direct SPT shower seat to wheelchair using HR cues for sequence and technique   Therapeutic Interventions   Strengthening seated and supine ther ex   Balance gait and transfer training   Other stair negotiation; car transfer, residual limb wrapping   Assessment   Treatment Assessment Patient agreeable to therapy session.    Reports pain in L residual limb 6/10.   Pt's dtr and mother present for family training.  Demonstrated transfers (from multiple surfaces) and stair negotiation with family.    Family comfortable with TEN needed to complete these tasks in safe manner.   Declined hands on training.   Patient will need CG to hop with RW, however, patient states she will be \"in my chair almost all of the time.\"    Patient completed ther ex for general LE strengthening as well as contracture prevention; gait and transfer training focusing on sequence and technique for improved balance and safety with functional mobility using walker.    Negotiated steps (as stated above) with CG and cues for safety and sequence.    Reviewed residual limb wrapping with family.  Handouts provided earlier in patient stay.      Reassessed AMPnoPRO and functional reach test ( see details below).   Patient improved AMPnoPRO to 18/39 and functional reach test remained the same.   Patient returned to bed with all needs within reach.   PT Barriers   Physical Impairment Decreased strength;Decreased range of motion;Decreased endurance;Impaired balance;Decreased mobility;Impaired judgement;Decreased safety awareness;Orthopedic restrictions;Pain   Functional Limitation Walking;Transfers;Standing;Stair negotiation;Ramp negotiation;Car transfers   Plan   Treatment/Interventions Functional transfer training;LE strengthening/ROM;Elevations;Therapeutic exercise;Bed " mobility;Gait training   Progress Progressing toward goals   PT Therapy Minutes   PT Time In 1230   PT Time Out 1400   PT Total Time (minutes) 90   PT Mode of treatment - Individual (minutes) 90   PT Mode of treatment - Concurrent (minutes) 0   PT Mode of treatment - Group (minutes) 0   PT Mode of treatment - Co-treat (minutes) 0   PT Mode of Treatment - Total time(minutes) 90 minutes   PT Cumulative Minutes 1032   Therapy Time missed   Time missed? No       FUNCTIONAL REACH TEST:  10.25 inches      AMPnoPRO    Sitting Balance: 1     1 = Can sit upright independently for 60s     2. Sitting Reach: 2    2 = Reaches forward and successfully grasps items     3. Chair to Chair Transfer 90: 2     2 = Performs independently     4. Arises from Chair - Single Effort: 1     1 = Able, uses arms / assistive device to help     5. Arises from Chair - Multiple Efforts: 2     2 = Able to rise in one attempt     6. Immediate Standing Balance (1st - 5 sec): 1     1 = Able, but requires use of arms for support     7. Standing Balance: 30 sec: 1    1 = Able, but requires use of arms for support      8. AMPPro Only     9. Standing Balance: Standing Reach: 1     1 = Able, but requires use of arms for support     10. Standing Balance: Nudge Test: 0    0 = Begins to fall, needs catching     11. Standing Balance: Eyes Closed: 0    0 = Unsteady or uses arm for support     12. Standing Balance: Picking Object Off the Floor: 0    0 = Unable     13. Stand to Sit: 1    1 = Able, but uses arms for support     14. Initiation of Gait: 1    1 = No hesitancy     15. Hopping 8 Meters: 2    1 = Advances minimum of 30 cm each hop     1 = Clears foot on every step     16. Step Continuity: 1     1 = Hops appear continuous      17. Turnin    0 = Unable to turn without physical assist      18. Variable Warner: 0    0 = Unable to vary warner     19. Hopping Over an Obstacle: 1    1 = Able, but catches foot or appears unsafe     20. Stairs:  0    Ascending     0 = Unable     Descending     0 = Unable     21. Assistive Device Selection: 1    1 = Wheelchair     K1 = 9-20

## 2025-02-28 NOTE — NURSING NOTE
Interdisciplinary team determined safest means of transport via family vehicle to home. All questions that patient had were answered. Patient and family state that they fully understand DC education at time of DC. Advised to call [facility] with any questions. All patients belongings sent home. No issues at DC.

## 2025-03-01 ENCOUNTER — HOME CARE VISIT (OUTPATIENT)
Dept: HOME HEALTH SERVICES | Facility: HOME HEALTHCARE | Age: 50
End: 2025-03-01
Payer: COMMERCIAL

## 2025-03-01 VITALS
DIASTOLIC BLOOD PRESSURE: 72 MMHG | SYSTOLIC BLOOD PRESSURE: 144 MMHG | TEMPERATURE: 97.8 F | OXYGEN SATURATION: 97 % | RESPIRATION RATE: 20 BRPM | HEART RATE: 97 BPM

## 2025-03-01 VITALS
SYSTOLIC BLOOD PRESSURE: 168 MMHG | OXYGEN SATURATION: 95 % | DIASTOLIC BLOOD PRESSURE: 76 MMHG | TEMPERATURE: 98 F | HEART RATE: 90 BPM | RESPIRATION RATE: 18 BRPM

## 2025-03-01 PROCEDURE — G0299 HHS/HOSPICE OF RN EA 15 MIN: HCPCS

## 2025-03-01 PROCEDURE — 96372 THER/PROPH/DIAG INJ SC/IM: CPT

## 2025-03-01 PROCEDURE — 400013 VN SOC

## 2025-03-01 RX ORDER — KETOROLAC TROMETHAMINE 30 MG/ML
15 INJECTION, SOLUTION INTRAMUSCULAR; INTRAVENOUS ONCE
Status: COMPLETED | OUTPATIENT
Start: 2025-03-01 | End: 2025-03-01

## 2025-03-01 RX ORDER — METHOCARBAMOL 500 MG/1
500 TABLET, FILM COATED ORAL ONCE
Status: COMPLETED | OUTPATIENT
Start: 2025-03-01 | End: 2025-03-01

## 2025-03-01 RX ADMIN — METHOCARBAMOL TABLETS 500 MG: 500 TABLET, COATED ORAL at 00:39

## 2025-03-01 RX ADMIN — KETOROLAC TROMETHAMINE 15 MG: 30 INJECTION, SOLUTION INTRAMUSCULAR at 00:40

## 2025-03-01 NOTE — ED PROVIDER NOTES
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  States that she forgot to lock her wheelchair and had a fall directly onto her left stump.  Denies any head strike or LOC.  She is concerned that by bumping her stump that she is going to cause an infection.    Differential: Pain of postoperative site, fracture    Plan: Will plan to treat with home dose of breakthrough pain Dilaudid and obtain an x-ray of the left stump.    On review of patient's x-ray, there is no acute osseous abnormalities.  Will plan to discharge patient back home with instructions to continue pain regimen and follow-up with outpatient specialist.  Return precautions given, all questions answered.    Amount and/or Complexity of Data Reviewed  Radiology: ordered and independent interpretation performed.    Risk  Prescription drug management.        ED Course as of 03/01/25 0042   Sat Mar 01, 2025   0029 On reevaluation, patient reports persistence of pain.  Will order Toradol and nighttime dose of Robaxin.       Medications   HYDROmorphone (DILAUDID) tablet 4 mg (4 mg Oral Given 2/28/25 2345)   methocarbamol (ROBAXIN) tablet 500 mg (500 mg Oral Given 3/1/25 0039)   ketorolac (TORADOL) injection 15 mg (15 mg Intramuscular Given 3/1/25 0040)       ED Risk Strat Scores                            SBIRT 20yo+      Flowsheet Row Most Recent Value   Initial Alcohol Screen: US AUDIT-C     1. How often do you have a  "drink containing alcohol? 1 Filed at: 02/28/2025 2331   2. How many drinks containing alcohol do you have on a typical day you are drinking?  0 Filed at: 02/28/2025 2331   3b. FEMALE Any Age, or MALE 65+: How often do you have 4 or more drinks on one occassion? 0 Filed at: 02/28/2025 2331   Audit-C Score 1 Filed at: 02/28/2025 2331   BANDAR: How many times in the past year have you...    Used an illegal drug or used a prescription medication for non-medical reasons? Never Filed at: 02/28/2025 2331                            History of Present Illness       Chief Complaint   Patient presents with    Fall     Fall while transferring from comode to wheelchair; pt reports hitting L leg on floor; pt has recent amputation of L lower leg; pt denies any head strike or LOC; +ASA       Past Medical History:   Diagnosis Date    Advanced maternal age in multigravida, second trimester 11/30/2016    Transitioned From: Advanced maternal age in multigravida, first trimester      Back pain     used 2 percocet tid until current admission in 2/2017    Bone spur     in back per pt    Chronic hypertension with superimposed preeclampsia 02/14/2017    Depression     Diabetes mellitus (HCC)     Elevated BP without diagnosis of hypertension     \"with pain\"    Foot injury     Full dentures     does not wear    GERD (gastroesophageal reflux disease)     occas    Gestational diabetes     insulin dependent    Herniated cervical disc     Herniated lumbar intervertebral disc     History of pneumonia     Hx of degenerative disc disease     Hyperlipidemia     Obesity     Pre-eclampsia     Prior pregnancy with fetal demise     previous demise at 36 weeks    Toe pain     and foot pain      Past Surgical History:   Procedure Laterality Date    ARTERIOGRAM Left 2/6/2025    Procedure: LEFT lower extremity arteriogram w/ popiteal and anterior tibial artery angioplasty;  Surgeon: Ottoniel Victoria MD;  Location: [facility];  Service: Vascular    " BACK SURGERY       SECTION      INCISION AND DRAINAGE OF WOUND Left 2025    Procedure: INCISION AND DRAINAGE (I&D) EXTREMITY;  Surgeon: Leopoldo Ritchie DPM;  Location: [facility];  Service: Podiatry    IR LOWER EXTREMITY ANGIOGRAM  2024    IR LOWER EXTREMITY ANGIOGRAM  2025    IR LOWER EXTREMITY ANGIOGRAM  2025    IR PICC PLACEMENT DOUBLE LUMEN  2025    IR TPA LYSIS CHECK  2025    IR TPA LYSIS CHECK  2025    LAMINECTOMY      with disc removal, last assessed 16    LEG AMPUTATION THROUGH LOWER TIBIA AND FIBULA Left 2025    Procedure: Left BKA;  Surgeon: Shane Arauz DO;  Location: [facility];  Service: Vascular    OTHER SURGICAL HISTORY      Right ovary removal 2005 per pt recall at [facility]    MO AMPUTATION FOOT TRANSMETARSAL Left 1/3/2025    Procedure: AMPUTATION TRANSMETATARSAL (TMA);  Surgeon: Leopoldo Ritchie DPM;  Location: [facility];  Service: Podiatry    MO  DELIVERY ONLY N/A 02/15/2017    Procedure:  SECTION ();  Surgeon: Tito Umaña MD;  [facility]: [facility];  Service: Obstetrics    MO LIG/TRNSXJ FALOPIAN TUBE  DEL/ABDML SURG Left 02/15/2017    Procedure: LIGATION/COAGULATION TUBAL;  Surgeon: Tito Umaña MD;  Location: [facility];  Service: Obstetrics    VASCULAR SURGERY  2024    WISDOM TOOTH EXTRACTION        Family History   Problem Relation Age of Onset    Diabetes Mother     COPD Mother     Heart disease Mother     Stroke Father     Heart disease Father       Social History     Tobacco Use    Smoking status: Former     Current packs/day: 0.50     Types: Cigarettes    Smokeless tobacco: Never    Tobacco comments:     Per pt last cigarette 24--quit cold turkey   Vaping Use    Vaping status: Never Used   Substance Use Topics    Alcohol use: Yes     Comment: 1-2 drinks a year    Drug use: Not Currently      E-Cigarette/Vaping    E-Cigarette Use Never User        E-Cigarette/Vaping Substances    Nicotine No     THC No     CBD No     Flavoring No     Other No     Unknown No       I have reviewed and agree with the history as documented.     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  Patient states that she had a left BKA on 2/11 after a long course of recurrent infections in her left lower extremity.  She states that she was discharged from rehab today back home.  She says that this evening she went to the bathroom, transferred from the wheelchair to the commode but forgot to lock her wheelchair.  On transferring back to the wheelchair, she fell directly onto her left BKA stump.  She states that she was able to catch herself and did not hit her head or lose consciousness.  She is complaining of severe pain in her left BKA stump.        Review of Systems   Constitutional:  Negative for chills and fever.   HENT:  Negative for congestion, rhinorrhea and sore throat.    Eyes:  Negative for pain and visual disturbance.   Respiratory:  Negative for cough and shortness of breath.    Cardiovascular:  Negative for chest pain and palpitations.   Gastrointestinal:  Negative for abdominal pain, constipation, diarrhea, nausea and vomiting.   Genitourinary:  Negative for dysuria and hematuria.   Musculoskeletal:  Negative for back pain and neck pain.   Skin:  Negative for color change and rash.   Neurological:  Negative for weakness and numbness.   All other systems reviewed and are negative.          Objective       ED Triage Vitals [02/28/25 2328]   Temperature Pulse Blood Pressure Respirations SpO2 Patient Position - Orthostatic VS   98.1 °F (36.7 °C) 95 164/72 18 100 % Sitting      Temp Source Heart Rate Source BP Location FiO2 (%) Pain Score    Oral Monitor Left arm -- 9      Vitals      Date and Time Temp Pulse SpO2 Resp BP Pain Score FACES Pain Rating User   03/01/25 0040 -- -- -- -- -- 9 -- KB   02/28/25 9985 -- -- --  -- -- 9 -- KB   02/28/25 2342 -- 96 100 % -- 153/69 -- -- KB   02/28/25 2328 98.1 °F (36.7 °C) 95 100 % 18 164/72 9 -- KB            Physical Exam  Vitals and nursing note reviewed.   Constitutional:       General: She is not in acute distress.     Appearance: Normal appearance. She is well-developed. She is obese. She is not ill-appearing, toxic-appearing or diaphoretic.   HENT:      Head: Normocephalic and atraumatic.      Right Ear: External ear normal.      Left Ear: External ear normal.      Nose: Nose normal. No congestion or rhinorrhea.      Mouth/Throat:      Mouth: Mucous membranes are moist.   Eyes:      General: No scleral icterus.        Right eye: No discharge.         Left eye: No discharge.      Conjunctiva/sclera: Conjunctivae normal.   Cardiovascular:      Rate and Rhythm: Normal rate and regular rhythm.      Pulses: Normal pulses.      Heart sounds: Normal heart sounds.   Pulmonary:      Effort: Pulmonary effort is normal.      Breath sounds: Normal breath sounds.   Abdominal:      General: Abdomen is flat.      Palpations: Abdomen is soft.   Musculoskeletal:         General: Normal range of motion.      Cervical back: Normal range of motion and neck supple.      Comments: Surgical wound of left stump appears clean dry and intact.  Staples in place without wound dehiscence.  There is no obvious swelling, fluctuance, or hematomas of the stump site.   Skin:     General: Skin is warm and dry.      Capillary Refill: Capillary refill takes less than 2 seconds.      Coloration: Skin is not jaundiced or pale.   Neurological:      General: No focal deficit present.      Mental Status: She is alert and oriented to person, place, and time.   Psychiatric:         Mood and Affect: Mood normal.         Behavior: Behavior normal.         Results Reviewed       None            XR knee 4+ vw left injury   ED Interpretation by Santosh Hurst MD (03/01 0025)   No acute osseous abnormalities           Procedures    ED Medication and Procedure Management   Prior to Admission Medications   Prescriptions Last Dose Informant Patient Reported? Taking?   Alcohol Swabs 70 % PADS  Self No No   Sig: May substitute brand based on insurance coverage. Check glucose TID.   Blood Glucose Monitoring Suppl (OneTouch Verio Reflect) w/Device KIT  Self No No   Sig: May substitute brand based on insurance coverage. Check glucose TID.   DULoxetine (CYMBALTA) 60 mg delayed release capsule   No No   Sig: Take 1 capsule (60 mg total) by mouth daily   HYDROmorphone (DILAUDID) 2 mg tablet   No No   Sig: Take 2 tablets (4 mg total) by mouth every 6 (six) hours as needed for severe pain for 3 days, THEN 2 tablets (4 mg total) every 8 (eight) hours as needed for severe pain for 3 days, THEN 1 tablet (2 mg total) every 8 (eight) hours as needed for severe pain for 3 days, THEN 0.5 tablets (1 mg total) every 8 (eight) hours as needed for severe pain for up to 3 days. Max Daily Amount: 16 mg.   Insulin Pen Needle (BD Pen Needle Ana 2nd Gen) 32G X 4 MM MISC  Self No No   Sig: For use with insulin pen. Pharmacy may dispense brand covered by insurance.   Insulin Pen Needle (BD Pen Needle Ana 2nd Gen) 32G X 4 MM MISC  Self No No   Sig: For use with insulin pen. Pharmacy may dispense brand covered by insurance.   OneTouch Delica Lancets 33G MISC  Self No No   Sig: May substitute brand based on insurance coverage. Check glucose TID.   acetaminophen (TYLENOL) 325 mg tablet   No No   Sig: Take 2 tablets (650 mg total) by mouth every 6 (six) hours as needed for mild pain   aspirin (ECOTRIN LOW STRENGTH) 81 mg EC tablet   No No   Sig: Take 1 tablet (81 mg total) by mouth daily   atorvastatin (LIPITOR) 80 mg tablet   No No   Sig: Take 1 tablet (80 mg total) by mouth daily with dinner   chlorthalidone 25 mg tablet   No No   Sig: Take 0.5 tablets (12.5 mg total) by mouth daily   diazepam (VALIUM) 5 mg tablet   No No   Sig: Take 1 tablet (5 mg total)  by mouth daily at bedtime as needed for anxiety or muscle spasms for up to 14 days   gabapentin (Neurontin) 600 MG tablet   No No   Sig: Take 1 tablet (600 mg total) by mouth 3 (three) times a day   glucose blood (OneTouch Verio) test strip  Self No No   Sig: May substitute brand based on insurance coverage. Check glucose TID.   hydrOXYzine HCL (ATARAX) 25 mg tablet   No No   Sig: Take 1 tablet (25 mg total) by mouth every 6 (six) hours as needed (insomnia)   insulin glargine (LANTUS) 100 units/mL subcutaneous injection   No No   Sig: Inject 22 Units under the skin daily at bedtime   insulin lispro (HumALOG/ADMELOG) 100 units/mL injection   No No   Sig: Inject 10 Units under the skin 3 (three) times a day with meals   lidocaine (LIDODERM) 5 %   No No   Sig: Apply 1 patch topically over 12 hours daily Remove & Discard patch within 12 hours or as directed by MD   methocarbamol (ROBAXIN) 750 mg tablet   No No   Sig: Take 1 tablet (750 mg total) by mouth every 6 (six) hours for 14 days   naloxone (NARCAN) 4 mg/0.1 mL nasal spray  Self No No   Sig: Administer 1 spray into a nostril. If no response after 2-3 minutes, give another dose in the other nostril using a new spray.   naloxone (NARCAN) 4 mg/0.1 mL nasal spray   No No   Sig: Administer 1 spray into a nostril. If no response after 2-3 minutes, give another dose in the other nostril using a new spray.   nitroglycerin (NITROSTAT) 0.4 mg SL tablet   No No   Sig: Place 1 tablet (0.4 mg total) under the tongue every 5 (five) minutes as needed for chest pain   pantoprazole (PROTONIX) 40 mg tablet   No No   Sig: Take 1 tablet (40 mg total) by mouth daily in the early morning   polyethylene glycol (MIRALAX) 17 g packet   No No   Sig: Take 17 g by mouth daily   senna (SENOKOT) 8.6 mg   No No   Sig: Take 2 tablets (17.2 mg total) by mouth daily at bedtime      Facility-Administered Medications: None     Patient's Medications   Discharge Prescriptions    No medications on file      No discharge procedures on file.  ED SEPSIS DOCUMENTATION   Time reflects when diagnosis was documented in both MDM as applicable and the Disposition within this note       Time User Action Codes Description Comment    3/1/2025 12:38 AM Santosh Hurst Add [W19.XXXA] Fall, initial encounter     3/1/2025 12:38 AM Santosh Hurst Add [T87.89,  M79.605] Pain of amputation stump of left lower extremity  (HCC)                  Santosh Hurst MD  03/01/25 0042

## 2025-03-01 NOTE — ED ATTENDING ATTESTATION
2/28/2025  I, Jim Reilly Jr, DO, saw and evaluated the patient. I have discussed the patient with the resident/non-physician practitioner and agree with the resident's/non-physician practitioner's findings, Plan of Care, and MDM as documented in the resident's/non-physician practitioner's note, except where noted. All available labs and Radiology studies were reviewed.  I was present for key portions of any procedure(s) performed by the resident/non-physician practitioner and I was immediately available to provide assistance.       At this point I agree with the current assessment done in the Emergency Department.  I have conducted an independent evaluation of this patient a history and physical is as follows:      Patient is a 49-year-old female status post left BKA which occurred in early February due to recurrent lower extremity infections.  Patient notes that today she was trying to transition and states that she was using her wheelchair to help stabilize her self but it was not locked and the wheelchair moved away for her causing her to fall onto her stump.  The patient notes that she was concerned that maybe there could be a wound and her family called 911 and brought her to the hospital.  Differential diagnosis is contusion versus fracture.  Upon inspecting the wound there is appears to be no evidence of cellulitis there is no erythema or warmth.  The skin does not show any evidence of trauma there is no wound dehiscence.  Patient was given 4 mg of Dilaudid because she is on the medication secondary to her surgery and x-ray was taken.  Patient will be told to follow-up with her doctors regarding reevaluation.  Patient was told to use ice to the area 4-6 times a day for 10 to 15 minutes at a time.    ED Course         Critical Care Time  Procedures

## 2025-03-01 NOTE — DISCHARGE INSTRUCTIONS
Please continue taking pain medications at home as prescribed.  Please go to your scheduled follow-up appointments as scheduled.    Please return if you develop any new or concerning symptoms including high fevers, redness of your leg, or drainage approximately her surgical site.

## 2025-03-02 NOTE — CASE COMMUNICATION
Ship to  Pt. Home       Ordering MD Dr Ritchie (home health only)    Wound 1 left BKA      Full   Frequency Daily    Sea Clens 625363 _1    Gauze 4x4 ST [id number] __20    Gauze Kerlix (fluff roll) 4inch sterile [id number] ___ 14    ABD 5x9 [id number] _14    Transpore white  2in [id number] __1    4in Ace [id number] __ 3

## 2025-03-02 NOTE — CASE COMMUNICATION
Medication discrepancies or Major drug interactions  Abnormal clinical findings patient did have a fall last night. Patient was evaluated at the ER and DC back home.   This report is informational only, no response is needed   ke[facility] A has Admitted your patient to Home Health service with the following disciplines: SN, PT and OT  Patient stated goals of care heal from surgery   Potential barriers to goal achievement comorbidities   Primary focus of home health care:Wound  Anticipated visit pattern and next visit date 2w3 next visit planned for Tues 3/4/25   Thank you for allowing us to participate in the care of your patient.

## 2025-03-03 ENCOUNTER — HOME CARE VISIT (OUTPATIENT)
Dept: HOME HEALTH SERVICES | Facility: HOME HEALTHCARE | Age: 50
End: 2025-03-03
Payer: COMMERCIAL

## 2025-03-03 ENCOUNTER — TELEPHONE (OUTPATIENT)
Dept: VASCULAR SURGERY | Facility: CLINIC | Age: 50
End: 2025-03-03

## 2025-03-03 VITALS — OXYGEN SATURATION: 98 % | HEART RATE: 102 BPM | SYSTOLIC BLOOD PRESSURE: 136 MMHG | DIASTOLIC BLOOD PRESSURE: 80 MMHG

## 2025-03-03 PROCEDURE — G0151 HHCP-SERV OF PT,EA 15 MIN: HCPCS

## 2025-03-03 NOTE — TELEPHONE ENCOUNTER
Vascular Nurse Navigator Post Op Call    Procedure:  Left - Left BKA     Date of Procedure:  2/11/25    Surgeon:     * Shane Arauz DO - Primary     * Mariam Heart MD - Assisting    Discharge Date:  2/15/25    Discharge Disposition: Rehab - [facility]    Discharge Date from [facility]:  2/28/25 to home with [facility]    Chest Pain?:No    Shortness of Breath?:No     Increased Pain, Swelling, Warmth, or Redness?:No    Purulent Drainage?:No    Foul- smelling drainage?: No    Signs of dehiscence?:No    Fever/Chills?:No    Bleeding?:No    Anticoagulation pt was discharged on post op?: Aspirin    Statin pt was discharged on post op?: Lipitor (atorvastatin)    Uncontrolled Pain?:No    Plan for Prosthetic in Future?: Yes    Prosthetic Provider?:  Digna      Reviewed discharge instructions and incision care with patient.        NEXT OFFICE VISIT SCHEDULED:   3/14/25 at 1 pm with Geraldine Andujar PA-C at [facility]    Transportation:  Yes       Any further questions/concerns?   Patient stated that she is doing good since discharge from rehab.  She stated that she had a fall on her left leg and was evaluated in the ER on 2/28/25.  She stated that her pain is controlled.  Denies any signs of infection with her incision.  All questions answered.  No concerns expressed at this time.

## 2025-03-03 NOTE — PROGRESS NOTES
03/03/25 1328   Hello, [Guardian’s Name / Patient’s Name], this is [Caller Name] from [facility], and our clinical care team wanted to check on you / your child after your recent visit to the hospital. It will only take 3-5 minutes. Is this a good time?   Discharge Call Type/ Specific Diagnosis: [facility] Amputee   [facility] Amputee Follow-up   [facility] Amputee Follow-Up Time Frame 5 Day follow up   [facility] Amputee Follow- up Questions   Patient location at time of call Home   Since your discharge from the [facility], have you had any of the following relating to amputation Fall related to amputation   Was this fall Without Injury   Were you/ your loved one's discharge instructions clear and understandable Yes   Have you Filled you/ your loved one's new prescriptions Yes   Do you have questions about your medications? No   Are you/ your loved one taking all medications as prescribed? Yes   Are you/ your loved ones having any unusual symptoms or problems? No   Follow up appointments   Follow up appointment with PCP Future appointment scheduled   Healthy Lifestyle   Are you participating in ongoing therapy? Yes   Arc discharge phone call follow ups and opportunities   How well do you feel the team did preparing you to return home? Very well   Is there a need for follow up? No   Call Complete   Discharge phone call complete? Complete

## 2025-03-04 ENCOUNTER — HOME CARE VISIT (OUTPATIENT)
Dept: HOME HEALTH SERVICES | Facility: HOME HEALTHCARE | Age: 50
End: 2025-03-04
Payer: COMMERCIAL

## 2025-03-04 VITALS — DIASTOLIC BLOOD PRESSURE: 80 MMHG | OXYGEN SATURATION: 97 % | HEART RATE: 107 BPM | SYSTOLIC BLOOD PRESSURE: 130 MMHG

## 2025-03-04 PROCEDURE — G0152 HHCP-SERV OF OT,EA 15 MIN: HCPCS

## 2025-03-04 PROCEDURE — G0299 HHS/HOSPICE OF RN EA 15 MIN: HCPCS

## 2025-03-04 NOTE — CASE COMMUNICATION
OT evaluation completed 3/4/25.  No further visits planned at this time per patient request.  Is functioning at max potential for ADL's.

## 2025-03-05 VITALS
HEART RATE: 92 BPM | TEMPERATURE: 96.9 F | SYSTOLIC BLOOD PRESSURE: 122 MMHG | DIASTOLIC BLOOD PRESSURE: 70 MMHG | OXYGEN SATURATION: 98 % | RESPIRATION RATE: 18 BRPM

## 2025-03-05 NOTE — CASE COMMUNICATION
PT initial evaluation completed.  Plan to see [id number] to address safety with transfers, ambulation and stair negotiation.

## 2025-03-06 ENCOUNTER — HOME CARE VISIT (OUTPATIENT)
Dept: HOME HEALTH SERVICES | Facility: HOME HEALTHCARE | Age: 50
End: 2025-03-06
Payer: COMMERCIAL

## 2025-03-06 VITALS — DIASTOLIC BLOOD PRESSURE: 84 MMHG | OXYGEN SATURATION: 97 % | HEART RATE: 96 BPM | SYSTOLIC BLOOD PRESSURE: 120 MMHG

## 2025-03-06 PROCEDURE — G0151 HHCP-SERV OF PT,EA 15 MIN: HCPCS

## 2025-03-07 ENCOUNTER — HOME CARE VISIT (OUTPATIENT)
Dept: HOME HEALTH SERVICES | Facility: HOME HEALTHCARE | Age: 50
End: 2025-03-07
Payer: COMMERCIAL

## 2025-03-07 PROCEDURE — G0299 HHS/HOSPICE OF RN EA 15 MIN: HCPCS

## 2025-03-07 NOTE — CASE MANAGEMENT
Patient progressed well in rehab. Patient discharged to home with family. Home care reserved with [facility] for RN/PT/OT

## 2025-03-08 VITALS
HEART RATE: 98 BPM | RESPIRATION RATE: 18 BRPM | SYSTOLIC BLOOD PRESSURE: 158 MMHG | DIASTOLIC BLOOD PRESSURE: 70 MMHG | TEMPERATURE: 97.2 F | OXYGEN SATURATION: 95 %

## 2025-03-10 ENCOUNTER — HOME CARE VISIT (OUTPATIENT)
Dept: HOME HEALTH SERVICES | Facility: HOME HEALTHCARE | Age: 50
End: 2025-03-10
Payer: COMMERCIAL

## 2025-03-10 VITALS
DIASTOLIC BLOOD PRESSURE: 78 MMHG | SYSTOLIC BLOOD PRESSURE: 122 MMHG | OXYGEN SATURATION: 100 % | RESPIRATION RATE: 18 BRPM | TEMPERATURE: 97.1 F | HEART RATE: 77 BPM

## 2025-03-10 VITALS — DIASTOLIC BLOOD PRESSURE: 72 MMHG | SYSTOLIC BLOOD PRESSURE: 140 MMHG | HEART RATE: 103 BPM | OXYGEN SATURATION: 95 %

## 2025-03-10 PROCEDURE — G0151 HHCP-SERV OF PT,EA 15 MIN: HCPCS

## 2025-03-10 PROCEDURE — G0299 HHS/HOSPICE OF RN EA 15 MIN: HCPCS

## 2025-03-12 ENCOUNTER — HOME CARE VISIT (OUTPATIENT)
Dept: HOME HEALTH SERVICES | Facility: HOME HEALTHCARE | Age: 50
End: 2025-03-12
Payer: COMMERCIAL

## 2025-03-12 VITALS — DIASTOLIC BLOOD PRESSURE: 86 MMHG | SYSTOLIC BLOOD PRESSURE: 140 MMHG | HEART RATE: 105 BPM | OXYGEN SATURATION: 96 %

## 2025-03-12 PROCEDURE — G0151 HHCP-SERV OF PT,EA 15 MIN: HCPCS

## 2025-03-12 NOTE — CASE COMMUNICATION
Plan for potential dc from  PT 3/12/2025.  Pt will transition to outpatient therapy once cleared by surgeon to begin prosthetic training.

## 2025-03-13 ENCOUNTER — HOME CARE VISIT (OUTPATIENT)
Dept: HOME HEALTH SERVICES | Facility: HOME HEALTHCARE | Age: 50
End: 2025-03-13
Payer: COMMERCIAL

## 2025-03-13 ENCOUNTER — OFFICE VISIT (OUTPATIENT)
Dept: FAMILY MEDICINE CLINIC | Facility: CLINIC | Age: 50
End: 2025-03-13
Payer: COMMERCIAL

## 2025-03-13 VITALS
DIASTOLIC BLOOD PRESSURE: 82 MMHG | HEART RATE: 113 BPM | HEIGHT: 61 IN | WEIGHT: 183.6 LBS | SYSTOLIC BLOOD PRESSURE: 162 MMHG | TEMPERATURE: 97.8 F | BODY MASS INDEX: 34.66 KG/M2 | OXYGEN SATURATION: 95 %

## 2025-03-13 DIAGNOSIS — Z11.59 NEED FOR HEPATITIS C SCREENING TEST: ICD-10-CM

## 2025-03-13 DIAGNOSIS — E11.65 UNCONTROLLED TYPE 2 DIABETES MELLITUS WITH HYPERGLYCEMIA (HCC): ICD-10-CM

## 2025-03-13 DIAGNOSIS — Z12.4 SCREENING FOR CERVICAL CANCER: ICD-10-CM

## 2025-03-13 DIAGNOSIS — Z23 ENCOUNTER FOR IMMUNIZATION: ICD-10-CM

## 2025-03-13 DIAGNOSIS — E11.42 DIABETIC POLYNEUROPATHY ASSOCIATED WITH TYPE 2 DIABETES MELLITUS (HCC): ICD-10-CM

## 2025-03-13 DIAGNOSIS — Z89.512 STATUS POST BELOW-KNEE AMPUTATION OF LEFT LOWER EXTREMITY (HCC): Primary | ICD-10-CM

## 2025-03-13 DIAGNOSIS — Z76.89 ENCOUNTER TO ESTABLISH CARE: ICD-10-CM

## 2025-03-13 DIAGNOSIS — Z12.11 SCREENING FOR COLON CANCER: ICD-10-CM

## 2025-03-13 DIAGNOSIS — Z76.89 ENCOUNTER FOR SUPPORT AND COORDINATION OF TRANSITION OF CARE: ICD-10-CM

## 2025-03-13 DIAGNOSIS — Z12.31 ENCOUNTER FOR SCREENING MAMMOGRAM FOR BREAST CANCER: ICD-10-CM

## 2025-03-13 LAB — SL AMB POCT HEMOGLOBIN AIC: 7.3 (ref ?–6.5)

## 2025-03-13 PROCEDURE — 83036 HEMOGLOBIN GLYCOSYLATED A1C: CPT

## 2025-03-13 PROCEDURE — 99495 TRANSJ CARE MGMT MOD F2F 14D: CPT

## 2025-03-14 ENCOUNTER — OFFICE VISIT (OUTPATIENT)
Dept: VASCULAR SURGERY | Facility: CLINIC | Age: 50
End: 2025-03-14

## 2025-03-14 ENCOUNTER — TELEPHONE (OUTPATIENT)
Age: 50
End: 2025-03-14

## 2025-03-14 VITALS
OXYGEN SATURATION: 99 % | SYSTOLIC BLOOD PRESSURE: 134 MMHG | HEART RATE: 102 BPM | RESPIRATION RATE: 16 BRPM | DIASTOLIC BLOOD PRESSURE: 82 MMHG | TEMPERATURE: 97.6 F

## 2025-03-14 DIAGNOSIS — Z89.512 S/P BKA (BELOW KNEE AMPUTATION) UNILATERAL, LEFT (HCC): Primary | ICD-10-CM

## 2025-03-14 PROCEDURE — 99024 POSTOP FOLLOW-UP VISIT: CPT

## 2025-03-14 NOTE — ASSESSMENT & PLAN NOTE
49 y.o. female w/ DM, obesity, HTN, tobacco abuse, back pain, GERD, PAD w/ L 4th toe wounds who underwent angiogram 12/23/24 w/ L SFA short segment stent, s/p L TMA 1/3/25 c/b dehiscence and found to have L SFA occlusion, s/p LLE angiogram 2/6 w/ lysis and tibial intervention (AT PTA). She is now s/p left BKA 2/11/25 for non salvageable L foot.     Denies acute complaints or incision concerns today. Reports intermittent phantom pains.  L BKA incision c/d/I with staples in place. No dehiscence, erythema, drainage, swelling, warmth. Every other staple was removed today. Remainder of staples were left in place for further healing time.        Plan-  -Incision care reviewed. Wash incision daily with soap and water. Pat dry. Paint incision daily with betadine paint and cover with dry dressing and ACE wrap.   -Continue ASA/statin.   -Return to office in 2-3 weeks for incision recheck and possible staple removal.   -Not cleared for weight bearing or prosthetic use. Patient established with [facility].   -Instructed to notify the office with questions, concerns, or new symptoms.

## 2025-03-14 NOTE — TELEPHONE ENCOUNTER
"Patient would like to start taking Valium 5mg long term. She was prescribed Valium for 14 days during a hospital admission. Patient states she needs Valium to sleep. Patient is calling from Vascular surgeon office and they are not willing to prescribe Valium and this needs to be ordered by PCP. Patient states she brought this up to PCP yesterday but it was \"dismissed\". Please advise if able to prescribe. Patient states she only has one pill for tonight and she absolutely needs it to sleep because of phantom pains. Will send high priority to office for review.  "

## 2025-03-14 NOTE — CASE COMMUNICATION
Pt discharged from  PT at this time.  Pt requires supervision for functional mobility inside home, assist x 2 for stair negotiation.  Pt has been educated in hep and reports compliance with same.  Pt will require further PT once able to begin prosthetic process - pt considering return to inpatient rehab vs outpatient physical therapy.  Will require new order when appropriate to begin prosthetic training.

## 2025-03-14 NOTE — PROGRESS NOTES
Name: Lizzy Acuna      : 1975      MRN: [id number]  Encounter Provider: Geraldine Andujar PA-C  Encounter Date: 3/14/2025   Encounter department: [facility]  :  Assessment & Plan  S/P BKA (below knee amputation) unilateral, left (HCC)  49 y.o. female w/ DM, obesity, HTN, tobacco abuse, back pain, GERD, PAD w/ L 4th toe wounds who underwent angiogram 24 w/ L SFA short segment stent, s/p L TMA 1/3/25 c/b dehiscence and found to have L SFA occlusion, s/p LLE angiogram  w/ lysis and tibial intervention (AT PTA). She is now s/p left BKA 25 for non salvageable L foot.     Denies acute complaints or incision concerns today. Reports intermittent phantom pains.  L BKA incision c/d/I with staples in place. No dehiscence, erythema, drainage, swelling, warmth. Every other staple was removed today. Remainder of staples were left in place for further healing time.        Plan-  -Incision care reviewed. Wash incision daily with soap and water. Pat dry. Paint incision daily with betadine paint and cover with dry dressing and ACE wrap.   -Continue ASA/statin.   -Return to office in 2-3 weeks for incision recheck and possible staple removal.   -Not cleared for weight bearing or prosthetic use. Patient established with [facility].   -Instructed to notify the office with questions, concerns, or new symptoms.              History of Present Illness   HPI  Lizzy Acuna is a 49 y.o. female who presents today for post op visit. She is s/p L BKA on 25 for non-salvageable left foot. She reports she is doing ok post operatively. She states she was discharged from rehab 2 weeks ago and on  she sustained a fall while in her bathroom and fell onto her stump. She was evaluated in the ER and xrays did not show any fractures. She reports pain at times when she is up and moving around. She has been using the walker while at home instead of the wheelchair. She does have some phantom  pains and was started on gabapentin, Cymbalta, and valium while in the hospital by the internal medicine team. She is on aspirin and atorvastatin.      History obtained from: patient, patient's child, and patient's parent        Review of Systems   Constitutional: Negative.    HENT: Negative.     Eyes: Negative.    Respiratory: Negative.     Cardiovascular: Negative.    Gastrointestinal: Negative.    Endocrine: Negative.    Genitourinary: Negative.    Musculoskeletal: Negative.    Skin:  Positive for color change and wound.   Allergic/Immunologic: Negative.    Neurological: Negative.    Hematological: Negative.    Psychiatric/Behavioral: Negative.       I have personally reviewed and made appropriate changes to the ROS that was input by the medical assistant.        Objective   /82 (BP Location: Right arm, Patient Position: Sitting, Cuff Size: Adult)   Pulse 102   Temp 97.6 °F (36.4 °C) (Temporal)   Resp 16   SpO2 99%      Physical Exam  Constitutional:       Appearance: Normal appearance.   HENT:      Head: Normocephalic and atraumatic.   Cardiovascular:      Rate and Rhythm: Normal rate and regular rhythm.      Heart sounds: Normal heart sounds.   Pulmonary:      Effort: Pulmonary effort is normal.   Musculoskeletal:         General: No tenderness.      Comments: L BKA stump c/d/I with staples in place. No signs of infection.    Skin:     General: Skin is warm and dry.      Findings: No erythema.   Neurological:      General: No focal deficit present.      Mental Status: She is alert and oriented to person, place, and time.

## 2025-03-17 ENCOUNTER — HOME CARE VISIT (OUTPATIENT)
Dept: HOME HEALTH SERVICES | Facility: HOME HEALTHCARE | Age: 50
End: 2025-03-17
Payer: COMMERCIAL

## 2025-03-17 VITALS
RESPIRATION RATE: 18 BRPM | TEMPERATURE: 96.2 F | DIASTOLIC BLOOD PRESSURE: 58 MMHG | HEART RATE: 98 BPM | SYSTOLIC BLOOD PRESSURE: 124 MMHG | OXYGEN SATURATION: 97 %

## 2025-03-17 PROCEDURE — G0299 HHS/HOSPICE OF RN EA 15 MIN: HCPCS

## 2025-03-17 NOTE — ASSESSMENT & PLAN NOTE
History of PAD w/ L 4th toe wounds who underwent angiogram 12/23/24 w/ L SFA short segment stent, s/p L TMA 1/3/25 c/b dehiscence and found to have L SFA occlusion, s/p LLE angiogram 2/6 w/ lysis and tibial intervention (AT PTA). She is now s/p left BKA 2/11/25 for non salvageable L foot    Last OV with Vascular 3/14/25 - notes reviewed  Daily betadine patient & dry dressing  3 week f/u for possible staple removal

## 2025-03-17 NOTE — PROGRESS NOTES
Transition of Care Visit  Name: Lizzy Acuna      : 1975      MRN: [id number]  Encounter Provider: NAOMY Johnson  Encounter Date: 3/13/2025   Encounter department: [facility]    Assessment & Plan  Encounter to establish care    Presents to establish care & for TCM  Previous patient of Dr. Linwood North - last OV   No notes available for review    Will need 1 week BP f/u - 162/82 in office today  Schedule annual in 3 mo  Update labs accordingly         Encounter for support and coordination of transition of care         Status post below-knee amputation of left lower extremity (HCC)    History of PAD w/ L 4th toe wounds who underwent angiogram 24 w/ L SFA short segment stent, s/p L TMA 1/3/25 c/b dehiscence and found to have L SFA occlusion, s/p LLE angiogram  w/ lysis and tibial intervention (AT PTA). She is now s/p left BKA 25 for non salvageable L foot    Last OV with Vascular 3/14/25 - notes reviewed  Daily betadine patient & dry dressing  3 week f/u for possible staple removal           Uncontrolled type 2 diabetes mellitus with hyperglycemia (HCC)    Lab Results   Component Value Date    HGBA1C 7.3 (A) 2025     A1c last checked at 10.3% on 24  Currently 7.3% in office today  Unclear who is managing insulin at this time as patient has not seen PCP in 9 years  Will review records and place referral to Endocrine  Counseled    Orders:    Ambulatory Referral to Endocrinology; Future    POCT hemoglobin A1c    Albumin / creatinine urine ratio; Future    Comprehensive metabolic panel; Future    Hemoglobin A1C; Future    CBC and differential; Future    Lipid Panel with Direct LDL reflex; Future    TSH, 3rd generation with Free T4 reflex; Future    Diabetic polyneuropathy associated with type 2 diabetes mellitus (HCC)    Lab Results   Component Value Date    HGBA1C 7.3 (A) 2025       Orders:    Albumin / creatinine urine ratio;  "Future    Encounter for immunization         Screening for cervical cancer    Orders:    Ambulatory referral to Obstetrics / Gynecology; Future    Ambulatory Referral to Gastroenterology; Future    Encounter for screening mammogram for breast cancer    Orders:    Mammo screening bilateral w 3d and cad; Future    Need for hepatitis C screening test    Orders:    Hepatitis C Antibody; Future    Screening for colon cancer    Orders:    Cologuard         History of Present Illness     Transitional Care Management Review:   Lizzy Acuna is a 49 y.o. female here for TCM follow up.     During the TCM phone call patient stated:  TCM Call (since 3/3/2025)       None          TCM Call (since 3/3/2025)       None            Review of Systems   Constitutional:  Negative for chills, fatigue and fever.   HENT:  Negative for congestion, ear pain, facial swelling, hearing loss, rhinorrhea, sinus pressure, sneezing, sore throat and trouble swallowing.    Eyes:  Negative for pain, redness and visual disturbance.   Respiratory:  Negative for cough, chest tightness, shortness of breath and wheezing.    Cardiovascular:  Negative for chest pain and palpitations.   Gastrointestinal:  Negative for abdominal pain, diarrhea, nausea and vomiting.   Genitourinary:  Negative for dysuria, flank pain, hematuria and pelvic pain.   Musculoskeletal:  Negative for arthralgias, back pain and myalgias.   Skin:  Negative for color change and rash.   Neurological:  Negative for dizziness, seizures, syncope, weakness, light-headedness and headaches.   Psychiatric/Behavioral:  Negative for confusion, hallucinations and sleep disturbance. The patient is not nervous/anxious.    All other systems reviewed and are negative.    Objective   /82 (Patient Position: Sitting)   Pulse (!) 113   Temp 97.8 °F (36.6 °C) (Tympanic)   Ht 5' 1\" (1.549 m)   Wt 83.3 kg (183 lb 9.6 oz)   SpO2 95%   BMI 34.69 kg/m²     Physical Exam  Vitals and nursing note " reviewed.   Constitutional:       General: She is not in acute distress.     Appearance: She is well-developed.   HENT:      Head: Normocephalic and atraumatic.      Right Ear: Hearing and tympanic membrane normal.      Left Ear: Hearing and tympanic membrane normal.      Nose: Nose normal.      Mouth/Throat:      Mouth: Mucous membranes are moist.      Dentition: Normal dentition.      Tongue: No lesions.      Pharynx: Oropharynx is clear. Uvula midline. No oropharyngeal exudate.      Tonsils: No tonsillar exudate.   Eyes:      Extraocular Movements: Extraocular movements intact.      Conjunctiva/sclera: Conjunctivae normal.   Neck:      Vascular: No carotid bruit or JVD.   Cardiovascular:      Rate and Rhythm: Normal rate and regular rhythm.      Heart sounds: S1 normal and S2 normal. No murmur heard.  Pulmonary:      Effort: Pulmonary effort is normal. No tachypnea or respiratory distress.      Breath sounds: Normal breath sounds and air entry. No decreased breath sounds.   Chest:      Chest wall: No deformity or tenderness.   Abdominal:      General: Abdomen is flat. Bowel sounds are normal. There is no distension.      Palpations: Abdomen is soft.      Tenderness: There is no abdominal tenderness. There is no right CVA tenderness, left CVA tenderness or guarding.   Musculoskeletal:         General: No swelling.      Cervical back: Full passive range of motion without pain and neck supple.      Right lower leg: No edema.      Left lower leg: No edema.      Left Lower Extremity: Left leg is amputated below knee.   Lymphadenopathy:      Cervical: No cervical adenopathy.   Skin:     General: Skin is warm and dry.      Capillary Refill: Capillary refill takes less than 2 seconds.      Findings: No rash.   Neurological:      Mental Status: She is alert and oriented to person, place, and time.      Cranial Nerves: Cranial nerves 2-12 are intact.      Sensory: Sensation is intact.      Motor: Motor function is intact.       Coordination: Coordination is intact.      Gait: Gait is intact.   Psychiatric:         Mood and Affect: Mood normal.         Behavior: Behavior is cooperative.       Medications have been reviewed by provider in current encounter

## 2025-03-18 ENCOUNTER — HOME CARE VISIT (OUTPATIENT)
Dept: HOME HEALTH SERVICES | Facility: HOME HEALTHCARE | Age: 50
End: 2025-03-18
Payer: COMMERCIAL

## 2025-03-19 ENCOUNTER — NURSE TRIAGE (OUTPATIENT)
Age: 50
End: 2025-03-19

## 2025-03-19 DIAGNOSIS — E66.9 OBESITY (BMI 30-39.9): ICD-10-CM

## 2025-03-19 DIAGNOSIS — G47.01 INSOMNIA DUE TO MEDICAL CONDITION: Primary | ICD-10-CM

## 2025-03-19 RX ORDER — PANTOPRAZOLE SODIUM 40 MG/1
40 TABLET, DELAYED RELEASE ORAL
Qty: 30 TABLET | Refills: 0 | Status: SHIPPED | OUTPATIENT
Start: 2025-03-19

## 2025-03-19 RX ORDER — TRAZODONE HYDROCHLORIDE 50 MG/1
50 TABLET ORAL
Qty: 30 TABLET | Refills: 0 | Status: SHIPPED | OUTPATIENT
Start: 2025-03-19 | End: 2025-03-25 | Stop reason: SDUPTHER

## 2025-03-19 NOTE — TELEPHONE ENCOUNTER
Patient called for update, advised of message from Marylou regarding the Valium. Patient expressed understanding, states she is open to recommendations regarding alternate sleep aide. She states the pains are worse at night so she is having difficulty sleeping.     Patient also states due to all the medications she is taking she is experiencing heart burn. She is requesting a script for Omeprazole.     Patient requests call back to advise.

## 2025-03-19 NOTE — TELEPHONE ENCOUNTER
"Pt was last seen in the office 3/14 and is s/p left BKA 2/11/25 for non salvageable L foot. Pt reports she had 1/2 her staples removed on 3/14 and her mother who assists with her wound care noticed that the color of the skin above the incision is staring to turn a darker purple and looks even darker today.   She is concerned with her history of wounds looking good initially and then forming abscesses.    Pt denies drainage, redness, warmth or pain at this time.     Instructed the patient to send a picture of the wound through [place] and advised patient will inform provider of finding and call patient back with further recommendations.       Answer Assessment - Initial Assessment Questions  1. SYMPTOM: \"What's the main symptom you're concerned about?\" (e.g., drainage, incision opened up, pain, redness)      Color of skin above the wound   2. ONSET: \"When did this  start?\"      Yesterday   3. SURGERY: \"What surgery did you have?\"      Left BKA   4. DATE of SURGERY: \"When was the surgery?\"       2/11/25  5. INCISION SITE: \"Where is the incision located?\"       Left leg   6. REDNESS: \"Is there any redness at the incision site?\" If Yes, ask: \"How wide across is the redness?\" (Inches, centimeters)       Denies   7. PAIN: \"Is there any pain?\" If Yes, ask: \"How bad is it?\"  (Scale 1-10; or mild, moderate, severe)      Denies  8. BLEEDING: \"Is there any bleeding?\" If Yes, ask: \"How much?\" and \"Where?\"      Denies   9. DRAINAGE: \"Is there any drainage from the incision site?\" If Yes, ask: \"What color and how much?\" (e.g., red, cloudy, pus; drops, teaspoon)      Denies    Protocols used: Post-Op Incision Symptoms and Questions-Adult-[place]    "

## 2025-03-19 NOTE — TELEPHONE ENCOUNTER
Reviewed photos.  No evidence of ischemic changes along incision. Has patient been having any fever or chills?

## 2025-03-20 ENCOUNTER — HOME CARE VISIT (OUTPATIENT)
Dept: HOME HEALTH SERVICES | Facility: HOME HEALTHCARE | Age: 50
End: 2025-03-20
Payer: COMMERCIAL

## 2025-03-20 VITALS
OXYGEN SATURATION: 96 % | DIASTOLIC BLOOD PRESSURE: 74 MMHG | SYSTOLIC BLOOD PRESSURE: 136 MMHG | TEMPERATURE: 97.6 F | RESPIRATION RATE: 18 BRPM | HEART RATE: 95 BPM

## 2025-03-20 PROCEDURE — G0299 HHS/HOSPICE OF RN EA 15 MIN: HCPCS

## 2025-03-20 NOTE — TELEPHONE ENCOUNTER
Noted, Picture from today reviewed. As expected.    Ok to moisturize surrounding skin/ stump.  NOT over incision.   Follow up in office as scheduled.

## 2025-03-20 NOTE — TELEPHONE ENCOUNTER
Spoke to pt regarding Portia's message of moisturizing. Pt confirmed she understood. Reminded pt of her next OV time and date.

## 2025-03-20 NOTE — TELEPHONE ENCOUNTER
Pictures from yesterday reviewed. Difficult to appreciate color change in one of pictures  2/2 lighting.   Any trauma or bumping the stump? Is she having pain in the area? Tender to touch?      Will await visiting nurse visit today for any acute concerns as they will be seeing in person.     Advise to call with any changes.

## 2025-03-20 NOTE — TELEPHONE ENCOUNTER
Called and spoke with patient- denies any trauma or bumping to the area/stump. Denies any pain or tenderness. Visiting nurse is there now as I am speaking with patient- denies any acute concerns at this time. New picture uploaded in chart today from visiting nurse.

## 2025-03-20 NOTE — TELEPHONE ENCOUNTER
Called and spoke with patient, informed her of message from Joanie. She also states that she is not experiencing any fever or chills. Visiting nurse will be coming this morning to look at surgical site. Please advise.

## 2025-03-25 ENCOUNTER — OFFICE VISIT (OUTPATIENT)
Dept: FAMILY MEDICINE CLINIC | Facility: CLINIC | Age: 50
End: 2025-03-25
Payer: COMMERCIAL

## 2025-03-25 ENCOUNTER — HOME CARE VISIT (OUTPATIENT)
Dept: HOME HEALTH SERVICES | Facility: HOME HEALTHCARE | Age: 50
End: 2025-03-25
Payer: COMMERCIAL

## 2025-03-25 VITALS
HEART RATE: 109 BPM | SYSTOLIC BLOOD PRESSURE: 138 MMHG | DIASTOLIC BLOOD PRESSURE: 80 MMHG | BODY MASS INDEX: 34.69 KG/M2 | OXYGEN SATURATION: 98 % | HEIGHT: 61 IN

## 2025-03-25 DIAGNOSIS — I10 PRIMARY HYPERTENSION: Primary | ICD-10-CM

## 2025-03-25 DIAGNOSIS — Z12.31 ENCOUNTER FOR SCREENING MAMMOGRAM FOR BREAST CANCER: ICD-10-CM

## 2025-03-25 DIAGNOSIS — E11.8 DIABETES MELLITUS TYPE 2 WITH COMPLICATIONS (HCC): ICD-10-CM

## 2025-03-25 DIAGNOSIS — Z89.512 S/P BKA (BELOW KNEE AMPUTATION) UNILATERAL, LEFT (HCC): ICD-10-CM

## 2025-03-25 DIAGNOSIS — E11.42 DIABETIC POLYNEUROPATHY ASSOCIATED WITH TYPE 2 DIABETES MELLITUS (HCC): ICD-10-CM

## 2025-03-25 DIAGNOSIS — Z12.4 SCREENING FOR CERVICAL CANCER: ICD-10-CM

## 2025-03-25 DIAGNOSIS — G47.01 INSOMNIA DUE TO MEDICAL CONDITION: ICD-10-CM

## 2025-03-25 DIAGNOSIS — Z23 ENCOUNTER FOR IMMUNIZATION: ICD-10-CM

## 2025-03-25 DIAGNOSIS — F41.9 ANXIETY: ICD-10-CM

## 2025-03-25 LAB
LEFT EYE DIABETIC RETINOPATHY: ABNORMAL
LEFT EYE IMAGE QUALITY: ABNORMAL
LEFT EYE MACULAR EDEMA: ABNORMAL
LEFT EYE OTHER RETINOPATHY: ABNORMAL
RIGHT EYE DIABETIC RETINOPATHY: ABNORMAL
RIGHT EYE IMAGE QUALITY: ABNORMAL
RIGHT EYE MACULAR EDEMA: ABNORMAL
RIGHT EYE OTHER RETINOPATHY: ABNORMAL
SEVERITY (EYE EXAM): ABNORMAL

## 2025-03-25 PROCEDURE — 99214 OFFICE O/P EST MOD 30 MIN: CPT

## 2025-03-25 RX ORDER — LIDOCAINE 50 MG/G
1 PATCH TOPICAL DAILY
Qty: 30 PATCH | Refills: 0 | Status: SHIPPED | OUTPATIENT
Start: 2025-03-25

## 2025-03-25 RX ORDER — GLUCOSAMINE HCL/CHONDROITIN SU 500-400 MG
CAPSULE ORAL
Qty: 100 EACH | Refills: 0 | Status: SHIPPED | OUTPATIENT
Start: 2025-03-25

## 2025-03-25 RX ORDER — TRAZODONE HYDROCHLORIDE 100 MG/1
100 TABLET ORAL
Qty: 30 TABLET | Refills: 0 | Status: SHIPPED | OUTPATIENT
Start: 2025-03-25

## 2025-03-25 RX ORDER — DULOXETIN HYDROCHLORIDE 60 MG/1
60 CAPSULE, DELAYED RELEASE ORAL DAILY
Qty: 30 CAPSULE | Refills: 0 | Status: SHIPPED | OUTPATIENT
Start: 2025-03-25

## 2025-03-25 RX ORDER — ATORVASTATIN CALCIUM 80 MG/1
80 TABLET, FILM COATED ORAL EVERY EVENING
Qty: 100 TABLET | Refills: 1 | Status: SHIPPED | OUTPATIENT
Start: 2025-03-25

## 2025-03-25 RX ORDER — CHLORTHALIDONE 25 MG/1
12.5 TABLET ORAL DAILY
Qty: 30 TABLET | Refills: 0 | Status: SHIPPED | OUTPATIENT
Start: 2025-03-25

## 2025-03-25 RX ORDER — METHOCARBAMOL 750 MG/1
750 TABLET, FILM COATED ORAL EVERY 8 HOURS SCHEDULED
Qty: 42 TABLET | Refills: 0 | Status: SHIPPED | OUTPATIENT
Start: 2025-03-25 | End: 2025-04-08

## 2025-03-25 NOTE — TELEPHONE ENCOUNTER
Patient was seen today refill of Insulin Pen Needle (BD Pen Needle Ana 2nd Gen) 32G X 4 MM MISC   Was not included,with the other meds    please sent to [facility]    Only has 1 needle left

## 2025-03-25 NOTE — ASSESSMENT & PLAN NOTE
Presents for BP f/u - improved  Currently 138/80  Counseled    Orders:    chlorthalidone 25 mg tablet; Take 0.5 tablets (12.5 mg total) by mouth daily

## 2025-03-25 NOTE — ASSESSMENT & PLAN NOTE
Lab Results   Component Value Date    HGBA1C 7.3 (A) 03/13/2025       Completed DM2 foot & eye exams  F/u with Endocrine as scheduled  Counseled    Orders:    Diabetic foot exam; Future    Albumin / creatinine urine ratio; Future    Alcohol Swabs 70 % PADS; May substitute brand based on insurance coverage. Check glucose TID.    atorvastatin (LIPITOR) 80 mg tablet; Take 1 tablet (80 mg total) by mouth every evening    Ambulatory referral to Ophthalmology; Future

## 2025-03-25 NOTE — PROGRESS NOTES
Name: Lizzy Acuna      : 1975      MRN: [id number]  Encounter Provider: NAOMY Johnson  Encounter Date: 3/25/2025   Encounter department: [facility]  :  Assessment & Plan  Primary hypertension    Presents for BP f/u - improved  Currently 138/80  Counseled    Orders:    chlorthalidone 25 mg tablet; Take 0.5 tablets (12.5 mg total) by mouth daily    Diabetic polyneuropathy associated with type 2 diabetes mellitus (HCC)    Lab Results   Component Value Date    HGBA1C 7.3 (A) 2025       Orders:    IRIS Diabetic eye exam    Diabetes mellitus type 2 with complications (HCC)    Lab Results   Component Value Date    HGBA1C 7.3 (A) 2025       Completed DM2 foot & eye exams  F/u with Endocrine as scheduled  Counseled    Orders:    Diabetic foot exam; Future    Albumin / creatinine urine ratio; Future    Alcohol Swabs 70 % PADS; May substitute brand based on insurance coverage. Check glucose TID.    atorvastatin (LIPITOR) 80 mg tablet; Take 1 tablet (80 mg total) by mouth every evening    Ambulatory referral to Ophthalmology; Future    S/P BKA (below knee amputation) unilateral, left (HCC)    Orders:    lidocaine (LIDODERM) 5 %; Apply 1 patch topically over 12 hours daily Remove & Discard patch within 12 hours or as directed by MD    methocarbamol (ROBAXIN) 750 mg tablet; Take 1 tablet (750 mg total) by mouth every 8 (eight) hours for 14 days    Insomnia due to medical condition    Increase dose to 100 mg HS  F/u as scheduled  Counseled    The benefits, risks and alternatives to the treatment plan were discussed at this visit. Patient was advised of common adverse effects of any medical therapies prescribed. All questions were answered and discussed with the patient and any accompanying family members or caretakers.     Orders:    traZODone (DESYREL) 100 mg tablet; Take 1 tablet (100 mg total) by mouth daily at bedtime    Anxiety    Orders:    DULoxetine (CYMBALTA) 60  mg delayed release capsule; Take 1 capsule (60 mg total) by mouth daily    Encounter for immunization    Counseled    Orders:    HEPATITIS A VACCINE ADULT IM    Screening for cervical cancer    Orders:    Ambulatory Referral to Obstetrics / Gynecology; Future    Encounter for screening mammogram for breast cancer    Orders:    Mammo screening bilateral w 3d and cad; Future      BMI Counseling: Body mass index is 34.69 kg/m². The BMI is above normal. Nutrition recommendations include reducing portion sizes, decreasing overall calorie intake, 3-5 servings of fruits/vegetables daily, reducing fast food intake, consuming healthier snacks, decreasing soda and/or juice intake, moderation in carbohydrate intake, increasing intake of lean protein, reducing intake of saturated fat and trans fat, and reducing intake of cholesterol. Exercise recommendations include moderate aerobic physical activity for 150 minutes/week and exercising 3-5 times per week.       Depression Screening and Follow-up Plan: Patient was screened for depression during today's encounter. They screened negative with a PHQ-2 score of 0.        History of Present Illness     Review of Systems   Constitutional:  Negative for chills, fatigue and fever.   HENT:  Negative for congestion, ear pain, facial swelling, hearing loss, rhinorrhea, sinus pressure, sneezing, sore throat and trouble swallowing.    Eyes:  Negative for pain, redness and visual disturbance.   Respiratory:  Negative for cough, chest tightness, shortness of breath and wheezing.    Cardiovascular:  Negative for chest pain and palpitations.   Gastrointestinal:  Negative for abdominal pain, diarrhea, nausea and vomiting.   Genitourinary:  Negative for dysuria, flank pain, hematuria and pelvic pain.   Musculoskeletal:  Negative for arthralgias, back pain and myalgias.   Skin:  Negative for color change and rash.   Neurological:  Negative for dizziness, seizures, syncope, weakness,  "light-headedness and headaches.   Psychiatric/Behavioral:  Negative for confusion, hallucinations and sleep disturbance. The patient is not nervous/anxious.    All other systems reviewed and are negative.      Objective   /80   Pulse (!) 109   Ht 5' 1\" (1.549 m)   SpO2 98%   BMI 34.69 kg/m²      Physical Exam  Vitals and nursing note reviewed.   Constitutional:       General: She is not in acute distress.     Appearance: She is well-developed.   HENT:      Head: Normocephalic and atraumatic.      Right Ear: Hearing and tympanic membrane normal.      Left Ear: Hearing and tympanic membrane normal.      Nose: Nose normal.      Mouth/Throat:      Mouth: Mucous membranes are moist.      Dentition: Normal dentition.      Tongue: No lesions.      Pharynx: Oropharynx is clear. Uvula midline. No oropharyngeal exudate.      Tonsils: No tonsillar exudate.   Eyes:      Extraocular Movements: Extraocular movements intact.      Conjunctiva/sclera: Conjunctivae normal.   Neck:      Vascular: No carotid bruit or JVD.   Cardiovascular:      Rate and Rhythm: Normal rate and regular rhythm.      Pulses: no weak pulses.           Dorsalis pedis pulses are 2+ on the right side.        Posterior tibial pulses are 2+ on the right side.      Heart sounds: S1 normal and S2 normal. No murmur heard.  Pulmonary:      Effort: Pulmonary effort is normal. No tachypnea or respiratory distress.      Breath sounds: Normal breath sounds and air entry. No decreased breath sounds.   Chest:      Chest wall: No deformity or tenderness.   Abdominal:      General: Abdomen is flat. Bowel sounds are normal. There is no distension.      Palpations: Abdomen is soft.      Tenderness: There is no abdominal tenderness. There is no right CVA tenderness, left CVA tenderness or guarding.   Musculoskeletal:         General: No swelling.      Cervical back: Full passive range of motion without pain and neck supple.      Right lower leg: No edema.      Left " lower leg: No edema.      Left Lower Extremity: Left leg is amputated below knee.   Feet:      Right foot:      Skin integrity: Dry skin present. No ulcer, skin breakdown, erythema, warmth or callus.      Left foot: amputated  Lymphadenopathy:      Cervical: No cervical adenopathy.   Skin:     General: Skin is warm and dry.      Capillary Refill: Capillary refill takes less than 2 seconds.      Findings: No rash.   Neurological:      Mental Status: She is alert and oriented to person, place, and time.      Cranial Nerves: Cranial nerves 2-12 are intact.      Sensory: Sensation is intact.      Motor: Motor function is intact.      Coordination: Coordination is intact.      Gait: Gait is intact.   Psychiatric:         Mood and Affect: Mood normal.         Behavior: Behavior is cooperative.         Diabetic Foot Exam    Patient's shoes and socks removed.    Right Foot/Ankle   Right Foot Inspection  Skin Exam: skin normal, skin intact and dry skin. No warmth, no callus, no erythema, no maceration, no abnormal color, no pre-ulcer, no ulcer and no callus.     Toe Exam: ROM and strength within normal limits.     Sensory   Monofilament testing: intact    Vascular  Capillary refills: < 3 seconds  The right DP pulse is 2+. The right PT pulse is 2+.     Left Foot/Ankle  Left Foot Inspection  Amputation: amputation left foot (Comments: L BKA)    Assign Risk Category  No deformity present  No loss of protective sensation  No weak pulses  Risk: 0

## 2025-03-26 RX ORDER — PEN NEEDLE, DIABETIC 32GX 5/32"
NEEDLE, DISPOSABLE MISCELLANEOUS
Qty: 100 EACH | Refills: 0 | Status: SHIPPED | OUTPATIENT
Start: 2025-03-26

## 2025-03-26 NOTE — CASE COMMUNICATION
Ship to Pt. Home   Ordering MD Dr Ritchie (home health only)   Wound 1 left BKA Full Frequency Daily   Gauze 4x4 ST [id number] __20   Gauze Kerlix (fluff roll) 4inch sterile [id number] ___ 14   ABD 5x9 [id number] _14   Transpore white 2in [id number] __1   4in Ace [id number] __ 2

## 2025-03-27 ENCOUNTER — HOME CARE VISIT (OUTPATIENT)
Dept: HOME HEALTH SERVICES | Facility: HOME HEALTHCARE | Age: 50
End: 2025-03-27
Payer: COMMERCIAL

## 2025-03-27 VITALS
OXYGEN SATURATION: 97 % | SYSTOLIC BLOOD PRESSURE: 144 MMHG | TEMPERATURE: 97.3 F | HEART RATE: 100 BPM | DIASTOLIC BLOOD PRESSURE: 60 MMHG | RESPIRATION RATE: 18 BRPM

## 2025-03-27 PROCEDURE — G0299 HHS/HOSPICE OF RN EA 15 MIN: HCPCS

## 2025-04-01 ENCOUNTER — TELEPHONE (OUTPATIENT)
Age: 50
End: 2025-04-01

## 2025-04-01 NOTE — TELEPHONE ENCOUNTER
Caller: Lizzy Acuna    Doctor and/or Office: Dr. Ritchie/Palma    #: [phone]    Escalation: Disability forms/Calling as she is not allowed to drive after left BKA and she is asking Dr. Ritchie to allow her to drive. Also needs paperwork updated? Please call back and advise. Thanks   This document is complete and the patient is ready for discharge.

## 2025-04-02 NOTE — TELEPHONE ENCOUNTER
Contacted patient to discuss disability form.  She stated the last form was completed by Dr. Ritchie prior to her TMA.  Since this form was completed, patient had a BKA.  Informed her that a new form will need to be completed.  Requested her to either fax, drop off, or send through [employer] the form that will need to be completed.  She stated that she will try and drop off at [facility] prior to her appointment on 4/11/25 or she will bring it with her as she stated that she will need to sign the form. Informed her once form is received, it will take up to 2 weeks to be completed.  All questions answered.

## 2025-04-03 ENCOUNTER — HOME CARE VISIT (OUTPATIENT)
Dept: HOME HEALTH SERVICES | Facility: HOME HEALTHCARE | Age: 50
End: 2025-04-03
Payer: COMMERCIAL

## 2025-04-03 VITALS
TEMPERATURE: 97.2 F | HEART RATE: 98 BPM | OXYGEN SATURATION: 97 % | SYSTOLIC BLOOD PRESSURE: 142 MMHG | DIASTOLIC BLOOD PRESSURE: 80 MMHG

## 2025-04-03 PROCEDURE — G0299 HHS/HOSPICE OF RN EA 15 MIN: HCPCS

## 2025-04-04 ENCOUNTER — TELEPHONE (OUTPATIENT)
Age: 50
End: 2025-04-04

## 2025-04-04 NOTE — TELEPHONE ENCOUNTER
Caller: Lizzy    Doctor: Mary Andujar     Reason for call: patient is calling to see if her FMLA forms loaded in [employer]. I informed her as of right now I do not see them. I went over with patient how to load them through [employer]. Patient understands and will try to load now.    Call back#: [phone]

## 2025-04-10 ENCOUNTER — HOME CARE VISIT (OUTPATIENT)
Dept: HOME HEALTH SERVICES | Facility: HOME HEALTHCARE | Age: 50
End: 2025-04-10
Payer: COMMERCIAL

## 2025-04-10 VITALS
TEMPERATURE: 97.3 F | SYSTOLIC BLOOD PRESSURE: 130 MMHG | DIASTOLIC BLOOD PRESSURE: 80 MMHG | OXYGEN SATURATION: 95 % | HEART RATE: 88 BPM

## 2025-04-10 PROCEDURE — G0299 HHS/HOSPICE OF RN EA 15 MIN: HCPCS

## 2025-04-11 ENCOUNTER — OFFICE VISIT (OUTPATIENT)
Dept: VASCULAR SURGERY | Facility: CLINIC | Age: 50
End: 2025-04-11

## 2025-04-11 VITALS
HEART RATE: 79 BPM | OXYGEN SATURATION: 100 % | DIASTOLIC BLOOD PRESSURE: 82 MMHG | SYSTOLIC BLOOD PRESSURE: 136 MMHG | TEMPERATURE: 97.5 F | RESPIRATION RATE: 16 BRPM

## 2025-04-11 DIAGNOSIS — Z89.512 STATUS POST BELOW-KNEE AMPUTATION OF LEFT LOWER EXTREMITY (HCC): Primary | ICD-10-CM

## 2025-04-11 DIAGNOSIS — I73.9 PAD (PERIPHERAL ARTERY DISEASE) (HCC): ICD-10-CM

## 2025-04-11 PROCEDURE — 99024 POSTOP FOLLOW-UP VISIT: CPT

## 2025-04-11 NOTE — ASSESSMENT & PLAN NOTE
49 y.o. female w/ DM, obesity, HTN, tobacco abuse, back pain, GERD, PAD w/ L 4th toe wounds who underwent angiogram 12/23/24 w/ L SFA short segment stent, s/p L TMA 1/3/25 c/b dehiscence and found to have L SFA occlusion, s/p LLE angiogram 2/6 w/ lysis and tibial intervention (AT PTA). She is now s/p left BKA 2/11/25 for non salvageable L foot.     Denies acute complaints or incision concerns today. Reports intermittent phantom pains.  L BKA incision c/d/I with remaining staples in place. No dehiscence, erythema, drainage, swelling, warmth, wounds. Remainder of staples removed today without issue. Incision with cleansed with NSS, betadine, and wrapped with kerlix and ACE wrap.       Plan-  -s/p L BKA. Left BKA incision well healed without signs of infection or dehiscence.   -Patient cleared for weight bearing, limb , and prosthetic use. Patient established with [facility].  -Referral to physiatry placed today.  -Patient ok to drive as she will be using the RLE to operate the gas and brake pedal and as long as she is not under the influence of narcotics, muscle relaxants, sedatives, or any medications that could cause drowsiness, impaired judgement, or slowed reaction times.   -Continue ASA/statin.   -Instructed to notify the office with questions, concerns, or new symptoms.     Orders:    Limb     Prosthetic    Ambulatory Referral to Physiatry; Future

## 2025-04-11 NOTE — ASSESSMENT & PLAN NOTE
-50-75% right distal popliteal artery stenosis.   -Asymptomatic.   -Continue to monitor with periodic duplex. Due 12/2025.  -Follow up in 1 year.    Orders:    VAS ARTERIAL DUPLEX-LOWER LIMB UNILATERAL; Future

## 2025-04-11 NOTE — PROGRESS NOTES
Name: Lizzy Acuna      : 1975      MRN: [id number]  Encounter Provider: Geraldine Andujar PA-C  Encounter Date: 2025   Encounter department: [facility]  :  Assessment & Plan  Status post below-knee amputation of left lower extremity (HCC)  49 y.o. female w/ DM, obesity, HTN, tobacco abuse, back pain, GERD, PAD w/ L 4th toe wounds who underwent angiogram 24 w/ L SFA short segment stent, s/p L TMA 1/3/25 c/b dehiscence and found to have L SFA occlusion, s/p LLE angiogram  w/ lysis and tibial intervention (AT PTA). She is now s/p left BKA 25 for non salvageable L foot.     Denies acute complaints or incision concerns today. Reports intermittent phantom pains.  L BKA incision c/d/I with remaining staples in place. No dehiscence, erythema, drainage, swelling, warmth, wounds. Remainder of staples removed today without issue. Incision with cleansed with NSS, betadine, and wrapped with kerlix and ACE wrap.       Plan-  -s/p L BKA. Left BKA incision well healed without signs of infection or dehiscence.   -Patient cleared for weight bearing, limb , and prosthetic use. Patient established with [facility].  -Referral to physiatry placed today.  -Patient ok to drive as she will be using the RLE to operate the gas and brake pedal and as long as she is not under the influence of narcotics, muscle relaxants, sedatives, or any medications that could cause drowsiness, impaired judgement, or slowed reaction times.   -Continue ASA/statin.   -Instructed to notify the office with questions, concerns, or new symptoms.     Orders:    Limb     Prosthetic    Ambulatory Referral to Physiatry; Future      PAD (peripheral artery disease) ([facility])  -50-75% right distal popliteal artery stenosis.   -Asymptomatic.   -Continue to monitor with periodic duplex. Due 2025.  -Follow up in 1 year.    Orders:    VAS ARTERIAL DUPLEX-LOWER LIMB UNILATERAL; Future        History of  Present Illness   HPI  Lizzy Acuna is a 49 y.o. female who presents today for post op evaluation. She denies any acute complaints. She reports intermittent phantom pains that are controlled. She denies incision concerns including erythema, warmth, drainage, dehiscence. She is cleansing her incision daily with soap and water and keeping covered.    History obtained from: patient    Review of Systems   Constitutional: Negative.    HENT: Negative.     Eyes: Negative.    Respiratory: Negative.     Cardiovascular: Negative.    Gastrointestinal: Negative.    Endocrine: Negative.    Genitourinary: Negative.    Musculoskeletal:  Positive for gait problem.   Skin: Negative.    Allergic/Immunologic: Negative.    Hematological: Negative.    Psychiatric/Behavioral: Negative.       I have personally reviewed and made appropriate changes to the ROS that was input by the medical assistant.        Objective   /82 (BP Location: Right arm, Patient Position: Sitting, Cuff Size: Adult)   Pulse 79   Temp 97.5 °F (36.4 °C) (Temporal)   Resp 16   SpO2 100%      Physical Exam  Vitals and nursing note reviewed.   Constitutional:       Appearance: Normal appearance. She is obese.   HENT:      Head: Normocephalic and atraumatic.   Cardiovascular:      Rate and Rhythm: Normal rate and regular rhythm.      Heart sounds: Normal heart sounds.   Pulmonary:      Effort: Pulmonary effort is normal.   Musculoskeletal:      Comments: L BKA   Skin:     General: Skin is warm and dry.      Findings: No erythema.      Comments: L BKA incision with remaining staples in place. Incision appears well healed without erythema, swelling, dehiscence, fluctuance, drainage.   Neurological:      General: No focal deficit present.      Mental Status: She is alert and oriented to person, place, and time.   Psychiatric:         Mood and Affect: Mood normal.         Behavior: Behavior normal.         Thought Content: Thought content normal.          Judgment: Judgment normal.       Pre-staple removal      Post staple removal    Geraldine Andujar PA-C  [facility]  [phone]

## 2025-04-17 DIAGNOSIS — E11.8 DIABETES MELLITUS TYPE 2 WITH COMPLICATIONS (HCC): ICD-10-CM

## 2025-04-17 RX ORDER — GLUCOSAMINE HCL/CHONDROITIN SU 500-400 MG
CAPSULE ORAL
Qty: 100 EACH | Refills: 0 | Status: SHIPPED | OUTPATIENT
Start: 2025-04-17

## 2025-04-17 RX ORDER — PEN NEEDLE, DIABETIC 32GX 5/32"
NEEDLE, DISPOSABLE MISCELLANEOUS
Qty: 100 EACH | Refills: 0 | Status: SHIPPED | OUTPATIENT
Start: 2025-04-17

## 2025-04-17 NOTE — TELEPHONE ENCOUNTER
Reason for call:   [x] Refill   [] Prior Auth  [] Other:     Office:   [x] PCP/Provider - JOCELYN Johnson FP   [] Specialty/Provider -     Medication: BD Pen Needle Ana    Dose/Frequency: 32 G x 4 MM    Quantity: #100    Pharmacy: [facility]   Does the patient have enough for 3 days?   [] Yes   [x] No - Send as HP to POD    [facility] Pharm[facility]   Does the patient have enough for 10 days?   [] Yes   [] No - Send as HP to POD                Reason for call:   [x] Refill   [] Prior Auth  [] Other:     Office:   [x] PCP/Provider -   [] Specialty/Provider -     Medication: Alcohol Swabs    Dose/Frequency: 70 % pads    Quantity: #100    Pharmacy: [facility]   Does the patient have enough for 3 days?   [] Yes   [x] No - Send as HP to POD    [facility] Pharmacy   Does the patient have enough for 10 days?   [] Yes   [] No - Send as HP to POD

## 2025-04-21 ENCOUNTER — NURSE TRIAGE (OUTPATIENT)
Age: 50
End: 2025-04-21

## 2025-04-21 NOTE — TELEPHONE ENCOUNTER
"FOLLOW UP: Discuss with PCP and call back by nurse    REASON FOR CONVERSATION: Leg Swelling    SYMPTOMS: mild-moderate swelling that pt noticed yesterday, states that the last time this occurred she was in the hospital and they gave her a diuretic    OTHER: pt is currently on Chlorothalidone 25mg to take 1/2 a tablet daily., denies SOB or chest pain; pt is having a hard time getting around without assistance and is unable to go to office.  Pt to send over DNS:Net picture of current condition of legs.      DISPOSITION: Discuss With PCP and Callback by Nurse Today (overriding See Today in Office)      Reason for Disposition   MODERATE swelling of both ankles (e.g., swelling extends up to the knees) AND new-onset or getting worse    Answer Assessment - Initial Assessment Questions  1. ONSET: \"When did the swelling start?\" (e.g., minutes, hours, days)      Noticed it yesterday    2. LOCATION: \"What part of the leg is swollen?\"  \"Are both legs swollen or just one leg?\"      Calf and foot of right  and pt is LLE amputee    3. SEVERITY: \"How bad is the swelling?\" (e.g., localized; mild, moderate, severe)      Less than double the size; mild-moderate    4. REDNESS: \"Does the swelling look red or infected?\"      Denies    5. PAIN: \"Is the swelling painful to touch?\" If Yes, ask: \"How painful is it?\"   (Scale 1-10; mild, moderate or severe)      Denies    6. FEVER: \"Do you have a fever?\" If Yes, ask: \"What is it, how was it measured, and when did it start?\"       Denies fevers    7. CAUSE: \"What do you think is causing the leg swelling?\"      Unsure but feels like she is retaining water    8. MEDICAL HISTORY: \"Do you have a history of blood clots (e.g., DVT), cancer, heart failure, kidney disease, or liver failure?\"      Denies    9. RECURRENT SYMPTOM: \"Have you had leg swelling before?\" If Yes, ask: \"When was the last time?\" \"What happened that time?\"      Around Jan or Feb and pt was given a diuretic.    10. OTHER SYMPTOMS: " "\"Do you have any other symptoms?\" (e.g., chest pain, difficulty breathing)        Denies    Protocols used: Leg Swelling and Edema-Adult-[place]    "

## 2025-04-23 ENCOUNTER — CONSULT (OUTPATIENT)
Dept: ENDOCRINOLOGY | Facility: CLINIC | Age: 50
End: 2025-04-23
Payer: COMMERCIAL

## 2025-04-23 ENCOUNTER — OFFICE VISIT (OUTPATIENT)
Dept: FAMILY MEDICINE CLINIC | Facility: CLINIC | Age: 50
End: 2025-04-23
Payer: COMMERCIAL

## 2025-04-23 VITALS
BODY MASS INDEX: 36.25 KG/M2 | TEMPERATURE: 97.8 F | DIASTOLIC BLOOD PRESSURE: 74 MMHG | OXYGEN SATURATION: 96 % | WEIGHT: 192 LBS | SYSTOLIC BLOOD PRESSURE: 130 MMHG | HEART RATE: 98 BPM | RESPIRATION RATE: 18 BRPM | HEIGHT: 61 IN

## 2025-04-23 VITALS
BODY MASS INDEX: 36.63 KG/M2 | SYSTOLIC BLOOD PRESSURE: 124 MMHG | OXYGEN SATURATION: 97 % | TEMPERATURE: 97.6 F | DIASTOLIC BLOOD PRESSURE: 88 MMHG | HEIGHT: 61 IN | HEART RATE: 93 BPM | WEIGHT: 194 LBS

## 2025-04-23 DIAGNOSIS — M79.89 LEG SWELLING: Primary | ICD-10-CM

## 2025-04-23 DIAGNOSIS — Z11.59 NEED FOR HEPATITIS C SCREENING TEST: ICD-10-CM

## 2025-04-23 DIAGNOSIS — Z12.12 SCREENING FOR COLORECTAL CANCER: ICD-10-CM

## 2025-04-23 DIAGNOSIS — M65.331 TRIGGER FINGER, RIGHT MIDDLE FINGER: ICD-10-CM

## 2025-04-23 DIAGNOSIS — E11.8 DIABETES MELLITUS TYPE 2 WITH COMPLICATIONS (HCC): ICD-10-CM

## 2025-04-23 DIAGNOSIS — L08.9 DIABETIC FOOT INFECTION  (HCC): ICD-10-CM

## 2025-04-23 DIAGNOSIS — I10 PRIMARY HYPERTENSION: Primary | ICD-10-CM

## 2025-04-23 DIAGNOSIS — E11.9 DIABETES MELLITUS (HCC): ICD-10-CM

## 2025-04-23 DIAGNOSIS — E11.65 UNCONTROLLED TYPE 2 DIABETES MELLITUS WITH HYPERGLYCEMIA (HCC): ICD-10-CM

## 2025-04-23 DIAGNOSIS — E11.628 DIABETIC FOOT INFECTION  (HCC): ICD-10-CM

## 2025-04-23 DIAGNOSIS — Z12.11 SCREENING FOR COLORECTAL CANCER: ICD-10-CM

## 2025-04-23 DIAGNOSIS — E04.1 THYROID NODULE: ICD-10-CM

## 2025-04-23 DIAGNOSIS — E27.9 ADRENAL NODULE (HCC): ICD-10-CM

## 2025-04-23 PROCEDURE — 99214 OFFICE O/P EST MOD 30 MIN: CPT

## 2025-04-23 PROCEDURE — 99214 OFFICE O/P EST MOD 30 MIN: CPT | Performed by: STUDENT IN AN ORGANIZED HEALTH CARE EDUCATION/TRAINING PROGRAM

## 2025-04-23 RX ORDER — INSULIN GLARGINE 100 [IU]/ML
22 INJECTION, SOLUTION SUBCUTANEOUS
Qty: 21 ML | Refills: 1 | Status: SHIPPED | OUTPATIENT
Start: 2025-04-23 | End: 2025-04-29

## 2025-04-23 RX ORDER — TIRZEPATIDE 2.5 MG/.5ML
2.5 INJECTION, SOLUTION SUBCUTANEOUS WEEKLY
Qty: 2 ML | Refills: 0 | Status: SHIPPED | OUTPATIENT
Start: 2025-04-23 | End: 2025-04-27

## 2025-04-23 RX ORDER — INSULIN LISPRO 100 [IU]/ML
10 INJECTION, SOLUTION INTRAVENOUS; SUBCUTANEOUS
Qty: 27 ML | Refills: 1 | Status: SHIPPED | OUTPATIENT
Start: 2025-04-23 | End: 2025-04-29

## 2025-04-23 RX ORDER — KETOROLAC TROMETHAMINE 30 MG/ML
INJECTION, SOLUTION INTRAMUSCULAR; INTRAVENOUS
Qty: 1 EACH | Refills: 0 | Status: SHIPPED | OUTPATIENT
Start: 2025-04-23

## 2025-04-23 RX ORDER — TIRZEPATIDE 5 MG/.5ML
5 INJECTION, SOLUTION SUBCUTANEOUS WEEKLY
Qty: 6 ML | Refills: 0 | Status: SHIPPED | OUTPATIENT
Start: 2025-05-23 | End: 2025-04-27

## 2025-04-23 RX ORDER — HYDROCHLOROTHIAZIDE 12.5 MG/1
CAPSULE ORAL
Qty: 6 EACH | Refills: 2 | Status: SHIPPED | OUTPATIENT
Start: 2025-04-23

## 2025-04-23 NOTE — PATIENT INSTRUCTIONS
We have started Mounjaro 2.5 mg weekly for 4 weeks and then 5 mg weekly until your next visit.  Decrease Humalog to 8 units before meals and Lantus to 20 units at bedtime  When you start taking Mounjaro 5 mg weekly, decrease Humalog to 4 units before meals and Lantus to 18 units before the time.  I ordered bernadette 3 sensor and reader to your pharmacy, please call us for any question, call us to connect you to the office.

## 2025-04-23 NOTE — ASSESSMENT & PLAN NOTE
Her blood pressure today is 130/74 mmHg. She is currently taking chlorthalidone 12.5 mg daily for blood pressure management. She reports water retention, which may be due to limited mobility. She is advised to continue her current medication regimen and monitor her symptoms.

## 2025-04-23 NOTE — PROGRESS NOTES
Name: Lizzy Acuna      : 1975      MRN: [id number]  Encounter Provider: NAOMY Johnson  Encounter Date: 2025   Encounter department: [facility]  :  Assessment & Plan  Leg swelling    Reports leg swelling since   States she felt R ankle & foot were swollen along with BKA  Denies CP or SOB but states her dghter notes her snoring more at night    No swelling present on exam  Lungs clear    Prev on Chlorthalidone 12.4 mg daily before transferring care    Weight 194 lb in office today    Discussed updating labs & CXR  Will f/u after results  ER precautions provided    Orders:    CBC and differential; Future    TSH, 3rd generation with Free T4 reflex; Future    Comprehensive metabolic panel; Future    Lipid panel; Future    Hemoglobin A1C; Future    Albumin / creatinine urine ratio; Future    Hemoglobin A1C; Future    XR chest pa and lateral; Future    Trigger finger, right middle finger    Orders:    Ambulatory Referral to Orthopedic Surgery; Future    Need for hepatitis C screening test    Orders:    Hepatitis C antibody; Future    Screening for colorectal cancer    Orders:    Cologuard           History of Present Illness     Review of Systems   Constitutional:  Negative for chills, fatigue and fever.   HENT:  Negative for congestion, ear pain, facial swelling, hearing loss, rhinorrhea, sinus pressure, sneezing, sore throat and trouble swallowing.    Eyes:  Negative for pain, redness and visual disturbance.   Respiratory:  Negative for cough, chest tightness, shortness of breath and wheezing.    Cardiovascular:  Negative for chest pain and palpitations.   Gastrointestinal:  Negative for abdominal pain, diarrhea, nausea and vomiting.   Genitourinary:  Negative for dysuria, flank pain, hematuria and pelvic pain.   Musculoskeletal:  Negative for arthralgias, back pain and myalgias.   Skin:  Negative for color change and rash.   Neurological:  Negative for  "dizziness, seizures, syncope, weakness, light-headedness and headaches.   Psychiatric/Behavioral:  Negative for confusion, hallucinations and sleep disturbance. The patient is not nervous/anxious.    All other systems reviewed and are negative.      Objective   /88   Pulse 93   Temp 97.6 °F (36.4 °C) (Tympanic)   Ht 5' 1\" (1.549 m)   Wt 88 kg (194 lb) Comment: wheelchair  SpO2 97%   BMI 36.66 kg/m²      Physical Exam  Vitals and nursing note reviewed.   Constitutional:       General: She is not in acute distress.     Appearance: She is well-developed.      Comments: NAD   HENT:      Head: Normocephalic and atraumatic.      Right Ear: Hearing and tympanic membrane normal.      Left Ear: Hearing and tympanic membrane normal.      Nose: Nose normal.      Mouth/Throat:      Mouth: Mucous membranes are moist.      Dentition: Normal dentition.      Tongue: No lesions.      Pharynx: Oropharynx is clear. Uvula midline. No oropharyngeal exudate.      Tonsils: No tonsillar exudate.   Eyes:      Extraocular Movements: Extraocular movements intact.      Conjunctiva/sclera: Conjunctivae normal.   Neck:      Vascular: No carotid bruit or JVD.   Cardiovascular:      Rate and Rhythm: Normal rate and regular rhythm.      Heart sounds: S1 normal and S2 normal. No murmur heard.     Comments: No edema noted  Pulmonary:      Effort: Pulmonary effort is normal. No tachypnea or respiratory distress.      Breath sounds: Normal breath sounds and air entry. No decreased breath sounds.      Comments: CTAB  Chest:      Chest wall: No deformity or tenderness.   Abdominal:      General: Abdomen is flat. Bowel sounds are normal. There is no distension.      Palpations: Abdomen is soft.      Tenderness: There is no abdominal tenderness. There is no right CVA tenderness, left CVA tenderness or guarding.   Musculoskeletal:         General: No swelling.      Cervical back: Full passive range of motion without pain and neck supple.      " Right lower leg: No edema.      Left lower leg: No edema.   Lymphadenopathy:      Cervical: No cervical adenopathy.   Skin:     General: Skin is warm and dry.      Capillary Refill: Capillary refill takes less than 2 seconds.      Findings: No rash.   Neurological:      Mental Status: She is alert and oriented to person, place, and time.      Cranial Nerves: Cranial nerves 2-12 are intact.      Sensory: Sensation is intact.      Motor: Motor function is intact.      Coordination: Coordination is intact.      Gait: Gait is intact.   Psychiatric:         Mood and Affect: Mood normal.         Behavior: Behavior is cooperative.

## 2025-04-23 NOTE — PROGRESS NOTES
Name: Lizzy Acuna      : 1975      MRN: [id number]  Encounter Provider: Anay Sheets MD  Encounter Date: 2025   Encounter department: [facility] [place]    Chief Complaint   Patient presents with   • Advice Only   :  Assessment & Plan  Uncontrolled type 2 diabetes mellitus with hyperglycemia (HCC)    Lab Results   Component Value Date    HGBA1C 7.3 (A) 2025       Her A1c has improved from 10.3% to 7.3%. She is currently on Humalog 10 units before meals and Lantus 22 units at bedtime.   Patient denies personal and family history of MCT and MEN2 tumors. Patient denies personal history of pancreatitis. Side effects discussed but not limited to diarrhea, bloating, constipation, GI upset, heartburn, headache, low blood sugar, fatigue and dizziness.   Mounjaro 2.5 mg will be initiated once weekly for 4 weeks, followed by an increase to 5 mg weekly until the next visit. Upon initiation of Mounjaro, Humalog will be reduced to 8 units before meals and Lantus to 20 units at bedtime. Once the Mounjaro dosage is increased to 5 mg weekly, Humalog will be further reduced to 4 units before meals and Lantus to 18 units at bedtime. She is instructed to monitor her blood sugar levels continuously and report any concerns promptly.  A prescription for Carlie 3 sensor and reader has been issued to [facility]. She is advised to contact the office upon receipt of the device for assistance with setup and usage instructions.     Follow-up  The patient will follow up in 4 months.        Orders:  •  Ambulatory Referral to Endocrinology  •  Continuous Glucose Sensor (FreeStyle Carlie 3 Plus Sensor) MISC; To be changed every 15 days and to check blood sugar continuously  •  Continuous Glucose  (FreeStyle Carlie 3 [place]) SUSAN; To check blood sugar continuously  •  Tirzepatide (Mounjaro) 2.5 MG/0.5ML SOAJ; Inject 2.5 mg under the skin once a week  •  Tirzepatide  (Mounjaro) 5 MG/0.5ML SOAJ; Inject 5 mg under the skin once a week Do not start before May 23, 2025.  •  Hemoglobin A1C; Future    Diabetes mellitus (HCC)    Lab Results   Component Value Date    HGBA1C 7.3 (A) 03/13/2025     Orders:  •  insulin glargine (LANTUS) 100 units/mL subcutaneous injection; Inject 22 Units under the skin daily at bedtime  •  insulin lispro (HumALOG/ADMELOG) 100 units/mL injection; Inject 10 Units under the skin 3 (three) times a day with meals    Diabetic foot infection  ([facility])    Lab Results   Component Value Date    HGBA1C 7.3 (A) 03/13/2025     Orders:  •  insulin glargine (LANTUS) 100 units/mL subcutaneous injection; Inject 22 Units under the skin daily at bedtime  •  insulin lispro (HumALOG/ADMELOG) 100 units/mL injection; Inject 10 Units under the skin 3 (three) times a day with meals    Diabetes mellitus type 2 with complications ([facility])    Lab Results   Component Value Date    HGBA1C 7.3 (A) 03/13/2025     Orders:  •  insulin glargine (LANTUS) 100 units/mL subcutaneous injection; Inject 22 Units under the skin daily at bedtime  •  insulin lispro (HumALOG/ADMELOG) 100 units/mL injection; Inject 10 Units under the skin 3 (three) times a day with meals    Primary hypertension  Her blood pressure today is 130/74 mmHg. She is currently taking chlorthalidone 12.5 mg daily for blood pressure management. She reports water retention, which may be due to limited mobility. She is advised to continue her current medication regimen and monitor her symptoms.        Thyroid nodule  Incidental finding of enlarged multinodular thyroid on his CT angio of chest and abdomen.  Primary team already ordered thyroid ultrasound which she was advised to follow-up.         Adrenal nodule (HCC)  A 14 mm right sided adrenal nodule which is consistent with adrenal adenoma.  Hormone overproduction discussed, low-dose dexamethasone suppression test will be ordered.  Also aldosterone and renin activity as well as  renin aldosterone ratio.             History of Present Illness   History of Present Illness      Lizzy Acuna is a 49 y.o. female with type 2 diabetes with long-term current use of insulin, hypertension and hyperlipidemia who presents for follow-up.    She was previously unaware of her diabetic status until her hospital admission, for left fourth toe gangrene, status post left below-knee amputation.  Currently on Lantus 22 units at bedtime and Humalog 10 units before meals.  She does not perform home glucose monitoring distantly.    She has no history of pancreatitis or medullary thyroid cancer.   She reports experiencing hot flashes and excessive sweating.    She is currently on Lipitor 80 mg for hyperlipidemia and chlorthalidone 12.5 mg for blood pressure management. She reports edema in her legs, which worsens as the day progresses.                     Last Eye Exam: Not on file  Last Foot Exam: 03/25/2025  Health Maintenance   Topic Date Due   • Diabetic Eye Exam  04/23/2026 (Originally 6/[birth year]/1986)   • Diabetic Foot Exam  03/25/2026     Pertinent Medical History            Review of Systems as per [facility]    Medical History Reviewed by provider this encounter:     .  Current Outpatient Medications on File Prior to Visit   Medication Sig Dispense Refill   • acetaminophen (TYLENOL) 325 mg tablet Take 2 tablets (650 mg total) by mouth every 6 (six) hours as needed for mild pain     • Alcohol Swabs 70 % PADS May substitute brand based on insurance coverage. Check glucose TID. 100 each 0   • aspirin (ECOTRIN LOW STRENGTH) 81 mg EC tablet Take 1 tablet (81 mg total) by mouth daily 30 tablet 0   • atorvastatin (LIPITOR) 80 mg tablet Take 1 tablet (80 mg total) by mouth every evening 100 tablet 1   • Blood Glucose Monitoring Suppl (OneTouch Verio Reflect) w/Device KIT May substitute brand based on insurance coverage. Check glucose TID. 1 kit 0   • chlorthalidone 25 mg tablet Take 0.5 tablets (12.5 mg total) by mouth daily  30 tablet 0   • DULoxetine (CYMBALTA) 60 mg delayed release capsule Take 1 capsule (60 mg total) by mouth daily 30 capsule 0   • gabapentin (Neurontin) 600 MG tablet Take 1 tablet (600 mg total) by mouth 3 (three) times a day 90 tablet 0   • glucose blood (OneTouch Verio) test strip May substitute brand based on insurance coverage. Check glucose TID. 100 each 0   • Insulin Pen Needle (BD Pen Needle Ana 2nd Gen) 32G X 4 MM MISC For use with insulin pen. Pharmacy may dispense brand covered by insurance. 100 each 0   • Insulin Pen Needle (BD Pen Needle Ana 2nd Gen) 32G X 4 MM MISC For use with insulin pen. Pharmacy may dispense brand covered by insurance. 100 each 0   • lidocaine (LIDODERM) 5 % Apply 1 patch topically over 12 hours daily Remove & Discard patch within 12 hours or as directed by MD 30 patch 0   • methocarbamol (ROBAXIN) 750 mg tablet Take 1 tablet (750 mg total) by mouth every 8 (eight) hours for 14 days 42 tablet 0   • naloxone (NARCAN) 4 mg/0.1 mL nasal spray Administer 1 spray into a nostril. If no response after 2-3 minutes, give another dose in the other nostril using a new spray. 1 each 0   • nitroglycerin (NITROSTAT) 0.4 mg SL tablet Place 1 tablet (0.4 mg total) under the tongue every 5 (five) minutes as needed for chest pain 30 tablet 0   • OneTouch Delica Lancets 33G MISC May substitute brand based on insurance coverage. Check glucose TID. 100 each 0   • pantoprazole (PROTONIX) 40 mg tablet Take 1 tablet (40 mg total) by mouth daily in the early morning 30 tablet 0   • traZODone (DESYREL) 100 mg tablet Take 1 tablet (100 mg total) by mouth daily at bedtime 30 tablet 0   • [DISCONTINUED] insulin glargine (LANTUS) 100 units/mL subcutaneous injection Inject 22 Units under the skin daily at bedtime 10 mL 0   • [DISCONTINUED] insulin lispro (HumALOG/ADMELOG) 100 units/mL injection Inject 10 Units under the skin 3 (three) times a day with meals 9 mL 0   • hydrOXYzine HCL (ATARAX) 25 mg tablet Take  "1 tablet (25 mg total) by mouth every 6 (six) hours as needed (insomnia) (Patient not taking: Reported on 3/25/2025) 30 tablet 0   • polyethylene glycol (MIRALAX) 17 g packet Take 17 g by mouth daily (Patient not taking: Reported on 3/25/2025)     • senna (SENOKOT) 8.6 mg Take 2 tablets (17.2 mg total) by mouth daily at bedtime (Patient not taking: Reported on 3/25/2025) 30 tablet 0     No current facility-administered medications on file prior to visit.         Medical History Reviewed by provider this encounter:     .    Objective   /74 (BP Location: Left arm, Patient Position: Sitting, Cuff Size: Adult)   Pulse 98   Temp 97.8 °F (36.6 °C) (Temporal)   Resp 18   Ht 5' 1\" (1.549 m)   Wt 87.1 kg (192 lb)   SpO2 96%   BMI 36.28 kg/m²      Body mass index is 36.28 kg/m².  Wt Readings from Last 3 Encounters:   04/23/25 87.1 kg (192 lb)   04/23/25 88 kg (194 lb)   03/13/25 83.3 kg (183 lb 9.6 oz)     Physical Exam  Heart sounds normal.    Vital Signs  Blood pressure reading is 130/74.  Physical Exam  Constitutional:       General: She is not in acute distress.     Appearance: She is not ill-appearing.   HENT:      Head: Normocephalic and atraumatic.   Pulmonary:      Effort: Pulmonary effort is normal. No respiratory distress.   Neurological:      Mental Status: She is oriented to person, place, and time.       Results  Laboratory Studies  A1c was 10.3%, now down to 7.3%.    Imaging  CAT scan of head showed enlarged thyroid gland and a very small growth on the right adrenal gland.  Labs:   Lab Results   Component Value Date    HGBA1C 7.3 (A) 03/13/2025    HGBA1C 10.3 (H) 12/13/2024    HGBA1C 11.0 (H) 09/19/2022     Lab Results   Component Value Date    CREATININE 0.73 02/24/2025    CREATININE 0.88 02/21/2025    CREATININE 0.75 02/20/2025    BUN 30 (H) 02/24/2025    K 4.3 02/24/2025    CL 94 (L) 02/24/2025    CO2 29 02/24/2025     eGFR   Date Value Ref Range Status   02/24/2025 97 ml/min/1.73sq m Final " "    No results found for: \"CHOL\", \"HDL\", \"LDL\", \"TRIG\", \"CHOLHDL\"  Lab Results   Component Value Date    ALT 22 02/24/2025    AST 21 02/24/2025    ALKPHOS 107 (H) 02/24/2025     Lab Results   Component Value Date    LDV9TFGMFDOX 0.620 02/17/2025       Patient Instructions   We have started Mounjaro 2.5 mg weekly for 4 weeks and then 5 mg weekly until your next visit.  Decrease Humalog to 8 units before meals and Lantus to 20 units at bedtime  When you start taking Mounjaro 5 mg weekly, decrease Humalog to 4 units before meals and Lantus to 18 units before the time.  I ordered bernadette 3 sensor and reader to your pharmacy, please call us for any question, call us to connect you to the office.    Discussed with the patient and all questioned fully answered. She will call me if any problems arise.      "

## 2025-04-23 NOTE — ASSESSMENT & PLAN NOTE
Lab Results   Component Value Date    HGBA1C 7.3 (A) 03/13/2025     Orders:  •  insulin glargine (LANTUS) 100 units/mL subcutaneous injection; Inject 22 Units under the skin daily at bedtime  •  insulin lispro (HumALOG/ADMELOG) 100 units/mL injection; Inject 10 Units under the skin 3 (three) times a day with meals

## 2025-04-23 NOTE — ASSESSMENT & PLAN NOTE
A 14 mm right sided adrenal nodule which is consistent with adrenal adenoma.  Hormone overproduction discussed, low-dose dexamethasone suppression test will be ordered.  Also aldosterone and renin activity as well as renin aldosterone ratio.

## 2025-04-23 NOTE — ASSESSMENT & PLAN NOTE
Incidental finding of enlarged multinodular thyroid on his CT angio of chest and abdomen.  Primary team already ordered thyroid ultrasound which she was advised to follow-up.

## 2025-04-24 ENCOUNTER — TELEPHONE (OUTPATIENT)
Dept: ENDOCRINOLOGY | Facility: CLINIC | Age: 50
End: 2025-04-24

## 2025-04-24 NOTE — TELEPHONE ENCOUNTER
PA for Mounjaro 5mg SUBMITTED to [facility]    via    [x]CMM-KEY: [id number]   []Surescripts-Case ID #   []Availity-Auth ID # NDC #   []Faxed to plan   []Other website   []Phone call Case ID #     [x]PA sent as URGENT    All office notes, labs and other pertaining documents and studies sent. Clinical questions answered. Awaiting determination from insurance company.     Turnaround time for your insurance to make a decision on your Prior Authorization can take 7-21 business days.

## 2025-04-25 ENCOUNTER — OFFICE VISIT (OUTPATIENT)
Dept: PAIN MEDICINE | Facility: CLINIC | Age: 50
End: 2025-04-25
Payer: COMMERCIAL

## 2025-04-25 VITALS — BODY MASS INDEX: 36.25 KG/M2 | HEIGHT: 61 IN | WEIGHT: 192 LBS

## 2025-04-25 DIAGNOSIS — Z89.512 S/P BKA (BELOW KNEE AMPUTATION), LEFT (HCC): ICD-10-CM

## 2025-04-25 DIAGNOSIS — M79.2 NEUROPATHIC PAIN: ICD-10-CM

## 2025-04-25 DIAGNOSIS — G89.4 CHRONIC PAIN SYNDROME: Primary | ICD-10-CM

## 2025-04-25 PROCEDURE — 99213 OFFICE O/P EST LOW 20 MIN: CPT | Performed by: STUDENT IN AN ORGANIZED HEALTH CARE EDUCATION/TRAINING PROGRAM

## 2025-04-25 NOTE — PROGRESS NOTES
"Assessment:  1. Chronic pain syndrome    2. S/P BKA (below knee amputation), left (HCC)    3. Neuropathic pain        Portions of the record may have been created with voice recognition software. Occasional wrong word or \"sound a like\" substitutions may have occurred due to the inherent limitations of voice recognition software. Read the chart carefully and recognize, using context, where substitutions have occurred. Contact me with any questions.       Plan:  49-year-old female here for a follow-up visit with regards to left lower extremity pain.  Since last visit, patient suffered complications with healing of left transmetatarsal amputation and is now status post a left BKA.  She is status post home PT/OT and is in the process of starting prosthetic fitting/rehab.  She has established with endocrinology and is on medication management for underlying diabetes.  She denies significant pain symptoms currently, well-managed on regimen of duloxetine 60 mg daily, gabapentin 600 mg 3 times daily.      Continue current medication regimen for symptom management.    No interventional treatment indicated.    Follow up as needed.          History of Present Illness:  The patient is a 49 y.o. female who presents for a follow up office visit in regards to Leg Pain.      I have personally reviewed and/or updated the patient's past medical history, past surgical history, family history, social history, current medications, allergies, and vital signs today.       Review of Systems  Review of Systems   Constitutional:  Negative for unexpected weight change.   HENT:  Negative for hearing loss.    Eyes:  Negative for visual disturbance.   Respiratory:  Negative for shortness of breath.    Cardiovascular:  Negative for leg swelling.   Gastrointestinal:  Negative for constipation.   Endocrine: Negative for polyuria.   Genitourinary:  Negative for difficulty urinating.   Musculoskeletal:  Negative for gait problem, joint swelling and " "myalgias.   Skin:  Negative for rash.   Neurological:  Negative for weakness and headaches.   Psychiatric/Behavioral:  Negative for decreased concentration.    All other systems reviewed and are negative.        Past Medical History:   Diagnosis Date    Advanced maternal age in multigravida, second trimester 2016    Transitioned From: Advanced maternal age in multigravida, first trimester      Back pain     used 2 percocet tid until current admission in 2017    Bone spur     in back per pt    Chronic hypertension with superimposed preeclampsia 2017    Depression     Diabetes mellitus (HCC)     Elevated BP without diagnosis of hypertension     \"with pain\"    Foot injury     Full dentures     does not wear    GERD (gastroesophageal reflux disease)     occas    Gestational diabetes     insulin dependent    Herniated cervical disc     Herniated lumbar intervertebral disc     History of pneumonia     Hx of degenerative disc disease     Hyperlipidemia     Obesity     Pre-eclampsia     Prior pregnancy with fetal demise     previous demise at 36 weeks    Toe pain     and foot pain       Past Surgical History:   Procedure Laterality Date    ARTERIOGRAM Left 2025    Procedure: LEFT lower extremity arteriogram w/ popiteal and anterior tibial artery angioplasty;  Surgeon: Ottoniel Victoria MD;  Location:  MAIN OR;  Service: Vascular    BACK SURGERY       SECTION      INCISION AND DRAINAGE OF WOUND Left 2025    Procedure: INCISION AND DRAINAGE (I&D) EXTREMITY;  Surgeon: Leopoldo Ritchie DPM;  Location: [facility];  Service: Podiatry    IR LOWER EXTREMITY ANGIOGRAM  2024    IR LOWER EXTREMITY ANGIOGRAM  2025    IR LOWER EXTREMITY ANGIOGRAM  2025    IR PICC PLACEMENT DOUBLE LUMEN  2025    IR TPA LYSIS CHECK  2025    IR TPA LYSIS CHECK  2025    LAMINECTOMY      with disc removal, last assessed 16    LEG AMPUTATION THROUGH LOWER TIBIA " AND FIBULA Left 2025    Procedure: Left BKA;  Surgeon: Shane Arauz DO;  Location: [facility];  Service: Vascular    OTHER SURGICAL HISTORY      Right ovary removal 2005 per pt recall at [facility]    PERIPHERAL ANGIOGRAM      SC AMPUTATION FOOT TRANSMETARSAL Left 2025    Procedure: AMPUTATION TRANSMETATARSAL (TMA);  Surgeon: Leopoldo Ritchie DPM;  Location: [facility];  Service: Podiatry    SC  DELIVERY ONLY N/A 02/15/2017    Procedure:  SECTION ();  Surgeon: Tito Umaña MD;  Location: [facility];  Service: Obstetrics    SC LIG/TRNSXJ FALOPIAN TUBE  DEL/ABDML SURG Left 02/15/2017    Procedure: LIGATION/COAGULATION TUBAL;  Surgeon: Tito Umaña MD;  Location: [facility];  Service: Obstetrics    VASCULAR SURGERY  2024    WISDOM TOOTH EXTRACTION         Family History   Problem Relation Age of Onset    Diabetes Mother     COPD Mother     Heart disease Mother     Stroke Father     Heart disease Father        Social History     Occupational History    Occupation: unemployed   Tobacco Use    Smoking status: Former     Current packs/day: 0.00     Types: Cigarettes     Quit date: 2024     Years since quittin.3    Smokeless tobacco: Never    Tobacco comments:     Per pt last cigarette 24--quit cold turkey   Vaping Use    Vaping status: Never Used   Substance and Sexual Activity    Alcohol use: Not Currently     Comment: 1-2 drinks a year    Drug use: Never    Sexual activity: Not Currently     Partners: Male     Birth control/protection: None     Comment: defer         Current Outpatient Medications:     acetaminophen (TYLENOL) 325 mg tablet, Take 2 tablets (650 mg total) by mouth every 6 (six) hours as needed for mild pain, Disp: , Rfl:     Alcohol Swabs 70 % PADS, May substitute brand based on insurance coverage. Check glucose TID., Disp: 100 each, Rfl: 0    aspirin (ECOTRIN LOW STRENGTH) 81 mg EC tablet, Take 1 tablet (81 mg  total) by mouth daily, Disp: 30 tablet, Rfl: 0    atorvastatin (LIPITOR) 80 mg tablet, Take 1 tablet (80 mg total) by mouth every evening, Disp: 100 tablet, Rfl: 1    Blood Glucose Monitoring Suppl (OneTouch Verio Reflect) w/Device KIT, May substitute brand based on insurance coverage. Check glucose TID., Disp: 1 kit, Rfl: 0    chlorthalidone 25 mg tablet, Take 0.5 tablets (12.5 mg total) by mouth daily, Disp: 30 tablet, Rfl: 0    Continuous Glucose  (FreeStyle Carlie 3 [place]) SUSAN, To check blood sugar continuously, Disp: 1 each, Rfl: 0    Continuous Glucose Sensor (FreeStyle Carlie 3 Plus Sensor) MISC, To be changed every 15 days and to check blood sugar continuously, Disp: 6 each, Rfl: 2    DULoxetine (CYMBALTA) 60 mg delayed release capsule, Take 1 capsule (60 mg total) by mouth daily, Disp: 30 capsule, Rfl: 0    gabapentin (Neurontin) 600 MG tablet, Take 1 tablet (600 mg total) by mouth 3 (three) times a day, Disp: 90 tablet, Rfl: 0    glucose blood (OneTouch Verio) test strip, May substitute brand based on insurance coverage. Check glucose TID., Disp: 100 each, Rfl: 0    insulin glargine (LANTUS) 100 units/mL subcutaneous injection, Inject 22 Units under the skin daily at bedtime, Disp: 21 mL, Rfl: 1    insulin lispro (HumALOG/ADMELOG) 100 units/mL injection, Inject 10 Units under the skin 3 (three) times a day with meals, Disp: 27 mL, Rfl: 1    Insulin Pen Needle (BD Pen Needle Ana 2nd Gen) 32G X 4 MM MISC, For use with insulin pen. Pharmacy may dispense brand covered by insurance., Disp: 100 each, Rfl: 0    Insulin Pen Needle (BD Pen Needle Ana 2nd Gen) 32G X 4 MM MISC, For use with insulin pen. Pharmacy may dispense brand covered by insurance., Disp: 100 each, Rfl: 0    lidocaine (LIDODERM) 5 %, Apply 1 patch topically over 12 hours daily Remove & Discard patch within 12 hours or as directed by MD, Disp: 30 patch, Rfl: 0    naloxone (NARCAN) 4 mg/0.1 mL nasal spray, Administer 1 spray into a  "nostril. If no response after 2-3 minutes, give another dose in the other nostril using a new spray., Disp: 1 each, Rfl: 0    nitroglycerin (NITROSTAT) 0.4 mg SL tablet, Place 1 tablet (0.4 mg total) under the tongue every 5 (five) minutes as needed for chest pain, Disp: 30 tablet, Rfl: 0    OneTouch Delica Lancets 33G MISC, May substitute brand based on insurance coverage. Check glucose TID., Disp: 100 each, Rfl: 0    pantoprazole (PROTONIX) 40 mg tablet, Take 1 tablet (40 mg total) by mouth daily in the early morning, Disp: 30 tablet, Rfl: 0    Tirzepatide (Mounjaro) 2.5 MG/0.5ML SOAJ, Inject 2.5 mg under the skin once a week, Disp: 2 mL, Rfl: 0    [START ON 5/23/2025] Tirzepatide (Mounjaro) 5 MG/0.5ML SOAJ, Inject 5 mg under the skin once a week Do not start before May 23, 2025., Disp: 6 mL, Rfl: 0    traZODone (DESYREL) 100 mg tablet, Take 1 tablet (100 mg total) by mouth daily at bedtime, Disp: 30 tablet, Rfl: 0    hydrOXYzine HCL (ATARAX) 25 mg tablet, Take 1 tablet (25 mg total) by mouth every 6 (six) hours as needed (insomnia) (Patient not taking: Reported on 4/25/2025), Disp: 30 tablet, Rfl: 0    methocarbamol (ROBAXIN) 750 mg tablet, Take 1 tablet (750 mg total) by mouth every 8 (eight) hours for 14 days, Disp: 42 tablet, Rfl: 0    polyethylene glycol (MIRALAX) 17 g packet, Take 17 g by mouth daily (Patient not taking: Reported on 3/25/2025), Disp: , Rfl:     senna (SENOKOT) 8.6 mg, Take 2 tablets (17.2 mg total) by mouth daily at bedtime (Patient not taking: Reported on 3/25/2025), Disp: 30 tablet, Rfl: 0    Allergies   Allergen Reactions    Codeine Other (See Comments)     Aggression-and nasty--\"took a cough syrup with codeine as a child\"       Physical Exam:    Ht 5' 1\" (1.549 m)   Wt 87.1 kg (192 lb)   BMI 36.28 kg/m²     Constitutional:normal, well developed, well nourished, alert, in no distress and non-toxic and no overt pain behavior.  Eyes:anicteric  HEENT:grossly intact  Neck:supple, symmetric, " trachea midline and no masses   Pulmonary:even and unlabored  Cardiovascular:No edema or pitting edema present  Skin:Normal without rashes or lesions and well hydrated  Psychiatric:Mood and affect appropriate  Neurologic:Cranial Nerves II-XII grossly intact  Musculoskeletal:in wheelchair, s/p L BKA      Imaging  No orders to display       No orders of the defined types were placed in this encounter.

## 2025-04-25 NOTE — TELEPHONE ENCOUNTER
PA for Mounjaro 5mg DENIED    Reason:        Message sent to office clinical pool Yes    Denial letter scanned into Media Yes    Appeal started No (Provider will need to decide if appeal is warranted and send clinical documentation to Prior Authorization Team for initiation.)    **Please follow up with your patient regarding denial and next steps**

## 2025-04-25 NOTE — TELEPHONE ENCOUNTER
Patient called in said that she got a message that the PA was denied.  Please advise and call patient on what the alternative will be.

## 2025-04-26 DIAGNOSIS — Z89.512 S/P BKA (BELOW KNEE AMPUTATION) UNILATERAL, LEFT (HCC): ICD-10-CM

## 2025-04-26 DIAGNOSIS — E66.9 OBESITY (BMI 30-39.9): ICD-10-CM

## 2025-04-26 DIAGNOSIS — F41.9 ANXIETY: ICD-10-CM

## 2025-04-26 DIAGNOSIS — G47.01 INSOMNIA DUE TO MEDICAL CONDITION: ICD-10-CM

## 2025-04-27 DIAGNOSIS — E11.8 DIABETES MELLITUS TYPE 2 WITH COMPLICATIONS (HCC): Primary | ICD-10-CM

## 2025-04-27 RX ORDER — DULOXETIN HYDROCHLORIDE 60 MG/1
60 CAPSULE, DELAYED RELEASE ORAL DAILY
Qty: 30 CAPSULE | Refills: 0 | Status: SHIPPED | OUTPATIENT
Start: 2025-04-27

## 2025-04-27 RX ORDER — DULAGLUTIDE 0.75 MG/.5ML
0.75 INJECTION, SOLUTION SUBCUTANEOUS WEEKLY
Qty: 2 ML | Refills: 0 | Status: SHIPPED | OUTPATIENT
Start: 2025-04-27

## 2025-04-27 RX ORDER — DULAGLUTIDE 1.5 MG/.5ML
1.5 INJECTION, SOLUTION SUBCUTANEOUS WEEKLY
Qty: 6 ML | Refills: 1 | Status: SHIPPED | OUTPATIENT
Start: 2025-04-27

## 2025-04-27 RX ORDER — TRAZODONE HYDROCHLORIDE 100 MG/1
100 TABLET ORAL
Qty: 30 TABLET | Refills: 0 | Status: SHIPPED | OUTPATIENT
Start: 2025-04-27

## 2025-04-27 RX ORDER — PANTOPRAZOLE SODIUM 40 MG/1
40 TABLET, DELAYED RELEASE ORAL
Qty: 30 TABLET | Refills: 0 | Status: SHIPPED | OUTPATIENT
Start: 2025-04-27

## 2025-04-28 ENCOUNTER — PROCEDURE VISIT (OUTPATIENT)
Dept: PODIATRY | Facility: CLINIC | Age: 50
End: 2025-04-28
Payer: COMMERCIAL

## 2025-04-28 ENCOUNTER — TELEPHONE (OUTPATIENT)
Dept: OTHER | Facility: HOSPITAL | Age: 50
End: 2025-04-28

## 2025-04-28 ENCOUNTER — APPOINTMENT (OUTPATIENT)
Dept: LAB | Facility: CLINIC | Age: 50
End: 2025-04-28
Payer: COMMERCIAL

## 2025-04-28 VITALS — WEIGHT: 192 LBS | HEIGHT: 61 IN | BODY MASS INDEX: 36.25 KG/M2

## 2025-04-28 DIAGNOSIS — E11.8 DIABETES MELLITUS TYPE 2 WITH COMPLICATIONS (HCC): ICD-10-CM

## 2025-04-28 DIAGNOSIS — E11.42 DIABETIC POLYNEUROPATHY ASSOCIATED WITH TYPE 2 DIABETES MELLITUS (HCC): ICD-10-CM

## 2025-04-28 DIAGNOSIS — E11.8 DIABETIC FOOT (HCC): ICD-10-CM

## 2025-04-28 DIAGNOSIS — M79.89 LEG SWELLING: ICD-10-CM

## 2025-04-28 DIAGNOSIS — E11.65 UNCONTROLLED TYPE 2 DIABETES MELLITUS WITH HYPERGLYCEMIA (HCC): ICD-10-CM

## 2025-04-28 DIAGNOSIS — Z11.59 NEED FOR HEPATITIS C SCREENING TEST: ICD-10-CM

## 2025-04-28 DIAGNOSIS — B35.1 ONYCHOMYCOSIS: Primary | ICD-10-CM

## 2025-04-28 LAB
BASOPHILS # BLD AUTO: 0.02 THOUSANDS/ÂΜL (ref 0–0.1)
BASOPHILS NFR BLD AUTO: 0 % (ref 0–1)
EOSINOPHIL # BLD AUTO: 0.08 THOUSAND/ÂΜL (ref 0–0.61)
EOSINOPHIL NFR BLD AUTO: 1 % (ref 0–6)
ERYTHROCYTE [DISTWIDTH] IN BLOOD BY AUTOMATED COUNT: 13.9 % (ref 11.6–15.1)
HCT VFR BLD AUTO: 32.5 % (ref 34.8–46.1)
HGB BLD-MCNC: 10.4 G/DL (ref 11.5–15.4)
IMM GRANULOCYTES # BLD AUTO: 0.02 THOUSAND/UL (ref 0–0.2)
IMM GRANULOCYTES NFR BLD AUTO: 0 % (ref 0–2)
LYMPHOCYTES # BLD AUTO: 2.11 THOUSANDS/ÂΜL (ref 0.6–4.47)
LYMPHOCYTES NFR BLD AUTO: 29 % (ref 14–44)
MCH RBC QN AUTO: 28.8 PG (ref 26.8–34.3)
MCHC RBC AUTO-ENTMCNC: 32 G/DL (ref 31.4–37.4)
MCV RBC AUTO: 90 FL (ref 82–98)
MONOCYTES # BLD AUTO: 0.35 THOUSAND/ÂΜL (ref 0.17–1.22)
MONOCYTES NFR BLD AUTO: 5 % (ref 4–12)
NEUTROPHILS # BLD AUTO: 4.7 THOUSANDS/ÂΜL (ref 1.85–7.62)
NEUTS SEG NFR BLD AUTO: 65 % (ref 43–75)
NRBC BLD AUTO-RTO: 0 /100 WBCS
PLATELET # BLD AUTO: 198 THOUSANDS/UL (ref 149–390)
PMV BLD AUTO: 10.8 FL (ref 8.9–12.7)
RBC # BLD AUTO: 3.61 MILLION/UL (ref 3.81–5.12)
WBC # BLD AUTO: 7.28 THOUSAND/UL (ref 4.31–10.16)

## 2025-04-28 PROCEDURE — 86803 HEPATITIS C AB TEST: CPT

## 2025-04-28 PROCEDURE — 80053 COMPREHEN METABOLIC PANEL: CPT

## 2025-04-28 PROCEDURE — 11720 DEBRIDE NAIL 1-5: CPT | Performed by: PODIATRIST

## 2025-04-28 PROCEDURE — 84439 ASSAY OF FREE THYROXINE: CPT

## 2025-04-28 PROCEDURE — 84443 ASSAY THYROID STIM HORMONE: CPT

## 2025-04-28 PROCEDURE — 36415 COLL VENOUS BLD VENIPUNCTURE: CPT

## 2025-04-28 PROCEDURE — 85025 COMPLETE CBC W/AUTO DIFF WBC: CPT

## 2025-04-28 PROCEDURE — 83036 HEMOGLOBIN GLYCOSYLATED A1C: CPT

## 2025-04-28 PROCEDURE — RECHECK: Performed by: PODIATRIST

## 2025-04-28 PROCEDURE — 80061 LIPID PANEL: CPT

## 2025-04-28 RX ORDER — GABAPENTIN 600 MG/1
600 TABLET ORAL 3 TIMES DAILY
Qty: 90 TABLET | Refills: 0 | Status: SHIPPED | OUTPATIENT
Start: 2025-04-28

## 2025-04-28 NOTE — TELEPHONE ENCOUNTER
Patient called in stating that she had just picked up her insulin and it's the wrong medication. Patient states it's the pens she uses and not the vials for:    Insulin lispro (HumALOG/ADMELOG) 100 units/mL injection .    (LANTUS) 100 units/mL subcutaneous injection    Please review and send in script if appropriate to [facility] [place]

## 2025-04-28 NOTE — PROGRESS NOTES
Lizzy Acuna  1975  AT RISK FOOT CARE    1. Onychomycosis        2. Diabetic foot (HCC)        3. Diabetic polyneuropathy associated with type 2 diabetes mellitus (HCC)            Patient presents for at-risk foot care.  Patient has no acute concerns today.  Patient has significant lower extremity risk due to previous amputation.    On exam patient has thickened, hypertrophic, discolored, brittle toenails with subungual debris and tenderness x5   Callus: 0  Amputation: L BKA    Today's treatment includes:  Debridement of toenails. Using nail nipper, ansley, and curette, nails were sharply debrided, reduced in thickness and length. Devitalized nail tissue and fungal debris excised and removed. Patient tolerated well.        Discussed proper shoe gear, daily inspections of feet, and general foot health with patient. Patient has Q7  findings and is recommended for at risk foot care every 9-10 weeks.      Procedure: All mycotic toenails were reduced and debrided in length, width, and girth using a nail nipper and dremel.  All hyperkeratotic skin lesion(s) were sharply pared with a scalpel with no bleeding or evidence of ulceration.  Patient tolerated procedure(s) well without complications.

## 2025-04-28 NOTE — TELEPHONE ENCOUNTER
Patient calling back, relayed the above message and she expressed understanding.   No further action needed

## 2025-04-29 ENCOUNTER — RESULTS FOLLOW-UP (OUTPATIENT)
Dept: ENDOCRINOLOGY | Facility: CLINIC | Age: 50
End: 2025-04-29

## 2025-04-29 ENCOUNTER — TELEPHONE (OUTPATIENT)
Age: 50
End: 2025-04-29

## 2025-04-29 DIAGNOSIS — E11.8 DIABETES MELLITUS TYPE 2 WITH COMPLICATIONS (HCC): Primary | ICD-10-CM

## 2025-04-29 LAB
ALBUMIN SERPL BCG-MCNC: 3.9 G/DL (ref 3.5–5)
ALP SERPL-CCNC: 87 U/L (ref 34–104)
ALT SERPL W P-5'-P-CCNC: 11 U/L (ref 7–52)
ANION GAP SERPL CALCULATED.3IONS-SCNC: 10 MMOL/L (ref 4–13)
AST SERPL W P-5'-P-CCNC: 12 U/L (ref 13–39)
BILIRUB SERPL-MCNC: 0.38 MG/DL (ref 0.2–1)
BUN SERPL-MCNC: 21 MG/DL (ref 5–25)
CALCIUM SERPL-MCNC: 9.4 MG/DL (ref 8.4–10.2)
CHLORIDE SERPL-SCNC: 95 MMOL/L (ref 96–108)
CHOLEST SERPL-MCNC: 191 MG/DL (ref ?–200)
CO2 SERPL-SCNC: 29 MMOL/L (ref 21–32)
CREAT SERPL-MCNC: 0.68 MG/DL (ref 0.6–1.3)
EST. AVERAGE GLUCOSE BLD GHB EST-MCNC: 194 MG/DL
GFR SERPL CREATININE-BSD FRML MDRD: 103 ML/MIN/1.73SQ M
GLUCOSE P FAST SERPL-MCNC: 197 MG/DL (ref 65–99)
HBA1C MFR BLD: 8.4 %
HCV AB SER QL: NORMAL
HDLC SERPL-MCNC: 50 MG/DL
LDLC SERPL CALC-MCNC: 108 MG/DL (ref 0–100)
POTASSIUM SERPL-SCNC: 4.1 MMOL/L (ref 3.5–5.3)
PROT SERPL-MCNC: 7.5 G/DL (ref 6.4–8.4)
SODIUM SERPL-SCNC: 134 MMOL/L (ref 135–147)
T4 FREE SERPL-MCNC: 0.86 NG/DL (ref 0.61–1.12)
TRIGL SERPL-MCNC: 165 MG/DL (ref ?–150)
TSH SERPL DL<=0.05 MIU/L-ACNC: 0.4 UIU/ML (ref 0.45–4.5)

## 2025-04-29 RX ORDER — INSULIN LISPRO 100 [IU]/ML
10 INJECTION, SOLUTION INTRAVENOUS; SUBCUTANEOUS
Qty: 27 ML | Refills: 1 | Status: SHIPPED | OUTPATIENT
Start: 2025-04-29

## 2025-04-29 RX ORDER — PEN NEEDLE, DIABETIC 32GX 5/32"
NEEDLE, DISPOSABLE MISCELLANEOUS
Qty: 400 EACH | Refills: 0 | Status: SHIPPED | OUTPATIENT
Start: 2025-04-29

## 2025-04-29 RX ORDER — INSULIN GLARGINE 100 [IU]/ML
22 INJECTION, SOLUTION SUBCUTANEOUS
Qty: 21 ML | Refills: 1 | Status: SHIPPED | OUTPATIENT
Start: 2025-04-29

## 2025-04-29 NOTE — TELEPHONE ENCOUNTER
Patient called stating she received her Carlie and was told to call to get it connected with the office. Patient states she has an android  phone and it is saying her phone is not compatible with the Carlie judit. She is asking if she can enter the code onto the  to connect? Please call patient to help with Carlie set up  Please advise

## 2025-04-29 NOTE — TELEPHONE ENCOUNTER
Called patient and informed. She started trulicity today bc mounjaro is denied.        She will  new script and keep the vials if she runs out. She needs syringes sent it

## 2025-04-30 ENCOUNTER — APPOINTMENT (OUTPATIENT)
Dept: LAB | Facility: CLINIC | Age: 50
End: 2025-04-30
Payer: COMMERCIAL

## 2025-04-30 ENCOUNTER — OFFICE VISIT (OUTPATIENT)
Dept: OBGYN CLINIC | Facility: CLINIC | Age: 50
End: 2025-04-30
Payer: COMMERCIAL

## 2025-04-30 VITALS — BODY MASS INDEX: 36.25 KG/M2 | WEIGHT: 192 LBS | HEIGHT: 61 IN

## 2025-04-30 DIAGNOSIS — M65.331 TRIGGER FINGER, RIGHT MIDDLE FINGER: ICD-10-CM

## 2025-04-30 LAB
CREAT UR-MCNC: 92.3 MG/DL
MICROALBUMIN UR-MCNC: 1317.6 MG/L
MICROALBUMIN/CREAT 24H UR: 1428 MG/G CREATININE (ref 0–30)

## 2025-04-30 PROCEDURE — 82570 ASSAY OF URINE CREATININE: CPT

## 2025-04-30 PROCEDURE — 20550 NJX 1 TENDON SHEATH/LIGAMENT: CPT | Performed by: SURGERY

## 2025-04-30 PROCEDURE — 99203 OFFICE O/P NEW LOW 30 MIN: CPT | Performed by: SURGERY

## 2025-04-30 PROCEDURE — 82043 UR ALBUMIN QUANTITATIVE: CPT

## 2025-04-30 RX ORDER — LIDOCAINE HYDROCHLORIDE 10 MG/ML
2.5 INJECTION, SOLUTION INFILTRATION; PERINEURAL
Status: COMPLETED | OUTPATIENT
Start: 2025-04-30 | End: 2025-04-30

## 2025-04-30 RX ORDER — BETAMETHASONE SODIUM PHOSPHATE AND BETAMETHASONE ACETATE 3; 3 MG/ML; MG/ML
3 INJECTION, SUSPENSION INTRA-ARTICULAR; INTRALESIONAL; INTRAMUSCULAR; SOFT TISSUE
Status: COMPLETED | OUTPATIENT
Start: 2025-04-30 | End: 2025-04-30

## 2025-04-30 RX ADMIN — BETAMETHASONE SODIUM PHOSPHATE AND BETAMETHASONE ACETATE 3 MG: 3; 3 INJECTION, SUSPENSION INTRA-ARTICULAR; INTRALESIONAL; INTRAMUSCULAR; SOFT TISSUE at 15:00

## 2025-04-30 RX ADMIN — LIDOCAINE HYDROCHLORIDE 2.5 ML: 10 INJECTION, SOLUTION INFILTRATION; PERINEURAL at 15:00

## 2025-04-30 NOTE — PROGRESS NOTES
ORTHOPAEDIC HAND, WRIST, AND ELBOW OFFICE  VISIT       ASSESSMENT/PLAN:      49 y.o. year old female who presents with    Assessment & Plan  Trigger finger, right middle finger  Physical exam was performed and the findings are consistent with trigger finger  Injections were offered for trigger fingers and we discussed they are 75% effective at resolving symptoms. They may have a repeat injection in 3 months if they get good relief of symptoms or full resolution that returns   Pt was offered, accepted, performed and steroid injection Ring A1 pulleys for symptomatic relief. Pt tolerated injections well. Ice and post injection protocol advised. Weigh bearing activities as tolerated.  NSAID's as needed for pain  Activities as tolerated and increase as able.  No specific restrictions from our standpoint.  Discussed pt must wait 3 months from injection for surgery  Discussed thsi can raiser her blood sugars for a day or two  Orders:    Ambulatory Referral to Orthopedic Surgery    Hand/upper extremity injection: R long A1    The patient verbalized understanding of exam findings and treatment plan. We engaged in the shared decision-making process and treatment options were discussed at length with the patient. Surgical and conservative management discussed today along with risks and benefits.        Follow Up:  Return if symptoms worsen or fail to improve.    ____________________________________________________________________________________________________________________________________________      CHIEF COMPLAINT:  Chief Complaint   Patient presents with    Right Middle Finger - Locking       SUBJECTIVE:  Lizzy Acuna is a 49 y.o. year old  female who presents for evaluation of her Right middle finger      Patient states that her R middle finger has been locking and catching at times for 2-3  weeks now with no known injuries. She denies any known injuries.  She states the finger will lock at times and it is painful  "to straighten out.   She cannot fully bend or straighten out the finger as well and thew whole finger can be painful at times.   She denies any previous treatments    She is diabetic last A1C 8.4      I have personally reviewed all the relevant PMH, PSH, SH, FH, Medications and allergies      PAST MEDICAL HISTORY:  Past Medical History:   Diagnosis Date    Advanced maternal age in multigravida, second trimester 2016    Transitioned From: Advanced maternal age in multigravida, first trimester      Back pain     used 2 percocet tid until current admission in 2017    Bone spur     in back per pt    Chronic hypertension with superimposed preeclampsia 2017    Depression     Diabetes mellitus (HCC)     Elevated BP without diagnosis of hypertension     \"with pain\"    Foot injury     Full dentures     does not wear    GERD (gastroesophageal reflux disease)     occas    Gestational diabetes     insulin dependent    Herniated cervical disc     Herniated lumbar intervertebral disc     History of pneumonia     Hx of degenerative disc disease     Hyperlipidemia     Obesity     Pre-eclampsia     Prior pregnancy with fetal demise     previous demise at 36 weeks    Toe pain     and foot pain       PAST SURGICAL HISTORY:  Past Surgical History:   Procedure Laterality Date    ARTERIOGRAM Left 2025    Procedure: LEFT lower extremity arteriogram w/ popiteal and anterior tibial artery angioplasty;  Surgeon: Ottoniel Victoria MD;  Location:  MAIN OR;  Service: Vascular    BACK SURGERY       SECTION      INCISION AND DRAINAGE OF WOUND Left 2025    Procedure: INCISION AND DRAINAGE (I&D) EXTREMITY;  Surgeon: Leopoldo Ritchie DPM;  Location: [facility];  Service: Podiatry    IR LOWER EXTREMITY ANGIOGRAM  2024    IR LOWER EXTREMITY ANGIOGRAM  2025    IR LOWER EXTREMITY ANGIOGRAM  2025    IR PICC PLACEMENT DOUBLE LUMEN  2025    IR TPA LYSIS CHECK  2025    IR " TPA LYSIS CHECK  2025    LAMINECTOMY      with disc removal, last assessed 16    LEG AMPUTATION THROUGH LOWER TIBIA AND FIBULA Left 2025    Procedure: Left BKA;  Surgeon: Shane Arauz DO;  Location: [facility];  Service: Vascular    OTHER SURGICAL HISTORY      Right ovary removal 2005 per pt recall at [facility] facilty    PERIPHERAL ANGIOGRAM      NJ AMPUTATION FOOT TRANSMETARSAL Left 2025    Procedure: AMPUTATION TRANSMETATARSAL (TMA);  Surgeon: Leopoldo Ritchie DPM;  Location: [facility];  Service: Podiatry    NJ  DELIVERY ONLY N/A 02/15/2017    Procedure:  SECTION ();  Surgeon: Tito Umaña MD;  [facility]: [facility];  Service: Obstetrics    NJ LIG/TRNSXJ FALOPIAN TUBE  DEL/ABDML SURG Left 02/15/2017    Procedure: LIGATION/COAGULATION TUBAL;  Surgeon: Tito Umaña MD;  [facility]: [facility];  Service: Obstetrics    VASCULAR SURGERY  2024    WISDOM TOOTH EXTRACTION         FAMILY HISTORY:  Family History   Problem Relation Age of Onset    Diabetes Mother     COPD Mother     Heart disease Mother     Stroke Father     Heart disease Father        SOCIAL HISTORY:  Social History     Tobacco Use    Smoking status: Former     Current packs/day: 0.00     Types: Cigarettes     Quit date: 2024     Years since quittin.3    Smokeless tobacco: Never    Tobacco comments:     Per pt last cigarette 24--quit cold turkey   Vaping Use    Vaping status: Never Used   Substance Use Topics    Alcohol use: Not Currently     Comment: 1-2 drinks a year    Drug use: Never       MEDICATIONS:    Current Outpatient Medications:     acetaminophen (TYLENOL) 325 mg tablet, Take 2 tablets (650 mg total) by mouth every 6 (six) hours as needed for mild pain, Disp: , Rfl:     Alcohol Swabs 70 % PADS, May substitute brand based on insurance coverage. Check glucose TID., Disp: 100 each, Rfl: 0    aspirin (ECOTRIN LOW STRENGTH) 81 mg EC tablet, Take 1 tablet  (81 mg total) by mouth daily, Disp: 30 tablet, Rfl: 0    atorvastatin (LIPITOR) 80 mg tablet, Take 1 tablet (80 mg total) by mouth every evening, Disp: 100 tablet, Rfl: 1    Blood Glucose Monitoring Suppl (OneTouch Verio Reflect) w/Device KIT, May substitute brand based on insurance coverage. Check glucose TID., Disp: 1 kit, Rfl: 0    chlorthalidone 25 mg tablet, Take 0.5 tablets (12.5 mg total) by mouth daily, Disp: 30 tablet, Rfl: 0    Continuous Glucose  (FreeStyle Carlie 3 Dows) SUSAN, To check blood sugar continuously, Disp: 1 each, Rfl: 0    Continuous Glucose Sensor (FreeStyle Carlie 3 Plus Sensor) MISC, To be changed every 15 days and to check blood sugar continuously, Disp: 6 each, Rfl: 2    Dulaglutide (Trulicity) 0.75 MG/0.5ML SOAJ, Inject 0.75 mg under the skin once a week, Disp: 2 mL, Rfl: 0    Dulaglutide (Trulicity) 1.5 MG/0.5ML SOAJ, Inject 1.5 mg under the skin once a week, Disp: 6 mL, Rfl: 1    DULoxetine (CYMBALTA) 60 mg delayed release capsule, Take 1 capsule (60 mg total) by mouth daily, Disp: 30 capsule, Rfl: 0    gabapentin (Neurontin) 600 MG tablet, Take 1 tablet (600 mg total) by mouth 3 (three) times a day, Disp: 90 tablet, Rfl: 0    glucose blood (OneTouch Verio) test strip, May substitute brand based on insurance coverage. Check glucose TID., Disp: 100 each, Rfl: 0    Insulin Glargine Solostar (Lantus SoloStar) 100 UNIT/ML SOPN, Inject 0.22 mL (22 Units total) under the skin daily at bedtime, Disp: 21 mL, Rfl: 1    insulin lispro (HumaLOG KwikPen) 100 units/mL injection pen, Inject 10 Units under the skin 3 (three) times a day with meals, Disp: 27 mL, Rfl: 1    Insulin Pen Needle (BD Pen Needle Ana 2nd Gen) 32G X 4 MM MISC, For use with insulin pen. Pharmacy may dispense brand covered by insurance., Disp: 100 each, Rfl: 0    Insulin Pen Needle (BD Pen Needle Ana 2nd Gen) 32G X 4 MM MISC, For use with insulin pen 4 times daily. Pharmacy may dispense brand covered by insurance.,  "Disp: 400 each, Rfl: 0    Insulin Syringe-Needle U-100 30G X 1/2\" 0.3 ML MISC, Use 4 times a day, Disp: 200 each, Rfl: 2    lidocaine (LIDODERM) 5 %, Apply 1 patch topically over 12 hours daily Remove & Discard patch within 12 hours or as directed by MD, Disp: 30 patch, Rfl: 0    naloxone (NARCAN) 4 mg/0.1 mL nasal spray, Administer 1 spray into a nostril. If no response after 2-3 minutes, give another dose in the other nostril using a new spray., Disp: 1 each, Rfl: 0    nitroglycerin (NITROSTAT) 0.4 mg SL tablet, Place 1 tablet (0.4 mg total) under the tongue every 5 (five) minutes as needed for chest pain, Disp: 30 tablet, Rfl: 0    OneTouch Delica Lancets 33G MISC, May substitute brand based on insurance coverage. Check glucose TID., Disp: 100 each, Rfl: 0    pantoprazole (PROTONIX) 40 mg tablet, Take 1 tablet (40 mg total) by mouth daily in the early morning, Disp: 30 tablet, Rfl: 0    senna (SENOKOT) 8.6 mg, Take 2 tablets (17.2 mg total) by mouth daily at bedtime, Disp: 30 tablet, Rfl: 0    traZODone (DESYREL) 100 mg tablet, Take 1 tablet (100 mg total) by mouth daily at bedtime, Disp: 30 tablet, Rfl: 0    hydrOXYzine HCL (ATARAX) 25 mg tablet, Take 1 tablet (25 mg total) by mouth every 6 (six) hours as needed (insomnia) (Patient not taking: Reported on 4/25/2025), Disp: 30 tablet, Rfl: 0    methocarbamol (ROBAXIN) 750 mg tablet, Take 1 tablet (750 mg total) by mouth every 8 (eight) hours for 14 days, Disp: 42 tablet, Rfl: 0    polyethylene glycol (MIRALAX) 17 g packet, Take 17 g by mouth daily (Patient not taking: Reported on 3/25/2025), Disp: , Rfl:     ALLERGIES:  Allergies   Allergen Reactions    Codeine Other (See Comments)     Aggression-and nasty--\"took a cough syrup with codeine as a child\"           REVIEW OF SYSTEMS:  Review of Systems   Constitutional:  Negative for chills and fever.   HENT:  Negative for ear pain and sore throat.    Eyes:  Negative for pain and visual disturbance.   Respiratory:  " Negative for cough and shortness of breath.    Cardiovascular:  Negative for chest pain and palpitations.   Gastrointestinal:  Negative for abdominal pain and vomiting.   Genitourinary:  Negative for dysuria and hematuria.   Musculoskeletal:  Negative for arthralgias and back pain.   Skin:  Negative for color change and rash.   Neurological:  Negative for seizures and syncope.   All other systems reviewed and are negative.      VITALS:  Vitals:       LABS:  HgA1c:   Lab Results   Component Value Date    HGBA1C 8.4 (H) 04/28/2025     BMP:   Lab Results   Component Value Date    CALCIUM 9.4 04/28/2025    K 4.1 04/28/2025    CO2 29 04/28/2025    CL 95 (L) 04/28/2025    BUN 21 04/28/2025    CREATININE 0.68 04/28/2025       _____________________________________________________  PHYSICAL EXAMINATION:  General: well developed and well nourished, alert, oriented times 3, and appears comfortable  Psychiatric: Normal  HEENT: Normocephalic, Atraumatic Trachea Midline, No torticollis  Pulmonary: No audible wheezing or respiratory distress   Abdomen/GI: Non tender, non distended   Cardiovascular: No pitting edema, 2+ radial pulse   Skin: No masses, erythema, lacerations, fluctation, ulcerations  Neurovascular: Sensation Intact to the Median, Ulnar, Radial Nerve, Motor Intact to the Median, Ulnar, Radial Nerve, and Pulses Intact  Musculoskeletal: Normal, except as noted in detailed exam and in HPI.      MUSCULOSKELETAL EXAMINATION:  Right hand:  SILT  Composite fist      Middle finger:  Positive palpable flexor tendon nodule at the the A1 pulley level.   Positive tenderness to palpation over A1 pulley. Positive catching. Negative clicking.      Left hand:  SILT  Composite fist    ___________________________________________________  STUDIES REVIEWED:    No new images obtained/reviewed      PROCEDURES PERFORMED:  Hand/upper extremity injection: R long A1  Universal Protocol:  procedure performed by consultantConsent: Verbal consent  obtained.  Risks and benefits: risks, benefits and alternatives were discussed  Consent given by: patient  Timeout called at: 4/30/2025 3:18 PM.  Patient understanding: patient states understanding of the procedure being performed  Site marked: the operative site was marked  Patient identity confirmed: verbally with patient  Supporting Documentation  Indications: pain and tendon swelling   Procedure Details  Condition:trigger finger Location: long finger - R long A1   Needle size: 22 G  Ultrasound guidance: no  Approach: volar  Medications administered: 2.5 mL lidocaine 1 %; 3 mg betamethasone acetate-betamethasone sodium phosphate 6 (3-3) mg/mL  Patient tolerance: patient tolerated the procedure well with no immediate complications  Dressing:  Sterile dressing applied           _____________________________________________________      Scribe Attestation      I,:  James Betancourt am acting as a scribe while in the presence of the attending physician.:       I,:  Thang Bliss MD personally performed the services described in this documentation    as scribed in my presence.:

## 2025-05-01 ENCOUNTER — TELEPHONE (OUTPATIENT)
Age: 50
End: 2025-05-01

## 2025-05-01 DIAGNOSIS — R11.0 NAUSEA: Primary | ICD-10-CM

## 2025-05-01 RX ORDER — ONDANSETRON 4 MG/1
4 TABLET, FILM COATED ORAL EVERY 8 HOURS PRN
Qty: 20 TABLET | Refills: 0 | Status: SHIPPED | OUTPATIENT
Start: 2025-05-01

## 2025-05-01 NOTE — TELEPHONE ENCOUNTER
Called patient to inform.  Patient took it on Tuesday and wants to know if she should go back on her original insulin regimen? Or is there an alternative that she can take

## 2025-05-01 NOTE — TELEPHONE ENCOUNTER
Patient states she started Trulicity on Tuesday.  States she has diarrhea, nausea,increased sweating and indigestion since starting it. Asking if she should continue medication or discontinue it. Please advise, thank you

## 2025-05-02 NOTE — TELEPHONE ENCOUNTER
Patient returning a call. I reviewed above message per provider with patient. She verbalized understanding of all. Patient asking if provider feels she should be seen sooner than 9/18 f/u appointment? I did add patient to the wait list, please advise, thank you

## 2025-05-05 ENCOUNTER — TELEPHONE (OUTPATIENT)
Age: 50
End: 2025-05-05

## 2025-05-05 NOTE — TELEPHONE ENCOUNTER
LVM for patient to schedule an appointment with Dr Troy (limb loss physician).  I offered 7/10/25 at 3:15 in [place].  If appt is taken please offer next available and the waitlist.  Thanks much!

## 2025-05-06 ENCOUNTER — TELEPHONE (OUTPATIENT)
Age: 50
End: 2025-05-06

## 2025-05-06 NOTE — TELEPHONE ENCOUNTER
Caller: finn Ball    Doctor: Dr. Walker     Reason for call: pt was looking for a physiatrist,, pt is an established pt of Dr. Walker, pt wanted to know if Dr. Walker would do the specs for her prosthetic. Pt would like a call back to discuss. Please Advise      Call back#: [phone]

## 2025-05-06 NOTE — TELEPHONE ENCOUNTER
Called and spoke with patient to clarify- states that per [facility] she needs to see Physiatry which she already has an appointment scheduled for 6/10/25 however, per [facility] this is too long to wait and needs a sooner appointment. She tells me that she cannot wear the stump  this long. Please advise.

## 2025-05-06 NOTE — TELEPHONE ENCOUNTER
"Pt is calling to find out who would be calling her about her prosthetic. She is s/p BKA 2/11/25. She is getting concerned because she is going on her 3rd week \"out of her .\"  She reports  won't do it. Please call her to discuss this. .   "

## 2025-05-08 ENCOUNTER — TELEPHONE (OUTPATIENT)
Age: 50
End: 2025-05-08

## 2025-05-08 NOTE — TELEPHONE ENCOUNTER
Patient called to inform that [facility] wants an appt with Dr Troy because patient has been in  3 weeks.  I informer her that the next available appt with Dr Troy is 6/26/25 at 12:15.  I also inforrmed her that I did leave her a message on 5/5 /25 to set up an appt and there had been no response.  I scheduled her for 6/26 at 12:30 with Dr Troy and waitlisted her.

## 2025-05-08 NOTE — TELEPHONE ENCOUNTER
Patient called to schedule an appointment.  Advised of message in chart. Patient is stating that harry has concerns that she is waiting too long for an appointment due to being in a .  Could you please call patient to discuss?    Thank you,  Hilda

## 2025-05-09 ENCOUNTER — OFFICE VISIT (OUTPATIENT)
Dept: ENDOCRINOLOGY | Facility: CLINIC | Age: 50
End: 2025-05-09
Payer: COMMERCIAL

## 2025-05-09 VITALS
SYSTOLIC BLOOD PRESSURE: 118 MMHG | RESPIRATION RATE: 18 BRPM | TEMPERATURE: 97.8 F | DIASTOLIC BLOOD PRESSURE: 70 MMHG | HEART RATE: 76 BPM | OXYGEN SATURATION: 98 %

## 2025-05-09 DIAGNOSIS — E11.8 DIABETES MELLITUS TYPE 2 WITH COMPLICATIONS (HCC): Primary | ICD-10-CM

## 2025-05-09 PROCEDURE — 95251 CONT GLUC MNTR ANALYSIS I&R: CPT | Performed by: STUDENT IN AN ORGANIZED HEALTH CARE EDUCATION/TRAINING PROGRAM

## 2025-05-09 PROCEDURE — 99213 OFFICE O/P EST LOW 20 MIN: CPT | Performed by: STUDENT IN AN ORGANIZED HEALTH CARE EDUCATION/TRAINING PROGRAM

## 2025-05-09 RX ORDER — TIRZEPATIDE 2.5 MG/.5ML
INJECTION, SOLUTION SUBCUTANEOUS
Qty: 2 ML | Refills: 0 | Status: SHIPPED | COMMUNITY
Start: 2025-05-09

## 2025-05-09 NOTE — ASSESSMENT & PLAN NOTE
Lab Results   Component Value Date    HGBA1C 8.4 (H) 04/28/2025     Her blood glucose levels have shown improvement, with an average reading of 176 over the past two weeks. Her estimated A1c is currently at 7.5%. She has achieved her target range 59% of the time. She will be provided with a sample of Mounjaro to be administered on Monday. She is advised to monitor her response to this medication and report any adverse effects. If she experiences similar symptoms as with Trulicity, she is instructed to take Zofran as needed. She is also advised to maintain hydration and consume small, frequent meals. If she experiences intolerable side effects from Mounjaro, she should inform the office immediately.    Follow-up  The patient will follow up in September.  Orders:  •  Tirzepatide (Mounjaro) 2.5 MG/0.5ML SOAJ; 2.5 mg weekly. Sample : [id number]  •  Hemoglobin A1C; Future  •  Comprehensive metabolic panel; Future

## 2025-05-09 NOTE — PROGRESS NOTES
Name: Lizzy Acuna      : 1975      MRN: [id number]  Encounter Provider: Anay Sheets MD  Encounter Date: 2025   Encounter department: [facility] [place]    Chief Complaint   Patient presents with   • Follow-up   :  Assessment & Plan  Diabetes mellitus type 2 with complications (HCC)    Lab Results   Component Value Date    HGBA1C 8.4 (H) 2025     Her blood glucose levels have shown improvement, with an average reading of 176 over the past two weeks. Her estimated A1c is currently at 7.5%. She has achieved her target range 59% of the time. She will be provided with a sample of Mounjaro to be administered on Monday. She is advised to monitor her response to this medication and report any adverse effects. If she experiences similar symptoms as with Trulicity, she is instructed to take Zofran as needed. She is also advised to maintain hydration and consume small, frequent meals. If she experiences intolerable side effects from Mounjaro, she should inform the office immediately.    Follow-up  The patient will follow up in September.  Orders:  •  Tirzepatide (Mounjaro) 2.5 MG/0.5ML SOAJ; 2.5 mg weekly. Sample : [id number]  •  Hemoglobin A1C; Future  •  Comprehensive metabolic panel; Future          History of Present Illness   History of Present Illness        Lizzy Acuna is a 49 y.o. female with type 2 diabetes seen in follow up.     She was last seen on 2025, at which time she was prescribed Humalog 10 units before meals and Lantus 22 units at bedtime. She was also initiated on Trulicity but experienced severe adverse effects, including diarrhea, excessive gas, and persistent burping, which lasted for a week.These symptoms were particularly pronounced when she was in an upright position.      CGM Interpretation:  Date Range:  - may 9th  Device used: Evotec 3  Option - Home use x Professional Use- Physician Provided Equipment  Option - Indication:  Type 2 Diabetes  Analysis of data:   Average Glucose: 176 mg/dl  GMI: 7.5%  Coefficient of Variation: 18.6%  SD: x mg/dL  Glucose Variability: x%  Time in Target Range: 59%   Time Above Range: 39% high, 2% very high  Time Below Range: 0% low, 0% very low  More than 72 hours of data was reviewed. Report to be scanned to chart.     Current regimen:   Lantus 22 units nightly, Humalog 10 units 3 times daily AC.  Intolerance to Trulicity.        Last Eye Exam: Not on file  Last Foot Exam: 03/25/2025  Health Maintenance   Topic Date Due   • Diabetic Eye Exam  04/23/2026 (Originally 6/30/1985)   • Diabetic Foot Exam  03/25/2026     Pertinent Medical History           Review of Systems as per [facility]    Medical History Reviewed by provider this encounter:     .  Current Outpatient Medications on File Prior to Visit   Medication Sig Dispense Refill   • acetaminophen (TYLENOL) 325 mg tablet Take 2 tablets (650 mg total) by mouth every 6 (six) hours as needed for mild pain     • Alcohol Swabs 70 % PADS May substitute brand based on insurance coverage. Check glucose TID. 100 each 0   • aspirin (ECOTRIN LOW STRENGTH) 81 mg EC tablet Take 1 tablet (81 mg total) by mouth daily 30 tablet 0   • atorvastatin (LIPITOR) 80 mg tablet Take 1 tablet (80 mg total) by mouth every evening 100 tablet 1   • Blood Glucose Monitoring Suppl (OneTouch Verio Reflect) w/Device KIT May substitute brand based on insurance coverage. Check glucose TID. 1 kit 0   • chlorthalidone 25 mg tablet Take 0.5 tablets (12.5 mg total) by mouth daily 30 tablet 0   • Continuous Glucose  (FreeStyle Carlie 3 [place]) SUSAN To check blood sugar continuously 1 each 0   • Continuous Glucose Sensor (FreeStyle Carlie 3 Plus Sensor) MISC To be changed every 15 days and to check blood sugar continuously 6 each 2   • DULoxetine (CYMBALTA) 60 mg delayed release capsule Take 1 capsule (60 mg total) by mouth daily 30 capsule 0   • gabapentin (Neurontin) 600 MG tablet Take 1  "tablet (600 mg total) by mouth 3 (three) times a day 90 tablet 0   • glucose blood (OneTouch Verio) test strip May substitute brand based on insurance coverage. Check glucose TID. 100 each 0   • Insulin Glargine Solostar (Lantus SoloStar) 100 UNIT/ML SOPN Inject 0.22 mL (22 Units total) under the skin daily at bedtime 21 mL 1   • insulin lispro (HumaLOG KwikPen) 100 units/mL injection pen Inject 10 Units under the skin 3 (three) times a day with meals 27 mL 1   • Insulin Pen Needle (BD Pen Needle Ana 2nd Gen) 32G X 4 MM MISC For use with insulin pen. Pharmacy may dispense brand covered by insurance. 100 each 0   • Insulin Pen Needle (BD Pen Needle Ana 2nd Gen) 32G X 4 MM MISC For use with insulin pen 4 times daily. Pharmacy may dispense brand covered by insurance. 400 each 0   • Insulin Syringe-Needle U-100 30G X 1/2\" 0.3 ML MISC Use 4 times a day 200 each 2   • lidocaine (LIDODERM) 5 % Apply 1 patch topically over 12 hours daily Remove & Discard patch within 12 hours or as directed by MD 30 patch 0   • methocarbamol (ROBAXIN) 750 mg tablet Take 1 tablet (750 mg total) by mouth every 8 (eight) hours for 14 days 42 tablet 0   • naloxone (NARCAN) 4 mg/0.1 mL nasal spray Administer 1 spray into a nostril. If no response after 2-3 minutes, give another dose in the other nostril using a new spray. 1 each 0   • nitroglycerin (NITROSTAT) 0.4 mg SL tablet Place 1 tablet (0.4 mg total) under the tongue every 5 (five) minutes as needed for chest pain 30 tablet 0   • ondansetron (ZOFRAN) 4 mg tablet Take 1 tablet (4 mg total) by mouth every 8 (eight) hours as needed for nausea or vomiting 20 tablet 0   • OneTouch Delica Lancets 33G MISC May substitute brand based on insurance coverage. Check glucose TID. 100 each 0   • pantoprazole (PROTONIX) 40 mg tablet Take 1 tablet (40 mg total) by mouth daily in the early morning 30 tablet 0   • senna (SENOKOT) 8.6 mg Take 2 tablets (17.2 mg total) by mouth daily at bedtime 30 tablet 0 "   • traZODone (DESYREL) 100 mg tablet Take 1 tablet (100 mg total) by mouth daily at bedtime 30 tablet 0   • [DISCONTINUED] Dulaglutide (Trulicity) 0.75 MG/0.5ML SOAJ Inject 0.75 mg under the skin once a week 2 mL 0   • [DISCONTINUED] Dulaglutide (Trulicity) 1.5 MG/0.5ML SOAJ Inject 1.5 mg under the skin once a week 6 mL 1   • hydrOXYzine HCL (ATARAX) 25 mg tablet Take 1 tablet (25 mg total) by mouth every 6 (six) hours as needed (insomnia) (Patient not taking: Reported on 4/25/2025) 30 tablet 0   • polyethylene glycol (MIRALAX) 17 g packet Take 17 g by mouth daily (Patient not taking: Reported on 3/25/2025)       No current facility-administered medications on file prior to visit.         Medical History Reviewed by provider this encounter:     .    Objective   /70 (BP Location: Right arm, Patient Position: Sitting, Cuff Size: Large)   Pulse 76   Temp 97.8 °F (36.6 °C) (Temporal)   Resp 18   SpO2 98%      There is no height or weight on file to calculate BMI.  Wt Readings from Last 3 Encounters:   04/30/25 87.1 kg (192 lb)   04/28/25 87.1 kg (192 lb)   04/25/25 87.1 kg (192 lb)     Physical Exam    Physical Exam  Constitutional:       General: She is not in acute distress.     Appearance: She is not ill-appearing.   HENT:      Head: Normocephalic and atraumatic.   Pulmonary:      Effort: Pulmonary effort is normal. No respiratory distress.   Neurological:      Mental Status: She is oriented to person, place, and time.       Results  Laboratory Studies  Average blood sugar for last 2 weeks is 176. Estimated A1c is 7.5%.  Labs:   Lab Results   Component Value Date    HGBA1C 8.4 (H) 04/28/2025    HGBA1C 7.3 (A) 03/13/2025    HGBA1C 10.3 (H) 12/13/2024     Lab Results   Component Value Date    CREATININE 0.68 04/28/2025    CREATININE 0.73 02/24/2025    CREATININE 0.88 02/21/2025    BUN 21 04/28/2025    K 4.1 04/28/2025    CL 95 (L) 04/28/2025    CO2 29 04/28/2025     eGFR   Date Value Ref Range Status    04/28/2025 103 ml/min/1.73sq m Final     Lab Results   Component Value Date    HDL 50 04/28/2025    TRIG 165 (H) 04/28/2025     Lab Results   Component Value Date    ALT 11 04/28/2025    AST 12 (L) 04/28/2025    ALKPHOS 87 04/28/2025     Lab Results   Component Value Date    CEH6WSEXXHDS 0.396 (L) 04/28/2025    BMK7BOUKNRKQ 0.620 02/17/2025       There are no Patient Instructions on file for this visit.    Discussed with the patient and all questioned fully answered. She will call me if any problems arise.

## 2025-05-12 ENCOUNTER — TELEPHONE (OUTPATIENT)
Dept: ENDOCRINOLOGY | Facility: CLINIC | Age: 50
End: 2025-05-12

## 2025-05-12 DIAGNOSIS — E11.65 UNCONTROLLED TYPE 2 DIABETES MELLITUS WITH HYPERGLYCEMIA (HCC): ICD-10-CM

## 2025-05-12 NOTE — TELEPHONE ENCOUNTER
"Patient called stating that she has had issues with her sensors. She said the one fell off, and the other is saying \"sensor error.\" I advised her to call [employer] to see if they will replace the current one. She is asking if there is any adhesive patches to make the sensors stick better? Please advise.   "

## 2025-05-13 DIAGNOSIS — E11.8 DIABETES MELLITUS TYPE 2 WITH COMPLICATIONS (HCC): ICD-10-CM

## 2025-05-13 RX ORDER — HYDROCHLOROTHIAZIDE 12.5 MG/1
CAPSULE ORAL
Qty: 6 EACH | Refills: 0 | OUTPATIENT
Start: 2025-05-13

## 2025-05-13 RX ORDER — PEN NEEDLE, DIABETIC 32GX 5/32"
NEEDLE, DISPOSABLE MISCELLANEOUS
Qty: 100 EACH | Refills: 0 | OUTPATIENT
Start: 2025-05-13

## 2025-05-13 NOTE — TELEPHONE ENCOUNTER
Called patient and left a voicemail.  They do make adhesive patches that she can get on [employer] that go over the sensor.  If she calls customer service they will send her replacement sensors to replace the bad sensors

## 2025-05-14 NOTE — TELEPHONE ENCOUNTER
Called patient and she will look on amaz[employer].  She is getting replacement sensors overnighted to her from the company    She started the Mounjaro and is feeling better than on the trulicity. Less side effects

## 2025-05-15 ENCOUNTER — TELEPHONE (OUTPATIENT)
Age: 50
End: 2025-05-15

## 2025-05-15 NOTE — TELEPHONE ENCOUNTER
Patient called to reschedule her 6/17 appointment with PCP for a well visit. No appointments available through solutions with any provider and a warm transfer to office clerical was completed for further assistance.

## 2025-05-19 ENCOUNTER — TELEPHONE (OUTPATIENT)
Age: 50
End: 2025-05-19

## 2025-05-19 DIAGNOSIS — E11.8 DIABETES MELLITUS TYPE 2 WITH COMPLICATIONS (HCC): Primary | ICD-10-CM

## 2025-05-19 DIAGNOSIS — E11.65 UNCONTROLLED TYPE 2 DIABETES MELLITUS WITH HYPERGLYCEMIA (HCC): ICD-10-CM

## 2025-05-19 RX ORDER — ACYCLOVIR 400 MG/1
1 TABLET ORAL CONTINUOUS
Qty: 1 EACH | Refills: 0 | Status: SHIPPED | OUTPATIENT
Start: 2025-05-19

## 2025-05-19 RX ORDER — ACYCLOVIR 400 MG/1
1 TABLET ORAL
Qty: 9 EACH | Refills: 1 | Status: SHIPPED | OUTPATIENT
Start: 2025-05-19 | End: 2025-08-17

## 2025-05-19 RX ORDER — PROCHLORPERAZINE 25 MG/1
SUPPOSITORY RECTAL
Qty: 1 EACH | Refills: 0 | Status: SHIPPED | OUTPATIENT
Start: 2025-05-19

## 2025-05-19 RX ORDER — PROCHLORPERAZINE 25 MG/1
SUPPOSITORY RECTAL
Qty: 6 EACH | Refills: 2 | Status: SHIPPED | OUTPATIENT
Start: 2025-05-19

## 2025-05-19 RX ORDER — ACYCLOVIR 400 MG/1
1 TABLET ORAL
Qty: 9 EACH | Refills: 3 | Status: SHIPPED | OUTPATIENT
Start: 2025-05-19

## 2025-05-19 RX ORDER — ACYCLOVIR 400 MG/1
1 TABLET ORAL DAILY
Qty: 1 EACH | Refills: 0 | Status: SHIPPED | OUTPATIENT
Start: 2025-05-19

## 2025-05-19 RX ORDER — PROCHLORPERAZINE 25 MG/1
SUPPOSITORY RECTAL
Qty: 3 EACH | Refills: 3 | Status: SHIPPED | OUTPATIENT
Start: 2025-05-19

## 2025-05-19 RX ORDER — ACYCLOVIR 400 MG/1
1 TABLET ORAL
Qty: 9 EACH | Refills: 3 | Status: SHIPPED | OUTPATIENT
Start: 2025-05-19 | End: 2025-05-19 | Stop reason: SDUPTHER

## 2025-05-19 NOTE — TELEPHONE ENCOUNTER
Patient called stating Dexcom G7 is backordered. Her pharmacy does have the G6. Please send script for Dexcom G6 sensor and  to [facility]

## 2025-05-19 NOTE — TELEPHONE ENCOUNTER
Pt called reporting that the dexcom g7 is not available at the pharmacy we sent the rx to. Pt will call other local pharmacies to see if they have it in stock and will call  us with an update.

## 2025-05-19 NOTE — TELEPHONE ENCOUNTER
"Patient retuning a call. States she called [employer] customer service in regards to her bernadette sensors and they shipped her 2 replacement sensors. Patient reports she received the replacement sensors, and used both of them immediatly last night because one immediately fell off after insertion, and the second senor stated \"Sensor error\". Patient reports she has no sensors left- she does state she has back up supplies for fingerstick's and verified she will check bg via fingerstick QID until sensor issue is resolved. Patient states that she has had \"nothing but problems\" since starting on the [employer] sensors. She is asking if Dexcom would possibly work better for her, and if this something she could try? Please advise, patient is requesting a call back. Thank you.   "

## 2025-05-20 ENCOUNTER — TELEPHONE (OUTPATIENT)
Dept: FAMILY MEDICINE CLINIC | Facility: CLINIC | Age: 50
End: 2025-05-20

## 2025-05-29 DIAGNOSIS — E11.8 DIABETES MELLITUS TYPE 2 WITH COMPLICATIONS (HCC): ICD-10-CM

## 2025-05-29 DIAGNOSIS — Z89.512 S/P BKA (BELOW KNEE AMPUTATION) UNILATERAL, LEFT (HCC): ICD-10-CM

## 2025-05-29 DIAGNOSIS — E66.9 OBESITY (BMI 30-39.9): ICD-10-CM

## 2025-05-29 DIAGNOSIS — G47.01 INSOMNIA DUE TO MEDICAL CONDITION: ICD-10-CM

## 2025-05-29 DIAGNOSIS — F41.9 ANXIETY: ICD-10-CM

## 2025-05-30 RX ORDER — PANTOPRAZOLE SODIUM 40 MG/1
40 TABLET, DELAYED RELEASE ORAL
Qty: 30 TABLET | Refills: 1 | Status: SHIPPED | OUTPATIENT
Start: 2025-05-30

## 2025-05-30 RX ORDER — TRAZODONE HYDROCHLORIDE 100 MG/1
100 TABLET ORAL
Qty: 30 TABLET | Refills: 0 | Status: SHIPPED | OUTPATIENT
Start: 2025-05-30

## 2025-05-30 RX ORDER — GLUCOSAMINE HCL/CHONDROITIN SU 500-400 MG
CAPSULE ORAL
Qty: 100 EACH | Refills: 0 | Status: SHIPPED | OUTPATIENT
Start: 2025-05-30

## 2025-05-30 RX ORDER — GABAPENTIN 600 MG/1
600 TABLET ORAL 3 TIMES DAILY
Qty: 90 TABLET | Refills: 0 | Status: SHIPPED | OUTPATIENT
Start: 2025-05-30 | End: 2025-06-04

## 2025-05-30 RX ORDER — DULOXETIN HYDROCHLORIDE 60 MG/1
60 CAPSULE, DELAYED RELEASE ORAL DAILY
Qty: 30 CAPSULE | Refills: 0 | Status: SHIPPED | OUTPATIENT
Start: 2025-05-30

## 2025-06-02 ENCOUNTER — TELEPHONE (OUTPATIENT)
Age: 50
End: 2025-06-02

## 2025-06-02 NOTE — TELEPHONE ENCOUNTER
Patient calling to advise she is doing well on mounjaro sample. Patient is asking for a refill to continue use. Patient also asking if she needs to increase dose?    Patient also stated she might need a prior authorization and wanted to get that done asap.    Please advise    Patient will use     [facility] pharmacy -abelardo vargas . On file

## 2025-06-03 ENCOUNTER — TELEPHONE (OUTPATIENT)
Age: 50
End: 2025-06-03

## 2025-06-03 DIAGNOSIS — E11.8 DIABETES MELLITUS TYPE 2 WITH COMPLICATIONS (HCC): Primary | ICD-10-CM

## 2025-06-03 NOTE — TELEPHONE ENCOUNTER
PA for Tirzepatide 5 MG SUBMITTED to [employer]    via    [x]CMM-KEY: [id number]  [x]PA sent as URGENT    All office notes, labs and other pertaining documents and studies sent. Clinical questions answered. Awaiting determination from insurance company.     Turnaround time for your insurance to make a decision on your Prior Authorization can take 7-21 business days.

## 2025-06-04 ENCOUNTER — OFFICE VISIT (OUTPATIENT)
Dept: PAIN MEDICINE | Facility: CLINIC | Age: 50
End: 2025-06-04
Payer: COMMERCIAL

## 2025-06-04 VITALS — HEIGHT: 61 IN | BODY MASS INDEX: 36.28 KG/M2

## 2025-06-04 DIAGNOSIS — G89.4 CHRONIC PAIN SYNDROME: Primary | ICD-10-CM

## 2025-06-04 DIAGNOSIS — G54.6 PHANTOM LIMB PAIN (HCC): ICD-10-CM

## 2025-06-04 DIAGNOSIS — M79.2 NEUROPATHIC PAIN: ICD-10-CM

## 2025-06-04 PROCEDURE — 99214 OFFICE O/P EST MOD 30 MIN: CPT | Performed by: STUDENT IN AN ORGANIZED HEALTH CARE EDUCATION/TRAINING PROGRAM

## 2025-06-04 RX ORDER — GABAPENTIN 800 MG/1
800 TABLET ORAL 3 TIMES DAILY
Qty: 270 TABLET | Refills: 0 | Status: SHIPPED | OUTPATIENT
Start: 2025-06-04

## 2025-06-04 NOTE — TELEPHONE ENCOUNTER
Patient calling in to report that Tirzepatide needed a PA. I advised patient per the above message that PA was submitted on 6/3 and was approved  w/ approval dates 6/3/25-6/3/26 for all strengths. Patient verbalized understanding and will call pharmacy to see if medication is ready for .   No further action needed

## 2025-06-04 NOTE — PATIENT INSTRUCTIONS
Increase gabapentin to 800 mg three times a day. Gabapentin may cause vision changes, clumsiness, unsteadiness, dizziness, drowsiness, sleepiness, or trouble with thinking. Make sure you know how you react to this medicine before you drive, use machines, or do anything else that could be dangerous if you are not alert, well-coordinated, or able to think or see well.

## 2025-06-04 NOTE — PROGRESS NOTES
"Name: Lizzy Acuna      : 1975      MRN: [id number]  Encounter Provider: Sarah Walker MD  Encounter Date: 2025   Encounter department: [facility] [place]  :     Portions of the record may have been created with voice recognition software. Occasional wrong word or \"sound a like\" substitutions may have occurred due to the inherent limitations of voice recognition software. Read the chart carefully and recognize, using context, where substitutions have occurred. Contact me with any questions.       Assessment & Plan  Chronic pain syndrome  Phantom limb pain (HCC)  Neuropathic pain    49-year-old female here for follow-up visit with regards to left lower extremity pain since a left BKA.  She was previously reporting adequate control of symptoms on duloxetine 60 mg daily and gabapentin 600 mg 3 times daily.  Over the last couple months, patient notes worse phantom limb pain.  She has recently established with an amputee specialist and is in the process of completing prosthetic fitting/For this.      Increase gabapentin to 800 mg daily.  Risks and benefit discussed.    Discuss prosthetic fitting and subsequent gait training process should also help with reducing phantom limb sensation/pain in the future. Patient advised to continue topical lidocaine, and desensitization techniques over LLE stump which she was taught in recent PT.  Patient expresses understanding.    Follow up in 3 months or as needed.      Orders:    gabapentin (NEURONTIN) 800 mg tablet; Take 1 tablet (800 mg total) by mouth 3 (three) times a day        My impressions and treatment recommendations were discussed in detail with the patient who verbalized understanding and had no further questions.  Discharge instructions were provided. I personally saw and examined the patient and I agree with the above discussed plan of care.    History of Present Illness     Lizzy Acuna is a 49 y.o. female who presents for a " "follow up office visit in regards to Knee Pain (Left knee).       Review of Systems   Constitutional:  Negative for fever.   HENT:  Negative for sinus pain.    Eyes:  Negative for redness.   Respiratory:  Negative for shortness of breath.    Cardiovascular:  Negative for palpitations.   Gastrointestinal:  Negative for abdominal pain and nausea.   Endocrine: Negative for polydipsia.   Genitourinary:  Negative for difficulty urinating.   Musculoskeletal:  Negative for gait problem and myalgias.        Pain in extremity-Left leg   Skin:  Negative for rash.   Neurological:  Negative for seizures and headaches.   Psychiatric/Behavioral:  Negative for decreased concentration. The patient is not nervous/anxious.        Medical History Reviewed by provider this encounter:  Tobacco  Allergies  Meds  Problems  Med Hx  Surg Hx  Fam Hx     .  Medications Ordered Prior to Encounter[1]   Social History[2]     Objective   Ht 5' 1\" (1.549 m)   BMI 36.28 kg/m²      Pain Score:   4  Physical Exam  Constitutional: normal, well developed, well nourished, alert, in no distress and non-toxic and no overt pain behavior.  Eyes: anicteric  HEENT: grossly intact  Neck: supple, symmetric, trachea midline and no masses   Pulmonary: even and unlabored  Cardiovascular: No edema or pitting edema present  Skin: Normal without rashes or lesions and well hydrated  Psychiatric: Mood and affect appropriate  Neurologic: Cranial Nerves II-XII grossly intact  Musculoskeletal: in wheelchair           [1]   Current Outpatient Medications on File Prior to Visit   Medication Sig Dispense Refill    acetaminophen (TYLENOL) 325 mg tablet Take 2 tablets (650 mg total) by mouth every 6 (six) hours as needed for mild pain      Alcohol Swabs 70 % PADS May substitute brand based on insurance coverage. Check glucose TID. 100 each 0    aspirin (ECOTRIN LOW STRENGTH) 81 mg EC tablet Take 1 tablet (81 mg total) by mouth daily 30 tablet 0    atorvastatin " "(LIPITOR) 80 mg tablet Take 1 tablet (80 mg total) by mouth every evening 100 tablet 1    Blood Glucose Monitoring Suppl (OneTouch Verio Reflect) w/Device KIT May substitute brand based on insurance coverage. Check glucose TID. 1 kit 0    chlorthalidone 25 mg tablet Take 0.5 tablets (12.5 mg total) by mouth daily 30 tablet 0    Continuous Glucose  (Dexcom G6 ) SUSAN Use daily 1 each 0    Continuous Glucose  (Dexcom G7 ) SUSAN Use 1 Units in the morning 1 each 0    Continuous Glucose  (Dexcom G7 ) SUSAN Use 1 each continuous 1 each 0    Continuous Glucose  (FreeStyle Carlie 3 [place]) SUSAN To check blood sugar continuously 1 each 0    Continuous Glucose Sensor (Dexcom G6 Sensor) MISC Change every 10 days 3 each 3    Continuous Glucose Sensor (Dexcom G7 Sensor) Use 1 Device every 10 days 9 each 3    Continuous Glucose Sensor (Dexcom G7 Sensor) Use 1 Device every 10 days 9 each 1    Continuous Glucose Transmitter (Dexcom G6 Transmitter) MISC To check blood sugar continuously on a change every 10 days. 6 each 2    DULoxetine (CYMBALTA) 60 mg delayed release capsule Take 1 capsule (60 mg total) by mouth daily 30 capsule 0    glucose blood (OneTouch Verio) test strip May substitute brand based on insurance coverage. Check glucose TID. 100 each 0    Insulin Glargine Solostar (Lantus SoloStar) 100 UNIT/ML SOPN Inject 0.22 mL (22 Units total) under the skin daily at bedtime 21 mL 1    insulin lispro (HumaLOG KwikPen) 100 units/mL injection pen Inject 10 Units under the skin 3 (three) times a day with meals 27 mL 1    Insulin Pen Needle (BD Pen Needle Ana 2nd Gen) 32G X 4 MM MISC For use with insulin pen. Pharmacy may dispense brand covered by insurance. 100 each 0    Insulin Pen Needle (BD Pen Needle Ana 2nd Gen) 32G X 4 MM MISC For use with insulin pen 4 times daily. Pharmacy may dispense brand covered by insurance. 400 each 0    Insulin Syringe-Needle U-100 30G X 1/2\" " 0.3 ML MISC Use 4 times a day 200 each 2    lidocaine (LIDODERM) 5 % Apply 1 patch topically over 12 hours daily Remove & Discard patch within 12 hours or as directed by MD 30 patch 0    methocarbamol (ROBAXIN) 750 mg tablet Take 1 tablet (750 mg total) by mouth every 8 (eight) hours for 14 days 42 tablet 0    naloxone (NARCAN) 4 mg/0.1 mL nasal spray Administer 1 spray into a nostril. If no response after 2-3 minutes, give another dose in the other nostril using a new spray. 1 each 0    nitroglycerin (NITROSTAT) 0.4 mg SL tablet Place 1 tablet (0.4 mg total) under the tongue every 5 (five) minutes as needed for chest pain 30 tablet 0    ondansetron (ZOFRAN) 4 mg tablet Take 1 tablet (4 mg total) by mouth every 8 (eight) hours as needed for nausea or vomiting 20 tablet 0    OneTouch Delica Lancets 33G MISC May substitute brand based on insurance coverage. Check glucose TID. 100 each 0    pantoprazole (PROTONIX) 40 mg tablet Take 1 tablet (40 mg total) by mouth daily in the early morning 30 tablet 1    Tirzepatide 5 MG/0.5ML SOAJ Inject 5 mg under the skin once a week 6 mL 0    traZODone (DESYREL) 100 mg tablet Take 1 tablet (100 mg total) by mouth daily at bedtime 30 tablet 0    [DISCONTINUED] gabapentin (Neurontin) 600 MG tablet Take 1 tablet (600 mg total) by mouth 3 (three) times a day 90 tablet 0    hydrOXYzine HCL (ATARAX) 25 mg tablet Take 1 tablet (25 mg total) by mouth every 6 (six) hours as needed (insomnia) (Patient not taking: Reported on 4/25/2025) 30 tablet 0    polyethylene glycol (MIRALAX) 17 g packet Take 17 g by mouth daily (Patient not taking: Reported on 3/25/2025)      senna (SENOKOT) 8.6 mg Take 2 tablets (17.2 mg total) by mouth daily at bedtime (Patient not taking: Reported on 6/4/2025) 30 tablet 0     No current facility-administered medications on file prior to visit.   [2]   Social History  Tobacco Use    Smoking status: Former     Current packs/day: 0.00     Types: Cigarettes     Quit  date: 2024     Years since quittin.4    Smokeless tobacco: Never    Tobacco comments:     Per pt last cigarette 24--quit cold turkey   Vaping Use    Vaping status: Never Used   Substance and Sexual Activity    Alcohol use: Not Currently     Comment: 1-2 drinks a year    Drug use: Never    Sexual activity: Not Currently     Partners: Male     Birth control/protection: None     Comment: defer

## 2025-06-04 NOTE — TELEPHONE ENCOUNTER
PA for Tirzepatide 5 MG  APPROVED     Date(s) approved 6/3/25-6/3/26 [employer]    Case #    Patient advised by          [x]Buzzillahart Message  []Phone call   []LMOM  []L/M to call office as no active Communication consent on file  []Unable to leave detailed message as VM not approved on Communication consent       Pharmacy advised by    [x]Fax  []Phone call  []Secure Chat    Specialty Pharmacy    []      Approval letter scanned into Media No

## 2025-06-09 DIAGNOSIS — E11.8 DIABETES MELLITUS TYPE 2 WITH COMPLICATIONS (HCC): ICD-10-CM

## 2025-06-10 ENCOUNTER — TELEPHONE (OUTPATIENT)
Age: 50
End: 2025-06-10

## 2025-06-10 RX ORDER — PROCHLORPERAZINE 25 MG/1
SUPPOSITORY RECTAL
Qty: 3 EACH | Refills: 0 | OUTPATIENT
Start: 2025-06-10

## 2025-06-11 RX ORDER — PROCHLORPERAZINE 25 MG/1
SUPPOSITORY RECTAL
Qty: 3 EACH | Refills: 3 | Status: SHIPPED | OUTPATIENT
Start: 2025-06-11

## 2025-06-11 NOTE — TELEPHONE ENCOUNTER
Patient states pharmacy has Dexcom G6 sensors in stock but refill was denied. Patient states she was not able to get the Dexcom G7 and this needs to be removed from med list. Patient asking for rx for Dexcom G6 sensors. Thank you.

## 2025-06-12 DIAGNOSIS — E11.8 DIABETES MELLITUS TYPE 2 WITH COMPLICATIONS (HCC): ICD-10-CM

## 2025-06-12 RX ORDER — PROCHLORPERAZINE 25 MG/1
SUPPOSITORY RECTAL
Qty: 6 EACH | Refills: 1 | Status: SHIPPED | OUTPATIENT
Start: 2025-06-12

## 2025-06-24 ENCOUNTER — VBI (OUTPATIENT)
Dept: ADMINISTRATIVE | Facility: OTHER | Age: 50
End: 2025-06-24

## 2025-06-24 NOTE — TELEPHONE ENCOUNTER
06/24/25 10:43 AM     Chart reviewed for CRC: Colonoscopy ; nothing is submitted to the patient's insurance at this time.     Jimmie Cano MA   [facility]

## 2025-07-02 DIAGNOSIS — E66.9 OBESITY (BMI 30-39.9): ICD-10-CM

## 2025-07-02 DIAGNOSIS — F41.9 ANXIETY: ICD-10-CM

## 2025-07-02 DIAGNOSIS — Z89.512 S/P BKA (BELOW KNEE AMPUTATION) UNILATERAL, LEFT (HCC): ICD-10-CM

## 2025-07-02 DIAGNOSIS — G47.01 INSOMNIA DUE TO MEDICAL CONDITION: ICD-10-CM

## 2025-07-03 ENCOUNTER — PROCEDURE VISIT (OUTPATIENT)
Dept: PODIATRY | Facility: CLINIC | Age: 50
End: 2025-07-03
Payer: COMMERCIAL

## 2025-07-03 VITALS — HEIGHT: 61 IN | WEIGHT: 192 LBS | BODY MASS INDEX: 36.25 KG/M2

## 2025-07-03 DIAGNOSIS — E11.42 DIABETIC POLYNEUROPATHY ASSOCIATED WITH TYPE 2 DIABETES MELLITUS (HCC): ICD-10-CM

## 2025-07-03 DIAGNOSIS — B35.1 ONYCHOMYCOSIS: Primary | ICD-10-CM

## 2025-07-03 PROCEDURE — 11720 DEBRIDE NAIL 1-5: CPT | Performed by: PODIATRIST

## 2025-07-03 RX ORDER — DULOXETIN HYDROCHLORIDE 60 MG/1
60 CAPSULE, DELAYED RELEASE ORAL DAILY
Qty: 30 CAPSULE | Refills: 5 | Status: SHIPPED | OUTPATIENT
Start: 2025-07-03

## 2025-07-03 RX ORDER — LIDOCAINE 50 MG/G
1 PATCH TOPICAL DAILY
Qty: 30 PATCH | Refills: 3 | Status: SHIPPED | OUTPATIENT
Start: 2025-07-03

## 2025-07-03 RX ORDER — PANTOPRAZOLE SODIUM 40 MG/1
40 TABLET, DELAYED RELEASE ORAL
Qty: 30 TABLET | Refills: 5 | Status: SHIPPED | OUTPATIENT
Start: 2025-07-03

## 2025-07-03 RX ORDER — TRAZODONE HYDROCHLORIDE 100 MG/1
100 TABLET ORAL
Qty: 30 TABLET | Refills: 5 | Status: SHIPPED | OUTPATIENT
Start: 2025-07-03

## 2025-07-03 NOTE — PROGRESS NOTES
Lizzy Jainni  1975  AT RISK FOOT CARE    1. Onychomycosis        2. Diabetic polyneuropathy associated with type 2 diabetes mellitus (HCC)            Patient presents for at-risk foot care.  Patient has no acute concerns today.  Patient has significant lower extremity risk due to previous amputation.    On exam patient has thickened, hypertrophic, discolored, brittle toenails with subungual debris and tenderness x5   Callus: 0  Amputation: L BKA    Today's treatment includes:  Debridement of toenails. Using nail nipper, ansley, and curette, nails were sharply debrided, reduced in thickness and length. Devitalized nail tissue and fungal debris excised and removed. Patient tolerated well.        Discussed proper shoe gear, daily inspections of feet, and general foot health with patient. Patient has Q7  findings and is recommended for at risk foot care every 9-10 weeks.      Procedure: All mycotic toenails were reduced and debrided in length, width, and girth using a nail nipper and dremel.  All hyperkeratotic skin lesion(s) were sharply pared with a scalpel with no bleeding or evidence of ulceration.  Patient tolerated procedure(s) well without complications.

## 2025-07-07 ENCOUNTER — TELEPHONE (OUTPATIENT)
Age: 50
End: 2025-07-07

## 2025-07-07 DIAGNOSIS — Z89.512 STATUS POST BELOW-KNEE AMPUTATION OF LEFT LOWER EXTREMITY (HCC): Primary | ICD-10-CM

## 2025-07-07 DIAGNOSIS — E11.8 DIABETES MELLITUS TYPE 2 WITH COMPLICATIONS (HCC): Primary | ICD-10-CM

## 2025-07-07 RX ORDER — TIRZEPATIDE 7.5 MG/.5ML
7.5 INJECTION, SOLUTION SUBCUTANEOUS
Qty: 2 ML | Refills: 1 | Status: SHIPPED | OUTPATIENT
Start: 2025-07-07

## 2025-07-07 NOTE — TELEPHONE ENCOUNTER
"[facility]     Calling in to make sure we put a referral for physical therapy as the one on file is for \"PHYSIATRY \"     pt needs Physical Therapy after Amputation. Please advise and call pt to confirm, Physical Therapy put her in for 07/23 pending referral.  "

## 2025-07-07 NOTE — TELEPHONE ENCOUNTER
Caller: Lizzy     Doctor:Mary Andujar    Reason for call: Patient states she is trying to schedule physical therapy. [facility] states there is no referral. I did find a Pending referral. Please call patient so she knows when to call Physical therapy to schedule.    Call back#: [phone]

## 2025-07-07 NOTE — TELEPHONE ENCOUNTER
Patient calling in stating she is out of Mounjaro 5mg. States she did will on 5mg dose and had very minimal side effects of gas. She is asking if she can increase to next dose? Asking to please send script to [facility]. Please advise, thank you.

## 2025-07-08 ENCOUNTER — TELEPHONE (OUTPATIENT)
Age: 50
End: 2025-07-08

## 2025-07-08 NOTE — TELEPHONE ENCOUNTER
Contacted patient o clarify what type of walker she was requesting.  She stated that she has been using the walker with the seat and brakes.  Informed her that she would have to pay the up charge for the rollator (cost that would exceed a wheeled walker) and she was agreeable to this.  Informed her that the order will be placed through [employer] and will be delivered to her home.  She was agreeable to same.      Order placed for rollator and send to Geraldine Andujar PA-C to sign.

## 2025-07-08 NOTE — TELEPHONE ENCOUNTER
Patient called in to see if we could write a script for a walker ? pt is an amputee and was using her moms walker but needs her own now. Please advise.

## 2025-07-09 NOTE — TELEPHONE ENCOUNTER
Received the following message from [employer]:    Mary NICOLE   [facility]  9m ago  @[email or web address]   This order has been canceled- The patient is currently renting a wheelchair through insurance and is not eligible for a lesser piece of ambulatory equipment. We are not able to offer an out of pocket option due to Medicaid eligibility. We will contact the patient to advise. Thank you

## 2025-07-11 LAB
DME PARACHUTE DELIVERY DATE REQUESTED: NORMAL
DME PARACHUTE ITEM DESCRIPTION: NORMAL
DME PARACHUTE ITEM DESCRIPTION: NORMAL
DME PARACHUTE ORDER STATUS: NORMAL
DME PARACHUTE SUPPLIER NAME: NORMAL
DME PARACHUTE SUPPLIER PHONE: NORMAL

## 2025-07-11 NOTE — TELEPHONE ENCOUNTER
Received the following messages from [employer]:      [facility]  38m ago  [facility] will attempt to contact the patient  Mary NICOLE  [facility]  46m ago  Thank you. Your order has been processed and sent to the scheduling team. The item(s) is usually shipped. The estimated delivery time for your patients order once contact has been made is 3-5 business days. Please note that the option for in-store retail pickup is available for added convenience, allowing the patient to receive their order faster.   [facility]  7/9 ? 2:17PM  Canceled: Order for Rollator Not Specific Color + 1 other item was canceled. Please contact the supplier if you wish to have this order fulfilled

## 2025-07-16 LAB
DME PARACHUTE DELIVERY DATE ACTUAL: NORMAL
DME PARACHUTE DELIVERY DATE REQUESTED: NORMAL
DME PARACHUTE ITEM DESCRIPTION: NORMAL
DME PARACHUTE ITEM DESCRIPTION: NORMAL
DME PARACHUTE ORDER STATUS: NORMAL
DME PARACHUTE SUPPLIER NAME: NORMAL
DME PARACHUTE SUPPLIER PHONE: NORMAL

## 2025-07-17 ENCOUNTER — HOSPITAL ENCOUNTER (INPATIENT)
Facility: HOSPITAL | Age: 50
LOS: 1 days | Discharge: HOME | DRG: 249 | End: 2025-07-19
Attending: EMERGENCY MEDICINE | Admitting: INTERNAL MEDICINE
Payer: COMMERCIAL

## 2025-07-17 ENCOUNTER — APPOINTMENT (EMERGENCY)
Dept: CT IMAGING | Facility: HOSPITAL | Age: 50
DRG: 249 | End: 2025-07-17
Payer: COMMERCIAL

## 2025-07-17 DIAGNOSIS — I82.3 RENAL VEIN THROMBOSIS (HCC): ICD-10-CM

## 2025-07-17 DIAGNOSIS — N39.0 UTI (URINARY TRACT INFECTION): ICD-10-CM

## 2025-07-17 DIAGNOSIS — K14.8 TONGUE MASS: ICD-10-CM

## 2025-07-17 DIAGNOSIS — R80.9 PROTEINURIA, UNSPECIFIED TYPE: ICD-10-CM

## 2025-07-17 DIAGNOSIS — E87.6 HYPOKALEMIA: ICD-10-CM

## 2025-07-17 DIAGNOSIS — R11.2 NAUSEA & VOMITING: Primary | ICD-10-CM

## 2025-07-17 PROBLEM — R19.7 ABDOMINAL PAIN, VOMITING, AND DIARRHEA: Status: ACTIVE | Noted: 2025-07-17

## 2025-07-17 PROBLEM — L03.116 CELLULITIS OF LEFT FOOT: Status: RESOLVED | Noted: 2024-12-13 | Resolved: 2025-07-17

## 2025-07-17 PROBLEM — T78.40XA: Status: RESOLVED | Noted: 2025-01-23 | Resolved: 2025-07-17

## 2025-07-17 PROBLEM — R11.10 ABDOMINAL PAIN, VOMITING, AND DIARRHEA: Status: ACTIVE | Noted: 2025-07-17

## 2025-07-17 PROBLEM — R52 ACUTE PAIN: Status: RESOLVED | Noted: 2025-02-15 | Resolved: 2025-07-17

## 2025-07-17 PROBLEM — Z72.0 TOBACCO ABUSE: Chronic | Status: RESOLVED | Noted: 2017-02-15 | Resolved: 2025-07-17

## 2025-07-17 PROBLEM — E87.1 HYPONATREMIA: Status: RESOLVED | Noted: 2018-11-05 | Resolved: 2025-07-17

## 2025-07-17 PROBLEM — R10.9 ABDOMINAL PAIN, VOMITING, AND DIARRHEA: Status: ACTIVE | Noted: 2025-07-17

## 2025-07-17 PROBLEM — L08.9 DIABETIC FOOT INFECTION  (HCC): Status: RESOLVED | Noted: 2025-01-28 | Resolved: 2025-07-17

## 2025-07-17 PROBLEM — D50.0 BLOOD LOSS ANEMIA: Status: RESOLVED | Noted: 2025-02-02 | Resolved: 2025-07-17

## 2025-07-17 PROBLEM — I96 GANGRENE OF LEFT FOOT (HCC): Status: RESOLVED | Noted: 2024-12-13 | Resolved: 2025-07-17

## 2025-07-17 PROBLEM — E11.628 DIABETIC FOOT INFECTION  (HCC): Status: RESOLVED | Noted: 2025-01-28 | Resolved: 2025-07-17

## 2025-07-17 PROBLEM — T81.31XA DEHISCENCE OF OPERATIVE WOUND, INITIAL ENCOUNTER: Status: RESOLVED | Noted: 2025-01-23 | Resolved: 2025-07-17

## 2025-07-17 LAB
ALBUMIN SERPL BCG-MCNC: 4.3 G/DL (ref 3.5–5)
ALP SERPL-CCNC: 72 U/L (ref 34–104)
ALT SERPL W P-5'-P-CCNC: 17 U/L (ref 7–52)
ANION GAP SERPL CALCULATED.3IONS-SCNC: 14 MMOL/L (ref 4–13)
APTT PPP: 58 SECONDS (ref 23–34)
AST SERPL W P-5'-P-CCNC: 15 U/L (ref 13–39)
BACTERIA UR QL AUTO: ABNORMAL /HPF
BASOPHILS # BLD AUTO: 0.02 THOUSANDS/ÂΜL (ref 0–0.1)
BASOPHILS NFR BLD AUTO: 0 % (ref 0–1)
BILIRUB SERPL-MCNC: 0.62 MG/DL (ref 0.2–1)
BILIRUB UR QL STRIP: NEGATIVE
BUN SERPL-MCNC: 25 MG/DL (ref 5–25)
CALCIUM SERPL-MCNC: 9 MG/DL (ref 8.4–10.2)
CHLORIDE SERPL-SCNC: 96 MMOL/L (ref 96–108)
CLARITY UR: ABNORMAL
CO2 SERPL-SCNC: 28 MMOL/L (ref 21–32)
COLOR UR: YELLOW
CREAT SERPL-MCNC: 0.89 MG/DL (ref 0.6–1.3)
EOSINOPHIL # BLD AUTO: 0.05 THOUSAND/ÂΜL (ref 0–0.61)
EOSINOPHIL NFR BLD AUTO: 1 % (ref 0–6)
ERYTHROCYTE [DISTWIDTH] IN BLOOD BY AUTOMATED COUNT: 13.8 % (ref 11.6–15.1)
EXT PREGNANCY TEST URINE: NEGATIVE
EXT. CONTROL: NORMAL
GFR SERPL CREATININE-BSD FRML MDRD: 76 ML/MIN/1.73SQ M
GLUCOSE SERPL-MCNC: 116 MG/DL (ref 65–140)
GLUCOSE SERPL-MCNC: 139 MG/DL (ref 65–140)
GLUCOSE SERPL-MCNC: 168 MG/DL (ref 65–140)
GLUCOSE SERPL-MCNC: 178 MG/DL (ref 65–140)
GLUCOSE UR STRIP-MCNC: NEGATIVE MG/DL
HCT VFR BLD AUTO: 38.7 % (ref 34.8–46.1)
HGB BLD-MCNC: 12.6 G/DL (ref 11.5–15.4)
HGB UR QL STRIP.AUTO: NEGATIVE
HYALINE CASTS #/AREA URNS LPF: ABNORMAL /LPF
IMM GRANULOCYTES # BLD AUTO: 0.03 THOUSAND/UL (ref 0–0.2)
IMM GRANULOCYTES NFR BLD AUTO: 0 % (ref 0–2)
INR PPP: 0.94 (ref 0.85–1.19)
KETONES UR STRIP-MCNC: NEGATIVE MG/DL
LEUKOCYTE ESTERASE UR QL STRIP: ABNORMAL
LIPASE SERPL-CCNC: 32 U/L (ref 11–82)
LYMPHOCYTES # BLD AUTO: 2.7 THOUSANDS/ÂΜL (ref 0.6–4.47)
LYMPHOCYTES NFR BLD AUTO: 27 % (ref 14–44)
MCH RBC QN AUTO: 27.6 PG (ref 26.8–34.3)
MCHC RBC AUTO-ENTMCNC: 32.6 G/DL (ref 31.4–37.4)
MCV RBC AUTO: 85 FL (ref 82–98)
MONOCYTES # BLD AUTO: 0.54 THOUSAND/ÂΜL (ref 0.17–1.22)
MONOCYTES NFR BLD AUTO: 5 % (ref 4–12)
NEUTROPHILS # BLD AUTO: 6.86 THOUSANDS/ÂΜL (ref 1.85–7.62)
NEUTS SEG NFR BLD AUTO: 67 % (ref 43–75)
NITRITE UR QL STRIP: NEGATIVE
NON-SQ EPI CELLS URNS QL MICRO: ABNORMAL /HPF
NRBC BLD AUTO-RTO: 0 /100 WBCS
PH UR STRIP.AUTO: 6 [PH]
PLATELET # BLD AUTO: 246 THOUSANDS/UL (ref 149–390)
PMV BLD AUTO: 10.4 FL (ref 8.9–12.7)
POTASSIUM SERPL-SCNC: 3 MMOL/L (ref 3.5–5.3)
PROT SERPL-MCNC: 8.1 G/DL (ref 6.4–8.4)
PROT UR STRIP-MCNC: ABNORMAL MG/DL
PROTHROMBIN TIME: 13 SECONDS (ref 12.3–15)
RBC # BLD AUTO: 4.57 MILLION/UL (ref 3.81–5.12)
RBC #/AREA URNS AUTO: ABNORMAL /HPF
SODIUM SERPL-SCNC: 138 MMOL/L (ref 135–147)
SP GR UR STRIP.AUTO: 1.02
UROBILINOGEN UR QL STRIP.AUTO: 0.2 E.U./DL
WBC # BLD AUTO: 10.2 THOUSAND/UL (ref 4.31–10.16)
WBC #/AREA URNS AUTO: ABNORMAL /HPF

## 2025-07-17 PROCEDURE — 99285 EMERGENCY DEPT VISIT HI MDM: CPT | Performed by: EMERGENCY MEDICINE

## 2025-07-17 PROCEDURE — 99284 EMERGENCY DEPT VISIT MOD MDM: CPT

## 2025-07-17 PROCEDURE — 81025 URINE PREGNANCY TEST: CPT | Performed by: EMERGENCY MEDICINE

## 2025-07-17 PROCEDURE — 83690 ASSAY OF LIPASE: CPT | Performed by: EMERGENCY MEDICINE

## 2025-07-17 PROCEDURE — 85610 PROTHROMBIN TIME: CPT | Performed by: STUDENT IN AN ORGANIZED HEALTH CARE EDUCATION/TRAINING PROGRAM

## 2025-07-17 PROCEDURE — 82570 ASSAY OF URINE CREATININE: CPT | Performed by: STUDENT IN AN ORGANIZED HEALTH CARE EDUCATION/TRAINING PROGRAM

## 2025-07-17 PROCEDURE — 82948 REAGENT STRIP/BLOOD GLUCOSE: CPT

## 2025-07-17 PROCEDURE — 74177 CT ABD & PELVIS W/CONTRAST: CPT

## 2025-07-17 PROCEDURE — 36415 COLL VENOUS BLD VENIPUNCTURE: CPT | Performed by: EMERGENCY MEDICINE

## 2025-07-17 PROCEDURE — 84156 ASSAY OF PROTEIN URINE: CPT | Performed by: STUDENT IN AN ORGANIZED HEALTH CARE EDUCATION/TRAINING PROGRAM

## 2025-07-17 PROCEDURE — 85025 COMPLETE CBC W/AUTO DIFF WBC: CPT | Performed by: EMERGENCY MEDICINE

## 2025-07-17 PROCEDURE — NC001 PR NO CHARGE: Performed by: RADIOLOGY

## 2025-07-17 PROCEDURE — 96376 TX/PRO/DX INJ SAME DRUG ADON: CPT

## 2025-07-17 PROCEDURE — 80053 COMPREHEN METABOLIC PANEL: CPT | Performed by: EMERGENCY MEDICINE

## 2025-07-17 PROCEDURE — 85730 THROMBOPLASTIN TIME PARTIAL: CPT | Performed by: STUDENT IN AN ORGANIZED HEALTH CARE EDUCATION/TRAINING PROGRAM

## 2025-07-17 PROCEDURE — 87086 URINE CULTURE/COLONY COUNT: CPT | Performed by: EMERGENCY MEDICINE

## 2025-07-17 PROCEDURE — 99223 1ST HOSP IP/OBS HIGH 75: CPT | Performed by: INTERNAL MEDICINE

## 2025-07-17 PROCEDURE — 96374 THER/PROPH/DIAG INJ IV PUSH: CPT

## 2025-07-17 PROCEDURE — 81001 URINALYSIS AUTO W/SCOPE: CPT | Performed by: EMERGENCY MEDICINE

## 2025-07-17 PROCEDURE — 96375 TX/PRO/DX INJ NEW DRUG ADDON: CPT

## 2025-07-17 PROCEDURE — 87081 CULTURE SCREEN ONLY: CPT | Performed by: INTERNAL MEDICINE

## 2025-07-17 PROCEDURE — 96361 HYDRATE IV INFUSION ADD-ON: CPT

## 2025-07-17 RX ORDER — GABAPENTIN 300 MG/1
900 CAPSULE ORAL ONCE
Status: DISCONTINUED | OUTPATIENT
Start: 2025-07-17 | End: 2025-07-17

## 2025-07-17 RX ORDER — INSULIN LISPRO 100 [IU]/ML
8 INJECTION, SOLUTION INTRAVENOUS; SUBCUTANEOUS
Status: DISCONTINUED | OUTPATIENT
Start: 2025-07-17 | End: 2025-07-19 | Stop reason: HOSPADM

## 2025-07-17 RX ORDER — GABAPENTIN 400 MG/1
800 CAPSULE ORAL 3 TIMES DAILY
Status: DISCONTINUED | OUTPATIENT
Start: 2025-07-17 | End: 2025-07-19 | Stop reason: HOSPADM

## 2025-07-17 RX ORDER — INSULIN GLARGINE 100 [IU]/ML
18 INJECTION, SOLUTION SUBCUTANEOUS
Status: DISCONTINUED | OUTPATIENT
Start: 2025-07-17 | End: 2025-07-19 | Stop reason: HOSPADM

## 2025-07-17 RX ORDER — DULOXETIN HYDROCHLORIDE 60 MG/1
60 CAPSULE, DELAYED RELEASE ORAL DAILY
Status: DISCONTINUED | OUTPATIENT
Start: 2025-07-18 | End: 2025-07-19 | Stop reason: HOSPADM

## 2025-07-17 RX ORDER — PANTOPRAZOLE SODIUM 40 MG/1
40 TABLET, DELAYED RELEASE ORAL
Status: DISCONTINUED | OUTPATIENT
Start: 2025-07-18 | End: 2025-07-19 | Stop reason: HOSPADM

## 2025-07-17 RX ORDER — HEPARIN SODIUM 1000 [USP'U]/ML
3400 INJECTION, SOLUTION INTRAVENOUS; SUBCUTANEOUS EVERY 6 HOURS PRN
Status: DISCONTINUED | OUTPATIENT
Start: 2025-07-17 | End: 2025-07-18

## 2025-07-17 RX ORDER — POTASSIUM CHLORIDE 1500 MG/1
40 TABLET, EXTENDED RELEASE ORAL ONCE
Status: DISCONTINUED | OUTPATIENT
Start: 2025-07-17 | End: 2025-07-17

## 2025-07-17 RX ORDER — ACETAMINOPHEN 325 MG/1
650 TABLET ORAL EVERY 6 HOURS PRN
Status: DISCONTINUED | OUTPATIENT
Start: 2025-07-17 | End: 2025-07-19 | Stop reason: HOSPADM

## 2025-07-17 RX ORDER — TRAZODONE HYDROCHLORIDE 100 MG/1
100 TABLET ORAL
Status: DISCONTINUED | OUTPATIENT
Start: 2025-07-17 | End: 2025-07-19 | Stop reason: HOSPADM

## 2025-07-17 RX ORDER — METHOCARBAMOL 500 MG/1
500 TABLET, FILM COATED ORAL EVERY 8 HOURS SCHEDULED
Status: DISCONTINUED | OUTPATIENT
Start: 2025-07-17 | End: 2025-07-19 | Stop reason: HOSPADM

## 2025-07-17 RX ORDER — ONDANSETRON 2 MG/ML
4 INJECTION INTRAMUSCULAR; INTRAVENOUS ONCE
Status: COMPLETED | OUTPATIENT
Start: 2025-07-17 | End: 2025-07-17

## 2025-07-17 RX ORDER — INSULIN LISPRO 100 [IU]/ML
2-12 INJECTION, SOLUTION INTRAVENOUS; SUBCUTANEOUS
Status: DISCONTINUED | OUTPATIENT
Start: 2025-07-17 | End: 2025-07-19 | Stop reason: HOSPADM

## 2025-07-17 RX ORDER — SODIUM CHLORIDE 9 MG/ML
150 INJECTION, SOLUTION INTRAVENOUS CONTINUOUS
Status: DISCONTINUED | OUTPATIENT
Start: 2025-07-17 | End: 2025-07-18

## 2025-07-17 RX ORDER — POTASSIUM CHLORIDE 14.9 MG/ML
20 INJECTION INTRAVENOUS ONCE
Status: COMPLETED | OUTPATIENT
Start: 2025-07-17 | End: 2025-07-17

## 2025-07-17 RX ORDER — ASPIRIN 81 MG/1
81 TABLET ORAL DAILY
Status: DISCONTINUED | OUTPATIENT
Start: 2025-07-18 | End: 2025-07-19 | Stop reason: HOSPADM

## 2025-07-17 RX ORDER — CHLORTHALIDONE 25 MG/1
12.5 TABLET ORAL DAILY
Status: DISCONTINUED | OUTPATIENT
Start: 2025-07-18 | End: 2025-07-19 | Stop reason: HOSPADM

## 2025-07-17 RX ORDER — ATORVASTATIN CALCIUM 80 MG/1
80 TABLET, FILM COATED ORAL EVERY EVENING
Status: DISCONTINUED | OUTPATIENT
Start: 2025-07-17 | End: 2025-07-19 | Stop reason: HOSPADM

## 2025-07-17 RX ORDER — LIDOCAINE 50 MG/G
1 PATCH TOPICAL DAILY PRN
Status: DISCONTINUED | OUTPATIENT
Start: 2025-07-17 | End: 2025-07-19 | Stop reason: HOSPADM

## 2025-07-17 RX ORDER — METOCLOPRAMIDE HYDROCHLORIDE 5 MG/ML
10 INJECTION INTRAMUSCULAR; INTRAVENOUS EVERY 6 HOURS PRN
Status: DISCONTINUED | OUTPATIENT
Start: 2025-07-17 | End: 2025-07-19 | Stop reason: HOSPADM

## 2025-07-17 RX ORDER — HEPARIN SODIUM 1000 [USP'U]/ML
6800 INJECTION, SOLUTION INTRAVENOUS; SUBCUTANEOUS EVERY 6 HOURS PRN
Status: DISCONTINUED | OUTPATIENT
Start: 2025-07-17 | End: 2025-07-18

## 2025-07-17 RX ORDER — ONDANSETRON 2 MG/ML
4 INJECTION INTRAMUSCULAR; INTRAVENOUS EVERY 6 HOURS PRN
Status: DISCONTINUED | OUTPATIENT
Start: 2025-07-17 | End: 2025-07-19 | Stop reason: HOSPADM

## 2025-07-17 RX ORDER — HEPARIN SODIUM 10000 [USP'U]/100ML
3-30 INJECTION, SOLUTION INTRAVENOUS
Status: DISCONTINUED | OUTPATIENT
Start: 2025-07-17 | End: 2025-07-18

## 2025-07-17 RX ORDER — HEPARIN SODIUM 1000 [USP'U]/ML
6800 INJECTION, SOLUTION INTRAVENOUS; SUBCUTANEOUS ONCE
Status: COMPLETED | OUTPATIENT
Start: 2025-07-17 | End: 2025-07-17

## 2025-07-17 RX ADMIN — INSULIN GLARGINE 18 UNITS: 100 INJECTION, SOLUTION SUBCUTANEOUS at 22:26

## 2025-07-17 RX ADMIN — POTASSIUM CHLORIDE 40 MEQ: 1500 TABLET, EXTENDED RELEASE ORAL at 16:57

## 2025-07-17 RX ADMIN — HEPARIN SODIUM 6800 UNITS: 1000 INJECTION, SOLUTION INTRAVENOUS; SUBCUTANEOUS at 16:55

## 2025-07-17 RX ADMIN — CEFTRIAXONE SODIUM 1000 MG: 10 INJECTION, POWDER, FOR SOLUTION INTRAVENOUS at 16:14

## 2025-07-17 RX ADMIN — POTASSIUM CHLORIDE 20 MEQ: 14.9 INJECTION, SOLUTION INTRAVENOUS at 19:59

## 2025-07-17 RX ADMIN — METHOCARBAMOL 500 MG: 500 TABLET ORAL at 16:51

## 2025-07-17 RX ADMIN — POTASSIUM CHLORIDE 20 MEQ: 14.9 INJECTION, SOLUTION INTRAVENOUS at 16:54

## 2025-07-17 RX ADMIN — ONDANSETRON 4 MG: 2 INJECTION INTRAMUSCULAR; INTRAVENOUS at 13:26

## 2025-07-17 RX ADMIN — IOHEXOL 100 ML: 350 INJECTION, SOLUTION INTRAVENOUS at 12:48

## 2025-07-17 RX ADMIN — SODIUM CHLORIDE 1000 ML: 0.9 INJECTION, SOLUTION INTRAVENOUS at 12:11

## 2025-07-17 RX ADMIN — ATORVASTATIN CALCIUM 80 MG: 80 TABLET, FILM COATED ORAL at 18:26

## 2025-07-17 RX ADMIN — SODIUM CHLORIDE 150 ML/HR: 0.9 INJECTION, SOLUTION INTRAVENOUS at 16:58

## 2025-07-17 RX ADMIN — HEPARIN SODIUM 18 UNITS/KG/HR: 10000 INJECTION, SOLUTION INTRAVENOUS at 17:00

## 2025-07-17 RX ADMIN — METHOCARBAMOL 500 MG: 500 TABLET ORAL at 22:26

## 2025-07-17 RX ADMIN — GABAPENTIN 800 MG: 400 CAPSULE ORAL at 16:49

## 2025-07-17 RX ADMIN — ONDANSETRON 4 MG: 2 INJECTION INTRAMUSCULAR; INTRAVENOUS at 12:20

## 2025-07-17 RX ADMIN — GABAPENTIN 800 MG: 400 CAPSULE ORAL at 22:26

## 2025-07-17 RX ADMIN — TRAZODONE HYDROCHLORIDE 100 MG: 100 TABLET ORAL at 22:26

## 2025-07-17 RX ADMIN — METOCLOPRAMIDE 10 MG: 5 INJECTION, SOLUTION INTRAMUSCULAR; INTRAVENOUS at 19:25

## 2025-07-17 NOTE — ASSESSMENT & PLAN NOTE
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 Typically this would require at least 6 month of treatment, pending further anticoagulation depending on risk factors  We will start workup for proteinuria as nephrotic syndrome can increase risk of renal vein thrombosis. Her UA on admission showed protein 3+ which is unusual. The UA is not consistent with UTI  Obvious off IV antibiotics

## 2025-07-17 NOTE — ASSESSMENT & PLAN NOTE
Admitted for nausea and vomiting for 2 days  CTA/P with IV contrast - no acute infectious findings. There is a new 3.3cm left renal vein thrombus - which prompted admission  F/u Infectious diarrhea workup - stool cultures  C/w IVF, once able to tolerate PO then advance to oral hydration  Clear liquid diet - advance as tolerated  Zofran PRN for nausea  C/w home pain medication

## 2025-07-17 NOTE — ASSESSMENT & PLAN NOTE
A1c 8.4%  Clarified home insulin to be lantus 18 units daily HS, insulin 8 units TID with meals  While admitted, check glucose TID and use ISS  If she is not having PO intake then she may not need meal time insulin

## 2025-07-17 NOTE — ED PROVIDER NOTES
Time reflects when diagnosis was documented in both MDM as applicable and the Disposition within this note       Time User Action Codes Description Comment    7/17/2025  4:13 PM BruJim yang Add [R11.2] Nausea & vomiting     7/17/2025  4:13 PM Brutrey Jim Add [I82.3] Renal vein thrombosis (HCC)     7/17/2025  4:13 PM Brutico Jim Add [E87.6] Hypokalemia     7/17/2025  4:14 PM Brutrey Jim Add [K14.8] Tongue mass     7/17/2025  4:14 PM Brutrey Jim Add [N39.0] UTI (urinary tract infection)     7/18/2025 11:12 AM Amber Tellez [R80.9] Proteinuria, unspecified type           ED Disposition       ED Disposition   Admit    Condition   Stable    Date/Time   Thu Jul 17, 2025  4:14 PM    Comment   Case was discussed with Dr. Shore and the patient's admission status was agreed to be Admission Status: observation status to the service of Dr. Shore .               Assessment & Plan       Medical Decision Making  10-year-old female presents emergency department secondary to nausea vomiting and generalized abdominal pain.  Patient has a history of being on a GLP-1 which was recently changed from a different medicine due to symptoms that were similar to this.  The patient however has been on her medicine for 2 months now without much issue.  Patient notes that she denies fever but admits to chills.  Differential diagnosis based on my evaluation is urinary tract infection, colitis, gastroenteritis, electrolyte abnormalities or dehydration.  On my exam however there appears to be a left tongue mass versus lesion that is roughly 0.5 to 0.6 cm in size.  CT scan however does show evidence of what appears to be a venous thrombosis of the left renal vein.  This is partial and the patient was evaluated by Dr. Pathak who feels that there is not a procedure that can necessarily extract this clot safely and recommends anticoagulation.  The patient does have an evidence of UTI as well as hypokalemia.  Patient  was given multiple doses of Zofran as well as IV fluids.  Heparin initially started in the emergency department.  Patient will be placed on the hospitalist service on anticoagulation with IV antibiotics and IV potassium due to history of GI upset.  Patient will eventually need to have her tongue lesion evaluated and possibly biopsied.    Amount and/or Complexity of Data Reviewed  Labs: ordered.  Radiology: ordered.    Risk  Prescription drug management.  Decision regarding hospitalization.        ED Course as of 07/20/25 2348   Thu Jul 17, 2025   1300 BP = 136/69   1434 1. New short segment (3.3 cm) left renal vein thrombus. Otherwise, no acute findings in the abdomen or pelvis.       Medications   sodium chloride 0.9 % bolus 1,000 mL (1,000 mL Intravenous New Bag 7/17/25 1211)   ondansetron (ZOFRAN) injection 4 mg (4 mg Intravenous Given 7/17/25 1220)   iohexol (OMNIPAQUE) 350 MG/ML injection (MULTI-DOSE) 100 mL (100 mL Intravenous Given 7/17/25 1248)   ondansetron (ZOFRAN) injection 4 mg (4 mg Intravenous Given 7/17/25 1326)   potassium chloride 20 mEq IVPB (premix) (20 mEq Intravenous New Bag 7/17/25 1654)   ceftriaxone (ROCEPHIN) 1 g/50 mL in dextrose IVPB (1,000 mg Intravenous New Bag 7/17/25 1614)   heparin (porcine) injection 6,800 Units (6,800 Units Intravenous Given 7/17/25 1655)   potassium chloride 20 mEq IVPB (premix) (has no administration in time range)       ED Risk Strat Scores                    No data recorded        SBIRT 22yo+      Flowsheet Row Most Recent Value   Initial Alcohol Screen: US AUDIT-C     1. How often do you have a drink containing alcohol? 0 Filed at: 07/17/2025 1119   2. How many drinks containing alcohol do you have on a typical day you are drinking?  0 Filed at: 07/17/2025 1119   3b. FEMALE Any Age, or MALE 65+: How often do you have 4 or more drinks on one occassion? 0 Filed at: 07/17/2025 1119   Audit-C Score 0 Filed at: 07/17/2025 1119   BANDAR: How many times in the past  year have you...    Used an illegal drug or used a prescription medication for non-medical reasons? Never Filed at: 07/17/2025 8279                            History of Present Illness       Chief Complaint   Patient presents with    Vomiting     Pt reports vomiting and diarrhea x1 day       Past Medical History[1]   Past Surgical History[2]   Family History[3]   Social History[4]   E-Cigarette/Vaping    E-Cigarette Use Never User       E-Cigarette/Vaping Substances    Nicotine No     THC No     CBD No     Flavoring No     Other No     Unknown No       I have reviewed and agree with the history as documented.     50-year-old female presents emergency department complaining of a less than 24-hour history of abdominal cramps vomiting and diarrhea.  The patient notes that she has not been able to hold anything down and had to call the ambulance to come into the hospital.  Patient notes that the nausea is severe states that she is currently on Ozempic but she has been on it for about 2 months and has not had any symptoms from it.  She did change from 1 GLP-1 to another due to symptoms but she has been doing well on this without much of an issue.  Patient notes she has generalized abdominal pain but no fever.  Patient denies bloody or black stool.        Review of Systems   Constitutional:  Positive for activity change. Negative for chills and fever.   HENT:  Negative for ear pain and sore throat.    Eyes:  Negative for pain and visual disturbance.   Respiratory:  Negative for cough and shortness of breath.    Cardiovascular:  Negative for chest pain and palpitations.   Gastrointestinal:  Positive for abdominal pain, diarrhea, nausea and vomiting.   Genitourinary:  Negative for dysuria and hematuria.   Musculoskeletal:  Negative for arthralgias and back pain.   Skin:  Negative for color change and rash.   Neurological:  Positive for weakness. Negative for seizures and syncope.   All other systems reviewed and are  negative.          Objective       ED Triage Vitals   Temperature Pulse Blood Pressure Respirations SpO2 Patient Position - Orthostatic VS   07/17/25 1116 07/17/25 1116 07/17/25 1116 07/17/25 1116 07/17/25 1116 07/17/25 1116   (!) 97.4 °F (36.3 °C) 99 (!) 150/122 18 96 % Lying      Temp Source Heart Rate Source BP Location FiO2 (%) Pain Score    07/17/25 1400 07/17/25 1116 07/17/25 1116 -- 07/17/25 1116    Temporal Monitor Right arm  7      Vitals      Date and Time Temp Pulse SpO2 Resp BP Pain Score FACES Pain Rating User   07/19/25 0800 -- -- -- -- -- No Pain -- SS   07/19/25 0736 98.2 °F (36.8 °C) 92 99 % -- 118/79 -- -- DII   07/19/25 0319 98.3 °F (36.8 °C) 101 96 % 18 152/79 -- -- DII   07/18/25 2305 97.7 °F (36.5 °C) 86 94 % 17 130/51 -- -- DII   07/18/25 2036 -- -- -- -- -- No Pain -- MC   07/18/25 1922 98.6 °F (37 °C) -- -- 17 165/82 -- -- DII   07/18/25 1557 -- 87 95 % -- 109/67 -- -- DII   07/18/25 1450 -- -- -- 20 -- -- -- ES   07/18/25 1450 98.1 °F (36.7 °C) 89 96 % -- 94/64 -- -- DII   07/18/25 1302 -- -- -- -- -- No Pain -- LB   07/18/25 1120 -- -- -- 18 -- -- -- ES   07/18/25 1120 97.9 °F (36.6 °C) 83 99 % -- 107/65 -- -- DII   07/18/25 1026 -- -- -- 18 -- -- -- ES   07/18/25 1026 -- 90 100 % -- 105/66 -- -- DII   07/18/25 0800 -- -- -- -- -- No Pain -- EW   07/18/25 0719 98.1 °F (36.7 °C) 83 97 % 16 104/64 -- -- DII   07/17/25 2244 98.5 °F (36.9 °C) 89 96 % 18 111/71 -- -- DII   07/17/25 1930 -- -- 100 % -- -- -- -- TB   07/17/25 1914 -- -- -- -- -- 6 -- TB   07/17/25 1908 98.3 °F (36.8 °C) 93 99 % 17 149/51 -- -- DII   07/17/25 1908 -- -- 99 % -- -- -- -- TB   07/17/25 1800 -- 93 100 % -- 158/72 -- -- J   07/17/25 1500 97.6 °F (36.4 °C) 86 99 % -- 155/68 -- -- J   07/17/25 1400 -- 88 98 % 17 172/81 -- -- J   07/17/25 1300 -- 89 98 % 18 152/72 -- -- J   07/17/25 1212 -- 60 99 % 19 136/69 -- -- J   07/17/25 1118 -- -- 100 % -- -- -- --    07/17/25 1116 97.4 °F (36.3 °C) 99 96 % 18 150/122 7 --              Physical Exam  Vitals and nursing note reviewed.   Constitutional:       General: She is not in acute distress.     Appearance: Normal appearance. She is well-developed. She is ill-appearing. She is not toxic-appearing.   HENT:      Head: Normocephalic and atraumatic.      Right Ear: External ear normal.      Left Ear: External ear normal.      Nose: Nose normal.      Mouth/Throat:      Mouth: Mucous membranes are moist.      Pharynx: No oropharyngeal exudate or posterior oropharyngeal erythema.      Comments: Small mass versus nodule on the left side of the tongue.  Roughly three-quarter of a centimeter in diameter.    Eyes:      Conjunctiva/sclera: Conjunctivae normal.       Cardiovascular:      Rate and Rhythm: Normal rate and regular rhythm.      Pulses: Normal pulses.      Heart sounds: Normal heart sounds. No murmur heard.  Pulmonary:      Effort: Pulmonary effort is normal. No respiratory distress.      Breath sounds: Normal breath sounds.   Abdominal:      General: Bowel sounds are normal.      Palpations: Abdomen is soft.      Tenderness: There is abdominal tenderness. There is no guarding or rebound.      Hernia: No hernia is present.     Musculoskeletal:         General: No swelling or deformity.      Cervical back: Neck supple. No rigidity.     Skin:     General: Skin is warm and dry.      Capillary Refill: Capillary refill takes less than 2 seconds.      Coloration: Skin is pale.     Neurological:      General: No focal deficit present.      Mental Status: She is alert and oriented to person, place, and time. Mental status is at baseline.         Results Reviewed       Procedure Component Value Units Date/Time    Urine culture [[id number]] Collected: 07/17/25 1344    Lab Status: Final result Specimen: Urine, Clean Catch Updated: 07/18/25 1545     Urine Culture 60,000-69,000 cfu/ml    Magnesium [[id number]]  (Abnormal) Collected: 07/18/25 0539    Lab Status: Final result Specimen: Blood from  Arm, Right Updated: 07/18/25 0619     Magnesium 0.6 mg/dL     Protime-INR [[id number]]  (Normal) Collected: 07/18/25 0539    Lab Status: Final result Specimen: Blood from Arm, Left Updated: 07/18/25 0618     Protime 13.2 seconds      INR 0.95    Narrative:      INR Therapeutic Range    Indication                                             INR Range      Atrial Fibrillation                                               2.0-3.0  Hypercoagulable State                                    2.0.2.3  Left Ventricular Asist Device                            2.0-3.0  Mechanical Heart Valve                                  -    Aortic(with afib, MI, embolism, HF, LA enlargement,    and/or coagulopathy)                                     2.0-3.0 (2.5-3.5)     Mitral                                                             2.5-3.5  Prosthetic/Bioprosthetic Heart Valve               2.0-3.0  Venous thromboembolism (VTE: VT, PE        2.0-3.0    APTT [[id number]]  (Abnormal) Collected: 07/18/25 0539    Lab Status: Final result Specimen: Blood from Arm, Left Updated: 07/18/25 0618      seconds     Comprehensive metabolic panel [[id number]]  (Abnormal) Collected: 07/18/25 0539    Lab Status: Final result Specimen: Blood from Arm, Right Updated: 07/18/25 0611     Sodium 141 mmol/L      Potassium 3.0 mmol/L      Chloride 104 mmol/L      CO2 25 mmol/L      ANION GAP 12 mmol/L      BUN 21 mg/dL      Creatinine 0.83 mg/dL      Glucose 91 mg/dL      Calcium 7.6 mg/dL      AST 17 U/L      ALT 15 U/L      Alkaline Phosphatase 69 U/L      Total Protein 7.4 g/dL      Albumin 3.9 g/dL      Total Bilirubin 0.44 mg/dL      eGFR 83 ml/min/1.73sq m     Narrative:      National Kid[employer] Disease Foundation guidelines for Chronic Kidney Disease (CKD):     Stage 1 with normal or high GFR (GFR > 90 mL/min/1.73 square meters)    Stage 2 Mild CKD (GFR = 60-89 mL/min/1.73 square meters)    Stage 3A Moderate CKD (GFR = 45-59 mL/min/1.73 square  meters)    Stage 3B Moderate CKD (GFR = 30-44 mL/min/1.73 square meters)    Stage 4 Severe CKD (GFR = 15-29 mL/min/1.73 square meters)    Stage 5 End Stage CKD (GFR <15 mL/min/1.73 square meters)  Note: GFR calculation is accurate only with a steady state creatinine    CBC and differential [[id number]]  (Abnormal) Collected: 07/18/25 0444    Lab Status: Final result Specimen: Blood from Foot, Right Updated: 07/18/25 0500     WBC 11.86 Thousand/uL      RBC 4.37 Million/uL      Hemoglobin 12.1 g/dL      Hematocrit 38.0 %      MCV 87 fL      MCH 27.7 pg      MCHC 31.8 g/dL      RDW 14.2 %      MPV 10.4 fL      Platelets 222 Thousands/uL      nRBC 0 /100 WBCs      Segmented % 50 %      Immature Grans % 0 %      Lymphocytes % 44 %      Monocytes % 5 %      Eosinophils Relative 1 %      Basophils Relative 0 %      Absolute Neutrophils 5.87 Thousands/µL      Absolute Immature Grans 0.04 Thousand/uL      Absolute Lymphocytes 5.26 Thousands/µL      Absolute Monocytes 0.59 Thousand/µL      Eosinophils Absolute 0.08 Thousand/µL      Basophils Absolute 0.02 Thousands/µL     Protein / creatinine ratio, urine [[id number]]  (Abnormal) Collected: 07/17/25 2350    Lab Status: Final result Specimen: Urine, Clean Catch Updated: 07/18/25 0007     Creatinine, Ur 108.9 mg/dL      Protein Urine Random 191.9 mg/dL      Prot/Creat Ratio, Ur 1.8    Protein, urine, random [[id number]] Collected: 07/17/25 2350    Lab Status: Final result Specimen: Urine, Clean Catch Updated: 07/18/25 0007     Protein Urine Random 191.9 mg/dL     APTT [[id number]]  (Abnormal) Collected: 07/17/25 2209    Lab Status: Final result Specimen: Blood from Arm, Left Updated: 07/17/25 2234     PTT 58 seconds     Protime-INR [[id number]]  (Normal) Collected: 07/17/25 2209    Lab Status: Final result Specimen: Blood from Arm, Left Updated: 07/17/25 2234     Protime 13.0 seconds      INR 0.94    Narrative:      INR Therapeutic Range    Indication                                              INR Range      Atrial Fibrillation                                               2.0-3.0  Hypercoagulable State                                    2.0.2.3  Left Ventricular Asist Device                            2.0-3.0  Mechanical Heart Valve                                  -    Aortic(with afib, MI, embolism, HF, LA enlargement,    and/or coagulopathy)                                     2.0-3.0 (2.5-3.5)     Mitral                                                             2.5-3.5  Prosthetic/Bioprosthetic Heart Valve               2.0-3.0  Venous thromboembolism (VTE: VT, PE        2.0-3.0    Fingerstick Glucose (POCT) [[id number]]  (Normal) Collected: 07/17/25 1648    Lab Status: Final result Specimen: Blood Updated: 07/17/25 1649     POC Glucose 139 mg/dl     Urine Microscopic [[id number]]  (Abnormal) Collected: 07/17/25 1344    Lab Status: Final result Specimen: Urine, Clean Catch Updated: 07/17/25 1403     RBC, UA 2-4 /hpf      WBC, UA 20-30 /hpf      Epithelial Cells Occasional /hpf      Bacteria, UA Occasional /hpf      Hyaline Casts, UA 1-2 /lpf     UA w Reflex to Microscopic w Reflex to Culture [[id number]]  (Abnormal) Collected: 07/17/25 1344    Lab Status: Final result Specimen: Urine, Clean Catch Updated: 07/17/25 1352     Color, UA Yellow     Clarity, UA Hazy     Specific Gravity, UA 1.020     pH, UA 6.0     Leukocytes, UA Trace     Nitrite, UA Negative     Protein, UA 3+ mg/dl      Glucose, UA Negative mg/dl      Ketones, UA Negative mg/dl      Urobilinogen, UA 0.2 E.U./dl      Bilirubin, UA Negative     Occult Blood, UA Negative    POCT pregnancy, urine [[id number]]  (Normal) Collected: 07/17/25 1344    Lab Status: Final result Updated: 07/17/25 1344     EXT Preg Test, Ur Negative     Control Valid    Lipase [[id number]]  (Normal) Collected: 07/17/25 1209    Lab Status: Final result Specimen: Blood from Arm, Right Updated: 07/17/25 1230     Lipase 32 u/L     Comprehensive metabolic panel  [[id number]]  (Abnormal) Collected: 07/17/25 1209    Lab Status: Final result Specimen: Blood from Arm, Right Updated: 07/17/25 1230     Sodium 138 mmol/L      Potassium 3.0 mmol/L      Chloride 96 mmol/L      CO2 28 mmol/L      ANION GAP 14 mmol/L      BUN 25 mg/dL      Creatinine 0.89 mg/dL      Glucose 178 mg/dL      Calcium 9.0 mg/dL      AST 15 U/L      ALT 17 U/L      Alkaline Phosphatase 72 U/L      Total Protein 8.1 g/dL      Albumin 4.3 g/dL      Total Bilirubin 0.62 mg/dL      eGFR 76 ml/min/1.73sq m     Narrative:      National Kidney Disease Foundation guidelines for Chronic Kidney Disease (CKD):     Stage 1 with normal or high GFR (GFR > 90 mL/min/1.73 square meters)    Stage 2 Mild CKD (GFR = 60-89 mL/min/1.73 square meters)    Stage 3A Moderate CKD (GFR = 45-59 mL/min/1.73 square meters)    Stage 3B Moderate CKD (GFR = 30-44 mL/min/1.73 square meters)    Stage 4 Severe CKD (GFR = 15-29 mL/min/1.73 square meters)    Stage 5 End Stage CKD (GFR <15 mL/min/1.73 square meters)  Note: GFR calculation is accurate only with a steady state creatinine    CBC and differential [[id number]]  (Abnormal) Collected: 07/17/25 1209    Lab Status: Final result Specimen: Blood from Arm, Right Updated: 07/17/25 1214     WBC 10.20 Thousand/uL      RBC 4.57 Million/uL      Hemoglobin 12.6 g/dL      Hematocrit 38.7 %      MCV 85 fL      MCH 27.6 pg      MCHC 32.6 g/dL      RDW 13.8 %      MPV 10.4 fL      Platelets 246 Thousands/uL      nRBC 0 /100 WBCs      Segmented % 67 %      Immature Grans % 0 %      Lymphocytes % 27 %      Monocytes % 5 %      Eosinophils Relative 1 %      Basophils Relative 0 %      Absolute Neutrophils 6.86 Thousands/µL      Absolute Immature Grans 0.03 Thousand/uL      Absolute Lymphocytes 2.70 Thousands/µL      Absolute Monocytes 0.54 Thousand/µL      Eosinophils Absolute 0.05 Thousand/µL      Basophils Absolute 0.02 Thousands/µL     Fingerstick Glucose (POCT) [[id number]]  (Abnormal) Collected:  07/17/25 1124    Lab Status: Final result Specimen: Blood Updated: 07/17/25 1126     POC Glucose 168 mg/dl             CT abdomen pelvis with contrast   Final Interpretation by Payal Oshea MD (07/17 1404)      1. New short segment (3.3 cm) left renal vein thrombus. Otherwise, no acute findings in the abdomen or pelvis.      2. Stable 2.0 cm right adrenal nodule, previously characterized as benign adenoma. Please note that for adrenal nodule > 1 cm,  biochemical evaluation is suggested to rule out functioning adenoma, if not already performed.      Resident: PAYAL BETANCOURT I, the attending radiologist, have reviewed the images and agree with the final report above.      Workstation performed: [id number]             Procedures    ED Medication and Procedure Management   Prior to Admission Medications   Prescriptions Last Dose Informant Patient Reported? Taking?   Alcohol Swabs 70 % PADS Past Week Self No Yes   Sig: May substitute brand based on insurance coverage. Check glucose TID.   Blood Glucose Monitoring Suppl (OneTouch Verio Reflect) w/Device KIT 7/17/2025 Self No Yes   Sig: May substitute brand based on insurance coverage. Check glucose TID.   Continuous Glucose  (Dexcom G6 ) SUSAN 7/17/2025 Self No Yes   Sig: Use daily   Continuous Glucose  (FreeStyle Carlie 3 [place]) SUSAN 7/17/2025 Self No Yes   Sig: To check blood sugar continuously   Continuous Glucose Sensor (Dexcom G6 Sensor) MISC 7/17/2025  No Yes   Sig: Change every 10 days   Continuous Glucose Transmitter (Dexcom G6 Transmitter) MISC 7/17/2025  No Yes   Sig: To check blood sugar continuously on a change every 10 days.   DULoxetine (CYMBALTA) 60 mg delayed release capsule 7/18/2025  No Yes   Sig: Take 1 capsule (60 mg total) by mouth daily   Insulin Glargine Solostar (Lantus SoloStar) 100 UNIT/ML SOPN 7/17/2025 Self No Yes   Sig: Inject 0.22 mL (22 Units total) under the skin daily at bedtime   Insulin Pen Needle (BD Pen  "Needle Ana 2nd Gen) 32G X 4 MM MISC Past Week Self No Yes   Sig: For use with insulin pen. Pharmacy may dispense brand covered by insurance.   Insulin Pen Needle (BD Pen Needle Ana 2nd Gen) 32G X 4 MM MISC Past Week Self No Yes   Sig: For use with insulin pen 4 times daily. Pharmacy may dispense brand covered by insurance.   Insulin Syringe-Needle U-100 30G X 1/2\" 0.3 ML MISC Past Week Self No Yes   Sig: Use 4 times a day   OneTouch Delica Lancets 33G MISC 7/17/2025 Self No Yes   Sig: May substitute brand based on insurance coverage. Check glucose TID.   Tirzepatide (Mounjaro) 7.5 MG/0.5ML SOAJ Past Week  No Yes   Sig: Inject 7.5 mg under the skin every 7 days   acetaminophen (TYLENOL) 325 mg tablet Past Week Self No Yes   Sig: Take 2 tablets (650 mg total) by mouth every 6 (six) hours as needed for mild pain   aspirin (ECOTRIN LOW STRENGTH) 81 mg EC tablet 7/17/2025 Self No Yes   Sig: Take 1 tablet (81 mg total) by mouth daily   atorvastatin (LIPITOR) 80 mg tablet 7/17/2025 Self No Yes   Sig: Take 1 tablet (80 mg total) by mouth every evening   chlorthalidone 25 mg tablet 7/17/2025 Self No Yes   Sig: Take 0.5 tablets (12.5 mg total) by mouth daily   gabapentin (NEURONTIN) 800 mg tablet 7/18/2025  No Yes   Sig: Take 1 tablet (800 mg total) by mouth 3 (three) times a day   glucose blood (OneTouch Verio) test strip 7/17/2025 Self No Yes   Sig: May substitute brand based on insurance coverage. Check glucose TID.   hydrOXYzine HCL (ATARAX) 25 mg tablet Not Taking Self No No   Sig: Take 1 tablet (25 mg total) by mouth every 6 (six) hours as needed (insomnia)   Patient not taking: Reported on 4/25/2025   insulin lispro (HumaLOG KwikPen) 100 units/mL injection pen 7/17/2025 Self No Yes   Sig: Inject 10 Units under the skin 3 (three) times a day with meals   lidocaine (LIDODERM) 5 % Past Week  No Yes   Sig: Apply 1 patch topically daily over 12 hours Remove & Discard patch within 12 hours or as directed by MD "   methocarbamol (ROBAXIN) 750 mg tablet 7/18/2025 Self No Yes   Sig: Take 1 tablet (750 mg total) by mouth every 8 (eight) hours for 14 days   naloxone (NARCAN) 4 mg/0.1 mL nasal spray Not Taking Self No No   Sig: Administer 1 spray into a nostril. If no response after 2-3 minutes, give another dose in the other nostril using a new spray.   Patient not taking: Reported on 7/18/2025   nitroglycerin (NITROSTAT) 0.4 mg SL tablet Not Taking Self No No   Sig: Place 1 tablet (0.4 mg total) under the tongue every 5 (five) minutes as needed for chest pain   Patient not taking: Reported on 7/18/2025   ondansetron (ZOFRAN) 4 mg tablet Past Week Self No Yes   Sig: Take 1 tablet (4 mg total) by mouth every 8 (eight) hours as needed for nausea or vomiting   pantoprazole (PROTONIX) 40 mg tablet 7/18/2025  No Yes   Sig: Take 1 tablet (40 mg total) by mouth daily in the early morning   polyethylene glycol (MIRALAX) 17 g packet Not Taking Self No No   Sig: Take 17 g by mouth daily   Patient not taking: Reported on 7/18/2025   senna (SENOKOT) 8.6 mg Not Taking Self No No   Sig: Take 2 tablets (17.2 mg total) by mouth daily at bedtime   Patient not taking: Reported on 7/18/2025   traZODone (DESYREL) 100 mg tablet 7/17/2025  No Yes   Sig: Take 1 tablet (100 mg total) by mouth daily at bedtime      Facility-Administered Medications: None     Discharge Medication List as of 7/19/2025 11:51 AM        CONTINUE these medications which have CHANGED    Details   apixaban (Eliquis) 5 mg Multiple Dosages:Starting Sat 7/19/2025, Until Fri 7/25/2025 at 2359, THEN Starting Sat 7/26/2025, Until Sun 8/17/2025 at 2359Take 2 tablets (10 mg total) by mouth 2 (two) times a day for 7 days, THEN 1 tablet (5 mg total) 2 (two) times a day for 23 d ays., Normal           CONTINUE these medications which have NOT CHANGED    Details   acetaminophen (TYLENOL) 325 mg tablet Take 2 tablets (650 mg total) by mouth every 6 (six) hours as needed for mild pain,  Starting Fri 2/28/2025, No Print      Alcohol Swabs 70 % PADS May substitute brand based on insurance coverage. Check glucose TID., Normal      aspirin (ECOTRIN LOW STRENGTH) 81 mg EC tablet Take 1 tablet (81 mg total) by mouth daily, Starting Thu 2/27/2025, Normal      atorvastatin (LIPITOR) 80 mg tablet Take 1 tablet (80 mg total) by mouth every evening, Starting Tue 3/25/2025, Normal      Blood Glucose Monitoring Suppl (OneTouch Verio Reflect) w/Device KIT May substitute brand based on insurance coverage. Check glucose TID., Normal      chlorthalidone 25 mg tablet Take 0.5 tablets (12.5 mg total) by mouth daily, Starting Tue 3/25/2025, Normal      !! Continuous Glucose  (Dexcom G6 ) SUSAN Use daily, Normal      !! Continuous Glucose  (FreeStyle Carlie 3 [place]) SUSAN To check blood sugar continuously, Normal      Continuous Glucose Sensor (Dexcom G6 Sensor) MISC Change every 10 days, Normal      Continuous Glucose Transmitter (Dexcom G6 Transmitter) MISC To check blood sugar continuously on a change every 10 days., Normal      DULoxetine (CYMBALTA) 60 mg delayed release capsule Take 1 capsule (60 mg total) by mouth daily, Starting Thu 7/3/2025, Normal      gabapentin (NEURONTIN) 800 mg tablet Take 1 tablet (800 mg total) by mouth 3 (three) times a day, Starting Wed 6/4/2025, Normal      glucose blood (OneTouch Verio) test strip May substitute brand based on insurance coverage. Check glucose TID., Normal      Insulin Glargine Solostar (Lantus SoloStar) 100 UNIT/ML SOPN Inject 0.22 mL (22 Units total) under the skin daily at bedtime, Starting Tue 4/29/2025, Normal      insulin lispro (HumaLOG KwikPen) 100 units/mL injection pen Inject 10 Units under the skin 3 (three) times a day with meals, Starting Tue 4/29/2025, Normal      !! Insulin Pen Needle (BD Pen Needle Ana 2nd Gen) 32G X 4 MM MISC For use with insulin pen. Pharmacy may dispense brand covered by insurance., Normal      !! Insulin Pen  "Needle (BD Pen Needle Ana 2nd Gen) 32G X 4 MM MISC For use with insulin pen 4 times daily. Pharmacy may dispense brand covered by insurance., Normal      Insulin Syringe-Needle U-100 30G X 1/2\" 0.3 ML MISC Use 4 times a day, Starting Tue 4/29/2025, Normal      lidocaine (LIDODERM) 5 % Apply 1 patch topically daily over 12 hours Remove & Discard patch within 12 hours or as directed by MD, Starting Thu 7/3/2025, Normal      methocarbamol (ROBAXIN) 750 mg tablet Take 1 tablet (750 mg total) by mouth every 8 (eight) hours for 14 days, Starting Tue 3/25/2025, Until Fri 7/18/2025, Normal      ondansetron (ZOFRAN) 4 mg tablet Take 1 tablet (4 mg total) by mouth every 8 (eight) hours as needed for nausea or vomiting, Starting Thu 5/1/2025, Normal      OneTouch Delica Lancets 33G MISC May substitute brand based on insurance coverage. Check glucose TID., Normal      pantoprazole (PROTONIX) 40 mg tablet Take 1 tablet (40 mg total) by mouth daily in the early morning, Starting Thu 7/3/2025, Normal      Tirzepatide (Mounjaro) 7.5 MG/0.5ML SOAJ Inject 7.5 mg under the skin every 7 days, Starting Mon 7/7/2025, Normal      traZODone (DESYREL) 100 mg tablet Take 1 tablet (100 mg total) by mouth daily at bedtime, Starting Thu 7/3/2025, Normal       !! - Potential duplicate medications found. Please discuss with provider.        STOP taking these medications       hydrOXYzine HCL (ATARAX) 25 mg tablet Comments:   Reason for Stopping:         naloxone (NARCAN) 4 mg/0.1 mL nasal spray Comments:   Reason for Stopping:         nitroglycerin (NITROSTAT) 0.4 mg SL tablet Comments:   Reason for Stopping:         polyethylene glycol (MIRALAX) 17 g packet Comments:   Reason for Stopping:         senna (SENOKOT) 8.6 mg Comments:   Reason for Stopping:             Outpatient Discharge Orders   Ambulatory Referral to Hematology / Oncology   Standing Status: Future Standing Exp. Date: 07/18/26      Ambulatory Referral to Nephrology   Standing " "Status: Future Standing Exp. Date: 07/18/26      Ambulatory Referral to Hematology / Oncology   Standing Status: Future Standing Exp. Date: 07/19/26      Activity as tolerated     Call provider for:  persistent nausea or vomiting     Call provider for:  severe uncontrolled pain     Call provider for:  difficulty breathing, headache or visual disturbances     Call provider for:  persistent dizziness or light-headedness     ED SEPSIS DOCUMENTATION   Time reflects when diagnosis was documented in both MDM as applicable and the Disposition within this note       Time User Action Codes Description Comment    7/17/2025  4:13 PM Jim Reilly Add [R11.2] Nausea & vomiting     7/17/2025  4:13 PM Jim Reilly Add [I82.3] Renal vein thrombosis (HCC)     7/17/2025  4:13 PM Jim Reilly Add [E87.6] Hypokalemia     7/17/2025  4:14 PM Jim Reilly Add [K14.8] Tongue mass     7/17/2025  4:14 PM Jim Reilly Add [N39.0] UTI (urinary tract infection)     7/18/2025 11:12 AM Amber Tellez Add [R80.9] Proteinuria, unspecified type                      [1]   Past Medical History:  Diagnosis Date    Advanced maternal age in multigravida, second trimester 11/30/2016    Transitioned From: Advanced maternal age in multigravida, first trimester      Back pain     used 2 percocet tid until current admission in 2/2017    Bone spur     in back per pt    Cellulitis of left foot complicated by Critical limb threatening ischemia of the left lower extremity with a nonhealing transmetatarsal amputation wound 12/13/2024    Chronic hypertension with superimposed preeclampsia 02/14/2017    Depression     Diabetes mellitus (HCC)     Diabetic foot infection  (HCC) 01/28/2025    Elevated BP without diagnosis of hypertension     \"with pain\"    Foot injury     Full dentures     does not wear    Gangrene of left foot (HCC) 12/13/2024    GERD (gastroesophageal reflux disease)     occas    Gestational diabetes     insulin dependent    " Herniated cervical disc     Herniated lumbar intervertebral disc     History of pneumonia     Hx of degenerative disc disease     Hyperlipidemia     Obesity     Pre-eclampsia     Prior pregnancy with fetal demise     previous demise at 36 weeks    Toe pain     and foot pain   [2]   Past Surgical History:  Procedure Laterality Date    ARTERIOGRAM Left 2025    Procedure: LEFT lower extremity arteriogram w/ popiteal and anterior tibial artery angioplasty;  Surgeon: Ottoniel Victoria MD;  [facility];  Service: Vascular    BACK SURGERY       SECTION      INCISION AND DRAINAGE OF WOUND Left 2025    Procedure: INCISION AND DRAINAGE (I&D) EXTREMITY;  Surgeon: Leopoldo Ritchie DPM;  [facility];  Service: Podiatry    IR LOWER EXTREMITY ANGIOGRAM  2024    IR LOWER EXTREMITY ANGIOGRAM  2025    IR LOWER EXTREMITY ANGIOGRAM  2025    IR PICC PLACEMENT DOUBLE LUMEN  2025    IR TPA LYSIS CHECK  2025    IR TPA LYSIS CHECK  2025    LAMINECTOMY      with disc removal, last assessed 16    LEG AMPUTATION THROUGH LOWER TIBIA AND FIBULA Left 2025    Procedure: Left BKA;  Surgeon: Shane Arauz DO;  [facility];  Service: Vascular    OTHER SURGICAL HISTORY      Right ovary removal 2005 per pt recall at [facility]    PERIPHERAL ANGIOGRAM      OR AMPUTATION FOOT TRANSMETARSAL Left 2025    Procedure: AMPUTATION TRANSMETATARSAL (TMA);  Surgeon: Leopoldo Ritchie DPM;  [facility];  Service: Podiatry    OR  DELIVERY ONLY N/A 02/15/2017    Procedure:  SECTION ();  Surgeon: Tito Umaña MD;  [facility];  Service: Obstetrics    OR LIG/TRNSXJ FALOPIAN TUBE  DEL/ABDML SURG Left 02/15/2017    Procedure: LIGATION/COAGULATION TUBAL;  Surgeon: Tito Umaña MD;  [facility];  Service: Obstetrics    VASCULAR SURGERY  2024    WISDOM TOOTH  EXTRACTION     [3]   Family History  Problem Relation Name Age of Onset    Diabetes Mother      COPD Mother      Heart disease Mother      Stroke Father      Heart disease Father     [4]   Social History  Tobacco Use    Smoking status: Former     Current packs/day: 0.00     Types: Cigarettes     Quit date: 2024     Years since quittin.6    Smokeless tobacco: Never    Tobacco comments:     Per pt last cigarette 24--quit cold turkey   Vaping Use    Vaping status: Never Used   Substance Use Topics    Alcohol use: Not Currently     Comment: 1-2 drinks a year    Drug use: Never        Jim Reilly Jr., DO  [phone]

## 2025-07-17 NOTE — ASSESSMENT & PLAN NOTE
C/w gabapentin 800mg TID, duloxetine  C/w home lidocaine patch PRN  Trial robaxin for 5 days  PT OT

## 2025-07-17 NOTE — CONSULTS
Interventional Radiology  Consultation 7/17/2025     Inpatient Consult to IR  Consult performed by: Jose Alberto Gutierrez MD  Consult ordered by: Jim Reilly Jr.,         Lizzy Acuna   1975   [id number]      Assessment/Plan:  Non-occlusive thrombus in left renal vein. Some thrombus in a posterior division branch. The clot in the main left renal vein is smaller than the vein. This may mean it has retracted, and is subacute. Or it may have migrated. It looks free floating. I think it is too small to make for a significant PE. However it is a DVT.  The kidney enhances normally. The nephrograms are symmetric.    I would recommend treating for DVT with anti-coagulation. I would hold on thrombectomy unless the kidney were threatened.    Medical Problems       Problem List       * (Principal) Renal vein thrombosis (HCC)    Obesity (BMI 30.0-34.9) (Chronic)    Chronic low back pain    Overview Signed 6/19/2018  2:50 PM by Linwood Shook DO   Transitioned From: Other chronic pain         Chronic sciatica    Chronic, continuous use of opioids    Diabetes mellitus type 2 with complications (HCC)    Lab Results   Component Value Date    HGBA1C 8.4 (H) 04/28/2025       Recent Labs     07/17/25  1124 07/17/25  1648   POCGLU 168* 139       Blood Sugar Average: Last 72 hrs:  (P) 153.5        GERD (gastroesophageal reflux disease)    Overview Signed 1/3/2025 12:02 PM by Tim Post MD   occas         Proteinuria    PAD (peripheral artery disease) (HCC)    Obesity (BMI 30-39.9)    Primary hypertension    Thyroid nodule    Adrenal nodule (HCC)    Status post below-knee amputation of left lower extremity (HCC)    At risk for venous thromboembolism (VTE)    Phantom limb syndrome (HCC)    Constipation    Adjustment disorder with anxiety    Abdominal pain, vomiting, and diarrhea            Subjective:     Patient ID: Lizzy Acuna is a 50 y.o. female.    History of Present Illness  In ED today. CT shows small volume  "clot in left renal vein.    Review of Systems      Past Medical History[1]     Past Surgical History[2]     Tobacco Use History[3]     Social History     Substance and Sexual Activity   Alcohol Use Not Currently    Comment: 1-2 drinks a year        Social History     Substance and Sexual Activity   Drug Use Never        Allergies[4]    Current Medications[5]       Objective:    Vitals:    07/17/25 1212 07/17/25 1300 07/17/25 1400 07/17/25 1500   BP: 136/69 152/72 (!) 172/81 155/68   BP Location: Right arm      Pulse: 60 89 88 86   Resp: 19 18 17    Temp:    97.6 °F (36.4 °C)   TempSrc:   Temporal Temporal   SpO2: 99% 98% 98% 99%   Height:            Physical Exam      No results found for: \"BNP\"   Lab Results   Component Value Date    WBC 10.20 (H) 07/17/2025    HGB 12.6 07/17/2025    HCT 38.7 07/17/2025    MCV 85 07/17/2025     07/17/2025     Lab Results   Component Value Date    INR 1.05 02/02/2025    INR 1.01 01/31/2025    INR 0.99 01/31/2025    PROTIME 14.1 02/02/2025    PROTIME 13.6 01/31/2025    PROTIME 13.4 01/31/2025     Lab Results   Component Value Date    PTT 71 (H) 02/11/2025         I have personally reviewed pertinent imaging and laboratory results.     Code Status: Level 1 - Full Code  Advance Directive and Living Will:      Power of :    POLST:      IR has been consulted to evaluate the patient, determine the appropriate procedure, and whether or not a procedure can and should be performed.    Thank you for allowing me to participate in the care of Lizzy Acuna. Please don't hesitate to call, text, email, or TigerText with any questions.     This text is generated with voice recognition software. There may be translation, syntax,  or grammatical errors. If you have any questions, please contact the dictating provider.             [1]   Past Medical History:  Diagnosis Date    Advanced maternal age in multigravida, second trimester 11/30/2016    Transitioned From: Advanced maternal " "age in multigravida, first trimester      Back pain     used 2 percocet tid until current admission in 2017    Bone spur     in back per pt    Cellulitis of left foot complicated by Critical limb threatening ischemia of the left lower extremity with a nonhealing transmetatarsal amputation wound 2024    Chronic hypertension with superimposed preeclampsia 2017    Depression     Diabetes mellitus (HCC)     Diabetic foot infection  (HCC) 2025    Elevated BP without diagnosis of hypertension     \"with pain\"    Foot injury     Full dentures     does not wear    Gangrene of left foot (HCC) 2024    GERD (gastroesophageal reflux disease)     occas    Gestational diabetes     insulin dependent    Herniated cervical disc     Herniated lumbar intervertebral disc     History of pneumonia     Hx of degenerative disc disease     Hyperlipidemia     Obesity     Pre-eclampsia     Prior pregnancy with fetal demise     previous demise at 36 weeks    Toe pain     and foot pain   [2]   Past Surgical History:  Procedure Laterality Date    ARTERIOGRAM Left 2025    Procedure: LEFT lower extremity arteriogram w/ popiteal and anterior tibial artery angioplasty;  Surgeon: Ottoniel Victoria MD;  [facility];  Service: Vascular    BACK SURGERY       SECTION      INCISION AND DRAINAGE OF WOUND Left 2025    Procedure: INCISION AND DRAINAGE (I&D) EXTREMITY;  Surgeon: Leopoldo Ritchie DPM;  [facility];  Service: Podiatry    IR LOWER EXTREMITY ANGIOGRAM  2024    IR LOWER EXTREMITY ANGIOGRAM  2025    IR LOWER EXTREMITY ANGIOGRAM  2025    IR PICC PLACEMENT DOUBLE LUMEN  2025    IR TPA LYSIS CHECK  2025    IR TPA LYSIS CHECK  2025    LAMINECTOMY      with disc removal, last assessed 16    LEG AMPUTATION THROUGH LOWER TIBIA AND FIBULA Left 2025    Procedure: Left BKA;  Surgeon: Shane Arauz DO;  [facility];  " "Service: Vascular    OTHER SURGICAL HISTORY      Right ovary removal 2005 per pt recall at [facility]    PERIPHERAL ANGIOGRAM      MO AMPUTATION FOOT TRANSMETARSAL Left 2025    Procedure: AMPUTATION TRANSMETATARSAL (TMA);  Surgeon: Leopoldo Ritchie DPM;  [facility];  Service: Podiatry    MO  DELIVERY ONLY N/A 02/15/2017    Procedure:  SECTION ();  Surgeon: Tito Umaña MD;  [facility];  Service: Obstetrics    MO LIG/TRNSXJ FALOPIAN TUBE  DEL/ABDML SURG Left 02/15/2017    Procedure: LIGATION/COAGULATION TUBAL;  Surgeon: Tito Umaña MD;  [facility];  Service: Obstetrics    VASCULAR SURGERY  2024    WISDOM TOOTH EXTRACTION     [3]   Social History  Tobacco Use   Smoking Status Former    Current packs/day: 0.00    Types: Cigarettes    Quit date: 2024    Years since quittin.5   Smokeless Tobacco Never   Tobacco Comments    Per pt last cigarette 24--quit cold turkey   [4]   Allergies  Allergen Reactions    Codeine Other (See Comments)     Aggression-and nasty--\"took a cough syrup with codeine as a child\"   [5]   Current Facility-Administered Medications   Medication Dose Route Frequency Provider Last Rate Last Admin    acetaminophen (TYLENOL) tablet 650 mg  650 mg Oral Q6H PRN Amber Tellez MD        [START ON 2025] aspirin (ECOTRIN LOW STRENGTH) EC tablet 81 mg  81 mg Oral Daily Amber Tellez MD        atorvastatin (LIPITOR) tablet 80 mg  80 mg Oral QPM Amber Tellez MD        [START ON 2025] chlorthalidone tablet 12.5 mg  12.5 mg Oral Daily Amber Tellez MD        [START ON 2025] DULoxetine (CYMBALTA) delayed release capsule 60 mg  60 mg Oral Daily Amber Tellez MD        gabapentin (NEURONTIN) capsule 800 mg  800 mg Oral TID Amber Tellez MD   800 mg at 25 1649    heparin (porcine) 25,000 units in 0.45% NaCl 250 mL infusion (premix)  3-30 Units/kg/hr (Order-Specific) " Intravenous Titrated Amber Tellez MD 15.3 mL/hr at 07/17/25 1700 18 Units/kg/hr at 07/17/25 1700    heparin (porcine) injection 3,400 Units  3,400 Units Intravenous Q6H PRN Amber Tellez MD        heparin (porcine) injection 6,800 Units  6,800 Units Intravenous Q6H PRN Amber Tellez MD        insulin glargine (LANTUS) subcutaneous injection 18 Units 0.18 mL  18 Units Subcutaneous HS Amber Tellez MD        insulin lispro (HumALOG/ADMELOG) 100 units/mL subcutaneous injection 2-12 Units  2-12 Units Subcutaneous TID AC Amber Tellez MD        insulin lispro (HumALOG/ADMELOG) 100 units/mL subcutaneous injection 8 Units  8 Units Subcutaneous TID With Meals Amber Tellez MD        lidocaine (LIDODERM) 5 % patch 1 patch  1 patch Topical Daily PRN Amber Tellez MD        methocarbamol (ROBAXIN) tablet 500 mg  500 mg Oral Q8H JORGE Amber Tellez MD   500 mg at 07/17/25 1651    ondansetron (ZOFRAN) injection 4 mg  4 mg Intravenous Q6H PRN Amber Tellez MD        [START ON 7/18/2025] pantoprazole (PROTONIX) EC tablet 40 mg  40 mg Oral Early Morning Amber Tellez MD        potassium chloride 20 mEq IVPB (premix)  20 mEq Intravenous Once Amber Tellez MD 50 mL/hr at 07/17/25 1654 20 mEq at 07/17/25 1654    potassium chloride 20 mEq IVPB (premix)  20 mEq Intravenous Once Amber Tellez MD        sodium chloride 0.9 % infusion  150 mL/hr Intravenous Continuous Amber Tellez  mL/hr at 07/17/25 1658 150 mL/hr at 07/17/25 1658    traZODone (DESYREL) tablet 100 mg  100 mg Oral HS Amber Tellez MD         Current Outpatient Medications   Medication Sig Dispense Refill    aspirin (ECOTRIN LOW STRENGTH) 81 mg EC tablet Take 1 tablet (81 mg total) by mouth daily 30 tablet 0    atorvastatin (LIPITOR) 80 mg tablet Take 1 tablet (80 mg total) by mouth every evening 100 tablet 1    chlorthalidone 25 mg tablet Take 0.5 tablets (12.5 mg total) by mouth daily 30 tablet 0    DULoxetine (CYMBALTA) 60 mg delayed  "release capsule Take 1 capsule (60 mg total) by mouth daily 30 capsule 5    gabapentin (NEURONTIN) 800 mg tablet Take 1 tablet (800 mg total) by mouth 3 (three) times a day 270 tablet 0    glucose blood (OneTouch Verio) test strip May substitute brand based on insurance coverage. Check glucose TID. 100 each 0    Insulin Glargine Solostar (Lantus SoloStar) 100 UNIT/ML SOPN Inject 0.22 mL (22 Units total) under the skin daily at bedtime 21 mL 1    insulin lispro (HumaLOG KwikPen) 100 units/mL injection pen Inject 10 Units under the skin 3 (three) times a day with meals 27 mL 1    Insulin Pen Needle (BD Pen Needle Ana 2nd Gen) 32G X 4 MM MISC For use with insulin pen. Pharmacy may dispense brand covered by insurance. 100 each 0    Insulin Pen Needle (BD Pen Needle Ana 2nd Gen) 32G X 4 MM MISC For use with insulin pen 4 times daily. Pharmacy may dispense brand covered by insurance. 400 each 0    Insulin Syringe-Needle U-100 30G X 1/2\" 0.3 ML MISC Use 4 times a day 200 each 2    naloxone (NARCAN) 4 mg/0.1 mL nasal spray Administer 1 spray into a nostril. If no response after 2-3 minutes, give another dose in the other nostril using a new spray. 1 each 0    nitroglycerin (NITROSTAT) 0.4 mg SL tablet Place 1 tablet (0.4 mg total) under the tongue every 5 (five) minutes as needed for chest pain 30 tablet 0    ondansetron (ZOFRAN) 4 mg tablet Take 1 tablet (4 mg total) by mouth every 8 (eight) hours as needed for nausea or vomiting 20 tablet 0    OneTouch Delica Lancets 33G MISC May substitute brand based on insurance coverage. Check glucose TID. 100 each 0    pantoprazole (PROTONIX) 40 mg tablet Take 1 tablet (40 mg total) by mouth daily in the early morning 30 tablet 5    polyethylene glycol (MIRALAX) 17 g packet Take 17 g by mouth daily      senna (SENOKOT) 8.6 mg Take 2 tablets (17.2 mg total) by mouth daily at bedtime 30 tablet 0    Tirzepatide (Mounjaro) 7.5 MG/0.5ML SOAJ Inject 7.5 mg under the skin every 7 days 2 " mL 1    traZODone (DESYREL) 100 mg tablet Take 1 tablet (100 mg total) by mouth daily at bedtime 30 tablet 5    acetaminophen (TYLENOL) 325 mg tablet Take 2 tablets (650 mg total) by mouth every 6 (six) hours as needed for mild pain      Alcohol Swabs 70 % PADS May substitute brand based on insurance coverage. Check glucose TID. 100 each 0    Blood Glucose Monitoring Suppl (OneTouch Verio Reflect) w/Device KIT May substitute brand based on insurance coverage. Check glucose TID. 1 kit 0    Continuous Glucose  (Dexcom G6 ) SUSAN Use daily 1 each 0    Continuous Glucose  (FreeStyle Carlie 3 [place]) SUSAN To check blood sugar continuously 1 each 0    Continuous Glucose Sensor (Dexcom G6 Sensor) MISC Change every 10 days 3 each 3    Continuous Glucose Transmitter (Dexcom G6 Transmitter) MISC To check blood sugar continuously on a change every 10 days. 6 each 1    hydrOXYzine HCL (ATARAX) 25 mg tablet Take 1 tablet (25 mg total) by mouth every 6 (six) hours as needed (insomnia) (Patient not taking: Reported on 4/25/2025) 30 tablet 0    lidocaine (LIDODERM) 5 % Apply 1 patch topically daily over 12 hours Remove & Discard patch within 12 hours or as directed by MD 30 patch 3    methocarbamol (ROBAXIN) 750 mg tablet Take 1 tablet (750 mg total) by mouth every 8 (eight) hours for 14 days 42 tablet 0

## 2025-07-17 NOTE — H&P
H&P - Hospitalist   Name: Lizzy Acuna 50 y.o. female I MRN: [id number]  Unit/Bed#: ED 25 I Date of Admission: 7/17/2025   Date of Service: 7/17/2025 I Hospital Day: 0     Assessment & Plan  Abdominal pain, vomiting, and diarrhea  Admitted for nausea and vomiting for 2 days  CTA/P with IV contrast - no acute infectious findings. There is a new 3.3cm left renal vein thrombus - which prompted admission  F/u Infectious diarrhea workup - stool cultures  C/w IVF, once able to tolerate PO then advance to oral hydration  Clear liquid diet - advance as tolerated  Zofran PRN for nausea  C/w home pain medication  Renal vein thrombosis (HCC)  CT scan: new left renal vein thrombus  Will treat with heparin drip, then transition to oral anticoagulation. Typically this would require at least 6 month of treatment, pending further anticoagulation depending on risk factors  We will start workup for proteinuria as nephrotic syndrome can increase risk of renal vein thrombosis. Her UA on admission showed protein 3+ which is unusual. The UA is not consistent with UTI  Obvious off IV antibiotics  Diabetes mellitus type 2 with complications (HCC)  A1c 8.4%  Clarified home insulin to be lantus 18 units daily HS, insulin 8 units TID with meals  While admitted, check glucose TID and use ISS  If she is not having PO intake then she may not need meal time insulin  Proteinuria  F/u urine protein tests  Should see outpatient nephrology to plan for further diagnosis clarification, possibly to discuss kidney biopsy  Status post below-knee amputation of left lower extremity (HCC)  Phantom limb syndrome (HCC)  C/w gabapentin 800mg TID, duloxetine  C/w home lidocaine patch PRN  Trial robaxin for 5 days  PT OT  Adjustment disorder with anxiety  C/w home trazodone       VTE Pharmacologic Prophylaxis:   on heparin drip  Code Status: Level 1 - Full Code   Discussion with family: she will tell her family    Anticipated Length of Stay: Patient will be  admitted on an inpatient basis with an anticipated length of stay of greater than 2 midnights secondary to left renal vein thrombosis.    History of Present Illness   Chief Complaint: abdominal pain    Lizzy Acuna is a 50 y.o. female with a PMH of DM2 s/p left BKA who presents with abdominal pain, nausea, and vomiting for 2 days. She reports vomiting 10+ times today with green and yellow content. Diarrhea is watery. No new food or medication. Denies other sick family members. Not able to eat or drink and therefore came to the ED.     Workup included CT scan which found venous thrombosis of left renal vein.   Admitted for anticoagulation treatment    Review of Systems   Constitutional:  Negative for chills and fever.   HENT:  Negative for ear pain and sore throat.    Eyes:  Negative for pain and visual disturbance.   Respiratory:  Negative for cough and shortness of breath.    Cardiovascular:  Negative for chest pain and palpitations.   Gastrointestinal:  Positive for abdominal pain, diarrhea, nausea and vomiting.   Genitourinary:  Negative for dysuria and hematuria.   Musculoskeletal:  Negative for arthralgias and back pain.   Skin:  Negative for color change and rash.   Neurological:  Negative for seizures and syncope.   All other systems reviewed and are negative.      Historical Information   Past Medical History[1]  Past Surgical History[2]  Social History[3]  E-Cigarette/Vaping    E-Cigarette Use Never User      E-Cigarette/Vaping Substances    Nicotine No     THC No     CBD No     Flavoring No     Other No     Unknown No        Social History:  Marital Status: Unknown   Occupation:   Patient Pre-hospital Living Situation: Home  Patient Pre-hospital Level of Mobility: walks  Patient Pre-hospital Diet Restrictions: none    Meds/Allergies   I have reviewed home medications with patient personally.  Prior to Admission medications    Medication Sig Start Date End Date Taking? Authorizing Provider   aspirin  "(ECOTRIN LOW STRENGTH) 81 mg EC tablet Take 1 tablet (81 mg total) by mouth daily 2/27/25  Yes Oscar Salas MD   atorvastatin (LIPITOR) 80 mg tablet Take 1 tablet (80 mg total) by mouth every evening 3/25/25  Yes NAOMY Johnson   chlorthalidone 25 mg tablet Take 0.5 tablets (12.5 mg total) by mouth daily 3/25/25  Yes NAOMY Johnson   DULoxetine (CYMBALTA) 60 mg delayed release capsule Take 1 capsule (60 mg total) by mouth daily 7/3/25  Yes NAOMY Johnson   gabapentin (NEURONTIN) 800 mg tablet Take 1 tablet (800 mg total) by mouth 3 (three) times a day 6/4/25  Yes Sarah Walker MD   glucose blood (OneTouch Verio) test strip May substitute brand based on insurance coverage. Check glucose TID. 12/25/24  Yes Bunny Rosado DO   Insulin Glargine Solostar (Lantus SoloStar) 100 UNIT/ML SOPN Inject 0.22 mL (22 Units total) under the skin daily at bedtime 4/29/25  Yes Anay Sheets MD   insulin lispro (HumaLOG KwikPen) 100 units/mL injection pen Inject 10 Units under the skin 3 (three) times a day with meals 4/29/25  Yes Anay Sheets MD   Insulin Pen Needle (BD Pen Needle Ana 2nd Gen) 32G X 4 MM MISC For use with insulin pen. Pharmacy may dispense brand covered by insurance. 4/17/25  Yes NAOMY Johnson   Insulin Pen Needle (BD Pen Needle Ana 2nd Gen) 32G X 4 MM MISC For use with insulin pen 4 times daily. Pharmacy may dispense brand covered by insurance. 4/29/25  Yes Anay Sheets MD   Insulin Syringe-Needle U-100 30G X 1/2\" 0.3 ML MISC Use 4 times a day 4/29/25  Yes Anay Sheets MD   naloxone (NARCAN) 4 mg/0.1 mL nasal spray Administer 1 spray into a nostril. If no response after 2-3 minutes, give another dose in the other nostril using a new spray. 2/28/25 2/28/26 Yes Laurence Dumont PA-C   nitroglycerin (NITROSTAT) 0.4 mg SL tablet Place 1 tablet (0.4 mg total) under the tongue every 5 (five) minutes as needed for chest pain 2/27/25  Yes Oscar Salas MD   ondansetron (ZOFRAN) 4 " mg tablet Take 1 tablet (4 mg total) by mouth every 8 (eight) hours as needed for nausea or vomiting 5/1/25  Yes [facility] Delica Lancets 33G MISC May substitute brand based on insurance coverage. Check glucose TID. 12/25/24  Yes Bunny Rosado DO   pantoprazole (PROTONIX) 40 mg tablet Take 1 tablet (40 mg total) by mouth daily in the early morning 7/3/25  Yes NAOMY Johnson   polyethylene glycol (MIRALAX) 17 g packet Take 17 g by mouth daily 2/15/25  Yes Sharron Stoddard MD   senna (SENOKOT) 8.6 mg Take 2 tablets (17.2 mg total) by mouth daily at bedtime 2/28/25  Yes Laurence Dumont PA-C   Tirzepatide (Mounjaro) 7.5 MG/0.5ML SOAJ Inject 7.5 mg under the skin every 7 days 7/7/25  Yes Karli Callaway MD   traZODone (DESYREL) 100 mg tablet Take 1 tablet (100 mg total) by mouth daily at bedtime 7/3/25  Yes NAOMY Johnson   acetaminophen (TYLENOL) 325 mg tablet Take 2 tablets (650 mg total) by mouth every 6 (six) hours as needed for mild pain 2/28/25   Laurence Dumont PA-C   Alcohol Swabs 70 % PADS May substitute brand based on insurance coverage. Check glucose TID. 5/30/25   NAOMY Johnson   Blood Glucose Monitoring Suppl (OneTouch Verio Reflect) w/Device KIT May substitute brand based on insurance coverage. Check glucose TID. 12/25/24   Bunny Rosado DO   Continuous Glucose  (Dexcom G6 ) SUSAN Use daily 5/19/25   Anay Sheets MD   Continuous Glucose  (FreeStyle Carlie 3 [place]) SUSAN To check blood sugar continuously 4/23/25   Anay Sheets MD   Continuous Glucose Sensor (Dexcom G6 Sensor) MISC Change every 10 days 6/11/25   Anay Sheets MD   Continuous Glucose Transmitter (Dexcom G6 Transmitter) MISC To check blood sugar continuously on a change every 10 days. 6/12/25   Anay Sheets MD   hydrOXYzine HCL (ATARAX) 25 mg tablet Take 1 tablet (25 mg total) by mouth every 6 (six) hours as needed (insomnia)  Patient not taking: Reported on 4/25/2025 2/27/25    "Oscar Salas MD   lidocaine (LIDODERM) 5 % Apply 1 patch topically daily over 12 hours Remove & Discard patch within 12 hours or as directed by MD 7/3/25   NAOMY Johnson   methocarbamol (ROBAXIN) 750 mg tablet Take 1 tablet (750 mg total) by mouth every 8 (eight) hours for 14 days 3/25/25 6/4/25  NAOMY Johnson     Allergies   Allergen Reactions    Codeine Other (See Comments)     Aggression-and nasty--\"took a cough syrup with codeine as a child\"       Objective :  Temp:  [97.4 °F (36.3 °C)-97.6 °F (36.4 °C)] 97.6 °F (36.4 °C)  HR:  [60-99] 86  BP: (136-172)/() 155/68  Resp:  [17-19] 17  SpO2:  [96 %-100 %] 99 %  O2 Device: None (Room air)    Physical Exam  Vitals and nursing note reviewed.   Constitutional:       General: She is not in acute distress.     Appearance: She is well-developed.   HENT:      Head: Normocephalic and atraumatic.      Mouth/Throat:      Comments: There is a single tongue nodule (there for 1 week) with smooth border, containing small blood vessels    Eyes:      Conjunctiva/sclera: Conjunctivae normal.       Cardiovascular:      Rate and Rhythm: Normal rate and regular rhythm.      Heart sounds: No murmur heard.  Pulmonary:      Effort: Pulmonary effort is normal. No respiratory distress.      Breath sounds: Normal breath sounds.   Abdominal:      Palpations: Abdomen is soft.      Tenderness: There is abdominal tenderness (RUQ).     Musculoskeletal:         General: No swelling.      Cervical back: Neck supple.      Right lower leg: No edema.      Comments: Left BKA     Skin:     General: Skin is warm and dry.      Capillary Refill: Capillary refill takes less than 2 seconds.     Neurological:      Mental Status: She is alert.     Psychiatric:         Mood and Affect: Mood normal.          Lines/Drains:                  Lab Results: I have reviewed the following results:  Results from last 7 days   Lab Units 07/17/25  1209   WBC Thousand/uL 10.20*   HEMOGLOBIN g/dL " "12.6   HEMATOCRIT % 38.7   PLATELETS Thousands/uL 246   SEGS PCT % 67   LYMPHO PCT % 27   MONO PCT % 5   EOS PCT % 1     Results from last 7 days   Lab Units 07/17/25  1209   SODIUM mmol/L 138   POTASSIUM mmol/L 3.0*   CHLORIDE mmol/L 96   CO2 mmol/L 28   BUN mg/dL 25   CREATININE mg/dL 0.89   ANION GAP mmol/L 14*   CALCIUM mg/dL 9.0   ALBUMIN g/dL 4.3   TOTAL BILIRUBIN mg/dL 0.62   ALK PHOS U/L 72   ALT U/L 17   AST U/L 15   GLUCOSE RANDOM mg/dL 178*         Results from last 7 days   Lab Units 07/17/25  1648 07/17/25  1124   POC GLUCOSE mg/dl 139 168*     Lab Results   Component Value Date    HGBA1C 8.4 (H) 04/28/2025    HGBA1C 7.3 (A) 03/13/2025    HGBA1C 10.3 (H) 12/13/2024                 Administrative Statements       ** Please Note: This note has been constructed using a voice recognition system. **         [1]   Past Medical History:  Diagnosis Date    Advanced maternal age in multigravida, second trimester 11/30/2016    Transitioned From: Advanced maternal age in multigravida, first trimester      Back pain     used 2 percocet tid until current admission in 2/2017    Bone spur     in back per pt    Cellulitis of left foot complicated by Critical limb threatening ischemia of the left lower extremity with a nonhealing transmetatarsal amputation wound 12/13/2024    Chronic hypertension with superimposed preeclampsia 02/14/2017    Depression     Diabetes mellitus (HCC)     Diabetic foot infection  ([facility]) 01/28/2025    Elevated BP without diagnosis of hypertension     \"with pain\"    Foot injury     Full dentures     does not wear    Gangrene of left foot ([facility]) 12/13/2024    GERD (gastroesophageal reflux disease)     occas    Gestational diabetes     insulin dependent    Herniated cervical disc     Herniated lumbar intervertebral disc     History of pneumonia     Hx of degenerative disc disease     Hyperlipidemia     Obesity     Pre-eclampsia     Prior pregnancy with fetal demise     previous demise at 36 weeks "    Toe pain     and foot pain   [2]   Past Surgical History:  Procedure Laterality Date    ARTERIOGRAM Left 2025    Procedure: LEFT lower extremity arteriogram w/ popiteal and anterior tibial artery angioplasty;  Surgeon: Ottoniel Victoria MD;  [facility]: [facility];  Service: Vascular    BACK SURGERY       SECTION      INCISION AND DRAINAGE OF WOUND Left 2025    Procedure: INCISION AND DRAINAGE (I&D) EXTREMITY;  Surgeon: Leopoldo Ritchie DPM;  Location: [facility];  Service: Podiatry    IR LOWER EXTREMITY ANGIOGRAM  2024    IR LOWER EXTREMITY ANGIOGRAM  2025    IR LOWER EXTREMITY ANGIOGRAM  2025    IR PICC PLACEMENT DOUBLE LUMEN  2025    IR TPA LYSIS CHECK  2025    IR TPA LYSIS CHECK  2025    LAMINECTOMY      with disc removal, last assessed 16    LEG AMPUTATION THROUGH LOWER TIBIA AND FIBULA Left 2025    Procedure: Left BKA;  Surgeon: Shane Arauz DO;  Location: [facility];  Service: Vascular    OTHER SURGICAL HISTORY      Right ovary removal 2005 per pt recall at [facility]    PERIPHERAL ANGIOGRAM      MT AMPUTATION FOOT TRANSMETARSAL Left 2025    Procedure: AMPUTATION TRANSMETATARSAL (TMA);  Surgeon: Leopoldo Ritchie DPM;  Location: [facility];  Service: Podiatry    MT  DELIVERY ONLY N/A 02/15/2017    Procedure:  SECTION ();  Surgeon: Tito Umaña MD;  [facility];  Service: Obstetrics    MT LIG/TRNSXJ FALOPIAN TUBE  DEL/ABDML SURG Left 02/15/2017    Procedure: LIGATION/COAGULATION TUBAL;  Surgeon: Tito Umaña MD;  Location: [facility];  Service: Obstetrics    VASCULAR SURGERY  2024    WISDOM TOOTH EXTRACTION     [3]   Social History  Tobacco Use    Smoking status: Former     Current packs/day: 0.00     Types: Cigarettes     Quit date: 2024     Years since quittin.5    Smokeless tobacco: Never    Tobacco comments:     Per pt last  cigarette 12/13/24--quit cold turkey   Vaping Use    Vaping status: Never Used   Substance and Sexual Activity    Alcohol use: Not Currently     Comment: 1-2 drinks a year    Drug use: Never    Sexual activity: Not Currently     Partners: Male     Birth control/protection: None     Comment: defer

## 2025-07-17 NOTE — ASSESSMENT & PLAN NOTE
F/u urine protein tests  Should see outpatient nephrology to plan for further diagnosis clarification, possibly to discuss kidney biopsy

## 2025-07-18 PROBLEM — E87.8 ELECTROLYTE ABNORMALITY: Status: ACTIVE | Noted: 2025-07-17

## 2025-07-18 LAB
ALBUMIN SERPL BCG-MCNC: 3.9 G/DL (ref 3.5–5)
ALP SERPL-CCNC: 69 U/L (ref 34–104)
ALT SERPL W P-5'-P-CCNC: 15 U/L (ref 7–52)
ANION GAP SERPL CALCULATED.3IONS-SCNC: 10 MMOL/L (ref 4–13)
ANION GAP SERPL CALCULATED.3IONS-SCNC: 12 MMOL/L (ref 4–13)
APTT PPP: 168 SECONDS (ref 23–34)
AST SERPL W P-5'-P-CCNC: 17 U/L (ref 13–39)
BACTERIA UR CULT: NORMAL
BASOPHILS # BLD AUTO: 0.02 THOUSANDS/ÂΜL (ref 0–0.1)
BASOPHILS NFR BLD AUTO: 0 % (ref 0–1)
BILIRUB SERPL-MCNC: 0.44 MG/DL (ref 0.2–1)
BUN SERPL-MCNC: 18 MG/DL (ref 5–25)
BUN SERPL-MCNC: 21 MG/DL (ref 5–25)
CALCIUM SERPL-MCNC: 7.3 MG/DL (ref 8.4–10.2)
CALCIUM SERPL-MCNC: 7.6 MG/DL (ref 8.4–10.2)
CHLORIDE SERPL-SCNC: 103 MMOL/L (ref 96–108)
CHLORIDE SERPL-SCNC: 104 MMOL/L (ref 96–108)
CO2 SERPL-SCNC: 25 MMOL/L (ref 21–32)
CO2 SERPL-SCNC: 26 MMOL/L (ref 21–32)
CREAT SERPL-MCNC: 0.76 MG/DL (ref 0.6–1.3)
CREAT SERPL-MCNC: 0.83 MG/DL (ref 0.6–1.3)
CREAT UR-MCNC: 108.9 MG/DL
EOSINOPHIL # BLD AUTO: 0.08 THOUSAND/ÂΜL (ref 0–0.61)
EOSINOPHIL NFR BLD AUTO: 1 % (ref 0–6)
ERYTHROCYTE [DISTWIDTH] IN BLOOD BY AUTOMATED COUNT: 14.2 % (ref 11.6–15.1)
GFR SERPL CREATININE-BSD FRML MDRD: 83 ML/MIN/1.73SQ M
GFR SERPL CREATININE-BSD FRML MDRD: 92 ML/MIN/1.73SQ M
GLUCOSE SERPL-MCNC: 104 MG/DL (ref 65–140)
GLUCOSE SERPL-MCNC: 106 MG/DL (ref 65–140)
GLUCOSE SERPL-MCNC: 122 MG/DL (ref 65–140)
GLUCOSE SERPL-MCNC: 133 MG/DL (ref 65–140)
GLUCOSE SERPL-MCNC: 148 MG/DL (ref 65–140)
GLUCOSE SERPL-MCNC: 91 MG/DL (ref 65–140)
HCT VFR BLD AUTO: 38 % (ref 34.8–46.1)
HGB BLD-MCNC: 12.1 G/DL (ref 11.5–15.4)
IMM GRANULOCYTES # BLD AUTO: 0.04 THOUSAND/UL (ref 0–0.2)
IMM GRANULOCYTES NFR BLD AUTO: 0 % (ref 0–2)
INR PPP: 0.95 (ref 0.85–1.19)
LYMPHOCYTES # BLD AUTO: 5.26 THOUSANDS/ÂΜL (ref 0.6–4.47)
LYMPHOCYTES NFR BLD AUTO: 44 % (ref 14–44)
MAGNESIUM SERPL-MCNC: 0.6 MG/DL (ref 1.9–2.7)
MAGNESIUM SERPL-MCNC: 1.8 MG/DL (ref 1.9–2.7)
MCH RBC QN AUTO: 27.7 PG (ref 26.8–34.3)
MCHC RBC AUTO-ENTMCNC: 31.8 G/DL (ref 31.4–37.4)
MCV RBC AUTO: 87 FL (ref 82–98)
MONOCYTES # BLD AUTO: 0.59 THOUSAND/ÂΜL (ref 0.17–1.22)
MONOCYTES NFR BLD AUTO: 5 % (ref 4–12)
NEUTROPHILS # BLD AUTO: 5.87 THOUSANDS/ÂΜL (ref 1.85–7.62)
NEUTS SEG NFR BLD AUTO: 50 % (ref 43–75)
NRBC BLD AUTO-RTO: 0 /100 WBCS
PHOSPHATE SERPL-MCNC: 4 MG/DL (ref 2.7–4.5)
PLATELET # BLD AUTO: 222 THOUSANDS/UL (ref 149–390)
PMV BLD AUTO: 10.4 FL (ref 8.9–12.7)
POTASSIUM SERPL-SCNC: 3 MMOL/L (ref 3.5–5.3)
POTASSIUM SERPL-SCNC: 3.1 MMOL/L (ref 3.5–5.3)
PROT SERPL-MCNC: 7.4 G/DL (ref 6.4–8.4)
PROT UR-MCNC: 191.9 MG/DL
PROT UR-MCNC: 191.9 MG/DL
PROT/CREAT UR: 1.8 MG/G{CREAT}
PROTHROMBIN TIME: 13.2 SECONDS (ref 12.3–15)
RBC # BLD AUTO: 4.37 MILLION/UL (ref 3.81–5.12)
SODIUM SERPL-SCNC: 139 MMOL/L (ref 135–147)
SODIUM SERPL-SCNC: 141 MMOL/L (ref 135–147)
WBC # BLD AUTO: 11.86 THOUSAND/UL (ref 4.31–10.16)

## 2025-07-18 PROCEDURE — 85610 PROTHROMBIN TIME: CPT | Performed by: STUDENT IN AN ORGANIZED HEALTH CARE EDUCATION/TRAINING PROGRAM

## 2025-07-18 PROCEDURE — 82948 REAGENT STRIP/BLOOD GLUCOSE: CPT

## 2025-07-18 PROCEDURE — 83735 ASSAY OF MAGNESIUM: CPT | Performed by: STUDENT IN AN ORGANIZED HEALTH CARE EDUCATION/TRAINING PROGRAM

## 2025-07-18 PROCEDURE — 99232 SBSQ HOSP IP/OBS MODERATE 35: CPT | Performed by: INTERNAL MEDICINE

## 2025-07-18 PROCEDURE — 97163 PT EVAL HIGH COMPLEX 45 MIN: CPT

## 2025-07-18 PROCEDURE — 85025 COMPLETE CBC W/AUTO DIFF WBC: CPT | Performed by: STUDENT IN AN ORGANIZED HEALTH CARE EDUCATION/TRAINING PROGRAM

## 2025-07-18 PROCEDURE — 86146 BETA-2 GLYCOPROTEIN ANTIBODY: CPT | Performed by: STUDENT IN AN ORGANIZED HEALTH CARE EDUCATION/TRAINING PROGRAM

## 2025-07-18 PROCEDURE — 80048 BASIC METABOLIC PNL TOTAL CA: CPT | Performed by: STUDENT IN AN ORGANIZED HEALTH CARE EDUCATION/TRAINING PROGRAM

## 2025-07-18 PROCEDURE — 86147 CARDIOLIPIN ANTIBODY EA IG: CPT | Performed by: STUDENT IN AN ORGANIZED HEALTH CARE EDUCATION/TRAINING PROGRAM

## 2025-07-18 PROCEDURE — 85730 THROMBOPLASTIN TIME PARTIAL: CPT | Performed by: STUDENT IN AN ORGANIZED HEALTH CARE EDUCATION/TRAINING PROGRAM

## 2025-07-18 PROCEDURE — 84100 ASSAY OF PHOSPHORUS: CPT | Performed by: STUDENT IN AN ORGANIZED HEALTH CARE EDUCATION/TRAINING PROGRAM

## 2025-07-18 PROCEDURE — 80053 COMPREHEN METABOLIC PANEL: CPT | Performed by: STUDENT IN AN ORGANIZED HEALTH CARE EDUCATION/TRAINING PROGRAM

## 2025-07-18 RX ORDER — POTASSIUM CHLORIDE 1500 MG/1
40 TABLET, EXTENDED RELEASE ORAL ONCE
Status: COMPLETED | OUTPATIENT
Start: 2025-07-18 | End: 2025-07-18

## 2025-07-18 RX ORDER — MAGNESIUM SULFATE HEPTAHYDRATE 40 MG/ML
2 INJECTION, SOLUTION INTRAVENOUS ONCE
Status: COMPLETED | OUTPATIENT
Start: 2025-07-18 | End: 2025-07-19

## 2025-07-18 RX ORDER — POTASSIUM CHLORIDE 14.9 MG/ML
20 INJECTION INTRAVENOUS ONCE
Status: COMPLETED | OUTPATIENT
Start: 2025-07-18 | End: 2025-07-18

## 2025-07-18 RX ORDER — POTASSIUM CHLORIDE 1500 MG/1
40 TABLET, EXTENDED RELEASE ORAL EVERY 4 HOURS
Status: COMPLETED | OUTPATIENT
Start: 2025-07-18 | End: 2025-07-18

## 2025-07-18 RX ORDER — MAGNESIUM SULFATE HEPTAHYDRATE 40 MG/ML
4 INJECTION, SOLUTION INTRAVENOUS ONCE
Status: COMPLETED | OUTPATIENT
Start: 2025-07-18 | End: 2025-07-19

## 2025-07-18 RX ORDER — MAGNESIUM SULFATE IN WATER 40 MG/ML
6 INJECTION, SOLUTION INTRAVENOUS ONCE
Status: DISCONTINUED | OUTPATIENT
Start: 2025-07-18 | End: 2025-07-18

## 2025-07-18 RX ADMIN — APIXABAN 10 MG: 5 TABLET, FILM COATED ORAL at 17:32

## 2025-07-18 RX ADMIN — MAGNESIUM SULFATE HEPTAHYDRATE 2 G: 40 INJECTION, SOLUTION INTRAVENOUS at 13:43

## 2025-07-18 RX ADMIN — GABAPENTIN 800 MG: 400 CAPSULE ORAL at 08:02

## 2025-07-18 RX ADMIN — GABAPENTIN 800 MG: 400 CAPSULE ORAL at 17:32

## 2025-07-18 RX ADMIN — TRAZODONE HYDROCHLORIDE 100 MG: 100 TABLET ORAL at 21:11

## 2025-07-18 RX ADMIN — INSULIN LISPRO 8 UNITS: 100 INJECTION, SOLUTION INTRAVENOUS; SUBCUTANEOUS at 11:59

## 2025-07-18 RX ADMIN — GABAPENTIN 800 MG: 400 CAPSULE ORAL at 21:11

## 2025-07-18 RX ADMIN — POTASSIUM CHLORIDE 40 MEQ: 1500 TABLET, EXTENDED RELEASE ORAL at 17:32

## 2025-07-18 RX ADMIN — PANTOPRAZOLE SODIUM 40 MG: 40 TABLET, DELAYED RELEASE ORAL at 05:09

## 2025-07-18 RX ADMIN — MAGNESIUM SULFATE HEPTAHYDRATE 2 G: 40 INJECTION, SOLUTION INTRAVENOUS at 20:33

## 2025-07-18 RX ADMIN — POTASSIUM CHLORIDE 40 MEQ: 1500 TABLET, EXTENDED RELEASE ORAL at 13:43

## 2025-07-18 RX ADMIN — POTASSIUM CHLORIDE 40 MEQ: 1500 TABLET, EXTENDED RELEASE ORAL at 08:15

## 2025-07-18 RX ADMIN — INSULIN LISPRO 8 UNITS: 100 INJECTION, SOLUTION INTRAVENOUS; SUBCUTANEOUS at 17:32

## 2025-07-18 RX ADMIN — DULOXETINE 60 MG: 60 CAPSULE, DELAYED RELEASE ORAL at 08:02

## 2025-07-18 RX ADMIN — METHOCARBAMOL 500 MG: 500 TABLET ORAL at 05:09

## 2025-07-18 RX ADMIN — ASPIRIN 81 MG: 81 TABLET, COATED ORAL at 08:02

## 2025-07-18 RX ADMIN — METHOCARBAMOL 500 MG: 500 TABLET ORAL at 21:11

## 2025-07-18 RX ADMIN — MAGNESIUM SULFATE HEPTAHYDRATE 4 G: 40 INJECTION, SOLUTION INTRAVENOUS at 08:15

## 2025-07-18 RX ADMIN — HEPARIN SODIUM 3400 UNITS: 1000 INJECTION, SOLUTION INTRAVENOUS; SUBCUTANEOUS at 01:06

## 2025-07-18 RX ADMIN — HEPARIN SODIUM 17 UNITS/KG/HR: 10000 INJECTION, SOLUTION INTRAVENOUS at 10:49

## 2025-07-18 RX ADMIN — INSULIN LISPRO 8 UNITS: 100 INJECTION, SOLUTION INTRAVENOUS; SUBCUTANEOUS at 07:35

## 2025-07-18 RX ADMIN — METHOCARBAMOL 500 MG: 500 TABLET ORAL at 13:43

## 2025-07-18 RX ADMIN — INSULIN GLARGINE 18 UNITS: 100 INJECTION, SOLUTION SUBCUTANEOUS at 21:10

## 2025-07-18 RX ADMIN — ATORVASTATIN CALCIUM 80 MG: 80 TABLET, FILM COATED ORAL at 17:32

## 2025-07-18 RX ADMIN — POTASSIUM CHLORIDE 20 MEQ: 14.9 INJECTION, SOLUTION INTRAVENOUS at 07:46

## 2025-07-18 RX ADMIN — APIXABAN 10 MG: 5 TABLET, FILM COATED ORAL at 11:58

## 2025-07-18 NOTE — PLAN OF CARE
Problem: PHYSICAL THERAPY ADULT  Goal: Performs mobility at highest level of function for planned discharge setting.  See evaluation for individualized goals.  Description: Treatment/Interventions: Functional transfer training, LE strengthening/ROM, Elevations, Therapeutic exercise, Endurance training, Gait training  Equipment Recommended: Walker       See flowsheet documentation for full assessment, interventions and recommendations.  Outcome: Progressing  Note: Prognosis: Good  Problem List: Decreased strength, Decreased endurance, Impaired balance, Decreased mobility, Pain, Orthopedic restrictions  Assessment: Pt is 50 y.o. female seen for PT evaluation s/p admit to [facility] on 7/17/2025 w/ Renal vein thrombosis (HCC). PT consulted to assess pt's functional mobility and d/c needs. Order placed for PT eval and tx, w/ up with assist order. Pt agreeable to PT  session upon arrival, pt found supine in bed.  PTA, pt was independent w/ all functional mobility w/ rw and lives w/ daughter,  in 2 level home .  Pt to benefit from continued PT tx to address deficits and maximize level of functional independent mobility and consistency. Upon conclusion pt  seated in recliner, with all needs in reach, and chair alarm intact. Complexity: Comorbidities affecting pt's physical performance at time of assessment include:  deep vein thrombosis, phantom limb pain, diarrhea, vomiting, BKA . Personal factors affecting pt at time of IE include: limited mobility, lives in multistory home, ambulating with assistive device, and steps to enter home. Please find objective findings from PT assessment regarding body systems outlined above with impairments and limitations including weakness, impaired balance, decreased endurance, pain, decreased functional mobility tolerance, and fall risk.  Pt's clinical presentation is currently unstable/unpredictable seen in pt's presentation of abnormal WBC count, abnormal potassium  levels, abnormal calcium levels, and pain. The patient's AM-PAC Basic Mobility Inpatient Short Form Raw Score is 23 .  Based on patient presentations and impairments, pt would most appropriately benefit from Level 3 resource intensity upon discharge. A Raw score of greater than 16 suggests the patient may benefit from discharge to home. Please also refer to the recommendation of the Physical Therapist for safe discharge planning. RN verbalized pt appropriate for PT session.        Rehab Resource Intensity Level, PT: III (Minimum Resource Intensity)    See flowsheet documentation for full assessment.

## 2025-07-18 NOTE — PLAN OF CARE
Problem: PAIN - ADULT  Goal: Verbalizes/displays adequate comfort level or baseline comfort level  Description: Interventions:  - Encourage patient to monitor pain and request assistance  - Assess pain using appropriate pain scale  - Administer analgesics as ordered based on type and severity of pain and evaluate response  - Implement non-pharmacological measures as appropriate and evaluate response  - Consider cultural and social influences on pain and pain management  - Notify physician/advanced practitioner if interventions unsuccessful or patient reports new pain  - Educate patient/family on pain management process including their role and importance of  reporting pain   - Provide non-pharmacologic/complimentary pain relief interventions  Outcome: Progressing     Problem: INFECTION - ADULT  Goal: Absence or prevention of progression during hospitalization  Description: INTERVENTIONS:  - Assess and monitor for signs and symptoms of infection  - Monitor lab/diagnostic results  - Monitor all insertion sites, i.e. indwelling lines, tubes, and drains  - Monitor endotracheal if appropriate and nasal secretions for changes in amount and color  - Regent appropriate cooling/warming therapies per order  - Administer medications as ordered  - Instruct and encourage patient and family to use good hand hygiene technique  - Identify and instruct in appropriate isolation precautions for identified infection/condition  Outcome: Progressing  Goal: Absence of fever/infection during neutropenic period  Description: INTERVENTIONS:  - Monitor WBC  - Perform strict hand hygiene  - Limit to healthy visitors only  - No plants, dried, fresh or silk flowers with griffin in patient room  Outcome: Progressing     Problem: SAFETY ADULT  Goal: Patient will remain free of falls  Description: INTERVENTIONS:  - Educate patient/family on patient safety including physical limitations  - Instruct patient to call for assistance with activity   -  Consider consulting OT/PT to assist with strengthening/mobility based on AM PAC & JH-HLM score  - Consult OT/PT to assist with strengthening/mobility   - Keep Call bell within reach  - Keep bed low and locked with side rails adjusted as appropriate  - Keep care items and personal belongings within reach  - Initiate and maintain comfort rounds  - Make Fall Risk Sign visible to staff  - Offer Toileting every 2 Hours, in advance of need  - Initiate/Maintain bed alarm  - Obtain necessary fall risk management equipment: yellow socks  - Apply yellow socks and bracelet for high fall risk patients  - Consider moving patient to room near nurses station  Outcome: Progressing  Goal: Maintain or return to baseline ADL function  Description: INTERVENTIONS:  -  Assess patient's ability to carry out ADLs; assess patient's baseline for ADL function and identify physical deficits which impact ability to perform ADLs (bathing, care of mouth/teeth, toileting, grooming, dressing, etc.)  - Assess/evaluate cause of self-care deficits   - Assess range of motion  - Assess patient's mobility; develop plan if impaired  - Assess patient's need for assistive devices and provide as appropriate  - Encourage maximum independence but intervene and supervise when necessary  - Involve family in performance of ADLs  - Assess for home care needs following discharge   - Consider OT consult to assist with ADL evaluation and planning for discharge  - Provide patient education as appropriate  - Monitor functional capacity and physical performance, use of AM PAC & JH-HLM   - Monitor gait, balance and fatigue with ambulation    Outcome: Progressing  Goal: Maintains/Returns to pre admission functional level  Description: INTERVENTIONS:  - Perform AM-PAC 6 Click Basic Mobility/ Daily Activity assessment daily.  - Set and communicate daily mobility goal to care team and patient/family/caregiver.   - Collaborate with rehabilitation services on mobility goals if  consulted  - Perform Range of Motion 3 times a day.  - Reposition patient every 2 hours.  - Dangle patient 3 times a day  - Stand patient 3 times a day  - Ambulate patient 3 times a day  - Out of bed to chair 3 times a day   - Out of bed for meals 3 times a day  - Out of bed for toileting  - Record patient progress and toleration of activity level   Outcome: Progressing     Problem: DISCHARGE PLANNING  Goal: Discharge to home or other facility with appropriate resources  Description: INTERVENTIONS:  - Identify barriers to discharge w/patient and caregiver  - Arrange for needed discharge resources and transportation as appropriate  - Identify discharge learning needs (meds, wound care, etc.)  - Arrange for interpretive services to assist at discharge as needed  - Refer to Case Management Department for coordinating discharge planning if the patient needs post-hospital services based on physician/advanced practitioner order or complex needs related to functional status, cognitive ability, or social support system  Outcome: Progressing     Problem: Knowledge Deficit  Goal: Patient/family/caregiver demonstrates understanding of disease process, treatment plan, medications, and discharge instructions  Description: Complete learning assessment and assess knowledge base.  Interventions:  - Provide teaching at level of understanding  - Provide teaching via preferred learning methods  Outcome: Progressing

## 2025-07-18 NOTE — ASSESSMENT & PLAN NOTE
Admitted for nausea, vomiting, and diarrhea for 2 days  CTA/P with IV contrast - no acute infectious findings. There is a new 3.3cm left renal vein thrombus - which prompted admission  The diarrhea has resolved on 7/17 night. Stool culture cancelled.   Lab work with Mg 0.6 and K 3 - improving with repletion  If diarrhea and electrolytes are stabilized on 7/19 then we can plan for discharge

## 2025-07-18 NOTE — PHYSICAL THERAPY NOTE
Physical Therapy Cancellation Note           07/18/25 0949   PT Last Visit   PT Visit Date 07/18/25   Note Type   Note type Cancelled Session   Cancel Reasons Medical status   Additional Comments Pt currently has DVT. PT to follow once 24 hours time period on heparin is complete.     Riley Dean PT

## 2025-07-18 NOTE — PHYSICAL THERAPY NOTE
PHYSICAL THERAPY EVALUATION  NAME:  Lizzy Acuna  DATE: 07/18/25    AGE:   50 y.o.  Mrn:   [id number]  ADMIT DX:  Hypokalemia [E87.6]  Vomiting [R11.10]  Tongue mass [K14.8]  UTI (urinary tract infection) [N39.0]  Renal vein thrombosis (HCC) [I82.3]  Nausea & vomiting [R11.2]  Problem List: Problem List[1]    Past Medical History  Past Medical History[2]    Past Surgical History  Past Surgical History[3]    Length Of Stay: 0  Performed at least 2 patient identifiers during session: Name and Epic photo       07/18/25 1302   PT Last Visit   PT Visit Date 07/18/25   Note Type   Note type Evaluation   Pain Assessment   Pain Assessment Tool 0-10   Pain Score No Pain   Restrictions/Precautions   Weight Bearing Precautions Per Order Yes   LLE Weight Bearing Per Order NWB   Other Precautions Chair Alarm;Bed Alarm;Fall Risk;Pain   Home Living   Type of Home House   Home Layout Two level;Performs ADLs on one level;Stairs to enter with rails   Bathroom Shower/Tub Walk-in shower   Bathroom Toilet Standard   Bathroom Equipment Shower chair   Bathroom Accessibility Accessible   Home Equipment Walker   Prior Function   Level of [place] Independent with ADLs;Independent with functional mobility;Independent with IADLS   Lives With Family  (mom. daughter, and )   Receives Help From Family   IADLs Independent with meal prep;Independent with medication management;Independent with driving   Falls in the last 6 months 1 to 4   Vocational On disability   Cognition   Overall Cognitive Status WFL   Arousal/Participation Alert   Orientation Level Oriented X4   Memory Within functional limits   Following Commands Follows all commands and directions without difficulty   RLE Assessment   RLE Assessment WFL   LLE Assessment   LLE Assessment   (BKA)   Vision-Basic Assessment   Current Vision No visual deficits   Coordination   Sensation WFL   Bed Mobility   Supine to Sit 6  Modified independent   Sit to Supine   (dnt, pt in  recliner to end session)   Transfers   Sit to Stand 5  Supervision   Additional items Assist x 1;Increased time required;Armrests;Bedrails   Stand to Sit 5  Supervision   Additional items Assist x 1;Increased time required;Bedrails   Stand pivot   (cga)   Additional items Assist x 1;Bedrails;Armrests;Increased time required   Additional Comments rw used, verbal cues for hand placement   Ambulation/Elevation   Ambulation/Elevation Additional Comments DNT at this time, pt is three weeks post prosthetic fitting and has been ambulating with said prosthetic. Pt does not have it at his time, pt able to hop pivot to chair   Balance   Static Sitting Good   Dynamic Sitting Good   Static Standing Good   Dynamic Standing Fair +   Ambulatory Fair +   Activity Tolerance   Activity Tolerance Patient limited by fatigue   Medical Staff Made Aware cm made aware   Nurse Made Aware RN beck cleared pt for eval   Assessment   Prognosis Good   Problem List Decreased strength;Decreased endurance;Impaired balance;Decreased mobility;Pain;Orthopedic restrictions   Assessment Pt is 50 y.o. female seen for PT evaluation s/p admit to [facility] on 7/17/2025 w/ Renal vein thrombosis (HCC). PT consulted to assess pt's functional mobility and d/c needs. Order placed for PT eval and tx, w/ up with assist order. Pt agreeable to PT  session upon arrival, pt found supine in bed.  PTA, pt was independent w/ all functional mobility w/ rw and lives w/ daughter,  in 2 level home .  Pt to benefit from continued PT tx to address deficits and maximize level of functional independent mobility and consistency. Upon conclusion pt  seated in recliner, with all needs in reach, and chair alarm intact. Complexity: Comorbidities affecting pt's physical performance at time of assessment include:  deep vein thrombosis, phantom limb pain, diarrhea, vomiting, BKA . Personal factors affecting pt at time of IE include: limited mobility, lives in multistory  home, ambulating with assistive device, and steps to enter home. Please find objective findings from PT assessment regarding body systems outlined above with impairments and limitations including weakness, impaired balance, decreased endurance, pain, decreased functional mobility tolerance, and fall risk.  Pt's clinical presentation is currently unstable/unpredictable seen in pt's presentation of abnormal WBC count, abnormal potassium levels, abnormal calcium levels, and pain. The patient's [facility] Basic Mobility Inpatient Short Form Raw Score is 23 .  Based on patient presentations and impairments, pt would most appropriately benefit from Level 3 resource intensity upon discharge. A Raw score of greater than 16 suggests the patient may benefit from discharge to home. Please also refer to the recommendation of the Physical Therapist for safe discharge planning. RN verbalized pt appropriate for PT session.   Goals   Patient Goals to go to therapy   LTG Expiration Date 07/28/25   Long Term Goal #1 Pt will: Perform bed mobility tasks to modified I to increase Indep in home environment and improve ease of bed mobility. Perform transfers to modified I to increase Indep in home environment, decrease risk for falls, and improve ease of transfers. Perform ambulation with rw for 75 ft to  increase Indep in home environment, decrease risk for falls, improve activity tolerance, and improve gait quality. Increase dynamic standing balance to F+ to decrease fall risk.   Increase OOB activity tolerance to 10 minutes without s/s of exertion to decrease fall risk.   PT Treatment Day 0   Plan   Treatment/Interventions Functional transfer training;LE strengthening/ROM;Elevations;Therapeutic exercise;Endurance training;Gait training   PT Frequency 3-5x/wk   Discharge Recommendation   Rehab Resource Intensity Level, PT III (Minimum Resource Intensity)   Equipment Recommended Walker   [facility] Basic Mobility Inpatient   Turning in Flat Bed  "Without Bedrails 4   Lying on Back to Sitting on Edge of Flat Bed Without Bedrails 4   Moving Bed to Chair 4   Standing Up From Chair Using Arms 4   Walk in Room 4   Climb 3-5 Stairs With Railing 3   Basic Mobility Inpatient Raw Score 23   Basic Mobility Standardized Score 50.88   [facility] Highest Level Of Mobility   -HLM Goal 7: Walk 25 feet or more   JH-HLM Achieved 7: Walk 25 feet or more       Time In: 1255  Time Out: 1314  Total Evaluation Minutes: 19    Riley Dean PT         [1]   Patient Active Problem List  Diagnosis    Obesity (BMI 30.0-34.9)    Chronic low back pain    Chronic sciatica    Chronic, continuous use of opioids    Diabetes mellitus type 2 with complications (HCC)    GERD (gastroesophageal reflux disease)    Proteinuria    PAD (peripheral artery disease) (HCC)    Obesity (BMI 30-39.9)    Primary hypertension    Thyroid nodule    Adrenal nodule (HCC)    Status post below-knee amputation of left lower extremity (HCC)    At risk for venous thromboembolism (VTE)    Phantom limb syndrome (HCC)    Constipation    Adjustment disorder with anxiety    Renal vein thrombosis (HCC)    Abdominal pain, vomiting, and diarrhea   [2]   Past Medical History:  Diagnosis Date    Advanced maternal age in multigravida, second trimester 11/30/2016    Transitioned From: Advanced maternal age in multigravida, first trimester      Back pain     used 2 percocet tid until current admission in 2/2017    Bone spur     in back per pt    Cellulitis of left foot complicated by Critical limb threatening ischemia of the left lower extremity with a nonhealing transmetatarsal amputation wound 12/13/2024    Chronic hypertension with superimposed preeclampsia 02/14/2017    Depression     Diabetes mellitus (HCC)     Diabetic foot infection  (HCC) 01/28/2025    Elevated BP without diagnosis of hypertension     \"with pain\"    Foot injury     Full dentures     does not wear    Gangrene of left foot ([facility]) 12/13/2024    GERD " (gastroesophageal reflux disease)     occas    Gestational diabetes     insulin dependent    Herniated cervical disc     Herniated lumbar intervertebral disc     History of pneumonia     Hx of degenerative disc disease     Hyperlipidemia     Obesity     Pre-eclampsia     Prior pregnancy with fetal demise     previous demise at 36 weeks    Toe pain     and foot pain   [3]   Past Surgical History:  Procedure Laterality Date    ARTERIOGRAM Left 2025    Procedure: LEFT lower extremity arteriogram w/ popiteal and anterior tibial artery angioplasty;  Surgeon: Ottoniel Victoria MD;  [facility];  Service: Vascular    BACK SURGERY       SECTION      INCISION AND DRAINAGE OF WOUND Left 2025    Procedure: INCISION AND DRAINAGE (I&D) EXTREMITY;  Surgeon: Leopoldo Ritchie DPM;  Location: [facility];  Service: Podiatry    IR LOWER EXTREMITY ANGIOGRAM  2024    IR LOWER EXTREMITY ANGIOGRAM  2025    IR LOWER EXTREMITY ANGIOGRAM  2025    IR PICC PLACEMENT DOUBLE LUMEN  2025    IR TPA LYSIS CHECK  2025    IR TPA LYSIS CHECK  2025    LAMINECTOMY      with disc removal, last assessed 16    LEG AMPUTATION THROUGH LOWER TIBIA AND FIBULA Left 2025    Procedure: Left BKA;  Surgeon: Shane Arauz DO;  [facility]: [facility];  Service: Vascular    OTHER SURGICAL HISTORY      Right ovary removal 2005 per pt recall at [facility]    PERIPHERAL ANGIOGRAM      VA AMPUTATION FOOT TRANSMETARSAL Left 2025    Procedure: AMPUTATION TRANSMETATARSAL (TMA);  Surgeon: Leopoldo Ritchie DPM;  Location: [facility];  Service: Podiatry    VA  DELIVERY ONLY N/A 02/15/2017    Procedure:  SECTION ();  Surgeon: Tito Umaña MD;  Location: [facility];  Service: Obstetrics    VA LIG/TRNSXJ FALOPIAN TUBE  DEL/ABDML SURG Left 02/15/2017    Procedure: LIGATION/COAGULATION TUBAL;  Surgeon: Tito Umaña MD;   Location: [facility];  Service: Obstetrics    VASCULAR SURGERY  12/2024    WISDOM TOOTH EXTRACTION

## 2025-07-18 NOTE — ASSESSMENT & PLAN NOTE
CT scan: new left renal vein thrombus  Received heparin drip on admission  Discussed with hematology attending on Epic chat - OK to transition to therapeutic Eliquis   Eliquis 10mg bid for 7 days then 5mg bid  Case management to assist with cost check  F/u hematology outpatient

## 2025-07-18 NOTE — ASSESSMENT & PLAN NOTE
UA protein 3+, urine protein 191, protein/creatinin ratio 1.8  Reviewed with nephrology on Epic chat - not consistent with nephrotic syndrome. Her incidental finding of renal vein thrombosis is NOT caused by the proteinuria.   Ambulatory f/u with nephrology

## 2025-07-18 NOTE — PLAN OF CARE
Problem: PAIN - ADULT  Goal: Verbalizes/displays adequate comfort level or baseline comfort level  Description: Interventions:  - Encourage patient to monitor pain and request assistance  - Assess pain using appropriate pain scale  - Administer analgesics as ordered based on type and severity of pain and evaluate response  - Implement non-pharmacological measures as appropriate and evaluate response  - Consider cultural and social influences on pain and pain management  - Notify physician/advanced practitioner if interventions unsuccessful or patient reports new pain  - Educate patient/family on pain management process including their role and importance of  reporting pain   - Provide non-pharmacologic/complimentary pain relief interventions  Outcome: Progressing     Problem: INFECTION - ADULT  Goal: Absence or prevention of progression during hospitalization  Description: INTERVENTIONS:  - Assess and monitor for signs and symptoms of infection  - Monitor lab/diagnostic results  - Monitor all insertion sites, i.e. indwelling lines, tubes, and drains  - Monitor endotracheal if appropriate and nasal secretions for changes in amount and color  - [place] appropriate cooling/warming therapies per order  - Administer medications as ordered  - Instruct and encourage patient and family to use good hand hygiene technique  - Identify and instruct in appropriate isolation precautions for identified infection/condition  Outcome: Progressing  Goal: Absence of fever/infection during neutropenic period  Description: INTERVENTIONS:  - Monitor WBC  - Perform strict hand hygiene  - Limit to healthy visitors only  - No plants, dried, fresh or silk flowers with griffin in patient room  Outcome: Progressing     Problem: SAFETY ADULT  Goal: Patient will remain free of falls  Description: INTERVENTIONS:  - Educate patient/family on patient safety including physical limitations  - Instruct patient to call for assistance with activity   -  Consider consulting OT/PT to assist with strengthening/mobility based on AM PAC & JH-HLM score  - Consult OT/PT to assist with strengthening/mobility   - Keep Call bell within reach  - Keep bed low and locked with side rails adjusted as appropriate  - Keep care items and personal belongings within reach  - Initiate and maintain comfort rounds  - Make Fall Risk Sign visible to staff  - Offer Toileting every 2 Hours, in advance of need  - Initiate/Maintain bed alarm  - Obtain necessary fall risk management equipment: yellow socks  - Apply yellow socks and bracelet for high fall risk patients  - Consider moving patient to room near nurses station  Outcome: Progressing  Goal: Maintain or return to baseline ADL function  Description: INTERVENTIONS:  -  Assess patient's ability to carry out ADLs; assess patient's baseline for ADL function and identify physical deficits which impact ability to perform ADLs (bathing, care of mouth/teeth, toileting, grooming, dressing, etc.)  - Assess/evaluate cause of self-care deficits   - Assess range of motion  - Assess patient's mobility; develop plan if impaired  - Assess patient's need for assistive devices and provide as appropriate  - Encourage maximum independence but intervene and supervise when necessary  - Involve family in performance of ADLs  - Assess for home care needs following discharge   - Consider OT consult to assist with ADL evaluation and planning for discharge  - Provide patient education as appropriate  - Monitor functional capacity and physical performance, use of AM PAC & JH-HLM   - Monitor gait, balance and fatigue with ambulation    Outcome: Progressing  Goal: Maintains/Returns to pre admission functional level  Description: INTERVENTIONS:  - Perform AM-PAC 6 Click Basic Mobility/ Daily Activity assessment daily.  - Set and communicate daily mobility goal to care team and patient/family/caregiver.   - Collaborate with rehabilitation services on mobility goals if  consulted  - Perform Range of Motion 3 times a day.  - Reposition patient every 2 hours.  - Dangle patient 3 times a day  - Stand patient 3 times a day  - Ambulate patient 3 times a day  - Out of bed to chair 3 times a day   - Out of bed for meals 3 times a day  - Out of bed for toileting  - Record patient progress and toleration of activity level   Outcome: Progressing     Problem: DISCHARGE PLANNING  Goal: Discharge to home or other facility with appropriate resources  Description: INTERVENTIONS:  - Identify barriers to discharge w/patient and caregiver  - Arrange for needed discharge resources and transportation as appropriate  - Identify discharge learning needs (meds, wound care, etc.)  - Arrange for interpretive services to assist at discharge as needed  - Refer to Case Management Department for coordinating discharge planning if the patient needs post-hospital services based on physician/advanced practitioner order or complex needs related to functional status, cognitive ability, or social support system  Outcome: Progressing     Problem: Knowledge Deficit  Goal: Patient/family/caregiver demonstrates understanding of disease process, treatment plan, medications, and discharge instructions  Description: Complete learning assessment and assess knowledge base.  Interventions:  - Provide teaching at level of understanding  - Provide teaching via preferred learning methods  Outcome: Progressing

## 2025-07-18 NOTE — ASSESSMENT & PLAN NOTE
C/w gabapentin 800mg TID, duloxetine  C/w home lidocaine patch PRN  Pain is well controlled. DC robaxin  PT outpatient

## 2025-07-18 NOTE — ASSESSMENT & PLAN NOTE
A1c 8.4%  home insulin to be lantus 18 units daily HS, insulin 8 units TID with meals  While admitted, check glucose TID and use ISS

## 2025-07-18 NOTE — PROGRESS NOTES
Progress Note - Hospitalist   Name: Lizzy Acuna 50 y.o. female I MRN: [id number]  Unit/Bed#: -01 I Date of Admission: 7/17/2025   Date of Service: 7/18/2025 I Hospital Day: 0    Assessment & Plan  Electrolyte abnormality  Admitted for nausea, vomiting, and diarrhea for 2 days  CTA/P with IV contrast - no acute infectious findings. There is a new 3.3cm left renal vein thrombus - which prompted admission  The diarrhea has resolved on 7/17 night. Stool culture cancelled.   Lab work with Mg 0.6 and K 3 - improving with repletion  If diarrhea and electrolytes are stabilized on 7/19 then we can plan for discharge  Renal vein thrombosis (HCC)  CT scan: new left renal vein thrombus  Received heparin drip on admission  Discussed with hematology attending on AdExtent chat - OK to transition to therapeutic Eliquis   Eliquis 10mg bid for 7 days then 5mg bid  Case management to assist with cost check  F/u hematology outpatient  Diabetes mellitus type 2 with complications ([facility])  A1c 8.4%  home insulin to be lantus 18 units daily HS, insulin 8 units TID with meals  While admitted, check glucose TID and use ISS  Proteinuria  UA protein 3+, urine protein 191, protein/creatinin ratio 1.8  Reviewed with nephrology on AdExtent chat - not consistent with nephrotic syndrome. Her incidental finding of renal vein thrombosis is NOT caused by the proteinuria.   Ambulatory f/u with nephrology  Status post below-knee amputation of left lower extremity (HCC)  Phantom limb syndrome (HCC)  C/w gabapentin 800mg TID, duloxetine  C/w home lidocaine patch PRN  Pain is well controlled. DC robaxin  PT outpatient  Adjustment disorder with anxiety  C/w home trazodone    VTE Pharmacologic Prophylaxis:   eliquis    Mobility:   Basic Mobility Inpatient Raw Score: 23  JH-HLM Goal: 7: Walk 25 feet or more  JH-HLM Achieved: 7: Walk 25 feet or more  JH-HLM Goal achieved. Continue to encourage appropriate mobility.    Patient Centered Rounds: I performed  bedside rounds with nursing staff today.   Discussions with Specialists or Other Care Team Provider: hematology, nephrology    Education and Discussions with Family / Patient: Patient declined call to .     Current Length of Stay: 0 day(s)  Current Patient Status: Observation   Certification Statement: The patient will continue to require additional inpatient hospital stay due to electrolyte imbalance   Discharge Plan: Anticipate discharge in 24-48 hrs to home.    Code Status: Level 1 - Full Code    Subjective   Patient denies any more diarrhea since admission. No nausea or vomiting. No abd pain. No complaint this morning.     Objective :  Temp:  [97.6 °F (36.4 °C)-98.5 °F (36.9 °C)] 97.9 °F (36.6 °C)  HR:  [83-93] 83  BP: (104-172)/(51-81) 107/65  Resp:  [16-18] 16  SpO2:  [96 %-100 %] 99 %  O2 Device: None (Room air)    Body mass index is 35.28 kg/m².     Input and Output Summary (last 24 hours):     Intake/Output Summary (Last 24 hours) at 7/18/2025 1356  Last data filed at 7/18/2025 1052  Gross per 24 hour   Intake 1005 ml   Output --   Net 1005 ml       Physical Exam  Vitals and nursing note reviewed.   Constitutional:       General: She is not in acute distress.     Appearance: She is well-developed.   HENT:      Head: Normocephalic and atraumatic.      Mouth/Throat:      Comments: There is a single tongue nodule (there for 1 week) with smooth border, containing small blood vessels    Eyes:      Conjunctiva/sclera: Conjunctivae normal.       Cardiovascular:      Rate and Rhythm: Normal rate and regular rhythm.      Heart sounds: No murmur heard.  Pulmonary:      Effort: Pulmonary effort is normal. No respiratory distress.      Breath sounds: Normal breath sounds.   Abdominal:      General: There is no distension.      Palpations: Abdomen is soft. There is no mass.      Tenderness: There is no abdominal tenderness.      Hernia: No hernia is present.     Musculoskeletal:         General: No  swelling.      Cervical back: Neck supple.      Right lower leg: No edema.      Comments: Left BKA     Skin:     General: Skin is warm and dry.      Capillary Refill: Capillary refill takes less than 2 seconds.     Neurological:      Mental Status: She is alert.     Psychiatric:         Mood and Affect: Mood normal.           Lines/Drains:        Telemetry:  Telemetry Orders (From admission, onward)               24 Hour Telemetry Monitoring  Continuous x 24 Hours (Telem)        Expiring   Question:  Reason for 24 Hour Telemetry  Answer:  Arrhythmias requiring acute medical intervention / PPM or ICD malfunction                                  Lab Results: I have reviewed the following results:   Results from last 7 days   Lab Units 07/18/25  0444   WBC Thousand/uL 11.86*   HEMOGLOBIN g/dL 12.1   HEMATOCRIT % 38.0   PLATELETS Thousands/uL 222   SEGS PCT % 50   LYMPHO PCT % 44   MONO PCT % 5   EOS PCT % 1     Results from last 7 days   Lab Units 07/18/25  1245 07/18/25  0539   SODIUM mmol/L 139 141   POTASSIUM mmol/L 3.1* 3.0*   CHLORIDE mmol/L 103 104   CO2 mmol/L 26 25   BUN mg/dL 18 21   CREATININE mg/dL 0.76 0.83   ANION GAP mmol/L 10 12   CALCIUM mg/dL 7.3* 7.6*   ALBUMIN g/dL  --  3.9   TOTAL BILIRUBIN mg/dL  --  0.44   ALK PHOS U/L  --  69   ALT U/L  --  15   AST U/L  --  17   GLUCOSE RANDOM mg/dL 133 91     Results from last 7 days   Lab Units 07/18/25  0539   INR  0.95     Results from last 7 days   Lab Units 07/18/25  1107 07/18/25  0718 07/17/25  2136 07/17/25  1648 07/17/25  1124   POC GLUCOSE mg/dl 106 104 116 139 168*               Recent Cultures (last 7 days):               Last 24 Hours Medication List:     Current Facility-Administered Medications:     acetaminophen (TYLENOL) tablet 650 mg, Q6H PRN    apixaban (ELIQUIS) tablet 10 mg, BID    [START ON 7/25/2025] apixaban (ELIQUIS) tablet 5 mg, BID    aspirin (ECOTRIN LOW STRENGTH) EC tablet 81 mg, Daily    atorvastatin (LIPITOR) tablet 80 mg, QPM     chlorthalidone tablet 12.5 mg, Daily    DULoxetine (CYMBALTA) delayed release capsule 60 mg, Daily    gabapentin (NEURONTIN) capsule 800 mg, TID    insulin glargine (LANTUS) subcutaneous injection 18 Units 0.18 mL, HS    insulin lispro (HumALOG/ADMELOG) 100 units/mL subcutaneous injection 2-12 Units, TID AC **AND** Fingerstick Glucose (POCT), TID AC    insulin lispro (HumALOG/ADMELOG) 100 units/mL subcutaneous injection 8 Units, TID With Meals    lidocaine (LIDODERM) 5 % patch 1 patch, Daily PRN    [COMPLETED] magnesium sulfate 4 g/100 mL IVPB (premix) 4 g, Once, Last Rate: 4 g (07/18/25 0815) **AND** magnesium sulfate 2 g/50 mL IVPB (premix) 2 g, Once, Last Rate: 2 g (07/18/25 1343)    magnesium sulfate 2 g/50 mL IVPB (premix) 2 g, Once    methocarbamol (ROBAXIN) tablet 500 mg, Q8H JORGE    metoclopramide (REGLAN) injection 10 mg, Q6H PRN    ondansetron (ZOFRAN) injection 4 mg, Q6H PRN    pantoprazole (PROTONIX) EC tablet 40 mg, Early Morning    potassium chloride (Klor-Con M20) CR tablet 40 mEq, Q4H    traZODone (DESYREL) tablet 100 mg, HS    Administrative Statements   Today, Patient Was Seen By: Amber Tellez MD      **Please Note: This note may have been constructed using a voice recognition system.**

## 2025-07-18 NOTE — UTILIZATION REVIEW
Initial Clinical Review    OBSERVATION  7/17 CHANGED TO IP ADMISSION 7/18 @  1411   The patient will continue to require additional inpatient hospital stay due to electrolyte imbalance     Admission: Date/Time/Statement:   Admission Orders (From admission, onward)       Ordered        07/17/25 1614  Place in Observation  Once                          07/18/25 1411  INPATIENT ADMISSION  Once        Transfer Service: Hospitalist   Question Answer Comment   Level of Care Med Surg    Estimated length of stay More than 2 Midnights    Certification I certify that inpatient services are medically necessary for this patient for a duration of greater than two midnights. See H&P and MD Progress Notes for additional information about the patient's course of treatment.        07/18/25 1410       ED Arrival Information       Expected   -    Arrival   7/17/2025 11:12    Acuity   Urgent              Means of arrival   Ambulance    Escorted by   [facility]    Service   Hospitalist    Admission type   Emergency              Arrival complaint   NVD             Chief Complaint   Patient presents with    Vomiting     Pt reports vomiting and diarrhea x1 day       Initial Presentation: 50 y.o. female presents to ED via  EMS from home with abdominal pain, nausea and vomiting for 2 days.  Vomited > 10 times the day of admission, green/yellow liquid. Diarrhea watery.  No new food or meds.  No sick contacts.  PMH is  DM2 and  L  BKA.   Ct scan shows new left renal thrombus.  Admit  Observation with  Abdominal pain, Nausea, Vomiting, Renal Vein thrombosis  and plan is  IV  heparin,  cl liq diet, IVF, stool studies, hold  antibiotics,   PT/OT and continue home meds.     IR  consult  Hold  on  thrombectomy for now.  Continue  with IV  heparin.    7/18  IP ADMISSION  IV  heparin  d/c  and transitioned to Eliquis.Pain currently controlled.   Monitor labs.        ED Treatment-Medication Administration from 07/17/2025 1112 to  07/17/2025 1907         Date/Time Order Dose Route Action     07/17/2025 1211 sodium chloride 0.9 % bolus 1,000 mL 1,000 mL Intravenous New Bag     07/17/2025 1220 ondansetron (ZOFRAN) injection 4 mg 4 mg Intravenous Given     07/17/2025 1248 iohexol (OMNIPAQUE) 350 MG/ML injection (MULTI-DOSE) 100 mL 100 mL Intravenous Given     07/17/2025 1326 ondansetron (ZOFRAN) injection 4 mg 4 mg Intravenous Given     07/17/2025 1654 potassium chloride 20 mEq IVPB (premix) 20 mEq Intravenous New Bag     07/17/2025 1614 ceftriaxone (ROCEPHIN) 1 g/50 mL in dextrose IVPB 1,000 mg Intravenous New Bag     07/17/2025 1657 potassium chloride (Klor-Con M20) CR tablet 40 mEq 40 mEq Oral Given     07/17/2025 1655 heparin (porcine) injection 6,800 Units 6,800 Units Intravenous Given     07/17/2025 1700 heparin (porcine) 25,000 units in 0.45% NaCl 250 mL infusion (premix) 18 Units/kg/hr Intravenous New Bag     07/17/2025 1658 sodium chloride 0.9 % infusion 150 mL/hr Intravenous New Bag     07/17/2025 1826 atorvastatin (LIPITOR) tablet 80 mg 80 mg Oral Given     07/17/2025 1649 gabapentin (NEURONTIN) capsule 800 mg 800 mg Oral Given     07/17/2025 1651 methocarbamol (ROBAXIN) tablet 500 mg 500 mg Oral Given            Scheduled Medications:  aspirin, 81 mg, Oral, Daily  atorvastatin, 80 mg, Oral, QPM  chlorthalidone, 12.5 mg, Oral, Daily  DULoxetine, 60 mg, Oral, Daily  gabapentin, 800 mg, Oral, TID  insulin glargine, 18 Units, Subcutaneous, HS  insulin lispro, 2-12 Units, Subcutaneous, TID AC  insulin lispro, 8 Units, Subcutaneous, TID With Meals  magnesium sulfate, 4 g, Intravenous, Once   And  magnesium sulfate, 2 g, Intravenous, Once  methocarbamol, 500 mg, Oral, Q8H JORGE  pantoprazole, 40 mg, Oral, Early Morning  traZODone, 100 mg, Oral, HS      Continuous IV Infusions:  heparin (porcine), 3-30 Units/kg/hr (Order-Specific), Intravenous, Titrated D/C  7/18  sodium chloride, 150 mL/hr, Intravenous, Continuous  D/C  7/18      PRN  Meds:  acetaminophen, 650 mg, Oral, Q6H PRN  heparin (porcine), 3,400 Units, Intravenous, Q6H PRN  heparin (porcine), 6,800 Units, Intravenous, Q6H PRN  lidocaine, 1 patch, Topical, Daily PRN  metoclopramide, 10 mg, Intravenous, Q6H PRN  ondansetron, 4 mg, Intravenous, Q6H PRN      ED Triage Vitals [07/17/25 1116]   Temperature Pulse Respirations Blood Pressure SpO2 Pain Score   (!) 97.4 °F (36.3 °C) 99 18 (!) 150/122 96 % 7     Weight (last 2 days)       Date/Time Weight    07/17/25 1908 84.7 (186.73)            Vital Signs (last 3 days)       Date/Time Temp Pulse Resp BP MAP (mmHg) SpO2 O2 Device Patient Position - Orthostatic VS [place] Coma Scale Score Pain    07/18/25 07:19:49 98.1 °F (36.7 °C) 83 16 104/64 77 97 % None (Room air) Sitting -- --    07/17/25 22:44:05 98.5 °F (36.9 °C) 89 18 111/71 84 96 % -- -- -- --    07/17/25 1930 -- -- -- -- -- 100 % None (Room air) -- 15 --    07/17/25 1914 -- -- -- -- -- -- -- -- -- 6    07/17/25 19:08:26 98.3 °F (36.8 °C) 93 17 149/51 84 99 % -- -- -- --    07/17/25 1908 -- -- -- -- -- 99 % None (Room air) -- -- --    07/17/25 1800 -- 93 -- 158/72 104 100 % -- -- -- --    07/17/25 1500 97.6 °F (36.4 °C) 86 -- 155/68 98 99 % -- -- -- --    07/17/25 1400 -- 88 17 172/81 116 98 % -- -- -- --    07/17/25 1300 -- 89 18 152/72 103 98 % -- -- -- --    07/17/25 1212 -- 60 19 136/69 -- 99 % -- Lying -- --    07/17/25 1131 -- -- -- -- -- -- -- -- 15 --    07/17/25 1118 -- -- -- -- -- 100 % -- -- -- --    07/17/25 1116 97.4 °F (36.3 °C) 99 18 150/122 -- 96 % None (Room air) Lying -- 7              Pertinent Labs/Diagnostic Test Results:   Radiology:  CT abdomen pelvis with contrast   Final Interpretation by Chidi Oshea MD (07/17 2672)      1. New short segment (3.3 cm) left renal vein thrombus. Otherwise, no acute findings in the abdomen or pelvis.      2. Stable 2.0 cm right adrenal nodule, previously characterized as benign adenoma. Please note that for adrenal nodule > 1 cm,   biochemical evaluation is suggested to rule out functioning adenoma, if not already performed.      Resident: PAYAL BETANCOURT I, the attending radiologist, have reviewed the images and agree with the final report above.      Workstation performed: [id number]           Cardiology:    GI:          Results from last 7 days   Lab Units 07/18/25  0444 07/17/25  1209   WBC Thousand/uL 11.86* 10.20*   HEMOGLOBIN g/dL 12.1 12.6   HEMATOCRIT % 38.0 38.7   PLATELETS Thousands/uL 222 246   TOTAL NEUT ABS Thousands/µL 5.87 6.86         Results from last 7 days   Lab Units 07/18/25  0539 07/17/25  1209   SODIUM mmol/L 141 138   POTASSIUM mmol/L 3.0* 3.0*   CHLORIDE mmol/L 104 96   CO2 mmol/L 25 28   ANION GAP mmol/L 12 14*   BUN mg/dL 21 25   CREATININE mg/dL 0.83 0.89   EGFR ml/min/1.73sq m 83 76   CALCIUM mg/dL 7.6* 9.0   MAGNESIUM mg/dL 0.6*  --      Results from last 7 days   Lab Units 07/18/25  0539 07/17/25  1209   AST U/L 17 15   ALT U/L 15 17   ALK PHOS U/L 69 72   TOTAL PROTEIN g/dL 7.4 8.1   ALBUMIN g/dL 3.9 4.3   TOTAL BILIRUBIN mg/dL 0.44 0.62     Results from last 7 days   Lab Units 07/18/25  0718 07/17/25  2136 07/17/25  1648 07/17/25  1124   POC GLUCOSE mg/dl 104 116 139 168*     Results from last 7 days   Lab Units 07/18/25  0539 07/17/25  1209   GLUCOSE RANDOM mg/dL 91 178*               Results from last 7 days   Lab Units 07/18/25  0539 07/17/25  2209   PROTIME seconds 13.2 13.0   INR  0.95 0.94   PTT seconds 168* 58*           Results from last 7 days   Lab Units 07/17/25  1209   LIPASE u/L 32                 Results from last 7 days   Lab Units 07/17/25  2350 07/17/25  1344   CLARITY UA   --  Hazy   COLOR UA   --  Yellow   SPEC GRAV UA   --  1.020   PH UA   --  6.0   GLUCOSE UA mg/dl  --  Negative   KETONES UA mg/dl  --  Negative   BLOOD UA   --  Negative   PROTEIN UA mg/dl  --  3+*   NITRITE UA   --  Negative   BILIRUBIN UA   --  Negative   UROBILINOGEN UA E.U./dl  --  0.2   LEUKOCYTES UA   --   Trace*   WBC UA /hpf  --  20-30*   RBC UA /hpf  --  2-4   BACTERIA UA /hpf  --  Occasional   EPITHELIAL CELLS WET PREP /hpf  --  Occasional   CREATININE UR mg/dL 108.9  --    PROTEIN UR mg/dL 191.9  191.9  --    PROT/CREAT RATIO UR  1.8*  --                Admitting Diagnosis: Hypokalemia [E87.6]  Vomiting [R11.10]  Tongue mass [K14.8]  UTI (urinary tract infection) [N39.0]  Renal vein thrombosis (HCC) [I82.3]  Nausea & vomiting [R11.2]  Age/Sex: 50 y.o. female    Network Utilization Review Department  ATTENTION: Please call with any questions or concerns to [phone] and carefully listen to the prompts so that you are directed to the right person. All voicemails are confidential.   For Discharge needs, contact Care Management DC Support Team at [phone] opt. 2  Send all requests for admission clinical reviews, approved or denied determinations and any other requests to dedicated fax number below belonging to the [facility] where the patient is receiving treatment. List of dedicated fax numbers for the Facilities:  FACILITY NAME UR FAX NUMBER   ADMISSION DENIALS (Administrative/Medical Necessity) [phone]   DISCHARGE SUPPORT TEAM (NETWORK) [phone]   PARENT CHILD HEALTH (Maternity/NICU/Pediatrics) [phone]   [facility] [phone]   [facility] [phone]   [facility] [phone]   [facility] [phone]   [facility] [phone]   [facility] [phone]   [facility] [phone]   [facility] [phone]   [facility] [phone]   formerly [facility] [phone]   [facility] [phone]   [facility] [phone]

## 2025-07-19 VITALS
OXYGEN SATURATION: 99 % | BODY MASS INDEX: 35.25 KG/M2 | HEART RATE: 92 BPM | DIASTOLIC BLOOD PRESSURE: 79 MMHG | WEIGHT: 186.73 LBS | HEIGHT: 61 IN | RESPIRATION RATE: 18 BRPM | SYSTOLIC BLOOD PRESSURE: 118 MMHG | TEMPERATURE: 98.2 F

## 2025-07-19 LAB
ALBUMIN SERPL BCG-MCNC: 3.8 G/DL (ref 3.5–5)
ALP SERPL-CCNC: 68 U/L (ref 34–104)
ALT SERPL W P-5'-P-CCNC: 16 U/L (ref 7–52)
ANION GAP SERPL CALCULATED.3IONS-SCNC: 7 MMOL/L (ref 4–13)
AST SERPL W P-5'-P-CCNC: 18 U/L (ref 13–39)
BASOPHILS # BLD AUTO: 0.02 THOUSANDS/ÂΜL (ref 0–0.1)
BASOPHILS NFR BLD AUTO: 0 % (ref 0–1)
BILIRUB SERPL-MCNC: 0.38 MG/DL (ref 0.2–1)
BUN SERPL-MCNC: 14 MG/DL (ref 5–25)
CALCIUM SERPL-MCNC: 8.2 MG/DL (ref 8.4–10.2)
CHLORIDE SERPL-SCNC: 108 MMOL/L (ref 96–108)
CO2 SERPL-SCNC: 23 MMOL/L (ref 21–32)
CREAT SERPL-MCNC: 0.71 MG/DL (ref 0.6–1.3)
EOSINOPHIL # BLD AUTO: 0.08 THOUSAND/ÂΜL (ref 0–0.61)
EOSINOPHIL NFR BLD AUTO: 1 % (ref 0–6)
ERYTHROCYTE [DISTWIDTH] IN BLOOD BY AUTOMATED COUNT: 14 % (ref 11.6–15.1)
GFR SERPL CREATININE-BSD FRML MDRD: 100 ML/MIN/1.73SQ M
GLUCOSE SERPL-MCNC: 117 MG/DL (ref 65–140)
GLUCOSE SERPL-MCNC: 129 MG/DL (ref 65–140)
GLUCOSE SERPL-MCNC: 184 MG/DL (ref 65–140)
HCT VFR BLD AUTO: 33.3 % (ref 34.8–46.1)
HGB BLD-MCNC: 10.3 G/DL (ref 11.5–15.4)
IMM GRANULOCYTES # BLD AUTO: 0.03 THOUSAND/UL (ref 0–0.2)
IMM GRANULOCYTES NFR BLD AUTO: 0 % (ref 0–2)
LYMPHOCYTES # BLD AUTO: 2.41 THOUSANDS/ÂΜL (ref 0.6–4.47)
LYMPHOCYTES NFR BLD AUTO: 29 % (ref 14–44)
MAGNESIUM SERPL-MCNC: 2.6 MG/DL (ref 1.9–2.7)
MCH RBC QN AUTO: 27.8 PG (ref 26.8–34.3)
MCHC RBC AUTO-ENTMCNC: 30.9 G/DL (ref 31.4–37.4)
MCV RBC AUTO: 90 FL (ref 82–98)
MONOCYTES # BLD AUTO: 0.41 THOUSAND/ÂΜL (ref 0.17–1.22)
MONOCYTES NFR BLD AUTO: 5 % (ref 4–12)
MRSA NOSE QL CULT: NORMAL
NEUTROPHILS # BLD AUTO: 5.4 THOUSANDS/ÂΜL (ref 1.85–7.62)
NEUTS SEG NFR BLD AUTO: 65 % (ref 43–75)
NRBC BLD AUTO-RTO: 0 /100 WBCS
PLATELET # BLD AUTO: 206 THOUSANDS/UL (ref 149–390)
PMV BLD AUTO: 9.9 FL (ref 8.9–12.7)
POTASSIUM SERPL-SCNC: 4 MMOL/L (ref 3.5–5.3)
PROT SERPL-MCNC: 7 G/DL (ref 6.4–8.4)
RBC # BLD AUTO: 3.71 MILLION/UL (ref 3.81–5.12)
SODIUM SERPL-SCNC: 138 MMOL/L (ref 135–147)
WBC # BLD AUTO: 8.35 THOUSAND/UL (ref 4.31–10.16)

## 2025-07-19 PROCEDURE — 99239 HOSP IP/OBS DSCHRG MGMT >30: CPT | Performed by: INTERNAL MEDICINE

## 2025-07-19 PROCEDURE — 80053 COMPREHEN METABOLIC PANEL: CPT | Performed by: STUDENT IN AN ORGANIZED HEALTH CARE EDUCATION/TRAINING PROGRAM

## 2025-07-19 PROCEDURE — 85025 COMPLETE CBC W/AUTO DIFF WBC: CPT | Performed by: STUDENT IN AN ORGANIZED HEALTH CARE EDUCATION/TRAINING PROGRAM

## 2025-07-19 PROCEDURE — 83735 ASSAY OF MAGNESIUM: CPT | Performed by: STUDENT IN AN ORGANIZED HEALTH CARE EDUCATION/TRAINING PROGRAM

## 2025-07-19 PROCEDURE — 82948 REAGENT STRIP/BLOOD GLUCOSE: CPT

## 2025-07-19 RX ADMIN — ASPIRIN 81 MG: 81 TABLET, COATED ORAL at 08:01

## 2025-07-19 RX ADMIN — CHLORTHALIDONE 12.5 MG: 25 TABLET ORAL at 08:01

## 2025-07-19 RX ADMIN — DULOXETINE 60 MG: 60 CAPSULE, DELAYED RELEASE ORAL at 08:01

## 2025-07-19 RX ADMIN — GABAPENTIN 800 MG: 400 CAPSULE ORAL at 08:01

## 2025-07-19 RX ADMIN — PANTOPRAZOLE SODIUM 40 MG: 40 TABLET, DELAYED RELEASE ORAL at 06:03

## 2025-07-19 RX ADMIN — APIXABAN 10 MG: 5 TABLET, FILM COATED ORAL at 08:01

## 2025-07-19 RX ADMIN — METHOCARBAMOL 500 MG: 500 TABLET ORAL at 06:03

## 2025-07-19 RX ADMIN — INSULIN LISPRO 8 UNITS: 100 INJECTION, SOLUTION INTRAVENOUS; SUBCUTANEOUS at 08:00

## 2025-07-19 NOTE — NURSING NOTE
Patient discharged to home. Patient and  have all follow up and medication changes. Patient has all belongings. IV site CDI at discharge

## 2025-07-19 NOTE — CASE MANAGEMENT
Case Management Discharge Planning Note    Patient name Lizzy Acuna  Location /-01 MRN [id number]  : 1975 Date 2025       Current Admission Date: 2025  Current Admission Diagnosis:Renal vein thrombosis (HCC)   Patient Active Problem List    Diagnosis Date Noted    Renal vein thrombosis (HCC) 2025    Electrolyte abnormality 2025    Status post below-knee amputation of left lower extremity (HCC) 02/15/2025    At risk for venous thromboembolism (VTE) 02/15/2025    Phantom limb syndrome (HCC) 02/15/2025    Constipation 02/15/2025    Adjustment disorder with anxiety 02/15/2025    Thyroid nodule 2025    Adrenal nodule (HCC) 2025    Primary hypertension 2025    Obesity (BMI 30-39.9) 2025    PAD (peripheral artery disease) (HCC) 2025    GERD (gastroesophageal reflux disease)     Diabetes mellitus type 2 with complications ([facility]) 2018    Chronic sciatica 2018    Obesity (BMI 30.0-34.9) 2017    Chronic low back pain 2016    Chronic, continuous use of opioids 2016    Proteinuria 2011      LOS (days): 1  Geometric Mean LOS (GMLOS) (days):   Days to GMLOS:     OBJECTIVE:  Risk of Unplanned Readmission Score: 40.91         Current admission status: Inpatient   Preferred Pharmacy:   [address] [address]  Phone: [phone] Fax: [phone]    [facility] [address] [place] [address] [address]  [address]  [facility] [address]  Phone: [phone] Fax: [phone]    Primary Care Provider: NAOMY Johnson    Primary Insurance: Drive  Secondary Insurance:     DISCHARGE DETAILS:    Discharge planning discussed with:: patietn & spouse at the bedside  Freedom of Choice: Yes  Comments - Freedom of Choice: recommendation is for minimum resources-  pt will go for outpt rehab - pt plans to  go to [address] [place] [address]  CM contacted family/caregiver?: Yes  Were Treatment Team discharge recommendations reviewed with patient/caregiver?: Yes  Did patient/caregiver verbalize understanding of patient care needs?: Yes  Were patient/caregiver advised of the risks associated with not following Treatment Team discharge recommendations?: Yes    Contacts  Patient Contacts: pt's spouse ( they are seperated)  Relationship to Patient:: Other (Comment)  Contact Method: In Person  Reason/Outcome: Discharge Planning    Requested Home Health Care         Is the patient interested in HHC at discharge?: No    DME Referral Provided  Referral made for DME?: No    Other Referral/Resources/Interventions Provided:  Interventions: Outpatient PT  Referral Comments: outpt rehab - [facility] [facility] and the order was not placed in time to receive for today- [facility] and they do have eliquis in stock- rx was sent to [facility] and the pt was made aware she needs to  the medication there- pt has $0.00 copay    Would you like to participate in our [facility] service program?  : No - Declined    Treatment Team Recommendation: Home (home with family & outpt rehab & outpt follow up- family)                                            Family notified:: spouse at the bedside

## 2025-07-19 NOTE — PLAN OF CARE
Problem: GASTROINTESTINAL - ADULT  Goal: Minimal or absence of nausea and/or vomiting  Description: INTERVENTIONS:  - Administer IV fluids if ordered to ensure adequate hydration  - Maintain NPO status until nausea and vomiting are resolved  - Nasogastric tube if ordered  - Administer ordered antiemetic medications as needed  - Provide nonpharmacologic comfort measures as appropriate  - Advance diet as tolerated, if ordered  - Consider nutrition services referral to assist patient with adequate nutrition and appropriate food choices  Outcome: Progressing  Goal: Maintains or returns to baseline bowel function  Description: INTERVENTIONS:  - Assess bowel function  - Encourage oral fluids to ensure adequate hydration  - Administer IV fluids if ordered to ensure adequate hydration  - Administer ordered medications as needed  - Encourage mobilization and activity  - Consider nutritional services referral to assist patient with adequate nutrition and appropriate food choices  Outcome: Progressing  Goal: Maintains adequate nutritional intake  Description: INTERVENTIONS:  - Monitor percentage of each meal consumed  - Identify factors contributing to decreased intake, treat as appropriate  - Assist with meals as needed  - Monitor I&O, weight, and lab values if indicated  - Obtain nutrition services referral as needed  Outcome: Progressing  Goal: Oral mucous membranes remain intact  Description: INTERVENTIONS  - Assess oral mucosa and hygiene practices  - Implement preventative oral hygiene regimen  - Implement oral medicated treatments as ordered  - Initiate Nutrition services referral as needed  Outcome: Progressing     Problem: METABOLIC, FLUID AND ELECTROLYTES - ADULT  Goal: Electrolytes maintained within normal limits  Description: INTERVENTIONS:  - Monitor labs and assess patient for signs and symptoms of electrolyte imbalances  - Administer electrolyte replacement as ordered  - Monitor response to electrolyte  replacements, including repeat lab results as appropriate  - Instruct patient on fluid and nutrition as appropriate  Outcome: Progressing  Goal: Fluid balance maintained  Description: INTERVENTIONS:  - Monitor labs   - Monitor I/O and WT  - Instruct patient on fluid and nutrition as appropriate  - Assess for signs & symptoms of volume excess or deficit  Outcome: Progressing  Goal: Glucose maintained within target range  Description: INTERVENTIONS:  - Monitor Blood Glucose as ordered  - Assess for signs and symptoms of hyperglycemia and hypoglycemia  - Administer ordered medications to maintain glucose within target range  - Assess nutritional intake and initiate nutrition service referral as needed  Outcome: Progressing

## 2025-07-19 NOTE — ASSESSMENT & PLAN NOTE
C/w gabapentin 800mg TID, duloxetine  C/w home lidocaine patch PRN  Pain is well controlled. DC robaxin  C/w gabapentin 800mg TID, duloxetine  C/w home lidocaine patch PRN  Pain is well controlled. DC robaxin

## 2025-07-19 NOTE — ASSESSMENT & PLAN NOTE
Secondary to gastroenteritis  Magnesium and potassium levels improved  Follow-up routine labs with PCP as outpatient

## 2025-07-19 NOTE — UTILIZATION REVIEW
SEE INITIAL REVIEW AT BOTTOM    Continued Stay Review    Date: 7/19                          Current Patient Class: Inpatient  Current Level of Care: MS     HPI:50 y.o. female initially admitted on 7/17 as obs and 7/18 as IP  Current Diagnosis: renal vein thrombosis- no thrombectomy, electrolyte abnormality, h/o BKA    Assessment/Plan:   Heparin drip d/c and pt is now on Eliquis. Will f/u with Hematology OP. Pt is cleared for d/c home today.  On exam no acute deficits. VS stable, afebrile.     Medications:   Scheduled Medications:  apixaban, 10 mg, Oral, BID  [START ON 7/25/2025] apixaban, 5 mg, Oral, BID  aspirin, 81 mg, Oral, Daily  atorvastatin, 80 mg, Oral, QPM  chlorthalidone, 12.5 mg, Oral, Daily  DULoxetine, 60 mg, Oral, Daily  gabapentin, 800 mg, Oral, TID  insulin glargine, 18 Units, Subcutaneous, HS  insulin lispro, 2-12 Units, Subcutaneous, TID AC  insulin lispro, 8 Units, Subcutaneous, TID With Meals  methocarbamol, 500 mg, Oral, Q8H JORGE  pantoprazole, 40 mg, Oral, Early Morning  traZODone, 100 mg, Oral, HS      Continuous IV Infusions:    Heparin drip d/c 7/18 PM  IV NSS @ 150 cc/hr - d/c 7/18 AM     PRN Meds:  acetaminophen, 650 mg, Oral, Q6H PRN  lidocaine, 1 patch, Topical, Daily PRN  metoclopramide, 10 mg, Intravenous, Q6H PRN - x 1 7/17  Ondansetron   Heparin 3400 u IV X 1 7/18    Discharge Plan: home     Vital Signs (last 3 days)       Date/Time Temp Pulse Resp BP MAP (mmHg) SpO2 O2 Device Patient Position - Orthostatic VS [place] Coma Scale Score Pain    07/19/25 0800 -- -- -- -- -- -- -- -- 15 No Pain    07/19/25 07:36:36 98.2 °F (36.8 °C) 92 -- 118/79 92 99 % -- -- -- --    07/19/25 03:19:40 98.3 °F (36.8 °C) 101 18 152/79 103 96 % -- -- -- --    07/18/25 23:05:25 97.7 °F (36.5 °C) 86 17 130/51 77 94 % -- -- -- --    07/18/25 2036 -- -- -- -- -- -- -- -- -- No Pain    07/18/25 2033 -- -- -- -- -- -- -- -- 15 --    07/18/25 19:22:19 98.6 °F (37 °C) -- 17 165/82 110 -- -- -- -- --    07/18/25  15:57:56 -- 87 -- 109/67 81 95 % -- -- -- --    07/18/25 14:50:27 98.1 °F (36.7 °C) 89 20 94/64 74 96 % None (Room air) Sitting -- --    07/18/25 1302 -- -- -- -- -- -- -- -- -- No Pain    07/18/25 11:20:19 97.9 °F (36.6 °C) 83 18 107/65 79 99 % None (Room air) Lying -- --    07/18/25 10:26:12 -- 90 18 105/66 79 100 % None (Room air) Lying -- --    07/18/25 0800 -- -- -- -- -- -- -- -- 15 No Pain    07/18/25 07:19:49 98.1 °F (36.7 °C) 83 16 104/64 77 97 % None (Room air) Sitting -- --    07/17/25 22:44:05 98.5 °F (36.9 °C) 89 18 111/71 84 96 % -- -- -- --    07/17/25 1930 -- -- -- -- -- 100 % None (Room air) -- 15 --    07/17/25 1914 -- -- -- -- -- -- -- -- -- 6    07/17/25 19:08:26 98.3 °F (36.8 °C) 93 17 149/51 84 99 % -- -- -- --    07/17/25 1908 -- -- -- -- -- 99 % None (Room air) -- -- --    07/17/25 1800 -- 93 -- 158/72 104 100 % -- -- -- --    07/17/25 1500 97.6 °F (36.4 °C) 86 -- 155/68 98 99 % -- -- -- --    07/17/25 1400 -- 88 17 172/81 116 98 % -- -- -- --    07/17/25 1300 -- 89 18 152/72 103 98 % -- -- -- --    07/17/25 1212 -- 60 19 136/69 -- 99 % -- Lying -- --    07/17/25 1131 -- -- -- -- -- -- -- -- 15 --    07/17/25 1118 -- -- -- -- -- 100 % -- -- -- --    07/17/25 1116 97.4 °F (36.3 °C) 99 18 150/122 -- 96 % None (Room air) Lying -- 7          Weight (last 2 days)       Date/Time Weight    07/17/25 1908 84.7 (186.73)            Pertinent Labs/Diagnostic Results:   Radiology:  CT abdomen pelvis with contrast   Final Interpretation by Payal Oshea MD (07/17 0588)      1. New short segment (3.3 cm) left renal vein thrombus. Otherwise, no acute findings in the abdomen or pelvis.      2. Stable 2.0 cm right adrenal nodule, previously characterized as benign adenoma. Please note that for adrenal nodule > 1 cm,  biochemical evaluation is suggested to rule out functioning adenoma, if not already performed.      Resident: PAYAL BETANCOURT I, the attending radiologist, have reviewed the images and agree  with the final report above.      Workstation performed: [id number]           Cardiology:  No orders to display     GI:  No orders to display           Results from last 7 days   Lab Units 07/19/25  0606 07/18/25  0444 07/17/25  1209   WBC Thousand/uL 8.35 11.86* 10.20*   HEMOGLOBIN g/dL 10.3* 12.1 12.6   HEMATOCRIT % 33.3* 38.0 38.7   PLATELETS Thousands/uL 206 222 246   TOTAL NEUT ABS Thousands/µL 5.40 5.87 6.86         Results from last 7 days   Lab Units 07/19/25  0606 07/18/25  1245 07/18/25  0539 07/17/25  1209   SODIUM mmol/L 138 139 141 138   POTASSIUM mmol/L 4.0 3.1* 3.0* 3.0*   CHLORIDE mmol/L 108 103 104 96   CO2 mmol/L 23 26 25 28   ANION GAP mmol/L 7 10 12 14*   BUN mg/dL 14 18 21 25   CREATININE mg/dL 0.71 0.76 0.83 0.89   EGFR ml/min/1.73sq m 100 92 83 76   CALCIUM mg/dL 8.2* 7.3* 7.6* 9.0   MAGNESIUM mg/dL 2.6 1.8* 0.6*  --    PHOSPHORUS mg/dL  --   --  4.0  --      Results from last 7 days   Lab Units 07/19/25  0606 07/18/25  0539 07/17/25  1209   AST U/L 18 17 15   ALT U/L 16 15 17   ALK PHOS U/L 68 69 72   TOTAL PROTEIN g/dL 7.0 7.4 8.1   ALBUMIN g/dL 3.8 3.9 4.3   TOTAL BILIRUBIN mg/dL 0.38 0.44 0.62     Results from last 7 days   Lab Units 07/19/25  0734 07/18/25  2043 07/18/25  1558 07/18/25  1107 07/18/25  0718 07/17/25  2136 07/17/25  1648 07/17/25  1124   POC GLUCOSE mg/dl 129 122 148* 106 104 116 139 168*     Results from last 7 days   Lab Units 07/19/25  0606 07/18/25  1245 07/18/25  0539 07/17/25  1209   GLUCOSE RANDOM mg/dL 117 133 91 178*       Results from last 7 days   Lab Units 07/18/25  0539 07/17/25  2209   PROTIME seconds 13.2 13.0   INR  0.95 0.94   PTT seconds 168* 58*       Results from last 7 days   Lab Units 07/17/25  1209   LIPASE u/L 32                 Results from last 7 days   Lab Units 07/17/25  2350 07/17/25  1344   CLARITY UA   --  Hazy   COLOR UA   --  Yellow   SPEC GRAV UA   --  1.020   PH UA   --  6.0   GLUCOSE UA mg/dl  --  Negative   KETONES UA mg/dl  --   Negative   BLOOD UA   --  Negative   PROTEIN UA mg/dl  --  3+*   NITRITE UA   --  Negative   BILIRUBIN UA   --  Negative   UROBILINOGEN UA E.U./dl  --  0.2   LEUKOCYTES UA   --  Trace*   WBC UA /hpf  --  20-30*   RBC UA /hpf  --  2-4   BACTERIA UA /hpf  --  Occasional   EPITHELIAL CELLS WET PREP /hpf  --  Occasional   CREATININE UR mg/dL 108.9  --    PROTEIN UR mg/dL 191.9  191.9  --    PROT/CREAT RATIO UR  1.8*  --            Results from last 7 days   Lab Units 07/17/25  1344   URINE CULTURE  60,000-69,000 cfu/ml       Network Utilization Review Department  ATTENTION: Please call with any questions or concerns to [phone] and carefully listen to the prompts so that you are directed to the right person. All voicemails are confidential.   For Discharge needs, contact Care Management DC Support Team at [phone] opt. 2  Send all requests for admission clinical reviews, approved or denied determinations and any other requests to dedicated fax number below belonging to the [facility] where the patient is receiving treatment. List of dedicated fax numbers for the Facilities:  FACILITY NAME UR FAX NUMBER   ADMISSION DENIALS (Administrative/Medical Necessity) [phone]   DISCHARGE SUPPORT TEAM (NETWORK) [phone]   PARENT CHILD HEALTH (Maternity/NICU/Pediatrics) [phone]   [facility] [phone]   [facility] [phone]   [facility] [phone]   [facility] [phone]   [facility] [phone]   [facility] [phone]   [facility] [phone]   [facility] [phone]   [facility] [phone]   [facility] [phone]   [facility] [phone]   [facility]  [phone]

## 2025-07-19 NOTE — CASE MANAGEMENT
Case Management Assessment & Discharge Planning Note    Patient name Lizzy Acuna  Location /-01 MRN [id number]  : 1975 Date 2025       Current Admission Date: 2025  Current Admission Diagnosis:Renal vein thrombosis (HCC)   Patient Active Problem List    Diagnosis Date Noted    Renal vein thrombosis (HCC) 2025    Electrolyte abnormality 2025    Status post below-knee amputation of left lower extremity (HCC) 02/15/2025    At risk for venous thromboembolism (VTE) 02/15/2025    Phantom limb syndrome (HCC) 02/15/2025    Constipation 02/15/2025    Adjustment disorder with anxiety 02/15/2025    Thyroid nodule 2025    Adrenal nodule (HCC) 2025    Primary hypertension 2025    Obesity (BMI 30-39.9) 2025    PAD (peripheral artery disease) (HCC) 2025    GERD (gastroesophageal reflux disease)     Diabetes mellitus type 2 with complications ([facility]) 2018    Chronic sciatica 2018    Obesity (BMI 30.0-34.9) 2017    Chronic low back pain 2016    Chronic, continuous use of opioids 2016    Proteinuria 2011      LOS (days): 0  Geometric Mean LOS (GMLOS) (days):   Days to GMLOS:     OBJECTIVE:    Risk of Unplanned Readmission Score: 38.91         Current admission status: Inpatient  Referral Reason: Other (d/c planning)    Preferred Pharmacy:   [address] [address]  Phone: [phone] Fax: [phone]    Primary Care Provider: NAOMY Johnson    Primary Insurance: [employer] ([employer]  Secondary Insurance:     ASSESSMENT:  Active Health Care Proxies       Paige Johansen Health Care Representative - Mother   Primary Phone: [phone] (Mobile)                 Advance Directives  Does patient have a Health Care POA?: No  Was patient offered paperwork?: Yes (declined paperwork)  Does patient have Advance Directives?: No  Was patient  offered paperwork?: Yes (declined paperwork)         Readmission Root Cause  30 Day Readmission: No    Patient Information  Admitted from:: Home  Mental Status: Alert  During Assessment patient was accompanied by: Not accompanied during assessment  Assessment information provided by:: Patient  Primary Caregiver: Self  Support Systems: Parent, Children  County of Residence: [facility] do you live in?: [place]  Home entry access options. Select all that apply.: Stairs  Number of steps to enter home.: One Flight  Do the steps have railings?: Yes  Type of Current Residence: 2 story home  Upon entering residence, is there a bedroom on the main floor (no further steps)?: No  A bedroom is located on the following floor levels of residence (select all that apply):: 2nd Floor  Upon entering residence, is there a bathroom on the main floor (no further steps)?: Yes (no BR on the 2nd floor)  Number of steps to 2nd floor from main floor: One Flight  Living Arrangements: Lives w/ Parent(s), Lives w/ Family members (pt lives with her motehr & 8yr old daughter & she has a 20 yr old son -  she is seperatd from her spouse -  he is helping to care for their daughter)  Is patient a ?: No    Activities of Daily Living Prior to Admission  Functional Status: Independent  Completes ADLs independently?: Yes  Ambulates independently?: Yes  Does patient use assisted devices?: Yes  Assisted Devices (DME) used: Walker, Other (Comment) (prosthetic leg)  Does patient currently own DME?: Yes  What DME does the patient currently own?: Walker, Other (Comment), Straight Cane, Quad Cane, Shower Chair, Wheelchair, Bedside Commode (prosthetic leg,)  Does patient have a history of Outpatient Therapy (PT/OT)?: Yes  Does the patient have a history of Short-Term Rehab?: Yes ([facility])  Does patient have a history of HHC?: Yes ([facility])  Does patient currently have HHC?: No         Patient Information Continued  Income Source: Unemployed  Does  patient have prescription coverage?: Yes  Can the patient afford their medications and any related supplies (such as glucometers or test strips)?: Yes  Does patient receive dialysis treatments?: No  Does patient have a history of substance abuse?: No  Does patient have a history of Mental Health Diagnosis?: Yes (anxiety)  Is patient receiving treatment for mental health?: Yes (medication)  Has patient received inpatient treatment related to mental health in the last 2 years?: No         Means of Transportation  Means of Transport to [facility]:: Drives Self          DISCHARGE DETAILS:    Discharge planning discussed with:: patient & spouse ( pt is seperated from him) at the bedside  Freedom of Choice: Yes  Comments - Freedom of Choice: recommendation is minimum resources- pt will go to for outpt rehab - pt will go to the [address] [place] [address] rehab center  CM contacted family/caregiver?: Yes             Contacts  Patient Contacts: pt's spouse ( they are seperated)  Relationship to Patient:: Other (Comment) (spouse)  Contact Method: In Person  Reason/Outcome: Discharge Planning    Requested Home Health Care         Is the patient interested in HHC at discharge?: No    DME Referral Provided  Referral made for DME?: No    Other Referral/Resources/Interventions Provided:  Interventions: Outpatient OT, Outpatient PT, Prescription Price Check  Referral Comments: outpt rehab - order is needed-  cm called her pharmacy [facility] - pt has $0.00 copay- they need toordereliquis - they hope to receive tomorrow - if they do not receive the eliquis tomorrow then rx needs to be sent to [facility] [place]    Would you like to participate in our [facility] service program?  : No - Declined    Treatment Team Recommendation: Home (home with family & outpt rehab & outpt follow up- family)

## 2025-07-19 NOTE — DISCHARGE SUMMARY
Discharge Summary - Hospitalist   Name: Lizzy Acuna 50 y.o. female I MRN: [id number]  Unit/Bed#: -01 I Date of Admission: 7/17/2025   Date of Service: 7/19/2025 I Hospital Day: 1     Assessment & Plan  Renal vein thrombosis (HCC)  CT scan: new left renal vein thrombus  Received heparin drip on admission  Discussed with hematology attending on Epic chat - OK to transition to therapeutic Eliquis   Eliquis 10mg bid for 7 days then 5mg bid  Case management states that patients insurance will cover cost of Eliquis  F/u hematology outpatient  Patient was also evaluated by IR on admission, no need for thrombectomy  Electrolyte abnormality  Secondary to gastroenteritis  Magnesium and potassium levels improved  Follow-up routine labs with PCP as outpatient  Diabetes mellitus type 2 with complications (HCC)  A1c 8.4%  Continue home diabetic regimen  Proteinuria  UA protein 3+, urine protein 191, protein/creatinin ratio 1.8  Reviewed with nephrology on [employer] chat - not consistent with nephrotic syndrome. Her incidental finding of renal vein thrombosis is NOT caused by the proteinuria.   Ambulatory f/u with nephrology  Status post below-knee amputation of left lower extremity (HCC)  Phantom limb syndrome (HCC)  C/w gabapentin 800mg TID, duloxetine  C/w home lidocaine patch PRN  Pain is well controlled. DC robaxin  C/w gabapentin 800mg TID, duloxetine  C/w home lidocaine patch PRN  Pain is well controlled. DC robaxin  Adjustment disorder with anxiety  C/w home trazodone     Medical Problems       Resolved Problems  Date Reviewed: 6/4/2025   None       Discharging Physician / Practitioner: Luz Maria Shore MD  PCP: NAOMY Johnson  Admission Date:   Admission Orders (From admission, onward)       Ordered        07/18/25 1410  INPATIENT ADMISSION  Once            07/17/25 1614  Place in Observation  Once                          Discharge Date: 07/19/25    Next Steps for Physician/AP Assuming  "Care:  Follow-up hypercoagulable labs    Test Results Pending at Discharge (will require follow up):  Hypercoagulable workup    Medication Changes for Discharge & Rationale:   Renal vein thrombosis  See after visit summary for reconciled discharge medications provided to patient and/or family.     Consultations During Hospital Stay:  IR    Procedures Performed:   None    Significant Findings / Test Results:   Renal vein thrombosis, proteinuria    Incidental Findings:   Adrenal nodule  I reviewed the above mentioned incidental findings with the patient and/or family and they expressed understanding.    Hospital Course:   Lizzy Acuna is a 50 y.o. female patient who originally presented to the hospital on 7/17/2025 due to nausea/vomiting.  CT imaging performed and showed new left renal vein thrombus.  Patient was seen by IR, no acute invention needed given that patient's kidney function is stable/normal.  Patient was started on heparin drip.  Case reviewed with hematology who recommended hypercoagulable workup.  Patient transitioned to Eliquis in anticipation of discharge.  Patient's GI symptoms likely secondary to viral gastroenteritis.  Gastroenteritis resolved with conservative management, no need for antibiotics.  Patient stable for discharge home with outpatient follow-up.  Noted to have low magnesium level.  Supplemented with magnesium and potassium.  Electrolyte levels have improved.  No further supplementation needed as patient's low electrolyte levels were likely secondary to GI losses and patient with no further GI symptoms.  Tolerating diet.    Please see above list of diagnoses and related plan for additional information.     Discharge Day Visit / Exam:   Subjective: No complaints at this time  Vitals: Blood Pressure: 118/79 (07/19/25 0736)  Pulse: 92 (07/19/25 0736)  Temperature: 98.2 °F (36.8 °C) (07/19/25 0736)  Temp Source: Oral (07/18/25 1450)  Respirations: 18 (07/19/25 0319)  Height: 5' 1\" (154.9 " cm) (07/17/25 1908)  Weight - Scale: 84.7 kg (186 lb 11.7 oz) (07/17/25 1908)  SpO2: 99 % (07/19/25 0736)  Physical Exam  Constitutional:       General: She is not in acute distress.  HENT:      Head: Normocephalic and atraumatic.      Nose: Nose normal.      Mouth/Throat:      Mouth: Mucous membranes are moist.     Eyes:      Extraocular Movements: Extraocular movements intact.      Conjunctiva/sclera: Conjunctivae normal.       Cardiovascular:      Rate and Rhythm: Normal rate and regular rhythm.   Pulmonary:      Effort: Pulmonary effort is normal. No respiratory distress.   Abdominal:      Palpations: Abdomen is soft.      Tenderness: There is no abdominal tenderness.     Musculoskeletal:         General: Normal range of motion.      Cervical back: Normal range of motion and neck supple.      Comments: BKA noted     Skin:     General: Skin is warm.     Neurological:      General: No focal deficit present.      Mental Status: She is alert. Mental status is at baseline.      Cranial Nerves: No cranial nerve deficit.     Psychiatric:         Mood and Affect: Mood normal.         Behavior: Behavior normal.          Discussion with Family: Updated patient regarding discharge plan.     Discharge instructions/Information to patient and family:   See after visit summary for information provided to patient and family.      Provisions for Follow-Up Care:  See after visit summary for information related to follow-up care and any pertinent home health orders.      Mobility at time of Discharge:   Basic Mobility Inpatient Raw Score: 20  JH-HLM Goal: 6: Walk 10 steps or more  JH-HLM Achieved: 6: Walk 10 steps or more  HLM Goal achieved. Continue to encourage appropriate mobility.     Disposition:   Home    Planned Readmission: no    Administrative Statements   Discharge Statement:  I have spent a total time of 35 minutes in caring for this patient on the day of the visit/encounter. >30 minutes of time was spent on: Counseling  / Coordination of care, Documenting in the medical record, Reviewing / ordering tests, medicine, procedures  , and Communicating with other healthcare professionals .    **Please Note: This note may have been constructed using a voice recognition system**

## 2025-07-19 NOTE — ASSESSMENT & PLAN NOTE
CT scan: new left renal vein thrombus  Received heparin drip on admission  Discussed with hematology attending on Epic chat - OK to transition to therapeutic Eliquis   Eliquis 10mg bid for 7 days then 5mg bid  Case management states that patients insurance will cover cost of Eliquis  F/u hematology outpatient  Patient was also evaluated by IR on admission, no need for thrombectomy

## 2025-07-19 NOTE — PLAN OF CARE
Problem: PAIN - ADULT  Goal: Verbalizes/displays adequate comfort level or baseline comfort level  Description: Interventions:  - Encourage patient to monitor pain and request assistance  - Assess pain using appropriate pain scale  - Administer analgesics as ordered based on type and severity of pain and evaluate response  - Implement non-pharmacological measures as appropriate and evaluate response  - Consider cultural and social influences on pain and pain management  - Notify physician/advanced practitioner if interventions unsuccessful or patient reports new pain  - Educate patient/family on pain management process including their role and importance of  reporting pain   - Provide non-pharmacologic/complimentary pain relief interventions  Outcome: Progressing     Problem: INFECTION - ADULT  Goal: Absence or prevention of progression during hospitalization  Description: INTERVENTIONS:  - Assess and monitor for signs and symptoms of infection  - Monitor lab/diagnostic results  - Monitor all insertion sites, i.e. indwelling lines, tubes, and drains  - Monitor endotracheal if appropriate and nasal secretions for changes in amount and color  - [place] appropriate cooling/warming therapies per order  - Administer medications as ordered  - Instruct and encourage patient and family to use good hand hygiene technique  - Identify and instruct in appropriate isolation precautions for identified infection/condition  Outcome: Progressing  Goal: Absence of fever/infection during neutropenic period  Description: INTERVENTIONS:  - Monitor WBC  - Perform strict hand hygiene  - Limit to healthy visitors only  - No plants, dried, fresh or silk flowers with griffin in patient room  Outcome: Progressing     Problem: SAFETY ADULT  Goal: Patient will remain free of falls  Description: INTERVENTIONS:  - Educate patient/family on patient safety including physical limitations  - Instruct patient to call for assistance with activity   -  Consider consulting OT/PT to assist with strengthening/mobility based on AM PAC & JH-HLM score  - Consult OT/PT to assist with strengthening/mobility   - Keep Call bell within reach  - Keep bed low and locked with side rails adjusted as appropriate  - Keep care items and personal belongings within reach  - Initiate and maintain comfort rounds  - Make Fall Risk Sign visible to staff  - Offer Toileting every 2 Hours, in advance of need  - Initiate/Maintain bed alarm  - Obtain necessary fall risk management equipment: yellow socks  - Apply yellow socks and bracelet for high fall risk patients  - Consider moving patient to room near nurses station  Outcome: Progressing  Goal: Maintain or return to baseline ADL function  Description: INTERVENTIONS:  -  Assess patient's ability to carry out ADLs; assess patient's baseline for ADL function and identify physical deficits which impact ability to perform ADLs (bathing, care of mouth/teeth, toileting, grooming, dressing, etc.)  - Assess/evaluate cause of self-care deficits   - Assess range of motion  - Assess patient's mobility; develop plan if impaired  - Assess patient's need for assistive devices and provide as appropriate  - Encourage maximum independence but intervene and supervise when necessary  - Involve family in performance of ADLs  - Assess for home care needs following discharge   - Consider OT consult to assist with ADL evaluation and planning for discharge  - Provide patient education as appropriate  - Monitor functional capacity and physical performance, use of AM PAC & JH-HLM   - Monitor gait, balance and fatigue with ambulation    Outcome: Progressing  Goal: Maintains/Returns to pre admission functional level  Description: INTERVENTIONS:  - Perform AM-PAC 6 Click Basic Mobility/ Daily Activity assessment daily.  - Set and communicate daily mobility goal to care team and patient/family/caregiver.   - Collaborate with rehabilitation services on mobility goals if  consulted  - Perform Range of Motion 3 times a day.  - Reposition patient every 2 hours.  - Dangle patient 3 times a day  - Stand patient 3 times a day  - Ambulate patient 3 times a day  - Out of bed to chair 3 times a day   - Out of bed for meals 3 times a day  - Out of bed for toileting  - Record patient progress and toleration of activity level   Outcome: Progressing     Problem: DISCHARGE PLANNING  Goal: Discharge to home or other facility with appropriate resources  Description: INTERVENTIONS:  - Identify barriers to discharge w/patient and caregiver  - Arrange for needed discharge resources and transportation as appropriate  - Identify discharge learning needs (meds, wound care, etc.)  - Arrange for interpretive services to assist at discharge as needed  - Refer to Case Management Department for coordinating discharge planning if the patient needs post-hospital services based on physician/advanced practitioner order or complex needs related to functional status, cognitive ability, or social support system  Outcome: Progressing     Problem: Knowledge Deficit  Goal: Patient/family/caregiver demonstrates understanding of disease process, treatment plan, medications, and discharge instructions  Description: Complete learning assessment and assess knowledge base.  Interventions:  - Provide teaching at level of understanding  - Provide teaching via preferred learning methods  Outcome: Progressing

## 2025-07-21 ENCOUNTER — TRANSITIONAL CARE MANAGEMENT (OUTPATIENT)
Dept: FAMILY MEDICINE CLINIC | Facility: CLINIC | Age: 50
End: 2025-07-21

## 2025-07-21 NOTE — UTILIZATION REVIEW
NOTIFICATION OF INPATIENT ADMISSION   AUTHORIZATION REQUEST   SERVICING FACILITY:   [place] [address]  Tax ID: [id number]  NPI: [id number]   ATTENDING PROVIDER:  Attending Name and NPI#: Luz Maria Shore Md [[id number]]  Address: [address] [place] [address]  Phone: [phone]     ADMISSION INFORMATION:  Place of Service: Inpatient Acute [facility]  Place of Service Code: 21  Inpatient Admission Date/Time: 7/18/25  2:10 PM  Discharge Date/Time: 7/19/2025 12:43 PM  Admitting Diagnosis Code/Description:  Hypokalemia [E87.6]  Vomiting [R11.10]  Tongue mass [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  Contact for approvals/pending authorizations, clinical reviews, and discharge.     PHYSICIAN ADVISORY SERVICES:  Medical Necessity Denial & Erur-hb-Oscx Review  Phone: [phone]  Fax: [phone]  Email: [email or web address]     DISCHARGE SUPPORT TEAM:  For Patients Discharge Needs & Updates  Phone: [phone] opt. 2 Fax: [phone]  Email: [email or web address]

## 2025-07-22 DIAGNOSIS — E11.8 DIABETES MELLITUS TYPE 2 WITH COMPLICATIONS (HCC): ICD-10-CM

## 2025-07-22 NOTE — PROGRESS NOTES
PT Evaluation     Today's date: 2025  Patient name: Lizzy Acuna  : 1975  MRN: [id number]  Referring provider: Geraldine Andujar PA-C  Dx:   Encounter Diagnosis     ICD-10-CM    1. Status post below-knee amputation of left lower extremity (HCC)  Z89.512 Ambulatory Referral to Physical Therapy          Start Time: 735  Stop Time: 08  Total time in clinic (min): 45 minutes    Assessment    Assessment details: Pt is a 50 y.o. with L BKA  who was referred to OPPT for prosthetic training. She demo L LE weakness dianna quad and hips, impaired balance, decreased weightbearing through prosthetic side, and requires AD for safety. She is able to demo variable gait speed and is currently ambulating with rollator. 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 She is expected to achieve K3 level by the end of PT. Due to time constraint, plan to assess AMPPRO and 2MWT/6MWT in upcoming sessions. She would benefit from skilled PT for prosthetic training in order to return to PLOF, decrease fall risk, and maximize functional mobility.        Goals  ST. Pt will be able to navigate steps with reciprocal pattern to demo improved balance within 6 weeks  2. Pt will improve TUG score to at least 19 sec with LRAD within 6 weeks needed to reduce fall risk.  3. Pt will be able to ambulate with SPC household distances to demo improved mobility within 6 weeks   4. Pt will demonstrate independence with HEP within 6 weeks.    LT. Pt will be able to ambulate community distances with no AD safely  within 8-12 weeks   3. Pt will score at K3 level with [place] and AMPPRO testing within 8-12 weeks.      Plan    Planned therapy interventions: balance, neuromuscular re-education, patient/caregiver education, coordination, strengthening, stretching, therapeutic activities, therapeutic exercise and gait training    Frequency: 2x week  Duration in  weeks: 12  Plan of Care beginning date: 2025  Plan of Care expiration date: 10/23/2025  Treatment plan discussed with: patient and PTA    Subjective Evaluation    History of Present Illness  Mechanism of injury: 48 y/o woman with a history of peripheral arterial disease status post left below-knee amputation on 2025 at [facility]. Per pt report, Dr. Ritchie took her toes and ended up having had abscess and infection which led to amputation. Had inpatient rehab for about 1 month and had some home therapy after amputation. Saw PMR on 6/3/25 and recently received prosthesis 3 weeks ago. Working with Neto from [facility]. Seeing them today.    Was in hospital last week for nausea and vomitting and admitted for low potassium and magnesium. Presented to session with prosthesis donned but sleeve was not correctly worn since she could not get it back on herself.     Can go up steps with prosthesis but not comfortable going down since only one HR and narrow steps inside house. Overall mod I with all ADLs. Would like to get back to work.     CLOF: rollator     PLOF: no AD  Patient Goals  Patient goal: go back to work  Pain  Current pain ratin  Location: phantom pain    Social Support  Steps to enter house: yes  Stairs in house: yes (takes prostheis off and bump or go on knee)   Lives in: multiple-level home  Lives with: adult children and parents (youngest daughter, mom, and ex-)    Employment status: working (currently not working and has not worked since Dec 2024, ivan at [employer])      Objective    Level of amputation: L TT  Socket Fit Comfort Score (SFCS): 5-6  Park Scale score: 5 K Level:   Plus-M Score:  Phantom pain: yes     Residual limb Inspection     Skin integrity: normal  Edema: no      Surgical incision: healed  Residual limb shape: normal   Wounds: no   Scarring: no   Palpation:  Sensation to light touch:intact  Contralateral Limb Inspection  Skin integrity: normal    Edema: no  Wounds: no  Palpation:  Sensation to light touch: intact      Manual Muscle Testing - Hip Left Right   Flexion 4- 5   Extension 4- 5   Abduction 4-  5   External Rotation       Manual Muscle Testing - Knee Left Right   Flexion 4 5   Extension 4 5     Manual Muscle Testing - Ankle Left Right   Doriflexion  4+   Plantarflexion  4+   Inversion     Eversion             LE Contracture: no   Transfers  Transfers Level With Prosthesis   Sit to stand     WC to mat     Mat to WC     Sit to supine     Supine to Sit       Gait Assessment:   Gait speed self selected: 7/23: 0.36 m/s; fast: 0.54 m/s Assistive Device: rollator </= .44m/s predictive of prosthesis nonuse  TUG Score: 7/23: 23.25 w/ rollator    >/= 19 sec fall risk; >/= 21.4 prosthesis nonuse at 1 year  DGI Score: ___/24  </=19 fall risk  FGA Score: ___/30  <21 fall risk  Endurance:  2MWT score:  TBA >/= 113 meters indicates K3/K4 level  6MWT score:   K Level:      AMPPRO Score: TBA/47   K-Level:    Stairs: TBA         Insurance: [employer]  Auth # of visits: auth after 24 (already used 1)  Expiration date:     Re-eval Date: 8/23    Date 7/23       Visit Count 1/23       FOTO        Pain In        Pain Out        Vitals             Precautions DM, fall risk, HTN        Manuals                                        Neuro Re-Ed         Step up L LE         Step taps R LE         Sidestepping        Bw amb         Hurdles         Static balance         2 MWT, AMPRO in upcoming sessions         Prognosis, POC, sock management        Ther Ex        Nustep         L SLR        Leg press  L SL        LAQ  HS curls         L TKE                                 Ther Activity        Stairs                Gait Training                        Modalities

## 2025-07-23 ENCOUNTER — EVALUATION (OUTPATIENT)
Dept: PHYSICAL THERAPY | Facility: CLINIC | Age: 50
End: 2025-07-23
Payer: COMMERCIAL

## 2025-07-23 DIAGNOSIS — Z89.512 STATUS POST BELOW-KNEE AMPUTATION OF LEFT LOWER EXTREMITY (HCC): ICD-10-CM

## 2025-07-23 PROCEDURE — 97112 NEUROMUSCULAR REEDUCATION: CPT | Performed by: PHYSICAL THERAPIST

## 2025-07-23 PROCEDURE — 97162 PT EVAL MOD COMPLEX 30 MIN: CPT | Performed by: PHYSICAL THERAPIST

## 2025-07-23 RX ORDER — PEN NEEDLE, DIABETIC 32GX 5/32"
NEEDLE, DISPOSABLE MISCELLANEOUS
Qty: 400 EACH | Refills: 1 | Status: SHIPPED | OUTPATIENT
Start: 2025-07-23

## 2025-07-24 ENCOUNTER — HOSPITAL ENCOUNTER (EMERGENCY)
Facility: HOSPITAL | Age: 50
Discharge: HOME | End: 2025-07-24
Payer: COMMERCIAL

## 2025-07-24 ENCOUNTER — TELEPHONE (OUTPATIENT)
Age: 50
End: 2025-07-24

## 2025-07-24 VITALS
RESPIRATION RATE: 19 BRPM | SYSTOLIC BLOOD PRESSURE: 172 MMHG | DIASTOLIC BLOOD PRESSURE: 79 MMHG | OXYGEN SATURATION: 99 % | HEART RATE: 102 BPM | TEMPERATURE: 97.8 F

## 2025-07-24 DIAGNOSIS — K14.8 TONGUE MASS: Primary | ICD-10-CM

## 2025-07-24 DIAGNOSIS — K13.79 MOUTH BLEEDING: ICD-10-CM

## 2025-07-24 LAB
ANION GAP SERPL CALCULATED.3IONS-SCNC: 12 MMOL/L (ref 4–13)
BUN SERPL-MCNC: 37 MG/DL (ref 5–25)
CALCIUM SERPL-MCNC: 9.1 MG/DL (ref 8.4–10.2)
CHLORIDE SERPL-SCNC: 99 MMOL/L (ref 96–108)
CO2 SERPL-SCNC: 24 MMOL/L (ref 21–32)
CREAT SERPL-MCNC: 1.01 MG/DL (ref 0.6–1.3)
GFR SERPL CREATININE-BSD FRML MDRD: 65 ML/MIN/1.73SQ M
GLUCOSE SERPL-MCNC: 189 MG/DL (ref 65–140)
HCT VFR BLD AUTO: 38.2 % (ref 34.8–46.1)
HGB BLD-MCNC: 12.1 G/DL (ref 11.5–15.4)
POTASSIUM SERPL-SCNC: 3.8 MMOL/L (ref 3.5–5.3)
SODIUM SERPL-SCNC: 135 MMOL/L (ref 135–147)

## 2025-07-24 PROCEDURE — 99283 EMERGENCY DEPT VISIT LOW MDM: CPT

## 2025-07-24 PROCEDURE — 80048 BASIC METABOLIC PNL TOTAL CA: CPT

## 2025-07-24 PROCEDURE — 85014 HEMATOCRIT: CPT

## 2025-07-24 PROCEDURE — 85018 HEMOGLOBIN: CPT

## 2025-07-24 PROCEDURE — 96365 THER/PROPH/DIAG IV INF INIT: CPT

## 2025-07-24 PROCEDURE — 36415 COLL VENOUS BLD VENIPUNCTURE: CPT

## 2025-07-24 PROCEDURE — 93005 ELECTROCARDIOGRAM TRACING: CPT

## 2025-07-24 PROCEDURE — 99284 EMERGENCY DEPT VISIT MOD MDM: CPT

## 2025-07-24 RX ORDER — LIDOCAINE HYDROCHLORIDE 10 MG/ML
5 INJECTION, SOLUTION EPIDURAL; INFILTRATION; INTRACAUDAL; PERINEURAL ONCE
Status: DISCONTINUED | OUTPATIENT
Start: 2025-07-24 | End: 2025-07-24

## 2025-07-24 RX ORDER — SODIUM CHLORIDE, SODIUM GLUCONATE, SODIUM ACETATE, POTASSIUM CHLORIDE, MAGNESIUM CHLORIDE, SODIUM PHOSPHATE, DIBASIC, AND POTASSIUM PHOSPHATE .53; .5; .37; .037; .03; .012; .00082 G/100ML; G/100ML; G/100ML; G/100ML; G/100ML; G/100ML; G/100ML
1000 INJECTION, SOLUTION INTRAVENOUS ONCE
Status: COMPLETED | OUTPATIENT
Start: 2025-07-24 | End: 2025-07-24

## 2025-07-24 RX ADMIN — SODIUM CHLORIDE, SODIUM GLUCONATE, SODIUM ACETATE, POTASSIUM CHLORIDE, MAGNESIUM CHLORIDE, SODIUM PHOSPHATE, DIBASIC, AND POTASSIUM PHOSPHATE 1000 ML: .53; .5; .37; .037; .03; .012; .00082 INJECTION, SOLUTION INTRAVENOUS at 22:47

## 2025-07-24 NOTE — TELEPHONE ENCOUNTER
Patient referred to Nephrology for Proteinuria.     Nephrology guide states if protein/creatinine ratio is more than 0.3 or albumin/creatinine ratio is more than 300 schedule within 14 days.     Unable to locate Urgent Consult slot within appropriate time frame.    Per Nephrology guide message sent to clinical staff to assist in scheduling.

## 2025-07-24 NOTE — TELEPHONE ENCOUNTER
Patient called back in regards to missed call. I attempted to reach a team member but the call wouldn't go thorugh. Please call patient back as soon as possible. Thanks!

## 2025-07-25 ENCOUNTER — OFFICE VISIT (OUTPATIENT)
Dept: FAMILY MEDICINE CLINIC | Facility: CLINIC | Age: 50
End: 2025-07-25
Payer: COMMERCIAL

## 2025-07-25 ENCOUNTER — TELEPHONE (OUTPATIENT)
Age: 50
End: 2025-07-25

## 2025-07-25 ENCOUNTER — TELEPHONE (OUTPATIENT)
Dept: OTHER | Facility: HOSPITAL | Age: 50
End: 2025-07-25

## 2025-07-25 VITALS
HEART RATE: 111 BPM | OXYGEN SATURATION: 98 % | DIASTOLIC BLOOD PRESSURE: 88 MMHG | HEIGHT: 61 IN | TEMPERATURE: 97.7 F | WEIGHT: 187 LBS | SYSTOLIC BLOOD PRESSURE: 156 MMHG | BODY MASS INDEX: 35.3 KG/M2

## 2025-07-25 DIAGNOSIS — K13.79 MOUTH BLEEDING: ICD-10-CM

## 2025-07-25 DIAGNOSIS — Z01.419 ENCOUNTER FOR GYNECOLOGICAL EXAMINATION: ICD-10-CM

## 2025-07-25 DIAGNOSIS — Z76.89 ENCOUNTER FOR SUPPORT AND COORDINATION OF TRANSITION OF CARE: ICD-10-CM

## 2025-07-25 DIAGNOSIS — I82.3 RENAL VEIN THROMBOSIS (HCC): Primary | ICD-10-CM

## 2025-07-25 DIAGNOSIS — R80.9 PROTEINURIA, UNSPECIFIED TYPE: Primary | ICD-10-CM

## 2025-07-25 DIAGNOSIS — Z12.31 ENCOUNTER FOR SCREENING MAMMOGRAM FOR BREAST CANCER: ICD-10-CM

## 2025-07-25 DIAGNOSIS — K14.8 TONGUE MASS: ICD-10-CM

## 2025-07-25 DIAGNOSIS — Z12.11 SCREENING FOR COLON CANCER: ICD-10-CM

## 2025-07-25 LAB
ATRIAL RATE: 102 BPM
B2 GLYCOPROT1 IGA SERPL IA-ACNC: 1.4
B2 GLYCOPROT1 IGG SERPL IA-ACNC: <0.8
B2 GLYCOPROT1 IGM SERPL IA-ACNC: <2.4
CARDIOLIPIN IGA SER IA-ACNC: 1.7
CARDIOLIPIN IGG SER IA-ACNC: 1
CARDIOLIPIN IGM SER IA-ACNC: <0.9
P AXIS: 61 DEGREES
PR INTERVAL: 158 MS
QRS AXIS: 127 DEGREES
QRSD INTERVAL: 78 MS
QT INTERVAL: 354 MS
QTC INTERVAL: 461 MS
T WAVE AXIS: 60 DEGREES
VENTRICULAR RATE: 102 BPM

## 2025-07-25 PROCEDURE — 93010 ELECTROCARDIOGRAM REPORT: CPT | Performed by: INTERNAL MEDICINE

## 2025-07-25 PROCEDURE — 99495 TRANSJ CARE MGMT MOD F2F 14D: CPT

## 2025-07-25 NOTE — ED PROVIDER NOTES
ED Disposition       None          Assessment & Plan       Medical Decision Making  Amount and/or Complexity of Data Reviewed  Labs: ordered.    Risk  Prescription drug management.      50-year-old female with a past medical history of BKA for gangrene comes in for evaluation of a mass on her tongue that is bleeding.  The patient recently started on Eliquis for a renal vein thrombosis.  The patient states that while she was eating ice cream today she started to have bleeding from the tongue.    On examination there is a small amount of blood coming out from around the mass on her tongue.  The patient does not have any concern for compromise to her airway.  Patient's heart lungs clear to auscultation abdomen soft and nontender.  Patient tachycardic    Plan reach out to ENT stop the bleeding with teabags which was successful and check for and hemoglobin/BMP due to patient's history of hypokalemia.  EKG run due to tachycardia.  Patient found to have a small amount of increase in her BUN.  Patient given a liter of fluids  ENT spoken to and they state that they will call her tomorrow for a addition to the schedule on Monday morning for cauterization of the area/removal of the mass.  ED Course as of 07/24/25 2308   Thu Jul 24, 2025 2116 Plan : supportive treatment . ENT follow up this upcoming week for removal/ cautery.   2117 ENT informed and she will be added on for Monday.ENT staff will  call her tomorrow   2136 EKG sinus tachycardia right axis no ST changes       Medications   multi-electrolyte (Plasmalyte-A/Isolyte-S PH 7.4/Normosol-R) IV bolus 1,000 mL (1,000 mL Intravenous New Bag 7/24/25 2247)       ED Risk Strat Scores                    No data recorded                            History of Present Illness       Chief Complaint   Patient presents with    Laceration     Tongue laceration; has known mass on tongue and was eating ice cream when it started; on thinners       Past Medical History[1]   Past  Surgical History[2]   Family History[3]   Social History[4]   E-Cigarette/Vaping    E-Cigarette Use Never User       E-Cigarette/Vaping Substances    Nicotine No     THC No     CBD No     Flavoring No     Other No     Unknown No       I have reviewed and agree with the history as documented.     50-year-old female comes in for bleeding tongue mass        Review of Systems   Constitutional:  Negative for chills and fever.   HENT:  Negative for ear pain and sore throat.    Eyes:  Negative for pain and visual disturbance.   Respiratory:  Negative for cough and shortness of breath.    Cardiovascular:  Negative for chest pain and palpitations.   Gastrointestinal:  Negative for abdominal pain and vomiting.   Genitourinary:  Negative for dysuria and hematuria.   Musculoskeletal:  Negative for arthralgias and back pain.   Skin:  Negative for color change and rash.   Neurological:  Negative for seizures and syncope.   All other systems reviewed and are negative.          Objective       ED Triage Vitals [07/24/25 2053]   Temperature Pulse Blood Pressure Respirations SpO2 Patient Position - Orthostatic VS   97.8 °F (36.6 °C) (!) 120 (!) 172/79 19 99 % --      Temp src Heart Rate Source BP Location FiO2 (%) Pain Score    -- -- -- -- --      Vitals      Date and Time Temp Pulse SpO2 Resp BP Pain Score FACES Pain Rating User   07/24/25 2156 -- 102 -- -- -- -- -- BM   07/24/25 2053 97.8 °F (36.6 °C) 120 99 % 19 172/79 -- -- AF            Physical Exam  Vitals and nursing note reviewed.   Constitutional:       General: She is not in acute distress.     Appearance: She is well-developed.   HENT:      Head: Normocephalic and atraumatic.      Mouth/Throat:       Eyes:      Conjunctiva/sclera: Conjunctivae normal.       Cardiovascular:      Rate and Rhythm: Normal rate and regular rhythm.      Heart sounds: No murmur heard.  Pulmonary:      Effort: Pulmonary effort is normal. No respiratory distress.      Breath sounds: Normal breath  sounds.   Abdominal:      Palpations: Abdomen is soft.      Tenderness: There is no abdominal tenderness.     Musculoskeletal:         General: No swelling.      Cervical back: Neck supple.     Skin:     General: Skin is warm and dry.      Capillary Refill: Capillary refill takes less than 2 seconds.     Neurological:      Mental Status: She is alert.     Psychiatric:         Mood and Affect: Mood normal.         Results Reviewed       Procedure Component Value Units Date/Time    Basic metabolic panel [[id number]]  (Abnormal) Collected: 07/24/25 2126    Lab Status: Final result Specimen: Blood from Arm, Right Updated: 07/24/25 2147     Sodium 135 mmol/L      Potassium 3.8 mmol/L      Chloride 99 mmol/L      CO2 24 mmol/L      ANION GAP 12 mmol/L      BUN 37 mg/dL      Creatinine 1.01 mg/dL      Glucose 189 mg/dL      Calcium 9.1 mg/dL      eGFR 65 ml/min/1.73sq m     Narrative:      National Kidney Disease Foundation guidelines for Chronic Kidney Disease (CKD):     Stage 1 with normal or high GFR (GFR > 90 mL/min/1.73 square meters)    Stage 2 Mild CKD (GFR = 60-89 mL/min/1.73 square meters)    Stage 3A Moderate CKD (GFR = 45-59 mL/min/1.73 square meters)    Stage 3B Moderate CKD (GFR = 30-44 mL/min/1.73 square meters)    Stage 4 Severe CKD (GFR = 15-29 mL/min/1.73 square meters)    Stage 5 End Stage CKD (GFR <15 mL/min/1.73 square meters)  Note: GFR calculation is accurate only with a steady state creatinine    Hemoglobin and hematocrit, blood [[id number]]  (Normal) Collected: 07/24/25 2126    Lab Status: Final result Specimen: Blood from Arm, Right Updated: 07/24/25 2131     Hemoglobin 12.1 g/dL      Hematocrit 38.2 %             No orders to display       Procedures    ED Medication and Procedure Management   Prior to Admission Medications   Prescriptions Last Dose Informant Patient Reported? Taking?   Alcohol Swabs 70 % PADS  Self No No   Sig: May substitute brand based on insurance coverage. Check glucose TID.  "  Blood Glucose Monitoring Suppl (OneTouch Verio Reflect) w/Device KIT  Self No No   Sig: May substitute brand based on insurance coverage. Check glucose TID.   Continuous Glucose  (Dexcom G6 ) SUSAN  Self No No   Sig: Use daily   Continuous Glucose  (FreeStyle Carlie 3 [place]) SUSAN  Self No No   Sig: To check blood sugar continuously   Continuous Glucose Sensor (Dexcom G6 Sensor) MISC   No No   Sig: Change every 10 days   Continuous Glucose Transmitter (Dexcom G6 Transmitter) MISC   No No   Sig: To check blood sugar continuously on a change every 10 days.   DULoxetine (CYMBALTA) 60 mg delayed release capsule   No No   Sig: Take 1 capsule (60 mg total) by mouth daily   Insulin Glargine Solostar (Lantus SoloStar) 100 UNIT/ML SOPN  Self No No   Sig: Inject 0.22 mL (22 Units total) under the skin daily at bedtime   Insulin Pen Needle (BD Pen Needle Ana 2nd Gen) 32G X 4 MM MISC  Self No No   Sig: For use with insulin pen. Pharmacy may dispense brand covered by insurance.   Insulin Pen Needle (BD Pen Needle Ana 2nd Gen) 32G X 4 MM MISC   No No   Sig: For use with insulin pen 4 times daily. Pharmacy may dispense brand covered by insurance.   Insulin Syringe-Needle U-100 30G X 1/2\" 0.3 ML MISC  Self No No   Sig: Use 4 times a day   OneTouch Delica Lancets 33G MISC  Self No No   Sig: May substitute brand based on insurance coverage. Check glucose TID.   Tirzepatide (Mounjaro) 7.5 MG/0.5ML SOAJ   No No   Sig: Inject 7.5 mg under the skin every 7 days   acetaminophen (TYLENOL) 325 mg tablet  Self No No   Sig: Take 2 tablets (650 mg total) by mouth every 6 (six) hours as needed for mild pain   apixaban (Eliquis) 5 mg   No No   Sig: Take 2 tablets (10 mg total) by mouth 2 (two) times a day for 7 days, THEN 1 tablet (5 mg total) 2 (two) times a day for 23 days.   aspirin (ECOTRIN LOW STRENGTH) 81 mg EC tablet  Self No No   Sig: Take 1 tablet (81 mg total) by mouth daily   atorvastatin (LIPITOR) 80 mg " tablet  Self No No   Sig: Take 1 tablet (80 mg total) by mouth every evening   chlorthalidone 25 mg tablet  Self No No   Sig: Take 0.5 tablets (12.5 mg total) by mouth daily   gabapentin (NEURONTIN) 800 mg tablet   No No   Sig: Take 1 tablet (800 mg total) by mouth 3 (three) times a day   glucose blood (OneTouch Verio) test strip  Self No No   Sig: May substitute brand based on insurance coverage. Check glucose TID.   insulin lispro (HumaLOG KwikPen) 100 units/mL injection pen  Self No No   Sig: Inject 10 Units under the skin 3 (three) times a day with meals   lidocaine (LIDODERM) 5 %   No No   Sig: Apply 1 patch topically daily over 12 hours Remove & Discard patch within 12 hours or as directed by MD   methocarbamol (ROBAXIN) 750 mg tablet  Self No No   Sig: Take 1 tablet (750 mg total) by mouth every 8 (eight) hours for 14 days   ondansetron (ZOFRAN) 4 mg tablet  Self No No   Sig: Take 1 tablet (4 mg total) by mouth every 8 (eight) hours as needed for nausea or vomiting   pantoprazole (PROTONIX) 40 mg tablet   No No   Sig: Take 1 tablet (40 mg total) by mouth daily in the early morning   traZODone (DESYREL) 100 mg tablet   No No   Sig: Take 1 tablet (100 mg total) by mouth daily at bedtime      Facility-Administered Medications: None     Patient's Medications   Discharge Prescriptions    No medications on file     No discharge procedures on file.  ED SEPSIS DOCUMENTATION              [1]   Past Medical History:  Diagnosis Date    Advanced maternal age in multigravida, second trimester 11/30/2016    Transitioned From: Advanced maternal age in multigravida, first trimester      Back pain     used 2 percocet tid until current admission in 2/2017    Bone spur     in back per pt    Cellulitis of left foot complicated by Critical limb threatening ischemia of the left lower extremity with a nonhealing transmetatarsal amputation wound 12/13/2024    Chronic hypertension with superimposed preeclampsia 02/14/2017     "Depression     Diabetes mellitus (HCC)     Diabetic foot infection  (HCC) 2025    Elevated BP without diagnosis of hypertension     \"with pain\"    Foot injury     Full dentures     does not wear    Gangrene of left foot (HCC) 2024    GERD (gastroesophageal reflux disease)     occas    Gestational diabetes     insulin dependent    Herniated cervical disc     Herniated lumbar intervertebral disc     History of pneumonia     Hx of degenerative disc disease     Hyperlipidemia     Obesity     Pre-eclampsia     Prior pregnancy with fetal demise     previous demise at 36 weeks    Toe pain     and foot pain   [2]   Past Surgical History:  Procedure Laterality Date    ARTERIOGRAM Left 2025    Procedure: LEFT lower extremity arteriogram w/ popiteal and anterior tibial artery angioplasty;  Surgeon: Ottoniel Victoria MD;  [facility]: [facility];  Service: Vascular    BACK SURGERY       SECTION      INCISION AND DRAINAGE OF WOUND Left 2025    Procedure: INCISION AND DRAINAGE (I&D) EXTREMITY;  Surgeon: Leopoldo Ritchie DPM;  Location: [facility];  Service: Podiatry    IR LOWER EXTREMITY ANGIOGRAM  2024    IR LOWER EXTREMITY ANGIOGRAM  2025    IR LOWER EXTREMITY ANGIOGRAM  2025    IR PICC PLACEMENT DOUBLE LUMEN  2025    IR TPA LYSIS CHECK  2025    IR TPA LYSIS CHECK  2025    LAMINECTOMY      with disc removal, last assessed 16    LEG AMPUTATION THROUGH LOWER TIBIA AND FIBULA Left 2025    Procedure: Left BKA;  Surgeon: Shane Arauz DO;  Location: [facility];  Service: Vascular    OTHER SURGICAL HISTORY      [facility]    PERIPHERAL ANGIOGRAM      ID AMPUTATION FOOT TRANSMETARSAL Left 2025    Procedure: AMPUTATION TRANSMETATARSAL (TMA);  Surgeon: Leopoldo Ritchie DPM;  Location: [facility];  Service: Podiatry    ID  DELIVERY ONLY N/A 02/15/2017    " Procedure:  SECTION ();  Surgeon: Tito Umaña MD;  Location: [facility] ;  Service: Obstetrics    NJ LIG/TRNSXJ FALOPIAN TUBE  DEL/ABDML SURG Left 02/15/2017    Procedure: LIGATION/COAGULATION TUBAL;  Surgeon: Tito Umaña MD;  [facility];  Service: Obstetrics    VASCULAR SURGERY  2024    WISDOM TOOTH EXTRACTION     [3]   Family History  Problem Relation Name Age of Onset    Diabetes Mother      COPD Mother      Heart disease Mother      Stroke Father      Heart disease Father     [4]   Social History  Tobacco Use    Smoking status: Former     Current packs/day: 0.00     Types: Cigarettes     Quit date: 2024     Years since quittin.6    Smokeless tobacco: Never    Tobacco comments:     Per pt last cigarette 24--quit cold turkey   Vaping Use    Vaping status: Never Used   Substance Use Topics    Alcohol use: Not Currently     Comment: 1-2 drinks a year    Drug use: Never        Nakul Varner MD  [phone]

## 2025-07-25 NOTE — ED ATTENDING ATTESTATION
7/24/2025  I, James Iglesias MD, saw and evaluated the patient. I have discussed the patient with the resident/non-physician practitioner and agree with the resident's/non-physician practitioner's findings, Plan of Care, and MDM as documented in the resident's/non-physician practitioner's note, except where noted. All available labs and Radiology studies were reviewed.  I was present for key portions of any procedure(s) performed by the resident/non-physician practitioner and I was immediately available to provide assistance.       At this point I agree with the current assessment done in the Emergency Department.  I have conducted an independent evaluation of this patient a history and physical is as follows:    ED Course  ED Course as of 07/24/25 2256   Thu Jul 24, 2025 2154 BUN(!): 37   2154 Creatinine: 1.01   2154 Plan for IV fluids     Patient 50-year-old female presenting for concerns of bleeding mass/blister on her tongue that began bleeding while she was having ice cream earlier today.  Past medical history significant for recent recent admission to the hospital for electrolyte abnormality, renal vein thrombosis for which she was placed on Eliquis.  Bleeding is since stopped but patient wanted to have her tongue looked at.  Denies any other symptoms.  States the mass was first noticed about a week ago and is concerned it has increased in size since that time.    Review of Systems   Constitutional: Negative.    HENT:          Tongue mass with bleeding   Eyes: Negative.    Respiratory: Negative.     Cardiovascular: Negative.    Gastrointestinal: Negative.    Endocrine: Negative.    Genitourinary: Negative.    Musculoskeletal: Negative.    Skin: Negative.    Allergic/Immunologic: Negative.    Neurological: Negative.    Hematological: Negative.    Psychiatric/Behavioral: Negative.     All other systems reviewed and are negative.    Physical Exam  Vitals and nursing note reviewed.   Constitutional:        Appearance: Normal appearance. She is normal weight.   HENT:      Head: Normocephalic and atraumatic.      Right Ear: Tympanic membrane, ear canal and external ear normal.      Left Ear: Tympanic membrane, ear canal and external ear normal.      Nose: Nose normal.      Mouth/Throat:      Comments: Patient has a small circular mass on the left side of her tongue, bleeding stopped at this time, appears possibly malignant/cancerous.  See picture below for full characterization.    Eyes:      Extraocular Movements: Extraocular movements intact.      Conjunctiva/sclera: Conjunctivae normal.      Pupils: Pupils are equal, round, and reactive to light.       Cardiovascular:      Rate and Rhythm: Regular rhythm. Tachycardia present.      Heart sounds: Normal heart sounds.   Pulmonary:      Effort: Pulmonary effort is normal.      Breath sounds: Normal breath sounds.   Abdominal:      General: Abdomen is flat. Bowel sounds are normal.      Palpations: Abdomen is soft.     Musculoskeletal:         General: Normal range of motion.      Cervical back: Normal range of motion and neck supple.     Skin:     General: Skin is warm and dry.      Capillary Refill: Capillary refill takes less than 2 seconds.     Neurological:      General: No focal deficit present.      Mental Status: She is alert and oriented to person, place, and time.     Psychiatric:         Mood and Affect: Mood normal.         Behavior: Behavior normal.         Thought Content: Thought content normal.         Judgment: Judgment normal.       MDM:  Patient is a 50-year-old female presenting for concerns of bleeding mass on her tongue.  DDx: Malignancy, mass, electrolyte abnormality, STACEY, anemia  Based on patient presentation and physical exam findings, primary concern is for treatment/workup of malignancy/mass.  ENT was contacted and states that they will see her in the outpatient office on Monday.  Labs concerning for STACEY, will plan for IV fluid hydration for  treatment of STACEY and discharge following IV fluids.  H&H unremarkable.  Return precautions given.  Patient understands agrees with plan.  Will follow-up with ENT on Monday with strict return precautions.  Ready for discharge.    Critical Care Time  Procedures

## 2025-07-25 NOTE — DISCHARGE INSTRUCTIONS
You will be called tomorrow for scheduling of ENT for a visit on Monday.  If you have any worsening bleeding please put a teabag over the area.  It is likely that the area will be removed and cauterized in the office by ENT at that time.

## 2025-07-25 NOTE — TELEPHONE ENCOUNTER
PT has STAT referral and needs to be seen  I called her and let her know that I would ask the offices if they can get her for an appt as soon as possible but the office is closing so they will not get back to her until Monday.  I did let the pt know to contact her ins and see if there is anyone else in the area that takes her ins

## 2025-07-25 NOTE — ASSESSMENT & PLAN NOTE
Admitted for N/V 7/17/25 - 7/19-25  Found to have new L renval vein thrombus  Received heparin - transitioned to Eliquis  IR deemed no thrombectomy necessary    Repeat labs ordered  Scheduled for consult with Nephro    Schedule 3 mo annual with DM2 review  Pt counseled

## 2025-07-25 NOTE — TELEPHONE ENCOUNTER
I called and spoke with Lizzy. She is scheduled to be seen August 12th 2025 at 11am with Dr Benoit in  for a Consult Appt. She is aware of labs to be completed prior to visit.  Lab orders placed.

## 2025-07-25 NOTE — PROGRESS NOTES
Transition of Care Visit:  Name: Lizzy Acuna      : 1975      MRN: [id number]  Encounter Provider: NAOMY Johnson  Encounter Date: 2025   Encounter department: [facility]    Assessment & Plan  Renal vein thrombosis (HCC)    Admitted for N/V 25 -   Found to have new L renval vein thrombus  Received heparin - transitioned to Eliquis  IR deemed no thrombectomy necessary    Repeat labs ordered  Scheduled for consult with Nephro    Schedule 3 mo annual with DM2 review  Pt counseled         Tongue mass    States she was told to get STAT referral from PCP d/t multiple ENTs not accepting her insurance  Referral placed    Orders:    Ambulatory Referral to Otolaryngology; Future    Mouth bleeding    No active bleeding at this time    Orders:    Ambulatory Referral to Otolaryngology; Future    Encounter for support and coordination of transition of care         Encounter for screening mammogram for breast cancer    Orders:    Mammo screening bilateral w 3d and cad; Future    Encounter for gynecological examination    Orders:    Ambulatory referral to Obstetrics / Gynecology; Future    Screening for colon cancer    Orders:    Ambulatory Referral to Gastroenterology; Future    Cologuard      Depression Screening and Follow-up Plan: Patient was screened for depression during today's encounter. They screened negative with a PHQ-2 score of 0.          History of Present Illness     Transitional Care Management Review:   Lizzy Acuna is a 50 y.o. female here for TCM follow up.     During the TCM phone call patient stated:  TCM Call (since 2025)       Date and time call was made  2025  7:12 AM    Hospital care reviewed  Records reviewed    Patient was hospitialized at  [facility]    Date of Admission  25    Date of discharge  25    Diagnosis  Renal vein thrombosis    Disposition  Home          TCM Call (since 2025)       Scheduled for  "follow up?  Yes    I have advised the patient to call PCP with any new or worsening symptoms  vanda wiggins            Review of Systems   Constitutional:  Negative for chills, fatigue and fever.   HENT:  Negative for congestion, ear pain, facial swelling, hearing loss, rhinorrhea, sinus pressure, sneezing, sore throat and trouble swallowing.    Eyes:  Negative for pain, redness and visual disturbance.   Respiratory:  Negative for cough, chest tightness, shortness of breath and wheezing.    Cardiovascular:  Negative for chest pain and palpitations.   Gastrointestinal:  Negative for abdominal pain, diarrhea, nausea and vomiting.   Genitourinary:  Negative for dysuria, flank pain, hematuria and pelvic pain.   Musculoskeletal:  Negative for arthralgias, back pain and myalgias.   Skin:  Negative for color change and rash.   Neurological:  Negative for dizziness, seizures, syncope, weakness, light-headedness and headaches.   Psychiatric/Behavioral:  Negative for confusion, hallucinations and sleep disturbance. The patient is not nervous/anxious.    All other systems reviewed and are negative.    Objective   /88   Pulse (!) 111   Temp 97.7 °F (36.5 °C)   Ht 5' 1\" (1.549 m)   Wt 84.8 kg (187 lb)   SpO2 98%   BMI 35.33 kg/m²     Physical Exam  Vitals and nursing note reviewed.   Constitutional:       General: She is not in acute distress.     Appearance: She is well-developed.   HENT:      Head: Normocephalic and atraumatic.      Right Ear: Hearing and tympanic membrane normal.      Left Ear: Hearing and tympanic membrane normal.      Nose: Nose normal.      Mouth/Throat:      Mouth: Mucous membranes are moist.      Dentition: Normal dentition.      Tongue: Lesions present.      Pharynx: Oropharynx is clear. Uvula midline. No oropharyngeal exudate.      Tonsils: No tonsillar exudate.        Comments: Mass visualized; no active bleeding at this time    Eyes:      Extraocular Movements: Extraocular movements intact. "      Conjunctiva/sclera: Conjunctivae normal.     Neck:      Vascular: No carotid bruit or JVD.     Cardiovascular:      Rate and Rhythm: Normal rate and regular rhythm.      Heart sounds: S1 normal and S2 normal. No murmur heard.  Pulmonary:      Effort: Pulmonary effort is normal. No tachypnea or respiratory distress.      Breath sounds: Normal breath sounds and air entry. No decreased breath sounds.   Chest:      Chest wall: No deformity or tenderness.   Abdominal:      General: Abdomen is flat. Bowel sounds are normal. There is no distension.      Palpations: Abdomen is soft.      Tenderness: There is no abdominal tenderness. There is no right CVA tenderness, left CVA tenderness or guarding.     Musculoskeletal:         General: No swelling.      Cervical back: Full passive range of motion without pain and neck supple.      Right lower leg: No edema.      Left lower leg: No edema.      Comments: +L prosthesis in place  Ambulating with rolling walker   Lymphadenopathy:      Cervical: No cervical adenopathy.     Skin:     General: Skin is warm and dry.      Capillary Refill: Capillary refill takes less than 2 seconds.      Findings: No rash.     Neurological:      Mental Status: She is alert and oriented to person, place, and time.      Cranial Nerves: Cranial nerves 2-12 are intact.      Sensory: Sensation is intact.      Motor: Motor function is intact.      Coordination: Coordination is intact.      Gait: Gait is intact.     Psychiatric:         Mood and Affect: Mood normal.         Behavior: Behavior is cooperative.       Medications have been reviewed by provider in current encounter

## 2025-07-28 ENCOUNTER — OFFICE VISIT (OUTPATIENT)
Dept: PHYSICAL THERAPY | Facility: CLINIC | Age: 50
End: 2025-07-28
Payer: COMMERCIAL

## 2025-07-28 DIAGNOSIS — Z89.512 STATUS POST BELOW-KNEE AMPUTATION OF LEFT LOWER EXTREMITY (HCC): Primary | ICD-10-CM

## 2025-07-28 PROCEDURE — 97110 THERAPEUTIC EXERCISES: CPT

## 2025-07-28 PROCEDURE — 97112 NEUROMUSCULAR REEDUCATION: CPT

## 2025-07-30 ENCOUNTER — OFFICE VISIT (OUTPATIENT)
Dept: PHYSICAL THERAPY | Facility: CLINIC | Age: 50
End: 2025-07-30
Payer: COMMERCIAL

## 2025-07-30 ENCOUNTER — APPOINTMENT (OUTPATIENT)
Dept: LAB | Facility: CLINIC | Age: 50
End: 2025-07-30
Attending: STUDENT IN AN ORGANIZED HEALTH CARE EDUCATION/TRAINING PROGRAM
Payer: COMMERCIAL

## 2025-07-30 DIAGNOSIS — Z89.512 STATUS POST BELOW-KNEE AMPUTATION OF LEFT LOWER EXTREMITY (HCC): Primary | ICD-10-CM

## 2025-07-30 DIAGNOSIS — R80.9 PROTEINURIA, UNSPECIFIED TYPE: ICD-10-CM

## 2025-07-30 DIAGNOSIS — E11.8 DIABETES MELLITUS TYPE 2 WITH COMPLICATIONS (HCC): ICD-10-CM

## 2025-07-30 LAB
25(OH)D3 SERPL-MCNC: 14.5 NG/ML (ref 30–100)
ALBUMIN SERPL BCG-MCNC: 3.9 G/DL (ref 3.5–5)
ALP SERPL-CCNC: 74 U/L (ref 34–104)
ALT SERPL W P-5'-P-CCNC: 16 U/L (ref 7–52)
ANION GAP SERPL CALCULATED.3IONS-SCNC: 11 MMOL/L (ref 4–13)
AST SERPL W P-5'-P-CCNC: 15 U/L (ref 13–39)
BASOPHILS # BLD AUTO: 0.03 THOUSANDS/ÂΜL (ref 0–0.1)
BASOPHILS NFR BLD AUTO: 0 % (ref 0–1)
BILIRUB SERPL-MCNC: 0.25 MG/DL (ref 0.2–1)
BUN SERPL-MCNC: 23 MG/DL (ref 5–25)
CALCIUM SERPL-MCNC: 9.5 MG/DL (ref 8.4–10.2)
CHLORIDE SERPL-SCNC: 102 MMOL/L (ref 96–108)
CO2 SERPL-SCNC: 26 MMOL/L (ref 21–32)
CREAT SERPL-MCNC: 0.71 MG/DL (ref 0.6–1.3)
EOSINOPHIL # BLD AUTO: 0.1 THOUSAND/ÂΜL (ref 0–0.61)
EOSINOPHIL NFR BLD AUTO: 1 % (ref 0–6)
ERYTHROCYTE [DISTWIDTH] IN BLOOD BY AUTOMATED COUNT: 14.3 % (ref 11.6–15.1)
EST. AVERAGE GLUCOSE BLD GHB EST-MCNC: 169 MG/DL
GFR SERPL CREATININE-BSD FRML MDRD: 100 ML/MIN/1.73SQ M
GLUCOSE P FAST SERPL-MCNC: 151 MG/DL (ref 65–99)
HBA1C MFR BLD: 7.5 %
HCT VFR BLD AUTO: 31.6 % (ref 34.8–46.1)
HGB BLD-MCNC: 10.3 G/DL (ref 11.5–15.4)
IMM GRANULOCYTES # BLD AUTO: 0.03 THOUSAND/UL (ref 0–0.2)
IMM GRANULOCYTES NFR BLD AUTO: 0 % (ref 0–2)
LYMPHOCYTES # BLD AUTO: 2.47 THOUSANDS/ÂΜL (ref 0.6–4.47)
LYMPHOCYTES NFR BLD AUTO: 31 % (ref 14–44)
MAGNESIUM SERPL-MCNC: 0.9 MG/DL (ref 1.9–2.7)
MCH RBC QN AUTO: 28.3 PG (ref 26.8–34.3)
MCHC RBC AUTO-ENTMCNC: 32.6 G/DL (ref 31.4–37.4)
MCV RBC AUTO: 87 FL (ref 82–98)
MONOCYTES # BLD AUTO: 0.42 THOUSAND/ÂΜL (ref 0.17–1.22)
MONOCYTES NFR BLD AUTO: 5 % (ref 4–12)
NEUTROPHILS # BLD AUTO: 5.05 THOUSANDS/ÂΜL (ref 1.85–7.62)
NEUTS SEG NFR BLD AUTO: 63 % (ref 43–75)
NRBC BLD AUTO-RTO: 0 /100 WBCS
PHOSPHATE SERPL-MCNC: 3.6 MG/DL (ref 2.7–4.5)
PLATELET # BLD AUTO: 217 THOUSANDS/UL (ref 149–390)
PMV BLD AUTO: 10.9 FL (ref 8.9–12.7)
POTASSIUM SERPL-SCNC: 3.8 MMOL/L (ref 3.5–5.3)
PROT SERPL-MCNC: 7.2 G/DL (ref 6.4–8.4)
PTH-INTACT SERPL-MCNC: 37.3 PG/ML (ref 12–88)
RBC # BLD AUTO: 3.64 MILLION/UL (ref 3.81–5.12)
SODIUM SERPL-SCNC: 139 MMOL/L (ref 135–147)
URATE SERPL-MCNC: 9.2 MG/DL (ref 2–7.5)
WBC # BLD AUTO: 8.1 THOUSAND/UL (ref 4.31–10.16)

## 2025-07-30 PROCEDURE — 84100 ASSAY OF PHOSPHORUS: CPT

## 2025-07-30 PROCEDURE — 83970 ASSAY OF PARATHORMONE: CPT

## 2025-07-30 PROCEDURE — 80053 COMPREHEN METABOLIC PANEL: CPT

## 2025-07-30 PROCEDURE — 83735 ASSAY OF MAGNESIUM: CPT

## 2025-07-30 PROCEDURE — 97110 THERAPEUTIC EXERCISES: CPT

## 2025-07-30 PROCEDURE — 85025 COMPLETE CBC W/AUTO DIFF WBC: CPT

## 2025-07-30 PROCEDURE — 83036 HEMOGLOBIN GLYCOSYLATED A1C: CPT

## 2025-07-30 PROCEDURE — 97116 GAIT TRAINING THERAPY: CPT

## 2025-07-30 PROCEDURE — 84550 ASSAY OF BLOOD/URIC ACID: CPT

## 2025-07-30 PROCEDURE — 36415 COLL VENOUS BLD VENIPUNCTURE: CPT

## 2025-07-30 PROCEDURE — 82306 VITAMIN D 25 HYDROXY: CPT

## 2025-07-30 PROCEDURE — 97112 NEUROMUSCULAR REEDUCATION: CPT

## 2025-07-31 ENCOUNTER — HOSPITAL ENCOUNTER (EMERGENCY)
Facility: HOSPITAL | Age: 50
Discharge: HOME | End: 2025-07-31
Attending: EMERGENCY MEDICINE | Admitting: EMERGENCY MEDICINE
Payer: COMMERCIAL

## 2025-07-31 VITALS
BODY MASS INDEX: 35.13 KG/M2 | DIASTOLIC BLOOD PRESSURE: 66 MMHG | SYSTOLIC BLOOD PRESSURE: 151 MMHG | WEIGHT: 186.07 LBS | HEART RATE: 84 BPM | RESPIRATION RATE: 18 BRPM | HEIGHT: 61 IN | OXYGEN SATURATION: 96 % | TEMPERATURE: 97 F

## 2025-07-31 DIAGNOSIS — E83.42 HYPOMAGNESEMIA: Primary | ICD-10-CM

## 2025-07-31 LAB
ALBUMIN SERPL BCG-MCNC: 3.8 G/DL (ref 3.5–5)
ALP SERPL-CCNC: 72 U/L (ref 34–104)
ALT SERPL W P-5'-P-CCNC: 16 U/L (ref 7–52)
ANION GAP SERPL CALCULATED.3IONS-SCNC: 10 MMOL/L (ref 4–13)
AST SERPL W P-5'-P-CCNC: 13 U/L (ref 13–39)
BASOPHILS # BLD AUTO: 0.02 THOUSANDS/ÂΜL (ref 0–0.1)
BASOPHILS NFR BLD AUTO: 0 % (ref 0–1)
BILIRUB SERPL-MCNC: 0.34 MG/DL (ref 0.2–1)
BUN SERPL-MCNC: 24 MG/DL (ref 5–25)
CALCIUM SERPL-MCNC: 8.9 MG/DL (ref 8.4–10.2)
CHLORIDE SERPL-SCNC: 105 MMOL/L (ref 96–108)
CO2 SERPL-SCNC: 25 MMOL/L (ref 21–32)
CREAT SERPL-MCNC: 0.76 MG/DL (ref 0.6–1.3)
EOSINOPHIL # BLD AUTO: 0.11 THOUSAND/ÂΜL (ref 0–0.61)
EOSINOPHIL NFR BLD AUTO: 1 % (ref 0–6)
ERYTHROCYTE [DISTWIDTH] IN BLOOD BY AUTOMATED COUNT: 14.1 % (ref 11.6–15.1)
GFR SERPL CREATININE-BSD FRML MDRD: 92 ML/MIN/1.73SQ M
GLUCOSE SERPL-MCNC: 183 MG/DL (ref 65–140)
HCT VFR BLD AUTO: 33.7 % (ref 34.8–46.1)
HGB BLD-MCNC: 10.8 G/DL (ref 11.5–15.4)
IMM GRANULOCYTES # BLD AUTO: 0.04 THOUSAND/UL (ref 0–0.2)
IMM GRANULOCYTES NFR BLD AUTO: 0 % (ref 0–2)
LYMPHOCYTES # BLD AUTO: 2.75 THOUSANDS/ÂΜL (ref 0.6–4.47)
LYMPHOCYTES NFR BLD AUTO: 30 % (ref 14–44)
MAGNESIUM SERPL-MCNC: 0.9 MG/DL (ref 1.9–2.7)
MAGNESIUM SERPL-MCNC: 2.7 MG/DL (ref 1.9–2.7)
MCH RBC QN AUTO: 28.3 PG (ref 26.8–34.3)
MCHC RBC AUTO-ENTMCNC: 32 G/DL (ref 31.4–37.4)
MCV RBC AUTO: 88 FL (ref 82–98)
MONOCYTES # BLD AUTO: 0.4 THOUSAND/ÂΜL (ref 0.17–1.22)
MONOCYTES NFR BLD AUTO: 4 % (ref 4–12)
NEUTROPHILS # BLD AUTO: 5.77 THOUSANDS/ÂΜL (ref 1.85–7.62)
NEUTS SEG NFR BLD AUTO: 65 % (ref 43–75)
NRBC BLD AUTO-RTO: 0 /100 WBCS
PHOSPHATE SERPL-MCNC: 3.4 MG/DL (ref 2.7–4.5)
PLATELET # BLD AUTO: 201 THOUSANDS/UL (ref 149–390)
PMV BLD AUTO: 10.4 FL (ref 8.9–12.7)
POTASSIUM SERPL-SCNC: 3.5 MMOL/L (ref 3.5–5.3)
PROT SERPL-MCNC: 6.9 G/DL (ref 6.4–8.4)
RBC # BLD AUTO: 3.82 MILLION/UL (ref 3.81–5.12)
SODIUM SERPL-SCNC: 140 MMOL/L (ref 135–147)
WBC # BLD AUTO: 9.09 THOUSAND/UL (ref 4.31–10.16)

## 2025-07-31 PROCEDURE — 96366 THER/PROPH/DIAG IV INF ADDON: CPT

## 2025-07-31 PROCEDURE — 84100 ASSAY OF PHOSPHORUS: CPT

## 2025-07-31 PROCEDURE — 93005 ELECTROCARDIOGRAM TRACING: CPT

## 2025-07-31 PROCEDURE — 96365 THER/PROPH/DIAG IV INF INIT: CPT

## 2025-07-31 PROCEDURE — 85025 COMPLETE CBC W/AUTO DIFF WBC: CPT

## 2025-07-31 PROCEDURE — 80053 COMPREHEN METABOLIC PANEL: CPT

## 2025-07-31 PROCEDURE — 99283 EMERGENCY DEPT VISIT LOW MDM: CPT

## 2025-07-31 PROCEDURE — 83735 ASSAY OF MAGNESIUM: CPT

## 2025-07-31 PROCEDURE — 99284 EMERGENCY DEPT VISIT MOD MDM: CPT

## 2025-07-31 PROCEDURE — 36415 COLL VENOUS BLD VENIPUNCTURE: CPT

## 2025-07-31 RX ORDER — LANOLIN ALCOHOL/MO/W.PET/CERES
400 CREAM (GRAM) TOPICAL ONCE
Status: COMPLETED | OUTPATIENT
Start: 2025-07-31 | End: 2025-07-31

## 2025-07-31 RX ORDER — MAGNESIUM SULFATE HEPTAHYDRATE 40 MG/ML
4 INJECTION, SOLUTION INTRAVENOUS ONCE
Status: COMPLETED | OUTPATIENT
Start: 2025-07-31 | End: 2025-07-31

## 2025-07-31 RX ADMIN — Medication 400 MG: at 12:47

## 2025-07-31 RX ADMIN — MAGNESIUM SULFATE HEPTAHYDRATE 4 G: 40 INJECTION, SOLUTION INTRAVENOUS at 12:33

## 2025-08-01 ENCOUNTER — TELEPHONE (OUTPATIENT)
Dept: FAMILY MEDICINE CLINIC | Facility: CLINIC | Age: 50
End: 2025-08-01

## 2025-08-01 ENCOUNTER — TELEPHONE (OUTPATIENT)
Dept: NEPHROLOGY | Facility: CLINIC | Age: 50
End: 2025-08-01

## 2025-08-01 ENCOUNTER — DOCUMENTATION (OUTPATIENT)
Dept: FAMILY MEDICINE CLINIC | Facility: CLINIC | Age: 50
End: 2025-08-01

## 2025-08-01 LAB
ATRIAL RATE: 96 BPM
P AXIS: 64 DEGREES
PR INTERVAL: 172 MS
QRS AXIS: 125 DEGREES
QRSD INTERVAL: 80 MS
QT INTERVAL: 368 MS
QTC INTERVAL: 464 MS
T WAVE AXIS: 68 DEGREES
VENTRICULAR RATE: 96 BPM

## 2025-08-01 PROCEDURE — 93010 ELECTROCARDIOGRAM REPORT: CPT | Performed by: INTERNAL MEDICINE

## 2025-08-05 ENCOUNTER — OFFICE VISIT (OUTPATIENT)
Dept: OTOLARYNGOLOGY | Facility: CLINIC | Age: 50
End: 2025-08-05
Payer: COMMERCIAL

## 2025-08-05 ENCOUNTER — OFFICE VISIT (OUTPATIENT)
Dept: PHYSICAL THERAPY | Facility: CLINIC | Age: 50
End: 2025-08-05
Payer: COMMERCIAL

## 2025-08-05 VITALS
HEIGHT: 61 IN | OXYGEN SATURATION: 96 % | DIASTOLIC BLOOD PRESSURE: 82 MMHG | RESPIRATION RATE: 16 BRPM | TEMPERATURE: 97.3 F | BODY MASS INDEX: 35.12 KG/M2 | HEART RATE: 114 BPM | WEIGHT: 186 LBS | SYSTOLIC BLOOD PRESSURE: 137 MMHG

## 2025-08-05 DIAGNOSIS — R80.9 PROTEINURIA, UNSPECIFIED TYPE: Primary | ICD-10-CM

## 2025-08-05 DIAGNOSIS — Z89.512 STATUS POST BELOW-KNEE AMPUTATION OF LEFT LOWER EXTREMITY (HCC): Primary | ICD-10-CM

## 2025-08-05 DIAGNOSIS — K14.8 TONGUE MASS: Primary | ICD-10-CM

## 2025-08-05 PROCEDURE — 88312 SPECIAL STAINS GROUP 1: CPT | Performed by: STUDENT IN AN ORGANIZED HEALTH CARE EDUCATION/TRAINING PROGRAM

## 2025-08-05 PROCEDURE — 88305 TISSUE EXAM BY PATHOLOGIST: CPT | Performed by: STUDENT IN AN ORGANIZED HEALTH CARE EDUCATION/TRAINING PROGRAM

## 2025-08-05 PROCEDURE — 88342 IMHCHEM/IMCYTCHM 1ST ANTB: CPT | Performed by: STUDENT IN AN ORGANIZED HEALTH CARE EDUCATION/TRAINING PROGRAM

## 2025-08-05 PROCEDURE — 97110 THERAPEUTIC EXERCISES: CPT | Performed by: PHYSICAL THERAPIST

## 2025-08-05 PROCEDURE — 97112 NEUROMUSCULAR REEDUCATION: CPT | Performed by: PHYSICAL THERAPIST

## 2025-08-05 PROCEDURE — 41100 BIOPSY OF TONGUE: CPT | Performed by: PHYSICIAN ASSISTANT

## 2025-08-05 PROCEDURE — 99243 OFF/OP CNSLTJ NEW/EST LOW 30: CPT | Performed by: PHYSICIAN ASSISTANT

## 2025-08-05 PROCEDURE — 88341 IMHCHEM/IMCYTCHM EA ADD ANTB: CPT | Performed by: STUDENT IN AN ORGANIZED HEALTH CARE EDUCATION/TRAINING PROGRAM

## 2025-08-06 DIAGNOSIS — F41.9 ANXIETY: ICD-10-CM

## 2025-08-07 ENCOUNTER — OFFICE VISIT (OUTPATIENT)
Dept: PHYSICAL THERAPY | Facility: CLINIC | Age: 50
End: 2025-08-07
Payer: COMMERCIAL

## 2025-08-07 DIAGNOSIS — Z89.512 STATUS POST BELOW-KNEE AMPUTATION OF LEFT LOWER EXTREMITY (HCC): Primary | ICD-10-CM

## 2025-08-07 PROCEDURE — 97110 THERAPEUTIC EXERCISES: CPT | Performed by: PHYSICAL THERAPIST

## 2025-08-07 PROCEDURE — 97112 NEUROMUSCULAR REEDUCATION: CPT | Performed by: PHYSICAL THERAPIST

## 2025-08-08 ENCOUNTER — TELEPHONE (OUTPATIENT)
Dept: ADMINISTRATIVE | Facility: OTHER | Age: 50
End: 2025-08-08

## 2025-08-08 ENCOUNTER — OFFICE VISIT (OUTPATIENT)
Dept: FAMILY MEDICINE CLINIC | Facility: CLINIC | Age: 50
End: 2025-08-08
Payer: COMMERCIAL

## 2025-08-08 VITALS
HEART RATE: 62 BPM | OXYGEN SATURATION: 99 % | SYSTOLIC BLOOD PRESSURE: 126 MMHG | HEIGHT: 61 IN | DIASTOLIC BLOOD PRESSURE: 70 MMHG | WEIGHT: 190 LBS | BODY MASS INDEX: 35.87 KG/M2 | TEMPERATURE: 97.4 F

## 2025-08-08 DIAGNOSIS — Z12.31 ENCOUNTER FOR SCREENING MAMMOGRAM FOR BREAST CANCER: ICD-10-CM

## 2025-08-08 DIAGNOSIS — Z12.11 SCREENING FOR COLON CANCER: ICD-10-CM

## 2025-08-08 DIAGNOSIS — E83.42 HYPOMAGNESEMIA: Primary | ICD-10-CM

## 2025-08-08 DIAGNOSIS — Z12.4 SCREENING FOR CERVICAL CANCER: ICD-10-CM

## 2025-08-08 DIAGNOSIS — K21.9 GASTROESOPHAGEAL REFLUX DISEASE WITHOUT ESOPHAGITIS: ICD-10-CM

## 2025-08-08 PROCEDURE — 99214 OFFICE O/P EST MOD 30 MIN: CPT

## 2025-08-08 RX ORDER — FAMOTIDINE 20 MG/1
20 TABLET, FILM COATED ORAL 2 TIMES DAILY
Qty: 60 TABLET | Refills: 0 | Status: SHIPPED | OUTPATIENT
Start: 2025-08-08 | End: 2026-08-03

## 2025-08-08 RX ORDER — DULOXETIN HYDROCHLORIDE 60 MG/1
60 CAPSULE, DELAYED RELEASE ORAL DAILY
Qty: 30 CAPSULE | Refills: 5 | Status: SHIPPED | OUTPATIENT
Start: 2025-08-08

## 2025-08-08 RX ORDER — MAGNESIUM OXIDE 400 MG/1
400 TABLET ORAL DAILY
Qty: 30 TABLET | Refills: 0 | Status: SHIPPED | OUTPATIENT
Start: 2025-08-08

## 2025-08-12 ENCOUNTER — CONSULT (OUTPATIENT)
Age: 50
End: 2025-08-12
Attending: STUDENT IN AN ORGANIZED HEALTH CARE EDUCATION/TRAINING PROGRAM
Payer: COMMERCIAL

## 2025-08-13 ENCOUNTER — TELEPHONE (OUTPATIENT)
Age: 50
End: 2025-08-13

## 2025-08-14 ENCOUNTER — OFFICE VISIT (OUTPATIENT)
Dept: PHYSICAL THERAPY | Facility: CLINIC | Age: 50
End: 2025-08-14
Payer: COMMERCIAL

## 2025-08-14 ENCOUNTER — APPOINTMENT (OUTPATIENT)
Dept: LAB | Facility: CLINIC | Age: 50
End: 2025-08-14
Attending: INTERNAL MEDICINE
Payer: COMMERCIAL

## 2025-08-14 DIAGNOSIS — R80.9 PROTEINURIA, UNSPECIFIED TYPE: ICD-10-CM

## 2025-08-14 DIAGNOSIS — D36.9 ADENOMA: ICD-10-CM

## 2025-08-14 DIAGNOSIS — E83.42 HYPOMAGNESEMIA: ICD-10-CM

## 2025-08-14 DIAGNOSIS — I82.3 RENAL VEIN THROMBOSIS (HCC): ICD-10-CM

## 2025-08-16 ENCOUNTER — APPOINTMENT (OUTPATIENT)
Dept: LAB | Facility: HOSPITAL | Age: 50
End: 2025-08-16
Payer: COMMERCIAL

## 2025-08-16 DIAGNOSIS — E83.42 HYPOMAGNESEMIA: ICD-10-CM

## 2025-08-16 DIAGNOSIS — I10 HYPERTENSION, UNSPECIFIED TYPE: Primary | ICD-10-CM

## 2025-08-16 LAB
ALBUMIN SERPL BCG-MCNC: 4.4 G/DL (ref 3.5–5)
ALP SERPL-CCNC: 79 U/L (ref 34–104)
ALT SERPL W P-5'-P-CCNC: 19 U/L (ref 7–52)
ANION GAP SERPL CALCULATED.3IONS-SCNC: 15 MMOL/L (ref 4–13)
AST SERPL W P-5'-P-CCNC: 14 U/L (ref 13–39)
BACTERIA UR QL AUTO: ABNORMAL /HPF
BILIRUB SERPL-MCNC: 0.35 MG/DL (ref 0.2–1)
BILIRUB UR QL STRIP: NEGATIVE
BUN SERPL-MCNC: 30 MG/DL (ref 5–25)
C3 SERPL-MCNC: 199 MG/DL (ref 87–200)
C4 SERPL-MCNC: 52 MG/DL (ref 19–52)
CALCIUM SERPL-MCNC: 10.2 MG/DL (ref 8.4–10.2)
CALCIUM SERPL-MCNC: 10.2 MG/DL (ref 8.4–10.2)
CHLORIDE SERPL-SCNC: 97 MMOL/L (ref 96–108)
CLARITY UR: CLEAR
CO2 SERPL-SCNC: 23 MMOL/L (ref 21–32)
COLOR UR: YELLOW
CREAT SERPL-MCNC: 0.81 MG/DL (ref 0.6–1.3)
CREAT UR-MCNC: 128.3 MG/DL
CREAT UR-MCNC: 130.9 MG/DL
GFR SERPL CREATININE-BSD FRML MDRD: 85 ML/MIN/1.73SQ M
GLUCOSE P FAST SERPL-MCNC: 259 MG/DL (ref 65–99)
GLUCOSE UR STRIP-MCNC: NEGATIVE MG/DL
HGB UR QL STRIP.AUTO: NEGATIVE
IGA SERPL-MCNC: 347 MG/DL (ref 66–433)
IGG SERPL-MCNC: 1114 MG/DL (ref 635–1741)
IGM SERPL-MCNC: 108 MG/DL (ref 45–281)
KETONES UR STRIP-MCNC: NEGATIVE MG/DL
LEUKOCYTE ESTERASE UR QL STRIP: NEGATIVE
MAGNESIUM SERPL-MCNC: 1.4 MG/DL (ref 1.9–2.7)
MICROALBUMIN UR-MCNC: 1966.9 MG/L
MICROALBUMIN/CREAT 24H UR: 1533 MG/G CREATININE (ref 0–30)
NITRITE UR QL STRIP: NEGATIVE
NON-SQ EPI CELLS URNS QL MICRO: ABNORMAL /HPF
PH UR STRIP.AUTO: 5.5 [PH]
POTASSIUM SERPL-SCNC: 3.9 MMOL/L (ref 3.5–5.3)
PROT SERPL-MCNC: 8 G/DL (ref 6.4–8.4)
PROT UR STRIP-MCNC: ABNORMAL MG/DL
PROT UR-MCNC: 291.2 MG/DL
PROT/CREAT UR: 2.2 MG/G{CREAT}
RBC #/AREA URNS AUTO: ABNORMAL /HPF
SODIUM SERPL-SCNC: 135 MMOL/L (ref 135–147)
SP GR UR STRIP.AUTO: >=1.03
UROBILINOGEN UR QL STRIP.AUTO: 0.2 E.U./DL
WBC #/AREA URNS AUTO: ABNORMAL /HPF

## 2025-08-16 PROCEDURE — 86780 TREPONEMA PALLIDUM: CPT

## 2025-08-16 PROCEDURE — 86480 TB TEST CELL IMMUN MEASURE: CPT

## 2025-08-16 PROCEDURE — 80053 COMPREHEN METABOLIC PANEL: CPT

## 2025-08-16 PROCEDURE — 86160 COMPLEMENT ANTIGEN: CPT

## 2025-08-16 PROCEDURE — 84244 ASSAY OF RENIN: CPT

## 2025-08-16 PROCEDURE — 82784 ASSAY IGA/IGD/IGG/IGM EACH: CPT

## 2025-08-16 PROCEDURE — 82088 ASSAY OF ALDOSTERONE: CPT

## 2025-08-16 PROCEDURE — 82570 ASSAY OF URINE CREATININE: CPT

## 2025-08-16 PROCEDURE — 83516 IMMUNOASSAY NONANTIBODY: CPT

## 2025-08-16 PROCEDURE — 87389 HIV-1 AG W/HIV-1&-2 AB AG IA: CPT

## 2025-08-16 PROCEDURE — 84156 ASSAY OF PROTEIN URINE: CPT

## 2025-08-16 PROCEDURE — 82384 ASSAY THREE CATECHOLAMINES: CPT

## 2025-08-16 PROCEDURE — 86225 DNA ANTIBODY NATIVE: CPT

## 2025-08-16 PROCEDURE — 86335 IMMUNFIX E-PHORSIS/URINE/CSF: CPT

## 2025-08-16 PROCEDURE — 82043 UR ALBUMIN QUANTITATIVE: CPT

## 2025-08-16 PROCEDURE — 82595 ASSAY OF CRYOGLOBULIN: CPT

## 2025-08-16 PROCEDURE — 83520 IMMUNOASSAY QUANT NOS NONAB: CPT

## 2025-08-16 PROCEDURE — 86037 ANCA TITER EACH ANTIBODY: CPT

## 2025-08-16 PROCEDURE — 83521 IG LIGHT CHAINS FREE EACH: CPT

## 2025-08-16 PROCEDURE — 36415 COLL VENOUS BLD VENIPUNCTURE: CPT

## 2025-08-16 PROCEDURE — 83735 ASSAY OF MAGNESIUM: CPT

## 2025-08-16 PROCEDURE — 83835 ASSAY OF METANEPHRINES: CPT

## 2025-08-16 PROCEDURE — 81001 URINALYSIS AUTO W/SCOPE: CPT

## 2025-08-16 PROCEDURE — 86038 ANTINUCLEAR ANTIBODIES: CPT

## 2025-08-16 PROCEDURE — 80074 ACUTE HEPATITIS PANEL: CPT

## 2025-08-16 PROCEDURE — 84166 PROTEIN E-PHORESIS/URINE/CSF: CPT

## 2025-08-16 PROCEDURE — 86162 COMPLEMENT TOTAL (CH50): CPT

## 2025-08-17 ENCOUNTER — RESULTS FOLLOW-UP (OUTPATIENT)
Dept: OTHER | Facility: HOSPITAL | Age: 50
End: 2025-08-17

## 2025-08-17 LAB
CH50 SERPL-ACNC: >60 U/ML
GAMMA INTERFERON BACKGROUND BLD IA-ACNC: 0.06 IU/ML
HIV 1+2 AB+HIV1 P24 AG SERPL QL IA: NORMAL
M TB IFN-G BLD-IMP: NEGATIVE
M TB IFN-G CD4+ BCKGRND COR BLD-ACNC: 0 IU/ML
M TB IFN-G CD4+ BCKGRND COR BLD-ACNC: 0 IU/ML
MITOGEN IGNF BCKGRD COR BLD-ACNC: 6.51 IU/ML
TREPONEMA PALLIDUM IGG+IGM AB [PRESENCE] IN SERUM OR PLASMA BY IMMUNOASSAY: NORMAL

## 2025-08-18 LAB
GBM AB SER IA-ACNC: <0.2 UNITS (ref 0–0.9)
HAV IGM SER QL: NORMAL
HBV CORE IGM SER QL: NORMAL
HBV SURFACE AG SER QL: NORMAL
HCV AB SER QL: NORMAL
PLA2R IGG SER IA-ACNC: <1.8 RU/ML (ref 0–19.9)

## 2025-08-19 DIAGNOSIS — E83.42 HYPOMAGNESEMIA: Primary | ICD-10-CM

## 2025-08-19 LAB
C-ANCA TITR SER IF: NORMAL TITER
DOPAMINE 24H UR-MRATE: <10 PG/ML (ref 0–36.7)
DSDNA IGG SERPL IA-ACNC: <0.9 IU/ML (ref ?–15)
DSDNA IGG SERPL IA-ACNC: <0.9 IU/ML (ref ?–15)
EPINEPH PLAS-MCNC: <10 PG/ML (ref 0–55.4)
KAPPA LC FREE SER-MCNC: 40.2 MG/L (ref 3.3–19.4)
KAPPA LC FREE/LAMBDA FREE SER: 1.1 {RATIO} (ref 0.26–1.65)
LAMBDA LC FREE SERPL-MCNC: 36.5 MG/L (ref 5.7–26.3)
MYELOPEROXIDASE AB SER IA-ACNC: <0.2 UNITS (ref 0–0.9)
NOREPINEPH PLAS-MCNC: 419 PG/ML (ref 115–524)
NUCLEAR IGG SER IA-RTO: <0.09 RATIO (ref ?–1)
P-ANCA ATYPICAL TITR SER IF: NORMAL TITER
P-ANCA TITR SER IF: NORMAL TITER
PROTEINASE3 AB SER IA-ACNC: <0.2 UNITS (ref 0–0.9)

## 2025-08-20 ENCOUNTER — TELEPHONE (OUTPATIENT)
Age: 50
End: 2025-08-20

## 2025-08-21 ENCOUNTER — OFFICE VISIT (OUTPATIENT)
Dept: PHYSICAL THERAPY | Facility: CLINIC | Age: 50
End: 2025-08-21
Payer: COMMERCIAL

## 2025-08-21 DIAGNOSIS — Z89.512 STATUS POST BELOW-KNEE AMPUTATION OF LEFT LOWER EXTREMITY (HCC): Primary | ICD-10-CM

## 2025-08-21 DIAGNOSIS — I82.3 RENAL VEIN THROMBOSIS (HCC): Primary | ICD-10-CM

## 2025-08-21 LAB
ALDOST SERPL-MCNC: 14 NG/DL (ref 0–30)
CRYOGLOB SER QL 1D COLD INC: NORMAL

## 2025-08-21 PROCEDURE — 97112 NEUROMUSCULAR REEDUCATION: CPT | Performed by: PHYSICAL THERAPIST

## 2025-08-21 PROCEDURE — 97110 THERAPEUTIC EXERCISES: CPT | Performed by: PHYSICAL THERAPIST

## 2025-08-22 LAB
METANEPH FREE SERPL-MCNC: <25 PG/ML (ref 0–88)
NORMETANEPHRINE SERPL-MCNC: 80.8 PG/ML (ref 0–218.9)

## 2025-08-24 LAB
ALBUMIN UR ELPH-MCNC: 73.8 %
ALPHA1 GLOB MFR UR ELPH: 3.7 %
ALPHA2 GLOB MFR UR ELPH: 6.6 %
B-GLOBULIN MFR UR ELPH: 4.3 %
GAMMA GLOB MFR UR ELPH: 11.6 %
PROT UR-MCNC: 262.6 MG/DL

## 2025-08-24 PROCEDURE — 84166 PROTEIN E-PHORESIS/URINE/CSF: CPT | Performed by: STUDENT IN AN ORGANIZED HEALTH CARE EDUCATION/TRAINING PROGRAM

## 2025-08-24 PROCEDURE — 86335 IMMUNFIX E-PHORSIS/URINE/CSF: CPT | Performed by: STUDENT IN AN ORGANIZED HEALTH CARE EDUCATION/TRAINING PROGRAM

## 2025-08-27 LAB — RENIN PLAS-CCNC: 6.86 NG/ML/HR (ref 0.17–5.38)

## (undated) DEVICE — INTENDED FOR TISSUE SEPARATION, AND OTHER PROCEDURES THAT REQUIRE A SHARP SURGICAL BLADE TO PUNCTURE OR CUT.: Brand: BARD-PARKER ® CARBON RIB-BACK BLADES

## (undated) DEVICE — GLOVE SRG BIOGEL 7

## (undated) DEVICE — GIGLI WIRE SAW COARSE 4-WIRES500MM: Brand: AESCULAP

## (undated) DEVICE — MEDI-VAC YANK SUCT HNDL W/TPRD BULBOUS TIP: Brand: CARDINAL HEALTH

## (undated) DEVICE — CATH BAL STERLING OTW 3 X 150MM X 150CM

## (undated) DEVICE — CYSTO TUBING SINGLE IRRIGATION

## (undated) DEVICE — SUT MONOCRYL 4-0 PS-2 27 IN Y426H

## (undated) DEVICE — NEEDLE 25G X 1 1/2

## (undated) DEVICE — KERLIX BANDAGE ROLL: Brand: KERLIX

## (undated) DEVICE — PACK C-SECTION PBDS

## (undated) DEVICE — CHLORAPREP HI-LITE 26ML ORANGE

## (undated) DEVICE — SUT ETHILON 3-0 PS-1 18 IN 1663H

## (undated) DEVICE — CATH DIAG 5FR 0.035IN 65CM BS RBI SIDE PORTS

## (undated) DEVICE — NEPTUNE E-SEP SMOKE EVACUATION PENCIL, COATED, 70MM BLADE, PUSH BUTTON SWITCH: Brand: NEPTUNE E-SEP

## (undated) DEVICE — CAST PADDING 6 IN SYNTHETIC STRL

## (undated) DEVICE — ANTIBACTERIAL VIOLET BRAIDED (POLYGLACTIN 910), SYNTHETIC ABSORBABLE SUTURE: Brand: COATED VICRYL

## (undated) DEVICE — TRAY FOLEY 14FR URIMETER SURESTEP

## (undated) DEVICE — PROBE COVER: Brand: STERILE PROBE COVER

## (undated) DEVICE — CULTURE TUBE AEROBIC

## (undated) DEVICE — RADIFOCUS GLIDEWIRE: Brand: GLIDEWIRE

## (undated) DEVICE — GAUZE SPONGES,16 PLY: Brand: CURITY

## (undated) DEVICE — CURITY STRETCH BANDAGE: Brand: CURITY

## (undated) DEVICE — TELFA NON-ADHERENT ABSORBENT DRESSING: Brand: TELFA

## (undated) DEVICE — PREMIUM DRY TRAY LF: Brand: MEDLINE INDUSTRIES, INC.

## (undated) DEVICE — SUT PLAIN 2-0 CTX 27 IN 872H

## (undated) DEVICE — ACE WRAP 4 IN UNSTERILE

## (undated) DEVICE — CURITY NON-ADHERENT STRIPS: Brand: CURITY

## (undated) DEVICE — IMMOBILIZER KNEE UNIVERSAL 22 IN

## (undated) DEVICE — NON-DEHP HIGH FLOW RATE EXTENSION SET, MALE LUER LOCK ADAPTER

## (undated) DEVICE — GLOVE INDICATOR PI UNDERGLOVE SZ 7 BLUE

## (undated) DEVICE — BETHLEHEM UNIV MAJ EXT ,KIT: Brand: CARDINAL HEALTH

## (undated) DEVICE — ZIMMER® STERILE DISPOSABLE TOURNIQUET CUFF, DUAL PORT, SINGLE BLADDER, 18 IN. (46 CM)

## (undated) DEVICE — PADDING CAST 4 IN  COTTON STRL

## (undated) DEVICE — BETHLEHEM UNIVERSAL  MIONR EXT: Brand: CARDINAL HEALTH

## (undated) DEVICE — GLOVE SRG BIOGEL ECLIPSE 7.5

## (undated) DEVICE — RADIFOCUS GLIDEWIRE ADVANTAGE GUIDEWIRE: Brand: GLIDEWIRE ADVANTAGE

## (undated) DEVICE — Device

## (undated) DEVICE — CULTURE TUBE ANAEROBIC

## (undated) DEVICE — OCCLUSIVE GAUZE STRIP,3% BISMUTH TRIBROMOPHENATE IN PETROLATUM BLEND: Brand: XEROFORM

## (undated) DEVICE — SUT VICRYL 0 CTX 36 IN J978H

## (undated) DEVICE — VAC CANISTER 500ML

## (undated) DEVICE — DISPOSABLE OR TOWEL: Brand: CARDINAL HEALTH

## (undated) DEVICE — ABDOMINAL PAD: Brand: DERMACEA

## (undated) DEVICE — PROXIMATE SKIN STAPLERS (35 WIDE) CONTAINS 35 STAINLESS STEEL STAPLES (FIXED HEAD): Brand: PROXIMATE

## (undated) DEVICE — PACK CUSTOM CARDIOVASCULAR

## (undated) DEVICE — POV-IOD SOLUTION 4OZ BT

## (undated) DEVICE — BULB SYRINGE, IRRIGATION WITH PROTECTIVE CAP, 60 CC, INDIVIDUALLY WRAPPED: Brand: DOVER

## (undated) DEVICE — SUT VICRYL 2-0 CT-1 36 IN J945H

## (undated) DEVICE — GLOVE SRG LF STRL BGL SKNSNS 7 PF

## (undated) DEVICE — CATH DIAG 5FR .035 65CM 6S OMMI-FLUSH

## (undated) DEVICE — SKIN MARKER DUAL TIP WITH RULER CAP, FLEXIBLE RULER AND LABELS: Brand: DEVON

## (undated) DEVICE — GLOVE SRG BIOGEL 6.5

## (undated) DEVICE — MICROPUNCTURE 501

## (undated) DEVICE — INTENDED FOR TISSUE SEPARATION, AND OTHER PROCEDURES THAT REQUIRE A SHARP SURGICAL BLADE TO PUNCTURE OR CUT.: Brand: BARD-PARKER SAFETY BLADES SIZE 10, STERILE

## (undated) DEVICE — SUT SILK 2-0 18 IN A185H

## (undated) DEVICE — RADIFOCUS TORQUE DEVICE MULTI-TORQUE VISE: Brand: RADIFOCUS TORQUE DEVICE

## (undated) DEVICE — PINNACLE R/O II INTRODUCER SHEATH WITH RADIOPAQUE MARKER: Brand: PINNACLE

## (undated) DEVICE — SUT ETHILON 2-0 PS 18 IN 585H

## (undated) DEVICE — PRESTO™ INFLATION DEVICE: Brand: PRESTO

## (undated) DEVICE — CUFF TOURNIQUET 24 X 4 IN QUICK CONNECT DISP 1BLA

## (undated) DEVICE — CURITY IDOFORM PACKING STRIP: Brand: CURITY

## (undated) DEVICE — CATH SUPPORT RUBICON 4FR 0.018IN 150CM STR

## (undated) DEVICE — PENCIL ELECTROSURG E-Z CLEAN -0035H

## (undated) DEVICE — SPONGE LAP 18 X 18 IN

## (undated) DEVICE — ANTIBACTERIAL UNDYED BRAIDED (POLYGLACTIN 910), SYNTHETIC ABSORBABLE SUTURE: Brand: COATED VICRYL

## (undated) DEVICE — PAD GROUNDING DUAL ADULT

## (undated) DEVICE — EXOFIN PRECISION PEN HIGH VISCOSITY TOPICAL SKIN ADHESIVE: Brand: EXOFIN PRECISION PEN, 1G

## (undated) DEVICE — FLUID MANAGEMENT KIT - IR

## (undated) DEVICE — ACE WRAP 6 IN STERILE

## (undated) DEVICE — PROXIMATE PLUS MD MULTI-DIRECTIONAL RELEASE SKIN STAPLERS CONTAINS 35 STAINLESS STEEL STAPLES APPROXIMATE CLOSED DIMENSIONS: 6.9MM X 3.9MM WIDE: Brand: PROXIMATE

## (undated) DEVICE — ACE WRAP 6 IN UNSTERILE

## (undated) DEVICE — SUT VICRYL 0 CT-1 27 IN J260H

## (undated) DEVICE — DESTINATION PERIPHERAL GUIDING SHEATH: Brand: DESTINATION